# Patient Record
Sex: MALE | Race: WHITE | NOT HISPANIC OR LATINO
[De-identification: names, ages, dates, MRNs, and addresses within clinical notes are randomized per-mention and may not be internally consistent; named-entity substitution may affect disease eponyms.]

---

## 2016-01-14 RX ORDER — CARVEDILOL PHOSPHATE 80 MG/1
1 CAPSULE, EXTENDED RELEASE ORAL
Qty: 0 | Refills: 0 | DISCHARGE
Start: 2016-01-14

## 2016-01-15 RX ORDER — MYCOPHENOLATE MOFETIL 250 MG/1
500 CAPSULE ORAL
Qty: 0 | Refills: 0 | DISCHARGE
Start: 2016-01-15

## 2017-01-14 ENCOUNTER — RX RENEWAL (OUTPATIENT)
Age: 77
End: 2017-01-14

## 2017-01-16 ENCOUNTER — RX RENEWAL (OUTPATIENT)
Age: 77
End: 2017-01-16

## 2017-01-17 ENCOUNTER — LABORATORY RESULT (OUTPATIENT)
Age: 77
End: 2017-01-17

## 2017-01-17 ENCOUNTER — APPOINTMENT (OUTPATIENT)
Dept: NEPHROLOGY | Facility: CLINIC | Age: 77
End: 2017-01-17

## 2017-01-17 VITALS — DIASTOLIC BLOOD PRESSURE: 40 MMHG | SYSTOLIC BLOOD PRESSURE: 100 MMHG

## 2017-01-17 VITALS — BODY MASS INDEX: 31.94 KG/M2 | HEART RATE: 81 BPM | OXYGEN SATURATION: 98 % | WEIGHT: 207 LBS

## 2017-01-17 VITALS — DIASTOLIC BLOOD PRESSURE: 40 MMHG | SYSTOLIC BLOOD PRESSURE: 110 MMHG | HEART RATE: 72 BPM

## 2017-01-17 VITALS — HEART RATE: 72 BPM | DIASTOLIC BLOOD PRESSURE: 40 MMHG | SYSTOLIC BLOOD PRESSURE: 102 MMHG

## 2017-01-17 VITALS — DIASTOLIC BLOOD PRESSURE: 40 MMHG | SYSTOLIC BLOOD PRESSURE: 100 MMHG | HEART RATE: 72 BPM

## 2017-01-17 DIAGNOSIS — N40.0 BENIGN PROSTATIC HYPERPLASIA WITHOUT LOWER URINARY TRACT SYMPMS: ICD-10-CM

## 2017-01-17 DIAGNOSIS — R09.89 OTHER SPECIFIED SYMPTOMS AND SIGNS INVOLVING THE CIRCULATORY AND RESPIRATORY SYSTEMS: ICD-10-CM

## 2017-01-18 LAB
24R-OH-CALCIDIOL SERPL-MCNC: 48.2 PG/ML
25(OH)D3 SERPL-MCNC: 20.9 NG/ML
ALBUMIN SERPL ELPH-MCNC: 3.6 G/DL
ALP BLD-CCNC: 107 U/L
ALT SERPL-CCNC: 12 U/L
ANION GAP SERPL CALC-SCNC: 15 MMOL/L
AST SERPL-CCNC: 12 U/L
BASOPHILS # BLD AUTO: 0 K/UL
BASOPHILS NFR BLD AUTO: 0 %
BILIRUB SERPL-MCNC: 0.4 MG/DL
BUN SERPL-MCNC: 20 MG/DL
CALCIUM SERPL-MCNC: 10 MG/DL
CHLORIDE SERPL-SCNC: 104 MMOL/L
CHOLEST SERPL-MCNC: 140 MG/DL
CHOLEST/HDLC SERPL: 2.7 RATIO
CO2 SERPL-SCNC: 22 MMOL/L
CREAT SERPL-MCNC: 0.97 MG/DL
EOSINOPHIL # BLD AUTO: 0.22 K/UL
EOSINOPHIL NFR BLD AUTO: 2.9 %
ERYTHROCYTE [SEDIMENTATION RATE] IN BLOOD BY WESTERGREN METHOD: 12 MM/HR
GLUCOSE SERPL-MCNC: 189 MG/DL
HCT VFR BLD CALC: 41.8 %
HCYS SERPL-MCNC: 12.9 UMOL/L
HDLC SERPL-MCNC: 51 MG/DL
HGB BLD-MCNC: 12.4 G/DL
IMM GRANULOCYTES NFR BLD AUTO: 0.1 %
IRON SATN MFR SERPL: 31 %
IRON SERPL-MCNC: 65 UG/DL
LDLC SERPL CALC-MCNC: 61 MG/DL
LYMPHOCYTES # BLD AUTO: 1.64 K/UL
LYMPHOCYTES NFR BLD AUTO: 21.9 %
MAGNESIUM SERPL-MCNC: 2 MG/DL
MAN DIFF?: NORMAL
MCHC RBC-ENTMCNC: 27.4 PG
MCHC RBC-ENTMCNC: 29.7 GM/DL
MCV RBC AUTO: 92.5 FL
MONOCYTES # BLD AUTO: 0.6 K/UL
MONOCYTES NFR BLD AUTO: 8 %
NEUTROPHILS # BLD AUTO: 5.01 K/UL
NEUTROPHILS NFR BLD AUTO: 67.1 %
PHOSPHATE SERPL-MCNC: 2.4 MG/DL
PLATELET # BLD AUTO: 193 K/UL
POTASSIUM SERPL-SCNC: 4.7 MMOL/L
PROT SERPL-MCNC: 6.3 G/DL
RBC # BLD: 4.52 M/UL
RBC # FLD: 15 %
SODIUM SERPL-SCNC: 141 MMOL/L
TIBC SERPL-MCNC: 212 UG/DL
TRIGL SERPL-MCNC: 139 MG/DL
UIBC SERPL-MCNC: 147 UG/DL
URATE SERPL-MCNC: 4.8 MG/DL
WBC # FLD AUTO: 7.48 K/UL

## 2017-01-21 LAB
ALBUMIN MFR SERPL ELPH: 58.2 %
ALBUMIN SERPL-MCNC: 3.7 G/DL
ALBUMIN/GLOB SERPL: 1.4 RATIO
ALP BONE SERPL-MCNC: 21 MCG/L
ALPHA1 GLOB MFR SERPL ELPH: 4.4 %
ALPHA1 GLOB SERPL ELPH-MCNC: 0.3 G/DL
ALPHA2 GLOB MFR SERPL ELPH: 12.6 %
ALPHA2 GLOB SERPL ELPH-MCNC: 0.8 G/DL
B-GLOBULIN MFR SERPL ELPH: 12.1 %
B-GLOBULIN SERPL ELPH-MCNC: 0.8 G/DL
CALCIUM SERPL-MCNC: 10 MG/DL
COLLAGEN CTX SERPL-MCNC: 276 PG/ML
CYSTATIN C SERPL-MCNC: 1.48 MG/L
DEPRECATED KAPPA LC FREE/LAMBDA SER: 1.75 RATIO
FERRITIN SERPL-MCNC: 217.1 NG/ML
FOLATE SERPL-MCNC: 12.5 NG/ML
GAMMA GLOB FLD ELPH-MCNC: 0.8 G/DL
GAMMA GLOB MFR SERPL ELPH: 12.7 %
GFR/BSA.PRED SERPLBLD CYS-BASED-ARV: 43
HBA1C MFR BLD HPLC: 9.9 %
IGA SER QL IEP: 414 MG/DL
IGG SER QL IEP: 845 MG/DL
IGM SER QL IEP: 23 MG/DL
INTERPRETATION SERPL IEP-IMP: NORMAL
KAPPA LC CSF-MCNC: 2.59 MG/DL
KAPPA LC SERPL-MCNC: 4.53 MG/DL
M PROTEIN SPEC IFE-MCNC: NORMAL
METHYLMALONATE SERPL-SCNC: 0.42 NMOL/ML
PARATHYROID HORMONE INTACT: 84 PG/ML
PROT SERPL-MCNC: 6.3 G/DL
PROT SERPL-MCNC: 6.3 G/DL
T3FREE SERPL-MCNC: 2.51 PG/ML
T3RU NFR SERPL: 0.93 INDEX
T4 FREE SERPL-MCNC: 1.2 NG/DL
T4 SERPL-MCNC: 5.6 UG/DL
THYROGLOB AB SERPL-ACNC: <20 IU/ML
THYROPEROXIDASE AB SERPL IA-ACNC: 11.5 IU/ML
TSH SERPL-ACNC: 3.74 UIU/ML
VIT B12 SERPL-MCNC: 387 PG/ML

## 2017-02-10 ENCOUNTER — RX RENEWAL (OUTPATIENT)
Age: 77
End: 2017-02-10

## 2017-02-12 ENCOUNTER — RX RENEWAL (OUTPATIENT)
Age: 77
End: 2017-02-12

## 2017-02-13 ENCOUNTER — FORM ENCOUNTER (OUTPATIENT)
Age: 77
End: 2017-02-13

## 2017-02-14 ENCOUNTER — OUTPATIENT (OUTPATIENT)
Dept: OUTPATIENT SERVICES | Facility: HOSPITAL | Age: 77
LOS: 1 days | End: 2017-02-14
Payer: MEDICARE

## 2017-02-14 DIAGNOSIS — E11.9 TYPE 2 DIABETES MELLITUS WITHOUT COMPLICATIONS: ICD-10-CM

## 2017-02-14 DIAGNOSIS — I25.9 CHRONIC ISCHEMIC HEART DISEASE, UNSPECIFIED: ICD-10-CM

## 2017-02-14 DIAGNOSIS — I10 ESSENTIAL (PRIMARY) HYPERTENSION: ICD-10-CM

## 2017-02-14 DIAGNOSIS — Z94.0 KIDNEY TRANSPLANT STATUS: Chronic | ICD-10-CM

## 2017-02-14 PROCEDURE — 78452 HT MUSCLE IMAGE SPECT MULT: CPT | Mod: 26

## 2017-02-14 PROCEDURE — 93017 CV STRESS TEST TRACING ONLY: CPT

## 2017-02-14 PROCEDURE — A9500: CPT

## 2017-02-14 PROCEDURE — 93306 TTE W/DOPPLER COMPLETE: CPT

## 2017-02-14 PROCEDURE — 93018 CV STRESS TEST I&R ONLY: CPT

## 2017-02-14 PROCEDURE — 93016 CV STRESS TEST SUPVJ ONLY: CPT

## 2017-02-14 PROCEDURE — A9505: CPT

## 2017-02-14 PROCEDURE — 78452 HT MUSCLE IMAGE SPECT MULT: CPT

## 2017-03-05 ENCOUNTER — RX RENEWAL (OUTPATIENT)
Age: 77
End: 2017-03-05

## 2017-04-10 ENCOUNTER — RX RENEWAL (OUTPATIENT)
Age: 77
End: 2017-04-10

## 2017-04-19 ENCOUNTER — RX RENEWAL (OUTPATIENT)
Age: 77
End: 2017-04-19

## 2017-05-14 ENCOUNTER — RX RENEWAL (OUTPATIENT)
Age: 77
End: 2017-05-14

## 2017-05-15 ENCOUNTER — RX RENEWAL (OUTPATIENT)
Age: 77
End: 2017-05-15

## 2017-05-24 ENCOUNTER — APPOINTMENT (OUTPATIENT)
Dept: OPHTHALMOLOGY | Facility: CLINIC | Age: 77
End: 2017-05-24

## 2017-06-13 ENCOUNTER — RX RENEWAL (OUTPATIENT)
Age: 77
End: 2017-06-13

## 2017-06-19 ENCOUNTER — APPOINTMENT (OUTPATIENT)
Dept: NEPHROLOGY | Facility: CLINIC | Age: 77
End: 2017-06-19

## 2017-06-26 ENCOUNTER — LABORATORY RESULT (OUTPATIENT)
Age: 77
End: 2017-06-26

## 2017-06-27 ENCOUNTER — APPOINTMENT (OUTPATIENT)
Dept: NEPHROLOGY | Facility: CLINIC | Age: 77
End: 2017-06-27

## 2017-06-27 VITALS — SYSTOLIC BLOOD PRESSURE: 136 MMHG | DIASTOLIC BLOOD PRESSURE: 60 MMHG

## 2017-06-27 VITALS — DIASTOLIC BLOOD PRESSURE: 60 MMHG | HEART RATE: 72 BPM | SYSTOLIC BLOOD PRESSURE: 138 MMHG

## 2017-06-27 VITALS — HEART RATE: 85 BPM | OXYGEN SATURATION: 96 %

## 2017-06-28 LAB
24R-OH-CALCIDIOL SERPL-MCNC: 35.9 PG/ML
25(OH)D3 SERPL-MCNC: 24.5 NG/ML
ALBUMIN SERPL ELPH-MCNC: 4 G/DL
ALP BLD-CCNC: 89 U/L
ALT SERPL-CCNC: 14 U/L
ANION GAP SERPL CALC-SCNC: 18 MMOL/L
APPEARANCE: CLEAR
AST SERPL-CCNC: 14 U/L
BACTERIA: NEGATIVE
BASOPHILS # BLD AUTO: 0 K/UL
BASOPHILS NFR BLD AUTO: 0 %
BILIRUB SERPL-MCNC: 0.3 MG/DL
BILIRUBIN URINE: NEGATIVE
BLOOD URINE: NEGATIVE
BUN SERPL-MCNC: 32 MG/DL
CALCIUM OXALATE CRYSTALS: ABNORMAL
CALCIUM SERPL-MCNC: 10.8 MG/DL
CALCIUM SERPL-MCNC: 10.8 MG/DL
CHLORIDE SERPL-SCNC: 104 MMOL/L
CHOLEST SERPL-MCNC: 129 MG/DL
CHOLEST/HDLC SERPL: 2.3 RATIO
CK SERPL-CCNC: 18 U/L
CO2 SERPL-SCNC: 21 MMOL/L
COLOR: YELLOW
CREAT SERPL-MCNC: 1.17 MG/DL
CREAT SPEC-SCNC: 68 MG/DL
CREAT/PROT UR: 0.1 RATIO
CRP SERPL-MCNC: <0.2 MG/DL
EOSINOPHIL # BLD AUTO: 0.11 K/UL
EOSINOPHIL NFR BLD AUTO: 1.6 %
ERYTHROCYTE [SEDIMENTATION RATE] IN BLOOD BY WESTERGREN METHOD: 15 MM/HR
FERRITIN SERPL-MCNC: 231 NG/ML
FOLATE SERPL-MCNC: 15.1 NG/ML
GLUCOSE QUALITATIVE U: 1000 MG/DL
GLUCOSE SERPL-MCNC: 316 MG/DL
HCT VFR BLD CALC: 41.9 %
HCYS SERPL-MCNC: 18 UMOL/L
HDLC SERPL-MCNC: 55 MG/DL
HGB BLD-MCNC: 12.8 G/DL
IMM GRANULOCYTES NFR BLD AUTO: 0.1 %
IRON SATN MFR SERPL: 24 %
IRON SERPL-MCNC: 51 UG/DL
KETONES URINE: NEGATIVE
LDLC SERPL CALC-MCNC: 52 MG/DL
LEUKOCYTE ESTERASE URINE: NEGATIVE
LYMPHOCYTES # BLD AUTO: 1.7 K/UL
LYMPHOCYTES NFR BLD AUTO: 24.3 %
MAGNESIUM SERPL-MCNC: 2.1 MG/DL
MAN DIFF?: NORMAL
MCHC RBC-ENTMCNC: 27.5 PG
MCHC RBC-ENTMCNC: 30.5 GM/DL
MCV RBC AUTO: 89.9 FL
MICROSCOPIC-UA: NORMAL
MONOCYTES # BLD AUTO: 0.35 K/UL
MONOCYTES NFR BLD AUTO: 5 %
NEUTROPHILS # BLD AUTO: 4.84 K/UL
NEUTROPHILS NFR BLD AUTO: 69 %
NITRITE URINE: NEGATIVE
PARATHYROID HORMONE INTACT: 90 PG/ML
PH URINE: 5
PLATELET # BLD AUTO: 217 K/UL
POTASSIUM SERPL-SCNC: 5.6 MMOL/L
PROT SERPL-MCNC: 7.1 G/DL
PROT UR-MCNC: 7 MG/DL
PROTEIN URINE: NEGATIVE MG/DL
RBC # BLD: 4.66 M/UL
RBC # FLD: 15.2 %
RED BLOOD CELLS URINE: 1 /HPF
SODIUM SERPL-SCNC: 143 MMOL/L
SPECIFIC GRAVITY URINE: 1.02
SQUAMOUS EPITHELIAL CELLS: 1 /HPF
T3FREE SERPL-MCNC: 2.22 PG/ML
T3RU NFR SERPL: 0.92 INDEX
T4 FREE SERPL-MCNC: 1.2 NG/DL
T4 SERPL-MCNC: 6.4 UG/DL
TIBC SERPL-MCNC: 211 UG/DL
TRIGL SERPL-MCNC: 111 MG/DL
TSH SERPL-ACNC: 3.53 UIU/ML
UIBC SERPL-MCNC: 160 UG/DL
URATE SERPL-MCNC: 5.4 MG/DL
UROBILINOGEN URINE: NORMAL MG/DL
VIT B12 SERPL-MCNC: 454 PG/ML
WBC # FLD AUTO: 7.01 K/UL
WHITE BLOOD CELLS URINE: 0 /HPF

## 2017-07-02 LAB
ALDOSTERONE SERUM: 16.1 NG/DL
ALP BONE SERPL-MCNC: 17 MCG/L
COLLAGEN CTX SERPL-MCNC: 196 PG/ML
CYSTATIN C SERPL-MCNC: 1.57 MG/L
GFR/BSA.PRED SERPLBLD CYS-BASED-ARV: 40
HBA1C MFR BLD HPLC: 9.3 %
METHYLMALONATE SERPL-SCNC: 1123 NMOL/L
PHOSPHATE SERPL-MCNC: 3.3 MG/DL
THYROGLOB AB SERPL-ACNC: <20 IU/ML
THYROPEROXIDASE AB SERPL IA-ACNC: 10.8 IU/ML

## 2017-07-03 LAB
ALBUMIN MFR SERPL ELPH: 57.6 %
ALBUMIN SERPL-MCNC: 4.1 G/DL
ALBUMIN/GLOB SERPL: 1.4 RATIO
ALPHA1 GLOB MFR SERPL ELPH: 4.1 %
ALPHA1 GLOB SERPL ELPH-MCNC: 0.3 G/DL
ALPHA2 GLOB MFR SERPL ELPH: 12.5 %
ALPHA2 GLOB SERPL ELPH-MCNC: 0.9 G/DL
B-GLOBULIN MFR SERPL ELPH: 12.5 %
B-GLOBULIN SERPL ELPH-MCNC: 0.9 G/DL
DEPRECATED KAPPA LC FREE/LAMBDA SER: 2.1 RATIO
GAMMA GLOB FLD ELPH-MCNC: 0.9 G/DL
GAMMA GLOB MFR SERPL ELPH: 13.3 %
IGA SER QL IEP: 426 MG/DL
IGG SER QL IEP: 918 MG/DL
IGM SER QL IEP: 29 MG/DL
INTERPRETATION SERPL IEP-IMP: NORMAL
KAPPA LC CSF-MCNC: 2.52 MG/DL
KAPPA LC SERPL-MCNC: 5.3 MG/DL
M PROTEIN SPEC IFE-MCNC: NORMAL
PROT SERPL-MCNC: 7.1 G/DL
PROT SERPL-MCNC: 7.1 G/DL

## 2017-07-10 ENCOUNTER — RX RENEWAL (OUTPATIENT)
Age: 77
End: 2017-07-10

## 2017-07-13 ENCOUNTER — RX RENEWAL (OUTPATIENT)
Age: 77
End: 2017-07-13

## 2017-07-27 ENCOUNTER — LABORATORY RESULT (OUTPATIENT)
Age: 77
End: 2017-07-27

## 2017-07-27 ENCOUNTER — APPOINTMENT (OUTPATIENT)
Dept: NEPHROLOGY | Facility: CLINIC | Age: 77
End: 2017-07-27
Payer: MEDICARE

## 2017-07-27 VITALS — DIASTOLIC BLOOD PRESSURE: 46 MMHG | SYSTOLIC BLOOD PRESSURE: 116 MMHG

## 2017-07-27 VITALS — SYSTOLIC BLOOD PRESSURE: 110 MMHG | DIASTOLIC BLOOD PRESSURE: 60 MMHG | HEART RATE: 72 BPM

## 2017-07-27 VITALS — DIASTOLIC BLOOD PRESSURE: 70 MMHG | HEART RATE: 72 BPM | SYSTOLIC BLOOD PRESSURE: 120 MMHG

## 2017-07-27 VITALS — HEART RATE: 72 BPM

## 2017-07-27 VITALS — WEIGHT: 204 LBS | BODY MASS INDEX: 31.48 KG/M2 | OXYGEN SATURATION: 97 % | HEART RATE: 75 BPM

## 2017-07-27 PROCEDURE — 36415 COLL VENOUS BLD VENIPUNCTURE: CPT

## 2017-07-27 PROCEDURE — 99214 OFFICE O/P EST MOD 30 MIN: CPT | Mod: 25

## 2017-07-30 LAB
24R-OH-CALCIDIOL SERPL-MCNC: 49.4 PG/ML
25(OH)D3 SERPL-MCNC: 23.1 NG/ML
ALBUMIN MFR SERPL ELPH: 57.7 %
ALBUMIN SERPL ELPH-MCNC: 4.1 G/DL
ALBUMIN SERPL-MCNC: 3.9 G/DL
ALBUMIN/GLOB SERPL: 1.3 RATIO
ALDOSTERONE SERUM: 11.8 NG/DL
ALP BLD-CCNC: 89 U/L
ALP BONE SERPL-MCNC: 17 MCG/L
ALPHA1 GLOB MFR SERPL ELPH: 4.3 %
ALPHA1 GLOB SERPL ELPH-MCNC: 0.3 G/DL
ALPHA2 GLOB MFR SERPL ELPH: 11.9 %
ALPHA2 GLOB SERPL ELPH-MCNC: 0.8 G/DL
ALT SERPL-CCNC: 16 U/L
ANA SER IF-ACNC: NEGATIVE
ANION GAP SERPL CALC-SCNC: 14 MMOL/L
AST SERPL-CCNC: 14 U/L
B-GLOBULIN MFR SERPL ELPH: 13.1 %
B-GLOBULIN SERPL ELPH-MCNC: 0.9 G/DL
BASOPHILS # BLD AUTO: 0 K/UL
BASOPHILS NFR BLD AUTO: 0 %
BILIRUB SERPL-MCNC: 0.3 MG/DL
BUN SERPL-MCNC: 22 MG/DL
C3 SERPL-MCNC: 116 MG/DL
C4 SERPL-MCNC: 25 MG/DL
CALCIUM SERPL-MCNC: 10.4 MG/DL
CALCIUM SERPL-MCNC: 10.4 MG/DL
CHLORIDE SERPL-SCNC: 104 MMOL/L
CHOLEST SERPL-MCNC: 146 MG/DL
CHOLEST/HDLC SERPL: 2.3 RATIO
CO2 SERPL-SCNC: 24 MMOL/L
CREAT SERPL-MCNC: 1.11 MG/DL
DEPRECATED KAPPA LC FREE/LAMBDA SER: 1.98 RATIO
EOSINOPHIL # BLD AUTO: 0.13 K/UL
EOSINOPHIL NFR BLD AUTO: 1.7 %
ERYTHROCYTE [SEDIMENTATION RATE] IN BLOOD BY WESTERGREN METHOD: 15 MM/HR
FERRITIN SERPL-MCNC: 201 NG/ML
FOLATE SERPL-MCNC: 17.1 NG/ML
GAMMA GLOB FLD ELPH-MCNC: 0.9 G/DL
GAMMA GLOB MFR SERPL ELPH: 13 %
GLUCOSE SERPL-MCNC: 97 MG/DL
HBA1C MFR BLD HPLC: 9.4 %
HBV SURFACE AB SER QL: NONREACTIVE
HBV SURFACE AG SER QL: NONREACTIVE
HCT VFR BLD CALC: 41.9 %
HCV AB SER QL: NONREACTIVE
HCV S/CO RATIO: 0.12 S/CO
HCYS SERPL-MCNC: 16.5 UMOL/L
HDLC SERPL-MCNC: 64 MG/DL
HGB BLD-MCNC: 12.9 G/DL
IGA SER QL IEP: 453 MG/DL
IGG SER QL IEP: 947 MG/DL
IGM SER QL IEP: 26 MG/DL
IMM GRANULOCYTES NFR BLD AUTO: 0.1 %
INTERPRETATION SERPL IEP-IMP: NORMAL
IRON SATN MFR SERPL: 22 %
IRON SERPL-MCNC: 46 UG/DL
KAPPA LC CSF-MCNC: 2.94 MG/DL
KAPPA LC SERPL-MCNC: 5.81 MG/DL
LDLC SERPL CALC-MCNC: 69 MG/DL
LYMPHOCYTES # BLD AUTO: 1.55 K/UL
LYMPHOCYTES NFR BLD AUTO: 19.7 %
M PROTEIN SPEC IFE-MCNC: NORMAL
MAGNESIUM SERPL-MCNC: 1.9 MG/DL
MAN DIFF?: NORMAL
MCHC RBC-ENTMCNC: 27.9 PG
MCHC RBC-ENTMCNC: 30.8 GM/DL
MCV RBC AUTO: 90.5 FL
MONOCYTES # BLD AUTO: 0.4 K/UL
MONOCYTES NFR BLD AUTO: 5.1 %
MPO AB + PR3 PNL SER: NORMAL
NEUTROPHILS # BLD AUTO: 5.76 K/UL
NEUTROPHILS NFR BLD AUTO: 73.4 %
NT-PROBNP SERPL-MCNC: 484 PG/ML
PARATHYROID HORMONE INTACT: 89 PG/ML
PHOSPHATE SERPL-MCNC: 2.9 MG/DL
PLATELET # BLD AUTO: 218 K/UL
POTASSIUM SERPL-SCNC: 5.3 MMOL/L
PROT SERPL-MCNC: 6.8 G/DL
RBC # BLD: 4.63 M/UL
RBC # FLD: 16 %
RENIN PLASMA: 8.9 PG/ML
RHEUMATOID FACT SER QL: 8 IU/ML
SODIUM SERPL-SCNC: 142 MMOL/L
T3FREE SERPL-MCNC: 2.37 PG/ML
T3RU NFR SERPL: 0.92 INDEX
T4 FREE SERPL-MCNC: 1.3 NG/DL
T4 SERPL-MCNC: 6.7 UG/DL
THYROGLOB AB SERPL-ACNC: <20 IU/ML
THYROPEROXIDASE AB SERPL IA-ACNC: <10 IU/ML
TIBC SERPL-MCNC: 209 UG/DL
TRIGL SERPL-MCNC: 63 MG/DL
TSH SERPL-ACNC: 3.94 UIU/ML
UIBC SERPL-MCNC: 163 UG/DL
URATE SERPL-MCNC: 5.5 MG/DL
VIT B12 SERPL-MCNC: 456 PG/ML
WBC # FLD AUTO: 7.85 K/UL

## 2017-07-31 LAB — COLLAGEN CTX SERPL-MCNC: 235 PG/ML

## 2017-08-06 LAB — METHYLMALONATE SERPL-SCNC: 607 NMOL/L

## 2017-08-14 ENCOUNTER — RX RENEWAL (OUTPATIENT)
Age: 77
End: 2017-08-14

## 2017-08-19 ENCOUNTER — RX RENEWAL (OUTPATIENT)
Age: 77
End: 2017-08-19

## 2017-08-31 ENCOUNTER — RX RENEWAL (OUTPATIENT)
Age: 77
End: 2017-08-31

## 2017-09-03 ENCOUNTER — RX RENEWAL (OUTPATIENT)
Age: 77
End: 2017-09-03

## 2017-09-19 ENCOUNTER — APPOINTMENT (OUTPATIENT)
Dept: NEPHROLOGY | Facility: CLINIC | Age: 77
End: 2017-09-19

## 2017-09-27 ENCOUNTER — RX RENEWAL (OUTPATIENT)
Age: 77
End: 2017-09-27

## 2017-10-10 ENCOUNTER — APPOINTMENT (OUTPATIENT)
Dept: NEPHROLOGY | Facility: CLINIC | Age: 77
End: 2017-10-10
Payer: MEDICARE

## 2017-10-10 ENCOUNTER — LABORATORY RESULT (OUTPATIENT)
Age: 77
End: 2017-10-10

## 2017-10-10 VITALS — DIASTOLIC BLOOD PRESSURE: 62 MMHG | HEART RATE: 72 BPM | SYSTOLIC BLOOD PRESSURE: 140 MMHG

## 2017-10-10 VITALS — DIASTOLIC BLOOD PRESSURE: 80 MMHG | SYSTOLIC BLOOD PRESSURE: 140 MMHG

## 2017-10-10 VITALS — SYSTOLIC BLOOD PRESSURE: 140 MMHG | DIASTOLIC BLOOD PRESSURE: 60 MMHG

## 2017-10-10 VITALS — SYSTOLIC BLOOD PRESSURE: 118 MMHG | DIASTOLIC BLOOD PRESSURE: 60 MMHG

## 2017-10-10 PROCEDURE — 90662 IIV NO PRSV INCREASED AG IM: CPT

## 2017-10-10 PROCEDURE — 99215 OFFICE O/P EST HI 40 MIN: CPT | Mod: 25

## 2017-10-10 PROCEDURE — G0008: CPT

## 2017-10-10 PROCEDURE — 36415 COLL VENOUS BLD VENIPUNCTURE: CPT

## 2017-10-15 LAB
24R-OH-CALCIDIOL SERPL-MCNC: 34.7 PG/ML
25(OH)D3 SERPL-MCNC: 19 NG/ML
ALBUMIN MFR SERPL ELPH: 58.9 %
ALBUMIN SERPL ELPH-MCNC: 3.8 G/DL
ALBUMIN SERPL-MCNC: 3.9 G/DL
ALBUMIN/GLOB SERPL: 1.4 RATIO
ALDOSTERONE SERUM: 6.3 NG/DL
ALP BLD-CCNC: 73 U/L
ALP BONE SERPL-MCNC: 14 MCG/L
ALPHA1 GLOB MFR SERPL ELPH: 4.2 %
ALPHA1 GLOB SERPL ELPH-MCNC: 0.3 G/DL
ALPHA2 GLOB MFR SERPL ELPH: 11.5 %
ALPHA2 GLOB SERPL ELPH-MCNC: 0.8 G/DL
ALT SERPL-CCNC: 16 U/L
ANA SER IF-ACNC: NEGATIVE
ANION GAP SERPL CALC-SCNC: 10 MMOL/L
AST SERPL-CCNC: 18 U/L
B-GLOBULIN MFR SERPL ELPH: 12.6 %
B-GLOBULIN SERPL ELPH-MCNC: 0.8 G/DL
BASOPHILS # BLD AUTO: 0 K/UL
BASOPHILS NFR BLD AUTO: 0 %
BILIRUB SERPL-MCNC: 0.3 MG/DL
BUN SERPL-MCNC: 24 MG/DL
C3 SERPL-MCNC: 83 MG/DL
C4 SERPL-MCNC: 21 MG/DL
CALCIUM SERPL-MCNC: 10.1 MG/DL
CALCIUM SERPL-MCNC: 10.1 MG/DL
CHLORIDE SERPL-SCNC: 101 MMOL/L
CHOLEST SERPL-MCNC: 142 MG/DL
CHOLEST/HDLC SERPL: 2.5 RATIO
CO2 SERPL-SCNC: 24 MMOL/L
COLLAGEN CTX SERPL-MCNC: 182 PG/ML
CREAT SERPL-MCNC: 1.13 MG/DL
DEPRECATED KAPPA LC FREE/LAMBDA SER: 1.64 RATIO
EOSINOPHIL # BLD AUTO: 0.07 K/UL
EOSINOPHIL NFR BLD AUTO: 1.1 %
FERRITIN SERPL-MCNC: 190 NG/ML
FOLATE SERPL-MCNC: 11.8 NG/ML
GAMMA GLOB FLD ELPH-MCNC: 0.8 G/DL
GAMMA GLOB MFR SERPL ELPH: 12.8 %
GLUCOSE SERPL-MCNC: 276 MG/DL
HBA1C MFR BLD HPLC: 9.6 %
HBV SURFACE AB SER QL: NONREACTIVE
HBV SURFACE AG SER QL: NONREACTIVE
HCT VFR BLD CALC: 40.8 %
HCV AB SER QL: NONREACTIVE
HCV S/CO RATIO: 0.12 S/CO
HCYS SERPL-MCNC: 13.2 UMOL/L
HDLC SERPL-MCNC: 57 MG/DL
HGB BLD-MCNC: 12.5 G/DL
IGA SER QL IEP: 431 MG/DL
IGG SER QL IEP: 927 MG/DL
IGM SER QL IEP: 19 MG/DL
IMM GRANULOCYTES NFR BLD AUTO: 0.2 %
INTERPRETATION SERPL IEP-IMP: NORMAL
IRON SATN MFR SERPL: 21 %
IRON SERPL-MCNC: 48 UG/DL
KAPPA LC CSF-MCNC: 2.47 MG/DL
KAPPA LC SERPL-MCNC: 4.04 MG/DL
LDLC SERPL CALC-MCNC: 63 MG/DL
LYMPHOCYTES # BLD AUTO: 1.51 K/UL
LYMPHOCYTES NFR BLD AUTO: 23 %
M PROTEIN SPEC IFE-MCNC: NORMAL
MAGNESIUM SERPL-MCNC: 2 MG/DL
MAN DIFF?: NORMAL
MCHC RBC-ENTMCNC: 27.8 PG
MCHC RBC-ENTMCNC: 30.6 GM/DL
MCV RBC AUTO: 90.7 FL
METHYLMALONATE SERPL-SCNC: 408 NMOL/L
MONOCYTES # BLD AUTO: 0.33 K/UL
MONOCYTES NFR BLD AUTO: 5 %
MPO AB + PR3 PNL SER: NORMAL
NEUTROPHILS # BLD AUTO: 4.65 K/UL
NEUTROPHILS NFR BLD AUTO: 70.7 %
PARATHYROID HORMONE INTACT: 87 PG/ML
PHOSPHATE SERPL-MCNC: 2.8 MG/DL
PLATELET # BLD AUTO: 204 K/UL
POTASSIUM SERPL-SCNC: 5.3 MMOL/L
PROT SERPL-MCNC: 6.6 G/DL
RBC # BLD: 4.5 M/UL
RBC # FLD: 14.9 %
RENIN PLASMA: 14.2 PG/ML
RHEUMATOID FACT SER QL: <7 IU/ML
SODIUM SERPL-SCNC: 135 MMOL/L
T3FREE SERPL-MCNC: 2.22 PG/ML
T3RU NFR SERPL: 0.94 INDEX
T4 FREE SERPL-MCNC: 1.3 NG/DL
T4 SERPL-MCNC: 6.5 UG/DL
THYROGLOB AB SERPL-ACNC: <20 IU/ML
THYROPEROXIDASE AB SERPL IA-ACNC: <10 IU/ML
TIBC SERPL-MCNC: 228 UG/DL
TRIGL SERPL-MCNC: 110 MG/DL
TSH SERPL-ACNC: 2.88 UIU/ML
UIBC SERPL-MCNC: 180 UG/DL
URATE SERPL-MCNC: 4.9 MG/DL
VIT B12 SERPL-MCNC: 450 PG/ML
WBC # FLD AUTO: 6.57 K/UL

## 2017-10-22 ENCOUNTER — RX RENEWAL (OUTPATIENT)
Age: 77
End: 2017-10-22

## 2017-10-25 ENCOUNTER — APPOINTMENT (OUTPATIENT)
Dept: OPHTHALMOLOGY | Facility: CLINIC | Age: 77
End: 2017-10-25

## 2017-10-29 ENCOUNTER — RX RENEWAL (OUTPATIENT)
Age: 77
End: 2017-10-29

## 2017-11-24 ENCOUNTER — RX RENEWAL (OUTPATIENT)
Age: 77
End: 2017-11-24

## 2017-12-25 ENCOUNTER — RX RENEWAL (OUTPATIENT)
Age: 77
End: 2017-12-25

## 2018-01-23 ENCOUNTER — LABORATORY RESULT (OUTPATIENT)
Age: 78
End: 2018-01-23

## 2018-01-23 ENCOUNTER — APPOINTMENT (OUTPATIENT)
Dept: NEPHROLOGY | Facility: CLINIC | Age: 78
End: 2018-01-23
Payer: MEDICARE

## 2018-01-23 VITALS — OXYGEN SATURATION: 97 % | BODY MASS INDEX: 31.94 KG/M2 | WEIGHT: 207 LBS | HEART RATE: 67 BPM

## 2018-01-23 VITALS — HEART RATE: 84 BPM | DIASTOLIC BLOOD PRESSURE: 80 MMHG | SYSTOLIC BLOOD PRESSURE: 160 MMHG

## 2018-01-23 VITALS — HEART RATE: 84 BPM | SYSTOLIC BLOOD PRESSURE: 136 MMHG | DIASTOLIC BLOOD PRESSURE: 60 MMHG

## 2018-01-23 VITALS — HEART RATE: 72 BPM | SYSTOLIC BLOOD PRESSURE: 132 MMHG | DIASTOLIC BLOOD PRESSURE: 60 MMHG

## 2018-01-23 VITALS — DIASTOLIC BLOOD PRESSURE: 72 MMHG | SYSTOLIC BLOOD PRESSURE: 140 MMHG

## 2018-01-23 PROCEDURE — 36415 COLL VENOUS BLD VENIPUNCTURE: CPT

## 2018-01-23 PROCEDURE — 99214 OFFICE O/P EST MOD 30 MIN: CPT | Mod: 25

## 2018-01-24 LAB
24R-OH-CALCIDIOL SERPL-MCNC: 54.2 PG/ML
25(OH)D3 SERPL-MCNC: 24.5 NG/ML
CALCIUM SERPL-MCNC: 10.2 MG/DL
FERRITIN SERPL-MCNC: 200 NG/ML
FOLATE SERPL-MCNC: 14.5 NG/ML
HBV SURFACE AB SER QL: NONREACTIVE
HBV SURFACE AG SER QL: NONREACTIVE
HCV AB SER QL: NONREACTIVE
HCV S/CO RATIO: 0.09 S/CO
HCYS SERPL-MCNC: 18.1 UMOL/L
PARATHYROID HORMONE INTACT: 75 PG/ML
PSA FREE FLD-MCNC: 40.4
PSA FREE SERPL-MCNC: 0.57 NG/ML
PSA SERPL-MCNC: 1.49 NG/ML
T3FREE SERPL-MCNC: 2.58 PG/ML
T3RU NFR SERPL: 0.97 INDEX
T4 FREE SERPL-MCNC: 1.3 NG/DL
T4 SERPL-MCNC: 7.2 UG/DL
TSH SERPL-ACNC: 4.16 UIU/ML
VIT B12 SERPL-MCNC: 461 PG/ML

## 2018-01-25 LAB
ALBUMIN MFR SERPL ELPH: 59.4 %
ALBUMIN SERPL ELPH-MCNC: 4.3 G/DL
ALBUMIN SERPL-MCNC: 4.6 G/DL
ALBUMIN/GLOB SERPL: 1.5 RATIO
ALDOSTERONE SERUM: 9.9 NG/DL
ALP BLD-CCNC: 82 U/L
ALP BONE SERPL-MCNC: 16 MCG/L
ALPHA1 GLOB MFR SERPL ELPH: 4.2 %
ALPHA1 GLOB SERPL ELPH-MCNC: 0.3 G/DL
ALPHA2 GLOB MFR SERPL ELPH: 11.5 %
ALPHA2 GLOB SERPL ELPH-MCNC: 0.9 G/DL
ALT SERPL-CCNC: 16 U/L
ANA SER IF-ACNC: NEGATIVE
ANION GAP SERPL CALC-SCNC: 15 MMOL/L
AST SERPL-CCNC: 19 U/L
B-GLOBULIN MFR SERPL ELPH: 12.7 %
B-GLOBULIN SERPL ELPH-MCNC: 1 G/DL
BASOPHILS # BLD AUTO: 0 K/UL
BASOPHILS NFR BLD AUTO: 0 %
BILIRUB SERPL-MCNC: 0.4 MG/DL
BUN SERPL-MCNC: 20 MG/DL
C3 SERPL-MCNC: 99 MG/DL
C4 SERPL-MCNC: 24 MG/DL
CALCIUM SERPL-MCNC: 10.2 MG/DL
CHLORIDE SERPL-SCNC: 101 MMOL/L
CHOLEST SERPL-MCNC: 152 MG/DL
CHOLEST/HDLC SERPL: 2.4 RATIO
CO2 SERPL-SCNC: 25 MMOL/L
COLLAGEN CTX SERPL-MCNC: 222 PG/ML
CREAT SERPL-MCNC: 1.1 MG/DL
CRP SERPL-MCNC: <0.2 MG/DL
DEPRECATED KAPPA LC FREE/LAMBDA SER: 1.5 RATIO
EOSINOPHIL # BLD AUTO: 0.16 K/UL
EOSINOPHIL NFR BLD AUTO: 2.3 %
ERYTHROCYTE [SEDIMENTATION RATE] IN BLOOD BY WESTERGREN METHOD: 6 MM/HR
GAMMA GLOB FLD ELPH-MCNC: 0.9 G/DL
GAMMA GLOB MFR SERPL ELPH: 12.2 %
GLUCOSE SERPL-MCNC: 132 MG/DL
HBA1C MFR BLD HPLC: 9.5 %
HCT VFR BLD CALC: 44.6 %
HDLC SERPL-MCNC: 64 MG/DL
HGB BLD-MCNC: 13.3 G/DL
IGA SER QL IEP: 461 MG/DL
IGG SER QL IEP: 957 MG/DL
IGM SER QL IEP: 22 MG/DL
IMM GRANULOCYTES NFR BLD AUTO: 0.3 %
INTERPRETATION SERPL IEP-IMP: NORMAL
IRON SATN MFR SERPL: 29 %
IRON SERPL-MCNC: 64 UG/DL
KAPPA LC CSF-MCNC: 2.54 MG/DL
KAPPA LC SERPL-MCNC: 3.81 MG/DL
LDLC SERPL CALC-MCNC: 73 MG/DL
LYMPHOCYTES # BLD AUTO: 1.68 K/UL
LYMPHOCYTES NFR BLD AUTO: 24 %
M PROTEIN SPEC IFE-MCNC: NORMAL
MAGNESIUM SERPL-MCNC: 1.9 MG/DL
MAN DIFF?: NORMAL
MCHC RBC-ENTMCNC: 27.7 PG
MCHC RBC-ENTMCNC: 29.8 GM/DL
MCV RBC AUTO: 92.7 FL
MONOCYTES # BLD AUTO: 0.39 K/UL
MONOCYTES NFR BLD AUTO: 5.6 %
MPO AB + PR3 PNL SER: NORMAL
NEUTROPHILS # BLD AUTO: 4.75 K/UL
NEUTROPHILS NFR BLD AUTO: 67.8 %
PHOSPHATE SERPL-MCNC: 3.1 MG/DL
PLATELET # BLD AUTO: 207 K/UL
POTASSIUM SERPL-SCNC: 5 MMOL/L
PROT SERPL-MCNC: 7.7 G/DL
RBC # BLD: 4.81 M/UL
RBC # FLD: 15.4 %
RHEUMATOID FACT SER QL: <7 IU/ML
SODIUM SERPL-SCNC: 141 MMOL/L
THYROGLOB AB SERPL-ACNC: <20 IU/ML
THYROPEROXIDASE AB SERPL IA-ACNC: 14.5 IU/ML
TIBC SERPL-MCNC: 219 UG/DL
TRIGL SERPL-MCNC: 76 MG/DL
UIBC SERPL-MCNC: 155 UG/DL
URATE SERPL-MCNC: 4.2 MG/DL
WBC # FLD AUTO: 7 K/UL

## 2018-01-28 LAB — METHYLMALONATE SERPL-SCNC: 596 NMOL/L

## 2018-01-29 ENCOUNTER — RX RENEWAL (OUTPATIENT)
Age: 78
End: 2018-01-29

## 2018-02-22 ENCOUNTER — RECORD ABSTRACTING (OUTPATIENT)
Age: 78
End: 2018-02-22

## 2018-02-25 ENCOUNTER — RX RENEWAL (OUTPATIENT)
Age: 78
End: 2018-02-25

## 2018-03-08 ENCOUNTER — APPOINTMENT (OUTPATIENT)
Dept: OPHTHALMOLOGY | Facility: CLINIC | Age: 78
End: 2018-03-08
Payer: MEDICARE

## 2018-03-08 PROCEDURE — 92226: CPT | Mod: LT

## 2018-03-08 PROCEDURE — 92250 FUNDUS PHOTOGRAPHY W/I&R: CPT

## 2018-03-08 PROCEDURE — 92020 GONIOSCOPY: CPT

## 2018-03-08 PROCEDURE — 92014 COMPRE OPH EXAM EST PT 1/>: CPT

## 2018-03-22 ENCOUNTER — APPOINTMENT (OUTPATIENT)
Dept: OPHTHALMOLOGY | Facility: CLINIC | Age: 78
End: 2018-03-22
Payer: MEDICARE

## 2018-03-22 PROCEDURE — 92012 INTRM OPH EXAM EST PATIENT: CPT

## 2018-04-03 ENCOUNTER — RX RENEWAL (OUTPATIENT)
Age: 78
End: 2018-04-03

## 2018-04-17 ENCOUNTER — APPOINTMENT (OUTPATIENT)
Dept: OPHTHALMOLOGY | Facility: CLINIC | Age: 78
End: 2018-04-17
Payer: MEDICARE

## 2018-04-17 PROCEDURE — 92012 INTRM OPH EXAM EST PATIENT: CPT

## 2018-05-02 ENCOUNTER — FORM ENCOUNTER (OUTPATIENT)
Age: 78
End: 2018-05-02

## 2018-05-03 ENCOUNTER — LABORATORY RESULT (OUTPATIENT)
Age: 78
End: 2018-05-03

## 2018-05-03 ENCOUNTER — APPOINTMENT (OUTPATIENT)
Dept: NEPHROLOGY | Facility: CLINIC | Age: 78
End: 2018-05-03
Payer: MEDICARE

## 2018-05-03 ENCOUNTER — OUTPATIENT (OUTPATIENT)
Dept: OUTPATIENT SERVICES | Facility: HOSPITAL | Age: 78
LOS: 1 days | End: 2018-05-03
Payer: MEDICARE

## 2018-05-03 VITALS — TEMPERATURE: 98.5 F | HEART RATE: 70 BPM | OXYGEN SATURATION: 97 %

## 2018-05-03 VITALS — WEIGHT: 206 LBS | BODY MASS INDEX: 31.79 KG/M2

## 2018-05-03 VITALS — DIASTOLIC BLOOD PRESSURE: 60 MMHG | SYSTOLIC BLOOD PRESSURE: 110 MMHG | HEART RATE: 72 BPM

## 2018-05-03 VITALS — DIASTOLIC BLOOD PRESSURE: 60 MMHG | HEART RATE: 72 BPM | SYSTOLIC BLOOD PRESSURE: 120 MMHG

## 2018-05-03 VITALS — DIASTOLIC BLOOD PRESSURE: 60 MMHG | SYSTOLIC BLOOD PRESSURE: 108 MMHG

## 2018-05-03 VITALS — SYSTOLIC BLOOD PRESSURE: 110 MMHG | DIASTOLIC BLOOD PRESSURE: 60 MMHG | HEART RATE: 72 BPM

## 2018-05-03 DIAGNOSIS — Z94.0 KIDNEY TRANSPLANT STATUS: Chronic | ICD-10-CM

## 2018-05-03 PROCEDURE — 70486 CT MAXILLOFACIAL W/O DYE: CPT | Mod: 26

## 2018-05-03 PROCEDURE — 71250 CT THORAX DX C-: CPT | Mod: 26

## 2018-05-03 PROCEDURE — 93000 ELECTROCARDIOGRAM COMPLETE: CPT

## 2018-05-03 PROCEDURE — 99214 OFFICE O/P EST MOD 30 MIN: CPT | Mod: 25

## 2018-05-03 PROCEDURE — 36415 COLL VENOUS BLD VENIPUNCTURE: CPT

## 2018-05-06 ENCOUNTER — INPATIENT (INPATIENT)
Facility: HOSPITAL | Age: 78
LOS: 2 days | Discharge: HOME | DRG: 178 | End: 2018-05-09
Attending: INTERNAL MEDICINE | Admitting: INTERNAL MEDICINE
Payer: MEDICARE

## 2018-05-06 VITALS
RESPIRATION RATE: 17 BRPM | SYSTOLIC BLOOD PRESSURE: 123 MMHG | TEMPERATURE: 99 F | HEART RATE: 70 BPM | OXYGEN SATURATION: 96 % | DIASTOLIC BLOOD PRESSURE: 69 MMHG

## 2018-05-06 DIAGNOSIS — Z29.9 ENCOUNTER FOR PROPHYLACTIC MEASURES, UNSPECIFIED: ICD-10-CM

## 2018-05-06 DIAGNOSIS — Z94.0 KIDNEY TRANSPLANT STATUS: Chronic | ICD-10-CM

## 2018-05-06 DIAGNOSIS — J18.8 OTHER PNEUMONIA, UNSPECIFIED ORGANISM: ICD-10-CM

## 2018-05-06 DIAGNOSIS — I10 ESSENTIAL (PRIMARY) HYPERTENSION: ICD-10-CM

## 2018-05-06 DIAGNOSIS — E11.9 TYPE 2 DIABETES MELLITUS WITHOUT COMPLICATIONS: ICD-10-CM

## 2018-05-06 DIAGNOSIS — H70.90 UNSPECIFIED MASTOIDITIS, UNSPECIFIED EAR: ICD-10-CM

## 2018-05-06 DIAGNOSIS — H66.90 OTITIS MEDIA, UNSPECIFIED, UNSPECIFIED EAR: ICD-10-CM

## 2018-05-06 DIAGNOSIS — Z94.0 KIDNEY TRANSPLANT STATUS: ICD-10-CM

## 2018-05-06 LAB
24R-OH-CALCIDIOL SERPL-MCNC: 36.5 PG/ML
25(OH)D3 SERPL-MCNC: 26.4 NG/ML
ALBUMIN MFR SERPL ELPH: 50.7 %
ALBUMIN SERPL ELPH-MCNC: 3.2 G/DL — LOW (ref 3.3–5)
ALBUMIN SERPL ELPH-MCNC: 3.4 G/DL
ALBUMIN SERPL-MCNC: 3.5 G/DL
ALBUMIN/GLOB SERPL: 1 RATIO
ALDOSTERONE SERUM: 9.3 NG/DL
ALP BLD-CCNC: 73 U/L
ALP BONE SERPL-MCNC: 12 MCG/L
ALP SERPL-CCNC: 70 U/L — SIGNIFICANT CHANGE UP (ref 40–120)
ALPHA1 GLOB MFR SERPL ELPH: 6.7 %
ALPHA1 GLOB SERPL ELPH-MCNC: 0.5 G/DL
ALPHA2 GLOB MFR SERPL ELPH: 15.5 %
ALPHA2 GLOB SERPL ELPH-MCNC: 1.1 G/DL
ALT FLD-CCNC: 9 U/L — LOW (ref 10–45)
ALT SERPL-CCNC: 13 U/L
ANA SER IF-ACNC: NEGATIVE
ANION GAP SERPL CALC-SCNC: 13 MMOL/L — SIGNIFICANT CHANGE UP (ref 5–17)
ANION GAP SERPL CALC-SCNC: 15 MMOL/L
APPEARANCE: CLEAR
APTT BLD: 32.2 SEC — SIGNIFICANT CHANGE UP (ref 27.5–37.4)
AST SERPL-CCNC: 17 U/L
AST SERPL-CCNC: 8 U/L — LOW (ref 10–40)
B-GLOBULIN MFR SERPL ELPH: 14.3 %
B-GLOBULIN SERPL ELPH-MCNC: 1 G/DL
BACTERIA: NEGATIVE
BASOPHILS # BLD AUTO: 0 K/UL
BASOPHILS NFR BLD AUTO: 0 %
BASOPHILS NFR BLD AUTO: 0 % — SIGNIFICANT CHANGE UP (ref 0–2)
BILIRUB SERPL-MCNC: 0.2 MG/DL
BILIRUB SERPL-MCNC: 0.3 MG/DL — SIGNIFICANT CHANGE UP (ref 0.2–1.2)
BILIRUBIN URINE: NEGATIVE
BLOOD URINE: NEGATIVE
BUN SERPL-MCNC: 19 MG/DL — SIGNIFICANT CHANGE UP (ref 7–23)
BUN SERPL-MCNC: 22 MG/DL
C3 SERPL-MCNC: 116 MG/DL
C4 SERPL-MCNC: 28 MG/DL
CALCIUM OXALATE CRYSTALS: ABNORMAL
CALCIUM SERPL-MCNC: 10.3 MG/DL
CALCIUM SERPL-MCNC: 10.3 MG/DL
CALCIUM SERPL-MCNC: 9.9 MG/DL — SIGNIFICANT CHANGE UP (ref 8.4–10.5)
CHLORIDE SERPL-SCNC: 100 MMOL/L
CHLORIDE SERPL-SCNC: 96 MMOL/L — SIGNIFICANT CHANGE UP (ref 96–108)
CHOLEST SERPL-MCNC: 115 MG/DL
CHOLEST/HDLC SERPL: 3.1 RATIO
CO2 SERPL-SCNC: 22 MMOL/L
CO2 SERPL-SCNC: 25 MMOL/L — SIGNIFICANT CHANGE UP (ref 22–31)
COLLAGEN CTX SERPL-MCNC: 382 PG/ML
COLOR: YELLOW
CREAT SERPL-MCNC: 0.96 MG/DL — SIGNIFICANT CHANGE UP (ref 0.5–1.3)
CREAT SERPL-MCNC: 1.11 MG/DL
CRP SERPL-MCNC: 1.4 MG/DL
DEPRECATED KAPPA LC FREE/LAMBDA SER: 1.26 RATIO
EOSINOPHIL # BLD AUTO: 0.18 K/UL
EOSINOPHIL NFR BLD AUTO: 0.4 % — SIGNIFICANT CHANGE UP (ref 0–6)
EOSINOPHIL NFR BLD AUTO: 2 %
ERYTHROCYTE [SEDIMENTATION RATE] IN BLOOD BY WESTERGREN METHOD: 18 MM/HR
FERRITIN SERPL-MCNC: 317 NG/ML
FOLATE SERPL-MCNC: >20 NG/ML
GAMMA GLOB FLD ELPH-MCNC: 0.9 G/DL
GAMMA GLOB MFR SERPL ELPH: 12.8 %
GLUCOSE BLDC GLUCOMTR-MCNC: 342 MG/DL — HIGH (ref 70–99)
GLUCOSE QUALITATIVE U: 1000 MG/DL
GLUCOSE SERPL-MCNC: 205 MG/DL
GLUCOSE SERPL-MCNC: 256 MG/DL — HIGH (ref 70–99)
HBA1C MFR BLD HPLC: 9.7 %
HBV SURFACE AB SER QL: NONREACTIVE
HBV SURFACE AG SER QL: NONREACTIVE
HCT VFR BLD CALC: 35.6 % — LOW (ref 39–50)
HCT VFR BLD CALC: 39.1 %
HCV AB SER QL: NONREACTIVE
HCV S/CO RATIO: 0.11 S/CO
HCYS SERPL-MCNC: 17.5 UMOL/L
HDLC SERPL-MCNC: 37 MG/DL
HGB BLD-MCNC: 11.5 G/DL — LOW (ref 13–17)
HGB BLD-MCNC: 11.6 G/DL
HYALINE CASTS: 0 /LPF
IGA SER QL IEP: 429 MG/DL
IGG SER QL IEP: 775 MG/DL
IGM SER QL IEP: 25 MG/DL
IMM GRANULOCYTES NFR BLD AUTO: 0.3 %
INR BLD: 1.23 — HIGH (ref 0.88–1.16)
INTERPRETATION SERPL IEP-IMP: NORMAL
IRON SATN MFR SERPL: 22 %
IRON SERPL-MCNC: 38 UG/DL
KAPPA LC CSF-MCNC: 3.56 MG/DL
KAPPA LC SERPL-MCNC: 4.48 MG/DL
KETONES URINE: NEGATIVE
LACTATE SERPL-SCNC: 1.5 MMOL/L — SIGNIFICANT CHANGE UP (ref 0.5–2)
LDLC SERPL CALC-MCNC: 61 MG/DL
LEUKOCYTE ESTERASE URINE: NEGATIVE
LYMPHOCYTES # BLD AUTO: 1.43 K/UL
LYMPHOCYTES # BLD AUTO: 7.8 % — LOW (ref 13–44)
LYMPHOCYTES NFR BLD AUTO: 15.7 %
M PROTEIN SPEC IFE-MCNC: NORMAL
MAGNESIUM SERPL-MCNC: 2 MG/DL
MAN DIFF?: NORMAL
MCHC RBC-ENTMCNC: 27.4 PG — SIGNIFICANT CHANGE UP (ref 27–34)
MCHC RBC-ENTMCNC: 27.5 PG
MCHC RBC-ENTMCNC: 29.7 GM/DL
MCHC RBC-ENTMCNC: 32.3 G/DL — SIGNIFICANT CHANGE UP (ref 32–36)
MCV RBC AUTO: 85 FL — SIGNIFICANT CHANGE UP (ref 80–100)
MCV RBC AUTO: 92.7 FL
MICROSCOPIC-UA: NORMAL
MONOCYTES # BLD AUTO: 0.75 K/UL
MONOCYTES NFR BLD AUTO: 8.2 %
MONOCYTES NFR BLD AUTO: 8.5 % — SIGNIFICANT CHANGE UP (ref 2–14)
MPO AB + PR3 PNL SER: NORMAL
NEUTROPHILS # BLD AUTO: 6.71 K/UL
NEUTROPHILS NFR BLD AUTO: 73.8 %
NEUTROPHILS NFR BLD AUTO: 83.3 % — HIGH (ref 43–77)
NITRITE URINE: NEGATIVE
PARATHYROID HORMONE INTACT: 59 PG/ML
PH URINE: 5
PHOSPHATE SERPL-MCNC: 2.5 MG/DL
PLATELET # BLD AUTO: 287 K/UL — SIGNIFICANT CHANGE UP (ref 150–400)
PLATELET # BLD AUTO: 353 K/UL
POTASSIUM SERPL-MCNC: 4.9 MMOL/L — SIGNIFICANT CHANGE UP (ref 3.5–5.3)
POTASSIUM SERPL-SCNC: 4.9 MMOL/L — SIGNIFICANT CHANGE UP (ref 3.5–5.3)
POTASSIUM SERPL-SCNC: 5.1 MMOL/L
PROT SERPL-MCNC: 6.7 G/DL — SIGNIFICANT CHANGE UP (ref 6–8.3)
PROT SERPL-MCNC: 6.9 G/DL
PROTEIN URINE: NEGATIVE MG/DL
PROTHROM AB SERPL-ACNC: 13.7 SEC — HIGH (ref 9.8–12.7)
RBC # BLD: 4.19 M/UL — LOW (ref 4.2–5.8)
RBC # BLD: 4.22 M/UL
RBC # FLD: 14.2 % — SIGNIFICANT CHANGE UP (ref 10.3–16.9)
RBC # FLD: 15 %
RED BLOOD CELLS URINE: 0 /HPF
RENIN PLASMA: 33.9 PG/ML
RHEUMATOID FACT SER QL: <7 IU/ML
SODIUM SERPL-SCNC: 134 MMOL/L — LOW (ref 135–145)
SODIUM SERPL-SCNC: 137 MMOL/L
SPECIFIC GRAVITY URINE: 1.03
SQUAMOUS EPITHELIAL CELLS: 1 /HPF
T3FREE SERPL-MCNC: 2.42 PG/ML
T3RU NFR SERPL: 0.84 INDEX
T4 FREE SERPL-MCNC: 1.4 NG/DL
T4 SERPL-MCNC: 6.8 UG/DL
THYROGLOB AB SERPL-ACNC: <20 IU/ML
THYROPEROXIDASE AB SERPL IA-ACNC: <10 IU/ML
TIBC SERPL-MCNC: 169 UG/DL
TRIGL SERPL-MCNC: 83 MG/DL
TSH SERPL-ACNC: 3.25 UIU/ML
UIBC SERPL-MCNC: 131 UG/DL
URATE SERPL-MCNC: 5 MG/DL
UROBILINOGEN URINE: 1 MG/DL
VIT B12 SERPL-MCNC: 679 PG/ML
WBC # BLD: 11.3 K/UL — HIGH (ref 3.8–10.5)
WBC # FLD AUTO: 11.3 K/UL — HIGH (ref 3.8–10.5)
WBC # FLD AUTO: 9.1 K/UL
WHITE BLOOD CELLS URINE: 1 /HPF

## 2018-05-06 PROCEDURE — 93010 ELECTROCARDIOGRAM REPORT: CPT

## 2018-05-06 PROCEDURE — 70450 CT HEAD/BRAIN W/O DYE: CPT | Mod: 26

## 2018-05-06 PROCEDURE — 99285 EMERGENCY DEPT VISIT HI MDM: CPT | Mod: 25

## 2018-05-06 PROCEDURE — 70490 CT SOFT TISSUE NECK W/O DYE: CPT | Mod: 26

## 2018-05-06 PROCEDURE — 71045 X-RAY EXAM CHEST 1 VIEW: CPT | Mod: 26

## 2018-05-06 RX ORDER — INSULIN LISPRO 100/ML
VIAL (ML) SUBCUTANEOUS
Qty: 0 | Refills: 0 | Status: DISCONTINUED | OUTPATIENT
Start: 2018-05-06 | End: 2018-05-09

## 2018-05-06 RX ORDER — MYCOPHENOLATE MOFETIL 250 MG/1
500 CAPSULE ORAL
Qty: 0 | Refills: 0 | Status: DISCONTINUED | OUTPATIENT
Start: 2018-05-06 | End: 2018-05-09

## 2018-05-06 RX ORDER — AMPICILLIN SODIUM AND SULBACTAM SODIUM 250; 125 MG/ML; MG/ML
3 INJECTION, POWDER, FOR SUSPENSION INTRAMUSCULAR; INTRAVENOUS EVERY 6 HOURS
Qty: 0 | Refills: 0 | Status: DISCONTINUED | OUTPATIENT
Start: 2018-05-07 | End: 2018-05-08

## 2018-05-06 RX ORDER — DEXTROSE 50 % IN WATER 50 %
12.5 SYRINGE (ML) INTRAVENOUS ONCE
Qty: 0 | Refills: 0 | Status: DISCONTINUED | OUTPATIENT
Start: 2018-05-06 | End: 2018-05-09

## 2018-05-06 RX ORDER — DEXTROSE 50 % IN WATER 50 %
25 SYRINGE (ML) INTRAVENOUS ONCE
Qty: 0 | Refills: 0 | Status: DISCONTINUED | OUTPATIENT
Start: 2018-05-06 | End: 2018-05-09

## 2018-05-06 RX ORDER — DEXTROSE 50 % IN WATER 50 %
1 SYRINGE (ML) INTRAVENOUS ONCE
Qty: 0 | Refills: 0 | Status: DISCONTINUED | OUTPATIENT
Start: 2018-05-06 | End: 2018-05-09

## 2018-05-06 RX ORDER — TAMSULOSIN HYDROCHLORIDE 0.4 MG/1
0.4 CAPSULE ORAL DAILY
Qty: 0 | Refills: 0 | Status: DISCONTINUED | OUTPATIENT
Start: 2018-05-06 | End: 2018-05-09

## 2018-05-06 RX ORDER — SENNA PLUS 8.6 MG/1
2 TABLET ORAL AT BEDTIME
Qty: 0 | Refills: 0 | Status: DISCONTINUED | OUTPATIENT
Start: 2018-05-06 | End: 2018-05-09

## 2018-05-06 RX ORDER — AMPICILLIN SODIUM AND SULBACTAM SODIUM 250; 125 MG/ML; MG/ML
INJECTION, POWDER, FOR SUSPENSION INTRAMUSCULAR; INTRAVENOUS
Qty: 0 | Refills: 0 | Status: DISCONTINUED | OUTPATIENT
Start: 2018-05-07 | End: 2018-05-08

## 2018-05-06 RX ORDER — ACETAMINOPHEN 500 MG
650 TABLET ORAL ONCE
Qty: 0 | Refills: 0 | Status: COMPLETED | OUTPATIENT
Start: 2018-05-06 | End: 2018-05-06

## 2018-05-06 RX ORDER — ACETAMINOPHEN 500 MG
650 TABLET ORAL EVERY 6 HOURS
Qty: 0 | Refills: 0 | Status: DISCONTINUED | OUTPATIENT
Start: 2018-05-06 | End: 2018-05-09

## 2018-05-06 RX ORDER — AMPICILLIN SODIUM AND SULBACTAM SODIUM 250; 125 MG/ML; MG/ML
3 INJECTION, POWDER, FOR SUSPENSION INTRAMUSCULAR; INTRAVENOUS ONCE
Qty: 0 | Refills: 0 | Status: COMPLETED | OUTPATIENT
Start: 2018-05-06 | End: 2018-05-07

## 2018-05-06 RX ORDER — INSULIN GLARGINE 100 [IU]/ML
15 INJECTION, SOLUTION SUBCUTANEOUS AT BEDTIME
Qty: 0 | Refills: 0 | Status: DISCONTINUED | OUTPATIENT
Start: 2018-05-06 | End: 2018-05-07

## 2018-05-06 RX ORDER — CARVEDILOL PHOSPHATE 80 MG/1
12.5 CAPSULE, EXTENDED RELEASE ORAL EVERY 12 HOURS
Qty: 0 | Refills: 0 | Status: DISCONTINUED | OUTPATIENT
Start: 2018-05-06 | End: 2018-05-09

## 2018-05-06 RX ORDER — DOCUSATE SODIUM 100 MG
100 CAPSULE ORAL THREE TIMES A DAY
Qty: 0 | Refills: 0 | Status: DISCONTINUED | OUTPATIENT
Start: 2018-05-06 | End: 2018-05-09

## 2018-05-06 RX ORDER — FUROSEMIDE 40 MG
20 TABLET ORAL DAILY
Qty: 0 | Refills: 0 | Status: DISCONTINUED | OUTPATIENT
Start: 2018-05-07 | End: 2018-05-09

## 2018-05-06 RX ORDER — AMPICILLIN SODIUM AND SULBACTAM SODIUM 250; 125 MG/ML; MG/ML
3 INJECTION, POWDER, FOR SUSPENSION INTRAMUSCULAR; INTRAVENOUS ONCE
Qty: 0 | Refills: 0 | Status: COMPLETED | OUTPATIENT
Start: 2018-05-06 | End: 2018-05-06

## 2018-05-06 RX ORDER — SODIUM CHLORIDE 9 MG/ML
1000 INJECTION, SOLUTION INTRAVENOUS
Qty: 0 | Refills: 0 | Status: DISCONTINUED | OUTPATIENT
Start: 2018-05-06 | End: 2018-05-09

## 2018-05-06 RX ORDER — TACROLIMUS 5 MG/1
4 CAPSULE ORAL EVERY 12 HOURS
Qty: 0 | Refills: 0 | Status: DISCONTINUED | OUTPATIENT
Start: 2018-05-06 | End: 2018-05-09

## 2018-05-06 RX ORDER — GLUCAGON INJECTION, SOLUTION 0.5 MG/.1ML
1 INJECTION, SOLUTION SUBCUTANEOUS ONCE
Qty: 0 | Refills: 0 | Status: DISCONTINUED | OUTPATIENT
Start: 2018-05-06 | End: 2018-05-09

## 2018-05-06 RX ADMIN — CARVEDILOL PHOSPHATE 12.5 MILLIGRAM(S): 80 CAPSULE, EXTENDED RELEASE ORAL at 22:27

## 2018-05-06 RX ADMIN — AMPICILLIN SODIUM AND SULBACTAM SODIUM 200 GRAM(S): 250; 125 INJECTION, POWDER, FOR SUSPENSION INTRAMUSCULAR; INTRAVENOUS at 15:32

## 2018-05-06 RX ADMIN — TACROLIMUS 4 MILLIGRAM(S): 5 CAPSULE ORAL at 22:28

## 2018-05-06 RX ADMIN — Medication 10 MILLIGRAM(S): at 22:26

## 2018-05-06 RX ADMIN — INSULIN GLARGINE 15 UNIT(S): 100 INJECTION, SOLUTION SUBCUTANEOUS at 22:45

## 2018-05-06 RX ADMIN — TAMSULOSIN HYDROCHLORIDE 0.4 MILLIGRAM(S): 0.4 CAPSULE ORAL at 22:27

## 2018-05-06 RX ADMIN — Medication 650 MILLIGRAM(S): at 18:43

## 2018-05-06 RX ADMIN — Medication 8: at 21:41

## 2018-05-06 NOTE — ED ADULT NURSE NOTE - OBJECTIVE STATEMENT
Pt presents to ED c/o cough x1 month. Pt and wife report pt with cough and sinus congestion x1 month with phlegm production, green at first per wife now with brown flecks, wet cough with scant mucus production noted in ED. Pt also reports sudden onset R ear pain a few days ago, currently 7/10 radiating to R temporal area. Pt also reports feeling off balance with walking after onset of ear pain. Pt was seen by MD Mac and told to come to ED for admission. Pt and wife deny pt with fever, chills, HA, dizziness, lightheadedness, numbness/tingling, neck pain, CP, palpitations, SOB, N/V/D, difficulty urinating, difficulty passing BM. Pt presents in NAD speaking full sentences via EMS stretcher through triage. Pt placed on continuous cardiac monitor and pulse ox on arrival to bed 19, EKG preformed.

## 2018-05-06 NOTE — H&P ADULT - ASSESSMENT
78 yo male hx right renal transplant with cough 3 weeks with imaging concerning for non-tb mycobacteria and otitis media/sinusitis.

## 2018-05-06 NOTE — H&P ADULT - HISTORY OF PRESENT ILLNESS
In the ED:  ICU Vital Signs Last 24 Hrs  T(C): 37.1 (06 May 2018 19:24), Max: 37.1 (06 May 2018 14:11)  T(F): 98.8 (06 May 2018 19:24), Max: 98.8 (06 May 2018 14:11)  HR: 76 (06 May 2018 19:24) (68 - 76)  BP: 161/68 (06 May 2018 19:24) (123/69 - 168/73)  BP(mean): --  ABP: --  ABP(mean): --  RR: 18 (06 May 2018 19:24) (17 - 18)  SpO2: 97% (06 May 2018 19:24) (96% - 97%)    Patient given 1x dose of unasyn, tylenol 650x1, had CT head, CT neck soft tissue, and ENT paged and will see, rec to continue abx. for now per Dr. Dunbar. 77 year old gentleman +Functional blindness,  hx renal transplant (right sided) (was on dialysis 3-4 years prior), DM1, hypertension, presents to ER at the behest of Dr. Mac for evaluation of 3 - 4 week hx of cough and NEW right sided ear pain, and balance issues. Patient has been on immunosuppressants mycophenylate and tacrolimus + prednisone for renal transplant since May 2012. His cough initially began 4 weeks prior, was productive intermittently with mucous. He obtained 5 days of azithromycin which according to him did not help. The ear pain started this past thursday, was sudden. He went to MD for evaluation, was told he had a lot of wax and no infection; wax was cleaned out and patient started having balance issues with walking intermittently. He has no apparent sick contacts. He denies shortness of breath, chest pain, nasal congestion, nausea, vomiting, diarrhea, headache, vision changes.    In the ED:  ICU Vital Signs Last 24 Hrs  T(C): 37.1 (06 May 2018 19:24), Max: 37.1 (06 May 2018 14:11)  T(F): 98.8 (06 May 2018 19:24), Max: 98.8 (06 May 2018 14:11)  HR: 76 (06 May 2018 19:24) (68 - 76)  BP: 161/68 (06 May 2018 19:24) (123/69 - 168/73)  BP(mean): --  ABP: --  ABP(mean): --  RR: 18 (06 May 2018 19:24) (17 - 18)  SpO2: 97% (06 May 2018 19:24) (96% - 97%)    Patient given 1x dose of unasyn, tylenol 650x1, had CT head, CT neck soft tissue, and ENT paged and will see, rec to continue abx. for now per Dr. Dunbar. 77 year old gentleman +Functional blindness,  hx renal transplant (right sided) (was on dialysis 3-4 years prior), DM1, hypertension, presents to ER at the behest of Dr. Mac for evaluation of 3 - 4 week hx of cough and NEW right sided ear pain, and balance issues. Patient has been on immunosuppressants mycophenylate and tacrolimus + prednisone for renal transplant since May 2012. His cough initially began 4 weeks prior, was productive intermittently with mucous, brown. He obtained 5 days of azithromycin which according to him did not help. The ear pain started this past thursday, was sudden. He went to MD for evaluation, was told he had a lot of wax and no infection; wax was cleaned out and patient started having balance issues with walking intermittently. He has no apparent sick contacts. He denies shortness of breath, chest pain, nasal congestion, nausea, vomiting, diarrhea, headache, vision changes.    In the ED:  ICU Vital Signs Last 24 Hrs  T(C): 37.1 (06 May 2018 19:24), Max: 37.1 (06 May 2018 14:11)  T(F): 98.8 (06 May 2018 19:24), Max: 98.8 (06 May 2018 14:11)  HR: 76 (06 May 2018 19:24) (68 - 76)  BP: 161/68 (06 May 2018 19:24) (123/69 - 168/73)  BP(mean): --  ABP: --  ABP(mean): --  RR: 18 (06 May 2018 19:24) (17 - 18)  SpO2: 97% (06 May 2018 19:24) (96% - 97%)    Patient given 1x dose of unasyn, tylenol 650x1, had CT head, CT neck soft tissue, and ENT paged and will see, rec to continue abx. for now per Dr. Dunbar.

## 2018-05-06 NOTE — H&P ADULT - NSHPLABSRESULTS_GEN_ALL_CORE
.  LABS:                         11.5   11.3  )-----------( 287      ( 06 May 2018 14:57 )             35.6     05-06    134<L>  |  96  |  19  ----------------------------<  256<H>  4.9   |  25  |  0.96    Ca    9.9      06 May 2018 14:57    TPro  6.7  /  Alb  3.2<L>  /  TBili  0.3  /  DBili  x   /  AST  8<L>  /  ALT  9<L>  /  AlkPhos  70  05-06    PT/INR - ( 06 May 2018 14:57 )   PT: 13.7 sec;   INR: 1.23          PTT - ( 06 May 2018 14:57 )  PTT:32.2 sec          Lactate, Blood: 1.5 mmoL/L (05-06 @ 14:57)      RADIOLOGY, EKG & ADDITIONAL TESTS: Reviewed. .  LABS:                         11.5   11.3  )-----------( 287      ( 06 May 2018 14:57 )             35.6     05-06    134<L>  |  96  |  19  ----------------------------<  256<H>  4.9   |  25  |  0.96    Ca    9.9      06 May 2018 14:57    TPro  6.7  /  Alb  3.2<L>  /  TBili  0.3  /  DBili  x   /  AST  8<L>  /  ALT  9<L>  /  AlkPhos  70  05-06    PT/INR - ( 06 May 2018 14:57 )   PT: 13.7 sec;   INR: 1.23          PTT - ( 06 May 2018 14:57 )  PTT:32.2 sec          Lactate, Blood: 1.5 mmoL/L (05-06 @ 14:57)    < from: CT Neck Soft Tissue No Cont (05.06.18 @ 17:42) >    1. Near complete opacification of the right mastoid air cells and right   middle ear compatible with  otomastoiditis.  2. Severe and extensive inflammatory changes of the paranasal sinuses.    < end of copied text >    < from: CT Head No Cont (05.06.18 @ 17:42) >    IMPRESSION:  1. No acute abnormality of the brain. Age related changes are noted.  2. There is prominence of the extra-axial spaces bilaterally. This is   likely secondary to advanced  peripheral volume loss, but cannot exclude subdural hygromas or chronic   subdural hematomas.  3. Near complete opacification of the right mastoid air cells and right   middle ear compatible with  otomastoiditis.  4. There are severe and extensive inflammatory changes of the paranasal   sinuses.    < end of copied text >    < from: CT Chest No Cont (05.03.18 @ 14:49) >    1. Newly developing cylindrical and traction bronchiectasis involving the   mid and lower lung zones. Scattered tree-in-bud micronodular opacities   are identified. 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 Bronchoscopic correlation.  2. New focus of parenchymal scarring and or linear atelectasis with Dr.   bronchiectasis involving the lingula.  3. Additional chronic findings as above.    < end of copied text >    < from: CT Sinuses No Cont (05.03.18 @ 14:49) >    IMPRESSION:    Since prior study of 2007, there is been marked interval worsening of   chronic inflammatory disease in the paranasal sinuses, with near complete   opacification of the right sinonasal cavity, as above.      < end of copied text >        RADIOLOGY, EKG & ADDITIONAL TESTS: Reviewed.

## 2018-05-06 NOTE — CONSULT NOTE ADULT - SUBJECTIVE AND OBJECTIVE BOX
HPI: 77y Male with h/o DM1 (insulin dependent), renal failure s/p kidney transplant 6 years ago, HTN presents with complaint of cold/cough for the past 4 weeks and new onset R ear and facial pain for 2 days. The patient was seeing a local doctor in his community then eventually went to see his PCP Dr. Mac when he wasn't getting better. Was ordered for a chest CT and sinus CT which he had performed on 5/3. The sinus CT was significant for pansinusitis and the chest CT showed findings consistent with non-tuberculous mycobacterial disease. The patient was sent to the hospital by Dr. Mac for admission and workup of these lung findings. He had a head CT performed in the ER which showed opacification of the right middle ear and mastoid air cells without bony erosion consistent with otomastoiditis.     The patient denies symptoms of nasal obstruction, facial pain or pressure. Does have some congestion and has been blowing his nose and coughing up brown colored sputum frequently. He is functionally blind so has not had any changes in vision, denies pain with EOM. His primary complaint at this time is a pain in his right ear that spreads to involve the right side of his face and head radiating up as well as down into the neck. He saw another doctor a few days ago who cleaned cerumen from his ear and since then it has been more painful. Denies any changes in hearing. Denies fevers. Appetite has been slightly decreased which he says is normal for him when he has a head cold.     He had a kidney transplant in 2012 due to renal failure 2/2 diabetic nephropathy and is on mycophenolate, tacrolimus, and prednisone. The patient denies having taken any antibiotics recently but his medical team states he took a 5 day course of azithromycin which did not cause any improvement in symptoms.       Allergies    No Known Allergies    Intolerances        PAST MEDICAL & SURGICAL HISTORY:  History of renal transplant: secondary to DM  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  BPH (benign prostatic hypertrophy)  HTN (hypertension)  Kidney transplant recipient      Vital Signs Last 24 Hrs  T(C): 36.9 (06 May 2018 21:09), Max: 37.1 (06 May 2018 14:11)  T(F): 98.4 (06 May 2018 21:09), Max: 98.8 (06 May 2018 14:11)  HR: 80 (06 May 2018 21:09) (68 - 80)  BP: 144/71 (06 May 2018 21:09) (123/69 - 168/73)  BP(mean): --  RR: 18 (06 May 2018 21:09) (17 - 18)  SpO2: 97% (06 May 2018 21:09) (96% - 97%)    LABS:  CBC-    05-06    134<L>  |  96  |  19  ----------------------------<  256<H>  4.9   |  25  |  0.96    Ca    9.9      06 May 2018 14:57    TPro  6.7  /  Alb  3.2<L>  /  TBili  0.3  /  DBili  x   /  AST  8<L>  /  ALT  9<L>  /  AlkPhos  70  05-06    Coagulation Studies-  PT/INR - ( 06 May 2018 14:57 )   PT: 13.7 sec;   INR: 1.23          PTT - ( 06 May 2018 14:57 )  PTT:32.2 sec  Endocrine Panel-  --  --  9.9 mg/dL        PHYSICAL EXAM:    ENT EXAM-   Constitutional: Well-developed, well-nourished.  No hoarseness.     Head:  normocephalic, atraumatic.   Ears:  Ear canals both clear.  Tympanic membranes both intact; R side TM with erythema and effusion. L side clear no effusion.   Nose:  Septum intact.  Inferior turbinates normal bilateral  OC/OP:  Tonsils present. Floor of mouth, buccal mucosa, lips, hard palate, soft palate, uvula, posterior pharyngeal wall normal.  Mucosa moist.  Neck:  Trachea midline.  Thyroid, parotid and submandibular glands normal.  Lymph:  No cervical adenopathy.    MULTISYSTEM EXAM-  Neuro/Psych:  A&O x 3.  .  Cranial nerves: 2-12 grossly intact bilaterally.  Eyes:  EOMI, no nystagmus.  Pulm:  No dyspnea, non-labored breathing  Skin:  No rash or lesions on exposed skin of head/neck      RADIOLOGY & ADDITIONAL STUDIES:  CT Sinus 5/3  Findings:    There has been marked interval worsening of sinus opacification since   prior sinus CT of 11/2007. Specifically, there is now complete   opacification of the right-sided paranasal sinuses and of the right   anterior and posterior drainage pathways with extensive surrounding   sclerosis compatible with chronic inflammation. A fluid level is now   evident in the left frontal sinus and there is extensive opacification of   left anterior ethmoidal air cells and the left maxillary antrum. While   left-sided posterior ethmoidal air cells are clear, polypoid mucosal   thickening is present along the floor of the small left sphenoid sinus.   Left-sided anterior drainage pathways are occluded by soft tissue, while   the components of the left sphenoethmoidal recess are predominantly   ventilated. Density of the material within the opacified sinuses appears   predominantly mucoid.    There is extensive opacification of the right nasal cavity sparing the   inferior meatus, with foci of mucosal thickening noted in the left   anterior and superior middle meatus. The nasal septum is deviated to the   left side. The nasopharyngeal soft tissues are unremarkable in appearance.    There is pneumatization of the [place]. The anterior skull base and   orbital walls are intact.    There has been interval placement of an ophthalmologic implant along the   superior temporal portion of the left globe. The native ocular lenses are   now absent bilaterally. Limited evaluation of the intracranial contents   demonstrates no evidence of hydrocephalus or large intracranial mass   lesion. There is now extensive opacification of right-sided mastoid air   cells.    IMPRESSION:    Since prior study of 2007, there is been marked interval worsening of   chronic inflammatory disease in the paranasal sinuses, with near complete   opacification of the right sinonasal cavity, as above.    CT Chest 5/3:  FINDINGS:    LUNGS: No pleural effusions are seen. Evaluation of the pulmonary   parenchyma demonstrates moderate bronchiectasis in the bilateral lower   lobes, with associated scarring and atelectasis most pronounced at the   right lung base and in the lingula where there is a confluent linear and   of atelectasis and/or parenchymal scarring with associated traction   bronchiectasis (image 180, series 5 and coronal image 89, series 8038).   There are small airway inflammation characterized by tree-in-bud   micronodular opacities seen within the lingula (images 132-176, series 5).    MEDIASTINUM: The heart is normal in size.  No pericardial effusion is   seen.  Evaluation of the vasculature is limited without intravenous   contrast, but the great vessels demonstrate mild that this chronic   calcifications. There are severe calcifications in the coronary arteries.    Evaluation for adenopathy is limited without intravenous contrast,   however no gross mediastinal, hilar, axillary or supraclavicular   lymphadenopathy is identified.    UPPER ABDOMEN, SOFT TISSUES AND BONES: Limited evaluation of the upper   abdomen demonstrates atrophic kidneys bilaterally. There is   cholelithiasis. Evaluation of the osseous structures demonstrates   degenerative changes.    IMPRESSION:    1. Newly developing cylindrical and traction bronchiectasis involving the   mid and lower lung zones. Scattered tree-in-bud micronodular opacities   are identified. 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 Bronchoscopic correlation.  2. New focus of parenchymal scarring and or linear atelectasis with Dr.   bronchiectasis involving the lingula.  3. Additional chronic findings as above.      [facility]  [facility]:  Brain: There is prominence of the extra-axial spaces bilaterally. There   is involutional change of  brain parenchyma, compatible with age. There is no evidence of brain   mass. There is heterogeneity  of the white matter, likely on the basis of small vessel ischemic change.   No hemorrhage.  Ventricles: Unremarkable. No ventriculomegaly.  Bones/joints: Unremarkable. No acute fracture.  Soft tissues: Unremarkable.  Sinuses: There is complete opacification of right frontal and ethmoid   sinuses and near complete  opacification of the right sphenoid and left anterior ethmoid sinuses.   There is severe mucosal  thickening within the left maxillary sinus, there is a moderate air-fluid   level within the left frontal sinus  and there is mild mucosal thickening within the left sphenoid sinus.  Mastoid air cells: Near complete opacification of the right mastoid air   cells and right middle ear  compatible with otomastoiditis.  Tubes, lines and devices: There is a prosthetic device associated with   the lateral aspect of the left  globe.      IMPRESSION:  1. No acute abnormality of the brain. Age related changes are noted.  2. There is prominence of the extra-axial spaces bilaterally. This is   likely secondary to advanced  peripheral volume loss, but cannot exclude subdural hygromas or chronic   subdural hematomas.  3. Near complete opacification of the right mastoid air cells and right   middle ear compatible with  otomastoiditis.  4. There are severe and extensive inflammatory changes of the paranasal   sinuses.        Assessment/Plan:  77y Male with cough/cold x 4 weeks without improvement with evidence of pan sinusitis and R otomastoiditis on imaging, exam consistent with R acute otitis media.   - Recommend augmentin for treatment of otitis media. Patient without clinical signs/symptoms of sinus infection though with chronic appearing inflammation on CT and mucoid material filling sinuses   - Can add flonase as well as saline nasal sprays to help reduce swelling in the nose and promote drainage  - If symptoms worsen or do not improve as expected or other further questions arise please reconsult ENT   - D/w attending on call     [facility] at [phone] with any questions/concerns.

## 2018-05-06 NOTE — H&P ADULT - PROBLEM SELECTOR PLAN 2
Patient with cough intermittent productivity for 3 weeks s/p 5 days of outpatient abx, azithromycin. Has not had blood  in sputum. CT scan findings showing  Newly developing cylindrical and traction bronchiectasis involving the mid and lower lung zones. Scattered tree-in-bud micronodular opacities are identified. 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 Bronchoscopic correlation. Immunosuppreseants mycophenylate, and tacrolimus, prednisone increase risk of pulmonary pathology as well.   -Will consult Pulm in AM: Dr. Prather to be consulted.   -f/u blood cultures, sent in ER.   -Will hold off on starting anti-tb therapy. Patient non-toxic appearing, not hypoxic and speaking in full sentences. Obtain sputum cultures.

## 2018-05-06 NOTE — H&P ADULT - PROBLEM SELECTOR PLAN 1
Patient with few day hx of right ear pain, with associated facial pain overlying right maxillary sinus. Imaging findings concerning for ?mastoiditis.   -ENT evaluated patient in ER, recommended augmentin v. Unasyn and believe that no drainage is necessary at this time. Would treat as an acute otitis media. Recs appreciated.   -Will c/w Unasyn 3grams Q6 hours. f/u  blood cultures.

## 2018-05-06 NOTE — ED PROVIDER NOTE - PHYSICAL EXAMINATION
CONSTITUTIONAL: Well appearing, well nourished, awake, alert and in no apparent distress.  HEENT: Head is atraumatic. Eyes clear bilaterally, normal EOMI. Airway patent.  CARDIAC: Normal rate, regular rhythm.  Heart sounds S1, S2.   RESPIRATORY: Breath sounds clear and equal bilaterally. no tachypnea, sob, JVD, leg swelling.  GASTROINTESTINAL: Abdomen soft, non-tender, no guarding.  MUSCULOSKELETAL: Spine appears normal, range of motion is not limited, no muscle or joint tenderness.   NEUROLOGICAL: Alert and oriented, no focal deficits, no motor or sensory deficits.  SKIN: Skin normal color for race, warm, dry and intact. No evidence of rash.  PSYCHIATRIC: Alert and oriented to person, place, time/situation. normal mood and affect. no apparent risk to self or others. CONSTITUTIONAL: Well appearing, well nourished, awake, alert and in no apparent distress.  HEENT: Head is atraumatic. Eyes clear bilaterally, normal EOMI. Airway patent.  CARDIAC: Normal rate, regular rhythm.  Heart sounds S1, S2.   RESPIRATORY: Breath sounds clear and equal bilaterally. no tachypnea, sob, JVD, leg swelling.  GASTROINTESTINAL: Abdomen soft, non-tender, no guarding.  MUSCULOSKELETAL: Spine appears normal, range of motion is not limited, no muscle or joint tenderness. no meningismus  NEUROLOGICAL: Alert and oriented, no focal deficits, no motor or sensory deficits.  SKIN: Skin normal color for race, warm, dry and intact. No evidence of rash.  PSYCHIATRIC: Alert and oriented to person, place, time/situation. normal mood and affect. no apparent risk to self or others.

## 2018-05-06 NOTE — ED PROVIDER NOTE - MEDICAL DECISION MAKING DETAILS
Pt w chronic cough, R ear/neck pain; prior CT showing bronchiectasis, VSS, no resp distress, afebrile, disc with Dr. Caicedo, will obtain further imaging of head/neck and to obtain pulm/ENT eval as inpt.

## 2018-05-06 NOTE — H&P ADULT - NSHPPHYSICALEXAM_GEN_ALL_CORE
.  VITAL SIGNS:  T(C): 37.1 (05-06-18 @ 19:24), Max: 37.1 (05-06-18 @ 14:11)  T(F): 98.8 (05-06-18 @ 19:24), Max: 98.8 (05-06-18 @ 14:11)  HR: 76 (05-06-18 @ 19:24) (68 - 76)  BP: 161/68 (05-06-18 @ 19:24) (123/69 - 168/73)  BP(mean): --  RR: 18 (05-06-18 @ 19:24) (17 - 18)  SpO2: 97% (05-06-18 @ 19:24) (96% - 97%)  Wt(kg): --    PHYSICAL EXAM:    Constitutional: WDWN resting comfortably in bed; NAD  Head: NC/AT  Eyes: PERRL, EOMI, anicteric sclera  ENT: no nasal discharge; uvula midline, no oropharyngeal erythema or exudates; MMM  Neck: supple; no JVD or thyromegaly  Respiratory: CTA B/L; no W/R/R, no retractions  Cardiac: +S1/S2; RRR; no M/R/G; PMI non-displaced  Gastrointestinal: soft, NT/ND; no rebound or guarding; +BSx4  Genitourinary: normal external genitalia  Back: spine midline, no bony tenderness or step-offs; no CVAT B/L  Extremities: WWP, no clubbing or cyanosis; no peripheral edema  Musculoskeletal: NROM x4; no joint swelling, tenderness or erythema  Vascular: 2+ radial, femoral, DP/PT pulses B/L  Dermatologic: skin warm, dry and intact; no rashes, wounds, or scars  Lymphatic: no submandibular or cervical LAD  Neurologic: AAOx3; CNII-XII grossly intact; no focal deficits  Psychiatric: affect and characteristics of appearance, verbalizations, behaviors are appropriate .  VITAL SIGNS:  T(C): 37.1 (05-06-18 @ 19:24), Max: 37.1 (05-06-18 @ 14:11)  T(F): 98.8 (05-06-18 @ 19:24), Max: 98.8 (05-06-18 @ 14:11)  HR: 76 (05-06-18 @ 19:24) (68 - 76)  BP: 161/68 (05-06-18 @ 19:24) (123/69 - 168/73)  BP(mean): --  RR: 18 (05-06-18 @ 19:24) (17 - 18)  SpO2: 97% (05-06-18 @ 19:24) (96% - 97%)  Wt(kg): --    PHYSICAL EXAM:    Constitutional: WDWN resting comfortably in bed; NAD  Head: NC/AT  Eyes: Anisocoria, surgical changes to bilateral eyes, not reactive to light.   ENT: no nasal discharge; uvula midline, no oropharyngeal erythema or exudates; MMM  Neck: supple; no JVD or thyromegaly; Ear pain on traction of right auricle, no tragus pain on pressure. +tenderness on right maxillary sinus region.   Respiratory: CTA B/L; no W/R/R, no retractions; decreased breath sounds in lower bilateral lobes   Cardiac: +S1/S2; RRR; no M/R/G   Gastrointestinal: soft, NT/ND; no rebound or guarding; +BSx4  Back: spine midline, no bony tenderness or step-offs; no CVAT B/L  Extremities: WWP, no clubbing or cyanosis; no peripheral edema, +right AV fistula graft  Musculoskeletal: NROM x4; no joint swelling, tenderness or erythema  Vascular: 2+ radial   Dermatologic: skin warm, dry and intact; no rashes, wounds, or scars  Lymphatic: no submandibular or cervical LAD  Neurologic: AAOx3; CNII-XII grossly intact; no focal deficits  Psychiatric: affect and characteristics of appearance, verbalizations, behaviors are appropriate

## 2018-05-06 NOTE — H&P ADULT - ATTENDING COMMENTS
above reviewed, and discussed in detail with er team and admission house staff.  immunosuppressed renal transplant pt with perisitent  cough, no response to azithromycin, now has severe right mastoid area pain and tend.  2 d before adm had cxr with new densities and atelectasis, and marked sinusitis which is likely to explain his severe pain.   will admit, rx with unasyn, and requst ent and pulm consultation.
None

## 2018-05-06 NOTE — ED ADULT NURSE NOTE - PMH
BPH (benign prostatic hypertrophy)    DM (diabetes mellitus)  Type 1/insulin dependent per patient  History of renal transplant  secondary to DM  HTN (hypertension)

## 2018-05-06 NOTE — H&P ADULT - PROBLEM SELECTOR PLAN 3
Patient takes humalog 25-75 combination insulin (short acting and intermediate acting insulin)  -A1c in AM   -C/w ISS  -Will dose with lantus 15 units QHS and titrate up as needed.

## 2018-05-06 NOTE — H&P ADULT - NSHPSOCIALHISTORY_GEN_ALL_CORE
Smoke:  Etoh:  Social:   Work: Smoke: Former smoker 1ppd x 40 years, quit 15 years ago  Etoh: denies  Social:  denies

## 2018-05-06 NOTE — ED PROVIDER NOTE - OBJECTIVE STATEMENT
78 yo DM, HTN, s/p renal transplant w persisting cough over the past month, productive and worsening R earn/neck discomfort, had been placed on azithromycin due to symptoms without improvement. Pt with recent CT chest/sinuses completed. No assoc f/c, abd pain, sob, chest pain. Has been using tylenol every 5 hours for ear pain. No headache.

## 2018-05-07 ENCOUNTER — TRANSCRIPTION ENCOUNTER (OUTPATIENT)
Age: 78
End: 2018-05-07

## 2018-05-07 DIAGNOSIS — J47.9 BRONCHIECTASIS, UNCOMPLICATED: ICD-10-CM

## 2018-05-07 DIAGNOSIS — R91.8 OTHER NONSPECIFIC ABNORMAL FINDING OF LUNG FIELD: ICD-10-CM

## 2018-05-07 DIAGNOSIS — R05 COUGH: ICD-10-CM

## 2018-05-07 DIAGNOSIS — J32.9 CHRONIC SINUSITIS, UNSPECIFIED: ICD-10-CM

## 2018-05-07 LAB
ANION GAP SERPL CALC-SCNC: 11 MMOL/L — SIGNIFICANT CHANGE UP (ref 5–17)
ANISOCYTOSIS BLD QL: SLIGHT — SIGNIFICANT CHANGE UP
BUN SERPL-MCNC: 16 MG/DL — SIGNIFICANT CHANGE UP (ref 7–23)
CALCIUM SERPL-MCNC: 9.7 MG/DL — SIGNIFICANT CHANGE UP (ref 8.4–10.5)
CHLORIDE SERPL-SCNC: 102 MMOL/L — SIGNIFICANT CHANGE UP (ref 96–108)
CO2 SERPL-SCNC: 24 MMOL/L — SIGNIFICANT CHANGE UP (ref 22–31)
CREAT SERPL-MCNC: 0.89 MG/DL — SIGNIFICANT CHANGE UP (ref 0.5–1.3)
GLUCOSE BLDC GLUCOMTR-MCNC: 107 MG/DL — HIGH (ref 70–99)
GLUCOSE BLDC GLUCOMTR-MCNC: 217 MG/DL — HIGH (ref 70–99)
GLUCOSE BLDC GLUCOMTR-MCNC: 219 MG/DL — HIGH (ref 70–99)
GLUCOSE BLDC GLUCOMTR-MCNC: 233 MG/DL — HIGH (ref 70–99)
GLUCOSE BLDC GLUCOMTR-MCNC: 246 MG/DL — HIGH (ref 70–99)
GLUCOSE BLDC GLUCOMTR-MCNC: 302 MG/DL — HIGH (ref 70–99)
GLUCOSE BLDC GLUCOMTR-MCNC: 317 MG/DL — HIGH (ref 70–99)
GLUCOSE SERPL-MCNC: 313 MG/DL — HIGH (ref 70–99)
HBA1C BLD-MCNC: 10.4 % — HIGH (ref 4–5.6)
HCT VFR BLD CALC: 34.4 % — LOW (ref 39–50)
HGB BLD-MCNC: 10.8 G/DL — LOW (ref 13–17)
LG PLATELETS BLD QL AUTO: PRESENT — SIGNIFICANT CHANGE UP
LYMPHOCYTES # BLD AUTO: 5 % — LOW (ref 13–44)
MACROCYTES BLD QL: SLIGHT — SIGNIFICANT CHANGE UP
MAGNESIUM SERPL-MCNC: 1.8 MG/DL — SIGNIFICANT CHANGE UP (ref 1.6–2.6)
MANUAL SMEAR VERIFICATION: SIGNIFICANT CHANGE UP
MCHC RBC-ENTMCNC: 26.7 PG — LOW (ref 27–34)
MCHC RBC-ENTMCNC: 31.4 G/DL — LOW (ref 32–36)
MCV RBC AUTO: 84.9 FL — SIGNIFICANT CHANGE UP (ref 80–100)
MICROCYTES BLD QL: SLIGHT — SIGNIFICANT CHANGE UP
MONOCYTES NFR BLD AUTO: 6 % — SIGNIFICANT CHANGE UP (ref 2–14)
NEUTROPHILS NFR BLD AUTO: 89 % — HIGH (ref 43–77)
PLAT MORPH BLD: (no result)
PLATELET # BLD AUTO: 305 K/UL — SIGNIFICANT CHANGE UP (ref 150–400)
POTASSIUM SERPL-MCNC: 4.6 MMOL/L — SIGNIFICANT CHANGE UP (ref 3.5–5.3)
POTASSIUM SERPL-SCNC: 4.6 MMOL/L — SIGNIFICANT CHANGE UP (ref 3.5–5.3)
RBC # BLD: 4.05 M/UL — LOW (ref 4.2–5.8)
RBC # FLD: 14.2 % — SIGNIFICANT CHANGE UP (ref 10.3–16.9)
RBC BLD AUTO: (no result)
SODIUM SERPL-SCNC: 137 MMOL/L — SIGNIFICANT CHANGE UP (ref 135–145)
WBC # BLD: 11.6 K/UL — HIGH (ref 3.8–10.5)
WBC # FLD AUTO: 11.6 K/UL — HIGH (ref 3.8–10.5)

## 2018-05-07 PROCEDURE — 99223 1ST HOSP IP/OBS HIGH 75: CPT | Mod: GC

## 2018-05-07 PROCEDURE — 99223 1ST HOSP IP/OBS HIGH 75: CPT

## 2018-05-07 PROCEDURE — 99222 1ST HOSP IP/OBS MODERATE 55: CPT | Mod: GC

## 2018-05-07 RX ORDER — INSULIN LISPRO 100/ML
5 VIAL (ML) SUBCUTANEOUS
Qty: 0 | Refills: 0 | Status: DISCONTINUED | OUTPATIENT
Start: 2018-05-07 | End: 2018-05-07

## 2018-05-07 RX ORDER — INSULIN GLARGINE 100 [IU]/ML
15 INJECTION, SOLUTION SUBCUTANEOUS AT BEDTIME
Qty: 0 | Refills: 0 | Status: DISCONTINUED | OUTPATIENT
Start: 2018-05-07 | End: 2018-05-07

## 2018-05-07 RX ORDER — INSULIN GLARGINE 100 [IU]/ML
27 INJECTION, SOLUTION SUBCUTANEOUS AT BEDTIME
Qty: 0 | Refills: 0 | Status: DISCONTINUED | OUTPATIENT
Start: 2018-05-07 | End: 2018-05-07

## 2018-05-07 RX ORDER — INSULIN LISPRO 100/ML
9 VIAL (ML) SUBCUTANEOUS
Qty: 0 | Refills: 0 | Status: DISCONTINUED | OUTPATIENT
Start: 2018-05-07 | End: 2018-05-08

## 2018-05-07 RX ORDER — INSULIN LISPRO 100/ML
9 VIAL (ML) SUBCUTANEOUS
Qty: 0 | Refills: 0 | Status: DISCONTINUED | OUTPATIENT
Start: 2018-05-07 | End: 2018-05-07

## 2018-05-07 RX ORDER — INSULIN GLARGINE 100 [IU]/ML
25 INJECTION, SOLUTION SUBCUTANEOUS AT BEDTIME
Qty: 0 | Refills: 0 | Status: DISCONTINUED | OUTPATIENT
Start: 2018-05-07 | End: 2018-05-08

## 2018-05-07 RX ORDER — INSULIN LISPRO 100/ML
4 VIAL (ML) SUBCUTANEOUS ONCE
Qty: 0 | Refills: 0 | Status: COMPLETED | OUTPATIENT
Start: 2018-05-07 | End: 2018-05-07

## 2018-05-07 RX ORDER — FLUTICASONE PROPIONATE 50 MCG
1 SPRAY, SUSPENSION NASAL
Qty: 0 | Refills: 0 | Status: DISCONTINUED | OUTPATIENT
Start: 2018-05-07 | End: 2018-05-09

## 2018-05-07 RX ORDER — MAGNESIUM SULFATE 500 MG/ML
1 VIAL (ML) INJECTION ONCE
Qty: 0 | Refills: 0 | Status: COMPLETED | OUTPATIENT
Start: 2018-05-07 | End: 2018-05-07

## 2018-05-07 RX ADMIN — MYCOPHENOLATE MOFETIL 500 MILLIGRAM(S): 250 CAPSULE ORAL at 11:01

## 2018-05-07 RX ADMIN — SENNA PLUS 2 TABLET(S): 8.6 TABLET ORAL at 22:09

## 2018-05-07 RX ADMIN — Medication 20 MILLIGRAM(S): at 11:00

## 2018-05-07 RX ADMIN — Medication 10 MILLIGRAM(S): at 11:00

## 2018-05-07 RX ADMIN — Medication 100 MILLIGRAM(S): at 22:09

## 2018-05-07 RX ADMIN — AMPICILLIN SODIUM AND SULBACTAM SODIUM 200 GRAM(S): 250; 125 INJECTION, POWDER, FOR SUSPENSION INTRAMUSCULAR; INTRAVENOUS at 17:44

## 2018-05-07 RX ADMIN — MYCOPHENOLATE MOFETIL 500 MILLIGRAM(S): 250 CAPSULE ORAL at 19:41

## 2018-05-07 RX ADMIN — Medication 4: at 10:48

## 2018-05-07 RX ADMIN — AMPICILLIN SODIUM AND SULBACTAM SODIUM 200 GRAM(S): 250; 125 INJECTION, POWDER, FOR SUSPENSION INTRAMUSCULAR; INTRAVENOUS at 06:00

## 2018-05-07 RX ADMIN — AMPICILLIN SODIUM AND SULBACTAM SODIUM 200 GRAM(S): 250; 125 INJECTION, POWDER, FOR SUSPENSION INTRAMUSCULAR; INTRAVENOUS at 00:13

## 2018-05-07 RX ADMIN — Medication 100 MILLIGRAM(S): at 11:00

## 2018-05-07 RX ADMIN — Medication 4: at 22:10

## 2018-05-07 RX ADMIN — Medication 100 MILLIGRAM(S): at 13:08

## 2018-05-07 RX ADMIN — Medication 9 UNIT(S): at 17:50

## 2018-05-07 RX ADMIN — Medication 100 GRAM(S): at 14:29

## 2018-05-07 RX ADMIN — CARVEDILOL PHOSPHATE 12.5 MILLIGRAM(S): 80 CAPSULE, EXTENDED RELEASE ORAL at 11:00

## 2018-05-07 RX ADMIN — Medication 4 UNIT(S): at 14:29

## 2018-05-07 RX ADMIN — TACROLIMUS 4 MILLIGRAM(S): 5 CAPSULE ORAL at 22:10

## 2018-05-07 RX ADMIN — CARVEDILOL PHOSPHATE 12.5 MILLIGRAM(S): 80 CAPSULE, EXTENDED RELEASE ORAL at 22:09

## 2018-05-07 RX ADMIN — TAMSULOSIN HYDROCHLORIDE 0.4 MILLIGRAM(S): 0.4 CAPSULE ORAL at 11:02

## 2018-05-07 RX ADMIN — TACROLIMUS 4 MILLIGRAM(S): 5 CAPSULE ORAL at 13:05

## 2018-05-07 RX ADMIN — AMPICILLIN SODIUM AND SULBACTAM SODIUM 200 GRAM(S): 250; 125 INJECTION, POWDER, FOR SUSPENSION INTRAMUSCULAR; INTRAVENOUS at 13:04

## 2018-05-07 RX ADMIN — Medication 5 UNIT(S): at 13:15

## 2018-05-07 RX ADMIN — AMPICILLIN SODIUM AND SULBACTAM SODIUM 200 GRAM(S): 250; 125 INJECTION, POWDER, FOR SUSPENSION INTRAMUSCULAR; INTRAVENOUS at 23:32

## 2018-05-07 RX ADMIN — Medication 1 SPRAY(S): at 17:40

## 2018-05-07 RX ADMIN — Medication 8: at 13:08

## 2018-05-07 RX ADMIN — INSULIN GLARGINE 25 UNIT(S): 100 INJECTION, SOLUTION SUBCUTANEOUS at 22:09

## 2018-05-07 RX ADMIN — Medication 1 SPRAY(S): at 07:27

## 2018-05-07 NOTE — CONSULT NOTE ADULT - PROBLEM SELECTOR RECOMMENDATION 9
I reviewed the CT scan dated 2013 and the findings are new.  The condition is stable and might be contributing to cough.  At this pint  there is no evidence of acute exacerbation and observe

## 2018-05-07 NOTE — DISCHARGE NOTE ADULT - MEDICATION SUMMARY - MEDICATIONS TO TAKE
I will START or STAY ON the medications listed below when I get home from the hospital:    predniSONE 5 mg oral tablet  -- 1 tab(s) by mouth once a day  -- Indication: For Renal transplant (home medication, no changes)    aspirin 325 mg oral delayed release tablet  -- 1 tab(s) by mouth 2 times a day  -- Indication: For Prophylaxis (home medication, no changes)    traMADol 50 mg oral tablet  -- 1 tab(s) by mouth every 4 hours, As needed, Moderate Pain (4-6)  -- Indication: For Pain (home medication, no changes)    Flomax 0.4 mg oral capsule  -- 1 cap(s) by mouth once a day  -- Indication: For BPH (home medication, no changes)    Lantus Solostar Pen 100 units/mL subcutaneous solution  -- 25 unit(s) subcutaneous once a day (at bedtime)   -- Do not drink alcoholic beverages when taking this medication.  It is very important that you take or use this exactly as directed.  Do not skip doses or discontinue unless directed by your doctor.  Keep in refrigerator.  Do not freeze.    -- Indication: For DM (diabetes mellitus)    HumaLOG KwikPen 100 units/mL injectable solution  -- 14 milliliter(s) injectable 3 times a day   -- Indication: For DM (diabetes mellitus)    carvedilol 12.5 mg oral tablet  -- 1 tab(s) by mouth every 12 hours  -- Indication: For Hypertension (home medication, no changes)    furosemide 20 mg oral tablet  -- 1 tab(s) by mouth once a day  -- Indication: For Diuretic (home medication, no changes)    famotidine 20 mg oral tablet  -- 1 tab(s) by mouth 2 times a day  -- Indication: For GERD (home medication, no changes)    mycophenolate mofetil 250 mg oral capsule  -- 500mg by mouth bid  **home antirejection medication**  -- Indication: For Renal transplant (home medication, no changes)    tacrolimus 1 mg oral capsule  -- 4 cap(s) by mouth 2 times a day  -- Indication: For Renal transplant (home medication, no changes)    docusate sodium 100 mg oral capsule  -- 1 cap(s) by mouth 3 times a day  -- Indication: For Constipation (home medication, no changes)    senna oral tablet  -- 2 tab(s) by mouth once a day (at bedtime)  -- Indication: For Constipation (home medication, no changes)    levoFLOXacin 500 mg oral tablet  -- 1 tab(s) by mouth every 24 hours  -- Indication: For Sinusitis

## 2018-05-07 NOTE — DISCHARGE NOTE ADULT - HOSPITAL COURSE
78 yo male hx right renal transplant with cough 3 weeks with imaging concerning for non-tb mycobacteria and otitis media/sinusitis. ENT consulted, recommended Augmentin vs Unasyn without any intervention. Patient was treated with unasyn during hospital stay. 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 No indication for bronchoscopy per Dr. Prather. Hb A1C noted to be 10.4. Endocrine consulted. Patient is medically stable for discharge. 78 yo male hx right renal transplant with cough 3 weeks with imaging concerning for non-tb mycobacteria and otitis media/sinusitis. ENT consulted, recommended Augmentin vs Unasyn without any intervention. Patient was treated with unasyn during hospital stay. ID consulted, switched to levaquin. 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 No indication for bronchoscopy per Dr. Prather, patient advised to get repeat CT in 8 weeks. Hb A1C noted to be 10.4. Endocrine consulted. Patient is medically stable for discharge.

## 2018-05-07 NOTE — DISCHARGE NOTE ADULT - CARE PROVIDERS DIRECT ADDRESSES
,[email or web address]

## 2018-05-07 NOTE — DISCHARGE NOTE ADULT - PLAN OF CARE
Continue antibiotics (levofloxacin 500mg once daily for 13 more days) You were admitted for acute ottitis media (ear infection), sinusitis and otomastoiditis (bone infection as a complication of ear infection). You were treated antibiotics. Please continue levofloxacin 500mg once daily for 13 more days. Please follow up with Dr. Hearn in two weeks. Your chest CT was notable for bronchiectasis: inflammation of lung airways which makes it hard to clear mucus. Your condition is currently stable and do not require any medication. You will need a repeat chest CT scan in 8 weeks. Please follow-up with Dr. Prather for further management. Please continue levofloxacin 500mg once daily for 13 more days. You have a history of diabetes. You insulin regimen was adjusted during hospital stay. Continue home medications You have a history of diabetes. You insulin regimen was adjusted during hospital stay. Please follow-up with Dr. Guajardo. You have a history of diabetes. You insulin regimen was adjusted during hospital stay. Please have lantus 25u at bedtime and humalog 14u three times a day before each meal. Please follow-up with Dr. Guajardo.

## 2018-05-07 NOTE — CONSULT NOTE ADULT - SUBJECTIVE AND OBJECTIVE BOX
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 Mac for evaluation of 3 - 4 week hx of cough and NEW right sided ear pain, and balance issues. Patient has been on immunosuppressants mycophenylate and tacrolimus + prednisone for renal transplant since May 2012. His cough initially began 4 weeks prior, was productive intermittently with mucous, brown. He obtained 5 days of azithromycin which according to him did not help. The ear pain started this past thursday, was sudden. He went to MD for evaluation, was told he had a lot of wax and no infection; wax was cleaned out and patient started having balance issues with walking intermittently. He has no apparent sick contacts. He denies shortness of breath, chest pain, nasal congestion, nausea, vomiting, diarrhea, headache, vision changes.    In the ED:  ICU Vital Signs Last 24 Hrs  T(C): 37.1 (06 May 2018 19:24), Max: 37.1 (06 May 2018 14:11)  T(F): 98.8 (06 May 2018 19:24), Max: 98.8 (06 May 2018 14:11)  HR: 76 (06 May 2018 19:24) (68 - 76)  BP: 161/68 (06 May 2018 19:24) (123/69 - 168/73)  BP(mean): --  ABP: --  ABP(mean): --  RR: 18 (06 May 2018 19:24) (17 - 18)  SpO2: 97% (06 May 2018 19:24) (96% - 97%)    Patient given 1x dose of unasyn, tylenol 650x1, had CT head, CT neck soft tissue, and ENT paged and will see, rec to continue abx. for now per Dr. Dunbar. (06 May 2018 20:24)      PAST MEDICAL & SURGICAL HISTORY:  History of renal transplant: secondary to DM  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  BPH (benign prostatic hypertrophy)  HTN (hypertension)  Kidney transplant recipient      FAMILY HISTORY:      SOCIAL HISTORY:  Smoking Status: [ ] Current, [ ] Former, [x ] Never  Pack Years:    MEDICATIONS:  Pulmonary:    Antimicrobials:  ampicillin/sulbactam  IVPB      ampicillin/sulbactam  IVPB 3 Gram(s) IV Intermittent every 6 hours    Anticoagulants:    Onc:    GI/:  docusate sodium 100 milliGRAM(s) Oral three times a day  senna 2 Tablet(s) Oral at bedtime    Endocrine:  dextrose 50% Injectable 12.5 Gram(s) IV Push once  dextrose 50% Injectable 25 Gram(s) IV Push once  dextrose 50% Injectable 25 Gram(s) IV Push once  dextrose Gel 1 Dose(s) Oral once PRN  glucagon  Injectable 1 milliGRAM(s) IntraMuscular once PRN  insulin glargine Injectable (LANTUS) 25 Unit(s) SubCutaneous at bedtime  insulin lispro (HumaLOG) corrective regimen sliding scale   SubCutaneous Before meals and at bedtime  insulin lispro Injectable (HumaLOG) 9 Unit(s) SubCutaneous three times a day before meals    Cardiac:  carvedilol 12.5 milliGRAM(s) Oral every 12 hours  furosemide    Tablet 20 milliGRAM(s) Oral daily  tamsulosin 0.4 milliGRAM(s) Oral daily    Other Medications:  acetaminophen   Tablet. 650 milliGRAM(s) Oral every 6 hours PRN  dextrose 5%. 1000 milliLiter(s) IV Continuous <Continuous>  fluticasone propionate 50 MICROgram(s)/spray Nasal Spray 1 Spray(s) Both Nostrils two times a day  mycophenolate mofetil 500 milliGRAM(s) Oral two times a day  tacrolimus 4 milliGRAM(s) Oral every 12 hours      Allergies    No Known Allergies    Intolerances        Vital Signs Last 24 Hrs  T(C): 37.2 (07 May 2018 15:09), Max: 37.2 (07 May 2018 15:09)  T(F): 99 (07 May 2018 15:09), Max: 99 (07 May 2018 15:09)  HR: 76 (07 May 2018 15:09) (76 - 82)  BP: 153/- (07 May 2018 15:09) (131/67 - 187/90)  BP(mean): 68 (07 May 2018 15:09) (68 - 68)  RR: 20 (07 May 2018 15:09) (17 - 20)  SpO2: 94% (07 May 2018 15:09) (93% - 97%)    05-06 @ 07:01  -  05-07 @ 07:00  --------------------------------------------------------  IN: 200 mL / OUT: 0 mL / NET: 200 mL          LABS:      CBC Full  -  ( 07 May 2018 06:05 )  WBC Count : 11.6 K/uL  Hemoglobin : 10.8 g/dL  Hematocrit : 34.4 %  Platelet Count - Automated : 305 K/uL  Mean Cell Volume : 84.9 fL  Mean Cell Hemoglobin : 26.7 pg  Mean Cell Hemoglobin Concentration : 31.4 g/dL  Auto Neutrophil # : x  Auto Lymphocyte # : x  Auto Monocyte # : x  Auto Eosinophil # : x  Auto Basophil # : x  Auto Neutrophil % : 89.0 %  Auto Lymphocyte % : 5.0 %  Auto Monocyte % : 6.0 %  Auto Eosinophil % : x  Auto Basophil % : x    05-07    137  |  102  |  16  ----------------------------<  313<H>  4.6   |  24  |  0.89    Ca    9.7      07 May 2018 12:51  Mg     1.8     05-07    TPro  6.7  /  Alb  3.2<L>  /  TBili  0.3  /  DBili  x   /  AST  8<L>  /  ALT  9<L>  /  AlkPhos  70  05-06    PT/INR - ( 06 May 2018 14:57 )   PT: 13.7 sec;   INR: 1.23          PTT - ( 06 May 2018 14:57 )  PTT:32.2 sec  < from: CT Chest No Cont (05.03.18 @ 14:49) >  EXAM:  CT CHEST                          PROCEDURE DATE:  05/03/2018                     INTERPRETATION:  CT of the CHEST without intravenous contrast dated   5/3/2018 2:49 PM    INDICATION: 77-year-old male with chronic cough.    TECHNIQUE: Thin axial reconstructions and multiplanar reformations were   reviewed.    PRIOR STUDIES: CT chest abdomen pelvis dated 5/24/2013    FINDINGS:    LUNGS: No pleural effusions are seen. Evaluation of the pulmonary   parenchyma demonstrates moderate bronchiectasis in the bilateral lower   lobes, with associated scarring and atelectasis most pronounced at the   right lung base and in the lingula where there is a confluent linear and   of atelectasis and/or parenchymal scarring with associated traction   bronchiectasis (image 180, series 5 and coronal image 89, series 8038).   There are small airway inflammation characterized by tree-in-bud   micronodular opacities seen within the lingula (images 132-176, series 5).    MEDIASTINUM: The heart is normal in size.  No pericardial effusion is   seen.  Evaluation of the vasculature is limited without intravenous   contrast, but the great vessels demonstrate mild that this chronic   calcifications. There are severe calcifications in the coronary arteries.    Evaluation for adenopathy is limited without intravenous contrast,   however no gross mediastinal, hilar, axillary or supraclavicular   lymphadenopathy is identified.    UPPER ABDOMEN, SOFT TISSUES AND BONES: Limited evaluation of the upper   abdomen demonstrates atrophic kidneys bilaterally. There is   cholelithiasis. Evaluation of the osseous structures demonstrates   degenerative changes.    IMPRESSION:    1. Newly developing cylindrical and traction bronchiectasis involving the   mid and lower lung zones. Scattered tree-in-bud micronodular opacities   are identified. 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 Bronchoscopic correlation.  2. New focus of parenchymal scarring and or linear atelectasis with DrDada   bronchiectasis involving the lingula.  3. Additional chronic findings as above.      < end of copied text >              < from: CT Sinuses No Cont (05.03.18 @ 14:49) >  EXAM:  CT SINUSES                          PROCEDURE DATE:  05/03/2018                     INTERPRETATION:  Technique: A subcentimeter axial acquisition was   obtained through the paranasal sinuses utilizing navigational protocol.    Axial, sagittal and coronal reformatted images were created.      Contrast: Not administered.    Clinical Information: Persistent cough.    Prior Studies: 11/12/2007.    Findings:    There has been marked interval worsening of sinus opacification since   prior sinus CT of 11/2007. Specifically, there is now complete   opacification of the right-sided paranasal sinuses and of the right   anterior and posterior drainage pathways with extensive surrounding   sclerosis compatible with chronic inflammation. A fluid level is now   evident in the left frontal sinus and there is extensive opacification of   left anterior ethmoidal air cells and the left maxillary antrum. While   left-sided posterior ethmoidal air cells are clear, polypoid mucosal   thickening is present along the floor of the small left sphenoid sinus.   Left-sided anterior drainage pathways are occluded by soft tissue, while   the components of the left sphenoethmoidal recess are predominantly   ventilated. Density of the material within the opacified sinuses appears   predominantly mucoid.    There is extensive opacification of the right nasal cavity sparing the   inferior meatus, with foci of mucosal thickening noted in the left   anterior and superior middle meatus. The nasal septum is deviated to the   left side. The nasopharyngeal soft tissues are unremarkable in appearance.    There is pneumatization of the kiah [place]. The anterior skull base and   orbital walls are intact.    There has been interval placement of an ophthalmologic implant along the   superior temporal portion of the left globe. The native ocular lenses are   now absent bilaterally. Limited evaluation of the intracranial contents   demonstrates no evidence of hydrocephalus or large intracranial mass   lesion. There is now extensive opacification of right-sided mastoid air   cells.    IMPRESSION:    Since prior study of 2007, there is been marked interval worsening of   chronic inflammatory disease in the paranasal sinuses, with near complete   opacification of the right sinonasal cavity, as above.    < end of copied text >        RADIOLOGY & ADDITIONAL STUDIES (The following images were personally reviewed):

## 2018-05-07 NOTE — DISCHARGE NOTE ADULT - ADDITIONAL INSTRUCTIONS
Please follow-up with Casandra  Please follow-up with Dr. Prather in 8 weeks. Please follow-up with Dr. Aranda and Dr. Hearn within the next two weeks. Please follow-up with Dr. Prather in 8 weeks. Please follow-up with Dr. Guajardo, Dr. Aranda and Dr. Hearn within the next two weeks.

## 2018-05-07 NOTE — PROGRESS NOTE ADULT - SUBJECTIVE AND OBJECTIVE BOX
INTERVAL HPI/OVERNIGHT EVENTS: MAGGIE    SUBJECTIVE: Patient seen and examined at bedside. No fever, chills, n/v or diarrhea. No SOB.     OBJECTIVE:    VITAL SIGNS:  ICU Vital Signs Last 24 Hrs  T(C): 37.1 (07 May 2018 08:35), Max: 37.1 (06 May 2018 14:11)  T(F): 98.7 (07 May 2018 08:35), Max: 98.8 (06 May 2018 14:11)  HR: 78 (07 May 2018 08:35) (68 - 82)  BP: 187/90 (07 May 2018 10:58) (123/69 - 187/90)  BP(mean): --  ABP: --  ABP(mean): --  RR: 18 (07 May 2018 08:35) (17 - 18)  SpO2: 93% (07 May 2018 08:35) (93% - 97%)        05-06 @ 07:01  -  05-07 @ 07:00  --------------------------------------------------------  IN: 200 mL / OUT: 0 mL / NET: 200 mL      CAPILLARY BLOOD GLUCOSE      POCT Blood Glucose.: 317 mg/dL (07 May 2018 12:24)      PHYSICAL EXAM:    General: NAD  HEENT: NC/AT; PERRL, clear conjunctiva  Neck: supple  Respiratory: CTA b/l  Cardiovascular: +S1/S2; RRR  Abdomen: soft, NT/ND; +BS x4  Extremities: WWP, 2+ peripheral pulses b/l; no LE edema  Skin: normal color and turgor; no rash  Neurological: No focal deficits    MEDICATIONS:  MEDICATIONS  (STANDING):  ampicillin/sulbactam  IVPB      ampicillin/sulbactam  IVPB 3 Gram(s) IV Intermittent every 6 hours  carvedilol 12.5 milliGRAM(s) Oral every 12 hours  dextrose 5%. 1000 milliLiter(s) (50 mL/Hr) IV Continuous <Continuous>  dextrose 50% Injectable 12.5 Gram(s) IV Push once  dextrose 50% Injectable 25 Gram(s) IV Push once  dextrose 50% Injectable 25 Gram(s) IV Push once  docusate sodium 100 milliGRAM(s) Oral three times a day  fluticasone propionate 50 MICROgram(s)/spray Nasal Spray 1 Spray(s) Both Nostrils two times a day  furosemide    Tablet 20 milliGRAM(s) Oral daily  insulin glargine Injectable (LANTUS) 15 Unit(s) SubCutaneous at bedtime  insulin lispro (HumaLOG) corrective regimen sliding scale   SubCutaneous Before meals and at bedtime  insulin lispro Injectable (HumaLOG) 4 Unit(s) SubCutaneous once  insulin lispro Injectable (HumaLOG) 5 Unit(s) SubCutaneous three times a day before meals  magnesium sulfate  IVPB 1 Gram(s) IV Intermittent once  mycophenolate mofetil 500 milliGRAM(s) Oral two times a day  senna 2 Tablet(s) Oral at bedtime  tacrolimus 4 milliGRAM(s) Oral every 12 hours  tamsulosin 0.4 milliGRAM(s) Oral daily    MEDICATIONS  (PRN):  acetaminophen   Tablet. 650 milliGRAM(s) Oral every 6 hours PRN Mild Pain (1 - 3)  dextrose Gel 1 Dose(s) Oral once PRN Blood Glucose LESS THAN 70 milliGRAM(s)/deciliter  glucagon  Injectable 1 milliGRAM(s) IntraMuscular once PRN Glucose LESS THAN 70 milligrams/deciliter      ALLERGIES:  Allergies    No Known Allergies    Intolerances        LABS:                        10.8   11.6  )-----------( 305      ( 07 May 2018 06:05 )             34.4     05-07    137  |  102  |  16  ----------------------------<  313<H>  4.6   |  24  |  0.89    Ca    9.7      07 May 2018 12:51  Mg     1.8     05-07    TPro  6.7  /  Alb  3.2<L>  /  TBili  0.3  /  DBili  x   /  AST  8<L>  /  ALT  9<L>  /  AlkPhos  70  05-06    PT/INR - ( 06 May 2018 14:57 )   PT: 13.7 sec;   INR: 1.23          PTT - ( 06 May 2018 14:57 )  PTT:32.2 sec      RADIOLOGY & ADDITIONAL TESTS: Reviewed.

## 2018-05-07 NOTE — CONSULT NOTE ADULT - SUBJECTIVE AND OBJECTIVE BOX
HPI: 77yMale    Age at Dx:  How dx:  Hx and duration of insulin:  Current Therapy:  Hx of hypoglycemia  Hx of DKA/HHS?    Home FSG:  Fasting  Lunch  Dinner  Bed    Hx of other regimens  Complications:  Outpatient Endo:    PMH & Surgical Hx:MASTOIDITIS  Unknown h/o HF  Handoff  MEWS Score  History of renal transplant  DM (diabetes mellitus)  BPH (benign prostatic hypertrophy)  HTN (hypertension)  Cough  Otitis media  Need for prophylactic measure  History of renal transplant  HTN (hypertension)  DM (diabetes mellitus)  Other pneumonia, unspecified organism  Mastoiditis  Kidney transplant recipient  DIZZINESS  90+  Mastoiditis      FH:  DM:  Thyroid:  Autoimmune:  Other:    SH:  Smoking  Etoh:  Recreational Drugs:  Social Life:    Home Meds:    Current Meds:  acetaminophen   Tablet. 650 milliGRAM(s) Oral every 6 hours PRN  ampicillin/sulbactam  IVPB      ampicillin/sulbactam  IVPB 3 Gram(s) IV Intermittent every 6 hours  carvedilol 12.5 milliGRAM(s) Oral every 12 hours  dextrose 5%. 1000 milliLiter(s) IV Continuous <Continuous>  dextrose 50% Injectable 12.5 Gram(s) IV Push once  dextrose 50% Injectable 25 Gram(s) IV Push once  dextrose 50% Injectable 25 Gram(s) IV Push once  dextrose Gel 1 Dose(s) Oral once PRN  docusate sodium 100 milliGRAM(s) Oral three times a day  fluticasone propionate 50 MICROgram(s)/spray Nasal Spray 1 Spray(s) Both Nostrils two times a day  furosemide    Tablet 20 milliGRAM(s) Oral daily  glucagon  Injectable 1 milliGRAM(s) IntraMuscular once PRN  insulin glargine Injectable (LANTUS) 15 Unit(s) SubCutaneous at bedtime  insulin lispro (HumaLOG) corrective regimen sliding scale   SubCutaneous Before meals and at bedtime  insulin lispro Injectable (HumaLOG) 5 Unit(s) SubCutaneous three times a day before meals  mycophenolate mofetil 500 milliGRAM(s) Oral two times a day  senna 2 Tablet(s) Oral at bedtime  tacrolimus 4 milliGRAM(s) Oral every 12 hours  tamsulosin 0.4 milliGRAM(s) Oral daily      Allergies:  No Known Allergies      ROS:  Denies the following except as indicated.    General: weight loss/weight gain, decreased appetite, fatigue  Eyes: Blurry vision, double vision, visual changes  ENT: Throat pain, changes in voice,   CV: palpitations, SOB, CP, cough  GI: NVD, difficulty swallowing, abdominal pain  : polyuria, dysuria  Endo: abnormal menses, temperature intolerance, decreased libido  MSK: weakness, joint pain  Skin: rash, dryness, diaphoresis  Heme: Easy bruising,bleeding  Neuro: HA, dizziness, lightheadedness, numbness tingling  Psych: Anxiety, Depression    Vital Signs Last 24 Hrs  T(C): 37.2 (07 May 2018 15:09), Max: 37.2 (07 May 2018 15:09)  T(F): 99 (07 May 2018 15:09), Max: 99 (07 May 2018 15:09)  HR: 76 (07 May 2018 15:09) (76 - 82)  BP: 153/- (07 May 2018 15:09) (131/67 - 187/90)  BP(mean): 68 (07 May 2018 15:09) (68 - 68)  RR: 20 (07 May 2018 15:09) (17 - 20)  SpO2: 94% (07 May 2018 15:09) (93% - 97%)  Height (cm): 170.18 (05-06 @ 21:30)  Weight (kg): 91.5 (05-06 @ 21:30)  BMI (kg/m2): 31.6 (05-06 @ 21:30)      Constitutional: wn/wd in NAD.   HEENT: NCAT, MMM, OP clear, EOMI, , no proptosis or lid retraction  Neck: no thyromegaly or palpable thyroid nodules   Respiratory: lungs CTAB.  Cardiovascular: regular rhythm, normal S1 and S2, no audible murmurs, no peripheral edema  GI: soft, NT/ND, no masses/HSM appreciated.  Neurology: no tremors, DTR 2+  Skin: no visible rashes/lesions  Psychiatric: AAO x 3, normal affect/mood.  Ext: radial pulses intact, DP pulses intact, extremities warm, no cyanosis, clubbing or edema.       LABS:                        10.8   11.6  )-----------( 305      ( 07 May 2018 06:05 )             34.4     05-07    137  |  102  |  16  ----------------------------<  313<H>  4.6   |  24  |  0.89    Ca    9.7      07 May 2018 12:51  Mg     1.8     05-07    TPro  6.7  /  Alb  3.2<L>  /  TBili  0.3  /  DBili  x   /  AST  8<L>  /  ALT  9<L>  /  AlkPhos  70  05-06    PT/INR - ( 06 May 2018 14:57 )   PT: 13.7 sec;   INR: 1.23          PTT - ( 06 May 2018 14:57 )  PTT:32.2 sec    Hemoglobin A1C, Whole Blood: 10.4 (05-07 @ 06:05)        RADIOLOGY & ADDITIONAL STUDIES:    CAPILLARY BLOOD GLUCOSE      A/P:77y Male    1.  DM  Please continue lantus       units at night / morning.  Please continue lispro      units before each meal.  Please continue lispro moderate / low dose sliding scale four times daily with meals and at bedtime    Pt's fingerstick glucose goal is     Will continue to monitor     For discharge, pt can continue    Pt can follow up at discharge with [facility] by calling  to make an appointment.   Will discuss case with     and update primary team HPI: 78 yo male hx of DM 2, s/p right renal transplant (5/2012) 2/2 diabetic retinopathy, 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 He has been diabetic for over 20 years. He was on oral medications at one point but was placed on insulin due to kidney disease. He is currently on HUmalog 75:25 35 units BID. Patient's wife checks his fsg and inject his insulin. His fsg can be sometimes normal and sometimes high. NO hypoglycemia. He will eat sweets sometimes. He denies any neuropathy. He has been eating well in the hospital. he admits to having sweets such as chocolate which is seen at bedside. He is followed by Dr Navarro as outpatient but has not seen him for 2 years now.      PMH & Surgical Hx:  History of renal transplant  DM (diabetes mellitus)  BPH (benign prostatic hypertrophy)  HTN (hypertension)      FH:  DM: parents    SH:  Smoking: none  Etoh:none    Home Meds:  Home Medications:  acetaminophen 500 mg oral capsule: 2 tab(s) orally every 8 hours  **do not administer more than 3,200mg in a day.** (15 Jay 2016 12:54)  aspirin 325 mg oral delayed release tablet: 1 tab(s) orally 2 times a day (14 Jan 2016 14:43)  carvedilol 12.5 mg oral tablet: 1 tab(s) orally every 12 hours (14 Jan 2016 14:43)  docusate sodium 100 mg oral capsule: 1 cap(s) orally 3 times a day (14 Jan 2016 14:43)  famotidine 20 mg oral tablet: 1 tab(s) orally 2 times a day (15 Jay 2016 12:54)  Flomax 0.4 mg oral capsule: 1 cap(s) orally once a day (11 Jan 2016 12:18)  mycophenolate mofetil 250 mg oral capsule: 500mg orally bid  **home antirejection medication** (15 Jay 2016 12:54)  predniSONE 10 mg oral tablet: 1 tab(s) orally once a day (14 Jan 2016 14:43)  senna oral tablet: 2 tab(s) orally once a day (at bedtime) (15 Jay 2016 12:54)  tacrolimus 1 mg oral capsule: 4 cap(s) orally 2 times a day (14 Jan 2016 14:43)  traMADol 50 mg oral tablet: 1 tab(s) orally every 4 hours, As needed, Moderate Pain (4-6) (15 Jay 2016 12:54)  Zofran 4 mg oral tablet: 1 tab(s) orally every 6 hours, As Needed, nausea (15 Jay 2016 12:54)    Current Meds:  acetaminophen   Tablet. 650 milliGRAM(s) Oral every 6 hours PRN  ampicillin/sulbactam  IVPB      ampicillin/sulbactam  IVPB 3 Gram(s) IV Intermittent every 6 hours  carvedilol 12.5 milliGRAM(s) Oral every 12 hours  dextrose 5%. 1000 milliLiter(s) IV Continuous <Continuous>  dextrose 50% Injectable 12.5 Gram(s) IV Push once  dextrose 50% Injectable 25 Gram(s) IV Push once  dextrose 50% Injectable 25 Gram(s) IV Push once  dextrose Gel 1 Dose(s) Oral once PRN  docusate sodium 100 milliGRAM(s) Oral three times a day  fluticasone propionate 50 MICROgram(s)/spray Nasal Spray 1 Spray(s) Both Nostrils two times a day  furosemide    Tablet 20 milliGRAM(s) Oral daily  glucagon  Injectable 1 milliGRAM(s) IntraMuscular once PRN  insulin glargine Injectable (LANTUS) 15 Unit(s) SubCutaneous at bedtime  insulin lispro (HumaLOG) corrective regimen sliding scale   SubCutaneous Before meals and at bedtime  insulin lispro Injectable (HumaLOG) 5 Unit(s) SubCutaneous three times a day before meals  mycophenolate mofetil 500 milliGRAM(s) Oral two times a day  senna 2 Tablet(s) Oral at bedtime  tacrolimus 4 milliGRAM(s) Oral every 12 hours  tamsulosin 0.4 milliGRAM(s) Oral daily      Allergies:  No Known Allergies      ROS:  Denies the following except as indicated.    General: weight loss/weight gain, decreased appetite, fatigue  Eyes: Blurry vision, double vision, visual changes  ENT: Throat pain, changes in voice,   CV: palpitations, SOB, CP, cough  GI: NVD, difficulty swallowing, abdominal pain  : polyuria, dysuria  Endo: abnormal menses, temperature intolerance, decreased libido  MSK: weakness, joint pain  Skin: rash, dryness, diaphoresis  Heme: Easy bruising,bleeding  Neuro: HA, dizziness, lightheadedness, numbness tingling  Psych: Anxiety, Depression    Vital Signs Last 24 Hrs  T(C): 37.2 (07 May 2018 15:09), Max: 37.2 (07 May 2018 15:09)  T(F): 99 (07 May 2018 15:09), Max: 99 (07 May 2018 15:09)  HR: 76 (07 May 2018 15:09) (76 - 82)  BP: 153/- (07 May 2018 15:09) (131/67 - 187/90)  BP(mean): 68 (07 May 2018 15:09) (68 - 68)  RR: 20 (07 May 2018 15:09) (17 - 20)  SpO2: 94% (07 May 2018 15:09) (93% - 97%)  Height (cm): 170.18 (05-06 @ 21:30)  Weight (kg): 91.5 (05-06 @ 21:30)  BMI (kg/m2): 31.6 (05-06 @ 21:30)      Constitutional: wn/wd in NAD.   HEENT: NCAT, MMM, OP clear, EOMI, , no proptosis or lid retraction  Neck: no thyromegaly or palpable thyroid nodules   Respiratory: lungs CTAB.  Cardiovascular: regular rhythm, normal S1 and S2, no audible murmurs, no peripheral edema  GI: soft, NT/ND, no masses/HSM appreciated.  Neurology: no tremors, DTR 2+  Skin: no visible rashes/lesions  Psychiatric: AAO x 3, normal affect/mood.  Ext: radial pulses intact, DP pulses intact, extremities warm, no cyanosis, clubbing or edema.       LABS:                        10.8   11.6  )-----------( 305      ( 07 May 2018 06:05 )             34.4     05-07    137  |  102  |  16  ----------------------------<  313<H>  4.6   |  24  |  0.89    Ca    9.7      07 May 2018 12:51  Mg     1.8     05-07    TPro  6.7  /  Alb  3.2<L>  /  TBili  0.3  /  DBili  x   /  AST  8<L>  /  ALT  9<L>  /  AlkPhos  70  05-06    PT/INR - ( 06 May 2018 14:57 )   PT: 13.7 sec;   INR: 1.23          PTT - ( 06 May 2018 14:57 )  PTT:32.2 sec    Hemoglobin A1C, Whole Blood: 10.4 (05-07 @ 06:05)        RADIOLOGY & ADDITIONAL STUDIES:    CAPILLARY BLOOD GLUCOSE      A/P:78 yo male hx of DM 2, s/p right renal transplant (5/2012) 2/2 diabetic retinopathy, 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    1.  DM 2-uncontrolled, complicated. Patient is noncompliant with diet. He will not have any more sweets and only eat meals offered by the hospital.  Please inc to lantus 25 units at night.  Please start lispro 9  units before each meal.  Please continue lispro moderate  dose sliding scale four times daily with meals and at bedtime    Pt's fingerstick glucose goal is 100-180    Will continue to monitor       Pt can follow up at discharge with DR Navarro [facility] by calling  to make an appointment.   Will discuss case with Dr. Rabago   and update primary team

## 2018-05-07 NOTE — DISCHARGE NOTE ADULT - MEDICATION SUMMARY - MEDICATIONS TO STOP TAKING
I will STOP taking the medications listed below when I get home from the hospital:    predniSONE 10 mg oral tablet  -- 1 tab(s) by mouth once a day    HumaLOG  --  50/50 subcutaneous  Administer 35 units prior to breakfast    HumaLOG  --  50/50 subcutaneous  Administer 15 units at bedtime    insulin lispro 100 units/mL subcutaneous solution  --  subcutaneous 4 times a day (before meals and at bedtime); 2 Unit(s) if Glucose 151 - 200  4 Unit(s) if Glucose 201 - 250  6 Unit(s) if Glucose 251 - 300  8 Unit(s) if Glucose 301 - 350  10 Unit(s) if Glucose 351 - 400  12 Unit(s) + Contact MD if Glucose GREATER THAN 400

## 2018-05-07 NOTE — DISCHARGE NOTE ADULT - CARE PROVIDER_API CALL
Tc Guajardo), EndocrinologyMetabDiabetes; Internal Medicine  [address]  [address]  [place] [address]  Phone: [phone]  Fax: [phone]    Callum Aranda), Internal Medicine; Nephrology  [address]  [address]  [place] [address]  Phone: [phone]  Fax: [phone]    Maikel Hearn), Otolaryngology  [address]  [address] [place] [address]  Phone: [phone]  Fax: [phone]    Viviana Prather), Critical Care Medicine; Pulmonary Disease  [address]  [place] [address]  Phone: [phone]  Fax: [phone]

## 2018-05-07 NOTE — DISCHARGE NOTE ADULT - CARE PLAN
Principal Discharge DX:	Otitis media  Goal:	Continue antibiotics (levofloxacin 500mg once daily for 13 more days)  Assessment and plan of treatment:	You were admitted for acute ottitis media (ear infection), sinusitis and otomastoiditis (bone infection as a complication of ear infection). You were treated antibiotics. Please continue levofloxacin 500mg once daily for 13 more days. Please follow up with Dr. Hearn in two weeks.  Secondary Diagnosis:	Bronchiectasis  Assessment and plan of treatment:	Your chest CT was notable for bronchiectasis: inflammation of lung airways which makes it hard to clear mucus. Your condition is currently stable and do not require any medication. You will need a repeat chest CT scan in 8 weeks. Please follow-up with Dr. Prather for further management. Please continue levofloxacin 500mg once daily for 13 more days.  Secondary Diagnosis:	DM (diabetes mellitus)  Assessment and plan of treatment:	You have a history of diabetes. You insulin regimen was adjusted during hospital stay.  Secondary Diagnosis:	Essential hypertension  Goal:	Continue home medications  Secondary Diagnosis:	History of renal transplant  Goal:	Continue home medications Principal Discharge DX:	Otitis media  Goal:	Continue antibiotics (levofloxacin 500mg once daily for 13 more days)  Assessment and plan of treatment:	You were admitted for acute ottitis media (ear infection), sinusitis and otomastoiditis (bone infection as a complication of ear infection). You were treated antibiotics. Please continue levofloxacin 500mg once daily for 13 more days. Please follow up with Dr. Hearn in two weeks.  Secondary Diagnosis:	Bronchiectasis  Assessment and plan of treatment:	Your chest CT was notable for bronchiectasis: inflammation of lung airways which makes it hard to clear mucus. Your condition is currently stable and do not require any medication. You will need a repeat chest CT scan in 8 weeks. Please follow-up with Dr. Prather for further management. Please continue levofloxacin 500mg once daily for 13 more days.  Secondary Diagnosis:	DM (diabetes mellitus)  Assessment and plan of treatment:	You have a history of diabetes. You insulin regimen was adjusted during hospital stay. Please follow-up with Dr. Guajardo.  Secondary Diagnosis:	Essential hypertension  Goal:	Continue home medications  Secondary Diagnosis:	History of renal transplant  Goal:	Continue home medications Principal Discharge DX:	Otitis media  Goal:	Continue antibiotics (levofloxacin 500mg once daily for 13 more days)  Assessment and plan of treatment:	You were admitted for acute ottitis media (ear infection), sinusitis and otomastoiditis (bone infection as a complication of ear infection). You were treated antibiotics. Please continue levofloxacin 500mg once daily for 13 more days. Please follow up with Dr. Hearn in two weeks.  Secondary Diagnosis:	Bronchiectasis  Assessment and plan of treatment:	Your chest CT was notable for bronchiectasis: inflammation of lung airways which makes it hard to clear mucus. Your condition is currently stable and do not require any medication. You will need a repeat chest CT scan in 8 weeks. Please follow-up with Dr. Prather for further management. Please continue levofloxacin 500mg once daily for 13 more days.  Secondary Diagnosis:	DM (diabetes mellitus)  Assessment and plan of treatment:	You have a history of diabetes. You insulin regimen was adjusted during hospital stay. Please have lantus 25u at bedtime and humalog 14u three times a day before each meal. Please follow-up with Dr. Guajardo.  Secondary Diagnosis:	Essential hypertension  Goal:	Continue home medications  Secondary Diagnosis:	History of renal transplant  Goal:	Continue home medications

## 2018-05-07 NOTE — CONSULT NOTE ADULT - PROBLEM SELECTOR RECOMMENDATION 2
most likely related to airway inflammation and possible JOSE ALBERTO.  There is no indication for bronchoscopy at this point.  No indication to treat for JOSE ALBERTO.  He will need to repeat the CT scan of chest in 8 weeks to follow on the consolidation in the lingula

## 2018-05-08 LAB
ANION GAP SERPL CALC-SCNC: 12 MMOL/L — SIGNIFICANT CHANGE UP (ref 5–17)
BLD GP AB SCN SERPL QL: NEGATIVE — SIGNIFICANT CHANGE UP
BUN SERPL-MCNC: 15 MG/DL — SIGNIFICANT CHANGE UP (ref 7–23)
CALCIUM SERPL-MCNC: 9.8 MG/DL — SIGNIFICANT CHANGE UP (ref 8.4–10.5)
CHLORIDE SERPL-SCNC: 101 MMOL/L — SIGNIFICANT CHANGE UP (ref 96–108)
CO2 SERPL-SCNC: 26 MMOL/L — SIGNIFICANT CHANGE UP (ref 22–31)
CREAT SERPL-MCNC: 0.85 MG/DL — SIGNIFICANT CHANGE UP (ref 0.5–1.3)
EOSINOPHIL NFR BLD AUTO: 1 % — SIGNIFICANT CHANGE UP (ref 0–6)
FERRITIN SERPL-MCNC: 394 NG/ML — SIGNIFICANT CHANGE UP (ref 30–400)
FOLATE SERPL-MCNC: 11.2 NG/ML — SIGNIFICANT CHANGE UP
GLUCOSE BLDC GLUCOMTR-MCNC: 107 MG/DL — HIGH (ref 70–99)
GLUCOSE BLDC GLUCOMTR-MCNC: 153 MG/DL — HIGH (ref 70–99)
GLUCOSE BLDC GLUCOMTR-MCNC: 168 MG/DL — HIGH (ref 70–99)
GLUCOSE BLDC GLUCOMTR-MCNC: 175 MG/DL — HIGH (ref 70–99)
GLUCOSE BLDC GLUCOMTR-MCNC: 271 MG/DL — HIGH (ref 70–99)
GLUCOSE SERPL-MCNC: 168 MG/DL — HIGH (ref 70–99)
HCT VFR BLD CALC: 35.9 % — LOW (ref 39–50)
HGB BLD-MCNC: 11.3 G/DL — LOW (ref 13–17)
IRON SATN MFR SERPL: 19 % — SIGNIFICANT CHANGE UP (ref 16–55)
IRON SATN MFR SERPL: 26 UG/DL — LOW (ref 45–165)
LYMPHOCYTES # BLD AUTO: 19 % — SIGNIFICANT CHANGE UP (ref 13–44)
MAGNESIUM SERPL-MCNC: 1.9 MG/DL — SIGNIFICANT CHANGE UP (ref 1.6–2.6)
MCHC RBC-ENTMCNC: 27.4 PG — SIGNIFICANT CHANGE UP (ref 27–34)
MCHC RBC-ENTMCNC: 31.5 G/DL — LOW (ref 32–36)
MCV RBC AUTO: 87.1 FL — SIGNIFICANT CHANGE UP (ref 80–100)
METHYLMALONATE SERPL-SCNC: 631 NMOL/L
MONOCYTES NFR BLD AUTO: 9.8 % — SIGNIFICANT CHANGE UP (ref 2–14)
NEUTROPHILS NFR BLD AUTO: 70.2 % — SIGNIFICANT CHANGE UP (ref 43–77)
PLATELET # BLD AUTO: 293 K/UL — SIGNIFICANT CHANGE UP (ref 150–400)
POTASSIUM SERPL-MCNC: 4.6 MMOL/L — SIGNIFICANT CHANGE UP (ref 3.5–5.3)
POTASSIUM SERPL-SCNC: 4.6 MMOL/L — SIGNIFICANT CHANGE UP (ref 3.5–5.3)
RBC # BLD: 4.12 M/UL — LOW (ref 4.2–5.8)
RBC # BLD: 4.12 M/UL — LOW (ref 4.2–5.8)
RBC # FLD: 13.8 % — SIGNIFICANT CHANGE UP (ref 10.3–16.9)
RETICS/RBC NFR: 1.2 % — SIGNIFICANT CHANGE UP (ref 0.5–2.5)
RH IG SCN BLD-IMP: POSITIVE — SIGNIFICANT CHANGE UP
SODIUM SERPL-SCNC: 139 MMOL/L — SIGNIFICANT CHANGE UP (ref 135–145)
TIBC SERPL-MCNC: 134 UG/DL — LOW (ref 220–430)
UIBC SERPL-MCNC: 108 UG/DL — LOW (ref 110–370)
VIT B12 SERPL-MCNC: 649 PG/ML — SIGNIFICANT CHANGE UP (ref 232–1245)
WBC # BLD: 9.6 K/UL — SIGNIFICANT CHANGE UP (ref 3.8–10.5)
WBC # FLD AUTO: 9.6 K/UL — SIGNIFICANT CHANGE UP (ref 3.8–10.5)

## 2018-05-08 PROCEDURE — 99232 SBSQ HOSP IP/OBS MODERATE 35: CPT | Mod: GC

## 2018-05-08 PROCEDURE — 99233 SBSQ HOSP IP/OBS HIGH 50: CPT

## 2018-05-08 RX ORDER — INSULIN LISPRO 100/ML
14 VIAL (ML) SUBCUTANEOUS
Qty: 0 | Refills: 0 | Status: DISCONTINUED | OUTPATIENT
Start: 2018-05-08 | End: 2018-05-09

## 2018-05-08 RX ORDER — MAGNESIUM SULFATE 500 MG/ML
1 VIAL (ML) INJECTION ONCE
Qty: 0 | Refills: 0 | Status: COMPLETED | OUTPATIENT
Start: 2018-05-08 | End: 2018-05-08

## 2018-05-08 RX ORDER — INSULIN GLARGINE 100 [IU]/ML
28 INJECTION, SOLUTION SUBCUTANEOUS AT BEDTIME
Qty: 0 | Refills: 0 | Status: DISCONTINUED | OUTPATIENT
Start: 2018-05-08 | End: 2018-05-09

## 2018-05-08 RX ADMIN — Medication 6: at 12:50

## 2018-05-08 RX ADMIN — Medication 1 SPRAY(S): at 18:30

## 2018-05-08 RX ADMIN — TACROLIMUS 4 MILLIGRAM(S): 5 CAPSULE ORAL at 23:43

## 2018-05-08 RX ADMIN — Medication 650 MILLIGRAM(S): at 10:45

## 2018-05-08 RX ADMIN — Medication 9 UNIT(S): at 08:47

## 2018-05-08 RX ADMIN — Medication 2: at 08:47

## 2018-05-08 RX ADMIN — Medication 2: at 22:10

## 2018-05-08 RX ADMIN — Medication 100 MILLIGRAM(S): at 10:13

## 2018-05-08 RX ADMIN — Medication 9 UNIT(S): at 12:50

## 2018-05-08 RX ADMIN — TAMSULOSIN HYDROCHLORIDE 0.4 MILLIGRAM(S): 0.4 CAPSULE ORAL at 12:41

## 2018-05-08 RX ADMIN — Medication 650 MILLIGRAM(S): at 11:52

## 2018-05-08 RX ADMIN — INSULIN GLARGINE 28 UNIT(S): 100 INJECTION, SOLUTION SUBCUTANEOUS at 22:11

## 2018-05-08 RX ADMIN — MYCOPHENOLATE MOFETIL 500 MILLIGRAM(S): 250 CAPSULE ORAL at 22:19

## 2018-05-08 RX ADMIN — MYCOPHENOLATE MOFETIL 500 MILLIGRAM(S): 250 CAPSULE ORAL at 10:13

## 2018-05-08 RX ADMIN — Medication 14 UNIT(S): at 17:25

## 2018-05-08 RX ADMIN — AMPICILLIN SODIUM AND SULBACTAM SODIUM 200 GRAM(S): 250; 125 INJECTION, POWDER, FOR SUSPENSION INTRAMUSCULAR; INTRAVENOUS at 12:41

## 2018-05-08 RX ADMIN — CARVEDILOL PHOSPHATE 12.5 MILLIGRAM(S): 80 CAPSULE, EXTENDED RELEASE ORAL at 10:13

## 2018-05-08 RX ADMIN — TACROLIMUS 4 MILLIGRAM(S): 5 CAPSULE ORAL at 12:41

## 2018-05-08 RX ADMIN — Medication 100 GRAM(S): at 11:34

## 2018-05-08 RX ADMIN — Medication 1 SPRAY(S): at 06:21

## 2018-05-08 RX ADMIN — Medication 20 MILLIGRAM(S): at 10:13

## 2018-05-08 RX ADMIN — Medication 100 MILLIGRAM(S): at 18:30

## 2018-05-08 RX ADMIN — AMPICILLIN SODIUM AND SULBACTAM SODIUM 200 GRAM(S): 250; 125 INJECTION, POWDER, FOR SUSPENSION INTRAMUSCULAR; INTRAVENOUS at 06:16

## 2018-05-08 RX ADMIN — Medication 5 MILLIGRAM(S): at 10:13

## 2018-05-08 NOTE — PROGRESS NOTE ADULT - ATTENDING COMMENTS
Pt seen with Dr. Alexander on rounds this afternoon.  Does not appear to be eating between meals nor any concentrated sweets at this point, but post-prandial glucoses are still high.  Will increase the pre-meal Humalog from 9 to 14 units, but the Lantus only to 28 units.  Will probably have him return to the 75/25 post-discharge, but will need to discuss this with his wife.  The 75/25 was not being given consistently at home, since she was decreasing the dose whenever his glucoses were below 200.
Patient examined, resident's hx and PE reviewed and confirmed. I find BP stable. CKD stable. A/P reviewed and confirmed. Continue abx. Follow SCr. Continue anti-hypertensives. See full note.
have reviewed above and examined pt, and discussed with 7u team and dr aguirre.   pt now feels better after beginning of unasyn, but still has tend in right mastoid area.   not toxic and feels better,.  exam otherwise w/o change form baseline.   will discuss with ent and with both pulm and id.
Patient seen and examined with house-staff during bedside rounds.  Resident note read, including vitals, physical findings, laboratory data, and radiological reports.   Revisions included below.  Direct personal management at bed side and extensive interpretation of the data.  Plan was outlined and discussed in details with the housestaff.  Decision making of high complexity  Action taken for acute disease activity to reflect the level of care provided:  - medication reconciliation  - review laboratory data  - discussed with the wife and Dr Howard  - creviewed all images  - evaluated the CT scan and pulmonary nodules

## 2018-05-08 NOTE — PROGRESS NOTE ADULT - PROBLEM SELECTOR PLAN 5
c/w mycophenylate 500 BID, tacrolimus 4mg BID, and prednisone 5qd
c/w mycophenylate 500 BID, tacrolimus 4mg BID, and prednisone 5qd

## 2018-05-08 NOTE — CONSULT NOTE ADULT - SUBJECTIVE AND OBJECTIVE BOX
HPI:  77 year old gentleman +Functional blindness,  hx renal transplant (right sided) (was on dialysis 3-4 years prior), DM1, hypertension, presents to ER at the behest of Dr. Mac for evaluation of 3 - 4 week hx of cough and NEW right sided ear pain, and balance issues. Patient has been on immunosuppressants mycophenylate and tacrolimus + prednisone for renal transplant since May 2012. His cough initially began 4 weeks prior, was productive intermittently with mucous, brown. He obtained 5 days of azithromycin which according to him did not help. The ear pain started this past thursday, was sudden. He went to MD for evaluation, was told he had a lot of wax and no infection; wax was cleaned out and patient started having balance issues with walking intermittently. He has no apparent sick contacts. He denies shortness of breath, chest pain, nasal congestion, nausea, vomiting, diarrhea, headache, vision changes.    In the ED:  ICU Vital Signs Last 24 Hrs  T(C): 37.1 (06 May 2018 19:24), Max: 37.1 (06 May 2018 14:11)  T(F): 98.8 (06 May 2018 19:24), Max: 98.8 (06 May 2018 14:11)  HR: 76 (06 May 2018 19:24) (68 - 76)  BP: 161/68 (06 May 2018 19:24) (123/69 - 168/73)  BP(mean): --  ABP: --  ABP(mean): --  RR: 18 (06 May 2018 19:24) (17 - 18)  SpO2: 97% (06 May 2018 19:24) (96% - 97%)    Patient given 1x dose of unasyn, tylenol 650x1, had CT head, CT neck soft tissue, and ENT paged and will see, rec to continue abx. for now per Dr. Dunbar. (06 May 2018 20:24)      PAST MEDICAL & SURGICAL HISTORY:  History of renal transplant: secondary to DM  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  BPH (benign prostatic hypertrophy)  HTN (hypertension)  Kidney transplant recipient        REVIEW OF SYSTEMS:    General:	 no weakness; no fevers, no chills  Skin/Breast: no rash  Respiratory and Thorax: no SOB, no cough  Cardiovascular:	No chest pain  Gastrointestinal:	 no nausea, vomiting , diarrhea  Genitourinary:	no dysuria, no difficulty urinating, no hematuria  Musculoskeletal:	no weakness, no joint swelling/pain  Neurological:	no focal weakness/numbness  Endocrine:	no polyuria, no polydipsia      ANTIBIOTICS:  MEDICATIONS  (STANDING):  ampicillin/sulbactam  IVPB      ampicillin/sulbactam  IVPB 3 Gram(s) IV Intermittent every 6 hours  carvedilol 12.5 milliGRAM(s) Oral every 12 hours  dextrose 5%. 1000 milliLiter(s) (50 mL/Hr) IV Continuous <Continuous>  dextrose 50% Injectable 12.5 Gram(s) IV Push once  dextrose 50% Injectable 25 Gram(s) IV Push once  dextrose 50% Injectable 25 Gram(s) IV Push once  docusate sodium 100 milliGRAM(s) Oral three times a day  fluticasone propionate 50 MICROgram(s)/spray Nasal Spray 1 Spray(s) Both Nostrils two times a day  furosemide    Tablet 20 milliGRAM(s) Oral daily  insulin glargine Injectable (LANTUS) 25 Unit(s) SubCutaneous at bedtime  insulin lispro (HumaLOG) corrective regimen sliding scale   SubCutaneous Before meals and at bedtime  insulin lispro Injectable (HumaLOG) 9 Unit(s) SubCutaneous three times a day before meals  mycophenolate mofetil 500 milliGRAM(s) Oral two times a day  predniSONE   Tablet 5 milliGRAM(s) Oral daily  senna 2 Tablet(s) Oral at bedtime  tacrolimus 4 milliGRAM(s) Oral every 12 hours  tamsulosin 0.4 milliGRAM(s) Oral daily    MEDICATIONS  (PRN):  acetaminophen   Tablet. 650 milliGRAM(s) Oral every 6 hours PRN Mild Pain (1 - 3)  dextrose Gel 1 Dose(s) Oral once PRN Blood Glucose LESS THAN 70 milliGRAM(s)/deciliter  glucagon  Injectable 1 milliGRAM(s) IntraMuscular once PRN Glucose LESS THAN 70 milligrams/deciliter      Allergies    No Known Allergies    Intolerances        SOCIAL HISTORY:    FAMILY HISTORY:      Vital Signs Last 24 Hrs  T(C): 37 (08 May 2018 08:42), Max: 37.2 (07 May 2018 15:09)  T(F): 98.6 (08 May 2018 08:42), Max: 99 (07 May 2018 15:09)  HR: 78 (08 May 2018 08:42) (72 - 80)  BP: 143/63 (08 May 2018 08:42) (143/63 - 181/80)  BP(mean): 68 (07 May 2018 15:09) (68 - 68)  RR: 18 (08 May 2018 08:42) (18 - 20)  SpO2: 94% (08 May 2018 08:42) (94% - 94%)      PHYSICAL EXAM:  Constitutional:Well-developed, well nourished  Eyes:GORDON, EOMI  Ear/Nose/Throat: no oral lesion, no sinus tenderness on percussion	  Neck:no JVD, no lymphadenopathy, supple  Respiratory: CTA slim  Cardiovascular: S1S2 RRR, no murmurs  Gastrointestinal:soft, (+) BS, no HSM  Extremities:no e/e/c  Vascular: DP Pulse:	right normal; left normal            LABS:                        11.3   9.6   )-----------( 293      ( 08 May 2018 06:41 )             35.9     05-08    139  |  101  |  15  ----------------------------<  168<H>  4.6   |  26  |  0.85    Ca    9.8      08 May 2018 06:41  Mg     1.9     05-08    TPro  6.7  /  Alb  3.2<L>  /  TBili  0.3  /  DBili  x   /  AST  8<L>  /  ALT  9<L>  /  AlkPhos  70  05-06    PT/INR - ( 06 May 2018 14:57 )   PT: 13.7 sec;   INR: 1.23          PTT - ( 06 May 2018 14:57 )  PTT:32.2 sec      MICROBIOLOGY:  RADIOLOGY & ADDITIONAL STUDIES: HPI:  77 year old gentleman +Functional blindness,  hx renal transplant (right sided) (was on dialysis 3-4 years prior), DM1, hypertension, presents to ER at the behest of Dr. Mac for evaluation of 3 - 4 week hx of cough and NEW right sided ear pain, and balance issues. Patient has been on immunosuppressants mycophenylate and tacrolimus + prednisone for renal transplant since May 2012. His cough initially began 4 weeks prior, was productive intermittently with mucous, brown. He obtained 5 days of azithromycin which according to him did not help. The ear pain started this past thursday, was sudden. He went to MD for evaluation, was told he had a lot of wax and no infection; wax was cleaned out and patient started having balance issues with walking intermittently. He has no apparent sick contacts. He denies shortness of breath, chest pain, nasal congestion, nausea, vomiting, diarrhea, headache, vision changes.    Of note, pt states that he has never had an ear infection before. Pt states that his right ear pain has been improving. Denies n/v/f/sob/chest pain.     In the ED:  ICU Vital Signs Last 24 Hrs  T(C): 37.1 (06 May 2018 19:24), Max: 37.1 (06 May 2018 14:11)  T(F): 98.8 (06 May 2018 19:24), Max: 98.8 (06 May 2018 14:11)  HR: 76 (06 May 2018 19:24) (68 - 76)  BP: 161/68 (06 May 2018 19:24) (123/69 - 168/73)  BP(mean): --  ABP: --  ABP(mean): --  RR: 18 (06 May 2018 19:24) (17 - 18)  SpO2: 97% (06 May 2018 19:24) (96% - 97%)    Patient given 1x dose of unasyn, tylenol 650x1, had CT head, CT neck soft tissue, and ENT paged and will see, rec to continue abx. for now per Dr. Dunbar. (06 May 2018 20:24)      PAST MEDICAL & SURGICAL HISTORY:  History of renal transplant: secondary to DM  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  BPH (benign prostatic hypertrophy)  HTN (hypertension)  Kidney transplant recipient        REVIEW OF SYSTEMS:    General:	 no weakness; no fevers, no chills  Skin/Breast: no rash  Respiratory and Thorax: no SOB, no cough  Cardiovascular:	No chest pain  Gastrointestinal:	 no nausea, vomiting , diarrhea  Genitourinary:	no dysuria, no difficulty urinating, no hematuria  Musculoskeletal:	no weakness, no joint swelling/pain  Neurological:	no focal weakness/numbness  Endocrine:	no polyuria, no polydipsia      ANTIBIOTICS:  MEDICATIONS  (STANDING):  ampicillin/sulbactam  IVPB      ampicillin/sulbactam  IVPB 3 Gram(s) IV Intermittent every 6 hours  carvedilol 12.5 milliGRAM(s) Oral every 12 hours  dextrose 5%. 1000 milliLiter(s) (50 mL/Hr) IV Continuous <Continuous>  dextrose 50% Injectable 12.5 Gram(s) IV Push once  dextrose 50% Injectable 25 Gram(s) IV Push once  dextrose 50% Injectable 25 Gram(s) IV Push once  docusate sodium 100 milliGRAM(s) Oral three times a day  fluticasone propionate 50 MICROgram(s)/spray Nasal Spray 1 Spray(s) Both Nostrils two times a day  furosemide    Tablet 20 milliGRAM(s) Oral daily  insulin glargine Injectable (LANTUS) 25 Unit(s) SubCutaneous at bedtime  insulin lispro (HumaLOG) corrective regimen sliding scale   SubCutaneous Before meals and at bedtime  insulin lispro Injectable (HumaLOG) 9 Unit(s) SubCutaneous three times a day before meals  mycophenolate mofetil 500 milliGRAM(s) Oral two times a day  predniSONE   Tablet 5 milliGRAM(s) Oral daily  senna 2 Tablet(s) Oral at bedtime  tacrolimus 4 milliGRAM(s) Oral every 12 hours  tamsulosin 0.4 milliGRAM(s) Oral daily    MEDICATIONS  (PRN):  acetaminophen   Tablet. 650 milliGRAM(s) Oral every 6 hours PRN Mild Pain (1 - 3)  dextrose Gel 1 Dose(s) Oral once PRN Blood Glucose LESS THAN 70 milliGRAM(s)/deciliter  glucagon  Injectable 1 milliGRAM(s) IntraMuscular once PRN Glucose LESS THAN 70 milligrams/deciliter      Allergies    No Known Allergies    Intolerances        SOCIAL HISTORY:    FAMILY HISTORY:      Vital Signs Last 24 Hrs  T(C): 37 (08 May 2018 08:42), Max: 37.2 (07 May 2018 15:09)  T(F): 98.6 (08 May 2018 08:42), Max: 99 (07 May 2018 15:09)  HR: 78 (08 May 2018 08:42) (72 - 80)  BP: 143/63 (08 May 2018 08:42) (143/63 - 181/80)  BP(mean): 68 (07 May 2018 15:09) (68 - 68)  RR: 18 (08 May 2018 08:42) (18 - 20)  SpO2: 94% (08 May 2018 08:42) (94% - 94%)      PHYSICAL EXAM:  Constitutional:Well-developed, well nourished  Eyes:GORDON, EOMI  Ear/Nose/Throat: no oral lesion, no sinus tenderness on percussion	  Neck:no JVD, no lymphadenopathy, supple  Respiratory: CTA slim  Cardiovascular: S1S2 RRR, no murmurs  Gastrointestinal:soft, (+) BS, no HSM  Extremities:no e/e/c  Vascular: DP Pulse:	right normal; left normal            LABS:                        11.3   9.6   )-----------( 293      ( 08 May 2018 06:41 )             35.9     05-08    139  |  101  |  15  ----------------------------<  168<H>  4.6   |  26  |  0.85    Ca    9.8      08 May 2018 06:41  Mg     1.9     05-08    TPro  6.7  /  Alb  3.2<L>  /  TBili  0.3  /  DBili  x   /  AST  8<L>  /  ALT  9<L>  /  AlkPhos  70  05-06    PT/INR - ( 06 May 2018 14:57 )   PT: 13.7 sec;   INR: 1.23          PTT - ( 06 May 2018 14:57 )  PTT:32.2 sec      MICROBIOLOGY:  RADIOLOGY & ADDITIONAL STUDIES:

## 2018-05-08 NOTE — PROGRESS NOTE ADULT - SUBJECTIVE AND OBJECTIVE BOX
INTERVAL HPI/OVERNIGHT EVENTS:    Patient is a 77y old  Male who presents with a chief complaint of Cough and facial pain (07 May 2018 14:28)      Pt reports the following symptoms:    CONSTITUTIONAL:  Negative fever or chills, feels well, good appetite  EYES:  Negative  blurry vision or double vision  CARDIOVASCULAR:  Negative for chest pain or palpitations  RESPIRATORY:  Negative for cough, wheezing, or SOB   GASTROINTESTINAL:  Negative for nausea, vomiting, diarrhea, constipation, or abdominal pain  GENITOURINARY:  Negative frequency, urgency or dysuria  NEUROLOGIC:  No headache, confusion, dizziness, lightheadedness    MEDICATIONS  (STANDING):  ampicillin/sulbactam  IVPB      ampicillin/sulbactam  IVPB 3 Gram(s) IV Intermittent every 6 hours  carvedilol 12.5 milliGRAM(s) Oral every 12 hours  dextrose 5%. 1000 milliLiter(s) (50 mL/Hr) IV Continuous <Continuous>  dextrose 50% Injectable 12.5 Gram(s) IV Push once  dextrose 50% Injectable 25 Gram(s) IV Push once  dextrose 50% Injectable 25 Gram(s) IV Push once  docusate sodium 100 milliGRAM(s) Oral three times a day  fluticasone propionate 50 MICROgram(s)/spray Nasal Spray 1 Spray(s) Both Nostrils two times a day  furosemide    Tablet 20 milliGRAM(s) Oral daily  insulin glargine Injectable (LANTUS) 25 Unit(s) SubCutaneous at bedtime  insulin lispro (HumaLOG) corrective regimen sliding scale   SubCutaneous Before meals and at bedtime  insulin lispro Injectable (HumaLOG) 9 Unit(s) SubCutaneous three times a day before meals  mycophenolate mofetil 500 milliGRAM(s) Oral two times a day  predniSONE   Tablet 5 milliGRAM(s) Oral daily  senna 2 Tablet(s) Oral at bedtime  tacrolimus 4 milliGRAM(s) Oral every 12 hours  tamsulosin 0.4 milliGRAM(s) Oral daily    MEDICATIONS  (PRN):  acetaminophen   Tablet. 650 milliGRAM(s) Oral every 6 hours PRN Mild Pain (1 - 3)  dextrose Gel 1 Dose(s) Oral once PRN Blood Glucose LESS THAN 70 milliGRAM(s)/deciliter  glucagon  Injectable 1 milliGRAM(s) IntraMuscular once PRN Glucose LESS THAN 70 milligrams/deciliter      PHYSICAL EXAM  Vital Signs Last 24 Hrs  T(C): 37 (08 May 2018 08:42), Max: 37.2 (07 May 2018 15:09)  T(F): 98.6 (08 May 2018 08:42), Max: 99 (07 May 2018 15:09)  HR: 78 (08 May 2018 08:42) (72 - 80)  BP: 143/63 (08 May 2018 08:42) (143/63 - 181/80)  BP(mean): 68 (07 May 2018 15:09) (68 - 68)  RR: 18 (08 May 2018 08:42) (18 - 20)  SpO2: 94% (08 May 2018 08:42) (94% - 94%)    Constitutional: wn/wd in NAD.   HEENT: NCAT, MMM, OP clear, EOMI, no proptosis or lid retraction  Neck: no thyromegaly or palpable thyroid nodules   Respiratory: lungs CTAB.  Cardiovascular: regular rhythm, normal S1 and S2, no audible murmurs, no peripheral edema  GI: soft, NT/ND, no masses/HSM appreciated.  Neurology: no tremors, DTR 2+  Skin: no visible rashes/lesions  Psychiatric: AAO x 3, normal affect/mood.    LABS:                        11.3   9.6   )-----------( 293      ( 08 May 2018 06:41 )             35.9     05-08    139  |  101  |  15  ----------------------------<  168<H>  4.6   |  26  |  0.85    Ca    9.8      08 May 2018 06:41  Mg     1.9     05-08    TPro  6.7  /  Alb  3.2<L>  /  TBili  0.3  /  DBili  x   /  AST  8<L>  /  ALT  9<L>  /  AlkPhos  70  05-06    PT/INR - ( 06 May 2018 14:57 )   PT: 13.7 sec;   INR: 1.23          PTT - ( 06 May 2018 14:57 )  PTT:32.2 sec        HbA1C: 10.4 % (05-07 @ 06:05)    CAPILLARY BLOOD GLUCOSE      Insulin Sliding Scale requirements X 24 Hours:    RADIOLOGY & ADDITIONAL TESTS:    A/P: 77y Male with history of DM type II presenting for       1.  DM -     Please continue           units lantus at bedtime  / in the morning and        units lispro with meals and lispro moderate / low dose sliding scale 4 times daily with meals and at bedtime.  Please continue consistent carbohydrate diet.      Goal FSG is   Will continue to monitor   For discharge, pt can continue    Pt can follow up at discharge with [facility] by calling  to make an appointment.   Will discuss case with     and update primary team INTERVAL HPI/OVERNIGHT EVENTS:    Patient is eating minimally. He had a sandwich this morning. Last night he had a yogurt, soup and cracker.  FSG & Insulin received:    Yesterday:  pre-dinner fs  nutritional lispro 9 units +  0 units lispro SS  bedtime fs  lantus 25  units + 4   units lispro SS    Today:  pre-breakfast fs  nutritional lispro 9  units+  2  units lispro SS  pre-lunch fs  nutritional lispro  9 units+ 6  units lispro SS      Pt reports the following symptoms:    CONSTITUTIONAL:  Negative fever or chills, feels well, good appetite  EYES:  Negative  blurry vision or double vision  CARDIOVASCULAR:  Negative for chest pain or palpitations  RESPIRATORY:  Negative for cough, wheezing, or SOB   GASTROINTESTINAL:  Negative for nausea, vomiting, diarrhea, constipation, or abdominal pain  GENITOURINARY:  Negative frequency, urgency or dysuria  NEUROLOGIC:  No headache, confusion, dizziness, lightheadedness    MEDICATIONS  (STANDING):  ampicillin/sulbactam  IVPB      ampicillin/sulbactam  IVPB 3 Gram(s) IV Intermittent every 6 hours  carvedilol 12.5 milliGRAM(s) Oral every 12 hours  dextrose 5%. 1000 milliLiter(s) (50 mL/Hr) IV Continuous <Continuous>  dextrose 50% Injectable 12.5 Gram(s) IV Push once  dextrose 50% Injectable 25 Gram(s) IV Push once  dextrose 50% Injectable 25 Gram(s) IV Push once  docusate sodium 100 milliGRAM(s) Oral three times a day  fluticasone propionate 50 MICROgram(s)/spray Nasal Spray 1 Spray(s) Both Nostrils two times a day  furosemide    Tablet 20 milliGRAM(s) Oral daily  insulin glargine Injectable (LANTUS) 25 Unit(s) SubCutaneous at bedtime  insulin lispro (HumaLOG) corrective regimen sliding scale   SubCutaneous Before meals and at bedtime  insulin lispro Injectable (HumaLOG) 9 Unit(s) SubCutaneous three times a day before meals  mycophenolate mofetil 500 milliGRAM(s) Oral two times a day  predniSONE   Tablet 5 milliGRAM(s) Oral daily  senna 2 Tablet(s) Oral at bedtime  tacrolimus 4 milliGRAM(s) Oral every 12 hours  tamsulosin 0.4 milliGRAM(s) Oral daily    MEDICATIONS  (PRN):  acetaminophen   Tablet. 650 milliGRAM(s) Oral every 6 hours PRN Mild Pain (1 - 3)  dextrose Gel 1 Dose(s) Oral once PRN Blood Glucose LESS THAN 70 milliGRAM(s)/deciliter  glucagon  Injectable 1 milliGRAM(s) IntraMuscular once PRN Glucose LESS THAN 70 milligrams/deciliter      PHYSICAL EXAM  Vital Signs Last 24 Hrs  T(C): 37 (08 May 2018 08:42), Max: 37.2 (07 May 2018 15:09)  T(F): 98.6 (08 May 2018 08:42), Max: 99 (07 May 2018 15:09)  HR: 78 (08 May 2018 08:42) (72 - 80)  BP: 143/63 (08 May 2018 08:42) (143/63 - 181/80)  BP(mean): 68 (07 May 2018 15:09) (68 - 68)  RR: 18 (08 May 2018 08:42) (18 - 20)  SpO2: 94% (08 May 2018 08:42) (94% - 94%)    Constitutional: wn/wd in NAD.   HEENT: NCAT, MMM, OP clear, EOMI, no proptosis or lid retraction  Neck: no thyromegaly or palpable thyroid nodules   Respiratory: lungs CTAB.  Cardiovascular: regular rhythm, normal S1 and S2, no audible murmurs, no peripheral edema  GI: soft, NT/ND, no masses/HSM appreciated.  Neurology: no tremors, DTR 2+  Skin: no visible rashes/lesions  Psychiatric: AAO x 3, normal affect/mood.    LABS:                        11.3   9.6   )-----------( 293      ( 08 May 2018 06:41 )             35.9     05-08    139  |  101  |  15  ----------------------------<  168<H>  4.6   |  26  |  0.85    Ca    9.8      08 May 2018 06:41  Mg     1.9     08    TPro  6.7  /  Alb  3.2<L>  /  TBili  0.3  /  DBili  x   /  AST  8<L>  /  ALT  9<L>  /  AlkPhos  70  05-06    PT/INR - ( 06 May 2018 14:57 )   PT: 13.7 sec;   INR: 1.23          PTT - ( 06 May 2018 14:57 )  PTT:32.2 sec        HbA1C: 10.4 % (05-07 @ 06:05)    CAPILLARY BLOOD GLUCOSE      A/P:78 yo male hx of DM 2, s/p right renal transplant (2012) 2/2 diabetic retinopathy, 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    1.  DM 2-uncontrolled, complicated. Patient is noncompliant with diet. He will not have any more sweets and only eat meals offered by the hospital.  Please inc to lantus 30 units at night.  Please inc to lispro 14  units before each meal.  Please continue lispro moderate  dose sliding scale four times daily with meals and at bedtime    Pt's fingerstick glucose goal is 100-180    Will continue to monitor       Pt can follow up at discharge with DR Navarro [facility] by calling  to make an appointment.   Will discuss case with Dr. Rabago   and update primary team INTERVAL HPI/OVERNIGHT EVENTS:    Patient is eating minimally. He had a sandwich this morning. Last night he had a yogurt, soup and cracker.  FSG & Insulin received:    Yesterday:  pre-dinner fs  nutritional lispro 9 units +  0 units lispro SS  bedtime fs  lantus 25  units + 4   units lispro SS    Today:  pre-breakfast fs  nutritional lispro 9  units+  2  units lispro SS  pre-lunch fs  nutritional lispro  9 units+ 6  units lispro SS      Pt reports the following symptoms:    CONSTITUTIONAL:  Negative fever or chills, feels well, good appetite  EYES:  Negative  blurry vision or double vision  CARDIOVASCULAR:  Negative for chest pain or palpitations  RESPIRATORY:  Negative for cough, wheezing, or SOB   GASTROINTESTINAL:  Negative for nausea, vomiting, diarrhea, constipation, or abdominal pain  GENITOURINARY:  Negative frequency, urgency or dysuria  NEUROLOGIC:  No headache, confusion, dizziness, lightheadedness    MEDICATIONS  (STANDING):  ampicillin/sulbactam  IVPB      ampicillin/sulbactam  IVPB 3 Gram(s) IV Intermittent every 6 hours  carvedilol 12.5 milliGRAM(s) Oral every 12 hours  dextrose 5%. 1000 milliLiter(s) (50 mL/Hr) IV Continuous <Continuous>  dextrose 50% Injectable 12.5 Gram(s) IV Push once  dextrose 50% Injectable 25 Gram(s) IV Push once  dextrose 50% Injectable 25 Gram(s) IV Push once  docusate sodium 100 milliGRAM(s) Oral three times a day  fluticasone propionate 50 MICROgram(s)/spray Nasal Spray 1 Spray(s) Both Nostrils two times a day  furosemide    Tablet 20 milliGRAM(s) Oral daily  insulin glargine Injectable (LANTUS) 25 Unit(s) SubCutaneous at bedtime  insulin lispro (HumaLOG) corrective regimen sliding scale   SubCutaneous Before meals and at bedtime  insulin lispro Injectable (HumaLOG) 9 Unit(s) SubCutaneous three times a day before meals  mycophenolate mofetil 500 milliGRAM(s) Oral two times a day  predniSONE   Tablet 5 milliGRAM(s) Oral daily  senna 2 Tablet(s) Oral at bedtime  tacrolimus 4 milliGRAM(s) Oral every 12 hours  tamsulosin 0.4 milliGRAM(s) Oral daily    MEDICATIONS  (PRN):  acetaminophen   Tablet. 650 milliGRAM(s) Oral every 6 hours PRN Mild Pain (1 - 3)  dextrose Gel 1 Dose(s) Oral once PRN Blood Glucose LESS THAN 70 milliGRAM(s)/deciliter  glucagon  Injectable 1 milliGRAM(s) IntraMuscular once PRN Glucose LESS THAN 70 milligrams/deciliter      PHYSICAL EXAM  Vital Signs Last 24 Hrs  T(C): 37 (08 May 2018 08:42), Max: 37.2 (07 May 2018 15:09)  T(F): 98.6 (08 May 2018 08:42), Max: 99 (07 May 2018 15:09)  HR: 78 (08 May 2018 08:42) (72 - 80)  BP: 143/63 (08 May 2018 08:42) (143/63 - 181/80)  BP(mean): 68 (07 May 2018 15:09) (68 - 68)  RR: 18 (08 May 2018 08:42) (18 - 20)  SpO2: 94% (08 May 2018 08:42) (94% - 94%)    Constitutional: wn/wd in NAD.   HEENT: NCAT, MMM, OP clear, EOMI, no proptosis or lid retraction  Neck: no thyromegaly or palpable thyroid nodules   Respiratory: lungs CTAB.  Cardiovascular: regular rhythm, normal S1 and S2, no audible murmurs, no peripheral edema  GI: soft, NT/ND, no masses/HSM appreciated.  Neurology: no tremors, DTR 2+  Skin: no visible rashes/lesions  Psychiatric: AAO x 3, normal affect/mood.    LABS:                        11.3   9.6   )-----------( 293      ( 08 May 2018 06:41 )             35.9     05-08    139  |  101  |  15  ----------------------------<  168<H>  4.6   |  26  |  0.85    Ca    9.8      08 May 2018 06:41  Mg     1.9     08    TPro  6.7  /  Alb  3.2<L>  /  TBili  0.3  /  DBili  x   /  AST  8<L>  /  ALT  9<L>  /  AlkPhos  70  05-06    PT/INR - ( 06 May 2018 14:57 )   PT: 13.7 sec;   INR: 1.23          PTT - ( 06 May 2018 14:57 )  PTT:32.2 sec        HbA1C: 10.4 % (05-07 @ 06:05)    CAPILLARY BLOOD GLUCOSE      A/P:78 yo male hx of DM 2, s/p right renal transplant (2012) 2/2 diabetic retinopathy, 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    1.  DM 2-uncontrolled, complicated. Patient is noncompliant with diet. He will not have any more sweets and only eat meals offered by the hospital.  Please inc to lantus 28 units at night.  Please inc to lispro 14  units before each meal.  Please continue lispro moderate  dose sliding scale four times daily with meals and at bedtime    Pt's fingerstick glucose goal is 100-180    Will continue to monitor       Pt can follow up at discharge with DR Navarro [facility] by calling  to make an appointment.   Will discuss case with Dr. Rabago   and update primary team

## 2018-05-08 NOTE — PROGRESS NOTE ADULT - PROBLEM SELECTOR PLAN 2
Patient with cough intermittent productivity for 3 weeks s/p 5 days of outpatient abx, azithromycin. Has not had blood  in sputum. CT scan findings showing  Newly developing cylindrical and traction bronchiectasis involving the mid and lower lung zones. Scattered tree-in-bud micronodular opacities are identified. 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 Bronchoscopic correlation. Immunosuppreseants mycophenylate, and tacrolimus, prednisone increase risk of pulmonary pathology as well.   -no indication for bronchoscopy  -No indication to treat for JOSE ALBERTO.  He will need to repeat the CT scan of chest in 8 weeks to follow on the consolidation in the lingula.   -f/u Dr. Prather  -f/u blood cultures, sent in ER.
Patient with cough intermittent productivity for 3 weeks s/p 5 days of outpatient abx, azithromycin. Has not had blood  in sputum. CT scan findings showing  Newly developing cylindrical and traction bronchiectasis involving the mid and lower lung zones. Scattered tree-in-bud micronodular opacities are identified. 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 Bronchoscopic correlation. Immunosuppreseants mycophenylate, and tacrolimus, prednisone increase risk of pulmonary pathology as well.   -no indication for bronchoscopy  -f/u Dr. Prather  -f/u blood cultures, sent in ER.   -Will hold off on starting anti-tb therapy. Patient non-toxic appearing, not hypoxic and speaking in full sentences. Obtain sputum cultures.
follow with CT scan of the chest I 8 weeks to follow on the ligular consolidation .  No indication for bronch now.  Avoid azithromycin and quinolons

## 2018-05-08 NOTE — PROGRESS NOTE ADULT - PROBLEM SELECTOR PLAN 4
-C/w Carvedilol 12.5 BID with hold parameters
-C/w Carvedilol 12.5 BID with hold parameters
is related to bronchiectasis and postnasal drip

## 2018-05-08 NOTE — PROGRESS NOTE ADULT - PROBLEM SELECTOR PLAN 1
Patient with few day hx of right ear pain, with associated facial pain overlying right maxillary sinus. Imaging findings concerning for ?mastoiditis.   -ENT evaluated patient in ER, recommended augmentin v. Unasyn and believe that no drainage is necessary at this time. Would treat as an acute otitis media. Recs appreciated.   -c/w Unasyn 3grams Q6 hours (5/6 - present)  -f/u  blood cultures  -no indication for drainage  -f/u ID recs
Patient with few day hx of right ear pain, with associated facial pain overlying right maxillary sinus. Imaging findings concerning for ?mastoiditis.   -ENT evaluated patient in ER, recommended augmentin v. Unasyn and believe that no drainage is necessary at this time. Would treat as an acute otitis media. Recs appreciated.   -c/w Unasyn 3grams Q6 hours (5/6 - present)  -f/u  blood cultures  -no indication for drainage
stable and no evidence of acute exacerbation

## 2018-05-08 NOTE — PROGRESS NOTE ADULT - SUBJECTIVE AND OBJECTIVE BOX
INTERVAL HPI/OVERNIGHT EVENTS: MAGGIE    SUBJECTIVE: Patient seen and examined at bedside. Ear pain better compared to patient. Reports occasional cough and nasal congestion. Denies sputum production. No fever, chills, n/v or diarrhea. Last BM Saturday     OBJECTIVE:    VITAL SIGNS:  ICU Vital Signs Last 24 Hrs  T(C): 37 (08 May 2018 08:42), Max: 37.2 (07 May 2018 15:09)  T(F): 98.6 (08 May 2018 08:42), Max: 99 (07 May 2018 15:09)  HR: 78 (08 May 2018 08:42) (72 - 80)  BP: 143/63 (08 May 2018 08:42) (143/63 - 181/80)  BP(mean): 68 (07 May 2018 15:09) (68 - 68)  ABP: --  ABP(mean): --  RR: 18 (08 May 2018 08:42) (18 - 20)  SpO2: 94% (08 May 2018 08:42) (94% - 94%)        CAPILLARY BLOOD GLUCOSE      POCT Blood Glucose.: 175 mg/dL (08 May 2018 08:27)      PHYSICAL EXAM:    General: NAD  HEENT: NC/AT; PERRL, clear conjunctiva  Neck: supple  Respiratory: CTA b/l  Cardiovascular: +S1/S2; RRR  Abdomen: soft, NT/ND; +BS x4  Extremities: WWP, 2+ peripheral pulses b/l; no LE edema  Skin: normal color and turgor; no rash  Neurological: AAOx3, no focal deficits.     MEDICATIONS:  MEDICATIONS  (STANDING):  ampicillin/sulbactam  IVPB      ampicillin/sulbactam  IVPB 3 Gram(s) IV Intermittent every 6 hours  carvedilol 12.5 milliGRAM(s) Oral every 12 hours  dextrose 5%. 1000 milliLiter(s) (50 mL/Hr) IV Continuous <Continuous>  dextrose 50% Injectable 12.5 Gram(s) IV Push once  dextrose 50% Injectable 25 Gram(s) IV Push once  dextrose 50% Injectable 25 Gram(s) IV Push once  docusate sodium 100 milliGRAM(s) Oral three times a day  fluticasone propionate 50 MICROgram(s)/spray Nasal Spray 1 Spray(s) Both Nostrils two times a day  furosemide    Tablet 20 milliGRAM(s) Oral daily  insulin glargine Injectable (LANTUS) 25 Unit(s) SubCutaneous at bedtime  insulin lispro (HumaLOG) corrective regimen sliding scale   SubCutaneous Before meals and at bedtime  insulin lispro Injectable (HumaLOG) 9 Unit(s) SubCutaneous three times a day before meals  mycophenolate mofetil 500 milliGRAM(s) Oral two times a day  predniSONE   Tablet 5 milliGRAM(s) Oral daily  senna 2 Tablet(s) Oral at bedtime  tacrolimus 4 milliGRAM(s) Oral every 12 hours  tamsulosin 0.4 milliGRAM(s) Oral daily    MEDICATIONS  (PRN):  acetaminophen   Tablet. 650 milliGRAM(s) Oral every 6 hours PRN Mild Pain (1 - 3)  dextrose Gel 1 Dose(s) Oral once PRN Blood Glucose LESS THAN 70 milliGRAM(s)/deciliter  glucagon  Injectable 1 milliGRAM(s) IntraMuscular once PRN Glucose LESS THAN 70 milligrams/deciliter      ALLERGIES:  Allergies    No Known Allergies    Intolerances        LABS:                        11.3   9.6   )-----------( 293      ( 08 May 2018 06:41 )             35.9     05-08    139  |  101  |  15  ----------------------------<  168<H>  4.6   |  26  |  0.85    Ca    9.8      08 May 2018 06:41  Mg     1.9     05-08    TPro  6.7  /  Alb  3.2<L>  /  TBili  0.3  /  DBili  x   /  AST  8<L>  /  ALT  9<L>  /  AlkPhos  70  05-06    PT/INR - ( 06 May 2018 14:57 )   PT: 13.7 sec;   INR: 1.23          PTT - ( 06 May 2018 14:57 )  PTT:32.2 sec      RADIOLOGY & ADDITIONAL TESTS: Reviewed.

## 2018-05-08 NOTE — CONSULT NOTE ADULT - ASSESSMENT
78 yo M with PMH of +Functional blindness,  hx renal transplant (right sided) (was on dialysis 3-4 years prior), DM1, hypertension, presents to ER  for evaluation of 3 - 4 week hx of cough and NEW right sided ear pain. Patient with acute right mastoiditis that has improved on intravenous ampicillin-sulbactam.  There is no ear drainage; blood Cx negative; pt seen by ENT who does not feel that drainage is indicated.    Recommendation  1. When pt is medically cleared for d/c, pt should receive levofloxacin 500 po q 24h x 14 days.        Please call if we may be of further assistance

## 2018-05-08 NOTE — CONSULT NOTE ADULT - ATTENDING COMMENTS
ID Attending    Chart reviewed, patient examined.    Renal transplant patient with acute right mastoiditis that has improved on intravenous ampicillin-sulbactam.  There is no ear drainage; blood Cx negative; pt seen by ENT who does not feel that drainage is indicated.    There is no prior history of ear or mastoid infection.    CT reviewed.  GFR is 83    Impression:    I understand Dr. Aranda' concern about transition to orals in this immune-suppressed patient.  Given that fact that this is a first episode, the infection is likely caused by upper respiratory gaby, that are responsive ro levofloxacin, which achieves very high blood and tissue levels, equivalent to intravenous.    Recommend:    When ready for discharge pt should receive levofloxacin 500 po q 24h x 14 days.    Please call if we may be of further assistance
Pt seen on rounds with Dr. Alexander this afternoon.  Was poorly controlled on BID 75/25 at home, though dietary non-compliance was no doubt a significant contributing factor.  Was started on Lantus last night, but without standing pre-meal insulin.  Dietary "lapses" have also been a problem in the hospital--extra food from the [employer] room plus the pt has chocolate at the bedside.  Will start on basal/bolus in the hospital, only because it is too difficult to manage with 75/25 with missed meals, NPO status for procedures, etc.  Have asked pt to avoid eating extra food.  Will need to speak to his wife about this.  To increase the Lantus to 25 units and start a standing pre-meal dose (9 units TID/AC)
Patient seen and examined with house-staff during bedside rounds.  Resident note read, including vitals, physical findings, laboratory data, and radiological reports.   Revisions included below.  Direct personal management at bed side and extensive interpretation of the data.  Plan was outlined and discussed in details with the housestaff.  Decision making of high complexity  Action taken for acute disease activity to reflect the level of care provided:  - medication reconciliation  - review laboratory data  - discussed with the wife and Dr Howard  - creviewed all images  - evaluated the CT scan and pulmonary nodules

## 2018-05-08 NOTE — PROGRESS NOTE ADULT - SUBJECTIVE AND OBJECTIVE BOX
Interval Events: Reviewed  Patient seen and examined at bedside.    Patient is a 77y old  Male who presents with a chief complaint of Cough and facial pain (07 May 2018 14:28)  pain in the right side of the face    PAST MEDICAL & SURGICAL HISTORY:  History of renal transplant: secondary to DM  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  BPH (benign prostatic hypertrophy)  HTN (hypertension)  Kidney transplant recipient      MEDICATIONS:  Pulmonary:    Antimicrobials:  levoFLOXacin  Tablet 500 milliGRAM(s) Oral every 24 hours    Anticoagulants:    Cardiac:  carvedilol 12.5 milliGRAM(s) Oral every 12 hours  furosemide    Tablet 20 milliGRAM(s) Oral daily  tamsulosin 0.4 milliGRAM(s) Oral daily      Allergies    No Known Allergies    Intolerances        Vital Signs Last 24 Hrs  T(C): 36.8 (08 May 2018 15:05), Max: 37.2 (07 May 2018 20:53)  T(F): 98.3 (08 May 2018 15:05), Max: 98.9 (07 May 2018 20:53)  HR: 71 (08 May 2018 15:05) (71 - 80)  BP: 136/69 (08 May 2018 15:05) (136/69 - 181/80)  BP(mean): --  RR: 20 (08 May 2018 15:05) (18 - 20)  SpO2: 93% (08 May 2018 15:05) (93% - 94%)        LABS:      CBC Full  -  ( 08 May 2018 06:41 )  WBC Count : 9.6 K/uL  Hemoglobin : 11.3 g/dL  Hematocrit : 35.9 %  Platelet Count - Automated : 293 K/uL  Mean Cell Volume : 87.1 fL  Mean Cell Hemoglobin : 27.4 pg  Mean Cell Hemoglobin Concentration : 31.5 g/dL  Auto Neutrophil # : x  Auto Lymphocyte # : x  Auto Monocyte # : x  Auto Eosinophil # : x  Auto Basophil # : x  Auto Neutrophil % : 70.2 %  Auto Lymphocyte % : 19.0 %  Auto Monocyte % : 9.8 %  Auto Eosinophil % : 1.0 %  Auto Basophil % : x    05-08    139  |  101  |  15  ----------------------------<  168<H>  4.6   |  26  |  0.85    Ca    9.8      08 May 2018 06:41  Mg     1.9     05-08                      Culture Results:   No growth at 1 day. (05-06 @ 20:54)  Culture Results:   No growth at 1 day. (05-06 @ 20:54)      RADIOLOGY & ADDITIONAL STUDIES (The following images were personally reviewed):  Ward:                                     No  Urine output:                       adequate  DVT prophylaxis:                 Yes  Flattus:                                  Yes  Bowel movement:              No

## 2018-05-08 NOTE — PROGRESS NOTE ADULT - PROBLEM SELECTOR PLAN 3
-Patient takes humalog 25-75 combination insulin (short acting and intermediate acting insulin)  -A1c 10.4  -takes humalog 75/25 35u bid  -c/w lantus 25u bedtime and humalog 9TID  -c/w ISS  -f/u Endo recs
-Patient takes humalog 25-75 combination insulin (short acting and intermediate acting insulin)  -A1c 10.4  -takes humalog 75/25 35u bid  -c/w lantus 15u bedtime and humalog 5TID  -c/w ISS
continue follow with ENT

## 2018-05-09 VITALS
RESPIRATION RATE: 17 BRPM | HEART RATE: 77 BPM | TEMPERATURE: 98 F | OXYGEN SATURATION: 95 % | DIASTOLIC BLOOD PRESSURE: 73 MMHG | SYSTOLIC BLOOD PRESSURE: 147 MMHG

## 2018-05-09 DIAGNOSIS — I10 ESSENTIAL (PRIMARY) HYPERTENSION: ICD-10-CM

## 2018-05-09 LAB — GLUCOSE BLDC GLUCOMTR-MCNC: 83 MG/DL — SIGNIFICANT CHANGE UP (ref 70–99)

## 2018-05-09 PROCEDURE — 99233 SBSQ HOSP IP/OBS HIGH 50: CPT

## 2018-05-09 RX ORDER — FUROSEMIDE 40 MG
1 TABLET ORAL
Qty: 0 | Refills: 0 | DISCHARGE
Start: 2018-05-09

## 2018-05-09 RX ORDER — ENOXAPARIN SODIUM 100 MG/ML
25 INJECTION SUBCUTANEOUS
Qty: 9 | Refills: 3
Start: 2018-05-09 | End: 2018-09-05

## 2018-05-09 RX ORDER — INSULIN LISPRO 100/ML
14 VIAL (ML) SUBCUTANEOUS
Qty: 15 | Refills: 2
Start: 2018-05-09 | End: 2018-08-06

## 2018-05-09 RX ADMIN — CARVEDILOL PHOSPHATE 12.5 MILLIGRAM(S): 80 CAPSULE, EXTENDED RELEASE ORAL at 09:28

## 2018-05-09 RX ADMIN — MYCOPHENOLATE MOFETIL 500 MILLIGRAM(S): 250 CAPSULE ORAL at 09:28

## 2018-05-09 RX ADMIN — Medication 100 MILLIGRAM(S): at 09:28

## 2018-05-09 RX ADMIN — Medication 1 SPRAY(S): at 09:28

## 2018-05-09 RX ADMIN — Medication 14 UNIT(S): at 09:27

## 2018-05-09 RX ADMIN — Medication 5 MILLIGRAM(S): at 09:28

## 2018-05-09 RX ADMIN — Medication 20 MILLIGRAM(S): at 09:28

## 2018-05-09 NOTE — PROGRESS NOTE ADULT - ASSESSMENT
76 yo male hx right renal transplant with cough 3 weeks with imaging concerning for non-tb mycobacteria and otitis media/sinusitis.
78 yo male hx right renal transplant with cough 3 weeks with imaging concerning for non-tb mycobacteria and otitis media/sinusitis.
pt clinically improved and has decrease in right mastoid tend from exam on adm. remainder of exam is unrevealing.   pt to go home on levaquin 500 mg/d for two weeks, and will have f/u with me and dr dumont, and pulmonary f/u with dr aguirre.

## 2018-05-09 NOTE — PROGRESS NOTE ADULT - SUBJECTIVE AND OBJECTIVE BOX
CC: MASTOIDITIS      INTERVAL HISTORY:  this 78 yo man with dm, htn, renal xp 6 yrs ago, adm with cough unresponsive to azithromycin, followed bye  severe right mastoid pain and dizziness.  ct scan showed marked extensive multisinus inflammation, infection, and accumulation of secretion.s/drainage.  adm and rxed with unasyn with good response, and dr medrano feels we can complete rx with po levaquin for two weeks.  pt feels much better, cough improved but not resolved, and is eager to return home.      ROS: No chest pain. No shortness of breath. No nausea.    PAST MEDICAL & SURGICAL HISTORY:  History of renal transplant: secondary to DM  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  BPH (benign prostatic hypertrophy)  HTN (hypertension)  Kidney transplant recipient      PHYSICAL EXAM:  T(C): 36.9 (05-09-18 @ 09:24), Max: 36.9 (05-09-18 @ 09:24)  HR: 77 (05-09-18 @ 09:24)  BP: 147/73 (05-09-18 @ 09:24) (147/73 - 163/77)  RR: 17 (05-09-18 @ 09:24)  SpO2: 95% (05-09-18 @ 09:24)  Wt(kg): --  I&O's Summary    Weight 91.5 (05-06 @ 21:30)    Appearance: alert, pleasant, INAD.  ENT: oral mucosa moist, no pallor/cyanosis.  Neck: no JVD visible.  Cardiac: no rubs. no murmurs. Extremities: NO EDEMA.  Skin: no rashes.  Extremities (digits): no clubbing or cyanosis.  Respratory effort: no access muscle use. Lungs: CLEAR TO AUSCULTATION.  Abdomen: soft. nontender. no masses.  Psych affect: not depressed. Orientation: person, place, situation.     MEDICATIONS  (STANDING):  carvedilol 12.5 milliGRAM(s) Oral every 12 hours  dextrose 5%. 1000 milliLiter(s) (50 mL/Hr) IV Continuous <Continuous>  dextrose 50% Injectable 12.5 Gram(s) IV Push once  dextrose 50% Injectable 25 Gram(s) IV Push once  dextrose 50% Injectable 25 Gram(s) IV Push once  docusate sodium 100 milliGRAM(s) Oral three times a day  fluticasone propionate 50 MICROgram(s)/spray Nasal Spray 1 Spray(s) Both Nostrils two times a day  furosemide    Tablet 20 milliGRAM(s) Oral daily  insulin glargine Injectable (LANTUS) 28 Unit(s) SubCutaneous at bedtime  insulin lispro (HumaLOG) corrective regimen sliding scale   SubCutaneous Before meals and at bedtime  insulin lispro Injectable (HumaLOG) 14 Unit(s) SubCutaneous three times a day before meals  levoFLOXacin  Tablet 500 milliGRAM(s) Oral every 24 hours  mycophenolate mofetil 500 milliGRAM(s) Oral two times a day  predniSONE   Tablet 5 milliGRAM(s) Oral daily  senna 2 Tablet(s) Oral at bedtime  tacrolimus 4 milliGRAM(s) Oral every 12 hours  tamsulosin 0.4 milliGRAM(s) Oral daily    MEDICATIONS  (PRN):  acetaminophen   Tablet. 650 milliGRAM(s) Oral every 6 hours PRN Mild Pain (1 - 3)  dextrose Gel 1 Dose(s) Oral once PRN Blood Glucose LESS THAN 70 milliGRAM(s)/deciliter  glucagon  Injectable 1 milliGRAM(s) IntraMuscular once PRN Glucose LESS THAN 70 milligrams/deciliter      DATA:  139    |  101    |  15     ----------------------------<  168<H>  Ca:9.8   (08 May 2018 06:41)  4.6     |  26     |  0.85                                 11.3<L>  9.6   )-----------( 293      ( 08 May 2018 06:41 )             35.9<L>    Ferritin, Serum: 394 ng/mL (05-08 @ 06:41)

## 2018-05-09 NOTE — PROGRESS NOTE ADULT - PROBLEM SELECTOR PROBLEM 2
Essential hypertension
Other pneumonia, unspecified organism
Other pneumonia, unspecified organism
Pulmonary nodules

## 2018-05-11 DIAGNOSIS — R05 COUGH: ICD-10-CM

## 2018-05-11 DIAGNOSIS — E11.319 TYPE 2 DIABETES MELLITUS WITH UNSPECIFIED DIABETIC RETINOPATHY WITHOUT MACULAR EDEMA: ICD-10-CM

## 2018-05-11 DIAGNOSIS — E11.40 TYPE 2 DIABETES MELLITUS WITH DIABETIC NEUROPATHY, UNSPECIFIED: ICD-10-CM

## 2018-05-11 DIAGNOSIS — E11.65 TYPE 2 DIABETES MELLITUS WITH HYPERGLYCEMIA: ICD-10-CM

## 2018-05-11 DIAGNOSIS — A31.0 PULMONARY MYCOBACTERIAL INFECTION: ICD-10-CM

## 2018-05-11 DIAGNOSIS — H70.001 ACUTE MASTOIDITIS WITHOUT COMPLICATIONS, RIGHT EAR: ICD-10-CM

## 2018-05-11 DIAGNOSIS — E11.22 TYPE 2 DIABETES MELLITUS WITH DIABETIC CHRONIC KIDNEY DISEASE: ICD-10-CM

## 2018-05-11 DIAGNOSIS — Z79.4 LONG TERM (CURRENT) USE OF INSULIN: ICD-10-CM

## 2018-05-11 DIAGNOSIS — Z94.0 KIDNEY TRANSPLANT STATUS: ICD-10-CM

## 2018-05-11 DIAGNOSIS — I12.0 HYPERTENSIVE CHRONIC KIDNEY DISEASE WITH STAGE 5 CHRONIC KIDNEY DISEASE OR END STAGE RENAL DISEASE: ICD-10-CM

## 2018-05-11 DIAGNOSIS — Z91.11 PATIENT'S NONCOMPLIANCE WITH DIETARY REGIMEN: ICD-10-CM

## 2018-05-11 DIAGNOSIS — N40.0 BENIGN PROSTATIC HYPERPLASIA WITHOUT LOWER URINARY TRACT SYMPTOMS: ICD-10-CM

## 2018-05-11 DIAGNOSIS — J32.9 CHRONIC SINUSITIS, UNSPECIFIED: ICD-10-CM

## 2018-05-11 DIAGNOSIS — H54.8 LEGAL BLINDNESS, AS DEFINED IN USA: ICD-10-CM

## 2018-05-11 LAB
CULTURE RESULTS: SIGNIFICANT CHANGE UP
CULTURE RESULTS: SIGNIFICANT CHANGE UP
SPECIMEN SOURCE: SIGNIFICANT CHANGE UP
SPECIMEN SOURCE: SIGNIFICANT CHANGE UP

## 2018-05-23 PROCEDURE — 82607 VITAMIN B-12: CPT

## 2018-05-23 PROCEDURE — 82728 ASSAY OF FERRITIN: CPT

## 2018-05-23 PROCEDURE — 85730 THROMBOPLASTIN TIME PARTIAL: CPT

## 2018-05-23 PROCEDURE — 83605 ASSAY OF LACTIC ACID: CPT

## 2018-05-23 PROCEDURE — 82962 GLUCOSE BLOOD TEST: CPT

## 2018-05-23 PROCEDURE — 71045 X-RAY EXAM CHEST 1 VIEW: CPT

## 2018-05-23 PROCEDURE — 87040 BLOOD CULTURE FOR BACTERIA: CPT

## 2018-05-23 PROCEDURE — 85045 AUTOMATED RETICULOCYTE COUNT: CPT

## 2018-05-23 PROCEDURE — 82746 ASSAY OF FOLIC ACID SERUM: CPT

## 2018-05-23 PROCEDURE — 86900 BLOOD TYPING SEROLOGIC ABO: CPT

## 2018-05-23 PROCEDURE — 93005 ELECTROCARDIOGRAM TRACING: CPT

## 2018-05-23 PROCEDURE — 86901 BLOOD TYPING SEROLOGIC RH(D): CPT

## 2018-05-23 PROCEDURE — 96374 THER/PROPH/DIAG INJ IV PUSH: CPT

## 2018-05-23 PROCEDURE — 85025 COMPLETE CBC W/AUTO DIFF WBC: CPT

## 2018-05-23 PROCEDURE — 36415 COLL VENOUS BLD VENIPUNCTURE: CPT

## 2018-05-23 PROCEDURE — 80048 BASIC METABOLIC PNL TOTAL CA: CPT

## 2018-05-23 PROCEDURE — 80053 COMPREHEN METABOLIC PANEL: CPT

## 2018-05-23 PROCEDURE — 86850 RBC ANTIBODY SCREEN: CPT

## 2018-05-23 PROCEDURE — 85610 PROTHROMBIN TIME: CPT

## 2018-05-23 PROCEDURE — 83550 IRON BINDING TEST: CPT

## 2018-05-23 PROCEDURE — 71250 CT THORAX DX C-: CPT

## 2018-05-23 PROCEDURE — 70486 CT MAXILLOFACIAL W/O DYE: CPT

## 2018-05-23 PROCEDURE — 99285 EMERGENCY DEPT VISIT HI MDM: CPT | Mod: 25

## 2018-05-23 PROCEDURE — 83735 ASSAY OF MAGNESIUM: CPT

## 2018-05-23 PROCEDURE — 94640 AIRWAY INHALATION TREATMENT: CPT

## 2018-05-24 ENCOUNTER — APPOINTMENT (OUTPATIENT)
Dept: OPHTHALMOLOGY | Facility: CLINIC | Age: 78
End: 2018-05-24
Payer: MEDICARE

## 2018-05-24 PROCEDURE — 92014 COMPRE OPH EXAM EST PT 1/>: CPT

## 2018-05-24 PROCEDURE — 92250 FUNDUS PHOTOGRAPHY W/I&R: CPT

## 2018-05-24 PROCEDURE — 92020 GONIOSCOPY: CPT

## 2018-05-24 PROCEDURE — 92226: CPT | Mod: RT

## 2018-05-30 ENCOUNTER — RX RENEWAL (OUTPATIENT)
Age: 78
End: 2018-05-30

## 2018-06-07 ENCOUNTER — APPOINTMENT (OUTPATIENT)
Dept: OTOLARYNGOLOGY | Facility: CLINIC | Age: 78
End: 2018-06-07

## 2018-06-07 ENCOUNTER — LABORATORY RESULT (OUTPATIENT)
Age: 78
End: 2018-06-07

## 2018-06-07 ENCOUNTER — RX RENEWAL (OUTPATIENT)
Age: 78
End: 2018-06-07

## 2018-06-07 ENCOUNTER — APPOINTMENT (OUTPATIENT)
Dept: NEPHROLOGY | Facility: CLINIC | Age: 78
End: 2018-06-07
Payer: MEDICARE

## 2018-06-07 ENCOUNTER — APPOINTMENT (OUTPATIENT)
Dept: OTOLARYNGOLOGY | Facility: CLINIC | Age: 78
End: 2018-06-07
Payer: MEDICARE

## 2018-06-07 VITALS — HEART RATE: 72 BPM | SYSTOLIC BLOOD PRESSURE: 120 MMHG | DIASTOLIC BLOOD PRESSURE: 70 MMHG

## 2018-06-07 VITALS — SYSTOLIC BLOOD PRESSURE: 120 MMHG | HEART RATE: 72 BPM | DIASTOLIC BLOOD PRESSURE: 70 MMHG

## 2018-06-07 VITALS — HEIGHT: 67 IN | WEIGHT: 200 LBS | BODY MASS INDEX: 31.39 KG/M2

## 2018-06-07 VITALS
DIASTOLIC BLOOD PRESSURE: 81 MMHG | TEMPERATURE: 98.2 F | SYSTOLIC BLOOD PRESSURE: 105 MMHG | HEART RATE: 69 BPM | OXYGEN SATURATION: 97 %

## 2018-06-07 VITALS — SYSTOLIC BLOOD PRESSURE: 118 MMHG | HEART RATE: 84 BPM | DIASTOLIC BLOOD PRESSURE: 70 MMHG

## 2018-06-07 VITALS — WEIGHT: 204 LBS | BODY MASS INDEX: 31.95 KG/M2

## 2018-06-07 DIAGNOSIS — H92.01 OTALGIA, RIGHT EAR: ICD-10-CM

## 2018-06-07 PROCEDURE — 92567 TYMPANOMETRY: CPT

## 2018-06-07 PROCEDURE — 99215 OFFICE O/P EST HI 40 MIN: CPT | Mod: 25

## 2018-06-07 PROCEDURE — 92557 COMPREHENSIVE HEARING TEST: CPT

## 2018-06-07 PROCEDURE — 69210 REMOVE IMPACTED EAR WAX UNI: CPT

## 2018-06-07 PROCEDURE — 36415 COLL VENOUS BLD VENIPUNCTURE: CPT

## 2018-06-07 PROCEDURE — 99204 OFFICE O/P NEW MOD 45 MIN: CPT | Mod: 25

## 2018-06-11 LAB
24R-OH-CALCIDIOL SERPL-MCNC: 59.6 PG/ML
25(OH)D3 SERPL-MCNC: 23.5 NG/ML
ALBUMIN MFR SERPL ELPH: 56.2 %
ALBUMIN SERPL ELPH-MCNC: 3.8 G/DL
ALBUMIN SERPL-MCNC: 4 G/DL
ALBUMIN/GLOB SERPL: 1.3 RATIO
ALDOSTERONE SERUM: 8.7 NG/DL
ALP BLD-CCNC: 86 U/L
ALP BONE SERPL-MCNC: 18 MCG/L
ALPHA1 GLOB MFR SERPL ELPH: 4.9 %
ALPHA1 GLOB SERPL ELPH-MCNC: 0.3 G/DL
ALPHA2 GLOB MFR SERPL ELPH: 12.3 %
ALPHA2 GLOB SERPL ELPH-MCNC: 0.9 G/DL
ALT SERPL-CCNC: 14 U/L
ANA SER IF-ACNC: NEGATIVE
ANION GAP SERPL CALC-SCNC: 16 MMOL/L
APPEARANCE: CLEAR
AST SERPL-CCNC: 17 U/L
B-GLOBULIN MFR SERPL ELPH: 13.9 %
B-GLOBULIN SERPL ELPH-MCNC: 1 G/DL
BACTERIA: NEGATIVE
BASOPHILS # BLD AUTO: 0 K/UL
BASOPHILS NFR BLD AUTO: 0 %
BILIRUB SERPL-MCNC: 0.4 MG/DL
BILIRUBIN URINE: NEGATIVE
BLOOD URINE: NEGATIVE
BUN SERPL-MCNC: 19 MG/DL
C3 SERPL-MCNC: 115 MG/DL
C4 SERPL-MCNC: 29 MG/DL
CALCIUM SERPL-MCNC: 10.1 MG/DL
CALCIUM SERPL-MCNC: 10.1 MG/DL
CHLORIDE SERPL-SCNC: 100 MMOL/L
CHOLEST SERPL-MCNC: 134 MG/DL
CHOLEST/HDLC SERPL: 2.2 RATIO
CO2 SERPL-SCNC: 23 MMOL/L
COLOR: YELLOW
CREAT SERPL-MCNC: 1.01 MG/DL
CYSTATIN C SERPL-MCNC: 1.65 MG/L
DEPRECATED KAPPA LC FREE/LAMBDA SER: 1.48 RATIO
EOSINOPHIL # BLD AUTO: 0.12 K/UL
EOSINOPHIL NFR BLD AUTO: 1.4 %
FERRITIN SERPL-MCNC: 217 NG/ML
FOLATE SERPL-MCNC: 14.1 NG/ML
GAMMA GLOB FLD ELPH-MCNC: 0.9 G/DL
GAMMA GLOB MFR SERPL ELPH: 12.7 %
GFR/BSA.PRED SERPLBLD CYS-BASED-ARV: 37 ML/MIN
GLUCOSE QUALITATIVE U: NEGATIVE MG/DL
GLUCOSE SERPL-MCNC: 109 MG/DL
HBA1C MFR BLD HPLC: 9.4 %
HBV SURFACE AB SER QL: NONREACTIVE
HBV SURFACE AG SER QL: NONREACTIVE
HCT VFR BLD CALC: 41.2 %
HCV AB SER QL: NONREACTIVE
HCV S/CO RATIO: 0.1 S/CO
HCYS SERPL-MCNC: 18.7 UMOL/L
HDLC SERPL-MCNC: 62 MG/DL
HGB BLD-MCNC: 12.4 G/DL
HYALINE CASTS: 1 /LPF
IGA SER QL IEP: 467 MG/DL
IGG SER QL IEP: 834 MG/DL
IGM SER QL IEP: 27 MG/DL
IMM GRANULOCYTES NFR BLD AUTO: 0.2 %
INTERPRETATION SERPL IEP-IMP: NORMAL
IRON SATN MFR SERPL: 19 %
IRON SERPL-MCNC: 38 UG/DL
KAPPA LC CSF-MCNC: 2.73 MG/DL
KAPPA LC SERPL-MCNC: 4.05 MG/DL
KETONES URINE: NEGATIVE
LDLC SERPL CALC-MCNC: 57 MG/DL
LEUKOCYTE ESTERASE URINE: NEGATIVE
LYMPHOCYTES # BLD AUTO: 1.59 K/UL
LYMPHOCYTES NFR BLD AUTO: 18 %
M PROTEIN SPEC IFE-MCNC: NORMAL
MAGNESIUM SERPL-MCNC: 1.9 MG/DL
MAN DIFF?: NORMAL
MCHC RBC-ENTMCNC: 27.2 PG
MCHC RBC-ENTMCNC: 30.1 GM/DL
MCV RBC AUTO: 90.4 FL
MICROSCOPIC-UA: NORMAL
MONOCYTES # BLD AUTO: 0.49 K/UL
MONOCYTES NFR BLD AUTO: 5.6 %
MPO AB + PR3 PNL SER: NORMAL
NEUTROPHILS # BLD AUTO: 6.6 K/UL
NEUTROPHILS NFR BLD AUTO: 74.8 %
NITRITE URINE: NEGATIVE
NT-PROBNP SERPL-MCNC: 966 PG/ML
PARATHYROID HORMONE INTACT: 129 PG/ML
PH URINE: 5
PHOSPHATE SERPL-MCNC: 2.7 MG/DL
PLATELET # BLD AUTO: 202 K/UL
POTASSIUM SERPL-SCNC: 4.8 MMOL/L
PROT SERPL-MCNC: 7.1 G/DL
PROTEIN URINE: NEGATIVE MG/DL
RBC # BLD: 4.56 M/UL
RBC # FLD: 16 %
RED BLOOD CELLS URINE: 2 /HPF
RENIN PLASMA: 7.9 PG/ML
RHEUMATOID FACT SER QL: <7 IU/ML
SODIUM SERPL-SCNC: 139 MMOL/L
SPECIFIC GRAVITY URINE: 1.02
SQUAMOUS EPITHELIAL CELLS: 1 /HPF
T3FREE SERPL-MCNC: 2.36 PG/ML
T3RU NFR SERPL: 0.91 INDEX
T4 FREE SERPL-MCNC: 1.4 NG/DL
T4 SERPL-MCNC: 7.1 UG/DL
THYROGLOB AB SERPL-ACNC: <20 IU/ML
THYROPEROXIDASE AB SERPL IA-ACNC: <10 IU/ML
TIBC SERPL-MCNC: 201 UG/DL
TRIGL SERPL-MCNC: 74 MG/DL
TSH SERPL-ACNC: 4.28 UIU/ML
UIBC SERPL-MCNC: 163 UG/DL
URATE SERPL-MCNC: 4.5 MG/DL
UROBILINOGEN URINE: NEGATIVE MG/DL
VIT B12 SERPL-MCNC: 558 PG/ML
WBC # FLD AUTO: 8.82 K/UL
WHITE BLOOD CELLS URINE: 1 /HPF

## 2018-06-12 LAB — COLLAGEN CTX SERPL-MCNC: 364 PG/ML

## 2018-06-24 LAB — METHYLMALONATE SERPL-SCNC: 800 NMOL/L

## 2018-07-09 ENCOUNTER — RX RENEWAL (OUTPATIENT)
Age: 78
End: 2018-07-09

## 2018-07-18 ENCOUNTER — APPOINTMENT (OUTPATIENT)
Dept: OPHTHALMOLOGY | Facility: CLINIC | Age: 78
End: 2018-07-18
Payer: MEDICARE

## 2018-07-18 DIAGNOSIS — H35.3130 NONEXUDATIVE AGE-RELATED MACULAR DEGENERATION, BILATERAL, STAGE UNSPECIFIED: ICD-10-CM

## 2018-07-18 DIAGNOSIS — Z96.1 PRESENCE OF INTRAOCULAR LENS: ICD-10-CM

## 2018-07-18 DIAGNOSIS — E11.3293 TYPE 2 DIABETES MELLITUS WITH MILD NONPROLIFERATIVE DIABETIC RETINOPATHY WITHOUT MACULAR EDEMA, BILATERAL: ICD-10-CM

## 2018-07-18 DIAGNOSIS — Z86.39 PERSONAL HISTORY OF OTHER ENDOCRINE, NUTRITIONAL AND METABOLIC DISEASE: ICD-10-CM

## 2018-07-18 DIAGNOSIS — H35.3132 NONEXUDATIVE AGE-RELATED MACULAR DEGENERATION, BILATERAL, INTERMEDIATE DRY STAGE: ICD-10-CM

## 2018-07-18 DIAGNOSIS — H35.371 PUCKERING OF MACULA, RIGHT EYE: ICD-10-CM

## 2018-07-18 PROCEDURE — 92225: CPT | Mod: LT

## 2018-07-18 PROCEDURE — 92014 COMPRE OPH EXAM EST PT 1/>: CPT

## 2018-07-18 PROCEDURE — 92134 CPTRZ OPH DX IMG PST SGM RTA: CPT

## 2018-07-19 ENCOUNTER — APPOINTMENT (OUTPATIENT)
Dept: OTOLARYNGOLOGY | Facility: CLINIC | Age: 78
End: 2018-07-19
Payer: MEDICARE

## 2018-07-19 DIAGNOSIS — Z87.448 PERSONAL HISTORY OF OTHER DISEASES OF URINARY SYSTEM: ICD-10-CM

## 2018-07-19 DIAGNOSIS — Z83.3 FAMILY HISTORY OF DIABETES MELLITUS: ICD-10-CM

## 2018-07-19 PROCEDURE — 69210 REMOVE IMPACTED EAR WAX UNI: CPT

## 2018-07-19 PROCEDURE — 99213 OFFICE O/P EST LOW 20 MIN: CPT | Mod: 25

## 2018-07-23 PROBLEM — Z83.3 FAMILY HISTORY OF DIABETES MELLITUS: Status: ACTIVE | Noted: 2018-06-07

## 2018-07-23 PROBLEM — Z87.448 HISTORY OF KIDNEY DISEASE: Status: RESOLVED | Noted: 2018-06-07 | Resolved: 2018-07-23

## 2018-07-23 RX ORDER — BLOOD-GLUCOSE METER
W/DEVICE KIT MISCELLANEOUS
Qty: 1 | Refills: 0 | Status: COMPLETED | COMMUNITY
Start: 2018-02-25 | End: 2018-07-23

## 2018-07-23 RX ORDER — TACROLIMUS 0.5 MG/1
CAPSULE, GELATIN COATED ORAL
Refills: 0 | Status: ACTIVE | COMMUNITY

## 2018-07-23 RX ORDER — MYCOPHENOLATE MOFETIL 500 MG/1
TABLET, FILM COATED ORAL
Refills: 0 | Status: ACTIVE | COMMUNITY

## 2018-07-23 RX ORDER — TAMSULOSIN HYDROCHLORIDE 0.4 MG/1
CAPSULE ORAL
Refills: 0 | Status: ACTIVE | COMMUNITY

## 2018-08-01 ENCOUNTER — RX RENEWAL (OUTPATIENT)
Age: 78
End: 2018-08-01

## 2018-08-23 ENCOUNTER — APPOINTMENT (OUTPATIENT)
Dept: OPHTHALMOLOGY | Facility: CLINIC | Age: 78
End: 2018-08-23
Payer: MEDICARE

## 2018-08-23 PROCEDURE — 92012 INTRM OPH EXAM EST PATIENT: CPT

## 2018-08-28 ENCOUNTER — APPOINTMENT (OUTPATIENT)
Dept: ENDOCRINOLOGY | Facility: CLINIC | Age: 78
End: 2018-08-28
Payer: MEDICARE

## 2018-08-28 VITALS
SYSTOLIC BLOOD PRESSURE: 173 MMHG | WEIGHT: 206 LBS | HEART RATE: 68 BPM | DIASTOLIC BLOOD PRESSURE: 69 MMHG | BODY MASS INDEX: 32.26 KG/M2

## 2018-08-28 LAB
GLUCOSE BLDC GLUCOMTR-MCNC: 206
HBA1C MFR BLD HPLC: 8.8

## 2018-08-28 PROCEDURE — 82962 GLUCOSE BLOOD TEST: CPT

## 2018-08-28 PROCEDURE — 99214 OFFICE O/P EST MOD 30 MIN: CPT | Mod: 25

## 2018-08-28 PROCEDURE — 83036 HEMOGLOBIN GLYCOSYLATED A1C: CPT | Mod: QW

## 2018-08-30 ENCOUNTER — RX RENEWAL (OUTPATIENT)
Age: 78
End: 2018-08-30

## 2018-09-12 ENCOUNTER — LABORATORY RESULT (OUTPATIENT)
Age: 78
End: 2018-09-12

## 2018-09-12 ENCOUNTER — APPOINTMENT (OUTPATIENT)
Dept: NEPHROLOGY | Facility: CLINIC | Age: 78
End: 2018-09-12
Payer: MEDICARE

## 2018-09-12 VITALS — HEART RATE: 72 BPM | SYSTOLIC BLOOD PRESSURE: 126 MMHG | DIASTOLIC BLOOD PRESSURE: 70 MMHG

## 2018-09-12 VITALS — DIASTOLIC BLOOD PRESSURE: 50 MMHG | SYSTOLIC BLOOD PRESSURE: 114 MMHG

## 2018-09-12 VITALS — WEIGHT: 206 LBS | OXYGEN SATURATION: 97 % | BODY MASS INDEX: 32.26 KG/M2 | HEART RATE: 86 BPM

## 2018-09-12 VITALS — DIASTOLIC BLOOD PRESSURE: 60 MMHG | SYSTOLIC BLOOD PRESSURE: 120 MMHG | HEART RATE: 84 BPM

## 2018-09-12 PROCEDURE — 99215 OFFICE O/P EST HI 40 MIN: CPT | Mod: 25

## 2018-09-12 PROCEDURE — 36415 COLL VENOUS BLD VENIPUNCTURE: CPT

## 2018-09-14 LAB
24R-OH-CALCIDIOL SERPL-MCNC: 57 PG/ML
25(OH)D3 SERPL-MCNC: 24.4 NG/ML
ALBUMIN MFR SERPL ELPH: 56.3 %
ALBUMIN SERPL ELPH-MCNC: 4.1 G/DL
ALBUMIN SERPL-MCNC: 4 G/DL
ALBUMIN/GLOB SERPL: 1.3 RATIO
ALDOSTERONE SERUM: 9.7 NG/DL
ALP BLD-CCNC: 84 U/L
ALP BONE SERPL-MCNC: 15 MCG/L
ALPHA1 GLOB MFR SERPL ELPH: 4.4 %
ALPHA1 GLOB SERPL ELPH-MCNC: 0.3 G/DL
ALPHA2 GLOB MFR SERPL ELPH: 12.1 %
ALPHA2 GLOB SERPL ELPH-MCNC: 0.9 G/DL
ALT SERPL-CCNC: 18 U/L
ANA SER IF-ACNC: NEGATIVE
ANION GAP SERPL CALC-SCNC: 11 MMOL/L
APPEARANCE: CLEAR
AST SERPL-CCNC: 18 U/L
B-GLOBULIN MFR SERPL ELPH: 13.4 %
B-GLOBULIN SERPL ELPH-MCNC: 1 G/DL
BACTERIA: NEGATIVE
BASOPHILS # BLD AUTO: 0 K/UL
BASOPHILS NFR BLD AUTO: 0 %
BILIRUB SERPL-MCNC: 0.3 MG/DL
BILIRUBIN URINE: NEGATIVE
BLOOD URINE: NEGATIVE
BUN SERPL-MCNC: 26 MG/DL
C3 SERPL-MCNC: 96 MG/DL
C4 SERPL-MCNC: 23 MG/DL
CALCIUM SERPL-MCNC: 10.1 MG/DL
CALCIUM SERPL-MCNC: 10.1 MG/DL
CHLORIDE SERPL-SCNC: 102 MMOL/L
CHOLEST SERPL-MCNC: 136 MG/DL
CHOLEST/HDLC SERPL: 2.6 RATIO
CO2 SERPL-SCNC: 26 MMOL/L
COLLAGEN CTX SERPL-MCNC: 365 PG/ML
COLOR: YELLOW
CREAT SERPL-MCNC: 1.02 MG/DL
CREAT SPEC-SCNC: 128 MG/DL
CREAT/PROT UR: 0.2 RATIO
CRP SERPL-MCNC: 0.16 MG/DL
DEPRECATED KAPPA LC FREE/LAMBDA SER: 1.62 RATIO
EOSINOPHIL # BLD AUTO: 0.16 K/UL
EOSINOPHIL NFR BLD AUTO: 2.2 %
ERYTHROCYTE [SEDIMENTATION RATE] IN BLOOD BY WESTERGREN METHOD: 10 MM/HR
FERRITIN SERPL-MCNC: 177 NG/ML
FOLATE SERPL-MCNC: 14.7 NG/ML
GAMMA GLOB FLD ELPH-MCNC: 1 G/DL
GAMMA GLOB MFR SERPL ELPH: 13.8 %
GLUCOSE QUALITATIVE U: 1000 MG/DL
GLUCOSE SERPL-MCNC: 248 MG/DL
HBA1C MFR BLD HPLC: 9.5 %
HBV SURFACE AB SER QL: NONREACTIVE
HBV SURFACE AG SER QL: NONREACTIVE
HCT VFR BLD CALC: 42 %
HCV AB SER QL: NONREACTIVE
HCV S/CO RATIO: 0.13 S/CO
HCYS SERPL-MCNC: 17 UMOL/L
HDLC SERPL-MCNC: 52 MG/DL
HGB BLD-MCNC: 12.5 G/DL
HYALINE CASTS: 2 /LPF
IGA SER QL IEP: 470 MG/DL
IGG SER QL IEP: 1015 MG/DL
IGM SER QL IEP: 34 MG/DL
IMM GRANULOCYTES NFR BLD AUTO: 0.3 %
INTERPRETATION SERPL IEP-IMP: NORMAL
IRON SATN MFR SERPL: 21 %
IRON SERPL-MCNC: 51 UG/DL
KAPPA LC CSF-MCNC: 2.73 MG/DL
KAPPA LC SERPL-MCNC: 4.43 MG/DL
KETONES URINE: NEGATIVE
LDLC SERPL CALC-MCNC: 68 MG/DL
LEUKOCYTE ESTERASE URINE: NEGATIVE
LYMPHOCYTES # BLD AUTO: 1.69 K/UL
LYMPHOCYTES NFR BLD AUTO: 23 %
M PROTEIN SPEC IFE-MCNC: NORMAL
MAGNESIUM SERPL-MCNC: 2.1 MG/DL
MAN DIFF?: NORMAL
MCHC RBC-ENTMCNC: 26.4 PG
MCHC RBC-ENTMCNC: 29.8 GM/DL
MCV RBC AUTO: 88.6 FL
MICROSCOPIC-UA: NORMAL
MONOCYTES # BLD AUTO: 0.4 K/UL
MONOCYTES NFR BLD AUTO: 5.4 %
MPO AB + PR3 PNL SER: NORMAL
NEUTROPHILS # BLD AUTO: 5.09 K/UL
NEUTROPHILS NFR BLD AUTO: 69.1 %
NITRITE URINE: NEGATIVE
PARATHYROID HORMONE INTACT: 104 PG/ML
PH URINE: 5
PHOSPHATE SERPL-MCNC: 2.6 MG/DL
PLATELET # BLD AUTO: 211 K/UL
POTASSIUM SERPL-SCNC: 5 MMOL/L
PROT SERPL-MCNC: 7.1 G/DL
PROT UR-MCNC: 25 MG/DL
PROTEIN URINE: ABNORMAL MG/DL
RBC # BLD: 4.74 M/UL
RBC # FLD: 15.2 %
RED BLOOD CELLS URINE: 3 /HPF
RENIN PLASMA: 17.7 PG/ML
RHEUMATOID FACT SER QL: 12 IU/ML
SODIUM SERPL-SCNC: 139 MMOL/L
SPECIFIC GRAVITY URINE: 1.03
SQUAMOUS EPITHELIAL CELLS: 1 /HPF
TIBC SERPL-MCNC: 248 UG/DL
TRIGL SERPL-MCNC: 80 MG/DL
UIBC SERPL-MCNC: 197 UG/DL
URATE SERPL-MCNC: 5.7 MG/DL
UROBILINOGEN URINE: NEGATIVE MG/DL
VIT B12 SERPL-MCNC: 482 PG/ML
WBC # FLD AUTO: 7.36 K/UL
WHITE BLOOD CELLS URINE: 3 /HPF

## 2018-09-16 LAB — METHYLMALONATE SERPL-SCNC: 536 NMOL/L

## 2018-10-07 ENCOUNTER — RX RENEWAL (OUTPATIENT)
Age: 78
End: 2018-10-07

## 2018-10-30 ENCOUNTER — RX RENEWAL (OUTPATIENT)
Age: 78
End: 2018-10-30

## 2018-11-19 ENCOUNTER — RX RENEWAL (OUTPATIENT)
Age: 78
End: 2018-11-19

## 2018-11-26 ENCOUNTER — RX RENEWAL (OUTPATIENT)
Age: 78
End: 2018-11-26

## 2018-12-20 ENCOUNTER — CHART COPY (OUTPATIENT)
Age: 78
End: 2018-12-20

## 2018-12-20 ENCOUNTER — RX RENEWAL (OUTPATIENT)
Age: 78
End: 2018-12-20

## 2019-01-14 ENCOUNTER — LABORATORY RESULT (OUTPATIENT)
Age: 79
End: 2019-01-14

## 2019-01-14 ENCOUNTER — APPOINTMENT (OUTPATIENT)
Dept: NEPHROLOGY | Facility: CLINIC | Age: 79
End: 2019-01-14
Payer: MEDICARE

## 2019-01-14 VITALS — SYSTOLIC BLOOD PRESSURE: 150 MMHG | DIASTOLIC BLOOD PRESSURE: 70 MMHG | HEART RATE: 84 BPM

## 2019-01-14 VITALS — DIASTOLIC BLOOD PRESSURE: 70 MMHG | HEART RATE: 84 BPM | SYSTOLIC BLOOD PRESSURE: 150 MMHG

## 2019-01-14 VITALS — SYSTOLIC BLOOD PRESSURE: 130 MMHG | DIASTOLIC BLOOD PRESSURE: 70 MMHG | HEART RATE: 84 BPM

## 2019-01-14 VITALS — BODY MASS INDEX: 32.73 KG/M2 | HEART RATE: 79 BPM | WEIGHT: 209 LBS | OXYGEN SATURATION: 98 %

## 2019-01-14 PROCEDURE — G0008: CPT

## 2019-01-14 PROCEDURE — 36415 COLL VENOUS BLD VENIPUNCTURE: CPT

## 2019-01-14 PROCEDURE — 90662 IIV NO PRSV INCREASED AG IM: CPT

## 2019-01-14 PROCEDURE — 99215 OFFICE O/P EST HI 40 MIN: CPT | Mod: 25

## 2019-01-14 NOTE — PHYSICAL EXAM
[General Appearance - Alert] : alert [General Appearance - In No Acute Distress] : in no acute distress [Sclera] : the sclera and conjunctiva were normal [PERRL With Normal Accommodation] : pupils were equal in size, round, and reactive to light [Extraocular Movements] : extraocular movements were intact [Outer Ear] : the ears and nose were normal in appearance [Oropharynx] : the oropharynx was normal [Neck Appearance] : the appearance of the neck was normal [Neck Cervical Mass (___cm)] : no neck mass was observed [Jugular Venous Distention Increased] : there was no jugular-venous distention [Thyroid Diffuse Enlargement] : the thyroid was not enlarged [Thyroid Nodule] : there were no palpable thyroid nodules [Auscultation Breath Sounds / Voice Sounds] : lungs were clear to auscultation bilaterally [Heart Rate And Rhythm] : heart rate was normal and rhythm regular [Heart Sounds] : normal S1 and S2 [Heart Sounds Gallop] : no gallops [Murmurs] : no murmurs [Heart Sounds Pericardial Friction Rub] : no pericardial rub [Bowel Sounds] : normal bowel sounds [Abdomen Soft] : soft [Abdomen Tenderness] : non-tender [Abdomen Mass (___ Cm)] : no abdominal mass palpated [No CVA Tenderness] : no ~M costovertebral angle tenderness [No Spinal Tenderness] : no spinal tenderness [Nail Clubbing] : no clubbing  or cyanosis of the fingernails [Musculoskeletal - Swelling] : no joint swelling seen [Motor Tone] : muscle strength and tone were normal [Skin Color & Pigmentation] : normal skin color and pigmentation [Skin Turgor] : normal skin turgor [] : no rash [No Focal Deficits] : no focal deficits [Oriented To Time, Place, And Person] : oriented to person, place, and time [Impaired Insight] : insight and judgment were intact [Affect] : the affect was normal [FreeTextEntry1] : Utilizes a cane for ambulation

## 2019-01-14 NOTE — ADDENDUM
[FreeTextEntry1] : Documented by Venkatesh Biggs acting as a scribe for Dr. Callum Aranda on 01/14/2019.\par  \par All medical record entries made by the Scribe were at my, Dr. Callum Aranda, direction and personally dictated by me on 01/14/2019. I have reviewed the chart and agree that the record accurately reflects my personal performance of the history, physical exam, assessment and plan. I have also personally directed, reviewed, and agreed with the chart.

## 2019-01-14 NOTE — HISTORY OF PRESENT ILLNESS
[FreeTextEntry1] : The patient is a 79 y/o male being seen for follow-up blood pressure management, with PMH of HTN, DM, CRF, visual disability, anemia, ASCVD, and h/o renal transplant. The patient denies any new acute symptoms since his last visit on 9/12/18. He does describe that his vision has been gradually worsening for many years, for which he is followed closely by an ophthalmologist. The patient's most recent routine lab work reveals urine T protein 25, Hgb 12.5, Hct 42.0,  LDL 68, total cholesterol 136, Na 139, K 5.0, CO2 26, glucose 248, BUN 26, creatinine 1.02, eGFR 70, , Vit D 25-hydroxy 24.4, Vit D 3-15-pcrxjkhef 57.0, and HbA1c 9.5. The patient has been measuring blood glucose at home and reports that values are not well controlled but recently have not exceeded 400. Patient was weighed at 209lbs today in the office, which is up 9lbs from 06/2018. Patient's spouse reports that she did not give the patient furosemide this morning. Patient inquires about an influenza vaccination at today's visit. \par \par The patient takes the following medications: prednisone 5mg QD, ASA 325mg BID, tramadol 50mg, Flomax .4mg QD, Lantus, Humalog, carvedilol 12.5mg BID, furosemide 20mg QOD, famotidine 20mg BID, mycophenolate 250mg-500mg BID, tacrolimus 4mg BID, docusate sodium 100mg TID, and senna QD.

## 2019-01-14 NOTE — ASSESSMENT
[FreeTextEntry1] : The patient is a 79 y/o male being seen for follow-up blood pressure management, with PMH of HTN, DM, CRF, visual disability, anemia, ASCVD, and h/o renal transplant. I will plan to discuss strategies to manage his hyperglycemia and weight gain, perhaps beginning Trulicity or Victoza, with Dr. Tc Guajardo. In view of his elevated blood pressure and 1-2+ bilateral LE edema revealed on physical exam, I will increase the patient's diuretic therapy to furosemide 20mg QD. The remainder of his physical exam was unremarkable. No other changes will be made to his current regimen of medications, aside from increasing furosemide. \par \par Labs were drawn to investigate potential toxic interactions of the patient's diuretics and other antihypertensive medications. BMP will be analyzed to check for hyponatremia, hyperkalemia, and decreasing renal function. Because of the timing of his most recent tacrolimus dose, I provided the patient with a referral to have a 12 hour tacrolimus trough drawn in his neighborhood. Patient will return in 2-3 weeks for a follow-up appointment. \par \par I provided the patient with a high dose influenza vaccine (LUE) today.

## 2019-01-14 NOTE — REASON FOR VISIT
[Follow-Up] : a follow-up visit [Spouse] : spouse [FreeTextEntry1] : with PMH of HTN, DM, CRF, visual disability, anemia, ASCVD, and h/o renal transplant.

## 2019-01-16 LAB
24R-OH-CALCIDIOL SERPL-MCNC: 47.1 PG/ML
25(OH)D3 SERPL-MCNC: 23.7 NG/ML
ALBUMIN MFR SERPL ELPH: 57.8 %
ALBUMIN SERPL ELPH-MCNC: 4 G/DL
ALBUMIN SERPL-MCNC: 4.1 G/DL
ALBUMIN/GLOB SERPL: 1.4 RATIO
ALDOSTERONE SERUM: 8.2 NG/DL
ALP BLD-CCNC: 75 U/L
ALP BONE SERPL-MCNC: 15 MCG/L
ALPHA1 GLOB MFR SERPL ELPH: 4.2 %
ALPHA1 GLOB SERPL ELPH-MCNC: 0.3 G/DL
ALPHA2 GLOB MFR SERPL ELPH: 12 %
ALPHA2 GLOB SERPL ELPH-MCNC: 0.9 G/DL
ALT SERPL-CCNC: 21 U/L
ANION GAP SERPL CALC-SCNC: 10 MMOL/L
APPEARANCE: CLEAR
AST SERPL-CCNC: 17 U/L
B-GLOBULIN MFR SERPL ELPH: 12.8 %
B-GLOBULIN SERPL ELPH-MCNC: 0.9 G/DL
BACTERIA: NEGATIVE
BASOPHILS # BLD AUTO: 0 K/UL
BASOPHILS NFR BLD AUTO: 0 %
BILIRUB SERPL-MCNC: 0.4 MG/DL
BILIRUBIN URINE: NEGATIVE
BLOOD URINE: NEGATIVE
BUN SERPL-MCNC: 23 MG/DL
C3 SERPL-MCNC: 108 MG/DL
C4 SERPL-MCNC: 22 MG/DL
CALCIUM SERPL-MCNC: 10.3 MG/DL
CALCIUM SERPL-MCNC: 10.3 MG/DL
CHLORIDE SERPL-SCNC: 103 MMOL/L
CHOLEST SERPL-MCNC: 132 MG/DL
CHOLEST/HDLC SERPL: 2.2 RATIO
CO2 SERPL-SCNC: 25 MMOL/L
COLLAGEN CTX SERPL-MCNC: 257 PG/ML
COLOR: YELLOW
CREAT SERPL-MCNC: 1.13 MG/DL
CRP SERPL-MCNC: <0.1 MG/DL
CYSTATIN C SERPL-MCNC: 1.67 MG/L
DEPRECATED KAPPA LC FREE/LAMBDA SER: 1.58 RATIO
EOSINOPHIL # BLD AUTO: 0.12 K/UL
EOSINOPHIL NFR BLD AUTO: 1.8 %
ERYTHROCYTE [SEDIMENTATION RATE] IN BLOOD BY WESTERGREN METHOD: 22 MM/HR
ESTIMATED AVERAGE GLUCOSE: 217 MG/DL
FERRITIN SERPL-MCNC: 182 NG/ML
FOLATE SERPL-MCNC: 19.5 NG/ML
GAMMA GLOB FLD ELPH-MCNC: 0.9 G/DL
GAMMA GLOB MFR SERPL ELPH: 13.2 %
GFR/BSA.PRED SERPLBLD CYS-BASED-ARV: 37 ML/MIN
GLUCOSE QUALITATIVE U: 1000 MG/DL
GLUCOSE SERPL-MCNC: 289 MG/DL
HBA1C MFR BLD HPLC: 9.2 %
HBV SURFACE AB SER QL: NONREACTIVE
HBV SURFACE AG SER QL: NONREACTIVE
HCT VFR BLD CALC: 42.8 %
HCV AB SER QL: NONREACTIVE
HCV S/CO RATIO: 0.13 S/CO
HCYS SERPL-MCNC: 15.3 UMOL/L
HDLC SERPL-MCNC: 61 MG/DL
HGB BLD-MCNC: 13.1 G/DL
HYALINE CASTS: 4 /LPF
IGA SER QL IEP: 429 MG/DL
IGG SER QL IEP: 925 MG/DL
IGM SER QL IEP: 29 MG/DL
IMM GRANULOCYTES NFR BLD AUTO: 0.1 %
INTERPRETATION SERPL IEP-IMP: NORMAL
IRON SATN MFR SERPL: 18 %
IRON SERPL-MCNC: 44 UG/DL
KAPPA LC CSF-MCNC: 2.37 MG/DL
KAPPA LC SERPL-MCNC: 3.75 MG/DL
KETONES URINE: ABNORMAL
LDLC SERPL CALC-MCNC: 56 MG/DL
LEUKOCYTE ESTERASE URINE: NEGATIVE
LYMPHOCYTES # BLD AUTO: 1.61 K/UL
LYMPHOCYTES NFR BLD AUTO: 24 %
M PROTEIN SPEC IFE-MCNC: NORMAL
MAGNESIUM SERPL-MCNC: 2.1 MG/DL
MAN DIFF?: NORMAL
MCHC RBC-ENTMCNC: 27.6 PG
MCHC RBC-ENTMCNC: 30.6 GM/DL
MCV RBC AUTO: 90.3 FL
MICROSCOPIC-UA: NORMAL
MONOCYTES # BLD AUTO: 0.49 K/UL
MONOCYTES NFR BLD AUTO: 7.3 %
MPO AB + PR3 PNL SER: NORMAL
NEUTROPHILS # BLD AUTO: 4.48 K/UL
NEUTROPHILS NFR BLD AUTO: 66.8 %
NITRITE URINE: NEGATIVE
NT-PROBNP SERPL-MCNC: 710 PG/ML
PARATHYROID HORMONE INTACT: 85 PG/ML
PH URINE: 5
PHOSPHATE SERPL-MCNC: 2.8 MG/DL
PLATELET # BLD AUTO: 194 K/UL
POTASSIUM SERPL-SCNC: 5.4 MMOL/L
PROT SERPL-MCNC: 7.1 G/DL
PROTEIN URINE: ABNORMAL MG/DL
RBC # BLD: 4.74 M/UL
RBC # FLD: 15.1 %
RED BLOOD CELLS URINE: 4 /HPF
RHEUMATOID FACT SER QL: <10 IU/ML
SODIUM SERPL-SCNC: 138 MMOL/L
SPECIFIC GRAVITY URINE: 1.04
SQUAMOUS EPITHELIAL CELLS: 1 /HPF
T3FREE SERPL-MCNC: 2.5 PG/ML
T3RU NFR SERPL: 0.96 INDEX
T4 FREE SERPL-MCNC: 1.3 NG/DL
T4 SERPL-MCNC: 6.5 UG/DL
THYROGLOB AB SERPL-ACNC: <20 IU/ML
THYROPEROXIDASE AB SERPL IA-ACNC: 10.2 IU/ML
TIBC SERPL-MCNC: 248 UG/DL
TRIGL SERPL-MCNC: 74 MG/DL
TSH SERPL-ACNC: 4.43 UIU/ML
UIBC SERPL-MCNC: 204 UG/DL
URATE SERPL-MCNC: 4.5 MG/DL
UROBILINOGEN URINE: NEGATIVE MG/DL
VIT B12 SERPL-MCNC: 446 PG/ML
WBC # FLD AUTO: 6.71 K/UL
WHITE BLOOD CELLS URINE: 3 /HPF

## 2019-01-17 LAB
ANA SER IF-ACNC: NEGATIVE
METHYLMALONATE SERPL-SCNC: 813 NMOL/L

## 2019-01-22 ENCOUNTER — RX RENEWAL (OUTPATIENT)
Age: 79
End: 2019-01-22

## 2019-01-27 ENCOUNTER — RX RENEWAL (OUTPATIENT)
Age: 79
End: 2019-01-27

## 2019-03-20 ENCOUNTER — LABORATORY RESULT (OUTPATIENT)
Age: 79
End: 2019-03-20

## 2019-03-20 ENCOUNTER — APPOINTMENT (OUTPATIENT)
Dept: NEPHROLOGY | Facility: CLINIC | Age: 79
End: 2019-03-20
Payer: MEDICARE

## 2019-03-20 VITALS — DIASTOLIC BLOOD PRESSURE: 67 MMHG | SYSTOLIC BLOOD PRESSURE: 128 MMHG | HEART RATE: 71 BPM

## 2019-03-20 VITALS — WEIGHT: 208 LBS | HEART RATE: 74 BPM | BODY MASS INDEX: 32.58 KG/M2 | OXYGEN SATURATION: 98 %

## 2019-03-20 VITALS — DIASTOLIC BLOOD PRESSURE: 70 MMHG | SYSTOLIC BLOOD PRESSURE: 126 MMHG

## 2019-03-20 VITALS — SYSTOLIC BLOOD PRESSURE: 104 MMHG | DIASTOLIC BLOOD PRESSURE: 60 MMHG

## 2019-03-20 VITALS — DIASTOLIC BLOOD PRESSURE: 70 MMHG | SYSTOLIC BLOOD PRESSURE: 122 MMHG

## 2019-03-20 DIAGNOSIS — Z86.69 PERSONAL HISTORY OF OTHER DISEASES OF THE NERVOUS SYSTEM AND SENSE ORGANS: ICD-10-CM

## 2019-03-20 DIAGNOSIS — M25.561 PAIN IN RIGHT KNEE: ICD-10-CM

## 2019-03-20 DIAGNOSIS — Z79.899 OTHER LONG TERM (CURRENT) DRUG THERAPY: ICD-10-CM

## 2019-03-20 PROCEDURE — 99214 OFFICE O/P EST MOD 30 MIN: CPT | Mod: 25

## 2019-03-20 PROCEDURE — 36415 COLL VENOUS BLD VENIPUNCTURE: CPT

## 2019-03-20 NOTE — HISTORY OF PRESENT ILLNESS
[FreeTextEntry1] : The patient is a 78 year old male presenting for follow-up blood pressure management, with PMH of HTN, DM, CRF stage 3, visual disability, anemia, ASCVD, and h/o renal transplant. The patient complains of longstanding bilateral knee pain which limits his mobility, but he denies having had any prior orthopedic evaluation and continues to decline it now. He otherwise denies any new acute complaints at this time. As recommended at his last visit, the patient had tacrolimus measured at a local lab on 2/7/19 which showed level of 14.6, but his accompanying spouse states that these labs were not drawn 12 hours following his previous tacrolimus dose. His routine lab work taken at his last appointment here reveals Vit D 25-hydroxy 23.7, Vit D 4-31-brffcitmp 47.1, sedimentation rate 22, LDL 56, total cholesterol 132, iron saturation 18%, ferritin 182, Na 138. K 5.4, glucose 289, creatinine 1.13, eGFR 61, intact PTH 85, HbA1c 9.2, mean plasma glucose 217, pro-, eGFR by cystatin C 37, , and TSH 4.43. He denies any upcoming scheduled follow-up appointments with Dr. Tc Guajardo for DM management, and his spouse continues to assert that patient would not comply with novel glycemic management strategies such as a pump or additional injections due to his visual impairment. Patient was weighed at 208lbs today in the office, which has been roughly stable since his last visit.  \par \par The patient takes the following medications: prednisone 5mg QD, ASA 325mg BID, tramadol 50mg, Flomax .4mg QD, Lantus, Humalog, carvedilol 12.5mg BID, furosemide 20mg QD (increased at last visit), famotidine 20mg BID, mycophenolate 250mg-500mg BID, tacrolimus 4mg BID, docusate sodium 100mg TID, and senna QD.

## 2019-03-20 NOTE — PHYSICAL EXAM
[General Appearance - Alert] : alert [General Appearance - In No Acute Distress] : in no acute distress [Sclera] : the sclera and conjunctiva were normal [PERRL With Normal Accommodation] : pupils were equal in size, round, and reactive to light [Extraocular Movements] : extraocular movements were intact [Oropharynx] : the oropharynx was normal [Outer Ear] : the ears and nose were normal in appearance [Neck Appearance] : the appearance of the neck was normal [Thyroid Diffuse Enlargement] : the thyroid was not enlarged [Neck Cervical Mass (___cm)] : no neck mass was observed [Jugular Venous Distention Increased] : there was no jugular-venous distention [Thyroid Nodule] : there were no palpable thyroid nodules [Auscultation Breath Sounds / Voice Sounds] : lungs were clear to auscultation bilaterally [Heart Sounds] : normal S1 and S2 [Heart Rate And Rhythm] : heart rate was normal and rhythm regular [Heart Sounds Gallop] : no gallops [Murmurs] : no murmurs [Heart Sounds Pericardial Friction Rub] : no pericardial rub [Bowel Sounds] : normal bowel sounds [Abdomen Soft] : soft [Abdomen Tenderness] : non-tender [Abdomen Mass (___ Cm)] : no abdominal mass palpated [No CVA Tenderness] : no ~M costovertebral angle tenderness [No Spinal Tenderness] : no spinal tenderness [Nail Clubbing] : no clubbing  or cyanosis of the fingernails [Musculoskeletal - Swelling] : no joint swelling seen [Motor Tone] : muscle strength and tone were normal [Skin Color & Pigmentation] : normal skin color and pigmentation [No Focal Deficits] : no focal deficits [] : no rash [Skin Turgor] : normal skin turgor [Impaired Insight] : insight and judgment were intact [Oriented To Time, Place, And Person] : oriented to person, place, and time [Affect] : the affect was normal [FreeTextEntry1] : Utilizes a cane for ambulation

## 2019-03-20 NOTE — REASON FOR VISIT
[Follow-Up] : a follow-up visit [Spouse] : spouse [FreeTextEntry1] : with PMH of HTN, DM, CRF stage 3, visual disability, anemia, ASCVD, and h/o renal transplant.

## 2019-03-20 NOTE — END OF VISIT
[FreeTextEntry3] : All medical record entries made by the Scribe were at my, Dr. Callum Aranda, direction and personally dictated by me on 03/20/2019. I have reviewed the chart and agree that the record accurately reflects my personal performance of the history, physical exam, assessment and plan. I have also personally directed, reviewed, and agreed with the chart.

## 2019-03-20 NOTE — ASSESSMENT
[FreeTextEntry1] : The patient is a 78 year old male presenting for follow-up blood pressure management, with PMH of HTN, DM, CRF stage 3, visual disability, anemia, ASCVD, and h/o renal transplant. I explained to patient the importance of standardized tacrolimus measurements 12 hours following most recent dose, and will again arrange for patient to have 12 hour trough drawn at a local lab. I contacted Dr. Guajardo today to discuss patient's uncontrolled hyperglycemia, and we planned for patient to return to Dr. Guajardo after [place] for DM management, perhaps including initiation of Trulicity or ozempic. His blood pressure, measured with electronic cuff and manually, was satisfactory today and the remainder of his physical exam revealed trace to 1+ bilateral LE edema. No changes will be made to his current regimen of medications at this time, and patient will continue his antihypertensive regimen as previously directed. \par \par Labs were drawn to investigate potential toxic interactions of the patient's diuretics and other antihypertensive medications. BMP will be analyzed to check for hyponatremia, hyperkalemia, and decreasing renal function. Patient will return in 3-4 weeks for a follow-up appointment here and with Dr. Guajardo in the endocrinology department.\par \par Plan:\par 1) HTN of unclear etiology: BP acceptable today in the office, no changes to antihypertensive regimen, will reassess pressure and medications at next visit due to risk for progression. \par 2) DM: hyperglycemia uncontrolled on current management therapy (HbA1c 9.2 with mean plasma glucose 217 on recent labs), will recheck on labs today and refer patient for reevaluation of glycemic management strategies with Dr. Guajardo in 3-4 weeks when he returns here, consider starting Trulicity or ozempic.\par 3) CRF: eGFR 61 acceptable, will continue to monitor with CMP on labs drawn today due to risk of progression.\par 4) transplant medicine: will again refer patient for local 12 hour tacrolimus trough measurement, counseled patient and spouse on importance of precise and standardized measurement of tacrolimus levels.\par 5) Bilateral knee pain: patient refused referral for orthopedic evaluation due to mild severity of pain, will reconsider at his next evaluation.

## 2019-03-24 LAB
24R-OH-CALCIDIOL SERPL-MCNC: 38.7 PG/ML
25(OH)D3 SERPL-MCNC: 27 NG/ML
ALBUMIN SERPL ELPH-MCNC: 3.8 G/DL
ALDOSTERONE SERUM: 8.6 NG/DL
ALP BLD-CCNC: 72 U/L
ALP BONE SERPL-MCNC: 14 MCG/L
ALT SERPL-CCNC: 18 U/L
ANA SER IF-ACNC: NEGATIVE
ANION GAP SERPL CALC-SCNC: 12 MMOL/L
APPEARANCE: ABNORMAL
AST SERPL-CCNC: 21 U/L
BACTERIA UR CULT: NORMAL
BACTERIA: ABNORMAL
BASOPHILS # BLD AUTO: 0 K/UL
BASOPHILS NFR BLD AUTO: 0 %
BILIRUB SERPL-MCNC: 0.3 MG/DL
BILIRUBIN URINE: NEGATIVE
BLOOD URINE: NEGATIVE
BUN SERPL-MCNC: 26 MG/DL
C3 SERPL-MCNC: 92 MG/DL
C4 SERPL-MCNC: 22 MG/DL
CALCIUM SERPL-MCNC: 10 MG/DL
CALCIUM SERPL-MCNC: 10 MG/DL
CHLORIDE SERPL-SCNC: 105 MMOL/L
CHOLEST SERPL-MCNC: 129 MG/DL
CHOLEST/HDLC SERPL: 2.7 RATIO
CO2 SERPL-SCNC: 23 MMOL/L
COLLAGEN CTX SERPL-MCNC: 291 PG/ML
COLOR: YELLOW
CREAT SERPL-MCNC: 1.09 MG/DL
CREAT SPEC-SCNC: 150 MG/DL
CREAT/PROT UR: 0.1 RATIO
CRP SERPL-MCNC: <0.1 MG/DL
CYSTATIN C SERPL-MCNC: 1.73 MG/L
EOSINOPHIL # BLD AUTO: 0.1 K/UL
EOSINOPHIL NFR BLD AUTO: 1.3 %
ERYTHROCYTE [SEDIMENTATION RATE] IN BLOOD BY WESTERGREN METHOD: 5 MM/HR
ESTIMATED AVERAGE GLUCOSE: 232 MG/DL
FERRITIN SERPL-MCNC: 169 NG/ML
FOLATE SERPL-MCNC: >20 NG/ML
GFR/BSA.PRED SERPLBLD CYS-BASED-ARV: 35 ML/MIN
GLUCOSE QUALITATIVE U: NEGATIVE
GLUCOSE SERPL-MCNC: 201 MG/DL
HBA1C MFR BLD HPLC: 9.7 %
HBV SURFACE AB SER QL: NONREACTIVE
HBV SURFACE AG SER QL: NONREACTIVE
HCT VFR BLD CALC: 43.5 %
HCV AB SER QL: NONREACTIVE
HCV S/CO RATIO: 0.09 S/CO
HCYS SERPL-MCNC: 18.3 UMOL/L
HDLC SERPL-MCNC: 48 MG/DL
HGB BLD-MCNC: 12.9 G/DL
HYALINE CASTS: 0 /LPF
IMM GRANULOCYTES NFR BLD AUTO: 0.1 %
IRON SATN MFR SERPL: 30 %
IRON SERPL-MCNC: 63 UG/DL
KETONES URINE: NEGATIVE
LDLC SERPL CALC-MCNC: 61 MG/DL
LEUKOCYTE ESTERASE URINE: NEGATIVE
LYMPHOCYTES # BLD AUTO: 1.34 K/UL
LYMPHOCYTES NFR BLD AUTO: 17.1 %
MAGNESIUM SERPL-MCNC: 2.1 MG/DL
MAN DIFF?: NORMAL
MCHC RBC-ENTMCNC: 28.3 PG
MCHC RBC-ENTMCNC: 29.7 GM/DL
MCV RBC AUTO: 95.4 FL
MICROSCOPIC-UA: NORMAL
MONOCYTES # BLD AUTO: 0.46 K/UL
MONOCYTES NFR BLD AUTO: 5.9 %
MPO AB + PR3 PNL SER: NORMAL
NEUTROPHILS # BLD AUTO: 5.94 K/UL
NEUTROPHILS NFR BLD AUTO: 75.6 %
NITRITE URINE: NEGATIVE
NT-PROBNP SERPL-MCNC: 535 PG/ML
PARATHYROID HORMONE INTACT: 99 PG/ML
PH URINE: 6
PHOSPHATE SERPL-MCNC: 2.4 MG/DL
PLATELET # BLD AUTO: 191 K/UL
POTASSIUM SERPL-SCNC: 4.9 MMOL/L
PROT SERPL-MCNC: 6.5 G/DL
PROT UR-MCNC: 12 MG/DL
PROTEIN URINE: NEGATIVE
RBC # BLD: 4.56 M/UL
RBC # FLD: 14.8 %
RED BLOOD CELLS URINE: 2 /HPF
RENIN PLASMA: 16.5 PG/ML
RHEUMATOID FACT SER QL: <10 IU/ML
SODIUM SERPL-SCNC: 140 MMOL/L
SPECIFIC GRAVITY URINE: 1.03
SQUAMOUS EPITHELIAL CELLS: 0 /HPF
TIBC SERPL-MCNC: 213 UG/DL
TRIGL SERPL-MCNC: 100 MG/DL
UIBC SERPL-MCNC: 150 UG/DL
URATE SERPL-MCNC: 5.8 MG/DL
UROBILINOGEN URINE: NORMAL
VIT B12 SERPL-MCNC: 368 PG/ML
WBC # FLD AUTO: 7.85 K/UL
WHITE BLOOD CELLS URINE: 4 /HPF

## 2019-03-31 LAB
ALBUMIN MFR SERPL ELPH: 59.5 %
ALBUMIN SERPL-MCNC: 4 G/DL
ALBUMIN/GLOB SERPL: 1.4 RATIO
ALPHA1 GLOB MFR SERPL ELPH: 4.1 %
ALPHA1 GLOB SERPL ELPH-MCNC: 0.3 G/DL
ALPHA2 GLOB MFR SERPL ELPH: 11.3 %
ALPHA2 GLOB SERPL ELPH-MCNC: 0.8 G/DL
B-GLOBULIN MFR SERPL ELPH: 12.9 %
B-GLOBULIN SERPL ELPH-MCNC: 0.9 G/DL
DEPRECATED KAPPA LC FREE/LAMBDA SER: 1.66 RATIO
GAMMA GLOB FLD ELPH-MCNC: 0.8 G/DL
GAMMA GLOB MFR SERPL ELPH: 12.2 %
IGA SER QL IEP: 393 MG/DL
IGG SER QL IEP: 854 MG/DL
IGM SER QL IEP: 22 MG/DL
INTERPRETATION SERPL IEP-IMP: NORMAL
KAPPA LC CSF-MCNC: 2.45 MG/DL
KAPPA LC SERPL-MCNC: 4.07 MG/DL
M PROTEIN SPEC IFE-MCNC: NORMAL
METHYLMALONATE SERPL-SCNC: 1160 NMOL/L
PROT SERPL-MCNC: 6.8 G/DL
PROT SERPL-MCNC: 6.8 G/DL

## 2019-05-15 ENCOUNTER — RX RENEWAL (OUTPATIENT)
Age: 79
End: 2019-05-15

## 2019-05-29 ENCOUNTER — RX RENEWAL (OUTPATIENT)
Age: 79
End: 2019-05-29

## 2019-05-30 ENCOUNTER — RX RENEWAL (OUTPATIENT)
Age: 79
End: 2019-05-30

## 2019-06-03 ENCOUNTER — RX RENEWAL (OUTPATIENT)
Age: 79
End: 2019-06-03

## 2019-06-04 ENCOUNTER — RX RENEWAL (OUTPATIENT)
Age: 79
End: 2019-06-04

## 2019-06-05 ENCOUNTER — APPOINTMENT (OUTPATIENT)
Dept: NEPHROLOGY | Facility: CLINIC | Age: 79
End: 2019-06-05
Payer: MEDICARE

## 2019-06-05 ENCOUNTER — LABORATORY RESULT (OUTPATIENT)
Age: 79
End: 2019-06-05

## 2019-06-05 VITALS — SYSTOLIC BLOOD PRESSURE: 120 MMHG | DIASTOLIC BLOOD PRESSURE: 62 MMHG

## 2019-06-05 VITALS — HEART RATE: 72 BPM | OXYGEN SATURATION: 96 % | BODY MASS INDEX: 33 KG/M2 | HEIGHT: 67 IN | WEIGHT: 210.25 LBS

## 2019-06-05 VITALS — SYSTOLIC BLOOD PRESSURE: 132 MMHG | HEART RATE: 72 BPM | DIASTOLIC BLOOD PRESSURE: 64 MMHG

## 2019-06-05 VITALS — SYSTOLIC BLOOD PRESSURE: 124 MMHG | HEART RATE: 72 BPM | DIASTOLIC BLOOD PRESSURE: 60 MMHG

## 2019-06-05 VITALS — HEART RATE: 72 BPM | DIASTOLIC BLOOD PRESSURE: 70 MMHG | SYSTOLIC BLOOD PRESSURE: 120 MMHG

## 2019-06-05 PROCEDURE — 36415 COLL VENOUS BLD VENIPUNCTURE: CPT

## 2019-06-05 PROCEDURE — 99214 OFFICE O/P EST MOD 30 MIN: CPT | Mod: 25

## 2019-06-05 NOTE — PHYSICAL EXAM
[General Appearance - In No Acute Distress] : in no acute distress [General Appearance - Alert] : alert [PERRL With Normal Accommodation] : pupils were equal in size, round, and reactive to light [Extraocular Movements] : extraocular movements were intact [Sclera] : the sclera and conjunctiva were normal [Outer Ear] : the ears and nose were normal in appearance [Oropharynx] : the oropharynx was normal [Neck Appearance] : the appearance of the neck was normal [Neck Cervical Mass (___cm)] : no neck mass was observed [Jugular Venous Distention Increased] : there was no jugular-venous distention [Thyroid Nodule] : there were no palpable thyroid nodules [Thyroid Diffuse Enlargement] : the thyroid was not enlarged [Heart Rate And Rhythm] : heart rate was normal and rhythm regular [Heart Sounds Gallop] : no gallops [Murmurs] : no murmurs [Heart Sounds] : normal S1 and S2 [Heart Sounds Pericardial Friction Rub] : no pericardial rub [Abdomen Tenderness] : non-tender [Bowel Sounds] : normal bowel sounds [Abdomen Soft] : soft [No CVA Tenderness] : no ~M costovertebral angle tenderness [Abdomen Mass (___ Cm)] : no abdominal mass palpated [No Spinal Tenderness] : no spinal tenderness [Nail Clubbing] : no clubbing  or cyanosis of the fingernails [Musculoskeletal - Swelling] : no joint swelling seen [Motor Tone] : muscle strength and tone were normal [Skin Color & Pigmentation] : normal skin color and pigmentation [Skin Turgor] : normal skin turgor [] : no rash [No Focal Deficits] : no focal deficits [Oriented To Time, Place, And Person] : oriented to person, place, and time [Impaired Insight] : insight and judgment were intact [Affect] : the affect was normal [FreeTextEntry1] : cognition normal, cranial nerves intact, no motor deficit, and gait normal but tentative due to visual disability.

## 2019-06-05 NOTE — ASSESSMENT
[FreeTextEntry1] : Plans:\par 1) HTN: BP acceptable today on current regimen and therefore will not adjust patient's antihypertensive medications, will reassess pressure and regimen at next evaluation. \par 2) CRI: last eGFR of 64 stable; will continue to monitor function with CMP due to risk for progression of renal failure; have evaluated patient's renal function, blood pressure, and fluid volume status which do not necessitate changes to medications at this time and will thus maintain current therapy.\par 3) Fluid volume disorder: physical exam finding of 1-2+ bilateral LE edema (more than baseline), recommended patient acquire and make use of LE compression stockings for increased edema, will reassess at next visit and consider adjustments to medications for additional fluid volume control.\par 4) Transplant medicine: will request results of recent 12 hour tacrolimus trough drawn locally; continue antirejection therapy with mycophenolate prednisone and tacrolimus at current dosages. \par 5) Coughing: physical exam finding of scattered low pitched rhonchi, patient to continue with course of cephalosporin as prescribed by local physician, will check Measles titers on labs today, advised patient to do basic airway hydration at home.\par \par Changes to Medications: none.\par \par Labs were drawn and patient will return in 6 weeks for a follow-up appointment.

## 2019-06-05 NOTE — END OF VISIT
[FreeTextEntry3] : All medical record entries made by the Scribe were at my, Dr. Callum Aranda, direction and personally dictated by me on 06/05/2019. I have reviewed the chart and agree that the record accurately reflects my personal performance of the history, physical exam, assessment and plan. I have also personally directed, reviewed, and agreed with the chart.

## 2019-06-05 NOTE — HISTORY OF PRESENT ILLNESS
[FreeTextEntry1] : The patient is a 78 year old male presenting for follow-up blood pressure management and for active reassessment of HTN, DM, CRF stage 2, visual disability, anemia, ASCVD, and h/o renal transplant. \par \par Interval Data:\par -Labs on March 20, 2019: Intact PTH 99, Total Cholesterol 129, LDL Cholesterol 61, Total Cholesterol/HDL Ratio 2.7, Iron-Total Binding Capacity 213, Iron Sat. 30%, ferritin 169, Glucose 201, BUN 26, Creatinine 1.09, Phosphorous 2.4, eGFR 64, eGFR by Cystatin C 35, Cystatin C,S 1.73, Vit D 25 Hydroxy 27.0, Vit D1-25 Di Hydroxy 38.7, Pro , HGB 12.9, Mean plasma glucose 232, HbA1c 9.7, Homocysteine 18.3, Kappa Lambda FLC ratio 1.66, No Monoclonal Band Identified, MMA 1160\par \par Current Complaints: \par - patients reports symptoms of coughing for the past 2-3 weeks, which were treated with an unspecified cephalosporin by a local physician and patient has 3 more days remaining on course, previous CXR reportedly revealed no pneumonia.\par - denies changes to dietary sodium intake since last visit.\par - reportedly had 12 hour tacrolimus trough drawn at local facility on 5/30/19.\par  \par Current Medication: unspecified cephalosporin (course to be completed on 6/8/19), prednisone 5mg QD, ASA 325mg BID, tramadol 50mg, Flomax .4mg QD, Lantus, Humalog, carvedilol 12.5mg BID, furosemide 20mg QD (increased at last visit), famotidine 20mg BID, mycophenolate 250mg-500mg BID, tacrolimus 4mg BID, docusate sodium 100mg TID, and senna QD.

## 2019-06-05 NOTE — REASON FOR VISIT
[Follow-Up] : a follow-up visit [Spouse] : spouse [FreeTextEntry1] : reassessment of blood pressure and renal insufficiency, with complaints of coughing.

## 2019-06-05 NOTE — ADDENDUM
[FreeTextEntry1] : I, Venkatesh Biggs, acted solely as a scribe for Dr. Callum Aranda on this date 06/05/2019.

## 2019-06-06 ENCOUNTER — RX RENEWAL (OUTPATIENT)
Age: 79
End: 2019-06-06

## 2019-06-07 ENCOUNTER — RX RENEWAL (OUTPATIENT)
Age: 79
End: 2019-06-07

## 2019-06-11 LAB
24R-OH-CALCIDIOL SERPL-MCNC: 30.5 PG/ML
25(OH)D3 SERPL-MCNC: 24.7 NG/ML
ALBUMIN MFR SERPL ELPH: 60.2 %
ALBUMIN SERPL ELPH-MCNC: 4.1 G/DL
ALBUMIN SERPL-MCNC: 3.8 G/DL
ALBUMIN/GLOB SERPL: 1.5 RATIO
ALP BLD-CCNC: 73 U/L
ALP BONE SERPL-MCNC: 12 MCG/L
ALPHA1 GLOB MFR SERPL ELPH: 4 %
ALPHA1 GLOB SERPL ELPH-MCNC: 0.3 G/DL
ALPHA2 GLOB MFR SERPL ELPH: 11.4 %
ALPHA2 GLOB SERPL ELPH-MCNC: 0.7 G/DL
ALT SERPL-CCNC: 26 U/L
ANA SER IF-ACNC: NEGATIVE
ANION GAP SERPL CALC-SCNC: 14 MMOL/L
APPEARANCE: CLEAR
AST SERPL-CCNC: 20 U/L
B-GLOBULIN MFR SERPL ELPH: 12.8 %
B-GLOBULIN SERPL ELPH-MCNC: 0.8 G/DL
BACTERIA: ABNORMAL
BASOPHILS # BLD AUTO: 0 K/UL
BASOPHILS NFR BLD AUTO: 0 %
BILIRUB SERPL-MCNC: 0.2 MG/DL
BILIRUBIN URINE: NEGATIVE
BLOOD URINE: NEGATIVE
BUN SERPL-MCNC: 20 MG/DL
C3 SERPL-MCNC: 92 MG/DL
C4 SERPL-MCNC: 22 MG/DL
CALCIUM SERPL-MCNC: 10.3 MG/DL
CALCIUM SERPL-MCNC: 10.3 MG/DL
CHLORIDE SERPL-SCNC: 103 MMOL/L
CHOLEST SERPL-MCNC: 132 MG/DL
CHOLEST/HDLC SERPL: 2.4 RATIO
CO2 SERPL-SCNC: 22 MMOL/L
COLLAGEN CTX SERPL-MCNC: 184 PG/ML
COLOR: YELLOW
CREAT SERPL-MCNC: 1.04 MG/DL
CREAT SPEC-SCNC: 69 MG/DL
CREAT SPEC-SCNC: 69 MG/DL
CREAT/PROT UR: 0.1 RATIO
CRP SERPL-MCNC: <0.1 MG/DL
CYSTATIN C SERPL-MCNC: 1.61 MG/L
DEPRECATED KAPPA LC FREE/LAMBDA SER: 1.48 RATIO
EOSINOPHIL # BLD AUTO: 0.42 K/UL
EOSINOPHIL NFR BLD AUTO: 5.6 %
ERYTHROCYTE [SEDIMENTATION RATE] IN BLOOD BY WESTERGREN METHOD: 6 MM/HR
FERRITIN SERPL-MCNC: 160 NG/ML
FOLATE SERPL-MCNC: 14.1 NG/ML
GAMMA GLOB FLD ELPH-MCNC: 0.7 G/DL
GAMMA GLOB MFR SERPL ELPH: 11.6 %
GFR/BSA.PRED SERPLBLD CYS-BASED-ARV: 38 ML/MIN
GLUCOSE QUALITATIVE U: NORMAL
GLUCOSE SERPL-MCNC: 192 MG/DL
HBV SURFACE AB SER QL: NONREACTIVE
HBV SURFACE AG SER QL: NONREACTIVE
HCT VFR BLD CALC: 42.4 %
HCV AB SER QL: NONREACTIVE
HCV S/CO RATIO: 0.09 S/CO
HCYS SERPL-MCNC: 21.2 UMOL/L
HDLC SERPL-MCNC: 54 MG/DL
HGB BLD-MCNC: 12.5 G/DL
IGA SER QL IEP: 355 MG/DL
IGG SER QL IEP: 785 MG/DL
IGM SER QL IEP: 21 MG/DL
IMM GRANULOCYTES NFR BLD AUTO: 0.1 %
INTERPRETATION SERPL IEP-IMP: NORMAL
IRON SATN MFR SERPL: 28 %
IRON SERPL-MCNC: 57 UG/DL
KAPPA LC CSF-MCNC: 2.84 MG/DL
KAPPA LC SERPL-MCNC: 4.19 MG/DL
KETONES URINE: NEGATIVE
LDLC SERPL CALC-MCNC: 62 MG/DL
LEUKOCYTE ESTERASE URINE: NEGATIVE
LYMPHOCYTES # BLD AUTO: 1.38 K/UL
LYMPHOCYTES NFR BLD AUTO: 18.5 %
M PROTEIN SPEC IFE-MCNC: NORMAL
MAGNESIUM SERPL-MCNC: 1.9 MG/DL
MAN DIFF?: NORMAL
MCHC RBC-ENTMCNC: 27.8 PG
MCHC RBC-ENTMCNC: 29.5 GM/DL
MCV RBC AUTO: 94.2 FL
METHYLMALONATE SERPL-SCNC: 1009 NMOL/L
MEV IGG FLD QL IA: >300 AU/ML
MEV IGG+IGM SER-IMP: POSITIVE
MEV IGM SER QL: NEGATIVE
MICROALBUMIN 24H UR DL<=1MG/L-MCNC: 1.8 MG/DL
MICROALBUMIN/CREAT 24H UR-RTO: 26 MG/G
MICROSCOPIC-UA: NORMAL
MONOCYTES # BLD AUTO: 0.44 K/UL
MONOCYTES NFR BLD AUTO: 5.9 %
MPO AB + PR3 PNL SER: NORMAL
NEUTROPHILS # BLD AUTO: 5.22 K/UL
NEUTROPHILS NFR BLD AUTO: 69.9 %
NITRITE URINE: NEGATIVE
PARATHYROID HORMONE INTACT: 60 PG/ML
PH URINE: 5
PHOSPHATE SERPL-MCNC: 2.5 MG/DL
PLATELET # BLD AUTO: 213 K/UL
POTASSIUM SERPL-SCNC: 5 MMOL/L
PROT SERPL-MCNC: 6.3 G/DL
PROT SERPL-MCNC: 6.3 G/DL
PROT SERPL-MCNC: 6.4 G/DL
PROT UR-MCNC: 8 MG/DL
PROTEIN URINE: NEGATIVE
RBC # BLD: 4.5 M/UL
RBC # FLD: 14.7 %
RED BLOOD CELLS URINE: 1 /HPF
RHEUMATOID FACT SER QL: <10 IU/ML
SODIUM SERPL-SCNC: 139 MMOL/L
SPECIFIC GRAVITY URINE: 1.02
SQUAMOUS EPITHELIAL CELLS: 0 /HPF
TIBC SERPL-MCNC: 207 UG/DL
TRIGL SERPL-MCNC: 79 MG/DL
UIBC SERPL-MCNC: 150 UG/DL
URATE SERPL-MCNC: 5.8 MG/DL
UROBILINOGEN URINE: NORMAL
VIT B12 SERPL-MCNC: 493 PG/ML
WBC # FLD AUTO: 7.47 K/UL
WHITE BLOOD CELLS URINE: 2 /HPF

## 2019-06-20 ENCOUNTER — RX RENEWAL (OUTPATIENT)
Age: 79
End: 2019-06-20

## 2019-06-21 ENCOUNTER — RX RENEWAL (OUTPATIENT)
Age: 79
End: 2019-06-21

## 2019-08-05 ENCOUNTER — RX RENEWAL (OUTPATIENT)
Age: 79
End: 2019-08-05

## 2019-08-23 ENCOUNTER — EMERGENCY (EMERGENCY)
Facility: HOSPITAL | Age: 79
LOS: 1 days | Discharge: HOME | End: 2019-08-23
Attending: EMERGENCY MEDICINE | Admitting: EMERGENCY MEDICINE
Payer: MEDICARE

## 2019-08-23 VITALS
OXYGEN SATURATION: 96 % | SYSTOLIC BLOOD PRESSURE: 183 MMHG | TEMPERATURE: 98 F | HEART RATE: 64 BPM | DIASTOLIC BLOOD PRESSURE: 83 MMHG | RESPIRATION RATE: 16 BRPM

## 2019-08-23 VITALS
OXYGEN SATURATION: 98 % | RESPIRATION RATE: 18 BRPM | SYSTOLIC BLOOD PRESSURE: 142 MMHG | HEIGHT: 67 IN | HEART RATE: 56 BPM | DIASTOLIC BLOOD PRESSURE: 67 MMHG | WEIGHT: 205.69 LBS | TEMPERATURE: 98 F

## 2019-08-23 DIAGNOSIS — Z94.0 KIDNEY TRANSPLANT STATUS: Chronic | ICD-10-CM

## 2019-08-23 LAB
ALBUMIN SERPL ELPH-MCNC: 3.7 G/DL — SIGNIFICANT CHANGE UP (ref 3.3–5)
ALP SERPL-CCNC: 68 U/L — SIGNIFICANT CHANGE UP (ref 40–120)
ALT FLD-CCNC: 18 U/L — SIGNIFICANT CHANGE UP (ref 10–45)
ANION GAP SERPL CALC-SCNC: 8 MMOL/L — SIGNIFICANT CHANGE UP (ref 5–17)
APTT BLD: 31.6 SEC — SIGNIFICANT CHANGE UP (ref 27.5–36.3)
AST SERPL-CCNC: 14 U/L — SIGNIFICANT CHANGE UP (ref 10–40)
BASOPHILS # BLD AUTO: 0.02 K/UL — SIGNIFICANT CHANGE UP (ref 0–0.2)
BASOPHILS NFR BLD AUTO: 0.3 % — SIGNIFICANT CHANGE UP (ref 0–2)
BILIRUB SERPL-MCNC: 0.4 MG/DL — SIGNIFICANT CHANGE UP (ref 0.2–1.2)
BUN SERPL-MCNC: 21 MG/DL — SIGNIFICANT CHANGE UP (ref 7–23)
CALCIUM SERPL-MCNC: 9.6 MG/DL — SIGNIFICANT CHANGE UP (ref 8.4–10.5)
CHLORIDE SERPL-SCNC: 102 MMOL/L — SIGNIFICANT CHANGE UP (ref 96–108)
CO2 SERPL-SCNC: 29 MMOL/L — SIGNIFICANT CHANGE UP (ref 22–31)
CREAT SERPL-MCNC: 0.97 MG/DL — SIGNIFICANT CHANGE UP (ref 0.5–1.3)
EOSINOPHIL # BLD AUTO: 0.36 K/UL — SIGNIFICANT CHANGE UP (ref 0–0.5)
EOSINOPHIL NFR BLD AUTO: 5 % — SIGNIFICANT CHANGE UP (ref 0–6)
GLUCOSE SERPL-MCNC: 179 MG/DL — HIGH (ref 70–99)
HCT VFR BLD CALC: 39.9 % — SIGNIFICANT CHANGE UP (ref 39–50)
HGB BLD-MCNC: 12.2 G/DL — LOW (ref 13–17)
IMM GRANULOCYTES NFR BLD AUTO: 0.1 % — SIGNIFICANT CHANGE UP (ref 0–1.5)
INR BLD: 1.1 — SIGNIFICANT CHANGE UP (ref 0.88–1.16)
LYMPHOCYTES # BLD AUTO: 1.18 K/UL — SIGNIFICANT CHANGE UP (ref 1–3.3)
LYMPHOCYTES # BLD AUTO: 16.5 % — SIGNIFICANT CHANGE UP (ref 13–44)
MCHC RBC-ENTMCNC: 27.9 PG — SIGNIFICANT CHANGE UP (ref 27–34)
MCHC RBC-ENTMCNC: 30.6 GM/DL — LOW (ref 32–36)
MCV RBC AUTO: 91.1 FL — SIGNIFICANT CHANGE UP (ref 80–100)
MONOCYTES # BLD AUTO: 0.56 K/UL — SIGNIFICANT CHANGE UP (ref 0–0.9)
MONOCYTES NFR BLD AUTO: 7.8 % — SIGNIFICANT CHANGE UP (ref 2–14)
NEUTROPHILS # BLD AUTO: 5.02 K/UL — SIGNIFICANT CHANGE UP (ref 1.8–7.4)
NEUTROPHILS NFR BLD AUTO: 70.3 % — SIGNIFICANT CHANGE UP (ref 43–77)
NRBC # BLD: 1 /100 WBCS — HIGH (ref 0–0)
PLATELET # BLD AUTO: 200 K/UL — SIGNIFICANT CHANGE UP (ref 150–400)
POTASSIUM SERPL-MCNC: 4.3 MMOL/L — SIGNIFICANT CHANGE UP (ref 3.5–5.3)
POTASSIUM SERPL-SCNC: 4.3 MMOL/L — SIGNIFICANT CHANGE UP (ref 3.5–5.3)
PROT SERPL-MCNC: 6.4 G/DL — SIGNIFICANT CHANGE UP (ref 6–8.3)
PROTHROM AB SERPL-ACNC: 12.5 SEC — SIGNIFICANT CHANGE UP (ref 10–12.9)
RBC # BLD: 4.38 M/UL — SIGNIFICANT CHANGE UP (ref 4.2–5.8)
RBC # FLD: 14.2 % — SIGNIFICANT CHANGE UP (ref 10.3–14.5)
SODIUM SERPL-SCNC: 139 MMOL/L — SIGNIFICANT CHANGE UP (ref 135–145)
WBC # BLD: 7.15 K/UL — SIGNIFICANT CHANGE UP (ref 3.8–10.5)
WBC # FLD AUTO: 7.15 K/UL — SIGNIFICANT CHANGE UP (ref 3.8–10.5)

## 2019-08-23 PROCEDURE — 93005 ELECTROCARDIOGRAM TRACING: CPT

## 2019-08-23 PROCEDURE — 85610 PROTHROMBIN TIME: CPT

## 2019-08-23 PROCEDURE — 36415 COLL VENOUS BLD VENIPUNCTURE: CPT

## 2019-08-23 PROCEDURE — 93010 ELECTROCARDIOGRAM REPORT: CPT

## 2019-08-23 PROCEDURE — 99284 EMERGENCY DEPT VISIT MOD MDM: CPT | Mod: 25

## 2019-08-23 PROCEDURE — 99284 EMERGENCY DEPT VISIT MOD MDM: CPT

## 2019-08-23 PROCEDURE — 85730 THROMBOPLASTIN TIME PARTIAL: CPT

## 2019-08-23 PROCEDURE — 85025 COMPLETE CBC W/AUTO DIFF WBC: CPT

## 2019-08-23 PROCEDURE — 70450 CT HEAD/BRAIN W/O DYE: CPT

## 2019-08-23 PROCEDURE — 80053 COMPREHEN METABOLIC PANEL: CPT

## 2019-08-23 PROCEDURE — 70450 CT HEAD/BRAIN W/O DYE: CPT | Mod: 26

## 2019-08-23 NOTE — ED PROVIDER NOTE - OBJECTIVE STATEMENT
Pt is an 78yo m, h/o htn, dm, bph, who p/w dizziness intermittently since yesterday. Described as lightheadedness, only occurring when standing/ walking, none when sitting/ lying supine. Wife has been checking bp and noted it to be elevated to 190's/70s. Pt took his usual dose of coreg with drop in blood pressure to 120s/ 60s. No c/o cp, sob, palp, syncope, vertigo, n/v, n/t/w, ha, visual changes, speech changes, gait difficulty...

## 2019-08-23 NOTE — ED PROVIDER NOTE - PHYSICAL EXAMINATION
VITAL SIGNS: I have reviewed nursing notes and confirm.  CONSTITUTIONAL: Well-developed; well-nourished; in no acute distress.   SKIN:  warm and dry, no acute rash.   HEAD:  normocephalic, atraumatic.  EYES: PERRLA. EOM intact; conjunctiva and sclera clear.  ENT: No nasal discharge; airway clear.   NECK: Supple; non tender.  CARD: S1, S2 normal; no murmurs, gallops, or rubs. Regular rate and rhythm.   RESP:  Clear to auscultation b/l, no wheezes, rales or rhonchi.  ABD: Normal bowel sounds; soft; non-distended; non-tender; no guarding/ rebound.  EXT: Normal ROM. No clubbing, cyanosis or edema. 2+ pulses to b/l ue/le.  NEURO: Alert, oriented x 3, CN II-XII grossly intact. Motor/ sensation intact and equal b/l. Neg pronator drift. Gait steady.  PSYCH: Cooperative, mood and affect appropriate.

## 2019-08-23 NOTE — ED PROVIDER NOTE - NSFOLLOWUPINSTRUCTIONS_ED_ALL_ED_FT
Continue with your current medications. Follow up with Dr. Mac as scheduled. Return to er for any new or worsening symptoms.     Dizziness    Dizziness can manifest as a feeling of unsteadiness or light-headedness. You may feel like you are about to faint. This condition can be caused by a number of things, including medicines, dehydration, or illness. Drink enough fluid to keep your urine clear or pale yellow. Do not drink alcohol and limit your caffeine intake. Avoid quick or sudden movements.  Rise slowly from chairs and steady yourself until you feel okay. In the morning, first sit up on the side of the bed.    SEEK IMMEDIATE MEDICAL CARE IF YOU HAVE ANY OF THE FOLLOWING SYMPTOMS: vomiting, changes in your vision or speech, weakness in your arms or legs, trouble speaking or swallowing, chest pain, abdominal pain, shortness of breath, sweating, bleeding, headache, neck pain, or fever.

## 2019-08-23 NOTE — ED PROVIDER NOTE - CARE PROVIDER_API CALL
Callum Aranda)  Internal Medicine; Nephrology  [address], [address], [place] [address]  Phone: [phone]  Fax: [phone]  Follow Up Time:

## 2019-08-23 NOTE — ED PROVIDER NOTE - CLINICAL SUMMARY MEDICAL DECISION MAKING FREE TEXT BOX
Impression: intermittent dizziness, mostly with sitting/ standing, w/ associated htn, which improves w/ coreg. No associated sob, cp, palp, focal weakness, numbness, ha... Afebrile. HDS. EKG non-ischemic. Labs reviewed and wnl including wbc, electrolytes and renal function. CT head neg for acute bleed/ infarct; + sinusitis which is improving. Pt seen by Dr. Mac; will dc home and pt to f/u with him in office for bp re-check. No recommendations for med changes at this time.

## 2019-08-23 NOTE — ED ADULT TRIAGE NOTE - CHIEF COMPLAINT QUOTE
reported dizziness that started yesterday with movement. Denies vison changes. confusion, slurring of speech or weakness asper wife.

## 2019-08-26 ENCOUNTER — INBOUND DOCUMENT (OUTPATIENT)
Age: 79
End: 2019-08-26

## 2019-08-29 DIAGNOSIS — E11.9 TYPE 2 DIABETES MELLITUS WITHOUT COMPLICATIONS: ICD-10-CM

## 2019-08-29 DIAGNOSIS — R42 DIZZINESS AND GIDDINESS: ICD-10-CM

## 2019-08-29 DIAGNOSIS — I10 ESSENTIAL (PRIMARY) HYPERTENSION: ICD-10-CM

## 2019-09-03 ENCOUNTER — LABORATORY RESULT (OUTPATIENT)
Age: 79
End: 2019-09-03

## 2019-09-03 ENCOUNTER — RX RENEWAL (OUTPATIENT)
Age: 79
End: 2019-09-03

## 2019-09-03 ENCOUNTER — APPOINTMENT (OUTPATIENT)
Dept: NEPHROLOGY | Facility: CLINIC | Age: 79
End: 2019-09-03
Payer: MEDICARE

## 2019-09-03 VITALS — HEART RATE: 84 BPM | SYSTOLIC BLOOD PRESSURE: 136 MMHG | DIASTOLIC BLOOD PRESSURE: 80 MMHG

## 2019-09-03 VITALS — SYSTOLIC BLOOD PRESSURE: 140 MMHG | DIASTOLIC BLOOD PRESSURE: 56 MMHG | HEART RATE: 72 BPM

## 2019-09-03 VITALS — HEART RATE: 72 BPM | SYSTOLIC BLOOD PRESSURE: 130 MMHG | DIASTOLIC BLOOD PRESSURE: 82 MMHG

## 2019-09-03 VITALS — OXYGEN SATURATION: 97 % | WEIGHT: 207 LBS | HEART RATE: 85 BPM | BODY MASS INDEX: 32.42 KG/M2

## 2019-09-03 VITALS — DIASTOLIC BLOOD PRESSURE: 70 MMHG | SYSTOLIC BLOOD PRESSURE: 132 MMHG | HEART RATE: 84 BPM

## 2019-09-03 DIAGNOSIS — H70.91 UNSPECIFIED MASTOIDITIS, RIGHT EAR: ICD-10-CM

## 2019-09-03 DIAGNOSIS — J32.8 OTHER CHRONIC SINUSITIS: ICD-10-CM

## 2019-09-03 PROCEDURE — 99214 OFFICE O/P EST MOD 30 MIN: CPT | Mod: 25

## 2019-09-03 PROCEDURE — 36415 COLL VENOUS BLD VENIPUNCTURE: CPT

## 2019-09-03 NOTE — ASSESSMENT
[FreeTextEntry1] : Plan:\par 1) HTN: BP acceptable today on current regimen and therefore will not adjust patient's antihypertensive medications, will reassess pressure and regimen at next evaluation. \par 2) CRI: last eGFR of 68; will continue to monitor function with CMP due to risk for progression of renal failure; have evaluated patient's renal function, blood pressure, and fluid volume status which do not necessitate changes to medications at this time and will thus maintain current therapy.\par 3) Fluid volume disorder: Upon physical examination patient found to have 1-2+ bilateral LE edema, which does not necessitate a change in approach to fluid volume management at this time. Will continue to monitor at future visits.\par 4) Sinusitis: Refer patient for an ENT evaluation with Dr. Olvera. Patient's complaint is chronic persistent cough, which could be due to either post nasal drainage from sinusitis, or reflux, both accessible to ENT examination.\par \par Changes to Medications: none \par \par Labs were drawn and patient will return in 2 months for a follow-up appointment.

## 2019-09-03 NOTE — PHYSICAL EXAM
[General Appearance - In No Acute Distress] : in no acute distress [General Appearance - Alert] : alert [Sclera] : the sclera and conjunctiva were normal [PERRL With Normal Accommodation] : pupils were equal in size, round, and reactive to light [Extraocular Movements] : extraocular movements were intact [Outer Ear] : the ears and nose were normal in appearance [Oropharynx] : the oropharynx was normal [Neck Appearance] : the appearance of the neck was normal [Neck Cervical Mass (___cm)] : no neck mass was observed [Jugular Venous Distention Increased] : there was no jugular-venous distention [Thyroid Diffuse Enlargement] : the thyroid was not enlarged [Thyroid Nodule] : there were no palpable thyroid nodules [Auscultation Breath Sounds / Voice Sounds] : lungs were clear to auscultation bilaterally [Heart Rate And Rhythm] : heart rate was normal and rhythm regular [Heart Sounds] : normal S1 and S2 [Heart Sounds Gallop] : no gallops [Murmurs] : no murmurs [Heart Sounds Pericardial Friction Rub] : no pericardial rub [Bowel Sounds] : normal bowel sounds [Abdomen Soft] : soft [Abdomen Tenderness] : non-tender [Abdomen Mass (___ Cm)] : no abdominal mass palpated [No CVA Tenderness] : no ~M costovertebral angle tenderness [No Spinal Tenderness] : no spinal tenderness [Abnormal Walk] : normal gait [Nail Clubbing] : no clubbing  or cyanosis of the fingernails [Musculoskeletal - Swelling] : no joint swelling seen [Motor Tone] : muscle strength and tone were normal [Skin Color & Pigmentation] : normal skin color and pigmentation [Skin Turgor] : normal skin turgor [] : no rash [Cranial Nerves] : cranial nerves 2-12 were intact [Motor Exam] : the motor exam was normal [No Focal Deficits] : no focal deficits [Oriented To Time, Place, And Person] : oriented to person, place, and time [Impaired Insight] : insight and judgment were intact [Affect] : the affect was normal [FreeTextEntry1] : 1-2+ bilateral LE edema

## 2019-09-03 NOTE — REASON FOR VISIT
[Follow-Up] : a follow-up visit [Spouse] : spouse [FreeTextEntry1] : for blood pressure management and active reassessment of CRI and fluid volume overload.

## 2019-09-03 NOTE — ADDENDUM
[FreeTextEntry1] :  Documented by Lasha Bush acting as a scribe for Dr. Callum Aranda on 09/03/2019.

## 2019-09-03 NOTE — END OF VISIT
[FreeTextEntry3] : All medical record entries made by the Scribe were at my, Dr. Callum Aranda, direction and personally dictated by me on 09/03/2019. I have reviewed the chart and agree that the record accurately reflects my personal performance of the history, physical exam, assessment and plan. I have also personally directed, reviewed, and agreed with the chart.

## 2019-09-03 NOTE — HISTORY OF PRESENT ILLNESS
[FreeTextEntry1] : The patient is a 79 year old male presenting for follow-up for blood pressure management and for active reassessment of HTN, DM, CRF stage 2, visual disability, anemia, ASCVD, and h/o renal transplant. \par \par Interval Data:\par - Labs from 6/5/19 reveal: homocysteine 21.2, HGB 12.5, HCT 42.4, eGFR by cystatin 38, iron total binding 207, glucose 192, creatinine 1.04, eGFR 68, Vit D 25 hydroxy 24.7, MMA 1009.\par - Patient was evaluated [facility] on 8/23/19 for dizziness and out of control hypertension. He was  discharged the same day after his workup showed no urgent reason for admission and his bp returned to acceptable levels. Head CT showed sinusitis. \par \par Current Complaints:\par - Patient reports that he has had a cough since May, states he was dx with inflamed bronchitis for which he completed a course of antibiotics. The cough has been progressively worse over the last 3 months, and is worse when patient is laying down. [facility] ER last week notes head CT that showed sinusitis. His wife reports that in the hospital his bp was as high as 190/100.\par \par Current Medication: prednisone 5mg QD, ASA 325mg BID, tramadol 50mg, Flomax .4mg QD, Lantus, Humalog 72/25 BID, carvedilol 12.5mg BID, furosemide 20mg QD, famotidine 20mg BID, mycophenolate 250mg-500mg BID, tacrolimus 4mg BID, docusate sodium 100mg TID, and senna QD.

## 2019-09-15 LAB
24R-OH-CALCIDIOL SERPL-MCNC: 49.6 PG/ML
25(OH)D3 SERPL-MCNC: 18.8 NG/ML
ALBUMIN MFR SERPL ELPH: 59.9 %
ALBUMIN SERPL ELPH-MCNC: 4.2 G/DL
ALBUMIN SERPL-MCNC: 3.8 G/DL
ALBUMIN/GLOB SERPL: 1.5 RATIO
ALDOSTERONE SERUM: 7.5 NG/DL
ALP BLD-CCNC: 85 U/L
ALP BONE SERPL-MCNC: 15 MCG/L
ALPHA1 GLOB MFR SERPL ELPH: 4.3 %
ALPHA1 GLOB SERPL ELPH-MCNC: 0.3 G/DL
ALPHA2 GLOB MFR SERPL ELPH: 11.5 %
ALPHA2 GLOB SERPL ELPH-MCNC: 0.7 G/DL
ALT SERPL-CCNC: 15 U/L
ANA SER IF-ACNC: NEGATIVE
ANION GAP SERPL CALC-SCNC: 17 MMOL/L
APPEARANCE: CLEAR
AST SERPL-CCNC: 13 U/L
B-GLOBULIN MFR SERPL ELPH: 12.9 %
B-GLOBULIN SERPL ELPH-MCNC: 0.8 G/DL
BACTERIA: NEGATIVE
BASOPHILS # BLD AUTO: 0.01 K/UL
BASOPHILS NFR BLD AUTO: 0.1 %
BILIRUB SERPL-MCNC: 0.3 MG/DL
BILIRUBIN URINE: NEGATIVE
BLOOD URINE: NORMAL
BUN SERPL-MCNC: 18 MG/DL
C3 SERPL-MCNC: 98 MG/DL
C4 SERPL-MCNC: 25 MG/DL
CALCIUM OXALATE CRYSTALS: ABNORMAL
CALCIUM SERPL-MCNC: 10.2 MG/DL
CALCIUM SERPL-MCNC: 10.2 MG/DL
CHLORIDE SERPL-SCNC: 102 MMOL/L
CHOLEST SERPL-MCNC: 138 MG/DL
CHOLEST/HDLC SERPL: 2.6 RATIO
CO2 SERPL-SCNC: 21 MMOL/L
COLLAGEN CTX SERPL-MCNC: 288 PG/ML
COLOR: YELLOW
CREAT SERPL-MCNC: 1.02 MG/DL
CRP SERPL-MCNC: 0.17 MG/DL
CYSTATIN C SERPL-MCNC: 1.72 MG/L
DEPRECATED KAPPA LC FREE/LAMBDA SER: 1.65 RATIO
DEPRECATED KAPPA LC FREE/LAMBDA SER: 1.65 RATIO
EOSINOPHIL # BLD AUTO: 0.51 K/UL
EOSINOPHIL NFR BLD AUTO: 6.9 %
ERYTHROCYTE [SEDIMENTATION RATE] IN BLOOD BY WESTERGREN METHOD: 9 MM/HR
ESTIMATED AVERAGE GLUCOSE: 206 MG/DL
FERRITIN SERPL-MCNC: 173 NG/ML
FOLATE SERPL-MCNC: 14 NG/ML
GAMMA GLOB FLD ELPH-MCNC: 0.7 G/DL
GAMMA GLOB MFR SERPL ELPH: 11.4 %
GFR/BSA.PRED SERPLBLD CYS-BASED-ARV: 35 ML/MIN
GLUCOSE QUALITATIVE U: ABNORMAL
GLUCOSE SERPL-MCNC: 241 MG/DL
HBA1C MFR BLD HPLC: 8.8 %
HBV SURFACE AB SER QL: NONREACTIVE
HBV SURFACE AG SER QL: NONREACTIVE
HCT VFR BLD CALC: 43.5 %
HCV AB SER QL: NONREACTIVE
HCV S/CO RATIO: 0.11 S/CO
HCYS SERPL-MCNC: 21.4 UMOL/L
HDLC SERPL-MCNC: 54 MG/DL
HGB BLD-MCNC: 12.8 G/DL
HYALINE CASTS: 0 /LPF
IGA SER QL IEP: 427 MG/DL
IGG SER QL IEP: 805 MG/DL
IGM SER QL IEP: 24 MG/DL
IMM GRANULOCYTES NFR BLD AUTO: 0.3 %
INTERPRETATION SERPL IEP-IMP: NORMAL
IRON SATN MFR SERPL: 19 %
IRON SERPL-MCNC: 45 UG/DL
KAPPA LC CSF-MCNC: 2.73 MG/DL
KAPPA LC CSF-MCNC: 2.73 MG/DL
KAPPA LC SERPL-MCNC: 4.51 MG/DL
KAPPA LC SERPL-MCNC: 4.51 MG/DL
KETONES URINE: NEGATIVE
LDLC SERPL CALC-MCNC: 63 MG/DL
LEUKOCYTE ESTERASE URINE: NEGATIVE
LYMPHOCYTES # BLD AUTO: 1.34 K/UL
LYMPHOCYTES NFR BLD AUTO: 18.2 %
M PROTEIN SPEC IFE-MCNC: NORMAL
MAGNESIUM SERPL-MCNC: 1.9 MG/DL
MAN DIFF?: NORMAL
MCHC RBC-ENTMCNC: 27.5 PG
MCHC RBC-ENTMCNC: 29.4 GM/DL
MCV RBC AUTO: 93.3 FL
METHYLMALONATE SERPL-SCNC: 913 NMOL/L
MICROSCOPIC-UA: NORMAL
MONOCYTES # BLD AUTO: 0.53 K/UL
MONOCYTES NFR BLD AUTO: 7.2 %
MPO AB + PR3 PNL SER: NORMAL
NEUTROPHILS # BLD AUTO: 4.95 K/UL
NEUTROPHILS NFR BLD AUTO: 67.3 %
NITRITE URINE: NEGATIVE
NT-PROBNP SERPL-MCNC: 1240 PG/ML
PARATHYROID HORMONE INTACT: 72 PG/ML
PH URINE: 5
PHOSPHATE SERPL-MCNC: 2.7 MG/DL
PLATELET # BLD AUTO: 201 K/UL
POTASSIUM SERPL-SCNC: 5 MMOL/L
PROT SERPL-MCNC: 6.4 G/DL
PROTEIN URINE: NEGATIVE
RBC # BLD: 4.66 M/UL
RBC # FLD: 14.7 %
RED BLOOD CELLS URINE: 2 /HPF
RENIN PLASMA: 8.5 PG/ML
RHEUMATOID FACT SER QL: <10 IU/ML
SODIUM SERPL-SCNC: 140 MMOL/L
SPECIFIC GRAVITY URINE: 1.02
SQUAMOUS EPITHELIAL CELLS: 0 /HPF
TIBC SERPL-MCNC: 231 UG/DL
TRIGL SERPL-MCNC: 106 MG/DL
UIBC SERPL-MCNC: 186 UG/DL
URATE SERPL-MCNC: 5.5 MG/DL
UROBILINOGEN URINE: NORMAL
VIT B12 SERPL-MCNC: 472 PG/ML
WBC # FLD AUTO: 7.36 K/UL
WHITE BLOOD CELLS URINE: 1 /HPF

## 2019-09-24 ENCOUNTER — RX RENEWAL (OUTPATIENT)
Age: 79
End: 2019-09-24

## 2019-10-02 ENCOUNTER — APPOINTMENT (OUTPATIENT)
Dept: OPHTHALMOLOGY | Facility: CLINIC | Age: 79
End: 2019-10-02
Payer: MEDICARE

## 2019-10-02 ENCOUNTER — NON-APPOINTMENT (OUTPATIENT)
Age: 79
End: 2019-10-02

## 2019-10-02 PROCEDURE — 92012 INTRM OPH EXAM EST PATIENT: CPT

## 2019-10-08 VITALS
HEIGHT: 67 IN | TEMPERATURE: 99 F | DIASTOLIC BLOOD PRESSURE: 57 MMHG | WEIGHT: 203.93 LBS | SYSTOLIC BLOOD PRESSURE: 129 MMHG | OXYGEN SATURATION: 99 % | HEART RATE: 71 BPM | RESPIRATION RATE: 18 BRPM

## 2019-10-08 NOTE — ED ADULT NURSE NOTE - CHIEF COMPLAINT QUOTE
generalized weakness x1 week and left foot wound with suspected infection (called in by MD Mac); pt took 1 gram of tylenol at approx. 9 pm

## 2019-10-08 NOTE — ED PROVIDER NOTE - OBJECTIVE STATEMENT
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 Pt took Tylenol at 9pm for pain. Denies CP, SOb or syncope. No trauma or fall.   Sent in by Dr. duarte for admission and vascular consult. he has been following with podiatry for debridements, has not been on abx recently.

## 2019-10-08 NOTE — ED PROVIDER NOTE - CLINICAL SUMMARY MEDICAL DECISION MAKING FREE TEXT BOX
79M with PMHx HTN, DM, glaucoma (functional blindness), BPH, ESRD s/p Right renal transplant (was on dialysis 3-4 years prior), BPH now with infected diabetic foot ulcer. Full labs including BCx ordered, Iv abx given. CXR and Foot xray as well as LE DVT study (neg). Pt Admitted to vascular surgery for further mgmt. D/w Dr. Mac as well, who sent the patient in to the ER.

## 2019-10-08 NOTE — ED ADULT NURSE NOTE - NSIMPLEMENTINTERV_GEN_ALL_ED
Implemented All Fall Risk Interventions:  [place] to call system. Call bell, personal items and telephone within reach. Instruct patient to call for assistance. Room bathroom lighting operational. Non-slip footwear when patient is off stretcher. Physically safe environment: no spills, clutter or unnecessary equipment. Stretcher in lowest position, wheels locked, appropriate side rails in place. Provide visual cue, wrist band, yellow gown, etc. Monitor gait and stability. Monitor for mental status changes and reorient to person, place, and time. Review medications for side effects contributing to fall risk. Reinforce activity limits and safety measures with patient and family.

## 2019-10-08 NOTE — ED ADULT NURSE NOTE - OBJECTIVE STATEMENT
77M with h/o +Functional blindness, hx renal transplant (right sided) (was on dialysis 3-4 years prior), DM1, hypertension, BPh who p/w fever and hypotension tonight (took Tylenol at 9pm) and feeling woozy for a few days  non-productive cough noted

## 2019-10-08 NOTE — ED ADULT TRIAGE NOTE - CHIEF COMPLAINT QUOTE
generalized weakness x1 week and left foot wound with suspected infection (called in by MD Mac) generalized weakness x1 week and left foot wound with suspected infection (called in by MD Mac); pt took 1 gram of tylenol at approx. 9 pm

## 2019-10-08 NOTE — ED PROVIDER NOTE - PHYSICAL EXAMINATION
GEN: elderly, obese, NTAF, awake, alert, oriented to person, place, time/situation and in no apparent distress.  ENT: Airway patent, Nasal mucosa clear. Mouth with normal mucosa.  EYES: Clear bilaterally.  RESPIRATORY: Breathing comfortably with normal RR., no w/c/r  CARDIAC: Regular rate and rhythm  ABDOMEN: Soft, nontender, +bowel sounds, no rebound, rigidity, or guarding.  MSK: +L LE to knee, palpable distal pulses, L Diabetic foot ulcer. Range of motion is not limited, no deformities noted.  NEURO: Alert and oriented, no focal deficits.  SKIN: Skin normal color for race, warm, dry and intact. No evidence of rash.  PSYCH: Alert and oriented to person, place, time/situation. normal mood and affect. no apparent risk to self or others.

## 2019-10-09 ENCOUNTER — TRANSCRIPTION ENCOUNTER (OUTPATIENT)
Age: 79
End: 2019-10-09

## 2019-10-09 ENCOUNTER — RESULT REVIEW (OUTPATIENT)
Age: 79
End: 2019-10-09

## 2019-10-09 ENCOUNTER — INPATIENT (INPATIENT)
Facility: HOSPITAL | Age: 79
LOS: 2 days | Discharge: HOME HEALTH | DRG: 628 | End: 2019-10-12
Attending: SURGERY | Admitting: SURGERY
Payer: MEDICARE

## 2019-10-09 DIAGNOSIS — Z94.0 KIDNEY TRANSPLANT STATUS: Chronic | ICD-10-CM

## 2019-10-09 LAB
ALBUMIN SERPL ELPH-MCNC: 3.3 G/DL — SIGNIFICANT CHANGE UP (ref 3.3–5)
ALP SERPL-CCNC: 73 U/L — SIGNIFICANT CHANGE UP (ref 40–120)
ALT FLD-CCNC: 8 U/L — LOW (ref 10–45)
ANION GAP SERPL CALC-SCNC: 10 MMOL/L — SIGNIFICANT CHANGE UP (ref 5–17)
ANION GAP SERPL CALC-SCNC: 9 MMOL/L — SIGNIFICANT CHANGE UP (ref 5–17)
APPEARANCE UR: CLEAR — SIGNIFICANT CHANGE UP
APTT BLD: 31.1 SEC — SIGNIFICANT CHANGE UP (ref 27.5–36.3)
AST SERPL-CCNC: 9 U/L — LOW (ref 10–40)
BASE EXCESS BLDV CALC-SCNC: -1.9 MMOL/L — SIGNIFICANT CHANGE UP
BASOPHILS # BLD AUTO: 0.01 K/UL — SIGNIFICANT CHANGE UP (ref 0–0.2)
BASOPHILS NFR BLD AUTO: 0.1 % — SIGNIFICANT CHANGE UP (ref 0–2)
BILIRUB SERPL-MCNC: 0.6 MG/DL — SIGNIFICANT CHANGE UP (ref 0.2–1.2)
BILIRUB UR-MCNC: NEGATIVE — SIGNIFICANT CHANGE UP
BLD GP AB SCN SERPL QL: NEGATIVE — SIGNIFICANT CHANGE UP
BUN SERPL-MCNC: 25 MG/DL — HIGH (ref 7–23)
BUN SERPL-MCNC: 26 MG/DL — HIGH (ref 7–23)
CALCIUM SERPL-MCNC: 9.5 MG/DL — SIGNIFICANT CHANGE UP (ref 8.4–10.5)
CALCIUM SERPL-MCNC: 9.6 MG/DL — SIGNIFICANT CHANGE UP (ref 8.4–10.5)
CHLORIDE SERPL-SCNC: 105 MMOL/L — SIGNIFICANT CHANGE UP (ref 96–108)
CHLORIDE SERPL-SCNC: 99 MMOL/L — SIGNIFICANT CHANGE UP (ref 96–108)
CK SERPL-CCNC: 22 U/L — LOW (ref 30–200)
CO2 SERPL-SCNC: 24 MMOL/L — SIGNIFICANT CHANGE UP (ref 22–31)
CO2 SERPL-SCNC: 25 MMOL/L — SIGNIFICANT CHANGE UP (ref 22–31)
COLOR SPEC: YELLOW — SIGNIFICANT CHANGE UP
CREAT SERPL-MCNC: 1.24 MG/DL — SIGNIFICANT CHANGE UP (ref 0.5–1.3)
CREAT SERPL-MCNC: 1.36 MG/DL — HIGH (ref 0.5–1.3)
DIFF PNL FLD: NEGATIVE — SIGNIFICANT CHANGE UP
EOSINOPHIL # BLD AUTO: 0.03 K/UL — SIGNIFICANT CHANGE UP (ref 0–0.5)
EOSINOPHIL NFR BLD AUTO: 0.2 % — SIGNIFICANT CHANGE UP (ref 0–6)
GLUCOSE BLDC GLUCOMTR-MCNC: 157 MG/DL — HIGH (ref 70–99)
GLUCOSE BLDC GLUCOMTR-MCNC: 167 MG/DL — HIGH (ref 70–99)
GLUCOSE BLDC GLUCOMTR-MCNC: 181 MG/DL — HIGH (ref 70–99)
GLUCOSE BLDC GLUCOMTR-MCNC: 199 MG/DL — HIGH (ref 70–99)
GLUCOSE SERPL-MCNC: 155 MG/DL — HIGH (ref 70–99)
GLUCOSE SERPL-MCNC: 81 MG/DL — SIGNIFICANT CHANGE UP (ref 70–99)
GLUCOSE UR QL: 250
GRAM STN FLD: SIGNIFICANT CHANGE UP
GRAM STN FLD: SIGNIFICANT CHANGE UP
HBA1C BLD-MCNC: 8.9 % — HIGH (ref 4–5.6)
HCO3 BLDV-SCNC: 24 MMOL/L — SIGNIFICANT CHANGE UP (ref 20–27)
HCT VFR BLD CALC: 34.6 % — LOW (ref 39–50)
HCT VFR BLD CALC: 36.4 % — LOW (ref 39–50)
HGB BLD-MCNC: 10.7 G/DL — LOW (ref 13–17)
HGB BLD-MCNC: 11.4 G/DL — LOW (ref 13–17)
IMM GRANULOCYTES NFR BLD AUTO: 0.3 % — SIGNIFICANT CHANGE UP (ref 0–1.5)
INR BLD: 1.19 — HIGH (ref 0.88–1.16)
KETONES UR-MCNC: ABNORMAL MG/DL
LACTATE SERPL-SCNC: 1.3 MMOL/L — SIGNIFICANT CHANGE UP (ref 0.5–2)
LEUKOCYTE ESTERASE UR-ACNC: NEGATIVE — SIGNIFICANT CHANGE UP
LYMPHOCYTES # BLD AUTO: 1.19 K/UL — SIGNIFICANT CHANGE UP (ref 1–3.3)
LYMPHOCYTES # BLD AUTO: 8.3 % — LOW (ref 13–44)
MAGNESIUM SERPL-MCNC: 1.8 MG/DL — SIGNIFICANT CHANGE UP (ref 1.6–2.6)
MCHC RBC-ENTMCNC: 27.6 PG — SIGNIFICANT CHANGE UP (ref 27–34)
MCHC RBC-ENTMCNC: 28 PG — SIGNIFICANT CHANGE UP (ref 27–34)
MCHC RBC-ENTMCNC: 30.9 GM/DL — LOW (ref 32–36)
MCHC RBC-ENTMCNC: 31.3 GM/DL — LOW (ref 32–36)
MCV RBC AUTO: 89.4 FL — SIGNIFICANT CHANGE UP (ref 80–100)
MCV RBC AUTO: 89.4 FL — SIGNIFICANT CHANGE UP (ref 80–100)
MONOCYTES # BLD AUTO: 1.48 K/UL — HIGH (ref 0–0.9)
MONOCYTES NFR BLD AUTO: 10.3 % — SIGNIFICANT CHANGE UP (ref 2–14)
NEUTROPHILS # BLD AUTO: 11.62 K/UL — HIGH (ref 1.8–7.4)
NEUTROPHILS NFR BLD AUTO: 80.8 % — HIGH (ref 43–77)
NITRITE UR-MCNC: NEGATIVE — SIGNIFICANT CHANGE UP
NRBC # BLD: 0 /100 WBCS — SIGNIFICANT CHANGE UP (ref 0–0)
NRBC # BLD: 0 /100 WBCS — SIGNIFICANT CHANGE UP (ref 0–0)
PCO2 BLDV: 45 MMHG — SIGNIFICANT CHANGE UP (ref 41–51)
PH BLDV: 7.34 — SIGNIFICANT CHANGE UP (ref 7.32–7.43)
PH UR: 5.5 — SIGNIFICANT CHANGE UP (ref 5–8)
PHOSPHATE SERPL-MCNC: 2.6 MG/DL — SIGNIFICANT CHANGE UP (ref 2.5–4.5)
PLATELET # BLD AUTO: 200 K/UL — SIGNIFICANT CHANGE UP (ref 150–400)
PLATELET # BLD AUTO: 208 K/UL — SIGNIFICANT CHANGE UP (ref 150–400)
PO2 BLDV: 31 MMHG — SIGNIFICANT CHANGE UP
POTASSIUM SERPL-MCNC: 4.1 MMOL/L — SIGNIFICANT CHANGE UP (ref 3.5–5.3)
POTASSIUM SERPL-MCNC: 4.8 MMOL/L — SIGNIFICANT CHANGE UP (ref 3.5–5.3)
POTASSIUM SERPL-SCNC: 4.1 MMOL/L — SIGNIFICANT CHANGE UP (ref 3.5–5.3)
POTASSIUM SERPL-SCNC: 4.8 MMOL/L — SIGNIFICANT CHANGE UP (ref 3.5–5.3)
PROT SERPL-MCNC: 6.4 G/DL — SIGNIFICANT CHANGE UP (ref 6–8.3)
PROT UR-MCNC: ABNORMAL MG/DL
PROTHROM AB SERPL-ACNC: 13.5 SEC — HIGH (ref 10–12.9)
RAPID RVP RESULT: SIGNIFICANT CHANGE UP
RBC # BLD: 3.87 M/UL — LOW (ref 4.2–5.8)
RBC # BLD: 4.07 M/UL — LOW (ref 4.2–5.8)
RBC # FLD: 13.5 % — SIGNIFICANT CHANGE UP (ref 10.3–14.5)
RBC # FLD: 13.7 % — SIGNIFICANT CHANGE UP (ref 10.3–14.5)
RH IG SCN BLD-IMP: POSITIVE — SIGNIFICANT CHANGE UP
RH IG SCN BLD-IMP: POSITIVE — SIGNIFICANT CHANGE UP
SAO2 % BLDV: 59 % — SIGNIFICANT CHANGE UP
SODIUM SERPL-SCNC: 134 MMOL/L — LOW (ref 135–145)
SODIUM SERPL-SCNC: 138 MMOL/L — SIGNIFICANT CHANGE UP (ref 135–145)
SP GR SPEC: 1.02 — SIGNIFICANT CHANGE UP (ref 1–1.03)
SPECIMEN SOURCE: SIGNIFICANT CHANGE UP
SPECIMEN SOURCE: SIGNIFICANT CHANGE UP
TROPONIN T SERPL-MCNC: 0.01 NG/ML — SIGNIFICANT CHANGE UP (ref 0–0.01)
UROBILINOGEN FLD QL: 0.2 E.U./DL — SIGNIFICANT CHANGE UP
WBC # BLD: 12.68 K/UL — HIGH (ref 3.8–10.5)
WBC # BLD: 14.37 K/UL — HIGH (ref 3.8–10.5)
WBC # FLD AUTO: 12.68 K/UL — HIGH (ref 3.8–10.5)
WBC # FLD AUTO: 14.37 K/UL — HIGH (ref 3.8–10.5)

## 2019-10-09 PROCEDURE — 71045 X-RAY EXAM CHEST 1 VIEW: CPT | Mod: 26

## 2019-10-09 PROCEDURE — 11044 DBRDMT BONE 1ST 20 SQ CM/<: CPT | Mod: GC

## 2019-10-09 PROCEDURE — 73630 X-RAY EXAM OF FOOT: CPT | Mod: 26,LT

## 2019-10-09 PROCEDURE — 93971 EXTREMITY STUDY: CPT | Mod: 26,LT

## 2019-10-09 PROCEDURE — 93010 ELECTROCARDIOGRAM REPORT: CPT

## 2019-10-09 PROCEDURE — 99285 EMERGENCY DEPT VISIT HI MDM: CPT

## 2019-10-09 RX ORDER — DEXTROSE 50 % IN WATER 50 %
25 SYRINGE (ML) INTRAVENOUS ONCE
Refills: 0 | Status: DISCONTINUED | OUTPATIENT
Start: 2019-10-09 | End: 2019-10-09

## 2019-10-09 RX ORDER — TACROLIMUS 5 MG/1
4 CAPSULE ORAL EVERY 12 HOURS
Refills: 0 | Status: DISCONTINUED | OUTPATIENT
Start: 2019-10-09 | End: 2019-10-12

## 2019-10-09 RX ORDER — SODIUM CHLORIDE 9 MG/ML
1000 INJECTION, SOLUTION INTRAVENOUS
Refills: 0 | Status: DISCONTINUED | OUTPATIENT
Start: 2019-10-09 | End: 2019-10-09

## 2019-10-09 RX ORDER — SODIUM CHLORIDE 9 MG/ML
500 INJECTION INTRAMUSCULAR; INTRAVENOUS; SUBCUTANEOUS ONCE
Refills: 0 | Status: COMPLETED | OUTPATIENT
Start: 2019-10-09 | End: 2019-10-09

## 2019-10-09 RX ORDER — DOCUSATE SODIUM 100 MG
100 CAPSULE ORAL THREE TIMES A DAY
Refills: 0 | Status: DISCONTINUED | OUTPATIENT
Start: 2019-10-09 | End: 2019-10-09

## 2019-10-09 RX ORDER — OXYCODONE AND ACETAMINOPHEN 5; 325 MG/1; MG/1
2 TABLET ORAL EVERY 4 HOURS
Refills: 0 | Status: DISCONTINUED | OUTPATIENT
Start: 2019-10-09 | End: 2019-10-12

## 2019-10-09 RX ORDER — HYDROMORPHONE HYDROCHLORIDE 2 MG/ML
0.5 INJECTION INTRAMUSCULAR; INTRAVENOUS; SUBCUTANEOUS ONCE
Refills: 0 | Status: DISCONTINUED | OUTPATIENT
Start: 2019-10-09 | End: 2019-10-09

## 2019-10-09 RX ORDER — DEXTROSE 50 % IN WATER 50 %
12.5 SYRINGE (ML) INTRAVENOUS ONCE
Refills: 0 | Status: DISCONTINUED | OUTPATIENT
Start: 2019-10-09 | End: 2019-10-09

## 2019-10-09 RX ORDER — INSULIN LISPRO 100/ML
VIAL (ML) SUBCUTANEOUS
Refills: 0 | Status: DISCONTINUED | OUTPATIENT
Start: 2019-10-09 | End: 2019-10-09

## 2019-10-09 RX ORDER — CARVEDILOL PHOSPHATE 80 MG/1
12.5 CAPSULE, EXTENDED RELEASE ORAL EVERY 12 HOURS
Refills: 0 | Status: DISCONTINUED | OUTPATIENT
Start: 2019-10-09 | End: 2019-10-12

## 2019-10-09 RX ORDER — GLUCAGON INJECTION, SOLUTION 0.5 MG/.1ML
1 INJECTION, SOLUTION SUBCUTANEOUS ONCE
Refills: 0 | Status: DISCONTINUED | OUTPATIENT
Start: 2019-10-09 | End: 2019-10-12

## 2019-10-09 RX ORDER — INFLUENZA VIRUS VACCINE 15; 15; 15; 15 UG/.5ML; UG/.5ML; UG/.5ML; UG/.5ML
0.5 SUSPENSION INTRAMUSCULAR ONCE
Refills: 0 | Status: COMPLETED | OUTPATIENT
Start: 2019-10-09 | End: 2019-10-09

## 2019-10-09 RX ORDER — DEXTROSE 50 % IN WATER 50 %
15 SYRINGE (ML) INTRAVENOUS ONCE
Refills: 0 | Status: DISCONTINUED | OUTPATIENT
Start: 2019-10-09 | End: 2019-10-09

## 2019-10-09 RX ORDER — DEXTROSE 50 % IN WATER 50 %
15 SYRINGE (ML) INTRAVENOUS ONCE
Refills: 0 | Status: DISCONTINUED | OUTPATIENT
Start: 2019-10-09 | End: 2019-10-12

## 2019-10-09 RX ORDER — AMPICILLIN SODIUM AND SULBACTAM SODIUM 250; 125 MG/ML; MG/ML
3 INJECTION, POWDER, FOR SUSPENSION INTRAMUSCULAR; INTRAVENOUS EVERY 6 HOURS
Refills: 0 | Status: DISCONTINUED | OUTPATIENT
Start: 2019-10-09 | End: 2019-10-11

## 2019-10-09 RX ORDER — DEXTROSE 50 % IN WATER 50 %
25 SYRINGE (ML) INTRAVENOUS ONCE
Refills: 0 | Status: DISCONTINUED | OUTPATIENT
Start: 2019-10-09 | End: 2019-10-12

## 2019-10-09 RX ORDER — DOCUSATE SODIUM 100 MG
100 CAPSULE ORAL
Refills: 0 | Status: DISCONTINUED | OUTPATIENT
Start: 2019-10-09 | End: 2019-10-09

## 2019-10-09 RX ORDER — TACROLIMUS 5 MG/1
4 CAPSULE ORAL EVERY 12 HOURS
Refills: 0 | Status: DISCONTINUED | OUTPATIENT
Start: 2019-10-09 | End: 2019-10-09

## 2019-10-09 RX ORDER — SODIUM CHLORIDE 9 MG/ML
1000 INJECTION INTRAMUSCULAR; INTRAVENOUS; SUBCUTANEOUS
Refills: 0 | Status: DISCONTINUED | OUTPATIENT
Start: 2019-10-09 | End: 2019-10-09

## 2019-10-09 RX ORDER — TAMSULOSIN HYDROCHLORIDE 0.4 MG/1
0.4 CAPSULE ORAL AT BEDTIME
Refills: 0 | Status: DISCONTINUED | OUTPATIENT
Start: 2019-10-09 | End: 2019-10-09

## 2019-10-09 RX ORDER — VANCOMYCIN HCL 1 G
1250 VIAL (EA) INTRAVENOUS EVERY 12 HOURS
Refills: 0 | Status: DISCONTINUED | OUTPATIENT
Start: 2019-10-09 | End: 2019-10-09

## 2019-10-09 RX ORDER — SODIUM CHLORIDE 9 MG/ML
1000 INJECTION, SOLUTION INTRAVENOUS
Refills: 0 | Status: DISCONTINUED | OUTPATIENT
Start: 2019-10-09 | End: 2019-10-12

## 2019-10-09 RX ORDER — PIPERACILLIN AND TAZOBACTAM 4; .5 G/20ML; G/20ML
3.38 INJECTION, POWDER, LYOPHILIZED, FOR SOLUTION INTRAVENOUS ONCE
Refills: 0 | Status: COMPLETED | OUTPATIENT
Start: 2019-10-09 | End: 2019-10-09

## 2019-10-09 RX ORDER — DEXTROSE 50 % IN WATER 50 %
12.5 SYRINGE (ML) INTRAVENOUS ONCE
Refills: 0 | Status: DISCONTINUED | OUTPATIENT
Start: 2019-10-09 | End: 2019-10-12

## 2019-10-09 RX ORDER — TAMSULOSIN HYDROCHLORIDE 0.4 MG/1
0.4 CAPSULE ORAL AT BEDTIME
Refills: 0 | Status: DISCONTINUED | OUTPATIENT
Start: 2019-10-09 | End: 2019-10-12

## 2019-10-09 RX ORDER — ENOXAPARIN SODIUM 100 MG/ML
40 INJECTION SUBCUTANEOUS EVERY 24 HOURS
Refills: 0 | Status: DISCONTINUED | OUTPATIENT
Start: 2019-10-09 | End: 2019-10-12

## 2019-10-09 RX ORDER — ENOXAPARIN SODIUM 100 MG/ML
40 INJECTION SUBCUTANEOUS EVERY 24 HOURS
Refills: 0 | Status: DISCONTINUED | OUTPATIENT
Start: 2019-10-09 | End: 2019-10-09

## 2019-10-09 RX ORDER — GLUCAGON INJECTION, SOLUTION 0.5 MG/.1ML
1 INJECTION, SOLUTION SUBCUTANEOUS ONCE
Refills: 0 | Status: DISCONTINUED | OUTPATIENT
Start: 2019-10-09 | End: 2019-10-09

## 2019-10-09 RX ORDER — MYCOPHENOLATE MOFETIL 250 MG/1
500 CAPSULE ORAL
Refills: 0 | Status: DISCONTINUED | OUTPATIENT
Start: 2019-10-09 | End: 2019-10-12

## 2019-10-09 RX ORDER — ASPIRIN/CALCIUM CARB/MAGNESIUM 324 MG
81 TABLET ORAL DAILY
Refills: 0 | Status: DISCONTINUED | OUTPATIENT
Start: 2019-10-09 | End: 2019-10-09

## 2019-10-09 RX ORDER — MYCOPHENOLATE MOFETIL 250 MG/1
500 CAPSULE ORAL
Refills: 0 | Status: DISCONTINUED | OUTPATIENT
Start: 2019-10-09 | End: 2019-10-09

## 2019-10-09 RX ORDER — ASPIRIN/CALCIUM CARB/MAGNESIUM 324 MG
81 TABLET ORAL DAILY
Refills: 0 | Status: DISCONTINUED | OUTPATIENT
Start: 2019-10-09 | End: 2019-10-12

## 2019-10-09 RX ORDER — INSULIN LISPRO 100/ML
VIAL (ML) SUBCUTANEOUS
Refills: 0 | Status: DISCONTINUED | OUTPATIENT
Start: 2019-10-09 | End: 2019-10-12

## 2019-10-09 RX ORDER — AMPICILLIN SODIUM AND SULBACTAM SODIUM 250; 125 MG/ML; MG/ML
3 INJECTION, POWDER, FOR SUSPENSION INTRAMUSCULAR; INTRAVENOUS EVERY 6 HOURS
Refills: 0 | Status: DISCONTINUED | OUTPATIENT
Start: 2019-10-09 | End: 2019-10-09

## 2019-10-09 RX ORDER — CARVEDILOL PHOSPHATE 80 MG/1
12.5 CAPSULE, EXTENDED RELEASE ORAL EVERY 12 HOURS
Refills: 0 | Status: DISCONTINUED | OUTPATIENT
Start: 2019-10-09 | End: 2019-10-09

## 2019-10-09 RX ORDER — OXYCODONE AND ACETAMINOPHEN 5; 325 MG/1; MG/1
1 TABLET ORAL EVERY 4 HOURS
Refills: 0 | Status: DISCONTINUED | OUTPATIENT
Start: 2019-10-09 | End: 2019-10-12

## 2019-10-09 RX ADMIN — Medication 166.67 MILLIGRAM(S): at 01:21

## 2019-10-09 RX ADMIN — Medication 81 MILLIGRAM(S): at 12:18

## 2019-10-09 RX ADMIN — AMPICILLIN SODIUM AND SULBACTAM SODIUM 200 GRAM(S): 250; 125 INJECTION, POWDER, FOR SUSPENSION INTRAMUSCULAR; INTRAVENOUS at 06:16

## 2019-10-09 RX ADMIN — MYCOPHENOLATE MOFETIL 500 MILLIGRAM(S): 250 CAPSULE ORAL at 06:15

## 2019-10-09 RX ADMIN — SODIUM CHLORIDE 500 MILLILITER(S): 9 INJECTION INTRAMUSCULAR; INTRAVENOUS; SUBCUTANEOUS at 00:57

## 2019-10-09 RX ADMIN — TACROLIMUS 4 MILLIGRAM(S): 5 CAPSULE ORAL at 21:44

## 2019-10-09 RX ADMIN — CARVEDILOL PHOSPHATE 12.5 MILLIGRAM(S): 80 CAPSULE, EXTENDED RELEASE ORAL at 17:11

## 2019-10-09 RX ADMIN — Medication 100 MILLIGRAM(S): at 06:16

## 2019-10-09 RX ADMIN — Medication 2: at 21:44

## 2019-10-09 RX ADMIN — AMPICILLIN SODIUM AND SULBACTAM SODIUM 200 GRAM(S): 250; 125 INJECTION, POWDER, FOR SUSPENSION INTRAMUSCULAR; INTRAVENOUS at 21:27

## 2019-10-09 RX ADMIN — PIPERACILLIN AND TAZOBACTAM 200 GRAM(S): 4; .5 INJECTION, POWDER, LYOPHILIZED, FOR SOLUTION INTRAVENOUS at 00:57

## 2019-10-09 RX ADMIN — Medication 2: at 15:37

## 2019-10-09 RX ADMIN — TACROLIMUS 4 MILLIGRAM(S): 5 CAPSULE ORAL at 06:17

## 2019-10-09 RX ADMIN — CARVEDILOL PHOSPHATE 12.5 MILLIGRAM(S): 80 CAPSULE, EXTENDED RELEASE ORAL at 06:16

## 2019-10-09 RX ADMIN — MYCOPHENOLATE MOFETIL 500 MILLIGRAM(S): 250 CAPSULE ORAL at 17:11

## 2019-10-09 RX ADMIN — ENOXAPARIN SODIUM 40 MILLIGRAM(S): 100 INJECTION SUBCUTANEOUS at 06:15

## 2019-10-09 RX ADMIN — HYDROMORPHONE HYDROCHLORIDE 0.5 MILLIGRAM(S): 2 INJECTION INTRAMUSCULAR; INTRAVENOUS; SUBCUTANEOUS at 14:23

## 2019-10-09 RX ADMIN — Medication 2: at 07:05

## 2019-10-09 RX ADMIN — SODIUM CHLORIDE 45 MILLILITER(S): 9 INJECTION INTRAMUSCULAR; INTRAVENOUS; SUBCUTANEOUS at 06:25

## 2019-10-09 RX ADMIN — AMPICILLIN SODIUM AND SULBACTAM SODIUM 200 GRAM(S): 250; 125 INJECTION, POWDER, FOR SUSPENSION INTRAMUSCULAR; INTRAVENOUS at 14:51

## 2019-10-09 RX ADMIN — PIPERACILLIN AND TAZOBACTAM 3.38 GRAM(S): 4; .5 INJECTION, POWDER, LYOPHILIZED, FOR SOLUTION INTRAVENOUS at 01:27

## 2019-10-09 RX ADMIN — HYDROMORPHONE HYDROCHLORIDE 0.5 MILLIGRAM(S): 2 INJECTION INTRAMUSCULAR; INTRAVENOUS; SUBCUTANEOUS at 15:25

## 2019-10-09 RX ADMIN — Medication 5 MILLIGRAM(S): at 06:16

## 2019-10-09 RX ADMIN — TAMSULOSIN HYDROCHLORIDE 0.4 MILLIGRAM(S): 0.4 CAPSULE ORAL at 21:27

## 2019-10-09 RX ADMIN — SODIUM CHLORIDE 500 MILLILITER(S): 9 INJECTION INTRAMUSCULAR; INTRAVENOUS; SUBCUTANEOUS at 01:29

## 2019-10-09 NOTE — BRIEF OPERATIVE NOTE - OPERATION/FINDINGS
Plantar surface ulcer was excised using scalpel. Scalpel was used to create a 4cm x 4cm circular incision around the plantar surface ulcer. The skin and underlying subcutaneous tissue was removed en bloc and sent for pathology. The deeper structures including the intrinsic musculature and the connective tissues were identified. Fragments of the 4th metatarsal bone were resected with rongeurs and sent for pathology. A 2 cm incision was made on the dorsal surface of the foot, directly superior to the excision on the plantar surface. Penrose drain was passed through and ends were sutured together, forming a loop through the foot. The surgical area was irrigated using 2L of saline through a pulse lavage. The site was packed with betadyne soaked kerlix and then wrapped with a second kerlix.

## 2019-10-09 NOTE — H&P ADULT - ASSESSMENT
Pt is a 78 y/o M with PMHx HTN, DM, glaucoma (functional blindness), BPH, ESRD s/p Right renal transplant (was on dialysis 3-4 years prior), BPH now with infected diabetic foot ulcer    Admit to vascular surgery  Unasyn  Home medication as appropriate  NPO/IVF  F/u AM labs

## 2019-10-09 NOTE — DISCHARGE NOTE PROVIDER - NSDCHHNEEDSERVICE_GEN_ALL_CORE
Central venous access care/Wound care and assessment Teaching and training/Central venous access care/Wound care and assessment

## 2019-10-09 NOTE — ED ADULT NURSE REASSESSMENT NOTE - NS ED NURSE REASSESS COMMENT FT1
interventions as noted, sara. well, placed in gown, provided with blanket for comfort, awaiting results/further orders, pt. and family utd on poc, with understanding verbalized, awaiting urine sample, for which pt. was provided with urinal, with understanding verbalized

## 2019-10-09 NOTE — H&P ADULT - NSHPPHYSICALEXAM_GEN_ALL_CORE
General: NAD, awake, alert  Respiratory: Unlabored breathing, no respiratory distress  Cardiac: RRR  Extremity: LLE swelling, left foot erythema mostly at the distal aspect of the left foot, ulcer at the plantar aspect of foot with surrounding callus, no drainage of pus.  Vascular: Right: Palpable DP/POP/FEM; triphasic signal; Left: Palpable DP 1+/POP/Fem, triphasic PT General: NAD, awake, alert  Respiratory: Unlabored breathing, no respiratory distress  Cardiac: RRR  Extremity: LLE swelling, left foot erythema at both plantar and dorsum foot, ulcer at the plantar aspect of foot with surrounding callus, ulcer is about 1cm deep, no drainage of pus.  Vascular: Right: Palpable DP/POP/FEM; triphasic PT signal; Left: Palpable DP 1+/POP/Fem, triphasic PT signal

## 2019-10-09 NOTE — DISCHARGE NOTE PROVIDER - HOSPITAL COURSE
Pt is a 78 y/o M with PMHx HTN, DM, glaucoma (functional blindness), BPH, ESRD s/p Right renal transplant (was on dialysis 3-4 years prior), BPH who presented to [facility] ED with left foot wound infection. According to Pt's wife, Pt developed left plantar foot ulcer about 3 wks ago. Pt was being followed by a podiatrist in [place].  About one week ago, his podiatrist suggested that he see's a wound care specialist and so he is pending an appointment with Dr. Castro on 10/10/19. About 2 days ago patient started experiencing pain and swelling of left foot in addition to erythema of the foot. Today, Pt came to [facility] ED due to dizziness and hypotension at home. Upon arrival by EMS, Pt was noted to be hypotensive and he was given 500ml of fluid bolus. Pt denies nausea, vomiting, fever or chills.        Admitted 10/9/19 via ER. EMS reported BP 80/50 which responded to IVF. He met sepsis criteria with hypotension and elevated WBC 14,000. Unasyn started. Pt was taken to OR 10/9/19 he underwent left foot excisional debridement down to bone and penrose drain place through and through via dorsum of foot. He tolerated procedure well. Pt is a 80 y/o M with PMHx HTN, DM, glaucoma (functional blindness), BPH, ESRD s/p Right renal transplant (was on dialysis 3-4 years prior), BPH who presented to [facility] ED with left foot wound infection. According to Pt's wife, Pt developed left plantar foot ulcer about 3 wks ago. Pt was being followed by a podiatrist in [place].  About one week ago, his podiatrist suggested that he see's a wound care specialist and so he is pending an appointment with Dr. Castro on 10/10/19. About 2 days ago patient started experiencing pain and swelling of left foot in addition to erythema of the foot. Today, Pt came to [facility] ED due to dizziness and hypotension at home. Upon arrival by EMS, Pt was noted to be hypotensive and he was given 500ml of fluid bolus. Pt denies nausea, vomiting, fever or chills.        Admitted 10/9/19 via ER. EMS reported BP 80/50 which responded to IVF. He met sepsis criteria with hypotension and elevated WBC 14,000. Unasyn started. Pt was taken to OR 10/9/19 he underwent left foot excisional debridement down to bone and penrose drain place through and through via dorsum of foot. He tolerated procedure well. Cultures from wound grew E. Coli, E. Faecalis, and Strep Anginosus. Dr. Tabor from ID was contacted regarding optimal antibiotics. PICC team was contacted to place a line for prolonged antibiotic course and PICC was placed on 10/11. VNS was set up for outpatient wound care. The patient was stable at time of discharge. Pt is a 78 y/o M with PMHx HTN, DM, glaucoma (functional blindness), BPH, ESRD s/p Right renal transplant (was on dialysis 3-4 years prior), BPH who presented to [facility] ED with left foot wound infection. According to Pt's wife, Pt developed left plantar foot ulcer about 3 wks ago. Pt was being followed by a podiatrist in [place].  About one week ago, his podiatrist suggested that he see's a wound care specialist and so he is pending an appointment with Dr. Castro on 10/10/19. About 2 days ago patient started experiencing pain and swelling of left foot in addition to erythema of the foot. Today, Pt came to [facility] ED due to dizziness and hypotension at home. Upon arrival by EMS, Pt was noted to be hypotensive and he was given 500ml of fluid bolus. Pt denies nausea, vomiting, fever or chills.        Admitted 10/9/19 via ER. EMS reported BP 80/50 which responded to IVF. He met sepsis criteria with hypotension and elevated WBC 14,000. Unasyn started. Pt was taken to OR 10/9/19 he underwent left foot excisional debridement down to bone and penrose drain place through and through via dorsum of foot. He tolerated procedure well. Cultures from wound grew E. Coli, E. Faecalis, and Strep Anginosus. Dr. Tabor from ID was contacted regarding optimal antibiotics and recommended Zosyn 4.5g q8h for 6 weeks. PICC team was contacted to place a line for prolonged antibiotic course and PICC was placed on 10/11. VNS was set up for outpatient wound care. The patient was stable at time of discharge.

## 2019-10-09 NOTE — DISCHARGE NOTE PROVIDER - NSDCFUSCHEDAPPT_GEN_ALL_CORE_FT
ROBERT OTOOLE ; 10/10/2019 ; [facility] Surg Vasc [address]  ROBERT OTOOLE ; 12/11/2019 ; [facility] Ophthal [address] ROBERT OTOOLE ; 12/11/2019 ; [facility] Ophthal [address]

## 2019-10-09 NOTE — ED ADULT NURSE REASSESSMENT NOTE - NS ED NURSE REASSESS COMMENT FT1
pt. resting quietly, nad, family states tried for urine without success, will attempt again when possible, assessment on-going

## 2019-10-09 NOTE — ED ADULT NURSE REASSESSMENT NOTE - NS ED NURSE REASSESS COMMENT FT1
additional lab orders were obtained and sent as noted, sara. well, awaiting admit orders/final disposition, pt. and spouse utd, with understanding verbalized, resting quietly Lt. recumbent, assessment on-going

## 2019-10-09 NOTE — DISCHARGE NOTE PROVIDER - NSDCFUADDINST_GEN_ALL_CORE_FT
Follow-up with Dr. Diggs in 1-2 weeks in office at [phone]. Remove dressings to shower with soap and water. Dry well. Dampen gauze with saline solution. Place on or into wound. Cover with dry gauze and Kerlix wrap daily. Please call your doctor if you have a fever of over 101.9 or swelling/bleeding at wound. Follow up with Dr. Diggs in 1-2 weeks. Call the office at  to schedule your appointment.   You may shower;   Left foot care: soap and water over the wound. Do not scrub. Pat dry when done. Dampen gauze with saline solution. Place on or into wound. Cover with dry gauze and Kerlix wrap daily.    Ambulate as tolerated, but no heavy lifting (>10lbs) or strenuous exercise.    You may resume regular diet.     Please complete 6 weeks of Zosyn 4.5g every 8 hours, and follow up with Dr. Tabor in the office.  Weekly labs will be faxed to Dr. Tabor.      Call the office if you experience increasing pain, redness, swelling or drainage from the wound, or temperature >101.4F.

## 2019-10-09 NOTE — DISCHARGE NOTE PROVIDER - NSDCCPCAREPLAN_GEN_ALL_CORE_FT
PRINCIPAL DISCHARGE DIAGNOSIS  Diagnosis: Sepsis  Assessment and Plan of Treatment:       SECONDARY DISCHARGE DIAGNOSES  Diagnosis: S/P kidney transplant  Assessment and Plan of Treatment:     Diagnosis: BPH (benign prostatic hyperplasia)  Assessment and Plan of Treatment:     Diagnosis: Diabetes  Assessment and Plan of Treatment:     Diagnosis: Hypertension  Assessment and Plan of Treatment:     Diagnosis: CKD (chronic kidney disease)  Assessment and Plan of Treatment: s/p kidney transplant    Diagnosis: Diabetic infection of left foot  Assessment and Plan of Treatment: PRINCIPAL DISCHARGE DIAGNOSIS  Diagnosis: Diabetic foot ulcer  Assessment and Plan of Treatment: infected foot ulcer      SECONDARY DISCHARGE DIAGNOSES  Diagnosis: S/P kidney transplant  Assessment and Plan of Treatment:     Diagnosis: BPH (benign prostatic hyperplasia)  Assessment and Plan of Treatment:     Diagnosis: Diabetes  Assessment and Plan of Treatment:     Diagnosis: Hypertension  Assessment and Plan of Treatment:     Diagnosis: CKD (chronic kidney disease)  Assessment and Plan of Treatment: s/p kidney transplant    Diagnosis: Diabetic infection of left foot  Assessment and Plan of Treatment: PRINCIPAL DISCHARGE DIAGNOSIS  Diagnosis: Diabetic foot ulcer  Assessment and Plan of Treatment: infected foot ulcer      SECONDARY DISCHARGE DIAGNOSES  Diagnosis: Renal transplant recipient  Assessment and Plan of Treatment:     Diagnosis: S/P kidney transplant  Assessment and Plan of Treatment:     Diagnosis: BPH (benign prostatic hyperplasia)  Assessment and Plan of Treatment:     Diagnosis: Diabetes  Assessment and Plan of Treatment:     Diagnosis: Hypertension  Assessment and Plan of Treatment:     Diagnosis: CKD (chronic kidney disease)  Assessment and Plan of Treatment: s/p kidney transplant    Diagnosis: Diabetic infection of left foot  Assessment and Plan of Treatment:

## 2019-10-09 NOTE — H&P ADULT - HISTORY OF PRESENT ILLNESS
Pt is a 78 y/o M with PMHx HTN, DM, glaucoma (functional blindness), BPH, ESRD s/p Right renal transplant (was on dialysis 3-4 years prior), BPH who presented to [facility] ED with left foot wound infection. According to Pt's wife, Pt developed left plantar foot ulcer about 3 wks ago. Pt was being followed by a podiatrist in [place].  About one week a call, his podiatrist suggested that he see's a wound care specialist and so he is pending an appointment with Dr. Castro on 10/10/19. About 2 days ago patient started experiencing pain and swelling of left leg in addition to erythema of the foot. Today, Pt came to [facility] ED due to dizziness and hypotension at home. Upon arrival of EMS, Pt was noted to be hypotensive and he was given 500ml of fluid bolus. Pt denies nausea, vomiting, fever or chills. Pt was Pt is a 78 y/o M with PMHx HTN, DM, glaucoma (functional blindness), BPH, ESRD s/p Right renal transplant (was on dialysis 3-4 years prior), BPH who presented to [facility] ED with left foot wound infection. According to Pt's wife, Pt developed left plantar foot ulcer about 3 wks ago. Pt was being followed by a podiatrist in [place].  About one week ago, his podiatrist suggested that he see's a wound care specialist and so he is pending an appointment with Dr. Castro on 10/10/19. About 2 days ago patient started experiencing pain and swelling of left foot in addition to erythema of the foot. Today, Pt came to [facility] ED due to dizziness and hypotension at home. Upon arrival by EMS, Pt was noted to be hypotensive and he was given 500ml of fluid bolus. Pt denies nausea, vomiting, fever or chills. Pt was Pt is a 80 y/o M with PMHx HTN, DM, glaucoma (functional blindness), BPH, ESRD s/p Right renal transplant (was on dialysis 3-4 years prior), BPH who presented to [facility] ED with left foot wound infection. According to Pt's wife, Pt developed left plantar foot ulcer about 3 wks ago. Pt was being followed by a podiatrist in [place].  About one week ago, his podiatrist suggested that he see's a wound care specialist and so he is pending an appointment with Dr. Castro on 10/10/19. About 2 days ago patient started experiencing pain and swelling of left foot in addition to erythema of the foot. Today, Pt came to [facility] ED due to dizziness and hypotension at home. Upon arrival by EMS, Pt was noted to be hypotensive and he was given 500ml of fluid bolus. Pt denies nausea, vomiting, fever or chills.

## 2019-10-09 NOTE — BRIEF OPERATIVE NOTE - NSICDXBRIEFPROCEDURE_GEN_ALL_CORE_FT
PROCEDURES:  Excisional debridement of ulcer of left foot 09-Oct-2019 11:40:47 Infected diabetic foot ulcer, plantar surface [place]ui

## 2019-10-09 NOTE — DISCHARGE NOTE PROVIDER - CARE PROVIDER_API CALL
Ovidio Diggs)  Surgery; Vascular Surgery  [address] [place] [address]  Phone: [phone]  Fax: [phone]  Follow Up Time:

## 2019-10-09 NOTE — ED ADULT NURSE REASSESSMENT NOTE - NS ED NURSE REASSESS COMMENT FT1
Vascular Resident in department for evaluation, directed to US, where pt. remains, with understanding verbalized

## 2019-10-09 NOTE — H&P ADULT - NSICDXPASTMEDICALHX_GEN_ALL_CORE_FT
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

## 2019-10-09 NOTE — DISCHARGE NOTE PROVIDER - NSDCCPTREATMENT_GEN_ALL_CORE_FT
PRINCIPAL PROCEDURE  Procedure: Excisional debridement of ulcer of left foot  Findings and Treatment: Through and through penrose drain

## 2019-10-09 NOTE — PROGRESS NOTE ADULT - SUBJECTIVE AND OBJECTIVE BOX
24hr Events:  O/N: Admitted, started on unasyn, NPO, possible OR for debridement        Assessment/Plan:  Pt is a 80 y/o M with PMHx HTN, DM, glaucoma (functional blindness), BPH, ESRD s/p Right renal transplant (was on dialysis 3-4 years prior), BPH now with infected diabetic foot ulcer    Unasyn  Home medication as appropriate  NPO/IVF  F/u AM labs 24hr Events:  O/N: Admitted, started on unasyn, NPO, possible OR for debridement    S. c/o foot pain.    ampicillin/sulbactam  IVPB 3  ampicillin/sulbactam  IVPB 3  aspirin  chewable 81  carvedilol 12.5  enoxaparin Injectable 40  tamsulosin 0.4      Allergies    No Known Allergies    Intolerances        Vital Signs Last 24 Hrs  T(C): 36.8 (09 Oct 2019 05:54), Max: 37.2 (08 Oct 2019 23:40)  T(F): 98.2 (09 Oct 2019 05:54), Max: 99 (08 Oct 2019 23:40)  HR: 76 (09 Oct 2019 05:54) (71 - 76)  BP: 132/64 (09 Oct 2019 05:54) (106/56 - 132/64)  BP(mean): --  RR: 18 (09 Oct 2019 05:54) (18 - 18)  SpO2: 96% (09 Oct 2019 05:54) (96% - 99%)    Physical Exam:  General: Awake, alert, NAD   Pulmonary:  Cardiovascular:  Abdominal:  Extremities: Left foot dressing cdi.  Dressing changed earlier by admitting resident/PA.  Left foot plantar ulcer 2cm round with surrounding callous, mild erythema and edema. No drainage.   Pulses:   Right:                                                                           Left:  FEM [ ]2+ [ ]1+ [ ] doppler                                            FEM [ ]2+ [ ]1+ [ ] doppler    POP [ ]2+ [ ]1+ [ ] doppler                                            POP [ ]2+ [ ]1+ [ ] doppler    DP [ ]2+ [ ]1+ [ ] doppler                                               DP [ ]2+ [ ]1+ [ ] doppler  PT[ ]2+ [ ]1+ [ ] doppler                                                 PT [ ]2+ [ ]1+ [ ] doppler      LABS:                        10.7   12.68 )-----------( 200      ( 09 Oct 2019 05:27 )             34.6     10-09    138  |  105  |  25<H>  ----------------------------<  155<H>  4.8   |  24  |  1.24    Ca    9.6      09 Oct 2019 05:27  Phos  2.6     10-09  Mg     1.8     10-09    TPro  6.4  /  Alb  3.3  /  TBili  0.6  /  DBili  x   /  AST  9<L>  /  ALT  8<L>  /  AlkPhos  73  10-09    PT/INR - ( 09 Oct 2019 00:07 )   PT: 13.5 sec;   INR: 1.19          PTT - ( 09 Oct 2019 00:07 )  PTT:31.1 sec      RADIOLOGY & ADDITIONAL TESTS:      ---------------------------------------------------------------------------  PLEASE CHECK WHEN PRESENT:     [  ]Heart Failure     [  ] Acute     [  ] Acute on Chronic     [  ] Chronic  -------------------------------------------------------------------     [  ]Diastolic [HFpEF]     [  ]Systolic [HFrEF]     [  ]Combined [HFpEF & HFrEF]     [  ]Other:  -------------------------------------------------------------------  [ ] Respiratory failure  [ ] Acute cor pulmonale  [ ] Asthma/COPD Exacerbation  [ ] Pleural effusion  [ ] Aspiration pneumonia  -------------------------------------------------------------------  [  ]ELIZABETH     [  ]ATN     [  ]Reneal Medullary Necrosis     [  ]Renal Cortical Necrosis     [  ]Other Pathological Lesions:    [  ]CKD 1  [  ]CKD 2  [  ]CKD 3  [  ]CKD 4  [X  ]CKD 5 s/p kidney transplant  [  ]Other  -------------------------------------------------------------------  [ X ]Diabetes  [  ] Diabetic PVD Ulcer  [  ] Neuropathic ulcer to DM  [ X ] Diabetes with Nephropathy  [  ] Osteomyelitis due to diabetes  --------------------------------------------------------------------  [  ]Malnutrition: See Nutrition Note  [  ]Cachexia  [  ]Other:   [  ]Supplement Ordered:  [  ]Morbid Obesity (BMI >=40]  ---------------------------------------------------------------------  [ ] Sepsis/severe sepsis/septic shock  [ ] UTI  [ ] Pneumonia  -----------------------------------------------------------------------  [ ] Acidosis/alkalosis  [ ] Fluid overload  [ ] Hypokalemia  [ ] Hyperkalemia  [ ] Hypomagnesemia  [ ] Hypophosphatemia  [ ] Hyperphosphatemia  ------------------------------------------------------------------------  [ ] Acute blood loss anemia  [ ] Post op blood loss anemia  [ ] Iron deficiency anemia  [ ] Anemia due to chronic disease  [ ] Hypercoagulable state  ----------------------------------------------------------------------  [ ] Cerebral infarction  [ ] Transient ischemia attack  [ ] Encephalopathy        Assessment/Plan:  Pt is a 78 y/o M with PMHx HTN, DM, glaucoma (functional blindness), BPH, ESRD s/p Right renal transplant (was on dialysis 3-4 years prior), BPH now with infected diabetic foot ulcer    Unasyn  Home medication as appropriate  NPO/IVF  F/u AM labs

## 2019-10-09 NOTE — BRIEF OPERATIVE NOTE - BRIEF OP NOTE DRAINS
Penrose drain placed through plantar surface defect with one end pulled out through incision made at the dorsum of the foot. Two ends of penrose drain sutured together.

## 2019-10-09 NOTE — DISCHARGE NOTE PROVIDER - NSDCHHASSISTILLNESS_GEN_ALL_CORE
Significant diabetic foot ulcer on plantar surface Significant diabetic foot ulcer on plantar surface and pain with ambulation

## 2019-10-09 NOTE — PROGRESS NOTE ADULT - SUBJECTIVE AND OBJECTIVE BOX
POST-OPERATIVE NOTE    Procedure: Debridement of Diabetic foot ulcer    Diagnosis/Indication: Infected diabetic foot ulcer    Surgeon: Dr. Diggs    S: Pt has no complaints. Denies CP, SOB, HUTCHINSON, calf tenderness. Pain controlled with medication. Denies nausea, vomiting.    O:  T(C): 36.1 (10-09-19 @ 11:13), Max: 36.1 (10-09-19 @ 11:13)  T(F): 97 (10-09-19 @ 11:13), Max: 97 (10-09-19 @ 11:13)  HR: 79 (10-09-19 @ 12:33) (74 - 81)  BP: 170/77 (10-09-19 @ 12:33) (128/56 - 170/77)  RR: 19 (10-09-19 @ 12:33) (18 - 21)  SpO2: 95% (10-09-19 @ 12:33) (95% - 99%)  Wt(kg): --                        10.7   12.68 )-----------( 200      ( 09 Oct 2019 05:27 )             34.6     10-09    138  |  105  |  25<H>  ----------------------------<  155<H>  4.8   |  24  |  1.24    Ca    9.6      09 Oct 2019 05:27  Phos  2.6     10-09  Mg     1.8     10-09    TPro  6.4  /  Alb  3.3  /  TBili  0.6  /  DBili  x   /  AST  9<L>  /  ALT  8<L>  /  AlkPhos  73  10-09      Gen: NAD, resting comfortably in bed  C/V: NSR  Pulm: Nonlabored breathing, no respiratory distress  Abd: soft, NT/ND  Extrem: LLE with kerlix dressing in place. Minimal blood soaking through dressing.

## 2019-10-10 ENCOUNTER — APPOINTMENT (OUTPATIENT)
Dept: VASCULAR SURGERY | Facility: CLINIC | Age: 79
End: 2019-10-10

## 2019-10-10 LAB
ANION GAP SERPL CALC-SCNC: 14 MMOL/L — SIGNIFICANT CHANGE UP (ref 5–17)
BUN SERPL-MCNC: 19 MG/DL — SIGNIFICANT CHANGE UP (ref 7–23)
CALCIUM SERPL-MCNC: 8.9 MG/DL — SIGNIFICANT CHANGE UP (ref 8.4–10.5)
CHLORIDE SERPL-SCNC: 102 MMOL/L — SIGNIFICANT CHANGE UP (ref 96–108)
CO2 SERPL-SCNC: 19 MMOL/L — LOW (ref 22–31)
CREAT SERPL-MCNC: 0.99 MG/DL — SIGNIFICANT CHANGE UP (ref 0.5–1.3)
CULTURE RESULTS: NO GROWTH — SIGNIFICANT CHANGE UP
GLUCOSE BLDC GLUCOMTR-MCNC: 237 MG/DL — HIGH (ref 70–99)
GLUCOSE BLDC GLUCOMTR-MCNC: 287 MG/DL — HIGH (ref 70–99)
GLUCOSE BLDC GLUCOMTR-MCNC: 334 MG/DL — HIGH (ref 70–99)
GLUCOSE SERPL-MCNC: 240 MG/DL — HIGH (ref 70–99)
HCT VFR BLD CALC: 35.5 % — LOW (ref 39–50)
HGB BLD-MCNC: 10.8 G/DL — LOW (ref 13–17)
MAGNESIUM SERPL-MCNC: 1.7 MG/DL — SIGNIFICANT CHANGE UP (ref 1.6–2.6)
MCHC RBC-ENTMCNC: 27.5 PG — SIGNIFICANT CHANGE UP (ref 27–34)
MCHC RBC-ENTMCNC: 30.4 GM/DL — LOW (ref 32–36)
MCV RBC AUTO: 90.3 FL — SIGNIFICANT CHANGE UP (ref 80–100)
NRBC # BLD: 0 /100 WBCS — SIGNIFICANT CHANGE UP (ref 0–0)
PHOSPHATE SERPL-MCNC: 2.1 MG/DL — LOW (ref 2.5–4.5)
PLATELET # BLD AUTO: 218 K/UL — SIGNIFICANT CHANGE UP (ref 150–400)
POTASSIUM SERPL-MCNC: 4.5 MMOL/L — SIGNIFICANT CHANGE UP (ref 3.5–5.3)
POTASSIUM SERPL-SCNC: 4.5 MMOL/L — SIGNIFICANT CHANGE UP (ref 3.5–5.3)
RBC # BLD: 3.93 M/UL — LOW (ref 4.2–5.8)
RBC # FLD: 13.4 % — SIGNIFICANT CHANGE UP (ref 10.3–14.5)
SODIUM SERPL-SCNC: 135 MMOL/L — SIGNIFICANT CHANGE UP (ref 135–145)
SPECIMEN SOURCE: SIGNIFICANT CHANGE UP
WBC # BLD: 11.91 K/UL — HIGH (ref 3.8–10.5)
WBC # FLD AUTO: 11.91 K/UL — HIGH (ref 3.8–10.5)

## 2019-10-10 PROCEDURE — 93306 TTE W/DOPPLER COMPLETE: CPT | Mod: 26

## 2019-10-10 RX ORDER — INSULIN GLARGINE 100 [IU]/ML
5 INJECTION, SOLUTION SUBCUTANEOUS AT BEDTIME
Refills: 0 | Status: DISCONTINUED | OUTPATIENT
Start: 2019-10-10 | End: 2019-10-11

## 2019-10-10 RX ORDER — MAGNESIUM SULFATE 500 MG/ML
2 VIAL (ML) INJECTION ONCE
Refills: 0 | Status: COMPLETED | OUTPATIENT
Start: 2019-10-10 | End: 2019-10-10

## 2019-10-10 RX ORDER — SODIUM,POTASSIUM PHOSPHATES 278-250MG
1 POWDER IN PACKET (EA) ORAL
Refills: 0 | Status: COMPLETED | OUTPATIENT
Start: 2019-10-10 | End: 2019-10-10

## 2019-10-10 RX ADMIN — TACROLIMUS 4 MILLIGRAM(S): 5 CAPSULE ORAL at 19:08

## 2019-10-10 RX ADMIN — Medication 4: at 07:11

## 2019-10-10 RX ADMIN — TAMSULOSIN HYDROCHLORIDE 0.4 MILLIGRAM(S): 0.4 CAPSULE ORAL at 22:08

## 2019-10-10 RX ADMIN — Medication 50 GRAM(S): at 10:43

## 2019-10-10 RX ADMIN — ENOXAPARIN SODIUM 40 MILLIGRAM(S): 100 INJECTION SUBCUTANEOUS at 06:12

## 2019-10-10 RX ADMIN — Medication 1 TABLET(S): at 14:06

## 2019-10-10 RX ADMIN — Medication 5 MILLIGRAM(S): at 06:08

## 2019-10-10 RX ADMIN — Medication 6: at 22:07

## 2019-10-10 RX ADMIN — Medication 1 TABLET(S): at 17:33

## 2019-10-10 RX ADMIN — TACROLIMUS 4 MILLIGRAM(S): 5 CAPSULE ORAL at 07:47

## 2019-10-10 RX ADMIN — INSULIN GLARGINE 5 UNIT(S): 100 INJECTION, SOLUTION SUBCUTANEOUS at 22:08

## 2019-10-10 RX ADMIN — CARVEDILOL PHOSPHATE 12.5 MILLIGRAM(S): 80 CAPSULE, EXTENDED RELEASE ORAL at 06:08

## 2019-10-10 RX ADMIN — AMPICILLIN SODIUM AND SULBACTAM SODIUM 200 GRAM(S): 250; 125 INJECTION, POWDER, FOR SUSPENSION INTRAMUSCULAR; INTRAVENOUS at 09:09

## 2019-10-10 RX ADMIN — MYCOPHENOLATE MOFETIL 500 MILLIGRAM(S): 250 CAPSULE ORAL at 17:33

## 2019-10-10 RX ADMIN — Medication 81 MILLIGRAM(S): at 14:06

## 2019-10-10 RX ADMIN — AMPICILLIN SODIUM AND SULBACTAM SODIUM 200 GRAM(S): 250; 125 INJECTION, POWDER, FOR SUSPENSION INTRAMUSCULAR; INTRAVENOUS at 19:08

## 2019-10-10 RX ADMIN — AMPICILLIN SODIUM AND SULBACTAM SODIUM 200 GRAM(S): 250; 125 INJECTION, POWDER, FOR SUSPENSION INTRAMUSCULAR; INTRAVENOUS at 03:08

## 2019-10-10 RX ADMIN — CARVEDILOL PHOSPHATE 12.5 MILLIGRAM(S): 80 CAPSULE, EXTENDED RELEASE ORAL at 17:33

## 2019-10-10 RX ADMIN — MYCOPHENOLATE MOFETIL 500 MILLIGRAM(S): 250 CAPSULE ORAL at 07:13

## 2019-10-10 RX ADMIN — Medication 1 TABLET(S): at 09:10

## 2019-10-10 RX ADMIN — Medication 8: at 16:35

## 2019-10-10 RX ADMIN — AMPICILLIN SODIUM AND SULBACTAM SODIUM 200 GRAM(S): 250; 125 INJECTION, POWDER, FOR SUSPENSION INTRAMUSCULAR; INTRAVENOUS at 14:08

## 2019-10-10 NOTE — PROGRESS NOTE ADULT - SUBJECTIVE AND OBJECTIVE BOX
O/N: MAGGIE, VSS                              Assessment/Plan:  Pt is a 78 y/o M with PMHx HTN, DM, glaucoma (functional blindness), BPH, ESRD s/p Right renal transplant (was on dialysis 3-4 years prior), BPH now with infected diabetic foot ulcer    Unasyn  Home medication as appropriate  f/u OR cxs  F/u AM labs O/N: MAGGIE, VSS      S. No complaints.    ampicillin/sulbactam  IVPB 3  ampicillin/sulbactam  IVPB 3  aspirin  chewable 81  carvedilol 12.5  enoxaparin Injectable 40  tamsulosin 0.4      Allergies    No Known Allergies    Intolerances        Vital Signs Last 24 Hrs  T(C): 37.3 (10 Oct 2019 06:24), Max: 37.8 (09 Oct 2019 22:01)  T(F): 99.2 (10 Oct 2019 06:24), Max: 100 (09 Oct 2019 22:01)  HR: 88 (10 Oct 2019 06:00) (74 - 88)  BP: 163/70 (10 Oct 2019 06:00) (128/56 - 170/77)  BP(mean): 94 (09 Oct 2019 16:33) (79 - 114)  RR: 18 (10 Oct 2019 06:00) (18 - 21)  SpO2: 98% (10 Oct 2019 06:00) (94% - 99%)    Physical Exam:  General: Awake, alert, NAD   Pulmonary:  Cardiovascular:  Abdominal:  Extremities: Left foot plantar ulcer mixed fibrinous, small amount of nonviable tissue. No drainage with compression of  foot. Less erythema on plantar surface.  Penrose in place. Dorsal wound clean, No drainage.   Pulses:   Right:                                                                           Left:  FEM [ ]2+ [ ]1+ [ ] doppler                                            FEM [ ]2+ [ ]1+ [ ] doppler    POP [ ]2+ [ ]1+ [ ] doppler                                            POP [ ]2+ [ ]1+ [ ] doppler    DP [ ]2+ [ ]1+ [ ] doppler                                               DP [ ]2+ [ ]1+ [X doppler triphasic  PT[ ]2+ [ ]1+ [ ] doppler                                                 PT [ ]2+ [ ]1+ [X ] doppler triphasic      LABS:                        10.8   11.91 )-----------( 218      ( 10 Oct 2019 06:25 )             35.5     10-10    135  |  102  |  19  ----------------------------<  240<H>  4.5   |  19<L>  |  0.99    Ca    8.9      10 Oct 2019 06:25  Phos  2.1     10-10  Mg     1.7     10-10    TPro  6.4  /  Alb  3.3  /  TBili  0.6  /  DBili  x   /  AST  9<L>  /  ALT  8<L>  /  AlkPhos  73  10-09    PT/INR - ( 09 Oct 2019 00:07 )   PT: 13.5 sec;   INR: 1.19          PTT - ( 09 Oct 2019 00:07 )  PTT:31.1 sec  Urinalysis Basic - ( 09 Oct 2019 07:04 )    Color: Yellow / Appearance: Clear / S.020 / pH: x  Gluc: x / Ketone: Trace mg/dL  / Bili: Negative / Urobili: 0.2 E.U./dL   Blood: x / Protein: Trace mg/dL / Nitrite: NEGATIVE   Leuk Esterase: NEGATIVE / RBC: < 5 /HPF / WBC < 5 /HPF   Sq Epi: x / Non Sq Epi: x / Bacteria: Present /HPF        RADIOLOGY & ADDITIONAL TESTS:    PLEASE CHECK WHEN PRESENT:     [  ]Heart Failure     [  ] Acute     [  ] Acute on Chronic     [  ] Chronic  -------------------------------------------------------------------     [  ]Diastolic [HFpEF]     [  ]Systolic [HFrEF]     [  ]Combined [HFpEF & HFrEF]     [  ]Other:  -------------------------------------------------------------------  [ ] Respiratory failure  [ ] Acute cor pulmonale  [ ] Asthma/COPD Exacerbation  [ ] Pleural effusion  [ ] Aspiration pneumonia  -------------------------------------------------------------------  [  ]ELIZABETH     [  ]ATN     [  ]Reneal Medullary Necrosis     [  ]Renal Cortical Necrosis     [  ]Other Pathological Lesions:    [  ]CKD 1  [  ]CKD 2  [  ]CKD 3  [  ]CKD 4  [X  ]CKD 5 s/p kidney transplant  [  ]Other  -------------------------------------------------------------------  [ X ]Diabetes  [  ] Diabetic PVD Ulcer  [  ] Neuropathic ulcer to DM  [ X ] Diabetes with Nephropathy  [  ] Osteomyelitis due to diabetes  --------------------------------------------------------------------  [  ]Malnutrition: See Nutrition Note  [  ]Cachexia  [  ]Other:   [  ]Supplement Ordered:  [  ]Morbid Obesity (BMI >=40]  ---------------------------------------------------------------------  [ ] Sepsis/severe sepsis/septic shock  [ ] UTI  [ ] Pneumonia  -----------------------------------------------------------------------  [ ] Acidosis/alkalosis  [ ] Fluid overload  [ ] Hypokalemia  [ ] Hyperkalemia  [ ] Hypomagnesemia  [ ] Hypophosphatemia  [ ] Hyperphosphatemia  ------------------------------------------------------------------------  [ ] Acute blood loss anemia  [ ] Post op blood loss anemia  [ ] Iron deficiency anemia  [ ] Anemia due to chronic disease  [ ] Hypercoagulable state  ----------------------------------------------------------------------  [ ] Cerebral infarction  [ ] Transient ischemia attack  [ ] Encephalopathy          Assessment/Plan:  Pt is a 80 y/o M with PMHx HTN, DM, glaucoma (functional blindness), BPH, ESRD s/p Right renal transplant (was on dialysis 3-4 years prior), BPH now with infected diabetic foot ulcer. Now s/p left foot wound excisional debridement down to bone and penrose drain placement.     Unasyn  Consult Dr Aranda for kidney transplant management.  Home medication as appropriate  f/u OR cxs  f/u blood Cx  f/u OR path  Will need PICC and IV home infusion.  F/u AM labs

## 2019-10-11 LAB
-  AMPICILLIN/SULBACTAM: SIGNIFICANT CHANGE UP
-  AMPICILLIN: SIGNIFICANT CHANGE UP
-  AMPICILLIN: SIGNIFICANT CHANGE UP
-  CEFAZOLIN: SIGNIFICANT CHANGE UP
-  CEFTRIAXONE: SIGNIFICANT CHANGE UP
-  CEFTRIAXONE: SIGNIFICANT CHANGE UP
-  CIPROFLOXACIN: SIGNIFICANT CHANGE UP
-  CLINDAMYCIN: SIGNIFICANT CHANGE UP
-  ERYTHROMYCIN: SIGNIFICANT CHANGE UP
-  GENTAMICIN: SIGNIFICANT CHANGE UP
-  PENICILLIN: SIGNIFICANT CHANGE UP
-  PIPERACILLIN/TAZOBACTAM: SIGNIFICANT CHANGE UP
-  TOBRAMYCIN: SIGNIFICANT CHANGE UP
-  TRIMETHOPRIM/SULFAMETHOXAZOLE: SIGNIFICANT CHANGE UP
-  VANCOMYCIN: SIGNIFICANT CHANGE UP
-  VANCOMYCIN: SIGNIFICANT CHANGE UP
GLUCOSE BLDC GLUCOMTR-MCNC: 204 MG/DL — HIGH (ref 70–99)
GLUCOSE BLDC GLUCOMTR-MCNC: 262 MG/DL — HIGH (ref 70–99)
GLUCOSE BLDC GLUCOMTR-MCNC: 313 MG/DL — HIGH (ref 70–99)
GLUCOSE BLDC GLUCOMTR-MCNC: 322 MG/DL — HIGH (ref 70–99)
METHOD TYPE: SIGNIFICANT CHANGE UP
SURGICAL PATHOLOGY STUDY: SIGNIFICANT CHANGE UP
TACROLIMUS SERPL-MCNC: 9.4 NG/ML — SIGNIFICANT CHANGE UP

## 2019-10-11 PROCEDURE — 99222 1ST HOSP IP/OBS MODERATE 55: CPT

## 2019-10-11 PROCEDURE — 36569 INSJ PICC 5 YR+ W/O IMAGING: CPT

## 2019-10-11 PROCEDURE — 76937 US GUIDE VASCULAR ACCESS: CPT | Mod: 26,59

## 2019-10-11 PROCEDURE — 99223 1ST HOSP IP/OBS HIGH 75: CPT

## 2019-10-11 RX ORDER — PIPERACILLIN AND TAZOBACTAM 4; .5 G/20ML; G/20ML
4.5 INJECTION, POWDER, LYOPHILIZED, FOR SOLUTION INTRAVENOUS
Qty: 567 | Refills: 0
Start: 2019-10-11 | End: 2019-11-21

## 2019-10-11 RX ORDER — CHLORHEXIDINE GLUCONATE 213 G/1000ML
1 SOLUTION TOPICAL
Refills: 0 | Status: DISCONTINUED | OUTPATIENT
Start: 2019-10-12 | End: 2019-10-12

## 2019-10-11 RX ORDER — INSULIN GLARGINE 100 [IU]/ML
10 INJECTION, SOLUTION SUBCUTANEOUS AT BEDTIME
Refills: 0 | Status: DISCONTINUED | OUTPATIENT
Start: 2019-10-11 | End: 2019-10-12

## 2019-10-11 RX ORDER — ACETAMINOPHEN 500 MG
650 TABLET ORAL EVERY 6 HOURS
Refills: 0 | Status: DISCONTINUED | OUTPATIENT
Start: 2019-10-11 | End: 2019-10-12

## 2019-10-11 RX ORDER — SODIUM CHLORIDE 9 MG/ML
10 INJECTION INTRAMUSCULAR; INTRAVENOUS; SUBCUTANEOUS
Refills: 0 | Status: DISCONTINUED | OUTPATIENT
Start: 2019-10-11 | End: 2019-10-12

## 2019-10-11 RX ORDER — PIPERACILLIN AND TAZOBACTAM 4; .5 G/20ML; G/20ML
4.5 INJECTION, POWDER, LYOPHILIZED, FOR SOLUTION INTRAVENOUS EVERY 8 HOURS
Refills: 0 | Status: DISCONTINUED | OUTPATIENT
Start: 2019-10-11 | End: 2019-10-12

## 2019-10-11 RX ADMIN — PIPERACILLIN AND TAZOBACTAM 200 GRAM(S): 4; .5 INJECTION, POWDER, LYOPHILIZED, FOR SOLUTION INTRAVENOUS at 12:50

## 2019-10-11 RX ADMIN — Medication 5 MILLIGRAM(S): at 06:12

## 2019-10-11 RX ADMIN — MYCOPHENOLATE MOFETIL 500 MILLIGRAM(S): 250 CAPSULE ORAL at 17:02

## 2019-10-11 RX ADMIN — Medication 8: at 17:02

## 2019-10-11 RX ADMIN — TAMSULOSIN HYDROCHLORIDE 0.4 MILLIGRAM(S): 0.4 CAPSULE ORAL at 22:03

## 2019-10-11 RX ADMIN — ENOXAPARIN SODIUM 40 MILLIGRAM(S): 100 INJECTION SUBCUTANEOUS at 06:12

## 2019-10-11 RX ADMIN — TACROLIMUS 4 MILLIGRAM(S): 5 CAPSULE ORAL at 14:37

## 2019-10-11 RX ADMIN — Medication 81 MILLIGRAM(S): at 11:24

## 2019-10-11 RX ADMIN — Medication 4: at 11:24

## 2019-10-11 RX ADMIN — Medication 6: at 07:13

## 2019-10-11 RX ADMIN — AMPICILLIN SODIUM AND SULBACTAM SODIUM 200 GRAM(S): 250; 125 INJECTION, POWDER, FOR SUSPENSION INTRAMUSCULAR; INTRAVENOUS at 07:52

## 2019-10-11 RX ADMIN — AMPICILLIN SODIUM AND SULBACTAM SODIUM 200 GRAM(S): 250; 125 INJECTION, POWDER, FOR SUSPENSION INTRAMUSCULAR; INTRAVENOUS at 02:04

## 2019-10-11 RX ADMIN — Medication 8: at 22:03

## 2019-10-11 RX ADMIN — PIPERACILLIN AND TAZOBACTAM 200 GRAM(S): 4; .5 INJECTION, POWDER, LYOPHILIZED, FOR SOLUTION INTRAVENOUS at 22:01

## 2019-10-11 RX ADMIN — CARVEDILOL PHOSPHATE 12.5 MILLIGRAM(S): 80 CAPSULE, EXTENDED RELEASE ORAL at 17:02

## 2019-10-11 RX ADMIN — INSULIN GLARGINE 10 UNIT(S): 100 INJECTION, SOLUTION SUBCUTANEOUS at 22:03

## 2019-10-11 RX ADMIN — MYCOPHENOLATE MOFETIL 500 MILLIGRAM(S): 250 CAPSULE ORAL at 06:12

## 2019-10-11 RX ADMIN — CARVEDILOL PHOSPHATE 12.5 MILLIGRAM(S): 80 CAPSULE, EXTENDED RELEASE ORAL at 06:12

## 2019-10-11 NOTE — PROGRESS NOTE ADULT - SUBJECTIVE AND OBJECTIVE BOX
O/N: MAGGIE, VSS                                    Assessment/Plan:  Pt is a 78 y/o M with PMHx HTN, DM, glaucoma (functional blindness), BPH, ESRD s/p Right renal transplant (was on dialysis 3-4 years prior), BPH now with infected diabetic foot ulcer. Now s/p left foot wound excisional debridement down to bone and penrose drain placement.     Unasyn  Home medication as appropriate  f/u OR cxs  f/u blood Cx  f/u OR path  Will need PICC and IV home infusion.  F/u AM labs

## 2019-10-11 NOTE — CONSULT NOTE ADULT - SUBJECTIVE AND OBJECTIVE BOX
Vascular Access Service Consult Note    79yMaleHEALTH ISSUES - PROBLEM Dx:             Diagnosis: foot infection    Indications for Vascular Access (Check all that apply)  [ x ]  Antibiotic Therapy       Antibiotic Prescribed:   unasyn for unknown duration, final recs TBD           [  ]  IV Hydration  [  ]  Total Parenteral Nutrition  [  ]  Chemotherapy  [  ]  Difficult Venous Access  [  ]  CVP monitoring  [  ]  Medications with high potential for tissue necrosis on extravasation  [  ]  Other    Screening (Check all that apply)  Previous Radiation to chest  [  ] Yes      [ x ]  No  Breast Cancer                          [  ] Left     [  ]  Right    [  x]  No  Lymph Node Dissection         [  ] Left     [  ]  Right    [ x ]  No  Pacemaker or ICD                   [  ] Left     [  ]  Right    [ x ]  No  Upper Extremity DVT             [  ] Left     [  ]  Right    [x  ]  No  Chronic Kidney Disease         [  ]  Yes     [ x ]  No  Hemodialysis                           [  ]  Yes     [ x ]  No  AV Fistula/ Graft                     [  ]  Left    [  ]  Right    [  x]  No  Temp>101F in past 24 H       [  ]  Yes     [ x ]  No  H/O PICC/Midline                   [  ]  Yes     [x  ]  No    Lab data:                        10.8   11.91 )-----------( 218      ( 10 Oct 2019 06:25 )             35.5     10-10    135  |  102  |  19  ----------------------------<  240<H>  4.5   |  19<L>  |  0.99    Ca    8.9      10 Oct 2019 06:25  Phos  2.1     10-10  Mg     1.7     10-10        bcx 10/9 ngtd x2 days        I have reviewed the chart, interviewed and examined the patient and determined that this patient:  [  x] Is a candidate for a PICC line  [  ] Is a candidate for a Midline  [  ] Is not a candidate for vascular access device (reason)    Lumens:    [ x ] Single  [  ] Double Vascular Access Service Consult Note    79yMaleHEALTH ISSUES - PROBLEM Dx:             Diagnosis: foot infection    Indications for Vascular Access (Check all that apply)  [ x ]  Antibiotic Therapy       Antibiotic Prescribed:   unasyn for unknown duration, final recs TBD           [  ]  IV Hydration  [  ]  Total Parenteral Nutrition  [  ]  Chemotherapy  [  ]  Difficult Venous Access  [  ]  CVP monitoring  [  ]  Medications with high potential for tissue necrosis on extravasation  [  ]  Other    Screening (Check all that apply)  Previous Radiation to chest  [  ] Yes      [ x ]  No  Breast Cancer                          [  ] Left     [  ]  Right    [  x]  No  Lymph Node Dissection         [  ] Left     [  ]  Right    [ x ]  No  Pacemaker or ICD                   [  ] Left     [  ]  Right    [ x ]  No  Upper Extremity DVT             [  ] Left     [  ]  Right    [x  ]  No  Chronic Kidney Disease         [  ]  Yes     [ x ]  No  Hemodialysis                           [  ]  Yes     [ x ]  No- but previously on R arm  AV Fistula/ Graft                     [  ]  Left    [  ]  Right    [  x]  No  Temp>101F in past 24 H       [  ]  Yes     [ x ]  No  H/O PICC/Midline                   [ ?? ]  Yes     [  ]  No    Lab data:                        10.8   11.91 )-----------( 218      ( 10 Oct 2019 06:25 )             35.5     10-10    135  |  102  |  19  ----------------------------<  240<H>  4.5   |  19<L>  |  0.99    Ca    8.9      10 Oct 2019 06:25  Phos  2.1     10-10  Mg     1.7     10-10        bcx 10/9 ngtd x2 days        I have reviewed the chart, interviewed and examined the patient and determined that this patient:  [  x] Is a candidate for a PICC line  [  ] Is a candidate for a Midline  [  ] Is not a candidate for vascular access device (reason)    Lumens:    [ x ] Single  [  ] Double

## 2019-10-11 NOTE — PROCEDURE NOTE - NSPROCDETAILS_GEN_ALL_CORE
sterile technique, catheter placed/ultrasound guidance/location identified, draped/prepped, sterile technique used/ultrasound assessment/sterile dressing applied/supine position

## 2019-10-11 NOTE — PROCEDURE NOTE - NSPOSTCAREGUIDE_GEN_A_CORE
Care for catheter as per unit/ICU protocols/Keep the cast/splint/dressing clean and dry/Verbal/written post procedure instructions were given to patient/caregiver

## 2019-10-11 NOTE — CONSULT NOTE ADULT - ASSESSMENT
IMPRESSION:  Diabetic foot infection with ulceration s/p wound debridement.  Concern for osteomyelitis.  Infection is polymicrobial.      Recommend:  1.  Can stop Unasyn  2.  Start Zosyn 4.5 grams IV q8hrs x 6 weeks  3.  Check ESR/CRP  4.  Needs weekly CBC, CMP, ESR, CRP  5.  Can follow up with me as outpatient:  [facility] [address], [address]  [place], [place]  [phone], option 2  fax:  [phone]    ID team 2 will sign off.  Reconsult as needed

## 2019-10-11 NOTE — PROCEDURE NOTE - NSINFORMCONSENT_GEN_A_CORE
Benefits, risks, and possible complications of procedure explained to patient/caregiver who verbalized understanding and gave verbal consent./by patient with wife at bedside, he cannot sign

## 2019-10-12 ENCOUNTER — TRANSCRIPTION ENCOUNTER (OUTPATIENT)
Age: 79
End: 2019-10-12

## 2019-10-12 VITALS — RESPIRATION RATE: 16 BRPM | HEART RATE: 77 BPM | SYSTOLIC BLOOD PRESSURE: 144 MMHG | DIASTOLIC BLOOD PRESSURE: 65 MMHG

## 2019-10-12 DIAGNOSIS — N18.6 END STAGE RENAL DISEASE: ICD-10-CM

## 2019-10-12 DIAGNOSIS — E11.621 TYPE 2 DIABETES MELLITUS WITH FOOT ULCER: ICD-10-CM

## 2019-10-12 LAB
-  LEVOFLOXACIN: SIGNIFICANT CHANGE UP
CULTURE RESULTS: SIGNIFICANT CHANGE UP
CULTURE RESULTS: SIGNIFICANT CHANGE UP
GLUCOSE BLDC GLUCOMTR-MCNC: 219 MG/DL — HIGH (ref 70–99)
GLUCOSE BLDC GLUCOMTR-MCNC: 235 MG/DL — HIGH (ref 70–99)
GLUCOSE BLDC GLUCOMTR-MCNC: 296 MG/DL — HIGH (ref 70–99)
ORGANISM # SPEC MICROSCOPIC CNT: SIGNIFICANT CHANGE UP
SPECIMEN SOURCE: SIGNIFICANT CHANGE UP
SPECIMEN SOURCE: SIGNIFICANT CHANGE UP

## 2019-10-12 PROCEDURE — 99238 HOSP IP/OBS DSCHRG MGMT 30/<: CPT

## 2019-10-12 RX ORDER — ASPIRIN/CALCIUM CARB/MAGNESIUM 324 MG
1 TABLET ORAL
Qty: 0 | Refills: 0 | DISCHARGE
Start: 2019-10-12

## 2019-10-12 RX ORDER — PIPERACILLIN AND TAZOBACTAM 4; .5 G/20ML; G/20ML
4.5 INJECTION, POWDER, LYOPHILIZED, FOR SOLUTION INTRAVENOUS ONCE
Refills: 0 | Status: COMPLETED | OUTPATIENT
Start: 2019-10-12 | End: 2019-10-12

## 2019-10-12 RX ADMIN — Medication 81 MILLIGRAM(S): at 11:24

## 2019-10-12 RX ADMIN — CARVEDILOL PHOSPHATE 12.5 MILLIGRAM(S): 80 CAPSULE, EXTENDED RELEASE ORAL at 16:54

## 2019-10-12 RX ADMIN — PIPERACILLIN AND TAZOBACTAM 200 GRAM(S): 4; .5 INJECTION, POWDER, LYOPHILIZED, FOR SOLUTION INTRAVENOUS at 06:34

## 2019-10-12 RX ADMIN — MYCOPHENOLATE MOFETIL 500 MILLIGRAM(S): 250 CAPSULE ORAL at 16:54

## 2019-10-12 RX ADMIN — PIPERACILLIN AND TAZOBACTAM 200 GRAM(S): 4; .5 INJECTION, POWDER, LYOPHILIZED, FOR SOLUTION INTRAVENOUS at 13:55

## 2019-10-12 RX ADMIN — Medication 4: at 07:10

## 2019-10-12 RX ADMIN — CARVEDILOL PHOSPHATE 12.5 MILLIGRAM(S): 80 CAPSULE, EXTENDED RELEASE ORAL at 06:34

## 2019-10-12 RX ADMIN — ENOXAPARIN SODIUM 40 MILLIGRAM(S): 100 INJECTION SUBCUTANEOUS at 06:34

## 2019-10-12 RX ADMIN — Medication 5 MILLIGRAM(S): at 06:34

## 2019-10-12 RX ADMIN — Medication 6: at 16:46

## 2019-10-12 RX ADMIN — Medication 4: at 11:24

## 2019-10-12 RX ADMIN — Medication 650 MILLIGRAM(S): at 18:48

## 2019-10-12 RX ADMIN — MYCOPHENOLATE MOFETIL 500 MILLIGRAM(S): 250 CAPSULE ORAL at 06:34

## 2019-10-12 RX ADMIN — CHLORHEXIDINE GLUCONATE 1 APPLICATION(S): 213 SOLUTION TOPICAL at 06:34

## 2019-10-12 RX ADMIN — PIPERACILLIN AND TAZOBACTAM 25 GRAM(S): 4; .5 INJECTION, POWDER, LYOPHILIZED, FOR SOLUTION INTRAVENOUS at 18:10

## 2019-10-12 RX ADMIN — TACROLIMUS 4 MILLIGRAM(S): 5 CAPSULE ORAL at 16:46

## 2019-10-12 RX ADMIN — Medication 650 MILLIGRAM(S): at 18:10

## 2019-10-12 RX ADMIN — TACROLIMUS 4 MILLIGRAM(S): 5 CAPSULE ORAL at 02:02

## 2019-10-12 NOTE — PROGRESS NOTE ADULT - SUBJECTIVE AND OBJECTIVE BOX
O/N: MAGGIE, VSS                                  Assessment/Plan:  Pt is a 78 y/o M with PMHx HTN, DM, glaucoma (functional blindness), BPH, ESRD s/p Right renal transplant (was on dialysis 3-4 years prior), BPH now with infected diabetic foot ulcer. Now s/p left foot wound excisional debridement down to bone and penrose drain placement.     Unasyn  Home medication as appropriate  has PICC->  IV home infusion.  dc home today  administer pm dose of Zosyn early (son picking up@730) O/N: HERIBERTO SERRANO      Patient is a 79y old  Male who presents with a chief complaint of Diabetic foot infection. (12 Oct 2019 06:11)      INTERVAL HPI/OVERNIGHT EVENTS:    Review of Systems: 12 point review of systems otherwise negative      MEDICATIONS  (STANDING):  aspirin  chewable 81 milliGRAM(s) Oral daily  carvedilol 12.5 milliGRAM(s) Oral every 12 hours  chlorhexidine 2% Cloths 1 Application(s) Topical <User Schedule>  dextrose 5%. 1000 milliLiter(s) (50 mL/Hr) IV Continuous <Continuous>  dextrose 50% Injectable 12.5 Gram(s) IV Push once  dextrose 50% Injectable 25 Gram(s) IV Push once  dextrose 50% Injectable 25 Gram(s) IV Push once  enoxaparin Injectable 40 milliGRAM(s) SubCutaneous every 24 hours  influenza   Vaccine 0.5 milliLiter(s) IntraMuscular once  insulin glargine Injectable (LANTUS) 10 Unit(s) SubCutaneous at bedtime  insulin lispro (HumaLOG) corrective regimen sliding scale   SubCutaneous Before meals and at bedtime  mycophenolate mofetil 500 milliGRAM(s) Oral two times a day  piperacillin/tazobactam IVPB.. 4.5 Gram(s) IV Intermittent every 8 hours  predniSONE   Tablet 5 milliGRAM(s) Oral daily  tacrolimus 4 milliGRAM(s) Oral every 12 hours  tamsulosin 0.4 milliGRAM(s) Oral at bedtime    MEDICATIONS  (PRN):  acetaminophen   Tablet .. 650 milliGRAM(s) Oral every 6 hours PRN Mild Pain (1 - 3)  dextrose 40% Gel 15 Gram(s) Oral once PRN Blood Glucose LESS THAN 70 milliGRAM(s)/deciliter  glucagon  Injectable 1 milliGRAM(s) IntraMuscular once PRN Glucose LESS THAN 70 milligrams/deciliter  oxyCODONE    5 mG/acetaminophen 325 mG 1 Tablet(s) Oral every 4 hours PRN Moderate Pain (4 - 6)  oxyCODONE    5 mG/acetaminophen 325 mG 2 Tablet(s) Oral every 4 hours PRN Severe Pain (7 - 10)  sodium chloride 0.9% lock flush 10 milliLiter(s) IV Push every 1 hour PRN Pre/post blood products, medications, blood draw, and to maintain line patency      Allergies    No Known Allergies    Intolerances          Vital Signs Last 24 Hrs  T(C): 36.7 (12 Oct 2019 05:19), Max: 37.2 (11 Oct 2019 14:49)  T(F): 98 (12 Oct 2019 05:19), Max: 98.9 (11 Oct 2019 14:49)  HR: 82 (12 Oct 2019 06:31) (73 - 82)  BP: 146/59 (12 Oct 2019 06:31) (125/53 - 171/74)  BP(mean): --  RR: 18 (12 Oct 2019 06:31) (18 - 18)  SpO2: 96% (12 Oct 2019 06:31) (96% - 97%)  CAPILLARY BLOOD GLUCOSE      POCT Blood Glucose.: 235 mg/dL (12 Oct 2019 06:52)  POCT Blood Glucose.: 313 mg/dL (11 Oct 2019 21:50)  POCT Blood Glucose.: 322 mg/dL (11 Oct 2019 16:12)  POCT Blood Glucose.: 204 mg/dL (11 Oct 2019 11:09)      10-11 @ 07:01  -  10-12 @ 07:00  --------------------------------------------------------  IN: 300 mL / OUT: 0 mL / NET: 300 mL        Physical Exam:    Daily     Daily Weight in k.5 (12 Oct 2019 05:19)  General:  Well appearing, NAD, not cachetic  HEENT:  Nonicteric, PERRLA  CV:  RRR, no murmur, no JVD  Lungs:  CTA B/L, no wheezes, rales, rhonchi  Abdomen:  Soft, non-tender, no distended, positive BS, no hepatosplenomegaly  Extremities:  2+ pulses, no c/c, no edema  Skin:  Warm and dry, no rashes  :  No bourgeois  Neuro:  AAOx3, non-focal, CN II-XII grossly intact  No Restraints    LABS:                  RADIOLOGY & ADDITIONAL TESTS:    ---------------------------------------------------------------------------  I personally reviewed: [  ]EKG   [  ]CXR    [  ] CT    [  ]Other  ---------------------------------------------------------------------------  PLEASE CHECK WHEN PRESENT:     [  ]Heart Failure     [  ] Acute     [  ] Acute on Chronic     [  ] Chronic  -------------------------------------------------------------------     [  ]Diastolic [HFpEF]     [  ]Systolic [HFrEF]     [  ]Combined [HFpEF & HFrEF]     [  ]Other:  -------------------------------------------------------------------  [ ] Respiratory failure  [ ] Acute cor pulmonale  [ ] Asthma/COPD Exacerbation  [ ] Pleural effusion  [ ] Aspiration pneumonia  -------------------------------------------------------------------  [  ]ELIZABETH     [  ]ATN     [  ]Reneal Medullary Necrosis     [  ]Renal Cortical Necrosis     [  ]Other Pathological Lesions:    [  ]CKD 1  [  ]CKD 2  [  ]CKD 3  [  ]CKD 4  [  ]CKD 5  [  ]Other  -------------------------------------------------------------------  [  ]Diabetes  [  ] Diabetic PVD Ulcer  [  ] Neuropathic ulcer to DM  [  ] Diabetes with Nephropathy  [  ] Osteomyelitis due to diabetes  --------------------------------------------------------------------  [  ]Malnutrition: See Nutrition Note  [  ]Cachexia  [  ]Other:   [  ]Supplement Ordered:  [  ]Morbid Obesity (BMI >=40]  ---------------------------------------------------------------------  [ ] Sepsis/severe sepsis/septic shock  [ ] UTI  [ ] Pneumonia  -----------------------------------------------------------------------  [ ] Acidosis/alkalosis  [ ] Fluid overload  [ ] Hypokalemia  [ ] Hyperkalemia  [ ] Hypomagnesemia  [ ] Hypophosphatemia  [ ] Hyperphosphatemia  ------------------------------------------------------------------------  [ ] Acute blood loss anemia  [ ] Post op blood loss anemia  [ ] Iron deficiency anemia  [ ] Anemia due to chronic disease  [ ] Hypercoagulable state  ----------------------------------------------------------------------  [ ] Cerebral infarction  [ ] Transient ischemia attack  [ ] Encephalopathy      Assessment/Plan: 79y Male                                          Assessment/Plan:  Pt is a 78 y/o M with PMHx HTN, DM, glaucoma (functional blindness), BPH, ESRD s/p Right renal transplant (was on dialysis 3-4 years prior), BPH now with infected diabetic foot ulcer. Now s/p left foot wound excisional debridement down to bone and penrose drain placement.     Unasyn  Home medication as appropriate  has PICC->  IV home infusion.  dc home today  administer pm dose of Zosyn early (son picking up@730) O/N: MAGGIE, HERIBERTO      Patient is a 79y old  Male who presents with a chief complaint of Diabetic foot infection. (12 Oct 2019 06:11)  Patient was seen and examined at bedside. No acute event overnight. No complaints.      INTERVAL HPI/OVERNIGHT EVENTS:    Review of Systems: 12 point review of systems otherwise negative      MEDICATIONS  (STANDING):  aspirin  chewable 81 milliGRAM(s) Oral daily  carvedilol 12.5 milliGRAM(s) Oral every 12 hours  chlorhexidine 2% Cloths 1 Application(s) Topical <User Schedule>  dextrose 5%. 1000 milliLiter(s) (50 mL/Hr) IV Continuous <Continuous>  dextrose 50% Injectable 12.5 Gram(s) IV Push once  dextrose 50% Injectable 25 Gram(s) IV Push once  dextrose 50% Injectable 25 Gram(s) IV Push once  enoxaparin Injectable 40 milliGRAM(s) SubCutaneous every 24 hours  influenza   Vaccine 0.5 milliLiter(s) IntraMuscular once  insulin glargine Injectable (LANTUS) 10 Unit(s) SubCutaneous at bedtime  insulin lispro (HumaLOG) corrective regimen sliding scale   SubCutaneous Before meals and at bedtime  mycophenolate mofetil 500 milliGRAM(s) Oral two times a day  piperacillin/tazobactam IVPB.. 4.5 Gram(s) IV Intermittent every 8 hours  predniSONE   Tablet 5 milliGRAM(s) Oral daily  tacrolimus 4 milliGRAM(s) Oral every 12 hours  tamsulosin 0.4 milliGRAM(s) Oral at bedtime    MEDICATIONS  (PRN):  acetaminophen   Tablet .. 650 milliGRAM(s) Oral every 6 hours PRN Mild Pain (1 - 3)  dextrose 40% Gel 15 Gram(s) Oral once PRN Blood Glucose LESS THAN 70 milliGRAM(s)/deciliter  glucagon  Injectable 1 milliGRAM(s) IntraMuscular once PRN Glucose LESS THAN 70 milligrams/deciliter  oxyCODONE    5 mG/acetaminophen 325 mG 1 Tablet(s) Oral every 4 hours PRN Moderate Pain (4 - 6)  oxyCODONE    5 mG/acetaminophen 325 mG 2 Tablet(s) Oral every 4 hours PRN Severe Pain (7 - 10)  sodium chloride 0.9% lock flush 10 milliLiter(s) IV Push every 1 hour PRN Pre/post blood products, medications, blood draw, and to maintain line patency      Allergies    No Known Allergies    Intolerances          Vital Signs Last 24 Hrs  T(C): 36.7 (12 Oct 2019 05:19), Max: 37.2 (11 Oct 2019 14:49)  T(F): 98 (12 Oct 2019 05:19), Max: 98.9 (11 Oct 2019 14:49)  HR: 82 (12 Oct 2019 06:31) (73 - 82)  BP: 146/59 (12 Oct 2019 06:31) (125/53 - 171/74)  BP(mean): --  RR: 18 (12 Oct 2019 06:31) (18 - 18)  SpO2: 96% (12 Oct 2019 06:31) (96% - 97%)  CAPILLARY BLOOD GLUCOSE      POCT Blood Glucose.: 235 mg/dL (12 Oct 2019 06:52)  POCT Blood Glucose.: 313 mg/dL (11 Oct 2019 21:50)  POCT Blood Glucose.: 322 mg/dL (11 Oct 2019 16:12)  POCT Blood Glucose.: 204 mg/dL (11 Oct 2019 11:09)      10-11 @ 07:01  -  10-12 @ 07:00  --------------------------------------------------------  IN: 300 mL / OUT: 0 mL / NET: 300 mL        Physical Exam:    Daily     Daily Weight in k.5 (12 Oct 2019 05:19)  General:  Well appearing, NAD  CV:  RRR  Lungs:  CTA B/l  Abdomen:  Soft, non-tender, no distended,  Extremities:  LLE debridement. Dressing soaked in yellow fluid. Erythema around the incisions. Penrose drain left in wound. Dressing Changed.   Skin:  Warm and dry  Neuro:  AAOx3,    LABS:                  RADIOLOGY & ADDITIONAL TESTS:    PLEASE CHECK WHEN PRESENT:     [  ]Heart Failure     [  ] Acute     [  ] Acute on Chronic     [  ] Chronic  -------------------------------------------------------------------     [  ]Diastolic [HFpEF]     [  ]Systolic [HFrEF]     [  ]Combined [HFpEF & HFrEF]     [  ]Other:  -------------------------------------------------------------------  [ ] Respiratory failure  [ ] Acute cor pulmonale  [ ] Asthma/COPD Exacerbation  [ ] Pleural effusion  [ ] Aspiration pneumonia  -------------------------------------------------------------------  [  ]ELIZABETH     [  ]ATN     [  ]Reneal Medullary Necrosis     [  ]Renal Cortical Necrosis     [  ]Other Pathological Lesions:    [  ]CKD 1  [  ]CKD 2  [  ]CKD 3  [  ]CKD 4  [X  ]CKD 5 s/p kidney transplant  [  ]Other  -------------------------------------------------------------------  [ X ]Diabetes  [  ] Diabetic PVD Ulcer  [  ] Neuropathic ulcer to DM  [ X ] Diabetes with Nephropathy  [  ] Osteomyelitis due to diabetes  --------------------------------------------------------------------  [  ]Malnutrition: See Nutrition Note  [  ]Cachexia  [  ]Other:   [  ]Supplement Ordered:  [  ]Morbid Obesity (BMI >=40]  ---------------------------------------------------------------------  [ ] Sepsis/severe sepsis/septic shock  [ ] UTI  [ ] Pneumonia  -----------------------------------------------------------------------  [ ] Acidosis/alkalosis  [ ] Fluid overload  [ ] Hypokalemia  [ ] Hyperkalemia  [ ] Hypomagnesemia  [ ] Hypophosphatemia  [ ] Hyperphosphatemia  ------------------------------------------------------------------------  [ ] Acute blood loss anemia  [ ] Post op blood loss anemia  [ ] Iron deficiency anemia  [ ] Anemia due to chronic disease  [ ] Hypercoagulable state  ----------------------------------------------------------------------  [ ] Cerebral infarction  [ ] Transient ischemia attack  [ ] Encephalopathy          Assessment/Plan: 79y Male        Assessment/Plan:  Pt is a 80 y/o M with PMHx HTN, DM, glaucoma (functional blindness), BPH, ESRD s/p Right renal transplant (was on dialysis 3-4 years prior), BPH now with infected diabetic foot ulcer. Now s/p left foot wound excisional debridement down to bone and penrose drain placement.     Unasyn  Home medication as appropriate  has PICC->  IV home infusion.  dc home today  administer pm dose of Zosyn early (son picking up@730)

## 2019-10-12 NOTE — PROGRESS NOTE ADULT - PROBLEM SELECTOR PLAN 1
continue Tacrolimus, Cellcept, and Prednisone  check tacrolimus level should patient not get discharged  follow-up as outpatient with nephrology

## 2019-10-12 NOTE — CONSULT NOTE ADULT - ASSESSMENT
79 year old man with ESRD s/p renal transplant, ELIZABETH due to hypotension, now improved. DM foot ulcer with possible osteo.    Suggest:    1. Follow SCr.  2. Cont tacrolimus, prednisone, and cellcept.  3. Abx per ID.  4. Follow up with Dr. Aranda after discharge.    Please call with any questions.    Felix Jeffrey MD, FACP, FASN | kidney.[facility]  Nephrology, Hypertension, and Internal Medicine  Mobile: [phone] (Daytime Hours Only)  Office/Answering Service: [phone]  [employer] of Medicine, [employer] at [facility] - Nephrology at [address]  [address], [address], [place], [place]

## 2019-10-12 NOTE — DISCHARGE NOTE NURSING/CASE MANAGEMENT/SOCIAL WORK - PATIENT PORTAL LINK FT
You can access the FollowMyHealth Patient Portal offered by [facility] by registering at the following website: [email or web address]. By joining [facility]’s FollowMyHealth portal, you will also be able to view your health information using other applications (apps) compatible with our system.

## 2019-10-12 NOTE — PROGRESS NOTE ADULT - REASON FOR ADMISSION
Diabetic foot infection.

## 2019-10-12 NOTE — CONSULT NOTE ADULT - SUBJECTIVE AND OBJECTIVE BOX
HPI:  Pt is a 80 y/o M with PMHx HTN, DM, glaucoma (functional blindness), BPH, ESRD s/p Right renal transplant (was on dialysis >4 years prior), BPH who presented to [facility] ED with left foot wound infection. According to Pt's wife, Pt developed left plantar foot ulcer about 3 wks ago. Pt was being followed by a podiatrist in [place].  About one week ago, his podiatrist suggested that he see's a wound care specialist and so he is pending an appointment with Dr. Castro on 10/10/19. About 2 days ago patient started experiencing pain and swelling of left foot in addition to erythema of the foot. Today, Pt came to [facility] ED due to dizziness and hypotension at home. Upon arrival by EMS, Pt was noted to be hypotensive and he was given 500ml of fluid bolus. Pt denies nausea, vomiting, fever or chills. (09 Oct 2019 02:38)    ELIZABETH improved, SCr decreased from 1.2 to 0.9 with IVF. As outpatient, on tacrolimus, prednisone, and cellcept. 1 cm LE plantar ulcer with surrounding erythema, no drainage.       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 650 milliGRAM(s) Oral every 6 hours PRN  aspirin  chewable 81 milliGRAM(s) Oral daily  carvedilol 12.5 milliGRAM(s) Oral every 12 hours  chlorhexidine 2% Cloths 1 Application(s) Topical <User Schedule>  dextrose 40% Gel 15 Gram(s) Oral once PRN  dextrose 5%. 1000 milliLiter(s) IV Continuous <Continuous>  dextrose 50% Injectable 12.5 Gram(s) IV Push once  dextrose 50% Injectable 25 Gram(s) IV Push once  dextrose 50% Injectable 25 Gram(s) IV Push once  enoxaparin Injectable 40 milliGRAM(s) SubCutaneous every 24 hours  glucagon  Injectable 1 milliGRAM(s) IntraMuscular once PRN  influenza   Vaccine 0.5 milliLiter(s) IntraMuscular once  insulin glargine Injectable (LANTUS) 10 Unit(s) SubCutaneous at bedtime  insulin lispro (HumaLOG) corrective regimen sliding scale   SubCutaneous Before meals and at bedtime  mycophenolate mofetil 500 milliGRAM(s) Oral two times a day  oxyCODONE    5 mG/acetaminophen 325 mG 1 Tablet(s) Oral every 4 hours PRN  oxyCODONE    5 mG/acetaminophen 325 mG 2 Tablet(s) Oral every 4 hours PRN  piperacillin/tazobactam IVPB.. 4.5 Gram(s) IV Intermittent every 8 hours  predniSONE   Tablet 5 milliGRAM(s) Oral daily  sodium chloride 0.9% lock flush 10 milliLiter(s) IV Push every 1 hour PRN  tacrolimus 4 milliGRAM(s) Oral every 12 hours  tamsulosin 0.4 milliGRAM(s) Oral at bedtime          SOCIAL HISTORY:    REVIEW OF SYSTEMS:  Constitutional: No fevers.   Card: No chest pain.   Resp: No SOB.  GI: No nausea or vomiting. No abdominal pain.  : No dysuria. Neuro: No focal weakness or sensory loss.  Eyes: No visual symptoms. MS: No joint swelling.  Skin: No rashes. Psych: No psychosis.    PHYSICAL EXAM:    10 Oct 2019 07:  -  11 Oct 2019 07:00  --------------------------------------------------------  IN: 240 mL / OUT: 350 mL / NET: -110 mL    11 Oct 2019 07:01  -  12 Oct 2019 00:26  --------------------------------------------------------  IN: 300 mL / OUT: 0 mL / NET: 300 mL      Constitutional: T(C): 36.9 (11 Oct 2019 22:15), Max: 37.2 (11 Oct 2019 14:49)  HR: 77 (12 Oct 2019 00:20) (73 - 83)  BP: 125/53 (12 Oct 2019 00:20) (125/53 - 171/74)  RR: 18 (12 Oct 2019 00:20) (18 - 18)  SpO2: 97% (11 Oct 2019 20:31) (95% - 97%)  Appearance: Alert, pleasant, INAD.  Eyes: Conjunctivae pink, moist.   ENT: External ears/nose normal appearing. Lips normal, dentition good.  Lymphatic: No cervical lymphadenopathy. No supraclavicular lymphadenopathy.  Cardiac: No rubs. NO MURMURS. Extremities: pitting legs  Respiratory: Effort no accessory muscle use. Lungs: decreased breath sound  Abdomen: Soft. No masses. Nontender. Liver: Not palpable. Spleen: Not palpable.  Musculoskeltal digits: No clubbing or cyanosis. Tone normal. Moves all four extremities.  Skin inspection: No rashes. Palpation: No abnormalities. Foot bandaged.   Neuro: Cranial nerves: no facial assymetry or deficits noted. Sensation: LE intact to light touch.  Psych: Oriented to person, place, situation. Mood/affect: Not depressed.     DATA:  135    |  102    |  19     ----------------------------<  240<H>  Ca:8.9   (10 Oct 2019 06:25)  4.5     |  19<L>  |  0.99             SCr 0.99 [10 Oct 2019 06:25]  SCr 1.24 [09 Oct 2019 05:27]  SCr 1.36 [09 Oct 2019 00:07]                          10.8<L>  11.91<H> )-----------( 218      ( 10 Oct 2019 06:25 )             35.5<L>    Phos:2.1 mg/dL<L> M.7 mg/dL PTH:-- Uric acid:-- Serum Osm:--  Ferritin:-- Iron:-- TIBC:-- Tsat:--  B12:-- TSH:-- (10 Oct 2019 06:25)    Urinalysis Basic - ( 09 Oct 2019 07:04 )  Color: Yellow / Appearance: Clear / S.020 / pH: x  Gluc: x / Ketone: Trace mg/dL<!>  / Bili: Negative / Urobili: 0.2 E.U./dL   Blood: x / Protein: Trace mg/dL<!> / Nitrite: NEGATIVE   Leuk Esterase: NEGATIVE / RBC: < 5 /HPF / WBC < 5 /HPF   Sq Epi: x / Non Sq Epi: x / Bacteria: Present /HPF<!>

## 2019-10-12 NOTE — PROGRESS NOTE ADULT - ASSESSMENT
78 y/o M with PMHx HTN, DM, glaucoma (functional blindness), BPH, ESRD s/p Right renal transplant (was on dialysis 3-4 years prior), BPH now with infected diabetic foot ulcer. Now s/p left foot wound excisional debridement down to bone and penrose drain placement.
79M with PMHx HTN, DM, glaucoma (functional blindness), BPH, ESRD s/p Right renal transplant (was on dialysis 3-4 years prior), BPH now s/p debridement of left sided diabetic foot ulcer on plantar surface    IV Unasyn  Home Meds  Regular Diabetic Diet  ISS  IVF, NS  Pain/nausea control  f/u OR cultures & path  plan for PICC tomorrow

## 2019-10-12 NOTE — PROGRESS NOTE ADULT - SUBJECTIVE AND OBJECTIVE BOX
CC: INFECTED ULCER OF SKIN      INTERVAL HISTORY:  feels well  for discharge later today      ROS: No chest pain, no sob, no abd pain. 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  BPH (benign prostatic hypertrophy)  HTN (hypertension)  Kidney transplant recipient      PHYSICAL EXAM:  T(C): 37.1 (10-12-19 @ 10:27), Max: 37.2 (10-11-19 @ 14:49)  HR: 78 (10-12-19 @ 09:13)  BP: 109/53 (10-12-19 @ 09:13) (109/53 - 171/74)  RR: 18 (10-12-19 @ 09:13)  SpO2: 94% (10-12-19 @ 09:13)  Wt(kg): --  I&O's Summary    11 Oct 2019 07:  -  12 Oct 2019 07:00  --------------------------------------------------------  IN: 300 mL / OUT: 0 mL / NET: 300 mL    12 Oct 2019 07:  -  12 Oct 2019 11:16  --------------------------------------------------------  IN: 120 mL / OUT: 0 mL / NET: 120 mL      Weight 92.5 (10-09 @ 09:42)  General: AAO x 3,  NAD.  HEENT: moist mucous membranes, no pallor/cyanosis.  Neck: no JVD visible.  Cardiac: S1, S2. RRR. No murmurs   Respratory: CTA b/l, no access muscle use.   Abdomen: soft. nontender. nondistended  Skin: no rashes.  Extremities: + LE edema b/l  Access:       DATA:                    Urinalysis Basic - ( 09 Oct 2019 07:04 )  Color: Yellow / Appearance: Clear / S.020 / pH: x  Gluc: x / Ketone: Trace mg/dL<!>  / Bili: Negative / Urobili: 0.2 E.U./dL   Blood: x / Protein: Trace mg/dL<!> / Nitrite: NEGATIVE   Leuk Esterase: NEGATIVE / RBC: < 5 /HPF / WBC < 5 /HPF   Sq Epi: x / Non Sq Epi: x / Bacteria: Present /HPF<!>                MEDICATIONS  (STANDING):  aspirin  chewable 81 milliGRAM(s) Oral daily  carvedilol 12.5 milliGRAM(s) Oral every 12 hours  chlorhexidine 2% Cloths 1 Application(s) Topical <User Schedule>  dextrose 5%. 1000 milliLiter(s) (50 mL/Hr) IV Continuous <Continuous>  dextrose 50% Injectable 12.5 Gram(s) IV Push once  dextrose 50% Injectable 25 Gram(s) IV Push once  dextrose 50% Injectable 25 Gram(s) IV Push once  enoxaparin Injectable 40 milliGRAM(s) SubCutaneous every 24 hours  insulin glargine Injectable (LANTUS) 10 Unit(s) SubCutaneous at bedtime  insulin lispro (HumaLOG) corrective regimen sliding scale   SubCutaneous Before meals and at bedtime  mycophenolate mofetil 500 milliGRAM(s) Oral two times a day  piperacillin/tazobactam IVPB.. 4.5 Gram(s) IV Intermittent every 8 hours  predniSONE   Tablet 5 milliGRAM(s) Oral daily  tacrolimus 4 milliGRAM(s) Oral every 12 hours  tamsulosin 0.4 milliGRAM(s) Oral at bedtime    MEDICATIONS  (PRN):  acetaminophen   Tablet .. 650 milliGRAM(s) Oral every 6 hours PRN Mild Pain (1 - 3)  dextrose 40% Gel 15 Gram(s) Oral once PRN Blood Glucose LESS THAN 70 milliGRAM(s)/deciliter  glucagon  Injectable 1 milliGRAM(s) IntraMuscular once PRN Glucose LESS THAN 70 milligrams/deciliter  oxyCODONE    5 mG/acetaminophen 325 mG 1 Tablet(s) Oral every 4 hours PRN Moderate Pain (4 - 6)  oxyCODONE    5 mG/acetaminophen 325 mG 2 Tablet(s) Oral every 4 hours PRN Severe Pain (7 - 10)  sodium chloride 0.9% lock flush 10 milliLiter(s) IV Push every 1 hour PRN Pre/post blood products, medications, blood draw, and to maintain line patency

## 2019-10-16 DIAGNOSIS — H40.9 UNSPECIFIED GLAUCOMA: ICD-10-CM

## 2019-10-16 DIAGNOSIS — N18.9 CHRONIC KIDNEY DISEASE, UNSPECIFIED: ICD-10-CM

## 2019-10-16 DIAGNOSIS — E66.9 OBESITY, UNSPECIFIED: ICD-10-CM

## 2019-10-16 DIAGNOSIS — N40.0 BENIGN PROSTATIC HYPERPLASIA WITHOUT LOWER URINARY TRACT SYMPTOMS: ICD-10-CM

## 2019-10-16 DIAGNOSIS — E10.621 TYPE 1 DIABETES MELLITUS WITH FOOT ULCER: ICD-10-CM

## 2019-10-16 DIAGNOSIS — A41.9 SEPSIS, UNSPECIFIED ORGANISM: ICD-10-CM

## 2019-10-16 DIAGNOSIS — Z94.0 KIDNEY TRANSPLANT STATUS: ICD-10-CM

## 2019-10-16 DIAGNOSIS — I12.9 HYPERTENSIVE CHRONIC KIDNEY DISEASE WITH STAGE 1 THROUGH STAGE 4 CHRONIC KIDNEY DISEASE, OR UNSPECIFIED CHRONIC KIDNEY DISEASE: ICD-10-CM

## 2019-10-16 DIAGNOSIS — E10.22 TYPE 1 DIABETES MELLITUS WITH DIABETIC CHRONIC KIDNEY DISEASE: ICD-10-CM

## 2019-10-16 DIAGNOSIS — L97.529 NON-PRESSURE CHRONIC ULCER OF OTHER PART OF LEFT FOOT WITH UNSPECIFIED SEVERITY: ICD-10-CM

## 2019-10-16 DIAGNOSIS — Z79.4 LONG TERM (CURRENT) USE OF INSULIN: ICD-10-CM

## 2019-10-16 DIAGNOSIS — I95.9 HYPOTENSION, UNSPECIFIED: ICD-10-CM

## 2019-10-16 DIAGNOSIS — H54.7 UNSPECIFIED VISUAL LOSS: ICD-10-CM

## 2019-10-28 ENCOUNTER — APPOINTMENT (OUTPATIENT)
Dept: VASCULAR SURGERY | Facility: CLINIC | Age: 79
End: 2019-10-28
Payer: MEDICARE

## 2019-10-28 PROCEDURE — 99213 OFFICE O/P EST LOW 20 MIN: CPT

## 2019-11-01 ENCOUNTER — OUTPATIENT (OUTPATIENT)
Dept: OUTPATIENT SERVICES | Facility: HOSPITAL | Age: 79
LOS: 1 days | End: 2019-11-01

## 2019-11-01 DIAGNOSIS — Z94.0 KIDNEY TRANSPLANT STATUS: Chronic | ICD-10-CM

## 2019-11-02 ENCOUNTER — EMERGENCY (EMERGENCY)
Facility: HOSPITAL | Age: 79
LOS: 1 days | Discharge: HOME | End: 2019-11-02
Attending: EMERGENCY MEDICINE | Admitting: EMERGENCY MEDICINE
Payer: MEDICARE

## 2019-11-02 VITALS
RESPIRATION RATE: 20 BRPM | HEART RATE: 80 BPM | SYSTOLIC BLOOD PRESSURE: 160 MMHG | OXYGEN SATURATION: 100 % | DIASTOLIC BLOOD PRESSURE: 80 MMHG | TEMPERATURE: 98 F

## 2019-11-02 VITALS
TEMPERATURE: 98 F | DIASTOLIC BLOOD PRESSURE: 65 MMHG | HEART RATE: 83 BPM | SYSTOLIC BLOOD PRESSURE: 133 MMHG | OXYGEN SATURATION: 97 % | RESPIRATION RATE: 18 BRPM

## 2019-11-02 DIAGNOSIS — Z94.0 KIDNEY TRANSPLANT STATUS: Chronic | ICD-10-CM

## 2019-11-02 DIAGNOSIS — R06.00 DYSPNEA, UNSPECIFIED: ICD-10-CM

## 2019-11-02 DIAGNOSIS — G47.33 OBSTRUCTIVE SLEEP APNEA (ADULT) (PEDIATRIC): ICD-10-CM

## 2019-11-02 LAB
ANION GAP SERPL CALC-SCNC: 13 MMOL/L — SIGNIFICANT CHANGE UP (ref 5–17)
APTT BLD: 30.6 SEC — SIGNIFICANT CHANGE UP (ref 27.5–36.3)
BASOPHILS # BLD AUTO: 0.01 K/UL — SIGNIFICANT CHANGE UP (ref 0–0.2)
BASOPHILS NFR BLD AUTO: 0.1 % — SIGNIFICANT CHANGE UP (ref 0–2)
BUN SERPL-MCNC: 15 MG/DL — SIGNIFICANT CHANGE UP (ref 7–23)
CALCIUM SERPL-MCNC: 9.6 MG/DL — SIGNIFICANT CHANGE UP (ref 8.4–10.5)
CHLORIDE SERPL-SCNC: 100 MMOL/L — SIGNIFICANT CHANGE UP (ref 96–108)
CO2 SERPL-SCNC: 22 MMOL/L — SIGNIFICANT CHANGE UP (ref 22–31)
CREAT SERPL-MCNC: 1.02 MG/DL — SIGNIFICANT CHANGE UP (ref 0.5–1.3)
D DIMER BLD IA.RAPID-MCNC: 412 NG/ML DDU — HIGH
EOSINOPHIL # BLD AUTO: 0.3 K/UL — SIGNIFICANT CHANGE UP (ref 0–0.5)
EOSINOPHIL NFR BLD AUTO: 3.4 % — SIGNIFICANT CHANGE UP (ref 0–6)
GLUCOSE SERPL-MCNC: 201 MG/DL — HIGH (ref 70–99)
HCT VFR BLD CALC: 39 % — SIGNIFICANT CHANGE UP (ref 39–50)
HGB BLD-MCNC: 11.9 G/DL — LOW (ref 13–17)
IMM GRANULOCYTES NFR BLD AUTO: 0.3 % — SIGNIFICANT CHANGE UP (ref 0–1.5)
INR BLD: 1.14 — SIGNIFICANT CHANGE UP (ref 0.88–1.16)
LYMPHOCYTES # BLD AUTO: 1.41 K/UL — SIGNIFICANT CHANGE UP (ref 1–3.3)
LYMPHOCYTES # BLD AUTO: 15.9 % — SIGNIFICANT CHANGE UP (ref 13–44)
MCHC RBC-ENTMCNC: 26.9 PG — LOW (ref 27–34)
MCHC RBC-ENTMCNC: 30.5 GM/DL — LOW (ref 32–36)
MCV RBC AUTO: 88.2 FL — SIGNIFICANT CHANGE UP (ref 80–100)
MONOCYTES # BLD AUTO: 0.81 K/UL — SIGNIFICANT CHANGE UP (ref 0–0.9)
MONOCYTES NFR BLD AUTO: 9.1 % — SIGNIFICANT CHANGE UP (ref 2–14)
NEUTROPHILS # BLD AUTO: 6.31 K/UL — SIGNIFICANT CHANGE UP (ref 1.8–7.4)
NEUTROPHILS NFR BLD AUTO: 71.2 % — SIGNIFICANT CHANGE UP (ref 43–77)
NRBC # BLD: 0 /100 WBCS — SIGNIFICANT CHANGE UP (ref 0–0)
NT-PROBNP SERPL-SCNC: 1236 PG/ML — HIGH (ref 0–300)
PLATELET # BLD AUTO: 225 K/UL — SIGNIFICANT CHANGE UP (ref 150–400)
POTASSIUM SERPL-MCNC: 4.6 MMOL/L — SIGNIFICANT CHANGE UP (ref 3.5–5.3)
POTASSIUM SERPL-SCNC: 4.6 MMOL/L — SIGNIFICANT CHANGE UP (ref 3.5–5.3)
PROTHROM AB SERPL-ACNC: 12.9 SEC — SIGNIFICANT CHANGE UP (ref 10–12.9)
RAPID RVP RESULT: SIGNIFICANT CHANGE UP
RBC # BLD: 4.42 M/UL — SIGNIFICANT CHANGE UP (ref 4.2–5.8)
RBC # FLD: 14.5 % — SIGNIFICANT CHANGE UP (ref 10.3–14.5)
SODIUM SERPL-SCNC: 135 MMOL/L — SIGNIFICANT CHANGE UP (ref 135–145)
TROPONIN T SERPL-MCNC: 0.01 NG/ML — SIGNIFICANT CHANGE UP (ref 0–0.01)
TROPONIN T SERPL-MCNC: <0.01 NG/ML — SIGNIFICANT CHANGE UP (ref 0–0.01)
WBC # BLD: 8.87 K/UL — SIGNIFICANT CHANGE UP (ref 3.8–10.5)
WBC # FLD AUTO: 8.87 K/UL — SIGNIFICANT CHANGE UP (ref 3.8–10.5)

## 2019-11-02 PROCEDURE — 85730 THROMBOPLASTIN TIME PARTIAL: CPT

## 2019-11-02 PROCEDURE — 93005 ELECTROCARDIOGRAM TRACING: CPT

## 2019-11-02 PROCEDURE — 93971 EXTREMITY STUDY: CPT

## 2019-11-02 PROCEDURE — 99285 EMERGENCY DEPT VISIT HI MDM: CPT | Mod: 25

## 2019-11-02 PROCEDURE — 85025 COMPLETE CBC W/AUTO DIFF WBC: CPT

## 2019-11-02 PROCEDURE — 87798 DETECT AGENT NOS DNA AMP: CPT

## 2019-11-02 PROCEDURE — 36415 COLL VENOUS BLD VENIPUNCTURE: CPT

## 2019-11-02 PROCEDURE — 87633 RESP VIRUS 12-25 TARGETS: CPT

## 2019-11-02 PROCEDURE — 87486 CHLMYD PNEUM DNA AMP PROBE: CPT

## 2019-11-02 PROCEDURE — 71046 X-RAY EXAM CHEST 2 VIEWS: CPT | Mod: 26

## 2019-11-02 PROCEDURE — 71275 CT ANGIOGRAPHY CHEST: CPT | Mod: 26

## 2019-11-02 PROCEDURE — 80048 BASIC METABOLIC PNL TOTAL CA: CPT

## 2019-11-02 PROCEDURE — 85610 PROTHROMBIN TIME: CPT

## 2019-11-02 PROCEDURE — 71275 CT ANGIOGRAPHY CHEST: CPT

## 2019-11-02 PROCEDURE — 99284 EMERGENCY DEPT VISIT MOD MDM: CPT | Mod: 25

## 2019-11-02 PROCEDURE — 93971 EXTREMITY STUDY: CPT | Mod: 26,LT

## 2019-11-02 PROCEDURE — 93010 ELECTROCARDIOGRAM REPORT: CPT

## 2019-11-02 PROCEDURE — 83880 ASSAY OF NATRIURETIC PEPTIDE: CPT

## 2019-11-02 PROCEDURE — 71046 X-RAY EXAM CHEST 2 VIEWS: CPT

## 2019-11-02 PROCEDURE — 84484 ASSAY OF TROPONIN QUANT: CPT

## 2019-11-02 PROCEDURE — 87581 M.PNEUMON DNA AMP PROBE: CPT

## 2019-11-02 PROCEDURE — 85379 FIBRIN DEGRADATION QUANT: CPT

## 2019-11-02 RX ORDER — SODIUM CHLORIDE 9 MG/ML
1000 INJECTION INTRAMUSCULAR; INTRAVENOUS; SUBCUTANEOUS
Refills: 0 | Status: DISCONTINUED | OUTPATIENT
Start: 2019-11-02 | End: 2019-11-09

## 2019-11-02 RX ORDER — CARVEDILOL PHOSPHATE 80 MG/1
3.12 CAPSULE, EXTENDED RELEASE ORAL EVERY 12 HOURS
Refills: 0 | Status: DISCONTINUED | OUTPATIENT
Start: 2019-11-02 | End: 2019-11-09

## 2019-11-02 RX ORDER — TACROLIMUS 5 MG/1
1 CAPSULE ORAL ONCE
Refills: 0 | Status: COMPLETED | OUTPATIENT
Start: 2019-11-02 | End: 2019-11-02

## 2019-11-02 RX ORDER — FUROSEMIDE 40 MG
40 TABLET ORAL DAILY
Refills: 0 | Status: DISCONTINUED | OUTPATIENT
Start: 2019-11-02 | End: 2019-11-09

## 2019-11-02 RX ORDER — MYCOPHENOLATE MOFETIL 250 MG/1
500 CAPSULE ORAL
Refills: 0 | Status: DISCONTINUED | OUTPATIENT
Start: 2019-11-02 | End: 2019-11-09

## 2019-11-02 RX ADMIN — Medication 100 UNIT(S): at 10:45

## 2019-11-02 RX ADMIN — CARVEDILOL PHOSPHATE 3.12 MILLIGRAM(S): 80 CAPSULE, EXTENDED RELEASE ORAL at 10:19

## 2019-11-02 RX ADMIN — Medication 40 MILLIGRAM(S): at 14:18

## 2019-11-02 RX ADMIN — MYCOPHENOLATE MOFETIL 500 MILLIGRAM(S): 250 CAPSULE ORAL at 10:35

## 2019-11-02 RX ADMIN — TACROLIMUS 1 MILLIGRAM(S): 5 CAPSULE ORAL at 10:34

## 2019-11-02 NOTE — ED ADULT NURSE NOTE - OTHER COMPLAINTS
hx of HTN, DM insulin dependent, kidney transplant recently admitted for a left foot infection currently getting abx through PICC. breathing unlabored, sating 99% on RA. old fistula to right arm, not being used s/p kidney transplant

## 2019-11-02 NOTE — ED PROVIDER NOTE - PATIENT PORTAL LINK FT
You can access the FollowMyHealth Patient Portal offered by [facility] by registering at the following website: [email or web address]. By joining [employer]’s FollowMyHealth portal, you will also be able to view your health information using other applications (apps) compatible with our system.

## 2019-11-02 NOTE — ED ADULT NURSE NOTE - NSIMPLEMENTINTERV_GEN_ALL_ED
Implemented All Fall with Harm Risk Interventions:  Excel to call system. Call bell, personal items and telephone within reach. Instruct patient to call for assistance. Room bathroom lighting operational. Non-slip footwear when patient is off stretcher. Physically safe environment: no spills, clutter or unnecessary equipment. Stretcher in lowest position, wheels locked, appropriate side rails in place. Provide visual cue, wrist band, yellow gown, etc. Monitor gait and stability. Monitor for mental status changes and reorient to person, place, and time. Review medications for side effects contributing to fall risk. Reinforce activity limits and safety measures with patient and family. Provide visual clues: red socks.

## 2019-11-02 NOTE — CONSULT NOTE ADULT - PROBLEM SELECTOR RECOMMENDATION 2
-Risk factors noted above.   -Discussed w/ pt and wife at beside. Plan is to f/u w/ PMD for Sleep study.

## 2019-11-02 NOTE — CONSULT NOTE ADULT - SUBJECTIVE AND OBJECTIVE BOX
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 1000 milliLiter(s) (125 mL/Hr) IV Continuous <Continuous>    MEDICATIONS  (PRN):      REVIEW OF SYSTEMS:    CONSTITUTIONAL: No fever or chills, No fatigue or tiredness.  EYES: No blurred or double vision.  ENT: No recent URI or sore throat  RESPIRATORY: No shortness of breath, cough, hemoptysis  CARDIOVASCULAR: No Chest pain or shortness of breath  GASTROINTESTINAL: NO abdominal or flank pain, No nausea or vomiting, No diarrhea  GENITOURINARY: No dysuria or urinary burning, No difficulty passing urine, No hematuria  NEUROLOGICAL: No headaches or blurred vision  SKIN: No skin rashes   MUSCULOSKELETAL: No arthralgia, Joint pain, leg edema, No muscle pains        PHYSICAL EXAM:  GENERAL: NAD, well-developed, well nourished, alert, awake, no acute distress at present  HEAD:  Atraumatic, Normocephalic,   EYES: Bilateral conjuctiva and sclera normal,  Oral cavity: Oral mucosa dry and pink  NECK: Neck supple, No JVD  CHEST/LUNG: Clear to auscultation bilaterally; No wheeze, no rales, no crepitations  HEART: Regular rate and rhythm. ALICIA II/VI at LPSB, No gallop, no rub   ABDOMEN: Soft, Nontender, BS+nt, No flank tenderness.   EXTREMITIES: No clubbing, cyanosis, or edema, no calf tenderness  Neurology: AAOx3, no focal neurological deficit  SKIN: No rashes or lesions      CAPILLARY BLOOD GLUCOSE          I&O's Summary        LABS:                            11.9   8.87  )-----------( 225      ( 02 Nov 2019 10:24 )             39.0       PT/INR - ( 02 Nov 2019 10:24 )   PT: 12.9 sec;   INR: 1.14          PTT - ( 02 Nov 2019 10:24 )  PTT:30.6 sec  CARDIAC MARKERS ( 02 Nov 2019 13:11 )  x     / <0.01 ng/mL / x     / x     / x      CARDIAC MARKERS ( 02 Nov 2019 10:24 )  x     / 0.01 ng/mL / x     / x     / x              RADIOLOGY & ADDITIONAL TESTS:    EKG/Telemetry: Reviewed renal consult  Patient is a 79 y o male with pmh of CKD s/p kidney transplant, BPH, HTN, PVD who came to ED 2nd SOB.   Upon completing Ct chest--> ground glass opacity.   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 Tabor office [phone]: 1 application injectable once a week   · 	Heparin 10units/ml 3ml - post infusion: 1 application injectable 3 times a day, after use  · 	Normal Saline 0.9% 10ml pre and post infusion flush: 2 application injectable 3 times a day   · 	Zosyn 4 g-0.5 g/100 mL intravenous solution: 4.5 gram(s) intravenously every 8 hours x6 weeks until 11/22/19  · 	mycophenolate mofetil 250 mg oral capsule: 500mg orally bid  	**home antirejection medication**  · 	tacrolimus 1 mg oral capsule: 4 cap(s) orally 2 times a day  · 	docusate sodium 100 mg oral capsule: 1 cap(s) orally 3 times a day  · 	carvedilol 3.125 mg oral tablet: 1 tab(s) orally 2 times a day  · 	HumaLOG Mix 75/25 subcutaneous suspension: subcutaneous 2 times a day  · 	Flomax 0.4 mg oral capsule: 1 cap(s) orally once a day        FAMILY HISTORY:na      SOCIAL HISTORY:na    MEDICATIONS  (STANDING):  carvedilol 3.125 milliGRAM(s) Oral every 12 hours  furosemide    Tablet 40 milliGRAM(s) Oral daily  mycophenolate mofetil 500 milliGRAM(s) Oral two times a day  sodium chloride 0.9%. 1000 milliLiter(s) (125 mL/Hr) IV Continuous <Continuous>    MEDICATIONS  (PRN):      REVIEW OF SYSTEMS:    CONSTITUTIONAL: No fever or chills, No fatigue or tiredness.  EYES: No blurred or double vision.  ENT: No recent URI or sore throat  RESPIRATORY: No shortness of breath, cough, hemoptysis  CARDIOVASCULAR: + shortness of breath  GASTROINTESTINAL: NO abdominal or flank pain, No nausea or vomiting, No diarrhea  GENITOURINARY: No dysuria or urinary burning, No difficulty passing urine, No hematuria  NEUROLOGICAL: No headaches or blurred vision  SKIN: No skin rashes   MUSCULOSKELETAL: LE swelling        PHYSICAL EXAM: B160/80 HR 80, RR 20  GENERAL: NAD,  HEAD:  Atraumatic, Normocephalic,   EYES: Bilateral conjunctiva and sclera normal,  Oral cavity: Oral mucosa dry and pink  NECK: Neck supple, No JVD  CHEST/LUNG: Clear to auscultation bilaterally; No wheeze, no rales, no crepitations  HEART: Regular rate and rhythm. ALICIA II/VI at LPSB, No gallop, no rub   ABDOMEN: Soft, Nontender, BS+nt, No flank tenderness.   EXTREMITIES:LLE swelling associated with L diabetic foot ulcer  Neurology: AAOx3, no focal neurological deficit  SKIN: No rashes or lesions      CAPILLARY BLOOD GLUCOSE          I&O's Summary        LABS:                            11.9   8.87  )-----------( 225      ( 02 Nov 2019 10:24 )             39.0       PT/INR - ( 02 Nov 2019 10:24 )   PT: 12.9 sec;   INR: 1.14          PTT - ( 02 Nov 2019 10:24 )  PTT:30.6 sec  CARDIAC MARKERS ( 02 Nov 2019 13:11 )  x     / <0.01 ng/mL / x     / x     / x      CARDIAC MARKERS ( 02 Nov 2019 10:24 )  x     / 0.01 ng/mL / x     / x     / x              RADIOLOGY & ADDITIONAL TESTS:    EKG/Telemetry: Reviewed

## 2019-11-02 NOTE — ED PROVIDER NOTE - CARE PROVIDER_API CALL
Callum Aranda)  Internal Medicine; Nephrology  [address], [address], [place] [address]  Phone: [phone]  Fax: [phone]  Follow Up Time:

## 2019-11-02 NOTE — ED ADULT NURSE NOTE - NS ED NURSE DC INFO COMPLEXITY
PHYSICAL THERAPY TREATMENT NOTE - INPATIENT     Room Number: 213/425-P       Presenting Problem: THR RLE    Problem List  Active Problems:    Primary osteoarthritis of right hip    Essential hypertension    Hyperlipidemia    Paroxysmal A-fib ([address]    Asthm steps with a railing?: A Little     AM-PAC Score:  Raw Score: 21   PT Approx Degree of Impairment Score: 28.97%   Standardized Score (AM-PAC Scale): 50.25   CMS Modifier (G-Code): CJ    FUNCTIONAL ABILITY STATUS  Gait Assessment   Gait Assistance: Les Verbalized Understanding/Simple: Patient demonstrates quick and easy understanding

## 2019-11-02 NOTE — ED PROVIDER NOTE - PROGRESS NOTE DETAILS
Pt w high bp on repeat in ed w/o associated sx; pt did not take his meds this am so ordered for his normal am meds.  Labs w CBC, chem, trop nl, cxr neg, bnp slightly elevated but no chf on cxr or crackles on exam.  Repeat trop pending.  CT prelim neg for pe, u/s prelim neg for dvt (prev neg during last admit).  Pt discussed w Dr Lanier ([facility]) - she agrees pt can be discharged if bp improved and 2nd trop neg. Contacted by radiology - pt w ?able early pna RUL.  Pt w/o fever, cough, uri sx or leukocytosis - low clinical concern for pna.  Findings discussed w Dr Lanier who requested w consult Dr Prather given immunsuppression and recent admit and risk for hap.  Dr Prather called and texted - await reply.  Dr Lanier requested pulm fellow since unable to reach Dr Prather; fellow contacted and will review, eval pt.  RVP added to eval and pending. Pulm eval pt and discussed w their attdg Dr Mathis - they feel ct changes are v minor and could occur for mult reasons; they do not feel pt has pna based on ct, labs, hpi  They feel pt safe for dc.  Will dc to fu Dr Aranda w precaution to return for worsening sob, fever, cough, any other concerns.  RVP pending but will not change dispo decision.

## 2019-11-02 NOTE — ED ADULT NURSE NOTE - OBJECTIVE STATEMENT
The Pt is a 78 y/o male who came in to ED for evaluation of shortness of breath for the past 45  minutes. Pt reports that he has been receiving antibiotics via PICC line for foot infection at home. PICC line noted on right upper arm. Pt also has old AV fistula (Pt needed dialysis in the past, received kidney transplant) Fistula no longer in use. Pt respirations are regular and unlabored, afebrile, Lungs sounds are clear on auscultation.

## 2019-11-02 NOTE — CONSULT NOTE ADULT - ASSESSMENT
This is an 78 y/o man w/ DM s/p Kidney transplant who presented after an acute episode of self limiting SOB and was found incidentally to have a small area of GGO on CT chest. The patients symptoms may have been due to undiagnosed ANDERS, viral illness, or less likely mild pulmonary edema in the setting of systolic BP of 200 on admission to ED.

## 2019-11-02 NOTE — ED PROVIDER NOTE - OBJECTIVE STATEMENT
78 yo male h/o htn, dm, renal transplant, bph, blindness, LLE diabetic foot infection on IV abx after recent admit c/o sob this am for ~ 1h after waking up 78 yo male h/o htn, dm, renal transplant, bph, blindness, LLE diabetic foot infection on IV abx after recent admit c/o sob this am for ~ 1h after waking up 7-8a that is now resolved.  No associated cp, palpitations, fever, uri sx, cough.  + lle edema but unchanged from prior and wife reports neg u/s during admission.  No n/v/d.

## 2019-11-02 NOTE — ED PROVIDER NOTE - NSFOLLOWUPINSTRUCTIONS_ED_ALL_ED_FT
Shortness of breath    Please continue your medications as before and follow up with Dr Aranda.  Return or seek care for increased difficulty breathing, chest pain, fever, cough, any other concerns.     Shortness of breath    Shortness of breath (dyspnea) means you have trouble breathing and could indicate a medical problem. Causes include lung disease, heart disease, low amount of red blood cells (anemia), poor physical fitness, being overweight, smoking, etc. Your health care provider today may not be able to find a cause for your shortness of breath after your exam. In this case, it is important to have a follow-up exam with your primary care physician as instructed. If medicines were prescribed, take them as directed for the full length of time directed. Refrain from tobacco products.    SEEK IMMEDIATE MEDICAL CARE IF YOU HAVE ANY OF THE FOLLOWING SYMPTOMS: worsening shortness of breath, chest pain, back pain, abdominal pain, fever, coughing up blood, lightheadedness/dizziness.

## 2019-11-02 NOTE — CONSULT NOTE ADULT - ASSESSMENT
Patient is a 79 y o male with pmh of CKD s/p kidney transplant, BPH, HTN, PVD who came to ED 2nd SOB.   Upon completing Ct chest--> ground glass opacity.   Nephrology consult placed in regards to h.o renal transplant    CKD s/p renal transplant  on prednisone 5 mg po daily, tacrolimus 4 mg po BID, and mycophenylate 500 mg po BID  cr noted  electrolytes noted      HTN  on coreg,    ground-glass opacity  pending pulmonary evaluation

## 2019-11-02 NOTE — CONSULT NOTE ADULT - SUBJECTIVE AND OBJECTIVE BOX
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 1000 milliLiter(s) IV Continuous <Continuous>      Allergies    No Known Allergies    Intolerances        Vital Signs Last 24 Hrs  T(C): 36.8 (02 Nov 2019 15:20), Max: 36.8 (02 Nov 2019 15:20)  T(F): 98.2 (02 Nov 2019 15:20), Max: 98.2 (02 Nov 2019 15:20)  HR: 83 (02 Nov 2019 15:20) (80 - 84)  BP: 133/65 (02 Nov 2019 15:20) (133/65 - 207/67)  BP(mean): --  RR: 18 (02 Nov 2019 15:20) (18 - 20)  SpO2: 97% (02 Nov 2019 15:20) (96% - 100%)        LABS:      CBC Full  -  ( 02 Nov 2019 10:24 )  WBC Count : 8.87 K/uL  RBC Count : 4.42 M/uL  Hemoglobin : 11.9 g/dL  Hematocrit : 39.0 %  Platelet Count - Automated : 225 K/uL  Mean Cell Volume : 88.2 fl  Mean Cell Hemoglobin : 26.9 pg  Mean Cell Hemoglobin Concentration : 30.5 gm/dL  Auto Neutrophil # : 6.31 K/uL  Auto Lymphocyte # : 1.41 K/uL  Auto Monocyte # : 0.81 K/uL  Auto Eosinophil # : 0.30 K/uL  Auto Basophil # : 0.01 K/uL  Auto Neutrophil % : 71.2 %  Auto Lymphocyte % : 15.9 %  Auto Monocyte % : 9.1 %  Auto Eosinophil % : 3.4 %  Auto Basophil % : 0.1 %    11-02    135  |  100  |  15  ----------------------------<  201<H>  4.6   |  22  |  1.02    Ca    9.6      02 Nov 2019 10:24      PT/INR - ( 02 Nov 2019 10:24 )   PT: 12.9 sec;   INR: 1.14          PTT - ( 02 Nov 2019 10:24 )  PTT:30.6 sec                  RADIOLOGY & ADDITIONAL STUDIES (The following images were personally reviewed): Patient is a 79y old  Male who presents with a chief complaint of     HPI: This is an 80 y/o man w/ a PMH of DM s/p renal transplant, HTN, BPH, who presents to the hospital after waking up in the morning and have an acute transient episode of SOB associated with anxiety that resolved spontaneously. The patients wife brought him to the hospital "Just to be safe". The patient was afebrile, without leukocytosis, not hypoxemic, breathing comfortably on room air, and hemodynamically stable. The patient denies any viral prodrome, any sick contacts, any cough, wheeze, or sputum production. The patient actually denies any complaints at this time at all and feels back to his baseline. Of note, the patient was diagnosed with a diabetic foot wound for which he currently has a PICC line and is receiving Zosyn for the past several weeks. The patient had LE dopplers done which were negative, followed by a CT PE study which was also negative for PE. The CT was read as having a small area of GGO which could represent PNA in the right clinical context and thus the pulmonary service was called for further evaluation.     ROS:  A 14 point ROS was reviewed with the patient and was negative except for what is mentioned above.       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     SOCIAL HISTORY:  Smoking Status: [ ] Current, [X ] Former, [ ] Never  Pack Years: 40, quit 25 years ago    MEDICATIONS:  Pulmonary:    Antimicrobials:    Anticoagulants:    Onc:    GI/:    Endocrine:    Cardiac:  carvedilol 3.125 milliGRAM(s) Oral every 12 hours  furosemide    Tablet 40 milliGRAM(s) Oral daily    Other Medications:  mycophenolate mofetil 500 milliGRAM(s) Oral two times a day  sodium chloride 0.9%. 1000 milliLiter(s) IV Continuous <Continuous>      Allergies    No Known Allergies    Intolerances        Vital Signs Last 24 Hrs  T(C): 36.8 (02 Nov 2019 15:20), Max: 36.8 (02 Nov 2019 15:20)  T(F): 98.2 (02 Nov 2019 15:20), Max: 98.2 (02 Nov 2019 15:20)  HR: 83 (02 Nov 2019 15:20) (80 - 84)  BP: 133/65 (02 Nov 2019 15:20) (133/65 - 207/67)  BP(mean): --  RR: 18 (02 Nov 2019 15:20) (18 - 20)  SpO2: 97% (02 Nov 2019 15:20) (96% - 100%)    General: NAD, AAO x3  HEENT: No icterus,. Moist mucous membranes  Neck: No JVD noted. Supple, no meningismus  Cardio: S1, S2 noted, RRR. No murmurs, rubs or gallops  Resp: Clear to auscultation b/l. No adventitious sounds  Abdo: Soft, NT, bowel sounds present. No organomegaly  Extremities: No edema noted. Pulses present b/l  Neuro: AAO x3, grossly normal motor strength.  Lymphnodes: no lymphadenopathy identified.  Skin: Dry, no rashes    LABS:      CBC Full  -  ( 02 Nov 2019 10:24 )  WBC Count : 8.87 K/uL  RBC Count : 4.42 M/uL  Hemoglobin : 11.9 g/dL  Hematocrit : 39.0 %  Platelet Count - Automated : 225 K/uL  Mean Cell Volume : 88.2 fl  Mean Cell Hemoglobin : 26.9 pg  Mean Cell Hemoglobin Concentration : 30.5 gm/dL  Auto Neutrophil # : 6.31 K/uL  Auto Lymphocyte # : 1.41 K/uL  Auto Monocyte # : 0.81 K/uL  Auto Eosinophil # : 0.30 K/uL  Auto Basophil # : 0.01 K/uL  Auto Neutrophil % : 71.2 %  Auto Lymphocyte % : 15.9 %  Auto Monocyte % : 9.1 %  Auto Eosinophil % : 3.4 %  Auto Basophil % : 0.1 %    11-02    135  |  100  |  15  ----------------------------<  201<H>  4.6   |  22  |  1.02    Ca    9.6      02 Nov 2019 10:24      PT/INR - ( 02 Nov 2019 10:24 )   PT: 12.9 sec;   INR: 1.14          PTT - ( 02 Nov 2019 10:24 )  PTT:30.6 sec                  RADIOLOGY & ADDITIONAL STUDIES (The following images were personally reviewed):  CT chest

## 2019-11-02 NOTE — ED PROVIDER NOTE - MUSCULOSKELETAL, MLM
Spine appears normal, range of motion is not limited, no muscle or joint tenderness, lle 2+ edema, rle no edema, dp 1+ bilat, dressing LLE c/d/i

## 2019-11-02 NOTE — CONSULT NOTE ADULT - PROBLEM SELECTOR RECOMMENDATION 9
-Self limiting, pt back to baseline.   -Pt as risk of ANDERS (Snores, wakes up w/ HA and dry mouth, obese, excessive daytime sleepiness), and patient was unclear if his symptoms this morning woke him from sleep or if they began shortly after waking up. His first memory of this morning is the respiratory distress.   -The CT scan was reviewed, negative for PE but showing a very small area of GGO. The differential for this includes pulmonary edema (pt hypertensive to 200 initially, however this is usually seen b/l), infection (pt afebrile and without other infectious symptoms) or viral.     Recommend:  -RVP  -No clinical hx suggestive of PNA, pt already on zosyn for LE, non toxic, no active symptoms at the moment, no ifectious prodrome, no sick contacts.   -Pt and wife prefer to go home an f/u PMD.   -DVT/PE ruled out.   -would recommend supportive care, f/u w/ PMD.    Case discussed with Dr. Mathis.

## 2019-11-02 NOTE — ED PROVIDER NOTE - CLINICAL SUMMARY MEDICAL DECISION MAKING FREE TEXT BOX
Pt c/o resolved sob x ~ 1 h this am w/o associated cp, palpitations, cough/uri sx.  No h/o cad, chf.  BP nl on arrival but elevated when in ed - did not take meds this am and suspect 2/2 no meds but ? htn urgency as etiology of sx.  ? ACS - ekg w/o stemi, no cp, ? new chf - no crackles, + le edema lle but not rle, ? pe - lle swelling w neg doppler early Oct.  Plan labs, repeat doppler, cxr; reassess.

## 2019-11-04 ENCOUNTER — APPOINTMENT (OUTPATIENT)
Dept: VASCULAR SURGERY | Facility: CLINIC | Age: 79
End: 2019-11-04
Payer: MEDICARE

## 2019-11-04 VITALS
SYSTOLIC BLOOD PRESSURE: 94 MMHG | DIASTOLIC BLOOD PRESSURE: 59 MMHG | HEART RATE: 66 BPM | TEMPERATURE: 98.3 F | OXYGEN SATURATION: 92 %

## 2019-11-04 PROCEDURE — 99213 OFFICE O/P EST LOW 20 MIN: CPT

## 2019-11-04 NOTE — PHYSICAL EXAM
[Normal Breath Sounds] : Normal breath sounds [Normal Heart Sounds] : normal heart sounds [Normal Rate and Rhythm] : normal rate and rhythm [2+] : left 2+ [1+] : left 1+ [0] : left 0 [Ankle Swelling (On Exam)] : present [Ankle Swelling On The Left] : moderate [Alert] : alert [Calm] : calm [JVD] : no jugular venous distention  [Varicose Veins Of Lower Extremities] : not present [] : not present [Abdomen Tenderness] : ~T ~M No abdominal tenderness [de-identified] : NAD [de-identified] : L foot: + plantar 1 x 2 cm ulcer, penrose drain in place, no signs of infection

## 2019-11-04 NOTE — REASON FOR VISIT
[Spouse] : spouse [de-identified] : 80 y/o M with PMHx HTN, DM, glaucoma (functional blindness), BPH, ESRD s/p Right renal transplant (was on dialysis 3-4 years prior), BPH who was recently admitted to [facility] ED with left foot wound infection requiring I&D that was done on 10/9/19. Patient returns today for a post-op visit. He has wound care by VNS and receives IV Abx's vial PICC line. He denies fever, chills.

## 2019-11-04 NOTE — DISCUSSION/SUMMARY
[FreeTextEntry1] : 78 y/o M with PMHx HTN, DM, s/p left foot wound infection requiring I&D that was done on 10/9/19 returns today for a post-op visit.\par L foot wound is clean, no signs of infection.\par Patient was recommended to wash his legs with soap/water daily.\par C/w with wet to dry dressing to plantar ulcer and apply bacitracin b/w 4th and 5th toes.\par F/u in 2 weeks.

## 2019-11-04 NOTE — REVIEW OF SYSTEMS
[Lower Ext Edema] : lower extremity edema [As Noted in HPI] : as noted in HPI [Skin Wound] : skin wound [Negative] : Heme/Lymph [Fever] : no fever [Chills] : no chills [Leg Claudication] : no intermittent leg claudication

## 2019-11-05 ENCOUNTER — CXD DOCUMENT (OUTPATIENT)
Age: 79
End: 2019-11-05

## 2019-11-06 NOTE — REASON FOR VISIT
[Spouse] : spouse [de-identified] : Left foot wound debridement [de-identified] : 10/9/19 [de-identified] : 80 y/o diabetic M, s/p recent admission  to [facility] ED with left foot wound infection requiring I&D that was done on 10/9/19. Patient was seen a week ago and returns today for a f/u. He has wound care by VNS and receives IV Abx's vial PICC line. He denies fever, chills.

## 2019-11-06 NOTE — DISCUSSION/SUMMARY
[FreeTextEntry1] : 78 y/o diabetic M, s/p recent admission  to [facility] ED with left foot wound infection requiring I&D that was done on 10/9/19 returns for a f/u.\par L foot wound is clean, no signs of infection.\par Patient was recommended to continue washing his legs with soap/water daily.\par C/w with wet to dry dressing to plantar ulcer and apply bacitracin b/w 4th and 5th toes.\par F/u in 2 weeks.

## 2019-11-06 NOTE — PHYSICAL EXAM
[Normal Breath Sounds] : Normal breath sounds [Normal Heart Sounds] : normal heart sounds [Normal Rate and Rhythm] : normal rate and rhythm [2+] : left 2+ [1+] : left 1+ [0] : left 0 [Ankle Swelling (On Exam)] : present [Ankle Swelling On The Left] : moderate [Alert] : alert [Calm] : calm [JVD] : no jugular venous distention  [Varicose Veins Of Lower Extremities] : not present [] : not present [Abdomen Tenderness] : ~T ~M No abdominal tenderness [de-identified] : NAD [de-identified] : L foot: + plantar 1 x 2 cm ulcer, penrose drain in place, no signs of infection

## 2019-11-08 DIAGNOSIS — R06.02 SHORTNESS OF BREATH: ICD-10-CM

## 2019-11-08 DIAGNOSIS — I10 ESSENTIAL (PRIMARY) HYPERTENSION: ICD-10-CM

## 2019-11-08 DIAGNOSIS — R60.0 LOCALIZED EDEMA: ICD-10-CM

## 2019-11-08 DIAGNOSIS — Z79.4 LONG TERM (CURRENT) USE OF INSULIN: ICD-10-CM

## 2019-11-08 DIAGNOSIS — E11.9 TYPE 2 DIABETES MELLITUS WITHOUT COMPLICATIONS: ICD-10-CM

## 2019-11-08 DIAGNOSIS — Z79.899 OTHER LONG TERM (CURRENT) DRUG THERAPY: ICD-10-CM

## 2019-11-12 PROCEDURE — 85730 THROMBOPLASTIN TIME PARTIAL: CPT

## 2019-11-12 PROCEDURE — 86850 RBC ANTIBODY SCREEN: CPT

## 2019-11-12 PROCEDURE — 87075 CULTR BACTERIA EXCEPT BLOOD: CPT

## 2019-11-12 PROCEDURE — 93971 EXTREMITY STUDY: CPT

## 2019-11-12 PROCEDURE — 96365 THER/PROPH/DIAG IV INF INIT: CPT

## 2019-11-12 PROCEDURE — 87486 CHLMYD PNEUM DNA AMP PROBE: CPT

## 2019-11-12 PROCEDURE — 87070 CULTURE OTHR SPECIMN AEROBIC: CPT

## 2019-11-12 PROCEDURE — 87184 SC STD DISK METHOD PER PLATE: CPT

## 2019-11-12 PROCEDURE — 84484 ASSAY OF TROPONIN QUANT: CPT

## 2019-11-12 PROCEDURE — 82803 BLOOD GASES ANY COMBINATION: CPT

## 2019-11-12 PROCEDURE — 99285 EMERGENCY DEPT VISIT HI MDM: CPT | Mod: 25

## 2019-11-12 PROCEDURE — 87798 DETECT AGENT NOS DNA AMP: CPT

## 2019-11-12 PROCEDURE — 71045 X-RAY EXAM CHEST 1 VIEW: CPT

## 2019-11-12 PROCEDURE — 83880 ASSAY OF NATRIURETIC PEPTIDE: CPT

## 2019-11-12 PROCEDURE — 36573 INSJ PICC RS&I 5 YR+: CPT

## 2019-11-12 PROCEDURE — 96375 TX/PRO/DX INJ NEW DRUG ADDON: CPT

## 2019-11-12 PROCEDURE — 88305 TISSUE EXAM BY PATHOLOGIST: CPT

## 2019-11-12 PROCEDURE — 80197 ASSAY OF TACROLIMUS: CPT

## 2019-11-12 PROCEDURE — 82962 GLUCOSE BLOOD TEST: CPT

## 2019-11-12 PROCEDURE — 87581 M.PNEUMON DNA AMP PROBE: CPT

## 2019-11-12 PROCEDURE — 88311 DECALCIFY TISSUE: CPT

## 2019-11-12 PROCEDURE — 85025 COMPLETE CBC W/AUTO DIFF WBC: CPT

## 2019-11-12 PROCEDURE — 36415 COLL VENOUS BLD VENIPUNCTURE: CPT

## 2019-11-12 PROCEDURE — 85610 PROTHROMBIN TIME: CPT

## 2019-11-12 PROCEDURE — 81001 URINALYSIS AUTO W/SCOPE: CPT

## 2019-11-12 PROCEDURE — 83036 HEMOGLOBIN GLYCOSYLATED A1C: CPT

## 2019-11-12 PROCEDURE — 80053 COMPREHEN METABOLIC PANEL: CPT

## 2019-11-12 PROCEDURE — 87040 BLOOD CULTURE FOR BACTERIA: CPT

## 2019-11-12 PROCEDURE — 87633 RESP VIRUS 12-25 TARGETS: CPT

## 2019-11-12 PROCEDURE — 86901 BLOOD TYPING SEROLOGIC RH(D): CPT

## 2019-11-12 PROCEDURE — 88304 TISSUE EXAM BY PATHOLOGIST: CPT

## 2019-11-12 PROCEDURE — 93306 TTE W/DOPPLER COMPLETE: CPT

## 2019-11-12 PROCEDURE — 83735 ASSAY OF MAGNESIUM: CPT

## 2019-11-12 PROCEDURE — 84100 ASSAY OF PHOSPHORUS: CPT

## 2019-11-12 PROCEDURE — 80048 BASIC METABOLIC PNL TOTAL CA: CPT

## 2019-11-12 PROCEDURE — 93005 ELECTROCARDIOGRAM TRACING: CPT

## 2019-11-12 PROCEDURE — 83605 ASSAY OF LACTIC ACID: CPT

## 2019-11-12 PROCEDURE — 82550 ASSAY OF CK (CPK): CPT

## 2019-11-12 PROCEDURE — 87086 URINE CULTURE/COLONY COUNT: CPT

## 2019-11-12 PROCEDURE — 86900 BLOOD TYPING SEROLOGIC ABO: CPT

## 2019-11-12 PROCEDURE — 87186 SC STD MICRODIL/AGAR DIL: CPT

## 2019-11-12 PROCEDURE — 85027 COMPLETE CBC AUTOMATED: CPT

## 2019-11-12 PROCEDURE — 73630 X-RAY EXAM OF FOOT: CPT

## 2019-11-13 DIAGNOSIS — Z71.89 OTHER SPECIFIED COUNSELING: ICD-10-CM

## 2019-11-18 ENCOUNTER — INPATIENT (INPATIENT)
Facility: HOSPITAL | Age: 79
LOS: 3 days | Discharge: HOME HEALTH | DRG: 617 | End: 2019-11-22
Attending: SURGERY | Admitting: SURGERY
Payer: MEDICARE

## 2019-11-18 ENCOUNTER — APPOINTMENT (OUTPATIENT)
Dept: VASCULAR SURGERY | Facility: CLINIC | Age: 79
End: 2019-11-18
Payer: MEDICARE

## 2019-11-18 VITALS
HEIGHT: 67 IN | RESPIRATION RATE: 16 BRPM | DIASTOLIC BLOOD PRESSURE: 72 MMHG | OXYGEN SATURATION: 98 % | HEART RATE: 72 BPM | SYSTOLIC BLOOD PRESSURE: 179 MMHG

## 2019-11-18 VITALS — HEART RATE: 75 BPM | OXYGEN SATURATION: 97 % | TEMPERATURE: 98 F

## 2019-11-18 DIAGNOSIS — Z94.0 KIDNEY TRANSPLANT STATUS: Chronic | ICD-10-CM

## 2019-11-18 LAB
ALBUMIN SERPL ELPH-MCNC: 3.7 G/DL — SIGNIFICANT CHANGE UP (ref 3.3–5)
ALP SERPL-CCNC: 81 U/L — SIGNIFICANT CHANGE UP (ref 40–120)
ALT FLD-CCNC: 16 U/L — SIGNIFICANT CHANGE UP (ref 10–45)
ANION GAP SERPL CALC-SCNC: 11 MMOL/L — SIGNIFICANT CHANGE UP (ref 5–17)
APTT BLD: 31.9 SEC — SIGNIFICANT CHANGE UP (ref 27.5–36.3)
AST SERPL-CCNC: 19 U/L — SIGNIFICANT CHANGE UP (ref 10–40)
BILIRUB SERPL-MCNC: 0.4 MG/DL — SIGNIFICANT CHANGE UP (ref 0.2–1.2)
BLD GP AB SCN SERPL QL: NEGATIVE — SIGNIFICANT CHANGE UP
BUN SERPL-MCNC: 14 MG/DL — SIGNIFICANT CHANGE UP (ref 7–23)
CALCIUM SERPL-MCNC: 10 MG/DL — SIGNIFICANT CHANGE UP (ref 8.4–10.5)
CHLORIDE SERPL-SCNC: 101 MMOL/L — SIGNIFICANT CHANGE UP (ref 96–108)
CO2 SERPL-SCNC: 26 MMOL/L — SIGNIFICANT CHANGE UP (ref 22–31)
CREAT SERPL-MCNC: 1.03 MG/DL — SIGNIFICANT CHANGE UP (ref 0.5–1.3)
GLUCOSE BLDC GLUCOMTR-MCNC: 154 MG/DL — HIGH (ref 70–99)
GLUCOSE BLDC GLUCOMTR-MCNC: 165 MG/DL — HIGH (ref 70–99)
GLUCOSE BLDC GLUCOMTR-MCNC: 218 MG/DL — HIGH (ref 70–99)
GLUCOSE SERPL-MCNC: 158 MG/DL — HIGH (ref 70–99)
HCT VFR BLD CALC: 37.3 % — LOW (ref 39–50)
HGB BLD-MCNC: 11.5 G/DL — LOW (ref 13–17)
INR BLD: 1.15 — SIGNIFICANT CHANGE UP (ref 0.88–1.16)
MAGNESIUM SERPL-MCNC: 1.7 MG/DL — SIGNIFICANT CHANGE UP (ref 1.6–2.6)
MCHC RBC-ENTMCNC: 27.4 PG — SIGNIFICANT CHANGE UP (ref 27–34)
MCHC RBC-ENTMCNC: 30.8 GM/DL — LOW (ref 32–36)
MCV RBC AUTO: 88.8 FL — SIGNIFICANT CHANGE UP (ref 80–100)
NRBC # BLD: 0 /100 WBCS — SIGNIFICANT CHANGE UP (ref 0–0)
PLATELET # BLD AUTO: 183 K/UL — SIGNIFICANT CHANGE UP (ref 150–400)
POTASSIUM SERPL-MCNC: 4.4 MMOL/L — SIGNIFICANT CHANGE UP (ref 3.5–5.3)
POTASSIUM SERPL-SCNC: 4.4 MMOL/L — SIGNIFICANT CHANGE UP (ref 3.5–5.3)
PROT SERPL-MCNC: 6.6 G/DL — SIGNIFICANT CHANGE UP (ref 6–8.3)
PROTHROM AB SERPL-ACNC: 13 SEC — HIGH (ref 10–12.9)
RBC # BLD: 4.2 M/UL — SIGNIFICANT CHANGE UP (ref 4.2–5.8)
RBC # FLD: 14.8 % — HIGH (ref 10.3–14.5)
RH IG SCN BLD-IMP: POSITIVE — SIGNIFICANT CHANGE UP
SODIUM SERPL-SCNC: 138 MMOL/L — SIGNIFICANT CHANGE UP (ref 135–145)
WBC # BLD: 6.4 K/UL — SIGNIFICANT CHANGE UP (ref 3.8–10.5)
WBC # FLD AUTO: 6.4 K/UL — SIGNIFICANT CHANGE UP (ref 3.8–10.5)

## 2019-11-18 PROCEDURE — 99214 OFFICE O/P EST MOD 30 MIN: CPT | Mod: 57

## 2019-11-18 PROCEDURE — 93010 ELECTROCARDIOGRAM REPORT: CPT

## 2019-11-18 PROCEDURE — 71045 X-RAY EXAM CHEST 1 VIEW: CPT | Mod: 26

## 2019-11-18 PROCEDURE — 99223 1ST HOSP IP/OBS HIGH 75: CPT

## 2019-11-18 RX ORDER — DEXTROSE 50 % IN WATER 50 %
15 SYRINGE (ML) INTRAVENOUS ONCE
Refills: 0 | Status: DISCONTINUED | OUTPATIENT
Start: 2019-11-18 | End: 2019-11-19

## 2019-11-18 RX ORDER — CARVEDILOL PHOSPHATE 80 MG/1
12.5 CAPSULE, EXTENDED RELEASE ORAL EVERY 12 HOURS
Refills: 0 | Status: DISCONTINUED | OUTPATIENT
Start: 2019-11-18 | End: 2019-11-19

## 2019-11-18 RX ORDER — INFLUENZA VIRUS VACCINE 15; 15; 15; 15 UG/.5ML; UG/.5ML; UG/.5ML; UG/.5ML
0.5 SUSPENSION INTRAMUSCULAR ONCE
Refills: 0 | Status: DISCONTINUED | OUTPATIENT
Start: 2019-11-18 | End: 2019-11-22

## 2019-11-18 RX ORDER — DEXTROSE 50 % IN WATER 50 %
25 SYRINGE (ML) INTRAVENOUS ONCE
Refills: 0 | Status: DISCONTINUED | OUTPATIENT
Start: 2019-11-18 | End: 2019-11-19

## 2019-11-18 RX ORDER — ASPIRIN/CALCIUM CARB/MAGNESIUM 324 MG
81 TABLET ORAL DAILY
Refills: 0 | Status: DISCONTINUED | OUTPATIENT
Start: 2019-11-18 | End: 2019-11-19

## 2019-11-18 RX ORDER — DEXTROSE 50 % IN WATER 50 %
12.5 SYRINGE (ML) INTRAVENOUS ONCE
Refills: 0 | Status: DISCONTINUED | OUTPATIENT
Start: 2019-11-18 | End: 2019-11-19

## 2019-11-18 RX ORDER — PIPERACILLIN AND TAZOBACTAM 4; .5 G/20ML; G/20ML
3.38 INJECTION, POWDER, LYOPHILIZED, FOR SOLUTION INTRAVENOUS EVERY 6 HOURS
Refills: 0 | Status: DISCONTINUED | OUTPATIENT
Start: 2019-11-18 | End: 2019-11-19

## 2019-11-18 RX ORDER — INSULIN LISPRO 100/ML
VIAL (ML) SUBCUTANEOUS
Refills: 0 | Status: DISCONTINUED | OUTPATIENT
Start: 2019-11-18 | End: 2019-11-19

## 2019-11-18 RX ORDER — TAMSULOSIN HYDROCHLORIDE 0.4 MG/1
0.4 CAPSULE ORAL AT BEDTIME
Refills: 0 | Status: DISCONTINUED | OUTPATIENT
Start: 2019-11-18 | End: 2019-11-19

## 2019-11-18 RX ORDER — MYCOPHENOLATE MOFETIL 250 MG/1
500 CAPSULE ORAL
Refills: 0 | Status: DISCONTINUED | OUTPATIENT
Start: 2019-11-18 | End: 2019-11-19

## 2019-11-18 RX ORDER — SODIUM CHLORIDE 9 MG/ML
1000 INJECTION, SOLUTION INTRAVENOUS
Refills: 0 | Status: DISCONTINUED | OUTPATIENT
Start: 2019-11-18 | End: 2019-11-19

## 2019-11-18 RX ORDER — GLUCAGON INJECTION, SOLUTION 0.5 MG/.1ML
1 INJECTION, SOLUTION SUBCUTANEOUS ONCE
Refills: 0 | Status: DISCONTINUED | OUTPATIENT
Start: 2019-11-18 | End: 2019-11-19

## 2019-11-18 RX ORDER — TACROLIMUS 5 MG/1
4 CAPSULE ORAL
Refills: 0 | Status: DISCONTINUED | OUTPATIENT
Start: 2019-11-18 | End: 2019-11-19

## 2019-11-18 RX ADMIN — PIPERACILLIN AND TAZOBACTAM 200 GRAM(S): 4; .5 INJECTION, POWDER, LYOPHILIZED, FOR SOLUTION INTRAVENOUS at 23:41

## 2019-11-18 RX ADMIN — PIPERACILLIN AND TAZOBACTAM 200 GRAM(S): 4; .5 INJECTION, POWDER, LYOPHILIZED, FOR SOLUTION INTRAVENOUS at 17:35

## 2019-11-18 RX ADMIN — TAMSULOSIN HYDROCHLORIDE 0.4 MILLIGRAM(S): 0.4 CAPSULE ORAL at 22:28

## 2019-11-18 RX ADMIN — Medication 4: at 23:04

## 2019-11-18 NOTE — H&P ADULT - NSHPPHYSICALEXAM_GEN_ALL_CORE
GA: NAD  HEENT: blind, AC/AT  CHEST: CTAB  CV: RRR, no mgr  ABD: soft, non-tender, non-distended  EXT: diabetic foot ulcer on LLE with redness. Chronic edema up to the mid calf. Previous site of penrose drain for previous diabetic ulcer on same foot  Pulses: strong bi signlas on PT/DP on LLE - uncompressed ABIs GA: NAD  HEENT: blind, AC/AT  CHEST: CTAB  CV: RRR, no mgr  ABD: soft, non-tender, non-distended  EXT: diabetic foot ulcer on LLE with redness. Chronic edema up to the mid calf. Previous site of penrose drain for previous diabetic ulcer on same foot. ULE with picc line (not able to draw blood- infusion seems to work)_RUE with old AVF.   Pulses: strong bi signlas on PT/DP on LLE - uncompressed ABIs

## 2019-11-18 NOTE — PHYSICAL EXAM
[Calm] : calm [2+] : left 2+ [de-identified] : well, here with daughter [de-identified] : the ulcer between the 4th and 5th toe has become much larger.  there is foul smell coming from it., and drainage

## 2019-11-18 NOTE — H&P ADULT - ASSESSMENT
78 yo pt diabetic pt with hx of diabetes under insulin for more that 20 years, kidney transplant under immunnosuppressing medication c/o new non healing ulcer in LLE under IV abx (PICC line on LUE).    -Admit to vascular service  -Preop and consent for debridement tomorrow  - Start IV abx  - On home meds - holding lasix  - OOBA  - Diet diabetic/kosher  - NPO after midnight

## 2019-11-18 NOTE — H&P ADULT - HISTORY OF PRESENT ILLNESS
Pt is a   yo pt that was seen in the office today by Dr. Lopez and sent to be direct admitted to Vascular service due to an new non-healing ulcer on LLE. In the same site there is I&D site Pt is a  80 yo pt that was seen in the office today by Dr. Lopez and sent to be direct admitted to Vascular service due to an new non-healing ulcer on LLE. Pt has a picc line and recently discharged from our service with IV abx at home after an I&D for a previous diabetic ulcer with a penrose drain healing well with no signs of infection. Nothings else changed between two hospital stays. Pt refuses LLE pain, fevers, chest or abdominal pain, SOB, dizziness, nausea, weakness.

## 2019-11-19 ENCOUNTER — RESULT REVIEW (OUTPATIENT)
Age: 79
End: 2019-11-19

## 2019-11-19 LAB
ANION GAP SERPL CALC-SCNC: 12 MMOL/L — SIGNIFICANT CHANGE UP (ref 5–17)
APTT BLD: 30.1 SEC — SIGNIFICANT CHANGE UP (ref 27.5–36.3)
BUN SERPL-MCNC: 13 MG/DL — SIGNIFICANT CHANGE UP (ref 7–23)
CALCIUM SERPL-MCNC: 9.2 MG/DL — SIGNIFICANT CHANGE UP (ref 8.4–10.5)
CHLORIDE SERPL-SCNC: 102 MMOL/L — SIGNIFICANT CHANGE UP (ref 96–108)
CO2 SERPL-SCNC: 25 MMOL/L — SIGNIFICANT CHANGE UP (ref 22–31)
CREAT SERPL-MCNC: 1.01 MG/DL — SIGNIFICANT CHANGE UP (ref 0.5–1.3)
GLUCOSE BLDC GLUCOMTR-MCNC: 148 MG/DL — HIGH (ref 70–99)
GLUCOSE BLDC GLUCOMTR-MCNC: 216 MG/DL — HIGH (ref 70–99)
GLUCOSE BLDC GLUCOMTR-MCNC: 227 MG/DL — HIGH (ref 70–99)
GLUCOSE BLDC GLUCOMTR-MCNC: 247 MG/DL — HIGH (ref 70–99)
GLUCOSE SERPL-MCNC: 156 MG/DL — HIGH (ref 70–99)
HBA1C BLD-MCNC: 8.6 % — HIGH (ref 4–5.6)
HCT VFR BLD CALC: 37.5 % — LOW (ref 39–50)
HGB BLD-MCNC: 11.7 G/DL — LOW (ref 13–17)
INR BLD: 1.15 — SIGNIFICANT CHANGE UP (ref 0.88–1.16)
MAGNESIUM SERPL-MCNC: 1.6 MG/DL — SIGNIFICANT CHANGE UP (ref 1.6–2.6)
MCHC RBC-ENTMCNC: 27.3 PG — SIGNIFICANT CHANGE UP (ref 27–34)
MCHC RBC-ENTMCNC: 31.2 GM/DL — LOW (ref 32–36)
MCV RBC AUTO: 87.6 FL — SIGNIFICANT CHANGE UP (ref 80–100)
NRBC # BLD: 0 /100 WBCS — SIGNIFICANT CHANGE UP (ref 0–0)
PHOSPHATE SERPL-MCNC: 2.2 MG/DL — LOW (ref 2.5–4.5)
PLATELET # BLD AUTO: 184 K/UL — SIGNIFICANT CHANGE UP (ref 150–400)
POTASSIUM SERPL-MCNC: 3.8 MMOL/L — SIGNIFICANT CHANGE UP (ref 3.5–5.3)
POTASSIUM SERPL-SCNC: 3.8 MMOL/L — SIGNIFICANT CHANGE UP (ref 3.5–5.3)
PROTHROM AB SERPL-ACNC: 13.1 SEC — HIGH (ref 10–12.9)
RBC # BLD: 4.28 M/UL — SIGNIFICANT CHANGE UP (ref 4.2–5.8)
RBC # FLD: 14.7 % — HIGH (ref 10.3–14.5)
SODIUM SERPL-SCNC: 139 MMOL/L — SIGNIFICANT CHANGE UP (ref 135–145)
WBC # BLD: 6.6 K/UL — SIGNIFICANT CHANGE UP (ref 3.8–10.5)
WBC # FLD AUTO: 6.6 K/UL — SIGNIFICANT CHANGE UP (ref 3.8–10.5)

## 2019-11-19 PROCEDURE — 99233 SBSQ HOSP IP/OBS HIGH 50: CPT

## 2019-11-19 PROCEDURE — 28820 AMPUTATION OF TOE: CPT | Mod: T3,GC

## 2019-11-19 RX ORDER — DEXTROSE 50 % IN WATER 50 %
12.5 SYRINGE (ML) INTRAVENOUS ONCE
Refills: 0 | Status: DISCONTINUED | OUTPATIENT
Start: 2019-11-19 | End: 2019-11-22

## 2019-11-19 RX ORDER — DEXTROSE 50 % IN WATER 50 %
15 SYRINGE (ML) INTRAVENOUS ONCE
Refills: 0 | Status: DISCONTINUED | OUTPATIENT
Start: 2019-11-19 | End: 2019-11-22

## 2019-11-19 RX ORDER — POTASSIUM PHOSPHATE, MONOBASIC POTASSIUM PHOSPHATE, DIBASIC 236; 224 MG/ML; MG/ML
15 INJECTION, SOLUTION INTRAVENOUS ONCE
Refills: 0 | Status: DISCONTINUED | OUTPATIENT
Start: 2019-11-19 | End: 2019-11-19

## 2019-11-19 RX ORDER — SODIUM CHLORIDE 9 MG/ML
1000 INJECTION, SOLUTION INTRAVENOUS
Refills: 0 | Status: DISCONTINUED | OUTPATIENT
Start: 2019-11-19 | End: 2019-11-22

## 2019-11-19 RX ORDER — CARVEDILOL PHOSPHATE 80 MG/1
12.5 CAPSULE, EXTENDED RELEASE ORAL EVERY 12 HOURS
Refills: 0 | Status: DISCONTINUED | OUTPATIENT
Start: 2019-11-19 | End: 2019-11-22

## 2019-11-19 RX ORDER — MYCOPHENOLATE MOFETIL 250 MG/1
500 CAPSULE ORAL
Refills: 0 | Status: DISCONTINUED | OUTPATIENT
Start: 2019-11-19 | End: 2019-11-22

## 2019-11-19 RX ORDER — DEXTROSE 50 % IN WATER 50 %
25 SYRINGE (ML) INTRAVENOUS ONCE
Refills: 0 | Status: DISCONTINUED | OUTPATIENT
Start: 2019-11-19 | End: 2019-11-22

## 2019-11-19 RX ORDER — TAMSULOSIN HYDROCHLORIDE 0.4 MG/1
0.4 CAPSULE ORAL AT BEDTIME
Refills: 0 | Status: DISCONTINUED | OUTPATIENT
Start: 2019-11-19 | End: 2019-11-22

## 2019-11-19 RX ORDER — GLUCAGON INJECTION, SOLUTION 0.5 MG/.1ML
1 INJECTION, SOLUTION SUBCUTANEOUS ONCE
Refills: 0 | Status: DISCONTINUED | OUTPATIENT
Start: 2019-11-19 | End: 2019-11-22

## 2019-11-19 RX ORDER — TACROLIMUS 5 MG/1
4 CAPSULE ORAL
Refills: 0 | Status: DISCONTINUED | OUTPATIENT
Start: 2019-11-19 | End: 2019-11-22

## 2019-11-19 RX ORDER — ONDANSETRON 8 MG/1
4 TABLET, FILM COATED ORAL EVERY 6 HOURS
Refills: 0 | Status: DISCONTINUED | OUTPATIENT
Start: 2019-11-19 | End: 2019-11-22

## 2019-11-19 RX ORDER — OXYCODONE AND ACETAMINOPHEN 5; 325 MG/1; MG/1
2 TABLET ORAL EVERY 6 HOURS
Refills: 0 | Status: DISCONTINUED | OUTPATIENT
Start: 2019-11-19 | End: 2019-11-22

## 2019-11-19 RX ORDER — ENOXAPARIN SODIUM 100 MG/ML
40 INJECTION SUBCUTANEOUS DAILY
Refills: 0 | Status: DISCONTINUED | OUTPATIENT
Start: 2019-11-19 | End: 2019-11-22

## 2019-11-19 RX ORDER — ASPIRIN/CALCIUM CARB/MAGNESIUM 324 MG
81 TABLET ORAL DAILY
Refills: 0 | Status: DISCONTINUED | OUTPATIENT
Start: 2019-11-19 | End: 2019-11-22

## 2019-11-19 RX ORDER — SODIUM CHLORIDE 9 MG/ML
1000 INJECTION INTRAMUSCULAR; INTRAVENOUS; SUBCUTANEOUS
Refills: 0 | Status: DISCONTINUED | OUTPATIENT
Start: 2019-11-19 | End: 2019-11-19

## 2019-11-19 RX ORDER — PIPERACILLIN AND TAZOBACTAM 4; .5 G/20ML; G/20ML
3.38 INJECTION, POWDER, LYOPHILIZED, FOR SOLUTION INTRAVENOUS EVERY 6 HOURS
Refills: 0 | Status: DISCONTINUED | OUTPATIENT
Start: 2019-11-19 | End: 2019-11-21

## 2019-11-19 RX ORDER — SODIUM CHLORIDE 9 MG/ML
1000 INJECTION, SOLUTION INTRAVENOUS
Refills: 0 | Status: DISCONTINUED | OUTPATIENT
Start: 2019-11-19 | End: 2019-11-19

## 2019-11-19 RX ORDER — INSULIN LISPRO 100/ML
VIAL (ML) SUBCUTANEOUS
Refills: 0 | Status: DISCONTINUED | OUTPATIENT
Start: 2019-11-19 | End: 2019-11-22

## 2019-11-19 RX ORDER — MAGNESIUM SULFATE 500 MG/ML
1 VIAL (ML) INJECTION ONCE
Refills: 0 | Status: COMPLETED | OUTPATIENT
Start: 2019-11-19 | End: 2019-11-19

## 2019-11-19 RX ORDER — HYDROMORPHONE HYDROCHLORIDE 2 MG/ML
1 INJECTION INTRAMUSCULAR; INTRAVENOUS; SUBCUTANEOUS EVERY 4 HOURS
Refills: 0 | Status: DISCONTINUED | OUTPATIENT
Start: 2019-11-19 | End: 2019-11-19

## 2019-11-19 RX ORDER — OXYCODONE AND ACETAMINOPHEN 5; 325 MG/1; MG/1
1 TABLET ORAL EVERY 4 HOURS
Refills: 0 | Status: DISCONTINUED | OUTPATIENT
Start: 2019-11-19 | End: 2019-11-22

## 2019-11-19 RX ADMIN — CARVEDILOL PHOSPHATE 12.5 MILLIGRAM(S): 80 CAPSULE, EXTENDED RELEASE ORAL at 05:42

## 2019-11-19 RX ADMIN — Medication 2: at 12:49

## 2019-11-19 RX ADMIN — PIPERACILLIN AND TAZOBACTAM 200 GRAM(S): 4; .5 INJECTION, POWDER, LYOPHILIZED, FOR SOLUTION INTRAVENOUS at 05:42

## 2019-11-19 RX ADMIN — TACROLIMUS 4 MILLIGRAM(S): 5 CAPSULE ORAL at 16:38

## 2019-11-19 RX ADMIN — Medication 100 GRAM(S): at 09:22

## 2019-11-19 RX ADMIN — Medication 5 MILLIGRAM(S): at 05:41

## 2019-11-19 RX ADMIN — Medication 2: at 18:28

## 2019-11-19 RX ADMIN — ENOXAPARIN SODIUM 40 MILLIGRAM(S): 100 INJECTION SUBCUTANEOUS at 19:27

## 2019-11-19 RX ADMIN — SODIUM CHLORIDE 75 MILLILITER(S): 9 INJECTION INTRAMUSCULAR; INTRAVENOUS; SUBCUTANEOUS at 06:38

## 2019-11-19 RX ADMIN — CARVEDILOL PHOSPHATE 12.5 MILLIGRAM(S): 80 CAPSULE, EXTENDED RELEASE ORAL at 17:36

## 2019-11-19 RX ADMIN — Medication 2: at 22:18

## 2019-11-19 RX ADMIN — MYCOPHENOLATE MOFETIL 500 MILLIGRAM(S): 250 CAPSULE ORAL at 14:34

## 2019-11-19 RX ADMIN — PIPERACILLIN AND TAZOBACTAM 200 GRAM(S): 4; .5 INJECTION, POWDER, LYOPHILIZED, FOR SOLUTION INTRAVENOUS at 14:34

## 2019-11-19 RX ADMIN — PIPERACILLIN AND TAZOBACTAM 200 GRAM(S): 4; .5 INJECTION, POWDER, LYOPHILIZED, FOR SOLUTION INTRAVENOUS at 19:27

## 2019-11-19 RX ADMIN — Medication 81 MILLIGRAM(S): at 14:34

## 2019-11-19 NOTE — PROGRESS NOTE ADULT - SUBJECTIVE AND OBJECTIVE BOX
Pre-op Diagnosis: Left foot infection  Procedure: Left foot debridement, possible 4th and 5th toe amputation  Surgeon: Dr. Diggs    Consent in chart                          11.5   6.40  )-----------( 183      ( 18 Nov 2019 16:48 )             37.3     11-18    138  |  101  |  14  ----------------------------<  158<H>  4.4   |  26  |  1.03    Ca    10.0      18 Nov 2019 16:48  Mg     1.7     11-18    TPro  6.6  /  Alb  3.7  /  TBili  0.4  /  DBili  x   /  AST  19  /  ALT  16  /  AlkPhos  81  11-18    PT/INR - ( 18 Nov 2019 16:48 )   PT: 13.0 sec;   INR: 1.15     PTT - ( 18 Nov 2019 16:48 )  PTT:31.9 sec    Type & Screen: A+ Ab neg  CXR: Official read pending  EKG: NSR        A/P: 79yMale planned for above procedure  1. NPO   2. Home medication  3. [ x] Blood on hold, Units: 2u PRBC  4. Home medication as appropriate  5. F/u AM labs

## 2019-11-19 NOTE — BRIEF OPERATIVE NOTE - OPERATION/FINDINGS
Left foot with purulent drainage of plantar ulcer with communication to dorsum of foot and 4th interspace. 4th and 5th digits disarticulated followed by 4th and 5th metatarsal head resections. Deep wound culture sent and clean margin of 4th metatarsal sent for C&S. Wound left open with packing.

## 2019-11-19 NOTE — BRIEF OPERATIVE NOTE - NSICDXBRIEFPROCEDURE_GEN_ALL_CORE_FT
PROCEDURES:  Complete amputation of fourth toe of left foot by open approach 19-Nov-2019 11:24:34  Elias Moreno  Complete amputation of fifth toe of left foot by open approach 19-Nov-2019 11:24:14  Elias Moreno

## 2019-11-19 NOTE — PROGRESS NOTE ADULT - SUBJECTIVE AND OBJECTIVE BOX
24hr Events:  O/N:Pre-op, consented, Renal called, VSS  11/18: Admitted, started on Zosyn    Assessment/Plan;  80 yo pt diabetic pt with hx of diabetes under insulin for more that 20 years, kidney transplant under immunnosuppressing medication c/o new non healing ulcer in LLE under IV abx (PICC line on LUE).    - Zosyn  - On home meds - holding lasix  - OOBA  - NPO/IVF  - F/u renal rec  -F/u AM labs 24hr Events:  O/N:Pre-op, consented, Renal called, VSS  11/18: Admitted, started on Zosyn      Vital Signs Last 24 Hrs  T(C): 36.6 (19 Nov 2019 05:48), Max: 36.8 (18 Nov 2019 17:57)  T(F): 97.9 (19 Nov 2019 05:48), Max: 98.2 (18 Nov 2019 17:57)  HR: 74 (19 Nov 2019 05:39) (72 - 84)  BP: 130/65 (19 Nov 2019 05:39) (128/54 - 179/72)  BP(mean): --  RR: 16 (19 Nov 2019 05:39) (16 - 18)  SpO2: 97% (19 Nov 2019 05:39) (96% - 98%)    Physical Exam:  General: NAD  Pulmonary: Nonlabored breathing, no respiratory distress  Cardiovascular: NSR  Abdominal: soft, NT/N  Extremities: WWP, normal strength, mild edema on R calf, Wound with penrose left for drainage in L foot - now with new wund between 4-5 toes communicating with the old cavity. No signs of joe infection - no discahrge. Dressings changed  Neuro: A/O x3, decreased vision  Pulses: strong bi signals on LLE DP/PT    Lines/drains/tubes:    I&O's Summary    18 Nov 2019 07:01  -  19 Nov 2019 07:00  --------------------------------------------------------  IN: 180 mL / OUT: 400 mL / NET: -220 mL        LABS:                        11.7   6.60  )-----------( 184      ( 19 Nov 2019 06:36 )             37.5     11-19    139  |  102  |  13  ----------------------------<  156<H>  3.8   |  25  |  1.01    Ca    9.2      19 Nov 2019 06:36  Phos  2.2     11-19  Mg     1.6     11-19    TPro  6.6  /  Alb  3.7  /  TBili  0.4  /  DBili  x   /  AST  19  /  ALT  16  /  AlkPhos  81  11-18    PT/INR - ( 19 Nov 2019 06:36 )   PT: 13.1 sec;   INR: 1.15          PTT - ( 19 Nov 2019 06:36 )  PTT:30.1 sec    CAPILLARY BLOOD GLUCOSE      POCT Blood Glucose.: 148 mg/dL (19 Nov 2019 06:52)  POCT Blood Glucose.: 218 mg/dL (18 Nov 2019 22:35)  POCT Blood Glucose.: 165 mg/dL (18 Nov 2019 21:25)  POCT Blood Glucose.: 154 mg/dL (18 Nov 2019 19:39)    LIVER FUNCTIONS - ( 18 Nov 2019 16:48 )  Alb: 3.7 g/dL / Pro: 6.6 g/dL / ALK PHOS: 81 U/L / ALT: 16 U/L / AST: 19 U/L / GGT: x             RADIOLOGY & ADDITIONAL STUDIES:                  Assessment/Plan;  78 yo pt diabetic pt with hx of diabetes under insulin for more that 20 years, kidney transplant under immunnosuppressing medication c/o new non healing ulcer in LLE under IV abx (PICC line on LUE).    - Zosyn  - On home meds - holding lasix  - OOBA  - NPO/IVF  - F/u renal rec  -F/u AM labs  - Preop adn consent for Debridement today - Added on

## 2019-11-19 NOTE — PROGRESS NOTE ADULT - SUBJECTIVE AND OBJECTIVE BOX
Vascular Surgery Post-Op Note    Procedure: left 3rd and 4th toe amputation    Diagnosis/Indication: diabetic foot infection of 4th and 5th toe    Surgeon: Dr. Diggs    S: Pt has no complaints. Denies CP, SOB.    O:  T(C): 36.9 (11-19-19 @ 13:35), Max: 36.9 (11-19-19 @ 13:35)  T(F): 98.4 (11-19-19 @ 13:35), Max: 98.4 (11-19-19 @ 13:35)  HR: 76 (11-19-19 @ 13:52) (68 - 76)  BP: 172/80 (11-19-19 @ 13:52) (140/49 - 175/60)  RR: 16 (11-19-19 @ 13:52) (16 - 31)  SpO2: 97% (11-19-19 @ 13:52) (95% - 100%)  Wt(kg): --                        11.7   6.60  )-----------( 184      ( 19 Nov 2019 06:36 )             37.5     11-19    139  |  102  |  13  ----------------------------<  156<H>  3.8   |  25  |  1.01    Ca    9.2      19 Nov 2019 06:36  Phos  2.2     11-19  Mg     1.6     11-19    TPro  6.6  /  Alb  3.7  /  TBili  0.4  /  DBili  x   /  AST  19  /  ALT  16  /  AlkPhos  81  11-18      Gen: NAD, resting comfortably in bed  C/V: NSR  Pulm: Nonlabored breathing, no respiratory distress  Abd: soft, NT/ND  Extrem: left foot dressing with mild serosanguinous saturation on the lateral aspect of the foot, calf soft and non tender      A/P: 79yMale s/p above procedure  Diet: advance as tolerated  IVF: HL once eating  Pain/nausea control  AM labs  PT eval in AM  Zosyn IV  AM labs  Renal following Vascular Surgery Post-Op Note    Procedure: left 4th and 5th toe amputation    Diagnosis/Indication: diabetic foot infection of 4th and 5th toe    Surgeon: Dr. Diggs    S: Pt has no complaints. Denies CP, SOB.    O:  T(C): 36.9 (11-19-19 @ 13:35), Max: 36.9 (11-19-19 @ 13:35)  T(F): 98.4 (11-19-19 @ 13:35), Max: 98.4 (11-19-19 @ 13:35)  HR: 76 (11-19-19 @ 13:52) (68 - 76)  BP: 172/80 (11-19-19 @ 13:52) (140/49 - 175/60)  RR: 16 (11-19-19 @ 13:52) (16 - 31)  SpO2: 97% (11-19-19 @ 13:52) (95% - 100%)  Wt(kg): --                        11.7   6.60  )-----------( 184      ( 19 Nov 2019 06:36 )             37.5     11-19    139  |  102  |  13  ----------------------------<  156<H>  3.8   |  25  |  1.01    Ca    9.2      19 Nov 2019 06:36  Phos  2.2     11-19  Mg     1.6     11-19    TPro  6.6  /  Alb  3.7  /  TBili  0.4  /  DBili  x   /  AST  19  /  ALT  16  /  AlkPhos  81  11-18      Gen: NAD, resting comfortably in bed  C/V: NSR  Pulm: Nonlabored breathing, no respiratory distress  Abd: soft, NT/ND  Extrem: left foot dressing with mild serosanguinous saturation on the lateral aspect of the foot, calf soft and non tender      A/P: 79yMale s/p above procedure  Diet: advance as tolerated  IVF: HL once eating  Pain/nausea control  AM labs  PT eval in AM  Zosyn IV  AM labs  Renal following

## 2019-11-20 DIAGNOSIS — Z94.0 KIDNEY TRANSPLANT STATUS: ICD-10-CM

## 2019-11-20 LAB
ANION GAP SERPL CALC-SCNC: 10 MMOL/L — SIGNIFICANT CHANGE UP (ref 5–17)
BUN SERPL-MCNC: 17 MG/DL — SIGNIFICANT CHANGE UP (ref 7–23)
CALCIUM SERPL-MCNC: 9.8 MG/DL — SIGNIFICANT CHANGE UP (ref 8.4–10.5)
CHLORIDE SERPL-SCNC: 98 MMOL/L — SIGNIFICANT CHANGE UP (ref 96–108)
CO2 SERPL-SCNC: 24 MMOL/L — SIGNIFICANT CHANGE UP (ref 22–31)
CREAT SERPL-MCNC: 0.99 MG/DL — SIGNIFICANT CHANGE UP (ref 0.5–1.3)
GLUCOSE BLDC GLUCOMTR-MCNC: 216 MG/DL — HIGH (ref 70–99)
GLUCOSE BLDC GLUCOMTR-MCNC: 246 MG/DL — HIGH (ref 70–99)
GLUCOSE BLDC GLUCOMTR-MCNC: 280 MG/DL — HIGH (ref 70–99)
GLUCOSE BLDC GLUCOMTR-MCNC: 288 MG/DL — HIGH (ref 70–99)
GLUCOSE SERPL-MCNC: 299 MG/DL — HIGH (ref 70–99)
GRAM STN FLD: SIGNIFICANT CHANGE UP
GRAM STN FLD: SIGNIFICANT CHANGE UP
HCT VFR BLD CALC: 35.5 % — LOW (ref 39–50)
HGB BLD-MCNC: 11.1 G/DL — LOW (ref 13–17)
MAGNESIUM SERPL-MCNC: 1.8 MG/DL — SIGNIFICANT CHANGE UP (ref 1.6–2.6)
MCHC RBC-ENTMCNC: 27.5 PG — SIGNIFICANT CHANGE UP (ref 27–34)
MCHC RBC-ENTMCNC: 31.3 GM/DL — LOW (ref 32–36)
MCV RBC AUTO: 87.9 FL — SIGNIFICANT CHANGE UP (ref 80–100)
NRBC # BLD: 0 /100 WBCS — SIGNIFICANT CHANGE UP (ref 0–0)
PHOSPHATE SERPL-MCNC: 2.3 MG/DL — LOW (ref 2.5–4.5)
PLATELET # BLD AUTO: 188 K/UL — SIGNIFICANT CHANGE UP (ref 150–400)
POTASSIUM SERPL-MCNC: 4.5 MMOL/L — SIGNIFICANT CHANGE UP (ref 3.5–5.3)
POTASSIUM SERPL-SCNC: 4.5 MMOL/L — SIGNIFICANT CHANGE UP (ref 3.5–5.3)
RBC # BLD: 4.04 M/UL — LOW (ref 4.2–5.8)
RBC # FLD: 14.6 % — HIGH (ref 10.3–14.5)
SODIUM SERPL-SCNC: 132 MMOL/L — LOW (ref 135–145)
SPECIMEN SOURCE: SIGNIFICANT CHANGE UP
SPECIMEN SOURCE: SIGNIFICANT CHANGE UP
WBC # BLD: 5.99 K/UL — SIGNIFICANT CHANGE UP (ref 3.8–10.5)
WBC # FLD AUTO: 5.99 K/UL — SIGNIFICANT CHANGE UP (ref 3.8–10.5)

## 2019-11-20 PROCEDURE — 99233 SBSQ HOSP IP/OBS HIGH 50: CPT

## 2019-11-20 RX ORDER — DOCUSATE SODIUM 100 MG
100 CAPSULE ORAL THREE TIMES A DAY
Refills: 0 | Status: DISCONTINUED | OUTPATIENT
Start: 2019-11-20 | End: 2019-11-22

## 2019-11-20 RX ORDER — INSULIN GLARGINE 100 [IU]/ML
18 INJECTION, SOLUTION SUBCUTANEOUS AT BEDTIME
Refills: 0 | Status: DISCONTINUED | OUTPATIENT
Start: 2019-11-20 | End: 2019-11-22

## 2019-11-20 RX ADMIN — Medication 3: at 10:57

## 2019-11-20 RX ADMIN — Medication 81 MILLIGRAM(S): at 14:33

## 2019-11-20 RX ADMIN — TACROLIMUS 4 MILLIGRAM(S): 5 CAPSULE ORAL at 20:30

## 2019-11-20 RX ADMIN — Medication 100 MILLIGRAM(S): at 08:00

## 2019-11-20 RX ADMIN — Medication 5 MILLIGRAM(S): at 05:54

## 2019-11-20 RX ADMIN — CARVEDILOL PHOSPHATE 12.5 MILLIGRAM(S): 80 CAPSULE, EXTENDED RELEASE ORAL at 05:54

## 2019-11-20 RX ADMIN — PIPERACILLIN AND TAZOBACTAM 200 GRAM(S): 4; .5 INJECTION, POWDER, LYOPHILIZED, FOR SOLUTION INTRAVENOUS at 14:34

## 2019-11-20 RX ADMIN — Medication 3: at 19:37

## 2019-11-20 RX ADMIN — PIPERACILLIN AND TAZOBACTAM 200 GRAM(S): 4; .5 INJECTION, POWDER, LYOPHILIZED, FOR SOLUTION INTRAVENOUS at 07:37

## 2019-11-20 RX ADMIN — TACROLIMUS 4 MILLIGRAM(S): 5 CAPSULE ORAL at 00:10

## 2019-11-20 RX ADMIN — TAMSULOSIN HYDROCHLORIDE 0.4 MILLIGRAM(S): 0.4 CAPSULE ORAL at 00:08

## 2019-11-20 RX ADMIN — TAMSULOSIN HYDROCHLORIDE 0.4 MILLIGRAM(S): 0.4 CAPSULE ORAL at 22:20

## 2019-11-20 RX ADMIN — MYCOPHENOLATE MOFETIL 500 MILLIGRAM(S): 250 CAPSULE ORAL at 05:54

## 2019-11-20 RX ADMIN — MYCOPHENOLATE MOFETIL 500 MILLIGRAM(S): 250 CAPSULE ORAL at 18:19

## 2019-11-20 RX ADMIN — CARVEDILOL PHOSPHATE 12.5 MILLIGRAM(S): 80 CAPSULE, EXTENDED RELEASE ORAL at 18:19

## 2019-11-20 RX ADMIN — TACROLIMUS 4 MILLIGRAM(S): 5 CAPSULE ORAL at 07:38

## 2019-11-20 RX ADMIN — Medication 2: at 22:20

## 2019-11-20 RX ADMIN — ENOXAPARIN SODIUM 40 MILLIGRAM(S): 100 INJECTION SUBCUTANEOUS at 14:34

## 2019-11-20 RX ADMIN — PIPERACILLIN AND TAZOBACTAM 200 GRAM(S): 4; .5 INJECTION, POWDER, LYOPHILIZED, FOR SOLUTION INTRAVENOUS at 01:55

## 2019-11-20 RX ADMIN — PIPERACILLIN AND TAZOBACTAM 200 GRAM(S): 4; .5 INJECTION, POWDER, LYOPHILIZED, FOR SOLUTION INTRAVENOUS at 20:30

## 2019-11-20 RX ADMIN — MYCOPHENOLATE MOFETIL 500 MILLIGRAM(S): 250 CAPSULE ORAL at 00:07

## 2019-11-20 NOTE — PHYSICAL THERAPY INITIAL EVALUATION ADULT - ADDITIONAL COMMENTS
Patient reports that he lives with his wife, was functionally independent using cane/ rolling walker as needed prior to admission. Has 2 steps to enter the home, then 9 steps to the bedroom.

## 2019-11-20 NOTE — CONSULT NOTE ADULT - SUBJECTIVE AND OBJECTIVE BOX
HPI:  80 y/o is admitted for vascular intervention of a new non-helaing wound on the LLE, w/PMHx of HTN/DM/BPH and ESRD s/p Kidney transplant ~ 7 yeasr ago  nephrology consulted for kidney function monitoring during hospital stay.  s/p Complete amputation of fifth toe of left foot POD #1, wound left open with packing   seen and examined, denies any acute complaints, no CP or SOB/ urinating adequately, kidney function remained stable with SCr ~ 1  he was continued on his immunosuppresant regimen of Tacrolimus, Cellcept and prednisone and started on zosyn empirically pending deep wound swab clx.     Review of Systems:  Review of Systems: as noted in HPI, negative 	      Allergies and Intolerances:        Allergies:  	No Known Allergies:     Home Medications:   * Patient Currently Takes Medications as of 18-Nov-2019 18:17 documented in Structured Notes  · 	aspirin 81 mg oral tablet, chewable: Last Dose Taken:  , 1 tab(s) orally once a day  · 	mycophenolate mofetil 250 mg oral capsule: Last Dose Taken:  , 500  orally 2 times a day  · 	tacrolimus 1 mg oral capsule: Last Dose Taken:  , 4 cap(s) orally 2 times a day  · 	docusate sodium 100 mg oral capsule: Last Dose Taken:  , 1 cap(s) orally 3 times a day  · 	carvedilol 3.125 mg oral tablet: Last Dose Taken:  , 1 tab(s) orally 2 times a day  · 	HumaLOG Mix 75/25 subcutaneous suspension: Last Dose Taken:  , subcutaneous 2 times a day  · 	predniSONE 5 mg oral tablet: Last Dose Taken:  , 1 tab(s) orally once a day  · 	Flomax 0.4 mg oral capsule: Last Dose Taken:  , 1 cap(s) orally once a day    Patient History:    Past Medical, Past Surgical, and Family History:  PAST MEDICAL HISTORY:  BPH (benign prostatic hypertrophy)   DM (diabetes mellitus) Type 1/insulin dependent per patient  History of renal transplant secondary to DM  HTN (hypertension).     PAST SURGICAL HISTORY:  Kidney transplant recipient    VITAL SIGNS:  T(C): 36.7 (11-20-19 @ 13:20), Max: 37.1 (11-20-19 @ 06:06)  T(F): 98 (11-20-19 @ 13:20), Max: 98.7 (11-20-19 @ 06:06)  HR: 74 (11-20-19 @ 14:49) (73 - 82)  BP: 166/65 (11-20-19 @ 14:49) (124/51 - 166/65)  BP(mean): 94 (11-20-19 @ 14:49) (94 - 94)  RR: 16 (11-20-19 @ 14:49) (16 - 18)  SpO2: 96% (11-20-19 @ 09:00) (95% - 96%)PHYSICAL EXAM:    Constitutional: WDWN, NAD  Eyes: legally blind   ENT: MMM  Neck: no JVD  Respiratory: CTA B/L  Cardiac: +S1/S2; RRR; systolic murmur is present   Gastrointestinal: soft, NT/ND  Extremities: Warm, trace LLE edema, left foot under dressing c/d/i/  Dermatologic: skin warm, dry and intact; no rashes, wounds, or scars  Neurologic: AAOx3, no focal deficits  Access: RUE lower AVF with thrill and audible bruit     LABS:                         11.1   5.99  )-----------( 188      ( 20 Nov 2019 15:38 )             35.5     11-20    132<L>  |  98  |  17  ----------------------------<  299<H>  4.5   |  24  |  0.99    Ca    9.8      20 Nov 2019 15:38  Phos  2.3     11-20  Mg     1.8     11-20      PT/INR - ( 19 Nov 2019 06:36 )   PT: 13.1 sec;   INR: 1.15          PTT - ( 19 Nov 2019 06:36 )  PTT:30.1 sec      RADIOLOGY, EKG & ADDITIONAL TESTS: Reviewed

## 2019-11-20 NOTE — PHYSICAL THERAPY INITIAL EVALUATION ADULT - PERTINENT HX OF CURRENT PROBLEM, REHAB EVAL
78 yo pt diabetic pt with hx of diabetes under insulin for more that 20 years, kidney transplant under immunnosuppressing medication c/o new non healing ulcer in LLE under IV abx (PICC line on LUE).

## 2019-11-20 NOTE — PROGRESS NOTE ADULT - SUBJECTIVE AND OBJECTIVE BOX
24hr Events:  O/N: MAGGIE, VSS  11/19: s/p left 4th and 5th toe amputation, POC ok. started on lovenox        Assessment/Plan;  78 yo pt diabetic pt with hx of diabetes under insulin for more that 20 years, kidney transplant under immunnosuppressing medication c/o new non healing ulcer in LLE under IV abx (PICC line on LUE). s/p Left 4th and 5th toe amputations    - Zosyn  - On home meds - holding lasix  - OOBA  -Consistent Carb diet  - F/u renal rec  -F/u AM labs 24hr Events:  O/N: MAGGIE, VSS  11/19: s/p left 4th and 5th toe amputation, POC ok. started on lovenox    piperacillin/tazobactam IVPB.. 3.375  aspirin  chewable 81  carvedilol 12.5  enoxaparin Injectable 40  piperacillin/tazobactam IVPB.. 3.375  tamsulosin 0.4        Vital Signs Last 24 Hrs  T(C): 37.1 (20 Nov 2019 06:06), Max: 37.1 (20 Nov 2019 06:06)  T(F): 98.7 (20 Nov 2019 06:06), Max: 98.7 (20 Nov 2019 06:06)  HR: 82 (20 Nov 2019 05:51) (68 - 82)  BP: 132/63 (20 Nov 2019 05:51) (131/58 - 175/60)  BP(mean): 77 (19 Nov 2019 12:30) (77 - 95)  RR: 16 (20 Nov 2019 05:51) (16 - 31)  SpO2: 95% (20 Nov 2019 05:51) (95% - 100%)  I&O's Summary    19 Nov 2019 07:01  -  20 Nov 2019 07:00  --------------------------------------------------------  IN: 200 mL / OUT: 805 mL / NET: -605 mL        Physical Exam:  General: NAD, resting comfortably in bed  Pulmonary: Nonlabored breathing, no respiratory distress  Extremities: WWP, normal strength, mild edema on R calf, Wound with penrose left for drainage in L foot - now with new wund between 4-5 toes communicating with the old cavity. No signs of joe infection - no discahrge. Dressings changed      LABS:                        11.7   6.60  )-----------( 184      ( 19 Nov 2019 06:36 )             37.5     11-19    139  |  102  |  13  ----------------------------<  156<H>  3.8   |  25  |  1.01    Ca    9.2      19 Nov 2019 06:36  Phos  2.2     11-19  Mg     1.6     11-19    TPro  6.6  /  Alb  3.7  /  TBili  0.4  /  DBili  x   /  AST  19  /  ALT  16  /  AlkPhos  81  11-18    PT/INR - ( 19 Nov 2019 06:36 )   PT: 13.1 sec;   INR: 1.15          PTT - ( 19 Nov 2019 06:36 )  PTT:30.1 sec      PLEASE CHECK WHEN PRESENT:     [  ]Heart Failure     [  ] Acute     [  ] Acute on Chronic     [  ] Chronic  -------------------------------------------------------------------     [  ]Diastolic [HFpEF]     [  ]Systolic [HFrEF]     [  ]Combined [HFpEF & HFrEF]     [  ]Other:  -------------------------------------------------------------------  [ ] Respiratory failure  [ ] Acute cor pulmonale  [ ] Asthma/COPD Exacerbation  [ ] Pleural effusion  [ ] Aspiration pneumonia  -------------------------------------------------------------------  [  ]ELIZABETH     [  ]ATN     [  ]Reneal Medullary Necrosis     [  ]Renal Cortical Necrosis     [  ]Other Pathological Lesions:    [  ]CKD 1  [  ]CKD 2  [  ]CKD 3  [  ]CKD 4  [X  ]CKD 5 s/p kidney transplant  [  ]Other  -------------------------------------------------------------------  [ X ]Diabetes  [  ] Diabetic PVD Ulcer  [  ] Neuropathic ulcer to DM  [ X ] Diabetes with Nephropathy  [  ] Osteomyelitis due to diabetes  --------------------------------------------------------------------  [  ]Malnutrition: See Nutrition Note  [  ]Cachexia  [  ]Other:   [  ]Supplement Ordered:  [  ]Morbid Obesity (BMI >=40]  ---------------------------------------------------------------------  [ ] Sepsis/severe sepsis/septic shock  [ ] UTI  [ ] Pneumonia  -----------------------------------------------------------------------  [ ] Acidosis/alkalosis  [ ] Fluid overload  [ ] Hypokalemia  [ ] Hyperkalemia  [ ] Hypomagnesemia  [ ] Hypophosphatemia  [ ] Hyperphosphatemia  ------------------------------------------------------------------------  [ ] Acute blood loss anemia  [ ] Post op blood loss anemia  [ ] Iron deficiency anemia  [ ] Anemia due to chronic disease  [ ] Hypercoagulable state  ----------------------------------------------------------------------  [ ] Cerebral infarction  [ ] Transient ischemia attack  [ ] Encephalopathy    Assessment/Plan;  80 yo pt diabetic pt with hx of diabetes under insulin for more that 20 years, kidney transplant under immunnosuppressing medication c/o new non healing ulcer in LLE under IV abx (PICC line on LUE). s/p Left 4th and 5th toe amputations    - Zosyn  - On home meds - holding lasix  - OOBA  -Consistent Carb diet  - F/u renal rec  -F/u AM labs

## 2019-11-20 NOTE — CONSULT NOTE ADULT - ATTENDING COMMENTS
above reviewed  and pt examined, and status discussed in detail with pt and Mrs Miguel.   agree with findings and plan.

## 2019-11-20 NOTE — PHYSICAL THERAPY INITIAL EVALUATION ADULT - GENERAL OBSERVATIONS, REHAB EVAL
Received supine in bed with + wound vac to (L) foot, guaze dressing, PICC (L) UE, tele, daughter at bedside.

## 2019-11-20 NOTE — CONSULT NOTE ADULT - ASSESSMENT
A/p  78 y/o M with IDDM and ESRD s/p kidney transplant, presenting for new non-healing left foot ulcer, requiring vascular work up, nephrology consulted for kidney function monitoring in a transplant pt.

## 2019-11-20 NOTE — CONSULT NOTE ADULT - PROBLEM SELECTOR RECOMMENDATION 9
s/p LKD ~ 7 yeasr ago( from son), on tacrolimus, cellcept and prednisone   required left 5th toe amputation in the setting of non healing wound, currently on zosyn  Kidney function remained stable  - will monitor, cw current immunosuppressant regimen of Tacrolimus 4 mg BID/ Cellcept 500 mg BID and prednisone 5 mg daily  - on home coreg 12.5 BID, Bp in a reasonable range mostly in 130-140/65 mmhg    Discussed with Dr. Aranda

## 2019-11-21 ENCOUNTER — TRANSCRIPTION ENCOUNTER (OUTPATIENT)
Age: 79
End: 2019-11-21

## 2019-11-21 LAB
-  AMPICILLIN/SULBACTAM: SIGNIFICANT CHANGE UP
-  AMPICILLIN: SIGNIFICANT CHANGE UP
-  AZTREONAM: SIGNIFICANT CHANGE UP
-  CEFAZOLIN: SIGNIFICANT CHANGE UP
-  CEFAZOLIN: SIGNIFICANT CHANGE UP
-  CEFEPIME: SIGNIFICANT CHANGE UP
-  CEFTRIAXONE: SIGNIFICANT CHANGE UP
-  CIPROFLOXACIN: SIGNIFICANT CHANGE UP
-  CLINDAMYCIN: SIGNIFICANT CHANGE UP
-  ERYTHROMYCIN: SIGNIFICANT CHANGE UP
-  GENTAMICIN: SIGNIFICANT CHANGE UP
-  GENTAMICIN: SIGNIFICANT CHANGE UP
-  LINEZOLID: SIGNIFICANT CHANGE UP
-  MEROPENEM: SIGNIFICANT CHANGE UP
-  OXACILLIN: SIGNIFICANT CHANGE UP
-  PENICILLIN: SIGNIFICANT CHANGE UP
-  PIPERACILLIN/TAZOBACTAM: SIGNIFICANT CHANGE UP
-  PIPERACILLIN/TAZOBACTAM: SIGNIFICANT CHANGE UP
-  RIFAMPIN: SIGNIFICANT CHANGE UP
-  TOBRAMYCIN: SIGNIFICANT CHANGE UP
-  TOBRAMYCIN: SIGNIFICANT CHANGE UP
-  TRIMETHOPRIM/SULFAMETHOXAZOLE: SIGNIFICANT CHANGE UP
-  TRIMETHOPRIM/SULFAMETHOXAZOLE: SIGNIFICANT CHANGE UP
-  VANCOMYCIN: SIGNIFICANT CHANGE UP
ANION GAP SERPL CALC-SCNC: 10 MMOL/L — SIGNIFICANT CHANGE UP (ref 5–17)
BUN SERPL-MCNC: 14 MG/DL — SIGNIFICANT CHANGE UP (ref 7–23)
CALCIUM SERPL-MCNC: 9.5 MG/DL — SIGNIFICANT CHANGE UP (ref 8.4–10.5)
CHLORIDE SERPL-SCNC: 101 MMOL/L — SIGNIFICANT CHANGE UP (ref 96–108)
CO2 SERPL-SCNC: 25 MMOL/L — SIGNIFICANT CHANGE UP (ref 22–31)
CREAT SERPL-MCNC: 0.97 MG/DL — SIGNIFICANT CHANGE UP (ref 0.5–1.3)
CULTURE RESULTS: SIGNIFICANT CHANGE UP
CULTURE RESULTS: SIGNIFICANT CHANGE UP
GLUCOSE BLDC GLUCOMTR-MCNC: 166 MG/DL — HIGH (ref 70–99)
GLUCOSE BLDC GLUCOMTR-MCNC: 199 MG/DL — HIGH (ref 70–99)
GLUCOSE BLDC GLUCOMTR-MCNC: 203 MG/DL — HIGH (ref 70–99)
GLUCOSE BLDC GLUCOMTR-MCNC: 206 MG/DL — HIGH (ref 70–99)
GLUCOSE BLDC GLUCOMTR-MCNC: 251 MG/DL — HIGH (ref 70–99)
GLUCOSE SERPL-MCNC: 164 MG/DL — HIGH (ref 70–99)
HCT VFR BLD CALC: 35.7 % — LOW (ref 39–50)
HGB BLD-MCNC: 11 G/DL — LOW (ref 13–17)
MAGNESIUM SERPL-MCNC: 1.7 MG/DL — SIGNIFICANT CHANGE UP (ref 1.6–2.6)
MCHC RBC-ENTMCNC: 27 PG — SIGNIFICANT CHANGE UP (ref 27–34)
MCHC RBC-ENTMCNC: 30.8 GM/DL — LOW (ref 32–36)
MCV RBC AUTO: 87.7 FL — SIGNIFICANT CHANGE UP (ref 80–100)
METHOD TYPE: SIGNIFICANT CHANGE UP
NRBC # BLD: 0 /100 WBCS — SIGNIFICANT CHANGE UP (ref 0–0)
ORGANISM # SPEC MICROSCOPIC CNT: SIGNIFICANT CHANGE UP
PHOSPHATE SERPL-MCNC: 1.8 MG/DL — LOW (ref 2.5–4.5)
PLATELET # BLD AUTO: 177 K/UL — SIGNIFICANT CHANGE UP (ref 150–400)
POTASSIUM SERPL-MCNC: 3.7 MMOL/L — SIGNIFICANT CHANGE UP (ref 3.5–5.3)
POTASSIUM SERPL-SCNC: 3.7 MMOL/L — SIGNIFICANT CHANGE UP (ref 3.5–5.3)
RBC # BLD: 4.07 M/UL — LOW (ref 4.2–5.8)
RBC # FLD: 14.5 % — SIGNIFICANT CHANGE UP (ref 10.3–14.5)
SODIUM SERPL-SCNC: 136 MMOL/L — SIGNIFICANT CHANGE UP (ref 135–145)
SPECIMEN SOURCE: SIGNIFICANT CHANGE UP
SPECIMEN SOURCE: SIGNIFICANT CHANGE UP
WBC # BLD: 5.62 K/UL — SIGNIFICANT CHANGE UP (ref 3.8–10.5)
WBC # FLD AUTO: 5.62 K/UL — SIGNIFICANT CHANGE UP (ref 3.8–10.5)

## 2019-11-21 PROCEDURE — 99222 1ST HOSP IP/OBS MODERATE 55: CPT

## 2019-11-21 PROCEDURE — 99233 SBSQ HOSP IP/OBS HIGH 50: CPT

## 2019-11-21 RX ORDER — LINEZOLID 600 MG/300ML
600 INJECTION, SOLUTION INTRAVENOUS EVERY 12 HOURS
Refills: 0 | Status: DISCONTINUED | OUTPATIENT
Start: 2019-11-21 | End: 2019-11-22

## 2019-11-21 RX ORDER — VANCOMYCIN HCL 1 G
1250 VIAL (EA) INTRAVENOUS ONCE
Refills: 0 | Status: COMPLETED | OUTPATIENT
Start: 2019-11-21 | End: 2019-11-21

## 2019-11-21 RX ORDER — VANCOMYCIN HCL 1 G
1250 VIAL (EA) INTRAVENOUS EVERY 12 HOURS
Refills: 0 | Status: DISCONTINUED | OUTPATIENT
Start: 2019-11-21 | End: 2019-11-21

## 2019-11-21 RX ORDER — CHLORHEXIDINE GLUCONATE 213 G/1000ML
1 SOLUTION TOPICAL
Refills: 0 | Status: DISCONTINUED | OUTPATIENT
Start: 2019-11-21 | End: 2019-11-22

## 2019-11-21 RX ORDER — POTASSIUM PHOSPHATE, MONOBASIC POTASSIUM PHOSPHATE, DIBASIC 236; 224 MG/ML; MG/ML
30 INJECTION, SOLUTION INTRAVENOUS ONCE
Refills: 0 | Status: COMPLETED | OUTPATIENT
Start: 2019-11-21 | End: 2019-11-21

## 2019-11-21 RX ORDER — CEFEPIME 1 G/1
2000 INJECTION, POWDER, FOR SOLUTION INTRAMUSCULAR; INTRAVENOUS EVERY 8 HOURS
Refills: 0 | Status: DISCONTINUED | OUTPATIENT
Start: 2019-11-21 | End: 2019-11-22

## 2019-11-21 RX ORDER — MAGNESIUM SULFATE 500 MG/ML
2 VIAL (ML) INJECTION ONCE
Refills: 0 | Status: COMPLETED | OUTPATIENT
Start: 2019-11-21 | End: 2019-11-21

## 2019-11-21 RX ORDER — VANCOMYCIN HCL 1 G
VIAL (EA) INTRAVENOUS
Refills: 0 | Status: DISCONTINUED | OUTPATIENT
Start: 2019-11-21 | End: 2019-11-21

## 2019-11-21 RX ORDER — POTASSIUM CHLORIDE 20 MEQ
20 PACKET (EA) ORAL ONCE
Refills: 0 | Status: COMPLETED | OUTPATIENT
Start: 2019-11-21 | End: 2019-11-21

## 2019-11-21 RX ORDER — PIPERACILLIN AND TAZOBACTAM 4; .5 G/20ML; G/20ML
3.38 INJECTION, POWDER, LYOPHILIZED, FOR SOLUTION INTRAVENOUS EVERY 6 HOURS
Refills: 0 | Status: DISCONTINUED | OUTPATIENT
Start: 2019-11-21 | End: 2019-11-21

## 2019-11-21 RX ADMIN — PIPERACILLIN AND TAZOBACTAM 200 GRAM(S): 4; .5 INJECTION, POWDER, LYOPHILIZED, FOR SOLUTION INTRAVENOUS at 02:39

## 2019-11-21 RX ADMIN — CARVEDILOL PHOSPHATE 12.5 MILLIGRAM(S): 80 CAPSULE, EXTENDED RELEASE ORAL at 05:35

## 2019-11-21 RX ADMIN — Medication 166.67 MILLIGRAM(S): at 10:24

## 2019-11-21 RX ADMIN — Medication 20 MILLIEQUIVALENT(S): at 11:59

## 2019-11-21 RX ADMIN — Medication 2: at 11:59

## 2019-11-21 RX ADMIN — Medication 81 MILLIGRAM(S): at 11:58

## 2019-11-21 RX ADMIN — MYCOPHENOLATE MOFETIL 500 MILLIGRAM(S): 250 CAPSULE ORAL at 05:35

## 2019-11-21 RX ADMIN — TACROLIMUS 4 MILLIGRAM(S): 5 CAPSULE ORAL at 19:18

## 2019-11-21 RX ADMIN — INSULIN GLARGINE 18 UNIT(S): 100 INJECTION, SOLUTION SUBCUTANEOUS at 00:43

## 2019-11-21 RX ADMIN — Medication 5 MILLIGRAM(S): at 05:35

## 2019-11-21 RX ADMIN — CEFEPIME 100 MILLIGRAM(S): 1 INJECTION, POWDER, FOR SOLUTION INTRAMUSCULAR; INTRAVENOUS at 18:36

## 2019-11-21 RX ADMIN — PIPERACILLIN AND TAZOBACTAM 200 GRAM(S): 4; .5 INJECTION, POWDER, LYOPHILIZED, FOR SOLUTION INTRAVENOUS at 13:43

## 2019-11-21 RX ADMIN — Medication 3: at 22:49

## 2019-11-21 RX ADMIN — POTASSIUM PHOSPHATE, MONOBASIC POTASSIUM PHOSPHATE, DIBASIC 83.33 MILLIMOLE(S): 236; 224 INJECTION, SOLUTION INTRAVENOUS at 11:59

## 2019-11-21 RX ADMIN — CARVEDILOL PHOSPHATE 12.5 MILLIGRAM(S): 80 CAPSULE, EXTENDED RELEASE ORAL at 17:10

## 2019-11-21 RX ADMIN — ENOXAPARIN SODIUM 40 MILLIGRAM(S): 100 INJECTION SUBCUTANEOUS at 11:58

## 2019-11-21 RX ADMIN — Medication 50 GRAM(S): at 09:37

## 2019-11-21 RX ADMIN — LINEZOLID 300 MILLIGRAM(S): 600 INJECTION, SOLUTION INTRAVENOUS at 19:14

## 2019-11-21 RX ADMIN — INSULIN GLARGINE 18 UNIT(S): 100 INJECTION, SOLUTION SUBCUTANEOUS at 22:49

## 2019-11-21 RX ADMIN — TAMSULOSIN HYDROCHLORIDE 0.4 MILLIGRAM(S): 0.4 CAPSULE ORAL at 22:49

## 2019-11-21 RX ADMIN — PIPERACILLIN AND TAZOBACTAM 200 GRAM(S): 4; .5 INJECTION, POWDER, LYOPHILIZED, FOR SOLUTION INTRAVENOUS at 08:01

## 2019-11-21 RX ADMIN — Medication 1: at 17:10

## 2019-11-21 RX ADMIN — MYCOPHENOLATE MOFETIL 500 MILLIGRAM(S): 250 CAPSULE ORAL at 17:10

## 2019-11-21 RX ADMIN — TACROLIMUS 4 MILLIGRAM(S): 5 CAPSULE ORAL at 07:39

## 2019-11-21 RX ADMIN — Medication 1: at 07:35

## 2019-11-21 NOTE — DISCHARGE NOTE PROVIDER - CARE PROVIDER_API CALL
Ovidio Diggs)  Surgery; Vascular Surgery  [address] [place] [address]  Phone: [phone]  Fax: [phone]  Follow Up Time: 1 week    Mike Tabor)  Internal Medicine  [address], [address] [place] [address]  Phone: [phone]  Fax: [phone]  Follow Up Time: 1-3 days

## 2019-11-21 NOTE — DISCHARGE NOTE PROVIDER - NSDCMRMEDTOKEN_GEN_ALL_CORE_FT
aspirin 81 mg oral tablet, chewable: 1 tab(s) orally once a day  carvedilol 3.125 mg oral tablet: 1 tab(s) orally 2 times a day  docusate sodium 100 mg oral capsule: 1 cap(s) orally 3 times a day  Flomax 0.4 mg oral capsule: 1 cap(s) orally once a day  HumaLOG Mix 75/25 subcutaneous suspension: subcutaneous 2 times a day  mycophenolate mofetil 250 mg oral capsule: 500  orally 2 times a day  predniSONE 5 mg oral tablet: 1 tab(s) orally once a day  tacrolimus 1 mg oral capsule: 4 cap(s) orally 2 times a day aspirin 81 mg oral tablet, chewable: 1 tab(s) orally once a day  carvedilol 3.125 mg oral tablet: 1 tab(s) orally 2 times a day  cefepime 2 g intravenous injection: 2 gram(s) intravenous every 8 hours  docusate sodium 100 mg oral capsule: 1 cap(s) orally 3 times a day  Flomax 0.4 mg oral capsule: 1 cap(s) orally once a day  HumaLOG Mix 75/25 subcutaneous suspension: subcutaneous 2 times a day  linezolid 600 mg oral tablet: 1 tab(s) orally every 12 hours  mycophenolate mofetil 250 mg oral capsule: 500  orally 2 times a day  predniSONE 5 mg oral tablet: 1 tab(s) orally once a day  tacrolimus 1 mg oral capsule: 4 cap(s) orally 2 times a day

## 2019-11-21 NOTE — DISCHARGE NOTE PROVIDER - PROVIDER TOKENS
PROVIDER:[TOKEN:[[id number]],FOLLOWUP:[1 week]],PROVIDER:[TOKEN:[[id number]],FOLLOWUP:[1-3 days]]

## 2019-11-21 NOTE — CONSULT NOTE ADULT - ASSESSMENT
IMPRESSION:  Diabetic foot infection - polymicrobial in nature    Patient is concerned about having too many IV doses at home    Recommend:  1.  Can stop Zosyn given resistance   2.  Start Cefepime 2 grams IV q8hrs  3.  He has Corynebacterium striatum in current culture and Enterococcus Faecalis in the prior culture.  Can start linezolid 600 mg PO q12 hrs.  Would prefer this over vancomycin given history of renal transplant and given his concern of too many IV doses at home  4.  Would treat for 6 weeks with cefepime and linezolid  5.  Will need CBC, CMP, ESR, CRP qweek.  CBC is essential as there is a risk of bone marrow suppression with prolonged course of linezolid    ID team 2 will follow

## 2019-11-21 NOTE — PROGRESS NOTE ADULT - PROBLEM SELECTOR PLAN 1
s/p LKD ~ 7 yeasr ago( from son), on tacrolimus, cellcept and prednisone   s/p left 5th toe amputation in the setting of non healing wound  Kidney function remained stable, zosyn switched over Cefepime and linezolid based on + OR Clx for pseudomonas and Staph  - will monitor kidney function, cw current immunosuppressant regimen of Tacrolimus 4 mg BID/ Cellcept 500 mg BID and prednisone 5 mg daily  - bp well controlled on coreg 12.5 BID  Will follow

## 2019-11-21 NOTE — DIETITIAN INITIAL EVALUATION ADULT. - ADD RECOMMEND
1) Recommend continue with consistent CHO (w/evening snacks), Kosher diet. 2) Encourage PO intake. 3) Monitor lytes and replete prn. [id number]. (pt noted w/ hypophosphatemia) 4) Obtain and monitor weekly wts.

## 2019-11-21 NOTE — DIETITIAN INITIAL EVALUATION ADULT. - OTHER INFO
Pt is a 79 y.o M with PMHx significant for IDDM, ESRD s/p kidney transplant(~7 years ago) under immunosuppressing medication, recently discharged w/ PICC line here for IV abx after an I&D for a previous diabetic ulcer, now p/w c/o new non healing ulcer in LLE under IV abx. S/p left 4th and 5th toe amputation 11/19.      Pt seen sitting upright in bed, pleasant; Spouse present at bedside and able to provide information for pt. Spouse reports pt w/ good appetite and PO intake PTA, typically consuming chicken, vegetable soup, beef (x1/week), fish (x3/week), vegetables, potato, pasta. Confirms NKFA and follows a Kosher diet. Denies chewing/swallowing difficulties. Pt states -204lbs. Pt noted w/ admit wt 204lbs (11/19). Wt appears stable. Pt endorses good appetite at present, consuming >75% of meals. Denies N/V/D/C. +BM 11/20. No pain reported at this time. Skin: Edema 1+ left leg noted; Surgical incision noted. Discussed increased needs given pt's condition and T2DM nutrition therapy provided. Reinforced food safety given pt's condition. Declined to have nutrition-related questions at this time. RD to f/u per protocol. Pt is a 79 y.o M with PMHx significant for IDDM, ESRD s/p kidney transplant(~7 years ago) under immunosuppressing medication, recently discharged w/ PICC line here for IV abx after an I&D for a previous diabetic ulcer, now p/w c/o new non healing ulcer in LLE under IV abx. S/p left 4th and 5th toe amputation 11/19.      Pt seen sitting upright in bed, pleasant; Spouse present at bedside and able to provide information for pt. Spouse reports pt w/ good appetite and PO intake PTA, typically consuming chicken, vegetable soup, beef (x1/week), fish (x3/week), vegetables, potato, pasta. Confirms NKFA and follows a Kosher diet. Denies chewing/swallowing difficulties. Pt states -204lbs. Pt noted w/ admit wt 204lbs (11/19). Wt appears stable. Pt endorses good appetite at present, consuming >75% of meals. Denies N/V/D/C. +BM 11/20. No pain reported at this time. Skin: Edema 1+ left leg noted; Surgical incision noted. Discussed increased needs given pt's condition and T2DM nutrition therapy provided. Reinforced importance of food safety given pt's condition. Declined to have nutrition-related questions at this time. RD to f/u per protocol.

## 2019-11-21 NOTE — DISCHARGE NOTE PROVIDER - NSDCCPCAREPLAN_GEN_ALL_CORE_FT
PRINCIPAL DISCHARGE DIAGNOSIS  Diagnosis: Diabetic foot ulcer  Assessment and Plan of Treatment:       SECONDARY DISCHARGE DIAGNOSES  Diagnosis: Diabetes mellitus  Assessment and Plan of Treatment:     Diagnosis: BPH without urinary obstruction  Assessment and Plan of Treatment:     Diagnosis: HTN (hypertension)  Assessment and Plan of Treatment: PRINCIPAL DISCHARGE DIAGNOSIS  Diagnosis: Diabetic foot ulcer  Assessment and Plan of Treatment:       SECONDARY DISCHARGE DIAGNOSES  Diagnosis: History of renal transplant  Assessment and Plan of Treatment:     Diagnosis: Diabetes mellitus  Assessment and Plan of Treatment:     Diagnosis: BPH without urinary obstruction  Assessment and Plan of Treatment:     Diagnosis: HTN (hypertension)  Assessment and Plan of Treatment: PRINCIPAL DISCHARGE DIAGNOSIS  Diagnosis: Diabetic infection of left foot  Assessment and Plan of Treatment: Pseudomonas      SECONDARY DISCHARGE DIAGNOSES  Diagnosis: History of renal transplant  Assessment and Plan of Treatment:     Diagnosis: Diabetes mellitus  Assessment and Plan of Treatment:     Diagnosis: BPH without urinary obstruction  Assessment and Plan of Treatment:     Diagnosis: HTN (hypertension)  Assessment and Plan of Treatment:

## 2019-11-21 NOTE — PROGRESS NOTE ADULT - ASSESSMENT
A/p  80 y/o M with IDDM and ESRD s/p kidney transplant, presenting for new non-healing left foot ulcer, requiring vascular work up, nephrology consulted for kidney function monitoring in a transplant pt.

## 2019-11-21 NOTE — DIETITIAN INITIAL EVALUATION ADULT. - ENERGY NEEDS
Ht: 67" Wt: 204lbs IBW: 148lbs (+/-10%), 138%IBW, BMI: 31.9 kg/m2   IBW (67.1 kg) used for calculations as pt >120% of IBW.   Nutrient needs based on [facility] standards of care for older adults. Needs adjusted for post-op healing.

## 2019-11-21 NOTE — CONSULT NOTE ADULT - SUBJECTIVE AND OBJECTIVE BOX
HPI:  Pt is a  80 yo pt that was seen in the office today by Dr. Lopez and sent to be direct admitted to Vascular service due to an new non-healing ulcer on LLE. Pt has a picc line and recently discharged from our service with IV abx at home after an I&D for a previous diabetic ulcer with a penrose drain healing well with no signs of infection. Nothings else changed between two hospital stays. Pt refuses LLE pain, fevers, chest or abdominal pain, SOB, dizziness, nausea, weakness.  Patient went to OR on  11/19 for I&D.  Purulence was noted in the OR.  Wound cultures were sent.  ID was consulted for further recommendations.      PAST MEDICAL & SURGICAL HISTORY:  History of renal transplant: secondary to DM  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  BPH (benign prostatic hypertrophy)  HTN (hypertension)  Kidney transplant recipient        REVIEW OF SYSTEMS:    General:	 no weakness; no fevers, no chills  Skin/Breast: no rash  Respiratory and Thorax: no SOB, no cough  Cardiovascular:	No chest pain  Gastrointestinal:	 no nausea, vomiting , diarrhea  Genitourinary:	no dysuria, no difficulty urinating, no hematuria  Musculoskeletal:	no weakness, no joint swelling/pain  Neurological:	no focal weakness/numbness  Endocrine:	no polyuria, no polydipsia      ANTIBIOTICS:  MEDICATIONS  (STANDING):  aspirin  chewable 81 milliGRAM(s) Oral daily  carvedilol 12.5 milliGRAM(s) Oral every 12 hours  chlorhexidine 2% Cloths 1 Application(s) Topical <User Schedule>  dextrose 5%. 1000 milliLiter(s) (50 mL/Hr) IV Continuous <Continuous>  dextrose 50% Injectable 12.5 Gram(s) IV Push once  dextrose 50% Injectable 25 Gram(s) IV Push once  dextrose 50% Injectable 25 Gram(s) IV Push once  docusate sodium 100 milliGRAM(s) Oral three times a day  enoxaparin Injectable 40 milliGRAM(s) SubCutaneous daily  influenza   Vaccine 0.5 milliLiter(s) IntraMuscular once  insulin glargine Injectable (LANTUS) 18 Unit(s) SubCutaneous at bedtime  insulin lispro (HumaLOG) corrective regimen sliding scale   SubCutaneous Before meals and at bedtime  mycophenolate mofetil 500 milliGRAM(s) Oral two times a day  piperacillin/tazobactam IVPB.. 3.375 Gram(s) IV Intermittent every 6 hours  predniSONE   Tablet 5 milliGRAM(s) Oral daily  tacrolimus 4 milliGRAM(s) Oral two times a day  tamsulosin 0.4 milliGRAM(s) Oral at bedtime  vancomycin  IVPB 1250 milliGRAM(s) IV Intermittent every 12 hours  vancomycin  IVPB        MEDICATIONS  (PRN):  dextrose 40% Gel 15 Gram(s) Oral once PRN Blood Glucose LESS THAN 70 milliGRAM(s)/deciliter  glucagon  Injectable 1 milliGRAM(s) IntraMuscular once PRN Glucose LESS THAN 70 milligrams/deciliter  ondansetron Injectable 4 milliGRAM(s) IV Push every 6 hours PRN Nausea  oxycodone    5 mG/acetaminophen 325 mG 1 Tablet(s) Oral every 4 hours PRN Moderate Pain (4 - 6)  oxycodone    5 mG/acetaminophen 325 mG 2 Tablet(s) Oral every 6 hours PRN Severe Pain (7 - 10)      Allergies    No Known Allergies    Intolerances        SOCIAL HISTORY:    FAMILY HISTORY:      Vital Signs Last 24 Hrs  T(C): 36.6 (21 Nov 2019 13:56), Max: 37.6 (21 Nov 2019 06:24)  T(F): 97.8 (21 Nov 2019 13:56), Max: 99.6 (21 Nov 2019 06:24)  HR: 70 (21 Nov 2019 13:22) (70 - 82)  BP: 165/73 (21 Nov 2019 13:22) (117/56 - 175/73)  BP(mean): 105 (20 Nov 2019 18:18) (105 - 105)  RR: 16 (21 Nov 2019 13:22) (16 - 20)  SpO2: 97% (21 Nov 2019 13:22) (96% - 98%)    PHYSICAL EXAM:  Constitutional:  non-toxic, no distress  Eyes:  no icterus   Ear/Nose/Throat: no oral lesion, no sinus tenderness on percussion	  Neck:no JVD, no lymphadenopathy, supple  Respiratory: CTA slim  Cardiovascular: S1S2 RRR, no murmurs  Gastrointestinal:soft, (+) BS, no HSM  Extremities: left foot with dressing in place   Vascular: DP Pulse:	right normal; left normal            LABS:                        11.0   5.62  )-----------( 177      ( 21 Nov 2019 07:42 )             35.7     11-21    136  |  101  |  14  ----------------------------<  164<H>  3.7   |  25  |  0.97    Ca    9.5      21 Nov 2019 07:42  Phos  1.8     11-21  Mg     1.7     11-21            MICROBIOLOGY:    Culture - Surgical Swab (11.19.19 @ 12:50)    Gram Stain:   No organisms seen  No WBC's seen.    -  Ampicillin: R >16 These ampicillin results predict results for amoxicillin    -  Ampicillin/Sulbactam: R >16/8 Enterobacter, Citrobacter, and Serratia may develop resistance during prolonged therapy (3-4 days)    -  Cefazolin: R >16 Enterobacter, Citrobacter, and Serratia may develop resistance during prolonged therapy (3-4 days)    -  Ceftriaxone: S <=1 Enterobacter, Citrobacter, and Serratia may develop resistance during prolonged therapy    -  Ciprofloxacin: S 0.012    -  Gentamicin: S <=1    -  Meropenem: S <=1    -  Piperacillin/Tazobactam: R >64    -  Tobramycin: S <=2    -  Trimethoprim/Sulfamethoxazole: R >2/38    Specimen Source: .Surgical Swab #2-or-left foot 4th metatarsal clean margin cultur    Culture Results:   Rare Escherichia coli      Rare Corynebacterium striatum group    Organism Identification: Escherichia coli  Escherichia coli    Organism: Escherichia coli    Organism: Escherichia coli    Method Type: JOJO    Method Type: ETEST      Culture - Surgical Swab (11.19.19 @ 12:50)    Gram Stain:   No organisms seen  No WBC's seen.    -  Cefazolin: S <=4    -  Cefepime: S 8    -  Aztreonam: I    -  RIF- Rifampin: S <=1 Should not be used as monotherapy    -  Piperacillin/Tazobactam: I    -  Tobramycin: S <=2    -  Vancomycin: S 0.5    -  Trimethoprim/Sulfamethoxazole: S <=0.5/9.5    -  Oxacillin: S 0.5    -  Penicillin: R 2    -  Gentamicin: S <=1    -  Linezolid: S <=0.5    -  Ciprofloxacin: S <=0.5    -  Ciprofloxacin: S <=1    -  Clindamycin: S <=0.25    -  Erythromycin: S <=0.25    Specimen Source: .Surgical Swab #1-or-left foot deep wound swab culture    Culture Results:   Rare Pseudomonas aeruginosa  Rare Staphylococcus luanahiunensis    Organism Identification: Pseudomonas aeruginosa  Pseudomonas aeruginosa  Staphylococcus luanahiunensis    Organism: Pseudomonas aeruginosa    Organism: Pseudomonas aeruginosa    Organism: Staphylococcus lugdunensis    Method Type: JOJO    Method Type: KB    Method Type: JOJO    RADIOLOGY & ADDITIONAL STUDIES:

## 2019-11-21 NOTE — PROGRESS NOTE ADULT - ATTENDING COMMENTS
have reviewed above and examined pt.  exam w/o change, and new ID recs reviewed.   will review plan for d/c and f/u with all.

## 2019-11-21 NOTE — DISCHARGE NOTE PROVIDER - NSDCFUADDINST_GEN_ALL_CORE_FT
Follow up: with Dr. Diggs in 1-2 weeks. Call the office at  to schedule your appointment.   Please follow up with Dr. Tabor within one week for blood work      Wound Care: You will have a Wound VAC placed on your left 4th and 5th toe amputation site. This wound VAC should be changed 3 times a week by Visiting nurses. Please make sure that the wound VAC does not become wet.     Activity:  Weight bearing ***  Ambulate as tolerated, but no heavy lifting (>10lbs) or strenuous exercise. You may resume regular diet.   Call the office if you experience increasing pain, redness, swelling or drainage from incision sites/wounds, or temperature >101.4F. FOLLOW UP: with Dr. Diggs in 1-2 weeks. Call the office at  to schedule your appointment.   Please follow up with Dr. Tabor within 2 weeks     Wound Care: You will have a Wound VAC placed on your left 4th and 5th toe amputation site. This wound VAC should be changed 3 times a week by Visiting nurses. Please make sure that the wound VAC does not become wet.     You Home infusion nurses will arrange for IV antibiotics at home. They will also draw your blood for weekly labs which will be sent to your ID Doctor. Dr. Tabor .     You will be receiving home Physical therapy services     Activity:  Weight bearing -Heel weight bearing on Left leg   Ambulate as tolerated, but no heavy lifting (>10lbs) or strenuous exercise. You may resume regular diet.     Call the office if you experience increasing pain, redness, swelling or drainage from incision sites/wounds, or temperature >101.4F.    NEW MEDICATION: you will be discharged on Cefepime 2g IV every 8 hours and Linezolid 600mg PO q12 for 6 weeks. This medications should be continued for a total of 6 weeks  Please follow up with Dr. Tabor in 2 weeks FOLLOW UP: with Dr. Diggs in 1 weeks. Call the office at  to schedule your appointment.   Please follow up with Dr. Tabor within 2 weeks     Wound Care: You will have a Wound VAC placed on your left 4th and 5th toe amputation site. This wound VAC should be changed 3 times a week by Visiting nurses. Please make sure that the wound VAC does not become wet.     You Home infusion nurses will arrange for IV antibiotics at home. They will also draw your blood for weekly labs which will be sent to your ID Doctor. Dr. Tabor .     You will be receiving home Physical therapy services     Activity:  Weight bearing -Heel weight bearing on Left leg   Ambulate as tolerated, but no heavy lifting (>10lbs) or strenuous exercise. You may resume regular diet.     Call the office if you experience increasing pain, redness, swelling or drainage from incision sites/wounds, or temperature >101.4F.    NEW MEDICATION: you will be discharged on Cefepime 2g IV every 8 hours and Linezolid 600mg PO q12 for 6 weeks. This medications should be continued for a total of 6 weeks  Please follow up with Dr. Tabor in 2 weeks

## 2019-11-21 NOTE — DISCHARGE NOTE PROVIDER - NSDCCPTREATMENT_GEN_ALL_CORE_FT
PRINCIPAL PROCEDURE  Procedure: Toe amputation, left, multiple toes  Findings and Treatment: left 4/5th toes

## 2019-11-21 NOTE — DIETITIAN INITIAL EVALUATION ADULT. - PERTINENT LABORATORY DATA
(11/21) glucose 164(H), POCT 166-203 (H), phosphorus 1.8 (L), (11/20) POCT 216-288(H), (11/19) A1c 8.6% (H)

## 2019-11-21 NOTE — PROGRESS NOTE ADULT - SUBJECTIVE AND OBJECTIVE BOX
O/N: MAGGIE, VSS started Lantus 18U                              Assessment/Plan;  78 yo pt diabetic pt with hx of diabetes under insulin for more that 20 years, kidney transplant under immunnosuppressing medication c/o new non healing ulcer in LLE under IV abx (PICC line on LUE). s/p Left 4th and 5th toe amputations    - Zosyn  - On home meds - holding lasix  - OOBA  -Consistent Carb diet  - F/u renal rec  -F/u AM labs O/N: MAGGIE, VSS started Lantus 18U    Patient was seen and examined at bedside. No acute event overnight. No complaints.      Vital Signs Last 24 Hrs  T(C): 37.6 (21 Nov 2019 06:24), Max: 37.6 (21 Nov 2019 06:24)  T(F): 99.6 (21 Nov 2019 06:24), Max: 99.6 (21 Nov 2019 06:24)  HR: 71 (21 Nov 2019 05:34) (71 - 82)  BP: 137/63 (21 Nov 2019 05:34) (124/51 - 175/73)  BP(mean): 105 (20 Nov 2019 18:18) (94 - 105)  RR: 16 (21 Nov 2019 05:34) (16 - 20)  SpO2: 97% (21 Nov 2019 05:34) (96% - 98%)    Physical Exam:  General: NAD  Pulmonary: Nonlabored breathing, no respiratory distress  Cardiovascular: NSR  Abdominal: soft, NT/ND  Extremities: WWP, 4th&5th toe amp wound without any signs of ischemia, no redness. VAC in place with good suction.   Neuro: A/O x2, baseline  Pulses: doppler signals unchanged    Lines/drains/tubes:    I&O's Summary    20 Nov 2019 07:01  -  21 Nov 2019 07:00  --------------------------------------------------------  IN: 240 mL / OUT: 350 mL / NET: -110 mL        LABS:                        11.1   5.99  )-----------( 188      ( 20 Nov 2019 15:38 )             35.5     11-20    132<L>  |  98  |  17  ----------------------------<  299<H>  4.5   |  24  |  0.99    Ca    9.8      20 Nov 2019 15:38  Phos  2.3     11-20  Mg     1.8     11-20          CAPILLARY BLOOD GLUCOSE      POCT Blood Glucose.: 166 mg/dL (21 Nov 2019 06:43)  POCT Blood Glucose.: 203 mg/dL (21 Nov 2019 00:37)  POCT Blood Glucose.: 246 mg/dL (20 Nov 2019 21:51)  POCT Blood Glucose.: 288 mg/dL (20 Nov 2019 19:33)  POCT Blood Glucose.: 280 mg/dL (20 Nov 2019 10:36)        RADIOLOGY & ADDITIONAL STUDIES:                          Assessment/Plan;  78 yo pt diabetic pt with hx of diabetes under insulin for more that 20 years, kidney transplant under immunnosuppressing medication c/o new non healing ulcer in LLE under IV abx (PICC line on LUE). s/p Left 4th and 5th toe amputations    - Zosyn (continue after 11/21)  - On home meds - holding lasix  - OOBA  -Consistent Carb diet  - F/u renal rec  -F/u AM labs  - WOUND VAC O/N: MAGGIE, VSS started Lantus 18U    Patient was seen and examined at bedside. No acute event overnight. No complaints.      Vital Signs Last 24 Hrs  T(C): 37.6 (21 Nov 2019 06:24), Max: 37.6 (21 Nov 2019 06:24)  T(F): 99.6 (21 Nov 2019 06:24), Max: 99.6 (21 Nov 2019 06:24)  HR: 71 (21 Nov 2019 05:34) (71 - 82)  BP: 137/63 (21 Nov 2019 05:34) (124/51 - 175/73)  BP(mean): 105 (20 Nov 2019 18:18) (94 - 105)  RR: 16 (21 Nov 2019 05:34) (16 - 20)  SpO2: 97% (21 Nov 2019 05:34) (96% - 98%)    Physical Exam:  General: NAD  Pulmonary: Nonlabored breathing, no respiratory distress  Cardiovascular: NSR  Abdominal: soft, NT/ND  Extremities: WWP, 4th&5th toe amp wound without any signs of ischemia, no redness. VAC in place with good suction.   Neuro: A/O x2, baseline  Pulses: doppler signals unchanged    Lines/drains/tubes:    I&O's Summary    20 Nov 2019 07:01  -  21 Nov 2019 07:00  --------------------------------------------------------  IN: 240 mL / OUT: 350 mL / NET: -110 mL        LABS:                        11.1   5.99  )-----------( 188      ( 20 Nov 2019 15:38 )             35.5     11-20    132<L>  |  98  |  17  ----------------------------<  299<H>  4.5   |  24  |  0.99    Ca    9.8      20 Nov 2019 15:38  Phos  2.3     11-20  Mg     1.8     11-20          CAPILLARY BLOOD GLUCOSE      POCT Blood Glucose.: 166 mg/dL (21 Nov 2019 06:43)  POCT Blood Glucose.: 203 mg/dL (21 Nov 2019 00:37)  POCT Blood Glucose.: 246 mg/dL (20 Nov 2019 21:51)  POCT Blood Glucose.: 288 mg/dL (20 Nov 2019 19:33)  POCT Blood Glucose.: 280 mg/dL (20 Nov 2019 10:36)        RADIOLOGY & ADDITIONAL STUDIES:  PLEASE CHECK WHEN PRESENT:     [  ]Heart Failure     [  ] Acute     [  ] Acute on Chronic     [  ] Chronic  -------------------------------------------------------------------     [  ]Diastolic [HFpEF]     [  ]Systolic [HFrEF]     [  ]Combined [HFpEF & HFrEF]     [  ]Other:  -------------------------------------------------------------------  [ ] Respiratory failure  [ ] Acute cor pulmonale  [ ] Asthma/COPD Exacerbation  [ ] Pleural effusion  [ ] Aspiration pneumonia  -------------------------------------------------------------------  [  ]ELIZABETH     [  ]ATN     [  ]Reneal Medullary Necrosis     [  ]Renal Cortical Necrosis     [  ]Other Pathological Lesions:    [  ]CKD 1  [  ]CKD 2  [  ]CKD 3  [  ]CKD 4  [X  ]CKD 5 s/p kidney transplant  [  ]Other  -------------------------------------------------------------------  [ X ]Diabetes  [  ] Diabetic PVD Ulcer  [  ] Neuropathic ulcer to DM  [ X ] Diabetes with Nephropathy  [  ] Osteomyelitis due to diabetes  --------------------------------------------------------------------  [  ]Malnutrition: See Nutrition Note  [  ]Cachexia  [  ]Other:   [  ]Supplement Ordered:  [  ]Morbid Obesity (BMI >=40]  ---------------------------------------------------------------------  [ ] Sepsis/severe sepsis/septic shock  [ ] UTI  [ ] Pneumonia  -----------------------------------------------------------------------  [ ] Acidosis/alkalosis  [ ] Fluid overload  [ ] Hypokalemia  [ ] Hyperkalemia  [ ] Hypomagnesemia  [ ] Hypophosphatemia  [ ] Hyperphosphatemia  ------------------------------------------------------------------------  [ ] Acute blood loss anemia  [ ] Post op blood loss anemia  [ ] Iron deficiency anemia  [ ] Anemia due to chronic disease  [ ] Hypercoagulable state  ----------------------------------------------------------------------  [ ] Cerebral infarction  [ ] Transient ischemia attack  [ ] Encephalopathy                      Assessment/Plan;  80 yo pt diabetic pt with hx of diabetes under insulin for more that 20 years, kidney transplant under immunnosuppressing medication c/o new non healing ulcer in LLE under IV abx (PICC line on LUE). s/p Left 4th and 5th toe amputations    - Zosyn (continue after 11/21)  - On home meds - holding lasix  - OOBA  -Consistent Carb diet  - F/u renal rec  -F/u AM labs  - WOUND VAC

## 2019-11-21 NOTE — PROGRESS NOTE ADULT - SUBJECTIVE AND OBJECTIVE BOX
O/N Events: MAGGIE  Subjective:  denies any complaints, renal function stable, appears euvolemic, Lytes are WNL except low phos   OR clx growing pseudomonas and staph    VITALS  Vital Signs Last 24 Hrs  T(C): 36.7 (21 Nov 2019 17:18), Max: 37.6 (21 Nov 2019 06:24)  T(F): 98.1 (21 Nov 2019 17:18), Max: 99.6 (21 Nov 2019 06:24)  HR: 71 (21 Nov 2019 17:07) (70 - 78)  BP: 135/97 (21 Nov 2019 17:07) (117/56 - 165/73)  RR: 16 (21 Nov 2019 17:07) (16 - 20)  SpO2: 97% (21 Nov 2019 17:07) (96% - 98%)    PHYSICAL EXAM  ENT: MMM  Neck: no JVD  Respiratory: CTA B/L  Cardiac: +S1/S2; RRR; systolic murmur is present   Gastrointestinal: soft, NT/ND  Extremities: Warm, trace LLE edema, left foot wrapped with kerlix c/d/i/  Neurologic: AAOx3, no focal deficits  Access: RUE lower AVF with thrill and audible bruit     MEDICATIONS  (STANDING):  aspirin  chewable 81 milliGRAM(s) Oral daily  carvedilol 12.5 milliGRAM(s) Oral every 12 hours  cefepime   IVPB 2000 milliGRAM(s) IV Intermittent every 8 hours  chlorhexidine 2% Cloths 1 Application(s) Topical <User Schedule>  dextrose 5%. 1000 milliLiter(s) (50 mL/Hr) IV Continuous <Continuous>  dextrose 50% Injectable 12.5 Gram(s) IV Push once  dextrose 50% Injectable 25 Gram(s) IV Push once  dextrose 50% Injectable 25 Gram(s) IV Push once  docusate sodium 100 milliGRAM(s) Oral three times a day  enoxaparin Injectable 40 milliGRAM(s) SubCutaneous daily  influenza   Vaccine 0.5 milliLiter(s) IntraMuscular once  insulin glargine Injectable (LANTUS) 18 Unit(s) SubCutaneous at bedtime  insulin lispro (HumaLOG) corrective regimen sliding scale   SubCutaneous Before meals and at bedtime  linezolid  IVPB 600 milliGRAM(s) IV Intermittent every 12 hours  mycophenolate mofetil 500 milliGRAM(s) Oral two times a day  predniSONE   Tablet 5 milliGRAM(s) Oral daily  tacrolimus 4 milliGRAM(s) Oral two times a day  tamsulosin 0.4 milliGRAM(s) Oral at bedtime    MEDICATIONS  (PRN):  dextrose 40% Gel 15 Gram(s) Oral once PRN Blood Glucose LESS THAN 70 milliGRAM(s)/deciliter  glucagon  Injectable 1 milliGRAM(s) IntraMuscular once PRN Glucose LESS THAN 70 milligrams/deciliter  ondansetron Injectable 4 milliGRAM(s) IV Push every 6 hours PRN Nausea  oxycodone    5 mG/acetaminophen 325 mG 1 Tablet(s) Oral every 4 hours PRN Moderate Pain (4 - 6)  oxycodone    5 mG/acetaminophen 325 mG 2 Tablet(s) Oral every 6 hours PRN Severe Pain (7 - 10)      LABS                        11.0   5.62  )-----------( 177      ( 21 Nov 2019 07:42 )             35.7     11-21    136  |  101  |  14  ----------------------------<  164<H>  3.7   |  25  |  0.97    Ca    9.5      21 Nov 2019 07:42  Phos  1.8     11-21  Mg     1.7     11-21

## 2019-11-21 NOTE — DIETITIAN INITIAL EVALUATION ADULT. - PERTINENT MEDS FT
lantus, humalog sliding scale, potassium chloride, potassium phosphate IV, colace, carvedilol, cellcept, ondansetron injectable, prednisone, prograf

## 2019-11-21 NOTE — DISCHARGE NOTE PROVIDER - CARE PROVIDERS DIRECT ADDRESSES
[email or web address]

## 2019-11-21 NOTE — DISCHARGE NOTE PROVIDER - NSDCFUSCHEDAPPT_GEN_ALL_CORE_FT
BRYN OTOOLEF ; 11/26/2019 ; [facility]PP Nephro [address]  BRYN OTOOLEF ; 12/11/2019 ; NPP Ophthal [address] [facility] ; 11/26/2019 ; NPP Nephro [address] [facility] ; 12/02/2019 ; NPP Surg Vasc [address] [facility] ; 12/11/2019 ; NPP Ophthal [address]  [facility] ; 11/26/2019 ; NPP Nephro [address] [facility] ; 12/02/2019 ; NPP Surg Vasc [address] [facility] ; 12/11/2019 ; NPP Ophthal [address]

## 2019-11-21 NOTE — DISCHARGE NOTE PROVIDER - HOSPITAL COURSE
78 yo with hx of Renal transplant (normal creatinine), DM, BPH, HTN,  that was seen in the office by Dr. Lopez and sent to be direct admitted to Vascular service due to an new non-healing ulcer on LLE. Patient was previously admitted to the Vascular service for nonhealing wounds of left foot. During a previous admission patient underwent I&D for a previous diabetic ulcer with a penrose drain and was discharged with  a picc line for IV abx.         on 11/19/19 Patient was taken to the operating room with  and underwent 4th/5th toe amputations. Cultures were sent from the OR which grew e-coli, staph Lugdunesis, and Cornebacterium. He was initially stated on IV Vancomycin and Zosyn. ID was consulted and recommendations were made to transition to IV Cefepime 2g q8 and Linezolid [id number] for 6 weeks. Patient was instructed to follow up with ID Dr. Tabor for regular weekly BMP and CBC while on this medication. Home IV infusion was arranged and home health was reinstated *******        A wound VAC was placed on the wound on 11/20/19. This Wound VAC was changed in 11/22 and noted to be ****         Patient was seen by PT and found to be a candidate for Home PT. 80 yo with hx of Renal transplant (normal creatinine), DM, BPH, HTN,  that was seen in the office by Dr. Lopez and sent to be direct admitted to Vascular service due to an new non-healing ulcer on LLE. Patient was previously admitted to the Vascular service for nonhealing wounds of left foot. During a previous admission patient underwent I&D for a previous diabetic ulcer with a penrose drain and was discharged with  a picc line for IV abx.         on 11/19/19 Patient was taken to the operating room with  and underwent 4th/5th toe amputations. Cultures were sent from the OR which grew e-coli, staph Lugdunesis, and Cornebacterium. He was initially stated on IV Vancomycin and Zosyn. ID was consulted and recommendations were made to transition to IV Cefepime 2g q8 and Linezolid 600mg PO q12 for 6 weeks. Patient was instructed to follow up with ID Dr. Tabor for regular weekly BMP and CBC while on this medication. Home IV infusion was arranged and home health was reinstated.         A wound VAC was placed on the wound on 11/20/19. This Wound VAC was changed in 11/22 and noted to be healing well. He will be discharged with Home VAC which was applied the day of discharge.     Patient was seen by PT and found to be a candidate for Home PT. This was arranged prior to discharge

## 2019-11-21 NOTE — CONSULT NOTE ADULT - REASON FOR ADMISSION
New non-healing ulcer on the LLE - Old ID for previous diabetic infection
New non-healing ulcer on the LLE - Old ID for previous diabetic infection

## 2019-11-22 ENCOUNTER — TRANSCRIPTION ENCOUNTER (OUTPATIENT)
Age: 79
End: 2019-11-22

## 2019-11-22 VITALS — RESPIRATION RATE: 16 BRPM | SYSTOLIC BLOOD PRESSURE: 125 MMHG | DIASTOLIC BLOOD PRESSURE: 77 MMHG | HEART RATE: 63 BPM

## 2019-11-22 LAB
ANION GAP SERPL CALC-SCNC: 10 MMOL/L — SIGNIFICANT CHANGE UP (ref 5–17)
BUN SERPL-MCNC: 10 MG/DL — SIGNIFICANT CHANGE UP (ref 7–23)
CALCIUM SERPL-MCNC: 9 MG/DL — SIGNIFICANT CHANGE UP (ref 8.4–10.5)
CHLORIDE SERPL-SCNC: 100 MMOL/L — SIGNIFICANT CHANGE UP (ref 96–108)
CO2 SERPL-SCNC: 23 MMOL/L — SIGNIFICANT CHANGE UP (ref 22–31)
CREAT SERPL-MCNC: 0.88 MG/DL — SIGNIFICANT CHANGE UP (ref 0.5–1.3)
GLUCOSE BLDC GLUCOMTR-MCNC: 255 MG/DL — HIGH (ref 70–99)
GLUCOSE SERPL-MCNC: 224 MG/DL — HIGH (ref 70–99)
HCT VFR BLD CALC: 37.4 % — LOW (ref 39–50)
HGB BLD-MCNC: 11.2 G/DL — LOW (ref 13–17)
MAGNESIUM SERPL-MCNC: 1.8 MG/DL — SIGNIFICANT CHANGE UP (ref 1.6–2.6)
MCHC RBC-ENTMCNC: 26.5 PG — LOW (ref 27–34)
MCHC RBC-ENTMCNC: 29.9 GM/DL — LOW (ref 32–36)
MCV RBC AUTO: 88.4 FL — SIGNIFICANT CHANGE UP (ref 80–100)
NRBC # BLD: 0 /100 WBCS — SIGNIFICANT CHANGE UP (ref 0–0)
PHOSPHATE SERPL-MCNC: 2.2 MG/DL — LOW (ref 2.5–4.5)
PLATELET # BLD AUTO: 192 K/UL — SIGNIFICANT CHANGE UP (ref 150–400)
POTASSIUM SERPL-MCNC: 4 MMOL/L — SIGNIFICANT CHANGE UP (ref 3.5–5.3)
POTASSIUM SERPL-SCNC: 4 MMOL/L — SIGNIFICANT CHANGE UP (ref 3.5–5.3)
RBC # BLD: 4.23 M/UL — SIGNIFICANT CHANGE UP (ref 4.2–5.8)
RBC # FLD: 14.4 % — SIGNIFICANT CHANGE UP (ref 10.3–14.5)
SODIUM SERPL-SCNC: 133 MMOL/L — LOW (ref 135–145)
WBC # BLD: 5.61 K/UL — SIGNIFICANT CHANGE UP (ref 3.8–10.5)
WBC # FLD AUTO: 5.61 K/UL — SIGNIFICANT CHANGE UP (ref 3.8–10.5)

## 2019-11-22 PROCEDURE — 99232 SBSQ HOSP IP/OBS MODERATE 35: CPT

## 2019-11-22 RX ORDER — LINEZOLID 600 MG/300ML
600 INJECTION, SOLUTION INTRAVENOUS EVERY 12 HOURS
Refills: 0 | Status: DISCONTINUED | OUTPATIENT
Start: 2019-11-22 | End: 2019-11-22

## 2019-11-22 RX ORDER — LINEZOLID 600 MG/300ML
1 INJECTION, SOLUTION INTRAVENOUS
Qty: 0 | Refills: 0 | DISCHARGE
Start: 2019-11-22 | End: 2020-01-03

## 2019-11-22 RX ORDER — CEFEPIME 1 G/1
2 INJECTION, POWDER, FOR SOLUTION INTRAMUSCULAR; INTRAVENOUS
Qty: 0 | Refills: 0 | DISCHARGE
Start: 2019-11-22 | End: 2020-01-03

## 2019-11-22 RX ORDER — LINEZOLID 600 MG/300ML
1 INJECTION, SOLUTION INTRAVENOUS
Qty: 0 | Refills: 0 | DISCHARGE
Start: 2019-11-22

## 2019-11-22 RX ORDER — CEFEPIME 1 G/1
2 INJECTION, POWDER, FOR SOLUTION INTRAMUSCULAR; INTRAVENOUS
Qty: 0 | Refills: 0 | DISCHARGE
Start: 2019-11-22

## 2019-11-22 RX ADMIN — Medication 3: at 11:16

## 2019-11-22 RX ADMIN — MYCOPHENOLATE MOFETIL 500 MILLIGRAM(S): 250 CAPSULE ORAL at 06:02

## 2019-11-22 RX ADMIN — CARVEDILOL PHOSPHATE 12.5 MILLIGRAM(S): 80 CAPSULE, EXTENDED RELEASE ORAL at 06:08

## 2019-11-22 RX ADMIN — ENOXAPARIN SODIUM 40 MILLIGRAM(S): 100 INJECTION SUBCUTANEOUS at 11:15

## 2019-11-22 RX ADMIN — CHLORHEXIDINE GLUCONATE 1 APPLICATION(S): 213 SOLUTION TOPICAL at 06:09

## 2019-11-22 RX ADMIN — CEFEPIME 100 MILLIGRAM(S): 1 INJECTION, POWDER, FOR SOLUTION INTRAMUSCULAR; INTRAVENOUS at 12:07

## 2019-11-22 RX ADMIN — CEFEPIME 100 MILLIGRAM(S): 1 INJECTION, POWDER, FOR SOLUTION INTRAMUSCULAR; INTRAVENOUS at 01:14

## 2019-11-22 RX ADMIN — Medication 81 MILLIGRAM(S): at 11:15

## 2019-11-22 RX ADMIN — TACROLIMUS 4 MILLIGRAM(S): 5 CAPSULE ORAL at 12:05

## 2019-11-22 RX ADMIN — LINEZOLID 300 MILLIGRAM(S): 600 INJECTION, SOLUTION INTRAVENOUS at 06:07

## 2019-11-22 RX ADMIN — Medication 5 MILLIGRAM(S): at 06:03

## 2019-11-22 NOTE — PROGRESS NOTE ADULT - ASSESSMENT
IMPRESSION:  Diabetic foot infection s/p debridement    Recommend:  1. Continue Cefepime 2 grams IV q8hrs + Linezolid 600 mg PO q12 x 6 weeks (day 1 = 11/21/19)  2.  Needs weekly CBC, CMP, ESR, CRP  3.  Can follow up with me in 2-3 weeks:    [address], [address]  [place]  [phone], option 2  fax:  [phone]    ID team 2 will sign off.  Reconsult with any questions

## 2019-11-22 NOTE — PROGRESS NOTE ADULT - REASON FOR ADMISSION
New non-healing ulcer on the LLE - Old ID for previous diabetic infection

## 2019-11-22 NOTE — DISCHARGE NOTE NURSING/CASE MANAGEMENT/SOCIAL WORK - PATIENT PORTAL LINK FT
You can access the FollowMyHealth Patient Portal offered by [facility] by registering at the following website: [email or web address]. By joining [facility]’s FollowMyHealth portal, you will also be able to view your health information using other applications (apps) compatible with our system.

## 2019-11-22 NOTE — PROGRESS NOTE ADULT - SUBJECTIVE AND OBJECTIVE BOX
INTERVAL HPI/OVERNIGHT EVENTS:    Patient was seen and examined at bedside.  No events overnight     CONSTITUTIONAL:  Negative fever or chills, feels well, good appetite  EYES:  Negative  blurry vision or double vision  CARDIOVASCULAR:  Negative for chest pain or palpitations  RESPIRATORY:  Negative for cough, wheezing, or SOB   GASTROINTESTINAL:  Negative for nausea, vomiting, diarrhea, constipation, or abdominal pain  GENITOURINARY:  Negative frequency, urgency or dysuria  NEUROLOGIC:  No headache, confusion, dizziness, lightheadedness      ANTIBIOTICS/RELEVANT:    MEDICATIONS  (STANDING):  aspirin  chewable 81 milliGRAM(s) Oral daily  carvedilol 12.5 milliGRAM(s) Oral every 12 hours  cefepime   IVPB 2000 milliGRAM(s) IV Intermittent every 8 hours  chlorhexidine 2% Cloths 1 Application(s) Topical <User Schedule>  dextrose 5%. 1000 milliLiter(s) (50 mL/Hr) IV Continuous <Continuous>  dextrose 50% Injectable 12.5 Gram(s) IV Push once  dextrose 50% Injectable 25 Gram(s) IV Push once  dextrose 50% Injectable 25 Gram(s) IV Push once  docusate sodium 100 milliGRAM(s) Oral three times a day  enoxaparin Injectable 40 milliGRAM(s) SubCutaneous daily  influenza   Vaccine 0.5 milliLiter(s) IntraMuscular once  insulin glargine Injectable (LANTUS) 18 Unit(s) SubCutaneous at bedtime  insulin lispro (HumaLOG) corrective regimen sliding scale   SubCutaneous Before meals and at bedtime  linezolid    Tablet 600 milliGRAM(s) Oral every 12 hours  mycophenolate mofetil 500 milliGRAM(s) Oral two times a day  predniSONE   Tablet 5 milliGRAM(s) Oral daily  tacrolimus 4 milliGRAM(s) Oral two times a day  tamsulosin 0.4 milliGRAM(s) Oral at bedtime    MEDICATIONS  (PRN):  dextrose 40% Gel 15 Gram(s) Oral once PRN Blood Glucose LESS THAN 70 milliGRAM(s)/deciliter  glucagon  Injectable 1 milliGRAM(s) IntraMuscular once PRN Glucose LESS THAN 70 milligrams/deciliter  ondansetron Injectable 4 milliGRAM(s) IV Push every 6 hours PRN Nausea  oxycodone    5 mG/acetaminophen 325 mG 1 Tablet(s) Oral every 4 hours PRN Moderate Pain (4 - 6)  oxycodone    5 mG/acetaminophen 325 mG 2 Tablet(s) Oral every 6 hours PRN Severe Pain (7 - 10)        Vital Signs Last 24 Hrs  T(C): 37.1 (22 Nov 2019 09:09), Max: 37.1 (22 Nov 2019 09:09)  T(F): 98.7 (22 Nov 2019 09:09), Max: 98.7 (22 Nov 2019 09:09)  HR: 75 (22 Nov 2019 08:45) (68 - 75)  BP: 135/63 (22 Nov 2019 08:45) (123/57 - 165/73)  BP(mean): --  RR: 16 (22 Nov 2019 08:45) (14 - 16)  SpO2: 96% (22 Nov 2019 08:45) (94% - 97%)    PHYSICAL EXAM:  Constitutional:  non-toxic, no distress  Eyes:  no icterus   Ear/Nose/Throat: no oral lesion, no sinus tenderness on percussion	  Neck:no JVD, no lymphadenopathy, supple  Respiratory: clear to auscultation  Cardiovascular: S1S2 RRR, no murmurs  Gastrointestinal:soft, (+) BS, no HSM  Extremities: s/p amputations   Vascular: DP Pulse:	right normal; left normal      LABS:                        11.2   5.61  )-----------( 192      ( 22 Nov 2019 07:06 )             37.4     11-22    133<L>  |  100  |  10  ----------------------------<  224<H>  4.0   |  23  |  0.88    Ca    9.0      22 Nov 2019 07:06  Phos  2.2     11-22  Mg     1.8     11-22            MICROBIOLOGY:    Culture - Surgical Swab (11.19.19 @ 12:50)    Gram Stain:   No organisms seen  No WBC's seen.    -  Ampicillin: R >16 These ampicillin results predict results for amoxicillin    -  Cefazolin: R >16 Enterobacter, Citrobacter, and Serratia may develop resistance during prolonged therapy (3-4 days)    -  Ampicillin/Sulbactam: R >16/8 Enterobacter, Citrobacter, and Serratia may develop resistance during prolonged therapy (3-4 days)    -  Ceftriaxone: S <=1 Enterobacter, Citrobacter, and Serratia may develop resistance during prolonged therapy    -  Ciprofloxacin: S 0.012    -  Trimethoprim/Sulfamethoxazole: R >2/38    -  Gentamicin: S <=1    -  Meropenem: S <=1    -  Piperacillin/Tazobactam: R >64    -  Tobramycin: S <=2    Specimen Source: .Surgical Swab #2-or-left foot 4th metatarsal clean margin cultur    Culture Results:   Rare Escherichia coli      Rare Corynebacterium striatum group    Organism Identification: Escherichia coli  Escherichia coli    Organism: Escherichia coli    Organism: Escherichia coli    Method Type: JOJO    Method Type: ETEST        RADIOLOGY & ADDITIONAL STUDIES:

## 2019-11-22 NOTE — PROGRESS NOTE ADULT - SUBJECTIVE AND OBJECTIVE BOX
O/N: MAGGIE, VSS                          Assessment/Plan;  78 yo pt diabetic pt with hx of diabetes under insulin for more that 20 years, kidney transplant under immunnosuppressing medication c/o new non healing ulcer in LLE under IV abx (PICC line on LUE). s/p Left 4th and 5th toe amputations    - Zosyn (continue after 11/21)  - On home meds - holding lasix  - OOBA  -Consistent Carb diet  - F/u renal rec  -F/u AM labs  - WOUND VAC O/N: MAGGIE, VSS      Patient was seen and examined at bedside. No acute event overnight. No complaints.      Vital Signs Last 24 Hrs  T(C): 37.1 (22 Nov 2019 09:09), Max: 37.1 (22 Nov 2019 09:09)  T(F): 98.7 (22 Nov 2019 09:09), Max: 98.7 (22 Nov 2019 09:09)  HR: 75 (22 Nov 2019 08:45) (68 - 75)  BP: 135/63 (22 Nov 2019 08:45) (123/57 - 165/73)  BP(mean): --  RR: 16 (22 Nov 2019 08:45) (14 - 16)  SpO2: 96% (22 Nov 2019 08:45) (94% - 97%)    Physical Exam:  General: NAD  Pulmonary: Nonlabored breathing, no respiratory distress  Cardiovascular: NSR  Abdominal: soft, NT/ND, no organomegaly  Extremities: WWP, normal strength, wound VAC off- wound healing ok. Home with wound VAC  Neuro: A/O x3        Lines/drains/tubes:    I&O's Summary    21 Nov 2019 07:01  -  22 Nov 2019 07:00  --------------------------------------------------------  IN: 1049.8 mL / OUT: 820 mL / NET: 229.8 mL    22 Nov 2019 07:01  -  22 Nov 2019 11:19  --------------------------------------------------------  IN: 0 mL / OUT: 200 mL / NET: -200 mL        LABS:                        11.2   5.61  )-----------( 192      ( 22 Nov 2019 07:06 )             37.4     11-22    133<L>  |  100  |  10  ----------------------------<  224<H>  4.0   |  23  |  0.88    Ca    9.0      22 Nov 2019 07:06  Phos  2.2     11-22  Mg     1.8     11-22          CAPILLARY BLOOD GLUCOSE      POCT Blood Glucose.: 255 mg/dL (22 Nov 2019 10:25)  POCT Blood Glucose.: 251 mg/dL (21 Nov 2019 21:50)  POCT Blood Glucose.: 199 mg/dL (21 Nov 2019 17:07)  POCT Blood Glucose.: 206 mg/dL (21 Nov 2019 11:35)        RADIOLOGY & ADDITIONAL STUDIES:                      Assessment/Plan;  80 yo pt diabetic pt with hx of diabetes under insulin for more that 20 years, kidney transplant under immunnosuppressing medication c/o new non healing ulcer in LLE under IV abx (PICC line on LUE). s/p Left 4th and 5th toe amputations    - Zosyn (continue after 11/21)  - On home meds - holding lasix  - OOBA  -Consistent Carb diet  - F/u renal rec  -F/u AM labs  - WOUND VAC    Home with wound vac  and home infusions

## 2019-11-25 ENCOUNTER — RX RENEWAL (OUTPATIENT)
Age: 79
End: 2019-11-25

## 2019-11-26 ENCOUNTER — APPOINTMENT (OUTPATIENT)
Dept: NEPHROLOGY | Facility: CLINIC | Age: 79
End: 2019-11-26

## 2019-11-26 DIAGNOSIS — E11.621 TYPE 2 DIABETES MELLITUS WITH FOOT ULCER: ICD-10-CM

## 2019-11-26 DIAGNOSIS — Z97.8 PRESENCE OF OTHER SPECIFIED DEVICES: ICD-10-CM

## 2019-11-26 DIAGNOSIS — N40.0 BENIGN PROSTATIC HYPERPLASIA WITHOUT LOWER URINARY TRACT SYMPTOMS: ICD-10-CM

## 2019-11-26 DIAGNOSIS — Z94.0 KIDNEY TRANSPLANT STATUS: ICD-10-CM

## 2019-11-26 DIAGNOSIS — K21.9 GASTRO-ESOPHAGEAL REFLUX DISEASE WITHOUT ESOPHAGITIS: ICD-10-CM

## 2019-11-26 DIAGNOSIS — Z79.4 LONG TERM (CURRENT) USE OF INSULIN: ICD-10-CM

## 2019-11-26 DIAGNOSIS — L97.529 NON-PRESSURE CHRONIC ULCER OF OTHER PART OF LEFT FOOT WITH UNSPECIFIED SEVERITY: ICD-10-CM

## 2019-11-26 DIAGNOSIS — Z79.82 LONG TERM (CURRENT) USE OF ASPIRIN: ICD-10-CM

## 2019-11-26 DIAGNOSIS — B96.20 UNSPECIFIED ESCHERICHIA COLI [E. COLI] AS THE CAUSE OF DISEASES CLASSIFIED ELSEWHERE: ICD-10-CM

## 2019-11-26 DIAGNOSIS — I10 ESSENTIAL (PRIMARY) HYPERTENSION: ICD-10-CM

## 2019-11-26 DIAGNOSIS — E11.52 TYPE 2 DIABETES MELLITUS WITH DIABETIC PERIPHERAL ANGIOPATHY WITH GANGRENE: ICD-10-CM

## 2019-11-26 DIAGNOSIS — Z79.899 OTHER LONG TERM (CURRENT) DRUG THERAPY: ICD-10-CM

## 2019-11-27 LAB — SURGICAL PATHOLOGY STUDY: SIGNIFICANT CHANGE UP

## 2019-12-02 ENCOUNTER — APPOINTMENT (OUTPATIENT)
Dept: VASCULAR SURGERY | Facility: CLINIC | Age: 79
End: 2019-12-02
Payer: MEDICARE

## 2019-12-02 PROCEDURE — 99024 POSTOP FOLLOW-UP VISIT: CPT

## 2019-12-04 PROCEDURE — 86850 RBC ANTIBODY SCREEN: CPT

## 2019-12-04 PROCEDURE — 87075 CULTR BACTERIA EXCEPT BLOOD: CPT

## 2019-12-04 PROCEDURE — 85610 PROTHROMBIN TIME: CPT

## 2019-12-04 PROCEDURE — 97162 PT EVAL MOD COMPLEX 30 MIN: CPT

## 2019-12-04 PROCEDURE — 86900 BLOOD TYPING SEROLOGIC ABO: CPT

## 2019-12-04 PROCEDURE — 85027 COMPLETE CBC AUTOMATED: CPT

## 2019-12-04 PROCEDURE — 83735 ASSAY OF MAGNESIUM: CPT

## 2019-12-04 PROCEDURE — 87184 SC STD DISK METHOD PER PLATE: CPT

## 2019-12-04 PROCEDURE — 71045 X-RAY EXAM CHEST 1 VIEW: CPT

## 2019-12-04 PROCEDURE — 87186 SC STD MICRODIL/AGAR DIL: CPT

## 2019-12-04 PROCEDURE — 80053 COMPREHEN METABOLIC PANEL: CPT

## 2019-12-04 PROCEDURE — 87070 CULTURE OTHR SPECIMN AEROBIC: CPT

## 2019-12-04 PROCEDURE — 85730 THROMBOPLASTIN TIME PARTIAL: CPT

## 2019-12-04 PROCEDURE — 86901 BLOOD TYPING SEROLOGIC RH(D): CPT

## 2019-12-04 PROCEDURE — 82962 GLUCOSE BLOOD TEST: CPT

## 2019-12-04 PROCEDURE — 83036 HEMOGLOBIN GLYCOSYLATED A1C: CPT

## 2019-12-04 PROCEDURE — 36415 COLL VENOUS BLD VENIPUNCTURE: CPT

## 2019-12-04 PROCEDURE — 88311 DECALCIFY TISSUE: CPT

## 2019-12-04 PROCEDURE — 84100 ASSAY OF PHOSPHORUS: CPT

## 2019-12-04 PROCEDURE — 80048 BASIC METABOLIC PNL TOTAL CA: CPT

## 2019-12-04 PROCEDURE — 88307 TISSUE EXAM BY PATHOLOGIST: CPT

## 2019-12-04 PROCEDURE — 93005 ELECTROCARDIOGRAM TRACING: CPT

## 2019-12-04 NOTE — ASSESSMENT
[FreeTextEntry1] : Patient here for wound evaluation - vac removed and wound looks like it is improving, patient remains afebrile/no fever/chills no pain in the foot and is scheduled to complete his IV ab in January. His wound care is being provided by [facility] nurses. \par

## 2019-12-04 NOTE — PHYSICAL EXAM
[2+] : left 2+ [Calm] : calm [de-identified] : left lateral wound - vac was removed revealing good granulation tissue in the wound bed with some fibrinous exudate, no foul smell or drainage or surrounding erythema.  [de-identified] : well, here with daughter

## 2019-12-04 NOTE — HISTORY OF PRESENT ILLNESS
[FreeTextEntry1] : pt comes in for his two week fu\par no fever or chills\par on the IV abx until January, getting regular vac care by visiting nurses. \par

## 2019-12-06 ENCOUNTER — INPATIENT (INPATIENT)
Facility: HOSPITAL | Age: 79
LOS: 11 days | Discharge: NURSING, REHAB OR LONG-TERM CARE | DRG: 682 | End: 2019-12-18
Attending: INTERNAL MEDICINE | Admitting: INTERNAL MEDICINE
Payer: MEDICARE

## 2019-12-06 VITALS
OXYGEN SATURATION: 99 % | TEMPERATURE: 98 F | SYSTOLIC BLOOD PRESSURE: 135 MMHG | DIASTOLIC BLOOD PRESSURE: 81 MMHG | HEART RATE: 72 BPM | RESPIRATION RATE: 16 BRPM

## 2019-12-06 DIAGNOSIS — R63.8 OTHER SYMPTOMS AND SIGNS CONCERNING FOOD AND FLUID INTAKE: ICD-10-CM

## 2019-12-06 DIAGNOSIS — Z94.0 KIDNEY TRANSPLANT STATUS: Chronic | ICD-10-CM

## 2019-12-06 DIAGNOSIS — I10 ESSENTIAL (PRIMARY) HYPERTENSION: ICD-10-CM

## 2019-12-06 DIAGNOSIS — R41.0 DISORIENTATION, UNSPECIFIED: ICD-10-CM

## 2019-12-06 DIAGNOSIS — S98.139A COMPLETE TRAUMATIC AMPUTATION OF ONE UNSPECIFIED LESSER TOE, INITIAL ENCOUNTER: Chronic | ICD-10-CM

## 2019-12-06 DIAGNOSIS — N40.0 BENIGN PROSTATIC HYPERPLASIA WITHOUT LOWER URINARY TRACT SYMPTOMS: ICD-10-CM

## 2019-12-06 DIAGNOSIS — N17.9 ACUTE KIDNEY FAILURE, UNSPECIFIED: ICD-10-CM

## 2019-12-06 DIAGNOSIS — E11.9 TYPE 2 DIABETES MELLITUS WITHOUT COMPLICATIONS: ICD-10-CM

## 2019-12-06 DIAGNOSIS — Z91.89 OTHER SPECIFIED PERSONAL RISK FACTORS, NOT ELSEWHERE CLASSIFIED: ICD-10-CM

## 2019-12-06 DIAGNOSIS — L97.509 NON-PRESSURE CHRONIC ULCER OF OTHER PART OF UNSPECIFIED FOOT WITH UNSPECIFIED SEVERITY: ICD-10-CM

## 2019-12-06 DIAGNOSIS — Z94.0 KIDNEY TRANSPLANT STATUS: ICD-10-CM

## 2019-12-06 LAB
-  MEROPENEM: SIGNIFICANT CHANGE UP
ALBUMIN SERPL ELPH-MCNC: 3.6 G/DL — SIGNIFICANT CHANGE UP (ref 3.3–5)
ALP SERPL-CCNC: 95 U/L — SIGNIFICANT CHANGE UP (ref 40–120)
ALT FLD-CCNC: 22 U/L — SIGNIFICANT CHANGE UP (ref 10–45)
ANION GAP SERPL CALC-SCNC: 12 MMOL/L — SIGNIFICANT CHANGE UP (ref 5–17)
APPEARANCE UR: ABNORMAL
APTT BLD: 22.5 SEC — LOW (ref 27.5–36.3)
AST SERPL-CCNC: 24 U/L — SIGNIFICANT CHANGE UP (ref 10–40)
BASOPHILS # BLD AUTO: 0.01 K/UL — SIGNIFICANT CHANGE UP (ref 0–0.2)
BASOPHILS NFR BLD AUTO: 0.1 % — SIGNIFICANT CHANGE UP (ref 0–2)
BILIRUB SERPL-MCNC: 0.4 MG/DL — SIGNIFICANT CHANGE UP (ref 0.2–1.2)
BILIRUB UR-MCNC: NEGATIVE — SIGNIFICANT CHANGE UP
BUN SERPL-MCNC: 27 MG/DL — HIGH (ref 7–23)
CALCIUM SERPL-MCNC: 10.2 MG/DL — SIGNIFICANT CHANGE UP (ref 8.4–10.5)
CHLORIDE SERPL-SCNC: 101 MMOL/L — SIGNIFICANT CHANGE UP (ref 96–108)
CO2 SERPL-SCNC: 20 MMOL/L — LOW (ref 22–31)
COLOR SPEC: YELLOW — SIGNIFICANT CHANGE UP
CREAT SERPL-MCNC: 1.51 MG/DL — HIGH (ref 0.5–1.3)
DIFF PNL FLD: ABNORMAL
EOSINOPHIL # BLD AUTO: 0.28 K/UL — SIGNIFICANT CHANGE UP (ref 0–0.5)
EOSINOPHIL NFR BLD AUTO: 3.5 % — SIGNIFICANT CHANGE UP (ref 0–6)
GLUCOSE BLDC GLUCOMTR-MCNC: 395 MG/DL — HIGH (ref 70–99)
GLUCOSE SERPL-MCNC: 265 MG/DL — HIGH (ref 70–99)
GLUCOSE UR QL: 250
HCT VFR BLD CALC: 38.3 % — LOW (ref 39–50)
HGB BLD-MCNC: 11.9 G/DL — LOW (ref 13–17)
IMM GRANULOCYTES NFR BLD AUTO: 0.4 % — SIGNIFICANT CHANGE UP (ref 0–1.5)
INR BLD: 1.06 — SIGNIFICANT CHANGE UP (ref 0.88–1.16)
KETONES UR-MCNC: ABNORMAL MG/DL
LACTATE SERPL-SCNC: 1.5 MMOL/L — SIGNIFICANT CHANGE UP (ref 0.5–2)
LEUKOCYTE ESTERASE UR-ACNC: NEGATIVE — SIGNIFICANT CHANGE UP
LYMPHOCYTES # BLD AUTO: 1.25 K/UL — SIGNIFICANT CHANGE UP (ref 1–3.3)
LYMPHOCYTES # BLD AUTO: 15.5 % — SIGNIFICANT CHANGE UP (ref 13–44)
MCHC RBC-ENTMCNC: 27.3 PG — SIGNIFICANT CHANGE UP (ref 27–34)
MCHC RBC-ENTMCNC: 31.1 GM/DL — LOW (ref 32–36)
MCV RBC AUTO: 87.8 FL — SIGNIFICANT CHANGE UP (ref 80–100)
MONOCYTES # BLD AUTO: 0.58 K/UL — SIGNIFICANT CHANGE UP (ref 0–0.9)
MONOCYTES NFR BLD AUTO: 7.2 % — SIGNIFICANT CHANGE UP (ref 2–14)
NEUTROPHILS # BLD AUTO: 5.94 K/UL — SIGNIFICANT CHANGE UP (ref 1.8–7.4)
NEUTROPHILS NFR BLD AUTO: 73.3 % — SIGNIFICANT CHANGE UP (ref 43–77)
NITRITE UR-MCNC: NEGATIVE — SIGNIFICANT CHANGE UP
NRBC # BLD: 0 /100 WBCS — SIGNIFICANT CHANGE UP (ref 0–0)
PH UR: 6 — SIGNIFICANT CHANGE UP (ref 5–8)
PLATELET # BLD AUTO: 150 K/UL — SIGNIFICANT CHANGE UP (ref 150–400)
POTASSIUM SERPL-MCNC: 5.2 MMOL/L — SIGNIFICANT CHANGE UP (ref 3.5–5.3)
POTASSIUM SERPL-SCNC: 5.2 MMOL/L — SIGNIFICANT CHANGE UP (ref 3.5–5.3)
PROT SERPL-MCNC: 6.3 G/DL — SIGNIFICANT CHANGE UP (ref 6–8.3)
PROT UR-MCNC: 30 MG/DL
PROTHROM AB SERPL-ACNC: 12 SEC — SIGNIFICANT CHANGE UP (ref 10–12.9)
RBC # BLD: 4.36 M/UL — SIGNIFICANT CHANGE UP (ref 4.2–5.8)
RBC # FLD: 15 % — HIGH (ref 10.3–14.5)
SODIUM SERPL-SCNC: 133 MMOL/L — LOW (ref 135–145)
SP GR SPEC: >=1.03 — SIGNIFICANT CHANGE UP (ref 1–1.03)
UROBILINOGEN FLD QL: 0.2 E.U./DL — SIGNIFICANT CHANGE UP
WBC # BLD: 8.09 K/UL — SIGNIFICANT CHANGE UP (ref 3.8–10.5)
WBC # FLD AUTO: 8.09 K/UL — SIGNIFICANT CHANGE UP (ref 3.8–10.5)

## 2019-12-06 PROCEDURE — 71046 X-RAY EXAM CHEST 2 VIEWS: CPT | Mod: 26

## 2019-12-06 PROCEDURE — 99285 EMERGENCY DEPT VISIT HI MDM: CPT | Mod: 25

## 2019-12-06 PROCEDURE — 70450 CT HEAD/BRAIN W/O DYE: CPT | Mod: 26

## 2019-12-06 RX ORDER — DEXTROSE 50 % IN WATER 50 %
15 SYRINGE (ML) INTRAVENOUS ONCE
Refills: 0 | Status: DISCONTINUED | OUTPATIENT
Start: 2019-12-06 | End: 2019-12-18

## 2019-12-06 RX ORDER — LINEZOLID 600 MG/300ML
600 INJECTION, SOLUTION INTRAVENOUS EVERY 12 HOURS
Refills: 0 | Status: DISCONTINUED | OUTPATIENT
Start: 2019-12-06 | End: 2019-12-18

## 2019-12-06 RX ORDER — DEXTROSE 50 % IN WATER 50 %
12.5 SYRINGE (ML) INTRAVENOUS ONCE
Refills: 0 | Status: DISCONTINUED | OUTPATIENT
Start: 2019-12-06 | End: 2019-12-18

## 2019-12-06 RX ORDER — DEXTROSE 50 % IN WATER 50 %
25 SYRINGE (ML) INTRAVENOUS ONCE
Refills: 0 | Status: DISCONTINUED | OUTPATIENT
Start: 2019-12-06 | End: 2019-12-18

## 2019-12-06 RX ORDER — TACROLIMUS 5 MG/1
4 CAPSULE ORAL EVERY 12 HOURS
Refills: 0 | Status: DISCONTINUED | OUTPATIENT
Start: 2019-12-06 | End: 2019-12-18

## 2019-12-06 RX ORDER — CARVEDILOL PHOSPHATE 80 MG/1
12.5 CAPSULE, EXTENDED RELEASE ORAL EVERY 12 HOURS
Refills: 0 | Status: DISCONTINUED | OUTPATIENT
Start: 2019-12-06 | End: 2019-12-10

## 2019-12-06 RX ORDER — ENOXAPARIN SODIUM 100 MG/ML
40 INJECTION SUBCUTANEOUS EVERY 24 HOURS
Refills: 0 | Status: DISCONTINUED | OUTPATIENT
Start: 2019-12-06 | End: 2019-12-09

## 2019-12-06 RX ORDER — TACROLIMUS 5 MG/1
4 CAPSULE ORAL EVERY 12 HOURS
Refills: 0 | Status: DISCONTINUED | OUTPATIENT
Start: 2019-12-06 | End: 2019-12-06

## 2019-12-06 RX ORDER — CEFEPIME 1 G/1
2000 INJECTION, POWDER, FOR SOLUTION INTRAMUSCULAR; INTRAVENOUS EVERY 12 HOURS
Refills: 0 | Status: DISCONTINUED | OUTPATIENT
Start: 2019-12-07 | End: 2019-12-08

## 2019-12-06 RX ORDER — INFLUENZA VIRUS VACCINE 15; 15; 15; 15 UG/.5ML; UG/.5ML; UG/.5ML; UG/.5ML
0.5 SUSPENSION INTRAMUSCULAR ONCE
Refills: 0 | Status: COMPLETED | OUTPATIENT
Start: 2019-12-06 | End: 2019-12-10

## 2019-12-06 RX ORDER — SODIUM CHLORIDE 9 MG/ML
500 INJECTION INTRAMUSCULAR; INTRAVENOUS; SUBCUTANEOUS ONCE
Refills: 0 | Status: COMPLETED | OUTPATIENT
Start: 2019-12-06 | End: 2019-12-06

## 2019-12-06 RX ORDER — CEFEPIME 1 G/1
INJECTION, POWDER, FOR SOLUTION INTRAMUSCULAR; INTRAVENOUS
Refills: 0 | Status: DISCONTINUED | OUTPATIENT
Start: 2019-12-06 | End: 2019-12-08

## 2019-12-06 RX ORDER — MYCOPHENOLATE MOFETIL 250 MG/1
500 CAPSULE ORAL EVERY 12 HOURS
Refills: 0 | Status: DISCONTINUED | OUTPATIENT
Start: 2019-12-06 | End: 2019-12-18

## 2019-12-06 RX ORDER — CHLORHEXIDINE GLUCONATE 213 G/1000ML
1 SOLUTION TOPICAL
Refills: 0 | Status: DISCONTINUED | OUTPATIENT
Start: 2019-12-06 | End: 2019-12-18

## 2019-12-06 RX ORDER — ASPIRIN/CALCIUM CARB/MAGNESIUM 324 MG
81 TABLET ORAL DAILY
Refills: 0 | Status: DISCONTINUED | OUTPATIENT
Start: 2019-12-06 | End: 2019-12-18

## 2019-12-06 RX ORDER — MYCOPHENOLATE MOFETIL 250 MG/1
500 CAPSULE ORAL EVERY 12 HOURS
Refills: 0 | Status: DISCONTINUED | OUTPATIENT
Start: 2019-12-06 | End: 2019-12-06

## 2019-12-06 RX ORDER — SODIUM CHLORIDE 9 MG/ML
10 INJECTION INTRAMUSCULAR; INTRAVENOUS; SUBCUTANEOUS
Refills: 0 | Status: DISCONTINUED | OUTPATIENT
Start: 2019-12-06 | End: 2019-12-18

## 2019-12-06 RX ORDER — INSULIN GLARGINE 100 [IU]/ML
15 INJECTION, SOLUTION SUBCUTANEOUS AT BEDTIME
Refills: 0 | Status: DISCONTINUED | OUTPATIENT
Start: 2019-12-06 | End: 2019-12-11

## 2019-12-06 RX ORDER — SODIUM CHLORIDE 9 MG/ML
1000 INJECTION, SOLUTION INTRAVENOUS
Refills: 0 | Status: DISCONTINUED | OUTPATIENT
Start: 2019-12-06 | End: 2019-12-18

## 2019-12-06 RX ORDER — DOCUSATE SODIUM 100 MG
100 CAPSULE ORAL THREE TIMES A DAY
Refills: 0 | Status: DISCONTINUED | OUTPATIENT
Start: 2019-12-06 | End: 2019-12-06

## 2019-12-06 RX ORDER — DOCUSATE SODIUM 100 MG
100 CAPSULE ORAL THREE TIMES A DAY
Refills: 0 | Status: DISCONTINUED | OUTPATIENT
Start: 2019-12-06 | End: 2019-12-18

## 2019-12-06 RX ORDER — SODIUM CHLORIDE 9 MG/ML
500 INJECTION INTRAMUSCULAR; INTRAVENOUS; SUBCUTANEOUS ONCE
Refills: 0 | Status: DISCONTINUED | OUTPATIENT
Start: 2019-12-06 | End: 2019-12-09

## 2019-12-06 RX ORDER — GLUCAGON INJECTION, SOLUTION 0.5 MG/.1ML
1 INJECTION, SOLUTION SUBCUTANEOUS ONCE
Refills: 0 | Status: DISCONTINUED | OUTPATIENT
Start: 2019-12-06 | End: 2019-12-18

## 2019-12-06 RX ORDER — INSULIN LISPRO 100/ML
VIAL (ML) SUBCUTANEOUS
Refills: 0 | Status: DISCONTINUED | OUTPATIENT
Start: 2019-12-06 | End: 2019-12-18

## 2019-12-06 RX ORDER — CARVEDILOL PHOSPHATE 80 MG/1
12.5 CAPSULE, EXTENDED RELEASE ORAL EVERY 12 HOURS
Refills: 0 | Status: DISCONTINUED | OUTPATIENT
Start: 2019-12-06 | End: 2019-12-06

## 2019-12-06 RX ORDER — INSULIN LISPRO 100/ML
5 VIAL (ML) SUBCUTANEOUS
Refills: 0 | Status: DISCONTINUED | OUTPATIENT
Start: 2019-12-06 | End: 2019-12-11

## 2019-12-06 RX ORDER — CEFEPIME 1 G/1
2000 INJECTION, POWDER, FOR SOLUTION INTRAMUSCULAR; INTRAVENOUS ONCE
Refills: 0 | Status: COMPLETED | OUTPATIENT
Start: 2019-12-06 | End: 2019-12-06

## 2019-12-06 RX ORDER — TAMSULOSIN HYDROCHLORIDE 0.4 MG/1
0.4 CAPSULE ORAL AT BEDTIME
Refills: 0 | Status: DISCONTINUED | OUTPATIENT
Start: 2019-12-06 | End: 2019-12-18

## 2019-12-06 RX ADMIN — TAMSULOSIN HYDROCHLORIDE 0.4 MILLIGRAM(S): 0.4 CAPSULE ORAL at 22:32

## 2019-12-06 RX ADMIN — CEFEPIME 100 MILLIGRAM(S): 1 INJECTION, POWDER, FOR SOLUTION INTRAMUSCULAR; INTRAVENOUS at 21:20

## 2019-12-06 RX ADMIN — Medication 10: at 22:29

## 2019-12-06 RX ADMIN — MYCOPHENOLATE MOFETIL 500 MILLIGRAM(S): 250 CAPSULE ORAL at 22:30

## 2019-12-06 RX ADMIN — INSULIN GLARGINE 15 UNIT(S): 100 INJECTION, SOLUTION SUBCUTANEOUS at 22:29

## 2019-12-06 RX ADMIN — SODIUM CHLORIDE 500 MILLILITER(S): 9 INJECTION INTRAMUSCULAR; INTRAVENOUS; SUBCUTANEOUS at 14:55

## 2019-12-06 RX ADMIN — ENOXAPARIN SODIUM 40 MILLIGRAM(S): 100 INJECTION SUBCUTANEOUS at 22:31

## 2019-12-06 RX ADMIN — LINEZOLID 600 MILLIGRAM(S): 600 INJECTION, SOLUTION INTRAVENOUS at 22:31

## 2019-12-06 RX ADMIN — Medication 100 MILLIGRAM(S): at 22:31

## 2019-12-06 NOTE — H&P ADULT - PROBLEM SELECTOR PLAN 2
Pt reports frustrations with memory from time to time. Wife believe patient is closer to baseline now (in evening) compared to morning confusion yesterday but still feels he is not as sharp as normal. Head CT negative. Neuro exam unremarkable. No signs of infections (no wbc elecation, no fever, no tachy, foot exam at baseline). AOx3 althought takes some time to answer date.  - cw abx  - monitor mental status  - monitor fluid status and encourage PO

## 2019-12-06 NOTE — H&P ADULT - PROBLEM SELECTOR PLAN 8
F: encourage PO, NS as needed  E: replete prn  N: kosher renal  ppx:  lovenox 40mg  gi none  FULL CODE 1) PCP Contacted on Admission: (Y/N) --> Name & Phone #:  2) Date of Contact with PCP:  3) PCP Contacted at Discharge: (Y/N, N/A)  4) Summary of Handoff Given to PCP:   5) Post-Discharge Appointment Date and Location: 1) PCP Contacted on Admission: (Y/N) --> Name & Phone #:Estefania PCP. Plan discussed with Dr. Jeffrey  2) Date of Contact with PCP:  3) PCP Contacted at Discharge: (Y/N, N/A)  4) Summary of Handoff Given to PCP:   5) Post-Discharge Appointment Date and Location:

## 2019-12-06 NOTE — H&P ADULT - PROBLEM SELECTOR PLAN 6
-cw flomax 0.4 mg bedtime uncontrolled, c/b retinopathy? and diabetic foot ulcers. reportedly on insulin at home. uncontrolled, c/b retinopathy? and diabetic foot ulcers. humalog 75/25 at home. Pt arrived with glucose 220s. Will underdose by weight given poor PO. Titrate accordingly.  - Lantus 15 nightly  - 5U premeal  -MISS

## 2019-12-06 NOTE — ED PROVIDER NOTE - CLINICAL SUMMARY MEDICAL DECISION MAKING FREE TEXT BOX
78 y/o M with PMHx of Dm, BPH, HTN, renal transplant, L foot partial amputation x2w p/w confusion as per pt's wife ongoing for the past week and intermittent in nature. PICC line for infection of the L partial amputated toe. Confusion w/o chest pain, palpitations, cough, sob, vomiting, diarrhea, abd pain, or painful urination. On exam pt appears well nontoxic. L foot dressing was removed; no evidence of erythema/ drainage, Skin was pink and  no tenderness to palpation. Will plan for labs. 78 y/o M with PMHx of Dm, BPH, HTN, renal transplant, L foot partial amputation x2w p/w confusion as per pt's wife ongoing for the past week and intermittent in nature. PICC line for infection of the L partial amputated toe. Confusion w/o chest pain, palpitations, cough, sob, vomiting, diarrhea, abd pain, or painful urination. On exam pt appears well nontoxic. L foot dressing was removed; no evidence of erythema/ drainage, Skin was pink and  no tenderness to palpation. Will plan for labs which shows elevated creatinine.spoke with Dr. Wall who believes patietn should be admitted for sheldon. no source of infection at this time. recommend eval during admission by neuro. patient and wife agreeable to plan. 80 y/o M with PMHx of Dm, BPH, HTN, renal transplant, L foot partial amputation x2w p/w confusion as per pt's wife ongoing for the past week and intermittent in nature. PICC line for infection of the L partial amputated toe. Confusion w/o chest pain, palpitations, cough, sob, vomiting, diarrhea, abd pain, or painful urination. On exam pt appears well nontoxic. L foot dressing was removed; no evidence of erythema/ drainage, Skin was pink and  no tenderness to palpation. Will plan for labs which shows elevated creatinine. spoke with Dr. Wall who believes patient should be admitted for sheldon (to consult renal fellow and Dr. Tabor- pt's infectious disease physician). no source of infection at this time. recommend eval during admission by neuro. patient and wife agreeable to plan.

## 2019-12-06 NOTE — ED ADULT NURSE NOTE - OBJECTIVE STATEMENT
The pt is a 80 y/o male who came in to ED for evaluation of confusion at home that has resolved. Here in ED pt is aox4. Pt is on antibiotics for left foot ulcer. Wife is concern of pt having some "kidney toxicity" to the medications. Pt with PICC line to left upper arm.

## 2019-12-06 NOTE — H&P ADULT - HISTORY OF PRESENT ILLNESS
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 Most of history is per wife at bedside. Says patient felt confused this morning with dizziness. Pt reports that he has been frustrated that he forgets things, such as his childrens names. Per wife, pt woke up confused and dizzy, and became anxious and had SOB. Pt has had poor PO intake over past few days and does not drink much fluids. Wife checked BP throughtout day which was SBP 100s-110s but had one reading of 210 during episode of anxiety. Pt was seen by Dr. Benito and found to have creatinine ~1.8, double his baseline. Admitted to [facility].     Pt is known to vascular service and has been seen by Dr. Lopez for non-healing ulcer on LLE. Previously with I&D for a previous diabetic ulcer with a penrose drain and was discharged with  a picc line for IV abx. 11/19/19 Patient was taken to the operating room with  and underwent 4th/5th toe amputations. Cultures were sent from the OR which grew e-coli, staph Lugdunesis, and Cornebacterium. He was initially stated on IV Vancomycin and Zosyn. ID was consulted and recommendations were made to transition to IV Cefepime 2g q8 and Linezolid 600mg PO q12 for 6 weeks. Patient was instructed to follow up with ID Dr. Tabor for regular weekly BMP and CBC while on this medication. Home IV infusion was arranged and home health was reinstated.     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 Most of history is per wife at bedside. Says patient felt confused this morning with dizziness. Pt reports that he has been frustrated that he forgets things, such as his childrens names. Per wife, pt woke up confused and dizzy, and became anxious and had SOB. Pt has had poor PO intake over past few days and does not drink much fluids. Wife checked BP throughtout day which was SBP 100s-110s but had one reading of 210 during episode of anxiety. Pt was seen by Dr. Benito and found to have creatinine ~1.8, double his baseline. Admitted to [facility].     Pt is known to vascular service and has been seen by Dr. Lopez for non-healing ulcer on LLE. Previously with I&D for a previous diabetic ulcer with a penrose drain and was discharged with  a picc line for IV abx. 11/19/19 Patient was taken to the operating room with  and underwent 4th/5th toe amputations. Cultures were sent from the OR which grew e-coli, staph Lugdunesis, and Cornebacterium. He was initially stated on IV Vancomycin and Zosyn. ID was consulted and recommendations were made to transition to IV Cefepime 2g q8 and Linezolid 600mg PO q12 for 6 weeks. Patient was instructed to follow up with ID Dr. Tabor for regular weekly BMP and CBC while on this medication. Home IV infusion was arranged and home health was reinstated.     Denies fever chill nausea vomiting. No chest pain. No abdominal pain. No dysuria or diarrhea. No sick contacts endorsed.    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 Most of history is per wife at bedside. Says patient felt confused this morning with dizziness. Pt reports that he has been frustrated that he forgets things, such as his childrens names. Per wife, pt woke up confused and dizzy, and became anxious and had SOB. Pt has had poor PO intake over past few days and does not drink much fluids. Wife checked BP throughtout day which was SBP 100s-110s but had one reading of 210 during episode of anxiety. Pt was seen by Dr. Benito and found to have creatinine ~1.8, double his baseline. Admitted to [facility].     Pt is known to vascular service and has been seen by Dr. Lopez for non-healing ulcer on LLE. Previously with I&D for a previous diabetic ulcer with a penrose drain and was discharged with  a picc line for IV abx. 11/19/19 Patient was taken to the operating room with  and underwent 4th/5th toe amputations. Cultures were sent from the OR which grew e-coli, staph Lugdunesis, and Cornebacterium. He was initially stated on IV Vancomycin and Zosyn. ID was consulted and recommendations were made to transition to IV Cefepime 2g q8 and Linezolid 600mg PO q12 for 6 weeks. Patient was instructed to follow up with ID Dr. Tabor for regular weekly BMP and CBC while on this medication. Home IV infusion was arranged and home health was reinstated.     Denies fever chill nausea vomiting. No chest pain. No abdominal pain. No dysuria or diarrhea. No sick contacts endorsed.    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 Most of history is per wife at bedside. Says patient felt confused this morning with dizziness. Pt reports that he has been frustrated that he forgets things, such as his childrens names. Per wife, pt woke up confused and dizzy, and became anxious and had SOB. Pt has had poor PO intake over past few days and does not drink much fluids. Wife checked BP throughtout day which was SBP 100s-110s but had one reading of 210 during episode of anxiety. Pt was seen by Dr. Benito and found to have creatinine ~1.8, double his baseline. Admitted to [facility].     Pt is known to vascular service and has been seen by Dr. Lopez for non-healing ulcer on LLE. Previously with I&D for a previous diabetic ulcer with a penrose drain and was discharged with  a picc line for IV abx. 11/19/19 Patient was taken to the operating room with  and underwent 4th/5th toe amputations. Cultures were sent from the OR which grew e-coli, staph Lugdunesis, and Cornebacterium. He was initially stated on IV Vancomycin and Zosyn. ID was consulted and recommendations were made to transition to IV Cefepime 2g q8 and Linezolid 600mg PO q12 for 6 weeks. Patient was instructed to follow up with ID Dr. Tabor for regular weekly BMP and CBC while on this medication. Home IV infusion was arranged and home health was reinstated.     Denies fever chill nausea vomiting. No chest pain. No abdominal pain. No dysuria or diarrhea. No sick contacts endorsed.    ED course:T 97.7, HR 74, /75, RR 16, 97% 02 on RA. WBC 8.09; h/h 11.9/38.3, glucose 255; Na 133, K 5.2. Cr 1.51 (baseline ~0.9). Given 500 ml NS bolus.    CT head noncont  IMPRESSION:   1. No acute intracranial hemorrhage or transcortical infarct.   2. 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   3. Chronic right frontal, right maxillary, and ethmoid sinus disease  CXR:    IMPRESSION:   No evidence of acute cardiopulmonary disease Mr Lamont is an 78 yo M with hx of Renal transplant (normal creatinine), functionally blind (glaucoma), DM (on humalog 72/25), BPH, HTN, left 4th adn 5th toe amputation with wound vac and diabetic ulcer required 6 week IV abx (PICC line on LUE) with cefepime and linezolid who was brought in today with complaints of confusion found to have elevated creatinine. Most of history is per wife at bedside. Says patient felt confused this morning with dizziness. Pt reports that he has been frustrated that he forgets things, such as his childrens names. Per wife, pt woke up confused and dizzy, and became anxious and had SOB. Pt has had poor PO intake over past few days and does not drink much fluids. Wife checked BP throughtout day which was SBP 100s-110s but had one reading of 210 during episode of anxiety. Pt was seen by Dr. Benito and found to have creatinine ~1.8, double his baseline. Admitted to [facility].     Pt is known to vascular service and has been seen by Dr. Lopez for non-healing ulcer on LLE. Previously with I&D for a previous diabetic ulcer with a penrose drain and was discharged with  a picc line for IV abx. 11/19/19 Patient was taken to the operating room with  and underwent 4th/5th toe amputations. Cultures were sent from the OR which grew e-coli, staph Lugdunesis, and Cornebacterium. He was initially stated on IV Vancomycin and Zosyn. ID was consulted and recommendations were made to transition to IV Cefepime 2g q8 and Linezolid 600mg PO q12 for 6 weeks. Patient was instructed to follow up with ID Dr. Tabor for regular weekly BMP and CBC while on this medication. Home IV infusion was arranged and home health was reinstated.     Denies fever chill nausea vomiting. No chest pain. No abdominal pain. No dysuria or diarrhea. No sick contacts endorsed.    ED course:T 97.7, HR 74, /75, RR 16, 97% 02 on RA. WBC 8.09; h/h 11.9/38.3, glucose 255; Na 133, K 5.2. Cr 1.51 (baseline ~0.9). Given 500 ml NS bolus.    CT head noncont  IMPRESSION:   1. No acute intracranial hemorrhage or transcortical infarct.   2. 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   3. Chronic right frontal, right maxillary, and ethmoid sinus disease  CXR:    IMPRESSION:   No evidence of acute cardiopulmonary disease

## 2019-12-06 NOTE — ED PROVIDER NOTE - DIAGNOSTIC INTERPRETATION
ER PA: Danae To  CHEST XRAY INTERPRETATION: lungs clear, heart shadow normal, bony structures intact

## 2019-12-06 NOTE — PATIENT PROFILE ADULT - CENTRAL VENOUS CATHETER/PICC LINE
Notified MD Ely that patient has had five troponins. Inquired if any troponins were needed. No additional troponins needed. Informed lab.   yes

## 2019-12-06 NOTE — H&P ADULT - PROBLEM SELECTOR PLAN 5
uncontrolled, c/b retinopathy? and diabetic foot ulcers. reportedly on insulin at home. s/p I+D on 11/19. Non healing ulcer started abx on 11/21 with cefepime and linezolid given sensitivities. Switched from Zosyn given resistance. He had Corynebacterium striatum in culture and Enterococcus Faecalis in the prior culture.  Was started on linezolid 600 mg PO q12 hrs over vancomycin given history of renal transplant and given his concern of too many IV doses at home  - cw Cefepime 2 grams IV q12hrs (renally dosed)  - cw linezolid 600 mg PO q12 hrs  - will treat for 6 weeks total from 11/21 with cefepime and linezolid  - Will need CBC, CMP, ESR, CRP qweek.  CBC is essential as there is a risk of bone marrow suppression with prolonged course of linezolid

## 2019-12-06 NOTE — H&P ADULT - PROBLEM SELECTOR PLAN 4
-cw mycophenolate 500mg twice a day  - cw tacrolimus 4mg twice a day; should be taken at least 2 hours after mycophenolate  - f/u tacrolimus level  - f/u renal recs  -trend cr

## 2019-12-06 NOTE — ED PROVIDER NOTE - OBJECTIVE STATEMENT
78 y/o M blind at baseline with PMHx of DM, BPH, HTN, renal transplant, L foot partial amputation x2w p/w confusion as per pt's wife ongoing for the past week and intermittent in nature but today was worse. Pt also c/o SOB and dizziness that started today. Pt's wife believed confusion was due to the antibiotics to treat amputated foot infection but the 2 doctors she spoke to states that it was not the cause.  Endorses nausea and constipation. Denies fever, chest pain, back pain, abd pain, vomiting, and headache.

## 2019-12-06 NOTE — ED ADULT TRIAGE NOTE - CHIEF COMPLAINT QUOTE
Pt presents BIBA in company of wife c/o intermittent confusion since last night.  Pt on intermittent IV abx s/p LLE sx x2 weeks ago.  Pt A&Ox3 on triage RN exam, but wife elicits "sometimes he mixes up his words."  Pt without neuro focal deficit on exam.  Pt blind at baseline.

## 2019-12-06 NOTE — H&P ADULT - PROBLEM SELECTOR PLAN 7
1) PCP Contacted on Admission: (Y/N) --> Name & Phone #:  2) Date of Contact with PCP:  3) PCP Contacted at Discharge: (Y/N, N/A)  4) Summary of Handoff Given to PCP:   5) Post-Discharge Appointment Date and Location: -cw flomax 0.4 mg bedtime

## 2019-12-06 NOTE — ED PROVIDER NOTE - ATTENDING CONTRIBUTION TO CARE
80 yo male h/o DM, BPH, HTN, renal transplant, L foot partial amputation x 2, s/p recent admit for ulcer LLE in Nov currently w wound vac and on abx via picc returns c/o 1 wk intermittent dizziness and confusion but worse since last pm and worst this am.  No associated fever/chills, ha, change in speech/gait, numbness or weakness in ext, uri sx, cough, cp, palpitations, abd pain, + n w/o v/d, dysuria.  Pt was also anxious and had sob w anxiety attack this am but o/w no c/o sob.   Pt's wife believed confusion was due to the antibiotics to treat amputated foot infection but the 2 doctors she spoke to states that it was not the cause.   Well appearing, nad, nc/at, lung cta, heart reg, abd soft, nt, ext no gross deformity, wound vac in place L foot, no gross neuro deficits other than pt's baseline blindness  Sx concerning for delirium - ? infection, or ? TME,  ? lyte abnl, no c/o ha or  neuro deficits to suggest cva, ich.  Plan ct head, labs, cx's, reassess.

## 2019-12-06 NOTE — H&P ADULT - NSHPPHYSICALEXAM_GEN_ALL_CORE
Vital Signs Last 12 Hrs  T(F): 97.7 (12-06-19 @ 15:45), Max: 97.7 (12-06-19 @ 15:45)  HR: 74 (12-06-19 @ 15:45) (72 - 74)  BP: 169/75 (12-06-19 @ 15:45) (135/81 - 169/75)  BP(mean): --  RR: 16 (12-06-19 @ 15:45) (16 - 16)  SpO2: 97% (12-06-19 @ 15:45) (97% - 99%)  I&O's Summary      PHYSICAL EXAM:  Constitutional: NAD, comfortable in bed.  HEENT: NC/AT, PERRLA, EOMI, no conjunctival pallor or scleral icterus, MMM  Neck: Supple, no JVD  Respiratory: Normal rate, rhythm, depth, effort. CTAB. No w/r/r.   Cardiovascular: RRR, normal S1 and S2, no m/r/g.   Gastrointestinal: +BS, soft NTND, no guarding or rebound tenderness, no palpable masses   Extremities: wwp; no cyanosis, clubbing or edema.   Vascular: Pulses equal and strong throughout.   Neurological: AAOx3, no CN deficits, strength and sensation intact throughout.   Skin: No gross skin abnormalities or rashes Vital Signs Last 12 Hrs  T(F): 97.7 (12-06-19 @ 15:45), Max: 97.7 (12-06-19 @ 15:45)  HR: 74 (12-06-19 @ 15:45) (72 - 74)  BP: 169/75 (12-06-19 @ 15:45) (135/81 - 169/75)  BP(mean): --  RR: 16 (12-06-19 @ 15:45) (16 - 16)  SpO2: 97% (12-06-19 @ 15:45) (97% - 99%)  I&O's Summary      PHYSICAL EXAM:  Constitutional: NAD, comfortable in bed.  HEENT: NC/AT, EOMI, no conjunctival pallor or scleral icterus, MMM  Neck: Supple, no JVD  Respiratory: Normal rate, rhythm, depth, effort. CTAB. No w/r/r.   Cardiovascular: RRR, normal S1 and S2, no m/r/g.   Gastrointestinal: +BS, soft NTND, no guarding or rebound tenderness, no palpable masses   Extremities: wwp; no cyanosis, clubbing or edema.   Vascular: Pulses equal and strong throughout.   Neurological: AAOx3, no CN deficits, strength and sensation intact throughout.   Skin: No gross skin abnormalities or rashes Vital Signs Last 12 Hrs  T(F): 97.7 (12-06-19 @ 15:45), Max: 97.7 (12-06-19 @ 15:45)  HR: 74 (12-06-19 @ 15:45) (72 - 74)  BP: 169/75 (12-06-19 @ 15:45) (135/81 - 169/75)  BP(mean): --  RR: 16 (12-06-19 @ 15:45) (16 - 16)  SpO2: 97% (12-06-19 @ 15:45) (97% - 99%)  I&O's Summary      PHYSICAL EXAM:  Constitutional: NAD, comfortable in bed.  HEENT: NC/AT, EOMI, no conjunctival pallor or scleral icterus, MMM  Respiratory: Normal rate, rhythm, depth, effort. CTAB. No w/r/r.   Cardiovascular: RRR, normal S1 and S2, no m/r/g.   Gastrointestinal: soft NTND, no guarding or rebound tenderness, no palpable masses   Extremities: left foot wrapped in ace bandage with wound vac in place draining, nails unkept and dry but no erythema purulence or tenderness on feet. No edema b/l  Vascular: pulses present throughout   Neurological: AAOx3 (slow to respond to date, communicates best with wife), no CN deficits, UE strength 5/5 and ROM intact; LE strength and ROM intact and sensation intact to soft touch throughout.

## 2019-12-06 NOTE — H&P ADULT - ASSESSMENT
Mr Lamont is an 78 yo M with hx of Renal transplant (normal creatinine), functionally blind (glaucoma), DM (on insulin), BPH, HTN, left 4th adn 5th toe amputation with wound vac and diabetic ulcer required 6 week IV abx (PICC line on LUE) with cefepime and linezolid who was brought in today with complaints of confusion found to have ELIZABETH; etiology likely in setting of poor PO intake, low suspicion of infectious etiology.

## 2019-12-06 NOTE — ED ADULT NURSE NOTE - CHPI ED NUR SYMPTOMS NEG
no fever/no loss of consciousness/no dizziness/no vomiting/no blurred vision/no weakness/no change in level of consciousness/no nausea/no numbness

## 2019-12-06 NOTE — H&P ADULT - PROBLEM SELECTOR PLAN 3
Pt with sbp 160s on admission. Head CT noncon negative.   -cw home coreg 12.5 bid, titrate appropraitely  -f/u renal recs

## 2019-12-06 NOTE — ED PROVIDER NOTE - PHYSICAL EXAMINATION
General: Patient is well developed and well nourished. Patient is alert and oriented to person, place and date. Patient is laying comfortably in stretcher and appears in no acute distress.  HEENT: Head is normocephalic and atraumatic. Pupils are equal, round and reactive. Extraocular movements intact. No evidence of nystagmus, conjunctival injection, or scleral icterus. External ears symmetric without evidence of discharge.  Nose is symmetric, non-tender, patent without evidence of discharge. Teeth in good repair. Uvula midline.   Neck: Supple with no evidence of lymphadenopathy.  Full range of motion.  Heart: Regular rate and rhythm. No murmurs, rubs or gallops.   Lungs: Clear to auscultation bilaterally with equal chest expansion. No note of wheezes, rhonchi, rales. Equal chest expansion. No note of retractions.  Abdomen: Bowel sounds present in all four quadrants. Soft, non-tender, non-distended without signs of masses, rebound or guarding. No note of hepatosplenomegaly. No CVA tenderness bilaterally. Negative Vigil sign. No pain present over McBurney's point.  Neuro: GCS 15. Moving all extremities without discomfort. Sensation intact in all four extremities. gait steady   Skin: Warm, dry and intact without evidence of rashes, bruising, pallor, jaundice or cyanosis.   Psych: Mood and affect appropriate.

## 2019-12-06 NOTE — H&P ADULT - PROBLEM SELECTOR PLAN 1
Pt with creatining of ~1.8, no 1.5 on admission to [facility] from baseline of ~0.9. Pt with hx of renal transplant in setting of DM and HTN. Likely etiology of ELIZABETH is prerenal given reliable hx given by wife of poor PO intake for past few days and low fluid intake, Na slightly low to 133 on admission. Likely also contributing to confusion. Patient tolerating PO, not dry on exam and mentating closer to baseline  -s/p 500 NS bolus in ED  - encourage PO intake  -trend CMP

## 2019-12-06 NOTE — H&P ADULT - PROBLEM SELECTOR PLAN 9
F: encourage PO, NS as needed  E: replete prn  N: kosher renal  ppx:  lovenox 40mg  gi none  FULL CODE

## 2019-12-06 NOTE — H&P ADULT - NSHPLABSRESULTS_GEN_ALL_CORE
LABS:                        11.9   8.09  )-----------( 150      ( 06 Dec 2019 13:50 )             38.3     12-06    133<L>  |  101  |  27<H>  ----------------------------<  265<H>  5.2   |  20<L>  |  1.51<H>    Ca    10.2      06 Dec 2019 13:50    TPro  6.3  /  Alb  3.6  /  TBili  0.4  /  DBili  x   /  AST  24  /  ALT  22  /  AlkPhos  95  12-06    PT/INR - ( 06 Dec 2019 13:50 )   PT: 12.0 sec;   INR: 1.06          PTT - ( 06 Dec 2019 13:50 )  PTT:22.5 sec  Urinalysis Basic - ( 06 Dec 2019 14:39 )    Color: Yellow / Appearance: Hazy / SG: >=1.030 / pH: x  Gluc: x / Ketone: Trace mg/dL  / Bili: Negative / Urobili: 0.2 E.U./dL   Blood: x / Protein: 30 mg/dL / Nitrite: NEGATIVE   Leuk Esterase: NEGATIVE / RBC: < 5 /HPF / WBC < 5 /HPF   Sq Epi: x / Non Sq Epi: 0-5 /HPF / Bacteria: Present /HPF        RADIOLOGY & ADDITIONAL TESTS:    MEDICATIONS  (STANDING):  aspirin  chewable 81 milliGRAM(s) Oral daily  carvedilol 12.5 milliGRAM(s) Oral every 12 hours  docusate sodium 100 milliGRAM(s) Oral three times a day  linezolid    Tablet 600 milliGRAM(s) Oral every 12 hours  mycophenolate mofetil 500 milliGRAM(s) Oral every 12 hours  tacrolimus 4 milliGRAM(s) Oral every 12 hours  tamsulosin 0.4 milliGRAM(s) Oral at bedtime    MEDICATIONS  (PRN):

## 2019-12-06 NOTE — H&P ADULT - NSHPSOCIALHISTORY_GEN_ALL_CORE
Lives at home with wife Lives at home with wife. Previous smoker, quit 25 years ago, smoked 1 pack a day prior, was unabel to say for how long.

## 2019-12-07 LAB
ANION GAP SERPL CALC-SCNC: 14 MMOL/L — SIGNIFICANT CHANGE UP (ref 5–17)
BUN SERPL-MCNC: 30 MG/DL — HIGH (ref 7–23)
CALCIUM SERPL-MCNC: 10 MG/DL — SIGNIFICANT CHANGE UP (ref 8.4–10.5)
CHLORIDE SERPL-SCNC: 103 MMOL/L — SIGNIFICANT CHANGE UP (ref 96–108)
CO2 SERPL-SCNC: 18 MMOL/L — LOW (ref 22–31)
CREAT SERPL-MCNC: 2 MG/DL — HIGH (ref 0.5–1.3)
CULTURE RESULTS: NO GROWTH — SIGNIFICANT CHANGE UP
GLUCOSE BLDC GLUCOMTR-MCNC: 167 MG/DL — HIGH (ref 70–99)
GLUCOSE BLDC GLUCOMTR-MCNC: 200 MG/DL — HIGH (ref 70–99)
GLUCOSE BLDC GLUCOMTR-MCNC: 289 MG/DL — HIGH (ref 70–99)
GLUCOSE BLDC GLUCOMTR-MCNC: 87 MG/DL — SIGNIFICANT CHANGE UP (ref 70–99)
GLUCOSE SERPL-MCNC: 231 MG/DL — HIGH (ref 70–99)
HCT VFR BLD CALC: 36.9 % — LOW (ref 39–50)
HGB BLD-MCNC: 11.6 G/DL — LOW (ref 13–17)
MCHC RBC-ENTMCNC: 27.1 PG — SIGNIFICANT CHANGE UP (ref 27–34)
MCHC RBC-ENTMCNC: 31.4 GM/DL — LOW (ref 32–36)
MCV RBC AUTO: 86.2 FL — SIGNIFICANT CHANGE UP (ref 80–100)
NRBC # BLD: 0 /100 WBCS — SIGNIFICANT CHANGE UP (ref 0–0)
PLATELET # BLD AUTO: 152 K/UL — SIGNIFICANT CHANGE UP (ref 150–400)
POTASSIUM SERPL-MCNC: 4.2 MMOL/L — SIGNIFICANT CHANGE UP (ref 3.5–5.3)
POTASSIUM SERPL-SCNC: 4.2 MMOL/L — SIGNIFICANT CHANGE UP (ref 3.5–5.3)
RBC # BLD: 4.28 M/UL — SIGNIFICANT CHANGE UP (ref 4.2–5.8)
RBC # FLD: 15.2 % — HIGH (ref 10.3–14.5)
SODIUM SERPL-SCNC: 135 MMOL/L — SIGNIFICANT CHANGE UP (ref 135–145)
SPECIMEN SOURCE: SIGNIFICANT CHANGE UP
TACROLIMUS SERPL-MCNC: 3.8 NG/ML — SIGNIFICANT CHANGE UP
WBC # BLD: 8.89 K/UL — SIGNIFICANT CHANGE UP (ref 3.8–10.5)
WBC # FLD AUTO: 8.89 K/UL — SIGNIFICANT CHANGE UP (ref 3.8–10.5)

## 2019-12-07 PROCEDURE — 99233 SBSQ HOSP IP/OBS HIGH 50: CPT | Mod: GC

## 2019-12-07 PROCEDURE — 71045 X-RAY EXAM CHEST 1 VIEW: CPT | Mod: 26

## 2019-12-07 RX ORDER — BACITRACIN ZINC 500 UNIT/G
1 OINTMENT IN PACKET (EA) TOPICAL DAILY
Refills: 0 | Status: DISCONTINUED | OUTPATIENT
Start: 2019-12-07 | End: 2019-12-18

## 2019-12-07 RX ORDER — ALTEPLASE 100 MG
2 KIT INTRAVENOUS ONCE
Refills: 0 | Status: COMPLETED | OUTPATIENT
Start: 2019-12-07 | End: 2019-12-07

## 2019-12-07 RX ORDER — ACETAMINOPHEN 500 MG
650 TABLET ORAL ONCE
Refills: 0 | Status: COMPLETED | OUTPATIENT
Start: 2019-12-07 | End: 2019-12-07

## 2019-12-07 RX ORDER — SODIUM CHLORIDE 9 MG/ML
1000 INJECTION INTRAMUSCULAR; INTRAVENOUS; SUBCUTANEOUS
Refills: 0 | Status: DISCONTINUED | OUTPATIENT
Start: 2019-12-07 | End: 2019-12-12

## 2019-12-07 RX ADMIN — Medication 2: at 22:55

## 2019-12-07 RX ADMIN — Medication 100 MILLIGRAM(S): at 22:54

## 2019-12-07 RX ADMIN — Medication 650 MILLIGRAM(S): at 19:00

## 2019-12-07 RX ADMIN — ENOXAPARIN SODIUM 40 MILLIGRAM(S): 100 INJECTION SUBCUTANEOUS at 22:54

## 2019-12-07 RX ADMIN — CHLORHEXIDINE GLUCONATE 1 APPLICATION(S): 213 SOLUTION TOPICAL at 05:42

## 2019-12-07 RX ADMIN — Medication 100 UNIT(S): at 12:47

## 2019-12-07 RX ADMIN — Medication 5 UNIT(S): at 19:02

## 2019-12-07 RX ADMIN — SODIUM CHLORIDE 125 MILLILITER(S): 9 INJECTION INTRAMUSCULAR; INTRAVENOUS; SUBCUTANEOUS at 16:55

## 2019-12-07 RX ADMIN — Medication 100 MILLIGRAM(S): at 05:41

## 2019-12-07 RX ADMIN — INSULIN GLARGINE 15 UNIT(S): 100 INJECTION, SOLUTION SUBCUTANEOUS at 22:54

## 2019-12-07 RX ADMIN — TACROLIMUS 4 MILLIGRAM(S): 5 CAPSULE ORAL at 05:41

## 2019-12-07 RX ADMIN — Medication 5 UNIT(S): at 08:53

## 2019-12-07 RX ADMIN — LINEZOLID 600 MILLIGRAM(S): 600 INJECTION, SOLUTION INTRAVENOUS at 11:30

## 2019-12-07 RX ADMIN — TACROLIMUS 4 MILLIGRAM(S): 5 CAPSULE ORAL at 11:29

## 2019-12-07 RX ADMIN — CARVEDILOL PHOSPHATE 12.5 MILLIGRAM(S): 80 CAPSULE, EXTENDED RELEASE ORAL at 19:01

## 2019-12-07 RX ADMIN — Medication 6: at 19:01

## 2019-12-07 RX ADMIN — Medication 81 MILLIGRAM(S): at 11:29

## 2019-12-07 RX ADMIN — TAMSULOSIN HYDROCHLORIDE 0.4 MILLIGRAM(S): 0.4 CAPSULE ORAL at 22:54

## 2019-12-07 RX ADMIN — Medication 5 MILLIGRAM(S): at 11:29

## 2019-12-07 RX ADMIN — Medication 2: at 08:53

## 2019-12-07 RX ADMIN — LINEZOLID 600 MILLIGRAM(S): 600 INJECTION, SOLUTION INTRAVENOUS at 22:54

## 2019-12-07 RX ADMIN — Medication 650 MILLIGRAM(S): at 17:58

## 2019-12-07 RX ADMIN — ALTEPLASE 2 MILLIGRAM(S): KIT at 15:51

## 2019-12-07 RX ADMIN — CARVEDILOL PHOSPHATE 12.5 MILLIGRAM(S): 80 CAPSULE, EXTENDED RELEASE ORAL at 05:41

## 2019-12-07 RX ADMIN — ALTEPLASE 2 MILLIGRAM(S): KIT at 13:41

## 2019-12-07 RX ADMIN — CEFEPIME 100 MILLIGRAM(S): 1 INJECTION, POWDER, FOR SOLUTION INTRAMUSCULAR; INTRAVENOUS at 05:41

## 2019-12-07 RX ADMIN — Medication 1 APPLICATION(S): at 19:01

## 2019-12-07 RX ADMIN — MYCOPHENOLATE MOFETIL 500 MILLIGRAM(S): 250 CAPSULE ORAL at 22:54

## 2019-12-07 RX ADMIN — MYCOPHENOLATE MOFETIL 500 MILLIGRAM(S): 250 CAPSULE ORAL at 11:29

## 2019-12-07 RX ADMIN — TACROLIMUS 4 MILLIGRAM(S): 5 CAPSULE ORAL at 22:54

## 2019-12-07 NOTE — CONSULT NOTE ADULT - ASSESSMENT
80 yo pt diabetic pt with hx of diabetes under insulin for more that 20 years, kidney transplant under immunnosuppression medication c/o new non healing ulcer in LLE under IV abx s/p Left 4th and 5th toe amputations. Here with ELIZABETH.     - Attached to hospital wound vac. I instructed patient's wife to bring home vac supplies tomorrow. When they are at bedside, call vascular at [phone] and we will change the vac   -apply bacitracin ointment to R big toe wound and cover with kerlex wrap   -c/w IV linezolid and cefepime for 6 weeks total  - f/u with Dr. Diggs in 2 weeks   - rest of care per primary team

## 2019-12-07 NOTE — PROGRESS NOTE ADULT - PROBLEM SELECTOR PLAN 8
1) PCP Contacted on Admission: (Y/N) --> Name & Phone #:Estefania PCP. Plan discussed with Dr. Jeffrey  2) Date of Contact with PCP:  3) PCP Contacted at Discharge: (Y/N, N/A)  4) Summary of Handoff Given to PCP:   5) Post-Discharge Appointment Date and Location:

## 2019-12-07 NOTE — PROGRESS NOTE ADULT - PROBLEM SELECTOR PLAN 1
Pt now with Cr 2.00, while it was 1.5 on admission to [facility] from baseline of ~0.9. Pt with hx of renal transplant in setting of DM and HTN. Likely etiology of ELIZABETH is prerenal given reliable hx given by wife of poor PO intake for past few days and low fluid intake, Na slightly low to 133 on admission. Likely also contributing to confusion. Patient tolerating PO, not dry on exam and mentating closer to baseline  -s/p 500 NS bolus in ED  - encourage PO intake  - starting IV NS @ 125cc/hr maintenance  - f/u UA, urine lytes  - pending renal U/S  - trend CMP  - Dr. Jeffrey following

## 2019-12-07 NOTE — PROCEDURE NOTE - NSPROCDETAILS_GEN_ALL_CORE
sterile dressing applied/supine position/location identified, draped/prepped, sterile technique used/ultrasound guidance/sterile technique, catheter placed

## 2019-12-07 NOTE — CONSULT NOTE ADULT - SUBJECTIVE AND OBJECTIVE BOX
SUBJECTIVE: Patient seen and examined at bedside. Resting comfortably in bed. Reports mild shortness of breath but says that it doesn't bother him that much. Otherwise, no complaints. No n/v, CP, dizziness, lightheadedness.       Vital Signs Last 24 Hrs  T(C): 36.2 (07 Dec 2019 16:25), Max: 37.2 (07 Dec 2019 03:10)  T(F): 97.2 (07 Dec 2019 16:25), Max: 98.9 (07 Dec 2019 03:10)  HR: 98 (07 Dec 2019 18:16) (75 - 98)  BP: 110/55 (07 Dec 2019 18:16) (98/65 - 150/67)  BP(mean): --  RR: 16 (07 Dec 2019 16:25) (16 - 28)  SpO2: 98% (07 Dec 2019 16:25) (95% - 100%)    Physical Exam:  General: NAD  Pulmonary: Nonlabored breathing, no respiratory distress  Abdominal: soft, nondistended, nontender with no rebound or guarding  Extremities: WWP, normal strength, wound VAC off- wound healing ok. Vac replaced while at bedside with good seal. R big toe with superficial wound, applied bacitracin ointment and covered with kerlex  Pulses: Right palpable DP/PT, Left biphasic DP/PT   Neuro: A/O x3    Lines/drains/tubes:    I&O's Summary      LABS:                        11.6   8.89  )-----------( 152      ( 07 Dec 2019 15:19 )             36.9     12-07    135  |  103  |  30<H>  ----------------------------<  231<H>  4.2   |  18<L>  |  2.00<H>    Ca    10.0      07 Dec 2019 15:19    TPro  6.3  /  Alb  3.6  /  TBili  0.4  /  DBili  x   /  AST  24  /  ALT  22  /  AlkPhos  95  12-06    PT/INR - ( 06 Dec 2019 13:50 )   PT: 12.0 sec;   INR: 1.06          PTT - ( 06 Dec 2019 13:50 )  PTT:22.5 sec  Urinalysis Basic - ( 06 Dec 2019 14:39 )    Color: Yellow / Appearance: Hazy / SG: >=1.030 / pH: x  Gluc: x / Ketone: Trace mg/dL  / Bili: Negative / Urobili: 0.2 E.U./dL   Blood: x / Protein: 30 mg/dL / Nitrite: NEGATIVE   Leuk Esterase: NEGATIVE / RBC: < 5 /HPF / WBC < 5 /HPF   Sq Epi: x / Non Sq Epi: 0-5 /HPF / Bacteria: Present /HPF      CAPILLARY BLOOD GLUCOSE      POCT Blood Glucose.: 289 mg/dL (07 Dec 2019 18:22)  POCT Blood Glucose.: 87 mg/dL (07 Dec 2019 12:52)  POCT Blood Glucose.: 167 mg/dL (07 Dec 2019 08:42)  POCT Blood Glucose.: 395 mg/dL (06 Dec 2019 22:04)    LIVER FUNCTIONS - ( 06 Dec 2019 13:50 )  Alb: 3.6 g/dL / Pro: 6.3 g/dL / ALK PHOS: 95 U/L / ALT: 22 U/L / AST: 24 U/L / GGT: x             RADIOLOGY & ADDITIONAL STUDIES:

## 2019-12-07 NOTE — PROGRESS NOTE ADULT - PROBLEM SELECTOR PLAN 5
s/p I+D on 11/19. Non healing ulcer started abx on 11/21 with cefepime and linezolid given sensitivities. Switched from Zosyn given resistance. He had Corynebacterium striatum in culture and Enterococcus Faecalis in the prior culture.  Was started on linezolid 600 mg PO q12 hrs over vancomycin given history of renal transplant and given his concern of too many IV doses at home  - c/w Cefepime 2 grams IV q12hrs (renally dosed)  - c/w linezolid 600 mg PO q12 hrs  - will treat for 6 weeks total from 11/21 with cefepime and linezolid  - Will need CBC, CMP, ESR, CRP qweek.  CBC is essential as there is a risk of bone marrow suppression with prolonged course of linezolid  - PICC line clogged today 12/7, pending replacement by Dr. Bhaskar brumfield on 12/7

## 2019-12-07 NOTE — PROGRESS NOTE ADULT - PROBLEM SELECTOR PLAN 4
- c/w mycophenolate 500mg twice a day  - c/w tacrolimus 4mg twice a day; should be taken at least 2 hours after mycophenolate  - tacrolimus level 3.8  - f/u renal recs  - trend cr

## 2019-12-07 NOTE — PROGRESS NOTE ADULT - ATTENDING COMMENTS
pt seen and examined by me case d/w housestaff agree with VS, PE, assessment and plan as above with following additives    1- ELIZABETH- likely prerenal  wife reports decreased PO intake  IVF  fu urine studies and repeat BUN/CR  2- Diabetic foot infection/osteo- on linezolid and cefepime      anticipated dc tomorrow if CR coming down pt seen and examined by me case d/w housestaff agree with VS, PE, assessment and plan as above with following additives    1- ELIZABETH- likely prerenal  wife reports decreased PO intake  IVF  fu urine studies and repeat BUN/CR  2- Diabetic foot infection/osteo- on linezolid and cefepime      anticipated dc tomorrow if CR coming down      I independently performed the key portions of the evaluation and management service provided. I agree with the above history, physical, and plan which I have reviewed and edited where appropriate. I find ELIZABETH, hypovolemia, HTN. Follow SCr. Continue anti-hypertensives. See full note. (Patient seen earlier in day.)

## 2019-12-07 NOTE — PROCEDURE NOTE - ADDITIONAL PROCEDURE DETAILS
the two ports aspirate and flush easily; the apparently patent left basilic vein could not be easily accessed and did not aspirate well;  accordingly the existing single channel PICC was easily used to place the guidewire and could be well advanced; the new catheter could only be advanced to the 32cm annabel where it easily aspirated blood and easily infused saline and Heplock flush; for the 39cm catheter,  32cm are indwelling; biopatch is applied.

## 2019-12-07 NOTE — PROGRESS NOTE ADULT - PROBLEM SELECTOR PLAN 6
uncontrolled, c/b retinopathy? and diabetic foot ulcers. humalog 75/25 at home. Pt arrived with glucose 220s. Will underdose by weight given poor PO. Titrate accordingly.  - Lantus 15 qhs, lispro 5u TID premeal, mISS

## 2019-12-07 NOTE — CONSULT NOTE ADULT - SUBJECTIVE AND OBJECTIVE BOX
Medicine requested replacement of the left arm dysfunctional PICC which was clotted and did not respond to Alteplace tpa therapy. The catheter has been in place for extended antibiotic therapy  for the non-healing infected foot ulcer. The history, crx and data were seen. He has a renal transplant and he has had hemodialysis through a right arm AV fistula. At the left arm clean, non-inflamed and non-tender PICC site, the catheter is unmoored and has been dislodged about five cm contributing to the dysfunction. The catheter infuses with much difficulty and does not   aspirate. The left arm basilic vein appears normal. The procedure was explained for consent.

## 2019-12-07 NOTE — PROGRESS NOTE ADULT - SUBJECTIVE AND OBJECTIVE BOX
OVERNIGHT EVENTS:    SUBJECTIVE/INTERVAL HPI: Patient was seen and examined at bedside.    VITALS  Vital Signs Last 24 Hrs  T(C): 36.5 (07 Dec 2019 04:40), Max: 37.2 (07 Dec 2019 03:10)  T(F): 97.7 (07 Dec 2019 04:40), Max: 98.9 (07 Dec 2019 03:10)  HR: 85 (07 Dec 2019 04:40) (72 - 97)  BP: 150/67 (07 Dec 2019 04:40) (127/71 - 169/75)  BP(mean): --  RR: 18 (07 Dec 2019 04:40) (16 - 28)  SpO2: 97% (07 Dec 2019 04:40) (97% - 99%)    I&O's Summary      CAPILLARY BLOOD GLUCOSE      POCT Blood Glucose.: 167 mg/dL (07 Dec 2019 08:42)  POCT Blood Glucose.: 395 mg/dL (06 Dec 2019 22:04)      PHYSICAL EXAM      MEDICATIONS  (STANDING):  aspirin  chewable 81 milliGRAM(s) Oral daily  carvedilol 12.5 milliGRAM(s) Oral every 12 hours  cefepime   IVPB 2000 milliGRAM(s) IV Intermittent every 12 hours  cefepime   IVPB      chlorhexidine 4% Liquid 1 Application(s) Topical <User Schedule>  dextrose 5%. 1000 milliLiter(s) (50 mL/Hr) IV Continuous <Continuous>  dextrose 50% Injectable 12.5 Gram(s) IV Push once  dextrose 50% Injectable 25 Gram(s) IV Push once  dextrose 50% Injectable 25 Gram(s) IV Push once  docusate sodium 100 milliGRAM(s) Oral three times a day  enoxaparin Injectable 40 milliGRAM(s) SubCutaneous every 24 hours  influenza   Vaccine 0.5 milliLiter(s) IntraMuscular once  insulin glargine Injectable (LANTUS) 15 Unit(s) SubCutaneous at bedtime  insulin lispro (HumaLOG) corrective regimen sliding scale   SubCutaneous Before meals and at bedtime  insulin lispro Injectable (HumaLOG) 5 Unit(s) SubCutaneous three times a day before meals  linezolid    Tablet 600 milliGRAM(s) Oral every 12 hours  mycophenolate mofetil 500 milliGRAM(s) Oral every 12 hours  predniSONE   Tablet 5 milliGRAM(s) Oral every 24 hours  sodium chloride 0.9% Bolus 500 milliLiter(s) IV Bolus once  tacrolimus 4 milliGRAM(s) Oral every 12 hours  tamsulosin 0.4 milliGRAM(s) Oral at bedtime    MEDICATIONS  (PRN):  dextrose 40% Gel 15 Gram(s) Oral once PRN Blood Glucose LESS THAN 70 milliGRAM(s)/deciliter  glucagon  Injectable 1 milliGRAM(s) IntraMuscular once PRN Glucose LESS THAN 70 milligrams/deciliter  sodium chloride 0.9% lock flush 10 milliLiter(s) IV Push every 1 hour PRN Pre/post blood products, medications, blood draw, and to maintain line patency      LABS                        11.9   8.09  )-----------( 150      ( 06 Dec 2019 13:50 )             38.3     12-06    133<L>  |  101  |  27<H>  ----------------------------<  265<H>  5.2   |  20<L>  |  1.51<H>    Ca    10.2      06 Dec 2019 13:50    TPro  6.3  /  Alb  3.6  /  TBili  0.4  /  DBili  x   /  AST  24  /  ALT  22  /  AlkPhos  95  12-06    LIVER FUNCTIONS - ( 06 Dec 2019 13:50 )  Alb: 3.6 g/dL / Pro: 6.3 g/dL / ALK PHOS: 95 U/L / ALT: 22 U/L / AST: 24 U/L / GGT: x           PT/INR - ( 06 Dec 2019 13:50 )   PT: 12.0 sec;   INR: 1.06          PTT - ( 06 Dec 2019 13:50 )  PTT:22.5 sec  Urinalysis Basic - ( 06 Dec 2019 14:39 )    Color: Yellow / Appearance: Hazy / SG: >=1.030 / pH: x  Gluc: x / Ketone: Trace mg/dL  / Bili: Negative / Urobili: 0.2 E.U./dL   Blood: x / Protein: 30 mg/dL / Nitrite: NEGATIVE   Leuk Esterase: NEGATIVE / RBC: < 5 /HPF / WBC < 5 /HPF   Sq Epi: x / Non Sq Epi: 0-5 /HPF / Bacteria: Present /HPF            Radiology and other tests: Reviewed OVERNIGHT EVENTS: Patient's wound vacuum got turned off.    SUBJECTIVE/INTERVAL HPI: Patient was seen and examined at bedside this morning. Denied any CP, abdominal pain, dysuria. Reports some subjective SOB. Patient AAOx3. Per wife, patient appears still slightly confused.     VITALS  Vital Signs Last 24 Hrs  T(C): 36.5 (07 Dec 2019 04:40), Max: 37.2 (07 Dec 2019 03:10)  T(F): 97.7 (07 Dec 2019 04:40), Max: 98.9 (07 Dec 2019 03:10)  HR: 85 (07 Dec 2019 04:40) (72 - 97)  BP: 150/67 (07 Dec 2019 04:40) (127/71 - 169/75)  BP(mean): --  RR: 18 (07 Dec 2019 04:40) (16 - 28)  SpO2: 97% (07 Dec 2019 04:40) (97% - 99%)    I&O's Summary      CAPILLARY BLOOD GLUCOSE      POCT Blood Glucose.: 167 mg/dL (07 Dec 2019 08:42)  POCT Blood Glucose.: 395 mg/dL (06 Dec 2019 22:04)      PHYSICAL EXAM  GENERAL: In NAD. Sitting at edge of bed  HEENT: NC/AT. PERRL. EOMI. No JVD.   CV: RRR. +S1/S2. No murmurs, rubs or gallops  LUNGS: Clear to auscultation b/l. No wheezing, rales or rhonchi.  ABDOMEN: Soft, nontender, nondistended. +BSx4.  EXT: WWP. No edema in b/l extremities. Left 4th and 5th toe s/p amputation wrapped in kerlix and attached to wound vacuum machine  VASCULAR: 2+ radial, DP bilaterally     MEDICATIONS  (STANDING):  aspirin  chewable 81 milliGRAM(s) Oral daily  carvedilol 12.5 milliGRAM(s) Oral every 12 hours  cefepime   IVPB 2000 milliGRAM(s) IV Intermittent every 12 hours  cefepime   IVPB      chlorhexidine 4% Liquid 1 Application(s) Topical <User Schedule>  dextrose 5%. 1000 milliLiter(s) (50 mL/Hr) IV Continuous <Continuous>  dextrose 50% Injectable 12.5 Gram(s) IV Push once  dextrose 50% Injectable 25 Gram(s) IV Push once  dextrose 50% Injectable 25 Gram(s) IV Push once  docusate sodium 100 milliGRAM(s) Oral three times a day  enoxaparin Injectable 40 milliGRAM(s) SubCutaneous every 24 hours  influenza   Vaccine 0.5 milliLiter(s) IntraMuscular once  insulin glargine Injectable (LANTUS) 15 Unit(s) SubCutaneous at bedtime  insulin lispro (HumaLOG) corrective regimen sliding scale   SubCutaneous Before meals and at bedtime  insulin lispro Injectable (HumaLOG) 5 Unit(s) SubCutaneous three times a day before meals  linezolid    Tablet 600 milliGRAM(s) Oral every 12 hours  mycophenolate mofetil 500 milliGRAM(s) Oral every 12 hours  predniSONE   Tablet 5 milliGRAM(s) Oral every 24 hours  sodium chloride 0.9% Bolus 500 milliLiter(s) IV Bolus once  tacrolimus 4 milliGRAM(s) Oral every 12 hours  tamsulosin 0.4 milliGRAM(s) Oral at bedtime    MEDICATIONS  (PRN):  dextrose 40% Gel 15 Gram(s) Oral once PRN Blood Glucose LESS THAN 70 milliGRAM(s)/deciliter  glucagon  Injectable 1 milliGRAM(s) IntraMuscular once PRN Glucose LESS THAN 70 milligrams/deciliter  sodium chloride 0.9% lock flush 10 milliLiter(s) IV Push every 1 hour PRN Pre/post blood products, medications, blood draw, and to maintain line patency      LABS                        11.9   8.09  )-----------( 150      ( 06 Dec 2019 13:50 )             38.3     12-06    133<L>  |  101  |  27<H>  ----------------------------<  265<H>  5.2   |  20<L>  |  1.51<H>    Ca    10.2      06 Dec 2019 13:50    TPro  6.3  /  Alb  3.6  /  TBili  0.4  /  DBili  x   /  AST  24  /  ALT  22  /  AlkPhos  95  12-06    LIVER FUNCTIONS - ( 06 Dec 2019 13:50 )  Alb: 3.6 g/dL / Pro: 6.3 g/dL / ALK PHOS: 95 U/L / ALT: 22 U/L / AST: 24 U/L / GGT: x           PT/INR - ( 06 Dec 2019 13:50 )   PT: 12.0 sec;   INR: 1.06          PTT - ( 06 Dec 2019 13:50 )  PTT:22.5 sec  Urinalysis Basic - ( 06 Dec 2019 14:39 )    Color: Yellow / Appearance: Hazy / SG: >=1.030 / pH: x  Gluc: x / Ketone: Trace mg/dL  / Bili: Negative / Urobili: 0.2 E.U./dL   Blood: x / Protein: 30 mg/dL / Nitrite: NEGATIVE   Leuk Esterase: NEGATIVE / RBC: < 5 /HPF / WBC < 5 /HPF   Sq Epi: x / Non Sq Epi: 0-5 /HPF / Bacteria: Present /HPF            Radiology and other tests: Reviewed

## 2019-12-08 LAB
ANION GAP SERPL CALC-SCNC: 14 MMOL/L — SIGNIFICANT CHANGE UP (ref 5–17)
APPEARANCE UR: CLEAR — SIGNIFICANT CHANGE UP
BACTERIA # UR AUTO: PRESENT /HPF
BILIRUB UR-MCNC: NEGATIVE — SIGNIFICANT CHANGE UP
BUN SERPL-MCNC: 36 MG/DL — HIGH (ref 7–23)
CALCIUM SERPL-MCNC: 10.2 MG/DL — SIGNIFICANT CHANGE UP (ref 8.4–10.5)
CHLORIDE SERPL-SCNC: 101 MMOL/L — SIGNIFICANT CHANGE UP (ref 96–108)
CO2 SERPL-SCNC: 19 MMOL/L — LOW (ref 22–31)
COLOR SPEC: YELLOW — SIGNIFICANT CHANGE UP
COMMENT - URINE: SIGNIFICANT CHANGE UP
CREAT ?TM UR-MCNC: 117 MG/DL — SIGNIFICANT CHANGE UP
CREAT SERPL-MCNC: 2.83 MG/DL — HIGH (ref 0.5–1.3)
DIFF PNL FLD: ABNORMAL
EOSINOPHIL NFR URNS MANUAL: NEGATIVE — SIGNIFICANT CHANGE UP
EPI CELLS # UR: SIGNIFICANT CHANGE UP /HPF (ref 0–5)
GLUCOSE BLDC GLUCOMTR-MCNC: 123 MG/DL — HIGH (ref 70–99)
GLUCOSE BLDC GLUCOMTR-MCNC: 154 MG/DL — HIGH (ref 70–99)
GLUCOSE BLDC GLUCOMTR-MCNC: 164 MG/DL — HIGH (ref 70–99)
GLUCOSE BLDC GLUCOMTR-MCNC: 207 MG/DL — HIGH (ref 70–99)
GLUCOSE SERPL-MCNC: 189 MG/DL — HIGH (ref 70–99)
GLUCOSE UR QL: 250
HCT VFR BLD CALC: 40 % — SIGNIFICANT CHANGE UP (ref 39–50)
HGB BLD-MCNC: 12.5 G/DL — LOW (ref 13–17)
KETONES UR-MCNC: 15 MG/DL
LEUKOCYTE ESTERASE UR-ACNC: NEGATIVE — SIGNIFICANT CHANGE UP
MAGNESIUM SERPL-MCNC: 2 MG/DL — SIGNIFICANT CHANGE UP (ref 1.6–2.6)
MCHC RBC-ENTMCNC: 27.2 PG — SIGNIFICANT CHANGE UP (ref 27–34)
MCHC RBC-ENTMCNC: 31.3 GM/DL — LOW (ref 32–36)
MCV RBC AUTO: 87.1 FL — SIGNIFICANT CHANGE UP (ref 80–100)
NITRITE UR-MCNC: NEGATIVE — SIGNIFICANT CHANGE UP
NRBC # BLD: 0 /100 WBCS — SIGNIFICANT CHANGE UP (ref 0–0)
OSMOLALITY UR: 382 MOSM/KG — SIGNIFICANT CHANGE UP (ref 50–1200)
PH UR: 5 — SIGNIFICANT CHANGE UP (ref 5–8)
PLATELET # BLD AUTO: 167 K/UL — SIGNIFICANT CHANGE UP (ref 150–400)
POTASSIUM SERPL-MCNC: 4.2 MMOL/L — SIGNIFICANT CHANGE UP (ref 3.5–5.3)
POTASSIUM SERPL-SCNC: 4.2 MMOL/L — SIGNIFICANT CHANGE UP (ref 3.5–5.3)
POTASSIUM UR-SCNC: 43 MMOL/L — SIGNIFICANT CHANGE UP
PROT UR-MCNC: 30 MG/DL
RBC # BLD: 4.59 M/UL — SIGNIFICANT CHANGE UP (ref 4.2–5.8)
RBC # FLD: 15.4 % — HIGH (ref 10.3–14.5)
RBC CASTS # UR COMP ASSIST: < 5 /HPF — SIGNIFICANT CHANGE UP
SODIUM SERPL-SCNC: 134 MMOL/L — LOW (ref 135–145)
SP GR SPEC: >=1.03 — SIGNIFICANT CHANGE UP (ref 1–1.03)
UROBILINOGEN FLD QL: 0.2 E.U./DL — SIGNIFICANT CHANGE UP
UUN UR-MCNC: 309 MG/DL — SIGNIFICANT CHANGE UP
WBC # BLD: 10.14 K/UL — SIGNIFICANT CHANGE UP (ref 3.8–10.5)
WBC # FLD AUTO: 10.14 K/UL — SIGNIFICANT CHANGE UP (ref 3.8–10.5)
WBC UR QL: < 5 /HPF — SIGNIFICANT CHANGE UP

## 2019-12-08 PROCEDURE — 99233 SBSQ HOSP IP/OBS HIGH 50: CPT | Mod: GC

## 2019-12-08 PROCEDURE — 74018 RADEX ABDOMEN 1 VIEW: CPT | Mod: 26

## 2019-12-08 PROCEDURE — 93010 ELECTROCARDIOGRAM REPORT: CPT

## 2019-12-08 PROCEDURE — 76770 US EXAM ABDO BACK WALL COMP: CPT | Mod: 26

## 2019-12-08 RX ORDER — ONDANSETRON 8 MG/1
4 TABLET, FILM COATED ORAL ONCE
Refills: 0 | Status: COMPLETED | OUTPATIENT
Start: 2019-12-08 | End: 2019-12-08

## 2019-12-08 RX ADMIN — Medication 5 UNIT(S): at 09:00

## 2019-12-08 RX ADMIN — CHLORHEXIDINE GLUCONATE 1 APPLICATION(S): 213 SOLUTION TOPICAL at 06:48

## 2019-12-08 RX ADMIN — Medication 2: at 09:00

## 2019-12-08 RX ADMIN — ONDANSETRON 4 MILLIGRAM(S): 8 TABLET, FILM COATED ORAL at 10:26

## 2019-12-08 RX ADMIN — Medication 1 APPLICATION(S): at 12:33

## 2019-12-08 RX ADMIN — Medication 100 UNIT(S): at 12:33

## 2019-12-08 RX ADMIN — Medication 5 UNIT(S): at 17:25

## 2019-12-08 RX ADMIN — Medication 4: at 17:25

## 2019-12-08 RX ADMIN — ENOXAPARIN SODIUM 40 MILLIGRAM(S): 100 INJECTION SUBCUTANEOUS at 22:56

## 2019-12-08 RX ADMIN — Medication 2: at 22:57

## 2019-12-08 RX ADMIN — CARVEDILOL PHOSPHATE 12.5 MILLIGRAM(S): 80 CAPSULE, EXTENDED RELEASE ORAL at 18:36

## 2019-12-08 RX ADMIN — MYCOPHENOLATE MOFETIL 500 MILLIGRAM(S): 250 CAPSULE ORAL at 22:56

## 2019-12-08 RX ADMIN — LINEZOLID 600 MILLIGRAM(S): 600 INJECTION, SOLUTION INTRAVENOUS at 10:05

## 2019-12-08 RX ADMIN — LINEZOLID 600 MILLIGRAM(S): 600 INJECTION, SOLUTION INTRAVENOUS at 22:56

## 2019-12-08 RX ADMIN — Medication 100 MILLIGRAM(S): at 14:18

## 2019-12-08 RX ADMIN — TACROLIMUS 4 MILLIGRAM(S): 5 CAPSULE ORAL at 11:10

## 2019-12-08 RX ADMIN — SODIUM CHLORIDE 125 MILLILITER(S): 9 INJECTION INTRAMUSCULAR; INTRAVENOUS; SUBCUTANEOUS at 18:36

## 2019-12-08 RX ADMIN — Medication 100 MILLIGRAM(S): at 22:56

## 2019-12-08 RX ADMIN — ONDANSETRON 4 MILLIGRAM(S): 8 TABLET, FILM COATED ORAL at 22:59

## 2019-12-08 RX ADMIN — SODIUM CHLORIDE 125 MILLILITER(S): 9 INJECTION INTRAMUSCULAR; INTRAVENOUS; SUBCUTANEOUS at 01:55

## 2019-12-08 RX ADMIN — CARVEDILOL PHOSPHATE 12.5 MILLIGRAM(S): 80 CAPSULE, EXTENDED RELEASE ORAL at 06:48

## 2019-12-08 RX ADMIN — Medication 5 MILLIGRAM(S): at 10:05

## 2019-12-08 RX ADMIN — Medication 81 MILLIGRAM(S): at 12:33

## 2019-12-08 RX ADMIN — CEFEPIME 100 MILLIGRAM(S): 1 INJECTION, POWDER, FOR SOLUTION INTRAMUSCULAR; INTRAVENOUS at 01:54

## 2019-12-08 RX ADMIN — MYCOPHENOLATE MOFETIL 500 MILLIGRAM(S): 250 CAPSULE ORAL at 10:05

## 2019-12-08 RX ADMIN — TAMSULOSIN HYDROCHLORIDE 0.4 MILLIGRAM(S): 0.4 CAPSULE ORAL at 22:55

## 2019-12-08 RX ADMIN — INSULIN GLARGINE 15 UNIT(S): 100 INJECTION, SOLUTION SUBCUTANEOUS at 22:56

## 2019-12-08 RX ADMIN — Medication 100 MILLIGRAM(S): at 06:48

## 2019-12-08 RX ADMIN — TACROLIMUS 4 MILLIGRAM(S): 5 CAPSULE ORAL at 22:58

## 2019-12-08 NOTE — PROGRESS NOTE ADULT - ASSESSMENT
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

## 2019-12-08 NOTE — PROGRESS NOTE ADULT - ATTENDING COMMENTS
pt seen and examined by me case d/w housestaff agree with VS, PE, assessment and plan as above with following additives    1- ELIZABETH-worsening today  possibly ATN and abx induced  dc cefepime- d/w ID  PLEASE SEND OFF urine studies and renal US  monitor UO  2- Diabetic foot infection/osteo- on linezolid stop cefepime  ID consulted  3- hx renal transplant- check tacrolimus level  case d/w Dr. Jeffrey       anticipated dc tomorrow if CR coming down      I independently performed the key portions of the evaluation and management service provided. I agree with the above history, physical, and plan which I have reviewed and edited where appropriate. I find ELIZABETH, hypovolemia, HTN. Follow SCr. Continue anti-hypertensives. See full note. (Patient seen earlier in day.) pt seen and examined by me case d/w housestaff agree with VS, PE, assessment and plan as above with following additives    1- ELIZABETH-worsening today  possibly ATN and abx induced  dc cefepime- d/w ID  PLEASE SEND OFF urine studies and renal US  monitor UO  2- Diabetic foot infection/osteo- on linezolid stop cefepime  ID consulted  3- hx renal transplant- check tacrolimus level  case d/w Dr. Jeffrey       anticipated dc tomorrow if CR coming down      I independently performed the key portions of the evaluation and management service provided. I agree with the above history, physical, and plan which I have reviewed and edited where appropriate. I find ELIZABETH, hypovolemia, HTN. Follow SCr. Continue anti-hypertensives. See full note. (Patient seen earlier in day.)      —    Addendum:    I reviewed the patients course with team. Follow renal ultrasound and bladder PVR. Discussed with Dr. Aranda. May consider transfer for transplant renal biopsy if worsens.    Please call with any questions.    Felix Jeffrey MD, FACP, FASN | kidney.[facility]  Nephrology, Hypertension, and Internal Medicine  Mobile: [phone] (Daytime Hours Only)  Office/Answering Service: [phone]  Asst. Prof. of Medicine, [facility] [employer] at [facility] [place] - Nephrology at [address]  [address], [address], [place], [place] pt seen and examined by me case d/w housestaff agree with VS, PE, assessment and plan as above with following additives    1- ELIZABETH-worsening today  possibly ATN and abx induced  dc cefepime- d/w ID  PLEASE SEND OFF urine studies and renal US  monitor UO  2- Diabetic foot infection/osteo- on linezolid stop cefepime  ID consulted  3- hx renal transplant- check tacrolimus level  case d/w Dr. Jeffrey         I independently performed the key portions of the evaluation and management service provided. I agree with the above history, physical, and plan which I have reviewed and edited where appropriate. I find ELIZABETH, hypovolemia, HTN. Follow SCr. Continue anti-hypertensives. See full note. (Patient seen earlier in day.)      —    Addendum:    I reviewed the patients course with team. Follow renal ultrasound and bladder PVR. Discussed with Dr. Aranda. May consider transfer for transplant renal biopsy if worsens.    Please call with any questions.    Felix Jeffrey MD, FACP, FASN | kidney.[facility]  Nephrology, Hypertension, and Internal Medicine  Mobile: [phone] (Daytime Hours Only)  Office/Answering Service: [phone]  Asst. Prof. of Medicine, [facility] [employer] at [facility] - Nephrology at [address]  [address], [address], [place], [place]

## 2019-12-08 NOTE — PROGRESS NOTE ADULT - SUBJECTIVE AND OBJECTIVE BOX
pt denies acute complaints.   no sob/cp  no urinary symptoms  ros otherwise negative      VITALS  Vital Signs Last 24 Hrs  T(C): 36.5 (07 Dec 2019 04:40), Max: 37.2 (07 Dec 2019 03:10)  T(F): 97.7 (07 Dec 2019 04:40), Max: 98.9 (07 Dec 2019 03:10)  HR: 85 (07 Dec 2019 04:40) (72 - 97)  BP: 150/67 (07 Dec 2019 04:40) (127/71 - 169/75)  BP(mean): --  RR: 18 (07 Dec 2019 04:40) (16 - 28)  SpO2: 97% (07 Dec 2019 04:40) (97% - 99%)    I&O's Summary      CAPILLARY BLOOD GLUCOSE      POCT Blood Glucose.: 167 mg/dL (07 Dec 2019 08:42)  POCT Blood Glucose.: 395 mg/dL (06 Dec 2019 22:04)      PHYSICAL EXAM  GENERAL: In NAD. Sitting at edge of bed  HEENT: NC/AT. PERRL. EOMI. No JVD.   CV: RRR. +S1/S2. No murmurs, rubs or gallops  LUNGS: Clear to auscultation b/l. No wheezing, rales or rhonchi.  ABDOMEN: Soft, nontender, nondistended. +BSx4.  EXT: WWP. No edema in b/l extremities. Left 4th and 5th toe s/p amputation wrapped in kerlix and attached to wound vacuum machine  VASCULAR: 2+ radial, DP bilaterally     MEDICATIONS  (STANDING):  aspirin  chewable 81 milliGRAM(s) Oral daily  carvedilol 12.5 milliGRAM(s) Oral every 12 hours  cefepime   IVPB 2000 milliGRAM(s) IV Intermittent every 12 hours  cefepime   IVPB      chlorhexidine 4% Liquid 1 Application(s) Topical <User Schedule>  dextrose 5%. 1000 milliLiter(s) (50 mL/Hr) IV Continuous <Continuous>  dextrose 50% Injectable 12.5 Gram(s) IV Push once  dextrose 50% Injectable 25 Gram(s) IV Push once  dextrose 50% Injectable 25 Gram(s) IV Push once  docusate sodium 100 milliGRAM(s) Oral three times a day  enoxaparin Injectable 40 milliGRAM(s) SubCutaneous every 24 hours  influenza   Vaccine 0.5 milliLiter(s) IntraMuscular once  insulin glargine Injectable (LANTUS) 15 Unit(s) SubCutaneous at bedtime  insulin lispro (HumaLOG) corrective regimen sliding scale   SubCutaneous Before meals and at bedtime  insulin lispro Injectable (HumaLOG) 5 Unit(s) SubCutaneous three times a day before meals  linezolid    Tablet 600 milliGRAM(s) Oral every 12 hours  mycophenolate mofetil 500 milliGRAM(s) Oral every 12 hours  predniSONE   Tablet 5 milliGRAM(s) Oral every 24 hours  sodium chloride 0.9% Bolus 500 milliLiter(s) IV Bolus once  tacrolimus 4 milliGRAM(s) Oral every 12 hours  tamsulosin 0.4 milliGRAM(s) Oral at bedtime    MEDICATIONS  (PRN):  dextrose 40% Gel 15 Gram(s) Oral once PRN Blood Glucose LESS THAN 70 milliGRAM(s)/deciliter  glucagon  Injectable 1 milliGRAM(s) IntraMuscular once PRN Glucose LESS THAN 70 milligrams/deciliter  sodium chloride 0.9% lock flush 10 milliLiter(s) IV Push every 1 hour PRN Pre/post blood products, medications, blood draw, and to maintain line patency      LABS                        11.9   8.09  )-----------( 150      ( 06 Dec 2019 13:50 )             38.3     12-06    133<L>  |  101  |  27<H>  ----------------------------<  265<H>  5.2   |  20<L>  |  1.51<H>    Ca    10.2      06 Dec 2019 13:50    TPro  6.3  /  Alb  3.6  /  TBili  0.4  /  DBili  x   /  AST  24  /  ALT  22  /  AlkPhos  95  12-06    LIVER FUNCTIONS - ( 06 Dec 2019 13:50 )  Alb: 3.6 g/dL / Pro: 6.3 g/dL / ALK PHOS: 95 U/L / ALT: 22 U/L / AST: 24 U/L / GGT: x           PT/INR - ( 06 Dec 2019 13:50 )   PT: 12.0 sec;   INR: 1.06          PTT - ( 06 Dec 2019 13:50 )  PTT:22.5 sec  Urinalysis Basic - ( 06 Dec 2019 14:39 )    Color: Yellow / Appearance: Hazy / SG: >=1.030 / pH: x  Gluc: x / Ketone: Trace mg/dL  / Bili: Negative / Urobili: 0.2 E.U./dL   Blood: x / Protein: 30 mg/dL / Nitrite: NEGATIVE   Leuk Esterase: NEGATIVE / RBC: < 5 /HPF / WBC < 5 /HPF   Sq Epi: x / Non Sq Epi: 0-5 /HPF / Bacteria: Present /HPF            Radiology and other tests: Reviewed

## 2019-12-09 LAB
ANION GAP SERPL CALC-SCNC: 13 MMOL/L — SIGNIFICANT CHANGE UP (ref 5–17)
APPEARANCE UR: CLEAR — SIGNIFICANT CHANGE UP
BILIRUB UR-MCNC: NEGATIVE — SIGNIFICANT CHANGE UP
BUN SERPL-MCNC: 41 MG/DL — HIGH (ref 7–23)
CALCIUM SERPL-MCNC: 9.6 MG/DL — SIGNIFICANT CHANGE UP (ref 8.4–10.5)
CHLORIDE SERPL-SCNC: 106 MMOL/L — SIGNIFICANT CHANGE UP (ref 96–108)
CO2 SERPL-SCNC: 18 MMOL/L — LOW (ref 22–31)
COLOR SPEC: YELLOW — SIGNIFICANT CHANGE UP
CREAT ?TM UR-MCNC: 47 MG/DL — SIGNIFICANT CHANGE UP
CREAT SERPL-MCNC: 3.74 MG/DL — HIGH (ref 0.5–1.3)
DIFF PNL FLD: ABNORMAL
GLUCOSE BLDC GLUCOMTR-MCNC: 115 MG/DL — HIGH (ref 70–99)
GLUCOSE BLDC GLUCOMTR-MCNC: 161 MG/DL — HIGH (ref 70–99)
GLUCOSE BLDC GLUCOMTR-MCNC: 92 MG/DL — SIGNIFICANT CHANGE UP (ref 70–99)
GLUCOSE BLDC GLUCOMTR-MCNC: 97 MG/DL — SIGNIFICANT CHANGE UP (ref 70–99)
GLUCOSE SERPL-MCNC: 155 MG/DL — HIGH (ref 70–99)
GLUCOSE UR QL: 250
HCT VFR BLD CALC: 34.5 % — LOW (ref 39–50)
HGB BLD-MCNC: 10.9 G/DL — LOW (ref 13–17)
KETONES UR-MCNC: 15 MG/DL
LEUKOCYTE ESTERASE UR-ACNC: NEGATIVE — SIGNIFICANT CHANGE UP
MCHC RBC-ENTMCNC: 27.6 PG — SIGNIFICANT CHANGE UP (ref 27–34)
MCHC RBC-ENTMCNC: 31.6 GM/DL — LOW (ref 32–36)
MCV RBC AUTO: 87.3 FL — SIGNIFICANT CHANGE UP (ref 80–100)
NITRITE UR-MCNC: NEGATIVE — SIGNIFICANT CHANGE UP
NRBC # BLD: 0 /100 WBCS — SIGNIFICANT CHANGE UP (ref 0–0)
OSMOLALITY UR: 342 MOSM/KG — SIGNIFICANT CHANGE UP (ref 50–1200)
PH UR: 6 — SIGNIFICANT CHANGE UP (ref 5–8)
PLATELET # BLD AUTO: 158 K/UL — SIGNIFICANT CHANGE UP (ref 150–400)
POTASSIUM SERPL-MCNC: 4 MMOL/L — SIGNIFICANT CHANGE UP (ref 3.5–5.3)
POTASSIUM SERPL-SCNC: 4 MMOL/L — SIGNIFICANT CHANGE UP (ref 3.5–5.3)
PROT UR-MCNC: 30 MG/DL
RBC # BLD: 3.95 M/UL — LOW (ref 4.2–5.8)
RBC # FLD: 15.4 % — HIGH (ref 10.3–14.5)
SODIUM SERPL-SCNC: 137 MMOL/L — SIGNIFICANT CHANGE UP (ref 135–145)
SODIUM UR-SCNC: 92 MMOL/L — SIGNIFICANT CHANGE UP
SP GR SPEC: 1.02 — SIGNIFICANT CHANGE UP (ref 1–1.03)
TACROLIMUS SERPL-MCNC: 5.9 NG/ML — SIGNIFICANT CHANGE UP
UROBILINOGEN FLD QL: 0.2 E.U./DL — SIGNIFICANT CHANGE UP
WBC # BLD: 11.41 K/UL — HIGH (ref 3.8–10.5)
WBC # FLD AUTO: 11.41 K/UL — HIGH (ref 3.8–10.5)

## 2019-12-09 PROCEDURE — 99222 1ST HOSP IP/OBS MODERATE 55: CPT

## 2019-12-09 PROCEDURE — 99233 SBSQ HOSP IP/OBS HIGH 50: CPT

## 2019-12-09 RX ORDER — MEROPENEM 1 G/30ML
1 INJECTION INTRAVENOUS EVERY 24 HOURS
Refills: 0 | Status: DISCONTINUED | OUTPATIENT
Start: 2019-12-09 | End: 2019-12-09

## 2019-12-09 RX ORDER — METOCLOPRAMIDE HCL 10 MG
5 TABLET ORAL ONCE
Refills: 0 | Status: COMPLETED | OUTPATIENT
Start: 2019-12-09 | End: 2019-12-09

## 2019-12-09 RX ORDER — MEROPENEM 1 G/30ML
1000 INJECTION INTRAVENOUS EVERY 24 HOURS
Refills: 0 | Status: DISCONTINUED | OUTPATIENT
Start: 2019-12-09 | End: 2019-12-16

## 2019-12-09 RX ORDER — HEPARIN SODIUM 5000 [USP'U]/ML
5000 INJECTION INTRAVENOUS; SUBCUTANEOUS EVERY 8 HOURS
Refills: 0 | Status: DISCONTINUED | OUTPATIENT
Start: 2019-12-09 | End: 2019-12-18

## 2019-12-09 RX ADMIN — MYCOPHENOLATE MOFETIL 500 MILLIGRAM(S): 250 CAPSULE ORAL at 10:46

## 2019-12-09 RX ADMIN — Medication 81 MILLIGRAM(S): at 12:40

## 2019-12-09 RX ADMIN — Medication 5 MILLIGRAM(S): at 01:40

## 2019-12-09 RX ADMIN — MYCOPHENOLATE MOFETIL 500 MILLIGRAM(S): 250 CAPSULE ORAL at 22:19

## 2019-12-09 RX ADMIN — MEROPENEM 100 MILLIGRAM(S): 1 INJECTION INTRAVENOUS at 12:39

## 2019-12-09 RX ADMIN — Medication 5 MILLIGRAM(S): at 21:19

## 2019-12-09 RX ADMIN — SODIUM CHLORIDE 125 MILLILITER(S): 9 INJECTION INTRAMUSCULAR; INTRAVENOUS; SUBCUTANEOUS at 21:19

## 2019-12-09 RX ADMIN — HEPARIN SODIUM 5000 UNIT(S): 5000 INJECTION INTRAVENOUS; SUBCUTANEOUS at 22:19

## 2019-12-09 RX ADMIN — TACROLIMUS 4 MILLIGRAM(S): 5 CAPSULE ORAL at 10:45

## 2019-12-09 RX ADMIN — Medication 5 UNIT(S): at 12:42

## 2019-12-09 RX ADMIN — Medication 5 MILLIGRAM(S): at 10:44

## 2019-12-09 RX ADMIN — Medication 100 MILLIGRAM(S): at 07:02

## 2019-12-09 RX ADMIN — CARVEDILOL PHOSPHATE 12.5 MILLIGRAM(S): 80 CAPSULE, EXTENDED RELEASE ORAL at 19:14

## 2019-12-09 RX ADMIN — INSULIN GLARGINE 15 UNIT(S): 100 INJECTION, SOLUTION SUBCUTANEOUS at 22:25

## 2019-12-09 RX ADMIN — LINEZOLID 600 MILLIGRAM(S): 600 INJECTION, SOLUTION INTRAVENOUS at 22:19

## 2019-12-09 RX ADMIN — CHLORHEXIDINE GLUCONATE 1 APPLICATION(S): 213 SOLUTION TOPICAL at 07:01

## 2019-12-09 RX ADMIN — Medication 2: at 09:34

## 2019-12-09 RX ADMIN — Medication 5 UNIT(S): at 09:34

## 2019-12-09 RX ADMIN — CARVEDILOL PHOSPHATE 12.5 MILLIGRAM(S): 80 CAPSULE, EXTENDED RELEASE ORAL at 07:01

## 2019-12-09 RX ADMIN — Medication 1 APPLICATION(S): at 12:40

## 2019-12-09 RX ADMIN — LINEZOLID 600 MILLIGRAM(S): 600 INJECTION, SOLUTION INTRAVENOUS at 10:46

## 2019-12-09 RX ADMIN — Medication 100 MILLIGRAM(S): at 12:41

## 2019-12-09 RX ADMIN — Medication 100 MILLIGRAM(S): at 22:19

## 2019-12-09 RX ADMIN — TACROLIMUS 4 MILLIGRAM(S): 5 CAPSULE ORAL at 22:19

## 2019-12-09 RX ADMIN — TAMSULOSIN HYDROCHLORIDE 0.4 MILLIGRAM(S): 0.4 CAPSULE ORAL at 22:19

## 2019-12-09 NOTE — PROGRESS NOTE ADULT - PROBLEM SELECTOR PLAN 3
Pt with sbp 160s on admission. Head CT noncon negative.   - c/w home coreg 12.5 bid, titrate appropriately

## 2019-12-09 NOTE — CONSULT NOTE ADULT - ASSESSMENT
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 Per wife, he was not acting himself. Since then, his confusion has resolved. He is now AAOx2, not to time. He is conversant, and though wife states he has not had the "best memory" lately, he is "slow on thought retrieval." His acute confused episodes may have been 2/2 toxic metabolic encephalopathy from his ongoing infection vs. acute uremia vs. dementia, as his creatinine has been greatly increasing inpatient due to antibiotics superimposed on poor PO intake of food/water. CT head wnl.    Recommend the following:  - Neurochecks as needed  - Manage ELIZABETH as per primary team  - Consider TSH, B12, folate (last in 5/2018 B12 and folate wnl)    Neurology will continue to follow  Case discussed with Dr. Mcnair, attending neurologist  Call with questions 79M with hx of Renal transplant, functionally blind (glaucoma), DM (on humalog 72/25), BPH, HTN, foot ulcers s/p 4th and 5th digit amputation on long term antibiotic therapy w/ cefepime and linezolid, admitted for ELIZABETH    #Delirium  Now resolved, patient was noted by wife to be confused last Thursday and Friday at home, patient couldn't even remember where he was. Per wife, he was not acting himself. Since then, his confusion has resolved. He is now AAOx2, not to time. He is conversant, and though wife states he has not had the "best memory" lately, he is "slow on thought retrieval." His acute confused episodes may have been 2/2 toxic metabolic encephalopathy from his ongoing infection vs. acute uremia vs. dementia, as his creatinine has been greatly increasing inpatient due to antibiotics superimposed on poor PO intake of food/water. CT head wnl.    Recommend the following:  - Manage ELIZABETH as per primary team  - Consider TSH, B12, folate (last in 5/2018 B12 and folate wnl)    Neurology will continue to follow  Case discussed with Dr. Mcnair, attending neurologist  Call with questions

## 2019-12-09 NOTE — PROGRESS NOTE ADULT - PROBLEM SELECTOR PLAN 4
- c/w mycophenolate 500mg twice a day  - c/w tacrolimus 4mg twice a day; should be taken at least 2 hours after mycophenolate  - tacrolimus level wnl  - 12/8 renal U/S consistent with medical renal dz without hydronephrosis  - pending renal U/S with dopplers to assess renal arteries  - trend cr

## 2019-12-09 NOTE — CONSULT NOTE ADULT - SUBJECTIVE AND OBJECTIVE BOX
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 Most of history is per wife at bedside. Says patient felt confused this morning with dizziness. Pt reports that he has been frustrated that he forgets things, such as his childrens names. Per wife, pt woke up confused and dizzy, and became anxious and had SOB. Pt has had poor PO intake over past few days and does not drink much fluids. Wife checked BP throughtout day which was SBP 100s-110s but had one reading of 210 during episode of anxiety. Pt was seen by Dr. Benito and found to have creatinine ~1.8, double his baseline. Admitted to [facility].     Pt is known to vascular service and has been seen by Dr. Lopez for non-healing ulcer on LLE. Previously with I&D for a previous diabetic ulcer with a penrose drain and was discharged with  a picc line for IV abx. 19 Patient was taken to the operating room with  and underwent 4th/5th toe amputations. Cultures were sent from the OR which grew e-coli, staph Lugdunesis, and Cornebacterium. He was initially stated on IV Vancomycin and Zosyn. ID was consulted and recommendations were made to transition to IV Cefepime 2g q8 and Linezolid 600mg PO q12 for 6 weeks. Patient was instructed to follow up with ID Dr. Tabor for regular weekly BMP and CBC while on this medication. Home IV infusion was arranged and home health was reinstated.     Denies fever chill nausea vomiting. No chest pain. No abdominal pain. No dysuria or diarrhea. No sick contacts endorsed.    ED course:T 97.7, HR 74, /75, RR 16, 97% 02 on RA. WBC 8.09; h/h 11.9/38.3, glucose 255; Na 133, K 5.2. Cr 1.51 (baseline ~0.9). Given 500 ml NS bolus.    CT head noncont  IMPRESSION:   1. No acute intracranial hemorrhage or transcortical infarct.   2. 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   3. Chronic right frontal, right maxillary, and ethmoid sinus disease  CXR:    IMPRESSION:   No evidence of acute cardiopulmonary disease (06 Dec 2019 17:39)    REVIEW OF SYSTEMS:   Otherwise negative except as specified in HPI    MEDICATIONS:  MEDICATIONS  (STANDING):  aspirin  chewable 81 milliGRAM(s) Oral daily  BACItracin   Ointment 1 Application(s) Topical daily  carvedilol 12.5 milliGRAM(s) Oral every 12 hours  chlorhexidine 4% Liquid 1 Application(s) Topical <User Schedule>  dextrose 5%. 1000 milliLiter(s) (50 mL/Hr) IV Continuous <Continuous>  dextrose 50% Injectable 12.5 Gram(s) IV Push once  dextrose 50% Injectable 25 Gram(s) IV Push once  dextrose 50% Injectable 25 Gram(s) IV Push once  docusate sodium 100 milliGRAM(s) Oral three times a day  heparin  flush 100 Units/mL Injectable 100 Unit(s) IV Push every other day  heparin  Injectable 5000 Unit(s) SubCutaneous every 8 hours  influenza   Vaccine 0.5 milliLiter(s) IntraMuscular once  insulin glargine Injectable (LANTUS) 15 Unit(s) SubCutaneous at bedtime  insulin lispro (HumaLOG) corrective regimen sliding scale   SubCutaneous Before meals and at bedtime  insulin lispro Injectable (HumaLOG) 5 Unit(s) SubCutaneous three times a day before meals  linezolid    Tablet 600 milliGRAM(s) Oral every 12 hours  meropenem  IVPB 1000 milliGRAM(s) IV Intermittent every 24 hours  mycophenolate mofetil 500 milliGRAM(s) Oral every 12 hours  predniSONE   Tablet 5 milliGRAM(s) Oral every 24 hours  sodium chloride 0.9%. 1000 milliLiter(s) (125 mL/Hr) IV Continuous <Continuous>  tacrolimus 4 milliGRAM(s) Oral every 12 hours  tamsulosin 0.4 milliGRAM(s) Oral at bedtime    MEDICATIONS  (PRN):  dextrose 40% Gel 15 Gram(s) Oral once PRN Blood Glucose LESS THAN 70 milliGRAM(s)/deciliter  glucagon  Injectable 1 milliGRAM(s) IntraMuscular once PRN Glucose LESS THAN 70 milligrams/deciliter  sodium chloride 0.9% lock flush 10 milliLiter(s) IV Push every 1 hour PRN Pre/post blood products, medications, blood draw, and to maintain line patency      ALLERGIES:  Allergies    No Known Allergies    Intolerances      VITAL SIGNS:  Vital Signs Last 24 Hrs  T(C): 36.2 (09 Dec 2019 09:56), Max: 36.6 (08 Dec 2019 17:41)  T(F): 97.2 (09 Dec 2019 09:56), Max: 97.8 (08 Dec 2019 17:41)  HR: 92 (09 Dec 2019 09:56) (86 - 96)  BP: 138/69 (09 Dec 2019 09:56) (136/69 - 152/63)  BP(mean): --  RR: 18 (09 Dec 2019 09:56) (17 - 18)  SpO2: 96% (09 Dec 2019 09:56) (96% - 98%)    19 @ 07:01  -  19 @ 07:00  --------------------------------------------------------  IN:    sodium chloride 0.9%.: 1250 mL  Total IN: 1250 mL    OUT:    Voided: 150 mL  Total OUT: 150 mL    Total NET: 1100 mL    PHYSICAL EXAM:  General: NAD, elderly [place] male resting in bed, speaks Yiddish and some English  HEENT: PERRL, EOMI unable to be assessed, anicteric sclera, DMM  Respiratory: CTA b/l, no wheezes, rales, or rhonchi  Cardiovascular: +RRR, +S1/S2, no murmurs, rubs, or gallops  Gastrointestinal: Soft, nontender, nondistended, normoactive bowel sounds x4 quadrants  Extremities: WWP, no erythema, no peripheral edema, no cyanosis b/l, +AVF in RUE w/ palpable thrill  Vascular: 2+ radial, DP pulses b/l  Neurological:  - Mental Status: AAOx2 to person and place (thinks it is November); speech is fluent with intact naming, repetition, and comprehension  - Cranial Nerves II - XII:  II: PERRL; visual fields unable to assess as patient is blind  III, IV, VI: EOMI unable to assess based on patient's vision loss 2/2 glaucoma  V: facial sensation intact to light touch V1-V3 b/l  VII: no ptosis, no facial droop, symmetric eyebrow raise and closure  VIII: hearing intact to finger rub b/l  IX, X: uvula is midline, soft palate rises symmetrically  XI: head turning and shoulder shrug intact b/l  XII: tongue protrusion midline  - Motor: strength is 5/5 b/l LLE & RLE, 5/5 b/l LUE & RUE. normal muscle bulk and tone throughout, no pronator drift  - Sensory: intact to light touch b/l  - Reflexes: intact b/l brachioradialis, patellar, and negative babinski  - Coordination: able to perform heel-shin, unable to perform finger nose due to unable to see my hand  - Gait: deferred      LABS:                        10.9   11.41 )-----------( 158      ( 09 Dec 2019 10:51 )             34.5         137  |  106  |  41<H>  ----------------------------<  155<H>  4.0   |  18<L>  |  3.74<H>    Ca    9.6      09 Dec 2019 10:51  Mg     2.0             Urinalysis Basic - ( 09 Dec 2019 12:05 )    Color: Yellow / Appearance: Clear / S.025 / pH: x  Gluc: x / Ketone: 15 mg/dL  / Bili: Negative / Urobili: 0.2 E.U./dL   Blood: x / Protein: 30 mg/dL / Nitrite: NEGATIVE   Leuk Esterase: NEGATIVE / RBC: 5-10 /HPF / WBC < 5 /HPF   Sq Epi: x / Non Sq Epi: 0-5 /HPF / Bacteria: Present /HPF    CAPILLARY BLOOD GLUCOSE    POCT Blood Glucose.: 92 mg/dL (09 Dec 2019 12:27)  POCT Blood Glucose.: 161 mg/dL (09 Dec 2019 08:58)  POCT Blood Glucose.: 164 mg/dL (08 Dec 2019 22:34)  POCT Blood Glucose.: 207 mg/dL (08 Dec 2019 17:20)    RADIOLOGY & ADDITIONAL TESTS:  < from: CT Head No Cont (19 @ 14:13) >  FINDINGS:    VENTRICLES AND SULCI: The ventricles are prominent secondary to   parenchymal volume loss. There is no hydrocephalus.  INTRA-AXIAL: There are patchy and confluent areas of hypodensity within   the subcortical and periventricular white matter, consistent with   moderate chronic small vessel ischemic disease. No acute intracranial   hemorrhage or midline shift is present. The gray-white matter   differentiation is preserved.  EXTRA-AXIAL: No extra-axial fluid collection is present.   VISUALIZED SINUSES: Grossly unchanged opacification of the right frontal   sinus, rightanterior ethmoid air cells, and right maxillary sinus. The   right maxillary right frontal and right ethmoid air cells demonstrate a   more thickened sclerotic wall suggestive of chronic sinus inflammatory   disease.  VISUALIZED MASTOIDS:  Chronic opacification of the right mastoid air   cells.  CALVARIUM:  Normal.  MISCELLANEOUS:  The native ocular lenses are absent bilaterally. A left   ocular glaucoma device is noted.      IMPRESSION:  1.  No acute intracranial hemorrhage or transcortical infarct.  2.  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  3.  Chronic right frontal, right maxillary, and ethmoid sinus disease. NEUROLOGY INITIAL CONSULT NOTE    HPI:  Mr Miguel is an 80 yo M with hx of Renal transplant (normal creatinine), functionally blind (glaucoma), DM (on humalog 72/25), BPH, HTN, left 4th adn 5th toe amputation with wound vac and diabetic ulcer required 6 week IV abx (PICC line on LUE) with cefepime and linezolid who was brought in today with complaints of confusion found to have elevated creatinine. Most of history is per wife at bedside. Says patient felt confused this morning with dizziness. Pt reports that he has been frustrated that he forgets things, such as his childrens names. Per wife, pt woke up confused and dizzy, and became anxious and had SOB. Pt has had poor PO intake over past few days and does not drink much fluids. Wife checked BP throughtout day which was SBP 100s-110s but had one reading of 210 during episode of anxiety. Pt was seen by Dr. Aranda and found to have creatinine ~1.8, double his baseline. Admitted to [facility].     Pt is known to vascular service and has been seen by Dr. Lopez for non-healing ulcer on LLE. Previously with I&D for a previous diabetic ulcer with a penrose drain and was discharged with  a picc line for IV abx. 19 Patient was taken to the operating room with  and underwent 4th/5th toe amputations. Cultures were sent from the OR which grew e-coli, staph Lugdunesis, and Cornebacterium. He was initially stated on IV Vancomycin and Zosyn. ID was consulted and recommendations were made to transition to IV Cefepime 2g q8 and Linezolid 600mg PO q12 for 6 weeks. Patient was instructed to follow up with ID Dr. Tabor for regular weekly BMP and CBC while on this medication. Home IV infusion was arranged and home health was reinstated.     Denies fever chill nausea vomiting. No chest pain. No abdominal pain. No dysuria or diarrhea. No sick contacts endorsed.    ED course:T 97.7, HR 74, /75, RR 16, 97% 02 on RA. WBC 8.09; h/h 11.9/38.3, glucose 255; Na 133, K 5.2. Cr 1.51 (baseline ~0.9). Given 500 ml NS bolus.    CT head noncont  IMPRESSION:   1. No acute intracranial hemorrhage or transcortical infarct.   2. 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   3. Chronic right frontal, right maxillary, and ethmoid sinus disease  CXR:    IMPRESSION:   No evidence of acute cardiopulmonary disease (06 Dec 2019 17:39)    REVIEW OF SYSTEMS:   Otherwise negative except as specified in HPI    MEDICATIONS:  MEDICATIONS  (STANDING):  aspirin  chewable 81 milliGRAM(s) Oral daily  BACItracin   Ointment 1 Application(s) Topical daily  carvedilol 12.5 milliGRAM(s) Oral every 12 hours  chlorhexidine 4% Liquid 1 Application(s) Topical <User Schedule>  dextrose 5%. 1000 milliLiter(s) (50 mL/Hr) IV Continuous <Continuous>  dextrose 50% Injectable 12.5 Gram(s) IV Push once  dextrose 50% Injectable 25 Gram(s) IV Push once  dextrose 50% Injectable 25 Gram(s) IV Push once  docusate sodium 100 milliGRAM(s) Oral three times a day  heparin  flush 100 Units/mL Injectable 100 Unit(s) IV Push every other day  heparin  Injectable 5000 Unit(s) SubCutaneous every 8 hours  influenza   Vaccine 0.5 milliLiter(s) IntraMuscular once  insulin glargine Injectable (LANTUS) 15 Unit(s) SubCutaneous at bedtime  insulin lispro (HumaLOG) corrective regimen sliding scale   SubCutaneous Before meals and at bedtime  insulin lispro Injectable (HumaLOG) 5 Unit(s) SubCutaneous three times a day before meals  linezolid    Tablet 600 milliGRAM(s) Oral every 12 hours  meropenem  IVPB 1000 milliGRAM(s) IV Intermittent every 24 hours  mycophenolate mofetil 500 milliGRAM(s) Oral every 12 hours  predniSONE   Tablet 5 milliGRAM(s) Oral every 24 hours  sodium chloride 0.9%. 1000 milliLiter(s) (125 mL/Hr) IV Continuous <Continuous>  tacrolimus 4 milliGRAM(s) Oral every 12 hours  tamsulosin 0.4 milliGRAM(s) Oral at bedtime    MEDICATIONS  (PRN):  dextrose 40% Gel 15 Gram(s) Oral once PRN Blood Glucose LESS THAN 70 milliGRAM(s)/deciliter  glucagon  Injectable 1 milliGRAM(s) IntraMuscular once PRN Glucose LESS THAN 70 milligrams/deciliter  sodium chloride 0.9% lock flush 10 milliLiter(s) IV Push every 1 hour PRN Pre/post blood products, medications, blood draw, and to maintain line patency      ALLERGIES:  Allergies    No Known Allergies    Intolerances      VITAL SIGNS:  Vital Signs Last 24 Hrs  T(C): 36.2 (09 Dec 2019 09:56), Max: 36.6 (08 Dec 2019 17:41)  T(F): 97.2 (09 Dec 2019 09:56), Max: 97.8 (08 Dec 2019 17:41)  HR: 92 (09 Dec 2019 09:56) (86 - 96)  BP: 138/69 (09 Dec 2019 09:56) (136/69 - 152/63)  BP(mean): --  RR: 18 (09 Dec 2019 09:56) (17 - 18)  SpO2: 96% (09 Dec 2019 09:56) (96% - 98%)    19 @ 07:01  -  19 @ 07:00  --------------------------------------------------------  IN:    sodium chloride 0.9%.: 1250 mL  Total IN: 1250 mL    OUT:    Voided: 150 mL  Total OUT: 150 mL    Total NET: 1100 mL    PHYSICAL EXAM:  General: NAD, elderly [place] male resting in bed, speaks Yiddish and some English  HEENT: PERRL, EOMI unable to be assessed, anicteric sclera, DMM  Respiratory: CTA b/l, no wheezes, rales, or rhonchi  Cardiovascular: +RRR, +S1/S2, no murmurs, rubs, or gallops  Gastrointestinal: Soft, nontender, nondistended, normoactive bowel sounds x4 quadrants  Extremities: WWP, no erythema, no peripheral edema, no cyanosis b/l, +AVF in RUE w/ palpable thrill  Vascular: 2+ radial, DP pulses b/l  Neurological:  - Mental Status: AAOx2 to person and place (thinks it is November); speech is fluent with intact naming, repetition, and comprehension  - Cranial Nerves II - XII:  II: PERRL; visual fields unable to assess as patient is blind  III, IV, VI: EOMI unable to assess based on patient's vision loss 2/2 glaucoma  V: facial sensation intact to light touch V1-V3 b/l  VII: no ptosis, no facial droop, symmetric eyebrow raise and closure  VIII: hearing intact to finger rub b/l  IX, X: uvula is midline, soft palate rises symmetrically  XI: head turning and shoulder shrug intact b/l  XII: tongue protrusion midline  - Motor: strength is 5/5 b/l LLE & RLE, 5/5 b/l LUE & RUE. normal muscle bulk and tone throughout; mild asterixes with arms outstretched   - Sensory: intact to light touch b/l  - Reflexes: intact b/l brachioradialis, patellar, and negative babinski  - Coordination: able to perform heel-shin, no dysmetria on finger to nose  - Gait: deferred      LABS:                        10.9   11.41 )-----------( 158      ( 09 Dec 2019 10:51 )             34.5         137  |  106  |  41<H>  ----------------------------<  155<H>  4.0   |  18<L>  |  3.74<H>    Ca    9.6      09 Dec 2019 10:51  Mg     2.0             Urinalysis Basic - ( 09 Dec 2019 12:05 )    Color: Yellow / Appearance: Clear / S.025 / pH: x  Gluc: x / Ketone: 15 mg/dL  / Bili: Negative / Urobili: 0.2 E.U./dL   Blood: x / Protein: 30 mg/dL / Nitrite: NEGATIVE   Leuk Esterase: NEGATIVE / RBC: 5-10 /HPF / WBC < 5 /HPF   Sq Epi: x / Non Sq Epi: 0-5 /HPF / Bacteria: Present /HPF    CAPILLARY BLOOD GLUCOSE    POCT Blood Glucose.: 92 mg/dL (09 Dec 2019 12:27)  POCT Blood Glucose.: 161 mg/dL (09 Dec 2019 08:58)  POCT Blood Glucose.: 164 mg/dL (08 Dec 2019 22:34)  POCT Blood Glucose.: 207 mg/dL (08 Dec 2019 17:20)    RADIOLOGY & ADDITIONAL TESTS:  < from: CT Head No Cont (19 @ 14:13) >  FINDINGS:    VENTRICLES AND SULCI: The ventricles are prominent secondary to   parenchymal volume loss. There is no hydrocephalus.  INTRA-AXIAL: There are patchy and confluent areas of hypodensity within   the subcortical and periventricular white matter, consistent with   moderate chronic small vessel ischemic disease. No acute intracranial   hemorrhage or midline shift is present. The gray-white matter   differentiation is preserved.  EXTRA-AXIAL: No extra-axial fluid collection is present.   VISUALIZED SINUSES: Grossly unchanged opacification of the right frontal   sinus, rightanterior ethmoid air cells, and right maxillary sinus. The   right maxillary right frontal and right ethmoid air cells demonstrate a   more thickened sclerotic wall suggestive of chronic sinus inflammatory   disease.  VISUALIZED MASTOIDS:  Chronic opacification of the right mastoid air   cells.  CALVARIUM:  Normal.  MISCELLANEOUS:  The native ocular lenses are absent bilaterally. A left   ocular glaucoma device is noted.      IMPRESSION:  1.  No acute intracranial hemorrhage or transcortical infarct.  2.  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  3.  Chronic right frontal, right maxillary, and ethmoid sinus disease.

## 2019-12-09 NOTE — PROGRESS NOTE ADULT - PROBLEM SELECTOR PLAN 1
Pt Cr 1.5 on admission to [facility] from baseline of ~0.9. Pt with hx of renal transplant in setting of DM and HTN. Likely etiology of ELIZABETH is prerenal (given poor PO intake for past few days) vs. ATN 2/2 cefepime toxicity Likely also contributing to confusion. Patient tolerating PO, not dry on exam and mentating closer to baseline, per wife at bedside. Cr uptrending to 3.74 today, BUN 41  - c/w IV fluids  - encourage PO intake  - 12/8 renal U/S consistent with medical renal dz without hydronephrosis.  - f/u UA, urine lytes  - renal U/S with dopplers ordered to assess renal arteries  - CT abdomen/pelvis without contrast also ordered  - trend Cr  - Dr. Osborne from vascular also following

## 2019-12-09 NOTE — CONSULT NOTE ADULT - SUBJECTIVE AND OBJECTIVE BOX
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 Most of history is per wife at bedside. Says patient felt confused this morning with dizziness. Pt reports that he has been frustrated that he forgets things, such as his childrens names. Per wife, pt woke up confused and dizzy, and became anxious and had SOB. Pt has had poor PO intake over past few days and does not drink much fluids. Wife checked BP throughtout day which was SBP 100s-110s but had one reading of 210 during episode of anxiety. Pt was seen by Dr. Benito and found to have creatinine ~1.8, double his baseline. Admitted to [facility].     Pt is known to vascular service and has been seen by Dr. Lopez for non-healing ulcer on LLE. Previously with I&D for a previous diabetic ulcer with a penrose drain and was discharged with  a picc line for IV abx. 11/19/19 Patient was taken to the operating room with  and underwent 4th/5th toe amputations. Cultures were sent from the OR which grew e-coli, staph Lugdunesis, and Cornebacterium. He was initially stated on IV Vancomycin and Zosyn. ID was consulted and recommendations were made to transition to IV Cefepime 2g q8 and Linezolid 600mg PO q12 for 6 weeks. Patient was instructed to follow up with ID Dr. Tabor for regular weekly BMP and CBC while on this medication. Home IV infusion was arranged and home health was reinstated.     Denies fever chill nausea vomiting. No chest pain. No abdominal pain. No dysuria or diarrhea. No sick contacts endorsed.    ED course:T 97.7, HR 74, /75, RR 16, 97% 02 on RA. WBC 8.09; h/h 11.9/38.3, glucose 255; Na 133, K 5.2. Cr 1.51 (baseline ~0.9). Given 500 ml NS bolus.    CT head noncont  IMPRESSION:   1. No acute intracranial hemorrhage or transcortical infarct.   2. 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   3. 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 1000 milliLiter(s) (50 mL/Hr) IV Continuous <Continuous>  dextrose 50% Injectable 12.5 Gram(s) IV Push once  dextrose 50% Injectable 25 Gram(s) IV Push once  dextrose 50% Injectable 25 Gram(s) IV Push once  docusate sodium 100 milliGRAM(s) Oral three times a day  enoxaparin Injectable 40 milliGRAM(s) SubCutaneous every 24 hours  heparin  flush 100 Units/mL Injectable 100 Unit(s) IV Push every other day  influenza   Vaccine 0.5 milliLiter(s) IntraMuscular once  insulin glargine Injectable (LANTUS) 15 Unit(s) SubCutaneous at bedtime  insulin lispro (HumaLOG) corrective regimen sliding scale   SubCutaneous Before meals and at bedtime  insulin lispro Injectable (HumaLOG) 5 Unit(s) SubCutaneous three times a day before meals  linezolid    Tablet 600 milliGRAM(s) Oral every 12 hours  meropenem  IVPB 1000 milliGRAM(s) IV Intermittent every 24 hours  mycophenolate mofetil 500 milliGRAM(s) Oral every 12 hours  predniSONE   Tablet 5 milliGRAM(s) Oral every 24 hours  sodium chloride 0.9%. 1000 milliLiter(s) (125 mL/Hr) IV Continuous <Continuous>  tacrolimus 4 milliGRAM(s) Oral every 12 hours  tamsulosin 0.4 milliGRAM(s) Oral at bedtime    MEDICATIONS  (PRN):  dextrose 40% Gel 15 Gram(s) Oral once PRN Blood Glucose LESS THAN 70 milliGRAM(s)/deciliter  glucagon  Injectable 1 milliGRAM(s) IntraMuscular once PRN Glucose LESS THAN 70 milligrams/deciliter  sodium chloride 0.9% lock flush 10 milliLiter(s) IV Push every 1 hour PRN Pre/post blood products, medications, blood draw, and to maintain line patency      Allergies    No Known Allergies    Intolerances        SOCIAL HISTORY:    FAMILY HISTORY:      Vital Signs Last 24 Hrs  T(C): 36.2 (09 Dec 2019 09:56), Max: 36.6 (08 Dec 2019 17:41)  T(F): 97.2 (09 Dec 2019 09:56), Max: 97.8 (08 Dec 2019 17:41)  HR: 92 (09 Dec 2019 09:56) (86 - 96)  BP: 138/69 (09 Dec 2019 09:56) (136/69 - 152/63)  BP(mean): --  RR: 18 (09 Dec 2019 09:56) (17 - 18)  SpO2: 96% (09 Dec 2019 09:56) (96% - 98%)    12-08-19 @ 07:01  -  12-09-19 @ 07:00  --------------------------------------------------------  IN: 1250 mL / OUT: 150 mL / NET: 1100 mL        PHYSICAL EXAM:  Constitutional: A&Ox2-3  Eyes: Periorbital edema  Ear/Nose/Throat: no oral lesion, no sinus tenderness on percussion  Neck:no JVD, no lymphadenopathy, supple  Respiratory: CTA slim  Cardiovascular: S1S2 RRR, no murmurs  Gastrointestinal:soft, (+) BS, no HSM  Extremities: L 4th & 5th toes w/ wound vac  Vascular: DP Pulse:  right normal; left diminished        LABS:                        10.9   11.41 )-----------( 158      ( 09 Dec 2019 10:51 )             34.5     12-09    137  |  106  |  41<H>  ----------------------------<  155<H>  4.0   |  18<L>  |  3.74<H>    Ca    9.6      09 Dec 2019 10:51  Mg     2.0     12-08        Urinalysis Basic - ( 08 Dec 2019 11:29 )    Color: Yellow / Appearance: Clear / SG: >=1.030 / pH: x  Gluc: x / Ketone: 15 mg/dL  / Bili: Negative / Urobili: 0.2 E.U./dL   Blood: x / Protein: 30 mg/dL / Nitrite: NEGATIVE   Leuk Esterase: NEGATIVE / RBC: < 5 /HPF / WBC < 5 /HPF   Sq Epi: x / Non Sq Epi: 0-5 /HPF / Bacteria: Present /HPF

## 2019-12-09 NOTE — CONSULT NOTE ADULT - SUBJECTIVE AND OBJECTIVE BOX
CC:      HISTORY OF PRESENT ILLNESS:  HPI:   80 yo M with hx of Renal transplant ~7 yr ago, functionally blind (glaucoma), IDDM, BPH, HTN, diabetic foot ulcer complicated by left 4th and 5th toe amputation with wound vac, on 6 week course of IV cefepime and linezolid through PICC line, presented with acute renal failure, confusion, poor PO intake.  Sent from Dr. Aranda office due to doubling of his creatinine.   Since admission creatinine has been rising, now > 3.  Most of history per wife.   is sleepy/lethargic.   No SOB, CP. No hx of CAD, MI, CVA.     Feb 2017 Normal pharm nuclear stress test   Feb 2017 ECHO - mod LVH, mild gradient across LVOT (40mmhg with valsalva)     CT head non contrast  IMPRESSION:   1. No acute intracranial hemorrhage or transcortical infarct.   2. 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   3. Chronic right frontal, right maxillary, and ethmoid sinus disease    CXR:  IMPRESSION:   No evidence of acute cardiopulmonary disease (06 Dec 2019 17:39)          Allergies    No Known Allergies    Intolerances    	    MEDICATIONS:  aspirin  chewable 81 milliGRAM(s) Oral daily  carvedilol 12.5 milliGRAM(s) Oral every 12 hours  heparin  flush 100 Units/mL Injectable 100 Unit(s) IV Push every other day  heparin  Injectable 5000 Unit(s) SubCutaneous every 8 hours  tamsulosin 0.4 milliGRAM(s) Oral at bedtime    linezolid    Tablet 600 milliGRAM(s) Oral every 12 hours  meropenem  IVPB 1000 milliGRAM(s) IV Intermittent every 24 hours        docusate sodium 100 milliGRAM(s) Oral three times a day    dextrose 40% Gel 15 Gram(s) Oral once PRN  dextrose 50% Injectable 12.5 Gram(s) IV Push once  dextrose 50% Injectable 25 Gram(s) IV Push once  dextrose 50% Injectable 25 Gram(s) IV Push once  glucagon  Injectable 1 milliGRAM(s) IntraMuscular once PRN  insulin glargine Injectable (LANTUS) 15 Unit(s) SubCutaneous at bedtime  insulin lispro (HumaLOG) corrective regimen sliding scale   SubCutaneous Before meals and at bedtime  insulin lispro Injectable (HumaLOG) 5 Unit(s) SubCutaneous three times a day before meals  predniSONE   Tablet 5 milliGRAM(s) Oral every 24 hours    BACItracin   Ointment 1 Application(s) Topical daily  chlorhexidine 4% Liquid 1 Application(s) Topical <User Schedule>  dextrose 5%. 1000 milliLiter(s) IV Continuous <Continuous>  influenza   Vaccine 0.5 milliLiter(s) IntraMuscular once  mycophenolate mofetil 500 milliGRAM(s) Oral every 12 hours  sodium chloride 0.9% lock flush 10 milliLiter(s) IV Push every 1 hour PRN  sodium chloride 0.9%. 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  BPH (benign prostatic hypertrophy)  HTN (hypertension)  Amputation of toe  Kidney transplant recipient      FAMILY HISTORY:      SOCIAL HISTORY:  unchanged    REVIEW OF SYSTEMS:  CONSTITUTIONAL: No fever, weight loss, or fatigue  EYES: No eye pain, visual disturbances, or discharge  ENMT:  No difficulty hearing, tinnitus, vertigo; No sinus or throat pain  NECK: No pain or stiffness  RESPIRATORY: No cough, wheezing, chills or hemoptysis; No Shortness of Breath  CARDIOVASCULAR: No chest pain, palpitations, passing out, dizziness, or leg swelling  GASTROINTESTINAL: No abdominal or epigastric pain. No nausea, vomiting, or hematemesis; No diarrhea or constipation. No melena or hematochezia.  GENITOURINARY: No dysuria, frequency, hematuria, or incontinence  NEUROLOGICAL: No headaches, memory loss, loss of strength, numbness, or tremors  SKIN: No itching, burning, rashes, or lesions   LYMPH Nodes: No enlarged glands  ENDOCRINE: No heat or cold intolerance; No hair loss  MUSCULOSKELETAL: No joint pain or swelling; No muscle, back, or extremity pain  PSYCHIATRIC: No depression, anxiety, mood swings, or difficulty sleeping  HEME/LYMPH: No easy bruising, or bleeding gums  ALLERY AND IMMUNOLOGIC: No hives or eczema	    [X ] All others negative	  [ ] Unable to obtain    PHYSICAL EXAM:  T(C): 36.1 (12-09-19 @ 16:35), Max: 36.3 (12-08-19 @ 22:06)  HR: 84 (12-09-19 @ 16:35) (84 - 96)  BP: 189/74 (12-09-19 @ 16:35) (138/69 - 189/74)  RR: 18 (12-09-19 @ 16:35) (17 - 18)  SpO2: 94% (12-09-19 @ 16:35) (94% - 98%)  Wt(kg): --  I&O's Summary    08 Dec 2019 07:01  -  09 Dec 2019 07:00  --------------------------------------------------------  IN: 1250 mL / OUT: 150 mL / NET: 1100 mL    09 Dec 2019 07:01  -  09 Dec 2019 20:43  --------------------------------------------------------  IN: 1250 mL / OUT: 1000 mL / NET: 250 mL        Appearance: lethargic/sleepy, no distress	  HEENT:   Normal oral mucosa, PERRL, EOMI	  Lymphatic: No lymphadenopathy  Respiratory: Lungs clear to auscultation	  Psychiatry: A & O x 3, Mood & affect appropriate  Gastrointestinal:  Soft, Non-tender, + BS	  Neurologic: Non-focal    CARDIOVASCULAR EXAM:  Cardiac: No murmurs, Normal S1 S2  Neck: No carotid bruits, no JVD  Extremities: No edema, s/p left 4th/5th toe amp, wound vac in place  Skin: Warm, No rashes, No ecchymoses, no cyanosis,  no ulcerations   Pulses: Peripheral pulses palpable bilaterally  LEFT Femoral - present  RIGHT Femoral - present  LEFT Popliteal - present  RIGHT Popliteal - present  LEFT Dorsalis Pedis - present  RIGHT Dorsalis Pedis - present  LEFT Posterior Tibial - present  RIGHT Posterior Tibial - present     DOPPLER:       LABS:	 	    CBC Full  -  ( 09 Dec 2019 10:51 )  WBC Count : 11.41 K/uL  Hemoglobin : 10.9 g/dL  Hematocrit : 34.5 %  Platelet Count - Automated : 158 K/uL  Mean Cell Volume : 87.3 fl  Mean Cell Hemoglobin : 27.6 pg  Mean Cell Hemoglobin Concentration : 31.6 gm/dL  Auto Neutrophil # : x  Auto Lymphocyte # : x  Auto Monocyte # : x  Auto Eosinophil # : x  Auto Basophil # : x  Auto Neutrophil % : x  Auto Lymphocyte % : x  Auto Monocyte % : x  Auto Eosinophil % : x  Auto Basophil % : x    12-09    137  |  106  |  41<H>  ----------------------------<  155<H>  4.0   |  18<L>  |  3.74<H>  12-08    134<L>  |  101  |  36<H>  ----------------------------<  189<H>  4.2   |  19<L>  |  2.83<H>    Ca    9.6      09 Dec 2019 10:51  Ca    10.2      08 Dec 2019 09:48  Mg     2.0     12-08        Assessment:  Acute Renal Failure - unclear etiology:  worsened with IVF, antibiotic induced? renal artery stenosis?  Altered Mental Status  Infected Diabetic Foot Ulcer s/p toe amp     Plan:  - check renal artery duplex US to assess for JENNA   - would benefit from cross sectional imaging of abdomen.  If unable to get good images of renal arteries on duplex could consider a non contrast MRA.    - repeat ECHO.  No signs/symptoms of heart failure.  Significant LVH with mild gradient across LVOT on echo in Feb 2017   - will cont to follow     Dago Osborne MD  Vascular Cardiology    [address]  OFFICE # [phone]  EMAIL: [email or web address] CC:      HISTORY OF PRESENT ILLNESS:  HPI:   80 yo M with hx of Renal transplant ~7 yr ago, functionally blind (glaucoma), IDDM, BPH, HTN, diabetic foot ulcer complicated by left 4th and 5th toe amputation with wound vac, on 6 week course of IV cefepime and linezolid through PICC line, presented with acute renal failure, confusion, poor PO intake.  Sent from Dr. Aranda office due to doubling of his creatinine.   Since admission creatinine has been rising, now > 3.  Most of history per wife.   is sleepy/lethargic.   No SOB, CP. No hx of CAD, MI, CVA.     Feb 2017 Normal pharm nuclear stress test   Feb 2017 ECHO - mod LVH, mild gradient across LVOT (40mmhg with valsalva)     CT head non contrast  IMPRESSION:   1. No acute intracranial hemorrhage or transcortical infarct.   2. 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   3. Chronic right frontal, right maxillary, and ethmoid sinus disease    CXR:  IMPRESSION:   No evidence of acute cardiopulmonary disease (06 Dec 2019 17:39)          Allergies    No Known Allergies    Intolerances    	    MEDICATIONS:  aspirin  chewable 81 milliGRAM(s) Oral daily  carvedilol 12.5 milliGRAM(s) Oral every 12 hours  heparin  flush 100 Units/mL Injectable 100 Unit(s) IV Push every other day  heparin  Injectable 5000 Unit(s) SubCutaneous every 8 hours  tamsulosin 0.4 milliGRAM(s) Oral at bedtime    linezolid    Tablet 600 milliGRAM(s) Oral every 12 hours  meropenem  IVPB 1000 milliGRAM(s) IV Intermittent every 24 hours        docusate sodium 100 milliGRAM(s) Oral three times a day    dextrose 40% Gel 15 Gram(s) Oral once PRN  dextrose 50% Injectable 12.5 Gram(s) IV Push once  dextrose 50% Injectable 25 Gram(s) IV Push once  dextrose 50% Injectable 25 Gram(s) IV Push once  glucagon  Injectable 1 milliGRAM(s) IntraMuscular once PRN  insulin glargine Injectable (LANTUS) 15 Unit(s) SubCutaneous at bedtime  insulin lispro (HumaLOG) corrective regimen sliding scale   SubCutaneous Before meals and at bedtime  insulin lispro Injectable (HumaLOG) 5 Unit(s) SubCutaneous three times a day before meals  predniSONE   Tablet 5 milliGRAM(s) Oral every 24 hours    BACItracin   Ointment 1 Application(s) Topical daily  chlorhexidine 4% Liquid 1 Application(s) Topical <User Schedule>  dextrose 5%. 1000 milliLiter(s) IV Continuous <Continuous>  influenza   Vaccine 0.5 milliLiter(s) IntraMuscular once  mycophenolate mofetil 500 milliGRAM(s) Oral every 12 hours  sodium chloride 0.9% lock flush 10 milliLiter(s) IV Push every 1 hour PRN  sodium chloride 0.9%. 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  BPH (benign prostatic hypertrophy)  HTN (hypertension)  Amputation of toe  Kidney transplant recipient      FAMILY HISTORY:      SOCIAL HISTORY:  unchanged    REVIEW OF SYSTEMS:  CONSTITUTIONAL: No fever, weight loss, or fatigue  EYES: No eye pain, visual disturbances, or discharge  ENMT:  No difficulty hearing, tinnitus, vertigo; No sinus or throat pain  NECK: No pain or stiffness  RESPIRATORY: No cough, wheezing, chills or hemoptysis; No Shortness of Breath  CARDIOVASCULAR: No chest pain, palpitations, passing out, dizziness, or leg swelling  GASTROINTESTINAL: No abdominal or epigastric pain. No nausea, vomiting, or hematemesis; No diarrhea or constipation. No melena or hematochezia.  GENITOURINARY: No dysuria, frequency, hematuria, or incontinence  NEUROLOGICAL: No headaches, memory loss, loss of strength, numbness, or tremors  SKIN: No itching, burning, rashes, or lesions   LYMPH Nodes: No enlarged glands  ENDOCRINE: No heat or cold intolerance; No hair loss  MUSCULOSKELETAL: No joint pain or swelling; No muscle, back, or extremity pain  PSYCHIATRIC: No depression, anxiety, mood swings, or difficulty sleeping  HEME/LYMPH: No easy bruising, or bleeding gums  ALLERY AND IMMUNOLOGIC: No hives or eczema	    [X ] All others negative	  [ ] Unable to obtain    PHYSICAL EXAM:  T(C): 36.1 (12-09-19 @ 16:35), Max: 36.3 (12-08-19 @ 22:06)  HR: 84 (12-09-19 @ 16:35) (84 - 96)  BP: 189/74 (12-09-19 @ 16:35) (138/69 - 189/74)  RR: 18 (12-09-19 @ 16:35) (17 - 18)  SpO2: 94% (12-09-19 @ 16:35) (94% - 98%)  Wt(kg): --  I&O's Summary    08 Dec 2019 07:01  -  09 Dec 2019 07:00  --------------------------------------------------------  IN: 1250 mL / OUT: 150 mL / NET: 1100 mL    09 Dec 2019 07:01  -  09 Dec 2019 20:43  --------------------------------------------------------  IN: 1250 mL / OUT: 1000 mL / NET: 250 mL        Appearance: lethargic/sleepy, no distress	  HEENT:   Normal oral mucosa, PERRL, EOMI	  Lymphatic: No lymphadenopathy  Respiratory: Lungs clear to auscultation	  Psychiatry: A & O x 3, Mood & affect appropriate  Gastrointestinal:  Soft, Non-tender, + BS	  Neurologic: Non-focal    CARDIOVASCULAR EXAM:  Cardiac: No murmurs, Normal S1 S2  Neck: No carotid bruits, no JVD  Extremities: No edema, s/p left 4th/5th toe amp, wound vac in place  Skin: Warm, No rashes, No ecchymoses, no cyanosis,  no ulcerations   Pulses: Peripheral pulses palpable bilaterally  LEFT Femoral - present  RIGHT Femoral - present  LEFT Popliteal - present  RIGHT Popliteal - present  LEFT Dorsalis Pedis - present  RIGHT Dorsalis Pedis - present  LEFT Posterior Tibial - present  RIGHT Posterior Tibial - present     DOPPLER:       LABS:	 	    CBC Full  -  ( 09 Dec 2019 10:51 )  WBC Count : 11.41 K/uL  Hemoglobin : 10.9 g/dL  Hematocrit : 34.5 %  Platelet Count - Automated : 158 K/uL  Mean Cell Volume : 87.3 fl  Mean Cell Hemoglobin : 27.6 pg  Mean Cell Hemoglobin Concentration : 31.6 gm/dL  Auto Neutrophil # : x  Auto Lymphocyte # : x  Auto Monocyte # : x  Auto Eosinophil # : x  Auto Basophil # : x  Auto Neutrophil % : x  Auto Lymphocyte % : x  Auto Monocyte % : x  Auto Eosinophil % : x  Auto Basophil % : x    12-09    137  |  106  |  41<H>  ----------------------------<  155<H>  4.0   |  18<L>  |  3.74<H>  12-08    134<L>  |  101  |  36<H>  ----------------------------<  189<H>  4.2   |  19<L>  |  2.83<H>    Ca    9.6      09 Dec 2019 10:51  Ca    10.2      08 Dec 2019 09:48  Mg     2.0     12-08        Assessment:  Acute Renal Failure - unclear etiology:  worsened with IVF, antibiotic induced? renal artery stenosis?  Altered Mental Status  Infected Diabetic Foot Ulcer s/p toe amp     Plan:  - check renal artery duplex US to assess for JENNA   - would benefit from cross sectional imaging of abdomen.  If unable to get good images of renal arteries on duplex could consider a non contrast MRA.    - check EKG/ECHO.  No signs/symptoms of heart failure.  Significant LVH with mild gradient across LVOT on echo in Feb 2017.  EKG Nov 2019 NSR  - will cont to follow     Dago Osborne MD  Vascular Cardiology    [address]   OFFICE # [phone]  EMAIL: [email or web address]

## 2019-12-09 NOTE — CONSULT NOTE ADULT - ASSESSMENT
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 D/C cefepime 2/2 ELIZABETH.     Recommendations:  -Meropenem 1 g daily  -C/w Linezolid  -Continue with 6 weeks of antibiotics (Day 1: 11/21)  -ID team 1 will follow  -Plan discussed w/ Dr. Tabor

## 2019-12-09 NOTE — PROGRESS NOTE ADULT - PROBLEM SELECTOR PLAN 5
s/p I+D on 11/19. Non healing ulcer started abx on 11/21 with cefepime and linezolid given sensitivities. Switched from Zosyn given resistance. He had Corynebacterium striatum in culture and Enterococcus Faecalis in the prior culture.  Was started on linezolid 600 mg PO q12 hrs over vancomycin given history of renal transplant and given his concern of too many IV doses at home. s/p PICC line replacement by Dr. Muro on 12/7. Cefepime 2 grams IV q12hrs (renally dosed) was discontinued on 12/8 given its possible role in ELIZABETH.  - per ID, started on meropenem 1g IV q24h  - c/w linezolid 600 mg PO q12 hrs  - will treat for 6 weeks total from 11/21  - Will need CBC, CMP, ESR, CRP qweek.  CBC is essential as there is a risk of bone marrow suppression with prolonged course of linezolid

## 2019-12-09 NOTE — PROGRESS NOTE ADULT - ATTENDING COMMENTS
have reviewed all of recent patient's course since his becoming ill and episodically disoriented and exhausted at home, and find the major urgent issue over past days his progressive renal failure and oliguria.    pt has unrevealing vs and volume status, and his chief complaint today is nausea and malaise.   in view of his oliguria and h/o vascular disease there is concern that a diffuse inflammatory or structural vascular disease could be causig multifocal vascular insuficiency, including perhaps renal, cns, and abd/visceral.  have requested cardiovascular , cns/neuro, and cont'd ID consultation to sorth this question.   if no w/u is found to clarify the question, the remaining option is to transfer him to [facility], [place], for renal transplant bx. that option discussed with dr deo gupta, and will be pursued in 24-48 hrs if echo kg, vascular imaging,abd/pelvic non-con CT,  and renal scan not helpful.

## 2019-12-09 NOTE — PROGRESS NOTE ADULT - SUBJECTIVE AND OBJECTIVE BOX
OVERNIGHT EVENTS:    SUBJECTIVE/INTERVAL HPI: Patient was seen and examined at bedside.    VITALS  Vital Signs Last 24 Hrs  T(C): 36.2 (09 Dec 2019 05:00), Max: 36.6 (08 Dec 2019 17:41)  T(F): 97.1 (09 Dec 2019 05:00), Max: 97.8 (08 Dec 2019 17:41)  HR: 96 (09 Dec 2019 05:00) (86 - 96)  BP: 152/63 (09 Dec 2019 05:00) (123/73 - 152/63)  BP(mean): --  RR: 17 (09 Dec 2019 05:00) (17 - 18)  SpO2: 96% (09 Dec 2019 05:00) (96% - 98%)    I&O's Summary    08 Dec 2019 07:01  -  09 Dec 2019 07:00  --------------------------------------------------------  IN: 1250 mL / OUT: 150 mL / NET: 1100 mL        CAPILLARY BLOOD GLUCOSE      POCT Blood Glucose.: 161 mg/dL (09 Dec 2019 08:58)  POCT Blood Glucose.: 164 mg/dL (08 Dec 2019 22:34)  POCT Blood Glucose.: 207 mg/dL (08 Dec 2019 17:20)  POCT Blood Glucose.: 123 mg/dL (08 Dec 2019 12:50)      PHYSICAL EXAM          MEDICATIONS  (STANDING):  aspirin  chewable 81 milliGRAM(s) Oral daily  BACItracin   Ointment 1 Application(s) Topical daily  carvedilol 12.5 milliGRAM(s) Oral every 12 hours  chlorhexidine 4% Liquid 1 Application(s) Topical <User Schedule>  dextrose 5%. 1000 milliLiter(s) (50 mL/Hr) IV Continuous <Continuous>  dextrose 50% Injectable 12.5 Gram(s) IV Push once  dextrose 50% Injectable 25 Gram(s) IV Push once  dextrose 50% Injectable 25 Gram(s) IV Push once  docusate sodium 100 milliGRAM(s) Oral three times a day  enoxaparin Injectable 40 milliGRAM(s) SubCutaneous every 24 hours  heparin  flush 100 Units/mL Injectable 100 Unit(s) IV Push every other day  influenza   Vaccine 0.5 milliLiter(s) IntraMuscular once  insulin glargine Injectable (LANTUS) 15 Unit(s) SubCutaneous at bedtime  insulin lispro (HumaLOG) corrective regimen sliding scale   SubCutaneous Before meals and at bedtime  insulin lispro Injectable (HumaLOG) 5 Unit(s) SubCutaneous three times a day before meals  linezolid    Tablet 600 milliGRAM(s) Oral every 12 hours  mycophenolate mofetil 500 milliGRAM(s) Oral every 12 hours  predniSONE   Tablet 5 milliGRAM(s) Oral every 24 hours  sodium chloride 0.9% Bolus 500 milliLiter(s) IV Bolus once  sodium chloride 0.9%. 1000 milliLiter(s) (125 mL/Hr) IV Continuous <Continuous>  tacrolimus 4 milliGRAM(s) Oral every 12 hours  tamsulosin 0.4 milliGRAM(s) Oral at bedtime    MEDICATIONS  (PRN):  dextrose 40% Gel 15 Gram(s) Oral once PRN Blood Glucose LESS THAN 70 milliGRAM(s)/deciliter  glucagon  Injectable 1 milliGRAM(s) IntraMuscular once PRN Glucose LESS THAN 70 milligrams/deciliter  sodium chloride 0.9% lock flush 10 milliLiter(s) IV Push every 1 hour PRN Pre/post blood products, medications, blood draw, and to maintain line patency      LABS                        12.5   10.14 )-----------( 167      ( 08 Dec 2019 09:48 )             40.0     12-08    134<L>  |  101  |  36<H>  ----------------------------<  189<H>  4.2   |  19<L>  |  2.83<H>    Ca    10.2      08 Dec 2019 09:48  Mg     2.0     12-08          Urinalysis Basic - ( 08 Dec 2019 11:29 )    Color: Yellow / Appearance: Clear / SG: >=1.030 / pH: x  Gluc: x / Ketone: 15 mg/dL  / Bili: Negative / Urobili: 0.2 E.U./dL   Blood: x / Protein: 30 mg/dL / Nitrite: NEGATIVE   Leuk Esterase: NEGATIVE / RBC: < 5 /HPF / WBC < 5 /HPF   Sq Epi: x / Non Sq Epi: 0-5 /HPF / Bacteria: Present /HPF            Radiology and other tests: Reviewed OVERNIGHT EVENTS: MAGGIE    SUBJECTIVE/INTERVAL HPI: Patient was seen and examined at bedside this morning. AAOx3. Denies any CP, SOB, abdominal pain. Wife at bedside he appears better, less confused than when he first came into the hospital    VITALS  Vital Signs Last 24 Hrs  T(C): 36.2 (09 Dec 2019 05:00), Max: 36.6 (08 Dec 2019 17:41)  T(F): 97.1 (09 Dec 2019 05:00), Max: 97.8 (08 Dec 2019 17:41)  HR: 96 (09 Dec 2019 05:00) (86 - 96)  BP: 152/63 (09 Dec 2019 05:00) (123/73 - 152/63)  BP(mean): --  RR: 17 (09 Dec 2019 05:00) (17 - 18)  SpO2: 96% (09 Dec 2019 05:00) (96% - 98%)    I&O's Summary    08 Dec 2019 07:01  -  09 Dec 2019 07:00  --------------------------------------------------------  IN: 1250 mL / OUT: 150 mL / NET: 1100 mL        CAPILLARY BLOOD GLUCOSE      POCT Blood Glucose.: 161 mg/dL (09 Dec 2019 08:58)  POCT Blood Glucose.: 164 mg/dL (08 Dec 2019 22:34)  POCT Blood Glucose.: 207 mg/dL (08 Dec 2019 17:20)  POCT Blood Glucose.: 123 mg/dL (08 Dec 2019 12:50)      PHYSICAL EXAM  GENERAL: In NAD. Sleeping in bed.  HEENT: NC/AT. EOMI.  CV: RRR. +S1/S2. No murmurs, rubs or gallops  LUNGS: Clear to auscultation b/l. No wheezing, rales or rhonchi.  ABDOMEN: Soft, nontender, nondistended. +BS  EXT: WWP. No edema in b/l extremities. Left 4th and 5th toe s/p amputation wrapped in kerlix and attached to wound vacuum machine  VASCULAR: 2+ radial, DP bilaterally         MEDICATIONS  (STANDING):  aspirin  chewable 81 milliGRAM(s) Oral daily  BACItracin   Ointment 1 Application(s) Topical daily  carvedilol 12.5 milliGRAM(s) Oral every 12 hours  chlorhexidine 4% Liquid 1 Application(s) Topical <User Schedule>  dextrose 5%. 1000 milliLiter(s) (50 mL/Hr) IV Continuous <Continuous>  dextrose 50% Injectable 12.5 Gram(s) IV Push once  dextrose 50% Injectable 25 Gram(s) IV Push once  dextrose 50% Injectable 25 Gram(s) IV Push once  docusate sodium 100 milliGRAM(s) Oral three times a day  enoxaparin Injectable 40 milliGRAM(s) SubCutaneous every 24 hours  heparin  flush 100 Units/mL Injectable 100 Unit(s) IV Push every other day  influenza   Vaccine 0.5 milliLiter(s) IntraMuscular once  insulin glargine Injectable (LANTUS) 15 Unit(s) SubCutaneous at bedtime  insulin lispro (HumaLOG) corrective regimen sliding scale   SubCutaneous Before meals and at bedtime  insulin lispro Injectable (HumaLOG) 5 Unit(s) SubCutaneous three times a day before meals  linezolid    Tablet 600 milliGRAM(s) Oral every 12 hours  mycophenolate mofetil 500 milliGRAM(s) Oral every 12 hours  predniSONE   Tablet 5 milliGRAM(s) Oral every 24 hours  sodium chloride 0.9% Bolus 500 milliLiter(s) IV Bolus once  sodium chloride 0.9%. 1000 milliLiter(s) (125 mL/Hr) IV Continuous <Continuous>  tacrolimus 4 milliGRAM(s) Oral every 12 hours  tamsulosin 0.4 milliGRAM(s) Oral at bedtime    MEDICATIONS  (PRN):  dextrose 40% Gel 15 Gram(s) Oral once PRN Blood Glucose LESS THAN 70 milliGRAM(s)/deciliter  glucagon  Injectable 1 milliGRAM(s) IntraMuscular once PRN Glucose LESS THAN 70 milligrams/deciliter  sodium chloride 0.9% lock flush 10 milliLiter(s) IV Push every 1 hour PRN Pre/post blood products, medications, blood draw, and to maintain line patency      LABS                        12.5   10.14 )-----------( 167      ( 08 Dec 2019 09:48 )             40.0     12-08    134<L>  |  101  |  36<H>  ----------------------------<  189<H>  4.2   |  19<L>  |  2.83<H>    Ca    10.2      08 Dec 2019 09:48  Mg     2.0     12-08          Urinalysis Basic - ( 08 Dec 2019 11:29 )    Color: Yellow / Appearance: Clear / SG: >=1.030 / pH: x  Gluc: x / Ketone: 15 mg/dL  / Bili: Negative / Urobili: 0.2 E.U./dL   Blood: x / Protein: 30 mg/dL / Nitrite: NEGATIVE   Leuk Esterase: NEGATIVE / RBC: < 5 /HPF / WBC < 5 /HPF   Sq Epi: x / Non Sq Epi: 0-5 /HPF / Bacteria: Present /HPF            Radiology and other tests: Reviewed

## 2019-12-09 NOTE — PROGRESS NOTE ADULT - SUBJECTIVE AND OBJECTIVE BOX
Patient seen and examined at bedside - no acute complaints, resting comfortably.     No acute events overnight, afebrile, HD stable.     linezolid    Tablet 600  aspirin  chewable 81  carvedilol 12.5  enoxaparin Injectable 40  heparin  flush 100 Units/mL Injectable 100  linezolid    Tablet 600  tamsulosin 0.4        Vital Signs Last 24 Hrs  T(C): 36.2 (09 Dec 2019 09:56), Max: 36.6 (08 Dec 2019 17:41)  T(F): 97.2 (09 Dec 2019 09:56), Max: 97.8 (08 Dec 2019 17:41)  HR: 92 (09 Dec 2019 09:56) (86 - 96)  BP: 138/69 (09 Dec 2019 09:56) (136/69 - 152/63)  BP(mean): --  RR: 18 (09 Dec 2019 09:56) (17 - 18)  SpO2: 96% (09 Dec 2019 09:56) (96% - 98%)  I&O's Detail    08 Dec 2019 07:01  -  09 Dec 2019 07:00  --------------------------------------------------------  IN:    sodium chloride 0.9%.: 1250 mL  Total IN: 1250 mL    OUT:    Voided: 150 mL  Total OUT: 150 mL    Total NET: 1100 mL          Physical Exam:  General: NAD, resting comfortably in bed  Pulmonary: Nonlabored breathing, no respiratory distress  Cardiovascular: NSR  Abdominal: soft, NT/ND  Extremities: WWP, left lateral foot vac removed, pink/healthy granulation tissue in the wound bed (5x4cm) with slightly macerated surrounding skin. Vac replaced and seal confirmed.   Pulses: 2+ DP/PT bilaterally      LABS:                        10.9   11.41 )-----------( 158      ( 09 Dec 2019 10:51 )             34.5     12-08    134<L>  |  101  |  36<H>  ----------------------------<  189<H>  4.2   |  19<L>  |  2.83<H>    Ca    10.2      08 Dec 2019 09:48  Mg     2.0     12-08          Radiology and Additional Studies:    Assessment and Plan:   79y male s/p 4/5 left toe amputation in Nov with subsequent wound vac placement.     Patient remains afebrile, HD stable with no signs or symptoms of infection.   Wound vac changed today and the wound is healing well.   Vascular will continue to change the vac M/W/F. Please call with any questions or concern [phone].

## 2019-12-10 LAB
ANION GAP SERPL CALC-SCNC: 14 MMOL/L — SIGNIFICANT CHANGE UP (ref 5–17)
BLD GP AB SCN SERPL QL: NEGATIVE — SIGNIFICANT CHANGE UP
BUN SERPL-MCNC: 42 MG/DL — HIGH (ref 7–23)
CALCIUM SERPL-MCNC: 9.6 MG/DL — SIGNIFICANT CHANGE UP (ref 8.4–10.5)
CHLORIDE SERPL-SCNC: 110 MMOL/L — HIGH (ref 96–108)
CO2 SERPL-SCNC: 17 MMOL/L — LOW (ref 22–31)
CREAT SERPL-MCNC: 3.83 MG/DL — HIGH (ref 0.5–1.3)
GLUCOSE BLDC GLUCOMTR-MCNC: 119 MG/DL — HIGH (ref 70–99)
GLUCOSE BLDC GLUCOMTR-MCNC: 139 MG/DL — HIGH (ref 70–99)
GLUCOSE BLDC GLUCOMTR-MCNC: 154 MG/DL — HIGH (ref 70–99)
GLUCOSE SERPL-MCNC: 147 MG/DL — HIGH (ref 70–99)
HCT VFR BLD CALC: 36 % — LOW (ref 39–50)
HGB BLD-MCNC: 11.1 G/DL — LOW (ref 13–17)
MCHC RBC-ENTMCNC: 27.1 PG — SIGNIFICANT CHANGE UP (ref 27–34)
MCHC RBC-ENTMCNC: 30.8 GM/DL — LOW (ref 32–36)
MCV RBC AUTO: 88 FL — SIGNIFICANT CHANGE UP (ref 80–100)
NRBC # BLD: 0 /100 WBCS — SIGNIFICANT CHANGE UP (ref 0–0)
PLATELET # BLD AUTO: 172 K/UL — SIGNIFICANT CHANGE UP (ref 150–400)
POTASSIUM SERPL-MCNC: 4 MMOL/L — SIGNIFICANT CHANGE UP (ref 3.5–5.3)
POTASSIUM SERPL-SCNC: 4 MMOL/L — SIGNIFICANT CHANGE UP (ref 3.5–5.3)
RBC # BLD: 4.09 M/UL — LOW (ref 4.2–5.8)
RBC # FLD: 15.3 % — HIGH (ref 10.3–14.5)
RH IG SCN BLD-IMP: POSITIVE — SIGNIFICANT CHANGE UP
SODIUM SERPL-SCNC: 141 MMOL/L — SIGNIFICANT CHANGE UP (ref 135–145)
WBC # BLD: 12.36 K/UL — HIGH (ref 3.8–10.5)
WBC # FLD AUTO: 12.36 K/UL — HIGH (ref 3.8–10.5)

## 2019-12-10 PROCEDURE — 74176 CT ABD & PELVIS W/O CONTRAST: CPT | Mod: 26

## 2019-12-10 PROCEDURE — 99232 SBSQ HOSP IP/OBS MODERATE 35: CPT | Mod: GC

## 2019-12-10 PROCEDURE — 76776 US EXAM K TRANSPL W/DOPPLER: CPT | Mod: 26,RT

## 2019-12-10 PROCEDURE — 74185 MRA ABD W OR W/O CNTRST: CPT | Mod: 26

## 2019-12-10 PROCEDURE — 99232 SBSQ HOSP IP/OBS MODERATE 35: CPT

## 2019-12-10 PROCEDURE — 99222 1ST HOSP IP/OBS MODERATE 55: CPT

## 2019-12-10 PROCEDURE — 72198 MR ANGIO PELVIS W/O & W/DYE: CPT | Mod: 26

## 2019-12-10 PROCEDURE — 93306 TTE W/DOPPLER COMPLETE: CPT | Mod: 26

## 2019-12-10 PROCEDURE — 99233 SBSQ HOSP IP/OBS HIGH 50: CPT

## 2019-12-10 RX ORDER — CARVEDILOL PHOSPHATE 80 MG/1
25 CAPSULE, EXTENDED RELEASE ORAL EVERY 12 HOURS
Refills: 0 | Status: DISCONTINUED | OUTPATIENT
Start: 2019-12-10 | End: 2019-12-11

## 2019-12-10 RX ORDER — CARVEDILOL PHOSPHATE 80 MG/1
12.5 CAPSULE, EXTENDED RELEASE ORAL ONCE
Refills: 0 | Status: COMPLETED | OUTPATIENT
Start: 2019-12-10 | End: 2019-12-10

## 2019-12-10 RX ORDER — CARVEDILOL PHOSPHATE 80 MG/1
25 CAPSULE, EXTENDED RELEASE ORAL EVERY 12 HOURS
Refills: 0 | Status: DISCONTINUED | OUTPATIENT
Start: 2019-12-10 | End: 2019-12-10

## 2019-12-10 RX ORDER — METOCLOPRAMIDE HCL 10 MG
5 TABLET ORAL ONCE
Refills: 0 | Status: COMPLETED | OUTPATIENT
Start: 2019-12-10 | End: 2019-12-10

## 2019-12-10 RX ADMIN — MYCOPHENOLATE MOFETIL 500 MILLIGRAM(S): 250 CAPSULE ORAL at 23:22

## 2019-12-10 RX ADMIN — INSULIN GLARGINE 15 UNIT(S): 100 INJECTION, SOLUTION SUBCUTANEOUS at 23:22

## 2019-12-10 RX ADMIN — Medication 2: at 23:21

## 2019-12-10 RX ADMIN — Medication 100 UNIT(S): at 15:33

## 2019-12-10 RX ADMIN — Medication 5 MILLIGRAM(S): at 10:03

## 2019-12-10 RX ADMIN — Medication 100 MILLIGRAM(S): at 23:23

## 2019-12-10 RX ADMIN — CARVEDILOL PHOSPHATE 12.5 MILLIGRAM(S): 80 CAPSULE, EXTENDED RELEASE ORAL at 06:49

## 2019-12-10 RX ADMIN — CHLORHEXIDINE GLUCONATE 1 APPLICATION(S): 213 SOLUTION TOPICAL at 06:48

## 2019-12-10 RX ADMIN — TACROLIMUS 4 MILLIGRAM(S): 5 CAPSULE ORAL at 23:22

## 2019-12-10 RX ADMIN — TAMSULOSIN HYDROCHLORIDE 0.4 MILLIGRAM(S): 0.4 CAPSULE ORAL at 23:22

## 2019-12-10 RX ADMIN — Medication 100 MILLIGRAM(S): at 06:49

## 2019-12-10 RX ADMIN — MEROPENEM 100 MILLIGRAM(S): 1 INJECTION INTRAVENOUS at 15:33

## 2019-12-10 RX ADMIN — HEPARIN SODIUM 5000 UNIT(S): 5000 INJECTION INTRAVENOUS; SUBCUTANEOUS at 15:34

## 2019-12-10 RX ADMIN — Medication 5 MILLIGRAM(S): at 23:31

## 2019-12-10 RX ADMIN — HEPARIN SODIUM 5000 UNIT(S): 5000 INJECTION INTRAVENOUS; SUBCUTANEOUS at 06:49

## 2019-12-10 RX ADMIN — Medication 1 APPLICATION(S): at 15:35

## 2019-12-10 RX ADMIN — Medication 100 MILLIGRAM(S): at 15:35

## 2019-12-10 RX ADMIN — SODIUM CHLORIDE 125 MILLILITER(S): 9 INJECTION INTRAMUSCULAR; INTRAVENOUS; SUBCUTANEOUS at 15:33

## 2019-12-10 RX ADMIN — HEPARIN SODIUM 5000 UNIT(S): 5000 INJECTION INTRAVENOUS; SUBCUTANEOUS at 23:21

## 2019-12-10 RX ADMIN — LINEZOLID 600 MILLIGRAM(S): 600 INJECTION, SOLUTION INTRAVENOUS at 23:22

## 2019-12-10 RX ADMIN — SODIUM CHLORIDE 125 MILLILITER(S): 9 INJECTION INTRAMUSCULAR; INTRAVENOUS; SUBCUTANEOUS at 23:22

## 2019-12-10 RX ADMIN — LINEZOLID 600 MILLIGRAM(S): 600 INJECTION, SOLUTION INTRAVENOUS at 10:04

## 2019-12-10 RX ADMIN — Medication 81 MILLIGRAM(S): at 15:34

## 2019-12-10 RX ADMIN — INFLUENZA VIRUS VACCINE 0.5 MILLILITER(S): 15; 15; 15; 15 SUSPENSION INTRAMUSCULAR at 18:18

## 2019-12-10 RX ADMIN — TACROLIMUS 4 MILLIGRAM(S): 5 CAPSULE ORAL at 10:04

## 2019-12-10 RX ADMIN — CARVEDILOL PHOSPHATE 25 MILLIGRAM(S): 80 CAPSULE, EXTENDED RELEASE ORAL at 18:18

## 2019-12-10 RX ADMIN — MYCOPHENOLATE MOFETIL 500 MILLIGRAM(S): 250 CAPSULE ORAL at 10:04

## 2019-12-10 RX ADMIN — CARVEDILOL PHOSPHATE 12.5 MILLIGRAM(S): 80 CAPSULE, EXTENDED RELEASE ORAL at 09:47

## 2019-12-10 NOTE — CONSULT NOTE ADULT - SUBJECTIVE AND OBJECTIVE BOX
80yo M with PMHx significant for renal transplant, BPH, and toe amputation admitted to medicine on 12/6 for ELIZABETH. Creatinine has been increasing since admission. CT shows 6mm stone in bladder at anterior R UVJ and prostate is 5.4cm. Pt denies dysuria, emptying issues, gross hematuria, suprapubic/back pain, fevers, chills, and vomiting. States decreased appetite, and nausea with eating and drinking.     ED course:T 97.7, HR 74, /75, RR 16, 97% 02 on RA. WBC 8.09; h/h 11.9/38.3, glucose 255; Na 133, K 5.2. Cr 1.51 (baseline ~0.9). Given 500 ml NS bolus.    CT head noncont  IMPRESSION:   1. No acute intracranial hemorrhage or transcortical infarct.   2. 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   3. Chronic right frontal, right maxillary, and ethmoid sinus disease  CXR:    IMPRESSION:   No evidence of acute cardiopulmonary disease (06 Dec 2019 17:39)      Vital Signs Last 24 Hrs  T(C): 36.3 (10 Dec 2019 15:36), Max: 36.4 (09 Dec 2019 20:55)  T(F): 97.3 (10 Dec 2019 15:36), Max: 97.5 (09 Dec 2019 20:55)  HR: 86 (10 Dec 2019 15:36) (84 - 86)  BP: 198/84 (10 Dec 2019 15:36) (166/65 - 198/84)  BP(mean): --  RR: 18 (10 Dec 2019 15:36) (18 - 18)  SpO2: 95% (10 Dec 2019 15:36) (95% - 97%)  I&O's Summary    09 Dec 2019 07:01  -  10 Dec 2019 07:00  --------------------------------------------------------  IN: 1750 mL / OUT: 1785 mL / NET: -35 mL    10 Dec 2019 07:01  -  10 Dec 2019 19:21  --------------------------------------------------------  IN: 550 mL / OUT: 960 mL / NET: -410 mL        PE:  Gen: NAD. A&O X 3  Abd: soft, nontender, non-distended  : nontender suprapubic area/CVA tenderness    LABS:                        11.1   12.36 )-----------( 172      ( 10 Dec 2019 09:20 )             36.0     12-10    141  |  110<H>  |  42<H>  ----------------------------<  147<H>  4.0   |  17<L>  |  3.83<H>    Ca    9.6      10 Dec 2019 09:20        Cultures  Culture Results:   No growth (12-06 @ 14:52)  Culture Results:   No growth at 4 days. (12-06 @ 14:42)  Culture Results:   No growth at 4 days. (12-06 @ 14:42)

## 2019-12-10 NOTE — PROGRESS NOTE ADULT - PROBLEM SELECTOR PLAN 5
s/p I+D on 11/19. Non healing ulcer started abx on 11/21 with cefepime and linezolid given sensitivities. Switched from Zosyn given resistance. He had Corynebacterium striatum in culture and Enterococcus Faecalis in the prior culture.  Was started on linezolid 600 mg PO q12 hrs over vancomycin given history of renal transplant and given his concern of too many IV doses at home. s/p PICC line replacement by Dr. Muro on 12/7. Cefepime 2 grams IV q12hrs (renally dosed) was discontinued on 12/8 given its possible role in ELIZABETH.  - per ID, c/w meropenem 1g IV q24h and linezolid 600 mg PO q12 hrs for 6 week course starting 11/21  - Will need CBC, CMP, ESR, CRP qweek.  CBC is essential as there is a risk of bone marrow suppression with prolonged course of linezolid s/p I+D on 11/19. Non healing ulcer started abx on 11/21 with cefepime and linezolid given sensitivities. Switched from Zosyn given resistance. He had Corynebacterium striatum in culture and Enterococcus Faecalis in the prior culture.  Was started on linezolid 600 mg PO q12 hrs over vancomycin given history of renal transplant and given his concern of too many IV doses at home. s/p PICC line replacement by Dr. Muro on 12/7.   - pt on cefepime 2g IV q12hrs and linezolid 600mg PO q12h since 11/21 on prior discharge, planned for 6 week course  - Cefepime discontinued on 12/8 given its possible role in ELIZABETH and meropenem IV 1g qd started on 12/8.  - per ID, c/w meropenem 1g IV q24h  and linezolid 600 mg PO q12 hrs to complete a 6 week course from 11/21  - Will need CBC, CMP, ESR, CRP qweek.  CBC is essential as there is a risk of bone marrow suppression with prolonged course of linezolid

## 2019-12-10 NOTE — PROGRESS NOTE ADULT - PROBLEM SELECTOR PLAN 3
Pt with sbp 160s on admission. Head CT noncon negative.   - sBP elevated 170s-190s today, patient asymptomatic  - coreg 12.5mg BID uptitrated to 25mg BID  - elevated sBP today could be 2/2 renal artery stenosis, as noted in renal duplex today 12/10

## 2019-12-10 NOTE — PROGRESS NOTE ADULT - PROBLEM SELECTOR PLAN 7
-c/w flomax 0.4 mg bedtime  - urology consulted today given concern for pt retaining urine, appreciate recs

## 2019-12-10 NOTE — PROGRESS NOTE ADULT - SUBJECTIVE AND OBJECTIVE BOX
Medicine requested that I check the PICC for difficulty flushing the ports. On checking each port, they both flushed easily with saline and they both aspirated easily for blood. This was witnessed by the nurse. The ports were flushed with Heplock flush.

## 2019-12-10 NOTE — PROGRESS NOTE ADULT - ATTENDING COMMENTS
Note reviewed and confirmed. Increased creatinine is unlikely to be due to obstruction. Check nuclear renal scan. See full note.

## 2019-12-10 NOTE — CONSULT NOTE ADULT - ASSESSMENT
A/P:  -F/u nuclear scan  -continue trending Creatinine  -PVR is currently 0, continue monitoring PVR  -Agree with Dr. Aranda about ATN.   -not likely obstruction, stone in bladder. No need for bourgeois

## 2019-12-10 NOTE — PROGRESS NOTE ADULT - SUBJECTIVE AND OBJECTIVE BOX
OVERNIGHT EVENTS:    SUBJECTIVE/INTERVAL HPI: Patient was seen and examined at bedside.    VITALS  Vital Signs Last 24 Hrs  T(C): 36.4 (09 Dec 2019 20:55), Max: 36.4 (09 Dec 2019 20:55)  T(F): 97.5 (09 Dec 2019 20:55), Max: 97.5 (09 Dec 2019 20:55)  HR: 84 (09 Dec 2019 20:55) (84 - 92)  BP: 166/65 (09 Dec 2019 20:55) (138/69 - 189/74)  BP(mean): --  RR: 18 (09 Dec 2019 20:55) (18 - 18)  SpO2: 95% (09 Dec 2019 20:55) (94% - 96%)    I&O's Summary    08 Dec 2019 07:01  -  09 Dec 2019 07:00  --------------------------------------------------------  IN: 1250 mL / OUT: 150 mL / NET: 1100 mL    09 Dec 2019 07:01  -  10 Dec 2019 05:55  --------------------------------------------------------  IN: 1750 mL / OUT: 1460 mL / NET: 290 mL        CAPILLARY BLOOD GLUCOSE      POCT Blood Glucose.: 115 mg/dL (09 Dec 2019 22:17)  POCT Blood Glucose.: 97 mg/dL (09 Dec 2019 17:15)  POCT Blood Glucose.: 92 mg/dL (09 Dec 2019 12:27)  POCT Blood Glucose.: 161 mg/dL (09 Dec 2019 08:58)      PHYSICAL EXAM          MEDICATIONS  (STANDING):  aspirin  chewable 81 milliGRAM(s) Oral daily  BACItracin   Ointment 1 Application(s) Topical daily  carvedilol 12.5 milliGRAM(s) Oral every 12 hours  chlorhexidine 4% Liquid 1 Application(s) Topical <User Schedule>  dextrose 5%. 1000 milliLiter(s) (50 mL/Hr) IV Continuous <Continuous>  dextrose 50% Injectable 12.5 Gram(s) IV Push once  dextrose 50% Injectable 25 Gram(s) IV Push once  dextrose 50% Injectable 25 Gram(s) IV Push once  docusate sodium 100 milliGRAM(s) Oral three times a day  heparin  flush 100 Units/mL Injectable 100 Unit(s) IV Push every other day  heparin  Injectable 5000 Unit(s) SubCutaneous every 8 hours  influenza   Vaccine 0.5 milliLiter(s) IntraMuscular once  insulin glargine Injectable (LANTUS) 15 Unit(s) SubCutaneous at bedtime  insulin lispro (HumaLOG) corrective regimen sliding scale   SubCutaneous Before meals and at bedtime  insulin lispro Injectable (HumaLOG) 5 Unit(s) SubCutaneous three times a day before meals  linezolid    Tablet 600 milliGRAM(s) Oral every 12 hours  meropenem  IVPB 1000 milliGRAM(s) IV Intermittent every 24 hours  mycophenolate mofetil 500 milliGRAM(s) Oral every 12 hours  predniSONE   Tablet 5 milliGRAM(s) Oral every 24 hours  sodium chloride 0.9%. 1000 milliLiter(s) (125 mL/Hr) IV Continuous <Continuous>  tacrolimus 4 milliGRAM(s) Oral every 12 hours  tamsulosin 0.4 milliGRAM(s) Oral at bedtime    MEDICATIONS  (PRN):  dextrose 40% Gel 15 Gram(s) Oral once PRN Blood Glucose LESS THAN 70 milliGRAM(s)/deciliter  glucagon  Injectable 1 milliGRAM(s) IntraMuscular once PRN Glucose LESS THAN 70 milligrams/deciliter  sodium chloride 0.9% lock flush 10 milliLiter(s) IV Push every 1 hour PRN Pre/post blood products, medications, blood draw, and to maintain line patency      LABS                        10.9   11.41 )-----------( 158      ( 09 Dec 2019 10:51 )             34.5     12-    137  |  106  |  41<H>  ----------------------------<  155<H>  4.0   |  18<L>  |  3.74<H>    Ca    9.6      09 Dec 2019 10:51  Mg     2.0     12-08          Urinalysis Basic - ( 09 Dec 2019 12:05 )    Color: Yellow / Appearance: Clear / S.025 / pH: x  Gluc: x / Ketone: 15 mg/dL  / Bili: Negative / Urobili: 0.2 E.U./dL   Blood: x / Protein: 30 mg/dL / Nitrite: NEGATIVE   Leuk Esterase: NEGATIVE / RBC: 5-10 /HPF / WBC < 5 /HPF   Sq Epi: x / Non Sq Epi: 0-5 /HPF / Bacteria: Present /HPF            Radiology and other tests: Reviewed OVERNIGHT EVENTS:    SUBJECTIVE/INTERVAL HPI: Patient was seen and examined at bedside.    VITALS  Vital Signs Last 24 Hrs  T(C): 36.4 (09 Dec 2019 20:55), Max: 36.4 (09 Dec 2019 20:55)  T(F): 97.5 (09 Dec 2019 20:55), Max: 97.5 (09 Dec 2019 20:55)  HR: 84 (09 Dec 2019 20:55) (84 - 92)  BP: 166/65 (09 Dec 2019 20:55) (138/69 - 189/74)  BP(mean): --  RR: 18 (09 Dec 2019 20:55) (18 - 18)  SpO2: 95% (09 Dec 2019 20:55) (94% - 96%)    I&O's Summary    08 Dec 2019 07:01  -  09 Dec 2019 07:00  --------------------------------------------------------  IN: 1250 mL / OUT: 150 mL / NET: 1100 mL    09 Dec 2019 07:01  -  10 Dec 2019 05:55  --------------------------------------------------------  IN: 1750 mL / OUT: 1460 mL / NET: 290 mL        CAPILLARY BLOOD GLUCOSE      POCT Blood Glucose.: 115 mg/dL (09 Dec 2019 22:17)  POCT Blood Glucose.: 97 mg/dL (09 Dec 2019 17:15)  POCT Blood Glucose.: 92 mg/dL (09 Dec 2019 12:27)  POCT Blood Glucose.: 161 mg/dL (09 Dec 2019 08:58)      PHYSICAL EXAM  GENERAL: In NAD. Lying to one side of bed comfortably.  HEENT: NC/AT. EOMI.  CV: RRR. +S1/S2. No murmurs, rubs or gallops  LUNGS: Clear to auscultation b/l. No wheezing, rales or rhonchi.  ABDOMEN: Soft, nontender, nondistended. +BS  EXT: WWP. No edema in b/l extremities. Left 4th and 5th toe s/p amputation wrapped in kerlix and attached to wound vacuum machine  VASCULAR: 2+ radial, DP bilaterally         MEDICATIONS  (STANDING):  aspirin  chewable 81 milliGRAM(s) Oral daily  BACItracin   Ointment 1 Application(s) Topical daily  carvedilol 12.5 milliGRAM(s) Oral every 12 hours  chlorhexidine 4% Liquid 1 Application(s) Topical <User Schedule>  dextrose 5%. 1000 milliLiter(s) (50 mL/Hr) IV Continuous <Continuous>  dextrose 50% Injectable 12.5 Gram(s) IV Push once  dextrose 50% Injectable 25 Gram(s) IV Push once  dextrose 50% Injectable 25 Gram(s) IV Push once  docusate sodium 100 milliGRAM(s) Oral three times a day  heparin  flush 100 Units/mL Injectable 100 Unit(s) IV Push every other day  heparin  Injectable 5000 Unit(s) SubCutaneous every 8 hours  influenza   Vaccine 0.5 milliLiter(s) IntraMuscular once  insulin glargine Injectable (LANTUS) 15 Unit(s) SubCutaneous at bedtime  insulin lispro (HumaLOG) corrective regimen sliding scale   SubCutaneous Before meals and at bedtime  insulin lispro Injectable (HumaLOG) 5 Unit(s) SubCutaneous three times a day before meals  linezolid    Tablet 600 milliGRAM(s) Oral every 12 hours  meropenem  IVPB 1000 milliGRAM(s) IV Intermittent every 24 hours  mycophenolate mofetil 500 milliGRAM(s) Oral every 12 hours  predniSONE   Tablet 5 milliGRAM(s) Oral every 24 hours  sodium chloride 0.9%. 1000 milliLiter(s) (125 mL/Hr) IV Continuous <Continuous>  tacrolimus 4 milliGRAM(s) Oral every 12 hours  tamsulosin 0.4 milliGRAM(s) Oral at bedtime    MEDICATIONS  (PRN):  dextrose 40% Gel 15 Gram(s) Oral once PRN Blood Glucose LESS THAN 70 milliGRAM(s)/deciliter  glucagon  Injectable 1 milliGRAM(s) IntraMuscular once PRN Glucose LESS THAN 70 milligrams/deciliter  sodium chloride 0.9% lock flush 10 milliLiter(s) IV Push every 1 hour PRN Pre/post blood products, medications, blood draw, and to maintain line patency      LABS                        10.9   11.41 )-----------( 158      ( 09 Dec 2019 10:51 )             34.5         137  |  106  |  41<H>  ----------------------------<  155<H>  4.0   |  18<L>  |  3.74<H>    Ca    9.6      09 Dec 2019 10:51  Mg     2.0     12-08          Urinalysis Basic - ( 09 Dec 2019 12:05 )    Color: Yellow / Appearance: Clear / S.025 / pH: x  Gluc: x / Ketone: 15 mg/dL  / Bili: Negative / Urobili: 0.2 E.U./dL   Blood: x / Protein: 30 mg/dL / Nitrite: NEGATIVE   Leuk Esterase: NEGATIVE / RBC: 5-10 /HPF / WBC < 5 /HPF   Sq Epi: x / Non Sq Epi: 0-5 /HPF / Bacteria: Present /HPF            Radiology and other tests: Reviewed OVERNIGHT EVENTS: 5mg IV reglan given for nausea. Dr. Muro confirmed that PICC line is not clogged.    SUBJECTIVE/INTERVAL HPI: Patient was seen and examined at bedside this morning. Denies any new complaints. Denies any CP, SOB, abdominal pain. Reports he is urinating.     VITALS  Vital Signs Last 24 Hrs  T(C): 36.4 (09 Dec 2019 20:55), Max: 36.4 (09 Dec 2019 20:55)  T(F): 97.5 (09 Dec 2019 20:55), Max: 97.5 (09 Dec 2019 20:55)  HR: 84 (09 Dec 2019 20:55) (84 - 92)  BP: 166/65 (09 Dec 2019 20:55) (138/69 - 189/74)  BP(mean): --  RR: 18 (09 Dec 2019 20:55) (18 - 18)  SpO2: 95% (09 Dec 2019 20:55) (94% - 96%)    I&O's Summary    08 Dec 2019 07:01  -  09 Dec 2019 07:00  --------------------------------------------------------  IN: 1250 mL / OUT: 150 mL / NET: 1100 mL    09 Dec 2019 07:01  -  10 Dec 2019 05:55  --------------------------------------------------------  IN: 1750 mL / OUT: 1460 mL / NET: 290 mL        CAPILLARY BLOOD GLUCOSE      POCT Blood Glucose.: 115 mg/dL (09 Dec 2019 22:17)  POCT Blood Glucose.: 97 mg/dL (09 Dec 2019 17:15)  POCT Blood Glucose.: 92 mg/dL (09 Dec 2019 12:27)  POCT Blood Glucose.: 161 mg/dL (09 Dec 2019 08:58)      PHYSICAL EXAM  GENERAL: In NAD. Lying to one side of bed comfortably.  HEENT: NC/AT. EOMI.  CV: RRR. +S1/S2. No murmurs, rubs or gallops  LUNGS: Clear to auscultation b/l. No wheezing, rales or rhonchi.  ABDOMEN: Soft, nontender, nondistended. +BS  EXT: WWP. No edema in b/l extremities. Left 4th and 5th toe s/p amputation wrapped in kerlix and attached to wound vacuum machine  VASCULAR: 2+ radial, DP bilaterally     MEDICATIONS  (STANDING):  aspirin  chewable 81 milliGRAM(s) Oral daily  BACItracin   Ointment 1 Application(s) Topical daily  carvedilol 12.5 milliGRAM(s) Oral every 12 hours  chlorhexidine 4% Liquid 1 Application(s) Topical <User Schedule>  dextrose 5%. 1000 milliLiter(s) (50 mL/Hr) IV Continuous <Continuous>  dextrose 50% Injectable 12.5 Gram(s) IV Push once  dextrose 50% Injectable 25 Gram(s) IV Push once  dextrose 50% Injectable 25 Gram(s) IV Push once  docusate sodium 100 milliGRAM(s) Oral three times a day  heparin  flush 100 Units/mL Injectable 100 Unit(s) IV Push every other day  heparin  Injectable 5000 Unit(s) SubCutaneous every 8 hours  influenza   Vaccine 0.5 milliLiter(s) IntraMuscular once  insulin glargine Injectable (LANTUS) 15 Unit(s) SubCutaneous at bedtime  insulin lispro (HumaLOG) corrective regimen sliding scale   SubCutaneous Before meals and at bedtime  insulin lispro Injectable (HumaLOG) 5 Unit(s) SubCutaneous three times a day before meals  linezolid    Tablet 600 milliGRAM(s) Oral every 12 hours  meropenem  IVPB 1000 milliGRAM(s) IV Intermittent every 24 hours  mycophenolate mofetil 500 milliGRAM(s) Oral every 12 hours  predniSONE   Tablet 5 milliGRAM(s) Oral every 24 hours  sodium chloride 0.9%. 1000 milliLiter(s) (125 mL/Hr) IV Continuous <Continuous>  tacrolimus 4 milliGRAM(s) Oral every 12 hours  tamsulosin 0.4 milliGRAM(s) Oral at bedtime    MEDICATIONS  (PRN):  dextrose 40% Gel 15 Gram(s) Oral once PRN Blood Glucose LESS THAN 70 milliGRAM(s)/deciliter  glucagon  Injectable 1 milliGRAM(s) IntraMuscular once PRN Glucose LESS THAN 70 milligrams/deciliter  sodium chloride 0.9% lock flush 10 milliLiter(s) IV Push every 1 hour PRN Pre/post blood products, medications, blood draw, and to maintain line patency      LABS                        10.9   11.41 )-----------( 158      ( 09 Dec 2019 10:51 )             34.5     12    137  |  106  |  41<H>  ----------------------------<  155<H>  4.0   |  18<L>  |  3.74<H>    Ca    9.6      09 Dec 2019 10:51  Mg     2.0     12          Urinalysis Basic - ( 09 Dec 2019 12:05 )    Color: Yellow / Appearance: Clear / S.025 / pH: x  Gluc: x / Ketone: 15 mg/dL  / Bili: Negative / Urobili: 0.2 E.U./dL   Blood: x / Protein: 30 mg/dL / Nitrite: NEGATIVE   Leuk Esterase: NEGATIVE / RBC: 5-10 /HPF / WBC < 5 /HPF   Sq Epi: x / Non Sq Epi: 0-5 /HPF / Bacteria: Present /HPF            Radiology and other tests: Reviewed

## 2019-12-10 NOTE — PROGRESS NOTE ADULT - ASSESSMENT
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 D/C cefepime 2/2 ELIZABETH.     Recommendations:  -6 weeks total course of antibiotics (Day 1: 11/21): Meropenem 1g daily, Linezolid 600 q12h  -ID team 1 will sign off. Please reconsult as needed.  -Plan discussed w/ Dr. Tabor Mr Lamont is an 78 yo M with hx of Renal transplant (normal creatinine), functionally blind (glaucoma), DM (on insulin), BPH, HTN, left 4th adn 5th toe amputation with wound vac and diabetic ulcer required 6 week IV abx (PICC line on LUE) with cefepime and linezolid who was brought in with complaints of confusion found to have ELIZABETH; etiology likely in setting of poor PO intake, low suspicion of infectious etiology. D/C cefepime 2/2 ELIZABETH.     Recommendations:  -6 weeks total course of antibiotics (Day 1: 11/21): Meropenem 1g daily, Linezolid 600 q12h  - Needs weekly CBC, CMP, ESR, CRP.   Meropenem dose may need to be adjusted with alterations in renal function   -ID team 1 will sign off. Please reconsult as needed.  -Plan discussed w/ Dr. Tabor

## 2019-12-10 NOTE — PROGRESS NOTE ADULT - SUBJECTIVE AND OBJECTIVE BOX
CC:      Interval: Cr with mild increase.  Pending studies.      HISTORY OF PRESENT ILLNESS:  HPI:   80 yo M with hx of Renal transplant ~7 yr ago, functionally blind (glaucoma), IDDM, BPH, HTN, diabetic foot ulcer complicated by left 4th and 5th toe amputation with wound vac, on 6 week course of IV cefepime and linezolid through PICC line, presented with acute renal failure, confusion, poor PO intake.  Sent from Dr. Aranda office due to doubling of his creatinine.   Since admission creatinine has been rising, now > 3.  Most of history per wife.   is sleepy/lethargic.   No SOB, CP. No hx of CAD, MI, CVA.     Feb 2017 Normal pharm nuclear stress test   Feb 2017 ECHO - mod LVH, mild gradient across LVOT (40mmhg with valsalva)     CT head non contrast  IMPRESSION:   1. No acute intracranial hemorrhage or transcortical infarct.   2. 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   3. Chronic right frontal, right maxillary, and ethmoid sinus disease    CXR:  IMPRESSION:   No evidence of acute cardiopulmonary disease (06 Dec 2019 17:39)          Allergies    No Known Allergies    Intolerances    	    MEDICATIONS:  MEDICATIONS  (STANDING):  aspirin  chewable 81 milliGRAM(s) Oral daily  BACItracin   Ointment 1 Application(s) Topical daily  carvedilol 25 milliGRAM(s) Oral every 12 hours  chlorhexidine 4% Liquid 1 Application(s) Topical <User Schedule>  dextrose 5%. 1000 milliLiter(s) (50 mL/Hr) IV Continuous <Continuous>  dextrose 50% Injectable 12.5 Gram(s) IV Push once  dextrose 50% Injectable 25 Gram(s) IV Push once  dextrose 50% Injectable 25 Gram(s) IV Push once  docusate sodium 100 milliGRAM(s) Oral three times a day  heparin  flush 100 Units/mL Injectable 100 Unit(s) IV Push every other day  heparin  Injectable 5000 Unit(s) SubCutaneous every 8 hours  influenza   Vaccine 0.5 milliLiter(s) IntraMuscular once  insulin glargine Injectable (LANTUS) 15 Unit(s) SubCutaneous at bedtime  insulin lispro (HumaLOG) corrective regimen sliding scale   SubCutaneous Before meals and at bedtime  insulin lispro Injectable (HumaLOG) 5 Unit(s) SubCutaneous three times a day before meals  linezolid    Tablet 600 milliGRAM(s) Oral every 12 hours  meropenem  IVPB 1000 milliGRAM(s) IV Intermittent every 24 hours  mycophenolate mofetil 500 milliGRAM(s) Oral every 12 hours  predniSONE   Tablet 5 milliGRAM(s) Oral every 24 hours  sodium chloride 0.9%. 1000 milliLiter(s) (125 mL/Hr) IV Continuous <Continuous>  tacrolimus 4 milliGRAM(s) Oral every 12 hours  tamsulosin 0.4 milliGRAM(s) Oral at bedtime    BACItracin   Ointment 1 Application(s) Topical daily  chlorhexidine 4% Liquid 1 Application(s) Topical <User Schedule>  dextrose 5%. 1000 milliLiter(s) IV Continuous <Continuous>  influenza   Vaccine 0.5 milliLiter(s) IntraMuscular once  mycophenolate mofetil 500 milliGRAM(s) Oral every 12 hours  sodium chloride 0.9% lock flush 10 milliLiter(s) IV Push every 1 hour PRN  sodium chloride 0.9%. 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  BPH (benign prostatic hypertrophy)  HTN (hypertension)  Amputation of toe  Kidney transplant recipient      FAMILY HISTORY:      SOCIAL HISTORY:  unchanged    REVIEW OF SYSTEMS:  CONSTITUTIONAL: No fever, weight loss, or fatigue  EYES: No eye pain, visual disturbances, or discharge  ENMT:  No difficulty hearing, tinnitus, vertigo; No sinus or throat pain  NECK: No pain or stiffness  RESPIRATORY: No cough, wheezing, chills or hemoptysis; No Shortness of Breath  CARDIOVASCULAR: No chest pain, palpitations, passing out, dizziness, or leg swelling  GASTROINTESTINAL: No abdominal or epigastric pain. No nausea, vomiting, or hematemesis; No diarrhea or constipation. No melena or hematochezia.  GENITOURINARY: No dysuria, frequency, hematuria, or incontinence  NEUROLOGICAL: No headaches, memory loss, loss of strength, numbness, or tremors  SKIN: No itching, burning, rashes, or lesions   LYMPH Nodes: No enlarged glands  ENDOCRINE: No heat or cold intolerance; No hair loss  MUSCULOSKELETAL: No joint pain or swelling; No muscle, back, or extremity pain  PSYCHIATRIC: No depression, anxiety, mood swings, or difficulty sleeping  HEME/LYMPH: No easy bruising, or bleeding gums  ALLERY AND IMMUNOLOGIC: No hives or eczema	    [X ] All others negative	  [ ] Unable to obtain    PHYSICAL EXAM:  Vital Signs Last 24 Hrs  T(C): 36.3 (10 Dec 2019 08:46), Max: 36.4 (09 Dec 2019 20:55)  T(F): 97.4 (10 Dec 2019 08:46), Max: 97.5 (09 Dec 2019 20:55)  HR: 86 (10 Dec 2019 08:46) (84 - 86)  BP: 187/69 (10 Dec 2019 08:46) (166/65 - 189/74)  BP(mean): --  RR: 18 (10 Dec 2019 08:46) (18 - 18)  SpO2: 95% (10 Dec 2019 08:46) (94% - 97%)      Appearance: lethargic/sleepy, no distress	  HEENT:   Normal oral mucosa, PERRL, EOMI	  Lymphatic: No lymphadenopathy  Respiratory: Lungs clear to auscultation	  Psychiatry: A & O x 3, Mood & affect appropriate  Gastrointestinal:  Soft, Non-tender, + BS	  Neurologic: Non-focal    CARDIOVASCULAR EXAM:  Cardiac: No murmurs, Normal S1 S2  Neck: No carotid bruits, no JVD  Extremities: No edema, s/p left 4th/5th toe amp, wound vac in place  Skin: Warm, No rashes, No ecchymoses, no cyanosis,  no ulcerations   Pulses: Peripheral pulses palpable bilaterally  LEFT Femoral - present  RIGHT Femoral - present  LEFT Popliteal - present  RIGHT Popliteal - present  LEFT Dorsalis Pedis - present  RIGHT Dorsalis Pedis - present  LEFT Posterior Tibial - present  RIGHT Posterior Tibial - present     DOPPLER:       LABS:	 	                        11.1   12.36 )-----------( 172      ( 10 Dec 2019 09:20 )             36.0   12-10    141  |  110<H>  |  42<H>  ----------------------------<  147<H>  4.0   |  17<L>  |  3.83<H>    Ca    9.6      10 Dec 2019 09:20        Assessment:  Acute Renal Failure - unclear etiology:  worsened with IVF, antibiotic induced? renal artery disease?  Altered Mental Status  Infected Diabetic Foot Ulcer s/p toe amp     Plan:  - follow up renal artery duplex US to assess for JENNA, evaluate transplant anastamosis  - would benefit from cross sectional imaging of abdomen. Follow up CT abd/pel. If unable to get good images of renal arteries on duplex could consider a non contrast MRA.    - check EKG/ECHO.  No signs/symptoms of heart failure.  Significant LVH with mild gradient across LVOT on echo in Feb 2017.  EKG Nov 2019 NSR  - will cont to follow     Dago Osborne MD  Vascular Cardiology    [address]   OFFICE # [phone]  EMAIL: [email or web address]

## 2019-12-10 NOTE — PROGRESS NOTE ADULT - PROBLEM SELECTOR PLAN 1
Pt Cr 1.5 on admission to [facility] from baseline of ~0.9. Pt with hx of renal transplant in setting of DM and HTN. Likely etiology of ELIZABETH is prerenal (given poor PO intake for past few days) vs. ATN 2/2 cefepime toxicity Likely also contributing to confusion. Patient tolerating PO, not dry on exam and mentating closer to baseline, per wife at bedside. Cr uptrending to 3.83 today, BUN 42  - c/w IV fluids  - encourage PO intake  - 12/8 renal U/S consistent with medical renal dz without hydronephrosis.  - 12/10 renal dopplers showing mild R transplant kidney hydronephrosis with elevation of transplant renal artery velocity, concerning for renal artery stenosis  - CT abdomen/pelvis without contrast 12/10 showing 6 mm bladder stone just anterior to the right UVJ  - continue to trend Cr  - Dr. Osborne from vascular cardiology also following, appreciate recs Pt Cr 1.5 on admission to [facility] from baseline of ~0.9. Pt with hx of renal transplant in setting of DM and HTN. Likely etiology of ELIZABETH is prerenal (given poor PO intake for past few days) vs. ATN 2/2 cefepime toxicity Likely also contributing to confusion. Patient tolerating PO, not dry on exam and mentating closer to baseline, per wife at bedside. Cr uptrending to 3.83 today, BUN 42  - c/w IV fluids  - encourage PO intake  - 12/8 renal U/S consistent with medical renal dz without hydronephrosis.  - 12/10 renal dopplers showing mild R transplant kidney hydronephrosis with elevation of transplant renal artery velocity, concerning for renal artery stenosis  - CT abdomen/pelvis without contrast 12/10 showing 6 mm bladder stone just anterior to the right UVJ  - pending MR angio abdomen/pelvis non-contrast tonight  - continue to trend Cr  - Dr. Osborne from vascular cardiology also following, appreciate recs

## 2019-12-10 NOTE — PROGRESS NOTE ADULT - SUBJECTIVE AND OBJECTIVE BOX
INTERVAL HPI/OVERNIGHT EVENTS:  Pt seen and examined bedside. NAEO.     MEDICATIONS  (STANDING):  aspirin  chewable 81 milliGRAM(s) Oral daily  BACItracin   Ointment 1 Application(s) Topical daily  carvedilol 25 milliGRAM(s) Oral every 12 hours  chlorhexidine 4% Liquid 1 Application(s) Topical <User Schedule>  dextrose 5%. 1000 milliLiter(s) (50 mL/Hr) IV Continuous <Continuous>  dextrose 50% Injectable 12.5 Gram(s) IV Push once  dextrose 50% Injectable 25 Gram(s) IV Push once  dextrose 50% Injectable 25 Gram(s) IV Push once  docusate sodium 100 milliGRAM(s) Oral three times a day  heparin  flush 100 Units/mL Injectable 100 Unit(s) IV Push every other day  heparin  Injectable 5000 Unit(s) SubCutaneous every 8 hours  influenza   Vaccine 0.5 milliLiter(s) IntraMuscular once  insulin glargine Injectable (LANTUS) 15 Unit(s) SubCutaneous at bedtime  insulin lispro (HumaLOG) corrective regimen sliding scale   SubCutaneous Before meals and at bedtime  insulin lispro Injectable (HumaLOG) 5 Unit(s) SubCutaneous three times a day before meals  linezolid    Tablet 600 milliGRAM(s) Oral every 12 hours  meropenem  IVPB 1000 milliGRAM(s) IV Intermittent every 24 hours  mycophenolate mofetil 500 milliGRAM(s) Oral every 12 hours  predniSONE   Tablet 5 milliGRAM(s) Oral every 24 hours  sodium chloride 0.9%. 1000 milliLiter(s) (125 mL/Hr) IV Continuous <Continuous>  tacrolimus 4 milliGRAM(s) Oral every 12 hours  tamsulosin 0.4 milliGRAM(s) Oral at bedtime    MEDICATIONS  (PRN):  dextrose 40% Gel 15 Gram(s) Oral once PRN Blood Glucose LESS THAN 70 milliGRAM(s)/deciliter  glucagon  Injectable 1 milliGRAM(s) IntraMuscular once PRN Glucose LESS THAN 70 milligrams/deciliter  sodium chloride 0.9% lock flush 10 milliLiter(s) IV Push every 1 hour PRN Pre/post blood products, medications, blood draw, and to maintain line patency        Vital Signs Last 24 Hrs  T(C): 36.3 (10 Dec 2019 08:46), Max: 36.4 (09 Dec 2019 20:55)  T(F): 97.4 (10 Dec 2019 08:46), Max: 97.5 (09 Dec 2019 20:55)  HR: 86 (10 Dec 2019 08:46) (84 - 86)  BP: 187/69 (10 Dec 2019 08:46) (166/65 - 189/74)  BP(mean): --  RR: 18 (10 Dec 2019 08:46) (18 - 18)  SpO2: 95% (10 Dec 2019 08:46) (94% - 97%)    19 @ 07:01  -  12-10-19 @ 07:00  --------------------------------------------------------  IN: 1750 mL / OUT: 1785 mL / NET: -35 mL    12-10-19 @ 07:01  -  12-10-19 @ 11:52  --------------------------------------------------------  IN: 0 mL / OUT: 360 mL / NET: -360 mL      PHYSICAL EXAM:  Constitutional: A&Ox2-3  Eyes: Periorbital edema  Ear/Nose/Throat: no oral lesion, no sinus tenderness on percussion  Neck:no JVD, no lymphadenopathy, supple  Respiratory: CTA slim  Cardiovascular: S1S2 RRR, no murmurs  Gastrointestinal:soft, (+) BS, no HSM  Extremities: L 4th & 5th toes w/ wound vac  Vascular: DP Pulse:  right normal; left diminished      LABS:                        11.1   12.36 )-----------( 172      ( 10 Dec 2019 09:20 )             36.0     12-10    141  |  110<H>  |  42<H>  ----------------------------<  147<H>  4.0   |  17<L>  |  3.83<H>    Ca    9.6      10 Dec 2019 09:20        Urinalysis Basic - ( 09 Dec 2019 12:05 )    Color: Yellow / Appearance: Clear / S.025 / pH: x  Gluc: x / Ketone: 15 mg/dL  / Bili: Negative / Urobili: 0.2 E.U./dL   Blood: x / Protein: 30 mg/dL / Nitrite: NEGATIVE   Leuk Esterase: NEGATIVE / RBC: 5-10 /HPF / WBC < 5 /HPF   Sq Epi: x / Non Sq Epi: 0-5 /HPF / Bacteria: Present /HPF

## 2019-12-10 NOTE — PROGRESS NOTE ADULT - PROBLEM SELECTOR PLAN 4
- c/w mycophenolate 500mg twice a day  - c/w tacrolimus 4mg twice a day; should be taken at least 2 hours after mycophenolate  - tacrolimus level wnl  - 12/8 renal U/S consistent with medical renal dz without hydronephrosis - c/w mycophenolate 500mg twice a day  - c/w tacrolimus 4mg twice a day; should be taken at least 2 hours after mycophenolate  - tacrolimus level wnl  - 12/8 renal U/S consistent with medical renal dz without hydronephrosis  - pending MR angio abdomen/pelvis non-contrast tonight

## 2019-12-10 NOTE — PROGRESS NOTE ADULT - ASSESSMENT
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 Abx currently switched from cefepime to meropenem per ID.

## 2019-12-10 NOTE — PROGRESS NOTE ADULT - PROBLEM SELECTOR PLAN 8
1) PCP Contacted on Admission: (Y/N) --> Name & Phone #:Estefania PCP. Plan discussed with Dr. Jeffrey  2) Date of Contact with PCP:  3) PCP Contacted at Discharge: (Y/N, N/A)  4) Summary of Handoff Given to PCP:   5) Post-Discharge Appointment Date and Location: 1) PCP Contacted on Admission: (Y/N) --> Name & Phone #: Estefania PCP. Plan discussed with Dr. Jeffrey  2) Date of Contact with PCP:  3) PCP Contacted at Discharge: (Y/N, N/A)  4) Summary of Handoff Given to PCP:   5) Post-Discharge Appointment Date and Location:

## 2019-12-11 ENCOUNTER — APPOINTMENT (OUTPATIENT)
Dept: OPHTHALMOLOGY | Facility: CLINIC | Age: 79
End: 2019-12-11

## 2019-12-11 LAB
ALBUMIN SERPL ELPH-MCNC: 3.1 G/DL — LOW (ref 3.3–5)
ALP SERPL-CCNC: 85 U/L — SIGNIFICANT CHANGE UP (ref 40–120)
ALT FLD-CCNC: 17 U/L — SIGNIFICANT CHANGE UP (ref 10–45)
ANION GAP SERPL CALC-SCNC: 11 MMOL/L — SIGNIFICANT CHANGE UP (ref 5–17)
AST SERPL-CCNC: 21 U/L — SIGNIFICANT CHANGE UP (ref 10–40)
BILIRUB SERPL-MCNC: 0.2 MG/DL — SIGNIFICANT CHANGE UP (ref 0.2–1.2)
BUN SERPL-MCNC: 41 MG/DL — HIGH (ref 7–23)
CALCIUM SERPL-MCNC: 9.2 MG/DL — SIGNIFICANT CHANGE UP (ref 8.4–10.5)
CHLORIDE SERPL-SCNC: 110 MMOL/L — HIGH (ref 96–108)
CO2 SERPL-SCNC: 18 MMOL/L — LOW (ref 22–31)
CREAT SERPL-MCNC: 3.68 MG/DL — HIGH (ref 0.5–1.3)
CULTURE RESULTS: SIGNIFICANT CHANGE UP
CULTURE RESULTS: SIGNIFICANT CHANGE UP
GLUCOSE BLDC GLUCOMTR-MCNC: 130 MG/DL — HIGH (ref 70–99)
GLUCOSE BLDC GLUCOMTR-MCNC: 148 MG/DL — HIGH (ref 70–99)
GLUCOSE BLDC GLUCOMTR-MCNC: 149 MG/DL — HIGH (ref 70–99)
GLUCOSE BLDC GLUCOMTR-MCNC: 189 MG/DL — HIGH (ref 70–99)
GLUCOSE BLDC GLUCOMTR-MCNC: 56 MG/DL — LOW (ref 70–99)
GLUCOSE BLDC GLUCOMTR-MCNC: 71 MG/DL — SIGNIFICANT CHANGE UP (ref 70–99)
GLUCOSE SERPL-MCNC: 185 MG/DL — HIGH (ref 70–99)
HCT VFR BLD CALC: 34.5 % — LOW (ref 39–50)
HGB BLD-MCNC: 10.9 G/DL — LOW (ref 13–17)
MAGNESIUM SERPL-MCNC: 1.7 MG/DL — SIGNIFICANT CHANGE UP (ref 1.6–2.6)
MCHC RBC-ENTMCNC: 27.5 PG — SIGNIFICANT CHANGE UP (ref 27–34)
MCHC RBC-ENTMCNC: 31.6 GM/DL — LOW (ref 32–36)
MCV RBC AUTO: 86.9 FL — SIGNIFICANT CHANGE UP (ref 80–100)
NRBC # BLD: 0 /100 WBCS — SIGNIFICANT CHANGE UP (ref 0–0)
PLATELET # BLD AUTO: 163 K/UL — SIGNIFICANT CHANGE UP (ref 150–400)
POTASSIUM SERPL-MCNC: 3.9 MMOL/L — SIGNIFICANT CHANGE UP (ref 3.5–5.3)
POTASSIUM SERPL-SCNC: 3.9 MMOL/L — SIGNIFICANT CHANGE UP (ref 3.5–5.3)
PROT SERPL-MCNC: 5.6 G/DL — LOW (ref 6–8.3)
RBC # BLD: 3.97 M/UL — LOW (ref 4.2–5.8)
RBC # FLD: 15.6 % — HIGH (ref 10.3–14.5)
SODIUM SERPL-SCNC: 139 MMOL/L — SIGNIFICANT CHANGE UP (ref 135–145)
SPECIMEN SOURCE: SIGNIFICANT CHANGE UP
SPECIMEN SOURCE: SIGNIFICANT CHANGE UP
WBC # BLD: 10.76 K/UL — HIGH (ref 3.8–10.5)
WBC # FLD AUTO: 10.76 K/UL — HIGH (ref 3.8–10.5)

## 2019-12-11 PROCEDURE — 99233 SBSQ HOSP IP/OBS HIGH 50: CPT

## 2019-12-11 PROCEDURE — 78707 K FLOW/FUNCT IMAGE W/O DRUG: CPT | Mod: 26

## 2019-12-11 PROCEDURE — 99232 SBSQ HOSP IP/OBS MODERATE 35: CPT

## 2019-12-11 PROCEDURE — 71045 X-RAY EXAM CHEST 1 VIEW: CPT | Mod: 26

## 2019-12-11 RX ORDER — CARVEDILOL PHOSPHATE 80 MG/1
37.5 CAPSULE, EXTENDED RELEASE ORAL EVERY 12 HOURS
Refills: 0 | Status: DISCONTINUED | OUTPATIENT
Start: 2019-12-11 | End: 2019-12-18

## 2019-12-11 RX ORDER — MEROPENEM 1 G/30ML
1000 INJECTION INTRAVENOUS
Qty: 30000 | Refills: 0
Start: 2019-12-11 | End: 2020-01-09

## 2019-12-11 RX ORDER — ONDANSETRON 8 MG/1
4 TABLET, FILM COATED ORAL ONCE
Refills: 0 | Status: COMPLETED | OUTPATIENT
Start: 2019-12-11 | End: 2019-12-11

## 2019-12-11 RX ORDER — INSULIN GLARGINE 100 [IU]/ML
12 INJECTION, SOLUTION SUBCUTANEOUS AT BEDTIME
Refills: 0 | Status: DISCONTINUED | OUTPATIENT
Start: 2019-12-11 | End: 2019-12-18

## 2019-12-11 RX ORDER — AMLODIPINE BESYLATE 2.5 MG/1
5 TABLET ORAL EVERY 24 HOURS
Refills: 0 | Status: DISCONTINUED | OUTPATIENT
Start: 2019-12-11 | End: 2019-12-13

## 2019-12-11 RX ORDER — AMLODIPINE BESYLATE 2.5 MG/1
5 TABLET ORAL ONCE
Refills: 0 | Status: DISCONTINUED | OUTPATIENT
Start: 2019-12-11 | End: 2019-12-11

## 2019-12-11 RX ORDER — HYDRALAZINE HCL 50 MG
5 TABLET ORAL ONCE
Refills: 0 | Status: COMPLETED | OUTPATIENT
Start: 2019-12-11 | End: 2019-12-11

## 2019-12-11 RX ORDER — MAGNESIUM SULFATE 500 MG/ML
1 VIAL (ML) INJECTION ONCE
Refills: 0 | Status: COMPLETED | OUTPATIENT
Start: 2019-12-11 | End: 2019-12-11

## 2019-12-11 RX ORDER — DEXTROSE 50 % IN WATER 50 %
25 SYRINGE (ML) INTRAVENOUS ONCE
Refills: 0 | Status: COMPLETED | OUTPATIENT
Start: 2019-12-11 | End: 2019-12-11

## 2019-12-11 RX ORDER — CARVEDILOL PHOSPHATE 80 MG/1
12.5 CAPSULE, EXTENDED RELEASE ORAL ONCE
Refills: 0 | Status: COMPLETED | OUTPATIENT
Start: 2019-12-11 | End: 2019-12-11

## 2019-12-11 RX ADMIN — Medication 81 MILLIGRAM(S): at 11:12

## 2019-12-11 RX ADMIN — HEPARIN SODIUM 5000 UNIT(S): 5000 INJECTION INTRAVENOUS; SUBCUTANEOUS at 14:23

## 2019-12-11 RX ADMIN — ONDANSETRON 4 MILLIGRAM(S): 8 TABLET, FILM COATED ORAL at 18:25

## 2019-12-11 RX ADMIN — TACROLIMUS 4 MILLIGRAM(S): 5 CAPSULE ORAL at 22:37

## 2019-12-11 RX ADMIN — SODIUM CHLORIDE 125 MILLILITER(S): 9 INJECTION INTRAMUSCULAR; INTRAVENOUS; SUBCUTANEOUS at 12:22

## 2019-12-11 RX ADMIN — Medication 100 MILLIGRAM(S): at 06:45

## 2019-12-11 RX ADMIN — CARVEDILOL PHOSPHATE 12.5 MILLIGRAM(S): 80 CAPSULE, EXTENDED RELEASE ORAL at 10:06

## 2019-12-11 RX ADMIN — TAMSULOSIN HYDROCHLORIDE 0.4 MILLIGRAM(S): 0.4 CAPSULE ORAL at 22:36

## 2019-12-11 RX ADMIN — CHLORHEXIDINE GLUCONATE 1 APPLICATION(S): 213 SOLUTION TOPICAL at 06:41

## 2019-12-11 RX ADMIN — MYCOPHENOLATE MOFETIL 500 MILLIGRAM(S): 250 CAPSULE ORAL at 22:37

## 2019-12-11 RX ADMIN — HEPARIN SODIUM 5000 UNIT(S): 5000 INJECTION INTRAVENOUS; SUBCUTANEOUS at 22:36

## 2019-12-11 RX ADMIN — INSULIN GLARGINE 12 UNIT(S): 100 INJECTION, SOLUTION SUBCUTANEOUS at 22:37

## 2019-12-11 RX ADMIN — MYCOPHENOLATE MOFETIL 500 MILLIGRAM(S): 250 CAPSULE ORAL at 10:05

## 2019-12-11 RX ADMIN — MEROPENEM 100 MILLIGRAM(S): 1 INJECTION INTRAVENOUS at 11:06

## 2019-12-11 RX ADMIN — Medication 100 MILLIGRAM(S): at 22:36

## 2019-12-11 RX ADMIN — Medication 5 MILLIGRAM(S): at 11:30

## 2019-12-11 RX ADMIN — Medication 5 MILLIGRAM(S): at 10:06

## 2019-12-11 RX ADMIN — Medication 25 MILLILITER(S): at 10:05

## 2019-12-11 RX ADMIN — SODIUM CHLORIDE 125 MILLILITER(S): 9 INJECTION INTRAMUSCULAR; INTRAVENOUS; SUBCUTANEOUS at 22:51

## 2019-12-11 RX ADMIN — LINEZOLID 600 MILLIGRAM(S): 600 INJECTION, SOLUTION INTRAVENOUS at 10:05

## 2019-12-11 RX ADMIN — Medication 100 MILLIGRAM(S): at 14:23

## 2019-12-11 RX ADMIN — LINEZOLID 600 MILLIGRAM(S): 600 INJECTION, SOLUTION INTRAVENOUS at 22:36

## 2019-12-11 RX ADMIN — Medication 100 GRAM(S): at 12:21

## 2019-12-11 RX ADMIN — TACROLIMUS 4 MILLIGRAM(S): 5 CAPSULE ORAL at 10:05

## 2019-12-11 RX ADMIN — Medication 1 APPLICATION(S): at 11:16

## 2019-12-11 RX ADMIN — CARVEDILOL PHOSPHATE 25 MILLIGRAM(S): 80 CAPSULE, EXTENDED RELEASE ORAL at 06:44

## 2019-12-11 RX ADMIN — HEPARIN SODIUM 5000 UNIT(S): 5000 INJECTION INTRAVENOUS; SUBCUTANEOUS at 06:44

## 2019-12-11 RX ADMIN — AMLODIPINE BESYLATE 5 MILLIGRAM(S): 2.5 TABLET ORAL at 18:25

## 2019-12-11 RX ADMIN — ONDANSETRON 4 MILLIGRAM(S): 8 TABLET, FILM COATED ORAL at 22:36

## 2019-12-11 RX ADMIN — CARVEDILOL PHOSPHATE 37.5 MILLIGRAM(S): 80 CAPSULE, EXTENDED RELEASE ORAL at 19:38

## 2019-12-11 NOTE — PROGRESS NOTE ADULT - SUBJECTIVE AND OBJECTIVE BOX
OVERNIGHT EVENTS: No acute events    SUBJECTIVE / INTERVAL HPI: Patient seen and examined at bedside.   Pt denied     VITAL SIGNS:  Vital Signs Last 24 Hrs  T(C): 36.4 (11 Dec 2019 11:16), Max: 36.7 (10 Dec 2019 20:44)  T(F): 97.5 (11 Dec 2019 11:16), Max: 98 (10 Dec 2019 20:44)  HR: 80 (11 Dec 2019 11:16) (76 - 86)  BP: 194/95 (11 Dec 2019 11:16) (187/78 - 207/97)  BP(mean): --  RR: 18 (11 Dec 2019 11:16) (18 - 20)  SpO2: 93% (11 Dec 2019 11:16) (90% - 95%)    PHYSICAL EXAM:    General: WDWN; Patient is in NAD  HEENT: NC/AT; PERRL, anicteric sclera; MMM  Neck: supple  Cardiovascular: +S1/S2, RRR  Respiratory: CTA B/L; no W/R/R  Gastrointestinal: soft, NT/ND; +BS  Extremities: WWP; no edema present  Vascular: 2+ radial pulses B/L  Neurological: AAOx3; no focal deficits; CN II-XII intact; 5/5 strength in upper and lower extremities; sensation intact to light touch    MEDICATIONS:  MEDICATIONS  (STANDING):  aspirin  chewable 81 milliGRAM(s) Oral daily  BACItracin   Ointment 1 Application(s) Topical daily  carvedilol 37.5 milliGRAM(s) Oral every 12 hours  chlorhexidine 4% Liquid 1 Application(s) Topical <User Schedule>  dextrose 5%. 1000 milliLiter(s) (50 mL/Hr) IV Continuous <Continuous>  dextrose 50% Injectable 12.5 Gram(s) IV Push once  dextrose 50% Injectable 25 Gram(s) IV Push once  dextrose 50% Injectable 25 Gram(s) IV Push once  docusate sodium 100 milliGRAM(s) Oral three times a day  heparin  flush 100 Units/mL Injectable 100 Unit(s) IV Push every other day  heparin  Injectable 5000 Unit(s) SubCutaneous every 8 hours  insulin glargine Injectable (LANTUS) 12 Unit(s) SubCutaneous at bedtime  insulin lispro (HumaLOG) corrective regimen sliding scale   SubCutaneous Before meals and at bedtime  linezolid    Tablet 600 milliGRAM(s) Oral every 12 hours  magnesium sulfate  IVPB 1 Gram(s) IV Intermittent once  meropenem  IVPB 1000 milliGRAM(s) IV Intermittent every 24 hours  mycophenolate mofetil 500 milliGRAM(s) Oral every 12 hours  predniSONE   Tablet 5 milliGRAM(s) Oral every 24 hours  sodium chloride 0.9%. 1000 milliLiter(s) (125 mL/Hr) IV Continuous <Continuous>  tacrolimus 4 milliGRAM(s) Oral every 12 hours  tamsulosin 0.4 milliGRAM(s) Oral at bedtime    MEDICATIONS  (PRN):  dextrose 40% Gel 15 Gram(s) Oral once PRN Blood Glucose LESS THAN 70 milliGRAM(s)/deciliter  glucagon  Injectable 1 milliGRAM(s) IntraMuscular once PRN Glucose LESS THAN 70 milligrams/deciliter  sodium chloride 0.9% lock flush 10 milliLiter(s) IV Push every 1 hour PRN Pre/post blood products, medications, blood draw, and to maintain line patency      ALLERGIES:  Allergies    No Known Allergies    Intolerances        LABS:                        10.9   10.76 )-----------( 163      ( 11 Dec 2019 10:36 )             34.5     12-11    139  |  110<H>  |  41<H>  ----------------------------<  185<H>  3.9   |  18<L>  |  3.68<H>    Ca    9.2      11 Dec 2019 10:36  Mg     1.7     12-11    TPro  5.6<L>  /  Alb  3.1<L>  /  TBili  0.2  /  DBili  x   /  AST  21  /  ALT  17  /  AlkPhos  85  12-11      Urinalysis Basic - ( 09 Dec 2019 12:05 )    Color: Yellow / Appearance: Clear / S.025 / pH: x  Gluc: x / Ketone: 15 mg/dL  / Bili: Negative / Urobili: 0.2 E.U./dL   Blood: x / Protein: 30 mg/dL / Nitrite: NEGATIVE   Leuk Esterase: NEGATIVE / RBC: 5-10 /HPF / WBC < 5 /HPF   Sq Epi: x / Non Sq Epi: 0-5 /HPF / Bacteria: Present /HPF      CAPILLARY BLOOD GLUCOSE      POCT Blood Glucose.: 149 mg/dL (11 Dec 2019 11:08)      RADIOLOGY & ADDITIONAL TESTS: Reviewed. OVERNIGHT EVENTS: No acute events. Pt hypertensive last night and this morning.     SUBJECTIVE / INTERVAL HPI: Patient seen and examined at bedside. Pt states he is feeling well and denied headache, changes in vision, n/v, chest pain, dyspnea.     VITAL SIGNS:  Vital Signs Last 24 Hrs  T(C): 36.4 (11 Dec 2019 11:16), Max: 36.7 (10 Dec 2019 20:44)  T(F): 97.5 (11 Dec 2019 11:16), Max: 98 (10 Dec 2019 20:44)  HR: 80 (11 Dec 2019 11:16) (76 - 86)  BP: 194/95 (11 Dec 2019 11:16) (187/78 - 207/97)  BP(mean): --  RR: 18 (11 Dec 2019 11:16) (18 - 20)  SpO2: 93% (11 Dec 2019 11:16) (90% - 95%)    PHYSICAL EXAM:    General: WDWN; Patient is in NAD  HEENT: NC/AT; PERRL, anicteric sclera; MMM  Neck: supple  Cardiovascular: +S1/S2, RRR  Respiratory: CTA B/L; no W/R/R  Gastrointestinal: soft, NT/ND; +BS  Extremities: WWP; no edema present  Vascular: 2+ radial pulses B/L  Neurological: AAOx3; no focal deficits; CN II-XII intact; 5/5 strength in upper and lower extremities; sensation intact to light touch    MEDICATIONS:  MEDICATIONS  (STANDING):  aspirin  chewable 81 milliGRAM(s) Oral daily  BACItracin   Ointment 1 Application(s) Topical daily  carvedilol 37.5 milliGRAM(s) Oral every 12 hours  chlorhexidine 4% Liquid 1 Application(s) Topical <User Schedule>  dextrose 5%. 1000 milliLiter(s) (50 mL/Hr) IV Continuous <Continuous>  dextrose 50% Injectable 12.5 Gram(s) IV Push once  dextrose 50% Injectable 25 Gram(s) IV Push once  dextrose 50% Injectable 25 Gram(s) IV Push once  docusate sodium 100 milliGRAM(s) Oral three times a day  heparin  flush 100 Units/mL Injectable 100 Unit(s) IV Push every other day  heparin  Injectable 5000 Unit(s) SubCutaneous every 8 hours  insulin glargine Injectable (LANTUS) 12 Unit(s) SubCutaneous at bedtime  insulin lispro (HumaLOG) corrective regimen sliding scale   SubCutaneous Before meals and at bedtime  linezolid    Tablet 600 milliGRAM(s) Oral every 12 hours  magnesium sulfate  IVPB 1 Gram(s) IV Intermittent once  meropenem  IVPB 1000 milliGRAM(s) IV Intermittent every 24 hours  mycophenolate mofetil 500 milliGRAM(s) Oral every 12 hours  predniSONE   Tablet 5 milliGRAM(s) Oral every 24 hours  sodium chloride 0.9%. 1000 milliLiter(s) (125 mL/Hr) IV Continuous <Continuous>  tacrolimus 4 milliGRAM(s) Oral every 12 hours  tamsulosin 0.4 milliGRAM(s) Oral at bedtime    MEDICATIONS  (PRN):  dextrose 40% Gel 15 Gram(s) Oral once PRN Blood Glucose LESS THAN 70 milliGRAM(s)/deciliter  glucagon  Injectable 1 milliGRAM(s) IntraMuscular once PRN Glucose LESS THAN 70 milligrams/deciliter  sodium chloride 0.9% lock flush 10 milliLiter(s) IV Push every 1 hour PRN Pre/post blood products, medications, blood draw, and to maintain line patency      ALLERGIES:  Allergies    No Known Allergies    Intolerances        LABS:                        10.9   10.76 )-----------( 163      ( 11 Dec 2019 10:36 )             34.5     12-11    139  |  110<H>  |  41<H>  ----------------------------<  185<H>  3.9   |  18<L>  |  3.68<H>    Ca    9.2      11 Dec 2019 10:36  Mg     1.7     12-11    TPro  5.6<L>  /  Alb  3.1<L>  /  TBili  0.2  /  DBili  x   /  AST  21  /  ALT  17  /  AlkPhos  85  12-11      Urinalysis Basic - ( 09 Dec 2019 12:05 )    Color: Yellow / Appearance: Clear / S.025 / pH: x  Gluc: x / Ketone: 15 mg/dL  / Bili: Negative / Urobili: 0.2 E.U./dL   Blood: x / Protein: 30 mg/dL / Nitrite: NEGATIVE   Leuk Esterase: NEGATIVE / RBC: 5-10 /HPF / WBC < 5 /HPF   Sq Epi: x / Non Sq Epi: 0-5 /HPF / Bacteria: Present /HPF      CAPILLARY BLOOD GLUCOSE      POCT Blood Glucose.: 149 mg/dL (11 Dec 2019 11:08)      RADIOLOGY & ADDITIONAL TESTS: Reviewed.

## 2019-12-11 NOTE — DIETITIAN INITIAL EVALUATION ADULT. - DIET TYPE
recommend c/w kosher, cstCHO no snack diet. Monitor renal indices, consider remove renal restriction (Na, K WDL, P (L))

## 2019-12-11 NOTE — PROGRESS NOTE ADULT - PROBLEM SELECTOR PLAN 8
1) PCP Contacted on Admission: (Y/N) --> Name & Phone #: Estefania PCP. Plan discussed with Dr. Jeffrey  2) Date of Contact with PCP:  3) PCP Contacted at Discharge: (Y/N, N/A)  4) Summary of Handoff Given to PCP:   5) Post-Discharge Appointment Date and Location:

## 2019-12-11 NOTE — PROGRESS NOTE ADULT - ATTENDING COMMENTS
above status reviewed in detail and discussed with family, and with house staff and dr nazario.    pt's arterial velocities to transplant are elevated to degree c/w renovascular lesion, but MRA was nondiagnostic and not really interpretable.    had considered empiric decision to proceed with angio, but creat has decreased slightly which may herald onset of beginning recovery.    will therefore defer decision on angio, and proceed with f/u of labs and renal nuclear scan tomorrow .   if renal function continues to improve will hope to discharge by weekend, but if rft's stall at current level will reevaluate angiography.   discussed with all .

## 2019-12-11 NOTE — DIETITIAN INITIAL EVALUATION ADULT. - PROBLEM SELECTOR PLAN 6
uncontrolled, c/b retinopathy? and diabetic foot ulcers. humalog 75/25 at home. Pt arrived with glucose 220s. Will underdose by weight given poor PO. Titrate accordingly.  - Lantus 15 nightly  - 5U premeal  -MISS

## 2019-12-11 NOTE — PROGRESS NOTE ADULT - ASSESSMENT
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 Per wife, he was not acting himself. Since then, his confusion has resolved. He is now AAOx2, not to time. He is conversant, and though wife states he has not had the "best memory" lately, he is "slow on thought retrieval." Confusion likely  2/2 toxic metabolic encephalopathy likely from abrupt increase of uremia from ELIZABETH. Pt with much improved mental status and improving asterixis.     Recommend the following:  - Manage ELIZABETH as per primary team    Neuro will sign off. Thank you for the consult. 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 Per wife, he was not acting himself. Since then, his confusion has resolved. He is now AAOx2, not to time. He is conversant, and though wife states he has not had the "best memory" lately, he is "slow on thought retrieval." Confusion likely  2/2 toxic metabolic encephalopathy likely from abrupt increase of uremia from ELIZABETH. Pt with much improved mental status and improving asterixis.     Recommend the following:  - Manage ELIZABETH as per primary team    Neuro will sign off. call back with further questions

## 2019-12-11 NOTE — PROGRESS NOTE ADULT - PROBLEM SELECTOR PLAN 3
Pt with sbp 160s on admission. Head CT noncon negative.   - sBP elevated 170s-190s today, patient asymptomatic  - coreg uptitrated to 37.5 mg BID  -gave hydralzine 5 mg IVP x1 on 12/11  - elevated sBP today could be 2/2 renal artery stenosis, as noted in renal duplex 12/10

## 2019-12-11 NOTE — PROGRESS NOTE ADULT - ATTENDING COMMENTS
AMS, most likely uremic encephalopathy from ELIZABETH, improving; asterixes also improved from yesterday   call back with further questions

## 2019-12-11 NOTE — DIETITIAN INITIAL EVALUATION ADULT. - ENERGY NEEDS
Ht: 5'7", Wt: 84.1kg, IBW: 61.4kg, 136.9%IBW.   Calculations done using IBW as pt's current wt is >120% IBW. Needs calculated based on [facility] standards of care. Needs adjusted for age. Conservative protein given renal hx.   Defer fluids to primary care team 2/2 renal hx.

## 2019-12-11 NOTE — PROGRESS NOTE ADULT - PROBLEM SELECTOR PLAN 5
s/p I+D on 11/19. Non healing ulcer started abx on 11/21 with cefepime and linezolid given sensitivities. Switched from Zosyn given resistance. He had Corynebacterium striatum in culture and Enterococcus Faecalis in the prior culture.  Was started on linezolid 600 mg PO q12 hrs over vancomycin given history of renal transplant and given his concern of too many IV doses at home. s/p PICC line replacement by Dr. Muro on 12/7.   - pt on cefepime 2g IV q12hrs and linezolid 600mg PO q12h since 11/21 on prior discharge, planned for 6 week course  - Cefepime discontinued on 12/8 given its possible role in EILZABETH and meropenem IV 1g qd started on 12/8.  - per ID, c/w meropenem 1g IV q24h  and linezolid 600 mg PO q12 hrs to complete a 6 week course from 11/21  - Will need CBC, CMP, ESR, CRP qweek.  CBC is essential as there is a risk of bone marrow suppression with prolonged course of linezolid

## 2019-12-11 NOTE — PROGRESS NOTE ADULT - PROBLEM SELECTOR PLAN 6
uncontrolled, c/b retinopathy? and diabetic foot ulcers. humalog 75/25 at home. Pt arrived with glucose 220s. Will underdose by weight given poor PO. Titrate accordingly.  - Lantus 15 qhs, lispro 5u TID premeal, mISS  -hypoglycemic this AM, gave OJ and 1/2 amp d50

## 2019-12-11 NOTE — PROGRESS NOTE ADULT - PROBLEM SELECTOR PLAN 4
- c/w mycophenolate 500mg twice a day  - c/w tacrolimus 4mg twice a day; should be taken at least 2 hours after mycophenolate  - tacrolimus level wnl  - 12/8 renal U/S consistent with medical renal dz without hydronephrosis

## 2019-12-11 NOTE — DIETITIAN INITIAL EVALUATION ADULT. - PERTINENT LABORATORY DATA
12/10: hemoglobin 11.1, hematocrit 36.0, BUN 42, creatinine 3.83, , 11/22: P 2.2, 11/19: HgbA1c 8.6%

## 2019-12-11 NOTE — PROGRESS NOTE ADULT - SUBJECTIVE AND OBJECTIVE BOX
Patient seen and examined at bedside.     Overnight:     linezolid    Tablet 600  meropenem  IVPB 1000  aspirin  chewable 81  carvedilol 37.5  heparin  flush 100 Units/mL Injectable 100  heparin  Injectable 5000  linezolid    Tablet 600  meropenem  IVPB 1000  tamsulosin 0.4        Vital Signs Last 24 Hrs  T(C): 36.4 (11 Dec 2019 12:24), Max: 36.7 (10 Dec 2019 20:44)  T(F): 97.5 (11 Dec 2019 12:24), Max: 98 (10 Dec 2019 20:44)  HR: 84 (11 Dec 2019 12:24) (76 - 86)  BP: 166/91 (11 Dec 2019 12:24) (166/91 - 207/97)  BP(mean): --  RR: 20 (11 Dec 2019 12:24) (18 - 20)  SpO2: 92% (11 Dec 2019 12:24) (90% - 95%)  I&O's Detail    10 Dec 2019 07:01  -  11 Dec 2019 07:00  --------------------------------------------------------  IN:    IV PiggyBack: 50 mL    sodium chloride 0.9%.: 1875 mL  Total IN: 1925 mL    OUT:    Voided: 1460 mL  Total OUT: 1460 mL    Total NET: 465 mL      11 Dec 2019 07:01  -  11 Dec 2019 15:05  --------------------------------------------------------  IN:    IV PiggyBack: 100 mL    sodium chloride 0.9%.: 625 mL  Total IN: 725 mL    OUT:  Total OUT: 0 mL    Total NET: 725 mL          Physical Exam: PENDING  General: NAD, resting comfortably in bed  Pulmonary: Nonlabored breathing, no respiratory distress  Cardiovascular: NSR  Abdominal: soft, NT/ND  Extremities: WWP  Pulses:   Right:                                                                          Left:  FEM [ ]2+ [ ]1+ [ ]doppler                                             FEM [ ]2+ [ ]1+ [ ]doppler    POP [ ]2+ [ ]1+ [ ]doppler                                             POP [ ]2+ [ ]1+ [ ]doppler    DP [ ]2+ [ ]1+ [ ]doppler                                                DP [ ]2+ [ ]1+ [ ]doppler  PT[ ]2+ [ ]1+ [ ]doppler                                                  PT [ ]2+ [ ]1+ [ ]doppler      LABS:                        10.9   10.76 )-----------( 163      ( 11 Dec 2019 10:36 )             34.5     12-11    139  |  110<H>  |  41<H>  ----------------------------<  185<H>  3.9   |  18<L>  |  3.68<H>    Ca    9.2      11 Dec 2019 10:36  Mg     1.7     12-11    TPro  5.6<L>  /  Alb  3.1<L>  /  TBili  0.2  /  DBili  x   /  AST  21  /  ALT  17  /  AlkPhos  85  12-11        Radiology and Additional Studies:    Assessment and Plan:     79y male s/p 4/5 left toe amputation in Nov with subsequent wound vac placement.     Patient remains afebrile, HD stable with no signs or symptoms of infection.   Wound vac changed today and the wound is healing well.   Vascular will continue to change the vac M/W/F. Please call with any questions or concern [phone].     Afebrile, downtrending leukocytosis from 12.36 to 10.76, wound appears_  wound vac changed today -   Continue to change vac M/W/F  as per ID patient continuing meropenem  s/p Renal transplant with recent elevation in Cr, appears to have plateaued at 3.6. recent renal US showing increased renal artery anastomosis velocities concerning for possible renal artery stenosis, 323 with minimally elevated RI 0.8 even though RAR is normal; MRA prelim read pending. Vascular will continue to follow. Patient seen and examined at bedside. Vac removed showing good granulation tissue and adequate healing. No fever/chills patient feels generally weak but alright.    Overnight: No acute events     linezolid    Tablet 600  meropenem  IVPB 1000  aspirin  chewable 81  carvedilol 37.5  heparin  flush 100 Units/mL Injectable 100  heparin  Injectable 5000  linezolid    Tablet 600  meropenem  IVPB 1000  tamsulosin 0.4        Vital Signs Last 24 Hrs  T(C): 36.4 (11 Dec 2019 12:24), Max: 36.7 (10 Dec 2019 20:44)  T(F): 97.5 (11 Dec 2019 12:24), Max: 98 (10 Dec 2019 20:44)  HR: 84 (11 Dec 2019 12:24) (76 - 86)  BP: 166/91 (11 Dec 2019 12:24) (166/91 - 207/97)  BP(mean): --  RR: 20 (11 Dec 2019 12:24) (18 - 20)  SpO2: 92% (11 Dec 2019 12:24) (90% - 95%)  I&O's Detail    10 Dec 2019 07:01  -  11 Dec 2019 07:00  --------------------------------------------------------  IN:    IV PiggyBack: 50 mL    sodium chloride 0.9%.: 1875 mL  Total IN: 1925 mL    OUT:    Voided: 1460 mL  Total OUT: 1460 mL    Total NET: 465 mL      11 Dec 2019 07:01  -  11 Dec 2019 15:05  --------------------------------------------------------  IN:    IV PiggyBack: 100 mL    sodium chloride 0.9%.: 625 mL  Total IN: 725 mL    OUT:  Total OUT: 0 mL    Total NET: 725 mL        Physical Exam:  General: NAD, resting comfortably in bed  Pulmonary: Nonlabored breathing, no respiratory distress  Cardiovascular: NSR  Abdominal: soft, NT/ND  Extremities: WWP, left lateral foot vac removed, pink/healthy granulation tissue in the wound bed (5x4cm) with slightly macerated surrounding skin. Vac replaced and seal confirmed.   Pulses: 2+ DP/PT bilaterally      LABS:                        10.9   10.76 )-----------( 163      ( 11 Dec 2019 10:36 )             34.5     12-11    139  |  110<H>  |  41<H>  ----------------------------<  185<H>  3.9   |  18<L>  |  3.68<H>    Ca    9.2      11 Dec 2019 10:36  Mg     1.7     12-11    TPro  5.6<L>  /  Alb  3.1<L>  /  TBili  0.2  /  DBili  x   /  AST  21  /  ALT  17  /  AlkPhos  85  12-11        Radiology and Additional Studies:    Assessment and Plan:     79y male s/p 4/5 left toe amputation in Nov with subsequent wound vac placement.       Afebrile, downtrending leukocytosis from 12.36 to 10.76, wound appears to be healing well.   wound vac changed today   Continue to change vac M/W/F  as per ID patient continuing meropenem  s/p Renal transplant with recent elevation in Cr, appears to have plateaued at 3.6. recent renal US showing increased renal artery anastomosis velocities concerning for possible renal artery stenosis, 323 with minimally elevated RI 0.8 even though RAR is normal; MRA prelim read pending. Vascular will continue to follow.

## 2019-12-11 NOTE — PROGRESS NOTE ADULT - SUBJECTIVE AND OBJECTIVE BOX
OVERNIGHT EVENTS: MAGGIE    SUBJECTIVE / INTERVAL HPI: Patient seen and examined at bedside. Pt laying in bed resting comfortably. States he is feeling better. States he feels mildly weak but denies any CP, SOB, Palpitations, N/V, Diarrhea, F, or chills.     VITAL SIGNS:  Vital Signs Last 24 Hrs  T(C): 36.4 (11 Dec 2019 12:24), Max: 36.7 (10 Dec 2019 20:44)  T(F): 97.5 (11 Dec 2019 12:24), Max: 98 (10 Dec 2019 20:44)  HR: 84 (11 Dec 2019 12:24) (76 - 86)  BP: 166/91 (11 Dec 2019 12:24) (166/91 - 207/97)  BP(mean): --  RR: 20 (11 Dec 2019 12:24) (18 - 20)  SpO2: 92% (11 Dec 2019 12:24) (90% - 95%)    PHYSICAL EXAM:  General: NAD, elderly Confucianism male resting in bed  HEENT: PERRL, EOMI unable to be assessed, anicteric sclera, DMM  Respiratory: CTA b/l, no wheezes, rales, or rhonchi  Cardiovascular: +RRR, +S1/S2, no murmurs, rubs, or gallops  Gastrointestinal: Soft, nontender, nondistended, normoactive bowel sounds x4 quadrants  Extremities: WWP, no erythema, no peripheral edema, no cyanosis b/l, +AVF in RUE w/ palpable thrill  Vascular: 2+ radial, DP pulses b/l  Neurological:  - Mental Status: AAOx3 speech is fluent with intact naming, repetition, and comprehension  - Cranial Nerves II - XII:  II: PERRL; visual fields unable to assess as patient is blind  III, IV, VI: EOMI unable to assess based on patient's vision loss 2/2 glaucoma  V: facial sensation intact to light touch V1-V3 b/l  VII: no ptosis, no facial droop, symmetric eyebrow raise and closure  VIII: hearing intact to finger rub b/l  IX, X: uvula is midline, soft palate rises symmetrically  XI: head turning and shoulder shrug intact b/l  XII: tongue protrusion midline  - Motor: strength is 5/5 b/l LLE & RLE, 5/5 b/l LUE & RUE. normal muscle bulk and tone throughout; slight asterixes with arms outstretched but improved since yesterday  - Sensory: intact to light touch b/l  - Reflexes: intact b/l brachioradialis, patellar, and negative babinski  - Coordination: able to perform heel-shin, no dysmetria on finger to nose  - Gait: deferred      MEDICATIONS:  MEDICATIONS  (STANDING):  aspirin  chewable 81 milliGRAM(s) Oral daily  BACItracin   Ointment 1 Application(s) Topical daily  carvedilol 37.5 milliGRAM(s) Oral every 12 hours  chlorhexidine 4% Liquid 1 Application(s) Topical <User Schedule>  dextrose 5%. 1000 milliLiter(s) (50 mL/Hr) IV Continuous <Continuous>  dextrose 50% Injectable 12.5 Gram(s) IV Push once  dextrose 50% Injectable 25 Gram(s) IV Push once  dextrose 50% Injectable 25 Gram(s) IV Push once  docusate sodium 100 milliGRAM(s) Oral three times a day  heparin  flush 100 Units/mL Injectable 100 Unit(s) IV Push every other day  heparin  Injectable 5000 Unit(s) SubCutaneous every 8 hours  insulin glargine Injectable (LANTUS) 12 Unit(s) SubCutaneous at bedtime  insulin lispro (HumaLOG) corrective regimen sliding scale   SubCutaneous Before meals and at bedtime  linezolid    Tablet 600 milliGRAM(s) Oral every 12 hours  meropenem  IVPB 1000 milliGRAM(s) IV Intermittent every 24 hours  mycophenolate mofetil 500 milliGRAM(s) Oral every 12 hours  predniSONE   Tablet 5 milliGRAM(s) Oral every 24 hours  sodium chloride 0.9%. 1000 milliLiter(s) (125 mL/Hr) IV Continuous <Continuous>  tacrolimus 4 milliGRAM(s) Oral every 12 hours  tamsulosin 0.4 milliGRAM(s) Oral at bedtime    MEDICATIONS  (PRN):  dextrose 40% Gel 15 Gram(s) Oral once PRN Blood Glucose LESS THAN 70 milliGRAM(s)/deciliter  glucagon  Injectable 1 milliGRAM(s) IntraMuscular once PRN Glucose LESS THAN 70 milligrams/deciliter  sodium chloride 0.9% lock flush 10 milliLiter(s) IV Push every 1 hour PRN Pre/post blood products, medications, blood draw, and to maintain line patency      ALLERGIES:  Allergies    No Known Allergies    Intolerances        LABS:                        10.9   10.76 )-----------( 163      ( 11 Dec 2019 10:36 )             34.5     12-11    139  |  110<H>  |  41<H>  ----------------------------<  185<H>  3.9   |  18<L>  |  3.68<H>    Ca    9.2      11 Dec 2019 10:36  Mg     1.7     12-11    TPro  5.6<L>  /  Alb  3.1<L>  /  TBili  0.2  /  DBili  x   /  AST  21  /  ALT  17  /  AlkPhos  85  12-11        CAPILLARY BLOOD GLUCOSE      POCT Blood Glucose.: 189 mg/dL (11 Dec 2019 13:43)      RADIOLOGY & ADDITIONAL TESTS: Reviewed. OVERNIGHT EVENTS: MAGGIE    SUBJECTIVE / INTERVAL HPI: Patient seen and examined at bedside. Pt laying in bed resting comfortably. States he is feeling better. States he feels mildly weak but denies any CP, SOB, Palpitations, N/V, Diarrhea, F, or chills.     VITAL SIGNS:  Vital Signs Last 24 Hrs  T(C): 36.4 (11 Dec 2019 12:24), Max: 36.7 (10 Dec 2019 20:44)  T(F): 97.5 (11 Dec 2019 12:24), Max: 98 (10 Dec 2019 20:44)  HR: 84 (11 Dec 2019 12:24) (76 - 86)  BP: 166/91 (11 Dec 2019 12:24) (166/91 - 207/97)  BP(mean): --  RR: 20 (11 Dec 2019 12:24) (18 - 20)  SpO2: 92% (11 Dec 2019 12:24) (90% - 95%)    PHYSICAL EXAM:  General: NAD, elderly male resting in bed  HEENT: PERRL, EOMI unable to be assessed, anicteric sclera, DMM  Respiratory: CTA b/l, no wheezes, rales, or rhonchi  Cardiovascular: +RRR, +S1/S2, no murmurs, rubs, or gallops  Gastrointestinal: Soft, nontender, nondistended, normoactive bowel sounds x4 quadrants  Extremities: WWP, no erythema, no peripheral edema, no cyanosis b/l, +AVF in RUE w/ palpable thrill  Vascular: 2+ radial, DP pulses b/l  Neurological:  - Mental Status: AAOx3 speech is fluent with intact naming, repetition, and comprehension  - Cranial Nerves II - XII:  II: PERRL; visual fields unable to assess as patient is blind  III, IV, VI: EOMI unable to assess based on patient's vision loss 2/2 glaucoma  V: facial sensation intact to light touch V1-V3 b/l  VII: no ptosis, no facial droop, symmetric eyebrow raise and closure  VIII: hearing intact to finger rub b/l  IX, X: uvula is midline, soft palate rises symmetrically  XI: head turning and shoulder shrug intact b/l  XII: tongue protrusion midline  - Motor: strength is 5/5 b/l LLE & RLE, 5/5 b/l LUE & RUE. normal muscle bulk and tone throughout; slight asterixes with arms outstretched but improved since yesterday  - Sensory: intact to light touch b/l  - Reflexes: intact b/l brachioradialis, patellar, and negative babinski  - Coordination: able to perform heel-shin, no dysmetria on finger to nose  - Gait: deferred      MEDICATIONS:  MEDICATIONS  (STANDING):  aspirin  chewable 81 milliGRAM(s) Oral daily  BACItracin   Ointment 1 Application(s) Topical daily  carvedilol 37.5 milliGRAM(s) Oral every 12 hours  chlorhexidine 4% Liquid 1 Application(s) Topical <User Schedule>  dextrose 5%. 1000 milliLiter(s) (50 mL/Hr) IV Continuous <Continuous>  dextrose 50% Injectable 12.5 Gram(s) IV Push once  dextrose 50% Injectable 25 Gram(s) IV Push once  dextrose 50% Injectable 25 Gram(s) IV Push once  docusate sodium 100 milliGRAM(s) Oral three times a day  heparin  flush 100 Units/mL Injectable 100 Unit(s) IV Push every other day  heparin  Injectable 5000 Unit(s) SubCutaneous every 8 hours  insulin glargine Injectable (LANTUS) 12 Unit(s) SubCutaneous at bedtime  insulin lispro (HumaLOG) corrective regimen sliding scale   SubCutaneous Before meals and at bedtime  linezolid    Tablet 600 milliGRAM(s) Oral every 12 hours  meropenem  IVPB 1000 milliGRAM(s) IV Intermittent every 24 hours  mycophenolate mofetil 500 milliGRAM(s) Oral every 12 hours  predniSONE   Tablet 5 milliGRAM(s) Oral every 24 hours  sodium chloride 0.9%. 1000 milliLiter(s) (125 mL/Hr) IV Continuous <Continuous>  tacrolimus 4 milliGRAM(s) Oral every 12 hours  tamsulosin 0.4 milliGRAM(s) Oral at bedtime    MEDICATIONS  (PRN):  dextrose 40% Gel 15 Gram(s) Oral once PRN Blood Glucose LESS THAN 70 milliGRAM(s)/deciliter  glucagon  Injectable 1 milliGRAM(s) IntraMuscular once PRN Glucose LESS THAN 70 milligrams/deciliter  sodium chloride 0.9% lock flush 10 milliLiter(s) IV Push every 1 hour PRN Pre/post blood products, medications, blood draw, and to maintain line patency      ALLERGIES:  Allergies    No Known Allergies    Intolerances        LABS:                        10.9   10.76 )-----------( 163      ( 11 Dec 2019 10:36 )             34.5     12-11    139  |  110<H>  |  41<H>  ----------------------------<  185<H>  3.9   |  18<L>  |  3.68<H>    Ca    9.2      11 Dec 2019 10:36  Mg     1.7     12-11    TPro  5.6<L>  /  Alb  3.1<L>  /  TBili  0.2  /  DBili  x   /  AST  21  /  ALT  17  /  AlkPhos  85  12-11        CAPILLARY BLOOD GLUCOSE      POCT Blood Glucose.: 189 mg/dL (11 Dec 2019 13:43)      RADIOLOGY & ADDITIONAL TESTS: Reviewed.

## 2019-12-11 NOTE — DIETITIAN INITIAL EVALUATION ADULT. - ADD RECOMMEND
Diet education: discussed cstCHO diet, encouraged PO as tolerated, encouraged small frequent meals as tolerated

## 2019-12-11 NOTE — DIETITIAN INITIAL EVALUATION ADULT. - OTHER INFO
Pt seen for initial assessment. 80 yo M with hx of Renal transplant (normal creatinine), functionally blind (glaucoma), DM (on insulin), BPH, HTN, left 4th adn 5th toe amputation with wound vac and diabetic ulcer required 6 week IV abx (PICC line on LUE) with cefepime and linezolid who was brought in today with complaints of confusion found to have ELIZABETH; initially thought to be prerenal from poor PO intake, now with possible ATN 2/2 cefepime. Abx currently switched from cefepime to meropenem per ID. Skin: 1+ edema b/l feet 12/10, no pressure injury. Pt seen resting in bed, sleeping soundly, wife at bedside speaking on his behalf. Pt is on cstCHO no snack, renal, Kosher diet with 0 PO intake x3 days due to nausea (however reports significant improvement today), however reports good PO intake prior to this period, adheres to low sugar, low CHO, Kosher diet, declines all ONS. Reports preference for foods outside of the hospital, has been bringing in foods and plans to continue to do so. NKFA. Denies N/V/C/D w/ last BM 12/11. Reports prolonged mastication, otherwise no difficulty chewing/swallowing, denies coughing w/ eating/swallowing. Not reporting pain at this time. Reports wt has remained stable. RD to follow up per protocol.

## 2019-12-11 NOTE — DIETITIAN INITIAL EVALUATION ADULT. - PROBLEM SELECTOR PLAN 1
Pt with creatining of ~1.8, no 1.5 on admission to [facility] from baseline of ~0.9. Pt with hx of renal transplant in setting of DM and HTN. Likely etiology of ELIZABETH is prerenal given reliable hx given by wife of poor PO intake for past few days and low fluid intake, Na slightly low to 133 on admission. Likely also contributing to confusion. Patient tolerating PO, not dry on exam and mentating closer to baseline  -s/p 500 NS bolus in ED  - encourage PO intake  -trend CMP

## 2019-12-11 NOTE — PROGRESS NOTE ADULT - PROBLEM SELECTOR PLAN 1
Pt Cr 1.5 on admission to [facility] from baseline of ~0.9. Pt with hx of renal transplant in setting of DM and HTN. Likely etiology of ELIZABETH is prerenal (given poor PO intake for past few days) vs. ATN 2/2 cefepime toxicity Likely also contributing to confusion. Patient tolerating PO, not dry on exam and mentating closer to baseline, per wife at bedside. Cr stable at 3.6 today  - c/w IV fluids  - encourage PO intake  - 12/8 renal U/S consistent with medical renal dz without hydronephrosis.  - 12/10 renal dopplers showing mild R transplant kidney hydronephrosis with elevation of transplant renal artery velocity, concerning for renal artery stenosis  - CT abdomen/pelvis without contrast 12/10 showing 6 mm bladder stone just anterior to the right UVJ  -MRA prelim read not clearly showing renal arteries, awaiting final read  - continue to trend Cr  - Dr. Osborne from vascular cardiology also following, appreciate recs  -possibly due to zosyn/cefepime

## 2019-12-11 NOTE — PROGRESS NOTE ADULT - SUBJECTIVE AND OBJECTIVE BOX
CC:      Interval: Cr with slight downtrend today,  MRA performed, inadequate visualization of renal arteries.         Feb 2017 Normal pharm nuclear stress test   Feb 2017 ECHO - mod LVH, mild gradient across LVOT (40mmhg with valsalva)     CT head non contrast  IMPRESSION:   1. No acute intracranial hemorrhage or transcortical infarct.   2. 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   3. Chronic right frontal, right maxillary, and ethmoid sinus disease    CXR:  IMPRESSION:   No evidence of acute cardiopulmonary disease (06 Dec 2019 17:39)          Allergies    No Known Allergies    Intolerances    	    MEDICATIONS:  MEDICATIONS  (STANDING):  aspirin  chewable 81 milliGRAM(s) Oral daily  BACItracin   Ointment 1 Application(s) Topical daily  carvedilol 37.5 milliGRAM(s) Oral every 12 hours  chlorhexidine 4% Liquid 1 Application(s) Topical <User Schedule>  dextrose 5%. 1000 milliLiter(s) (50 mL/Hr) IV Continuous <Continuous>  dextrose 50% Injectable 12.5 Gram(s) IV Push once  dextrose 50% Injectable 25 Gram(s) IV Push once  dextrose 50% Injectable 25 Gram(s) IV Push once  docusate sodium 100 milliGRAM(s) Oral three times a day  heparin  flush 100 Units/mL Injectable 100 Unit(s) IV Push every other day  heparin  Injectable 5000 Unit(s) SubCutaneous every 8 hours  insulin glargine Injectable (LANTUS) 12 Unit(s) SubCutaneous at bedtime  insulin lispro (HumaLOG) corrective regimen sliding scale   SubCutaneous Before meals and at bedtime  linezolid    Tablet 600 milliGRAM(s) Oral every 12 hours  meropenem  IVPB 1000 milliGRAM(s) IV Intermittent every 24 hours  mycophenolate mofetil 500 milliGRAM(s) Oral every 12 hours  predniSONE   Tablet 5 milliGRAM(s) Oral every 24 hours  sodium chloride 0.9%. 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  BPH (benign prostatic hypertrophy)  HTN (hypertension)  Amputation of toe  Kidney transplant recipient      FAMILY HISTORY:      SOCIAL HISTORY:  unchanged    REVIEW OF SYSTEMS:  CONSTITUTIONAL: No fever, weight loss, or fatigue  EYES: No eye pain, visual disturbances, or discharge  ENMT:  No difficulty hearing, tinnitus, vertigo; No sinus or throat pain  NECK: No pain or stiffness  RESPIRATORY: No cough, wheezing, chills or hemoptysis; No Shortness of Breath  CARDIOVASCULAR: No chest pain, palpitations, passing out, dizziness, or leg swelling  GASTROINTESTINAL: No abdominal or epigastric pain. No nausea, vomiting, or hematemesis; No diarrhea or constipation. No melena or hematochezia.  GENITOURINARY: No dysuria, frequency, hematuria, or incontinence  NEUROLOGICAL: No headaches, memory loss, loss of strength, numbness, or tremors  SKIN: No itching, burning, rashes, or lesions   LYMPH Nodes: No enlarged glands  ENDOCRINE: No heat or cold intolerance; No hair loss  MUSCULOSKELETAL: No joint pain or swelling; No muscle, back, or extremity pain  PSYCHIATRIC: No depression, anxiety, mood swings, or difficulty sleeping  HEME/LYMPH: No easy bruising, or bleeding gums  ALLERY AND IMMUNOLOGIC: No hives or eczema	    [X ] All others negative	  [ ] Unable to obtain    PHYSICAL EXAM:  Vital Signs Last 24 Hrs  T(C): 36.3 (11 Dec 2019 16:24), Max: 36.7 (10 Dec 2019 20:44)  T(F): 97.4 (11 Dec 2019 16:24), Max: 98 (10 Dec 2019 20:44)  HR: 80 (11 Dec 2019 16:24) (76 - 84)  BP: 159/72 (11 Dec 2019 16:24) (159/72 - 207/97)  BP(mean): --  RR: 18 (11 Dec 2019 16:24) (18 - 20)  SpO2: 95% (11 Dec 2019 16:24) (90% - 95%)      Appearance: lethargic/sleepy, no distress	  HEENT:   Normal oral mucosa, PERRL, EOMI	  Lymphatic: No lymphadenopathy  Respiratory: Lungs clear to auscultation	  Psychiatry: A & O x 3, Mood & affect appropriate  Gastrointestinal:  Soft, Non-tender, + BS	  Neurologic: Non-focal    CARDIOVASCULAR EXAM:  Cardiac: No murmurs, Normal S1 S2  Neck: No carotid bruits, no JVD  Extremities: No edema, s/p left 4th/5th toe amp, wound vac in place  Skin: Warm, No rashes, No ecchymoses, no cyanosis,  no ulcerations   Pulses: Peripheral pulses palpable bilaterally  LEFT Femoral - present  RIGHT Femoral - present  LEFT Popliteal - present  RIGHT Popliteal - present  LEFT Dorsalis Pedis - present  RIGHT Dorsalis Pedis - present  LEFT Posterior Tibial - present  RIGHT Posterior Tibial - present     DOPPLER:       LABS:	 	                                 10.9   10.76 )-----------( 163      ( 11 Dec 2019 10:36 )             34.5   12-11    139  |  110<H>  |  41<H>  ----------------------------<  185<H>  3.9   |  18<L>  |  3.68<H>    Ca    9.2      11 Dec 2019 10:36  Mg     1.7     12-11    TPro  5.6<L>  /  Alb  3.1<L>  /  TBili  0.2  /  DBili  x   /  AST  21  /  ALT  17  /  AlkPhos  85  12-11          Assessment:  Acute Renal Failure - most likely due to antibiotic use (cefepime), Cr appears to have peaked, slight downtrend today.    Renal Artery Stenosis - High velocities > 300 cm/s noted on renal artery duplex at site of graft anastomosis.  Unclear if present in the past. Acute rise in creatinine less likely due to JENNA. MRA non con of abdomen/pelvis was not able to visualize the vasculature well.      Altered Mental Status  Infected Diabetic Foot Ulcer s/p toe amp   HTN    Plan:  - no plan for renal angiogram at this time given slight downtrending of creatinine.  Will cont to monitor closely.  F/u renal perfusion scan. If renal function continues to improve the most likely etiology is antibiotic induced.    - ECHO unremarkable, ELIZABETH not due to cardiac etiology  - HTN not well controlled, recommend adding second agent to his coreg regimen  - will cont to follow     Dago Osborne MD  Vascular Cardiology    [address]   OFFICE # [phone]  EMAIL: [email or web address]

## 2019-12-11 NOTE — DIETITIAN INITIAL EVALUATION ADULT. - PROBLEM SELECTOR PLAN 5
s/p I+D on 11/19. Non healing ulcer started abx on 11/21 with cefepime and linezolid given sensitivities. Switched from Zosyn given resistance. He had Corynebacterium striatum in culture and Enterococcus Faecalis in the prior culture.  Was started on linezolid 600 mg PO q12 hrs over vancomycin given history of renal transplant and given his concern of too many IV doses at home  - cw Cefepime 2 grams IV q12hrs (renally dosed)  - cw linezolid 600 mg PO q12 hrs  - will treat for 6 weeks total from 11/21 with cefepime and linezolid  - Will need CBC, CMP, ESR, CRP qweek.  CBC is essential as there is a risk of bone marrow suppression with prolonged course of linezolid

## 2019-12-12 LAB
ANION GAP SERPL CALC-SCNC: 12 MMOL/L — SIGNIFICANT CHANGE UP (ref 5–17)
BUN SERPL-MCNC: 40 MG/DL — HIGH (ref 7–23)
CALCIUM SERPL-MCNC: 9.3 MG/DL — SIGNIFICANT CHANGE UP (ref 8.4–10.5)
CHLORIDE SERPL-SCNC: 110 MMOL/L — HIGH (ref 96–108)
CO2 SERPL-SCNC: 19 MMOL/L — LOW (ref 22–31)
CREAT SERPL-MCNC: 3.71 MG/DL — HIGH (ref 0.5–1.3)
GLUCOSE BLDC GLUCOMTR-MCNC: 102 MG/DL — HIGH (ref 70–99)
GLUCOSE BLDC GLUCOMTR-MCNC: 109 MG/DL — HIGH (ref 70–99)
GLUCOSE BLDC GLUCOMTR-MCNC: 75 MG/DL — SIGNIFICANT CHANGE UP (ref 70–99)
GLUCOSE BLDC GLUCOMTR-MCNC: 97 MG/DL — SIGNIFICANT CHANGE UP (ref 70–99)
GLUCOSE SERPL-MCNC: 80 MG/DL — SIGNIFICANT CHANGE UP (ref 70–99)
HCT VFR BLD CALC: 35.4 % — LOW (ref 39–50)
HGB BLD-MCNC: 10.9 G/DL — LOW (ref 13–17)
MAGNESIUM SERPL-MCNC: 2 MG/DL — SIGNIFICANT CHANGE UP (ref 1.6–2.6)
MCHC RBC-ENTMCNC: 27 PG — SIGNIFICANT CHANGE UP (ref 27–34)
MCHC RBC-ENTMCNC: 30.8 GM/DL — LOW (ref 32–36)
MCV RBC AUTO: 87.8 FL — SIGNIFICANT CHANGE UP (ref 80–100)
NRBC # BLD: 0 /100 WBCS — SIGNIFICANT CHANGE UP (ref 0–0)
PLATELET # BLD AUTO: 162 K/UL — SIGNIFICANT CHANGE UP (ref 150–400)
POTASSIUM SERPL-MCNC: 3.6 MMOL/L — SIGNIFICANT CHANGE UP (ref 3.5–5.3)
POTASSIUM SERPL-SCNC: 3.6 MMOL/L — SIGNIFICANT CHANGE UP (ref 3.5–5.3)
RBC # BLD: 4.03 M/UL — LOW (ref 4.2–5.8)
RBC # FLD: 15.6 % — HIGH (ref 10.3–14.5)
SODIUM SERPL-SCNC: 141 MMOL/L — SIGNIFICANT CHANGE UP (ref 135–145)
WBC # BLD: 8.73 K/UL — SIGNIFICANT CHANGE UP (ref 3.8–10.5)
WBC # FLD AUTO: 8.73 K/UL — SIGNIFICANT CHANGE UP (ref 3.8–10.5)

## 2019-12-12 PROCEDURE — 93010 ELECTROCARDIOGRAM REPORT: CPT

## 2019-12-12 PROCEDURE — 99233 SBSQ HOSP IP/OBS HIGH 50: CPT

## 2019-12-12 PROCEDURE — 71045 X-RAY EXAM CHEST 1 VIEW: CPT | Mod: 26

## 2019-12-12 RX ORDER — FUROSEMIDE 40 MG
40 TABLET ORAL ONCE
Refills: 0 | Status: COMPLETED | OUTPATIENT
Start: 2019-12-12 | End: 2019-12-12

## 2019-12-12 RX ORDER — ONDANSETRON 8 MG/1
4 TABLET, FILM COATED ORAL ONCE
Refills: 0 | Status: COMPLETED | OUTPATIENT
Start: 2019-12-12 | End: 2019-12-12

## 2019-12-12 RX ORDER — POTASSIUM CHLORIDE 20 MEQ
40 PACKET (EA) ORAL ONCE
Refills: 0 | Status: COMPLETED | OUTPATIENT
Start: 2019-12-12 | End: 2019-12-12

## 2019-12-12 RX ADMIN — ONDANSETRON 4 MILLIGRAM(S): 8 TABLET, FILM COATED ORAL at 23:32

## 2019-12-12 RX ADMIN — Medication 81 MILLIGRAM(S): at 11:50

## 2019-12-12 RX ADMIN — HEPARIN SODIUM 5000 UNIT(S): 5000 INJECTION INTRAVENOUS; SUBCUTANEOUS at 22:24

## 2019-12-12 RX ADMIN — HEPARIN SODIUM 5000 UNIT(S): 5000 INJECTION INTRAVENOUS; SUBCUTANEOUS at 14:00

## 2019-12-12 RX ADMIN — TACROLIMUS 4 MILLIGRAM(S): 5 CAPSULE ORAL at 10:49

## 2019-12-12 RX ADMIN — CHLORHEXIDINE GLUCONATE 1 APPLICATION(S): 213 SOLUTION TOPICAL at 06:12

## 2019-12-12 RX ADMIN — MYCOPHENOLATE MOFETIL 500 MILLIGRAM(S): 250 CAPSULE ORAL at 10:49

## 2019-12-12 RX ADMIN — ONDANSETRON 4 MILLIGRAM(S): 8 TABLET, FILM COATED ORAL at 17:52

## 2019-12-12 RX ADMIN — Medication 40 MILLIEQUIVALENT(S): at 10:49

## 2019-12-12 RX ADMIN — Medication 40 MILLIGRAM(S): at 07:57

## 2019-12-12 RX ADMIN — SODIUM CHLORIDE 125 MILLILITER(S): 9 INJECTION INTRAMUSCULAR; INTRAVENOUS; SUBCUTANEOUS at 06:13

## 2019-12-12 RX ADMIN — LINEZOLID 600 MILLIGRAM(S): 600 INJECTION, SOLUTION INTRAVENOUS at 22:25

## 2019-12-12 RX ADMIN — Medication 100 MILLIGRAM(S): at 06:12

## 2019-12-12 RX ADMIN — CARVEDILOL PHOSPHATE 37.5 MILLIGRAM(S): 80 CAPSULE, EXTENDED RELEASE ORAL at 06:11

## 2019-12-12 RX ADMIN — MEROPENEM 100 MILLIGRAM(S): 1 INJECTION INTRAVENOUS at 11:50

## 2019-12-12 RX ADMIN — MYCOPHENOLATE MOFETIL 500 MILLIGRAM(S): 250 CAPSULE ORAL at 22:25

## 2019-12-12 RX ADMIN — Medication 5 MILLIGRAM(S): at 10:49

## 2019-12-12 RX ADMIN — Medication 100 MILLIGRAM(S): at 14:00

## 2019-12-12 RX ADMIN — AMLODIPINE BESYLATE 5 MILLIGRAM(S): 2.5 TABLET ORAL at 17:13

## 2019-12-12 RX ADMIN — TAMSULOSIN HYDROCHLORIDE 0.4 MILLIGRAM(S): 0.4 CAPSULE ORAL at 22:25

## 2019-12-12 RX ADMIN — CARVEDILOL PHOSPHATE 37.5 MILLIGRAM(S): 80 CAPSULE, EXTENDED RELEASE ORAL at 19:23

## 2019-12-12 RX ADMIN — Medication 100 UNIT(S): at 11:50

## 2019-12-12 RX ADMIN — HEPARIN SODIUM 5000 UNIT(S): 5000 INJECTION INTRAVENOUS; SUBCUTANEOUS at 06:11

## 2019-12-12 RX ADMIN — INSULIN GLARGINE 12 UNIT(S): 100 INJECTION, SOLUTION SUBCUTANEOUS at 22:24

## 2019-12-12 RX ADMIN — Medication 1 APPLICATION(S): at 11:50

## 2019-12-12 RX ADMIN — LINEZOLID 600 MILLIGRAM(S): 600 INJECTION, SOLUTION INTRAVENOUS at 10:49

## 2019-12-12 RX ADMIN — Medication 100 MILLIGRAM(S): at 22:25

## 2019-12-12 RX ADMIN — TACROLIMUS 4 MILLIGRAM(S): 5 CAPSULE ORAL at 22:24

## 2019-12-12 NOTE — PROGRESS NOTE ADULT - ATTENDING COMMENTS
I independently performed the key portions of the evaluation and management service provided. I agree with the above history, physical, and plan which I have reviewed and edited where appropriate. I find ELIZABETH of unclear cause. Possibilities include AIN, JENNA, and ATN — rejection and other causes are less likely. Follow SCr. CMV and BK tests ordered. Follow up tomorrow with Dr. Aranda. See full note. (Patient seen earlier in day.)

## 2019-12-12 NOTE — PROGRESS NOTE ADULT - SUBJECTIVE AND OBJECTIVE BOX
CC:      Interval: Cr stable.  pt oob in chair, mild tachypnea, given one dose of IV lasix     HISTORY OF PRESENT ILLNESS:  HPI:   78 yo M with hx of Renal transplant ~7 yr ago, functionally blind (glaucoma), IDDM, BPH, HTN, diabetic foot ulcer complicated by left 4th and 5th toe amputation with wound vac, on 6 week course of IV cefepime and linezolid through PICC line, presented with acute renal failure, confusion, poor PO intake.  Sent from Dr. Aranda office due to doubling of his creatinine.   Since admission creatinine has been rising, now > 3.  Most of history per wife.   is sleepy/lethargic.   No SOB, CP. No hx of CAD, MI, CVA.     Feb 2017 Normal pharm nuclear stress test   Feb 2017 ECHO - mod LVH, mild gradient across LVOT (40mmhg with valsalva)     CT head non contrast  IMPRESSION:   1. No acute intracranial hemorrhage or transcortical infarct.   2. 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   3. Chronic right frontal, right maxillary, and ethmoid sinus disease    CXR:  IMPRESSION:   No evidence of acute cardiopulmonary disease (06 Dec 2019 17:39)          Allergies    No Known Allergies    Intolerances    	    MEDICATIONS:  MEDICATIONS  (STANDING):  amLODIPine   Tablet 5 milliGRAM(s) Oral every 24 hours  aspirin  chewable 81 milliGRAM(s) Oral daily  BACItracin   Ointment 1 Application(s) Topical daily  carvedilol 37.5 milliGRAM(s) Oral every 12 hours  chlorhexidine 4% Liquid 1 Application(s) Topical <User Schedule>  dextrose 5%. 1000 milliLiter(s) (50 mL/Hr) IV Continuous <Continuous>  dextrose 50% Injectable 12.5 Gram(s) IV Push once  dextrose 50% Injectable 25 Gram(s) IV Push once  dextrose 50% Injectable 25 Gram(s) IV Push once  docusate sodium 100 milliGRAM(s) Oral three times a day  heparin  flush 100 Units/mL Injectable 100 Unit(s) IV Push every other day  heparin  Injectable 5000 Unit(s) SubCutaneous every 8 hours  insulin glargine Injectable (LANTUS) 12 Unit(s) SubCutaneous at bedtime  insulin lispro (HumaLOG) corrective regimen sliding scale   SubCutaneous Before meals and at bedtime  linezolid    Tablet 600 milliGRAM(s) Oral every 12 hours  meropenem  IVPB 1000 milliGRAM(s) IV Intermittent every 24 hours  mycophenolate mofetil 500 milliGRAM(s) Oral every 12 hours  predniSONE   Tablet 5 milliGRAM(s) Oral every 24 hours  tacrolimus 4 milliGRAM(s) Oral every 12 hours  tamsulosin 0.4 milliGRAM(s) Oral at bedtime      PAST MEDICAL & SURGICAL HISTORY:  History of renal transplant: secondary to DM  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  BPH (benign prostatic hypertrophy)  HTN (hypertension)  Amputation of toe  Kidney transplant recipient      FAMILY HISTORY:      SOCIAL HISTORY:  unchanged    REVIEW OF SYSTEMS:  CONSTITUTIONAL: No fever, weight loss, or fatigue  EYES: No eye pain, visual disturbances, or discharge  ENMT:  No difficulty hearing, tinnitus, vertigo; No sinus or throat pain  NECK: No pain or stiffness  RESPIRATORY: No cough, wheezing, chills or hemoptysis; No Shortness of Breath  CARDIOVASCULAR: No chest pain, palpitations, passing out, dizziness, or leg swelling  GASTROINTESTINAL: No abdominal or epigastric pain. No nausea, vomiting, or hematemesis; No diarrhea or constipation. No melena or hematochezia.  GENITOURINARY: No dysuria, frequency, hematuria, or incontinence  NEUROLOGICAL: No headaches, memory loss, loss of strength, numbness, or tremors  SKIN: No itching, burning, rashes, or lesions   LYMPH Nodes: No enlarged glands  ENDOCRINE: No heat or cold intolerance; No hair loss  MUSCULOSKELETAL: No joint pain or swelling; No muscle, back, or extremity pain  PSYCHIATRIC: No depression, anxiety, mood swings, or difficulty sleeping  HEME/LYMPH: No easy bruising, or bleeding gums  ALLERY AND IMMUNOLOGIC: No hives or eczema	    [X ] All others negative	  [ ] Unable to obtain    PHYSICAL EXAM:  Vital Signs Last 24 Hrs  T(C): 36.3 (12 Dec 2019 09:55), Max: 36.4 (11 Dec 2019 12:24)  T(F): 97.4 (12 Dec 2019 09:55), Max: 97.5 (11 Dec 2019 12:24)  HR: 73 (12 Dec 2019 09:55) (73 - 84)  BP: 164/84 (12 Dec 2019 09:55) (138/54 - 182/68)  BP(mean): --  RR: 18 (12 Dec 2019 09:55) (18 - 20)  SpO2: 97% (12 Dec 2019 09:55) (92% - 97%)      Appearance: lethargic/sleepy, no distress	  HEENT:   Normal oral mucosa, PERRL, EOMI	  Lymphatic: No lymphadenopathy  Respiratory: Lungs clear to auscultation	  Psychiatry: A & O x 3, Mood & affect appropriate  Gastrointestinal:  Soft, Non-tender, + BS	  Neurologic: Non-focal    CARDIOVASCULAR EXAM:  Cardiac: No murmurs, Normal S1 S2  Neck: No carotid bruits, no JVD  Extremities: No edema, s/p left 4th/5th toe amp, wound vac in place  Skin: Warm, No rashes, No ecchymoses, no cyanosis,  no ulcerations   Pulses: Peripheral pulses palpable bilaterally  LEFT Femoral - present  RIGHT Femoral - present  LEFT Popliteal - present  RIGHT Popliteal - present  LEFT Dorsalis Pedis - present  RIGHT Dorsalis Pedis - present  LEFT Posterior Tibial - present  RIGHT Posterior Tibial - present     DOPPLER:       LABS:	 	                         10.9   8.73  )-----------( 162      ( 12 Dec 2019 08:38 )             35.4   12-12    141  |  110<H>  |  40<H>  ----------------------------<  80  3.6   |  19<L>  |  3.71<H>    Ca    9.3      12 Dec 2019 08:38  Mg     2.0     12-12    TPro  5.6<L>  /  Alb  3.1<L>  /  TBili  0.2  /  DBili  x   /  AST  21  /  ALT  17  /  AlkPhos  85  12-11                        Assessment:  Acute Renal Failure - unclear etiology: most likely antibiotic induced, did not improve with IVF, renal artery disease noted on duplex PSV ~ 300 cm/s at site of anastomosis.  MRA abdomen with poor visualization of renal artery.    Altered Mental Status - improved  Infected Diabetic Foot Ulcer s/p toe amp   HTN     Plan:  - no plan for renal angiogram at this time given stabilization of renal function.  Will cont to monitor closely.  If renal function continues to improve the most likely etiology is antibiotic induced.  Perfusion study with adequate flow into the transplant kidney with normal uptake pattern, excretion pattern consistent with acute kidney injury.    - ECHO unremarkable, ELIZABETH not due to cardiac etiology.  Please stop IVF as patient beginning to get congested.  Given one dose of IV lasix 40mg this AM.    - HTN better controlled with addition of norvasc to coreg.  - will cont to follow     Dago Osborne MD  Vascular Cardiology    [address]  OFFICE # [phone]  EMAIL: [email or web address]

## 2019-12-12 NOTE — PROGRESS NOTE ADULT - PROBLEM SELECTOR PLAN 1
Pt Cr 1.5 on admission to [facility] from baseline of ~0.9. Pt with hx of renal transplant in setting of DM and HTN. Likely etiology of ELIZABETH is prerenal (given poor PO intake for past few days) vs. ATN 2/2 cefepime toxicity Likely also contributing to confusion. Patient tolerating PO, not dry on exam and mentating closer to baseline, per wife at bedside. Cr stable at 3.6 today  - c/w IV fluids  - encourage PO intake  - 12/8 renal U/S consistent with medical renal dz without hydronephrosis.  - 12/10 renal dopplers showing mild R transplant kidney hydronephrosis with elevation of transplant renal artery velocity, concerning for renal artery stenosis  - CT abdomen/pelvis without contrast 12/10 showing 6 mm bladder stone just anterior to the right UVJ  -MRA prelim read not clearly showing renal arteries, awaiting final read  - continue to trend Cr  - Dr. Osborne from vascular cardiology also following, appreciate recs  -possibly due to zosyn/cefepime Pt Cr 1.5 on admission to [facility] from baseline of ~0.9. Pt with hx of renal transplant in setting of DM and HTN. Likely etiology of ELIZABETH is ATN 2/2 cefepime toxicity, likely also contributing to confusion. Patient tolerating PO, not dry on exam and mentating closer to baseline, per wife at bedside. Cr stable at 3.7 today  - encourage PO intake  -stopped IV fluids due to concern for volume overload (dyspnea with eating and mild vasc congestion on am cxr)  - 12/8 renal U/S consistent with medical renal dz without hydronephrosis.  - 12/10 renal dopplers showing mild R transplant kidney hydronephrosis with elevation of transplant renal artery velocity, concerning for renal artery stenosis  - CT abdomen/pelvis without contrast 12/10 showing 6 mm bladder stone just anterior to the right UVJ  -MRA prelim read not clearly showing renal arteries, awaiting final read  - NM study showing adequate flow to transplant kidney with excretion pattern consistent with ELIZABETH  - galium scan ordered  - continue to trend Cr  - Dr. Osborne from vascular cardiology also following, appreciate recs   -possibly due to zosyn/cefepime    #Volume overload  -pt endorsed dyspnea after eating, crackles appreciated on exam, and vasc congestion on cxr  -s/p 40 mg IVP lasix

## 2019-12-12 NOTE — PROGRESS NOTE ADULT - PROBLEM SELECTOR PLAN 3
Pt with sbp 160s on admission. Head CT noncon negative.   - sBP elevated 170s-190s today, patient asymptomatic  - coreg uptitrated to 37.5 mg BID  -gave hydralzine 5 mg IVP x1 on 12/11  - elevated sBP today could be 2/2 renal artery stenosis, as noted in renal duplex 12/10 Pt with sbp 160s on admission. Head CT noncon negative.   - sBP elevated 170s-190s on 12/10 patient asymptomatic  - coreg uptitrated to 37.5 mg BID  -gave hydralzine 5 mg IVP x1 on 12/11  - BP more controlled today Pt with sbp 160s on admission. Head CT noncon negative.   - sBP elevated 170s-190s on 12/10 patient asymptomatic  - coreg uptitrated to 37.5 mg BID  -gave hydralzine 5 mg IVP x1 on 12/11  started norvasc 5 mg qd   - BP more controlled today

## 2019-12-12 NOTE — SWALLOW BEDSIDE ASSESSMENT ADULT - SWALLOW EVAL: DIAGNOSIS
Pt presents with mild oral phase deficits characterized by significantly prolonged bolus formation. Pharyngeal phase appears to be WFL. Pt would benefit from a GI consult based on c/o bloating, sticking sensation, burping, and possible regurgitation below UES. Complaints appear to be more esophageal versus pharyngeal.

## 2019-12-12 NOTE — PROGRESS NOTE ADULT - PROBLEM SELECTOR PLAN 7
-c/w flomax 0.4 mg bedtime  - urology consulted today given concern for pt retaining urine, appreciate recs -c/w flomax 0.4 mg bedtime  - urology consulted given concern for pt retaining urine, appreciate recs  - with good UOP today

## 2019-12-12 NOTE — SWALLOW BEDSIDE ASSESSMENT ADULT - COMMENTS
Pt's wife and son at bedside. Per pt and family report, pt with difficulties swallowing both solids and liquids. Per their explanation, liquid/food just "stop there," pointing to his abdomen. Although he has not had many episodes of emesis, pt feels as if he has to vomit. Additional c/o bloating, prolonged mastication, and coughing with all PO. Symptoms began a few days ago.

## 2019-12-12 NOTE — PROGRESS NOTE ADULT - PROBLEM SELECTOR PLAN 5
s/p I+D on 11/19. Non healing ulcer started abx on 11/21 with cefepime and linezolid given sensitivities. Switched from Zosyn given resistance. He had Corynebacterium striatum in culture and Enterococcus Faecalis in the prior culture.  Was started on linezolid 600 mg PO q12 hrs over vancomycin given history of renal transplant and given his concern of too many IV doses at home. s/p PICC line replacement by Dr. Muro on 12/7.   - pt on cefepime 2g IV q12hrs and linezolid 600mg PO q12h since 11/21 on prior discharge, planned for 6 week course  - Cefepime discontinued on 12/8 given its possible role in ELIZABETH and meropenem IV 1g qd started on 12/8.  - per ID, c/w meropenem 1g IV q24h  and linezolid 600 mg PO q12 hrs to complete a 6 week course from 11/21  - Will need CBC, CMP, ESR, CRP qweek.  CBC is essential as there is a risk of bone marrow suppression with prolonged course of linezolid s/p I+D on 11/19. Non healing ulcer started abx on 11/21 with cefepime and linezolid given sensitivities. Switched from Zosyn given resistance. He had Corynebacterium striatum in culture and Enterococcus Faecalis in the prior culture.  Was started on linezolid 600 mg PO q12 hrs over vancomycin given history of renal transplant and given his concern of too many IV doses at home. s/p PICC line replacement by Dr. Muro on 12/7.   - pt on cefepime 2g IV q12hrs and linezolid 600mg PO q12h since 11/21 on prior discharge, planned for 6 week course (completion on 1/11)  - Cefepime discontinued on 12/8 given its possible role in ELIZABETH and meropenem IV 1g qd started on 12/8.  - per ID, c/w meropenem 1g IV q24h  and linezolid 600 mg PO q12 hrs to complete a 6 week course from 11/21  - Will need CBC, CMP, ESR, CRP qweek. CBC is essential as there is a risk of bone marrow suppression with prolonged course of linezolid

## 2019-12-12 NOTE — SWALLOW BEDSIDE ASSESSMENT ADULT - NS SPL SWALLOW CLINIC TRIAL FT
No anterior bolus loss was appreciated. Pt presents with extremely prolonged bolus formation. However, bolus was eventually cleared from oral cavity. No clinical overt s/s of aspiration observed. Breathing remained stable without any SOB. Pt with c/o bloating and food sticking (pointing to abdomen). Burping occasionally noted throughout assessment.

## 2019-12-12 NOTE — PROGRESS NOTE ADULT - SUBJECTIVE AND OBJECTIVE BOX
OVERNIGHT EVENTS: No acute events    SUBJECTIVE / INTERVAL HPI: Patient seen and examined at bedside. Pt endorsed dyspnea "after eating" and feeling of fullness in abdomen. Also endorsed intermittent nausea. Denied chest pain, changes in vision, HA.    VITAL SIGNS:  Vital Signs Last 24 Hrs  T(C): 36.3 (12 Dec 2019 09:55), Max: 36.4 (11 Dec 2019 20:53)  T(F): 97.4 (12 Dec 2019 09:55), Max: 97.5 (11 Dec 2019 20:53)  HR: 73 (12 Dec 2019 09:55) (73 - 84)  BP: 164/84 (12 Dec 2019 09:55) (138/54 - 182/68)  BP(mean): --  RR: 18 (12 Dec 2019 09:55) (18 - 20)  SpO2: 97% (12 Dec 2019 09:55) (92% - 97%)    PHYSICAL EXAM:  General: WDWN; Patient is in NAD  HEENT: NC/AT; PERRL, anicteric sclera; MMM  Neck: supple  Cardiovascular: +S1/S2, RRR  Respiratory: bibasilar crackles; no W/R/R  Gastrointestinal: soft, NT/ND; +BS  Extremities: WWP; no edema present  Vascular: 2+ radial pulses B/L  Neurological: AAOx3; no focal deficits    MEDICATIONS:  MEDICATIONS  (STANDING):  amLODIPine   Tablet 5 milliGRAM(s) Oral every 24 hours  aspirin  chewable 81 milliGRAM(s) Oral daily  BACItracin   Ointment 1 Application(s) Topical daily  carvedilol 37.5 milliGRAM(s) Oral every 12 hours  chlorhexidine 4% Liquid 1 Application(s) Topical <User Schedule>  dextrose 5%. 1000 milliLiter(s) (50 mL/Hr) IV Continuous <Continuous>  dextrose 50% Injectable 12.5 Gram(s) IV Push once  dextrose 50% Injectable 25 Gram(s) IV Push once  dextrose 50% Injectable 25 Gram(s) IV Push once  docusate sodium 100 milliGRAM(s) Oral three times a day  heparin  flush 100 Units/mL Injectable 100 Unit(s) IV Push every other day  heparin  Injectable 5000 Unit(s) SubCutaneous every 8 hours  insulin glargine Injectable (LANTUS) 12 Unit(s) SubCutaneous at bedtime  insulin lispro (HumaLOG) corrective regimen sliding scale   SubCutaneous Before meals and at bedtime  linezolid    Tablet 600 milliGRAM(s) Oral every 12 hours  meropenem  IVPB 1000 milliGRAM(s) IV Intermittent every 24 hours  mycophenolate mofetil 500 milliGRAM(s) Oral every 12 hours  predniSONE   Tablet 5 milliGRAM(s) Oral every 24 hours  tacrolimus 4 milliGRAM(s) Oral every 12 hours  tamsulosin 0.4 milliGRAM(s) Oral at bedtime    MEDICATIONS  (PRN):  dextrose 40% Gel 15 Gram(s) Oral once PRN Blood Glucose LESS THAN 70 milliGRAM(s)/deciliter  glucagon  Injectable 1 milliGRAM(s) IntraMuscular once PRN Glucose LESS THAN 70 milligrams/deciliter  sodium chloride 0.9% lock flush 10 milliLiter(s) IV Push every 1 hour PRN Pre/post blood products, medications, blood draw, and to maintain line patency      ALLERGIES:  Allergies    No Known Allergies    Intolerances        LABS:                        10.9   8.73  )-----------( 162      ( 12 Dec 2019 08:38 )             35.4     12-12    141  |  110<H>  |  40<H>  ----------------------------<  80  3.6   |  19<L>  |  3.71<H>    Ca    9.3      12 Dec 2019 08:38  Mg     2.0     12-12    TPro  5.6<L>  /  Alb  3.1<L>  /  TBili  0.2  /  DBili  x   /  AST  21  /  ALT  17  /  AlkPhos  85  12-11        CAPILLARY BLOOD GLUCOSE      POCT Blood Glucose.: 97 mg/dL (12 Dec 2019 12:16)      RADIOLOGY & ADDITIONAL TESTS: Reviewed.

## 2019-12-12 NOTE — PROGRESS NOTE ADULT - PROBLEM SELECTOR PLAN 6
uncontrolled, c/b retinopathy? and diabetic foot ulcers. humalog 75/25 at home. Pt arrived with glucose 220s. Will underdose by weight given poor PO. Titrate accordingly.  - Lantus 15 qhs, lispro 5u TID premeal, mISS  -hypoglycemic this AM, gave OJ and 1/2 amp d50 uncontrolled, c/b retinopathy? and diabetic foot ulcers. humalog 75/25 at home. Pt arrived with glucose 220s. Will underdose by weight given poor PO. Titrate accordingly.  - Lantus 12 qhs and mISS

## 2019-12-13 LAB
ALBUMIN SERPL ELPH-MCNC: 3 G/DL — LOW (ref 3.3–5)
ALP SERPL-CCNC: 85 U/L — SIGNIFICANT CHANGE UP (ref 40–120)
ALT FLD-CCNC: 16 U/L — SIGNIFICANT CHANGE UP (ref 10–45)
ANION GAP SERPL CALC-SCNC: 12 MMOL/L — SIGNIFICANT CHANGE UP (ref 5–17)
AST SERPL-CCNC: 20 U/L — SIGNIFICANT CHANGE UP (ref 10–40)
BILIRUB DIRECT SERPL-MCNC: <0.2 MG/DL — SIGNIFICANT CHANGE UP (ref 0–0.2)
BILIRUB INDIRECT FLD-MCNC: >0 MG/DL — LOW (ref 0.2–1)
BILIRUB SERPL-MCNC: 0.2 MG/DL — SIGNIFICANT CHANGE UP (ref 0.2–1.2)
BUN SERPL-MCNC: 44 MG/DL — HIGH (ref 7–23)
CALCIUM SERPL-MCNC: 9.4 MG/DL — SIGNIFICANT CHANGE UP (ref 8.4–10.5)
CHLORIDE SERPL-SCNC: 109 MMOL/L — HIGH (ref 96–108)
CO2 SERPL-SCNC: 19 MMOL/L — LOW (ref 22–31)
CREAT SERPL-MCNC: 3.89 MG/DL — HIGH (ref 0.5–1.3)
GLUCOSE BLDC GLUCOMTR-MCNC: 140 MG/DL — HIGH (ref 70–99)
GLUCOSE BLDC GLUCOMTR-MCNC: 166 MG/DL — HIGH (ref 70–99)
GLUCOSE BLDC GLUCOMTR-MCNC: 176 MG/DL — HIGH (ref 70–99)
GLUCOSE BLDC GLUCOMTR-MCNC: 197 MG/DL — HIGH (ref 70–99)
GLUCOSE SERPL-MCNC: 151 MG/DL — HIGH (ref 70–99)
HCT VFR BLD CALC: 34 % — LOW (ref 39–50)
HGB BLD-MCNC: 10.7 G/DL — LOW (ref 13–17)
MCHC RBC-ENTMCNC: 27.4 PG — SIGNIFICANT CHANGE UP (ref 27–34)
MCHC RBC-ENTMCNC: 31.5 GM/DL — LOW (ref 32–36)
MCV RBC AUTO: 87.2 FL — SIGNIFICANT CHANGE UP (ref 80–100)
NRBC # BLD: 0 /100 WBCS — SIGNIFICANT CHANGE UP (ref 0–0)
PLATELET # BLD AUTO: 162 K/UL — SIGNIFICANT CHANGE UP (ref 150–400)
POTASSIUM SERPL-MCNC: 4.8 MMOL/L — SIGNIFICANT CHANGE UP (ref 3.5–5.3)
POTASSIUM SERPL-SCNC: 4.8 MMOL/L — SIGNIFICANT CHANGE UP (ref 3.5–5.3)
PROT SERPL-MCNC: 5.4 G/DL — LOW (ref 6–8.3)
RBC # BLD: 3.9 M/UL — LOW (ref 4.2–5.8)
RBC # FLD: 15.9 % — HIGH (ref 10.3–14.5)
SODIUM SERPL-SCNC: 140 MMOL/L — SIGNIFICANT CHANGE UP (ref 135–145)
WBC # BLD: 5.31 K/UL — SIGNIFICANT CHANGE UP (ref 3.8–10.5)
WBC # FLD AUTO: 5.31 K/UL — SIGNIFICANT CHANGE UP (ref 3.8–10.5)

## 2019-12-13 PROCEDURE — 99233 SBSQ HOSP IP/OBS HIGH 50: CPT

## 2019-12-13 PROCEDURE — 76705 ECHO EXAM OF ABDOMEN: CPT | Mod: 26

## 2019-12-13 RX ORDER — AMLODIPINE BESYLATE 2.5 MG/1
10 TABLET ORAL EVERY 24 HOURS
Refills: 0 | Status: DISCONTINUED | OUTPATIENT
Start: 2019-12-13 | End: 2019-12-18

## 2019-12-13 RX ORDER — POLYETHYLENE GLYCOL 3350 17 G/17G
17 POWDER, FOR SOLUTION ORAL DAILY
Refills: 0 | Status: DISCONTINUED | OUTPATIENT
Start: 2019-12-13 | End: 2019-12-15

## 2019-12-13 RX ADMIN — Medication 5 MILLIGRAM(S): at 10:59

## 2019-12-13 RX ADMIN — LINEZOLID 600 MILLIGRAM(S): 600 INJECTION, SOLUTION INTRAVENOUS at 22:00

## 2019-12-13 RX ADMIN — CARVEDILOL PHOSPHATE 37.5 MILLIGRAM(S): 80 CAPSULE, EXTENDED RELEASE ORAL at 18:24

## 2019-12-13 RX ADMIN — MYCOPHENOLATE MOFETIL 500 MILLIGRAM(S): 250 CAPSULE ORAL at 10:59

## 2019-12-13 RX ADMIN — Medication 1 APPLICATION(S): at 12:44

## 2019-12-13 RX ADMIN — Medication 81 MILLIGRAM(S): at 12:45

## 2019-12-13 RX ADMIN — Medication 2: at 18:23

## 2019-12-13 RX ADMIN — Medication 100 MILLIGRAM(S): at 06:26

## 2019-12-13 RX ADMIN — MEROPENEM 100 MILLIGRAM(S): 1 INJECTION INTRAVENOUS at 12:44

## 2019-12-13 RX ADMIN — TACROLIMUS 4 MILLIGRAM(S): 5 CAPSULE ORAL at 22:02

## 2019-12-13 RX ADMIN — AMLODIPINE BESYLATE 10 MILLIGRAM(S): 2.5 TABLET ORAL at 18:23

## 2019-12-13 RX ADMIN — POLYETHYLENE GLYCOL 3350 17 GRAM(S): 17 POWDER, FOR SOLUTION ORAL at 12:44

## 2019-12-13 RX ADMIN — HEPARIN SODIUM 5000 UNIT(S): 5000 INJECTION INTRAVENOUS; SUBCUTANEOUS at 06:26

## 2019-12-13 RX ADMIN — INSULIN GLARGINE 12 UNIT(S): 100 INJECTION, SOLUTION SUBCUTANEOUS at 22:09

## 2019-12-13 RX ADMIN — Medication 100 MILLIGRAM(S): at 22:00

## 2019-12-13 RX ADMIN — Medication 2: at 21:59

## 2019-12-13 RX ADMIN — MYCOPHENOLATE MOFETIL 500 MILLIGRAM(S): 250 CAPSULE ORAL at 22:00

## 2019-12-13 RX ADMIN — HEPARIN SODIUM 5000 UNIT(S): 5000 INJECTION INTRAVENOUS; SUBCUTANEOUS at 14:17

## 2019-12-13 RX ADMIN — CARVEDILOL PHOSPHATE 37.5 MILLIGRAM(S): 80 CAPSULE, EXTENDED RELEASE ORAL at 06:26

## 2019-12-13 RX ADMIN — CHLORHEXIDINE GLUCONATE 1 APPLICATION(S): 213 SOLUTION TOPICAL at 06:25

## 2019-12-13 RX ADMIN — TACROLIMUS 4 MILLIGRAM(S): 5 CAPSULE ORAL at 12:43

## 2019-12-13 RX ADMIN — LINEZOLID 600 MILLIGRAM(S): 600 INJECTION, SOLUTION INTRAVENOUS at 10:59

## 2019-12-13 RX ADMIN — TAMSULOSIN HYDROCHLORIDE 0.4 MILLIGRAM(S): 0.4 CAPSULE ORAL at 22:00

## 2019-12-13 RX ADMIN — HEPARIN SODIUM 5000 UNIT(S): 5000 INJECTION INTRAVENOUS; SUBCUTANEOUS at 22:00

## 2019-12-13 NOTE — PROGRESS NOTE ADULT - SUBJECTIVE AND OBJECTIVE BOX
Patient seen and examined at bedside. No acute complaints. Vac removed showing well healing wound with granulation tissue and minimal surrounding erythema.     No acute overnight events.     Vascular will continue to care for the wound, with vac changes MWF - patient remains afebrile, no leukocytosis and no signs or symptoms of wound infection.   continue to appreciate abx plan as per ID

## 2019-12-13 NOTE — PROGRESS NOTE ADULT - PROBLEM SELECTOR PLAN 6
uncontrolled, c/b retinopathy? and diabetic foot ulcers. humalog 75/25 at home. Pt arrived with glucose 220s. Will underdose by weight given poor PO. Titrate accordingly.  - Lantus 12 qhs and mISS

## 2019-12-13 NOTE — PROGRESS NOTE ADULT - ATTENDING COMMENTS
well summarized above.   pt states he feels slightly better and had some increase appetite.   bp still unacceptably high, remainder of exam unrevealing including absence of ruq tend despite stones seen on mr.    for bp will increase amlodipine , and if that not effective consider addn of transdermal #1 clonidine.   dr bean to [facility] gi sympt.   will wish to review current rx with ID, to consider whether the linezolid could be causing his gi complaints and/or either drug contributing to renal dysf.   ga scan in progress.   if kidneys light up will likely proceed with kidney bx.   discussed with all in depth   creat slightly increased, which makes interruption of antibiotics a more plausible question.

## 2019-12-13 NOTE — PROGRESS NOTE ADULT - PROBLEM SELECTOR PLAN 3
Pt with sbp 160s on admission. Head CT noncon negative.   - sBP elevated 170s-190s on 12/10 patient asymptomatic  - coreg uptitrated to 37.5 mg BID  -gave hydralzine 5 mg IVP x1 on 12/11  started norvasc 5 mg qd   - BP more controlled today Pt with sbp 160s on admission. Head CT noncon negative.   - sBP elevated 170s-190s on 12/10 patient asymptomatic  - coreg uptitrated to 37.5 mg BID  -gave hydralzine 5 mg IVP x1 on 12/11  -inc norvasc to 10 mg qd     #abd pain  - pt endorsed abd pain with nausea this AM  - f/u RUQ to assess for biliary pathology

## 2019-12-13 NOTE — PROGRESS NOTE ADULT - PROBLEM SELECTOR PLAN 1
Pt Cr 1.5 on admission to [facility] from baseline of ~0.9. Pt with hx of renal transplant in setting of DM and HTN. Likely etiology of ELIZBAETH is ATN 2/2 cefepime toxicity, likely also contributing to confusion. Patient tolerating PO, not dry on exam and mentating closer to baseline, per wife at bedside. Cr stable at 3.7 today  - encourage PO intake  -stopped IV fluids due to concern for volume overload (dyspnea with eating and mild vasc congestion on am cxr)  - 12/8 renal U/S consistent with medical renal dz without hydronephrosis.  - 12/10 renal dopplers showing mild R transplant kidney hydronephrosis with elevation of transplant renal artery velocity, concerning for renal artery stenosis  - CT abdomen/pelvis without contrast 12/10 showing 6 mm bladder stone just anterior to the right UVJ  -MRA prelim read not clearly showing renal arteries, awaiting final read  - NM study showing adequate flow to transplant kidney with excretion pattern consistent with ELIZABETH  - galium scan ordered to assess for AIN/ATN  - continue to trend Cr  - Dr. Osborne from vascular cardiology also following, appreciate recs   -possibly due to zosyn/cefepime    #Volume overload  -pt endorsed dyspnea after eating, crackles appreciated on exam, and vasc congestion on cxr; now improved, not c/o dyspnea today  -s/p 40 mg IVP lasix on 12/12

## 2019-12-13 NOTE — PROGRESS NOTE ADULT - PROBLEM SELECTOR PLAN 7
-c/w flomax 0.4 mg bedtime  - urology consulted given concern for pt retaining urine, appreciate recs  - with good UOP today

## 2019-12-13 NOTE — PROGRESS NOTE ADULT - SUBJECTIVE AND OBJECTIVE BOX
On interval followup, the left arm PICC site is clean, dry and non-inflamed/ non-tender.  Afebrile. Notes, cultures and progress are followed.

## 2019-12-13 NOTE — PROGRESS NOTE ADULT - PROBLEM SELECTOR PLAN 5
s/p I+D on 11/19. Non healing ulcer started abx on 11/21 with cefepime and linezolid given sensitivities. Switched from Zosyn given resistance. He had Corynebacterium striatum in culture and Enterococcus Faecalis in the prior culture.  Was started on linezolid 600 mg PO q12 hrs over vancomycin given history of renal transplant and given his concern of too many IV doses at home. s/p PICC line replacement by Dr. Muro on 12/7.   - pt on cefepime 2g IV q12hrs and linezolid 600mg PO q12h since 11/21 on prior discharge, planned for 6 week course (completion on 1/11)  - Cefepime discontinued on 12/8 given its possible role in ELIZABETH and meropenem IV 1g qd started on 12/8.  - per ID, c/w meropenem 1g IV q24h  and linezolid 600 mg PO q12 hrs to complete a 6 week course from 11/21  - Will need CBC, CMP, ESR, CRP qweek. CBC is essential as there is a risk of bone marrow suppression with prolonged course of linezolid

## 2019-12-13 NOTE — PROGRESS NOTE ADULT - PROBLEM SELECTOR PLAN 4
- c/w mycophenolate 500mg twice a day  - c/w tacrolimus 4mg twice a day; should be taken at least 2 hours after mycophenolate  - tacrolimus level wnl  - 12/8 renal U/S consistent with medical renal dz without hydronephrosis  -f/u galium scan

## 2019-12-13 NOTE — PROGRESS NOTE ADULT - SUBJECTIVE AND OBJECTIVE BOX
OVERNIGHT EVENTS: No acute events    SUBJECTIVE / INTERVAL HPI: Patient seen and examined at bedside. Pt notes improved breathing. Pt had nausea overnight which improved with zofran. Pt's last BM was 2-3 days ago. Pt denied dyspnea, chest pain, vomiting, abd pain.     VITAL SIGNS:  Vital Signs Last 24 Hrs  T(C): 36.3 (13 Dec 2019 11:06), Max: 36.8 (12 Dec 2019 19:15)  T(F): 97.3 (13 Dec 2019 11:06), Max: 98.3 (12 Dec 2019 19:15)  HR: 80 (13 Dec 2019 11:06) (79 - 82)  BP: 178/- (13 Dec 2019 11:06) (154/67 - 185/80)  BP(mean): --  RR: 19 (13 Dec 2019 11:06) (18 - 20)  SpO2: 100% (13 Dec 2019 11:06) (94% - 100%)    PHYSICAL EXAM:  General: WDWN; Patient is in NAD  HEENT: NC/AT; PERRL, anicteric sclera; MMM  Neck: supple  Cardiovascular: +S1/S2, RRR  Respiratory: CTA B/L; no W/R/R  Gastrointestinal: soft, NT/ND; +BS  Extremities: WWP; no edema present  Vascular: 2+ radial pulses B/L  Neurological: AAOx3; no focal deficits    MEDICATIONS:  MEDICATIONS  (STANDING):  amLODIPine   Tablet 5 milliGRAM(s) Oral every 24 hours  aspirin  chewable 81 milliGRAM(s) Oral daily  BACItracin   Ointment 1 Application(s) Topical daily  carvedilol 37.5 milliGRAM(s) Oral every 12 hours  chlorhexidine 4% Liquid 1 Application(s) Topical <User Schedule>  dextrose 5%. 1000 milliLiter(s) (50 mL/Hr) IV Continuous <Continuous>  dextrose 50% Injectable 12.5 Gram(s) IV Push once  dextrose 50% Injectable 25 Gram(s) IV Push once  dextrose 50% Injectable 25 Gram(s) IV Push once  docusate sodium 100 milliGRAM(s) Oral three times a day  heparin  flush 100 Units/mL Injectable 100 Unit(s) IV Push every other day  heparin  Injectable 5000 Unit(s) SubCutaneous every 8 hours  insulin glargine Injectable (LANTUS) 12 Unit(s) SubCutaneous at bedtime  insulin lispro (HumaLOG) corrective regimen sliding scale   SubCutaneous Before meals and at bedtime  linezolid    Tablet 600 milliGRAM(s) Oral every 12 hours  meropenem  IVPB 1000 milliGRAM(s) IV Intermittent every 24 hours  mycophenolate mofetil 500 milliGRAM(s) Oral every 12 hours  polyethylene glycol 3350 17 Gram(s) Oral daily  predniSONE   Tablet 5 milliGRAM(s) Oral every 24 hours  tacrolimus 4 milliGRAM(s) Oral every 12 hours  tamsulosin 0.4 milliGRAM(s) Oral at bedtime    MEDICATIONS  (PRN):  dextrose 40% Gel 15 Gram(s) Oral once PRN Blood Glucose LESS THAN 70 milliGRAM(s)/deciliter  glucagon  Injectable 1 milliGRAM(s) IntraMuscular once PRN Glucose LESS THAN 70 milligrams/deciliter  sodium chloride 0.9% lock flush 10 milliLiter(s) IV Push every 1 hour PRN Pre/post blood products, medications, blood draw, and to maintain line patency      ALLERGIES:  Allergies    No Known Allergies    Intolerances        LABS:                        10.7   5.31  )-----------( 162      ( 13 Dec 2019 10:53 )             34.0     12-12    141  |  110<H>  |  40<H>  ----------------------------<  80  3.6   |  19<L>  |  3.71<H>    Ca    9.3      12 Dec 2019 08:38  Mg     2.0     12-12          CAPILLARY BLOOD GLUCOSE      POCT Blood Glucose.: 140 mg/dL (13 Dec 2019 08:39)      RADIOLOGY & ADDITIONAL TESTS: Reviewed.

## 2019-12-14 DIAGNOSIS — K59.00 CONSTIPATION, UNSPECIFIED: ICD-10-CM

## 2019-12-14 LAB
ANION GAP SERPL CALC-SCNC: 12 MMOL/L — SIGNIFICANT CHANGE UP (ref 5–17)
BKV DNA UR QL NAA+PROBE: SIGNIFICANT CHANGE UP
BUN SERPL-MCNC: 46 MG/DL — HIGH (ref 7–23)
CALCIUM SERPL-MCNC: 8.8 MG/DL — SIGNIFICANT CHANGE UP (ref 8.4–10.5)
CHLORIDE SERPL-SCNC: 108 MMOL/L — SIGNIFICANT CHANGE UP (ref 96–108)
CMV DNA # UR NAA+PROBE: SIGNIFICANT CHANGE UP IU/ML
CO2 SERPL-SCNC: 18 MMOL/L — LOW (ref 22–31)
CREAT SERPL-MCNC: 3.51 MG/DL — HIGH (ref 0.5–1.3)
GLUCOSE BLDC GLUCOMTR-MCNC: 125 MG/DL — HIGH (ref 70–99)
GLUCOSE BLDC GLUCOMTR-MCNC: 191 MG/DL — HIGH (ref 70–99)
GLUCOSE BLDC GLUCOMTR-MCNC: 249 MG/DL — HIGH (ref 70–99)
GLUCOSE BLDC GLUCOMTR-MCNC: 68 MG/DL — LOW (ref 70–99)
GLUCOSE BLDC GLUCOMTR-MCNC: 69 MG/DL — LOW (ref 70–99)
GLUCOSE BLDC GLUCOMTR-MCNC: 76 MG/DL — SIGNIFICANT CHANGE UP (ref 70–99)
GLUCOSE SERPL-MCNC: 213 MG/DL — HIGH (ref 70–99)
HCT VFR BLD CALC: 34.8 % — LOW (ref 39–50)
HGB BLD-MCNC: 10.7 G/DL — LOW (ref 13–17)
MCHC RBC-ENTMCNC: 27 PG — SIGNIFICANT CHANGE UP (ref 27–34)
MCHC RBC-ENTMCNC: 30.7 GM/DL — LOW (ref 32–36)
MCV RBC AUTO: 87.7 FL — SIGNIFICANT CHANGE UP (ref 80–100)
NRBC # BLD: 0 /100 WBCS — SIGNIFICANT CHANGE UP (ref 0–0)
PLATELET # BLD AUTO: 154 K/UL — SIGNIFICANT CHANGE UP (ref 150–400)
POTASSIUM SERPL-MCNC: 4.4 MMOL/L — SIGNIFICANT CHANGE UP (ref 3.5–5.3)
POTASSIUM SERPL-SCNC: 4.4 MMOL/L — SIGNIFICANT CHANGE UP (ref 3.5–5.3)
RBC # BLD: 3.97 M/UL — LOW (ref 4.2–5.8)
RBC # FLD: 15.6 % — HIGH (ref 10.3–14.5)
SODIUM SERPL-SCNC: 138 MMOL/L — SIGNIFICANT CHANGE UP (ref 135–145)
WBC # BLD: 5.77 K/UL — SIGNIFICANT CHANGE UP (ref 3.8–10.5)
WBC # FLD AUTO: 5.77 K/UL — SIGNIFICANT CHANGE UP (ref 3.8–10.5)

## 2019-12-14 RX ADMIN — HEPARIN SODIUM 5000 UNIT(S): 5000 INJECTION INTRAVENOUS; SUBCUTANEOUS at 23:00

## 2019-12-14 RX ADMIN — CARVEDILOL PHOSPHATE 37.5 MILLIGRAM(S): 80 CAPSULE, EXTENDED RELEASE ORAL at 10:57

## 2019-12-14 RX ADMIN — AMLODIPINE BESYLATE 10 MILLIGRAM(S): 2.5 TABLET ORAL at 17:48

## 2019-12-14 RX ADMIN — Medication 100 MILLIGRAM(S): at 07:30

## 2019-12-14 RX ADMIN — Medication 2: at 23:00

## 2019-12-14 RX ADMIN — Medication 1 APPLICATION(S): at 12:25

## 2019-12-14 RX ADMIN — Medication 5 MILLIGRAM(S): at 10:57

## 2019-12-14 RX ADMIN — Medication 4: at 18:23

## 2019-12-14 RX ADMIN — Medication 81 MILLIGRAM(S): at 12:23

## 2019-12-14 RX ADMIN — LINEZOLID 600 MILLIGRAM(S): 600 INJECTION, SOLUTION INTRAVENOUS at 22:59

## 2019-12-14 RX ADMIN — POLYETHYLENE GLYCOL 3350 17 GRAM(S): 17 POWDER, FOR SOLUTION ORAL at 12:25

## 2019-12-14 RX ADMIN — TACROLIMUS 4 MILLIGRAM(S): 5 CAPSULE ORAL at 12:26

## 2019-12-14 RX ADMIN — HEPARIN SODIUM 5000 UNIT(S): 5000 INJECTION INTRAVENOUS; SUBCUTANEOUS at 13:48

## 2019-12-14 RX ADMIN — Medication 100 MILLIGRAM(S): at 13:48

## 2019-12-14 RX ADMIN — MYCOPHENOLATE MOFETIL 500 MILLIGRAM(S): 250 CAPSULE ORAL at 10:57

## 2019-12-14 RX ADMIN — LINEZOLID 600 MILLIGRAM(S): 600 INJECTION, SOLUTION INTRAVENOUS at 10:57

## 2019-12-14 RX ADMIN — Medication 100 MILLIGRAM(S): at 22:59

## 2019-12-14 RX ADMIN — INSULIN GLARGINE 12 UNIT(S): 100 INJECTION, SOLUTION SUBCUTANEOUS at 23:01

## 2019-12-14 RX ADMIN — Medication 5 MILLIGRAM(S): at 12:22

## 2019-12-14 RX ADMIN — TACROLIMUS 4 MILLIGRAM(S): 5 CAPSULE ORAL at 23:00

## 2019-12-14 RX ADMIN — MYCOPHENOLATE MOFETIL 500 MILLIGRAM(S): 250 CAPSULE ORAL at 22:59

## 2019-12-14 RX ADMIN — HEPARIN SODIUM 5000 UNIT(S): 5000 INJECTION INTRAVENOUS; SUBCUTANEOUS at 07:31

## 2019-12-14 RX ADMIN — CHLORHEXIDINE GLUCONATE 1 APPLICATION(S): 213 SOLUTION TOPICAL at 07:30

## 2019-12-14 RX ADMIN — Medication 100 UNIT(S): at 12:23

## 2019-12-14 RX ADMIN — TAMSULOSIN HYDROCHLORIDE 0.4 MILLIGRAM(S): 0.4 CAPSULE ORAL at 23:08

## 2019-12-14 RX ADMIN — MEROPENEM 100 MILLIGRAM(S): 1 INJECTION INTRAVENOUS at 10:57

## 2019-12-14 NOTE — CONSULT NOTE ADULT - ASSESSMENT
Abdominal bloating discomfort, probably due to lack of defecation for 4 days and possibly worsened by the metabolic effects of his ELIZABETH.    Chronic constipation.  Prolonged chewing with reluctance to transfer the food out of the mouth: probably due to abdominal discomfort and possibly also to weakness of the oropharyngeal musculature.  The history does not suggest esophageal dysphagia.

## 2019-12-14 NOTE — CONSULT NOTE ADULT - SUBJECTIVE AND OBJECTIVE BOX
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  He was on cefepime and linezolid for 6 weeks at home.  He is chronically constipated, with prn senna tea every 3-4 days when he has not defecated for 3-4 days.  He feels abdominal fullness on the 4th day without a BM, and this seems to limit his food intake.  He has had anorexia for ?duration.  His wife states that for several weeks, he has chewed his food for an abnormally long time, which he attributes to the fullness in his abdomen making him unwilling to swallow the food.  He has occasional heartburn, regurgitation, and nausea..  He denies dysphagia or abdominal pain.  He was admitted one week ago with mild confusion and dyspnea.  His BUN/creat no 12/6 was 27/1.5, increased from 10/0.8 on November 22.  The BUN/creat progressively increased since then, with a creat of 3.7 on 12/9 and peaking at 44/3.89 yesterday.  The cefepime was switched to meropenem last week because of concern about possible acute interstitial nephritis due to the cefepime.  Medications include insulin, mycophenolate, prednisone 5 mg/day, tacrolimus, s.c heparin, docusate, tamsulosin, carvedilol, hydralazine, and 81 mg ASA.  His WBC count was about 8.8 on admission, peaked a few days later at 12.3, and is now 5.3.  His liver enzymes are normal.  Hhis major complaint now is the abdominal fullness. His last BM in the hospital was 4 days ago.  He has been on docusate, and MiraLax was given last night, with no BM yet.    He is alert and oriented today.  His abdomen is obese, non-distended, with LLQ>RLQ tenderness (no rebound or guarding) and minimal LUQ=RUQ tenderness with a negative Vigil's sign. 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  He was on cefepime and linezolid for 6 weeks at home.  He is chronically constipated, with prn senna tea every 3-4 days when he has not defecated for 3-4 days.  He feels abdominal fullness on the 4th day without a BM, and this seems to limit his food intake.  He has had anorexia for ?duration.  His wife states that for several weeks, he has chewed his food for an abnormally long time, which he attributes to the fullness in his abdomen making him unwilling to swallow the food.  He has occasional heartburn, regurgitation, and nausea..  He denies dysphagia or abdominal pain.  He was admitted one week ago with mild confusion and dyspnea.  His BUN/creat no 12/6 was 27/1.5, increased from 10/0.8 on November 22.  The BUN/creat progressively increased since then, with a creat of 3.7 on 12/9 and peaking at 44/3.89 yesterday.  The cefepime was switched to meropenem last week because of concern about possible acute interstitial nephritis due to the cefepime.  Medications include insulin, mycophenolate, prednisone 5 mg/day, tacrolimus, s.c heparin, docusate, tamsulosin, carvedilol, hydralazine, and 81 mg ASA.  His WBC count was about 8.8 on admission, peaked a few days later at 12.3, and is now 5.3.  His liver enzymes are normal.  Hhis major complaint now is the abdominal fullness. His last BM in the hospital was 4 days ago.  He has been on docusate, and MiraLax was given last night, with no BM yet.    He is alert and oriented today.  His abdomen is obese, non-distended, with LLQ>RLQ tenderness (no rebound or guarding) and minimal LUQ=RUQ tenderness with a negative Vigil's sign.  There is 2+ left leg edema (chronic)  Abdominal/pelvic CT scans this week showed a 2.4 cm stone in the GB neck with mild GB distention and wall thickening (unchanged since 2013), mildly dilated rectum with stool, small bilateral pleural effusions, and small kidney and bladder stones.  There was no significant ascites.

## 2019-12-14 NOTE — CONSULT NOTE ADULT - PROBLEM SELECTOR RECOMMENDATION 9
A/P:  -F/u nuclear scan  -continue trending Creatinine  -PVR is currently 0, continue monitoring PVR  -Agree with Dr. Aranda about ATN.   -not likely obstruction, stone in bladder. No need for bourgeois
Give 2 doses of MiraLax today, one in morning and one in evening.  Repeat this daily unless diarrhea occurs.  Give 2 bisacodyl  pills this morning. May repeat with 3 pills if no BM by tomorrow.  If no BM by 4 PM today, he should get a Fleet enema.  Observe effects of adequate defecation on the abdominal bloating and the reluctance to swallow food.

## 2019-12-14 NOTE — PROGRESS NOTE ADULT - ASSESSMENT
80 yo M with hx of Renal transplant (normal creatinine), functionally blind (glaucoma), DM (on insulin), BPH, HTN, left 4th and 5th toe amputation with wound vac and diabetic ulcer required 6 week IV abx (PICC line on LUE) with cefepime and linezolid who was brought in today with complaints of confusion found to have ELIZABETH

## 2019-12-14 NOTE — PROGRESS NOTE ADULT - SUBJECTIVE AND OBJECTIVE BOX
CC: ACUTE KIDNEY INJURY      INTERVAL HISTORY:  lying in bed in NAD      ROS: No chest pain, no sob, no abd pain. 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  BPH (benign prostatic hypertrophy)  HTN (hypertension)  Amputation of toe  Kidney transplant recipient      PHYSICAL EXAM:  T(C): 36.6 (12-14-19 @ 10:42), Max: 36.8 (12-14-19 @ 05:08)  HR: 76 (12-14-19 @ 10:42)  BP: 179/75 (12-14-19 @ 10:42) (110/51 - 181/67)  RR: 20 (12-14-19 @ 10:42)  SpO2: 95% (12-14-19 @ 10:42)  Wt(kg): --  I&O's Summary    13 Dec 2019 07:01  -  14 Dec 2019 07:00  --------------------------------------------------------  IN: 0 mL / OUT: 1225 mL / NET: -1225 mL      Weight   General: ,  NAD.  HEENT: moist mucous membranes, no pallor/cyanosis.  Neck: no JVD visible.  Cardiac: S1, S2. RRR. No murmurs   Respratory: CTA b/l, no access muscle use.   Abdomen: soft. nontender. nondistended  Skin: no rashes.  Extremities: no LE edema b/l; L PICC   Access:       DATA:                        10.7<L>  5.77  )-----------( 154      ( 14 Dec 2019 13:18 )             34.8<L>        140    |  109<H>  |  44<H>  ----------------------------<  151<H>  Ca:9.4   (13 Dec 2019 10:53)  4.8     |  19<L>  |  3.89<H>        TPro  5.4<L>  /  Alb  3.0<L>  /  TBili  0.2    /  DBili  <0.2   /  AST  20     /  ALT  16     /  AlkPhos  85     13 Dec 2019 10:53                    MEDICATIONS  (STANDING):  amLODIPine   Tablet 10 milliGRAM(s) Oral every 24 hours  aspirin  chewable 81 milliGRAM(s) Oral daily  BACItracin   Ointment 1 Application(s) Topical daily  carvedilol 37.5 milliGRAM(s) Oral every 12 hours  chlorhexidine 4% Liquid 1 Application(s) Topical <User Schedule>  dextrose 5%. 1000 milliLiter(s) (50 mL/Hr) IV Continuous <Continuous>  dextrose 50% Injectable 12.5 Gram(s) IV Push once  dextrose 50% Injectable 25 Gram(s) IV Push once  dextrose 50% Injectable 25 Gram(s) IV Push once  docusate sodium 100 milliGRAM(s) Oral three times a day  heparin  flush 100 Units/mL Injectable 100 Unit(s) IV Push every other day  heparin  Injectable 5000 Unit(s) SubCutaneous every 8 hours  insulin glargine Injectable (LANTUS) 12 Unit(s) SubCutaneous at bedtime  insulin lispro (HumaLOG) corrective regimen sliding scale   SubCutaneous Before meals and at bedtime  linezolid    Tablet 600 milliGRAM(s) Oral every 12 hours  meropenem  IVPB 1000 milliGRAM(s) IV Intermittent every 24 hours  mycophenolate mofetil 500 milliGRAM(s) Oral every 12 hours  polyethylene glycol 3350 17 Gram(s) Oral daily  predniSONE   Tablet 5 milliGRAM(s) Oral every 24 hours  tacrolimus 4 milliGRAM(s) Oral every 12 hours  tamsulosin 0.4 milliGRAM(s) Oral at bedtime    MEDICATIONS  (PRN):  dextrose 40% Gel 15 Gram(s) Oral once PRN Blood Glucose LESS THAN 70 milliGRAM(s)/deciliter  glucagon  Injectable 1 milliGRAM(s) IntraMuscular once PRN Glucose LESS THAN 70 milligrams/deciliter  sodium chloride 0.9% lock flush 10 milliLiter(s) IV Push every 1 hour PRN Pre/post blood products, medications, blood draw, and to maintain line patency

## 2019-12-14 NOTE — PROGRESS NOTE ADULT - ASSESSMENT
Mr Lamont is an 78 yo M with hx of Renal transplant (normal creatinine), functionally blind (glaucoma), DM (on insulin), BPH, HTN, left 4th adn 5th toe amputation with wound vac and diabetic ulcer required 6 week IV abx (PICC line on LUE) with cefepime and linezolid who was brought in today with complaints of confusion found to have ELIZABETH; initially thought to be prerenal from poor PO intake, now with possible ATN 2/2 cefepime. Abx currently switched from cefepime to meropenem per ID.

## 2019-12-14 NOTE — PROGRESS NOTE ADULT - PROBLEM SELECTOR PLAN 1
non-oliguric ELIZABETH  awaiting lab work from today to assess  continue Mycophenylate and tacrolimus - check level  may need to consider other forms of Nutrition since patient does not appear to have much PO intake   maintenance IVF discontinued earlier this week

## 2019-12-14 NOTE — PROGRESS NOTE ADULT - PROBLEM SELECTOR PLAN 3
Pt with sbp 160s on admission. Head CT noncon negative.   - sBP elevated 170s-190s on 12/10 patient asymptomatic  - coreg uptitrated to 37.5 mg BID  -gave hydralzine 5 mg IVP x1 on 12/11  -inc norvasc to 10 mg qd     #abd pain  - pt endorsed abd pain with nausea this AM  - f/u RUQ to assess for biliary pathology - no clear diagnosis of cholecystitis but would need HIDA to further assess if high clinical concern

## 2019-12-14 NOTE — PROGRESS NOTE ADULT - SUBJECTIVE AND OBJECTIVE BOX
OVERNIGHT EVENTS: None    SUBJECTIVE / INTERVAL HPI: Patient seen and examined at bedside.     VITAL SIGNS:  Vital Signs Last 24 Hrs  T(C): 36.6 (14 Dec 2019 10:42), Max: 36.8 (14 Dec 2019 05:08)  T(F): 97.9 (14 Dec 2019 10:42), Max: 98.3 (14 Dec 2019 05:08)  HR: 76 (14 Dec 2019 10:42) (72 - 87)  BP: 179/75 (14 Dec 2019 10:42) (110/51 - 181/67)  BP(mean): --  RR: 20 (14 Dec 2019 10:42) (18 - 20)  SpO2: 95% (14 Dec 2019 10:42) (94% - 95%)    12-13-19 @ 07:01  -  12-14-19 @ 07:00  --------------------------------------------------------  IN: 0 mL / OUT: 1225 mL / NET: -1225 mL        PHYSICAL EXAM:    General: WDWN  HEENT: NC/AT; PERRL, anicteric sclera; MMM  Neck: supple  Cardiovascular: +S1/S2; RRR  Respiratory: CTA B/L; no W/R/R  Gastrointestinal: soft, NT/ND; +BSx4  Extremities: WWP; no edema, clubbing or cyanosis  Vascular: 2+ radial, DP/PT pulses B/L  Neurological: AAOx3; no focal deficits    MEDICATIONS:  MEDICATIONS  (STANDING):  amLODIPine   Tablet 10 milliGRAM(s) Oral every 24 hours  aspirin  chewable 81 milliGRAM(s) Oral daily  BACItracin   Ointment 1 Application(s) Topical daily  bisacodyl 5 milliGRAM(s) Oral once  bisacodyl 5 milliGRAM(s) Oral once  carvedilol 37.5 milliGRAM(s) Oral every 12 hours  chlorhexidine 4% Liquid 1 Application(s) Topical <User Schedule>  dextrose 5%. 1000 milliLiter(s) (50 mL/Hr) IV Continuous <Continuous>  dextrose 50% Injectable 12.5 Gram(s) IV Push once  dextrose 50% Injectable 25 Gram(s) IV Push once  dextrose 50% Injectable 25 Gram(s) IV Push once  docusate sodium 100 milliGRAM(s) Oral three times a day  heparin  flush 100 Units/mL Injectable 100 Unit(s) IV Push every other day  heparin  Injectable 5000 Unit(s) SubCutaneous every 8 hours  insulin glargine Injectable (LANTUS) 12 Unit(s) SubCutaneous at bedtime  insulin lispro (HumaLOG) corrective regimen sliding scale   SubCutaneous Before meals and at bedtime  linezolid    Tablet 600 milliGRAM(s) Oral every 12 hours  meropenem  IVPB 1000 milliGRAM(s) IV Intermittent every 24 hours  mycophenolate mofetil 500 milliGRAM(s) Oral every 12 hours  polyethylene glycol 3350 17 Gram(s) Oral daily  predniSONE   Tablet 5 milliGRAM(s) Oral every 24 hours  tacrolimus 4 milliGRAM(s) Oral every 12 hours  tamsulosin 0.4 milliGRAM(s) Oral at bedtime    MEDICATIONS  (PRN):  dextrose 40% Gel 15 Gram(s) Oral once PRN Blood Glucose LESS THAN 70 milliGRAM(s)/deciliter  glucagon  Injectable 1 milliGRAM(s) IntraMuscular once PRN Glucose LESS THAN 70 milligrams/deciliter  sodium chloride 0.9% lock flush 10 milliLiter(s) IV Push every 1 hour PRN Pre/post blood products, medications, blood draw, and to maintain line patency      ALLERGIES:  Allergies    No Known Allergies    Intolerances        LABS:                        10.7   5.31  )-----------( 162      ( 13 Dec 2019 10:53 )             34.0     12-13    140  |  109<H>  |  44<H>  ----------------------------<  151<H>  4.8   |  19<L>  |  3.89<H>    Ca    9.4      13 Dec 2019 10:53    TPro  5.4<L>  /  Alb  3.0<L>  /  TBili  0.2  /  DBili  <0.2  /  AST  20  /  ALT  16  /  AlkPhos  85  12-13        CAPILLARY BLOOD GLUCOSE      POCT Blood Glucose.: 125 mg/dL (14 Dec 2019 10:57)        RADIOLOGY & ADDITIONAL TESTS: Reviewed.    ASSESSMENT:    PLAN: OVERNIGHT EVENTS: None    SUBJECTIVE / INTERVAL HPI: Patient seen and examined at bedside. Complaining that he feels like he needs to have a BM and would like to get on the commode. Denies any pain, SOB, or other acute symptoms.    VITAL SIGNS:  Vital Signs Last 24 Hrs  T(C): 36.6 (14 Dec 2019 10:42), Max: 36.8 (14 Dec 2019 05:08)  T(F): 97.9 (14 Dec 2019 10:42), Max: 98.3 (14 Dec 2019 05:08)  HR: 76 (14 Dec 2019 10:42) (72 - 87)  BP: 179/75 (14 Dec 2019 10:42) (110/51 - 181/67)  BP(mean): --  RR: 20 (14 Dec 2019 10:42) (18 - 20)  SpO2: 95% (14 Dec 2019 10:42) (94% - 95%)    12-13-19 @ 07:01  -  12-14-19 @ 07:00  --------------------------------------------------------  IN: 0 mL / OUT: 1225 mL / NET: -1225 mL        PHYSICAL EXAM:    General: WDWN, picc in place left arm c/d/i  HEENT: NC/AT; PERRL, anicteric sclera; MMM  Neck: supple  Cardiovascular: +S1/S2; RRR  Respiratory: CTA B/L; no W/R/R  Gastrointestinal: soft, NT/ND; +BSx4  Extremities: WWP; no edema, clubbing or cyanosis  Vascular: 2+ radial, DP/PT pulses B/L  Neurological: AAOx3; no focal deficits    MEDICATIONS:  MEDICATIONS  (STANDING):  amLODIPine   Tablet 10 milliGRAM(s) Oral every 24 hours  aspirin  chewable 81 milliGRAM(s) Oral daily  BACItracin   Ointment 1 Application(s) Topical daily  bisacodyl 5 milliGRAM(s) Oral once  bisacodyl 5 milliGRAM(s) Oral once  carvedilol 37.5 milliGRAM(s) Oral every 12 hours  chlorhexidine 4% Liquid 1 Application(s) Topical <User Schedule>  dextrose 5%. 1000 milliLiter(s) (50 mL/Hr) IV Continuous <Continuous>  dextrose 50% Injectable 12.5 Gram(s) IV Push once  dextrose 50% Injectable 25 Gram(s) IV Push once  dextrose 50% Injectable 25 Gram(s) IV Push once  docusate sodium 100 milliGRAM(s) Oral three times a day  heparin  flush 100 Units/mL Injectable 100 Unit(s) IV Push every other day  heparin  Injectable 5000 Unit(s) SubCutaneous every 8 hours  insulin glargine Injectable (LANTUS) 12 Unit(s) SubCutaneous at bedtime  insulin lispro (HumaLOG) corrective regimen sliding scale   SubCutaneous Before meals and at bedtime  linezolid    Tablet 600 milliGRAM(s) Oral every 12 hours  meropenem  IVPB 1000 milliGRAM(s) IV Intermittent every 24 hours  mycophenolate mofetil 500 milliGRAM(s) Oral every 12 hours  polyethylene glycol 3350 17 Gram(s) Oral daily  predniSONE   Tablet 5 milliGRAM(s) Oral every 24 hours  tacrolimus 4 milliGRAM(s) Oral every 12 hours  tamsulosin 0.4 milliGRAM(s) Oral at bedtime    MEDICATIONS  (PRN):  dextrose 40% Gel 15 Gram(s) Oral once PRN Blood Glucose LESS THAN 70 milliGRAM(s)/deciliter  glucagon  Injectable 1 milliGRAM(s) IntraMuscular once PRN Glucose LESS THAN 70 milligrams/deciliter  sodium chloride 0.9% lock flush 10 milliLiter(s) IV Push every 1 hour PRN Pre/post blood products, medications, blood draw, and to maintain line patency      ALLERGIES:  Allergies    No Known Allergies    Intolerances        LABS:                        10.7   5.31  )-----------( 162      ( 13 Dec 2019 10:53 )             34.0     12-13    140  |  109<H>  |  44<H>  ----------------------------<  151<H>  4.8   |  19<L>  |  3.89<H>    Ca    9.4      13 Dec 2019 10:53    TPro  5.4<L>  /  Alb  3.0<L>  /  TBili  0.2  /  DBili  <0.2  /  AST  20  /  ALT  16  /  AlkPhos  85  12-13        CAPILLARY BLOOD GLUCOSE      POCT Blood Glucose.: 125 mg/dL (14 Dec 2019 10:57)        RADIOLOGY & ADDITIONAL TESTS: Reviewed.    ASSESSMENT:    PLAN:

## 2019-12-14 NOTE — CONSULT NOTE ADULT - ATTENDING COMMENTS
12/10/19  Worsening mental status in setting of ELIZABETH with uremia  Asterixes on exam, otherwise neurologic exam unremarkable  Toxic metabolic encephalopathy due to uremia  CT head unremarkable     Will re-evaluate tomorrow if continues to improve then no further neurologic workup or treatment
80yo male with h/p ESRD s/p renal transplant admitted for ELIZABETH thought to be secondary to ATN. Urology consulted to rule out obstruction. 6mm R UVJ stone noted on CT appears to be involving right native ureter and not transplant ureter. Minimal hydronephrosis of renal transplant kidney. Agree that this is likely ATN. PVR = 0cc.
I myself performed the H&P, review of medical chart, and consultation.

## 2019-12-14 NOTE — PROGRESS NOTE ADULT - PROBLEM SELECTOR PLAN 1
Pt Cr 1.5 on admission to [facility] from baseline of ~0.9. Pt with hx of renal transplant in setting of DM and HTN. Likely etiology of ELIZABETH is ATN 2/2 cefepime toxicity, likely also contributing to confusion. Patient tolerating PO, not dry on exam and mentating closer to baseline, per wife at bedside.   -creatinine improved to 3.5 today  -nephrology following, appreciate ongoing guidance  - encourage PO intake  -stopped IV fluids due to concern for volume overload (dyspnea with eating and mild vasc congestion on am cxr)  - 12/8 renal U/S consistent with medical renal dz without hydronephrosis.  - 12/10 renal dopplers showing mild R transplant kidney hydronephrosis with elevation of transplant renal artery velocity, concerning for renal artery stenosis  - CT abdomen/pelvis without contrast 12/10 showing 6 mm bladder stone just anterior to the right UVJ  -MRA prelim read not clearly showing renal arteries, awaiting final read  - NM study showing adequate flow to transplant kidney with excretion pattern consistent with ELIZABETH  - galium scan ordered to assess for AIN/ATN  - continue to trend Cr  - Dr. Osborne from vascular cardiology also following, appreciate recs   -possibly due to zosyn/cefepime    #Volume overload  -pt endorsed dyspnea after eating, crackles appreciated on exam, and vasc congestion on cxr; now improved, not c/o dyspnea today  -s/p 40 mg IVP lasix on 12/12

## 2019-12-15 LAB
ALBUMIN SERPL ELPH-MCNC: 3.3 G/DL — SIGNIFICANT CHANGE UP (ref 3.3–5)
ALP SERPL-CCNC: 70 U/L — SIGNIFICANT CHANGE UP (ref 40–120)
ALT FLD-CCNC: 11 U/L — SIGNIFICANT CHANGE UP (ref 10–45)
ANION GAP SERPL CALC-SCNC: 11 MMOL/L — SIGNIFICANT CHANGE UP (ref 5–17)
AST SERPL-CCNC: 14 U/L — SIGNIFICANT CHANGE UP (ref 10–40)
BASOPHILS # BLD AUTO: 0.01 K/UL — SIGNIFICANT CHANGE UP (ref 0–0.2)
BASOPHILS NFR BLD AUTO: 0.2 % — SIGNIFICANT CHANGE UP (ref 0–2)
BILIRUB SERPL-MCNC: 0.2 MG/DL — SIGNIFICANT CHANGE UP (ref 0.2–1.2)
BUN SERPL-MCNC: 42 MG/DL — HIGH (ref 7–23)
CALCIUM SERPL-MCNC: 9.4 MG/DL — SIGNIFICANT CHANGE UP (ref 8.4–10.5)
CHLORIDE SERPL-SCNC: 108 MMOL/L — SIGNIFICANT CHANGE UP (ref 96–108)
CO2 SERPL-SCNC: 21 MMOL/L — LOW (ref 22–31)
CREAT SERPL-MCNC: 3.19 MG/DL — HIGH (ref 0.5–1.3)
EOSINOPHIL # BLD AUTO: 0.36 K/UL — SIGNIFICANT CHANGE UP (ref 0–0.5)
EOSINOPHIL NFR BLD AUTO: 7.1 % — HIGH (ref 0–6)
GLUCOSE BLDC GLUCOMTR-MCNC: 103 MG/DL — HIGH (ref 70–99)
GLUCOSE BLDC GLUCOMTR-MCNC: 137 MG/DL — HIGH (ref 70–99)
GLUCOSE BLDC GLUCOMTR-MCNC: 180 MG/DL — HIGH (ref 70–99)
GLUCOSE BLDC GLUCOMTR-MCNC: 182 MG/DL — HIGH (ref 70–99)
GLUCOSE SERPL-MCNC: 138 MG/DL — HIGH (ref 70–99)
HCT VFR BLD CALC: 33.4 % — LOW (ref 39–50)
HGB BLD-MCNC: 10.7 G/DL — LOW (ref 13–17)
IMM GRANULOCYTES NFR BLD AUTO: 0.2 % — SIGNIFICANT CHANGE UP (ref 0–1.5)
LYMPHOCYTES # BLD AUTO: 1.27 K/UL — SIGNIFICANT CHANGE UP (ref 1–3.3)
LYMPHOCYTES # BLD AUTO: 25 % — SIGNIFICANT CHANGE UP (ref 13–44)
MAGNESIUM SERPL-MCNC: 1.8 MG/DL — SIGNIFICANT CHANGE UP (ref 1.6–2.6)
MCHC RBC-ENTMCNC: 27.7 PG — SIGNIFICANT CHANGE UP (ref 27–34)
MCHC RBC-ENTMCNC: 32 GM/DL — SIGNIFICANT CHANGE UP (ref 32–36)
MCV RBC AUTO: 86.5 FL — SIGNIFICANT CHANGE UP (ref 80–100)
MONOCYTES # BLD AUTO: 0.5 K/UL — SIGNIFICANT CHANGE UP (ref 0–0.9)
MONOCYTES NFR BLD AUTO: 9.9 % — SIGNIFICANT CHANGE UP (ref 2–14)
NEUTROPHILS # BLD AUTO: 2.92 K/UL — SIGNIFICANT CHANGE UP (ref 1.8–7.4)
NEUTROPHILS NFR BLD AUTO: 57.6 % — SIGNIFICANT CHANGE UP (ref 43–77)
NRBC # BLD: 0 /100 WBCS — SIGNIFICANT CHANGE UP (ref 0–0)
PLATELET # BLD AUTO: 149 K/UL — LOW (ref 150–400)
POTASSIUM SERPL-MCNC: 4.1 MMOL/L — SIGNIFICANT CHANGE UP (ref 3.5–5.3)
POTASSIUM SERPL-SCNC: 4.1 MMOL/L — SIGNIFICANT CHANGE UP (ref 3.5–5.3)
PROT SERPL-MCNC: 5.3 G/DL — LOW (ref 6–8.3)
RBC # BLD: 3.86 M/UL — LOW (ref 4.2–5.8)
RBC # FLD: 15.7 % — HIGH (ref 10.3–14.5)
SODIUM SERPL-SCNC: 140 MMOL/L — SIGNIFICANT CHANGE UP (ref 135–145)
WBC # BLD: 5.07 K/UL — SIGNIFICANT CHANGE UP (ref 3.8–10.5)
WBC # FLD AUTO: 5.07 K/UL — SIGNIFICANT CHANGE UP (ref 3.8–10.5)

## 2019-12-15 PROCEDURE — 78806: CPT | Mod: 26

## 2019-12-15 RX ORDER — INSULIN GLARGINE 100 [IU]/ML
9 INJECTION, SOLUTION SUBCUTANEOUS AT BEDTIME
Refills: 0 | Status: COMPLETED | OUTPATIENT
Start: 2019-12-15 | End: 2019-12-15

## 2019-12-15 RX ORDER — INSULIN GLARGINE 100 [IU]/ML
9 INJECTION, SOLUTION SUBCUTANEOUS AT BEDTIME
Refills: 0 | Status: DISCONTINUED | OUTPATIENT
Start: 2019-12-15 | End: 2019-12-15

## 2019-12-15 RX ORDER — POLYETHYLENE GLYCOL 3350 17 G/17G
17 POWDER, FOR SOLUTION ORAL EVERY 12 HOURS
Refills: 0 | Status: DISCONTINUED | OUTPATIENT
Start: 2019-12-15 | End: 2019-12-18

## 2019-12-15 RX ADMIN — Medication 81 MILLIGRAM(S): at 11:27

## 2019-12-15 RX ADMIN — HEPARIN SODIUM 5000 UNIT(S): 5000 INJECTION INTRAVENOUS; SUBCUTANEOUS at 15:52

## 2019-12-15 RX ADMIN — AMLODIPINE BESYLATE 10 MILLIGRAM(S): 2.5 TABLET ORAL at 18:37

## 2019-12-15 RX ADMIN — TAMSULOSIN HYDROCHLORIDE 0.4 MILLIGRAM(S): 0.4 CAPSULE ORAL at 22:35

## 2019-12-15 RX ADMIN — INSULIN GLARGINE 9 UNIT(S): 100 INJECTION, SOLUTION SUBCUTANEOUS at 23:18

## 2019-12-15 RX ADMIN — HEPARIN SODIUM 5000 UNIT(S): 5000 INJECTION INTRAVENOUS; SUBCUTANEOUS at 07:02

## 2019-12-15 RX ADMIN — LINEZOLID 600 MILLIGRAM(S): 600 INJECTION, SOLUTION INTRAVENOUS at 11:27

## 2019-12-15 RX ADMIN — POLYETHYLENE GLYCOL 3350 17 GRAM(S): 17 POWDER, FOR SOLUTION ORAL at 18:37

## 2019-12-15 RX ADMIN — MYCOPHENOLATE MOFETIL 500 MILLIGRAM(S): 250 CAPSULE ORAL at 11:27

## 2019-12-15 RX ADMIN — MEROPENEM 100 MILLIGRAM(S): 1 INJECTION INTRAVENOUS at 11:27

## 2019-12-15 RX ADMIN — HEPARIN SODIUM 5000 UNIT(S): 5000 INJECTION INTRAVENOUS; SUBCUTANEOUS at 22:34

## 2019-12-15 RX ADMIN — TACROLIMUS 4 MILLIGRAM(S): 5 CAPSULE ORAL at 22:36

## 2019-12-15 RX ADMIN — CARVEDILOL PHOSPHATE 37.5 MILLIGRAM(S): 80 CAPSULE, EXTENDED RELEASE ORAL at 18:37

## 2019-12-15 RX ADMIN — LINEZOLID 600 MILLIGRAM(S): 600 INJECTION, SOLUTION INTRAVENOUS at 22:35

## 2019-12-15 RX ADMIN — Medication 2: at 18:36

## 2019-12-15 RX ADMIN — MYCOPHENOLATE MOFETIL 500 MILLIGRAM(S): 250 CAPSULE ORAL at 22:37

## 2019-12-15 RX ADMIN — Medication 100 MILLIGRAM(S): at 07:02

## 2019-12-15 RX ADMIN — Medication 100 MILLIGRAM(S): at 15:53

## 2019-12-15 RX ADMIN — Medication 5 MILLIGRAM(S): at 11:27

## 2019-12-15 RX ADMIN — Medication 1 APPLICATION(S): at 11:27

## 2019-12-15 RX ADMIN — TACROLIMUS 4 MILLIGRAM(S): 5 CAPSULE ORAL at 11:31

## 2019-12-15 RX ADMIN — CHLORHEXIDINE GLUCONATE 1 APPLICATION(S): 213 SOLUTION TOPICAL at 07:03

## 2019-12-15 RX ADMIN — CARVEDILOL PHOSPHATE 37.5 MILLIGRAM(S): 80 CAPSULE, EXTENDED RELEASE ORAL at 07:04

## 2019-12-15 RX ADMIN — Medication 2: at 22:34

## 2019-12-15 RX ADMIN — Medication 10 MILLIGRAM(S): at 15:53

## 2019-12-15 RX ADMIN — POLYETHYLENE GLYCOL 3350 17 GRAM(S): 17 POWDER, FOR SOLUTION ORAL at 11:25

## 2019-12-15 NOTE — PROGRESS NOTE ADULT - SUBJECTIVE AND OBJECTIVE BOX
CC: ACUTE KIDNEY INJURY      INTERVAL HISTORY:  feels a little better      ROS: No chest pain, no sob, no abd pain. 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  HEENT: moist mucous membranes, no pallor/cyanosis.  Neck: no JVD visible.  Cardiac: S1, S2. RRR. No murmurs   Respratory: CTA b/l, no access muscle use.   Abdomen: soft. nontender. nondistended  Skin: no rashes.  Extremities: no LE edema b/l  Access:       DATA:                        10.7<L>  5.77  )-----------( 154      ( 14 Dec 2019 13:18 )             34.8<L>        138    |  108    |  46<H>  ----------------------------<  213<H>  Ca:8.8   (14 Dec 2019 13:18)  4.4     |  18<L>  |  3.51<H>      eGFR if Non : 16 <L>  eGFR if : 18 <L>    TPro  5.4<L>  /  Alb  3.0<L>  /  TBili  0.2    /  DBili  <0.2   /  AST  20     /  ALT  16     /  AlkPhos  85     13 Dec 2019 10:53                    MEDICATIONS  (STANDING):  amLODIPine   Tablet 10 milliGRAM(s) Oral every 24 hours  aspirin  chewable 81 milliGRAM(s) Oral daily  BACItracin   Ointment 1 Application(s) Topical daily  carvedilol 37.5 milliGRAM(s) Oral every 12 hours  chlorhexidine 4% Liquid 1 Application(s) Topical <User Schedule>  dextrose 5%. 1000 milliLiter(s) (50 mL/Hr) IV Continuous <Continuous>  dextrose 50% Injectable 12.5 Gram(s) IV Push once  dextrose 50% Injectable 25 Gram(s) IV Push once  dextrose 50% Injectable 25 Gram(s) IV Push once  docusate sodium 100 milliGRAM(s) Oral three times a day  heparin  flush 100 Units/mL Injectable 100 Unit(s) IV Push every other day  heparin  Injectable 5000 Unit(s) SubCutaneous every 8 hours  insulin glargine Injectable (LANTUS) 12 Unit(s) SubCutaneous at bedtime  insulin lispro (HumaLOG) corrective regimen sliding scale   SubCutaneous Before meals and at bedtime  linezolid    Tablet 600 milliGRAM(s) Oral every 12 hours  meropenem  IVPB 1000 milliGRAM(s) IV Intermittent every 24 hours  mycophenolate mofetil 500 milliGRAM(s) Oral every 12 hours  polyethylene glycol 3350 17 Gram(s) Oral daily  predniSONE   Tablet 5 milliGRAM(s) Oral every 24 hours  tacrolimus 4 milliGRAM(s) Oral every 12 hours  tamsulosin 0.4 milliGRAM(s) Oral at bedtime    MEDICATIONS  (PRN):  dextrose 40% Gel 15 Gram(s) Oral once PRN Blood Glucose LESS THAN 70 milliGRAM(s)/deciliter  glucagon  Injectable 1 milliGRAM(s) IntraMuscular once PRN Glucose LESS THAN 70 milligrams/deciliter  sodium chloride 0.9% lock flush 10 milliLiter(s) IV Push every 1 hour PRN Pre/post blood products, medications, blood draw, and to maintain line patency

## 2019-12-15 NOTE — PROGRESS NOTE ADULT - PROBLEM SELECTOR PLAN 1
slight resolution of ELIZABETH today  non-oliguric  IV Meropenem in stead of Cefipime  encourage PO intake

## 2019-12-15 NOTE — PROGRESS NOTE ADULT - ASSESSMENT
80 yo M with hx of Renal transplant (normal creatinine), functionally blind (glaucoma), DM (on insulin), BPH, HTN, left 4th adn 5th toe amputation with wound vac and diabetic ulcer required 6 week IV abx (PICC line on LUE) with cefepime and linezolid who was brought in today with complaints of confusion found to have ELIZABETH; initially thought to be prerenal from poor PO intake, now with possible ATN 2/2 cefepime

## 2019-12-16 ENCOUNTER — APPOINTMENT (OUTPATIENT)
Dept: VASCULAR SURGERY | Facility: CLINIC | Age: 79
End: 2019-12-16

## 2019-12-16 LAB
ANION GAP SERPL CALC-SCNC: 13 MMOL/L — SIGNIFICANT CHANGE UP (ref 5–17)
BUN SERPL-MCNC: 39 MG/DL — HIGH (ref 7–23)
CALCIUM SERPL-MCNC: 9.5 MG/DL — SIGNIFICANT CHANGE UP (ref 8.4–10.5)
CHLORIDE SERPL-SCNC: 107 MMOL/L — SIGNIFICANT CHANGE UP (ref 96–108)
CO2 SERPL-SCNC: 22 MMOL/L — SIGNIFICANT CHANGE UP (ref 22–31)
CREAT SERPL-MCNC: 2.88 MG/DL — HIGH (ref 0.5–1.3)
GLUCOSE BLDC GLUCOMTR-MCNC: 110 MG/DL — HIGH (ref 70–99)
GLUCOSE BLDC GLUCOMTR-MCNC: 137 MG/DL — HIGH (ref 70–99)
GLUCOSE BLDC GLUCOMTR-MCNC: 146 MG/DL — HIGH (ref 70–99)
GLUCOSE BLDC GLUCOMTR-MCNC: 179 MG/DL — HIGH (ref 70–99)
GLUCOSE BLDC GLUCOMTR-MCNC: 264 MG/DL — HIGH (ref 70–99)
GLUCOSE SERPL-MCNC: 94 MG/DL — SIGNIFICANT CHANGE UP (ref 70–99)
HCT VFR BLD CALC: 34.4 % — LOW (ref 39–50)
HGB BLD-MCNC: 10.8 G/DL — LOW (ref 13–17)
MAGNESIUM SERPL-MCNC: 1.7 MG/DL — SIGNIFICANT CHANGE UP (ref 1.6–2.6)
MCHC RBC-ENTMCNC: 27 PG — SIGNIFICANT CHANGE UP (ref 27–34)
MCHC RBC-ENTMCNC: 31.4 GM/DL — LOW (ref 32–36)
MCV RBC AUTO: 86 FL — SIGNIFICANT CHANGE UP (ref 80–100)
NRBC # BLD: 0 /100 WBCS — SIGNIFICANT CHANGE UP (ref 0–0)
PLATELET # BLD AUTO: 141 K/UL — LOW (ref 150–400)
POTASSIUM SERPL-MCNC: 4.4 MMOL/L — SIGNIFICANT CHANGE UP (ref 3.5–5.3)
POTASSIUM SERPL-SCNC: 4.4 MMOL/L — SIGNIFICANT CHANGE UP (ref 3.5–5.3)
RBC # BLD: 4 M/UL — LOW (ref 4.2–5.8)
RBC # FLD: 15.5 % — HIGH (ref 10.3–14.5)
SODIUM SERPL-SCNC: 142 MMOL/L — SIGNIFICANT CHANGE UP (ref 135–145)
WBC # BLD: 6.02 K/UL — SIGNIFICANT CHANGE UP (ref 3.8–10.5)
WBC # FLD AUTO: 6.02 K/UL — SIGNIFICANT CHANGE UP (ref 3.8–10.5)

## 2019-12-16 PROCEDURE — 99232 SBSQ HOSP IP/OBS MODERATE 35: CPT

## 2019-12-16 PROCEDURE — 99233 SBSQ HOSP IP/OBS HIGH 50: CPT

## 2019-12-16 RX ORDER — MEROPENEM 1 G/30ML
1000 INJECTION INTRAVENOUS EVERY 12 HOURS
Refills: 0 | Status: DISCONTINUED | OUTPATIENT
Start: 2019-12-16 | End: 2019-12-18

## 2019-12-16 RX ORDER — ALTEPLASE 100 MG
2 KIT INTRAVENOUS ONCE
Refills: 0 | Status: COMPLETED | OUTPATIENT
Start: 2019-12-16 | End: 2019-12-16

## 2019-12-16 RX ADMIN — POLYETHYLENE GLYCOL 3350 17 GRAM(S): 17 POWDER, FOR SOLUTION ORAL at 17:54

## 2019-12-16 RX ADMIN — MYCOPHENOLATE MOFETIL 500 MILLIGRAM(S): 250 CAPSULE ORAL at 11:25

## 2019-12-16 RX ADMIN — Medication 6: at 17:54

## 2019-12-16 RX ADMIN — HEPARIN SODIUM 5000 UNIT(S): 5000 INJECTION INTRAVENOUS; SUBCUTANEOUS at 14:12

## 2019-12-16 RX ADMIN — Medication 1 APPLICATION(S): at 11:24

## 2019-12-16 RX ADMIN — TACROLIMUS 4 MILLIGRAM(S): 5 CAPSULE ORAL at 22:16

## 2019-12-16 RX ADMIN — Medication 5 MILLIGRAM(S): at 11:25

## 2019-12-16 RX ADMIN — Medication 100 MILLIGRAM(S): at 14:12

## 2019-12-16 RX ADMIN — TACROLIMUS 4 MILLIGRAM(S): 5 CAPSULE ORAL at 11:25

## 2019-12-16 RX ADMIN — CARVEDILOL PHOSPHATE 37.5 MILLIGRAM(S): 80 CAPSULE, EXTENDED RELEASE ORAL at 17:54

## 2019-12-16 RX ADMIN — Medication 100 MILLIGRAM(S): at 22:15

## 2019-12-16 RX ADMIN — LINEZOLID 600 MILLIGRAM(S): 600 INJECTION, SOLUTION INTRAVENOUS at 22:17

## 2019-12-16 RX ADMIN — AMLODIPINE BESYLATE 10 MILLIGRAM(S): 2.5 TABLET ORAL at 17:54

## 2019-12-16 RX ADMIN — MYCOPHENOLATE MOFETIL 500 MILLIGRAM(S): 250 CAPSULE ORAL at 22:17

## 2019-12-16 RX ADMIN — MEROPENEM 100 MILLIGRAM(S): 1 INJECTION INTRAVENOUS at 11:24

## 2019-12-16 RX ADMIN — Medication 100 UNIT(S): at 11:24

## 2019-12-16 RX ADMIN — HEPARIN SODIUM 5000 UNIT(S): 5000 INJECTION INTRAVENOUS; SUBCUTANEOUS at 07:29

## 2019-12-16 RX ADMIN — INSULIN GLARGINE 12 UNIT(S): 100 INJECTION, SOLUTION SUBCUTANEOUS at 22:18

## 2019-12-16 RX ADMIN — MEROPENEM 100 MILLIGRAM(S): 1 INJECTION INTRAVENOUS at 23:02

## 2019-12-16 RX ADMIN — CARVEDILOL PHOSPHATE 37.5 MILLIGRAM(S): 80 CAPSULE, EXTENDED RELEASE ORAL at 07:29

## 2019-12-16 RX ADMIN — LINEZOLID 600 MILLIGRAM(S): 600 INJECTION, SOLUTION INTRAVENOUS at 11:25

## 2019-12-16 RX ADMIN — CHLORHEXIDINE GLUCONATE 1 APPLICATION(S): 213 SOLUTION TOPICAL at 07:29

## 2019-12-16 RX ADMIN — HEPARIN SODIUM 5000 UNIT(S): 5000 INJECTION INTRAVENOUS; SUBCUTANEOUS at 22:15

## 2019-12-16 RX ADMIN — Medication 2: at 09:00

## 2019-12-16 RX ADMIN — Medication 81 MILLIGRAM(S): at 11:24

## 2019-12-16 RX ADMIN — TAMSULOSIN HYDROCHLORIDE 0.4 MILLIGRAM(S): 0.4 CAPSULE ORAL at 22:17

## 2019-12-16 NOTE — PROGRESS NOTE ADULT - SUBJECTIVE AND OBJECTIVE BOX
OVERNIGHT EVENTS: No acute events    SUBJECTIVE / INTERVAL HPI: Patient seen and examined at bedside. Describes his breathing as "good" and denied chest pain, n/v, dyspnea, abd pain.    VITAL SIGNS:  Vital Signs Last 24 Hrs  T(C): 36.5 (16 Dec 2019 08:29), Max: 36.9 (16 Dec 2019 05:17)  T(F): 97.7 (16 Dec 2019 08:29), Max: 98.5 (16 Dec 2019 05:17)  HR: 78 (16 Dec 2019 08:29) (76 - 78)  BP: 110/64 (16 Dec 2019 08:29) (110/64 - 150/75)  BP(mean): --  RR: 18 (16 Dec 2019 08:29) (16 - 18)  SpO2: 98% (16 Dec 2019 08:29) (94% - 98%)    PHYSICAL EXAM:  General: WDWN; Patient is in NAD  HEENT: NC/AT; PERRL, anicteric sclera; MMM  Neck: supple  Cardiovascular: +S1/S2, RRR  Respiratory: CTA B/L; no W/R/R  Gastrointestinal: soft, NT/ND; +BS  Extremities: WWP; no edema present  Vascular: 2+ radial pulses B/L  Neurological: AAOx3; no focal deficits    MEDICATIONS:  MEDICATIONS  (STANDING):  amLODIPine   Tablet 10 milliGRAM(s) Oral every 24 hours  aspirin  chewable 81 milliGRAM(s) Oral daily  BACItracin   Ointment 1 Application(s) Topical daily  carvedilol 37.5 milliGRAM(s) Oral every 12 hours  chlorhexidine 4% Liquid 1 Application(s) Topical <User Schedule>  dextrose 5%. 1000 milliLiter(s) (50 mL/Hr) IV Continuous <Continuous>  dextrose 50% Injectable 12.5 Gram(s) IV Push once  dextrose 50% Injectable 25 Gram(s) IV Push once  dextrose 50% Injectable 25 Gram(s) IV Push once  docusate sodium 100 milliGRAM(s) Oral three times a day  heparin  flush 100 Units/mL Injectable 100 Unit(s) IV Push every other day  heparin  Injectable 5000 Unit(s) SubCutaneous every 8 hours  insulin glargine Injectable (LANTUS) 12 Unit(s) SubCutaneous at bedtime  insulin lispro (HumaLOG) corrective regimen sliding scale   SubCutaneous Before meals and at bedtime  linezolid    Tablet 600 milliGRAM(s) Oral every 12 hours  meropenem  IVPB 1000 milliGRAM(s) IV Intermittent every 24 hours  mycophenolate mofetil 500 milliGRAM(s) Oral every 12 hours  polyethylene glycol 3350 17 Gram(s) Oral every 12 hours  predniSONE   Tablet 5 milliGRAM(s) Oral every 24 hours  tacrolimus 4 milliGRAM(s) Oral every 12 hours  tamsulosin 0.4 milliGRAM(s) Oral at bedtime    MEDICATIONS  (PRN):  dextrose 40% Gel 15 Gram(s) Oral once PRN Blood Glucose LESS THAN 70 milliGRAM(s)/deciliter  glucagon  Injectable 1 milliGRAM(s) IntraMuscular once PRN Glucose LESS THAN 70 milligrams/deciliter  sodium chloride 0.9% lock flush 10 milliLiter(s) IV Push every 1 hour PRN Pre/post blood products, medications, blood draw, and to maintain line patency      ALLERGIES:  Allergies    No Known Allergies    Intolerances        LABS:                        10.7   5.07  )-----------( 149      ( 15 Dec 2019 12:20 )             33.4     12-15    140  |  108  |  42<H>  ----------------------------<  138<H>  4.1   |  21<L>  |  3.19<H>    Ca    9.4      15 Dec 2019 12:20  Mg     1.8     12-15    TPro  5.3<L>  /  Alb  3.3  /  TBili  0.2  /  DBili  x   /  AST  14  /  ALT  11  /  AlkPhos  70  12-15        CAPILLARY BLOOD GLUCOSE      POCT Blood Glucose.: 179 mg/dL (16 Dec 2019 08:30)      RADIOLOGY & ADDITIONAL TESTS: Reviewed.

## 2019-12-16 NOTE — PROGRESS NOTE ADULT - SUBJECTIVE AND OBJECTIVE BOX
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  Sent from Dr. Aranda office due to doubling of his creatinine.   Creatine has been improving over the weekend.   Pt is awake and alert today.  No SOB, CP.   C/o poor appetite.     Feb 2017 Normal pharm nuclear stress test   ECHO - mod LVH, normal LVEF     CXR:  IMPRESSION:   No evidence of acute cardiopulmonary disease (12 Dec 2019)          Allergies    No Known Allergies    Intolerances    	    MEDICATIONS:  MEDICATIONS  (STANDING):  amLODIPine   Tablet 5 milliGRAM(s) Oral every 24 hours  aspirin  chewable 81 milliGRAM(s) Oral daily  BACItracin   Ointment 1 Application(s) Topical daily  carvedilol 37.5 milliGRAM(s) Oral every 12 hours  chlorhexidine 4% Liquid 1 Application(s) Topical <User Schedule>  dextrose 5%. 1000 milliLiter(s) (50 mL/Hr) IV Continuous <Continuous>  dextrose 50% Injectable 12.5 Gram(s) IV Push once  dextrose 50% Injectable 25 Gram(s) IV Push once  dextrose 50% Injectable 25 Gram(s) IV Push once  docusate sodium 100 milliGRAM(s) Oral three times a day  heparin  flush 100 Units/mL Injectable 100 Unit(s) IV Push every other day  heparin  Injectable 5000 Unit(s) SubCutaneous every 8 hours  insulin glargine Injectable (LANTUS) 12 Unit(s) SubCutaneous at bedtime  insulin lispro (HumaLOG) corrective regimen sliding scale   SubCutaneous Before meals and at bedtime  linezolid    Tablet 600 milliGRAM(s) Oral every 12 hours  meropenem  IVPB 1000 milliGRAM(s) IV Intermittent every 24 hours  mycophenolate mofetil 500 milliGRAM(s) Oral every 12 hours  predniSONE   Tablet 5 milliGRAM(s) Oral every 24 hours  tacrolimus 4 milliGRAM(s) Oral every 12 hours  tamsulosin 0.4 milliGRAM(s) Oral at bedtime      PAST MEDICAL & SURGICAL HISTORY:  History of renal transplant: secondary to DM  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  BPH (benign prostatic hypertrophy)  HTN (hypertension)  Amputation of toe  Kidney transplant recipient      FAMILY HISTORY:      SOCIAL HISTORY:  unchanged    REVIEW OF SYSTEMS:  CONSTITUTIONAL: No fever, weight loss, or fatigue  EYES: No eye pain, visual disturbances, or discharge  ENMT:  No difficulty hearing, tinnitus, vertigo; No sinus or throat pain  NECK: No pain or stiffness  RESPIRATORY: No cough, wheezing, chills or hemoptysis; No Shortness of Breath  CARDIOVASCULAR: No chest pain, palpitations, passing out, dizziness, or leg swelling  GASTROINTESTINAL: No abdominal or epigastric pain. No nausea, vomiting, or hematemesis; No diarrhea or constipation. No melena or hematochezia.  GENITOURINARY: No dysuria, frequency, hematuria, or incontinence  NEUROLOGICAL: No headaches, memory loss, loss of strength, numbness, or tremors  SKIN: No itching, burning, rashes, or lesions   LYMPH Nodes: No enlarged glands  ENDOCRINE: No heat or cold intolerance; No hair loss  MUSCULOSKELETAL: No joint pain or swelling; No muscle, back, or extremity pain  PSYCHIATRIC: No depression, anxiety, mood swings, or difficulty sleeping  HEME/LYMPH: No easy bruising, or bleeding gums  ALLERY AND IMMUNOLOGIC: No hives or eczema	    [X ] All others negative	  [ ] Unable to obtain    PHYSICAL EXAM:  Vital Signs Last 24 Hrs  T(C): 36.3 (12 Dec 2019 09:55), Max: 36.4 (11 Dec 2019 12:24)  T(F): 97.4 (12 Dec 2019 09:55), Max: 97.5 (11 Dec 2019 12:24)  HR: 73 (12 Dec 2019 09:55) (73 - 84)  BP: 164/84 (12 Dec 2019 09:55) (138/54 - 182/68)  BP(mean): --  RR: 18 (12 Dec 2019 09:55) (18 - 20)  SpO2: 97% (12 Dec 2019 09:55) (92% - 97%)      Appearance: lethargic/sleepy, no distress	  HEENT:   Normal oral mucosa, PERRL, EOMI	  Lymphatic: No lymphadenopathy  Respiratory: Lungs clear to auscultation	  Psychiatry: A & O x 3, Mood & affect appropriate  Gastrointestinal:  Soft, Non-tender, + BS	  Neurologic: Non-focal    CARDIOVASCULAR EXAM:  Cardiac: No murmurs, Normal S1 S2  Neck: No carotid bruits, no JVD  Extremities: No edema, s/p left 4th/5th toe amp, wound vac in place  Skin: Warm, No rashes, No ecchymoses, no cyanosis,  no ulcerations   Pulses: Peripheral pulses palpable bilaterally  LEFT Femoral - present  RIGHT Femoral - present  LEFT Popliteal - present  RIGHT Popliteal - present  LEFT Dorsalis Pedis - present  RIGHT Dorsalis Pedis - present  LEFT Posterior Tibial - present  RIGHT Posterior Tibial - present       LABS:	 	              Hct 33.6, Cr 3.1          Assessment:  Acute Renal Failure - unclear etiology: most likely antibiotic induced.  Renal artery disease noted on duplex PSV ~ 300 cm/s at site of anastomosis.  MRA abdomen with poor visualization of renal artery.    Altered Mental Status - improved  Infected Diabetic Foot Ulcer s/p toe amp   HTN     Plan:  - no plan for renal angiogram at this time given stabilization of renal function.  Will cont to monitor closely.  If renal function continues to improve the most likely etiology is antibiotic induced.  Perfusion study with adequate flow into the transplant kidney with normal uptake pattern, excretion pattern consistent with acute kidney injury.    - ECHO unremarkable, ELIZABETH not due to cardiac etiology.     - HTN better controlled with addition of norvasc to coreg.  - will cont to follow.    Dr. Osborne will resume care tomorrow. HISTORY OF PRESENT ILLNESS:  HPI:   80 yo M with hx of Renal transplant ~7 yr ago, functionally blind (glaucoma), IDDM, BPH, HTN, diabetic foot ulcer complicated by left 4th and 5th toe amputation with wound vac, on 6 week course of IV cefepime and linezolid through PICC line, presented with acute renal failure, confusion, poor PO intake.  Sent from Dr. Aranda office due to doubling of his creatinine.   Creatine has been improving over the weekend.   Pt is awake and alert today.  No SOB, CP.   C/o poor appetite.     Feb 2017 Normal pharm nuclear stress test   ECHO - mod LVH, normal LVEF     CXR:  IMPRESSION:   No evidence of acute cardiopulmonary disease (12 Dec 2019)          Allergies    No Known Allergies    Intolerances    	    MEDICATIONS:  MEDICATIONS  (STANDING):  amLODIPine   Tablet 5 milliGRAM(s) Oral every 24 hours  aspirin  chewable 81 milliGRAM(s) Oral daily  BACItracin   Ointment 1 Application(s) Topical daily  carvedilol 37.5 milliGRAM(s) Oral every 12 hours  chlorhexidine 4% Liquid 1 Application(s) Topical <User Schedule>  dextrose 5%. 1000 milliLiter(s) (50 mL/Hr) IV Continuous <Continuous>  dextrose 50% Injectable 12.5 Gram(s) IV Push once  dextrose 50% Injectable 25 Gram(s) IV Push once  dextrose 50% Injectable 25 Gram(s) IV Push once  docusate sodium 100 milliGRAM(s) Oral three times a day  heparin  flush 100 Units/mL Injectable 100 Unit(s) IV Push every other day  heparin  Injectable 5000 Unit(s) SubCutaneous every 8 hours  insulin glargine Injectable (LANTUS) 12 Unit(s) SubCutaneous at bedtime  insulin lispro (HumaLOG) corrective regimen sliding scale   SubCutaneous Before meals and at bedtime  linezolid    Tablet 600 milliGRAM(s) Oral every 12 hours  meropenem  IVPB 1000 milliGRAM(s) IV Intermittent every 24 hours  mycophenolate mofetil 500 milliGRAM(s) Oral every 12 hours  predniSONE   Tablet 5 milliGRAM(s) Oral every 24 hours  tacrolimus 4 milliGRAM(s) Oral every 12 hours  tamsulosin 0.4 milliGRAM(s) Oral at bedtime      PAST MEDICAL & SURGICAL HISTORY:  History of renal transplant: secondary to DM  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  BPH (benign prostatic hypertrophy)  HTN (hypertension)  Amputation of toe  Kidney transplant recipient      FAMILY HISTORY:      SOCIAL HISTORY:  unchanged    REVIEW OF SYSTEMS:  CONSTITUTIONAL: No fever, weight loss, or fatigue  EYES: No eye pain, visual disturbances, or discharge  ENMT:  No difficulty hearing, tinnitus, vertigo; No sinus or throat pain  NECK: No pain or stiffness  RESPIRATORY: No cough, wheezing, chills or hemoptysis; No Shortness of Breath  CARDIOVASCULAR: No chest pain, palpitations, passing out, dizziness, or leg swelling  GASTROINTESTINAL: No abdominal or epigastric pain. No nausea, vomiting, or hematemesis; No diarrhea or constipation. No melena or hematochezia.  GENITOURINARY: No dysuria, frequency, hematuria, or incontinence  NEUROLOGICAL: No headaches, memory loss, loss of strength, numbness, or tremors  SKIN: No itching, burning, rashes, or lesions   LYMPH Nodes: No enlarged glands  ENDOCRINE: No heat or cold intolerance; No hair loss  MUSCULOSKELETAL: No joint pain or swelling; No muscle, back, or extremity pain  PSYCHIATRIC: No depression, anxiety, mood swings, or difficulty sleeping  HEME/LYMPH: No easy bruising, or bleeding gums  ALLERY AND IMMUNOLOGIC: No hives or eczema	    [X ] All others negative	  [ ] Unable to obtain    PHYSICAL EXAM:  Vital Signs Last 24 Hrs  Afebrile, Hr 60, -150      Appearance: lethargic/sleepy, no distress	  HEENT:   Normal oral mucosa, PERRL, EOMI	  Lymphatic: No lymphadenopathy  Respiratory: Lungs clear to auscultation	  Psychiatry: A & O x 3, Mood & affect appropriate  Gastrointestinal:  Soft, Non-tender, + BS	  Neurologic: Non-focal    CARDIOVASCULAR EXAM:  Cardiac: No murmurs, Normal S1 S2  Neck: No carotid bruits, no JVD  Extremities: No edema, s/p left 4th/5th toe amp, wound vac in place  Skin: Warm, No rashes, No ecchymoses, no cyanosis,  no ulcerations   Pulses: Peripheral pulses palpable bilaterally  LEFT Femoral - present  RIGHT Femoral - present  LEFT Popliteal - present  RIGHT Popliteal - present  LEFT Dorsalis Pedis - present  RIGHT Dorsalis Pedis - present  LEFT Posterior Tibial - present  RIGHT Posterior Tibial - present       LABS:	 	              Hct 33.6, Cr 3.1          Assessment:  Acute Renal Failure - unclear etiology: most likely antibiotic induced.  Renal artery disease noted on duplex PSV ~ 300 cm/s at site of anastomosis.  MRA abdomen with poor visualization of renal artery.    Altered Mental Status - improved  Infected Diabetic Foot Ulcer s/p toe amp   HTN     Plan:  - no plan for renal angiogram at this time given stabilization of renal function.  Will cont to monitor closely.  If renal function continues to improve the most likely etiology is antibiotic induced.  Perfusion study with adequate flow into the transplant kidney with normal uptake pattern, excretion pattern consistent with acute kidney injury.    - ECHO unremarkable, ELIZABETH not due to cardiac etiology.     - HTN better controlled with addition of norvasc to coreg.  - will cont to follow.    Dr. Osborne will resume care tomorrow.

## 2019-12-16 NOTE — PROGRESS NOTE ADULT - PROBLEM SELECTOR PLAN 1
Pt Cr 1.5 on admission to [facility] from baseline of ~0.9. Pt with hx of renal transplant in setting of DM and HTN. Likely etiology of ELIZABETH is ATN 2/2 cefepime toxicity, likely also contributing to confusion. Patient tolerating PO, not dry on exam and mentating closer to baseline, per wife at bedside.   -creatinine improved to 3.19 today  -nephrology following, appreciate ongoing guidance  - encourage PO intake  -stopped IV fluids due to concern for volume overload (dyspnea with eating and mild vasc congestion on am cxr)  - 12/8 renal U/S consistent with medical renal dz without hydronephrosis.  - 12/10 renal dopplers showing mild R transplant kidney hydronephrosis with elevation of transplant renal artery velocity, concerning for renal artery stenosis  - CT abdomen/pelvis without contrast 12/10 showing 6 mm bladder stone just anterior to the right UVJ  -MRA prelim read not clearly showing renal arteries, awaiting final read  - NM study showing adequate flow to transplant kidney with excretion pattern consistent with ELIZABETH  - galium scan ordered to assess for AIN/ATN - showing inflammation of transplant kidney, though unlikely 2/2 chronic rejection  - continue to trend Cr  - Dr. Osborne from vascular cardiology also following, appreciate recs - no interventions at this time  -possibly due to zosyn/cefepime    #Volume overload, improved  -pt endorsed dyspnea after eating, crackles appreciated on exam, and vasc congestion on cxr; now improved, not c/o dyspnea today  -s/p 40 mg IVP lasix on 12/12 Pt Cr 1.5 on admission to [facility] from baseline of ~0.9. Pt with hx of renal transplant in setting of DM and HTN. Likely etiology of ELIZABETH is ATN 2/2 cefepime toxicity, likely also contributing to confusion. Patient tolerating PO, not dry on exam and mentating closer to baseline, per wife at bedside.   -creatinine improved to 2.88 today  -nephrology following, appreciate ongoing guidance  - encourage PO intake  -stopped IV fluids due to concern for volume overload (dyspnea with eating and mild vasc congestion on am cxr)  - 12/8 renal U/S consistent with medical renal dz without hydronephrosis.  - 12/10 renal dopplers showing mild R transplant kidney hydronephrosis with elevation of transplant renal artery velocity, concerning for renal artery stenosis  - CT abdomen/pelvis without contrast 12/10 showing 6 mm bladder stone just anterior to the right UVJ  -MRA prelim read not clearly showing renal arteries, awaiting final read  - NM study showing adequate flow to transplant kidney with excretion pattern consistent with ELIZABETH  - galium scan ordered to assess for AIN/ATN - showing inflammation of transplant kidney, though unlikely 2/2 chronic rejection  - continue to trend Cr  - Dr. Osborne from vascular cardiology also following, appreciate recs - no interventions at this time  -possibly due to zosyn/cefepime    #Volume overload, improved  -pt endorsed dyspnea after eating, crackles appreciated on exam, and vasc congestion on cxr; now improved, not c/o dyspnea today  -s/p 40 mg IVP lasix on 12/12

## 2019-12-16 NOTE — CHART NOTE - NSCHARTNOTEFT_GEN_A_CORE
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5      16 Dec 2019 12:14  Mg     1.7     12-16    TPro  5.3<L>  /  Alb  3.3  /  TBili  0.2  /  DBili  x   /  AST  14  /  ALT  11  /  AlkPhos  70  12-15    CAPILLARY BLOOD GLUCOSE      POCT Blood Glucose.: 137 mg/dL (16 Dec 2019 12:37)  POCT Blood Glucose.: 179 mg/dL (16 Dec 2019 08:30)  POCT Blood Glucose.: 110 mg/dL (16 Dec 2019 06:22)  POCT Blood Glucose.: 182 mg/dL (15 Dec 2019 22:01)  POCT Blood Glucose.: 180 mg/dL (15 Dec 2019 18:10)      Medications:  MEDICATIONS  (STANDING):  amLODIPine   Tablet 10 milliGRAM(s) Oral every 24 hours  aspirin  chewable 81 milliGRAM(s) Oral daily  BACItracin   Ointment 1 Application(s) Topical daily  carvedilol 37.5 milliGRAM(s) Oral every 12 hours  chlorhexidine 4% Liquid 1 Application(s) Topical <User Schedule>  dextrose 5%. 1000 milliLiter(s) (50 mL/Hr) IV Continuous <Continuous>  dextrose 50% Injectable 12.5 Gram(s) IV Push once  dextrose 50% Injectable 25 Gram(s) IV Push once  dextrose 50% Injectable 25 Gram(s) IV Push once  docusate sodium 100 milliGRAM(s) Oral three times a day  heparin  flush 100 Units/mL Injectable 100 Unit(s) IV Push every other day  heparin  Injectable 5000 Unit(s) SubCutaneous every 8 hours  insulin glargine Injectable (LANTUS) 12 Unit(s) SubCutaneous at bedtime  insulin lispro (HumaLOG) corrective regimen sliding scale   SubCutaneous Before meals and at bedtime  linezolid    Tablet 600 milliGRAM(s) Oral every 12 hours  meropenem  IVPB 1000 milliGRAM(s) IV Intermittent every 12 hours  mycophenolate mofetil 500 milliGRAM(s) Oral every 12 hours  polyethylene glycol 3350 17 Gram(s) Oral every 12 hours  predniSONE   Tablet 5 milliGRAM(s) Oral every 24 hours  tacrolimus 4 milliGRAM(s) Oral every 12 hours  tamsulosin 0.4 milliGRAM(s) Oral at bedtime    MEDICATIONS  (PRN):  dextrose 40% Gel 15 Gram(s) Oral once PRN Blood Glucose LESS THAN 70 milliGRAM(s)/deciliter  glucagon  Injectable 1 milliGRAM(s) IntraMuscular once PRN Glucose LESS THAN 70 milligrams/deciliter  sodium chloride 0.9% lock flush 10 milliLiter(s) IV Push every 1 hour PRN Pre/post blood products, medications, blood draw, and to maintain line patency      Weight:  12/6- 84.1kg  12/7- 84.1kg    Weight Change: No new wts to assess    Estimated energy needs:   Ht: 5'7", Wt: 84.1kg, IBW: 61.4kg, 136.9%IBW.   Calculations done using IBW as pt's current wt is >120% IBW. Needs calculated based on [facility] standards of care. Needs adjusted for age. Conservative protein given renal hx.   Defer fluids to primary care team 2/2 renal hx.  20-25kcal/kg= 1228-1535kcal  0.75-1.0gms pro/kg= 46-62gms protein    Subjective:   Pt seen for nutrition follow up. 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 Abx currently switched from cefepime to meropenem per ID. Seen by SLP 12/12 w/ recommendation for mech soft w/ thin liquids. Pt seen resting in bed, awake, wife at bedside assisting w/ assessment. Pt is on Dysphagia 2 Mech Soft, thin liquids, cstCHO no snack, DASH, low Na, Kosher diet, although consuming mostly foods from home prepared by pts wife. Wife reports that pt is now w/ improved PO intake although appetite is still poor (wife has been coaxing/forcing pt to eat). Pt has been consuming mostly green salads, soups, egg salad etc., diet education on increasing protein and calories provided to pt and wife. Continues to decline all ONS. HgbA1c 8.6% (11/19) and 12/16: 110-179, 12/15: 103-182, 12/14: , c/w cstCHO diet (of note, also ordered for deltasone). Diet education provided. Nutrition recs below. RD to follow up per protocol.     Previous Nutrition Diagnosis:  Inadequate Oral Intake RT poor appetite AEB consuming 0% PO x 3 days.   Active [   ]  Resolved [ x  ]    If resolved, new PES:   Inadequate oral intake RT ongoing decreased appetite AEB pt reports, w/ fair PO intake     Goal: Pt to consume >75% EER consistently.    Recommendations:  1. Recommend c/w current diet order: Dysphagia 2 Mech Soft, thin liquids, cstCHO no snack, DASH, low Na, Kosher diet  2. Daily wts  3. Bowel regimen per team  4. Consider appetite stimulant of decreased appetite is persistent    Education: Discussed current diet order, encouraged consumption of small frequent meals, discussed ways to incorporate more protein rich, energy dense foods     Risk Level: High [   ] Moderate [ x  ] Low [   ]

## 2019-12-16 NOTE — PROGRESS NOTE ADULT - PROBLEM SELECTOR PLAN 6
uncontrolled, c/b diabetic foot ulcers. humalog 75/25 at home. Pt arrived with glucose 220s. Will underdose by weight given poor PO. Titrate accordingly.  - Lantus 12 qhs and mISS

## 2019-12-16 NOTE — PROGRESS NOTE ADULT - ASSESSMENT
79M w/ h/o renal transplant s/p left 4th & 5th toe amputation 11/19 (Caterina) for diabetic foot infection requiring 6 wks of IV abx (cefepime & linezolid), now readmitted w/ ELIZABETH. Per ID, abx changed to meropenem 1g qD & linezolid 600 BID to complete a 6wk course (Day 1: 11/21).     #Left foot wound  -Pt remains afebrile w/ no leukocytosis and healthy, healing wound  -Continue antibiotics per ID  -Vascular Surgery to continue vac changes MWF  -Pt may follow up w/ Dr. Diggs in the office after discharge to further discuss possibility of skin graft

## 2019-12-16 NOTE — PROGRESS NOTE ADULT - SUBJECTIVE AND OBJECTIVE BOX
The resident called to check the left arm PICC function because he and the nurse had extreme difficulty flushing both ports.  With the left arm extended as when the line was inserted, the two PICC ports effortlessly flushed easily with saline and   aspirated blood easily; this was demonstrated to the nurse. The ports were flushed with Heplock. The site is clean, dry and non-inflamed and non-tender. Notes, cultures and progress are followed.  Afebrile. The resident was notified and  the nurse was requested to cancel the Alteplase order.

## 2019-12-16 NOTE — PROGRESS NOTE ADULT - PROBLEM SELECTOR PLAN 3
Pt with sbp 160s on admission.  - sBP elevated 170s-190s on 12/10 patient asymptomatic  - coreg uptitrated to 37.5 mg BID  -gave hydralzine 5 mg IVP x1 on 12/11  -inc norvasc to 10 mg qd     #abd pain, resolved  - pt endorsed abd pain with nausea  - f/u RUQ to assess for biliary pathology - no clear diagnosis of cholecystitis but would need HIDA to further assess if high clinical concern

## 2019-12-16 NOTE — PROGRESS NOTE ADULT - SUBJECTIVE AND OBJECTIVE BOX
Vascular Surgery Consult Progress Note    Interval HPI:   79M w/ h/o renal transplant s/p left 4th & 5th toe amputation 11/19 (Eduardomissaelalexandra) for diabetic foot infection requiring 6 wks of IV abx (cefepime & linezolid) who returned to [facility] 12/6 w/ ELIZABETH. Per ID, abx were changed to meropenem 1g qD & linezolid 600 BID to complete a 6wk course (Day 1: 11/21). Vascular Surgery continues changing left foot wound vac and monitoring healing.     Subjective:   Pt seen & examined at bedside. Pain controlled. No complaints. No F/C, N/V, CP/SOB.    Medications:  linezolid    Tablet 600  meropenem  IVPB 1000  amLODIPine   Tablet 10  aspirin  chewable 81  carvedilol 37.5  heparin  flush 100 Units/mL Injectable 100  heparin  Injectable 5000  tamsulosin 0.4    Vital Signs Last 24 Hrs  T(C): 36.5 (16 Dec 2019 08:29), Max: 36.9 (16 Dec 2019 05:17)  T(F): 97.7 (16 Dec 2019 08:29), Max: 98.5 (16 Dec 2019 05:17)  HR: 78 (16 Dec 2019 08:29) (76 - 78)  BP: 110/64 (16 Dec 2019 08:29) (110/64 - 150/75)  BP(mean): --  RR: 18 (16 Dec 2019 08:29) (16 - 18)  SpO2: 98% (16 Dec 2019 08:29) (94% - 98%)    I&O's Summary    15 Dec 2019 07:01  -  16 Dec 2019 07:00  --------------------------------------------------------  IN: 0 mL / OUT: 325 mL / NET: -325 mL    Physical Exam:  General: Non-toxic, resting comfortably in bed  Pulmonary: Nonlabored breathing  Cardiovascular: NSR  Abdominal: soft, NT/ND  Extremities: WWP, left lateral foot wound 3x2cm healing well w/ pink granulation tissue, slightly macerated skin edge inferiorly however improved from admission. Vac replaced w/ good seal.   Pulses: 2+ DP/PT bilaterally    LABS:                        10.8   6.02  )-----------( 141      ( 16 Dec 2019 12:14 )             34.4     12-15    140  |  108  |  42<H>  ----------------------------<  138<H>  4.1   |  21<L>  |  3.19<H>    Ca    9.4      15 Dec 2019 12:20  Mg     1.8     12-15    TPro  5.3<L>  /  Alb  3.3  /  TBili  0.2  /  DBili  x   /  AST  14  /  ALT  11  /  AlkPhos  70  12-15

## 2019-12-16 NOTE — PROGRESS NOTE ADULT - ATTENDING COMMENTS
have reviewed above and examined pt, and discussed status with pt and staff.   pt is clinically improved and exam stable.  creat down to 2.88discussed with dr bean -- feels pt better in part because of success in modifying bowel routine.  discussed gallium result with dr swain , who feels that AIN may be most plausible dx.   if creat continues to improve will contemplate discharge.

## 2019-12-17 ENCOUNTER — TRANSCRIPTION ENCOUNTER (OUTPATIENT)
Age: 79
End: 2019-12-17

## 2019-12-17 ENCOUNTER — APPOINTMENT (OUTPATIENT)
Dept: NEPHROLOGY | Facility: CLINIC | Age: 79
End: 2019-12-17

## 2019-12-17 LAB
ANION GAP SERPL CALC-SCNC: 12 MMOL/L — SIGNIFICANT CHANGE UP (ref 5–17)
BUN SERPL-MCNC: 36 MG/DL — HIGH (ref 7–23)
CALCIUM SERPL-MCNC: 9.6 MG/DL — SIGNIFICANT CHANGE UP (ref 8.4–10.5)
CHLORIDE SERPL-SCNC: 109 MMOL/L — HIGH (ref 96–108)
CO2 SERPL-SCNC: 21 MMOL/L — LOW (ref 22–31)
CREAT SERPL-MCNC: 2.6 MG/DL — HIGH (ref 0.5–1.3)
GLUCOSE BLDC GLUCOMTR-MCNC: 124 MG/DL — HIGH (ref 70–99)
GLUCOSE BLDC GLUCOMTR-MCNC: 159 MG/DL — HIGH (ref 70–99)
GLUCOSE BLDC GLUCOMTR-MCNC: 183 MG/DL — HIGH (ref 70–99)
GLUCOSE BLDC GLUCOMTR-MCNC: 231 MG/DL — HIGH (ref 70–99)
GLUCOSE SERPL-MCNC: 207 MG/DL — HIGH (ref 70–99)
HCT VFR BLD CALC: 34.2 % — LOW (ref 39–50)
HGB BLD-MCNC: 10.9 G/DL — LOW (ref 13–17)
MCHC RBC-ENTMCNC: 27.5 PG — SIGNIFICANT CHANGE UP (ref 27–34)
MCHC RBC-ENTMCNC: 31.9 GM/DL — LOW (ref 32–36)
MCV RBC AUTO: 86.1 FL — SIGNIFICANT CHANGE UP (ref 80–100)
NRBC # BLD: 0 /100 WBCS — SIGNIFICANT CHANGE UP (ref 0–0)
PLATELET # BLD AUTO: 116 K/UL — LOW (ref 150–400)
POTASSIUM SERPL-MCNC: 4.6 MMOL/L — SIGNIFICANT CHANGE UP (ref 3.5–5.3)
POTASSIUM SERPL-SCNC: 4.6 MMOL/L — SIGNIFICANT CHANGE UP (ref 3.5–5.3)
RBC # BLD: 3.97 M/UL — LOW (ref 4.2–5.8)
RBC # FLD: 15.4 % — HIGH (ref 10.3–14.5)
SODIUM SERPL-SCNC: 142 MMOL/L — SIGNIFICANT CHANGE UP (ref 135–145)
WBC # BLD: 6.18 K/UL — SIGNIFICANT CHANGE UP (ref 3.8–10.5)
WBC # FLD AUTO: 6.18 K/UL — SIGNIFICANT CHANGE UP (ref 3.8–10.5)

## 2019-12-17 PROCEDURE — 99233 SBSQ HOSP IP/OBS HIGH 50: CPT

## 2019-12-17 RX ADMIN — Medication 5 MILLIGRAM(S): at 10:46

## 2019-12-17 RX ADMIN — Medication 100 MILLIGRAM(S): at 22:13

## 2019-12-17 RX ADMIN — CARVEDILOL PHOSPHATE 37.5 MILLIGRAM(S): 80 CAPSULE, EXTENDED RELEASE ORAL at 19:02

## 2019-12-17 RX ADMIN — MEROPENEM 100 MILLIGRAM(S): 1 INJECTION INTRAVENOUS at 23:55

## 2019-12-17 RX ADMIN — TACROLIMUS 4 MILLIGRAM(S): 5 CAPSULE ORAL at 13:30

## 2019-12-17 RX ADMIN — HEPARIN SODIUM 5000 UNIT(S): 5000 INJECTION INTRAVENOUS; SUBCUTANEOUS at 13:30

## 2019-12-17 RX ADMIN — HEPARIN SODIUM 5000 UNIT(S): 5000 INJECTION INTRAVENOUS; SUBCUTANEOUS at 07:48

## 2019-12-17 RX ADMIN — Medication 1 APPLICATION(S): at 13:29

## 2019-12-17 RX ADMIN — MEROPENEM 100 MILLIGRAM(S): 1 INJECTION INTRAVENOUS at 10:47

## 2019-12-17 RX ADMIN — MYCOPHENOLATE MOFETIL 500 MILLIGRAM(S): 250 CAPSULE ORAL at 23:55

## 2019-12-17 RX ADMIN — Medication 2: at 22:13

## 2019-12-17 RX ADMIN — MYCOPHENOLATE MOFETIL 500 MILLIGRAM(S): 250 CAPSULE ORAL at 10:47

## 2019-12-17 RX ADMIN — TACROLIMUS 4 MILLIGRAM(S): 5 CAPSULE ORAL at 22:12

## 2019-12-17 RX ADMIN — POLYETHYLENE GLYCOL 3350 17 GRAM(S): 17 POWDER, FOR SOLUTION ORAL at 07:48

## 2019-12-17 RX ADMIN — Medication 100 MILLIGRAM(S): at 07:48

## 2019-12-17 RX ADMIN — LINEZOLID 600 MILLIGRAM(S): 600 INJECTION, SOLUTION INTRAVENOUS at 22:14

## 2019-12-17 RX ADMIN — HEPARIN SODIUM 5000 UNIT(S): 5000 INJECTION INTRAVENOUS; SUBCUTANEOUS at 22:13

## 2019-12-17 RX ADMIN — AMLODIPINE BESYLATE 10 MILLIGRAM(S): 2.5 TABLET ORAL at 17:37

## 2019-12-17 RX ADMIN — INSULIN GLARGINE 12 UNIT(S): 100 INJECTION, SOLUTION SUBCUTANEOUS at 22:14

## 2019-12-17 RX ADMIN — Medication 4: at 17:50

## 2019-12-17 RX ADMIN — Medication 81 MILLIGRAM(S): at 13:28

## 2019-12-17 RX ADMIN — TAMSULOSIN HYDROCHLORIDE 0.4 MILLIGRAM(S): 0.4 CAPSULE ORAL at 22:12

## 2019-12-17 RX ADMIN — CARVEDILOL PHOSPHATE 37.5 MILLIGRAM(S): 80 CAPSULE, EXTENDED RELEASE ORAL at 07:48

## 2019-12-17 RX ADMIN — Medication 2: at 09:58

## 2019-12-17 RX ADMIN — CHLORHEXIDINE GLUCONATE 1 APPLICATION(S): 213 SOLUTION TOPICAL at 07:49

## 2019-12-17 RX ADMIN — LINEZOLID 600 MILLIGRAM(S): 600 INJECTION, SOLUTION INTRAVENOUS at 10:47

## 2019-12-17 NOTE — DISCHARGE NOTE PROVIDER - NSDCFUADDAPPT_GEN_ALL_CORE_FT
2:30 pm on 1/6/2020 with Caterina.  Please follow up with Dr. Aranda within 1 week of discharge. An appointment has been made for you at 2:30 pm on 1/6/2020 with Dr. Diggs.  Please follow up with Dr. Aranda within 7-10 days of discharge.

## 2019-12-17 NOTE — PHYSICAL THERAPY INITIAL EVALUATION ADULT - NS ASR WT BEARING DETAIL LLE
L foot heel WBing, confirmed with Vascular MD team. Pt owns L toe relief DARCO shoe and R custom orthotic shoe

## 2019-12-17 NOTE — PROGRESS NOTE ADULT - PROBLEM SELECTOR PLAN 2
IV Meropenem
Pt reports frustrations with memory from time to time. Wife believe patient is closer to baseline now (in evening) compared to morning confusion. Head CT negative (12/6). Neuro exam unremarkable. No signs of infections (no leukocytosis, no fever, no tachy, foot exam at baseline). AOx3 although takes some time to answer date. Per wife at bedside, pt's confusion is improving compared to at time of admission.  - Neuro consulted 12/9, appreciate recs  - c/w current abx treatment  - monitor mental status
patient alert today  told to get into chair for as long as possible  Physical Therapy consult needed
Pt reports frustrations with memory from time to time. Wife believe patient is closer to baseline now (in evening) compared to morning confusion. Head CT negative (12/6). Neuro exam unremarkable. No signs of infections (no leukocytosis, no fever, no tachy, foot exam at baseline). AOx3 although takes some time to answer date. Per wife at bedside, pt's confusion is improving compared to at time of admission.  - Neuro consulted 12/9, appreciate recs  - c/w current abx treatment  - monitor mental status
Pt reports frustrations with memory from time to time. Wife believe patient is closer to baseline now (in evening) compared to morning confusion. Head CT negative (12/6). Neuro exam unremarkable. No signs of infections (no leukocytosis, no fever, no tachy, foot exam at baseline). AOx3 although takes some time to answer date. Per wife at bedside, pt's confusion is improving compared to at time of admission.  - Neuro consulted today, appreciate recs  - c/w abx  - monitor mental status
Pt reports frustrations with memory from time to time. Wife believe patient is closer to baseline now (in evening) compared to morning confusion. Head CT negative (12/6). Neuro exam unremarkable. No signs of infections (no leukocytosis, no fever, no tachy, foot exam at baseline). AOx3 although takes some time to answer date. Per wife at bedside, pt's confusion is improving compared to at time of admission.  - Neuro consulted 12/9, appreciate recs  - c/w current abx treatment  - monitor mental status
Pt reports frustrations with memory from time to time. Wife believe patient is closer to baseline now (in evening) compared to morning confusion. Head CT negative (12/6). Neuro exam unremarkable. No signs of infections (no leukocytosis, no fever, no tachy, foot exam at baseline). AOx3 although takes some time to answer date. Per wife at bedside, pt's confusion is improving compared to at time of admission.  - Neuro consulted 12/9, appreciate recs  - c/w current abx treatment  - monitor mental status
Pt reports frustrations with memory from time to time. Wife believe patient is closer to baseline now (in evening) compared to morning confusion yesterday but still feels he is not as sharp as normal. Head CT negative. Neuro exam unremarkable. No signs of infections (no leukocytosis, no fever, no tachy, foot exam at baseline). AOx3 although takes some time to answer date.  - c/w abx  - monitor mental status

## 2019-12-17 NOTE — DISCHARGE NOTE PROVIDER - NSDCMRMEDTOKEN_GEN_ALL_CORE_FT
aspirin 81 mg oral tablet, chewable: 1 tab(s) orally once a day  carvedilol 12.5 mg oral tablet: 1 tab(s) orally 2 times a day  cefepime 2 g intravenous injection: 2 gram(s) intravenous every 8 hours  docusate sodium 100 mg oral capsule: 1 cap(s) orally 3 times a day  Flomax 0.4 mg oral capsule: 1 cap(s) orally once a day  HumaLOG Mix 75/25 subcutaneous suspension: subcutaneous 2 times a day  linezolid 600 mg oral tablet: 1 tab(s) orally every 12 hours  meropenem 1000 mg intravenous injection: 1000 milligram(s) intravenously once a day   mycophenolate mofetil 250 mg oral capsule: 500  orally 2 times a day  predniSONE 5 mg oral tablet: 1 tab(s) orally once a day  tacrolimus 1 mg oral capsule: 4 cap(s) orally 2 times a day  Weekly: CBC, CMP, ESR, CRP Fax to Dr. Tabor [phone]: Apply topically to affected area once a week aspirin 81 mg oral tablet, chewable: 1 tab(s) orally once a day  carvedilol 12.5 mg oral tablet: 1 tab(s) orally 2 times a day  cefepime 2 g intravenous injection: 2 gram(s) intravenous every 8 hours  docusate sodium 100 mg oral capsule: 1 cap(s) orally 3 times a day  Flomax 0.4 mg oral capsule: 1 cap(s) orally once a day  HumaLOG Mix 75/25 subcutaneous suspension: subcutaneous 2 times a day  linezolid 600 mg oral tablet: 1 tab(s) orally every 12 hours  meropenem 1000 mg intravenous injection: 1000 milligram(s) intravenously once a day   mycophenolate mofetil 250 mg oral capsule: 500  orally 2 times a day  predniSONE 5 mg oral tablet: 1 tab(s) orally once a day  tacrolimus 1 mg oral capsule: 4 cap(s) orally 2 times a day  Weekly: CBC, CMP, ESR, CRP Fax to Dr. Tabor [phone]: Apply topically to affected area once a week aspirin 81 mg oral tablet, chewable: 1 tab(s) orally once a day  carvedilol 12.5 mg oral tablet: 1 tab(s) orally 2 times a day  cefepime 2 g intravenous injection: 2 gram(s) intravenous every 8 hours  docusate sodium 100 mg oral capsule: 1 cap(s) orally 3 times a day  Flomax 0.4 mg oral capsule: 1 cap(s) orally once a day  HumaLOG Mix 75/25 subcutaneous suspension: subcutaneous 2 times a day  linezolid 600 mg oral tablet: 1 tab(s) orally every 12 hours  meropenem 1000 mg intravenous injection: 1000 milligram(s) intravenously once a day   mycophenolate mofetil 250 mg oral capsule: 500  orally 2 times a day  predniSONE 5 mg oral tablet: 1 tab(s) orally once a day  tacrolimus 1 mg oral capsule: 4 cap(s) orally 2 times a day  Weekly: CBC, CMP, ESR, CRP Fax to Dr. Tabor [phone]: Apply topically to affected area once a week aspirin 81 mg oral tablet, chewable: 1 tab(s) orally once a day  Attach left wound vac continuous setting 125, change wound vac three times a week Monday Wednesday Friday: Apply topically to affected area 3 times a week   carvedilol 12.5 mg oral tablet: 1 tab(s) orally 2 times a day  cefepime 2 g intravenous injection: 2 gram(s) intravenous every 8 hours  docusate sodium 100 mg oral capsule: 1 cap(s) orally 3 times a day  Flomax 0.4 mg oral capsule: 1 cap(s) orally once a day  HumaLOG Mix 75/25 subcutaneous suspension: subcutaneous 2 times a day  linezolid 600 mg oral tablet: 1 tab(s) orally every 12 hours  meropenem 1000 mg intravenous injection: 1000 milligram(s) intravenously once a day   mycophenolate mofetil 250 mg oral capsule: 500  orally 2 times a day  predniSONE 5 mg oral tablet: 1 tab(s) orally once a day  tacrolimus 1 mg oral capsule: 4 cap(s) orally 2 times a day  Weekly: CBC, CMP, ESR, CRP Fax to Dr. Tabor [phone]: Apply topically to affected area once a week aspirin 81 mg oral tablet, chewable: 1 tab(s) orally once a day  Attach left wound vac continuous setting 125, change wound vac three times a week Monday Wednesday Friday: Apply topically to affected area 3 times a week   carvedilol 12.5 mg oral tablet: 1 tab(s) orally 2 times a day  cefepime 2 g intravenous injection: 2 gram(s) intravenous every 8 hours  docusate sodium 100 mg oral capsule: 1 cap(s) orally 3 times a day  Flomax 0.4 mg oral capsule: 1 cap(s) orally once a day  HumaLOG Mix 75/25 subcutaneous suspension: subcutaneous 2 times a day  linezolid 600 mg oral tablet: 1 tab(s) orally every 12 hours  meropenem 1000 mg intravenous injection: 1000 milligram(s) intravenously once a day   mycophenolate mofetil 250 mg oral capsule: 500  orally 2 times a day  predniSONE 5 mg oral tablet: 1 tab(s) orally once a day  tacrolimus 1 mg oral capsule: 4 cap(s) orally 2 times a day  Weekly: CBC, CMP, ESR, CRP Fax to Dr. Tabor [phone]: Apply topically to affected area once a week

## 2019-12-17 NOTE — PROGRESS NOTE ADULT - ATTENDING COMMENTS
I independently performed the key portions of the evaluation and management service provided. I agree with the above history, physical, and plan which I have reviewed and edited where appropriate. I find ELIZABETH improving.  Follow SCr. Continue anti-hypertensives. Continue antibiotics.  See full note. (Patient seen earlier in day.)

## 2019-12-17 NOTE — PROGRESS NOTE ADULT - PROBLEM SELECTOR PROBLEM 8
Transition of care performed with sharing of clinical summary

## 2019-12-17 NOTE — PHYSICAL THERAPY INITIAL EVALUATION ADULT - CRITERIA FOR SKILLED THERAPEUTIC INTERVENTIONS
risk reduction/prevention/impairments found/rehab potential/anticipated discharge recommendation/functional limitations in following categories/therapy frequency

## 2019-12-17 NOTE — DISCHARGE NOTE PROVIDER - HOSPITAL COURSE
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        #Antibiotic toxicity - During admission, pt's creatinine increased to a peak of 3.8. Initially, ELIZABETH was thought to be due to poor PO intake vs. RT rejection vs. ATN from cefepime.         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         #ELIZABETH 2/2 ATN from cefepime toxicity - During admission, pt's creatinine increased to a peak of 3.8. Initially, ELIZABETH was thought to be due to poor PO intake vs. RT rejection vs. ATN from cefepime.         #Foot ulcer - vascular consulted and dressing changes performed. 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        #ELIZABETH 2/2 ATN from cefepime toxicity - During admission, pt's creatinine increased to a peak of 3.8. Initially, ELIZABETH was thought to be due to poor PO intake vs. RT rejection vs. ATN from cefepime. US initially concerning for renal artery stenosis, but further imaging consistent with inflammation and ATN due to the cefepime pt received. During admission cefepime was changed to meropenem with improvement in creatinine.    #Foot ulcer - vascular consulted and dressing changes performed MWF.     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        Main diagnoses:    #ELIZABETH 2/2 ATN from cefepime toxicity - During admission, pt's creatinine increased to a peak of 3.8. Initially, ELIZABETH was thought to be due to poor PO intake vs. RT rejection vs. ATN from cefepime. US initially concerning for renal artery stenosis, but further imaging consistent with inflammation and ATN due to the cefepime pt received. During admission cefepime was changed to meropenem with improvement in creatinine. Pt to receive meropenem via PICC until Jan 2nd 2020.     #Foot ulcer - Pt s/p I+D on 11/19 for non-healing ulcer and was started abx on 11/21 with cefepime and linezolid given sensitivities. Vascular consulted and dressing changes performed MWF. Switched from Zosyn given resistance. He had Corynebacterium striatum in culture and Enterococcus Faecalis in the prior culture.  Was started on linezolid 600 mg PO q12 hrs over vancomycin given history of renal transplant and given his concern of too many IV doses at home. Cefepime discontinued on 12/8 given its possible role in ELIZABETH and meropenem IV 1g qd started on 12/8.    #DM - Uncontrolled as outpatient. Started on Lantus and MISS during admission with titration with FSGs.     #HTN - Pt hypertensive during admission. coreg uptitrated to 37.5 mg BID and increased norvasc to 10 mg qd with improvement in BP.         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        Main diagnoses:    #ELIZABETH 2/2 ATN from cefepime toxicity - During admission, pt's creatinine increased to a peak of 3.8. Initially, ELIZABETH was thought to be due to poor PO intake vs. RT rejection vs. ATN from cefepime. US initially concerning for renal artery stenosis, but further imaging consistent with inflammation and ATN due to the cefepime pt received. During admission cefepime was changed to meropenem with improvement in creatinine. Pt to receive meropenem via PICC until Jan 2nd 2020.     #Foot ulcer - Pt s/p I+D on 11/19 for non-healing ulcer and was started abx on 11/21 with cefepime and linezolid given sensitivities. Vascular consulted and dressing changes performed MWF. Switched from Zosyn given resistance. He had Corynebacterium striatum in culture and Enterococcus Faecalis in the prior culture.  Was started on linezolid 600 mg PO q12 hrs over vancomycin given history of renal transplant and given his concern of too many IV doses at home. Cefepime discontinued on 12/8 given its possible role in ELIZABETH and meropenem IV 1g qd started on 12/8.    #DM - Uncontrolled as outpatient. Started on Lantus and MISS during admission with titration with FSGs.     #HTN - Pt hypertensive during admission. coreg uptitrated to 37.5 mg BID and increased norvasc to 10 mg qd with improvement in BP.         New Medications - meropenem 1000 BID, linezolid 600 BID, coreg 37.5 BID, norvasc 10 mg qd    Labs to be followed outpatient - cbc, cmp, ESR, CRP    Exam to be followed outpatient - foot x-ray

## 2019-12-17 NOTE — PHYSICAL THERAPY INITIAL EVALUATION ADULT - GENERAL OBSERVATIONS, REHAB EVAL
Pt found semi supine in bed in NAD, + hep lock, + PICC line intact, + wound VAC L foot site C/D/I, daughter at bedside, agreeable to PT Eval.

## 2019-12-17 NOTE — DISCHARGE NOTE PROVIDER - CARE PROVIDERS DIRECT ADDRESSES
[email or web address]

## 2019-12-17 NOTE — PROGRESS NOTE ADULT - PROBLEM SELECTOR PLAN 1
Pt Cr 1.5 on admission to [facility] from baseline of ~0.9. Pt with hx of renal transplant in setting of DM and HTN. Likely etiology of ELIZABETH is ATN 2/2 cefepime toxicity, likely also contributing to confusion. Patient tolerating PO, not dry on exam and mentating closer to baseline, per wife at bedside.   -creatinine improved to 2.88 today  -nephrology following, appreciate ongoing guidance  - encourage PO intake  -stopped IV fluids due to concern for volume overload (dyspnea with eating and mild vasc congestion on am cxr)  - 12/8 renal U/S consistent with medical renal dz without hydronephrosis.  - 12/10 renal dopplers showing mild R transplant kidney hydronephrosis with elevation of transplant renal artery velocity, concerning for renal artery stenosis  - CT abdomen/pelvis without contrast 12/10 showing 6 mm bladder stone just anterior to the right UVJ  -MRA prelim read not clearly showing renal arteries, awaiting final read  - NM study showing adequate flow to transplant kidney with excretion pattern consistent with ELIZABETH  - galium scan ordered to assess for AIN/ATN - showing inflammation of transplant kidney, though unlikely 2/2 chronic rejection  - continue to trend Cr  - Dr. Osborne from vascular cardiology also following, appreciate recs - no interventions at this time  -possibly due to zosyn/cefepime    #Volume overload, improved  -pt endorsed dyspnea after eating, crackles appreciated on exam, and vasc congestion on cxr; now improved, not c/o dyspnea today  -s/p 40 mg IVP lasix on 12/12

## 2019-12-17 NOTE — DISCHARGE NOTE PROVIDER - NSDCCPCAREPLAN_GEN_ALL_CORE_FT
PRINCIPAL DISCHARGE DIAGNOSIS  Diagnosis: Acute kidney injury  Assessment and Plan of Treatment: While admitted, we noticed that your kidney function tests were worsening. This was due to the cefepime you were on. We switched antibiotics to meropenema nd your kidney s improved. PRINCIPAL DISCHARGE DIAGNOSIS  Diagnosis: Acute kidney injury  Assessment and Plan of Treatment: While admitted, we noticed that your kidney function tests were worsening. This was due to the cefepime you were on. We switched antibiotics to meropenema nd your kidney s improved.  Dr. Guo PRINCIPAL DISCHARGE DIAGNOSIS  Diagnosis: Acute kidney injury  Assessment and Plan of Treatment: While admitted, we noticed that your kidney function tests were worsening. This was due to the cefepime you were on. We switched antibiotics to meropenem and your kidney function has been improved. PRINCIPAL DISCHARGE DIAGNOSIS  Diagnosis: Acute kidney injury  Assessment and Plan of Treatment: While admitted, we noticed that your kidney function tests were worsening. This was due to the cefepime you were on. We switched antibiotics to meropenem and your kidney function has been improved. Please follow up with Dr. Aranda within 7-10 days after discharge who will monitor your kidney function once discharged. Please also continue to eat and drink enough water during the day.      SECONDARY DISCHARGE DIAGNOSES  Diagnosis: Foot ulcer  Assessment and Plan of Treatment: You have a nonhealing foot ulcer. You need to be on oral linezolid (600 mg twice a day) and meropenem IV (1000 mg twice a day) for a total of 6 weeks. The last day of antibiotics is January 2nd, 2020 (first day was 11/21/2019). You will get the meropenem through the PICC line that was inserted. Vascular surgery was consulted for the foot ulcer and recommend that the wound dressing is changed on Monday, Wednsesday, and Friday. In addition, an appointment with Dr. Wood from vascular surgery has been scheduled for you. Please also continue to eat and drink enough water during the day.    Diagnosis: DM (diabetes mellitus)  Assessment and Plan of Treatment: You have a history of diabetes which is when your body cannot control your blood glucose levels. Your diabetes is the reason for the nonhealing ulcer on your foot as it can cause wounds to not heal properly. Please continue you antibiotics once discharged and follow up with Dr. Aranda.  For Rehab: pt was on Lantus 12u and MISS with adequate control. Please titrate appropriately.    Diagnosis: Confusion  Assessment and Plan of Treatment: You came into the hospital with concern for confusion. We believe this was due to dehydration and decreased intake of food and water. Upon discharge, you improved. Please also continue to eat and drink enough water during the day.    Diagnosis: HTN (hypertension)  Assessment and Plan of Treatment: Hypertension is the medical term for high blood pressure. Blood pressure refers to the pressure that blood applies to the inner walls of the arteries. Arteries carry blood from the heart to other organs and parts of the body. Untreated high blood pressure increases the strain on the heart and arteries, eventually causing organ damage. High blood pressure increases the risk of heart failure, heart attack (myocardial infarction), stroke, and kidney failure. High blood pressure does not usually cause any symptoms. Treatment of hypertension usually begins with lifestyle changes. Making these lifestyle changes involves little or no risk. Recommended changes often include reducing the amount of salt in your diet, losing weight if you are overweight or obese, avoiding drinking too much alcohol, stopping smoking and exercising at least 30 minutes per day most days of the week. If you are prescribed medication for your hypertension it is important to take these as prescribed to prevent the possible complications of uncontrolled hypertension. We increased your coreg to 37.5 mg twice a day and we increased your Norvasc to 10 mg once a day. Please monitor your blood pressure and continue these medications.    Diagnosis: History of renal transplant  Assessment and Plan of Treatment: You have a transplanted kidney. Please follow up with Dr. Aranda who will continue to monitor your kidney. Continue with your immunosuppressive medications as well.

## 2019-12-17 NOTE — PROGRESS NOTE ADULT - PROBLEM SELECTOR PLAN 9
F: IV NS @ 125cc/hr  E: replete prn  N: kosher renal  ppx:  lovenox 40mg  gi none  FULL CODE
F: IV NS @ 125cc/hr  E: replete prn  N: kosher renal  ppx:  lovenox 40mg  gi none  FULL CODE
F: None  E: replete prn  N: kosher renal dysphagia 2 mechanical soft thin liquids  ppx:  lovenox 40mg  gi none  FULL CODE
F: None  E: replete prn  N: kosher renal dysphagia 2 mechanical soft thin liquids  ppx:  lovenox 40mg  gi none  FULL CODE
F: IV NS @ 125cc/hr  E: replete prn  N: kosher renal  ppx:  lovenox 40mg  gi none  FULL CODE
F: None  E: replete prn  N: kosher renal dysphagia 2 mechanical soft thin liquids  ppx:  lovenox 40mg  gi none  FULL CODE
F: IV NS @ 125cc/hr  E: replete prn  N: kosher renal  ppx:  lovenox 40mg  gi none  FULL CODE

## 2019-12-17 NOTE — PHYSICAL THERAPY INITIAL EVALUATION ADULT - ADDITIONAL COMMENTS
Pt reports living in a PH with his wife, 2 PINO and 1 flight of steps to bedroom. Pt reports being independent with ADL's and mobility using a RW/cane prior to L toe amputations. Per wife, pt required increased assistance with mobility and ADL's following recent L toe amputations due to difficulty maintaining proper heel WB status on L LE. Pt reports he has remained on main level of house secondary to difficulty negotiating steps.

## 2019-12-17 NOTE — DISCHARGE NOTE PROVIDER - NSDCFUSCHEDAPPT_GEN_ALL_CORE_FT
ROBERT OTOOLE ; 02/10/2020 ; [facility] [address] ROBERT OTOOLE ; 01/06/2020 ; [facility] [address]  ROBERT OTOOLE ; 02/10/2020 ; [facility] [address]

## 2019-12-17 NOTE — PHYSICAL THERAPY INITIAL EVALUATION ADULT - PERTINENT HX OF CURRENT PROBLEM, REHAB EVAL
Mr Lamont is an 80 yo M with hx of Renal transplant (normal creatinine), functionally blind (glaucoma), DM (on insulin), BPH, HTN, left 4th adn 5th toe amputation with wound vac and diabetic ulcer required 6 week IV abx (PICC line on LUE) with cefepime and linezolid who was brought in today with complaints of confusion found to have ELIZABETH

## 2019-12-17 NOTE — PHYSICAL THERAPY INITIAL EVALUATION ADULT - GAIT DEVIATIONS NOTED, PT EVAL
increased time in double stance/decreased velocity of limb motion/decreased weight-shifting ability/decreased step length

## 2019-12-17 NOTE — DISCHARGE NOTE PROVIDER - CARE PROVIDER_API CALL
Ovidio Diggs)  Surgery; Vascular Surgery  [address] [place] [address]  Phone: [phone]  Fax: [phone]  Follow Up Time:     Callum Aranda)  Internal Medicine; Nephrology  [address], [address]  [place] [address]  Phone: [phone]  Fax: [phone]  Follow Up Time:

## 2019-12-17 NOTE — PHYSICAL THERAPY INITIAL EVALUATION ADULT - PLANNED THERAPY INTERVENTIONS, PT EVAL
transfer training/gait training/motor coordination training/strengthening/balance training/orthotic fitting/training/postural re-education/bed mobility training/neuromuscular re-education

## 2019-12-17 NOTE — PROGRESS NOTE ADULT - SUBJECTIVE AND OBJECTIVE BOX
OVERNIGHT EVENTS: No acute events    SUBJECTIVE / INTERVAL HPI: Patient seen and examined at bedside. Pt denied dyspnea, n/v, abd pain, d/c.     VITAL SIGNS:  Vital Signs Last 24 Hrs  T(C): 36.7 (17 Dec 2019 11:00), Max: 36.9 (16 Dec 2019 12:00)  T(F): 98.1 (17 Dec 2019 11:00), Max: 98.5 (16 Dec 2019 12:00)  HR: 75 (17 Dec 2019 11:00) (71 - 75)  BP: 144/74 (17 Dec 2019 11:00) (81/- - 144/74)  BP(mean): --  RR: 18 (17 Dec 2019 11:00) (18 - 18)  SpO2: 99% (17 Dec 2019 11:00) (92% - 99%)    PHYSICAL EXAM:    General: WDWN; Patient is in NAD  HEENT: NC/AT; PERRL, anicteric sclera; MMM  Neck: supple  Cardiovascular: +S1/S2, RRR  Respiratory: CTA B/L; no W/R/R  Gastrointestinal: soft, NT/ND; +BS  Extremities: WWP; no edema present  Vascular: 2+ radial pulses B/L  Neurological: AAOx3; no focal deficits     MEDICATIONS:  MEDICATIONS  (STANDING):  amLODIPine   Tablet 10 milliGRAM(s) Oral every 24 hours  aspirin  chewable 81 milliGRAM(s) Oral daily  BACItracin   Ointment 1 Application(s) Topical daily  carvedilol 37.5 milliGRAM(s) Oral every 12 hours  chlorhexidine 4% Liquid 1 Application(s) Topical <User Schedule>  dextrose 5%. 1000 milliLiter(s) (50 mL/Hr) IV Continuous <Continuous>  dextrose 50% Injectable 12.5 Gram(s) IV Push once  dextrose 50% Injectable 25 Gram(s) IV Push once  dextrose 50% Injectable 25 Gram(s) IV Push once  docusate sodium 100 milliGRAM(s) Oral three times a day  heparin  flush 100 Units/mL Injectable 100 Unit(s) IV Push every other day  heparin  Injectable 5000 Unit(s) SubCutaneous every 8 hours  insulin glargine Injectable (LANTUS) 12 Unit(s) SubCutaneous at bedtime  insulin lispro (HumaLOG) corrective regimen sliding scale   SubCutaneous Before meals and at bedtime  linezolid    Tablet 600 milliGRAM(s) Oral every 12 hours  meropenem  IVPB 1000 milliGRAM(s) IV Intermittent every 12 hours  mycophenolate mofetil 500 milliGRAM(s) Oral every 12 hours  polyethylene glycol 3350 17 Gram(s) Oral every 12 hours  predniSONE   Tablet 5 milliGRAM(s) Oral every 24 hours  tacrolimus 4 milliGRAM(s) Oral every 12 hours  tamsulosin 0.4 milliGRAM(s) Oral at bedtime    MEDICATIONS  (PRN):  dextrose 40% Gel 15 Gram(s) Oral once PRN Blood Glucose LESS THAN 70 milliGRAM(s)/deciliter  glucagon  Injectable 1 milliGRAM(s) IntraMuscular once PRN Glucose LESS THAN 70 milligrams/deciliter  sodium chloride 0.9% lock flush 10 milliLiter(s) IV Push every 1 hour PRN Pre/post blood products, medications, blood draw, and to maintain line patency      ALLERGIES:  Allergies    No Known Allergies    Intolerances        LABS:                        10.9   6.18  )-----------( 116      ( 17 Dec 2019 10:57 )             34.2     12-16    142  |  107  |  39<H>  ----------------------------<  94  4.4   |  22  |  2.88<H>    Ca    9.5      16 Dec 2019 12:14  Mg     1.7     12-16    TPro  5.3<L>  /  Alb  3.3  /  TBili  0.2  /  DBili  x   /  AST  14  /  ALT  11  /  AlkPhos  70  12-15        CAPILLARY BLOOD GLUCOSE      POCT Blood Glucose.: 159 mg/dL (17 Dec 2019 09:05)      RADIOLOGY & ADDITIONAL TESTS: Reviewed.

## 2019-12-18 ENCOUNTER — TRANSCRIPTION ENCOUNTER (OUTPATIENT)
Age: 79
End: 2019-12-18

## 2019-12-18 VITALS
SYSTOLIC BLOOD PRESSURE: 128 MMHG | HEART RATE: 71 BPM | DIASTOLIC BLOOD PRESSURE: 69 MMHG | OXYGEN SATURATION: 98 % | TEMPERATURE: 98 F | RESPIRATION RATE: 20 BRPM

## 2019-12-18 DIAGNOSIS — H40.9 UNSPECIFIED GLAUCOMA: ICD-10-CM

## 2019-12-18 LAB
ANION GAP SERPL CALC-SCNC: 12 MMOL/L — SIGNIFICANT CHANGE UP (ref 5–17)
BUN SERPL-MCNC: 32 MG/DL — HIGH (ref 7–23)
CALCIUM SERPL-MCNC: 9.4 MG/DL — SIGNIFICANT CHANGE UP (ref 8.4–10.5)
CHLORIDE SERPL-SCNC: 105 MMOL/L — SIGNIFICANT CHANGE UP (ref 96–108)
CO2 SERPL-SCNC: 22 MMOL/L — SIGNIFICANT CHANGE UP (ref 22–31)
CREAT SERPL-MCNC: 2.25 MG/DL — HIGH (ref 0.5–1.3)
GLUCOSE BLDC GLUCOMTR-MCNC: 146 MG/DL — HIGH (ref 70–99)
GLUCOSE BLDC GLUCOMTR-MCNC: 172 MG/DL — HIGH (ref 70–99)
GLUCOSE SERPL-MCNC: 131 MG/DL — HIGH (ref 70–99)
HCT VFR BLD CALC: 33 % — LOW (ref 39–50)
HGB BLD-MCNC: 10.5 G/DL — LOW (ref 13–17)
MAGNESIUM SERPL-MCNC: 1.7 MG/DL — SIGNIFICANT CHANGE UP (ref 1.6–2.6)
MCHC RBC-ENTMCNC: 27.1 PG — SIGNIFICANT CHANGE UP (ref 27–34)
MCHC RBC-ENTMCNC: 31.8 GM/DL — LOW (ref 32–36)
MCV RBC AUTO: 85.3 FL — SIGNIFICANT CHANGE UP (ref 80–100)
NRBC # BLD: 0 /100 WBCS — SIGNIFICANT CHANGE UP (ref 0–0)
PLATELET # BLD AUTO: 101 K/UL — LOW (ref 150–400)
POTASSIUM SERPL-MCNC: 4.6 MMOL/L — SIGNIFICANT CHANGE UP (ref 3.5–5.3)
POTASSIUM SERPL-SCNC: 4.6 MMOL/L — SIGNIFICANT CHANGE UP (ref 3.5–5.3)
RBC # BLD: 3.87 M/UL — LOW (ref 4.2–5.8)
RBC # FLD: 15.3 % — HIGH (ref 10.3–14.5)
SODIUM SERPL-SCNC: 139 MMOL/L — SIGNIFICANT CHANGE UP (ref 135–145)
WBC # BLD: 5.13 K/UL — SIGNIFICANT CHANGE UP (ref 3.8–10.5)
WBC # FLD AUTO: 5.13 K/UL — SIGNIFICANT CHANGE UP (ref 3.8–10.5)

## 2019-12-18 PROCEDURE — 99233 SBSQ HOSP IP/OBS HIGH 50: CPT

## 2019-12-18 PROCEDURE — 76776 US EXAM K TRANSPL W/DOPPLER: CPT

## 2019-12-18 PROCEDURE — 80076 HEPATIC FUNCTION PANEL: CPT

## 2019-12-18 PROCEDURE — 72198 MR ANGIO PELVIS W/O & W/DYE: CPT

## 2019-12-18 PROCEDURE — 80053 COMPREHEN METABOLIC PANEL: CPT

## 2019-12-18 PROCEDURE — 86901 BLOOD TYPING SEROLOGIC RH(D): CPT

## 2019-12-18 PROCEDURE — 81001 URINALYSIS AUTO W/SCOPE: CPT

## 2019-12-18 PROCEDURE — 97162 PT EVAL MOD COMPLEX 30 MIN: CPT

## 2019-12-18 PROCEDURE — 85027 COMPLETE CBC AUTOMATED: CPT

## 2019-12-18 PROCEDURE — 87799 DETECT AGENT NOS DNA QUANT: CPT

## 2019-12-18 PROCEDURE — 93005 ELECTROCARDIOGRAM TRACING: CPT

## 2019-12-18 PROCEDURE — 76705 ECHO EXAM OF ABDOMEN: CPT

## 2019-12-18 PROCEDURE — 86850 RBC ANTIBODY SCREEN: CPT

## 2019-12-18 PROCEDURE — 99285 EMERGENCY DEPT VISIT HI MDM: CPT | Mod: 25

## 2019-12-18 PROCEDURE — 80197 ASSAY OF TACROLIMUS: CPT

## 2019-12-18 PROCEDURE — 90686 IIV4 VACC NO PRSV 0.5 ML IM: CPT

## 2019-12-18 PROCEDURE — 74185 MRA ABD W OR W/O CNTRST: CPT

## 2019-12-18 PROCEDURE — 85610 PROTHROMBIN TIME: CPT

## 2019-12-18 PROCEDURE — 83605 ASSAY OF LACTIC ACID: CPT

## 2019-12-18 PROCEDURE — 74018 RADEX ABDOMEN 1 VIEW: CPT

## 2019-12-18 PROCEDURE — 87040 BLOOD CULTURE FOR BACTERIA: CPT

## 2019-12-18 PROCEDURE — 85025 COMPLETE CBC W/AUTO DIFF WBC: CPT

## 2019-12-18 PROCEDURE — 78802 RP LOCLZJ TUM WHBDY 1 D IMG: CPT

## 2019-12-18 PROCEDURE — 70450 CT HEAD/BRAIN W/O DYE: CPT

## 2019-12-18 PROCEDURE — 84540 ASSAY OF URINE/UREA-N: CPT

## 2019-12-18 PROCEDURE — A9556: CPT

## 2019-12-18 PROCEDURE — 84133 ASSAY OF URINE POTASSIUM: CPT

## 2019-12-18 PROCEDURE — 86900 BLOOD TYPING SEROLOGIC ABO: CPT

## 2019-12-18 PROCEDURE — 78707 K FLOW/FUNCT IMAGE W/O DRUG: CPT

## 2019-12-18 PROCEDURE — 82962 GLUCOSE BLOOD TEST: CPT

## 2019-12-18 PROCEDURE — 71046 X-RAY EXAM CHEST 2 VIEWS: CPT

## 2019-12-18 PROCEDURE — 93306 TTE W/DOPPLER COMPLETE: CPT

## 2019-12-18 PROCEDURE — 84300 ASSAY OF URINE SODIUM: CPT

## 2019-12-18 PROCEDURE — 71045 X-RAY EXAM CHEST 1 VIEW: CPT

## 2019-12-18 PROCEDURE — 82570 ASSAY OF URINE CREATININE: CPT

## 2019-12-18 PROCEDURE — 85730 THROMBOPLASTIN TIME PARTIAL: CPT

## 2019-12-18 PROCEDURE — 87205 SMEAR GRAM STAIN: CPT

## 2019-12-18 PROCEDURE — 92611 MOTION FLUOROSCOPY/SWALLOW: CPT

## 2019-12-18 PROCEDURE — 87086 URINE CULTURE/COLONY COUNT: CPT

## 2019-12-18 PROCEDURE — A9562: CPT

## 2019-12-18 PROCEDURE — 36415 COLL VENOUS BLD VENIPUNCTURE: CPT

## 2019-12-18 PROCEDURE — 83735 ASSAY OF MAGNESIUM: CPT

## 2019-12-18 PROCEDURE — 80048 BASIC METABOLIC PNL TOTAL CA: CPT

## 2019-12-18 PROCEDURE — 76770 US EXAM ABDO BACK WALL COMP: CPT

## 2019-12-18 PROCEDURE — 83935 ASSAY OF URINE OSMOLALITY: CPT

## 2019-12-18 PROCEDURE — 74176 CT ABD & PELVIS W/O CONTRAST: CPT

## 2019-12-18 PROCEDURE — 99232 SBSQ HOSP IP/OBS MODERATE 35: CPT

## 2019-12-18 RX ORDER — AMLODIPINE BESYLATE 2.5 MG/1
1 TABLET ORAL
Qty: 0 | Refills: 0 | DISCHARGE
Start: 2019-12-18

## 2019-12-18 RX ORDER — CARVEDILOL PHOSPHATE 80 MG/1
1 CAPSULE, EXTENDED RELEASE ORAL
Qty: 0 | Refills: 0 | DISCHARGE

## 2019-12-18 RX ORDER — INSULIN GLARGINE 100 [IU]/ML
12 INJECTION, SOLUTION SUBCUTANEOUS
Qty: 0 | Refills: 0 | DISCHARGE
Start: 2019-12-18

## 2019-12-18 RX ORDER — TACROLIMUS 5 MG/1
4 CAPSULE ORAL
Qty: 0 | Refills: 0 | DISCHARGE
Start: 2019-12-18

## 2019-12-18 RX ORDER — BACITRACIN ZINC 500 UNIT/G
1 OINTMENT IN PACKET (EA) TOPICAL
Qty: 0 | Refills: 0 | DISCHARGE
Start: 2019-12-18

## 2019-12-18 RX ORDER — MEROPENEM 1 G/30ML
1000 INJECTION INTRAVENOUS
Qty: 0 | Refills: 0 | DISCHARGE
Start: 2019-12-18

## 2019-12-18 RX ORDER — ASPIRIN/CALCIUM CARB/MAGNESIUM 324 MG
1 TABLET ORAL
Qty: 0 | Refills: 0 | DISCHARGE
Start: 2019-12-18

## 2019-12-18 RX ORDER — LINEZOLID 600 MG/300ML
1 INJECTION, SOLUTION INTRAVENOUS
Qty: 0 | Refills: 0 | DISCHARGE
Start: 2019-12-18

## 2019-12-18 RX ORDER — CARVEDILOL PHOSPHATE 80 MG/1
3 CAPSULE, EXTENDED RELEASE ORAL
Qty: 0 | Refills: 0 | DISCHARGE
Start: 2019-12-18

## 2019-12-18 RX ORDER — DOCUSATE SODIUM 100 MG
1 CAPSULE ORAL
Qty: 0 | Refills: 0 | DISCHARGE
Start: 2019-12-18

## 2019-12-18 RX ORDER — TACROLIMUS 5 MG/1
3 CAPSULE ORAL
Qty: 0 | Refills: 0 | DISCHARGE
Start: 2019-12-18

## 2019-12-18 RX ORDER — INSULIN LISPRO 100 [IU]/ML
0 INJECTION, SUSPENSION SUBCUTANEOUS
Qty: 0 | Refills: 0 | DISCHARGE

## 2019-12-18 RX ORDER — MYCOPHENOLATE MOFETIL 250 MG/1
2 CAPSULE ORAL
Qty: 0 | Refills: 0 | DISCHARGE
Start: 2019-12-18

## 2019-12-18 RX ORDER — MYCOPHENOLATE MOFETIL 250 MG/1
1 CAPSULE ORAL
Qty: 0 | Refills: 0 | DISCHARGE
Start: 2019-12-18

## 2019-12-18 RX ORDER — SODIUM CHLORIDE 9 MG/ML
10 INJECTION INTRAMUSCULAR; INTRAVENOUS; SUBCUTANEOUS
Qty: 252 | Refills: 0
Start: 2019-12-18 | End: 2020-01-28

## 2019-12-18 RX ORDER — TAMSULOSIN HYDROCHLORIDE 0.4 MG/1
1 CAPSULE ORAL
Qty: 0 | Refills: 0 | DISCHARGE
Start: 2019-12-18

## 2019-12-18 RX ORDER — CARVEDILOL PHOSPHATE 80 MG/1
1 CAPSULE, EXTENDED RELEASE ORAL
Qty: 0 | Refills: 0 | DISCHARGE
Start: 2019-12-18

## 2019-12-18 RX ADMIN — Medication 100 MILLIGRAM(S): at 06:43

## 2019-12-18 RX ADMIN — MEROPENEM 100 MILLIGRAM(S): 1 INJECTION INTRAVENOUS at 13:03

## 2019-12-18 RX ADMIN — LINEZOLID 600 MILLIGRAM(S): 600 INJECTION, SOLUTION INTRAVENOUS at 10:21

## 2019-12-18 RX ADMIN — Medication 1 APPLICATION(S): at 13:02

## 2019-12-18 RX ADMIN — Medication 81 MILLIGRAM(S): at 13:02

## 2019-12-18 RX ADMIN — MYCOPHENOLATE MOFETIL 500 MILLIGRAM(S): 250 CAPSULE ORAL at 10:22

## 2019-12-18 RX ADMIN — Medication 2: at 09:07

## 2019-12-18 RX ADMIN — CHLORHEXIDINE GLUCONATE 1 APPLICATION(S): 213 SOLUTION TOPICAL at 06:43

## 2019-12-18 RX ADMIN — CARVEDILOL PHOSPHATE 37.5 MILLIGRAM(S): 80 CAPSULE, EXTENDED RELEASE ORAL at 06:42

## 2019-12-18 RX ADMIN — TACROLIMUS 4 MILLIGRAM(S): 5 CAPSULE ORAL at 10:22

## 2019-12-18 RX ADMIN — Medication 100 UNIT(S): at 13:02

## 2019-12-18 RX ADMIN — Medication 5 MILLIGRAM(S): at 10:22

## 2019-12-18 RX ADMIN — HEPARIN SODIUM 5000 UNIT(S): 5000 INJECTION INTRAVENOUS; SUBCUTANEOUS at 06:42

## 2019-12-18 NOTE — PROGRESS NOTE ADULT - PROBLEM SELECTOR PROBLEM 2
Foot ulcer
Confusion
Foot ulcer
Confusion

## 2019-12-18 NOTE — PROGRESS NOTE ADULT - PROBLEM SELECTOR PROBLEM 4
Acute kidney injury
History of renal transplant

## 2019-12-18 NOTE — PROGRESS NOTE ADULT - REASON FOR ADMISSION
ELIZABETH
ELIZABETH
ELIZABETH/ AMS
ELIZABETH
ELIZABETH
ELIZABETH in pt with renal transplant.
sheldon in renal xp pt

## 2019-12-18 NOTE — PROGRESS NOTE ADULT - PROBLEM SELECTOR PROBLEM 1
Acute kidney injury
DM (diabetes mellitus)
Acute kidney injury

## 2019-12-18 NOTE — PROGRESS NOTE ADULT - ASSESSMENT
marked improvement, with creat now down to 2.25.    best interpretation of course to date is that pt had AIN likely from cefepine, now improved after change to meropenem.   bp satisfactory, and clin vol status acceptable. will transfer pt to rehab when bed avail, and continue current abx.     reviewed with family and staff.

## 2019-12-18 NOTE — PROGRESS NOTE ADULT - PROBLEM SELECTOR PROBLEM 3
History of renal transplant
HTN (hypertension)

## 2019-12-18 NOTE — PROGRESS NOTE ADULT - PROBLEM SELECTOR PROBLEM 5
HTN (hypertension)
Foot ulcer

## 2019-12-18 NOTE — PROGRESS NOTE ADULT - SUBJECTIVE AND OBJECTIVE BOX
interval:  no events, Cr cont to downtrend.        Feb 2017 Normal pharm nuclear stress test   ECHO - mod LVH, normal LVEF     CXR:  IMPRESSION:   No evidence of acute cardiopulmonary disease (12 Dec 2019)          Allergies    No Known Allergies    Intolerances    	    MEDICATIONS:  MEDICATIONS  (STANDING):  amLODIPine   Tablet 10 milliGRAM(s) Oral every 24 hours  aspirin  chewable 81 milliGRAM(s) Oral daily  BACItracin   Ointment 1 Application(s) Topical daily  carvedilol 37.5 milliGRAM(s) Oral every 12 hours  chlorhexidine 4% Liquid 1 Application(s) Topical <User Schedule>  dextrose 5%. 1000 milliLiter(s) (50 mL/Hr) IV Continuous <Continuous>  dextrose 50% Injectable 12.5 Gram(s) IV Push once  dextrose 50% Injectable 25 Gram(s) IV Push once  dextrose 50% Injectable 25 Gram(s) IV Push once  docusate sodium 100 milliGRAM(s) Oral three times a day  heparin  flush 100 Units/mL Injectable 100 Unit(s) IV Push every other day  heparin  Injectable 5000 Unit(s) SubCutaneous every 8 hours  insulin glargine Injectable (LANTUS) 12 Unit(s) SubCutaneous at bedtime  insulin lispro (HumaLOG) corrective regimen sliding scale   SubCutaneous Before meals and at bedtime  linezolid    Tablet 600 milliGRAM(s) Oral every 12 hours  meropenem  IVPB 1000 milliGRAM(s) IV Intermittent every 12 hours  mycophenolate mofetil 500 milliGRAM(s) Oral every 12 hours  polyethylene glycol 3350 17 Gram(s) Oral every 12 hours  predniSONE   Tablet 5 milliGRAM(s) Oral every 24 hours  tacrolimus 4 milliGRAM(s) Oral every 12 hours  tamsulosin 0.4 milliGRAM(s) Oral at bedtime      PAST MEDICAL & SURGICAL HISTORY:  History of renal transplant: secondary to DM  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  BPH (benign prostatic hypertrophy)  HTN (hypertension)  Amputation of toe  Kidney transplant recipient      FAMILY HISTORY:      SOCIAL HISTORY:  unchanged    REVIEW OF SYSTEMS:  CONSTITUTIONAL: No fever, weight loss, or fatigue  EYES: No eye pain, visual disturbances, or discharge  ENMT:  No difficulty hearing, tinnitus, vertigo; No sinus or throat pain  NECK: No pain or stiffness  RESPIRATORY: No cough, wheezing, chills or hemoptysis; No Shortness of Breath  CARDIOVASCULAR: No chest pain, palpitations, passing out, dizziness, or leg swelling  GASTROINTESTINAL: No abdominal or epigastric pain. No nausea, vomiting, or hematemesis; No diarrhea or constipation. No melena or hematochezia.  GENITOURINARY: No dysuria, frequency, hematuria, or incontinence  NEUROLOGICAL: No headaches, memory loss, loss of strength, numbness, or tremors  SKIN: No itching, burning, rashes, or lesions   LYMPH Nodes: No enlarged glands  ENDOCRINE: No heat or cold intolerance; No hair loss  MUSCULOSKELETAL: No joint pain or swelling; No muscle, back, or extremity pain  PSYCHIATRIC: No depression, anxiety, mood swings, or difficulty sleeping  HEME/LYMPH: No easy bruising, or bleeding gums  ALLERY AND IMMUNOLOGIC: No hives or eczema	    [X ] All others negative	  [ ] Unable to obtain    PHYSICAL EXAM:  Vital Signs Last 24 Hrs  Afebrile, Hr 60, -150      Appearance: lethargic/sleepy, no distress	  HEENT:   Normal oral mucosa, PERRL, EOMI	  Lymphatic: No lymphadenopathy  Respiratory: Lungs clear to auscultation	  Psychiatry: A & O x 3, Mood & affect appropriate  Gastrointestinal:  Soft, Non-tender, + BS	  Neurologic: Non-focal    CARDIOVASCULAR EXAM:  Cardiac: No murmurs, Normal S1 S2  Neck: No carotid bruits, no JVD  Extremities: No edema, s/p left 4th/5th toe amp, wound vac in place  Skin: Warm, No rashes, No ecchymoses, no cyanosis,  no ulcerations   Pulses: Peripheral pulses palpable bilaterally  LEFT Femoral - present  RIGHT Femoral - present  LEFT Popliteal - present  RIGHT Popliteal - present  LEFT Dorsalis Pedis - present  RIGHT Dorsalis Pedis - present  LEFT Posterior Tibial - present  RIGHT Posterior Tibial - present       LABS:	 	                        10.5   5.13  )-----------( 101      ( 18 Dec 2019 11:48 )             33.0   12-18    139  |  105  |  32<H>  ----------------------------<  131<H>  4.6   |  22  |  2.25<H>    Ca    9.4      18 Dec 2019 11:48  Mg     1.7     12-18      Assessment:  Acute Renal Failure -  most likely antibiotic induced, Cr improving.  Renal artery disease noted on duplex PSV ~ 300 cm/s at site of anastomosis.  MRA abdomen with poor visualization of renal artery.    Altered Mental Status - improved  Infected Diabetic Foot Ulcer s/p toe amp   HTN     Plan:  - as renal function improving no plan for renal angiogram at this time.  Perfusion study with adequate flow into the transplant kidney with normal uptake pattern, excretion pattern consistent with acute kidney injury.    - ECHO unremarkable, ELIZABETH not due to cardiac etiology.     - HTN better controlled with addition of norvasc to coreg     Dago Osborne MD

## 2019-12-18 NOTE — DISCHARGE NOTE NURSING/CASE MANAGEMENT/SOCIAL WORK - PATIENT PORTAL LINK FT
You can access the FollowMyHealth Patient Portal offered by [facility] by registering at the following website: [email or web address]. By joining [employer]’s FollowMyHealth portal, you will also be able to view your health information using other applications (apps) compatible with our system.

## 2019-12-18 NOTE — PROGRESS NOTE ADULT - SUBJECTIVE AND OBJECTIVE BOX
Vascular Surgery Consult Progress Note    Interval HPI:   79M w/ h/o renal transplant s/p left 4th & 5th toe amputation 11/19 (Caterina) for diabetic foot infection requiring 6 wks of IV abx (cefepime & linezolid) who returned to [facility] 12/6 w/ ELIZABETH. Per ID, abx were changed to meropenem 1g qD & linezolid 600 BID to complete a 6wk course (Day 1: 11/21). Vascular Surgery continues changing left foot wound vac and monitoring healing.     Subjective:   Pt seen & examined at bedside.     Vital Signs Last 24 Hrs  T(C): 36.7 (18 Dec 2019 11:27), Max: 36.9 (17 Dec 2019 21:16)  T(F): 98.1 (18 Dec 2019 11:27), Max: 98.4 (17 Dec 2019 21:16)  HR: 71 (18 Dec 2019 11:27) (71 - 80)  BP: 128/69 (18 Dec 2019 11:27) (117/55 - 166/66)  BP(mean): --  RR: 20 (18 Dec 2019 11:27) (18 - 20)  SpO2: 98% (18 Dec 2019 11:27) (96% - 98%)    Physical Exam:  General: Non-toxic, resting comfortably in bed  Pulmonary: Nonlabored breathing  Cardiovascular: NSR  Abdominal: soft, NT/ND  Extremities: WWP, left lateral foot wound 3x2cm healing well w/ pink granulation tissue, slightly macerated skin edge inferiorly however improved from admission. Wet to dry dressing    LABS/RADIOLOGY RESULTS:                          10.5   5.13  )-----------( 101      ( 18 Dec 2019 11:48 )             33.0   12-18    139  |  105  |  32<H>  ----------------------------<  131<H>  4.6   |  22  |  2.25<H>    Ca    9.4      18 Dec 2019 11:48  Mg     1.7     12-18    Blood Cultures

## 2019-12-18 NOTE — PROGRESS NOTE ADULT - SUBJECTIVE AND OBJECTIVE BOX
CC: ACUTE KIDNEY INJURY      INTERVAL HISTORY:  pt continues gradual clinical and biochemical improvement.   feels better.  more alert.   appetite and strength are better.        ROS: No chest pain. No shortness of breath. No nausea.    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  ENT: oral mucosa moist, no pallor/cyanosis.  Neck: no JVD visible.  Cardiac: no rubs. no murmurs. Extremities: NO EDEMA.  Skin: no rashes.  Extremities (digits): no clubbing or cyanosis.  Respratory effort: no access muscle use. Lungs: CLEAR TO AUSCULTATION.  Abdomen: soft. nontender. no masses.  Psych affect: not depressed. Orientation: person, place, situation.     MEDICATIONS  (STANDING):  amLODIPine   Tablet 10 milliGRAM(s) Oral every 24 hours  aspirin  chewable 81 milliGRAM(s) Oral daily  BACItracin   Ointment 1 Application(s) Topical daily  carvedilol 37.5 milliGRAM(s) Oral every 12 hours  chlorhexidine 4% Liquid 1 Application(s) Topical <User Schedule>  dextrose 5%. 1000 milliLiter(s) (50 mL/Hr) IV Continuous <Continuous>  dextrose 50% Injectable 12.5 Gram(s) IV Push once  dextrose 50% Injectable 25 Gram(s) IV Push once  dextrose 50% Injectable 25 Gram(s) IV Push once  docusate sodium 100 milliGRAM(s) Oral three times a day  heparin  flush 100 Units/mL Injectable 100 Unit(s) IV Push every other day  heparin  Injectable 5000 Unit(s) SubCutaneous every 8 hours  insulin glargine Injectable (LANTUS) 12 Unit(s) SubCutaneous at bedtime  insulin lispro (HumaLOG) corrective regimen sliding scale   SubCutaneous Before meals and at bedtime  linezolid    Tablet 600 milliGRAM(s) Oral every 12 hours  meropenem  IVPB 1000 milliGRAM(s) IV Intermittent every 12 hours  mycophenolate mofetil 500 milliGRAM(s) Oral every 12 hours  polyethylene glycol 3350 17 Gram(s) Oral every 12 hours  predniSONE   Tablet 5 milliGRAM(s) Oral every 24 hours  tacrolimus 4 milliGRAM(s) Oral every 12 hours  tamsulosin 0.4 milliGRAM(s) Oral at bedtime    MEDICATIONS  (PRN):  dextrose 40% Gel 15 Gram(s) Oral once PRN Blood Glucose LESS THAN 70 milliGRAM(s)/deciliter  glucagon  Injectable 1 milliGRAM(s) IntraMuscular once PRN Glucose LESS THAN 70 milligrams/deciliter  sodium chloride 0.9% lock flush 10 milliLiter(s) IV Push every 1 hour PRN Pre/post blood products, medications, blood draw, and to maintain line patency      DATA:  139    |  105    |  32<H>  ----------------------------<  131<H>  Ca:9.4   (18 Dec 2019 11:48)  4.6     |  22     |  2.25<H>      eGFR if Non : 27 <L>  eGFR if : 31 <L>                            10.5<L>  5.13  )-----------( 101<L>    ( 18 Dec 2019 11:48 )             33.0<L>

## 2019-12-18 NOTE — DISCHARGE NOTE NURSING/CASE MANAGEMENT/SOCIAL WORK - NSDCFUADDAPPT_GEN_ALL_CORE_FT
An appointment has been made for you at 2:30 pm on 1/6/2020 with Dr. Diggs.  Please follow up with Dr. Aranda within 7-10 days of discharge.

## 2019-12-18 NOTE — PROGRESS NOTE ADULT - ASSESSMENT
79M w/ h/o renal transplant s/p left 4th & 5th toe amputation 11/19 (Caterina) for diabetic foot infection requiring 6 wks of IV abx (cefepime & linezolid), now readmitted w/ ELIZABETH. Per ID, abx changed to meropenem 1g qD & linezolid 600 BID to complete a 6wk course (Day 1: 11/21).     #Left foot wound  -  healing wound  - Dressing replaced with wet to dry dressing  -Pt may follow up w/ Dr. Diggs in the office after discharge to further discuss possibility of skin graft

## 2019-12-18 NOTE — PROGRESS NOTE ADULT - PROBLEM SELECTOR PROBLEM 6
Glaucoma
DM (diabetes mellitus)

## 2019-12-21 DIAGNOSIS — T36.1X5A ADVERSE EFFECT OF CEPHALOSPORINS AND OTHER BETA-LACTAM ANTIBIOTICS, INITIAL ENCOUNTER: ICD-10-CM

## 2019-12-21 DIAGNOSIS — E11.649 TYPE 2 DIABETES MELLITUS WITH HYPOGLYCEMIA WITHOUT COMA: ICD-10-CM

## 2019-12-21 DIAGNOSIS — G93.41 METABOLIC ENCEPHALOPATHY: ICD-10-CM

## 2019-12-21 DIAGNOSIS — N13.30 UNSPECIFIED HYDRONEPHROSIS: ICD-10-CM

## 2019-12-21 DIAGNOSIS — R53.1 WEAKNESS: ICD-10-CM

## 2019-12-21 DIAGNOSIS — E11.319 TYPE 2 DIABETES MELLITUS WITH UNSPECIFIED DIABETIC RETINOPATHY WITHOUT MACULAR EDEMA: ICD-10-CM

## 2019-12-21 DIAGNOSIS — Z89.422 ACQUIRED ABSENCE OF OTHER LEFT TOE(S): ICD-10-CM

## 2019-12-21 DIAGNOSIS — E11.621 TYPE 2 DIABETES MELLITUS WITH FOOT ULCER: ICD-10-CM

## 2019-12-21 DIAGNOSIS — I70.1 ATHEROSCLEROSIS OF RENAL ARTERY: ICD-10-CM

## 2019-12-21 DIAGNOSIS — Z87.891 PERSONAL HISTORY OF NICOTINE DEPENDENCE: ICD-10-CM

## 2019-12-21 DIAGNOSIS — L97.529 NON-PRESSURE CHRONIC ULCER OF OTHER PART OF LEFT FOOT WITH UNSPECIFIED SEVERITY: ICD-10-CM

## 2019-12-21 DIAGNOSIS — K59.00 CONSTIPATION, UNSPECIFIED: ICD-10-CM

## 2019-12-21 DIAGNOSIS — N40.0 BENIGN PROSTATIC HYPERPLASIA WITHOUT LOWER URINARY TRACT SYMPTOMS: ICD-10-CM

## 2019-12-21 DIAGNOSIS — Z94.0 KIDNEY TRANSPLANT STATUS: ICD-10-CM

## 2019-12-21 DIAGNOSIS — N17.0 ACUTE KIDNEY FAILURE WITH TUBULAR NECROSIS: ICD-10-CM

## 2019-12-21 DIAGNOSIS — H40.9 UNSPECIFIED GLAUCOMA: ICD-10-CM

## 2020-01-06 ENCOUNTER — APPOINTMENT (OUTPATIENT)
Dept: VASCULAR SURGERY | Facility: CLINIC | Age: 80
End: 2020-01-06
Payer: MEDICARE

## 2020-01-06 PROCEDURE — 99024 POSTOP FOLLOW-UP VISIT: CPT

## 2020-01-06 NOTE — PHYSICAL EXAM
[2+] : left 2+ [Calm] : calm [de-identified] : pleasant here with wife and daughter [de-identified] : left foot wound with good granulation tissue

## 2020-01-06 NOTE — HISTORY OF PRESENT ILLNESS
[FreeTextEntry1] : pt was admitted to the hospital for medical reasons\par he was seen while he was admitted and the wound was cared for by the team\par \par pt had 4th and 5th toe amputations\par he had a VAC placed\par \par he is doing well now\par

## 2020-01-15 ENCOUNTER — LABORATORY RESULT (OUTPATIENT)
Age: 80
End: 2020-01-15

## 2020-01-15 ENCOUNTER — APPOINTMENT (OUTPATIENT)
Dept: NEPHROLOGY | Facility: CLINIC | Age: 80
End: 2020-01-15
Payer: MEDICARE

## 2020-01-15 VITALS — HEART RATE: 74 BPM | WEIGHT: 192.25 LBS | HEIGHT: 67 IN | OXYGEN SATURATION: 98 % | BODY MASS INDEX: 30.17 KG/M2

## 2020-01-15 VITALS — DIASTOLIC BLOOD PRESSURE: 67 MMHG | SYSTOLIC BLOOD PRESSURE: 129 MMHG | HEART RATE: 72 BPM

## 2020-01-15 VITALS — SYSTOLIC BLOOD PRESSURE: 134 MMHG | DIASTOLIC BLOOD PRESSURE: 69 MMHG | HEART RATE: 79 BPM

## 2020-01-15 DIAGNOSIS — B95.5 BACTEREMIA: ICD-10-CM

## 2020-01-15 DIAGNOSIS — H26.491 OTHER SECONDARY CATARACT, RIGHT EYE: ICD-10-CM

## 2020-01-15 DIAGNOSIS — R78.81 BACTEREMIA: ICD-10-CM

## 2020-01-15 PROCEDURE — 99214 OFFICE O/P EST MOD 30 MIN: CPT | Mod: 25

## 2020-01-15 PROCEDURE — 36415 COLL VENOUS BLD VENIPUNCTURE: CPT

## 2020-01-15 NOTE — ASSESSMENT
[FreeTextEntry1] : Plan:\par 1) HTN: BP acceptable today on current regimen and therefore will not adjust patient's antihypertensive medications. Will reassess pressure and regimen at next evaluation. \par 2) CRF: last eGFR of 59; Have evaluated patient's renal function, blood pressure, and fluid volume status which do not necessitate changes to medications at this time and will thus maintain current therapy. Will continue to monitor function with CMP due to risk for progression of renal failure.\par 3) Transplant medications: Continue on current regimen of tacrolimus and mycophenolate. \par 4) Inability to ambulate and paresthesia in hands: Advised patient to see Dr. Mcnair for neurology evaluation. Will speak to Dr. Mcnair directly.\par \par Changes to Medications: none\par \par Labs were drawn and patient will return for a follow-up appointment pending labs and above workup.

## 2020-01-15 NOTE — HISTORY OF PRESENT ILLNESS
[FreeTextEntry1] : The patient is a 79 year old male presenting for follow-up after recent hospitalization and for active reassessment of HTN, DM, anemia, ASCVD, and h/o renal transplant. \par \par Interval Data:\par - Labs from 11/14/19 reveal: HGB 12.1, HCT 39, Na 134, K 5.4, glucose 335, eGFR 59, creatinine 1.16. \par - 12/6/19 patient was admitted to [facility] for ELIZABETH due to ATN from Cefepime of which patient was on IV course s/p 4th and 5th toe amputation with wound vac and diabetic foot ulcer. Changed to Meropenem with improvement in creatinine. Pt started on linezolid 600mg PO Q12H over vancomycin. Started on Lantus during admission with titration with FSGs. Discharged on 12/18/19; note is on record.\par - 1/6/20 surgical f/u with Dr. Diggs s/p 4th and 5th toe amputations. Noted good granulation, and advised to continue local wound care on left foot; note is on record.\par \par Current Complaints:\par - Patient complains that he cannot walk since recent hospitalization, not even with walker as he was able to do prior. He also c/o tingling in hands. Pt reports that he saw Dr. Mcnair while in the hospital. Reports that his appetite and nausea are improving. He weighs 192lbs in office today, an involuntary loss of 15lbs in four months. He is now currently in rehab at [facility] s/p hospitalization.\par - Pt reports that his foot ulcer is healing. He is now off of abx and wound vacc. PICC line has been removed. \par \par Current Medication: prednisone 5mg QD, ASA 81mg QD,Flomax .4mg QD, Humalog 100 BID, insulin 100 QHS, carvedilol 12.5mg BID,  mycophenolate 500mg BID, tacrolimus 4mg BID, Tylenol 325mg prn, amlodipine 10mg QD, Colace 100mg QD, and ergocalciferol 15067 units QW.

## 2020-01-15 NOTE — END OF VISIT
[FreeTextEntry3] : All medical record entries made by the Scribe were at my, Dr. Callum Aranda, direction and personally dictated by me on 01/15/2020. I have reviewed the chart and agree that the record accurately reflects my personal performance of the history, physical exam, assessment and plan. I have also personally directed, reviewed, and agreed with the chart.

## 2020-01-15 NOTE — PHYSICAL EXAM
[General Appearance - Alert] : alert [General Appearance - In No Acute Distress] : in no acute distress [PERRL With Normal Accommodation] : pupils were equal in size, round, and reactive to light [Sclera] : the sclera and conjunctiva were normal [Extraocular Movements] : extraocular movements were intact [Outer Ear] : the ears and nose were normal in appearance [Oropharynx] : the oropharynx was normal [Neck Appearance] : the appearance of the neck was normal [Neck Cervical Mass (___cm)] : no neck mass was observed [Jugular Venous Distention Increased] : there was no jugular-venous distention [Thyroid Diffuse Enlargement] : the thyroid was not enlarged [Thyroid Nodule] : there were no palpable thyroid nodules [Auscultation Breath Sounds / Voice Sounds] : lungs were clear to auscultation bilaterally [Heart Rate And Rhythm] : heart rate was normal and rhythm regular [Heart Sounds] : normal S1 and S2 [Heart Sounds Gallop] : no gallops [Murmurs] : no murmurs [Heart Sounds Pericardial Friction Rub] : no pericardial rub [Bowel Sounds] : normal bowel sounds [Edema] : there was no peripheral edema [Abdomen Tenderness] : non-tender [Abdomen Soft] : soft [Abdomen Mass (___ Cm)] : no abdominal mass palpated [No CVA Tenderness] : no ~M costovertebral angle tenderness [Nail Clubbing] : no clubbing  or cyanosis of the fingernails [No Spinal Tenderness] : no spinal tenderness [Musculoskeletal - Swelling] : no joint swelling seen [Motor Tone] : muscle strength and tone were normal [Skin Turgor] : normal skin turgor [Skin Color & Pigmentation] : normal skin color and pigmentation [] : no rash [No Focal Deficits] : no focal deficits [Oriented To Time, Place, And Person] : oriented to person, place, and time [Impaired Insight] : insight and judgment were intact [Affect] : the affect was normal [FreeTextEntry1] : reliant on wheelchair

## 2020-01-15 NOTE — ADDENDUM
[FreeTextEntry1] :  Documented by Lasha Bush acting as a scribe for Dr. Callum Aranda on 01/15/2020.

## 2020-01-19 LAB
24R-OH-CALCIDIOL SERPL-MCNC: 53 PG/ML
25(OH)D3 SERPL-MCNC: 24.6 NG/ML
ALBUMIN MFR SERPL ELPH: 55.9 %
ALBUMIN SERPL ELPH-MCNC: 3.6 G/DL
ALBUMIN SERPL-MCNC: 3.2 G/DL
ALBUMIN/GLOB SERPL: 1.3 RATIO
ALDOSTERONE SERUM: 14 NG/DL
ALP BLD-CCNC: 113 U/L
ALP BONE SERPL-MCNC: 18 MCG/L
ALPHA1 GLOB MFR SERPL ELPH: 5.6 %
ALPHA1 GLOB SERPL ELPH-MCNC: 0.3 G/DL
ALPHA2 GLOB MFR SERPL ELPH: 13.6 %
ALPHA2 GLOB SERPL ELPH-MCNC: 0.8 G/DL
ALT SERPL-CCNC: 25 U/L
ANION GAP SERPL CALC-SCNC: 12 MMOL/L
APPEARANCE: CLEAR
AST SERPL-CCNC: 19 U/L
B-GLOBULIN MFR SERPL ELPH: 14.2 %
B-GLOBULIN SERPL ELPH-MCNC: 0.8 G/DL
BACTERIA: ABNORMAL
BASOPHILS # BLD AUTO: 0.01 K/UL
BASOPHILS NFR BLD AUTO: 0.2 %
BILIRUB SERPL-MCNC: 0.4 MG/DL
BILIRUBIN URINE: NEGATIVE
BLOOD URINE: NEGATIVE
BUN SERPL-MCNC: 12 MG/DL
C3 SERPL-MCNC: 120 MG/DL
C4 SERPL-MCNC: 28 MG/DL
CALCIUM SERPL-MCNC: 9.6 MG/DL
CALCIUM SERPL-MCNC: 9.6 MG/DL
CHLORIDE SERPL-SCNC: 103 MMOL/L
CHOLEST SERPL-MCNC: 115 MG/DL
CHOLEST/HDLC SERPL: 2.9 RATIO
CO2 SERPL-SCNC: 21 MMOL/L
COLLAGEN CTX SERPL-MCNC: 587 PG/ML
COLOR: YELLOW
CREAT SERPL-MCNC: 1.02 MG/DL
CRP SERPL-MCNC: 0.42 MG/DL
DEPRECATED KAPPA LC FREE/LAMBDA SER: 1.39 RATIO
EOSINOPHIL # BLD AUTO: 0.29 K/UL
EOSINOPHIL NFR BLD AUTO: 4.4 %
ERYTHROCYTE [SEDIMENTATION RATE] IN BLOOD BY WESTERGREN METHOD: 7 MM/HR
ESTIMATED AVERAGE GLUCOSE: 212 MG/DL
FERRITIN SERPL-MCNC: 301 NG/ML
FOLATE SERPL-MCNC: 8.6 NG/ML
GAMMA GLOB FLD ELPH-MCNC: 0.6 G/DL
GAMMA GLOB MFR SERPL ELPH: 10.7 %
GLUCOSE QUALITATIVE U: ABNORMAL
GLUCOSE SERPL-MCNC: 266 MG/DL
HBA1C MFR BLD HPLC: 9 %
HBV SURFACE AB SER QL: NONREACTIVE
HBV SURFACE AG SER QL: NONREACTIVE
HCT VFR BLD CALC: 31.3 %
HCV AB SER QL: NONREACTIVE
HCV S/CO RATIO: 0.09 S/CO
HCYS SERPL-MCNC: 24.9 UMOL/L
HDLC SERPL-MCNC: 40 MG/DL
HGB BLD-MCNC: 9.4 G/DL
HYALINE CASTS: 3 /LPF
IGA SER QL IEP: 369 MG/DL
IGG SER QL IEP: 637 MG/DL
IGM SER QL IEP: 22 MG/DL
IMM GRANULOCYTES NFR BLD AUTO: 0.5 %
INTERPRETATION SERPL IEP-IMP: NORMAL
IRON SATN MFR SERPL: 17 %
IRON SERPL-MCNC: 29 UG/DL
KAPPA LC CSF-MCNC: 3.26 MG/DL
KAPPA LC SERPL-MCNC: 4.54 MG/DL
KETONES URINE: NEGATIVE
LDLC SERPL CALC-MCNC: 50 MG/DL
LEUKOCYTE ESTERASE URINE: NEGATIVE
LYMPHOCYTES # BLD AUTO: 1.1 K/UL
LYMPHOCYTES NFR BLD AUTO: 16.5 %
M PROTEIN SPEC IFE-MCNC: NORMAL
MAGNESIUM SERPL-MCNC: 1.6 MG/DL
MAN DIFF?: NORMAL
MCHC RBC-ENTMCNC: 27.5 PG
MCHC RBC-ENTMCNC: 30 GM/DL
MCV RBC AUTO: 91.5 FL
METHYLMALONATE SERPL-SCNC: 937 NMOL/L
MICROSCOPIC-UA: NORMAL
MONOCYTES # BLD AUTO: 0.46 K/UL
MONOCYTES NFR BLD AUTO: 6.9 %
MPO AB + PR3 PNL SER: NORMAL
NEUTROPHILS # BLD AUTO: 4.77 K/UL
NEUTROPHILS NFR BLD AUTO: 71.5 %
NITRITE URINE: NEGATIVE
NT-PROBNP SERPL-MCNC: 2152 PG/ML
PARATHYROID HORMONE INTACT: 94 PG/ML
PH URINE: 5
PHOSPHATE SERPL-MCNC: 2.9 MG/DL
PLATELET # BLD AUTO: 331 K/UL
POTASSIUM SERPL-SCNC: 5.3 MMOL/L
PROT SERPL-MCNC: 5.7 G/DL
PROTEIN URINE: NORMAL
RBC # BLD: 3.42 M/UL
RBC # FLD: 15.9 %
RED BLOOD CELLS URINE: 28 /HPF
RENIN PLASMA: 14.2 PG/ML
RHEUMATOID FACT SER QL: <10 IU/ML
SODIUM SERPL-SCNC: 136 MMOL/L
SPECIFIC GRAVITY URINE: 1.02
SQUAMOUS EPITHELIAL CELLS: 1 /HPF
T3FREE SERPL-MCNC: 2.51 PG/ML
T3RU NFR SERPL: 0.9 TBI
T4 FREE SERPL-MCNC: 1.3 NG/DL
T4 SERPL-MCNC: 6 UG/DL
THYROGLOB AB SERPL-ACNC: <20 IU/ML
THYROPEROXIDASE AB SERPL IA-ACNC: <10 IU/ML
TIBC SERPL-MCNC: 173 UG/DL
TRIGL SERPL-MCNC: 123 MG/DL
TSH SERPL-ACNC: 4.64 UIU/ML
UIBC SERPL-MCNC: 144 UG/DL
URATE SERPL-MCNC: 5 MG/DL
UROBILINOGEN URINE: NORMAL
VIT B12 SERPL-MCNC: 554 PG/ML
WBC # FLD AUTO: 6.66 K/UL
WHITE BLOOD CELLS URINE: 4 /HPF

## 2020-01-25 LAB — ANA SER IF-ACNC: NEGATIVE

## 2020-02-10 ENCOUNTER — APPOINTMENT (OUTPATIENT)
Dept: OPHTHALMOLOGY | Facility: CLINIC | Age: 80
End: 2020-02-10

## 2020-02-10 ENCOUNTER — APPOINTMENT (OUTPATIENT)
Dept: VASCULAR SURGERY | Facility: CLINIC | Age: 80
End: 2020-02-10
Payer: MEDICARE

## 2020-02-10 PROCEDURE — 99024 POSTOP FOLLOW-UP VISIT: CPT

## 2020-02-11 NOTE — REVIEW OF SYSTEMS
[Skin Wound] : skin wound [Fever] : no fever [Chills] : no chills [Feeling Tired] : not feeling tired [Leg Claudication] : no intermittent leg claudication [Lower Ext Edema] : lower extremity edema [Limb Pain] : no limb pain [Limb Swelling] : limb swelling [Negative] : Endocrine

## 2020-02-11 NOTE — PROCEDURE
[FreeTextEntry1] : L foot : wound was cleaned with hydrogen peroxide and Solosite was applied. Foot was wrapped with kerlix

## 2020-02-11 NOTE — ASSESSMENT
[Arterial/Venous Disease] : arterial/venous disease [FreeTextEntry1] : 80 yo M, w/p L 4th and 5th toes amputation returns for a f/u.\par Wound appears to be smaller, with good granulation tissue. It was cleaned with hydrogen peroxide and Solosite was applied. Foot was wrapped with kerlix.\par Patient was recommended to elevate his leg to decrease the edema and speed up the healing process.\par F/u in 4 weeks.\par  [Ulcer Care] : ulcer care

## 2020-02-11 NOTE — PHYSICAL EXAM
[2+] : left 2+ [Calm] : calm [Ankle Swelling (On Exam)] : present [Ankle Swelling Bilaterally] : bilaterally  [Ankle Swelling On The Left] : moderate [Alert] : alert [de-identified] : pleasant, here with wife, on a wheelchair [de-identified] : left foot wound with good granulation tissue, smaller in size, no signs of infection

## 2020-02-11 NOTE — HISTORY OF PRESENT ILLNESS
[FreeTextEntry1] : 78 yo M with multiple medical problems, s/p 4th and 5th toe amputations that was treated with VAC dressing, later he developed a wound infection and was admitted to the hospital for IV antibiotics. Patient was discharged to a rehab and today he returns for a follow up. He continues to stay at rehab where he receives wound care and PT. Patient denies pain at the site, no fever, chills.\par

## 2020-02-24 NOTE — PATIENT PROFILE ADULT - CENTRAL VENOUS CATHETER/PICC LINE
Subjective:       Patient ID: Josephine Bolton is a 51 y.o. female.    Chief Complaint: Follow up thyroid and Fatigue    HPI     Here for a f/u.      S/p drug eluting stent in ostial rca via heart cath on 12/13/19. Developed left sided chest pain on 12/18/19. Went to [facility] ER and given imdur. At ER, no ekg changes and neg troponins. F/ued with her cardiologist on 12/20/19. Subsequently, was hospitalized on 12/23/19 for chf exacerbation. Nuclear stress test at that time showed no ischemia.      Depression and anxiety stable while on effexor xr.     Reports some heaviness in left arm x 4-6 weeks, more so with lifting overhead.     Difficulty swallowing liquids and solids x 7-10 days. On protonix.     Review of Systems      Review of Systems   Constitutional: Negative for fever and chills.   HENT: Negative for hearing loss and neck stiffness.    Eyes: Negative for redness and itching.   Respiratory: Negative for cough and choking.    Cardiovascular: Negative for chest pain and leg swelling.  Abdomen: Negative for abdominal pain and blood in stool.   Genitourinary: Negative for dysuria and flank pain.   Musculoskeletal: Negative for back pain and gait problem.   Neurological: Negative for light-headedness and headaches.   Hematological: Negative for adenopathy.   Psychiatric/Behavioral: Negative for behavioral problems.     Objective:      Physical Exam   Constitutional: She appears well-developed.   HENT:   Head: Normocephalic and atraumatic.   Eyes: Pupils are equal, round, and reactive to light. Conjunctivae are normal.   Neck: Normal range of motion.   Cardiovascular: Normal rate and regular rhythm.   No murmur heard.  Pulmonary/Chest: Effort normal and breath sounds normal.   Musculoskeletal:   Left arm: weakness with abduction > 90 deg   Lymphadenopathy:     She has no cervical adenopathy.       Assessment:       1. Dysphagia, unspecified type    2. Muscle left arm weakness    3. Essential hypertension    4.  Coronary artery disease involving native coronary artery of native heart without angina pectoris    5. Depression, unspecified depression type    6. Anxiety    7. Morbid obesity        Plan:       Dysphagia, unspecified type  -     Case request GI: ESOPHAGOGASTRODUODENOSCOPY (EGD)    Muscle left arm weakness  -     EMG W/ ULTRASOUND AND NERVE CONDUCTION TEST 1 Extremity; Future    Essential hypertension    Coronary artery disease involving native coronary artery of native heart without angina pectoris    Depression, unspecified depression type    Anxiety    Morbid obesity            Plan:  See orders  Cont current meds        Medication List with Changes/Refills   Current Medications    ACETAMINOPHEN (TYLENOL) 500 MG TABLET    Take 2 tablets (1,000 mg total) by mouth every 8 (eight) hours.    ALBUTEROL (PROVENTIL HFA) 90 MCG/ACTUATION INHALER    Inhale 2 puffs into the lungs.    ALLOPURINOL (ZYLOPRIM) 100 MG TABLET    Take 1 tablet (100 mg total) by mouth once daily.    AMLODIPINE (NORVASC) 5 MG TABLET    TAKE 1 TABLET BY MOUTH ONCE DAILY    ASPIRIN (ECOTRIN) 81 MG EC TABLET    Take 1 tablet (81 mg total) by mouth once daily.    ATORVASTATIN (LIPITOR) 40 MG TABLET    Take 1 tablet (40 mg total) by mouth every evening.    BLOOD-GLUCOSE METER (GLUCOSE MONITORING KIT) KIT    Use as instructed    CARVEDILOL (COREG) 25 MG TABLET    Take 1 tablet (25 mg total) by mouth 2 (two) times daily.    CETIRIZINE (ZYRTEC) 10 MG TABLET    Take 10 mg by mouth daily as needed for Allergies.    GABAPENTIN (NEURONTIN) 100 MG CAPSULE    Take 1 capsule (100 mg total) by mouth 2 (two) times daily.    ISOSORBIDE MONONITRATE (IMDUR) 30 MG 24 HR TABLET    Take 1 tablet (30 mg total) by mouth once daily. for 15 days    LANCETS MISC    1 Units by Misc.(Non-Drug; Combo Route) route 2 (two) times daily as needed.    LEVOTHYROXINE (SYNTHROID) 125 MCG TABLET    Take 1 tablet (125 mcg total) by mouth before breakfast.    METFORMIN (GLUCOPHAGE) 1000  MG TABLET    TAKE 1 TABLET BY MOUTH TWICE DAILY WITH MEALS    NITROGLYCERIN (NITROSTAT) 0.4 MG SL TABLET    Place 1 tablet (0.4 mg total) under the tongue every 5 (five) minutes as needed for Chest pain.    PANTOPRAZOLE (PROTONIX) 40 MG TABLET    Take 1 tablet (40 mg total) by mouth once daily.    POLYETHYLENE GLYCOL (GLYCOLAX) 17 GRAM PWPK    Take 17 g by mouth once daily.    RAMIPRIL (ALTACE) 10 MG CAPSULE    Take 1 capsule (10 mg total) by mouth once daily.    RANOLAZINE (RANEXA) 500 MG TB12    TAKE 1 TABLET (500 MG TOTAL) BY MOUTH 2 (TWO) TIMES DAILY.    TICAGRELOR (BRILINTA) 90 MG TABLET    Take 1 tablet (90 mg total) by mouth 2 (two) times daily.    VENLAFAXINE (EFFEXOR-XR) 150 MG CP24    Take 1 capsule (150 mg total) by mouth once daily.        yes

## 2020-02-26 NOTE — ADDENDUM
[Curative] : Goals of care discussed with patient: Curative [FreeTextEntry1] : I, Venkatesh Biggs, acted solely as a scribe for Dr. Callum Aranda on this date 03/20/2019.

## 2020-03-23 ENCOUNTER — APPOINTMENT (OUTPATIENT)
Dept: VASCULAR SURGERY | Facility: CLINIC | Age: 80
End: 2020-03-23

## 2020-04-29 ENCOUNTER — RX RENEWAL (OUTPATIENT)
Age: 80
End: 2020-04-29

## 2020-05-04 NOTE — PROGRESS NOTE ADULT - PROBLEM/PLAN-3
Addended by: TOI STEVENS on: 5/1/2020 03:07 PM     Modules accepted: Orders    
Addended by: TOI STEVENS on: 5/4/2020 08:20 AM     Modules accepted: Orders    
DISPLAY PLAN FREE TEXT

## 2020-05-07 ENCOUNTER — APPOINTMENT (OUTPATIENT)
Dept: NEPHROLOGY | Facility: CLINIC | Age: 80
End: 2020-05-07
Payer: MEDICARE

## 2020-05-07 PROCEDURE — 99214 OFFICE O/P EST MOD 30 MIN: CPT | Mod: 95

## 2020-05-07 NOTE — ADDENDUM
[FreeTextEntry1] :  Documented by Lasha Bush acting as a scribe for Dr. Callum Aranda on 05/07/2020.

## 2020-05-07 NOTE — END OF VISIT
[Time Spent: ___ minutes] : I have spent [unfilled] minutes of time on the encounter. [>50% of the face to face encounter time was spent on counseling and/or coordination of care for ___] : Greater than 50% of the face to face encounter time was spent on counseling and/or coordination of care for [unfilled] [FreeTextEntry3] : All medical record entries made by the Scribe were at my, Dr. Callum Aranda, direction and personally dictated by me on 05/07/2020. I have reviewed the chart and agree that the record accurately reflects my personal performance of the history, physical exam, assessment and plan. I have also personally directed, reviewed, and agreed with the chart.

## 2020-05-07 NOTE — PHYSICAL EXAM
[General Appearance - Alert] : alert [General Appearance - In No Acute Distress] : in no acute distress [Extraocular Movements] : extraocular movements were intact [Outer Ear] : the ears and nose were normal in appearance [Neck Appearance] : the appearance of the neck was normal [] : no respiratory distress [Respiration, Rhythm And Depth] : normal respiratory rhythm and effort [Exaggerated Use Of Accessory Muscles For Inspiration] : no accessory muscle use [Edema] : there was no peripheral edema [Involuntary Movements] : no involuntary movements were seen [Skin Color & Pigmentation] : normal skin color and pigmentation [No Focal Deficits] : no focal deficits [Oriented To Time, Place, And Person] : oriented to person, place, and time [Impaired Insight] : insight and judgment were intact [Affect] : the affect was normal [FreeTextEntry1] : no evidence of deformity of discomfort.

## 2020-05-07 NOTE — HISTORY OF PRESENT ILLNESS
[Other Location: e.g. Home (Enter Location, City,State)___] : at [unfilled] [Spouse] : spouse [Home] : at home, [unfilled] , at the time of the visit. [Self] : self [Patient] : the patient [Stable] : stable [Good Compliance] : good compliance with treatment [___ Month(s) Ago] : [unfilled] month(s) ago [Diabetes Mellitus] : diabetes mellitus [Primary] : primary hypertension [None] : ~He/She~ has no significant interval events [de-identified] : anemia, ASCVD, h/o renal transplant. [FreeTextEntry1] : Patient feels well, appetite is improved from prior when he was sick. He is maintaining quarantine. He is unsure of his weight but wife reports that last checked was 184lbs. He denies SOB. \par Patient has had constipation in the past and was using Colace. The last week or two he has had normal bowels during the day, but has had 2-3 episodes of diarrhea at night with bowel incontinence. 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 He is unsure of his current BP. His wife discontinued his BP meds due to hypotensive pressure.\par Patient is still not walking, but he is mobile around the house via wheelchair. Patient's physical therapy was discontinued due to pandemic.\par Reported BPs: 134/62 sitting, standing 128/62.\par \par Labs from 1/15/20 reveal: RBC 3.42, HGB 9.4, HCT 31.3, TSH 4.64, PTH 94, CO2 of 21, glucose 266, eGFR 69, Vit D 25 of 24.6, CRP 0.42, Pro BNP 2152, HbA1C 9.0.\par \par Current Medication: prednisone 5mg QD, ASA 81mg QD,Flomax .4mg QD, Humalog 100 BID, insulin 100 QHS, carvedilol 12.5mg BID, mycophenolate 500mg BID, tacrolimus 4mg BID, Tylenol 325mg prn, amlodipine 10mg QD, Colace 100mg QD, and ergocalciferol 39362 units QW.

## 2020-05-07 NOTE — ASSESSMENT
[FreeTextEntry1] : Plan:\par 1) HTN: BP was recently hypotensive and patient d/c his antihypertensive medications. Advised the wife to take the patient's pressure with her Omran cuff daily while sitting and standing. Will report pressure log to me.\par 2) Diarrhea: Advised patient to use FiberOne daily with meals, and advised against using Imodium. Advised patient to use disposable bed covers for the time being until problem resolves.\par 3) CRI: Last eGFR of 69 stable, continue to monitor on future CMP.\par 4) Transplant medications: Continue on current regimen of tacrolimus and mycophenolate.\par 5) Will attempt to arrange for patient to have labs drawn at home, and will also check for covid antibodies.\par 6) Covid quarantine: Instructed patient that even if he does have antibodies that does not necessarily ensure his protection against further infection. Advised patient to maintain social distancing, and that anyone who comes into his house should use a mask.\par 7) Physical therapy: Advised patient to obtain nasal swab and antibodies in order to ensure that he no longer has the virus, and then hopefully physical therapist will then be able to return.\par \par Plan to reconvene with patient in 2 weeks via telemedicine.

## 2020-05-12 ENCOUNTER — LABORATORY RESULT (OUTPATIENT)
Age: 80
End: 2020-05-12

## 2020-05-15 LAB
24R-OH-CALCIDIOL SERPL-MCNC: 31.7 PG/ML
25(OH)D3 SERPL-MCNC: 17.5 NG/ML
ALBUMIN MFR SERPL ELPH: 54.8 %
ALBUMIN SERPL ELPH-MCNC: 3.3 G/DL
ALBUMIN SERPL-MCNC: 3.1 G/DL
ALBUMIN/GLOB SERPL: 1.2 RATIO
ALDOSTERONE SERUM: 15.3 NG/DL
ALP BLD-CCNC: 78 U/L
ALPHA1 GLOB MFR SERPL ELPH: 5.3 %
ALPHA1 GLOB SERPL ELPH-MCNC: 0.3 G/DL
ALPHA2 GLOB MFR SERPL ELPH: 12.1 %
ALPHA2 GLOB SERPL ELPH-MCNC: 0.7 G/DL
ALT SERPL-CCNC: 19 U/L
ANA SER IF-ACNC: NEGATIVE
ANION GAP SERPL CALC-SCNC: 15 MMOL/L
APPEARANCE: CLEAR
AST SERPL-CCNC: 17 U/L
B-GLOBULIN MFR SERPL ELPH: 13.8 %
B-GLOBULIN SERPL ELPH-MCNC: 0.8 G/DL
BACTERIA: ABNORMAL
BASOPHILS # BLD AUTO: 0.01 K/UL
BASOPHILS NFR BLD AUTO: 0.1 %
BILIRUB SERPL-MCNC: 0.2 MG/DL
BILIRUBIN URINE: NEGATIVE
BLOOD URINE: NEGATIVE
BUN SERPL-MCNC: 22 MG/DL
C3 SERPL-MCNC: 82 MG/DL
C4 SERPL-MCNC: 22 MG/DL
CALCIUM SERPL-MCNC: 9.4 MG/DL
CALCIUM SERPL-MCNC: 9.4 MG/DL
CHLORIDE SERPL-SCNC: 102 MMOL/L
CHOLEST SERPL-MCNC: 120 MG/DL
CHOLEST/HDLC SERPL: 2 RATIO
CO2 SERPL-SCNC: 19 MMOL/L
COLLAGEN CTX SERPL-MCNC: 497 PG/ML
COLOR: YELLOW
CREAT SERPL-MCNC: 1.13 MG/DL
CRP SERPL-MCNC: 0.19 MG/DL
CYSTATIN C SERPL-MCNC: 2.14 MG/L
DEPRECATED D DIMER PPP IA-ACNC: 986 NG/ML DDU
DEPRECATED KAPPA LC FREE/LAMBDA SER: 2.09 RATIO
EOSINOPHIL # BLD AUTO: 0.29 K/UL
EOSINOPHIL NFR BLD AUTO: 4.2 %
ERYTHROCYTE [SEDIMENTATION RATE] IN BLOOD BY WESTERGREN METHOD: 26 MM/HR
FERRITIN SERPL-MCNC: 258 NG/ML
FOLATE SERPL-MCNC: 7 NG/ML
GAMMA GLOB FLD ELPH-MCNC: 0.8 G/DL
GAMMA GLOB MFR SERPL ELPH: 14 %
GFR/BSA.PRED SERPLBLD CYS-BASED-ARV: 26 ML/MIN
GLUCOSE QUALITATIVE U: ABNORMAL
GLUCOSE SERPL-MCNC: 254 MG/DL
HBV SURFACE AB SER QL: NONREACTIVE
HBV SURFACE AG SER QL: NONREACTIVE
HCT VFR BLD CALC: 32.2 %
HCV AB SER QL: NONREACTIVE
HCV S/CO RATIO: 0.17 S/CO
HCYS SERPL-MCNC: 31.5 UMOL/L
HDLC SERPL-MCNC: 60 MG/DL
HGB BLD-MCNC: 9.8 G/DL
HYALINE CASTS: 3 /LPF
IGA SER QL IEP: 498 MG/DL
IGG SER QL IEP: 791 MG/DL
IGM SER QL IEP: 35 MG/DL
IMM GRANULOCYTES NFR BLD AUTO: 0.1 %
INTERPRETATION SERPL IEP-IMP: NORMAL
IRON SATN MFR SERPL: 23 %
IRON SERPL-MCNC: 41 UG/DL
KAPPA LC CSF-MCNC: 4.11 MG/DL
KAPPA LC SERPL-MCNC: 8.59 MG/DL
KETONES URINE: NEGATIVE
LDLC SERPL CALC-MCNC: 46 MG/DL
LEUKOCYTE ESTERASE URINE: NEGATIVE
LYMPHOCYTES # BLD AUTO: 1.23 K/UL
LYMPHOCYTES NFR BLD AUTO: 17.8 %
M PROTEIN SPEC IFE-MCNC: NORMAL
MAGNESIUM SERPL-MCNC: 1.9 MG/DL
MAN DIFF?: NORMAL
MCHC RBC-ENTMCNC: 26.6 PG
MCHC RBC-ENTMCNC: 30.4 GM/DL
MCV RBC AUTO: 87.3 FL
METHYLMALONATE SERPL-SCNC: 869 NMOL/L
MICROSCOPIC-UA: NORMAL
MONOCYTES # BLD AUTO: 0.48 K/UL
MONOCYTES NFR BLD AUTO: 6.9 %
MPO AB + PR3 PNL SER: NORMAL
NEUTROPHILS # BLD AUTO: 4.9 K/UL
NEUTROPHILS NFR BLD AUTO: 70.9 %
NITRITE URINE: NEGATIVE
NT-PROBNP SERPL-MCNC: 3405 PG/ML
PARATHYROID HORMONE INTACT: 67 PG/ML
PH URINE: 5
PHOSPHATE SERPL-MCNC: 2 MG/DL
PLATELET # BLD AUTO: 159 K/UL
POTASSIUM SERPL-SCNC: 4.6 MMOL/L
PROT SERPL-MCNC: 5.7 G/DL
PROTEIN URINE: NORMAL
RBC # BLD: 3.69 M/UL
RBC # FLD: 16.5 %
RED BLOOD CELLS URINE: 3 /HPF
RHEUMATOID FACT SER QL: <10 IU/ML
SARS-COV-2 IGG SERPL IA-ACNC: 111 AU/ML
SARS-COV-2 IGG SERPL QL IA: POSITIVE
SODIUM SERPL-SCNC: 136 MMOL/L
SPECIFIC GRAVITY URINE: 1.02
SQUAMOUS EPITHELIAL CELLS: 1 /HPF
T3FREE SERPL-MCNC: 2 PG/ML
T3RU NFR SERPL: 0.9 TBI
T4 FREE SERPL-MCNC: 1 NG/DL
T4 SERPL-MCNC: 4.4 UG/DL
THYROGLOB AB SERPL-ACNC: <20 IU/ML
THYROPEROXIDASE AB SERPL IA-ACNC: 10.3 IU/ML
TIBC SERPL-MCNC: 182 UG/DL
TRIGL SERPL-MCNC: 71 MG/DL
TSH SERPL-ACNC: 16.1 UIU/ML
UIBC SERPL-MCNC: 141 UG/DL
URATE SERPL-MCNC: 7.2 MG/DL
UROBILINOGEN URINE: NORMAL
VIT B12 SERPL-MCNC: 497 PG/ML
WBC # FLD AUTO: 6.92 K/UL
WHITE BLOOD CELLS URINE: 10 /HPF

## 2020-05-22 LAB
ALP BONE SERPL-MCNC: 15 MCG/L
INNER EAR 68KD AB FLD QL: NEGATIVE

## 2020-05-26 ENCOUNTER — RX RENEWAL (OUTPATIENT)
Age: 80
End: 2020-05-26

## 2020-05-27 ENCOUNTER — RX RENEWAL (OUTPATIENT)
Age: 80
End: 2020-05-27

## 2020-06-22 ENCOUNTER — RX RENEWAL (OUTPATIENT)
Age: 80
End: 2020-06-22

## 2020-06-23 ENCOUNTER — RX RENEWAL (OUTPATIENT)
Age: 80
End: 2020-06-23

## 2020-06-24 ENCOUNTER — NON-APPOINTMENT (OUTPATIENT)
Age: 80
End: 2020-06-24

## 2020-06-24 ENCOUNTER — APPOINTMENT (OUTPATIENT)
Dept: OPHTHALMOLOGY | Facility: CLINIC | Age: 80
End: 2020-06-24
Payer: MEDICARE

## 2020-06-24 PROCEDURE — 92014 COMPRE OPH EXAM EST PT 1/>: CPT

## 2020-06-24 PROCEDURE — 92250 FUNDUS PHOTOGRAPHY W/I&R: CPT

## 2020-06-24 PROCEDURE — 92020 GONIOSCOPY: CPT

## 2020-07-02 NOTE — ED ADULT TRIAGE NOTE - NS ED TRIAGE AVPU SCALE
PRE-SEDATION ASSESSMENT    CONSENT  Consent for procedure and sedation obtained: Yes    MEDICAL HISTORY  Significant medical/surgical history: No  Past Complications with Sedation/Anesthesia: No  Significant Family History: No  Smoking History: No  Alcohol/Drug abuse: No  Possible Pregnancy (LMP): No  Cardiac History: No  Respiratory History: No    PHYSICAL EXAM  History and Physical Reviewed: Patient has valid H&P within 30 days. I have reviewed and there are no changes.  Heart : Normal  Lungs : Normal  LOC/Mental Status : Normal    OTHER FINDINGS  Reviewed current medications and allergies: Yes  Pertinent lab/diagnostic test reviewed: Yes    SEDATION RISK ASSESSMENT  Risk Status ASA: Class I - Normal, healthy patient  Plan for Sedation: Deep Sedation  EKG Monitoring: Yes    NARRATIVE FINDINGS     
Alert-The patient is alert, awake and responds to voice. The patient is oriented to time, place, and person. The triage nurse is able to obtain subjective information.

## 2020-07-16 ENCOUNTER — NON-APPOINTMENT (OUTPATIENT)
Age: 80
End: 2020-07-16

## 2020-07-16 ENCOUNTER — APPOINTMENT (OUTPATIENT)
Dept: OPHTHALMOLOGY | Facility: CLINIC | Age: 80
End: 2020-07-16
Payer: MEDICARE

## 2020-07-16 PROCEDURE — 92014 COMPRE OPH EXAM EST PT 1/>: CPT

## 2020-07-16 PROCEDURE — 92134 CPTRZ OPH DX IMG PST SGM RTA: CPT

## 2020-07-28 ENCOUNTER — APPOINTMENT (OUTPATIENT)
Dept: NEPHROLOGY | Facility: CLINIC | Age: 80
End: 2020-07-28
Payer: MEDICARE

## 2020-07-28 ENCOUNTER — RX RENEWAL (OUTPATIENT)
Age: 80
End: 2020-07-28

## 2020-07-28 PROCEDURE — 99442: CPT | Mod: 95

## 2020-07-28 NOTE — ADDENDUM
[FreeTextEntry1] :  Documented by Lasha Bush acting as a scribe for Dr. Callum Aranda on 07/28/2020.

## 2020-07-28 NOTE — END OF VISIT
[Time Spent: ___ minutes] : I have spent [unfilled] minutes of time on the encounter. [>50% of the face to face encounter time was spent on counseling and/or coordination of care for ___] : Greater than 50% of the face to face encounter time was spent on counseling and/or coordination of care for [unfilled] [FreeTextEntry3] : All medical record entries made by the Scribe were at my, Dr. Callum Aranda, direction and personally dictated by me on 07/28/2020. I have reviewed the chart and agree that the record accurately reflects my personal performance of the history, physical exam, assessment and plan. I have also personally directed, reviewed, and agreed with the chart.

## 2020-07-28 NOTE — HISTORY OF PRESENT ILLNESS
[Stable] : stable [Diabetic Nephropathy] : diabetic nephropathy [End-Stage Kidney Disease] : end-stage kidney disease [___ Month(s) Ago] : [unfilled] month(s) ago [Diabetes Mellitus] : diabetes mellitus [Primary] : primary hypertension [None] : ~He/She~ has no significant interval events [de-identified] : anemia, ASCVD, h/o renal transplant. [FreeTextEntry1] : Patient is now in an intense rehabilitation program, now out of the wheelchair and using a walker. He reports that beofre therapy in the morning his BP is low 105-115 systolic. After a couple of hours of intense therapy his BP increases to 160-170 systolic. He notes he is fatigued but otherwise feels normal. Patient also is doing occupational therapy.\par \par 7/16/20 ophthalmology evaluation with Dr. Hilton Roberts, note is on record. \par \par Labs from 5/12/20 reveal: RBC 3.69, HGB 9.8, HCT 32.2, sed rate 26, CO2 of 19, glucose 254, eGFR 61, proBNP 3405, covid IgG positive, D-dimer 986, Vit D 25 of 17.5, phosphorus 2.0, PTH 67, TSH 16.1, \par \par Current Medication: prednisone 5mg QD, ASA 81mg QD,Flomax .4mg QD, Humalog 100 BID, insulin 100 QHS, carvedilol 12.5mg BID, mycophenolate 500mg BID, tacrolimus 4mg BID, Tylenol 325mg prn, amlodipine 10mg QD, Colace 100mg QD, and ergocalciferol 53182 units QW.

## 2020-07-28 NOTE — ASSESSMENT
[FreeTextEntry1] : Plan:\par 1) HTN: ADvised patient to take his BP at home after returning home from therapy. If BP normalizes after he has had a refractory period after his intense exercise then we will continue to monitor. Continue current antihypertensive regimen. \par 2) Transplant medications: Continue on current regimen.\par 3) Hyperglycemia:  Advised patient to continue lifestyle management with healthy dieting and routine exercise and to continue on current antihyperglycemic regimen. \par 4) Covid IgG positive: Advised patient to continue covid precautions throughout the duration of the covid pandemic. \par \par Plan to reconvene with patient in September in the office.

## 2020-08-24 ENCOUNTER — APPOINTMENT (OUTPATIENT)
Dept: OPHTHALMOLOGY | Facility: CLINIC | Age: 80
End: 2020-08-24
Payer: MEDICARE

## 2020-08-24 ENCOUNTER — NON-APPOINTMENT (OUTPATIENT)
Age: 80
End: 2020-08-24

## 2020-08-24 PROCEDURE — 66821 AFTER CATARACT LASER SURGERY: CPT | Mod: RT

## 2020-08-25 ENCOUNTER — RX RENEWAL (OUTPATIENT)
Age: 80
End: 2020-08-25

## 2020-08-27 ENCOUNTER — RX RENEWAL (OUTPATIENT)
Age: 80
End: 2020-08-27

## 2020-09-16 ENCOUNTER — RX RENEWAL (OUTPATIENT)
Age: 80
End: 2020-09-16

## 2020-09-17 ENCOUNTER — RX RENEWAL (OUTPATIENT)
Age: 80
End: 2020-09-17

## 2020-09-21 ENCOUNTER — RX RENEWAL (OUTPATIENT)
Age: 80
End: 2020-09-21

## 2020-09-22 ENCOUNTER — RX RENEWAL (OUTPATIENT)
Age: 80
End: 2020-09-22

## 2020-09-24 ENCOUNTER — LABORATORY RESULT (OUTPATIENT)
Age: 80
End: 2020-09-24

## 2020-09-24 ENCOUNTER — APPOINTMENT (OUTPATIENT)
Dept: NEPHROLOGY | Facility: CLINIC | Age: 80
End: 2020-09-24
Payer: MEDICARE

## 2020-09-24 VITALS — SYSTOLIC BLOOD PRESSURE: 162 MMHG | HEART RATE: 84 BPM | DIASTOLIC BLOOD PRESSURE: 80 MMHG

## 2020-09-24 VITALS — SYSTOLIC BLOOD PRESSURE: 172 MMHG | DIASTOLIC BLOOD PRESSURE: 70 MMHG | HEART RATE: 84 BPM

## 2020-09-24 VITALS — HEART RATE: 77 BPM | BODY MASS INDEX: 27.88 KG/M2 | WEIGHT: 178 LBS | OXYGEN SATURATION: 98 %

## 2020-09-24 VITALS — DIASTOLIC BLOOD PRESSURE: 50 MMHG | SYSTOLIC BLOOD PRESSURE: 140 MMHG | HEART RATE: 84 BPM

## 2020-09-24 PROCEDURE — 36415 COLL VENOUS BLD VENIPUNCTURE: CPT

## 2020-09-24 PROCEDURE — 90662 IIV NO PRSV INCREASED AG IM: CPT

## 2020-09-24 PROCEDURE — 99214 OFFICE O/P EST MOD 30 MIN: CPT | Mod: 25

## 2020-09-24 PROCEDURE — 93000 ELECTROCARDIOGRAM COMPLETE: CPT

## 2020-09-24 PROCEDURE — G0008: CPT

## 2020-09-24 NOTE — END OF VISIT
[Time Spent: ___ minutes] : I have spent [unfilled] minutes of time on the encounter. [>50% of the face to face encounter time was spent on counseling and/or coordination of care for ___] : Greater than 50% of the face to face encounter time was spent on counseling and/or coordination of care for [unfilled] [FreeTextEntry3] : All medical record entries made by the Scribe were at my, Dr. Callum Aranda, direction and personally dictated by me on 09/24/2020. I have reviewed the chart and agree that the record accurately reflects my personal performance of the history, physical exam, assessment and plan. I have also personally directed, reviewed, and agreed with the chart.

## 2020-09-24 NOTE — ADDENDUM
[FreeTextEntry1] :  Documented by Lasha Bush acting as a scribe for Dr. Callum Aranda on 09/24/2020.

## 2020-09-24 NOTE — ASSESSMENT
[FreeTextEntry1] : Plan:\par 1) HTN: Patient's BP in office today is hypertensive and exhibits a postural drop upon standing with stable pulse. Patient has held his amlodipine 10mg QD. He will now take amlodipine 5mg QD and will continue to assess BP at home. \par 2) CRI: last eGFR of 61 is stable for patient. Continue to monitor on repeat CMP. \par 3) Transplant medications: Patient to continue on current regimen of tacrolimus, prednisone, and mycophenolate.\par 4) EKG taken in office today is WNL without acute changes.\par 5) Have administered high dose flu vaccine into left deltoid without incident. \par 6) COVID status: Patient is s/p COVID-19 infection and is positive for COVID-19 antibodies. Advised patient to continue covid precautions throughout the duration of the covid pandemic. \par \par Changes to Medications: Will decrease amlodipine from 10mg QD to 5mg QD.\par \par Labs were drawn and patient will return in 3-4 weeks for a follow-up appointment.

## 2020-09-24 NOTE — PHYSICAL EXAM
[General Appearance - Alert] : alert [General Appearance - In No Acute Distress] : in no acute distress [Sclera] : the sclera and conjunctiva were normal [PERRL With Normal Accommodation] : pupils were equal in size, round, and reactive to light [Extraocular Movements] : extraocular movements were intact [Outer Ear] : the ears and nose were normal in appearance [Oropharynx] : the oropharynx was normal [Neck Appearance] : the appearance of the neck was normal [Neck Cervical Mass (___cm)] : no neck mass was observed [Jugular Venous Distention Increased] : there was no jugular-venous distention [Thyroid Diffuse Enlargement] : the thyroid was not enlarged [Thyroid Nodule] : there were no palpable thyroid nodules [Auscultation Breath Sounds / Voice Sounds] : lungs were clear to auscultation bilaterally [Heart Rate And Rhythm] : heart rate was normal and rhythm regular [Heart Sounds] : normal S1 and S2 [Heart Sounds Gallop] : no gallops [Murmurs] : no murmurs [Heart Sounds Pericardial Friction Rub] : no pericardial rub [Bowel Sounds] : normal bowel sounds [Abdomen Soft] : soft [Abdomen Tenderness] : non-tender [Abdomen Mass (___ Cm)] : no abdominal mass palpated [No CVA Tenderness] : no ~M costovertebral angle tenderness [No Spinal Tenderness] : no spinal tenderness [Abnormal Walk] : normal gait [Nail Clubbing] : no clubbing  or cyanosis of the fingernails [Musculoskeletal - Swelling] : no joint swelling seen [Motor Tone] : muscle strength and tone were normal [Skin Color & Pigmentation] : normal skin color and pigmentation [Skin Turgor] : normal skin turgor [] : no rash [No Focal Deficits] : no focal deficits [Oriented To Time, Place, And Person] : oriented to person, place, and time [Impaired Insight] : insight and judgment were intact [Affect] : the affect was normal [FreeTextEntry1] : trace to 1+ bilateral LE edema

## 2020-09-24 NOTE — HISTORY OF PRESENT ILLNESS
[FreeTextEntry1] : Patient is an 80 year old male with PMH of DM, anemia, ASCVD, and h/o renal transplant presenting today for f/u evaluation of HTN.\par \par Patient feels generally well today. He is reliant on wheelchair for mobility, but he can walk with help. He is s/p COVID-19 infection and reports that he lost 14lbs since January. He reports a healthy appetite. Patient states that his breathing is comfortable. Pt reports that he is not having constipation.\par \par His wife brought in a BP log from July 2020 with temperature, O2 sat, and pulse which has been reviewed and scanned into the chart - most pressures are between 150 to 170 systolic. Patient's wife had discontinued his amlodipine and reports his BP at 130 systolic. Pt reports LE pain yesterday which is new and LE swelling and redness.\par \par He has not had an annual flu shot and agrees to receive one today.\par \par Labs from 5/12/20 reveal: RBC 3.69, HGB 9.8, HCT 32.2, sed rate 26, CO2 of 19, glucose 254, eGFR 61, proBNP 3405, covid IgG positive, D-dimer 986, Vit D 25 of 17.5, phosphorus 2.0, PTH 67, TSH 16.1, \par \par Current Medication: prednisone 5mg QD, ASA 81mg QD, Flomax .4mg QD, Humalog 100 BID, insulin 100 QHS, carvedilol 12.5mg BID, mycophenolate 500mg BID, tacrolimus 4mg BID, Tylenol 325mg prn, amlodipine 10mg QD (currently being held), Colace 100mg QD, and ergocalciferol 57024 units QW.

## 2020-09-25 LAB
25(OH)D3 SERPL-MCNC: 18 NG/ML
ALBUMIN SERPL ELPH-MCNC: 4.2 G/DL
ALP BLD-CCNC: 92 U/L
ALT SERPL-CCNC: 13 U/L
ANION GAP SERPL CALC-SCNC: 15 MMOL/L
APPEARANCE: CLEAR
AST SERPL-CCNC: 15 U/L
BACTERIA: NEGATIVE
BASOPHILS # BLD AUTO: 0.01 K/UL
BASOPHILS NFR BLD AUTO: 0.1 %
BILIRUB SERPL-MCNC: 0.2 MG/DL
BILIRUBIN URINE: NEGATIVE
BLOOD URINE: NEGATIVE
BUN SERPL-MCNC: 27 MG/DL
CALCIUM SERPL-MCNC: 9.7 MG/DL
CALCIUM SERPL-MCNC: 9.7 MG/DL
CHLORIDE SERPL-SCNC: 101 MMOL/L
CHOLEST SERPL-MCNC: 130 MG/DL
CHOLEST/HDLC SERPL: 2 RATIO
CO2 SERPL-SCNC: 22 MMOL/L
COLOR: YELLOW
CREAT SERPL-MCNC: 1.19 MG/DL
CREAT SPEC-SCNC: 152 MG/DL
CREAT/PROT UR: 0.3 RATIO
CRP SERPL-MCNC: 0.1 MG/DL
EOSINOPHIL # BLD AUTO: 0.25 K/UL
EOSINOPHIL NFR BLD AUTO: 3.1 %
ERYTHROCYTE [SEDIMENTATION RATE] IN BLOOD BY WESTERGREN METHOD: 18 MM/HR
ESTIMATED AVERAGE GLUCOSE: 209 MG/DL
FERRITIN SERPL-MCNC: 162 NG/ML
FOLATE SERPL-MCNC: 14.2 NG/ML
GLUCOSE QUALITATIVE U: ABNORMAL
GLUCOSE SERPL-MCNC: 262 MG/DL
HBA1C MFR BLD HPLC: 8.9 %
HBV SURFACE AB SER QL: NONREACTIVE
HBV SURFACE AG SER QL: NONREACTIVE
HCT VFR BLD CALC: 36.8 %
HCV AB SER QL: NONREACTIVE
HCV S/CO RATIO: 0.12 S/CO
HCYS SERPL-MCNC: 33.6 UMOL/L
HDLC SERPL-MCNC: 64 MG/DL
HGB BLD-MCNC: 11 G/DL
HYALINE CASTS: 2 /LPF
IMM GRANULOCYTES NFR BLD AUTO: 0.4 %
IRON SATN MFR SERPL: 25 %
IRON SERPL-MCNC: 56 UG/DL
KETONES URINE: NORMAL
LDLC SERPL CALC-MCNC: 47 MG/DL
LEUKOCYTE ESTERASE URINE: NEGATIVE
LYMPHOCYTES # BLD AUTO: 1.68 K/UL
LYMPHOCYTES NFR BLD AUTO: 20.7 %
MAGNESIUM SERPL-MCNC: 2.1 MG/DL
MAN DIFF?: NORMAL
MCHC RBC-ENTMCNC: 26.7 PG
MCHC RBC-ENTMCNC: 29.9 GM/DL
MCV RBC AUTO: 89.3 FL
MICROSCOPIC-UA: NORMAL
MONOCYTES # BLD AUTO: 0.39 K/UL
MONOCYTES NFR BLD AUTO: 4.8 %
NEUTROPHILS # BLD AUTO: 5.75 K/UL
NEUTROPHILS NFR BLD AUTO: 70.9 %
NITRITE URINE: NEGATIVE
NT-PROBNP SERPL-MCNC: 1258 PG/ML
PARATHYROID HORMONE INTACT: 83 PG/ML
PH URINE: 5.5
PHOSPHATE SERPL-MCNC: 2.8 MG/DL
PLATELET # BLD AUTO: 216 K/UL
POTASSIUM SERPL-SCNC: 5 MMOL/L
PROT SERPL-MCNC: 6.5 G/DL
PROT UR-MCNC: 38 MG/DL
PROTEIN URINE: ABNORMAL
RBC # BLD: 4.12 M/UL
RBC # FLD: 14.8 %
RED BLOOD CELLS URINE: 4 /HPF
RHEUMATOID FACT SER QL: <10 IU/ML
SARS-COV-2 IGG SERPL IA-ACNC: 0.1 INDEX
SARS-COV-2 IGG SERPL QL IA: NEGATIVE
SODIUM SERPL-SCNC: 138 MMOL/L
SPECIFIC GRAVITY URINE: 1.03
SQUAMOUS EPITHELIAL CELLS: 2 /HPF
T3FREE SERPL-MCNC: 2.12 PG/ML
T3RU NFR SERPL: 0.9 TBI
T4 FREE SERPL-MCNC: 1.1 NG/DL
T4 SERPL-MCNC: 5.8 UG/DL
THYROGLOB AB SERPL-ACNC: <20 IU/ML
THYROPEROXIDASE AB SERPL IA-ACNC: 13.2 IU/ML
TIBC SERPL-MCNC: 221 UG/DL
TRIGL SERPL-MCNC: 96 MG/DL
TSH SERPL-ACNC: 3.68 UIU/ML
UIBC SERPL-MCNC: 165 UG/DL
URATE SERPL-MCNC: 5.8 MG/DL
UROBILINOGEN URINE: NORMAL
VIT B12 SERPL-MCNC: 457 PG/ML
WBC # FLD AUTO: 8.11 K/UL
WHITE BLOOD CELLS URINE: 9 /HPF

## 2020-09-27 LAB
ALBUMIN MFR SERPL ELPH: 56.9 %
ALBUMIN SERPL-MCNC: 3.5 G/DL
ALBUMIN/GLOB SERPL: 1.3 RATIO
ALP BONE SERPL-MCNC: 16 MCG/L
ALPHA1 GLOB MFR SERPL ELPH: 4.2 %
ALPHA1 GLOB SERPL ELPH-MCNC: 0.3 G/DL
ALPHA2 GLOB MFR SERPL ELPH: 12.1 %
ALPHA2 GLOB SERPL ELPH-MCNC: 0.8 G/DL
ANA SER IF-ACNC: NEGATIVE
B-GLOBULIN MFR SERPL ELPH: 12.6 %
B-GLOBULIN SERPL ELPH-MCNC: 0.8 G/DL
C3 SERPL-MCNC: 92 MG/DL
C4 SERPL-MCNC: 23 MG/DL
DEPRECATED KAPPA LC FREE/LAMBDA SER: 2.36 RATIO
GAMMA GLOB FLD ELPH-MCNC: 0.9 G/DL
GAMMA GLOB MFR SERPL ELPH: 14.2 %
IGA SER QL IEP: 432 MG/DL
IGG SER QL IEP: 833 MG/DL
IGM SER QL IEP: 24 MG/DL
INTERPRETATION SERPL IEP-IMP: NORMAL
KAPPA LC CSF-MCNC: 3.12 MG/DL
KAPPA LC SERPL-MCNC: 7.37 MG/DL
M PROTEIN SPEC IFE-MCNC: NORMAL
PROT SERPL-MCNC: 6.2 G/DL
PROT SERPL-MCNC: 6.2 G/DL
RENIN PLASMA: 4.4 PG/ML

## 2020-09-29 LAB
24R-OH-CALCIDIOL SERPL-MCNC: 40.6 PG/ML
ALDOSTERONE SERUM: 9.5 NG/DL
METHYLMALONATE SERPL-SCNC: 2147 NMOL/L
MPO AB + PR3 PNL SER: NORMAL

## 2020-10-06 LAB — COLLAGEN CTX SERPL-MCNC: 469 PG/ML

## 2020-10-08 ENCOUNTER — NON-APPOINTMENT (OUTPATIENT)
Age: 80
End: 2020-10-08

## 2020-10-08 ENCOUNTER — APPOINTMENT (OUTPATIENT)
Dept: OPHTHALMOLOGY | Facility: CLINIC | Age: 80
End: 2020-10-08
Payer: MEDICARE

## 2020-10-08 PROCEDURE — 92012 INTRM OPH EXAM EST PATIENT: CPT | Mod: 24

## 2020-10-13 ENCOUNTER — EMERGENCY (EMERGENCY)
Facility: HOSPITAL | Age: 80
LOS: 1 days | Discharge: HOME | End: 2020-10-13
Attending: EMERGENCY MEDICINE | Admitting: EMERGENCY MEDICINE
Payer: MEDICARE

## 2020-10-13 VITALS
RESPIRATION RATE: 18 BRPM | TEMPERATURE: 98 F | DIASTOLIC BLOOD PRESSURE: 70 MMHG | OXYGEN SATURATION: 98 % | SYSTOLIC BLOOD PRESSURE: 158 MMHG | HEART RATE: 78 BPM

## 2020-10-13 VITALS
HEART RATE: 67 BPM | DIASTOLIC BLOOD PRESSURE: 72 MMHG | RESPIRATION RATE: 18 BRPM | HEIGHT: 67 IN | SYSTOLIC BLOOD PRESSURE: 167 MMHG | TEMPERATURE: 98 F | WEIGHT: 175.93 LBS | OXYGEN SATURATION: 98 %

## 2020-10-13 DIAGNOSIS — S98.139A COMPLETE TRAUMATIC AMPUTATION OF ONE UNSPECIFIED LESSER TOE, INITIAL ENCOUNTER: Chronic | ICD-10-CM

## 2020-10-13 DIAGNOSIS — R42 DIZZINESS AND GIDDINESS: ICD-10-CM

## 2020-10-13 DIAGNOSIS — Z94.0 KIDNEY TRANSPLANT STATUS: Chronic | ICD-10-CM

## 2020-10-13 LAB
ALBUMIN SERPL ELPH-MCNC: 4 G/DL — SIGNIFICANT CHANGE UP (ref 3.3–5)
ALP SERPL-CCNC: 100 U/L — SIGNIFICANT CHANGE UP (ref 40–120)
ALT FLD-CCNC: 11 U/L — SIGNIFICANT CHANGE UP (ref 10–45)
ANION GAP SERPL CALC-SCNC: 10 MMOL/L — SIGNIFICANT CHANGE UP (ref 5–17)
APTT BLD: 33.5 SEC — SIGNIFICANT CHANGE UP (ref 27.5–35.5)
AST SERPL-CCNC: 16 U/L — SIGNIFICANT CHANGE UP (ref 10–40)
BASOPHILS # BLD AUTO: 0.01 K/UL — SIGNIFICANT CHANGE UP (ref 0–0.2)
BASOPHILS NFR BLD AUTO: 0.1 % — SIGNIFICANT CHANGE UP (ref 0–2)
BILIRUB SERPL-MCNC: 0.4 MG/DL — SIGNIFICANT CHANGE UP (ref 0.2–1.2)
BUN SERPL-MCNC: 23 MG/DL — SIGNIFICANT CHANGE UP (ref 7–23)
CALCIUM SERPL-MCNC: 9.5 MG/DL — SIGNIFICANT CHANGE UP (ref 8.4–10.5)
CHLORIDE SERPL-SCNC: 105 MMOL/L — SIGNIFICANT CHANGE UP (ref 96–108)
CO2 SERPL-SCNC: 25 MMOL/L — SIGNIFICANT CHANGE UP (ref 22–31)
CREAT SERPL-MCNC: 1.11 MG/DL — SIGNIFICANT CHANGE UP (ref 0.5–1.3)
EOSINOPHIL # BLD AUTO: 0.37 K/UL — SIGNIFICANT CHANGE UP (ref 0–0.5)
EOSINOPHIL NFR BLD AUTO: 4.6 % — SIGNIFICANT CHANGE UP (ref 0–6)
GLUCOSE SERPL-MCNC: 291 MG/DL — HIGH (ref 70–99)
HCT VFR BLD CALC: 36.3 % — LOW (ref 39–50)
HGB BLD-MCNC: 11.1 G/DL — LOW (ref 13–17)
IMM GRANULOCYTES NFR BLD AUTO: 0.2 % — SIGNIFICANT CHANGE UP (ref 0–1.5)
INR BLD: 1 — SIGNIFICANT CHANGE UP (ref 0.88–1.16)
LYMPHOCYTES # BLD AUTO: 1.36 K/UL — SIGNIFICANT CHANGE UP (ref 1–3.3)
LYMPHOCYTES # BLD AUTO: 16.7 % — SIGNIFICANT CHANGE UP (ref 13–44)
MAGNESIUM SERPL-MCNC: 2 MG/DL — SIGNIFICANT CHANGE UP (ref 1.6–2.6)
MCHC RBC-ENTMCNC: 26.9 PG — LOW (ref 27–34)
MCHC RBC-ENTMCNC: 30.6 GM/DL — LOW (ref 32–36)
MCV RBC AUTO: 87.9 FL — SIGNIFICANT CHANGE UP (ref 80–100)
MONOCYTES # BLD AUTO: 0.46 K/UL — SIGNIFICANT CHANGE UP (ref 0–0.9)
MONOCYTES NFR BLD AUTO: 5.7 % — SIGNIFICANT CHANGE UP (ref 2–14)
NEUTROPHILS # BLD AUTO: 5.9 K/UL — SIGNIFICANT CHANGE UP (ref 1.8–7.4)
NEUTROPHILS NFR BLD AUTO: 72.7 % — SIGNIFICANT CHANGE UP (ref 43–77)
NRBC # BLD: 0 /100 WBCS — SIGNIFICANT CHANGE UP (ref 0–0)
PLATELET # BLD AUTO: 196 K/UL — SIGNIFICANT CHANGE UP (ref 150–400)
POTASSIUM SERPL-MCNC: 5.1 MMOL/L — SIGNIFICANT CHANGE UP (ref 3.5–5.3)
POTASSIUM SERPL-SCNC: 5.1 MMOL/L — SIGNIFICANT CHANGE UP (ref 3.5–5.3)
PROT SERPL-MCNC: 6.6 G/DL — SIGNIFICANT CHANGE UP (ref 6–8.3)
PROTHROM AB SERPL-ACNC: 12 SEC — SIGNIFICANT CHANGE UP (ref 10.6–13.6)
RBC # BLD: 4.13 M/UL — LOW (ref 4.2–5.8)
RBC # FLD: 15.2 % — HIGH (ref 10.3–14.5)
SODIUM SERPL-SCNC: 140 MMOL/L — SIGNIFICANT CHANGE UP (ref 135–145)
WBC # BLD: 8.12 K/UL — SIGNIFICANT CHANGE UP (ref 3.8–10.5)
WBC # FLD AUTO: 8.12 K/UL — SIGNIFICANT CHANGE UP (ref 3.8–10.5)

## 2020-10-13 PROCEDURE — 70450 CT HEAD/BRAIN W/O DYE: CPT

## 2020-10-13 PROCEDURE — 82962 GLUCOSE BLOOD TEST: CPT

## 2020-10-13 PROCEDURE — 80053 COMPREHEN METABOLIC PANEL: CPT

## 2020-10-13 PROCEDURE — 71045 X-RAY EXAM CHEST 1 VIEW: CPT | Mod: 26

## 2020-10-13 PROCEDURE — 36415 COLL VENOUS BLD VENIPUNCTURE: CPT

## 2020-10-13 PROCEDURE — 83735 ASSAY OF MAGNESIUM: CPT

## 2020-10-13 PROCEDURE — 85025 COMPLETE CBC W/AUTO DIFF WBC: CPT

## 2020-10-13 PROCEDURE — 85730 THROMBOPLASTIN TIME PARTIAL: CPT

## 2020-10-13 PROCEDURE — 93010 ELECTROCARDIOGRAM REPORT: CPT

## 2020-10-13 PROCEDURE — 84484 ASSAY OF TROPONIN QUANT: CPT

## 2020-10-13 PROCEDURE — 85610 PROTHROMBIN TIME: CPT

## 2020-10-13 PROCEDURE — 99285 EMERGENCY DEPT VISIT HI MDM: CPT | Mod: 25

## 2020-10-13 PROCEDURE — 70450 CT HEAD/BRAIN W/O DYE: CPT | Mod: 26

## 2020-10-13 PROCEDURE — 99284 EMERGENCY DEPT VISIT MOD MDM: CPT | Mod: 25

## 2020-10-13 PROCEDURE — 93005 ELECTROCARDIOGRAM TRACING: CPT

## 2020-10-13 PROCEDURE — 71045 X-RAY EXAM CHEST 1 VIEW: CPT

## 2020-10-13 NOTE — ED PROVIDER NOTE - DIAGNOSTIC INTERPRETATION
Attending: Nalini Ochoa   CXR wet read: The heart appears to be within normal limits in transverse diameter.  No acute infiltrates, effusions or evidence of pulmonary vascular congestion.  The chest wall and surrounding bony structures appear normal.

## 2020-10-13 NOTE — ED PROVIDER NOTE - PATIENT PORTAL LINK FT
You can access the FollowMyHealth Patient Portal offered by [facility] by registering at the following website: [email or web address]. By joining [facility]’s FollowMyHealth portal, you will also be able to view your health information using other applications (apps) compatible with our system.

## 2020-10-13 NOTE — ED ADULT NURSE NOTE - NSIMPLEMENTINTERV_GEN_ALL_ED
Implemented All Fall Risk Interventions:  [place] to call system. Call bell, personal items and telephone within reach. Instruct patient to call for assistance. Room bathroom lighting operational. Non-slip footwear when patient is off stretcher. Physically safe environment: no spills, clutter or unnecessary equipment. Stretcher in lowest position, wheels locked, appropriate side rails in place. Provide visual cue, wrist band, yellow gown, etc. Monitor gait and stability. Monitor for mental status changes and reorient to person, place, and time. Review medications for side effects contributing to fall risk. Reinforce activity limits and safety measures with patient and family.

## 2020-10-13 NOTE — ED ADULT NURSE NOTE - OBJECTIVE STATEMENT
Patient arrived with wife, alert, skin warm and dry, respirations even and unlabored. As per wife, patient arrives with complaints of dizziness, wife took BP at home and was "in the 90s", called EMS. Patient reports dizziness has resolved. Denies chest pain, SOB, recent falls, fevers, chills, nausea or vomiting. Hx renal transplant, BPH, HTN, DM.

## 2020-10-13 NOTE — ED PROVIDER NOTE - CLINICAL SUMMARY MEDICAL DECISION MAKING FREE TEXT BOX
Patient presents to ED with concern for episode of transient dizziness today.  Patient denies feeling dizzy or lightheaded on arrival to ED.  He is awake, alert and has no acute complaints.  EKG reviewed and noted to be NSR with non specific ST changes to inferior leads.  Patient denies CP, SOB or any additional concerns.  Labs reviewed and aside from slightly elevated blood glucose, no significant lab abnormalities are noted.  CT head and CXR unremarkable for acute process.  Vital signs remain stable throughout patient's ED stay, no significant findings with orthostatic VS.  Case is discussed w patient's PCP, Dr. Mac, who is agreeable to plan for discharge at this time with prompt outpatient follow up in his office.  Patient is advised to follow up with their PCP in 1-2 days without fail.  Patient instructed to return to ED immediately should their symptoms worsen or if there is any concern prior to the recommended PCP follow up.  Patient is aware of plan and verbalizes their understanding.  Will discharge home at this time.

## 2020-10-13 NOTE — ED PROVIDER NOTE - NS ED ROS FT
Constitutional: No fever or chills.   Eyes: No pain, blurry vision, or discharge.  ENMT: No hearing changes, pain, discharge or infections. No neck pain or stiffness.  Cardiac: No chest pain, SOB or edema. No chest pain with exertion.  Respiratory: No cough or respiratory distress. No hemoptysis. No history of asthma or RAD.  GI: No nausea, vomiting, diarrhea or abdominal pain.  : No dysuria, frequency or burning.  MS: No myalgia, muscle weakness, joint pain or back pain.  Neuro: + Episode of dizziness (resolved).  No headache or weakness. No LOC.  Skin: No skin rash.

## 2020-10-13 NOTE — ED ADULT TRIAGE NOTE - CHIEF COMPLAINT QUOTE
Per wife, patient woke up dizzy today. EMS reports patient's BP was 90/40 on their arrival to scene. Patient denies dizziness at present time.

## 2020-10-13 NOTE — ED PROVIDER NOTE - OBJECTIVE STATEMENT
80 year old male with history of Glaucoma, blindness, BPH, HTN presents to ED with concern for episode of dizziness today.  Patient states episode was transient and only lasted a minute or so.  He denies associated headache, chest pain, shortness of breath, abdominal pain, nausea, emesis, changes to bowel movements, peripheral edema, rashes, recent travel, known sick contacts or any additional acute complaints or concerns at this time.

## 2020-10-13 NOTE — ED PROVIDER NOTE - NSFOLLOWUPINSTRUCTIONS_ED_ALL_ED_FT
Please follow up with your primary physician in 1-2 days for re evaluation.  Please return to ER immediately should your symptoms worsen or if you have any concern prior to this recommended follow up.          Dizziness    WHAT YOU NEED TO KNOW:    Dizziness is a feeling of being off balance or unsteady. Common causes of dizziness are an inner ear fluid imbalance or a lack of oxygen in your blood. Dizziness may be acute (lasts 3 days or less) or chronic (lasts longer than 3 days). You may have dizzy spells that last from seconds to a few hours.     DISCHARGE INSTRUCTIONS:    Return to the emergency department if:   •You have a headache and a stiff neck.      •You have shaking chills and a fever.       •You vomit over and over with no relief.       •Your vomit or bowel movements are red or black.       •You have pain in your chest, back, or abdomen.       •You have numbness, especially in your face, arms, or legs.       •You have trouble moving your arms or legs.       •You are confused.       Contact your healthcare provider if:   •You have a fever.       •Your symptoms do not get better with treatment.       •You have questions or concerns about your condition or care.       Manage your symptoms:   •Do not drive or operate heavy machinery when you are dizzy.       •Get up slowly from sitting or lying down.       •Drink plenty of liquids. Liquids help prevent dehydration. Ask how much liquid to drink each day and which liquids are best for you.      Follow up with your healthcare provider as directed: Write down your questions so you remember to ask them during your visits.        © Copyright [employer] 2020           back to top                          © Copyright [employer] 2020

## 2020-10-13 NOTE — ED PROVIDER NOTE - CARE PROVIDER_API CALL
Callum Aranda (MD)  Internal Medicine; Nephrology  [address], [address], [place] [address]  Phone: [phone]  Fax: [phone]  Follow Up Time:

## 2020-10-14 ENCOUNTER — RX RENEWAL (OUTPATIENT)
Age: 80
End: 2020-10-14

## 2020-10-15 ENCOUNTER — RX RENEWAL (OUTPATIENT)
Age: 80
End: 2020-10-15

## 2020-10-18 ENCOUNTER — RX RENEWAL (OUTPATIENT)
Age: 80
End: 2020-10-18

## 2020-11-10 ENCOUNTER — RX RENEWAL (OUTPATIENT)
Age: 80
End: 2020-11-10

## 2020-11-11 ENCOUNTER — RX RENEWAL (OUTPATIENT)
Age: 80
End: 2020-11-11

## 2021-01-05 ENCOUNTER — RX RENEWAL (OUTPATIENT)
Age: 81
End: 2021-01-05

## 2021-02-07 ENCOUNTER — RX RENEWAL (OUTPATIENT)
Age: 81
End: 2021-02-07

## 2021-02-24 NOTE — PROGRESS NOTE ADULT - PROBLEM SELECTOR PROBLEM 6
[facility]    Medicine Progress Note - Hospitalist Service       Date of Admission:  2/23/2021  Assessment & Plan       Abdominal pain, epigastric  Presenting to clinic with 1 to 2-week history of worsening abdominal pain associate with nausea and vomiting, unable to eat or drink  Past history significant for hiatal hernia repair in July 2020  X-ray imaging on February 16 showed nonspecific pattern with no clear signs of obstruction  [place] observation, IV fluids, pain management  CT imaging of the abdomen showing no obvious obstruction, but increased stool  Today will proceed with gastrografin smal bowel follow through  If better will advance diet, hopefully home later today or tomorrow    Non-intractable vomiting with nausea  Present for the past 2 weeks associated with the abdominal pain  As above, nausea medication ordered, IV fluids    CKD (chronic kidney disease) stage 4, GFR 15-29 ml/min (H)  Chronic issue, followed by nephrology at baseline  IV fluids, follow    Essential hypertension  Controlled taking Imdur, Zestril and metoprolol  Continue these, hold Lasix for now    Type 2 diabetes mellitus with hyperglycemia, with long-term current use of insulin (H)  Controlled at home taking insulin, last hemoglobin A1c 7.0  Continue basal insulin, decrease dosage due to inability to eat  Monitor, sliding scale coverage with meals    History of coronary artery bypass graft x 2  History of drug-eluting stents, completely occluded RCA being managed medically  Asymptomatic at this time  Continue treatments using Imdur, Zestril, metoprolol and is on Crestor for lipid control    Hyperlipidemia  Taking Crestor with good control of LDL  Restart at discharge    COVID-19 virus detected  Asymptomatic, will place on special precautions  2/24/2021- no symptoms, continue to monitor    Hypokalemia  Potassium low at 2.7, likely due to poor oral intake  Potassium replacement, follow  Placed on telemetry 
overnight           Diet: Advance Diet as Tolerated: Clear Liquid Diet; 6918-3529 Calories: Moderate Consistent CHO (4-6 CHO units/meal)    DVT Prophylaxis: Pneumatic Compression Devices  Lopez Catheter: not present  Code Status: Full Code           Disposition Plan   Expected discharge: Today, or tomorrow recommended to prior living arrangement once able to tolerate diet, passing stool.  Entered: Fly Schilling MD 02/24/2021, 7:23 AM       The patient's care was discussed with the Patient.    Fly Schilling MD  Hospitalist Service  [facility]  Contact information available via [facility] Paging/Directory    ______________________________________________________________________    Interval History   Feeling better since yesterday with pain improved.  Still not eating or drinking well, not passing gas or stool.  Denies fever, cough, shortness of breath    Data reviewed today: I reviewed all medications, new labs and imaging results over the last 24 hours. I personally reviewed no images or EKG's today.    Physical Exam   Vital Signs: Temp: 98.6  F (37  C) Temp src: Tympanic BP: 126/71 Pulse: (!) 37   Resp: 16 SpO2: 100 % O2 Device: None (Room air)    Weight: 164 lbs 0 oz  Constitutional: awake, alert, cooperative, no apparent distress, and appears stated age  Respiratory: No increased work of breathing, good air exchange, clear to auscultation bilaterally, no crackles or wheezing  Cardiovascular: regular rate and rhythm  GI: normal bowel sounds, soft and non-distended    Data   Recent Labs   Lab 02/24/21  0545 02/23/21  1920 02/23/21  1419   WBC 3.1*  --  4.4   HGB 10.3*  --  12.6   MCV 90  --  88   PLT 90*  --  116*     --  144   POTASSIUM 3.3* 2.9* 2.7*   CHLORIDE 109*  --  103   CO2 29  --  30   BUN 17  --  19   CR 2.02*  --  2.10*   ANIONGAP 5  --  11   ELICIA 8.2*  --  10.0   *  --  189*   ALBUMIN  --   --  3.7   PROTTOTAL  --   --  5.9*   BILITOTAL  --   --  1.4*   ALKPHOS 
 --   --  52   ALT  --   --  50   AST  --   --  51*     Recent Results (from the past 24 hour(s))   CT Abdomen pelvis w/o contrast    Narrative    PROCEDURE:  CT ABDOMEN PELVIS W/O CONTRAST    HISTORY:  Bowel obstruction suspected; Abdominal pain, acute,  nonlocalized    TECHNIQUE:  Helical CT of the abdomen and pelvis was performed without  intravenous contrast.    COMPARISON:  6/12/2020    FINDINGS:      Evaluation of the solid organs is somewhat limited due to the lack of  intravenous contrast.    Limited views through the lung bases demonstrate trace tree-in-bud  opacity in the left lower lobe and lingula.    There is a residual or recurrent mild to moderate hiatal hernia. There  is retained or refluxed contrast in the thoracic esophagus. Oral  contrast passes through a normal caliber stomach into normal caliber  small bowel. There is prominent stool in the colon and rectum.    Small nonobstructive renal calculi versus vascular calcifications are  seen. There is hydronephrosis. There is fatty atrophy of the pancreas.  No adrenal mass is identified. The spleen is within normal limits in  size. The gallbladder is surgically absent.    No free fluid, free air or adenopathy is present.  No suspicious  osseous lesions are identified.      Impression    IMPRESSION:      Recurrent or residual hiatal hernia with extensive retained or  refluxed contrast in the thoracic esophagus. Normal caliber stomach  and small bowel. No small bowel obstruction. Prominent stool can be  seen in constipation.    NELLY PERKINS MD     
Other pneumonia, unspecified organism
Need for prophylactic measure
Need for prophylactic measure

## 2021-03-18 ENCOUNTER — LABORATORY RESULT (OUTPATIENT)
Age: 81
End: 2021-03-18

## 2021-03-18 ENCOUNTER — APPOINTMENT (OUTPATIENT)
Dept: NEPHROLOGY | Facility: CLINIC | Age: 81
End: 2021-03-18
Payer: MEDICARE

## 2021-03-18 VITALS — SYSTOLIC BLOOD PRESSURE: 163 MMHG | DIASTOLIC BLOOD PRESSURE: 64 MMHG | HEART RATE: 72 BPM

## 2021-03-18 VITALS — HEART RATE: 84 BPM | SYSTOLIC BLOOD PRESSURE: 166 MMHG | DIASTOLIC BLOOD PRESSURE: 76 MMHG

## 2021-03-18 DIAGNOSIS — I51.7 CARDIOMEGALY: ICD-10-CM

## 2021-03-18 PROCEDURE — 99215 OFFICE O/P EST HI 40 MIN: CPT | Mod: 25

## 2021-03-18 PROCEDURE — 36415 COLL VENOUS BLD VENIPUNCTURE: CPT

## 2021-03-18 PROCEDURE — 93000 ELECTROCARDIOGRAM COMPLETE: CPT

## 2021-03-18 NOTE — ASSESSMENT
[FreeTextEntry1] : Plan:\par 1) HTN: BP is elevated in office today, though patient's wife reports hypotensive pressures at home and patient now off carvedilol. 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 Will have patient receive repeat echocardiogram and nuclear stress test for further cardiac evaluation.\par 3) CRI: last eGFR of 57; will continue to monitor function with CMP due to risk for progression of renal failure; have evaluated patient's renal function, blood pressure, and fluid volume status; continue current regimen at this time.\par 4) Fluid overload: patient determined to be with 1-2+ RLE, trace-1+ LLE edema today through physical exam. We will maintain current approach to fluid volume management today but will continue to monitor at future evaluations due to risk of exacerbation of renal failure. \par 5) Transplant medications: Patient to continue on current regimen of tacrolimus, prednisone, and mycophenolate.\par 6) Referred patient to Dr. Mcnair for neurologic evaluation.\par 7) Advised patient to receive COVID vaccine as soon as he can, excepting if the patient has a history of severe allergy.\par \par No changes to medications at this time.\par \par EKG taken, results above. Labs were drawn and patient will return in 1 month for a follow-up appointment.\par

## 2021-03-18 NOTE — ADDENDUM
[FreeTextEntry1] : All medical record entries made by the Scribe were at my, Dr. Callum Aranda, direction and personally dictated by me on 03/18/2021. I have reviewed the chart and agree that the record accurately reflects my personal performance of the history, physical exam, assessment and plan. I have also personally directed, reviewed, and agreed with the chart.

## 2021-03-18 NOTE — END OF VISIT
[Time Spent: ___ minutes] : I have spent [unfilled] minutes of time on the encounter. [FreeTextEntry3] : Documented by Vasiliy Lo acting as a scribe for Dr. Callum Aranda on 03/18/2021.

## 2021-03-18 NOTE — PHYSICAL EXAM
[General Appearance - Alert] : alert [General Appearance - In No Acute Distress] : in no acute distress [Sclera] : the sclera and conjunctiva were normal [PERRL With Normal Accommodation] : pupils were equal in size, round, and reactive to light [Extraocular Movements] : extraocular movements were intact [Outer Ear] : the ears and nose were normal in appearance [Hearing Threshold Finger Rub Not [place]] : hearing was normal [Examination Of The Oral Cavity] : the lips and gums were normal [Neck Appearance] : the appearance of the neck was normal [Neck Cervical Mass (___cm)] : no neck mass was observed [Jugular Venous Distention Increased] : there was no jugular-venous distention [Thyroid Diffuse Enlargement] : the thyroid was not enlarged [Thyroid Nodule] : there were no palpable thyroid nodules [Auscultation Breath Sounds / Voice Sounds] : lungs were clear to auscultation bilaterally [Heart Rate And Rhythm] : heart rate was normal and rhythm regular [Heart Sounds] : normal S1 and S2 [Heart Sounds Gallop] : no gallops [Murmurs] : no murmurs [Heart Sounds Pericardial Friction Rub] : no pericardial rub [Bowel Sounds] : normal bowel sounds [Abdomen Soft] : soft [Abdomen Tenderness] : non-tender [Abdomen Mass (___ Cm)] : no abdominal mass palpated [No CVA Tenderness] : no ~M costovertebral angle tenderness [No Spinal Tenderness] : no spinal tenderness [Abnormal Walk] : normal gait [Involuntary Movements] : no involuntary movements were seen [Musculoskeletal - Swelling] : no joint swelling seen [Motor Tone] : muscle strength and tone were normal [Skin Color & Pigmentation] : normal skin color and pigmentation [Skin Turgor] : normal skin turgor [] : no rash [No Focal Deficits] : no focal deficits [Oriented To Time, Place, And Person] : oriented to person, place, and time [Impaired Insight] : insight and judgment were intact [Affect] : the affect was normal [FreeTextEntry1] : 1-2+ RLE, trace-1+ LLE edema

## 2021-03-18 NOTE — HISTORY OF PRESENT ILLNESS
[FreeTextEntry1] : The patient is an 80 year old male with PMH of DM, anemia, ASCVD, and h/o renal transplant presenting today for f/u evaluation of HTN. Patient's wife was the primary historian today.\par \par Over the past two weeks, the patient's BP has been lowering, to consistently 90 systolic. Patient's wife then took out carvedilol first QAM dose, then both daily doses. This morning, patient had pressure of 116 systolic. The patient is having very little mobility or exercise at home. Also patient reports increased RLE edema. Patient denies CP, SOB, though with mild exertional dyspnea. Patient also reportedly with labile BP at times, up to 160 systolic.\par \par 2017 EKG with septal hypertrophy and hyperdynamic LV with minimally increased LV OT peak rating to 40 with Valsalva. Negative pharmacologic thallium stress test which showed no myocardial ischemia.\par \par Current Medication: prednisone 5mg QD, ASA 81mg QD, Flomax 0.4mg QD, Humalog 100 BID, insulin 16 units 75/25 prn, mycophenolate 500mg BID, tacrolimus 4mg BID, Colace 100mg QD, vitamin D3 1000 units QD.\par \par Patient now off carvedilol 12.5mg BID.

## 2021-03-23 LAB
24R-OH-CALCIDIOL SERPL-MCNC: 33.8 PG/ML
25(OH)D3 SERPL-MCNC: 15 NG/ML
ALBUMIN MFR SERPL ELPH: 60.9 %
ALBUMIN SERPL ELPH-MCNC: 3.9 G/DL
ALBUMIN SERPL-MCNC: 3.7 G/DL
ALBUMIN/GLOB SERPL: 1.5 RATIO
ALDOSTERONE SERUM: 11.6 NG/DL
ALP BLD-CCNC: 85 U/L
ALPHA1 GLOB MFR SERPL ELPH: 4 %
ALPHA1 GLOB SERPL ELPH-MCNC: 0.2 G/DL
ALPHA2 GLOB MFR SERPL ELPH: 10.7 %
ALPHA2 GLOB SERPL ELPH-MCNC: 0.7 G/DL
ALT SERPL-CCNC: 15 U/L
ANION GAP SERPL CALC-SCNC: 10 MMOL/L
APPEARANCE: ABNORMAL
AST SERPL-CCNC: 14 U/L
B-GLOBULIN MFR SERPL ELPH: 12.2 %
B-GLOBULIN SERPL ELPH-MCNC: 0.7 G/DL
BACTERIA UR CULT: NORMAL
BACTERIA: NEGATIVE
BASOPHILS # BLD AUTO: 0.01 K/UL
BASOPHILS NFR BLD AUTO: 0.1 %
BILIRUB SERPL-MCNC: 0.2 MG/DL
BILIRUBIN URINE: NEGATIVE
BLOOD URINE: NEGATIVE
BUN SERPL-MCNC: 26 MG/DL
C3 SERPL-MCNC: 84 MG/DL
C4 SERPL-MCNC: 22 MG/DL
CALCIUM OXALATE CRYSTALS: ABNORMAL
CALCIUM SERPL-MCNC: 9.6 MG/DL
CALCIUM SERPL-MCNC: 9.6 MG/DL
CHLORIDE SERPL-SCNC: 108 MMOL/L
CHOLEST SERPL-MCNC: 134 MG/DL
CO2 SERPL-SCNC: 23 MMOL/L
COLOR: YELLOW
COVID-19 NUCLEOCAPSID  GAM ANTIBODY INTERPRETATION: NEGATIVE
COVID-19 SPIKE DOMAIN ANTIBODY INTERPRETATION: POSITIVE
CREAT SERPL-MCNC: 1.29 MG/DL
CREAT SPEC-SCNC: 151 MG/DL
CREAT SPEC-SCNC: 151 MG/DL
CREAT/PROT UR: 0.6 RATIO
CRP SERPL-MCNC: <3 MG/L
CYSTATIN C SERPL-MCNC: 2.17 MG/L
DEPRECATED KAPPA LC FREE/LAMBDA SER: 2.46 RATIO
DEPRECATED KAPPA LC FREE/LAMBDA SER: 2.46 RATIO
EOSINOPHIL # BLD AUTO: 0.18 K/UL
EOSINOPHIL NFR BLD AUTO: 2.1 %
ERYTHROCYTE [SEDIMENTATION RATE] IN BLOOD BY WESTERGREN METHOD: 9 MM/HR
ESTIMATED AVERAGE GLUCOSE: 200 MG/DL
FERRITIN SERPL-MCNC: 142 NG/ML
FOLATE SERPL-MCNC: 15.4 NG/ML
GAMMA GLOB FLD ELPH-MCNC: 0.7 G/DL
GAMMA GLOB MFR SERPL ELPH: 12.2 %
GFR/BSA.PRED SERPLBLD CYS-BASED-ARV: 26 ML/MIN
GLUCOSE QUALITATIVE U: ABNORMAL
GLUCOSE SERPL-MCNC: 212 MG/DL
GRANULAR CASTS: 2 /LPF
HBA1C MFR BLD HPLC: 8.6 %
HBV SURFACE AB SER QL: NONREACTIVE
HBV SURFACE AG SER QL: NONREACTIVE
HCT VFR BLD CALC: 37 %
HCV AB SER QL: NONREACTIVE
HCV S/CO RATIO: 0.07 S/CO
HCYS SERPL-MCNC: 37.1 UMOL/L
HDLC SERPL-MCNC: 67 MG/DL
HGB BLD-MCNC: 10.8 G/DL
HYALINE CASTS: 3 /LPF
IGA 24H UR QL IFE: NORMAL
IGA SER QL IEP: 422 MG/DL
IGG SER QL IEP: 846 MG/DL
IGM SER QL IEP: 24 MG/DL
IMM GRANULOCYTES NFR BLD AUTO: 0.3 %
INTERPRETATION SERPL IEP-IMP: NORMAL
IRON SATN MFR SERPL: 27 %
IRON SERPL-MCNC: 57 UG/DL
KAPPA LC CSF-MCNC: 3.49 MG/DL
KAPPA LC CSF-MCNC: 3.49 MG/DL
KAPPA LC SERPL-MCNC: 8.59 MG/DL
KAPPA LC SERPL-MCNC: 8.59 MG/DL
KAPPA TOTAL LIGHT CHAIN, URINE: 10 MG/DL
KAPPA/LAMBDA TOTAL LIGHT CHAIN RATIO, URINE: 3.18
KETONES URINE: NEGATIVE
LAMBDA TOTAL LIGHT CHAIN, URINE: 3.14 MG/DL
LDLC SERPL CALC-MCNC: 48 MG/DL
LEUKOCYTE ESTERASE URINE: NEGATIVE
LYMPHOCYTES # BLD AUTO: 1.2 K/UL
LYMPHOCYTES NFR BLD AUTO: 13.7 %
M PROTEIN SPEC IFE-MCNC: NORMAL
MAGNESIUM SERPL-MCNC: 2.3 MG/DL
MAN DIFF?: NORMAL
MCHC RBC-ENTMCNC: 27.1 PG
MCHC RBC-ENTMCNC: 29.2 GM/DL
MCV RBC AUTO: 92.7 FL
MICROALBUMIN 24H UR DL<=1MG/L-MCNC: 38 MG/DL
MICROALBUMIN/CREAT 24H UR-RTO: 251 MG/G
MICROSCOPIC-UA: NORMAL
MONOCYTES # BLD AUTO: 0.41 K/UL
MONOCYTES NFR BLD AUTO: 4.7 %
MPO AB + PR3 PNL SER: NORMAL
NEUTROPHILS # BLD AUTO: 6.9 K/UL
NEUTROPHILS NFR BLD AUTO: 79.1 %
NITRITE URINE: NEGATIVE
NONHDLC SERPL-MCNC: 67 MG/DL
NT-PROBNP SERPL-MCNC: 1497 PG/ML
PARATHYROID HORMONE INTACT: 106 PG/ML
PH URINE: 5.5
PHOSPHATE SERPL-MCNC: 2.8 MG/DL
PLATELET # BLD AUTO: 228 K/UL
POTASSIUM SERPL-SCNC: 5 MMOL/L
PROT SERPL-MCNC: 6.1 G/DL
PROT UR-MCNC: 90 MG/DL
PROTEIN URINE: ABNORMAL
RBC # BLD: 3.99 M/UL
RBC # FLD: 16.8 %
RED BLOOD CELLS URINE: 11 /HPF
RENIN PLASMA: 4.9 PG/ML
RHEUMATOID FACT SER QL: <10 IU/ML
SARS-COV-2 AB SERPL IA-ACNC: 49.5 U/ML
SARS-COV-2 AB SERPL QL IA: 0.17 INDEX
SODIUM SERPL-SCNC: 141 MMOL/L
SPECIFIC GRAVITY URINE: 1.02
SQUAMOUS EPITHELIAL CELLS: 2 /HPF
T3FREE SERPL-MCNC: 2.34 PG/ML
T3RU NFR SERPL: 0.9 TBI
T4 FREE SERPL-MCNC: 1.3 NG/DL
T4 SERPL-MCNC: 6.3 UG/DL
THYROGLOB AB SERPL-ACNC: <20 IU/ML
THYROPEROXIDASE AB SERPL IA-ACNC: 18.6 IU/ML
TIBC SERPL-MCNC: 209 UG/DL
TRIGL SERPL-MCNC: 95 MG/DL
TSH SERPL-ACNC: 4.63 UIU/ML
UIBC SERPL-MCNC: 152 UG/DL
URATE SERPL-MCNC: 5.9 MG/DL
UROBILINOGEN URINE: NORMAL
VIT B12 SERPL-MCNC: 348 PG/ML
WBC # FLD AUTO: 8.73 K/UL
WHITE BLOOD CELLS URINE: 3 /HPF

## 2021-04-06 LAB
ALP BONE SERPL-MCNC: 18.9 UG/L
ANA SER IF-ACNC: NEGATIVE
C1Q IMMUNE COMPLEX: <1.2 UG EQ/ML
C3D IMMUNE COMPLEXES: 5.2 UG EQ/ML
COLLAGEN CTX SERPL-MCNC: 590 PG/ML
METHYLMALONATE SERPL-SCNC: 1446 NMOL/L

## 2021-04-26 ENCOUNTER — NON-APPOINTMENT (OUTPATIENT)
Age: 81
End: 2021-04-26

## 2021-04-26 ENCOUNTER — APPOINTMENT (OUTPATIENT)
Dept: OPHTHALMOLOGY | Facility: CLINIC | Age: 81
End: 2021-04-26
Payer: MEDICARE

## 2021-04-26 PROCEDURE — 99213 OFFICE O/P EST LOW 20 MIN: CPT

## 2021-04-26 PROCEDURE — 92133 CPTRZD OPH DX IMG PST SGM ON: CPT

## 2021-05-07 ENCOUNTER — RX RENEWAL (OUTPATIENT)
Age: 81
End: 2021-05-07

## 2021-05-24 ENCOUNTER — FORM ENCOUNTER (OUTPATIENT)
Age: 81
End: 2021-05-24

## 2021-05-25 ENCOUNTER — OUTPATIENT (OUTPATIENT)
Dept: OUTPATIENT SERVICES | Facility: HOSPITAL | Age: 81
LOS: 1 days | End: 2021-05-25
Payer: MEDICARE

## 2021-05-25 ENCOUNTER — RESULT REVIEW (OUTPATIENT)
Age: 81
End: 2021-05-25

## 2021-05-25 DIAGNOSIS — S98.139A COMPLETE TRAUMATIC AMPUTATION OF ONE UNSPECIFIED LESSER TOE, INITIAL ENCOUNTER: Chronic | ICD-10-CM

## 2021-05-25 DIAGNOSIS — I11.9 HYPERTENSIVE HEART DISEASE WITHOUT HEART FAILURE: ICD-10-CM

## 2021-05-25 DIAGNOSIS — Z94.0 KIDNEY TRANSPLANT STATUS: Chronic | ICD-10-CM

## 2021-05-25 PROCEDURE — 93306 TTE W/DOPPLER COMPLETE: CPT | Mod: 26

## 2021-05-25 PROCEDURE — 93018 CV STRESS TEST I&R ONLY: CPT

## 2021-05-25 PROCEDURE — 93016 CV STRESS TEST SUPVJ ONLY: CPT

## 2021-05-25 PROCEDURE — A9500: CPT

## 2021-05-25 PROCEDURE — A9505: CPT

## 2021-05-25 PROCEDURE — 93017 CV STRESS TEST TRACING ONLY: CPT

## 2021-05-25 PROCEDURE — 93306 TTE W/DOPPLER COMPLETE: CPT

## 2021-05-25 PROCEDURE — 78452 HT MUSCLE IMAGE SPECT MULT: CPT

## 2021-05-25 PROCEDURE — 78452 HT MUSCLE IMAGE SPECT MULT: CPT | Mod: 26,MH

## 2021-06-04 ENCOUNTER — RX RENEWAL (OUTPATIENT)
Age: 81
End: 2021-06-04

## 2021-06-04 RX ORDER — TACROLIMUS 5 MG/1
5 CAPSULE ORAL
Qty: 30 | Refills: 0 | Status: ACTIVE | COMMUNITY
Start: 2020-07-28 | End: 1900-01-01

## 2021-06-08 ENCOUNTER — APPOINTMENT (OUTPATIENT)
Dept: NEPHROLOGY | Facility: CLINIC | Age: 81
End: 2021-06-08
Payer: MEDICARE

## 2021-06-08 PROCEDURE — 99443: CPT | Mod: 95

## 2021-06-08 NOTE — ADDENDUM
[FreeTextEntry1] :  Documented by Lasha Bush acting as a scribe for Dr. Callum Aranda on 06/08/2021.

## 2021-06-08 NOTE — HISTORY OF PRESENT ILLNESS
[FreeTextEntry1] : The patient is an 80 year old male presenting today for f/u evaluation of ischemic dilatation with recommendation for cardiac catheterization and active reassessment of HTN, DM, anemia, ASCVD, and h/o renal transplant.\par Patient's wife is the primary historian today.\par \par At this time the patient has no change clinically. He does not walk often and when he does he becomes SOB.\par \par Patient's findings from nuclear stress test: 2017 no evidence of coronary insufficiency, and now there are areas of decreased tracer uptake. There is also a report of a suggestion ischemic dilatation of the heart. Do not pursue  CTA coronaries due to renal dysfunction need for clear answer. Will proceed straight to cath as discussed with Dr. Lemuel Wu.\par \par 5/25/21 echocardiogram showed severe asymmetric left ventricular hypertrophy, normal left ventricular size and systolic function, grade 2 left  ventricular diastolic dysfunction, normal right  ventricular size and systolic function, severely dilated left atrium, mild mitral stenosis, no pericardial effusion. \par \par Labs from 3/18/21 reveal: RBC 3.99, HGB 10.8, HCT 37, cystatin C 26, 2.17, eGFR by cystatin 26, TSH 4.63, , Vit D 25 of 15.0, glucose 212, BUN 26, creatinine 1.29, eGFR 52, FLC ratio 2.46, Kappa FLC 8.59, urine RBC 11, Ca oxalate few, urine Gran cst 2, urine protein 30, urine glucose 200, homocysteine 37.1, negative nucleocapsid COVID-19 antibody, positive COVID-19 spike domain antibody, A1C 8.6, proBNP 1497, prot/creat ratio 0.6,  microalb/creat ratio 251, MMA 1446.\par \par Current Medication: prednisone 5mg QD, ASA 81mg QD, Flomax 0.4mg QD, Humalog 100 BID, insulin 16 units 75/25 prn, mycophenolate 500mg BID, tacrolimus 4mg BID, Colace 100mg QD, vitamin D3 1000 units QD.\par \par Patient now off carvedilol 12.5mg BID.

## 2021-06-08 NOTE — ASSESSMENT
[FreeTextEntry1] : Plan:\par 1) Have discussed at length with the patient and his wife the results of his abnormal nuclear stress test which revealed possible ischemic dilatation of the heart and the need for a coronary catheterization. This decision was made after review of all available data and discussion with two other cardiologists. The patient and his wife understand the risks and what subsequent therapies are possible. They will contact Dr. Lemuel Wu to schedule cardiac catheterization.\par 2) CRI: last creatinine of 1.29 with eGFR of 52 is stable for patient. Continue to monitor on repeat CMP. \par 3) Hyperglycemia: Most recent HbA1C of 8.6. Advised patient to continue lifestyle management with healthy dieting and routine exercise and to continue on current antihyperglycemic regimen and continue f/u with Dr. Guajardo for diabetes management.\par 4) Transplant medications: Patient will continue his current regimen of prednisone, mycophenolate, and tacrolimus.\par 5) COVID-19 Vaccination: Patient has reportedly received both doses of a Covid19 vaccine and has tested positive for COVID-19 spike domain antibody.\par \par Medication changes: no change today.\par \par Plan to reconvene with patient after cardiac catheterization and subsequent course.\par \par Discussion and evaluation of the patient's treatment plan led to a visit time of over 40 minutes.

## 2021-06-08 NOTE — END OF VISIT
[Time Spent: ___ minutes] : I have spent [unfilled] minutes of time on the encounter. [FreeTextEntry3] : All medical record entries made by the Scribe were at my, Dr. Callum Aranda, direction and personally dictated by me on 06/08/2021. I have reviewed the chart and agree that the record accurately reflects my personal performance of the history, physical exam, assessment and plan. I have also personally directed, reviewed, and agreed with the chart.

## 2021-06-14 VITALS
TEMPERATURE: 98 F | OXYGEN SATURATION: 99 % | RESPIRATION RATE: 18 BRPM | DIASTOLIC BLOOD PRESSURE: 99 MMHG | SYSTOLIC BLOOD PRESSURE: 183 MMHG | HEART RATE: 78 BPM

## 2021-06-14 RX ORDER — CHLORHEXIDINE GLUCONATE 213 G/1000ML
1 SOLUTION TOPICAL ONCE
Refills: 0 | Status: DISCONTINUED | OUTPATIENT
Start: 2021-06-15 | End: 2021-06-16

## 2021-06-14 NOTE — H&P ADULT - NSHPPOADEEPVENOUSTHROMB_GEN_A_CORE
Referrals sent to [facility] and [facility], [facility] is currently screening and will need a prior auth from [facility].     Per discharge planning, the choice of vendor list has been provided to the patient/family for a skilled nursing facility.    First Choice : Skilled Nursing Facility [place]: [facility]         [phone]    Second Choice: Skilled Nursing Facility [facility]: [facility]     [phone]    CM remains available.        no

## 2021-06-14 NOTE — H&P ADULT - ASSESSMENT
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    -H/H: 10.4/35.0.  (H/H was 10.8/32.0 March 2021). Pt denies BRBPR, hematuria, hematochezia, melena. Pt loaded 325mg ASA x1 and Plavix 600mg x1  -Cr: _____. EF normal. Euvolemic on exam. IVF @ 75cc/hr started pre procedure  -DM: Insulin Sliding Scale Coverage ordered  -Cardiac Catheterization ordered placed   -Home Medication Confirmed via: Wife and medication list   -Type of sedation: Moderate  -Candidate for sedation: Yes     Risks & benefits of procedure and alternative therapy have been explained to the patient including but not limited to: allergic reaction, bleeding w/possible need for blood transfusion, infection, renal and vascular compromise, limb damage, arrhythmia, stroke, vessel dissection/perforation, Myocardial infarction, emergent CABG. Informed consent obtained and in chart.  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    -H/H: 10.4/35.0.  (H/H was 10.8/32.0 March 2021March 2021, 2019 [facility] trend: H/H: 10.7-10.9/34-35). Pt denies BRBPR, hematuria, hematochezia, melena. Pt loaded 325mg ASA x1 and Plavix 600mg x1  -Cr: _____. EF normal. Euvolemic on exam. IVF @ 75cc/hr started pre procedure  -DM: Insulin Sliding Scale Coverage ordered  -Cardiac Catheterization ordered placed   -Home Medication Confirmed via: Wife and medication list   -History of renal transplant: Took his Prednisone 5 mg daily this AM; does not need further stress dosing at this time.   -Type of sedation: Moderate  -Candidate for sedation: Yes     Risks & benefits of procedure and alternative therapy have been explained to the patient including but not limited to: allergic reaction, bleeding w/possible need for blood transfusion, infection, renal and vascular compromise, limb damage, arrhythmia, stroke, vessel dissection/perforation, Myocardial infarction, emergent CABG. Informed consent obtained and in chart.  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    -H/H: 10.4/35.0.  (H/H was 10.8/32.0 March 2021March 2021, 2019 [facility] trend: H/H: 10.7-10.9/34-35). Pt denies BRBPR, hematuria, hematochezia, melena. Pt loaded 325mg ASA x1 and Plavix 600mg x1  -Cr: _____. EF normal. O2 sat stable on RA; patient with 3+ pedal edema (reports this is chronic). Lungs CTA b/l. IVF @ 50 cc/hr started pre procedure  -History of renal transplant: Took his Prednisone 5 mg daily this AM; does not need further stress dosing at this time.  -DM: Insulin Sliding Scale Coverage ordered  -Cardiac Catheterization ordered placed   -Home Medication Confirmed via: Wife and medication list      -Type of sedation: Moderate  -Candidate for sedation: Yes     Risks & benefits of procedure and alternative therapy have been explained to the patient including but not limited to: allergic reaction, bleeding w/possible need for blood transfusion, infection, renal and vascular compromise, limb damage, arrhythmia, stroke, vessel dissection/perforation, Myocardial infarction, emergent CABG. Informed consent obtained and in chart.  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    -H/H: 10.4/35.0.  (H/H was 10.8/32.0 March 2021March 2021, 2019 [facility] trend: H/H: 10.7-10.9/34-35). Pt denies BRBPR, hematuria, hematochezia, melena. Pt loaded 325mg ASA x1 and Plavix 600mg x1  -BUN/Cr: 24/1.41 (Cr: 1.29 3/18/2021).  EF 55%, Grade II left ventricular diastolic dysfunction by Echo 5/2021, O2 sat stable on RA; patient with 3+ pedal edema (reports this is chronic). Lungs CTA b/l. IVF @ 50 cc/hr started pre procedure will discuss further with Interventional Fellow.   -History of renal transplant: Took his Prednisone 5 mg daily this AM; does not need further stress dosing at this time.  -DM: Insulin Sliding Scale Coverage ordered  -Cardiac Catheterization ordered placed   -Home Medication Confirmed via: Wife and medication list      -Type of sedation: Moderate  -Candidate for sedation: Yes     Risks & benefits of procedure and alternative therapy have been explained to the patient including but not limited to: allergic reaction, bleeding w/possible need for blood transfusion, infection, renal and vascular compromise, limb damage, arrhythmia, stroke, vessel dissection/perforation, Myocardial infarction, emergent CABG. Informed consent obtained and in chart.  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    -H/H: 10.4/35.0.  (H/H was 10.8/32.0 March 2021March 2021, 2019 [facility] trend: H/H: 10.7-10.9/34-35). Pt denies BRBPR, hematuria, hematochezia, melena. Pt loaded 81mg ASA x1 and Plavix 600mg x1 (Did not take home ASA 81 mg this AM)  -BUN/Cr: 24/1.41 (Cr: 1.29 3/18/2021).  EF 55%, Grade II left ventricular diastolic dysfunction by Echo 5/2021, O2 sat stable on RA; patient with 3+ pedal edema (reports this is chronic). Lungs CTA b/l. IVF @ 50 cc/hr started pre procedure will discuss further with Interventional Fellow.   -History of renal transplant: Took his Prednisone 5 mg daily this AM; does not need further stress dosing at this time.  -DM: Insulin Sliding Scale Coverage ordered  -Cardiac Catheterization ordered placed   -Home Medication Confirmed via: Wife and medication list      -Type of sedation: Moderate  -Candidate for sedation: Yes     Risks & benefits of procedure and alternative therapy have been explained to the patient including but not limited to: allergic reaction, bleeding w/possible need for blood transfusion, infection, renal and vascular compromise, limb damage, arrhythmia, stroke, vessel dissection/perforation, Myocardial infarction, emergent CABG. Informed consent obtained and in chart.  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    -H/H: 10.4/35.0.  (H/H was 10.8/32.0 March 2021March 2021, 2019 [facility] trend: H/H: 10.7-10.9/34-35). Pt denies BRBPR, hematuria, hematochezia, melena. Pt loaded 81mg ASA x1 and Plavix 600mg x1 (Did not take home ASA 81 mg this AM)  -BUN/Cr: 24/1.41 (Cr: 1.29 3/18/2021).  EF 55%, Grade II left ventricular diastolic dysfunction by Echo 5/2021, O2 sat stable on RA; patient with 3+ pedal edema (reports this is chronic). Lungs CTA b/l. IVF @ 50 cc/hr started pre procedure as discussed with Dr. Taylor   -History of renal transplant: Took his Prednisone 5 mg daily this AM; does not need further stress dosing at this time.  -DM: Insulin Sliding Scale Coverage ordered  -Cardiac Catheterization ordered placed   -Home Medication Confirmed via: Wife and medication list      -Type of sedation: Moderate  -Candidate for sedation: Yes     Risks & benefits of procedure and alternative therapy have been explained to the patient including but not limited to: allergic reaction, bleeding w/possible need for blood transfusion, infection, renal and vascular compromise, limb damage, arrhythmia, stroke, vessel dissection/perforation, Myocardial infarction, emergent CABG. Informed consent obtained and in chart.

## 2021-06-14 NOTE — H&P ADULT - HISTORY OF PRESENT ILLNESS
PCP Dr. 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 Aranda endorsing shortness of breath, patient does not walk much but when he does he becomes short of breath,     shortness of breath w/ 15-20 steps on going for years, stable since his COVID infection in March 2021 (did not require hospitalization)  lightheaded and dizziness espeically in the morning     intermittent LE aletha a  Patient denies active chest pain, palpitations, diaphoresis, fatigue, dizziness, syncope, lower extremity edema, orthopnea, positional nocturnal dyspnea, recent fever, chills, night sweats, cough, nausea, vomiting, and diarrhea.   Echo on 5/25/21 severe asymmetric LBH, EF 55%, severely dilated LA, mild MS.   NST on 5/25/ 21 medium size area of mild ischemia in the apex and entire inferior wall. No WMA. Decreased myocardial thickening in the apical inferior wall and possible evidence of ischemic dilation or LV.  In light of patients risk factors, CCS class **, and abnormal patient now presents for cardiac catheterization with possible intervention.  PCP Dr. 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 Aranda endorsing shortness of breath w/ 15-20 steps on going for years, stable since his COVID infection in March 2021 (did not require hospitalization)patient does not walk much but when he does he becomes short of breath. Patient also endorsing lightheaded and dizziness especially in the morning, patient has intermittent LE Edema. Patient denies active chest pain, palpitations, diaphoresis, fatigue, syncope, orthopnea, positional nocturnal dyspnea, recent fever, chills, night sweats, cough, nausea, vomiting, and diarrhea. Echo on 5/25/21 severe asymmetric LBH, EF 55%, severely dilated LA, mild MS.  NST on 5/25/ 21 medium size area of mild ischemia in the apex and entire inferior wall. No WMA. Decreased myocardial thickening in the apical inferior wall and possible evidence of ischemic dilation or LV.  In light of patients risk factors, CCS class IV and abnormal patient now presents for cardiac catheterization with possible intervention.   PCP Dr. 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 Aranda endorsing shortness of breath w/ 15-20 steps on going for years, stable since his COVID infection in March 2021 (did not require hospitalization). Patient does not walk much but when he does he becomes short of breath. Patient also endorses lightheaded and dizziness especially in the morning and has intermittent LE Edema. Patient denies active chest pain, palpitations, diaphoresis, fatigue, syncope, orthopnea, positional nocturnal dyspnea, recent fever, chills, night sweats, cough, nausea, vomiting, and diarrhea. Echo on 5/25/21 severe asymmetric LVH, EF 55%, severely dilated LA, mild MS.  NST on 5/25/ 21 medium size area of mild ischemia in the apex and entire inferior wall. No WMA. Decreased myocardial thickening in the apical inferior wall and possible evidence of ischemic dilation or LV.  In light of patient’s risk factors, CCS class IV equivalent symptoms and abnormal NST, patient now presents for cardiac catheterization with possible intervention.       PCP Dr. 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 Aranda endorsing shortness of breath w/ 15-20 steps on going for years, stable since his COVID infection in March 2021 (did not require hospitalization). Patient does not walk much but when he does he becomes short of breath. Patient also endorses lightheaded and dizziness especially in the morning and has intermittent LE Edema. Patient denies active chest pain, palpitations, diaphoresis, fatigue, syncope, orthopnea, positional nocturnal dyspnea, recent fever, chills, night sweats, cough, nausea, vomiting, and diarrhea. Echo on 5/25/21 severe asymmetric LVH, EF 55%, severely dilated LA, mild MS.  NST on 5/25/ 21 medium size area of mild ischemia in the apex and entire inferior wall. No WMA. Decreased myocardial thickening in the apical inferior wall and possible evidence of ischemic dilation or LV.  In light of patient’s risk factors, CCS class IV equivalent symptoms and abnormal NST, patient now presents for cardiac catheterization with possible intervention.       PCP Dr. 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 Aranda endorsing shortness of breath w/ 15-20 steps on going for years, stable since his COVID infection in March 2021 (did not require hospitalization). Patient does not walk much but when he does he becomes short of breath. Patient also endorses lightheaded and dizziness especially in the morning and has intermittent LE Edema. Patient denies active chest pain, palpitations, diaphoresis, fatigue, syncope, orthopnea, positional nocturnal dyspnea, recent fever, chills, night sweats, cough, nausea, vomiting, and diarrhea. Echo on 5/25/21 severe asymmetric LVH, EF 55%, severely dilated LA, mild MS.  NST on 5/25/ 21 medium size area of mild ischemia in the apex and entire inferior wall. No WMA. Decreased myocardial thickening in the apical inferior wall and possible evidence of ischemic dilation or LV.  In light of patient’s risk factors, CCS class IV equivalent symptoms and abnormal NST, patient now presents for cardiac catheterization with possible intervention.       PCP Dr. Aranda   COVID-19 PCR: Negative 6/13/2021 (Results in chart)  Pharmacy: [facility], Medications confirmed wife (list present from pharmacy)  Escort: Wife or son  79 y/o legally blind (glaucoma) male, POOR HISTORIAN with a PMHx of HTN, DM type II, Anemia, history of renal failure (has Right AVF) s/p renal transplant 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 Aranda endorsing shortness of breath w/ 15-20 steps on going for years, stable since his COVID infection in March 2021 (did not require hospitalization). Patient does not walk much but when he does he becomes short of breath. Patient also endorses lightheaded and dizziness especially in the morning and has intermittent LE Edema. Patient denies active chest pain, palpitations, diaphoresis, fatigue, syncope, orthopnea, positional nocturnal dyspnea, recent fever, chills, night sweats, cough, nausea, vomiting, and diarrhea. Echo on 5/25/21 severe asymmetric LVH, EF 55%, Grade II left ventricular diastolic dysfunction., severely dilated LA, mild MS.  NST on 5/25/ 21 medium size area of mild ischemia in the apex and entire inferior wall. No WMA. Decreased myocardial thickening in the apical inferior wall and possible evidence of ischemic dilation or LV.  In light of patient’s risk factors, CCS class IV equivalent symptoms and abnormal NST, patient now presents for cardiac catheterization with possible intervention.

## 2021-06-15 ENCOUNTER — INPATIENT (INPATIENT)
Facility: HOSPITAL | Age: 81
LOS: 0 days | Discharge: HOME | DRG: 247 | End: 2021-06-16
Attending: INTERNAL MEDICINE | Admitting: INTERNAL MEDICINE
Payer: COMMERCIAL

## 2021-06-15 VITALS
HEART RATE: 80 BPM | DIASTOLIC BLOOD PRESSURE: 97 MMHG | OXYGEN SATURATION: 97 % | SYSTOLIC BLOOD PRESSURE: 231 MMHG | RESPIRATION RATE: 16 BRPM

## 2021-06-15 DIAGNOSIS — Z94.0 KIDNEY TRANSPLANT STATUS: Chronic | ICD-10-CM

## 2021-06-15 DIAGNOSIS — S98.139A COMPLETE TRAUMATIC AMPUTATION OF ONE UNSPECIFIED LESSER TOE, INITIAL ENCOUNTER: Chronic | ICD-10-CM

## 2021-06-15 LAB
A1C WITH ESTIMATED AVERAGE GLUCOSE RESULT: 8 % — HIGH (ref 4–5.6)
ALBUMIN SERPL ELPH-MCNC: 3.7 G/DL — SIGNIFICANT CHANGE UP (ref 3.3–5)
ALP SERPL-CCNC: 94 U/L — SIGNIFICANT CHANGE UP (ref 40–120)
ALT FLD-CCNC: 14 U/L — SIGNIFICANT CHANGE UP (ref 10–45)
ANION GAP SERPL CALC-SCNC: 7 MMOL/L — SIGNIFICANT CHANGE UP (ref 5–17)
AST SERPL-CCNC: 12 U/L — SIGNIFICANT CHANGE UP (ref 10–40)
BASOPHILS # BLD AUTO: 0.01 K/UL — SIGNIFICANT CHANGE UP (ref 0–0.2)
BASOPHILS NFR BLD AUTO: 0.2 % — SIGNIFICANT CHANGE UP (ref 0–2)
BILIRUB SERPL-MCNC: 0.3 MG/DL — SIGNIFICANT CHANGE UP (ref 0.2–1.2)
BUN SERPL-MCNC: 24 MG/DL — HIGH (ref 7–23)
CALCIUM SERPL-MCNC: 9.5 MG/DL — SIGNIFICANT CHANGE UP (ref 8.4–10.5)
CHLORIDE SERPL-SCNC: 108 MMOL/L — SIGNIFICANT CHANGE UP (ref 96–108)
CHOLEST SERPL-MCNC: 123 MG/DL — SIGNIFICANT CHANGE UP
CK MB CFR SERPL CALC: 1.9 NG/ML — SIGNIFICANT CHANGE UP (ref 0–6.7)
CK SERPL-CCNC: 27 U/L — LOW (ref 30–200)
CO2 SERPL-SCNC: 26 MMOL/L — SIGNIFICANT CHANGE UP (ref 22–31)
CREAT SERPL-MCNC: 1.41 MG/DL — HIGH (ref 0.5–1.3)
EOSINOPHIL # BLD AUTO: 0.26 K/UL — SIGNIFICANT CHANGE UP (ref 0–0.5)
EOSINOPHIL NFR BLD AUTO: 4.5 % — SIGNIFICANT CHANGE UP (ref 0–6)
ESTIMATED AVERAGE GLUCOSE: 183 MG/DL — HIGH (ref 68–114)
GLUCOSE BLDC GLUCOMTR-MCNC: 110 MG/DL — HIGH (ref 70–99)
GLUCOSE BLDC GLUCOMTR-MCNC: 124 MG/DL — HIGH (ref 70–99)
GLUCOSE BLDC GLUCOMTR-MCNC: 155 MG/DL — HIGH (ref 70–99)
GLUCOSE SERPL-MCNC: 136 MG/DL — HIGH (ref 70–99)
HCT VFR BLD CALC: 35 % — LOW (ref 39–50)
HDLC SERPL-MCNC: 71 MG/DL — SIGNIFICANT CHANGE UP
HGB BLD-MCNC: 10.4 G/DL — LOW (ref 13–17)
IMM GRANULOCYTES NFR BLD AUTO: 0.3 % — SIGNIFICANT CHANGE UP (ref 0–1.5)
LIPID PNL WITH DIRECT LDL SERPL: 31 MG/DL — SIGNIFICANT CHANGE UP
LYMPHOCYTES # BLD AUTO: 1.65 K/UL — SIGNIFICANT CHANGE UP (ref 1–3.3)
LYMPHOCYTES # BLD AUTO: 28.3 % — SIGNIFICANT CHANGE UP (ref 13–44)
MCHC RBC-ENTMCNC: 26.5 PG — LOW (ref 27–34)
MCHC RBC-ENTMCNC: 29.7 GM/DL — LOW (ref 32–36)
MCV RBC AUTO: 89.1 FL — SIGNIFICANT CHANGE UP (ref 80–100)
MONOCYTES # BLD AUTO: 0.53 K/UL — SIGNIFICANT CHANGE UP (ref 0–0.9)
MONOCYTES NFR BLD AUTO: 9.1 % — SIGNIFICANT CHANGE UP (ref 2–14)
NEUTROPHILS # BLD AUTO: 3.37 K/UL — SIGNIFICANT CHANGE UP (ref 1.8–7.4)
NEUTROPHILS NFR BLD AUTO: 57.6 % — SIGNIFICANT CHANGE UP (ref 43–77)
NON HDL CHOLESTEROL: 52 MG/DL — SIGNIFICANT CHANGE UP
NRBC # BLD: 0 /100 WBCS — SIGNIFICANT CHANGE UP (ref 0–0)
PLATELET # BLD AUTO: 212 K/UL — SIGNIFICANT CHANGE UP (ref 150–400)
POTASSIUM SERPL-MCNC: 4.4 MMOL/L — SIGNIFICANT CHANGE UP (ref 3.5–5.3)
POTASSIUM SERPL-SCNC: 4.4 MMOL/L — SIGNIFICANT CHANGE UP (ref 3.5–5.3)
PROT SERPL-MCNC: 6.4 G/DL — SIGNIFICANT CHANGE UP (ref 6–8.3)
RBC # BLD: 3.93 M/UL — LOW (ref 4.2–5.8)
RBC # FLD: 15.4 % — HIGH (ref 10.3–14.5)
SODIUM SERPL-SCNC: 141 MMOL/L — SIGNIFICANT CHANGE UP (ref 135–145)
TRIGL SERPL-MCNC: 104 MG/DL — SIGNIFICANT CHANGE UP
WBC # BLD: 5.84 K/UL — SIGNIFICANT CHANGE UP (ref 3.8–10.5)
WBC # FLD AUTO: 5.84 K/UL — SIGNIFICANT CHANGE UP (ref 3.8–10.5)

## 2021-06-15 PROCEDURE — 99152 MOD SED SAME PHYS/QHP 5/>YRS: CPT

## 2021-06-15 PROCEDURE — 93458 L HRT ARTERY/VENTRICLE ANGIO: CPT | Mod: 26,59

## 2021-06-15 PROCEDURE — 93010 ELECTROCARDIOGRAM REPORT: CPT | Mod: 77

## 2021-06-15 PROCEDURE — 93010 ELECTROCARDIOGRAM REPORT: CPT

## 2021-06-15 PROCEDURE — 92929: CPT | Mod: LD

## 2021-06-15 PROCEDURE — 92933 PRQ TRLML C ATHRC ST ANGIOP1: CPT | Mod: LD

## 2021-06-15 RX ORDER — MYCOPHENOLATE MOFETIL 250 MG/1
500 CAPSULE ORAL
Refills: 0 | Status: DISCONTINUED | OUTPATIENT
Start: 2021-06-15 | End: 2021-06-16

## 2021-06-15 RX ORDER — HYDRALAZINE HCL 50 MG
10 TABLET ORAL ONCE
Refills: 0 | Status: COMPLETED | OUTPATIENT
Start: 2021-06-15 | End: 2021-06-15

## 2021-06-15 RX ORDER — ASPIRIN/CALCIUM CARB/MAGNESIUM 324 MG
81 TABLET ORAL DAILY
Refills: 0 | Status: DISCONTINUED | OUTPATIENT
Start: 2021-06-16 | End: 2021-06-16

## 2021-06-15 RX ORDER — DEXTROSE 50 % IN WATER 50 %
12.5 SYRINGE (ML) INTRAVENOUS ONCE
Refills: 0 | Status: DISCONTINUED | OUTPATIENT
Start: 2021-06-15 | End: 2021-06-16

## 2021-06-15 RX ORDER — DEXTROSE 50 % IN WATER 50 %
15 SYRINGE (ML) INTRAVENOUS ONCE
Refills: 0 | Status: DISCONTINUED | OUTPATIENT
Start: 2021-06-15 | End: 2021-06-16

## 2021-06-15 RX ORDER — INSULIN GLARGINE 100 [IU]/ML
18 INJECTION, SOLUTION SUBCUTANEOUS AT BEDTIME
Refills: 0 | Status: DISCONTINUED | OUTPATIENT
Start: 2021-06-15 | End: 2021-06-16

## 2021-06-15 RX ORDER — INSULIN LISPRO 100/ML
VIAL (ML) SUBCUTANEOUS
Refills: 0 | Status: DISCONTINUED | OUTPATIENT
Start: 2021-06-15 | End: 2021-06-15

## 2021-06-15 RX ORDER — ASPIRIN/CALCIUM CARB/MAGNESIUM 324 MG
81 TABLET ORAL ONCE
Refills: 0 | Status: COMPLETED | OUTPATIENT
Start: 2021-06-15 | End: 2021-06-15

## 2021-06-15 RX ORDER — SENNA PLUS 8.6 MG/1
1 TABLET ORAL AT BEDTIME
Refills: 0 | Status: DISCONTINUED | OUTPATIENT
Start: 2021-06-15 | End: 2021-06-16

## 2021-06-15 RX ORDER — CLOPIDOGREL BISULFATE 75 MG/1
75 TABLET, FILM COATED ORAL DAILY
Refills: 0 | Status: DISCONTINUED | OUTPATIENT
Start: 2021-06-16 | End: 2021-06-16

## 2021-06-15 RX ORDER — TACROLIMUS 5 MG/1
3 CAPSULE ORAL AT BEDTIME
Refills: 0 | Status: DISCONTINUED | OUTPATIENT
Start: 2021-06-15 | End: 2021-06-16

## 2021-06-15 RX ORDER — GLUCAGON INJECTION, SOLUTION 0.5 MG/.1ML
1 INJECTION, SOLUTION SUBCUTANEOUS ONCE
Refills: 0 | Status: DISCONTINUED | OUTPATIENT
Start: 2021-06-15 | End: 2021-06-16

## 2021-06-15 RX ORDER — INSULIN LISPRO 100/ML
3 VIAL (ML) SUBCUTANEOUS
Refills: 0 | Status: DISCONTINUED | OUTPATIENT
Start: 2021-06-15 | End: 2021-06-16

## 2021-06-15 RX ORDER — CARVEDILOL PHOSPHATE 80 MG/1
12.5 CAPSULE, EXTENDED RELEASE ORAL EVERY 12 HOURS
Refills: 0 | Status: DISCONTINUED | OUTPATIENT
Start: 2021-06-15 | End: 2021-06-16

## 2021-06-15 RX ORDER — CLOPIDOGREL BISULFATE 75 MG/1
600 TABLET, FILM COATED ORAL ONCE
Refills: 0 | Status: COMPLETED | OUTPATIENT
Start: 2021-06-15 | End: 2021-06-15

## 2021-06-15 RX ORDER — SODIUM CHLORIDE 9 MG/ML
1000 INJECTION, SOLUTION INTRAVENOUS
Refills: 0 | Status: DISCONTINUED | OUTPATIENT
Start: 2021-06-15 | End: 2021-06-16

## 2021-06-15 RX ORDER — ATORVASTATIN CALCIUM 80 MG/1
20 TABLET, FILM COATED ORAL AT BEDTIME
Refills: 0 | Status: DISCONTINUED | OUTPATIENT
Start: 2021-06-15 | End: 2021-06-16

## 2021-06-15 RX ORDER — TACROLIMUS 5 MG/1
5 CAPSULE ORAL DAILY
Refills: 0 | Status: DISCONTINUED | OUTPATIENT
Start: 2021-06-16 | End: 2021-06-16

## 2021-06-15 RX ORDER — TAMSULOSIN HYDROCHLORIDE 0.4 MG/1
0.4 CAPSULE ORAL AT BEDTIME
Refills: 0 | Status: DISCONTINUED | OUTPATIENT
Start: 2021-06-15 | End: 2021-06-16

## 2021-06-15 RX ORDER — INSULIN LISPRO 100/ML
VIAL (ML) SUBCUTANEOUS ONCE
Refills: 0 | Status: COMPLETED | OUTPATIENT
Start: 2021-06-15 | End: 2021-06-15

## 2021-06-15 RX ORDER — INSULIN LISPRO 100/ML
VIAL (ML) SUBCUTANEOUS ONCE
Refills: 0 | Status: DISCONTINUED | OUTPATIENT
Start: 2021-06-15 | End: 2021-06-15

## 2021-06-15 RX ORDER — DEXTROSE 50 % IN WATER 50 %
25 SYRINGE (ML) INTRAVENOUS ONCE
Refills: 0 | Status: DISCONTINUED | OUTPATIENT
Start: 2021-06-15 | End: 2021-06-16

## 2021-06-15 RX ORDER — SODIUM CHLORIDE 9 MG/ML
500 INJECTION INTRAMUSCULAR; INTRAVENOUS; SUBCUTANEOUS
Refills: 0 | Status: DISCONTINUED | OUTPATIENT
Start: 2021-06-15 | End: 2021-06-16

## 2021-06-15 RX ORDER — SODIUM CHLORIDE 9 MG/ML
500 INJECTION INTRAMUSCULAR; INTRAVENOUS; SUBCUTANEOUS
Refills: 0 | Status: DISCONTINUED | OUTPATIENT
Start: 2021-06-15 | End: 2021-06-15

## 2021-06-15 RX ORDER — INSULIN LISPRO 100/ML
VIAL (ML) SUBCUTANEOUS
Refills: 0 | Status: DISCONTINUED | OUTPATIENT
Start: 2021-06-15 | End: 2021-06-16

## 2021-06-15 RX ADMIN — MYCOPHENOLATE MOFETIL 500 MILLIGRAM(S): 250 CAPSULE ORAL at 21:33

## 2021-06-15 RX ADMIN — SODIUM CHLORIDE 50 MILLILITER(S): 9 INJECTION INTRAMUSCULAR; INTRAVENOUS; SUBCUTANEOUS at 11:14

## 2021-06-15 RX ADMIN — TACROLIMUS 3 MILLIGRAM(S): 5 CAPSULE ORAL at 21:34

## 2021-06-15 RX ADMIN — SODIUM CHLORIDE 75 MILLILITER(S): 9 INJECTION INTRAMUSCULAR; INTRAVENOUS; SUBCUTANEOUS at 16:15

## 2021-06-15 RX ADMIN — INSULIN GLARGINE 18 UNIT(S): 100 INJECTION, SOLUTION SUBCUTANEOUS at 22:00

## 2021-06-15 RX ADMIN — TAMSULOSIN HYDROCHLORIDE 0.4 MILLIGRAM(S): 0.4 CAPSULE ORAL at 21:33

## 2021-06-15 RX ADMIN — ATORVASTATIN CALCIUM 20 MILLIGRAM(S): 80 TABLET, FILM COATED ORAL at 21:33

## 2021-06-15 RX ADMIN — CARVEDILOL PHOSPHATE 12.5 MILLIGRAM(S): 80 CAPSULE, EXTENDED RELEASE ORAL at 18:14

## 2021-06-15 RX ADMIN — CLOPIDOGREL BISULFATE 600 MILLIGRAM(S): 75 TABLET, FILM COATED ORAL at 12:35

## 2021-06-15 RX ADMIN — SENNA PLUS 1 TABLET(S): 8.6 TABLET ORAL at 21:33

## 2021-06-15 RX ADMIN — Medication 10 MILLIGRAM(S): at 19:15

## 2021-06-15 RX ADMIN — Medication 81 MILLIGRAM(S): at 12:35

## 2021-06-15 NOTE — PATIENT PROFILE ADULT - CAREGIVER NAME
Per Christi Valderrama and Dr Beyer: (this RN was only present at end of procedure)  Both prior midline drains ( #1 and #2 ) were removed and both replaced with new #8 fr pigtail drains. Drain #3right lateral # 12 fr was checked and found to be in good working order therefore it was not exchanged. Drain # 4 was taken out and replaced with a new #8 fr posterior right back. All 4 drains are to be flushed every 6 hours with 10cc of normal saline.    During procedure total sedation given was 7 mg versed (over 2.5 hours) and 500 mch fentanyl (also over 2.5 hours ) Pt remained mostly awake, comfortable and all vitals remained stable throughout. SPO2 96-98% on 4 liters O2 throughout. Please check orders from Dr Beyer for a pain pump which he will place in orders this afternoon. Call radiology if any questions    Alejandra Miguel

## 2021-06-16 ENCOUNTER — TRANSCRIPTION ENCOUNTER (OUTPATIENT)
Age: 81
End: 2021-06-16

## 2021-06-16 VITALS — TEMPERATURE: 98 F

## 2021-06-16 DIAGNOSIS — I25.10 ATHEROSCLEROTIC HEART DISEASE OF NATIVE CORONARY ARTERY WITHOUT ANGINA PECTORIS: ICD-10-CM

## 2021-06-16 DIAGNOSIS — E11.9 TYPE 2 DIABETES MELLITUS WITHOUT COMPLICATIONS: ICD-10-CM

## 2021-06-16 DIAGNOSIS — N40.0 BENIGN PROSTATIC HYPERPLASIA WITHOUT LOWER URINARY TRACT SYMPTOMS: ICD-10-CM

## 2021-06-16 DIAGNOSIS — Z94.0 KIDNEY TRANSPLANT STATUS: ICD-10-CM

## 2021-06-16 DIAGNOSIS — U07.1 COVID-19: ICD-10-CM

## 2021-06-16 DIAGNOSIS — H54.7 UNSPECIFIED VISUAL LOSS: ICD-10-CM

## 2021-06-16 DIAGNOSIS — I10 ESSENTIAL (PRIMARY) HYPERTENSION: ICD-10-CM

## 2021-06-16 LAB
ANION GAP SERPL CALC-SCNC: 9 MMOL/L — SIGNIFICANT CHANGE UP (ref 5–17)
BUN SERPL-MCNC: 25 MG/DL — HIGH (ref 7–23)
CALCIUM SERPL-MCNC: 9.1 MG/DL — SIGNIFICANT CHANGE UP (ref 8.4–10.5)
CHLORIDE SERPL-SCNC: 108 MMOL/L — SIGNIFICANT CHANGE UP (ref 96–108)
CO2 SERPL-SCNC: 22 MMOL/L — SIGNIFICANT CHANGE UP (ref 22–31)
COVID-19 SPIKE DOMAIN AB INTERP: POSITIVE
COVID-19 SPIKE DOMAIN ANTIBODY RESULT: 27.9 U/ML — HIGH
CREAT SERPL-MCNC: 1.3 MG/DL — SIGNIFICANT CHANGE UP (ref 0.5–1.3)
GLUCOSE BLDC GLUCOMTR-MCNC: 129 MG/DL — HIGH (ref 70–99)
GLUCOSE BLDC GLUCOMTR-MCNC: 170 MG/DL — HIGH (ref 70–99)
GLUCOSE SERPL-MCNC: 130 MG/DL — HIGH (ref 70–99)
HCT VFR BLD CALC: 31.1 % — LOW (ref 39–50)
HGB BLD-MCNC: 9.5 G/DL — LOW (ref 13–17)
MAGNESIUM SERPL-MCNC: 1.9 MG/DL — SIGNIFICANT CHANGE UP (ref 1.6–2.6)
MCHC RBC-ENTMCNC: 26.9 PG — LOW (ref 27–34)
MCHC RBC-ENTMCNC: 30.5 GM/DL — LOW (ref 32–36)
MCV RBC AUTO: 88.1 FL — SIGNIFICANT CHANGE UP (ref 80–100)
NRBC # BLD: 0 /100 WBCS — SIGNIFICANT CHANGE UP (ref 0–0)
PLATELET # BLD AUTO: 189 K/UL — SIGNIFICANT CHANGE UP (ref 150–400)
POTASSIUM SERPL-MCNC: 4.2 MMOL/L — SIGNIFICANT CHANGE UP (ref 3.5–5.3)
POTASSIUM SERPL-SCNC: 4.2 MMOL/L — SIGNIFICANT CHANGE UP (ref 3.5–5.3)
RBC # BLD: 3.53 M/UL — LOW (ref 4.2–5.8)
RBC # FLD: 15.4 % — HIGH (ref 10.3–14.5)
SARS-COV-2 IGG+IGM SERPL QL IA: 27.9 U/ML — HIGH
SARS-COV-2 IGG+IGM SERPL QL IA: POSITIVE
SODIUM SERPL-SCNC: 139 MMOL/L — SIGNIFICANT CHANGE UP (ref 135–145)
WBC # BLD: 5.83 K/UL — SIGNIFICANT CHANGE UP (ref 3.8–10.5)
WBC # FLD AUTO: 5.83 K/UL — SIGNIFICANT CHANGE UP (ref 3.8–10.5)

## 2021-06-16 PROCEDURE — 85027 COMPLETE CBC AUTOMATED: CPT

## 2021-06-16 PROCEDURE — C9602: CPT

## 2021-06-16 PROCEDURE — 83735 ASSAY OF MAGNESIUM: CPT

## 2021-06-16 PROCEDURE — C1894: CPT

## 2021-06-16 PROCEDURE — C1887: CPT

## 2021-06-16 PROCEDURE — 93458 L HRT ARTERY/VENTRICLE ANGIO: CPT

## 2021-06-16 PROCEDURE — 82962 GLUCOSE BLOOD TEST: CPT

## 2021-06-16 PROCEDURE — 93005 ELECTROCARDIOGRAM TRACING: CPT

## 2021-06-16 PROCEDURE — C1769: CPT

## 2021-06-16 PROCEDURE — 86769 SARS-COV-2 COVID-19 ANTIBODY: CPT

## 2021-06-16 PROCEDURE — C1724: CPT

## 2021-06-16 PROCEDURE — 80048 BASIC METABOLIC PNL TOTAL CA: CPT

## 2021-06-16 PROCEDURE — 85025 COMPLETE CBC W/AUTO DIFF WBC: CPT

## 2021-06-16 PROCEDURE — C1760: CPT

## 2021-06-16 PROCEDURE — C9601: CPT

## 2021-06-16 PROCEDURE — 99153 MOD SED SAME PHYS/QHP EA: CPT

## 2021-06-16 PROCEDURE — 99223 1ST HOSP IP/OBS HIGH 75: CPT

## 2021-06-16 PROCEDURE — 99152 MOD SED SAME PHYS/QHP 5/>YRS: CPT

## 2021-06-16 PROCEDURE — 82553 CREATINE MB FRACTION: CPT

## 2021-06-16 PROCEDURE — 80061 LIPID PANEL: CPT

## 2021-06-16 PROCEDURE — 82550 ASSAY OF CK (CPK): CPT

## 2021-06-16 PROCEDURE — 80053 COMPREHEN METABOLIC PANEL: CPT

## 2021-06-16 PROCEDURE — C1874: CPT

## 2021-06-16 PROCEDURE — 36415 COLL VENOUS BLD VENIPUNCTURE: CPT

## 2021-06-16 PROCEDURE — 99239 HOSP IP/OBS DSCHRG MGMT >30: CPT

## 2021-06-16 PROCEDURE — C1725: CPT

## 2021-06-16 PROCEDURE — 83036 HEMOGLOBIN GLYCOSYLATED A1C: CPT

## 2021-06-16 RX ORDER — ATORVASTATIN CALCIUM 80 MG/1
1 TABLET, FILM COATED ORAL
Qty: 30 | Refills: 11
Start: 2021-06-16 | End: 2022-06-10

## 2021-06-16 RX ORDER — ASPIRIN/CALCIUM CARB/MAGNESIUM 324 MG
1 TABLET ORAL
Qty: 30 | Refills: 11
Start: 2021-06-16 | End: 2022-06-10

## 2021-06-16 RX ORDER — CLOPIDOGREL BISULFATE 75 MG/1
1 TABLET, FILM COATED ORAL
Qty: 30 | Refills: 11
Start: 2021-06-16 | End: 2022-06-10

## 2021-06-16 RX ADMIN — CARVEDILOL PHOSPHATE 12.5 MILLIGRAM(S): 80 CAPSULE, EXTENDED RELEASE ORAL at 10:08

## 2021-06-16 RX ADMIN — Medication 5 MILLIGRAM(S): at 10:12

## 2021-06-16 RX ADMIN — TACROLIMUS 5 MILLIGRAM(S): 5 CAPSULE ORAL at 12:16

## 2021-06-16 RX ADMIN — MYCOPHENOLATE MOFETIL 500 MILLIGRAM(S): 250 CAPSULE ORAL at 10:08

## 2021-06-16 RX ADMIN — CLOPIDOGREL BISULFATE 75 MILLIGRAM(S): 75 TABLET, FILM COATED ORAL at 10:08

## 2021-06-16 RX ADMIN — Medication 81 MILLIGRAM(S): at 10:08

## 2021-06-16 NOTE — DISCHARGE NOTE PROVIDER - CARE PROVIDER_API CALL
Perez Galindo)  Cardiovascular Disease; Internal Medicine  [address], [address]  [place], [place] [address]  Phone: [phone]  Fax: [phone]  Established Patient  Follow Up Time: 2 weeks

## 2021-06-16 NOTE — DISCHARGE NOTE PROVIDER - NSDCCPCAREPLAN_GEN_ALL_CORE_FT
PRINCIPAL DISCHARGE DIAGNOSIS  Diagnosis: CAD (coronary artery disease)  Assessment and Plan of Treatment: You underwent a cardiac catheterization and the blockage/narrowing due to plaque build-up in your proximal left anterior descending artery and Diagonal 2 artery were opened using 2 stents. You should continue taking aspirin 81mg daily and Plavix 75 mg daily.  -You have residual blockages in 3 other arteries which we plan to medically manage at this time  NEVER MISS A DOSE OF ASPIRIN OR PLAVIX; IF YOU DO, YOU ARE AT RISK OF YOUR STENT CLOSING AND HAVING A HEART ATTACK. DO NOT STOP THESE TWO MEDICATIONS UNLESS INSTRUCTED TO DO SO BY YOUR CARDIOLOGIST.  -Your procedure was done through your groin   -You do not need to keep this area covered and you may shower  -Please avoid any heavy lifting  (no more than 3 to 5 lbs) or strenuous activity for five days  -If you develop any swelling, bleeding, hardening of the skin (hematoma formation), acute pain, numbness/tingling  in your arm or leg please contact your doctor immediately or call our 24/7 line: [phone]  -Please follow up with Dr. Galindo in 1-2 weeks  -Please return to the hospital or seek immediate medical attention if you have symptoms including, but not limited to, persistent chest pain or shortness of breath, especially if it’s associated with nausea, vomiting, sweating, passing out, or pain radiating to your jaw, shoulder, or arm.    We have provided you with a prescription for cardiac rehab which is medically supervised exercise program for your heart and has been shown to improve the quantity and quality of life of people with heart disease like yours. You should attend cardiac rehab 3 times per week for 12 weeks. We have provided you with a list of nearby facilities. Please call your insurance carrier to determine which of these facilities are covered under your plan. Please bring this prescription with you to your follow up appointment with your cardiologist who can then further assist you to enroll into a cardiac rehab program.      SECONDARY DISCHARGE DIAGNOSES  Diagnosis: HTN (hypertension)  Assessment and Plan of Treatment: Please continue your home Lasix 20mg daily. ****BB on admission??? ****    Diagnosis: HLD (hyperlipidemia)  Assessment and Plan of Treatment: We started you on a medication called Atorvastatin 20mg daily. This will help to reduce plaque buildup and the need for more stents in the future. Additionally it will help avoid the progression of the remaining blocked arteries that you have.    Diagnosis: BPH (benign prostatic hyperplasia)  Assessment and Plan of Treatment: Please continue your home tamsulosin .4mg daily. There have been no changes.    Diagnosis: DM (diabetes mellitus)  Assessment and Plan of Treatment: Please continue your home Humalog Mix 75/25 KwikPen 12-16 units twice daily. There have been no changes.    Diagnosis: History of renal transplant  Assessment and Plan of Treatment: Please continue your home tacrolimus on discharge, there have been no changes.     PRINCIPAL DISCHARGE DIAGNOSIS  Diagnosis: CAD (coronary artery disease)  Assessment and Plan of Treatment: You underwent a cardiac catheterization and the blockage/narrowing due to plaque build-up in your proximal left anterior descending artery and Diagonal 2 artery were opened using 2 stents. You should continue taking aspirin 81mg daily and Plavix 75 mg daily.  -You have residual blockages in 3 other arteries which we plan to medically manage at this time  NEVER MISS A DOSE OF ASPIRIN OR PLAVIX; IF YOU DO, YOU ARE AT RISK OF YOUR STENT CLOSING AND HAVING A HEART ATTACK. DO NOT STOP THESE TWO MEDICATIONS UNLESS INSTRUCTED TO DO SO BY YOUR CARDIOLOGIST.  -Your procedure was done through your groin   -You do not need to keep this area covered and you may shower  -Please avoid any heavy lifting  (no more than 3 to 5 lbs) or strenuous activity for five days  -If you develop any swelling, bleeding, hardening of the skin (hematoma formation), acute pain, numbness/tingling  in your arm or leg please contact your doctor immediately or call our 24/7 line: [phone]  -Please follow up with Dr. Galindo  in 1-2 weeks  -Please return to the hospital or seek immediate medical attention if you have symptoms including, but not limited to, persistent chest pain or shortness of breath, especially if it’s associated with nausea, vomiting, sweating, passing out, or pain radiating to your jaw, shoulder, or arm.    We have provided you with a prescription for cardiac rehab which is medically supervised exercise program for your heart and has been shown to improve the quantity and quality of life of people with heart disease like yours. You should attend cardiac rehab 3 times per week for 12 weeks. We have provided you with a list of nearby facilities. Please call your insurance carrier to determine which of these facilities are covered under your plan. Please bring this prescription with you to your follow up appointment with your cardiologist who can then further assist you to enroll into a cardiac rehab program.      SECONDARY DISCHARGE DIAGNOSES  Diagnosis: HTN (hypertension)  Assessment and Plan of Treatment: Please continue your home Lasix 20mg daily. Caravedilol 12.5mg twice daily    Diagnosis: HLD (hyperlipidemia)  Assessment and Plan of Treatment: We started you on a medication called Atorvastatin 40mg daily. This will help to reduce plaque buildup and the need for more stents in the future.  Additionally it will help avoid the progression of the remaining blocked arteries that you have.    Diagnosis: BPH (benign prostatic hyperplasia)  Assessment and Plan of Treatment: Please continue your home tamsulosin 0.4mg daily. There have been no changes.    Diagnosis: DM (diabetes mellitus)  Assessment and Plan of Treatment: Please continue your home Humalog Mix 75/25 KwikPen 12-16 units twice daily. There have been no changes.    Diagnosis: History of renal transplant  Assessment and Plan of Treatment: Please continue your home tacrolimus on discharge, there have been no changes.   Your kidney function has remained stable during this admission.    Please follow up with Dr. Mac in 2 weeks for post discharge check-up.

## 2021-06-16 NOTE — CONSULT NOTE ADULT - SUBJECTIVE AND OBJECTIVE BOX
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 1000 milliLiter(s) IV Continuous <Continuous>  dextrose 5%. 1000 milliLiter(s) IV Continuous <Continuous>  dextrose 50% Injectable 25 Gram(s) IV Push Once  dextrose 50% Injectable 12.5 Gram(s) IV Push Once  dextrose 50% Injectable 25 Gram(s) IV Push Once  glucagon  Injectable 1 milliGRAM(s) IntraMuscular Once  insulin glargine Injectable (LANTUS) 18 Unit(s) SubCutaneous at bedtime  insulin lispro (ADMELOG) corrective regimen sliding scale   SubCutaneous Before meals and at bedtime  insulin lispro Injectable (ADMELOG) 3 Unit(s) SubCutaneous three times a day before meals  mycophenolate mofetil 500 milliGRAM(s) Oral two times a day  predniSONE   Tablet 5 milliGRAM(s) Oral every 24 hours  senna 1 Tablet(s) Oral at bedtime  sodium chloride 0.9%. 500 milliLiter(s) IV Continuous <Continuous>  tacrolimus 5 milliGRAM(s) Oral daily  tacrolimus 3 milliGRAM(s) Oral at bedtime  tamsulosin 0.4 milliGRAM(s) Oral at bedtime      No pertinent family history in first degree relatives        SOCIAL HISTORY:    REVIEW OF SYSTEMS:  Constitutional: No fevers.   Card: No chest pain.   Resp: No SOB.  GI: No nausea or vomiting. No abdominal pain.  : No dysuria. Neuro: No focal weakness or sensory loss.  Eyes: No visual symptoms. MS: No joint swelling.  Skin: No rashes. Psych: No psychosis.    PHYSICAL EXAM:    06-15 @ 07:01  -  06-16 @ 07:00  --------------------------------------------------------  IN: 630 mL / OUT: 670 mL / NET: -40 mL    06-16 @ 07:01  -  06-16 @ 18:22  --------------------------------------------------------  IN: 420 mL / OUT: 500 mL / NET: -80 mL      Constitutional: T(C): 36.7 (06-16-21 @ 09:58), Max: 36.9 (06-15-21 @ 22:26)  HR: 80 (06-16-21 @ 08:37) (74 - 82)  BP: 150/67 (06-16-21 @ 08:37) (119/58 - 212/93)  RR: 18 (06-16-21 @ 08:37) (16 - 19)  SpO2: 98% (06-16-21 @ 08:37) (95% - 98%)  Wt(kg): --  Appearance: Alert, pleasant, INAD.  Eyes: marked visual impairment.  ENT: External ears/nose normal appearing. Lips normal, dentition good.  Lymphatic: No cervical lymphadenopathy. No supraclavicular lymphadenopathy.  Cardiac: No rubs. NO MURMURS. Extremities: NO LE EDEMA.  Respiratory: Effort no accessory muscle use. Lungs: CLEAR TO AUSCULTATION.  Abdomen: Soft. No masses. Nontender. Liver: Not palpable. Spleen: Not palpable.  Musculoskeltal digits: No clubbing or cyanosis. Tone normal. Moves all four extremities.  Skin inspection: No rashes. Palpation: No abnormalities.  Neuro: Cranial nerves: no facial assymetry or deficits noted. Sensation: LE intact to light touch.  Psych: Oriented to person, place, situation. Mood/affect: Not depressed.     DATA:  139    |  108    |  25<H>  ----------------------------<  130<H>  Ca:9.1   (16 Jun 2021 06:08)  4.2     |  22     |  1.30       eGFR if Non : 51 *L*  eGFR if : 59 *L*    TPro  6.4    /  Alb  3.7    /  TBili  0.3    /  DBili  x      /  AST  12     /  ALT  14     /  AlkPhos  94     15 Angel 2021 10:47                        9.5<L>  5.83  )-----------( 189      ( 16 Jun 2021 06:08 )             31.1<L>

## 2021-06-16 NOTE — CONSULT NOTE ADULT - ASSESSMENT
pt with symptoms suggestive of new cardiac ischemia, found to have coronary insufficiency on pharmacologic nuclear ETT, , now s/p diagnostic and therapeutic cardiac cath, which revealed marked stenosis of LAD and diag at bifurcation,  and was rxed successfully with rotoblator and bifurcation stenting.  Rpda disease will be managed medically for now.

## 2021-06-16 NOTE — DISCHARGE NOTE PROVIDER - HOSPITAL COURSE
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 Aranda endorsing shortness of breath w/ 15-20 steps on going for years, stable since his COVID infection in March 2021 (did not require hospitalization). Patient does not walk much but when he does he becomes short of breath. Patient also endorses lightheaded and dizziness especially in the morning and has intermittent LE Edema. Patient denies active chest pain, palpitations, diaphoresis, fatigue, syncope, orthopnea, positional nocturnal dyspnea, recent fever, chills, night sweats, cough, nausea, vomiting, and diarrhea. Echo on 5/25/21 severe asymmetric LVH, EF 55%, Grade II left ventricular diastolic dysfunction., severely dilated LA, mild MS.  NST on 5/25/ 21 medium size area of mild ischemia in the apex and entire inferior wall. No WMA. Decreased myocardial thickening in the apical inferior wall and possible evidence of ischemic dilation or LV. In light of patient’s risk factors, CCS class IV equivalent symptoms and abnormal NST, patient now presents for cardiac catheterization with possible intervention.   s/p cardiac cath 6/15/21: pLAD 80-90% large sized s/p rota/PTCA/DAMEON, D2 90% large s/p rota/PTCA/DAMEON. Residual dLMCA 30%, D1 60%, RPDA 70% with plan to medically manage. R groin AS, Dopplerable pulses. EF 55%, grade II LV diastolic dysfunction.    Patient seen and examined at the bedside. No complaints at this time. AM labs WNL. HD stable, No overnight events on tele. R groin access site stable with distal pulses to baseline. As per Dr. Bryant, patient is stable for discharge home on Asprin 81mg QD, Plavix 75mg QD, Lasix 20mg QD, tamsulosin .4mg QD and new Atorvastatin 20mg QD with instruction to follow up with Dr. Galindo in 1-2 weeks. Rx for cardiac rehab given to patient on discharge.           Cardiac Rehab (STEMI/NSTEMI/ACS/Unstable Angina/CHF/Post PCI):            *Education on benefits of Cardiac Rehab provided to patient: Yes/No         *Referral and Prescription Given for Cardiac Rehab : Yes/No.  If No, Why Not?         *Pt given list of locations & instructed to contact their insurance company to review list of participating provider    Statin Prescribed (STEMI/NSTEMI/UA &/OR PCI this admission):  Yes/No; If No, Why Not?    DAPT: Prescriptions for Aspirin/Plavix/Brilinta/Effient e-prescribed to patient's pharmacy Yes/No__.                     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 Aranda endorsing shortness of breath w/ 15-20 steps on going for years, stable since his COVID infection in March 2021 (did not require hospitalization). Patient does not walk much but when he does he becomes short of breath. Patient also endorses lightheaded and dizziness especially in the morning and has intermittent LE Edema. Patient denies active chest pain, palpitations, diaphoresis, fatigue, syncope, orthopnea, positional nocturnal dyspnea, recent fever, chills, night sweats, cough, nausea, vomiting, and diarrhea. Echo on 5/25/21 severe asymmetric LVH, EF 55%, Grade II left ventricular diastolic dysfunction., severely dilated LA, mild MS.  NST on 5/25/ 21 medium size area of mild ischemia in the apex and entire inferior wall. No WMA. Decreased myocardial thickening in the apical inferior wall and possible evidence of ischemic dilation or LV. In light of patient’s risk factors, CCS class IV equivalent symptoms and abnormal NST, patient now presents for cardiac catheterization with possible intervention.   s/p cardiac cath 6/15/21: pLAD 80-90% large sized s/p rota/PTCA/DAMEON, D2 90% large s/p rota/PTCA/DAMEON. Residual dLMCA 30%, D1 60%, RPDA 70% with plan to medically manage. R groin AS, Dopplerable pulses. EF 55%, grade II LV diastolic dysfunction.    Patient seen and examined at the bedside. No complaints at this time. AM labs WNL. HD stable, No overnight events on tele. R groin access site stable with distal pulses to baseline. As per Dr. Brynat, patient is stable for discharge home on Asprin 81mg QD, Plavix 75mg QD, Lasix 20mg QD, tamsulosin .4mg QD, ______(BB?) and new Atorvastatin 20mg QD with instruction to follow up with Dr. Galindo in 1-2 weeks. Rx for cardiac rehab given to patient on discharge.           Cardiac Rehab (STEMI/NSTEMI/ACS/Unstable Angina/CHF/Post PCI):            *Education on benefits of Cardiac Rehab provided to patient: Yes/No         *Referral and Prescription Given for Cardiac Rehab : Yes/No.  If No, Why Not?         *Pt given list of locations & instructed to contact their insurance company to review list of participating provider    Statin Prescribed (STEMI/NSTEMI/UA &/OR PCI this admission):  Yes/No; If No, Why Not?    DAPT: Prescriptions for Aspirin/Plavix/Brilinta/Effient e-prescribed to patient's pharmacy Yes/No__.                     79 y/o legally blind (glaucoma) male, POOR HISTORIAN with a PMHx of HTN, DM type II, Anemia, history of renal failure (has Right AVF) 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 Aranda endorsing shortness of breath w/ 15-20 steps on going for years, stable since his COVID infection in March 2021 (did not require hospitalization). Patient does not walk much but when he does he becomes short of breath. Patient also endorses lightheaded and dizziness especially in the morning and has intermittent LE Edema. Patient denies active chest pain, palpitations, diaphoresis, fatigue, syncope, orthopnea, positional nocturnal dyspnea, recent fever, chills, night sweats, cough, nausea, vomiting, and diarrhea. Echo on 5/25/21 severe asymmetric LVH, EF 55%, Grade II left ventricular diastolic dysfunction., severely dilated LA, mild MS.  NST on 5/25/ 21 medium size area of mild ischemia in the apex and entire inferior wall. No WMA. Decreased myocardial thickening in the apical inferior wall and possible evidence of ischemic dilation or LV. In light of patient’s risk factors, CCS class IV equivalent symptoms and abnormal NST, patient now presents for cardiac catheterization with possible intervention.   Pt is s/p cardiac cath 6/15/21: pLAD 80-90% large sized s/p rota/PTCA/DAMEON, D2 90% large s/p rota/PTCA/DAMEON. Residual dLMCA 30%, D1 60%, RPDA 70% with plan to medically manage. R groin AS, Dopplerable pulses. EF 55%, grade II LV diastolic dysfunction.    Patient seen and examined at the bedside. NAD, HD stable.  No complaints at this time. EKG unchanged from prior EKG.  AM labs WNL. o overnight events on tele. R groin access site stable with distal pulses to baseline. As per Dr. Bryant, patient is stable for discharge home on Asprin 81mg QD, Plavix 75mg QD, Lasix 20mg QD, Carvedilol 12.5mg BID, added Atorvastatin increased 40mg QD with instruction to follow up with Dr. Galindo and Dr. Mac 1-2 weeks for discharge check-up. Discharge plans and instruction discussed with pt who is agreeable with plan.  Pt is advised to return to the nearest ED if worsening symptoms of CP, SOB or palpitations or severe bleeding from access site occurs.  Rx for cardiac rehab given to patient on discharge.           Cardiac Rehab (STEMI/NSTEMI/ACS/Unstable Angina/CHF/Post PCI):            *Education on benefits of Cardiac Rehab provided to patient: Yes         *Referral and Prescription Given for Cardiac Rehab : Yes         *Pt given list of locations & instructed to contact their insurance company to review list of participating provider    Statin Prescribed (PCI this admission):  Yes    DAPT: Prescriptions for Aspirin/Plavix: Yes

## 2021-06-16 NOTE — DISCHARGE NOTE NURSING/CASE MANAGEMENT/SOCIAL WORK - PATIENT PORTAL LINK FT
You can access the FollowMyHealth Patient Portal offered by [facility] by registering at the following website: [email or web address]. By joining [facility]’s FollowMyHealth portal, you will also be able to view your health information using other applications (apps) compatible with our system.

## 2021-06-16 NOTE — DISCHARGE NOTE PROVIDER - NSDCMRMEDTOKEN_GEN_ALL_CORE_FT
aspirin 81 mg oral tablet, chewable: 1 tab(s) orally once a day  carvedilol 12.5 mg oral tablet: 1 tab(s) orally every 12 hours  docusate sodium 100 mg oral capsule: 1 cap(s) orally 2 times a day  HumaLOG Mix 75/25 KwikPen subcutaneous suspension: 12-16 unit(s) subcutaneous 2 times a day (pending glucose levels)  Lasix 20 mg oral tablet: 1 tab(s) orally once a day  mycophenolate mofetil 500 mg oral tablet: 1 tab(s) orally 2 times a day  predniSONE 5 mg oral tablet: 1 tab(s) orally every 24 hours  Senna 8.6 mg oral tablet: 1 tab(s) orally once a day (at bedtime)  tacrolimus 1 mg oral capsule: 3 cap(s) orally once a day (at bedtime)  tacrolimus 5 mg oral capsule: 1 cap(s) orally once a day (AT NOON)  tamsulosin 0.4 mg oral capsule: 1 cap(s) orally once a day (at bedtime)   aspirin 81 mg oral tablet, chewable: 1 tab(s) orally once a day x 30 days   atorvastatin 40 mg oral tablet: 1 tab(s) orally once a day  carvedilol 12.5 mg oral tablet: 1 tab(s) orally every 12 hours  clopidogrel 75 mg oral tablet: 1 tab(s) orally once a day  docusate sodium 100 mg oral capsule: 1 cap(s) orally 2 times a day  HumaLOG Mix 75/25 KwikPen subcutaneous suspension: 12-16 unit(s) subcutaneous 2 times a day (pending glucose levels)  Lasix 20 mg oral tablet: 1 tab(s) orally once a day  mycophenolate mofetil 500 mg oral tablet: 1 tab(s) orally 2 times a day  predniSONE 5 mg oral tablet: 1 tab(s) orally every 24 hours  Senna 8.6 mg oral tablet: 1 tab(s) orally once a day (at bedtime)  tacrolimus 1 mg oral capsule: 3 cap(s) orally once a day (at bedtime)  tacrolimus 5 mg oral capsule: 1 cap(s) orally once a day (AT NOON)  tamsulosin 0.4 mg oral capsule: 1 cap(s) orally once a day (at bedtime)

## 2021-06-16 NOTE — DISCHARGE NOTE PROVIDER - NSDCFUADDINST_GEN_ALL_CORE_FT
You underwent a coronary angiogram and should wait 3 days before returning to ordinary activities. Do not drive for 2 days. Consult your doctor before returning to vigorous activity. You may return to work in 3-5 days. The catheter from your groin was removed. You may shower once the dressing is removed, but avoid baths, hot tubs, or swimming for 5 days to prevent infection. If you notice bleeding from the site, hardening and pain at the site, drainage or redness from the site, coolness/paleness of the groin, weakness/paralysis of right leg (unable to lift or move) swelling, or fever, please call [phone]. Please continue your Aspirin and Plavix as prescribed unless otherwise indicated by your cardiologist. Please follow up with Dr. Galindo in 1-2 weeks. All of your needed prescriptions have been sent electronically to your pharmacy.

## 2021-06-28 ENCOUNTER — RX RENEWAL (OUTPATIENT)
Age: 81
End: 2021-06-28

## 2021-06-29 ENCOUNTER — NON-APPOINTMENT (OUTPATIENT)
Age: 81
End: 2021-06-29

## 2021-06-29 ENCOUNTER — APPOINTMENT (OUTPATIENT)
Dept: HEART AND VASCULAR | Facility: CLINIC | Age: 81
End: 2021-06-29
Payer: MEDICARE

## 2021-06-29 VITALS
DIASTOLIC BLOOD PRESSURE: 60 MMHG | OXYGEN SATURATION: 95 % | HEIGHT: 67 IN | SYSTOLIC BLOOD PRESSURE: 118 MMHG | WEIGHT: 175 LBS | HEART RATE: 72 BPM | BODY MASS INDEX: 27.47 KG/M2

## 2021-06-29 VITALS — TEMPERATURE: 98 F

## 2021-06-29 PROCEDURE — 93000 ELECTROCARDIOGRAM COMPLETE: CPT

## 2021-06-29 PROCEDURE — 99214 OFFICE O/P EST MOD 30 MIN: CPT

## 2021-06-29 RX ORDER — CLOPIDOGREL 75 MG/1
75 TABLET, FILM COATED ORAL
Refills: 0 | Status: ACTIVE | COMMUNITY

## 2021-06-29 RX ORDER — BRINZOLAMIDE 10 MG/ML
1 SUSPENSION/ DROPS OPHTHALMIC
Qty: 1 | Refills: 6 | Status: DISCONTINUED | COMMUNITY
Start: 2018-03-08 | End: 2021-06-29

## 2021-06-29 RX ORDER — HYDROXYCHLOROQUINE SULFATE 200 MG/1
200 TABLET, FILM COATED ORAL
Qty: 10 | Refills: 0 | Status: DISCONTINUED | COMMUNITY
Start: 2020-04-05 | End: 2021-06-29

## 2021-06-29 NOTE — REVIEW OF SYSTEMS
[Total Vision Loss] : total vision loss [Lower Ext Edema] : lower extremity edema [Heartburn] : heartburn [Change in Appetite] : change in appetite [Urinary Frequency] : urinary frequency [Nocturia] : nocturia [Joint Pain] : joint pain [Dizziness] : dizziness [Negative] : Heme/Lymph [SOB] : no shortness of breath [Dyspnea on exertion] : not dyspnea during exertion [Chest Discomfort] : no chest discomfort [Leg Claudication] : no intermittent leg claudication [Palpitations] : no palpitations [Orthopnea] : no orthopnea [PND] : no PND [Syncope] : no syncope [Nausea] : no nausea [Change In The Stool] : no change in stool [Hematuria] : no hematuria [Rash] : no rash [Numbness (Hypoesthesia)] : no numbness [Weakness] : no weakness [Memory Lapses Or Loss] : no memory lapses or loss [Suicidal] : not suicidal

## 2021-06-29 NOTE — DISCUSSION/SUMMARY
[FreeTextEntry1] : EKG:NSR,First degree AVB\par feel,given,CAD important that he be on BB- will resume at lower dose and lower norvasc for BP, continue asa +Plavix for CAD;lipitor for lipids; they are unsure of dosage of losartan and they will let me know. meds renewed

## 2021-06-29 NOTE — HISTORY OF PRESENT ILLNESS
[FreeTextEntry1] : was having increased sob and had + nuc stress and subsequent PCI- les sob but mostly wheelchair bound- no cp\par was supposed to be on Coreg but was held because of low BP

## 2021-06-29 NOTE — PHYSICAL EXAM
[Well Developed] : well developed [Well Nourished] : well nourished [No Acute Distress] : no acute distress [Normal Conjunctiva] : normal conjunctiva [Normal Venous Pressure] : normal venous pressure [No Carotid Bruit] : no carotid bruit [Normal S1, S2] : normal S1, S2 [No Murmur] : no murmur [No Rub] : no rub [Clear Lung Fields] : clear lung fields [Good Air Entry] : good air entry [No Respiratory Distress] : no respiratory distress  [Soft] : abdomen soft [Non Tender] : non-tender [Normal Bowel Sounds] : normal bowel sounds [No Cyanosis] : no cyanosis [No Clubbing] : no clubbing [No Varicosities] : no varicosities [No Rash] : no rash [No Skin Lesions] : no skin lesions [Moves all extremities] : moves all extremities [Alert and Oriented] : alert and oriented [Normal memory] : normal memory [de-identified] : examined in wheelchair [de-identified] : wheelchair [de-identified] : trace bipedal edema

## 2021-06-30 ENCOUNTER — RX RENEWAL (OUTPATIENT)
Age: 81
End: 2021-06-30

## 2021-06-30 DIAGNOSIS — Z94.0 KIDNEY TRANSPLANT STATUS: ICD-10-CM

## 2021-06-30 DIAGNOSIS — E10.22 TYPE 1 DIABETES MELLITUS WITH DIABETIC CHRONIC KIDNEY DISEASE: ICD-10-CM

## 2021-06-30 DIAGNOSIS — Z89.421 ACQUIRED ABSENCE OF OTHER RIGHT TOE(S): ICD-10-CM

## 2021-06-30 DIAGNOSIS — E78.5 HYPERLIPIDEMIA, UNSPECIFIED: ICD-10-CM

## 2021-06-30 DIAGNOSIS — N18.30 CHRONIC KIDNEY DISEASE, STAGE 3 UNSPECIFIED: ICD-10-CM

## 2021-06-30 DIAGNOSIS — I25.10 ATHEROSCLEROTIC HEART DISEASE OF NATIVE CORONARY ARTERY WITHOUT ANGINA PECTORIS: ICD-10-CM

## 2021-06-30 DIAGNOSIS — H54.7 UNSPECIFIED VISUAL LOSS: ICD-10-CM

## 2021-06-30 DIAGNOSIS — Z89.422 ACQUIRED ABSENCE OF OTHER LEFT TOE(S): ICD-10-CM

## 2021-06-30 DIAGNOSIS — I12.9 HYPERTENSIVE CHRONIC KIDNEY DISEASE WITH STAGE 1 THROUGH STAGE 4 CHRONIC KIDNEY DISEASE, OR UNSPECIFIED CHRONIC KIDNEY DISEASE: ICD-10-CM

## 2021-06-30 DIAGNOSIS — N40.0 BENIGN PROSTATIC HYPERPLASIA WITHOUT LOWER URINARY TRACT SYMPTOMS: ICD-10-CM

## 2021-06-30 DIAGNOSIS — D63.1 ANEMIA IN CHRONIC KIDNEY DISEASE: ICD-10-CM

## 2021-07-13 ENCOUNTER — LABORATORY RESULT (OUTPATIENT)
Age: 81
End: 2021-07-13

## 2021-07-13 ENCOUNTER — APPOINTMENT (OUTPATIENT)
Dept: NEPHROLOGY | Facility: CLINIC | Age: 81
End: 2021-07-13
Payer: MEDICARE

## 2021-07-13 VITALS — DIASTOLIC BLOOD PRESSURE: 50 MMHG | SYSTOLIC BLOOD PRESSURE: 126 MMHG

## 2021-07-13 VITALS — DIASTOLIC BLOOD PRESSURE: 70 MMHG | SYSTOLIC BLOOD PRESSURE: 140 MMHG

## 2021-07-13 DIAGNOSIS — I95.9 HYPOTENSION, UNSPECIFIED: ICD-10-CM

## 2021-07-13 PROCEDURE — 36415 COLL VENOUS BLD VENIPUNCTURE: CPT

## 2021-07-13 PROCEDURE — 99215 OFFICE O/P EST HI 40 MIN: CPT | Mod: 25

## 2021-07-13 NOTE — PHYSICAL EXAM
[General Appearance - Alert] : alert [General Appearance - In No Acute Distress] : in no acute distress [Sclera] : the sclera and conjunctiva were normal [PERRL With Normal Accommodation] : pupils were equal in size, round, and reactive to light [Extraocular Movements] : extraocular movements were intact [Outer Ear] : the ears and nose were normal in appearance [Hearing Threshold Finger Rub Not [place]] : hearing was normal [Examination Of The Oral Cavity] : the lips and gums were normal [Neck Appearance] : the appearance of the neck was normal [Neck Cervical Mass (___cm)] : no neck mass was observed [Jugular Venous Distention Increased] : there was no jugular-venous distention [Thyroid Diffuse Enlargement] : the thyroid was not enlarged [Thyroid Nodule] : there were no palpable thyroid nodules [Auscultation Breath Sounds / Voice Sounds] : lungs were clear to auscultation bilaterally [Heart Rate And Rhythm] : heart rate was normal and rhythm regular [Heart Sounds] : normal S1 and S2 [Heart Sounds Gallop] : no gallops [Murmurs] : no murmurs [Heart Sounds Pericardial Friction Rub] : no pericardial rub [Bowel Sounds] : normal bowel sounds [Abdomen Soft] : soft [Abdomen Tenderness] : non-tender [Abdomen Mass (___ Cm)] : no abdominal mass palpated [No CVA Tenderness] : no ~M costovertebral angle tenderness [No Spinal Tenderness] : no spinal tenderness [Skin Color & Pigmentation] : normal skin color and pigmentation [Skin Turgor] : normal skin turgor [] : no rash [No Focal Deficits] : no focal deficits [Oriented To Time, Place, And Person] : oriented to person, place, and time [Impaired Insight] : insight and judgment were intact [Affect] : the affect was normal [FreeTextEntry1] : Patient mostly wheelchair-bound, though at times walks with cane

## 2021-07-13 NOTE — HISTORY OF PRESENT ILLNESS
[FreeTextEntry1] : The patient is an 81 year old male presenting today for f/u evaluation s/p cardiac catheterization and active reassessment of HTN, DM, anemia, ASCVD, and h/o renal transplant.\par \par The patient is s/p cardiac cath 6/15/21: pLAD 80-90% large sized s/p rota/PTCA/DAMEON, D2 90% large s/p rota/PTCA/DAMEON. Residual dLMCA 30%, D1 60%, RPDA 70% with plan to medically manage. R groin AS, Dopplerable pulses. EF 55%, grade II LV diastolic dysfunction.\par \par Today, the patient is feeling generally well, though feels mild postoperative pain involving the left upper chest, towards his left shoulder. The patient has been holding amlodipine since visit with Dr. Galindo, as pressures had been notably low (99 systolic). At times, the patient had also been held by his wife on carvedilol, which she did earlier today. The patient also c/o constipation.\par \par Current Medications: atorvastatin 40mg QD, carvedilol 6.25mg BID (holding intermittently), amlodipine 5mg QD (holding for past 2 weeks), Plavix 75mg QD, prednisone 5mg QD, ASA 81mg QD, Flomax 0.4mg QD, Humalog 100 BID, insulin 16 units 75/25 prn, furosemide 20mg QD, mycophenolate 500mg BID, tacrolimus 5mg noon + 3mg QPM, Dulcolax, Senna, Colace 100mg BID, vitamin D3 1000 units QD

## 2021-07-13 NOTE — END OF VISIT
[Time Spent: ___ minutes] : I have spent [unfilled] minutes of time on the encounter. [FreeTextEntry3] : Documented by Vasiliy Lo acting as a scribe for Dr. Callum Aranda on 07/13/2021.

## 2021-07-13 NOTE — ASSESSMENT
[FreeTextEntry1] : Plan:\par 1) HTN: BP is borderline elevated in office today. Advised patient and his wife that beta blockade must not be held in view of patient's cardiovascular disease. Will cease amlodipine but continue carvedilol at 6.25mg BID, and advised patient and wife to contact me should his pressure be consistently outside normal range.\par 2) CRI: last creatinine of 1.29 with eGFR of 46 is stable for patient. Continue to monitor on repeat CMP today.\par 3) Hyperglycemia: Most recent HbA1C of 8.0. Advised patient to continue lifestyle management with healthy dieting and routine exercise and to continue on current antihyperglycemic regimen and continue f/u with Dr. Guajardo for diabetes management.\par 4) Transplant medications: Patient will c/w prednisone, mycophenolate, and tacrolimus; contacted patient's pharmacist to return tacrolimus to 4mg BID schedule.\par 5) Constipation: Advised patient to avoid milk of magnesia at this time. Advised patient on Miralax, Colace, and FiberOne regimen\par \par Changes to medications: continue tacrolimus at 4mg BID, cease amlodipine, continue carvedilol at 6.25mg BID\par \par Labs were drawn and patient will return in 2 weeks for a follow-up appointment.

## 2021-07-13 NOTE — ADDENDUM
[FreeTextEntry1] : All medical record entries made by the Scribe were at my, Dr. Callum Aranda, direction and personally dictated by me on 07/13/2021. I have reviewed the chart and agree that the record accurately reflects my personal performance of the history, physical exam, assessment and plan. I have also personally directed, reviewed, and agreed with the chart.

## 2021-07-14 LAB
25(OH)D3 SERPL-MCNC: 20.2 NG/ML
ALBUMIN SERPL ELPH-MCNC: 4 G/DL
ALP BLD-CCNC: 80 U/L
ALT SERPL-CCNC: 17 U/L
ANION GAP SERPL CALC-SCNC: 10 MMOL/L
APPEARANCE: CLEAR
AST SERPL-CCNC: 17 U/L
BACTERIA: NEGATIVE
BASOPHILS # BLD AUTO: 0 K/UL
BASOPHILS NFR BLD AUTO: 0 %
BILIRUB SERPL-MCNC: 0.3 MG/DL
BILIRUBIN URINE: NEGATIVE
BLOOD URINE: NORMAL
BUN SERPL-MCNC: 26 MG/DL
C3 SERPL-MCNC: 80 MG/DL
C4 SERPL-MCNC: 22 MG/DL
CALCIUM SERPL-MCNC: 9.2 MG/DL
CALCIUM SERPL-MCNC: 9.2 MG/DL
CHLORIDE SERPL-SCNC: 107 MMOL/L
CHOLEST SERPL-MCNC: 141 MG/DL
CO2 SERPL-SCNC: 20 MMOL/L
COLOR: NORMAL
COVID-19 NUCLEOCAPSID  GAM ANTIBODY INTERPRETATION: NEGATIVE
COVID-19 SPIKE DOMAIN ANTIBODY INTERPRETATION: POSITIVE
CREAT SERPL-MCNC: 1.27 MG/DL
CRP SERPL-MCNC: <3 MG/L
CYSTATIN C SERPL-MCNC: 2 MG/L
EOSINOPHIL # BLD AUTO: 0.09 K/UL
EOSINOPHIL NFR BLD AUTO: 1.2 %
ERYTHROCYTE [SEDIMENTATION RATE] IN BLOOD BY WESTERGREN METHOD: 7 MM/HR
ESTIMATED AVERAGE GLUCOSE: 180 MG/DL
FERRITIN SERPL-MCNC: 130 NG/ML
FOLATE SERPL-MCNC: >20 NG/ML
GFR/BSA.PRED SERPLBLD CYS-BASED-ARV: 28 ML/MIN
GLUCOSE QUALITATIVE U: ABNORMAL
GLUCOSE SERPL-MCNC: 174 MG/DL
HBA1C MFR BLD HPLC: 7.9 %
HBV SURFACE AB SER QL: NONREACTIVE
HBV SURFACE AG SER QL: NONREACTIVE
HCT VFR BLD CALC: 36.2 %
HCV AB SER QL: NONREACTIVE
HCV S/CO RATIO: 0.13 S/CO
HCYS SERPL-MCNC: 24.8 UMOL/L
HDLC SERPL-MCNC: 77 MG/DL
HGB BLD-MCNC: 10.9 G/DL
HYALINE CASTS: 0 /LPF
IMM GRANULOCYTES NFR BLD AUTO: 0.3 %
IRON SATN MFR SERPL: 29 %
IRON SERPL-MCNC: 64 UG/DL
KETONES URINE: NEGATIVE
LDLC SERPL CALC-MCNC: 50 MG/DL
LEUKOCYTE ESTERASE URINE: NEGATIVE
LYMPHOCYTES # BLD AUTO: 0.91 K/UL
LYMPHOCYTES NFR BLD AUTO: 12.6 %
MAGNESIUM SERPL-MCNC: 2.2 MG/DL
MAN DIFF?: NORMAL
MCHC RBC-ENTMCNC: 27.5 PG
MCHC RBC-ENTMCNC: 30.1 GM/DL
MCV RBC AUTO: 91.4 FL
MICROSCOPIC-UA: NORMAL
MONOCYTES # BLD AUTO: 0.33 K/UL
MONOCYTES NFR BLD AUTO: 4.6 %
NEUTROPHILS # BLD AUTO: 5.89 K/UL
NEUTROPHILS NFR BLD AUTO: 81.3 %
NITRITE URINE: NEGATIVE
NONHDLC SERPL-MCNC: 64 MG/DL
NT-PROBNP SERPL-MCNC: 1670 PG/ML
PARATHYROID HORMONE INTACT: 81 PG/ML
PH URINE: 5.5
PHOSPHATE SERPL-MCNC: 2.9 MG/DL
PLATELET # BLD AUTO: 212 K/UL
POTASSIUM SERPL-SCNC: 4.9 MMOL/L
PROT SERPL-MCNC: 6 G/DL
PROTEIN URINE: ABNORMAL
RBC # BLD: 3.96 M/UL
RBC # FLD: 16.1 %
RED BLOOD CELLS URINE: 8 /HPF
RENIN PLASMA: 10.2 PG/ML
RHEUMATOID FACT SER QL: <10 IU/ML
SARS-COV-2 AB SERPL IA-ACNC: 30.4 U/ML
SARS-COV-2 AB SERPL QL IA: 0.18 INDEX
SODIUM SERPL-SCNC: 137 MMOL/L
SPECIFIC GRAVITY URINE: 1.02
SQUAMOUS EPITHELIAL CELLS: 1 /HPF
T3FREE SERPL-MCNC: 2.23 PG/ML
T3RU NFR SERPL: 0.9 TBI
T4 FREE SERPL-MCNC: 1.2 NG/DL
T4 SERPL-MCNC: 5.7 UG/DL
THYROGLOB AB SERPL-ACNC: <20 IU/ML
THYROPEROXIDASE AB SERPL IA-ACNC: 11.1 IU/ML
TIBC SERPL-MCNC: 219 UG/DL
TRIGL SERPL-MCNC: 73 MG/DL
TSH SERPL-ACNC: 5.48 UIU/ML
UIBC SERPL-MCNC: 155 UG/DL
URATE SERPL-MCNC: 6.3 MG/DL
UROBILINOGEN URINE: NORMAL
VIT B12 SERPL-MCNC: 416 PG/ML
WBC # FLD AUTO: 7.24 K/UL
WHITE BLOOD CELLS URINE: 3 /HPF

## 2021-07-19 LAB
24R-OH-CALCIDIOL SERPL-MCNC: 35.9 PG/ML
ALDOSTERONE SERUM: 12.6 NG/DL
ALP BONE SERPL-MCNC: 18.1 UG/L
ANA SER IF-ACNC: NEGATIVE
COLLAGEN CTX SERPL-MCNC: 432 PG/ML
CREAT SPEC-SCNC: 97 MG/DL
CREAT/PROT UR: 1.2 RATIO
METHYLMALONATE SERPL-SCNC: 1117 NMOL/L
MPO AB + PR3 PNL SER: NORMAL
PROT UR-MCNC: 120 MG/DL

## 2021-07-25 LAB
ALBUMIN MFR SERPL ELPH: 60.6 %
ALBUMIN SERPL-MCNC: 3.6 G/DL
ALBUMIN/GLOB SERPL: 1.5 RATIO
ALPHA1 GLOB MFR SERPL ELPH: 3.9 %
ALPHA1 GLOB SERPL ELPH-MCNC: 0.2 G/DL
ALPHA2 GLOB MFR SERPL ELPH: 11.5 %
ALPHA2 GLOB SERPL ELPH-MCNC: 0.7 G/DL
B-GLOBULIN MFR SERPL ELPH: 11.8 %
B-GLOBULIN SERPL ELPH-MCNC: 0.7 G/DL
DEPRECATED KAPPA LC FREE/LAMBDA SER: 2.19 RATIO
GAMMA GLOB FLD ELPH-MCNC: 0.7 G/DL
GAMMA GLOB MFR SERPL ELPH: 12.2 %
IGA SER QL IEP: 408 MG/DL
IGG SER QL IEP: 784 MG/DL
IGM SER QL IEP: 21 MG/DL
INTERPRETATION SERPL IEP-IMP: NORMAL
KAPPA LC CSF-MCNC: 3.26 MG/DL
KAPPA LC SERPL-MCNC: 7.14 MG/DL
M PROTEIN SPEC IFE-MCNC: NORMAL
PROT SERPL-MCNC: 6 G/DL
PROT SERPL-MCNC: 6 G/DL

## 2021-08-06 ENCOUNTER — RX RENEWAL (OUTPATIENT)
Age: 81
End: 2021-08-06

## 2021-08-11 ENCOUNTER — RX RENEWAL (OUTPATIENT)
Age: 81
End: 2021-08-11

## 2021-09-23 NOTE — ED PROVIDER NOTE - BIRTH SEX
Male
Quality 130: Documentation Of Current Medications In The Medical Record: Current Medications Documented
Quality 402: Tobacco Use And Help With Quitting Among Adolescents: Patient screened for tobacco and never smoked
Quality 111:Pneumonia Vaccination Status For Older Adults: Pneumococcal Vaccination Previously Received
Quality 431: Preventive Care And Screening: Unhealthy Alcohol Use - Screening: Patient screened for unhealthy alcohol use using a single question and scores less than 2 times per year
Quality 226: Preventive Care And Screening: Tobacco Use: Screening And Cessation Intervention: Patient screened for tobacco use and is an ex/non-smoker
Quality 137: Melanoma: Continuity Of Care - Recall System: Patient information entered into a recall system that includes: target date for the next exam specified AND a process to follow up with patients regarding missed or unscheduled appointments
Detail Level: Detailed

## 2021-10-25 ENCOUNTER — RX RENEWAL (OUTPATIENT)
Age: 81
End: 2021-10-25

## 2021-11-01 ENCOUNTER — APPOINTMENT (OUTPATIENT)
Dept: OPHTHALMOLOGY | Facility: CLINIC | Age: 81
End: 2021-11-01
Payer: MEDICARE

## 2021-11-01 ENCOUNTER — NON-APPOINTMENT (OUTPATIENT)
Age: 81
End: 2021-11-01

## 2021-11-01 PROCEDURE — 92014 COMPRE OPH EXAM EST PT 1/>: CPT

## 2021-11-01 PROCEDURE — 92020 GONIOSCOPY: CPT

## 2021-11-01 PROCEDURE — 92250 FUNDUS PHOTOGRAPHY W/I&R: CPT

## 2021-11-03 ENCOUNTER — RX RENEWAL (OUTPATIENT)
Age: 81
End: 2021-11-03

## 2021-11-04 ENCOUNTER — RX RENEWAL (OUTPATIENT)
Age: 81
End: 2021-11-04

## 2021-11-04 RX ORDER — DOCUSATE SODIUM 100 MG/1
100 CAPSULE, LIQUID FILLED ORAL
Qty: 180 | Refills: 3 | Status: ACTIVE | COMMUNITY
Start: 2021-11-04 | End: 1900-01-01

## 2021-11-10 ENCOUNTER — LABORATORY RESULT (OUTPATIENT)
Age: 81
End: 2021-11-10

## 2021-11-10 ENCOUNTER — APPOINTMENT (OUTPATIENT)
Dept: NEPHROLOGY | Facility: CLINIC | Age: 81
End: 2021-11-10
Payer: MEDICARE

## 2021-11-10 VITALS — SYSTOLIC BLOOD PRESSURE: 161 MMHG | DIASTOLIC BLOOD PRESSURE: 60 MMHG | HEART RATE: 65 BPM

## 2021-11-10 VITALS — OXYGEN SATURATION: 93 % | HEART RATE: 72 BPM | TEMPERATURE: 98.6 F

## 2021-11-10 VITALS — OXYGEN SATURATION: 93 % | HEART RATE: 72 BPM

## 2021-11-10 VITALS — DIASTOLIC BLOOD PRESSURE: 60 MMHG | SYSTOLIC BLOOD PRESSURE: 161 MMHG | WEIGHT: 183 LBS | BODY MASS INDEX: 28.66 KG/M2

## 2021-11-10 VITALS — DIASTOLIC BLOOD PRESSURE: 70 MMHG | SYSTOLIC BLOOD PRESSURE: 140 MMHG

## 2021-11-10 PROCEDURE — 90662 IIV NO PRSV INCREASED AG IM: CPT

## 2021-11-10 PROCEDURE — G0008: CPT

## 2021-11-10 PROCEDURE — 36415 COLL VENOUS BLD VENIPUNCTURE: CPT

## 2021-11-10 PROCEDURE — 99214 OFFICE O/P EST MOD 30 MIN: CPT | Mod: 25

## 2021-11-10 NOTE — HISTORY OF PRESENT ILLNESS
[FreeTextEntry1] : The patient is an 81 year old male presenting today for f/u evaluation and active reassessment of HTN, DM, anemia, ASCVD, and h/o renal transplant, s/p 06/15/21 cardiac catheterization and angioplasty (see details on last note). Last seen July 2021.\par \par The patient is accompanied today by his spouse who is the primary historian today. He is feeling generally well today. He denies any worsening of his breathing, and denies difficulty voiding. He c/o pruritus of the skin. He has gained ~8lbs since last visit. His wife reports that they have appointments with Dr. Galindo in January.  He has not yet received his annual influenza vaccine and requests it today. \par - His wife requests a new wheelchair for him and Rx for physical therapy.\par \par Labs 07/13/21: BNP 1670, eGFR by cystatin C 28, RBC 3.96, , HCT 36.2%, Vit D 25-OH 20.2, LDL 50, CO2 20, glucose 174, BUN 26, creatinine 1.27, eGFR 52, TSH 5.48, PTH 81, U RBC 8, HgbA1c 7.9%, C3 80, protein/creat ratio 1.2, methylmalonic acid 1117, positive COVID spike domain antibody \par \par Current Medications: atorvastatin 40mg QD, carvedilol 12.5mg BID, Plavix 75mg QD, prednisone 5mg QD, ASA 81mg QD, Flomax 0.4mg QD, Humalog 100 BID, insulin 16 units 75/25 prn, furosemide 20mg QD, mycophenolate 500mg BID, tacrolimus 4mg BID, Dulcolax, Senna, Colace 100mg BID, vitamin D3 1000 units QD \par - Off amlodipine 5mg QD\par

## 2021-11-10 NOTE — ASSESSMENT
[FreeTextEntry1] : Plan:\par 1) HTN: BP is elevated in office today and hypervolemic. Will therefore increase furosemide to 40mg QD. Have contacted Dr. Galindo to move up his appointment in view of his high BPs and hypervolemia, and we agreed to have pt receive an echocardiogram and pharmacologic nuclear ETT at [facility]. He will f/u with Dr. Galindo in 1-2 weeks. Will reassess pressure and regimen at next evaluation. \par 2) CRI: last creatinine of 1.27 with eGFR of 52 is stable for patient. Continue to monitor on repeat CMP today.\par 3) Hyperglycemia: Most recent HbA1C of 7.9%. Advised patient to continue lifestyle management with healthy dieting and routine exercise and to continue on current antihyperglycemic regimen and continue f/u with Dr. Guajardo for diabetes management.\par 4) Transplant medications: Patient will c/w prednisone, mycophenolate, and tacrolimus.\par 5) Administered senior influenza vaccine subcutaneously into left deltoid without incident. \par 6) Will request wheelchair for pt. Rx given for PT.\par \par Changes to medications: Increase furosemide to 40mg QD.\par \par Labs were drawn and patient will return in mid-January for a follow-up appointment.

## 2021-11-10 NOTE — PHYSICAL EXAM
[General Appearance - Alert] : alert [General Appearance - In No Acute Distress] : in no acute distress [Sclera] : the sclera and conjunctiva were normal [PERRL With Normal Accommodation] : pupils were equal in size, round, and reactive to light [Extraocular Movements] : extraocular movements were intact [Outer Ear] : the ears and nose were normal in appearance [Hearing Threshold Finger Rub Not [place]] : hearing was normal [Neck Appearance] : the appearance of the neck was normal [Neck Cervical Mass (___cm)] : no neck mass was observed [Jugular Venous Distention Increased] : there was no jugular-venous distention [Thyroid Diffuse Enlargement] : the thyroid was not enlarged [Thyroid Nodule] : there were no palpable thyroid nodules [Auscultation Breath Sounds / Voice Sounds] : lungs were clear to auscultation bilaterally [Heart Rate And Rhythm] : heart rate was normal and rhythm regular [Heart Sounds] : normal S1 and S2 [Heart Sounds Gallop] : no gallops [Murmurs] : no murmurs [Heart Sounds Pericardial Friction Rub] : no pericardial rub [Bowel Sounds] : normal bowel sounds [Abdomen Soft] : soft [Abdomen Tenderness] : non-tender [Abdomen Mass (___ Cm)] : no abdominal mass palpated [No CVA Tenderness] : no ~M costovertebral angle tenderness [No Spinal Tenderness] : no spinal tenderness [Skin Color & Pigmentation] : normal skin color and pigmentation [Skin Turgor] : normal skin turgor [] : no rash [No Focal Deficits] : no focal deficits [Impaired Insight] : insight and judgment were intact [Oriented To Time, Place, And Person] : oriented to person, place, and time [Affect] : the affect was normal [FreeTextEntry1] : pt presents in a wheelchair

## 2021-11-10 NOTE — END OF VISIT
[Time Spent: ___ minutes] : I have spent [unfilled] minutes of time on the encounter. [FreeTextEntry3] : Documented by Dia Saeed acting as a scribe for [facility] Burns on 11/10/2021.\par

## 2021-11-10 NOTE — ADDENDUM
[FreeTextEntry1] : All medical record entries made by the Scribe were at my, [facility] Burns's direction and personally dictated by me on 11/10/2021. I have received the chart and agree that the record accurately reflects my personal performance of the history, physical exam, assessment, and plan. I have also personally directed, reviewed, and agreed with the chart.\par

## 2021-11-12 LAB
24R-OH-CALCIDIOL SERPL-MCNC: 31.6 PG/ML
25(OH)D3 SERPL-MCNC: 13.7 NG/ML
ALBUMIN SERPL ELPH-MCNC: 3.9 G/DL
ALDOSTERONE SERUM: 12.3 NG/DL
ALP BLD-CCNC: 81 U/L
ALT SERPL-CCNC: 12 U/L
ANA SER IF-ACNC: NEGATIVE
ANION GAP SERPL CALC-SCNC: 12 MMOL/L
AST SERPL-CCNC: 15 U/L
BASOPHILS # BLD AUTO: 0.01 K/UL
BASOPHILS NFR BLD AUTO: 0.1 %
BILIRUB SERPL-MCNC: 0.2 MG/DL
BUN SERPL-MCNC: 27 MG/DL
C3 SERPL-MCNC: 89 MG/DL
C4 SERPL-MCNC: 20 MG/DL
CALCIUM SERPL-MCNC: 9.2 MG/DL
CALCIUM SERPL-MCNC: 9.2 MG/DL
CHLORIDE SERPL-SCNC: 109 MMOL/L
CHOLEST SERPL-MCNC: 148 MG/DL
CO2 SERPL-SCNC: 17 MMOL/L
COVID-19 NUCLEOCAPSID  GAM ANTIBODY INTERPRETATION: NEGATIVE
COVID-19 SPIKE DOMAIN ANTIBODY INTERPRETATION: POSITIVE
CREAT SERPL-MCNC: 1.39 MG/DL
CRP SERPL-MCNC: <3 MG/L
CYSTATIN C SERPL-MCNC: 2.22 MG/L
EOSINOPHIL # BLD AUTO: 0.23 K/UL
EOSINOPHIL NFR BLD AUTO: 3.2 %
ERYTHROCYTE [SEDIMENTATION RATE] IN BLOOD BY WESTERGREN METHOD: 10 MM/HR
ESTIMATED AVERAGE GLUCOSE: 192 MG/DL
FERRITIN SERPL-MCNC: 169 NG/ML
FOLATE SERPL-MCNC: 19 NG/ML
GFR/BSA.PRED SERPLBLD CYS-BASED-ARV: 25 ML/MIN
GLUCOSE SERPL-MCNC: 193 MG/DL
HBA1C MFR BLD HPLC: 8.3 %
HBV SURFACE AB SER QL: NONREACTIVE
HBV SURFACE AG SER QL: NONREACTIVE
HCT VFR BLD CALC: 34.7 %
HCV AB SER QL: NONREACTIVE
HCV S/CO RATIO: 0.12 S/CO
HCYS SERPL-MCNC: 24.5 UMOL/L
HDLC SERPL-MCNC: 66 MG/DL
HGB BLD-MCNC: 10.2 G/DL
IMM GRANULOCYTES NFR BLD AUTO: 0.3 %
IRON SATN MFR SERPL: 31 %
IRON SERPL-MCNC: 66 UG/DL
LDLC SERPL CALC-MCNC: 59 MG/DL
LYMPHOCYTES # BLD AUTO: 0.96 K/UL
LYMPHOCYTES NFR BLD AUTO: 13.3 %
MAGNESIUM SERPL-MCNC: 2.1 MG/DL
MAN DIFF?: NORMAL
MCHC RBC-ENTMCNC: 27.1 PG
MCHC RBC-ENTMCNC: 29.4 GM/DL
MCV RBC AUTO: 92 FL
MONOCYTES # BLD AUTO: 0.33 K/UL
MONOCYTES NFR BLD AUTO: 4.6 %
NEUTROPHILS # BLD AUTO: 5.66 K/UL
NEUTROPHILS NFR BLD AUTO: 78.5 %
NONHDLC SERPL-MCNC: 83 MG/DL
NT-PROBNP SERPL-MCNC: 2880 PG/ML
PARATHYROID HORMONE INTACT: 74 PG/ML
PHOSPHATE SERPL-MCNC: 2.8 MG/DL
PLATELET # BLD AUTO: 203 K/UL
POTASSIUM SERPL-SCNC: 4.9 MMOL/L
PROT SERPL-MCNC: 6.2 G/DL
RBC # BLD: 3.77 M/UL
RBC # FLD: 15.9 %
RENIN PLASMA: 9.4 PG/ML
RHEUMATOID FACT SER QL: <10 IU/ML
SARS-COV-2 AB SERPL IA-ACNC: 24.5 U/ML
SARS-COV-2 AB SERPL QL IA: 0.17 INDEX
SODIUM SERPL-SCNC: 138 MMOL/L
T3FREE SERPL-MCNC: 2.35 PG/ML
T3RU NFR SERPL: 0.9 TBI
T4 FREE SERPL-MCNC: 1.1 NG/DL
T4 SERPL-MCNC: 6 UG/DL
THYROGLOB AB SERPL-ACNC: <20 IU/ML
THYROPEROXIDASE AB SERPL IA-ACNC: <10 IU/ML
TIBC SERPL-MCNC: 212 UG/DL
TRIGL SERPL-MCNC: 119 MG/DL
TSH SERPL-ACNC: 6.43 UIU/ML
UIBC SERPL-MCNC: 146 UG/DL
URATE SERPL-MCNC: 6.2 MG/DL
VIT B12 SERPL-MCNC: 341 PG/ML
WBC # FLD AUTO: 7.21 K/UL

## 2021-11-21 LAB
ALBUMIN MFR SERPL ELPH: 60.7 %
ALBUMIN SERPL-MCNC: 3.8 G/DL
ALBUMIN/GLOB SERPL: 1.6 RATIO
ALP BONE SERPL-MCNC: 15.6 UG/L
ALPHA1 GLOB MFR SERPL ELPH: 4.2 %
ALPHA1 GLOB SERPL ELPH-MCNC: 0.3 G/DL
ALPHA2 GLOB MFR SERPL ELPH: 11.2 %
ALPHA2 GLOB SERPL ELPH-MCNC: 0.7 G/DL
B-GLOBULIN MFR SERPL ELPH: 12.4 %
B-GLOBULIN SERPL ELPH-MCNC: 0.8 G/DL
COLLAGEN CTX SERPL-MCNC: 363 PG/ML
DEPRECATED KAPPA LC FREE/LAMBDA SER: 1.85 RATIO
GAMMA GLOB FLD ELPH-MCNC: 0.7 G/DL
GAMMA GLOB MFR SERPL ELPH: 11.5 %
IGA SER QL IEP: 355 MG/DL
IGG SER QL IEP: 712 MG/DL
IGM SER QL IEP: 22 MG/DL
INTERPRETATION SERPL IEP-IMP: NORMAL
KAPPA LC CSF-MCNC: 3.57 MG/DL
KAPPA LC SERPL-MCNC: 6.59 MG/DL
M PROTEIN SPEC IFE-MCNC: NORMAL
METHYLMALONATE SERPL-SCNC: 2598 NMOL/L
MPO AB + PR3 PNL SER: NORMAL
PROT SERPL-MCNC: 6.2 G/DL
PROT SERPL-MCNC: 6.2 G/DL

## 2021-11-29 ENCOUNTER — RX RENEWAL (OUTPATIENT)
Age: 81
End: 2021-11-29

## 2021-12-01 ENCOUNTER — RX RENEWAL (OUTPATIENT)
Age: 81
End: 2021-12-01

## 2021-12-02 ENCOUNTER — RX RENEWAL (OUTPATIENT)
Age: 81
End: 2021-12-02

## 2021-12-03 ENCOUNTER — RX RENEWAL (OUTPATIENT)
Age: 81
End: 2021-12-03

## 2021-12-08 ENCOUNTER — RX RENEWAL (OUTPATIENT)
Age: 81
End: 2021-12-08

## 2021-12-10 ENCOUNTER — RX RENEWAL (OUTPATIENT)
Age: 81
End: 2021-12-10

## 2021-12-16 ENCOUNTER — APPOINTMENT (OUTPATIENT)
Dept: HEART AND VASCULAR | Facility: CLINIC | Age: 81
End: 2021-12-16

## 2022-01-01 ENCOUNTER — INPATIENT (INPATIENT)
Facility: HOSPITAL | Age: 82
LOS: 3 days | Discharge: HOME HEALTH | DRG: 871 | End: 2022-10-25
Payer: MEDICARE

## 2022-01-01 ENCOUNTER — NON-APPOINTMENT (OUTPATIENT)
Age: 82
End: 2022-01-01

## 2022-01-01 ENCOUNTER — APPOINTMENT (OUTPATIENT)
Dept: NEPHROLOGY | Facility: CLINIC | Age: 82
End: 2022-01-01

## 2022-01-01 ENCOUNTER — LABORATORY RESULT (OUTPATIENT)
Age: 82
End: 2022-01-01

## 2022-01-01 ENCOUNTER — RX RENEWAL (OUTPATIENT)
Age: 82
End: 2022-01-01

## 2022-01-01 ENCOUNTER — TRANSCRIPTION ENCOUNTER (OUTPATIENT)
Age: 82
End: 2022-01-01

## 2022-01-01 ENCOUNTER — APPOINTMENT (OUTPATIENT)
Dept: PULMONOLOGY | Facility: CLINIC | Age: 82
End: 2022-01-01

## 2022-01-01 ENCOUNTER — APPOINTMENT (OUTPATIENT)
Dept: NEPHROLOGY | Facility: CLINIC | Age: 82
End: 2022-01-01
Payer: MEDICARE

## 2022-01-01 ENCOUNTER — EMERGENCY (EMERGENCY)
Facility: HOSPITAL | Age: 82
LOS: 1 days | Discharge: HOME | End: 2022-01-01
Attending: EMERGENCY MEDICINE | Admitting: EMERGENCY MEDICINE
Payer: MEDICARE

## 2022-01-01 ENCOUNTER — APPOINTMENT (OUTPATIENT)
Dept: GASTROENTEROLOGY | Facility: CLINIC | Age: 82
End: 2022-01-01

## 2022-01-01 ENCOUNTER — INPATIENT (INPATIENT)
Facility: HOSPITAL | Age: 82
LOS: 35 days | Discharge: NURSING, REHAB OR LONG-TERM CARE | DRG: 4 | End: 2022-12-23
Payer: MEDICARE

## 2022-01-01 ENCOUNTER — APPOINTMENT (OUTPATIENT)
Dept: HEART AND VASCULAR | Facility: CLINIC | Age: 82
End: 2022-01-01

## 2022-01-01 ENCOUNTER — APPOINTMENT (OUTPATIENT)
Dept: CARE COORDINATION | Facility: HOME HEALTH | Age: 82
End: 2022-01-01

## 2022-01-01 VITALS — OXYGEN SATURATION: 95 %

## 2022-01-01 VITALS — DIASTOLIC BLOOD PRESSURE: 51 MMHG | TEMPERATURE: 72 F | SYSTOLIC BLOOD PRESSURE: 124 MMHG

## 2022-01-01 VITALS
OXYGEN SATURATION: 97 % | TEMPERATURE: 98 F | WEIGHT: 164.02 LBS | DIASTOLIC BLOOD PRESSURE: 70 MMHG | HEART RATE: 63 BPM | RESPIRATION RATE: 18 BRPM | SYSTOLIC BLOOD PRESSURE: 119 MMHG | HEIGHT: 67 IN

## 2022-01-01 VITALS — WEIGHT: 165 LBS | HEART RATE: 55 BPM | TEMPERATURE: 98.1 F | OXYGEN SATURATION: 98 % | BODY MASS INDEX: 25.84 KG/M2

## 2022-01-01 VITALS
HEIGHT: 67 IN | TEMPERATURE: 97.6 F | BODY MASS INDEX: 24.8 KG/M2 | SYSTOLIC BLOOD PRESSURE: 102 MMHG | DIASTOLIC BLOOD PRESSURE: 58 MMHG | WEIGHT: 158 LBS | OXYGEN SATURATION: 100 % | HEART RATE: 62 BPM

## 2022-01-01 VITALS
TEMPERATURE: 98 F | SYSTOLIC BLOOD PRESSURE: 165 MMHG | HEART RATE: 76 BPM | RESPIRATION RATE: 18 BRPM | DIASTOLIC BLOOD PRESSURE: 81 MMHG | OXYGEN SATURATION: 96 %

## 2022-01-01 VITALS
SYSTOLIC BLOOD PRESSURE: 148 MMHG | OXYGEN SATURATION: 95 % | RESPIRATION RATE: 20 BRPM | HEART RATE: 56 BPM | HEIGHT: 67 IN | DIASTOLIC BLOOD PRESSURE: 68 MMHG

## 2022-01-01 VITALS
OXYGEN SATURATION: 94 % | RESPIRATION RATE: 18 BRPM | TEMPERATURE: 97 F | DIASTOLIC BLOOD PRESSURE: 64 MMHG | SYSTOLIC BLOOD PRESSURE: 143 MMHG | HEART RATE: 61 BPM

## 2022-01-01 VITALS
DIASTOLIC BLOOD PRESSURE: 91 MMHG | SYSTOLIC BLOOD PRESSURE: 176 MMHG | HEART RATE: 59 BPM | WEIGHT: 220.02 LBS | HEIGHT: 67 IN | TEMPERATURE: 98 F | OXYGEN SATURATION: 98 % | RESPIRATION RATE: 18 BRPM

## 2022-01-01 VITALS
DIASTOLIC BLOOD PRESSURE: 60 MMHG | SYSTOLIC BLOOD PRESSURE: 134 MMHG | WEIGHT: 174 LBS | BODY MASS INDEX: 27.25 KG/M2 | TEMPERATURE: 77 F

## 2022-01-01 VITALS — OXYGEN SATURATION: 98 % | WEIGHT: 168 LBS | TEMPERATURE: 98.4 F | HEART RATE: 73 BPM | BODY MASS INDEX: 26.31 KG/M2

## 2022-01-01 VITALS — BODY MASS INDEX: 24.59 KG/M2 | HEART RATE: 61 BPM | TEMPERATURE: 98.9 F | WEIGHT: 157 LBS | OXYGEN SATURATION: 99 %

## 2022-01-01 VITALS
OXYGEN SATURATION: 100 % | RESPIRATION RATE: 20 BRPM | DIASTOLIC BLOOD PRESSURE: 44 MMHG | HEART RATE: 76 BPM | SYSTOLIC BLOOD PRESSURE: 120 MMHG

## 2022-01-01 VITALS — HEART RATE: 72 BPM | DIASTOLIC BLOOD PRESSURE: 70 MMHG | SYSTOLIC BLOOD PRESSURE: 136 MMHG

## 2022-01-01 DIAGNOSIS — I12.9 HYPERTENSIVE CHRONIC KIDNEY DISEASE WITH STAGE 1 THROUGH STAGE 4 CHRONIC KIDNEY DISEASE, OR UNSPECIFIED CHRONIC KIDNEY DISEASE: ICD-10-CM

## 2022-01-01 DIAGNOSIS — Z29.9 ENCOUNTER FOR PROPHYLACTIC MEASURES, UNSPECIFIED: ICD-10-CM

## 2022-01-01 DIAGNOSIS — Z89.421 ACQUIRED ABSENCE OF OTHER RIGHT TOE(S): ICD-10-CM

## 2022-01-01 DIAGNOSIS — Z95.5 PRESENCE OF CORONARY ANGIOPLASTY IMPLANT AND GRAFT: Chronic | ICD-10-CM

## 2022-01-01 DIAGNOSIS — J15.1 PNEUMONIA DUE TO PSEUDOMONAS: ICD-10-CM

## 2022-01-01 DIAGNOSIS — N18.4 CHRONIC KIDNEY DISEASE, STAGE 4 (SEVERE): ICD-10-CM

## 2022-01-01 DIAGNOSIS — I25.10 ATHEROSCLEROTIC HEART DISEASE OF NATIVE CORONARY ARTERY W/OUT ANGINA PECTORIS: ICD-10-CM

## 2022-01-01 DIAGNOSIS — N18.9 CHRONIC KIDNEY DISEASE, UNSPECIFIED: ICD-10-CM

## 2022-01-01 DIAGNOSIS — R65.21 SEVERE SEPSIS WITH SEPTIC SHOCK: ICD-10-CM

## 2022-01-01 DIAGNOSIS — Z94.0 KIDNEY TRANSPLANT STATUS: Chronic | ICD-10-CM

## 2022-01-01 DIAGNOSIS — R93.89 ABNORMAL FINDINGS ON DIAGNOSTIC IMAGING OF OTHER SPECIFIED BODY STRUCTURES: ICD-10-CM

## 2022-01-01 DIAGNOSIS — R33.9 RETENTION OF URINE, UNSPECIFIED: ICD-10-CM

## 2022-01-01 DIAGNOSIS — H40.9 UNSPECIFIED GLAUCOMA: ICD-10-CM

## 2022-01-01 DIAGNOSIS — N40.0 BENIGN PROSTATIC HYPERPLASIA WITHOUT LOWER URINARY TRACT SYMPTOMS: ICD-10-CM

## 2022-01-01 DIAGNOSIS — E87.0 HYPEROSMOLALITY AND HYPERNATREMIA: ICD-10-CM

## 2022-01-01 DIAGNOSIS — I25.10 ATHEROSCLEROTIC HEART DISEASE OF NATIVE CORONARY ARTERY WITHOUT ANGINA PECTORIS: ICD-10-CM

## 2022-01-01 DIAGNOSIS — A41.52 SEPSIS DUE TO PSEUDOMONAS: ICD-10-CM

## 2022-01-01 DIAGNOSIS — H93.90 UNSPECIFIED DISORDER OF EAR, UNSPECIFIED EAR: ICD-10-CM

## 2022-01-01 DIAGNOSIS — E10.39 TYPE 1 DIABETES MELLITUS WITH OTHER DIABETIC OPHTHALMIC COMPLICATION: ICD-10-CM

## 2022-01-01 DIAGNOSIS — Z79.82 LONG TERM (CURRENT) USE OF ASPIRIN: ICD-10-CM

## 2022-01-01 DIAGNOSIS — E11.9 TYPE 2 DIABETES MELLITUS WITHOUT COMPLICATIONS: ICD-10-CM

## 2022-01-01 DIAGNOSIS — T68.XXXA HYPOTHERMIA, INITIAL ENCOUNTER: ICD-10-CM

## 2022-01-01 DIAGNOSIS — H54.8 LEGAL BLINDNESS, AS DEFINED IN USA: ICD-10-CM

## 2022-01-01 DIAGNOSIS — R79.89 OTHER SPECIFIED ABNORMAL FINDINGS OF BLOOD CHEMISTRY: ICD-10-CM

## 2022-01-01 DIAGNOSIS — J96.01 ACUTE RESPIRATORY FAILURE WITH HYPOXIA: ICD-10-CM

## 2022-01-01 DIAGNOSIS — E11.21 TYPE 2 DIABETES MELLITUS WITH DIABETIC NEPHROPATHY: ICD-10-CM

## 2022-01-01 DIAGNOSIS — H66.91 OTITIS MEDIA, UNSPECIFIED, RIGHT EAR: ICD-10-CM

## 2022-01-01 DIAGNOSIS — E87.29 OTHER ACIDOSIS: ICD-10-CM

## 2022-01-01 DIAGNOSIS — Z95.5 PRESENCE OF CORONARY ANGIOPLASTY IMPLANT AND GRAFT: ICD-10-CM

## 2022-01-01 DIAGNOSIS — Z87.891 PERSONAL HISTORY OF NICOTINE DEPENDENCE: ICD-10-CM

## 2022-01-01 DIAGNOSIS — I10 ESSENTIAL (PRIMARY) HYPERTENSION: ICD-10-CM

## 2022-01-01 DIAGNOSIS — A41.9 SEPSIS, UNSPECIFIED ORGANISM: ICD-10-CM

## 2022-01-01 DIAGNOSIS — E87.2 ACIDOSIS: ICD-10-CM

## 2022-01-01 DIAGNOSIS — H61.23 IMPACTED CERUMEN, BILATERAL: ICD-10-CM
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
## 2022-01-01 DIAGNOSIS — D63.1 ANEMIA IN CHRONIC KIDNEY DISEASE: ICD-10-CM

## 2022-01-01 DIAGNOSIS — S98.139A COMPLETE TRAUMATIC AMPUTATION OF ONE UNSPECIFIED LESSER TOE, INITIAL ENCOUNTER: Chronic | ICD-10-CM

## 2022-01-01 DIAGNOSIS — E10.22 TYPE 1 DIABETES MELLITUS WITH DIABETIC CHRONIC KIDNEY DISEASE: ICD-10-CM

## 2022-01-01 DIAGNOSIS — I13.0 HYPERTENSIVE HEART AND CHRONIC KIDNEY DISEASE WITH HEART FAILURE AND STAGE 1 THROUGH STAGE 4 CHRONIC KIDNEY DISEASE, OR UNSPECIFIED CHRONIC KIDNEY DISEASE: ICD-10-CM

## 2022-01-01 DIAGNOSIS — Z20.822 CONTACT WITH AND (SUSPECTED) EXPOSURE TO COVID-19: ICD-10-CM

## 2022-01-01 DIAGNOSIS — N40.1 BENIGN PROSTATIC HYPERPLASIA WITH LOWER URINARY TRACT SYMPTOMS: ICD-10-CM

## 2022-01-01 DIAGNOSIS — E87.70 FLUID OVERLOAD, UNSPECIFIED: ICD-10-CM

## 2022-01-01 DIAGNOSIS — E78.5 HYPERLIPIDEMIA, UNSPECIFIED: ICD-10-CM

## 2022-01-01 DIAGNOSIS — E10.10 TYPE 1 DIABETES MELLITUS WITH KETOACIDOSIS WITHOUT COMA: ICD-10-CM

## 2022-01-01 DIAGNOSIS — Z87.898 PERSONAL HISTORY OF OTHER SPECIFIED CONDITIONS: ICD-10-CM

## 2022-01-01 DIAGNOSIS — E87.20 ACIDOSIS, UNSPECIFIED: ICD-10-CM

## 2022-01-01 DIAGNOSIS — R05.8 OTHER SPECIFIED COUGH: ICD-10-CM

## 2022-01-01 DIAGNOSIS — J18.9 PNEUMONIA, UNSPECIFIED ORGANISM: ICD-10-CM

## 2022-01-01 DIAGNOSIS — J22 UNSPECIFIED ACUTE LOWER RESPIRATORY INFECTION: ICD-10-CM

## 2022-01-01 DIAGNOSIS — E03.9 HYPOTHYROIDISM, UNSPECIFIED: ICD-10-CM

## 2022-01-01 DIAGNOSIS — D64.9 ANEMIA, UNSPECIFIED: ICD-10-CM

## 2022-01-01 DIAGNOSIS — I50.33 ACUTE ON CHRONIC DIASTOLIC (CONGESTIVE) HEART FAILURE: ICD-10-CM

## 2022-01-01 DIAGNOSIS — R60.0 LOCALIZED EDEMA: ICD-10-CM

## 2022-01-01 DIAGNOSIS — R09.89 OTHER SPECIFIED SYMPTOMS AND SIGNS INVOLVING THE CIRCULATORY AND RESPIRATORY SYSTEMS: ICD-10-CM

## 2022-01-01 DIAGNOSIS — E44.1 MILD PROTEIN-CALORIE MALNUTRITION: ICD-10-CM

## 2022-01-01 DIAGNOSIS — U07.1 COVID-19: ICD-10-CM

## 2022-01-01 DIAGNOSIS — R68.81 EARLY SATIETY: ICD-10-CM
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
## 2022-01-01 DIAGNOSIS — E11.9 TYPE 2 DIABETES MELLITUS W/OUT COMPLICATIONS: ICD-10-CM

## 2022-01-01 DIAGNOSIS — Z89.422 ACQUIRED ABSENCE OF OTHER LEFT TOE(S): ICD-10-CM

## 2022-01-01 DIAGNOSIS — I46.8 CARDIAC ARREST DUE TO OTHER UNDERLYING CONDITION: ICD-10-CM

## 2022-01-01 DIAGNOSIS — R45.1 RESTLESSNESS AND AGITATION: ICD-10-CM

## 2022-01-01 DIAGNOSIS — J69.0 PNEUMONITIS DUE TO INHALATION OF FOOD AND VOMIT: ICD-10-CM

## 2022-01-01 DIAGNOSIS — K59.00 CONSTIPATION, UNSPECIFIED: ICD-10-CM

## 2022-01-01 DIAGNOSIS — N17.9 ACUTE KIDNEY FAILURE, UNSPECIFIED: ICD-10-CM

## 2022-01-01 DIAGNOSIS — H40.2233 CHRONIC ANGLE-CLOSURE GLAUCOMA, BILATERAL, SEVERE STAGE: ICD-10-CM

## 2022-01-01 DIAGNOSIS — J15.6 PNEUMONIA DUE TO OTHER GRAM-NEGATIVE BACTERIA: ICD-10-CM

## 2022-01-01 DIAGNOSIS — Z94.0 KIDNEY TRANSPLANT STATUS: ICD-10-CM

## 2022-01-01 DIAGNOSIS — Z99.3 DEPENDENCE ON WHEELCHAIR: ICD-10-CM

## 2022-01-01 DIAGNOSIS — K21.9 GASTRO-ESOPHAGEAL REFLUX DISEASE W/OUT ESOPHAGITIS: ICD-10-CM

## 2022-01-01 DIAGNOSIS — G93.41 METABOLIC ENCEPHALOPATHY: ICD-10-CM

## 2022-01-01 DIAGNOSIS — T86.19 OTHER COMPLICATION OF KIDNEY TRANSPLANT: ICD-10-CM

## 2022-01-01 DIAGNOSIS — Z79.4 LONG TERM (CURRENT) USE OF INSULIN: ICD-10-CM

## 2022-01-01 DIAGNOSIS — R33.8 OTHER RETENTION OF URINE: ICD-10-CM

## 2022-01-01 DIAGNOSIS — Z79.01 LONG TERM (CURRENT) USE OF ANTICOAGULANTS: ICD-10-CM

## 2022-01-01 DIAGNOSIS — N17.0 ACUTE KIDNEY FAILURE WITH TUBULAR NECROSIS: ICD-10-CM

## 2022-01-01 DIAGNOSIS — E27.40 UNSPECIFIED ADRENOCORTICAL INSUFFICIENCY: ICD-10-CM

## 2022-01-01 DIAGNOSIS — M25.529 PAIN IN UNSPECIFIED ELBOW: ICD-10-CM

## 2022-01-01 DIAGNOSIS — Z79.02 LONG TERM (CURRENT) USE OF ANTITHROMBOTICS/ANTIPLATELETS: ICD-10-CM

## 2022-01-01 DIAGNOSIS — Z01.89 ENCOUNTER FOR OTHER SPECIFIED SPECIAL EXAMINATIONS: ICD-10-CM

## 2022-01-01 DIAGNOSIS — E87.5 HYPERKALEMIA: ICD-10-CM

## 2022-01-01 DIAGNOSIS — G62.9 POLYNEUROPATHY, UNSPECIFIED: ICD-10-CM

## 2022-01-01 DIAGNOSIS — I50.30 UNSPECIFIED DIASTOLIC (CONGESTIVE) HEART FAILURE: ICD-10-CM

## 2022-01-01 DIAGNOSIS — R19.7 DIARRHEA, UNSPECIFIED: ICD-10-CM

## 2022-01-01 DIAGNOSIS — E87.1 HYPO-OSMOLALITY AND HYPONATREMIA: ICD-10-CM

## 2022-01-01 DIAGNOSIS — D84.9 IMMUNODEFICIENCY, UNSPECIFIED: ICD-10-CM

## 2022-01-01 DIAGNOSIS — H42 GLAUCOMA IN DISEASES CLASSIFIED ELSEWHERE: ICD-10-CM

## 2022-01-01 DIAGNOSIS — Z98.61 ATHEROSCLEROTIC HEART DISEASE OF NATIVE CORONARY ARTERY W/OUT ANGINA PECTORIS: ICD-10-CM

## 2022-01-01 DIAGNOSIS — Z23 ENCOUNTER FOR IMMUNIZATION: ICD-10-CM

## 2022-01-01 DIAGNOSIS — E10.649 TYPE 1 DIABETES MELLITUS WITH HYPOGLYCEMIA WITHOUT COMA: ICD-10-CM

## 2022-01-01 DIAGNOSIS — N04.9 NEPHROTIC SYNDROME WITH UNSPECIFIED MORPHOLOGIC CHANGES: ICD-10-CM

## 2022-01-01 DIAGNOSIS — Z87.2 PERSONAL HISTORY OF DISEASES OF THE SKIN AND SUBCUTANEOUS TISSUE: ICD-10-CM

## 2022-01-01 DIAGNOSIS — Z79.899 OTHER LONG TERM (CURRENT) DRUG THERAPY: ICD-10-CM

## 2022-01-01 DIAGNOSIS — R63.4 ABNORMAL WEIGHT LOSS: ICD-10-CM

## 2022-01-01 DIAGNOSIS — E10.9 TYPE 1 DIABETES MELLITUS WITHOUT COMPLICATIONS: ICD-10-CM

## 2022-01-01 LAB
-  AMPICILLIN/SULBACTAM: SIGNIFICANT CHANGE UP
-  AMPICILLIN: SIGNIFICANT CHANGE UP
-  AZTREONAM: SIGNIFICANT CHANGE UP
-  CEFAZOLIN: SIGNIFICANT CHANGE UP
-  CEFEPIME: SIGNIFICANT CHANGE UP
-  CEFTAZIDIME: SIGNIFICANT CHANGE UP
-  CEFTRIAXONE: SIGNIFICANT CHANGE UP
-  CIPROFLOXACIN: SIGNIFICANT CHANGE UP
-  ERTAPENEM: SIGNIFICANT CHANGE UP
-  GENTAMICIN: SIGNIFICANT CHANGE UP
-  LEVOFLOXACIN: SIGNIFICANT CHANGE UP
-  PIPERACILLIN/TAZOBACTAM: SIGNIFICANT CHANGE UP
-  TOBRAMYCIN: SIGNIFICANT CHANGE UP
-  TRIMETHOPRIM/SULFAMETHOXAZOLE: SIGNIFICANT CHANGE UP
-  TRIMETHOPRIM/SULFAMETHOXAZOLE: SIGNIFICANT CHANGE UP
24R-OH-CALCIDIOL SERPL-MCNC: 22 PG/ML
24R-OH-CALCIDIOL SERPL-MCNC: 25.6 PG/ML
25(OH)D3 SERPL-MCNC: 13.6 NG/ML
25(OH)D3 SERPL-MCNC: 19.1 NG/ML
A1C WITH ESTIMATED AVERAGE GLUCOSE RESULT: 9 % — HIGH (ref 4–5.6)
ACID FAST STN SPT: NORMAL
ALBUMIN MFR SERPL ELPH: 53 %
ALBUMIN MFR SERPL ELPH: 60.7 %
ALBUMIN SERPL ELPH-MCNC: 1.9 G/DL — LOW (ref 3.3–5)
ALBUMIN SERPL ELPH-MCNC: 2.1 G/DL — LOW (ref 3.3–5)
ALBUMIN SERPL ELPH-MCNC: 2.2 G/DL — LOW (ref 3.3–5)
ALBUMIN SERPL ELPH-MCNC: 2.2 G/DL — LOW (ref 3.3–5)
ALBUMIN SERPL ELPH-MCNC: 2.3 G/DL — LOW (ref 3.3–5)
ALBUMIN SERPL ELPH-MCNC: 2.4 G/DL — LOW (ref 3.3–5)
ALBUMIN SERPL ELPH-MCNC: 2.5 G/DL — LOW (ref 3.3–5)
ALBUMIN SERPL ELPH-MCNC: 2.6 G/DL — LOW (ref 3.3–5)
ALBUMIN SERPL ELPH-MCNC: 2.7 G/DL — LOW (ref 3.3–5)
ALBUMIN SERPL ELPH-MCNC: 2.7 G/DL — LOW (ref 3.3–5)
ALBUMIN SERPL ELPH-MCNC: 2.8 G/DL — LOW (ref 3.3–5)
ALBUMIN SERPL ELPH-MCNC: 2.8 G/DL — LOW (ref 3.3–5)
ALBUMIN SERPL ELPH-MCNC: 2.9 G/DL — LOW (ref 3.3–5)
ALBUMIN SERPL ELPH-MCNC: 3 G/DL — LOW (ref 3.3–5)
ALBUMIN SERPL ELPH-MCNC: 3.1 G/DL — LOW (ref 3.3–5)
ALBUMIN SERPL ELPH-MCNC: 3.2 G/DL — LOW (ref 3.3–5)
ALBUMIN SERPL ELPH-MCNC: 3.5 G/DL
ALBUMIN SERPL ELPH-MCNC: 3.5 G/DL — SIGNIFICANT CHANGE UP (ref 3.3–5)
ALBUMIN SERPL ELPH-MCNC: 4 G/DL
ALBUMIN SERPL-MCNC: 3.1 G/DL
ALBUMIN SERPL-MCNC: 3.7 G/DL
ALBUMIN/GLOB SERPL: 1.1 RATIO
ALBUMIN/GLOB SERPL: 1.5 RATIO
ALP BLD-CCNC: 104 U/L
ALP BLD-CCNC: 79 U/L
ALP BONE SERPL-MCNC: 18.7 UG/L
ALP BONE SERPL-MCNC: 23.3 UG/L
ALP SERPL-CCNC: 100 U/L — SIGNIFICANT CHANGE UP (ref 40–120)
ALP SERPL-CCNC: 101 U/L — SIGNIFICANT CHANGE UP (ref 40–120)
ALP SERPL-CCNC: 102 U/L — SIGNIFICANT CHANGE UP (ref 40–120)
ALP SERPL-CCNC: 102 U/L — SIGNIFICANT CHANGE UP (ref 40–120)
ALP SERPL-CCNC: 106 U/L — SIGNIFICANT CHANGE UP (ref 40–120)
ALP SERPL-CCNC: 107 U/L — SIGNIFICANT CHANGE UP (ref 40–120)
ALP SERPL-CCNC: 108 U/L — SIGNIFICANT CHANGE UP (ref 40–120)
ALP SERPL-CCNC: 113 U/L — SIGNIFICANT CHANGE UP (ref 40–120)
ALP SERPL-CCNC: 118 U/L — SIGNIFICANT CHANGE UP (ref 40–120)
ALP SERPL-CCNC: 122 U/L — HIGH (ref 40–120)
ALP SERPL-CCNC: 124 U/L — HIGH (ref 40–120)
ALP SERPL-CCNC: 128 U/L — HIGH (ref 40–120)
ALP SERPL-CCNC: 132 U/L — HIGH (ref 40–120)
ALP SERPL-CCNC: 140 U/L — HIGH (ref 40–120)
ALP SERPL-CCNC: 70 U/L — SIGNIFICANT CHANGE UP (ref 40–120)
ALP SERPL-CCNC: 71 U/L — SIGNIFICANT CHANGE UP (ref 40–120)
ALP SERPL-CCNC: 89 U/L — SIGNIFICANT CHANGE UP (ref 40–120)
ALP SERPL-CCNC: 92 U/L — SIGNIFICANT CHANGE UP (ref 40–120)
ALP SERPL-CCNC: 93 U/L — SIGNIFICANT CHANGE UP (ref 40–120)
ALP SERPL-CCNC: 93 U/L — SIGNIFICANT CHANGE UP (ref 40–120)
ALP SERPL-CCNC: 94 U/L — SIGNIFICANT CHANGE UP (ref 40–120)
ALP SERPL-CCNC: 95 U/L — SIGNIFICANT CHANGE UP (ref 40–120)
ALP SERPL-CCNC: 95 U/L — SIGNIFICANT CHANGE UP (ref 40–120)
ALP SERPL-CCNC: 96 U/L — SIGNIFICANT CHANGE UP (ref 40–120)
ALP SERPL-CCNC: 98 U/L — SIGNIFICANT CHANGE UP (ref 40–120)
ALP SERPL-CCNC: 99 U/L — SIGNIFICANT CHANGE UP (ref 40–120)
ALPHA1 GLOB MFR SERPL ELPH: 4.5 %
ALPHA1 GLOB MFR SERPL ELPH: 7.3 %
ALPHA1 GLOB SERPL ELPH-MCNC: 0.3 G/DL
ALPHA1 GLOB SERPL ELPH-MCNC: 0.4 G/DL
ALPHA2 GLOB MFR SERPL ELPH: 11.5 %
ALPHA2 GLOB MFR SERPL ELPH: 14.2 %
ALPHA2 GLOB SERPL ELPH-MCNC: 0.7 G/DL
ALPHA2 GLOB SERPL ELPH-MCNC: 0.8 G/DL
ALT FLD-CCNC: 11 U/L — SIGNIFICANT CHANGE UP (ref 10–45)
ALT FLD-CCNC: 12 U/L — SIGNIFICANT CHANGE UP (ref 10–45)
ALT FLD-CCNC: 16 U/L — SIGNIFICANT CHANGE UP (ref 10–45)
ALT FLD-CCNC: 17 U/L — SIGNIFICANT CHANGE UP (ref 10–45)
ALT FLD-CCNC: 31 U/L — SIGNIFICANT CHANGE UP (ref 10–45)
ALT FLD-CCNC: 34 U/L — SIGNIFICANT CHANGE UP (ref 10–45)
ALT FLD-CCNC: 37 U/L — SIGNIFICANT CHANGE UP (ref 10–45)
ALT FLD-CCNC: 38 U/L — SIGNIFICANT CHANGE UP (ref 10–45)
ALT FLD-CCNC: 42 U/L — SIGNIFICANT CHANGE UP (ref 10–45)
ALT FLD-CCNC: 44 U/L — SIGNIFICANT CHANGE UP (ref 10–45)
ALT FLD-CCNC: 45 U/L — SIGNIFICANT CHANGE UP (ref 10–45)
ALT FLD-CCNC: 47 U/L — HIGH (ref 10–45)
ALT FLD-CCNC: 48 U/L — HIGH (ref 10–45)
ALT FLD-CCNC: 53 U/L — HIGH (ref 10–45)
ALT FLD-CCNC: 56 U/L — HIGH (ref 10–45)
ALT FLD-CCNC: 6 U/L — LOW (ref 10–45)
ALT FLD-CCNC: 6 U/L — LOW (ref 10–45)
ALT FLD-CCNC: 66 U/L — HIGH (ref 10–45)
ALT FLD-CCNC: 7 U/L — LOW (ref 10–45)
ALT FLD-CCNC: 70 U/L — HIGH (ref 10–45)
ALT FLD-CCNC: 74 U/L — HIGH (ref 10–45)
ALT FLD-CCNC: 8 U/L — LOW (ref 10–45)
ALT FLD-CCNC: SIGNIFICANT CHANGE UP (ref 10–45)
ALT FLD-CCNC: SIGNIFICANT CHANGE UP (ref 10–45)
ALT SERPL-CCNC: 10 U/L
ALT SERPL-CCNC: 7 U/L
AMMONIA BLD-MCNC: 22 UMOL/L — SIGNIFICANT CHANGE UP (ref 11–55)
ANA PAT FLD IF-IMP: ABNORMAL
ANA SER IF-ACNC: ABNORMAL
ANA SER IF-ACNC: NEGATIVE
ANION GAP SERPL CALC-SCNC: 10 MMOL/L — SIGNIFICANT CHANGE UP (ref 5–17)
ANION GAP SERPL CALC-SCNC: 11 MMOL/L — SIGNIFICANT CHANGE UP (ref 5–17)
ANION GAP SERPL CALC-SCNC: 12 MMOL/L — SIGNIFICANT CHANGE UP (ref 5–17)
ANION GAP SERPL CALC-SCNC: 13 MMOL/L — SIGNIFICANT CHANGE UP (ref 5–17)
ANION GAP SERPL CALC-SCNC: 14 MMOL/L
ANION GAP SERPL CALC-SCNC: 14 MMOL/L — SIGNIFICANT CHANGE UP (ref 5–17)
ANION GAP SERPL CALC-SCNC: 15 MMOL/L
ANION GAP SERPL CALC-SCNC: 15 MMOL/L — SIGNIFICANT CHANGE UP (ref 5–17)
ANION GAP SERPL CALC-SCNC: 16 MMOL/L — SIGNIFICANT CHANGE UP (ref 5–17)
ANION GAP SERPL CALC-SCNC: 17 MMOL/L — SIGNIFICANT CHANGE UP (ref 5–17)
ANION GAP SERPL CALC-SCNC: 17 MMOL/L — SIGNIFICANT CHANGE UP (ref 5–17)
ANION GAP SERPL CALC-SCNC: 18 MMOL/L — HIGH (ref 5–17)
ANION GAP SERPL CALC-SCNC: 8 MMOL/L — SIGNIFICANT CHANGE UP (ref 5–17)
ANION GAP SERPL CALC-SCNC: 9 MMOL/L — SIGNIFICANT CHANGE UP (ref 5–17)
ANION GAP SERPL CALC-SCNC: 9 MMOL/L — SIGNIFICANT CHANGE UP (ref 5–17)
ANISOCYTOSIS BLD QL: SIGNIFICANT CHANGE UP
ANISOCYTOSIS BLD QL: SLIGHT — SIGNIFICANT CHANGE UP
APPEARANCE UR: ABNORMAL
APPEARANCE UR: CLEAR — SIGNIFICANT CHANGE UP
APPEARANCE: ABNORMAL
APTT BLD: 32.5 SEC — SIGNIFICANT CHANGE UP (ref 27.5–35.5)
APTT BLD: 34.8 SEC — SIGNIFICANT CHANGE UP (ref 27.5–35.5)
APTT BLD: 37.4 SEC — HIGH (ref 27.5–35.5)
APTT BLD: 37.5 SEC — HIGH (ref 27.5–35.5)
APTT BLD: 39.3 SEC — HIGH (ref 27.5–35.5)
APTT BLD: 47.5 SEC — HIGH (ref 27.5–35.5)
AST SERPL-CCNC: 10 U/L
AST SERPL-CCNC: 10 U/L — SIGNIFICANT CHANGE UP (ref 10–40)
AST SERPL-CCNC: 15 U/L — SIGNIFICANT CHANGE UP (ref 10–40)
AST SERPL-CCNC: 24 U/L — SIGNIFICANT CHANGE UP (ref 10–40)
AST SERPL-CCNC: 25 U/L — SIGNIFICANT CHANGE UP (ref 10–40)
AST SERPL-CCNC: 27 U/L — SIGNIFICANT CHANGE UP (ref 10–40)
AST SERPL-CCNC: 32 U/L — SIGNIFICANT CHANGE UP (ref 10–40)
AST SERPL-CCNC: 36 U/L — SIGNIFICANT CHANGE UP (ref 10–40)
AST SERPL-CCNC: 36 U/L — SIGNIFICANT CHANGE UP (ref 10–40)
AST SERPL-CCNC: 38 U/L — SIGNIFICANT CHANGE UP (ref 10–40)
AST SERPL-CCNC: 41 U/L — HIGH (ref 10–40)
AST SERPL-CCNC: 53 U/L — HIGH (ref 10–40)
AST SERPL-CCNC: 56 U/L — HIGH (ref 10–40)
AST SERPL-CCNC: 56 U/L — HIGH (ref 10–40)
AST SERPL-CCNC: 61 U/L — HIGH (ref 10–40)
AST SERPL-CCNC: 65 U/L — HIGH (ref 10–40)
AST SERPL-CCNC: 66 U/L — HIGH (ref 10–40)
AST SERPL-CCNC: 73 U/L — HIGH (ref 10–40)
AST SERPL-CCNC: 8 U/L — LOW (ref 10–40)
AST SERPL-CCNC: 8 U/L — LOW (ref 10–40)
AST SERPL-CCNC: 9 U/L
AST SERPL-CCNC: 9 U/L — LOW (ref 10–40)
AST SERPL-CCNC: 9 U/L — LOW (ref 10–40)
AST SERPL-CCNC: SIGNIFICANT CHANGE UP (ref 10–40)
AST SERPL-CCNC: SIGNIFICANT CHANGE UP (ref 10–40)
B-GLOBULIN MFR SERPL ELPH: 11.6 %
B-GLOBULIN MFR SERPL ELPH: 13.5 %
B-GLOBULIN SERPL ELPH-MCNC: 0.7 G/DL
B-GLOBULIN SERPL ELPH-MCNC: 0.8 G/DL
BACTERIA # UR AUTO: ABNORMAL /HPF
BACTERIA # UR AUTO: PRESENT /HPF
BACTERIA # UR AUTO: SIGNIFICANT CHANGE UP /HPF
BACTERIA SPT CULT: ABNORMAL
BACTERIA: NEGATIVE
BASE EXCESS BLDA CALC-SCNC: -10.9 MMOL/L — LOW (ref -2–3)
BASE EXCESS BLDA CALC-SCNC: -11.8 MMOL/L — LOW (ref -2–3)
BASE EXCESS BLDA CALC-SCNC: -13.3 MMOL/L — LOW (ref -2–3)
BASE EXCESS BLDA CALC-SCNC: -14 MMOL/L — LOW (ref -2–3)
BASE EXCESS BLDA CALC-SCNC: -6 MMOL/L — LOW (ref -2–3)
BASE EXCESS BLDA CALC-SCNC: -7 MMOL/L — LOW (ref -2–3)
BASE EXCESS BLDA CALC-SCNC: -7.9 MMOL/L — LOW (ref -2–3)
BASE EXCESS BLDV CALC-SCNC: -12.2 MMOL/L — LOW (ref -2–3)
BASE EXCESS BLDV CALC-SCNC: -13.7 MMOL/L — LOW (ref -2–3)
BASOPHILS # BLD AUTO: 0 K/UL
BASOPHILS # BLD AUTO: 0 K/UL — SIGNIFICANT CHANGE UP (ref 0–0.2)
BASOPHILS # BLD AUTO: 0.01 K/UL
BASOPHILS # BLD AUTO: 0.01 K/UL
BASOPHILS # BLD AUTO: 0.01 K/UL — SIGNIFICANT CHANGE UP (ref 0–0.2)
BASOPHILS # BLD AUTO: 0.02 K/UL — SIGNIFICANT CHANGE UP (ref 0–0.2)
BASOPHILS # BLD AUTO: 0.02 K/UL — SIGNIFICANT CHANGE UP (ref 0–0.2)
BASOPHILS # BLD AUTO: 0.03 K/UL — SIGNIFICANT CHANGE UP (ref 0–0.2)
BASOPHILS # BLD AUTO: 0.07 K/UL — SIGNIFICANT CHANGE UP (ref 0–0.2)
BASOPHILS NFR BLD AUTO: 0 %
BASOPHILS NFR BLD AUTO: 0 % — SIGNIFICANT CHANGE UP (ref 0–2)
BASOPHILS NFR BLD AUTO: 0.1 %
BASOPHILS NFR BLD AUTO: 0.1 %
BASOPHILS NFR BLD AUTO: 0.1 % — SIGNIFICANT CHANGE UP (ref 0–2)
BASOPHILS NFR BLD AUTO: 0.2 % — SIGNIFICANT CHANGE UP (ref 0–2)
BASOPHILS NFR BLD AUTO: 0.3 % — SIGNIFICANT CHANGE UP (ref 0–2)
BASOPHILS NFR BLD AUTO: 0.9 % — SIGNIFICANT CHANGE UP (ref 0–2)
BASOPHILS NFR BLD AUTO: 0.9 % — SIGNIFICANT CHANGE UP (ref 0–2)
BETA CAROTENE: 7 UG/DL
BILIRUB DIRECT SERPL-MCNC: 0.2 MG/DL — SIGNIFICANT CHANGE UP (ref 0–0.3)
BILIRUB INDIRECT FLD-MCNC: SIGNIFICANT CHANGE UP (ref 0.2–1)
BILIRUB SERPL-MCNC: 0.2 MG/DL
BILIRUB SERPL-MCNC: 0.2 MG/DL — SIGNIFICANT CHANGE UP (ref 0.2–1.2)
BILIRUB SERPL-MCNC: 0.3 MG/DL — SIGNIFICANT CHANGE UP (ref 0.2–1.2)
BILIRUB SERPL-MCNC: 0.4 MG/DL — SIGNIFICANT CHANGE UP (ref 0.2–1.2)
BILIRUB SERPL-MCNC: 0.5 MG/DL — SIGNIFICANT CHANGE UP (ref 0.2–1.2)
BILIRUB SERPL-MCNC: <0.2 MG/DL
BILIRUB SERPL-MCNC: <0.2 MG/DL — SIGNIFICANT CHANGE UP (ref 0.2–1.2)
BILIRUB UR-MCNC: ABNORMAL
BILIRUB UR-MCNC: NEGATIVE — SIGNIFICANT CHANGE UP
BILIRUBIN URINE: NEGATIVE
BLD GP AB SCN SERPL QL: NEGATIVE — SIGNIFICANT CHANGE UP
BLOOD URINE: NEGATIVE
BUN SERPL-MCNC: 102 MG/DL — HIGH (ref 7–23)
BUN SERPL-MCNC: 106 MG/DL — HIGH (ref 7–23)
BUN SERPL-MCNC: 108 MG/DL — HIGH (ref 7–23)
BUN SERPL-MCNC: 109 MG/DL — HIGH (ref 7–23)
BUN SERPL-MCNC: 125 MG/DL — HIGH (ref 7–23)
BUN SERPL-MCNC: 138 MG/DL — HIGH (ref 7–23)
BUN SERPL-MCNC: 37 MG/DL — HIGH (ref 7–23)
BUN SERPL-MCNC: 38 MG/DL — HIGH (ref 7–23)
BUN SERPL-MCNC: 39 MG/DL — HIGH (ref 7–23)
BUN SERPL-MCNC: 39 MG/DL — HIGH (ref 7–23)
BUN SERPL-MCNC: 40 MG/DL — HIGH (ref 7–23)
BUN SERPL-MCNC: 47 MG/DL
BUN SERPL-MCNC: 49 MG/DL — HIGH (ref 7–23)
BUN SERPL-MCNC: 50 MG/DL — HIGH (ref 7–23)
BUN SERPL-MCNC: 51 MG/DL
BUN SERPL-MCNC: 51 MG/DL — HIGH (ref 7–23)
BUN SERPL-MCNC: 51 MG/DL — HIGH (ref 7–23)
BUN SERPL-MCNC: 52 MG/DL — HIGH (ref 7–23)
BUN SERPL-MCNC: 52 MG/DL — HIGH (ref 7–23)
BUN SERPL-MCNC: 53 MG/DL — HIGH (ref 7–23)
BUN SERPL-MCNC: 54 MG/DL — HIGH (ref 7–23)
BUN SERPL-MCNC: 55 MG/DL — HIGH (ref 7–23)
BUN SERPL-MCNC: 57 MG/DL — HIGH (ref 7–23)
BUN SERPL-MCNC: 57 MG/DL — HIGH (ref 7–23)
BUN SERPL-MCNC: 59 MG/DL — HIGH (ref 7–23)
BUN SERPL-MCNC: 60 MG/DL — HIGH (ref 7–23)
BUN SERPL-MCNC: 61 MG/DL — HIGH (ref 7–23)
BUN SERPL-MCNC: 62 MG/DL — HIGH (ref 7–23)
BUN SERPL-MCNC: 63 MG/DL — HIGH (ref 7–23)
BUN SERPL-MCNC: 63 MG/DL — HIGH (ref 7–23)
BUN SERPL-MCNC: 64 MG/DL — HIGH (ref 7–23)
BUN SERPL-MCNC: 68 MG/DL — HIGH (ref 7–23)
BUN SERPL-MCNC: 69 MG/DL — HIGH (ref 7–23)
BUN SERPL-MCNC: 70 MG/DL — HIGH (ref 7–23)
BUN SERPL-MCNC: 70 MG/DL — HIGH (ref 7–23)
BUN SERPL-MCNC: 71 MG/DL — HIGH (ref 7–23)
BUN SERPL-MCNC: 73 MG/DL — HIGH (ref 7–23)
BUN SERPL-MCNC: 73 MG/DL — HIGH (ref 7–23)
BUN SERPL-MCNC: 74 MG/DL — HIGH (ref 7–23)
BUN SERPL-MCNC: 77 MG/DL — HIGH (ref 7–23)
BUN SERPL-MCNC: 77 MG/DL — HIGH (ref 7–23)
BUN SERPL-MCNC: 79 MG/DL — HIGH (ref 7–23)
BUN SERPL-MCNC: 81 MG/DL — HIGH (ref 7–23)
BUN SERPL-MCNC: 81 MG/DL — HIGH (ref 7–23)
BUN SERPL-MCNC: 83 MG/DL — HIGH (ref 7–23)
BUN SERPL-MCNC: 89 MG/DL — HIGH (ref 7–23)
BUN SERPL-MCNC: 89 MG/DL — HIGH (ref 7–23)
BUN SERPL-MCNC: 91 MG/DL — HIGH (ref 7–23)
BUN SERPL-MCNC: 97 MG/DL — HIGH (ref 7–23)
BURR CELLS BLD QL SMEAR: PRESENT — SIGNIFICANT CHANGE UP
C3 SERPL-MCNC: 107 MG/DL
C3 SERPL-MCNC: 76 MG/DL
C4 SERPL-MCNC: 21 MG/DL
C4 SERPL-MCNC: 28 MG/DL
CA-I SERPL-SCNC: 1.36 MMOL/L — HIGH (ref 1.15–1.33)
CA-I SERPL-SCNC: 1.4 MMOL/L — HIGH (ref 1.15–1.33)
CALCIUM OXALATE CRYSTALS: ABNORMAL
CALCIUM SERPL-MCNC: 10 MG/DL
CALCIUM SERPL-MCNC: 10 MG/DL
CALCIUM SERPL-MCNC: 7.1 MG/DL — LOW (ref 8.4–10.5)
CALCIUM SERPL-MCNC: 7.6 MG/DL — LOW (ref 8.4–10.5)
CALCIUM SERPL-MCNC: 7.7 MG/DL — LOW (ref 8.4–10.5)
CALCIUM SERPL-MCNC: 8 MG/DL — LOW (ref 8.4–10.5)
CALCIUM SERPL-MCNC: 8 MG/DL — LOW (ref 8.4–10.5)
CALCIUM SERPL-MCNC: 8.1 MG/DL — LOW (ref 8.4–10.5)
CALCIUM SERPL-MCNC: 8.2 MG/DL — LOW (ref 8.4–10.5)
CALCIUM SERPL-MCNC: 8.2 MG/DL — LOW (ref 8.4–10.5)
CALCIUM SERPL-MCNC: 8.3 MG/DL — LOW (ref 8.4–10.5)
CALCIUM SERPL-MCNC: 8.4 MG/DL — SIGNIFICANT CHANGE UP (ref 8.4–10.5)
CALCIUM SERPL-MCNC: 8.5 MG/DL — SIGNIFICANT CHANGE UP (ref 8.4–10.5)
CALCIUM SERPL-MCNC: 8.6 MG/DL — SIGNIFICANT CHANGE UP (ref 8.4–10.5)
CALCIUM SERPL-MCNC: 8.7 MG/DL — SIGNIFICANT CHANGE UP (ref 8.4–10.5)
CALCIUM SERPL-MCNC: 8.8 MG/DL — SIGNIFICANT CHANGE UP (ref 8.4–10.5)
CALCIUM SERPL-MCNC: 8.9 MG/DL — SIGNIFICANT CHANGE UP (ref 8.4–10.5)
CALCIUM SERPL-MCNC: 9 MG/DL — SIGNIFICANT CHANGE UP (ref 8.4–10.5)
CALCIUM SERPL-MCNC: 9 MG/DL — SIGNIFICANT CHANGE UP (ref 8.4–10.5)
CALCIUM SERPL-MCNC: 9.1 MG/DL — SIGNIFICANT CHANGE UP (ref 8.4–10.5)
CALCIUM SERPL-MCNC: 9.2 MG/DL — SIGNIFICANT CHANGE UP (ref 8.4–10.5)
CALCIUM SERPL-MCNC: 9.4 MG/DL — SIGNIFICANT CHANGE UP (ref 8.4–10.5)
CALCIUM SERPL-MCNC: 9.4 MG/DL — SIGNIFICANT CHANGE UP (ref 8.4–10.5)
CALCIUM SERPL-MCNC: 9.6 MG/DL
CALCIUM SERPL-MCNC: 9.6 MG/DL
CALCIUM SERPL-MCNC: 9.8 MG/DL — SIGNIFICANT CHANGE UP (ref 8.4–10.5)
CEFEPIME LEVEL RESULT: 45.1 — SIGNIFICANT CHANGE UP
CHLORIDE SERPL-SCNC: 100 MMOL/L — SIGNIFICANT CHANGE UP (ref 96–108)
CHLORIDE SERPL-SCNC: 103 MMOL/L — SIGNIFICANT CHANGE UP (ref 96–108)
CHLORIDE SERPL-SCNC: 104 MMOL/L — SIGNIFICANT CHANGE UP (ref 96–108)
CHLORIDE SERPL-SCNC: 106 MMOL/L
CHLORIDE SERPL-SCNC: 106 MMOL/L — SIGNIFICANT CHANGE UP (ref 96–108)
CHLORIDE SERPL-SCNC: 107 MMOL/L
CHLORIDE SERPL-SCNC: 108 MMOL/L — SIGNIFICANT CHANGE UP (ref 96–108)
CHLORIDE SERPL-SCNC: 109 MMOL/L — HIGH (ref 96–108)
CHLORIDE SERPL-SCNC: 110 MMOL/L — HIGH (ref 96–108)
CHLORIDE SERPL-SCNC: 112 MMOL/L — HIGH (ref 96–108)
CHLORIDE SERPL-SCNC: 114 MMOL/L — HIGH (ref 96–108)
CHLORIDE SERPL-SCNC: 115 MMOL/L — HIGH (ref 96–108)
CHLORIDE SERPL-SCNC: 117 MMOL/L — HIGH (ref 96–108)
CHLORIDE SERPL-SCNC: 93 MMOL/L — LOW (ref 96–108)
CHLORIDE SERPL-SCNC: 94 MMOL/L — LOW (ref 96–108)
CHLORIDE SERPL-SCNC: 95 MMOL/L — LOW (ref 96–108)
CHLORIDE SERPL-SCNC: 96 MMOL/L — SIGNIFICANT CHANGE UP (ref 96–108)
CHLORIDE SERPL-SCNC: 97 MMOL/L — SIGNIFICANT CHANGE UP (ref 96–108)
CHLORIDE SERPL-SCNC: 98 MMOL/L — SIGNIFICANT CHANGE UP (ref 96–108)
CHOLEST SERPL-MCNC: 115 MG/DL
CHOLEST SERPL-MCNC: 118 MG/DL
CHOLEST SERPL-MCNC: 90 MG/DL — SIGNIFICANT CHANGE UP
CK MB CFR SERPL CALC: 3.8 NG/ML — SIGNIFICANT CHANGE UP (ref 0–6.7)
CK MB CFR SERPL CALC: 4.9 NG/ML — SIGNIFICANT CHANGE UP (ref 0–6.7)
CK SERPL-CCNC: 22 U/L — LOW (ref 30–200)
CK SERPL-CCNC: 23 U/L — LOW (ref 30–200)
CK SERPL-CCNC: 9 U/L — LOW (ref 30–200)
CO2 BLDA-SCNC: 14 MMOL/L — LOW (ref 19–24)
CO2 BLDA-SCNC: 16 MMOL/L — LOW (ref 19–24)
CO2 BLDA-SCNC: 16 MMOL/L — LOW (ref 19–24)
CO2 BLDA-SCNC: 19 MMOL/L — SIGNIFICANT CHANGE UP (ref 19–24)
CO2 BLDA-SCNC: 20 MMOL/L — SIGNIFICANT CHANGE UP (ref 19–24)
CO2 BLDV-SCNC: 14.9 MMOL/L — LOW (ref 22–26)
CO2 BLDV-SCNC: 17.4 MMOL/L — LOW (ref 22–26)
CO2 SERPL-SCNC: 12 MMOL/L — LOW (ref 22–31)
CO2 SERPL-SCNC: 13 MMOL/L — LOW (ref 22–31)
CO2 SERPL-SCNC: 14 MMOL/L — LOW (ref 22–31)
CO2 SERPL-SCNC: 15 MMOL/L — LOW (ref 22–31)
CO2 SERPL-SCNC: 16 MMOL/L
CO2 SERPL-SCNC: 16 MMOL/L — LOW (ref 22–31)
CO2 SERPL-SCNC: 17 MMOL/L — LOW (ref 22–31)
CO2 SERPL-SCNC: 18 MMOL/L — LOW (ref 22–31)
CO2 SERPL-SCNC: 18 MMOL/L — LOW (ref 22–31)
CO2 SERPL-SCNC: 19 MMOL/L
CO2 SERPL-SCNC: 19 MMOL/L — LOW (ref 22–31)
CO2 SERPL-SCNC: 19 MMOL/L — LOW (ref 22–31)
CO2 SERPL-SCNC: 20 MMOL/L — LOW (ref 22–31)
CO2 SERPL-SCNC: 20 MMOL/L — LOW (ref 22–31)
CO2 SERPL-SCNC: 21 MMOL/L — LOW (ref 22–31)
CO2 SERPL-SCNC: 22 MMOL/L — SIGNIFICANT CHANGE UP (ref 22–31)
CO2 SERPL-SCNC: 25 MMOL/L — SIGNIFICANT CHANGE UP (ref 22–31)
CO2 SERPL-SCNC: 26 MMOL/L — SIGNIFICANT CHANGE UP (ref 22–31)
CO2 SERPL-SCNC: 27 MMOL/L — SIGNIFICANT CHANGE UP (ref 22–31)
CO2 SERPL-SCNC: 27 MMOL/L — SIGNIFICANT CHANGE UP (ref 22–31)
CO2 SERPL-SCNC: 28 MMOL/L — SIGNIFICANT CHANGE UP (ref 22–31)
CO2 SERPL-SCNC: 29 MMOL/L — SIGNIFICANT CHANGE UP (ref 22–31)
CO2 SERPL-SCNC: 30 MMOL/L — SIGNIFICANT CHANGE UP (ref 22–31)
CO2 SERPL-SCNC: 30 MMOL/L — SIGNIFICANT CHANGE UP (ref 22–31)
CO2 SERPL-SCNC: 31 MMOL/L — SIGNIFICANT CHANGE UP (ref 22–31)
CO2 SERPL-SCNC: 31 MMOL/L — SIGNIFICANT CHANGE UP (ref 22–31)
CO2 SERPL-SCNC: 32 MMOL/L — HIGH (ref 22–31)
CO2 SERPL-SCNC: 32 MMOL/L — HIGH (ref 22–31)
COD CRY URNS QL: ABNORMAL /HPF
COD CRY URNS QL: ABNORMAL /HPF
COLLAGEN CTX SERPL-MCNC: 706 PG/ML
COLLAGEN CTX SERPL-MCNC: 935 PG/ML
COLOR SPEC: ABNORMAL
COLOR SPEC: YELLOW — SIGNIFICANT CHANGE UP
COLOR: NORMAL
COMMENT - URINE: SIGNIFICANT CHANGE UP
CORTICOSTEROID BINDING GLOBULIN RESULT: 1.1 MG/DL — LOW
CORTIS AM PEAK SERPL-MCNC: 20.36 UG/DL — HIGH (ref 6.02–18.4)
CORTIS AM PEAK SERPL-MCNC: 23.58 UG/DL — HIGH (ref 6.02–18.4)
CORTIS F/TOTAL MFR SERPL: 15 % — SIGNIFICANT CHANGE UP
CORTIS SERPL-MCNC: 6.5 UG/DL — SIGNIFICANT CHANGE UP
CORTISOL, FREE RESULT: 0.98 UG/DL — SIGNIFICANT CHANGE UP
CREAT ?TM UR-MCNC: 21 MG/DL — SIGNIFICANT CHANGE UP
CREAT ?TM UR-MCNC: 29 MG/DL — SIGNIFICANT CHANGE UP
CREAT ?TM UR-MCNC: 30 MG/DL — SIGNIFICANT CHANGE UP
CREAT ?TM UR-MCNC: 37 MG/DL — SIGNIFICANT CHANGE UP
CREAT ?TM UR-MCNC: 40 MG/DL — SIGNIFICANT CHANGE UP
CREAT ?TM UR-MCNC: 41 MG/DL — SIGNIFICANT CHANGE UP
CREAT ?TM UR-MCNC: 45 MG/DL — SIGNIFICANT CHANGE UP
CREAT ?TM UR-MCNC: 63 MG/DL — SIGNIFICANT CHANGE UP
CREAT ?TM UR-MCNC: 65 MG/DL — SIGNIFICANT CHANGE UP
CREAT ?TM UR-MCNC: 67 MG/DL — SIGNIFICANT CHANGE UP
CREAT ?TM UR-MCNC: 68 MG/DL — SIGNIFICANT CHANGE UP
CREAT ?TM UR-MCNC: 70 MG/DL — SIGNIFICANT CHANGE UP
CREAT ?TM UR-MCNC: 71 MG/DL — SIGNIFICANT CHANGE UP
CREAT SERPL-MCNC: 1.8 MG/DL — HIGH (ref 0.5–1.3)
CREAT SERPL-MCNC: 1.86 MG/DL — HIGH (ref 0.5–1.3)
CREAT SERPL-MCNC: 1.88 MG/DL — HIGH (ref 0.5–1.3)
CREAT SERPL-MCNC: 2.13 MG/DL
CREAT SERPL-MCNC: 2.19 MG/DL
CREAT SERPL-MCNC: 2.21 MG/DL — HIGH (ref 0.5–1.3)
CREAT SERPL-MCNC: 2.38 MG/DL — HIGH (ref 0.5–1.3)
CREAT SERPL-MCNC: 2.39 MG/DL — HIGH (ref 0.5–1.3)
CREAT SERPL-MCNC: 2.39 MG/DL — HIGH (ref 0.5–1.3)
CREAT SERPL-MCNC: 2.42 MG/DL — HIGH (ref 0.5–1.3)
CREAT SERPL-MCNC: 2.43 MG/DL — HIGH (ref 0.5–1.3)
CREAT SERPL-MCNC: 2.44 MG/DL — HIGH (ref 0.5–1.3)
CREAT SERPL-MCNC: 2.47 MG/DL — HIGH (ref 0.5–1.3)
CREAT SERPL-MCNC: 2.49 MG/DL — HIGH (ref 0.5–1.3)
CREAT SERPL-MCNC: 2.5 MG/DL — HIGH (ref 0.5–1.3)
CREAT SERPL-MCNC: 2.56 MG/DL — HIGH (ref 0.5–1.3)
CREAT SERPL-MCNC: 2.56 MG/DL — HIGH (ref 0.5–1.3)
CREAT SERPL-MCNC: 2.58 MG/DL — HIGH (ref 0.5–1.3)
CREAT SERPL-MCNC: 2.58 MG/DL — HIGH (ref 0.5–1.3)
CREAT SERPL-MCNC: 2.67 MG/DL — HIGH (ref 0.5–1.3)
CREAT SERPL-MCNC: 2.69 MG/DL — HIGH (ref 0.5–1.3)
CREAT SERPL-MCNC: 2.72 MG/DL — HIGH (ref 0.5–1.3)
CREAT SERPL-MCNC: 2.72 MG/DL — HIGH (ref 0.5–1.3)
CREAT SERPL-MCNC: 2.73 MG/DL — HIGH (ref 0.5–1.3)
CREAT SERPL-MCNC: 2.75 MG/DL — HIGH (ref 0.5–1.3)
CREAT SERPL-MCNC: 2.81 MG/DL — HIGH (ref 0.5–1.3)
CREAT SERPL-MCNC: 2.83 MG/DL — HIGH (ref 0.5–1.3)
CREAT SERPL-MCNC: 2.83 MG/DL — HIGH (ref 0.5–1.3)
CREAT SERPL-MCNC: 2.9 MG/DL — HIGH (ref 0.5–1.3)
CREAT SERPL-MCNC: 2.93 MG/DL — HIGH (ref 0.5–1.3)
CREAT SERPL-MCNC: 2.93 MG/DL — HIGH (ref 0.5–1.3)
CREAT SERPL-MCNC: 2.97 MG/DL — HIGH (ref 0.5–1.3)
CREAT SERPL-MCNC: 3 MG/DL — HIGH (ref 0.5–1.3)
CREAT SERPL-MCNC: 3.03 MG/DL — HIGH (ref 0.5–1.3)
CREAT SERPL-MCNC: 3.08 MG/DL — HIGH (ref 0.5–1.3)
CREAT SERPL-MCNC: 3.12 MG/DL — HIGH (ref 0.5–1.3)
CREAT SERPL-MCNC: 3.15 MG/DL — HIGH (ref 0.5–1.3)
CREAT SERPL-MCNC: 3.34 MG/DL — HIGH (ref 0.5–1.3)
CREAT SERPL-MCNC: 3.41 MG/DL — HIGH (ref 0.5–1.3)
CREAT SERPL-MCNC: 3.46 MG/DL — HIGH (ref 0.5–1.3)
CREAT SERPL-MCNC: 3.49 MG/DL — HIGH (ref 0.5–1.3)
CREAT SERPL-MCNC: 3.63 MG/DL — HIGH (ref 0.5–1.3)
CREAT SERPL-MCNC: 3.65 MG/DL — HIGH (ref 0.5–1.3)
CREAT SERPL-MCNC: 4.02 MG/DL — HIGH (ref 0.5–1.3)
CREAT SERPL-MCNC: 4.25 MG/DL — HIGH (ref 0.5–1.3)
CREAT SERPL-MCNC: 4.29 MG/DL — HIGH (ref 0.5–1.3)
CREAT SERPL-MCNC: 4.31 MG/DL — HIGH (ref 0.5–1.3)
CREAT SERPL-MCNC: 4.48 MG/DL — HIGH (ref 0.5–1.3)
CREAT SERPL-MCNC: 4.92 MG/DL — HIGH (ref 0.5–1.3)
CRP SERPL-MCNC: 36 MG/L
CRP SERPL-MCNC: <3 MG/L
CULTURE RESULTS: NO GROWTH — SIGNIFICANT CHANGE UP
CULTURE RESULTS: NO GROWTH — SIGNIFICANT CHANGE UP
CULTURE RESULTS: SIGNIFICANT CHANGE UP
CYSTATIN C SERPL-MCNC: 2.95 MG/L
CYSTATIN C SERPL-MCNC: 3.39 MG/L
DACRYOCYTES BLD QL SMEAR: SLIGHT — SIGNIFICANT CHANGE UP
DACRYOCYTES BLD QL SMEAR: SLIGHT — SIGNIFICANT CHANGE UP
DEPRECATED KAPPA LC FREE/LAMBDA SER: 1.95 RATIO
DEPRECATED KAPPA LC FREE/LAMBDA SER: 2.36 RATIO
DIFF PNL FLD: ABNORMAL
DIFF PNL FLD: NEGATIVE — SIGNIFICANT CHANGE UP
EGFR: 11 ML/MIN/1.73M2 — LOW
EGFR: 12 ML/MIN/1.73M2 — LOW
EGFR: 13 ML/MIN/1.73M2 — LOW
EGFR: 14 ML/MIN/1.73M2 — LOW
EGFR: 16 ML/MIN/1.73M2 — LOW
EGFR: 16 ML/MIN/1.73M2 — LOW
EGFR: 17 ML/MIN/1.73M2 — LOW
EGFR: 18 ML/MIN/1.73M2 — LOW
EGFR: 19 ML/MIN/1.73M2 — LOW
EGFR: 20 ML/MIN/1.73M2 — LOW
EGFR: 21 ML/MIN/1.73M2 — LOW
EGFR: 22 ML/MIN/1.73M2 — LOW
EGFR: 23 ML/MIN/1.73M2 — LOW
EGFR: 23 ML/MIN/1.73M2 — LOW
EGFR: 24 ML/MIN/1.73M2 — LOW
EGFR: 25 ML/MIN/1.73M2 — LOW
EGFR: 26 ML/MIN/1.73M2 — LOW
EGFR: 29 ML/MIN/1.73M2
EGFR: 29 ML/MIN/1.73M2 — LOW
EGFR: 30 ML/MIN/1.73M2
EGFR: 35 ML/MIN/1.73M2 — LOW
EGFR: 35 ML/MIN/1.73M2 — LOW
EGFR: 37 ML/MIN/1.73M2 — LOW
EOSINOPHIL # BLD AUTO: 0 K/UL — SIGNIFICANT CHANGE UP (ref 0–0.5)
EOSINOPHIL # BLD AUTO: 0.01 K/UL — SIGNIFICANT CHANGE UP (ref 0–0.5)
EOSINOPHIL # BLD AUTO: 0.02 K/UL — SIGNIFICANT CHANGE UP (ref 0–0.5)
EOSINOPHIL # BLD AUTO: 0.03 K/UL — SIGNIFICANT CHANGE UP (ref 0–0.5)
EOSINOPHIL # BLD AUTO: 0.04 K/UL — SIGNIFICANT CHANGE UP (ref 0–0.5)
EOSINOPHIL # BLD AUTO: 0.05 K/UL — SIGNIFICANT CHANGE UP (ref 0–0.5)
EOSINOPHIL # BLD AUTO: 0.05 K/UL — SIGNIFICANT CHANGE UP (ref 0–0.5)
EOSINOPHIL # BLD AUTO: 0.06 K/UL — SIGNIFICANT CHANGE UP (ref 0–0.5)
EOSINOPHIL # BLD AUTO: 0.08 K/UL — SIGNIFICANT CHANGE UP (ref 0–0.5)
EOSINOPHIL # BLD AUTO: 0.09 K/UL
EOSINOPHIL # BLD AUTO: 0.09 K/UL — SIGNIFICANT CHANGE UP (ref 0–0.5)
EOSINOPHIL # BLD AUTO: 0.1 K/UL
EOSINOPHIL # BLD AUTO: 0.1 K/UL — SIGNIFICANT CHANGE UP (ref 0–0.5)
EOSINOPHIL # BLD AUTO: 0.11 K/UL — SIGNIFICANT CHANGE UP (ref 0–0.5)
EOSINOPHIL # BLD AUTO: 0.11 K/UL — SIGNIFICANT CHANGE UP (ref 0–0.5)
EOSINOPHIL # BLD AUTO: 0.12 K/UL — SIGNIFICANT CHANGE UP (ref 0–0.5)
EOSINOPHIL # BLD AUTO: 0.12 K/UL — SIGNIFICANT CHANGE UP (ref 0–0.5)
EOSINOPHIL # BLD AUTO: 0.14 K/UL
EOSINOPHIL # BLD AUTO: 0.15 K/UL — SIGNIFICANT CHANGE UP (ref 0–0.5)
EOSINOPHIL # BLD AUTO: 0.17 K/UL — SIGNIFICANT CHANGE UP (ref 0–0.5)
EOSINOPHIL # BLD AUTO: 0.17 K/UL — SIGNIFICANT CHANGE UP (ref 0–0.5)
EOSINOPHIL # BLD AUTO: 0.2 K/UL — SIGNIFICANT CHANGE UP (ref 0–0.5)
EOSINOPHIL # BLD AUTO: 0.2 K/UL — SIGNIFICANT CHANGE UP (ref 0–0.5)
EOSINOPHIL # BLD AUTO: 0.23 K/UL — SIGNIFICANT CHANGE UP (ref 0–0.5)
EOSINOPHIL # BLD AUTO: 0.26 K/UL — SIGNIFICANT CHANGE UP (ref 0–0.5)
EOSINOPHIL # BLD AUTO: 0.27 K/UL — SIGNIFICANT CHANGE UP (ref 0–0.5)
EOSINOPHIL # BLD AUTO: 0.29 K/UL — SIGNIFICANT CHANGE UP (ref 0–0.5)
EOSINOPHIL # BLD AUTO: 0.3 K/UL — SIGNIFICANT CHANGE UP (ref 0–0.5)
EOSINOPHIL # BLD AUTO: 0.38 K/UL — SIGNIFICANT CHANGE UP (ref 0–0.5)
EOSINOPHIL # BLD AUTO: 0.42 K/UL — SIGNIFICANT CHANGE UP (ref 0–0.5)
EOSINOPHIL # BLD AUTO: 0.72 K/UL — HIGH (ref 0–0.5)
EOSINOPHIL NFR BLD AUTO: 0 % — SIGNIFICANT CHANGE UP (ref 0–6)
EOSINOPHIL NFR BLD AUTO: 0.1 % — SIGNIFICANT CHANGE UP (ref 0–6)
EOSINOPHIL NFR BLD AUTO: 0.1 % — SIGNIFICANT CHANGE UP (ref 0–6)
EOSINOPHIL NFR BLD AUTO: 0.3 % — SIGNIFICANT CHANGE UP (ref 0–6)
EOSINOPHIL NFR BLD AUTO: 0.4 % — SIGNIFICANT CHANGE UP (ref 0–6)
EOSINOPHIL NFR BLD AUTO: 0.6 % — SIGNIFICANT CHANGE UP (ref 0–6)
EOSINOPHIL NFR BLD AUTO: 0.7 % — SIGNIFICANT CHANGE UP (ref 0–6)
EOSINOPHIL NFR BLD AUTO: 0.8 % — SIGNIFICANT CHANGE UP (ref 0–6)
EOSINOPHIL NFR BLD AUTO: 0.8 % — SIGNIFICANT CHANGE UP (ref 0–6)
EOSINOPHIL NFR BLD AUTO: 0.9 % — SIGNIFICANT CHANGE UP (ref 0–6)
EOSINOPHIL NFR BLD AUTO: 1 % — SIGNIFICANT CHANGE UP (ref 0–6)
EOSINOPHIL NFR BLD AUTO: 1.3 %
EOSINOPHIL NFR BLD AUTO: 1.3 % — SIGNIFICANT CHANGE UP (ref 0–6)
EOSINOPHIL NFR BLD AUTO: 1.4 % — SIGNIFICANT CHANGE UP (ref 0–6)
EOSINOPHIL NFR BLD AUTO: 1.5 %
EOSINOPHIL NFR BLD AUTO: 1.5 %
EOSINOPHIL NFR BLD AUTO: 1.9 % — SIGNIFICANT CHANGE UP (ref 0–6)
EOSINOPHIL NFR BLD AUTO: 2 % — SIGNIFICANT CHANGE UP (ref 0–6)
EOSINOPHIL NFR BLD AUTO: 2.2 % — SIGNIFICANT CHANGE UP (ref 0–6)
EOSINOPHIL NFR BLD AUTO: 2.3 % — SIGNIFICANT CHANGE UP (ref 0–6)
EOSINOPHIL NFR BLD AUTO: 2.3 % — SIGNIFICANT CHANGE UP (ref 0–6)
EOSINOPHIL NFR BLD AUTO: 2.5 % — SIGNIFICANT CHANGE UP (ref 0–6)
EOSINOPHIL NFR BLD AUTO: 2.5 % — SIGNIFICANT CHANGE UP (ref 0–6)
EOSINOPHIL NFR BLD AUTO: 2.6 % — SIGNIFICANT CHANGE UP (ref 0–6)
EOSINOPHIL NFR BLD AUTO: 2.8 % — SIGNIFICANT CHANGE UP (ref 0–6)
EOSINOPHIL NFR BLD AUTO: 2.8 % — SIGNIFICANT CHANGE UP (ref 0–6)
EOSINOPHIL NFR BLD AUTO: 3.7 % — SIGNIFICANT CHANGE UP (ref 0–6)
EOSINOPHIL NFR BLD AUTO: 4.6 % — SIGNIFICANT CHANGE UP (ref 0–6)
EOSINOPHIL NFR BLD AUTO: 4.6 % — SIGNIFICANT CHANGE UP (ref 0–6)
EOSINOPHIL NFR BLD AUTO: 5.3 % — SIGNIFICANT CHANGE UP (ref 0–6)
EOSINOPHIL NFR BLD AUTO: 6.2 % — HIGH (ref 0–6)
EPI CELLS # UR: ABNORMAL /HPF (ref 0–5)
EPI CELLS # UR: ABNORMAL /HPF (ref 0–5)
EPI CELLS # UR: SIGNIFICANT CHANGE UP /HPF (ref 0–5)
ERYTHROCYTE [SEDIMENTATION RATE] IN BLOOD BY WESTERGREN METHOD: 12 MM/HR
ERYTHROCYTE [SEDIMENTATION RATE] IN BLOOD BY WESTERGREN METHOD: 49 MM/HR
ESTIMATED AVERAGE GLUCOSE: 200 MG/DL
ESTIMATED AVERAGE GLUCOSE: 212 MG/DL — HIGH (ref 68–114)
ESTIMATED AVERAGE GLUCOSE: 217 MG/DL
FERRITIN SERPL-MCNC: 134 NG/ML
FERRITIN SERPL-MCNC: 175 NG/ML — SIGNIFICANT CHANGE UP (ref 30–400)
FERRITIN SERPL-MCNC: 183 NG/ML
FLUAV H3 RNA SPEC QL NAA+PROBE: DETECTED
FOLATE SERPL-MCNC: 13.2 NG/ML
FOLATE SERPL-MCNC: 13.4 NG/ML
FUNGUS SPT CULT: ABNORMAL
GAMMA GLOB FLD ELPH-MCNC: 0.7 G/DL
GAMMA GLOB FLD ELPH-MCNC: 0.7 G/DL
GAMMA GLOB MFR SERPL ELPH: 11.7 %
GAMMA GLOB MFR SERPL ELPH: 12 %
GAS PNL BLDA: SIGNIFICANT CHANGE UP
GAS PNL BLDV: 135 MMOL/L — LOW (ref 136–145)
GAS PNL BLDV: 137 MMOL/L — SIGNIFICANT CHANGE UP (ref 136–145)
GAS PNL BLDV: SIGNIFICANT CHANGE UP
GAS PNL BLDV: SIGNIFICANT CHANGE UP
GFR/BSA.PRED SERPLBLD CYS-BASED-ARV: 14 ML/MIN/1.73M2
GFR/BSA.PRED SERPLBLD CYS-BASED-ARV: 17 ML/MIN/1.73M2
GIANT PLATELETS BLD QL SMEAR: PRESENT — SIGNIFICANT CHANGE UP
GLUCOSE BLDC GLUCOMTR-MCNC: 101 MG/DL — HIGH (ref 70–99)
GLUCOSE BLDC GLUCOMTR-MCNC: 102 MG/DL — HIGH (ref 70–99)
GLUCOSE BLDC GLUCOMTR-MCNC: 102 MG/DL — HIGH (ref 70–99)
GLUCOSE BLDC GLUCOMTR-MCNC: 104 MG/DL — HIGH (ref 70–99)
GLUCOSE BLDC GLUCOMTR-MCNC: 108 MG/DL — HIGH (ref 70–99)
GLUCOSE BLDC GLUCOMTR-MCNC: 109 MG/DL — HIGH (ref 70–99)
GLUCOSE BLDC GLUCOMTR-MCNC: 110 MG/DL — HIGH (ref 70–99)
GLUCOSE BLDC GLUCOMTR-MCNC: 110 MG/DL — HIGH (ref 70–99)
GLUCOSE BLDC GLUCOMTR-MCNC: 113 MG/DL — HIGH (ref 70–99)
GLUCOSE BLDC GLUCOMTR-MCNC: 114 MG/DL — HIGH (ref 70–99)
GLUCOSE BLDC GLUCOMTR-MCNC: 115 MG/DL — HIGH (ref 70–99)
GLUCOSE BLDC GLUCOMTR-MCNC: 116 MG/DL — HIGH (ref 70–99)
GLUCOSE BLDC GLUCOMTR-MCNC: 116 MG/DL — HIGH (ref 70–99)
GLUCOSE BLDC GLUCOMTR-MCNC: 117 MG/DL — HIGH (ref 70–99)
GLUCOSE BLDC GLUCOMTR-MCNC: 117 MG/DL — HIGH (ref 70–99)
GLUCOSE BLDC GLUCOMTR-MCNC: 118 MG/DL — HIGH (ref 70–99)
GLUCOSE BLDC GLUCOMTR-MCNC: 118 MG/DL — HIGH (ref 70–99)
GLUCOSE BLDC GLUCOMTR-MCNC: 120 MG/DL — HIGH (ref 70–99)
GLUCOSE BLDC GLUCOMTR-MCNC: 121 MG/DL — HIGH (ref 70–99)
GLUCOSE BLDC GLUCOMTR-MCNC: 122 MG/DL — HIGH (ref 70–99)
GLUCOSE BLDC GLUCOMTR-MCNC: 123 MG/DL — HIGH (ref 70–99)
GLUCOSE BLDC GLUCOMTR-MCNC: 125 MG/DL — HIGH (ref 70–99)
GLUCOSE BLDC GLUCOMTR-MCNC: 125 MG/DL — HIGH (ref 70–99)
GLUCOSE BLDC GLUCOMTR-MCNC: 129 MG/DL — HIGH (ref 70–99)
GLUCOSE BLDC GLUCOMTR-MCNC: 130 MG/DL — HIGH (ref 70–99)
GLUCOSE BLDC GLUCOMTR-MCNC: 130 MG/DL — HIGH (ref 70–99)
GLUCOSE BLDC GLUCOMTR-MCNC: 134 MG/DL — HIGH (ref 70–99)
GLUCOSE BLDC GLUCOMTR-MCNC: 135 MG/DL — HIGH (ref 70–99)
GLUCOSE BLDC GLUCOMTR-MCNC: 136 MG/DL — HIGH (ref 70–99)
GLUCOSE BLDC GLUCOMTR-MCNC: 136 MG/DL — HIGH (ref 70–99)
GLUCOSE BLDC GLUCOMTR-MCNC: 137 MG/DL — HIGH (ref 70–99)
GLUCOSE BLDC GLUCOMTR-MCNC: 138 MG/DL — HIGH (ref 70–99)
GLUCOSE BLDC GLUCOMTR-MCNC: 139 MG/DL — HIGH (ref 70–99)
GLUCOSE BLDC GLUCOMTR-MCNC: 139 MG/DL — HIGH (ref 70–99)
GLUCOSE BLDC GLUCOMTR-MCNC: 143 MG/DL — HIGH (ref 70–99)
GLUCOSE BLDC GLUCOMTR-MCNC: 145 MG/DL — HIGH (ref 70–99)
GLUCOSE BLDC GLUCOMTR-MCNC: 146 MG/DL — HIGH (ref 70–99)
GLUCOSE BLDC GLUCOMTR-MCNC: 146 MG/DL — HIGH (ref 70–99)
GLUCOSE BLDC GLUCOMTR-MCNC: 147 MG/DL — HIGH (ref 70–99)
GLUCOSE BLDC GLUCOMTR-MCNC: 148 MG/DL — HIGH (ref 70–99)
GLUCOSE BLDC GLUCOMTR-MCNC: 149 MG/DL — HIGH (ref 70–99)
GLUCOSE BLDC GLUCOMTR-MCNC: 150 MG/DL — HIGH (ref 70–99)
GLUCOSE BLDC GLUCOMTR-MCNC: 150 MG/DL — HIGH (ref 70–99)
GLUCOSE BLDC GLUCOMTR-MCNC: 151 MG/DL — HIGH (ref 70–99)
GLUCOSE BLDC GLUCOMTR-MCNC: 152 MG/DL — HIGH (ref 70–99)
GLUCOSE BLDC GLUCOMTR-MCNC: 153 MG/DL — HIGH (ref 70–99)
GLUCOSE BLDC GLUCOMTR-MCNC: 154 MG/DL — HIGH (ref 70–99)
GLUCOSE BLDC GLUCOMTR-MCNC: 154 MG/DL — HIGH (ref 70–99)
GLUCOSE BLDC GLUCOMTR-MCNC: 160 MG/DL — HIGH (ref 70–99)
GLUCOSE BLDC GLUCOMTR-MCNC: 160 MG/DL — HIGH (ref 70–99)
GLUCOSE BLDC GLUCOMTR-MCNC: 161 MG/DL — HIGH (ref 70–99)
GLUCOSE BLDC GLUCOMTR-MCNC: 163 MG/DL — HIGH (ref 70–99)
GLUCOSE BLDC GLUCOMTR-MCNC: 164 MG/DL — HIGH (ref 70–99)
GLUCOSE BLDC GLUCOMTR-MCNC: 165 MG/DL — HIGH (ref 70–99)
GLUCOSE BLDC GLUCOMTR-MCNC: 167 MG/DL — HIGH (ref 70–99)
GLUCOSE BLDC GLUCOMTR-MCNC: 168 MG/DL — HIGH (ref 70–99)
GLUCOSE BLDC GLUCOMTR-MCNC: 170 MG/DL — HIGH (ref 70–99)
GLUCOSE BLDC GLUCOMTR-MCNC: 171 MG/DL — HIGH (ref 70–99)
GLUCOSE BLDC GLUCOMTR-MCNC: 172 MG/DL — HIGH (ref 70–99)
GLUCOSE BLDC GLUCOMTR-MCNC: 173 MG/DL — HIGH (ref 70–99)
GLUCOSE BLDC GLUCOMTR-MCNC: 176 MG/DL — HIGH (ref 70–99)
GLUCOSE BLDC GLUCOMTR-MCNC: 177 MG/DL — HIGH (ref 70–99)
GLUCOSE BLDC GLUCOMTR-MCNC: 178 MG/DL — HIGH (ref 70–99)
GLUCOSE BLDC GLUCOMTR-MCNC: 180 MG/DL — HIGH (ref 70–99)
GLUCOSE BLDC GLUCOMTR-MCNC: 181 MG/DL — HIGH (ref 70–99)
GLUCOSE BLDC GLUCOMTR-MCNC: 182 MG/DL — HIGH (ref 70–99)
GLUCOSE BLDC GLUCOMTR-MCNC: 184 MG/DL — HIGH (ref 70–99)
GLUCOSE BLDC GLUCOMTR-MCNC: 185 MG/DL — HIGH (ref 70–99)
GLUCOSE BLDC GLUCOMTR-MCNC: 186 MG/DL — HIGH (ref 70–99)
GLUCOSE BLDC GLUCOMTR-MCNC: 186 MG/DL — HIGH (ref 70–99)
GLUCOSE BLDC GLUCOMTR-MCNC: 187 MG/DL — HIGH (ref 70–99)
GLUCOSE BLDC GLUCOMTR-MCNC: 187 MG/DL — HIGH (ref 70–99)
GLUCOSE BLDC GLUCOMTR-MCNC: 190 MG/DL — HIGH (ref 70–99)
GLUCOSE BLDC GLUCOMTR-MCNC: 190 MG/DL — HIGH (ref 70–99)
GLUCOSE BLDC GLUCOMTR-MCNC: 193 MG/DL — HIGH (ref 70–99)
GLUCOSE BLDC GLUCOMTR-MCNC: 193 MG/DL — HIGH (ref 70–99)
GLUCOSE BLDC GLUCOMTR-MCNC: 194 MG/DL — HIGH (ref 70–99)
GLUCOSE BLDC GLUCOMTR-MCNC: 196 MG/DL — HIGH (ref 70–99)
GLUCOSE BLDC GLUCOMTR-MCNC: 197 MG/DL — HIGH (ref 70–99)
GLUCOSE BLDC GLUCOMTR-MCNC: 197 MG/DL — HIGH (ref 70–99)
GLUCOSE BLDC GLUCOMTR-MCNC: 200 MG/DL — HIGH (ref 70–99)
GLUCOSE BLDC GLUCOMTR-MCNC: 200 MG/DL — HIGH (ref 70–99)
GLUCOSE BLDC GLUCOMTR-MCNC: 201 MG/DL — HIGH (ref 70–99)
GLUCOSE BLDC GLUCOMTR-MCNC: 203 MG/DL — HIGH (ref 70–99)
GLUCOSE BLDC GLUCOMTR-MCNC: 205 MG/DL — HIGH (ref 70–99)
GLUCOSE BLDC GLUCOMTR-MCNC: 207 MG/DL — HIGH (ref 70–99)
GLUCOSE BLDC GLUCOMTR-MCNC: 207 MG/DL — HIGH (ref 70–99)
GLUCOSE BLDC GLUCOMTR-MCNC: 208 MG/DL — HIGH (ref 70–99)
GLUCOSE BLDC GLUCOMTR-MCNC: 208 MG/DL — HIGH (ref 70–99)
GLUCOSE BLDC GLUCOMTR-MCNC: 209 MG/DL — HIGH (ref 70–99)
GLUCOSE BLDC GLUCOMTR-MCNC: 210 MG/DL — HIGH (ref 70–99)
GLUCOSE BLDC GLUCOMTR-MCNC: 210 MG/DL — HIGH (ref 70–99)
GLUCOSE BLDC GLUCOMTR-MCNC: 212 MG/DL — HIGH (ref 70–99)
GLUCOSE BLDC GLUCOMTR-MCNC: 213 MG/DL — HIGH (ref 70–99)
GLUCOSE BLDC GLUCOMTR-MCNC: 214 MG/DL — HIGH (ref 70–99)
GLUCOSE BLDC GLUCOMTR-MCNC: 214 MG/DL — HIGH (ref 70–99)
GLUCOSE BLDC GLUCOMTR-MCNC: 215 MG/DL — HIGH (ref 70–99)
GLUCOSE BLDC GLUCOMTR-MCNC: 216 MG/DL — HIGH (ref 70–99)
GLUCOSE BLDC GLUCOMTR-MCNC: 217 MG/DL — HIGH (ref 70–99)
GLUCOSE BLDC GLUCOMTR-MCNC: 218 MG/DL — HIGH (ref 70–99)
GLUCOSE BLDC GLUCOMTR-MCNC: 219 MG/DL — HIGH (ref 70–99)
GLUCOSE BLDC GLUCOMTR-MCNC: 221 MG/DL — HIGH (ref 70–99)
GLUCOSE BLDC GLUCOMTR-MCNC: 221 MG/DL — HIGH (ref 70–99)
GLUCOSE BLDC GLUCOMTR-MCNC: 223 MG/DL — HIGH (ref 70–99)
GLUCOSE BLDC GLUCOMTR-MCNC: 226 MG/DL — HIGH (ref 70–99)
GLUCOSE BLDC GLUCOMTR-MCNC: 227 MG/DL — HIGH (ref 70–99)
GLUCOSE BLDC GLUCOMTR-MCNC: 228 MG/DL — HIGH (ref 70–99)
GLUCOSE BLDC GLUCOMTR-MCNC: 230 MG/DL — HIGH (ref 70–99)
GLUCOSE BLDC GLUCOMTR-MCNC: 235 MG/DL — HIGH (ref 70–99)
GLUCOSE BLDC GLUCOMTR-MCNC: 239 MG/DL — HIGH (ref 70–99)
GLUCOSE BLDC GLUCOMTR-MCNC: 246 MG/DL — HIGH (ref 70–99)
GLUCOSE BLDC GLUCOMTR-MCNC: 249 MG/DL — HIGH (ref 70–99)
GLUCOSE BLDC GLUCOMTR-MCNC: 251 MG/DL — HIGH (ref 70–99)
GLUCOSE BLDC GLUCOMTR-MCNC: 251 MG/DL — HIGH (ref 70–99)
GLUCOSE BLDC GLUCOMTR-MCNC: 253 MG/DL — HIGH (ref 70–99)
GLUCOSE BLDC GLUCOMTR-MCNC: 253 MG/DL — HIGH (ref 70–99)
GLUCOSE BLDC GLUCOMTR-MCNC: 256 MG/DL — HIGH (ref 70–99)
GLUCOSE BLDC GLUCOMTR-MCNC: 276 MG/DL — HIGH (ref 70–99)
GLUCOSE BLDC GLUCOMTR-MCNC: 278 MG/DL — HIGH (ref 70–99)
GLUCOSE BLDC GLUCOMTR-MCNC: 280 MG/DL — HIGH (ref 70–99)
GLUCOSE BLDC GLUCOMTR-MCNC: 282 MG/DL — HIGH (ref 70–99)
GLUCOSE BLDC GLUCOMTR-MCNC: 287 MG/DL — HIGH (ref 70–99)
GLUCOSE BLDC GLUCOMTR-MCNC: 287 MG/DL — HIGH (ref 70–99)
GLUCOSE BLDC GLUCOMTR-MCNC: 29 MG/DL — CRITICAL LOW (ref 70–99)
GLUCOSE BLDC GLUCOMTR-MCNC: 292 MG/DL — HIGH (ref 70–99)
GLUCOSE BLDC GLUCOMTR-MCNC: 309 MG/DL — HIGH (ref 70–99)
GLUCOSE BLDC GLUCOMTR-MCNC: 31 MG/DL — CRITICAL LOW (ref 70–99)
GLUCOSE BLDC GLUCOMTR-MCNC: 315 MG/DL — HIGH (ref 70–99)
GLUCOSE BLDC GLUCOMTR-MCNC: 324 MG/DL — HIGH (ref 70–99)
GLUCOSE BLDC GLUCOMTR-MCNC: 339 MG/DL — HIGH (ref 70–99)
GLUCOSE BLDC GLUCOMTR-MCNC: 377 MG/DL — HIGH (ref 70–99)
GLUCOSE BLDC GLUCOMTR-MCNC: 40 MG/DL — CRITICAL LOW (ref 70–99)
GLUCOSE BLDC GLUCOMTR-MCNC: 40 MG/DL — CRITICAL LOW (ref 70–99)
GLUCOSE BLDC GLUCOMTR-MCNC: 430 MG/DL — HIGH (ref 70–99)
GLUCOSE BLDC GLUCOMTR-MCNC: 453 MG/DL — CRITICAL HIGH (ref 70–99)
GLUCOSE BLDC GLUCOMTR-MCNC: 462 MG/DL — CRITICAL HIGH (ref 70–99)
GLUCOSE BLDC GLUCOMTR-MCNC: 61 MG/DL — LOW (ref 70–99)
GLUCOSE BLDC GLUCOMTR-MCNC: 63 MG/DL — LOW (ref 70–99)
GLUCOSE BLDC GLUCOMTR-MCNC: 64 MG/DL — LOW (ref 70–99)
GLUCOSE BLDC GLUCOMTR-MCNC: 65 MG/DL — LOW (ref 70–99)
GLUCOSE BLDC GLUCOMTR-MCNC: 67 MG/DL — LOW (ref 70–99)
GLUCOSE BLDC GLUCOMTR-MCNC: 68 MG/DL — LOW (ref 70–99)
GLUCOSE BLDC GLUCOMTR-MCNC: 68 MG/DL — LOW (ref 70–99)
GLUCOSE BLDC GLUCOMTR-MCNC: 70 MG/DL — SIGNIFICANT CHANGE UP (ref 70–99)
GLUCOSE BLDC GLUCOMTR-MCNC: 73 MG/DL — SIGNIFICANT CHANGE UP (ref 70–99)
GLUCOSE BLDC GLUCOMTR-MCNC: 76 MG/DL — SIGNIFICANT CHANGE UP (ref 70–99)
GLUCOSE BLDC GLUCOMTR-MCNC: 85 MG/DL — SIGNIFICANT CHANGE UP (ref 70–99)
GLUCOSE BLDC GLUCOMTR-MCNC: 85 MG/DL — SIGNIFICANT CHANGE UP (ref 70–99)
GLUCOSE BLDC GLUCOMTR-MCNC: 86 MG/DL — SIGNIFICANT CHANGE UP (ref 70–99)
GLUCOSE BLDC GLUCOMTR-MCNC: 89 MG/DL — SIGNIFICANT CHANGE UP (ref 70–99)
GLUCOSE BLDC GLUCOMTR-MCNC: 90 MG/DL — SIGNIFICANT CHANGE UP (ref 70–99)
GLUCOSE BLDC GLUCOMTR-MCNC: 91 MG/DL — SIGNIFICANT CHANGE UP (ref 70–99)
GLUCOSE BLDC GLUCOMTR-MCNC: 91 MG/DL — SIGNIFICANT CHANGE UP (ref 70–99)
GLUCOSE BLDC GLUCOMTR-MCNC: 94 MG/DL — SIGNIFICANT CHANGE UP (ref 70–99)
GLUCOSE QUALITATIVE U: ABNORMAL
GLUCOSE SERPL-MCNC: 107 MG/DL
GLUCOSE SERPL-MCNC: 107 MG/DL — HIGH (ref 70–99)
GLUCOSE SERPL-MCNC: 108 MG/DL — HIGH (ref 70–99)
GLUCOSE SERPL-MCNC: 118 MG/DL — HIGH (ref 70–99)
GLUCOSE SERPL-MCNC: 119 MG/DL — HIGH (ref 70–99)
GLUCOSE SERPL-MCNC: 137 MG/DL — HIGH (ref 70–99)
GLUCOSE SERPL-MCNC: 140 MG/DL — HIGH (ref 70–99)
GLUCOSE SERPL-MCNC: 141 MG/DL — HIGH (ref 70–99)
GLUCOSE SERPL-MCNC: 144 MG/DL — HIGH (ref 70–99)
GLUCOSE SERPL-MCNC: 144 MG/DL — HIGH (ref 70–99)
GLUCOSE SERPL-MCNC: 147 MG/DL — HIGH (ref 70–99)
GLUCOSE SERPL-MCNC: 153 MG/DL — HIGH (ref 70–99)
GLUCOSE SERPL-MCNC: 155 MG/DL — HIGH (ref 70–99)
GLUCOSE SERPL-MCNC: 158 MG/DL — HIGH (ref 70–99)
GLUCOSE SERPL-MCNC: 160 MG/DL — HIGH (ref 70–99)
GLUCOSE SERPL-MCNC: 164 MG/DL — HIGH (ref 70–99)
GLUCOSE SERPL-MCNC: 165 MG/DL — HIGH (ref 70–99)
GLUCOSE SERPL-MCNC: 166 MG/DL — HIGH (ref 70–99)
GLUCOSE SERPL-MCNC: 167 MG/DL — HIGH (ref 70–99)
GLUCOSE SERPL-MCNC: 168 MG/DL — HIGH (ref 70–99)
GLUCOSE SERPL-MCNC: 171 MG/DL — HIGH (ref 70–99)
GLUCOSE SERPL-MCNC: 171 MG/DL — HIGH (ref 70–99)
GLUCOSE SERPL-MCNC: 173 MG/DL — HIGH (ref 70–99)
GLUCOSE SERPL-MCNC: 177 MG/DL — HIGH (ref 70–99)
GLUCOSE SERPL-MCNC: 179 MG/DL — HIGH (ref 70–99)
GLUCOSE SERPL-MCNC: 179 MG/DL — HIGH (ref 70–99)
GLUCOSE SERPL-MCNC: 180 MG/DL — HIGH (ref 70–99)
GLUCOSE SERPL-MCNC: 186 MG/DL — HIGH (ref 70–99)
GLUCOSE SERPL-MCNC: 187 MG/DL — HIGH (ref 70–99)
GLUCOSE SERPL-MCNC: 189 MG/DL — HIGH (ref 70–99)
GLUCOSE SERPL-MCNC: 193 MG/DL — HIGH (ref 70–99)
GLUCOSE SERPL-MCNC: 194 MG/DL — HIGH (ref 70–99)
GLUCOSE SERPL-MCNC: 204 MG/DL — HIGH (ref 70–99)
GLUCOSE SERPL-MCNC: 205 MG/DL
GLUCOSE SERPL-MCNC: 216 MG/DL — HIGH (ref 70–99)
GLUCOSE SERPL-MCNC: 239 MG/DL — HIGH (ref 70–99)
GLUCOSE SERPL-MCNC: 239 MG/DL — HIGH (ref 70–99)
GLUCOSE SERPL-MCNC: 240 MG/DL — HIGH (ref 70–99)
GLUCOSE SERPL-MCNC: 25 MG/DL — CRITICAL LOW (ref 70–99)
GLUCOSE SERPL-MCNC: 251 MG/DL — HIGH (ref 70–99)
GLUCOSE SERPL-MCNC: 259 MG/DL — HIGH (ref 70–99)
GLUCOSE SERPL-MCNC: 279 MG/DL — HIGH (ref 70–99)
GLUCOSE SERPL-MCNC: 326 MG/DL — HIGH (ref 70–99)
GLUCOSE SERPL-MCNC: 344 MG/DL — HIGH (ref 70–99)
GLUCOSE SERPL-MCNC: 414 MG/DL — HIGH (ref 70–99)
GLUCOSE SERPL-MCNC: 58 MG/DL — LOW (ref 70–99)
GLUCOSE SERPL-MCNC: 62 MG/DL — LOW (ref 70–99)
GLUCOSE SERPL-MCNC: 88 MG/DL — SIGNIFICANT CHANGE UP (ref 70–99)
GLUCOSE SERPL-MCNC: 92 MG/DL — SIGNIFICANT CHANGE UP (ref 70–99)
GLUCOSE UR QL: 100
GLUCOSE UR QL: NEGATIVE — SIGNIFICANT CHANGE UP
GRAM STN FLD: SIGNIFICANT CHANGE UP
GRAN CASTS # UR COMP ASSIST: ABNORMAL /LPF
GRAN CASTS # UR COMP ASSIST: ABNORMAL /LPF
HAPTOGLOB SERPL-MCNC: 203 MG/DL — HIGH (ref 34–200)
HBA1C MFR BLD HPLC: 8.6 %
HBA1C MFR BLD HPLC: 9.2 %
HBV SURFACE AB SER QL: NONREACTIVE
HBV SURFACE AB SER QL: NONREACTIVE
HBV SURFACE AB SER-ACNC: SIGNIFICANT CHANGE UP
HBV SURFACE AG SER QL: NONREACTIVE
HBV SURFACE AG SER QL: NONREACTIVE
HBV SURFACE AG SER-ACNC: SIGNIFICANT CHANGE UP
HCO3 BLDA-SCNC: 13 MMOL/L — LOW (ref 21–28)
HCO3 BLDA-SCNC: 15 MMOL/L — LOW (ref 21–28)
HCO3 BLDA-SCNC: 15 MMOL/L — LOW (ref 21–28)
HCO3 BLDA-SCNC: 17 MMOL/L — LOW (ref 21–28)
HCO3 BLDA-SCNC: 18 MMOL/L — LOW (ref 21–28)
HCO3 BLDA-SCNC: 19 MMOL/L — LOW (ref 21–28)
HCO3 BLDA-SCNC: 19 MMOL/L — LOW (ref 21–28)
HCO3 BLDV-SCNC: 14 MMOL/L — LOW (ref 22–29)
HCO3 BLDV-SCNC: 16 MMOL/L — LOW (ref 22–29)
HCT VFR BLD CALC: 20.4 % — CRITICAL LOW (ref 39–50)
HCT VFR BLD CALC: 21.1 % — LOW (ref 39–50)
HCT VFR BLD CALC: 21.1 % — LOW (ref 39–50)
HCT VFR BLD CALC: 21.4 % — LOW (ref 39–50)
HCT VFR BLD CALC: 22 % — LOW (ref 39–50)
HCT VFR BLD CALC: 22.1 % — LOW (ref 39–50)
HCT VFR BLD CALC: 22.5 % — LOW (ref 39–50)
HCT VFR BLD CALC: 22.6 % — LOW (ref 39–50)
HCT VFR BLD CALC: 23 % — LOW (ref 39–50)
HCT VFR BLD CALC: 23.4 % — LOW (ref 39–50)
HCT VFR BLD CALC: 24 % — LOW (ref 39–50)
HCT VFR BLD CALC: 24.3 % — LOW (ref 39–50)
HCT VFR BLD CALC: 24.4 % — LOW (ref 39–50)
HCT VFR BLD CALC: 24.6 % — LOW (ref 39–50)
HCT VFR BLD CALC: 24.6 % — LOW (ref 39–50)
HCT VFR BLD CALC: 24.7 % — LOW (ref 39–50)
HCT VFR BLD CALC: 24.8 % — LOW (ref 39–50)
HCT VFR BLD CALC: 24.9 % — LOW (ref 39–50)
HCT VFR BLD CALC: 25.1 % — LOW (ref 39–50)
HCT VFR BLD CALC: 25.3 % — LOW (ref 39–50)
HCT VFR BLD CALC: 25.6 % — LOW (ref 39–50)
HCT VFR BLD CALC: 25.7 % — LOW (ref 39–50)
HCT VFR BLD CALC: 25.7 % — LOW (ref 39–50)
HCT VFR BLD CALC: 25.8 % — LOW (ref 39–50)
HCT VFR BLD CALC: 25.8 % — LOW (ref 39–50)
HCT VFR BLD CALC: 25.9 % — LOW (ref 39–50)
HCT VFR BLD CALC: 25.9 % — LOW (ref 39–50)
HCT VFR BLD CALC: 26.1 % — LOW (ref 39–50)
HCT VFR BLD CALC: 26.1 % — LOW (ref 39–50)
HCT VFR BLD CALC: 26.2 % — LOW (ref 39–50)
HCT VFR BLD CALC: 26.3 % — LOW (ref 39–50)
HCT VFR BLD CALC: 26.4 % — LOW (ref 39–50)
HCT VFR BLD CALC: 26.6 % — LOW (ref 39–50)
HCT VFR BLD CALC: 26.8 % — LOW (ref 39–50)
HCT VFR BLD CALC: 27.2 % — LOW (ref 39–50)
HCT VFR BLD CALC: 27.8 % — LOW (ref 39–50)
HCT VFR BLD CALC: 28 %
HCT VFR BLD CALC: 28 % — LOW (ref 39–50)
HCT VFR BLD CALC: 28.1 % — LOW (ref 39–50)
HCT VFR BLD CALC: 28.1 % — LOW (ref 39–50)
HCT VFR BLD CALC: 28.4 % — LOW (ref 39–50)
HCT VFR BLD CALC: 28.5 % — LOW (ref 39–50)
HCT VFR BLD CALC: 29.2 % — LOW (ref 39–50)
HCT VFR BLD CALC: 29.5 % — LOW (ref 39–50)
HCT VFR BLD CALC: 30 % — LOW (ref 39–50)
HCT VFR BLD CALC: 30.8 %
HCT VFR BLD CALC: 30.8 % — LOW (ref 39–50)
HCT VFR BLD CALC: 31.2 % — LOW (ref 39–50)
HCT VFR BLD CALC: 31.8 % — LOW (ref 39–50)
HCT VFR BLD CALC: 32.1 % — LOW (ref 39–50)
HCT VFR BLD CALC: 32.9 %
HCV AB S/CO SERPL IA: 0.04 S/CO — SIGNIFICANT CHANGE UP
HCV AB SER QL: NONREACTIVE
HCV AB SER QL: NONREACTIVE
HCV AB SERPL-IMP: SIGNIFICANT CHANGE UP
HCV S/CO RATIO: 0.09 S/CO
HCV S/CO RATIO: 0.1 S/CO
HCYS SERPL-MCNC: 40.8 UMOL/L
HCYS SERPL-MCNC: 42.7 UMOL/L
HDLC SERPL-MCNC: 46 MG/DL — SIGNIFICANT CHANGE UP
HDLC SERPL-MCNC: 53 MG/DL
HDLC SERPL-MCNC: 57 MG/DL
HGB BLD-MCNC: 10.1 G/DL — LOW (ref 13–17)
HGB BLD-MCNC: 6.3 G/DL — CRITICAL LOW (ref 13–17)
HGB BLD-MCNC: 6.5 G/DL — CRITICAL LOW (ref 13–17)
HGB BLD-MCNC: 6.5 G/DL — CRITICAL LOW (ref 13–17)
HGB BLD-MCNC: 6.6 G/DL — CRITICAL LOW (ref 13–17)
HGB BLD-MCNC: 6.9 G/DL — CRITICAL LOW (ref 13–17)
HGB BLD-MCNC: 6.9 G/DL — CRITICAL LOW (ref 13–17)
HGB BLD-MCNC: 7 G/DL — CRITICAL LOW (ref 13–17)
HGB BLD-MCNC: 7.1 G/DL — LOW (ref 13–17)
HGB BLD-MCNC: 7.2 G/DL — LOW (ref 13–17)
HGB BLD-MCNC: 7.2 G/DL — LOW (ref 13–17)
HGB BLD-MCNC: 7.3 G/DL — LOW (ref 13–17)
HGB BLD-MCNC: 7.3 G/DL — LOW (ref 13–17)
HGB BLD-MCNC: 7.4 G/DL — LOW (ref 13–17)
HGB BLD-MCNC: 7.5 G/DL — LOW (ref 13–17)
HGB BLD-MCNC: 7.7 G/DL — LOW (ref 13–17)
HGB BLD-MCNC: 7.8 G/DL — LOW (ref 13–17)
HGB BLD-MCNC: 7.9 G/DL — LOW (ref 13–17)
HGB BLD-MCNC: 8 G/DL — LOW (ref 13–17)
HGB BLD-MCNC: 8 G/DL — LOW (ref 13–17)
HGB BLD-MCNC: 8.1 G/DL
HGB BLD-MCNC: 8.1 G/DL — LOW (ref 13–17)
HGB BLD-MCNC: 8.1 G/DL — LOW (ref 13–17)
HGB BLD-MCNC: 8.2 G/DL — LOW (ref 13–17)
HGB BLD-MCNC: 8.2 G/DL — LOW (ref 13–17)
HGB BLD-MCNC: 8.4 G/DL — LOW (ref 13–17)
HGB BLD-MCNC: 8.4 G/DL — LOW (ref 13–17)
HGB BLD-MCNC: 8.5 G/DL — LOW (ref 13–17)
HGB BLD-MCNC: 8.6 G/DL — LOW (ref 13–17)
HGB BLD-MCNC: 8.6 G/DL — LOW (ref 13–17)
HGB BLD-MCNC: 8.7 G/DL — LOW (ref 13–17)
HGB BLD-MCNC: 8.8 G/DL — LOW (ref 13–17)
HGB BLD-MCNC: 9 G/DL
HGB BLD-MCNC: 9 G/DL — LOW (ref 13–17)
HGB BLD-MCNC: 9 G/DL — LOW (ref 13–17)
HGB BLD-MCNC: 9.2 G/DL — LOW (ref 13–17)
HGB BLD-MCNC: 9.3 G/DL — LOW (ref 13–17)
HGB BLD-MCNC: 9.4 G/DL — LOW (ref 13–17)
HGB BLD-MCNC: 9.7 G/DL
HGB BLD-MCNC: 9.9 G/DL — LOW (ref 13–17)
HYALINE CASTS # UR AUTO: SIGNIFICANT CHANGE UP /LPF (ref 0–2)
HYALINE CASTS # UR AUTO: SIGNIFICANT CHANGE UP /LPF (ref 0–2)
HYALINE CASTS: 1 /LPF
HYPOCHROMIA BLD QL: SIGNIFICANT CHANGE UP
HYPOCHROMIA BLD QL: SIGNIFICANT CHANGE UP
HYPOCHROMIA BLD QL: SLIGHT — SIGNIFICANT CHANGE UP
HYPOCHROMIA BLD QL: SLIGHT — SIGNIFICANT CHANGE UP
IGA SER QL IEP: 378 MG/DL
IGA SER QL IEP: 401 MG/DL
IGG SER QL IEP: 723 MG/DL
IGG SER QL IEP: 753 MG/DL
IGM SER QL IEP: 18 MG/DL
IGM SER QL IEP: 20 MG/DL
IMM GRANULOCYTES NFR BLD AUTO: 0.2 % — SIGNIFICANT CHANGE UP (ref 0–0.9)
IMM GRANULOCYTES NFR BLD AUTO: 0.2 % — SIGNIFICANT CHANGE UP (ref 0–0.9)
IMM GRANULOCYTES NFR BLD AUTO: 0.3 %
IMM GRANULOCYTES NFR BLD AUTO: 0.3 %
IMM GRANULOCYTES NFR BLD AUTO: 0.3 % — SIGNIFICANT CHANGE UP (ref 0–0.9)
IMM GRANULOCYTES NFR BLD AUTO: 0.4 %
IMM GRANULOCYTES NFR BLD AUTO: 0.4 % — SIGNIFICANT CHANGE UP (ref 0–0.9)
IMM GRANULOCYTES NFR BLD AUTO: 0.4 % — SIGNIFICANT CHANGE UP (ref 0–0.9)
IMM GRANULOCYTES NFR BLD AUTO: 0.4 % — SIGNIFICANT CHANGE UP (ref 0–1.5)
IMM GRANULOCYTES NFR BLD AUTO: 0.5 % — SIGNIFICANT CHANGE UP (ref 0–0.9)
IMM GRANULOCYTES NFR BLD AUTO: 0.5 % — SIGNIFICANT CHANGE UP (ref 0–0.9)
IMM GRANULOCYTES NFR BLD AUTO: 0.6 % — SIGNIFICANT CHANGE UP (ref 0–0.9)
IMM GRANULOCYTES NFR BLD AUTO: 0.6 % — SIGNIFICANT CHANGE UP (ref 0–0.9)
IMM GRANULOCYTES NFR BLD AUTO: 0.7 % — SIGNIFICANT CHANGE UP (ref 0–0.9)
IMM GRANULOCYTES NFR BLD AUTO: 0.8 % — SIGNIFICANT CHANGE UP (ref 0–0.9)
IMM GRANULOCYTES NFR BLD AUTO: 0.9 % — SIGNIFICANT CHANGE UP (ref 0–0.9)
IMM GRANULOCYTES NFR BLD AUTO: 0.9 % — SIGNIFICANT CHANGE UP (ref 0–0.9)
IMM GRANULOCYTES NFR BLD AUTO: 1 % — HIGH (ref 0–0.9)
IMM GRANULOCYTES NFR BLD AUTO: 1.1 % — HIGH (ref 0–0.9)
IMM GRANULOCYTES NFR BLD AUTO: 1.1 % — HIGH (ref 0–0.9)
IMM GRANULOCYTES NFR BLD AUTO: 1.2 % — HIGH (ref 0–0.9)
IMM GRANULOCYTES NFR BLD AUTO: 1.7 % — HIGH (ref 0–0.9)
INR BLD: 1.01 — SIGNIFICANT CHANGE UP (ref 0.88–1.16)
INR BLD: 1.05 — SIGNIFICANT CHANGE UP (ref 0.88–1.16)
INR BLD: 1.05 — SIGNIFICANT CHANGE UP (ref 0.88–1.16)
INR BLD: 1.06 — SIGNIFICANT CHANGE UP (ref 0.88–1.16)
INR BLD: 1.07 — SIGNIFICANT CHANGE UP (ref 0.88–1.16)
INR BLD: 1.12 — SIGNIFICANT CHANGE UP (ref 0.88–1.16)
INTERPRETATION SERPL IEP-IMP: NORMAL
INTERPRETATION SERPL IEP-IMP: NORMAL
IRON SATN MFR SERPL: 12 %
IRON SATN MFR SERPL: 21 %
IRON SATN MFR SERPL: 53 % — SIGNIFICANT CHANGE UP (ref 16–55)
IRON SATN MFR SERPL: 79 UG/DL — SIGNIFICANT CHANGE UP (ref 45–165)
IRON SERPL-MCNC: 28 UG/DL
IRON SERPL-MCNC: 45 UG/DL
KAPPA LC CSF-MCNC: 4.07 MG/DL
KAPPA LC CSF-MCNC: 6 MG/DL
KAPPA LC SERPL-MCNC: 11.72 MG/DL
KAPPA LC SERPL-MCNC: 9.61 MG/DL
KETONES UR-MCNC: 15 MG/DL
KETONES UR-MCNC: ABNORMAL MG/DL
KETONES UR-MCNC: NEGATIVE — SIGNIFICANT CHANGE UP
KETONES URINE: NEGATIVE
LACTATE SERPL-SCNC: 0.5 MMOL/L — SIGNIFICANT CHANGE UP (ref 0.5–2)
LACTATE SERPL-SCNC: 0.8 MMOL/L — SIGNIFICANT CHANGE UP (ref 0.5–2)
LACTATE SERPL-SCNC: 0.9 MMOL/L — SIGNIFICANT CHANGE UP (ref 0.5–2)
LACTATE SERPL-SCNC: 1.2 MMOL/L — SIGNIFICANT CHANGE UP (ref 0.5–2)
LDH SERPL L TO P-CCNC: 199 U/L — SIGNIFICANT CHANGE UP (ref 50–242)
LDLC SERPL CALC-MCNC: 47 MG/DL
LDLC SERPL CALC-MCNC: 50 MG/DL
LEGIONELLA AG UR QL: NEGATIVE — SIGNIFICANT CHANGE UP
LEUKOCYTE ESTERASE UR-ACNC: ABNORMAL
LEUKOCYTE ESTERASE UR-ACNC: NEGATIVE — SIGNIFICANT CHANGE UP
LEUKOCYTE ESTERASE URINE: NEGATIVE
LIDOCAIN IGE QN: 4 U/L — LOW (ref 7–60)
LIPID PNL WITH DIRECT LDL SERPL: 21 MG/DL — SIGNIFICANT CHANGE UP
LYMPHOCYTES # BLD AUTO: 0 % — LOW (ref 13–44)
LYMPHOCYTES # BLD AUTO: 0 K/UL — LOW (ref 1–3.3)
LYMPHOCYTES # BLD AUTO: 0.09 K/UL — LOW (ref 1–3.3)
LYMPHOCYTES # BLD AUTO: 0.18 K/UL — LOW (ref 1–3.3)
LYMPHOCYTES # BLD AUTO: 0.22 K/UL — LOW (ref 1–3.3)
LYMPHOCYTES # BLD AUTO: 0.24 K/UL — LOW (ref 1–3.3)
LYMPHOCYTES # BLD AUTO: 0.31 K/UL — LOW (ref 1–3.3)
LYMPHOCYTES # BLD AUTO: 0.33 K/UL — LOW (ref 1–3.3)
LYMPHOCYTES # BLD AUTO: 0.34 K/UL — LOW (ref 1–3.3)
LYMPHOCYTES # BLD AUTO: 0.35 K/UL — LOW (ref 1–3.3)
LYMPHOCYTES # BLD AUTO: 0.38 K/UL — LOW (ref 1–3.3)
LYMPHOCYTES # BLD AUTO: 0.38 K/UL — LOW (ref 1–3.3)
LYMPHOCYTES # BLD AUTO: 0.43 K/UL — LOW (ref 1–3.3)
LYMPHOCYTES # BLD AUTO: 0.43 K/UL — LOW (ref 1–3.3)
LYMPHOCYTES # BLD AUTO: 0.44 K/UL — LOW (ref 1–3.3)
LYMPHOCYTES # BLD AUTO: 0.47 K/UL — LOW (ref 1–3.3)
LYMPHOCYTES # BLD AUTO: 0.48 K/UL — LOW (ref 1–3.3)
LYMPHOCYTES # BLD AUTO: 0.48 K/UL — LOW (ref 1–3.3)
LYMPHOCYTES # BLD AUTO: 0.52 K/UL — LOW (ref 1–3.3)
LYMPHOCYTES # BLD AUTO: 0.54 K/UL — LOW (ref 1–3.3)
LYMPHOCYTES # BLD AUTO: 0.58 K/UL — LOW (ref 1–3.3)
LYMPHOCYTES # BLD AUTO: 0.59 K/UL — LOW (ref 1–3.3)
LYMPHOCYTES # BLD AUTO: 0.61 K/UL — LOW (ref 1–3.3)
LYMPHOCYTES # BLD AUTO: 0.65 K/UL — LOW (ref 1–3.3)
LYMPHOCYTES # BLD AUTO: 0.66 K/UL — LOW (ref 1–3.3)
LYMPHOCYTES # BLD AUTO: 0.7 K/UL — LOW (ref 1–3.3)
LYMPHOCYTES # BLD AUTO: 0.71 K/UL — LOW (ref 1–3.3)
LYMPHOCYTES # BLD AUTO: 0.73 K/UL — LOW (ref 1–3.3)
LYMPHOCYTES # BLD AUTO: 0.78 K/UL — LOW (ref 1–3.3)
LYMPHOCYTES # BLD AUTO: 0.84 K/UL — LOW (ref 1–3.3)
LYMPHOCYTES # BLD AUTO: 0.85 K/UL — LOW (ref 1–3.3)
LYMPHOCYTES # BLD AUTO: 0.89 K/UL — LOW (ref 1–3.3)
LYMPHOCYTES # BLD AUTO: 0.9 % — LOW (ref 13–44)
LYMPHOCYTES # BLD AUTO: 0.96 K/UL
LYMPHOCYTES # BLD AUTO: 0.98 K/UL
LYMPHOCYTES # BLD AUTO: 1.03 K/UL
LYMPHOCYTES # BLD AUTO: 1.09 K/UL — SIGNIFICANT CHANGE UP (ref 1–3.3)
LYMPHOCYTES # BLD AUTO: 1.25 K/UL — SIGNIFICANT CHANGE UP (ref 1–3.3)
LYMPHOCYTES # BLD AUTO: 1.7 % — LOW (ref 13–44)
LYMPHOCYTES # BLD AUTO: 10 % — LOW (ref 13–44)
LYMPHOCYTES # BLD AUTO: 11.3 % — LOW (ref 13–44)
LYMPHOCYTES # BLD AUTO: 11.5 % — LOW (ref 13–44)
LYMPHOCYTES # BLD AUTO: 12.6 % — LOW (ref 13–44)
LYMPHOCYTES # BLD AUTO: 13.1 % — SIGNIFICANT CHANGE UP (ref 13–44)
LYMPHOCYTES # BLD AUTO: 13.5 % — SIGNIFICANT CHANGE UP (ref 13–44)
LYMPHOCYTES # BLD AUTO: 13.5 % — SIGNIFICANT CHANGE UP (ref 13–44)
LYMPHOCYTES # BLD AUTO: 13.6 % — SIGNIFICANT CHANGE UP (ref 13–44)
LYMPHOCYTES # BLD AUTO: 2.6 % — LOW (ref 13–44)
LYMPHOCYTES # BLD AUTO: 3.2 % — LOW (ref 13–44)
LYMPHOCYTES # BLD AUTO: 3.6 % — LOW (ref 13–44)
LYMPHOCYTES # BLD AUTO: 4 % — LOW (ref 13–44)
LYMPHOCYTES # BLD AUTO: 4.4 % — LOW (ref 13–44)
LYMPHOCYTES # BLD AUTO: 4.8 % — LOW (ref 13–44)
LYMPHOCYTES # BLD AUTO: 5.2 % — LOW (ref 13–44)
LYMPHOCYTES # BLD AUTO: 5.3 % — LOW (ref 13–44)
LYMPHOCYTES # BLD AUTO: 5.9 % — LOW (ref 13–44)
LYMPHOCYTES # BLD AUTO: 6.1 % — LOW (ref 13–44)
LYMPHOCYTES # BLD AUTO: 6.4 % — LOW (ref 13–44)
LYMPHOCYTES # BLD AUTO: 6.7 % — LOW (ref 13–44)
LYMPHOCYTES # BLD AUTO: 6.7 % — LOW (ref 13–44)
LYMPHOCYTES # BLD AUTO: 6.8 % — LOW (ref 13–44)
LYMPHOCYTES # BLD AUTO: 7.5 % — LOW (ref 13–44)
LYMPHOCYTES # BLD AUTO: 7.6 % — LOW (ref 13–44)
LYMPHOCYTES # BLD AUTO: 7.6 % — LOW (ref 13–44)
LYMPHOCYTES # BLD AUTO: 7.9 % — LOW (ref 13–44)
LYMPHOCYTES # BLD AUTO: 7.9 % — LOW (ref 13–44)
LYMPHOCYTES # BLD AUTO: 8 % — LOW (ref 13–44)
LYMPHOCYTES # BLD AUTO: 8.3 % — LOW (ref 13–44)
LYMPHOCYTES # BLD AUTO: 8.8 % — LOW (ref 13–44)
LYMPHOCYTES # BLD AUTO: 9.1 % — LOW (ref 13–44)
LYMPHOCYTES # BLD AUTO: 9.8 % — LOW (ref 13–44)
LYMPHOCYTES NFR BLD AUTO: 10.9 %
LYMPHOCYTES NFR BLD AUTO: 12.2 %
LYMPHOCYTES NFR BLD AUTO: 15.9 %
M PROTEIN SPEC IFE-MCNC: NORMAL
M PROTEIN SPEC IFE-MCNC: NORMAL
MACROCYTES BLD QL: SLIGHT — SIGNIFICANT CHANGE UP
MAGNESIUM SERPL-MCNC: 1.6 MG/DL — SIGNIFICANT CHANGE UP (ref 1.6–2.6)
MAGNESIUM SERPL-MCNC: 1.7 MG/DL — SIGNIFICANT CHANGE UP (ref 1.6–2.6)
MAGNESIUM SERPL-MCNC: 1.8 MG/DL — SIGNIFICANT CHANGE UP (ref 1.6–2.6)
MAGNESIUM SERPL-MCNC: 1.8 MG/DL — SIGNIFICANT CHANGE UP (ref 1.6–2.6)
MAGNESIUM SERPL-MCNC: 1.9 MG/DL — SIGNIFICANT CHANGE UP (ref 1.6–2.6)
MAGNESIUM SERPL-MCNC: 2 MG/DL — SIGNIFICANT CHANGE UP (ref 1.6–2.6)
MAGNESIUM SERPL-MCNC: 2.1 MG/DL
MAGNESIUM SERPL-MCNC: 2.1 MG/DL — SIGNIFICANT CHANGE UP (ref 1.6–2.6)
MAGNESIUM SERPL-MCNC: 2.2 MG/DL — SIGNIFICANT CHANGE UP (ref 1.6–2.6)
MAGNESIUM SERPL-MCNC: 2.3 MG/DL — SIGNIFICANT CHANGE UP (ref 1.6–2.6)
MAGNESIUM SERPL-MCNC: 2.4 MG/DL
MAGNESIUM SERPL-MCNC: 2.4 MG/DL — SIGNIFICANT CHANGE UP (ref 1.6–2.6)
MAGNESIUM SERPL-MCNC: 2.5 MG/DL — SIGNIFICANT CHANGE UP (ref 1.6–2.6)
MAGNESIUM SERPL-MCNC: 2.5 MG/DL — SIGNIFICANT CHANGE UP (ref 1.6–2.6)
MAGNESIUM SERPL-MCNC: 2.6 MG/DL — SIGNIFICANT CHANGE UP (ref 1.6–2.6)
MAN DIFF?: NORMAL
MANUAL SMEAR VERIFICATION: SIGNIFICANT CHANGE UP
MCHC RBC-ENTMCNC: 25.5 PG
MCHC RBC-ENTMCNC: 25.5 PG
MCHC RBC-ENTMCNC: 25.6 PG — LOW (ref 27–34)
MCHC RBC-ENTMCNC: 25.6 PG — LOW (ref 27–34)
MCHC RBC-ENTMCNC: 25.7 PG — LOW (ref 27–34)
MCHC RBC-ENTMCNC: 25.7 PG — LOW (ref 27–34)
MCHC RBC-ENTMCNC: 25.8 PG — LOW (ref 27–34)
MCHC RBC-ENTMCNC: 25.9 PG — LOW (ref 27–34)
MCHC RBC-ENTMCNC: 26 PG
MCHC RBC-ENTMCNC: 26 PG — LOW (ref 27–34)
MCHC RBC-ENTMCNC: 26 PG — LOW (ref 27–34)
MCHC RBC-ENTMCNC: 26.1 PG — LOW (ref 27–34)
MCHC RBC-ENTMCNC: 26.2 PG — LOW (ref 27–34)
MCHC RBC-ENTMCNC: 26.4 PG — LOW (ref 27–34)
MCHC RBC-ENTMCNC: 26.4 PG — LOW (ref 27–34)
MCHC RBC-ENTMCNC: 26.5 PG — LOW (ref 27–34)
MCHC RBC-ENTMCNC: 26.5 PG — LOW (ref 27–34)
MCHC RBC-ENTMCNC: 26.6 PG — LOW (ref 27–34)
MCHC RBC-ENTMCNC: 26.7 PG — LOW (ref 27–34)
MCHC RBC-ENTMCNC: 26.7 PG — LOW (ref 27–34)
MCHC RBC-ENTMCNC: 26.9 PG — LOW (ref 27–34)
MCHC RBC-ENTMCNC: 27 PG — SIGNIFICANT CHANGE UP (ref 27–34)
MCHC RBC-ENTMCNC: 27.1 PG — SIGNIFICANT CHANGE UP (ref 27–34)
MCHC RBC-ENTMCNC: 27.2 PG — SIGNIFICANT CHANGE UP (ref 27–34)
MCHC RBC-ENTMCNC: 27.3 PG — SIGNIFICANT CHANGE UP (ref 27–34)
MCHC RBC-ENTMCNC: 27.4 PG — SIGNIFICANT CHANGE UP (ref 27–34)
MCHC RBC-ENTMCNC: 27.4 PG — SIGNIFICANT CHANGE UP (ref 27–34)
MCHC RBC-ENTMCNC: 27.5 PG — SIGNIFICANT CHANGE UP (ref 27–34)
MCHC RBC-ENTMCNC: 27.5 PG — SIGNIFICANT CHANGE UP (ref 27–34)
MCHC RBC-ENTMCNC: 27.6 PG — SIGNIFICANT CHANGE UP (ref 27–34)
MCHC RBC-ENTMCNC: 27.8 PG — SIGNIFICANT CHANGE UP (ref 27–34)
MCHC RBC-ENTMCNC: 27.8 PG — SIGNIFICANT CHANGE UP (ref 27–34)
MCHC RBC-ENTMCNC: 28.1 PG — SIGNIFICANT CHANGE UP (ref 27–34)
MCHC RBC-ENTMCNC: 28.9 GM/DL
MCHC RBC-ENTMCNC: 28.9 GM/DL — LOW (ref 32–36)
MCHC RBC-ENTMCNC: 29.1 GM/DL — LOW (ref 32–36)
MCHC RBC-ENTMCNC: 29.2 GM/DL
MCHC RBC-ENTMCNC: 29.2 GM/DL — LOW (ref 32–36)
MCHC RBC-ENTMCNC: 29.2 GM/DL — LOW (ref 32–36)
MCHC RBC-ENTMCNC: 29.3 GM/DL — LOW (ref 32–36)
MCHC RBC-ENTMCNC: 29.5 GM/DL
MCHC RBC-ENTMCNC: 29.5 GM/DL — LOW (ref 32–36)
MCHC RBC-ENTMCNC: 29.5 GM/DL — LOW (ref 32–36)
MCHC RBC-ENTMCNC: 29.7 GM/DL — LOW (ref 32–36)
MCHC RBC-ENTMCNC: 29.8 GM/DL — LOW (ref 32–36)
MCHC RBC-ENTMCNC: 29.9 GM/DL — LOW (ref 32–36)
MCHC RBC-ENTMCNC: 30 GM/DL — LOW (ref 32–36)
MCHC RBC-ENTMCNC: 30 GM/DL — LOW (ref 32–36)
MCHC RBC-ENTMCNC: 30.1 GM/DL — LOW (ref 32–36)
MCHC RBC-ENTMCNC: 30.2 GM/DL — LOW (ref 32–36)
MCHC RBC-ENTMCNC: 30.3 GM/DL — LOW (ref 32–36)
MCHC RBC-ENTMCNC: 30.3 GM/DL — LOW (ref 32–36)
MCHC RBC-ENTMCNC: 30.5 GM/DL — LOW (ref 32–36)
MCHC RBC-ENTMCNC: 30.6 GM/DL — LOW (ref 32–36)
MCHC RBC-ENTMCNC: 30.7 GM/DL — LOW (ref 32–36)
MCHC RBC-ENTMCNC: 30.8 GM/DL — LOW (ref 32–36)
MCHC RBC-ENTMCNC: 30.9 GM/DL — LOW (ref 32–36)
MCHC RBC-ENTMCNC: 31.1 GM/DL — LOW (ref 32–36)
MCHC RBC-ENTMCNC: 31.2 GM/DL — LOW (ref 32–36)
MCHC RBC-ENTMCNC: 31.3 GM/DL — LOW (ref 32–36)
MCHC RBC-ENTMCNC: 31.4 GM/DL — LOW (ref 32–36)
MCHC RBC-ENTMCNC: 31.4 GM/DL — LOW (ref 32–36)
MCHC RBC-ENTMCNC: 31.5 GM/DL — LOW (ref 32–36)
MCHC RBC-ENTMCNC: 31.8 GM/DL — LOW (ref 32–36)
MCHC RBC-ENTMCNC: 32 GM/DL — SIGNIFICANT CHANGE UP (ref 32–36)
MCHC RBC-ENTMCNC: 32 GM/DL — SIGNIFICANT CHANGE UP (ref 32–36)
MCHC RBC-ENTMCNC: 32.2 GM/DL — SIGNIFICANT CHANGE UP (ref 32–36)
MCHC RBC-ENTMCNC: 32.4 GM/DL — SIGNIFICANT CHANGE UP (ref 32–36)
MCV RBC AUTO: 82.4 FL — SIGNIFICANT CHANGE UP (ref 80–100)
MCV RBC AUTO: 82.5 FL — SIGNIFICANT CHANGE UP (ref 80–100)
MCV RBC AUTO: 83.2 FL — SIGNIFICANT CHANGE UP (ref 80–100)
MCV RBC AUTO: 83.4 FL — SIGNIFICANT CHANGE UP (ref 80–100)
MCV RBC AUTO: 84.1 FL — SIGNIFICANT CHANGE UP (ref 80–100)
MCV RBC AUTO: 84.6 FL — SIGNIFICANT CHANGE UP (ref 80–100)
MCV RBC AUTO: 84.7 FL — SIGNIFICANT CHANGE UP (ref 80–100)
MCV RBC AUTO: 84.8 FL — SIGNIFICANT CHANGE UP (ref 80–100)
MCV RBC AUTO: 84.9 FL — SIGNIFICANT CHANGE UP (ref 80–100)
MCV RBC AUTO: 85 FL — SIGNIFICANT CHANGE UP (ref 80–100)
MCV RBC AUTO: 85.2 FL — SIGNIFICANT CHANGE UP (ref 80–100)
MCV RBC AUTO: 85.3 FL — SIGNIFICANT CHANGE UP (ref 80–100)
MCV RBC AUTO: 85.4 FL — SIGNIFICANT CHANGE UP (ref 80–100)
MCV RBC AUTO: 85.6 FL — SIGNIFICANT CHANGE UP (ref 80–100)
MCV RBC AUTO: 85.7 FL — SIGNIFICANT CHANGE UP (ref 80–100)
MCV RBC AUTO: 85.8 FL — SIGNIFICANT CHANGE UP (ref 80–100)
MCV RBC AUTO: 86.2 FL — SIGNIFICANT CHANGE UP (ref 80–100)
MCV RBC AUTO: 86.3 FL — SIGNIFICANT CHANGE UP (ref 80–100)
MCV RBC AUTO: 86.4 FL
MCV RBC AUTO: 86.5 FL — SIGNIFICANT CHANGE UP (ref 80–100)
MCV RBC AUTO: 86.8 FL — SIGNIFICANT CHANGE UP (ref 80–100)
MCV RBC AUTO: 87 FL — SIGNIFICANT CHANGE UP (ref 80–100)
MCV RBC AUTO: 87.2 FL — SIGNIFICANT CHANGE UP (ref 80–100)
MCV RBC AUTO: 87.2 FL — SIGNIFICANT CHANGE UP (ref 80–100)
MCV RBC AUTO: 87.4 FL — SIGNIFICANT CHANGE UP (ref 80–100)
MCV RBC AUTO: 87.5 FL — SIGNIFICANT CHANGE UP (ref 80–100)
MCV RBC AUTO: 87.7 FL — SIGNIFICANT CHANGE UP (ref 80–100)
MCV RBC AUTO: 87.8 FL — SIGNIFICANT CHANGE UP (ref 80–100)
MCV RBC AUTO: 88.1 FL
MCV RBC AUTO: 88.3 FL — SIGNIFICANT CHANGE UP (ref 80–100)
MCV RBC AUTO: 88.3 FL — SIGNIFICANT CHANGE UP (ref 80–100)
MCV RBC AUTO: 88.5 FL — SIGNIFICANT CHANGE UP (ref 80–100)
MCV RBC AUTO: 88.5 FL — SIGNIFICANT CHANGE UP (ref 80–100)
MCV RBC AUTO: 89 FL
MCV RBC AUTO: 89 FL — SIGNIFICANT CHANGE UP (ref 80–100)
MCV RBC AUTO: 89 FL — SIGNIFICANT CHANGE UP (ref 80–100)
MCV RBC AUTO: 89.1 FL — SIGNIFICANT CHANGE UP (ref 80–100)
MCV RBC AUTO: 89.3 FL — SIGNIFICANT CHANGE UP (ref 80–100)
MCV RBC AUTO: 89.3 FL — SIGNIFICANT CHANGE UP (ref 80–100)
MCV RBC AUTO: 89.9 FL — SIGNIFICANT CHANGE UP (ref 80–100)
MCV RBC AUTO: 90.5 FL — SIGNIFICANT CHANGE UP (ref 80–100)
MCV RBC AUTO: 90.9 FL — SIGNIFICANT CHANGE UP (ref 80–100)
MCV RBC AUTO: 91.8 FL — SIGNIFICANT CHANGE UP (ref 80–100)
MCV RBC AUTO: 92.1 FL — SIGNIFICANT CHANGE UP (ref 80–100)
MCV RBC AUTO: 92.1 FL — SIGNIFICANT CHANGE UP (ref 80–100)
MCV RBC AUTO: 92.4 FL — SIGNIFICANT CHANGE UP (ref 80–100)
MCV RBC AUTO: 93.7 FL — SIGNIFICANT CHANGE UP (ref 80–100)
MCV RBC AUTO: 93.7 FL — SIGNIFICANT CHANGE UP (ref 80–100)
MENADIONE SERPL-MCNC: 0.2 NG/ML
METAMYELOCYTES # FLD: 1.7 % — HIGH (ref 0–0)
METAMYELOCYTES # FLD: 2.6 % — HIGH (ref 0–0)
METAMYELOCYTES # FLD: 3.7 % — HIGH (ref 0–0)
METHOD TYPE: SIGNIFICANT CHANGE UP
METHYLMALONATE SERPL-SCNC: 2152 NMOL/L
METHYLMALONATE SERPL-SCNC: 627 NMOL/L
MICROCYTES BLD QL: SLIGHT — SIGNIFICANT CHANGE UP
MICROSCOPIC-UA: NORMAL
MONOCYTES # BLD AUTO: 0.05 K/UL — SIGNIFICANT CHANGE UP (ref 0–0.9)
MONOCYTES # BLD AUTO: 0.1 K/UL — SIGNIFICANT CHANGE UP (ref 0–0.9)
MONOCYTES # BLD AUTO: 0.19 K/UL — SIGNIFICANT CHANGE UP (ref 0–0.9)
MONOCYTES # BLD AUTO: 0.21 K/UL — SIGNIFICANT CHANGE UP (ref 0–0.9)
MONOCYTES # BLD AUTO: 0.25 K/UL
MONOCYTES # BLD AUTO: 0.25 K/UL — SIGNIFICANT CHANGE UP (ref 0–0.9)
MONOCYTES # BLD AUTO: 0.25 K/UL — SIGNIFICANT CHANGE UP (ref 0–0.9)
MONOCYTES # BLD AUTO: 0.27 K/UL — SIGNIFICANT CHANGE UP (ref 0–0.9)
MONOCYTES # BLD AUTO: 0.3 K/UL — SIGNIFICANT CHANGE UP (ref 0–0.9)
MONOCYTES # BLD AUTO: 0.32 K/UL — SIGNIFICANT CHANGE UP (ref 0–0.9)
MONOCYTES # BLD AUTO: 0.33 K/UL — SIGNIFICANT CHANGE UP (ref 0–0.9)
MONOCYTES # BLD AUTO: 0.34 K/UL — SIGNIFICANT CHANGE UP (ref 0–0.9)
MONOCYTES # BLD AUTO: 0.34 K/UL — SIGNIFICANT CHANGE UP (ref 0–0.9)
MONOCYTES # BLD AUTO: 0.35 K/UL — SIGNIFICANT CHANGE UP (ref 0–0.9)
MONOCYTES # BLD AUTO: 0.37 K/UL — SIGNIFICANT CHANGE UP (ref 0–0.9)
MONOCYTES # BLD AUTO: 0.39 K/UL
MONOCYTES # BLD AUTO: 0.4 K/UL
MONOCYTES # BLD AUTO: 0.4 K/UL — SIGNIFICANT CHANGE UP (ref 0–0.9)
MONOCYTES # BLD AUTO: 0.41 K/UL — SIGNIFICANT CHANGE UP (ref 0–0.9)
MONOCYTES # BLD AUTO: 0.43 K/UL — SIGNIFICANT CHANGE UP (ref 0–0.9)
MONOCYTES # BLD AUTO: 0.52 K/UL — SIGNIFICANT CHANGE UP (ref 0–0.9)
MONOCYTES # BLD AUTO: 0.59 K/UL — SIGNIFICANT CHANGE UP (ref 0–0.9)
MONOCYTES # BLD AUTO: 0.6 K/UL — SIGNIFICANT CHANGE UP (ref 0–0.9)
MONOCYTES # BLD AUTO: 0.72 K/UL — SIGNIFICANT CHANGE UP (ref 0–0.9)
MONOCYTES # BLD AUTO: 0.85 K/UL — SIGNIFICANT CHANGE UP (ref 0–0.9)
MONOCYTES # BLD AUTO: 1.05 K/UL — HIGH (ref 0–0.9)
MONOCYTES # BLD AUTO: 1.22 K/UL — HIGH (ref 0–0.9)
MONOCYTES # BLD AUTO: 1.23 K/UL — HIGH (ref 0–0.9)
MONOCYTES # BLD AUTO: 1.26 K/UL — HIGH (ref 0–0.9)
MONOCYTES # BLD AUTO: 1.36 K/UL — HIGH (ref 0–0.9)
MONOCYTES # BLD AUTO: 1.52 K/UL — HIGH (ref 0–0.9)
MONOCYTES # BLD AUTO: 2.13 K/UL — HIGH (ref 0–0.9)
MONOCYTES NFR BLD AUTO: 0.9 % — LOW (ref 2–14)
MONOCYTES NFR BLD AUTO: 0.9 % — LOW (ref 2–14)
MONOCYTES NFR BLD AUTO: 10.3 % — SIGNIFICANT CHANGE UP (ref 2–14)
MONOCYTES NFR BLD AUTO: 11.3 % — SIGNIFICANT CHANGE UP (ref 2–14)
MONOCYTES NFR BLD AUTO: 11.4 % — SIGNIFICANT CHANGE UP (ref 2–14)
MONOCYTES NFR BLD AUTO: 11.6 % — SIGNIFICANT CHANGE UP (ref 2–14)
MONOCYTES NFR BLD AUTO: 11.7 % — SIGNIFICANT CHANGE UP (ref 2–14)
MONOCYTES NFR BLD AUTO: 12 % — SIGNIFICANT CHANGE UP (ref 2–14)
MONOCYTES NFR BLD AUTO: 12.1 % — SIGNIFICANT CHANGE UP (ref 2–14)
MONOCYTES NFR BLD AUTO: 14 % — SIGNIFICANT CHANGE UP (ref 2–14)
MONOCYTES NFR BLD AUTO: 17 % — HIGH (ref 2–14)
MONOCYTES NFR BLD AUTO: 2.1 % — SIGNIFICANT CHANGE UP (ref 2–14)
MONOCYTES NFR BLD AUTO: 3.2 %
MONOCYTES NFR BLD AUTO: 3.4 % — SIGNIFICANT CHANGE UP (ref 2–14)
MONOCYTES NFR BLD AUTO: 3.7 % — SIGNIFICANT CHANGE UP (ref 2–14)
MONOCYTES NFR BLD AUTO: 3.9 % — SIGNIFICANT CHANGE UP (ref 2–14)
MONOCYTES NFR BLD AUTO: 4 % — SIGNIFICANT CHANGE UP (ref 2–14)
MONOCYTES NFR BLD AUTO: 4.1 %
MONOCYTES NFR BLD AUTO: 4.2 % — SIGNIFICANT CHANGE UP (ref 2–14)
MONOCYTES NFR BLD AUTO: 4.3 % — SIGNIFICANT CHANGE UP (ref 2–14)
MONOCYTES NFR BLD AUTO: 4.4 % — SIGNIFICANT CHANGE UP (ref 2–14)
MONOCYTES NFR BLD AUTO: 4.5 % — SIGNIFICANT CHANGE UP (ref 2–14)
MONOCYTES NFR BLD AUTO: 4.8 % — SIGNIFICANT CHANGE UP (ref 2–14)
MONOCYTES NFR BLD AUTO: 5 % — SIGNIFICANT CHANGE UP (ref 2–14)
MONOCYTES NFR BLD AUTO: 5.3 % — SIGNIFICANT CHANGE UP (ref 2–14)
MONOCYTES NFR BLD AUTO: 5.5 % — SIGNIFICANT CHANGE UP (ref 2–14)
MONOCYTES NFR BLD AUTO: 5.7 % — SIGNIFICANT CHANGE UP (ref 2–14)
MONOCYTES NFR BLD AUTO: 6.1 % — SIGNIFICANT CHANGE UP (ref 2–14)
MONOCYTES NFR BLD AUTO: 6.5 %
MONOCYTES NFR BLD AUTO: 6.5 % — SIGNIFICANT CHANGE UP (ref 2–14)
MONOCYTES NFR BLD AUTO: 6.6 % — SIGNIFICANT CHANGE UP (ref 2–14)
MONOCYTES NFR BLD AUTO: 6.9 % — SIGNIFICANT CHANGE UP (ref 2–14)
MONOCYTES NFR BLD AUTO: 7.5 % — SIGNIFICANT CHANGE UP (ref 2–14)
MONOCYTES NFR BLD AUTO: 7.8 % — SIGNIFICANT CHANGE UP (ref 2–14)
MONOCYTES NFR BLD AUTO: 8.6 % — SIGNIFICANT CHANGE UP (ref 2–14)
MONOCYTES NFR BLD AUTO: 8.9 % — SIGNIFICANT CHANGE UP (ref 2–14)
MONOCYTES NFR BLD AUTO: 9.1 % — SIGNIFICANT CHANGE UP (ref 2–14)
MONOCYTES NFR BLD AUTO: 9.7 % — SIGNIFICANT CHANGE UP (ref 2–14)
MRSA PCR RESULT.: POSITIVE
MRSA PCR RESULT.: POSITIVE
MYELOCYTES NFR BLD: 0.9 % — HIGH (ref 0–0)
MYELOCYTES NFR BLD: 5.6 % — HIGH (ref 0–0)
NEUTROPHILS # BLD AUTO: 10.4 K/UL — HIGH (ref 1.8–7.4)
NEUTROPHILS # BLD AUTO: 10.57 K/UL — HIGH (ref 1.8–7.4)
NEUTROPHILS # BLD AUTO: 12.48 K/UL — HIGH (ref 1.8–7.4)
NEUTROPHILS # BLD AUTO: 13.14 K/UL — HIGH (ref 1.8–7.4)
NEUTROPHILS # BLD AUTO: 16.2 K/UL — HIGH (ref 1.8–7.4)
NEUTROPHILS # BLD AUTO: 2.74 K/UL — SIGNIFICANT CHANGE UP (ref 1.8–7.4)
NEUTROPHILS # BLD AUTO: 2.84 K/UL — SIGNIFICANT CHANGE UP (ref 1.8–7.4)
NEUTROPHILS # BLD AUTO: 2.9 K/UL — SIGNIFICANT CHANGE UP (ref 1.8–7.4)
NEUTROPHILS # BLD AUTO: 2.98 K/UL — SIGNIFICANT CHANGE UP (ref 1.8–7.4)
NEUTROPHILS # BLD AUTO: 24.42 K/UL — HIGH (ref 1.8–7.4)
NEUTROPHILS # BLD AUTO: 3.65 K/UL — SIGNIFICANT CHANGE UP (ref 1.8–7.4)
NEUTROPHILS # BLD AUTO: 3.68 K/UL — SIGNIFICANT CHANGE UP (ref 1.8–7.4)
NEUTROPHILS # BLD AUTO: 3.79 K/UL — SIGNIFICANT CHANGE UP (ref 1.8–7.4)
NEUTROPHILS # BLD AUTO: 4.25 K/UL — SIGNIFICANT CHANGE UP (ref 1.8–7.4)
NEUTROPHILS # BLD AUTO: 4.43 K/UL — SIGNIFICANT CHANGE UP (ref 1.8–7.4)
NEUTROPHILS # BLD AUTO: 4.66 K/UL — SIGNIFICANT CHANGE UP (ref 1.8–7.4)
NEUTROPHILS # BLD AUTO: 4.66 K/UL — SIGNIFICANT CHANGE UP (ref 1.8–7.4)
NEUTROPHILS # BLD AUTO: 4.67 K/UL — SIGNIFICANT CHANGE UP (ref 1.8–7.4)
NEUTROPHILS # BLD AUTO: 4.69 K/UL
NEUTROPHILS # BLD AUTO: 4.69 K/UL — SIGNIFICANT CHANGE UP (ref 1.8–7.4)
NEUTROPHILS # BLD AUTO: 4.86 K/UL — SIGNIFICANT CHANGE UP (ref 1.8–7.4)
NEUTROPHILS # BLD AUTO: 4.98 K/UL — SIGNIFICANT CHANGE UP (ref 1.8–7.4)
NEUTROPHILS # BLD AUTO: 5.08 K/UL — SIGNIFICANT CHANGE UP (ref 1.8–7.4)
NEUTROPHILS # BLD AUTO: 5.47 K/UL — SIGNIFICANT CHANGE UP (ref 1.8–7.4)
NEUTROPHILS # BLD AUTO: 5.69 K/UL — SIGNIFICANT CHANGE UP (ref 1.8–7.4)
NEUTROPHILS # BLD AUTO: 5.91 K/UL — SIGNIFICANT CHANGE UP (ref 1.8–7.4)
NEUTROPHILS # BLD AUTO: 6.2 K/UL — SIGNIFICANT CHANGE UP (ref 1.8–7.4)
NEUTROPHILS # BLD AUTO: 6.37 K/UL — SIGNIFICANT CHANGE UP (ref 1.8–7.4)
NEUTROPHILS # BLD AUTO: 6.54 K/UL
NEUTROPHILS # BLD AUTO: 6.6 K/UL — SIGNIFICANT CHANGE UP (ref 1.8–7.4)
NEUTROPHILS # BLD AUTO: 6.77 K/UL — SIGNIFICANT CHANGE UP (ref 1.8–7.4)
NEUTROPHILS # BLD AUTO: 7.2 K/UL — SIGNIFICANT CHANGE UP (ref 1.8–7.4)
NEUTROPHILS # BLD AUTO: 7.29 K/UL — SIGNIFICANT CHANGE UP (ref 1.8–7.4)
NEUTROPHILS # BLD AUTO: 7.88 K/UL
NEUTROPHILS # BLD AUTO: 7.98 K/UL — HIGH (ref 1.8–7.4)
NEUTROPHILS # BLD AUTO: 8.33 K/UL — HIGH (ref 1.8–7.4)
NEUTROPHILS # BLD AUTO: 8.66 K/UL — HIGH (ref 1.8–7.4)
NEUTROPHILS # BLD AUTO: 8.78 K/UL — HIGH (ref 1.8–7.4)
NEUTROPHILS NFR BLD AUTO: 66.4 % — SIGNIFICANT CHANGE UP (ref 43–77)
NEUTROPHILS NFR BLD AUTO: 71.9 % — SIGNIFICANT CHANGE UP (ref 43–77)
NEUTROPHILS NFR BLD AUTO: 74.1 % — SIGNIFICANT CHANGE UP (ref 43–77)
NEUTROPHILS NFR BLD AUTO: 74.2 % — SIGNIFICANT CHANGE UP (ref 43–77)
NEUTROPHILS NFR BLD AUTO: 75.8 %
NEUTROPHILS NFR BLD AUTO: 76.2 % — SIGNIFICANT CHANGE UP (ref 43–77)
NEUTROPHILS NFR BLD AUTO: 76.9 % — SIGNIFICANT CHANGE UP (ref 43–77)
NEUTROPHILS NFR BLD AUTO: 77 % — SIGNIFICANT CHANGE UP (ref 43–77)
NEUTROPHILS NFR BLD AUTO: 77.2 % — HIGH (ref 43–77)
NEUTROPHILS NFR BLD AUTO: 77.2 % — HIGH (ref 43–77)
NEUTROPHILS NFR BLD AUTO: 77.8 % — HIGH (ref 43–77)
NEUTROPHILS NFR BLD AUTO: 78.1 % — HIGH (ref 43–77)
NEUTROPHILS NFR BLD AUTO: 78.4 % — HIGH (ref 43–77)
NEUTROPHILS NFR BLD AUTO: 78.5 % — HIGH (ref 43–77)
NEUTROPHILS NFR BLD AUTO: 79.3 % — HIGH (ref 43–77)
NEUTROPHILS NFR BLD AUTO: 79.9 % — HIGH (ref 43–77)
NEUTROPHILS NFR BLD AUTO: 80.2 % — HIGH (ref 43–77)
NEUTROPHILS NFR BLD AUTO: 80.2 % — HIGH (ref 43–77)
NEUTROPHILS NFR BLD AUTO: 80.6 % — HIGH (ref 43–77)
NEUTROPHILS NFR BLD AUTO: 81.4 % — HIGH (ref 43–77)
NEUTROPHILS NFR BLD AUTO: 82.3 % — HIGH (ref 43–77)
NEUTROPHILS NFR BLD AUTO: 82.8 %
NEUTROPHILS NFR BLD AUTO: 83.1 %
NEUTROPHILS NFR BLD AUTO: 83.8 % — HIGH (ref 43–77)
NEUTROPHILS NFR BLD AUTO: 84.3 % — HIGH (ref 43–77)
NEUTROPHILS NFR BLD AUTO: 84.3 % — HIGH (ref 43–77)
NEUTROPHILS NFR BLD AUTO: 85.1 % — HIGH (ref 43–77)
NEUTROPHILS NFR BLD AUTO: 85.8 % — HIGH (ref 43–77)
NEUTROPHILS NFR BLD AUTO: 87 % — HIGH (ref 43–77)
NEUTROPHILS NFR BLD AUTO: 87 % — HIGH (ref 43–77)
NEUTROPHILS NFR BLD AUTO: 87.8 % — HIGH (ref 43–77)
NEUTROPHILS NFR BLD AUTO: 88 % — HIGH (ref 43–77)
NEUTROPHILS NFR BLD AUTO: 88.8 % — HIGH (ref 43–77)
NEUTROPHILS NFR BLD AUTO: 91.2 % — HIGH (ref 43–77)
NEUTROPHILS NFR BLD AUTO: 91.8 % — HIGH (ref 43–77)
NEUTROPHILS NFR BLD AUTO: 92 % — HIGH (ref 43–77)
NEUTROPHILS NFR BLD AUTO: 93 % — HIGH (ref 43–77)
NEUTROPHILS NFR BLD AUTO: 96.5 % — HIGH (ref 43–77)
NEUTS BAND # BLD: 1.7 % — SIGNIFICANT CHANGE UP (ref 0–8)
NEUTS BAND # BLD: 1.9 % — SIGNIFICANT CHANGE UP (ref 0–8)
NEUTS BAND # BLD: 2.6 % — SIGNIFICANT CHANGE UP (ref 0–8)
NEUTS BAND # BLD: 7.7 % — SIGNIFICANT CHANGE UP (ref 0–8)
NICOTINAMIDE: 51.4 NG/ML
NICOTINIC ACID: <5 NG/ML
NITRITE UR-MCNC: NEGATIVE — SIGNIFICANT CHANGE UP
NITRITE UR-MCNC: POSITIVE
NITRITE URINE: NEGATIVE
NON HDL CHOLESTEROL: 44 MG/DL — SIGNIFICANT CHANGE UP
NONHDLC SERPL-MCNC: 62 MG/DL
NONHDLC SERPL-MCNC: 62 MG/DL
NRBC # BLD: 0 /100 WBCS — SIGNIFICANT CHANGE UP (ref 0–0)
NRBC # BLD: 1 /100 — HIGH (ref 0–0)
NRBC # BLD: SIGNIFICANT CHANGE UP /100 WBCS (ref 0–0)
NT-PROBNP SERPL-MCNC: 4501 PG/ML
NT-PROBNP SERPL-MCNC: 5927 PG/ML
NT-PROBNP SERPL-SCNC: 8909 PG/ML — HIGH (ref 0–300)
NT-PROBNP SERPL-SCNC: HIGH PG/ML (ref 0–300)
OB PNL STL: NEGATIVE — SIGNIFICANT CHANGE UP
ORGANISM # SPEC MICROSCOPIC CNT: SIGNIFICANT CHANGE UP
OSMOLALITY UR: 323 MOSM/KG — SIGNIFICANT CHANGE UP (ref 300–900)
OSMOLALITY UR: 334 MOSM/KG — SIGNIFICANT CHANGE UP (ref 300–900)
OSMOLALITY UR: 373 MOSM/KG — SIGNIFICANT CHANGE UP (ref 300–900)
OSMOLALITY UR: 377 MOSM/KG — SIGNIFICANT CHANGE UP (ref 300–900)
OSMOLALITY UR: 386 MOSM/KG — SIGNIFICANT CHANGE UP (ref 300–900)
OSMOLALITY UR: 388 MOSM/KG — SIGNIFICANT CHANGE UP (ref 300–900)
OSMOLALITY UR: 417 MOSM/KG — SIGNIFICANT CHANGE UP (ref 300–900)
OVALOCYTES BLD QL SMEAR: SLIGHT — SIGNIFICANT CHANGE UP
P AERUGINOSA DNA BLD POS NAA+NON-PROBE: SIGNIFICANT CHANGE UP
PARATHYROID HORMONE INTACT: 203 PG/ML
PARATHYROID HORMONE INTACT: 96 PG/ML
PCO2 BLDA: 31 MMHG — LOW (ref 35–48)
PCO2 BLDA: 31 MMHG — LOW (ref 35–48)
PCO2 BLDA: 34 MMHG — LOW (ref 35–48)
PCO2 BLDA: 37 MMHG — SIGNIFICANT CHANGE UP (ref 35–48)
PCO2 BLDA: 37 MMHG — SIGNIFICANT CHANGE UP (ref 35–48)
PCO2 BLDA: 39 MMHG — SIGNIFICANT CHANGE UP (ref 35–48)
PCO2 BLDA: 64 MMHG — HIGH (ref 35–48)
PCO2 BLDV: 37 MMHG — LOW (ref 42–55)
PCO2 BLDV: 44 MMHG — SIGNIFICANT CHANGE UP (ref 42–55)
PH BLDA: 7.04 — CRITICAL LOW (ref 7.35–7.45)
PH BLDA: 7.22 — LOW (ref 7.35–7.45)
PH BLDA: 7.23 — LOW (ref 7.35–7.45)
PH BLDA: 7.28 — LOW (ref 7.35–7.45)
PH BLDA: 7.28 — LOW (ref 7.35–7.45)
PH BLDA: 7.31 — LOW (ref 7.35–7.45)
PH BLDA: 7.35 — SIGNIFICANT CHANGE UP (ref 7.35–7.45)
PH BLDV: 7.17 — CRITICAL LOW (ref 7.32–7.43)
PH BLDV: 7.18 — CRITICAL LOW (ref 7.32–7.43)
PH UR: 5.5 — SIGNIFICANT CHANGE UP (ref 5–8)
PH UR: 5.5 — SIGNIFICANT CHANGE UP (ref 5–8)
PH UR: 6 — SIGNIFICANT CHANGE UP (ref 5–8)
PH UR: 6.5 — SIGNIFICANT CHANGE UP (ref 5–8)
PH URINE: 5
PHOSPHATE SERPL-MCNC: 1.9 MG/DL — LOW (ref 2.5–4.5)
PHOSPHATE SERPL-MCNC: 2.2 MG/DL — LOW (ref 2.5–4.5)
PHOSPHATE SERPL-MCNC: 2.3 MG/DL — LOW (ref 2.5–4.5)
PHOSPHATE SERPL-MCNC: 2.4 MG/DL — LOW (ref 2.5–4.5)
PHOSPHATE SERPL-MCNC: 2.5 MG/DL — SIGNIFICANT CHANGE UP (ref 2.5–4.5)
PHOSPHATE SERPL-MCNC: 2.6 MG/DL — SIGNIFICANT CHANGE UP (ref 2.5–4.5)
PHOSPHATE SERPL-MCNC: 2.7 MG/DL — SIGNIFICANT CHANGE UP (ref 2.5–4.5)
PHOSPHATE SERPL-MCNC: 2.8 MG/DL — SIGNIFICANT CHANGE UP (ref 2.5–4.5)
PHOSPHATE SERPL-MCNC: 2.9 MG/DL — SIGNIFICANT CHANGE UP (ref 2.5–4.5)
PHOSPHATE SERPL-MCNC: 2.9 MG/DL — SIGNIFICANT CHANGE UP (ref 2.5–4.5)
PHOSPHATE SERPL-MCNC: 3 MG/DL — SIGNIFICANT CHANGE UP (ref 2.5–4.5)
PHOSPHATE SERPL-MCNC: 3.1 MG/DL — SIGNIFICANT CHANGE UP (ref 2.5–4.5)
PHOSPHATE SERPL-MCNC: 3.1 MG/DL — SIGNIFICANT CHANGE UP (ref 2.5–4.5)
PHOSPHATE SERPL-MCNC: 3.2 MG/DL — SIGNIFICANT CHANGE UP (ref 2.5–4.5)
PHOSPHATE SERPL-MCNC: 3.3 MG/DL — SIGNIFICANT CHANGE UP (ref 2.5–4.5)
PHOSPHATE SERPL-MCNC: 3.4 MG/DL — SIGNIFICANT CHANGE UP (ref 2.5–4.5)
PHOSPHATE SERPL-MCNC: 3.5 MG/DL
PHOSPHATE SERPL-MCNC: 3.5 MG/DL — SIGNIFICANT CHANGE UP (ref 2.5–4.5)
PHOSPHATE SERPL-MCNC: 3.5 MG/DL — SIGNIFICANT CHANGE UP (ref 2.5–4.5)
PHOSPHATE SERPL-MCNC: 3.8 MG/DL
PHOSPHATE SERPL-MCNC: 4 MG/DL — SIGNIFICANT CHANGE UP (ref 2.5–4.5)
PHOSPHATE SERPL-MCNC: 4.2 MG/DL — SIGNIFICANT CHANGE UP (ref 2.5–4.5)
PHOSPHATE SERPL-MCNC: 4.4 MG/DL — SIGNIFICANT CHANGE UP (ref 2.5–4.5)
PHOSPHATE SERPL-MCNC: 4.5 MG/DL — SIGNIFICANT CHANGE UP (ref 2.5–4.5)
PHOSPHATE SERPL-MCNC: 4.6 MG/DL — HIGH (ref 2.5–4.5)
PHOSPHATE SERPL-MCNC: 4.6 MG/DL — HIGH (ref 2.5–4.5)
PHOSPHATE SERPL-MCNC: 4.7 MG/DL — HIGH (ref 2.5–4.5)
PHOSPHATE SERPL-MCNC: 4.7 MG/DL — HIGH (ref 2.5–4.5)
PHOSPHATE SERPL-MCNC: 4.8 MG/DL — HIGH (ref 2.5–4.5)
PHOSPHATE SERPL-MCNC: 4.9 MG/DL — HIGH (ref 2.5–4.5)
PHOSPHATE SERPL-MCNC: 4.9 MG/DL — HIGH (ref 2.5–4.5)
PHOSPHATE SERPL-MCNC: 5.2 MG/DL — HIGH (ref 2.5–4.5)
PHOSPHATE SERPL-MCNC: 5.3 MG/DL — HIGH (ref 2.5–4.5)
PHOSPHATE SERPL-MCNC: 5.3 MG/DL — HIGH (ref 2.5–4.5)
PHOSPHATE SERPL-MCNC: 5.4 MG/DL — HIGH (ref 2.5–4.5)
PHOSPHATE SERPL-MCNC: 5.5 MG/DL — HIGH (ref 2.5–4.5)
PLAT MORPH BLD: ABNORMAL
PLAT MORPH BLD: NORMAL — SIGNIFICANT CHANGE UP
PLAT MORPH BLD: NORMAL — SIGNIFICANT CHANGE UP
PLATELET # BLD AUTO: 107 K/UL — LOW (ref 150–400)
PLATELET # BLD AUTO: 117 K/UL — LOW (ref 150–400)
PLATELET # BLD AUTO: 118 K/UL — LOW (ref 150–400)
PLATELET # BLD AUTO: 119 K/UL — LOW (ref 150–400)
PLATELET # BLD AUTO: 121 K/UL — LOW (ref 150–400)
PLATELET # BLD AUTO: 121 K/UL — LOW (ref 150–400)
PLATELET # BLD AUTO: 122 K/UL — LOW (ref 150–400)
PLATELET # BLD AUTO: 123 K/UL — LOW (ref 150–400)
PLATELET # BLD AUTO: 123 K/UL — LOW (ref 150–400)
PLATELET # BLD AUTO: 127 K/UL — LOW (ref 150–400)
PLATELET # BLD AUTO: 130 K/UL — LOW (ref 150–400)
PLATELET # BLD AUTO: 131 K/UL — LOW (ref 150–400)
PLATELET # BLD AUTO: 134 K/UL — LOW (ref 150–400)
PLATELET # BLD AUTO: 135 K/UL — LOW (ref 150–400)
PLATELET # BLD AUTO: 135 K/UL — LOW (ref 150–400)
PLATELET # BLD AUTO: 143 K/UL — LOW (ref 150–400)
PLATELET # BLD AUTO: 146 K/UL — LOW (ref 150–400)
PLATELET # BLD AUTO: 155 K/UL — SIGNIFICANT CHANGE UP (ref 150–400)
PLATELET # BLD AUTO: 166 K/UL — SIGNIFICANT CHANGE UP (ref 150–400)
PLATELET # BLD AUTO: 171 K/UL — SIGNIFICANT CHANGE UP (ref 150–400)
PLATELET # BLD AUTO: 176 K/UL — SIGNIFICANT CHANGE UP (ref 150–400)
PLATELET # BLD AUTO: 185 K/UL — SIGNIFICANT CHANGE UP (ref 150–400)
PLATELET # BLD AUTO: 196 K/UL — SIGNIFICANT CHANGE UP (ref 150–400)
PLATELET # BLD AUTO: 196 K/UL — SIGNIFICANT CHANGE UP (ref 150–400)
PLATELET # BLD AUTO: 205 K/UL — SIGNIFICANT CHANGE UP (ref 150–400)
PLATELET # BLD AUTO: 205 K/UL — SIGNIFICANT CHANGE UP (ref 150–400)
PLATELET # BLD AUTO: 210 K/UL
PLATELET # BLD AUTO: 212 K/UL — SIGNIFICANT CHANGE UP (ref 150–400)
PLATELET # BLD AUTO: 233 K/UL — SIGNIFICANT CHANGE UP (ref 150–400)
PLATELET # BLD AUTO: 255 K/UL
PLATELET # BLD AUTO: 270 K/UL
PLATELET # BLD AUTO: 273 K/UL — SIGNIFICANT CHANGE UP (ref 150–400)
PLATELET # BLD AUTO: 277 K/UL — SIGNIFICANT CHANGE UP (ref 150–400)
PLATELET # BLD AUTO: 280 K/UL — SIGNIFICANT CHANGE UP (ref 150–400)
PLATELET # BLD AUTO: 281 K/UL — SIGNIFICANT CHANGE UP (ref 150–400)
PLATELET # BLD AUTO: 283 K/UL — SIGNIFICANT CHANGE UP (ref 150–400)
PLATELET # BLD AUTO: 288 K/UL — SIGNIFICANT CHANGE UP (ref 150–400)
PLATELET # BLD AUTO: 299 K/UL — SIGNIFICANT CHANGE UP (ref 150–400)
PLATELET # BLD AUTO: 309 K/UL — SIGNIFICANT CHANGE UP (ref 150–400)
PLATELET # BLD AUTO: 310 K/UL — SIGNIFICANT CHANGE UP (ref 150–400)
PLATELET # BLD AUTO: 311 K/UL — SIGNIFICANT CHANGE UP (ref 150–400)
PLATELET # BLD AUTO: 313 K/UL — SIGNIFICANT CHANGE UP (ref 150–400)
PLATELET # BLD AUTO: 313 K/UL — SIGNIFICANT CHANGE UP (ref 150–400)
PLATELET # BLD AUTO: 317 K/UL — SIGNIFICANT CHANGE UP (ref 150–400)
PLATELET # BLD AUTO: 321 K/UL — SIGNIFICANT CHANGE UP (ref 150–400)
PLATELET # BLD AUTO: 333 K/UL — SIGNIFICANT CHANGE UP (ref 150–400)
PLATELET # BLD AUTO: 346 K/UL — SIGNIFICANT CHANGE UP (ref 150–400)
PLATELET # BLD AUTO: 359 K/UL — SIGNIFICANT CHANGE UP (ref 150–400)
PLATELET # BLD AUTO: 360 K/UL — SIGNIFICANT CHANGE UP (ref 150–400)
PLATELET # BLD AUTO: 394 K/UL — SIGNIFICANT CHANGE UP (ref 150–400)
PLATELET # BLD AUTO: 415 K/UL — HIGH (ref 150–400)
PLATELET CLUMP BLD QL SMEAR: SLIGHT
PO2 BLDA: 146 MMHG — HIGH (ref 83–108)
PO2 BLDA: 154 MMHG — HIGH (ref 83–108)
PO2 BLDA: 174 MMHG — HIGH (ref 83–108)
PO2 BLDA: 198 MMHG — HIGH (ref 83–108)
PO2 BLDA: 335 MMHG — HIGH (ref 83–108)
PO2 BLDA: 83 MMHG — SIGNIFICANT CHANGE UP (ref 83–108)
PO2 BLDA: 99 MMHG — SIGNIFICANT CHANGE UP (ref 83–108)
PO2 BLDV: 61 MMHG — HIGH (ref 25–45)
PO2 BLDV: <33 MMHG — SIGNIFICANT CHANGE UP (ref 25–45)
POIKILOCYTOSIS BLD QL AUTO: SIGNIFICANT CHANGE UP
POIKILOCYTOSIS BLD QL AUTO: SLIGHT — SIGNIFICANT CHANGE UP
POLYCHROMASIA BLD QL SMEAR: SLIGHT — SIGNIFICANT CHANGE UP
POTASSIUM BLDV-SCNC: 4.6 MMOL/L — SIGNIFICANT CHANGE UP (ref 3.5–5.1)
POTASSIUM BLDV-SCNC: 4.7 MMOL/L — SIGNIFICANT CHANGE UP (ref 3.5–5.1)
POTASSIUM SERPL-MCNC: 3.1 MMOL/L — LOW (ref 3.5–5.3)
POTASSIUM SERPL-MCNC: 3.2 MMOL/L — LOW (ref 3.5–5.3)
POTASSIUM SERPL-MCNC: 3.3 MMOL/L — LOW (ref 3.5–5.3)
POTASSIUM SERPL-MCNC: 3.3 MMOL/L — LOW (ref 3.5–5.3)
POTASSIUM SERPL-MCNC: 3.4 MMOL/L — LOW (ref 3.5–5.3)
POTASSIUM SERPL-MCNC: 3.4 MMOL/L — LOW (ref 3.5–5.3)
POTASSIUM SERPL-MCNC: 3.5 MMOL/L — SIGNIFICANT CHANGE UP (ref 3.5–5.3)
POTASSIUM SERPL-MCNC: 3.5 MMOL/L — SIGNIFICANT CHANGE UP (ref 3.5–5.3)
POTASSIUM SERPL-MCNC: 3.6 MMOL/L — SIGNIFICANT CHANGE UP (ref 3.5–5.3)
POTASSIUM SERPL-MCNC: 3.7 MMOL/L — SIGNIFICANT CHANGE UP (ref 3.5–5.3)
POTASSIUM SERPL-MCNC: 3.8 MMOL/L — SIGNIFICANT CHANGE UP (ref 3.5–5.3)
POTASSIUM SERPL-MCNC: 3.9 MMOL/L — SIGNIFICANT CHANGE UP (ref 3.5–5.3)
POTASSIUM SERPL-MCNC: 4 MMOL/L — SIGNIFICANT CHANGE UP (ref 3.5–5.3)
POTASSIUM SERPL-MCNC: 4.2 MMOL/L — SIGNIFICANT CHANGE UP (ref 3.5–5.3)
POTASSIUM SERPL-MCNC: 4.2 MMOL/L — SIGNIFICANT CHANGE UP (ref 3.5–5.3)
POTASSIUM SERPL-MCNC: 4.3 MMOL/L — SIGNIFICANT CHANGE UP (ref 3.5–5.3)
POTASSIUM SERPL-MCNC: 4.3 MMOL/L — SIGNIFICANT CHANGE UP (ref 3.5–5.3)
POTASSIUM SERPL-MCNC: 4.4 MMOL/L — SIGNIFICANT CHANGE UP (ref 3.5–5.3)
POTASSIUM SERPL-MCNC: 4.4 MMOL/L — SIGNIFICANT CHANGE UP (ref 3.5–5.3)
POTASSIUM SERPL-MCNC: 4.5 MMOL/L — SIGNIFICANT CHANGE UP (ref 3.5–5.3)
POTASSIUM SERPL-MCNC: 4.5 MMOL/L — SIGNIFICANT CHANGE UP (ref 3.5–5.3)
POTASSIUM SERPL-MCNC: 4.6 MMOL/L — SIGNIFICANT CHANGE UP (ref 3.5–5.3)
POTASSIUM SERPL-MCNC: 4.7 MMOL/L — SIGNIFICANT CHANGE UP (ref 3.5–5.3)
POTASSIUM SERPL-MCNC: 4.7 MMOL/L — SIGNIFICANT CHANGE UP (ref 3.5–5.3)
POTASSIUM SERPL-MCNC: 4.8 MMOL/L — SIGNIFICANT CHANGE UP (ref 3.5–5.3)
POTASSIUM SERPL-MCNC: 4.8 MMOL/L — SIGNIFICANT CHANGE UP (ref 3.5–5.3)
POTASSIUM SERPL-MCNC: 4.9 MMOL/L — SIGNIFICANT CHANGE UP (ref 3.5–5.3)
POTASSIUM SERPL-MCNC: 5 MMOL/L — SIGNIFICANT CHANGE UP (ref 3.5–5.3)
POTASSIUM SERPL-MCNC: 5 MMOL/L — SIGNIFICANT CHANGE UP (ref 3.5–5.3)
POTASSIUM SERPL-MCNC: 5.1 MMOL/L — SIGNIFICANT CHANGE UP (ref 3.5–5.3)
POTASSIUM SERPL-MCNC: 5.1 MMOL/L — SIGNIFICANT CHANGE UP (ref 3.5–5.3)
POTASSIUM SERPL-MCNC: 5.2 MMOL/L — SIGNIFICANT CHANGE UP (ref 3.5–5.3)
POTASSIUM SERPL-MCNC: 5.3 MMOL/L — SIGNIFICANT CHANGE UP (ref 3.5–5.3)
POTASSIUM SERPL-MCNC: 5.5 MMOL/L — HIGH (ref 3.5–5.3)
POTASSIUM SERPL-MCNC: 5.8 MMOL/L — HIGH (ref 3.5–5.3)
POTASSIUM SERPL-MCNC: SIGNIFICANT CHANGE UP (ref 3.5–5.3)
POTASSIUM SERPL-SCNC: 3.1 MMOL/L — LOW (ref 3.5–5.3)
POTASSIUM SERPL-SCNC: 3.2 MMOL/L — LOW (ref 3.5–5.3)
POTASSIUM SERPL-SCNC: 3.3 MMOL/L — LOW (ref 3.5–5.3)
POTASSIUM SERPL-SCNC: 3.3 MMOL/L — LOW (ref 3.5–5.3)
POTASSIUM SERPL-SCNC: 3.4 MMOL/L — LOW (ref 3.5–5.3)
POTASSIUM SERPL-SCNC: 3.4 MMOL/L — LOW (ref 3.5–5.3)
POTASSIUM SERPL-SCNC: 3.5 MMOL/L — SIGNIFICANT CHANGE UP (ref 3.5–5.3)
POTASSIUM SERPL-SCNC: 3.5 MMOL/L — SIGNIFICANT CHANGE UP (ref 3.5–5.3)
POTASSIUM SERPL-SCNC: 3.6 MMOL/L — SIGNIFICANT CHANGE UP (ref 3.5–5.3)
POTASSIUM SERPL-SCNC: 3.7 MMOL/L — SIGNIFICANT CHANGE UP (ref 3.5–5.3)
POTASSIUM SERPL-SCNC: 3.8 MMOL/L — SIGNIFICANT CHANGE UP (ref 3.5–5.3)
POTASSIUM SERPL-SCNC: 3.9 MMOL/L — SIGNIFICANT CHANGE UP (ref 3.5–5.3)
POTASSIUM SERPL-SCNC: 4 MMOL/L — SIGNIFICANT CHANGE UP (ref 3.5–5.3)
POTASSIUM SERPL-SCNC: 4.2 MMOL/L — SIGNIFICANT CHANGE UP (ref 3.5–5.3)
POTASSIUM SERPL-SCNC: 4.2 MMOL/L — SIGNIFICANT CHANGE UP (ref 3.5–5.3)
POTASSIUM SERPL-SCNC: 4.3 MMOL/L — SIGNIFICANT CHANGE UP (ref 3.5–5.3)
POTASSIUM SERPL-SCNC: 4.3 MMOL/L — SIGNIFICANT CHANGE UP (ref 3.5–5.3)
POTASSIUM SERPL-SCNC: 4.4 MMOL/L — SIGNIFICANT CHANGE UP (ref 3.5–5.3)
POTASSIUM SERPL-SCNC: 4.4 MMOL/L — SIGNIFICANT CHANGE UP (ref 3.5–5.3)
POTASSIUM SERPL-SCNC: 4.5 MMOL/L — SIGNIFICANT CHANGE UP (ref 3.5–5.3)
POTASSIUM SERPL-SCNC: 4.5 MMOL/L — SIGNIFICANT CHANGE UP (ref 3.5–5.3)
POTASSIUM SERPL-SCNC: 4.6 MMOL/L — SIGNIFICANT CHANGE UP (ref 3.5–5.3)
POTASSIUM SERPL-SCNC: 4.7 MMOL/L — SIGNIFICANT CHANGE UP (ref 3.5–5.3)
POTASSIUM SERPL-SCNC: 4.7 MMOL/L — SIGNIFICANT CHANGE UP (ref 3.5–5.3)
POTASSIUM SERPL-SCNC: 4.8 MMOL/L — SIGNIFICANT CHANGE UP (ref 3.5–5.3)
POTASSIUM SERPL-SCNC: 4.8 MMOL/L — SIGNIFICANT CHANGE UP (ref 3.5–5.3)
POTASSIUM SERPL-SCNC: 4.9 MMOL/L — SIGNIFICANT CHANGE UP (ref 3.5–5.3)
POTASSIUM SERPL-SCNC: 5 MMOL/L — SIGNIFICANT CHANGE UP (ref 3.5–5.3)
POTASSIUM SERPL-SCNC: 5 MMOL/L — SIGNIFICANT CHANGE UP (ref 3.5–5.3)
POTASSIUM SERPL-SCNC: 5.1 MMOL/L — SIGNIFICANT CHANGE UP (ref 3.5–5.3)
POTASSIUM SERPL-SCNC: 5.1 MMOL/L — SIGNIFICANT CHANGE UP (ref 3.5–5.3)
POTASSIUM SERPL-SCNC: 5.2 MMOL/L
POTASSIUM SERPL-SCNC: 5.2 MMOL/L — SIGNIFICANT CHANGE UP (ref 3.5–5.3)
POTASSIUM SERPL-SCNC: 5.3 MMOL/L — SIGNIFICANT CHANGE UP (ref 3.5–5.3)
POTASSIUM SERPL-SCNC: 5.5 MMOL/L — HIGH (ref 3.5–5.3)
POTASSIUM SERPL-SCNC: 5.8 MMOL/L — HIGH (ref 3.5–5.3)
POTASSIUM SERPL-SCNC: 6.1 MMOL/L
POTASSIUM SERPL-SCNC: SIGNIFICANT CHANGE UP (ref 3.5–5.3)
POTASSIUM UR-SCNC: 17 MMOL/L — SIGNIFICANT CHANGE UP
PROCALCITONIN SERPL-MCNC: 0.1 NG/ML
PROT ?TM UR-MCNC: 14 MG/DL — HIGH (ref 0–12)
PROT SERPL-MCNC: 4.4 G/DL — LOW (ref 6–8.3)
PROT SERPL-MCNC: 4.5 G/DL — LOW (ref 6–8.3)
PROT SERPL-MCNC: 5 G/DL — LOW (ref 6–8.3)
PROT SERPL-MCNC: 5.1 G/DL — LOW (ref 6–8.3)
PROT SERPL-MCNC: 5.1 G/DL — LOW (ref 6–8.3)
PROT SERPL-MCNC: 5.2 G/DL — LOW (ref 6–8.3)
PROT SERPL-MCNC: 5.3 G/DL — LOW (ref 6–8.3)
PROT SERPL-MCNC: 5.4 G/DL — LOW (ref 6–8.3)
PROT SERPL-MCNC: 5.5 G/DL — LOW (ref 6–8.3)
PROT SERPL-MCNC: 5.6 G/DL — LOW (ref 6–8.3)
PROT SERPL-MCNC: 5.7 G/DL — LOW (ref 6–8.3)
PROT SERPL-MCNC: 5.7 G/DL — LOW (ref 6–8.3)
PROT SERPL-MCNC: 5.8 G/DL — LOW (ref 6–8.3)
PROT SERPL-MCNC: 5.8 G/DL — LOW (ref 6–8.3)
PROT SERPL-MCNC: 5.9 G/DL
PROT SERPL-MCNC: 6 G/DL — SIGNIFICANT CHANGE UP (ref 6–8.3)
PROT SERPL-MCNC: 6.1 G/DL
PROT SERPL-MCNC: 6.3 G/DL — SIGNIFICANT CHANGE UP (ref 6–8.3)
PROT UR-MCNC: 100 MG/DL
PROT UR-MCNC: 30 MG/DL
PROT UR-MCNC: >=300 MG/DL
PROT UR-MCNC: ABNORMAL MG/DL
PROT UR-MCNC: NEGATIVE MG/DL — SIGNIFICANT CHANGE UP
PROT/CREAT UR-RTO: 0.5 RATIO — HIGH (ref 0–0.2)
PROTEIN URINE: NORMAL
PROTHROM AB SERPL-ACNC: 12 SEC — SIGNIFICANT CHANGE UP (ref 10.5–13.4)
PROTHROM AB SERPL-ACNC: 12.5 SEC — SIGNIFICANT CHANGE UP (ref 10.5–13.4)
PROTHROM AB SERPL-ACNC: 12.5 SEC — SIGNIFICANT CHANGE UP (ref 10.5–13.4)
PROTHROM AB SERPL-ACNC: 12.6 SEC — SIGNIFICANT CHANGE UP (ref 10.5–13.4)
PROTHROM AB SERPL-ACNC: 12.7 SEC — SIGNIFICANT CHANGE UP (ref 10.5–13.4)
PROTHROM AB SERPL-ACNC: 13.4 SEC — SIGNIFICANT CHANGE UP (ref 10.5–13.4)
RAPID RVP RESULT: DETECTED
RBC # BLD: 2.37 M/UL — LOW (ref 4.2–5.8)
RBC # BLD: 2.38 M/UL — LOW (ref 4.2–5.8)
RBC # BLD: 2.46 M/UL — LOW (ref 4.2–5.8)
RBC # BLD: 2.48 M/UL — LOW (ref 4.2–5.8)
RBC # BLD: 2.63 M/UL — LOW (ref 4.2–5.8)
RBC # BLD: 2.64 M/UL — LOW (ref 4.2–5.8)
RBC # BLD: 2.66 M/UL — LOW (ref 4.2–5.8)
RBC # BLD: 2.66 M/UL — LOW (ref 4.2–5.8)
RBC # BLD: 2.67 M/UL — LOW (ref 4.2–5.8)
RBC # BLD: 2.68 M/UL — LOW (ref 4.2–5.8)
RBC # BLD: 2.68 M/UL — LOW (ref 4.2–5.8)
RBC # BLD: 2.71 M/UL — LOW (ref 4.2–5.8)
RBC # BLD: 2.79 M/UL — LOW (ref 4.2–5.8)
RBC # BLD: 2.8 M/UL — LOW (ref 4.2–5.8)
RBC # BLD: 2.81 M/UL — LOW (ref 4.2–5.8)
RBC # BLD: 2.82 M/UL — LOW (ref 4.2–5.8)
RBC # BLD: 2.86 M/UL — LOW (ref 4.2–5.8)
RBC # BLD: 2.86 M/UL — LOW (ref 4.2–5.8)
RBC # BLD: 2.87 M/UL — LOW (ref 4.2–5.8)
RBC # BLD: 2.87 M/UL — LOW (ref 4.2–5.8)
RBC # BLD: 2.9 M/UL — LOW (ref 4.2–5.8)
RBC # BLD: 2.91 M/UL — LOW (ref 4.2–5.8)
RBC # BLD: 2.94 M/UL — LOW (ref 4.2–5.8)
RBC # BLD: 2.95 M/UL — LOW (ref 4.2–5.8)
RBC # BLD: 2.95 M/UL — LOW (ref 4.2–5.8)
RBC # BLD: 2.97 M/UL — LOW (ref 4.2–5.8)
RBC # BLD: 2.99 M/UL — LOW (ref 4.2–5.8)
RBC # BLD: 3 M/UL — LOW (ref 4.2–5.8)
RBC # BLD: 3.01 M/UL — LOW (ref 4.2–5.8)
RBC # BLD: 3.04 M/UL — LOW (ref 4.2–5.8)
RBC # BLD: 3.05 M/UL — LOW (ref 4.2–5.8)
RBC # BLD: 3.08 M/UL — LOW (ref 4.2–5.8)
RBC # BLD: 3.08 M/UL — LOW (ref 4.2–5.8)
RBC # BLD: 3.09 M/UL — LOW (ref 4.2–5.8)
RBC # BLD: 3.12 M/UL — LOW (ref 4.2–5.8)
RBC # BLD: 3.14 M/UL — LOW (ref 4.2–5.8)
RBC # BLD: 3.15 M/UL — LOW (ref 4.2–5.8)
RBC # BLD: 3.17 M/UL — LOW (ref 4.2–5.8)
RBC # BLD: 3.18 M/UL
RBC # BLD: 3.18 M/UL — LOW (ref 4.2–5.8)
RBC # BLD: 3.19 M/UL — LOW (ref 4.2–5.8)
RBC # BLD: 3.19 M/UL — LOW (ref 4.2–5.8)
RBC # BLD: 3.2 M/UL — LOW (ref 4.2–5.8)
RBC # BLD: 3.29 M/UL — LOW (ref 4.2–5.8)
RBC # BLD: 3.32 M/UL — LOW (ref 4.2–5.8)
RBC # BLD: 3.33 M/UL — LOW (ref 4.2–5.8)
RBC # BLD: 3.35 M/UL — LOW (ref 4.2–5.8)
RBC # BLD: 3.42 M/UL — LOW (ref 4.2–5.8)
RBC # BLD: 3.46 M/UL
RBC # BLD: 3.63 M/UL — LOW (ref 4.2–5.8)
RBC # BLD: 3.64 M/UL — LOW (ref 4.2–5.8)
RBC # BLD: 3.75 M/UL — LOW (ref 4.2–5.8)
RBC # BLD: 3.81 M/UL
RBC # FLD: 15.1 %
RBC # FLD: 15.3 %
RBC # FLD: 15.6 % — HIGH (ref 10.3–14.5)
RBC # FLD: 15.7 % — HIGH (ref 10.3–14.5)
RBC # FLD: 15.7 % — HIGH (ref 10.3–14.5)
RBC # FLD: 15.8 % — HIGH (ref 10.3–14.5)
RBC # FLD: 15.9 % — HIGH (ref 10.3–14.5)
RBC # FLD: 16 % — HIGH (ref 10.3–14.5)
RBC # FLD: 16.1 % — HIGH (ref 10.3–14.5)
RBC # FLD: 16.2 % — HIGH (ref 10.3–14.5)
RBC # FLD: 16.3 %
RBC # FLD: 16.3 % — HIGH (ref 10.3–14.5)
RBC # FLD: 16.4 % — HIGH (ref 10.3–14.5)
RBC # FLD: 16.5 % — HIGH (ref 10.3–14.5)
RBC # FLD: 16.5 % — HIGH (ref 10.3–14.5)
RBC # FLD: 16.6 % — HIGH (ref 10.3–14.5)
RBC # FLD: 16.7 % — HIGH (ref 10.3–14.5)
RBC # FLD: 16.7 % — HIGH (ref 10.3–14.5)
RBC # FLD: 16.9 % — HIGH (ref 10.3–14.5)
RBC # FLD: 16.9 % — HIGH (ref 10.3–14.5)
RBC # FLD: 17 % — HIGH (ref 10.3–14.5)
RBC # FLD: 17 % — HIGH (ref 10.3–14.5)
RBC # FLD: 17.1 % — HIGH (ref 10.3–14.5)
RBC # FLD: 17.2 % — HIGH (ref 10.3–14.5)
RBC # FLD: 17.2 % — HIGH (ref 10.3–14.5)
RBC # FLD: 17.4 % — HIGH (ref 10.3–14.5)
RBC # FLD: 17.6 % — HIGH (ref 10.3–14.5)
RBC # FLD: 17.7 % — HIGH (ref 10.3–14.5)
RBC # FLD: 17.9 % — HIGH (ref 10.3–14.5)
RBC # FLD: 18 % — HIGH (ref 10.3–14.5)
RBC # FLD: 18.3 % — HIGH (ref 10.3–14.5)
RBC # FLD: 18.4 % — HIGH (ref 10.3–14.5)
RBC BLD AUTO: ABNORMAL
RBC CASTS # UR COMP ASSIST: < 5 /HPF — SIGNIFICANT CHANGE UP
RBC CASTS # UR COMP ASSIST: > 10 /HPF
RBC CASTS # UR COMP ASSIST: ABNORMAL /HPF
RED BLOOD CELLS URINE: 1 /HPF
RETICS #: 14.6 K/UL — LOW (ref 25–125)
RETICS/RBC NFR: 0.6 % — SIGNIFICANT CHANGE UP (ref 0.5–2.5)
RH IG SCN BLD-IMP: POSITIVE — SIGNIFICANT CHANGE UP
RHEUMATOID FACT SER QL: <10 IU/ML
RHEUMATOID FACT SER QL: <10 IU/ML
RV+EV RNA SPEC QL NAA+PROBE: DETECTED
RVP NOTIFY: SIGNIFICANT CHANGE UP
S AUREUS DNA NOSE QL NAA+PROBE: POSITIVE
S AUREUS DNA NOSE QL NAA+PROBE: POSITIVE
S PNEUM AG UR QL: NEGATIVE — SIGNIFICANT CHANGE UP
S PNEUM AG UR QL: NEGATIVE — SIGNIFICANT CHANGE UP
SAO2 % BLDA: 94.3 % — SIGNIFICANT CHANGE UP (ref 94–98)
SAO2 % BLDA: 96.8 % — SIGNIFICANT CHANGE UP (ref 94–98)
SAO2 % BLDA: 96.8 % — SIGNIFICANT CHANGE UP (ref 94–98)
SAO2 % BLDA: 97 % — SIGNIFICANT CHANGE UP (ref 94–98)
SAO2 % BLDA: 97.3 % — SIGNIFICANT CHANGE UP (ref 94–98)
SAO2 % BLDA: 97.3 % — SIGNIFICANT CHANGE UP (ref 94–98)
SAO2 % BLDA: 97.6 % — SIGNIFICANT CHANGE UP (ref 94–98)
SAO2 % BLDV: 52.7 % — LOW (ref 67–88)
SAO2 % BLDV: 91.9 % — HIGH (ref 67–88)
SARS-COV-2 RNA SPEC QL NAA+PROBE: NEGATIVE — SIGNIFICANT CHANGE UP
SARS-COV-2 RNA SPEC QL NAA+PROBE: SIGNIFICANT CHANGE UP
SCHISTOCYTES BLD QL AUTO: SIGNIFICANT CHANGE UP
SCHISTOCYTES BLD QL AUTO: SLIGHT — SIGNIFICANT CHANGE UP
SMUDGE CELLS # BLD: PRESENT — SIGNIFICANT CHANGE UP
SODIUM SERPL-SCNC: 127 MMOL/L — LOW (ref 135–145)
SODIUM SERPL-SCNC: 130 MMOL/L — LOW (ref 135–145)
SODIUM SERPL-SCNC: 131 MMOL/L — LOW (ref 135–145)
SODIUM SERPL-SCNC: 132 MMOL/L — LOW (ref 135–145)
SODIUM SERPL-SCNC: 132 MMOL/L — LOW (ref 135–145)
SODIUM SERPL-SCNC: 133 MMOL/L — LOW (ref 135–145)
SODIUM SERPL-SCNC: 134 MMOL/L — LOW (ref 135–145)
SODIUM SERPL-SCNC: 135 MMOL/L — SIGNIFICANT CHANGE UP (ref 135–145)
SODIUM SERPL-SCNC: 136 MMOL/L
SODIUM SERPL-SCNC: 136 MMOL/L — SIGNIFICANT CHANGE UP (ref 135–145)
SODIUM SERPL-SCNC: 136 MMOL/L — SIGNIFICANT CHANGE UP (ref 135–145)
SODIUM SERPL-SCNC: 137 MMOL/L — SIGNIFICANT CHANGE UP (ref 135–145)
SODIUM SERPL-SCNC: 138 MMOL/L — SIGNIFICANT CHANGE UP (ref 135–145)
SODIUM SERPL-SCNC: 139 MMOL/L — SIGNIFICANT CHANGE UP (ref 135–145)
SODIUM SERPL-SCNC: 140 MMOL/L
SODIUM SERPL-SCNC: 140 MMOL/L — SIGNIFICANT CHANGE UP (ref 135–145)
SODIUM SERPL-SCNC: 141 MMOL/L — SIGNIFICANT CHANGE UP (ref 135–145)
SODIUM SERPL-SCNC: 142 MMOL/L — SIGNIFICANT CHANGE UP (ref 135–145)
SODIUM SERPL-SCNC: 142 MMOL/L — SIGNIFICANT CHANGE UP (ref 135–145)
SODIUM SERPL-SCNC: 143 MMOL/L — SIGNIFICANT CHANGE UP (ref 135–145)
SODIUM SERPL-SCNC: 143 MMOL/L — SIGNIFICANT CHANGE UP (ref 135–145)
SODIUM SERPL-SCNC: 144 MMOL/L — SIGNIFICANT CHANGE UP (ref 135–145)
SODIUM SERPL-SCNC: 145 MMOL/L — SIGNIFICANT CHANGE UP (ref 135–145)
SODIUM SERPL-SCNC: 146 MMOL/L — HIGH (ref 135–145)
SODIUM SERPL-SCNC: 146 MMOL/L — HIGH (ref 135–145)
SODIUM SERPL-SCNC: 148 MMOL/L — HIGH (ref 135–145)
SODIUM UR-SCNC: 20 MMOL/L — SIGNIFICANT CHANGE UP
SODIUM UR-SCNC: 25 MMOL/L — SIGNIFICANT CHANGE UP
SODIUM UR-SCNC: 49 MMOL/L — SIGNIFICANT CHANGE UP
SODIUM UR-SCNC: 73 MMOL/L — SIGNIFICANT CHANGE UP
SODIUM UR-SCNC: 81 MMOL/L — SIGNIFICANT CHANGE UP
SODIUM UR-SCNC: <20 MMOL/L — SIGNIFICANT CHANGE UP
SP GR SPEC: 1.02 — SIGNIFICANT CHANGE UP (ref 1–1.03)
SPECIFIC GRAVITY URINE: 1.01
SPECIMEN SOURCE: SIGNIFICANT CHANGE UP
SPHEROCYTES BLD QL SMEAR: SLIGHT — SIGNIFICANT CHANGE UP
SQUAMOUS EPITHELIAL CELLS: 0 /HPF
T3 SERPL-MCNC: 49 NG/DL — LOW (ref 80–200)
T3FREE SERPL-MCNC: 1.54 PG/ML
T3FREE SERPL-MCNC: 1.87 PG/ML
T3RU NFR SERPL: 0.8 TBI
T3RU NFR SERPL: 0.9 TBI
T4 AB SER-ACNC: 3.55 UG/DL — LOW (ref 4.5–11.7)
T4 AB SER-ACNC: 4.51 UG/DL — SIGNIFICANT CHANGE UP (ref 4.5–11.7)
T4 FREE SERPL-MCNC: 0.56 NG/DL — LOW (ref 0.93–1.7)
T4 FREE SERPL-MCNC: 0.7 NG/DL — LOW (ref 0.93–1.7)
T4 FREE SERPL-MCNC: 0.72 NG/DL — LOW (ref 0.93–1.7)
T4 FREE SERPL-MCNC: 0.74 NG/DL — LOW (ref 0.93–1.7)
T4 FREE SERPL-MCNC: 0.77 NG/DL — LOW (ref 0.93–1.7)
T4 FREE SERPL-MCNC: 0.95 NG/DL — SIGNIFICANT CHANGE UP (ref 0.93–1.7)
T4 FREE SERPL-MCNC: 1.1 NG/DL
T4 FREE SERPL-MCNC: 1.1 NG/DL
T4 SERPL-MCNC: 5.5 UG/DL
T4 SERPL-MCNC: 5.6 UG/DL
TACROLIMUS SERPL-MCNC: 2.2 NG/ML — SIGNIFICANT CHANGE UP
TACROLIMUS SERPL-MCNC: 2.4 NG/ML — SIGNIFICANT CHANGE UP
TACROLIMUS SERPL-MCNC: 2.5 NG/ML — SIGNIFICANT CHANGE UP
TACROLIMUS SERPL-MCNC: 2.5 NG/ML — SIGNIFICANT CHANGE UP
TACROLIMUS SERPL-MCNC: 2.7 NG/ML — SIGNIFICANT CHANGE UP
TACROLIMUS SERPL-MCNC: 2.7 NG/ML — SIGNIFICANT CHANGE UP
TACROLIMUS SERPL-MCNC: 3 NG/ML — SIGNIFICANT CHANGE UP
TACROLIMUS SERPL-MCNC: 3.1 NG/ML — SIGNIFICANT CHANGE UP
TACROLIMUS SERPL-MCNC: 3.2 NG/ML — SIGNIFICANT CHANGE UP
TACROLIMUS SERPL-MCNC: 3.3 NG/ML — SIGNIFICANT CHANGE UP
TACROLIMUS SERPL-MCNC: 3.4 NG/ML — SIGNIFICANT CHANGE UP
TACROLIMUS SERPL-MCNC: 3.6 NG/ML — SIGNIFICANT CHANGE UP
TACROLIMUS SERPL-MCNC: 3.7 NG/ML — SIGNIFICANT CHANGE UP
TACROLIMUS SERPL-MCNC: 4 NG/ML — SIGNIFICANT CHANGE UP
TACROLIMUS SERPL-MCNC: 4.1 NG/ML — SIGNIFICANT CHANGE UP
TACROLIMUS SERPL-MCNC: 5.4 NG/ML — SIGNIFICANT CHANGE UP
TACROLIMUS SERPL-MCNC: 5.6 NG/ML — SIGNIFICANT CHANGE UP
TACROLIMUS SERPL-MCNC: 6.5 NG/ML
TACROLIMUS SERPL-MCNC: 8.3 NG/ML — SIGNIFICANT CHANGE UP
TACROLIMUS SERPL-MCNC: 8.5 NG/ML — SIGNIFICANT CHANGE UP
TACROLIMUS SERPL-MCNC: <2 NG/ML — SIGNIFICANT CHANGE UP
THYROGLOB AB SERPL-ACNC: <20 IU/ML
THYROGLOB AB SERPL-ACNC: <20 IU/ML
THYROPEROXIDASE AB SERPL IA-ACNC: <10 IU/ML
THYROPEROXIDASE AB SERPL IA-ACNC: <10 IU/ML
TIBC SERPL-MCNC: 148 UG/DL — LOW (ref 220–430)
TIBC SERPL-MCNC: 217 UG/DL
TIBC SERPL-MCNC: 232 UG/DL
TRANSFERRIN SERPL-MCNC: 133 MG/DL — LOW (ref 200–360)
TRIGL SERPL-MCNC: 114 MG/DL — SIGNIFICANT CHANGE UP
TRIGL SERPL-MCNC: 60 MG/DL
TRIGL SERPL-MCNC: 79 MG/DL
TRIGL SERPL-MCNC: 91 MG/DL — SIGNIFICANT CHANGE UP
TROPONIN T SERPL-MCNC: 0.02 NG/ML — HIGH (ref 0–0.01)
TROPONIN T SERPL-MCNC: 0.03 NG/ML — HIGH (ref 0–0.01)
TSH SERPL-ACNC: 5.34 UIU/ML
TSH SERPL-ACNC: 5.99 UIU/ML
TSH SERPL-MCNC: 11.17 UIU/ML — HIGH (ref 0.27–4.2)
TSH SERPL-MCNC: 20.89 UIU/ML — HIGH (ref 0.27–4.2)
TSH SERPL-MCNC: 4.64 UIU/ML — HIGH (ref 0.27–4.2)
TSH SERPL-MCNC: 6.88 UIU/ML — HIGH (ref 0.27–4.2)
TSH SERPL-MCNC: 9.26 UIU/ML — HIGH (ref 0.27–4.2)
UIBC SERPL-MCNC: 172 UG/DL
UIBC SERPL-MCNC: 204 UG/DL
UIBC SERPL-MCNC: 69 UG/DL — LOW (ref 110–370)
URATE SERPL-MCNC: 10 MG/DL
URATE SERPL-MCNC: 9.8 MG/DL
UROBILINOGEN FLD QL: 0.2 E.U./DL — SIGNIFICANT CHANGE UP
UROBILINOGEN FLD QL: 1 E.U./DL — SIGNIFICANT CHANGE UP
UROBILINOGEN URINE: NORMAL
UUN UR-MCNC: 360 MG/DL — SIGNIFICANT CHANGE UP
VANCOMYCIN TROUGH SERPL-MCNC: 25.3 UG/ML — CRITICAL HIGH (ref 10–20)
VIT A SERPL-MCNC: 29.2 UG/DL
VIT B1 SERPL-MCNC: 100.8 NMOL/L
VIT B12 SERPL-MCNC: 328 PG/ML
VIT B12 SERPL-MCNC: 565 PG/ML
VIT B2 SERPL-MCNC: 228 UG/L
VIT B6 SERPL-MCNC: 1.6 UG/L
VIT C SERPL-MCNC: 0.1 MG/DL
WBC # BLD: 10.26 K/UL — SIGNIFICANT CHANGE UP (ref 3.8–10.5)
WBC # BLD: 10.42 K/UL — SIGNIFICANT CHANGE UP (ref 3.8–10.5)
WBC # BLD: 10.78 K/UL — HIGH (ref 3.8–10.5)
WBC # BLD: 11.02 K/UL — HIGH (ref 3.8–10.5)
WBC # BLD: 11.68 K/UL — HIGH (ref 3.8–10.5)
WBC # BLD: 12.52 K/UL — HIGH (ref 3.8–10.5)
WBC # BLD: 13.33 K/UL — HIGH (ref 3.8–10.5)
WBC # BLD: 14.68 K/UL — HIGH (ref 3.8–10.5)
WBC # BLD: 15.1 K/UL — HIGH (ref 3.8–10.5)
WBC # BLD: 15.4 K/UL — HIGH (ref 3.8–10.5)
WBC # BLD: 17.43 K/UL — HIGH (ref 3.8–10.5)
WBC # BLD: 25.22 K/UL — HIGH (ref 3.8–10.5)
WBC # BLD: 27.25 K/UL — HIGH (ref 3.8–10.5)
WBC # BLD: 3.34 K/UL — LOW (ref 3.8–10.5)
WBC # BLD: 3.81 K/UL — SIGNIFICANT CHANGE UP (ref 3.8–10.5)
WBC # BLD: 3.82 K/UL — SIGNIFICANT CHANGE UP (ref 3.8–10.5)
WBC # BLD: 4.05 K/UL — SIGNIFICANT CHANGE UP (ref 3.8–10.5)
WBC # BLD: 4.11 K/UL — SIGNIFICANT CHANGE UP (ref 3.8–10.5)
WBC # BLD: 4.34 K/UL — SIGNIFICANT CHANGE UP (ref 3.8–10.5)
WBC # BLD: 4.36 K/UL — SIGNIFICANT CHANGE UP (ref 3.8–10.5)
WBC # BLD: 4.49 K/UL — SIGNIFICANT CHANGE UP (ref 3.8–10.5)
WBC # BLD: 4.5 K/UL — SIGNIFICANT CHANGE UP (ref 3.8–10.5)
WBC # BLD: 4.7 K/UL — SIGNIFICANT CHANGE UP (ref 3.8–10.5)
WBC # BLD: 4.93 K/UL — SIGNIFICANT CHANGE UP (ref 3.8–10.5)
WBC # BLD: 4.98 K/UL — SIGNIFICANT CHANGE UP (ref 3.8–10.5)
WBC # BLD: 5.07 K/UL — SIGNIFICANT CHANGE UP (ref 3.8–10.5)
WBC # BLD: 5.26 K/UL — SIGNIFICANT CHANGE UP (ref 3.8–10.5)
WBC # BLD: 5.44 K/UL — SIGNIFICANT CHANGE UP (ref 3.8–10.5)
WBC # BLD: 5.81 K/UL — SIGNIFICANT CHANGE UP (ref 3.8–10.5)
WBC # BLD: 5.94 K/UL — SIGNIFICANT CHANGE UP (ref 3.8–10.5)
WBC # BLD: 5.99 K/UL — SIGNIFICANT CHANGE UP (ref 3.8–10.5)
WBC # BLD: 6.09 K/UL — SIGNIFICANT CHANGE UP (ref 3.8–10.5)
WBC # BLD: 6.15 K/UL — SIGNIFICANT CHANGE UP (ref 3.8–10.5)
WBC # BLD: 6.16 K/UL — SIGNIFICANT CHANGE UP (ref 3.8–10.5)
WBC # BLD: 6.2 K/UL — SIGNIFICANT CHANGE UP (ref 3.8–10.5)
WBC # BLD: 6.37 K/UL — SIGNIFICANT CHANGE UP (ref 3.8–10.5)
WBC # BLD: 6.46 K/UL — SIGNIFICANT CHANGE UP (ref 3.8–10.5)
WBC # BLD: 6.68 K/UL — SIGNIFICANT CHANGE UP (ref 3.8–10.5)
WBC # BLD: 6.94 K/UL — SIGNIFICANT CHANGE UP (ref 3.8–10.5)
WBC # BLD: 7.09 K/UL — SIGNIFICANT CHANGE UP (ref 3.8–10.5)
WBC # BLD: 7.24 K/UL — SIGNIFICANT CHANGE UP (ref 3.8–10.5)
WBC # BLD: 7.26 K/UL — SIGNIFICANT CHANGE UP (ref 3.8–10.5)
WBC # BLD: 8.05 K/UL — SIGNIFICANT CHANGE UP (ref 3.8–10.5)
WBC # BLD: 8.68 K/UL — SIGNIFICANT CHANGE UP (ref 3.8–10.5)
WBC # BLD: 8.75 K/UL — SIGNIFICANT CHANGE UP (ref 3.8–10.5)
WBC # BLD: 8.81 K/UL — SIGNIFICANT CHANGE UP (ref 3.8–10.5)
WBC # BLD: 9.06 K/UL — SIGNIFICANT CHANGE UP (ref 3.8–10.5)
WBC # BLD: 9.16 K/UL — SIGNIFICANT CHANGE UP (ref 3.8–10.5)
WBC # BLD: 9.8 K/UL — SIGNIFICANT CHANGE UP (ref 3.8–10.5)
WBC # BLD: 9.81 K/UL — SIGNIFICANT CHANGE UP (ref 3.8–10.5)
WBC # FLD AUTO: 10.26 K/UL — SIGNIFICANT CHANGE UP (ref 3.8–10.5)
WBC # FLD AUTO: 10.42 K/UL — SIGNIFICANT CHANGE UP (ref 3.8–10.5)
WBC # FLD AUTO: 10.78 K/UL — HIGH (ref 3.8–10.5)
WBC # FLD AUTO: 11.02 K/UL — HIGH (ref 3.8–10.5)
WBC # FLD AUTO: 11.68 K/UL — HIGH (ref 3.8–10.5)
WBC # FLD AUTO: 12.52 K/UL — HIGH (ref 3.8–10.5)
WBC # FLD AUTO: 13.33 K/UL — HIGH (ref 3.8–10.5)
WBC # FLD AUTO: 14.68 K/UL — HIGH (ref 3.8–10.5)
WBC # FLD AUTO: 15.1 K/UL — HIGH (ref 3.8–10.5)
WBC # FLD AUTO: 15.4 K/UL — HIGH (ref 3.8–10.5)
WBC # FLD AUTO: 17.43 K/UL — HIGH (ref 3.8–10.5)
WBC # FLD AUTO: 25.22 K/UL — HIGH (ref 3.8–10.5)
WBC # FLD AUTO: 27.25 K/UL — HIGH (ref 3.8–10.5)
WBC # FLD AUTO: 3.34 K/UL — LOW (ref 3.8–10.5)
WBC # FLD AUTO: 3.81 K/UL — SIGNIFICANT CHANGE UP (ref 3.8–10.5)
WBC # FLD AUTO: 3.82 K/UL — SIGNIFICANT CHANGE UP (ref 3.8–10.5)
WBC # FLD AUTO: 4.05 K/UL — SIGNIFICANT CHANGE UP (ref 3.8–10.5)
WBC # FLD AUTO: 4.11 K/UL — SIGNIFICANT CHANGE UP (ref 3.8–10.5)
WBC # FLD AUTO: 4.34 K/UL — SIGNIFICANT CHANGE UP (ref 3.8–10.5)
WBC # FLD AUTO: 4.36 K/UL — SIGNIFICANT CHANGE UP (ref 3.8–10.5)
WBC # FLD AUTO: 4.49 K/UL — SIGNIFICANT CHANGE UP (ref 3.8–10.5)
WBC # FLD AUTO: 4.5 K/UL — SIGNIFICANT CHANGE UP (ref 3.8–10.5)
WBC # FLD AUTO: 4.7 K/UL — SIGNIFICANT CHANGE UP (ref 3.8–10.5)
WBC # FLD AUTO: 4.93 K/UL — SIGNIFICANT CHANGE UP (ref 3.8–10.5)
WBC # FLD AUTO: 4.98 K/UL — SIGNIFICANT CHANGE UP (ref 3.8–10.5)
WBC # FLD AUTO: 5.07 K/UL — SIGNIFICANT CHANGE UP (ref 3.8–10.5)
WBC # FLD AUTO: 5.26 K/UL — SIGNIFICANT CHANGE UP (ref 3.8–10.5)
WBC # FLD AUTO: 5.44 K/UL — SIGNIFICANT CHANGE UP (ref 3.8–10.5)
WBC # FLD AUTO: 5.81 K/UL — SIGNIFICANT CHANGE UP (ref 3.8–10.5)
WBC # FLD AUTO: 5.94 K/UL — SIGNIFICANT CHANGE UP (ref 3.8–10.5)
WBC # FLD AUTO: 5.99 K/UL — SIGNIFICANT CHANGE UP (ref 3.8–10.5)
WBC # FLD AUTO: 6.09 K/UL — SIGNIFICANT CHANGE UP (ref 3.8–10.5)
WBC # FLD AUTO: 6.15 K/UL — SIGNIFICANT CHANGE UP (ref 3.8–10.5)
WBC # FLD AUTO: 6.16 K/UL — SIGNIFICANT CHANGE UP (ref 3.8–10.5)
WBC # FLD AUTO: 6.18 K/UL
WBC # FLD AUTO: 6.2 K/UL — SIGNIFICANT CHANGE UP (ref 3.8–10.5)
WBC # FLD AUTO: 6.37 K/UL — SIGNIFICANT CHANGE UP (ref 3.8–10.5)
WBC # FLD AUTO: 6.46 K/UL — SIGNIFICANT CHANGE UP (ref 3.8–10.5)
WBC # FLD AUTO: 6.68 K/UL — SIGNIFICANT CHANGE UP (ref 3.8–10.5)
WBC # FLD AUTO: 6.94 K/UL — SIGNIFICANT CHANGE UP (ref 3.8–10.5)
WBC # FLD AUTO: 7.09 K/UL — SIGNIFICANT CHANGE UP (ref 3.8–10.5)
WBC # FLD AUTO: 7.24 K/UL — SIGNIFICANT CHANGE UP (ref 3.8–10.5)
WBC # FLD AUTO: 7.26 K/UL — SIGNIFICANT CHANGE UP (ref 3.8–10.5)
WBC # FLD AUTO: 7.89 K/UL
WBC # FLD AUTO: 8.05 K/UL — SIGNIFICANT CHANGE UP (ref 3.8–10.5)
WBC # FLD AUTO: 8.68 K/UL — SIGNIFICANT CHANGE UP (ref 3.8–10.5)
WBC # FLD AUTO: 8.75 K/UL — SIGNIFICANT CHANGE UP (ref 3.8–10.5)
WBC # FLD AUTO: 8.81 K/UL — SIGNIFICANT CHANGE UP (ref 3.8–10.5)
WBC # FLD AUTO: 9.06 K/UL — SIGNIFICANT CHANGE UP (ref 3.8–10.5)
WBC # FLD AUTO: 9.16 K/UL — SIGNIFICANT CHANGE UP (ref 3.8–10.5)
WBC # FLD AUTO: 9.48 K/UL
WBC # FLD AUTO: 9.8 K/UL — SIGNIFICANT CHANGE UP (ref 3.8–10.5)
WBC # FLD AUTO: 9.81 K/UL — SIGNIFICANT CHANGE UP (ref 3.8–10.5)
WBC UR QL: < 5 /HPF — SIGNIFICANT CHANGE UP
WBC UR QL: > 10 /HPF
WBC UR QL: ABNORMAL /HPF
WBC UR QL: ABNORMAL /HPF
WHITE BLOOD CELLS URINE: 1 /HPF

## 2022-01-01 PROCEDURE — 99233 SBSQ HOSP IP/OBS HIGH 50: CPT | Mod: GC

## 2022-01-01 PROCEDURE — 36415 COLL VENOUS BLD VENIPUNCTURE: CPT

## 2022-01-01 PROCEDURE — 99233 SBSQ HOSP IP/OBS HIGH 50: CPT

## 2022-01-01 PROCEDURE — 99291 CRITICAL CARE FIRST HOUR: CPT | Mod: 25

## 2022-01-01 PROCEDURE — 49465 FLUORO EXAM OF G/COLON TUBE: CPT

## 2022-01-01 PROCEDURE — 87635 SARS-COV-2 COVID-19 AMP PRB: CPT

## 2022-01-01 PROCEDURE — 76937 US GUIDE VASCULAR ACCESS: CPT | Mod: 26

## 2022-01-01 PROCEDURE — 99232 SBSQ HOSP IP/OBS MODERATE 35: CPT

## 2022-01-01 PROCEDURE — 93308 TTE F-UP OR LMTD: CPT

## 2022-01-01 PROCEDURE — 84100 ASSAY OF PHOSPHORUS: CPT

## 2022-01-01 PROCEDURE — 87070 CULTURE OTHR SPECIMN AEROBIC: CPT

## 2022-01-01 PROCEDURE — 94003 VENT MGMT INPAT SUBQ DAY: CPT

## 2022-01-01 PROCEDURE — 36000 PLACE NEEDLE IN VEIN: CPT

## 2022-01-01 PROCEDURE — 99233 SBSQ HOSP IP/OBS HIGH 50: CPT | Mod: GC,25

## 2022-01-01 PROCEDURE — 93971 EXTREMITY STUDY: CPT

## 2022-01-01 PROCEDURE — 93306 TTE W/DOPPLER COMPLETE: CPT

## 2022-01-01 PROCEDURE — 73070 X-RAY EXAM OF ELBOW: CPT

## 2022-01-01 PROCEDURE — 84540 ASSAY OF URINE/UREA-N: CPT

## 2022-01-01 PROCEDURE — 99231 SBSQ HOSP IP/OBS SF/LOW 25: CPT

## 2022-01-01 PROCEDURE — 71045 X-RAY EXAM CHEST 1 VIEW: CPT | Mod: 26

## 2022-01-01 PROCEDURE — 93321 DOPPLER ECHO F-UP/LMTD STD: CPT | Mod: 26

## 2022-01-01 PROCEDURE — 99232 SBSQ HOSP IP/OBS MODERATE 35: CPT | Mod: GC

## 2022-01-01 PROCEDURE — 83880 ASSAY OF NATRIURETIC PEPTIDE: CPT

## 2022-01-01 PROCEDURE — 74019 RADEX ABDOMEN 2 VIEWS: CPT

## 2022-01-01 PROCEDURE — 84443 ASSAY THYROID STIM HORMONE: CPT

## 2022-01-01 PROCEDURE — 99285 EMERGENCY DEPT VISIT HI MDM: CPT

## 2022-01-01 PROCEDURE — 96374 THER/PROPH/DIAG INJ IV PUSH: CPT

## 2022-01-01 PROCEDURE — 99442: CPT | Mod: 95

## 2022-01-01 PROCEDURE — C8929: CPT

## 2022-01-01 PROCEDURE — 99443: CPT | Mod: CS,95

## 2022-01-01 PROCEDURE — 87040 BLOOD CULTURE FOR BACTERIA: CPT

## 2022-01-01 PROCEDURE — 87899 AGENT NOS ASSAY W/OPTIC: CPT

## 2022-01-01 PROCEDURE — 80061 LIPID PANEL: CPT

## 2022-01-01 PROCEDURE — 0225U NFCT DS DNA&RNA 21 SARSCOV2: CPT

## 2022-01-01 PROCEDURE — 99214 OFFICE O/P EST MOD 30 MIN: CPT | Mod: 25

## 2022-01-01 PROCEDURE — 80299 QUANTITATIVE ASSAY DRUG: CPT

## 2022-01-01 PROCEDURE — 80197 ASSAY OF TACROLIMUS: CPT

## 2022-01-01 PROCEDURE — 86901 BLOOD TYPING SEROLOGIC RH(D): CPT

## 2022-01-01 PROCEDURE — 85027 COMPLETE CBC AUTOMATED: CPT

## 2022-01-01 PROCEDURE — 99291 CRITICAL CARE FIRST HOUR: CPT

## 2022-01-01 PROCEDURE — 84132 ASSAY OF SERUM POTASSIUM: CPT

## 2022-01-01 PROCEDURE — 85025 COMPLETE CBC W/AUTO DIFF WBC: CPT

## 2022-01-01 PROCEDURE — 84436 ASSAY OF TOTAL THYROXINE: CPT

## 2022-01-01 PROCEDURE — 86706 HEP B SURFACE ANTIBODY: CPT

## 2022-01-01 PROCEDURE — 92526 ORAL FUNCTION THERAPY: CPT

## 2022-01-01 PROCEDURE — P9040: CPT

## 2022-01-01 PROCEDURE — 31600 PLANNED TRACHEOSTOMY: CPT | Mod: GC

## 2022-01-01 PROCEDURE — 92610 EVALUATE SWALLOWING FUNCTION: CPT

## 2022-01-01 PROCEDURE — 71045 X-RAY EXAM CHEST 1 VIEW: CPT

## 2022-01-01 PROCEDURE — 71250 CT THORAX DX C-: CPT | Mod: 26,MA

## 2022-01-01 PROCEDURE — 97163 PT EVAL HIGH COMPLEX 45 MIN: CPT

## 2022-01-01 PROCEDURE — 84466 ASSAY OF TRANSFERRIN: CPT

## 2022-01-01 PROCEDURE — 85610 PROTHROMBIN TIME: CPT

## 2022-01-01 PROCEDURE — 82570 ASSAY OF URINE CREATININE: CPT

## 2022-01-01 PROCEDURE — 93005 ELECTROCARDIOGRAM TRACING: CPT

## 2022-01-01 PROCEDURE — 84478 ASSAY OF TRIGLYCERIDES: CPT

## 2022-01-01 PROCEDURE — 99285 EMERGENCY DEPT VISIT HI MDM: CPT | Mod: CS

## 2022-01-01 PROCEDURE — 87449 NOS EACH ORGANISM AG IA: CPT

## 2022-01-01 PROCEDURE — 36000 PLACE NEEDLE IN VEIN: CPT | Mod: 59

## 2022-01-01 PROCEDURE — 84300 ASSAY OF URINE SODIUM: CPT

## 2022-01-01 PROCEDURE — 83605 ASSAY OF LACTIC ACID: CPT

## 2022-01-01 PROCEDURE — 80048 BASIC METABOLIC PNL TOTAL CA: CPT

## 2022-01-01 PROCEDURE — 99222 1ST HOSP IP/OBS MODERATE 55: CPT | Mod: GC

## 2022-01-01 PROCEDURE — 93306 TTE W/DOPPLER COMPLETE: CPT | Mod: 26

## 2022-01-01 PROCEDURE — 99215 OFFICE O/P EST HI 40 MIN: CPT

## 2022-01-01 PROCEDURE — 93970 EXTREMITY STUDY: CPT | Mod: 26

## 2022-01-01 PROCEDURE — 87340 HEPATITIS B SURFACE AG IA: CPT

## 2022-01-01 PROCEDURE — 90935 HEMODIALYSIS ONE EVALUATION: CPT

## 2022-01-01 PROCEDURE — 99222 1ST HOSP IP/OBS MODERATE 55: CPT

## 2022-01-01 PROCEDURE — 96375 TX/PRO/DX INJ NEW DRUG ADDON: CPT

## 2022-01-01 PROCEDURE — 87640 STAPH A DNA AMP PROBE: CPT

## 2022-01-01 PROCEDURE — 82728 ASSAY OF FERRITIN: CPT

## 2022-01-01 PROCEDURE — 36620 INSERTION CATHETER ARTERY: CPT

## 2022-01-01 PROCEDURE — 97530 THERAPEUTIC ACTIVITIES: CPT

## 2022-01-01 PROCEDURE — 87150 DNA/RNA AMPLIFIED PROBE: CPT

## 2022-01-01 PROCEDURE — 86803 HEPATITIS C AB TEST: CPT

## 2022-01-01 PROCEDURE — 82533 TOTAL CORTISOL: CPT

## 2022-01-01 PROCEDURE — 70450 CT HEAD/BRAIN W/O DYE: CPT

## 2022-01-01 PROCEDURE — 99232 SBSQ HOSP IP/OBS MODERATE 35: CPT | Mod: 25

## 2022-01-01 PROCEDURE — 94002 VENT MGMT INPAT INIT DAY: CPT

## 2022-01-01 PROCEDURE — 31622 DX BRONCHOSCOPE/WASH: CPT | Mod: GC

## 2022-01-01 PROCEDURE — 85730 THROMBOPLASTIN TIME PARTIAL: CPT

## 2022-01-01 PROCEDURE — 82550 ASSAY OF CK (CPK): CPT

## 2022-01-01 PROCEDURE — 87086 URINE CULTURE/COLONY COUNT: CPT

## 2022-01-01 PROCEDURE — 83615 LACTATE (LD) (LDH) ENZYME: CPT

## 2022-01-01 PROCEDURE — 71250 CT THORAX DX C-: CPT | Mod: MA

## 2022-01-01 PROCEDURE — 81001 URINALYSIS AUTO W/SCOPE: CPT

## 2022-01-01 PROCEDURE — 87184 SC STD DISK METHOD PER PLATE: CPT

## 2022-01-01 PROCEDURE — 93308 TTE F-UP OR LMTD: CPT | Mod: 26

## 2022-01-01 PROCEDURE — 86850 RBC ANTIBODY SCREEN: CPT

## 2022-01-01 PROCEDURE — 97161 PT EVAL LOW COMPLEX 20 MIN: CPT

## 2022-01-01 PROCEDURE — 99233 SBSQ HOSP IP/OBS HIGH 50: CPT | Mod: 25

## 2022-01-01 PROCEDURE — 80076 HEPATIC FUNCTION PANEL: CPT

## 2022-01-01 PROCEDURE — 99443: CPT | Mod: 95

## 2022-01-01 PROCEDURE — 76604 US EXAM CHEST: CPT | Mod: 26

## 2022-01-01 PROCEDURE — 82553 CREATINE MB FRACTION: CPT

## 2022-01-01 PROCEDURE — 86900 BLOOD TYPING SEROLOGIC ABO: CPT

## 2022-01-01 PROCEDURE — 84133 ASSAY OF URINE POTASSIUM: CPT

## 2022-01-01 PROCEDURE — 82803 BLOOD GASES ANY COMBINATION: CPT

## 2022-01-01 PROCEDURE — 92522 EVALUATE SPEECH PRODUCTION: CPT

## 2022-01-01 PROCEDURE — 83036 HEMOGLOBIN GLYCOSYLATED A1C: CPT

## 2022-01-01 PROCEDURE — 97110 THERAPEUTIC EXERCISES: CPT

## 2022-01-01 PROCEDURE — 99239 HOSP IP/OBS DSCHRG MGMT >30: CPT

## 2022-01-01 PROCEDURE — 84439 ASSAY OF FREE THYROXINE: CPT

## 2022-01-01 PROCEDURE — U0003: CPT

## 2022-01-01 PROCEDURE — 99495 TRANSJ CARE MGMT MOD F2F 14D: CPT | Mod: 95

## 2022-01-01 PROCEDURE — 82962 GLUCOSE BLOOD TEST: CPT

## 2022-01-01 PROCEDURE — 99231 SBSQ HOSP IP/OBS SF/LOW 25: CPT | Mod: GC

## 2022-01-01 PROCEDURE — 83010 ASSAY OF HAPTOGLOBIN QUANT: CPT

## 2022-01-01 PROCEDURE — 93971 EXTREMITY STUDY: CPT | Mod: 26,RT

## 2022-01-01 PROCEDURE — 76604 US EXAM CHEST: CPT | Mod: 26,GC

## 2022-01-01 PROCEDURE — 71045 X-RAY EXAM CHEST 1 VIEW: CPT | Mod: 26,77

## 2022-01-01 PROCEDURE — 99285 EMERGENCY DEPT VISIT HI MDM: CPT | Mod: 25

## 2022-01-01 PROCEDURE — 36430 TRANSFUSION BLD/BLD COMPNT: CPT

## 2022-01-01 PROCEDURE — 80053 COMPREHEN METABOLIC PANEL: CPT

## 2022-01-01 PROCEDURE — 84295 ASSAY OF SERUM SODIUM: CPT

## 2022-01-01 PROCEDURE — 90937 HEMODIALYSIS REPEATED EVAL: CPT

## 2022-01-01 PROCEDURE — U0005: CPT

## 2022-01-01 PROCEDURE — 85045 AUTOMATED RETICULOCYTE COUNT: CPT

## 2022-01-01 PROCEDURE — 82330 ASSAY OF CALCIUM: CPT

## 2022-01-01 PROCEDURE — 97116 GAIT TRAINING THERAPY: CPT

## 2022-01-01 PROCEDURE — 82272 OCCULT BLD FECES 1-3 TESTS: CPT

## 2022-01-01 PROCEDURE — 84484 ASSAY OF TROPONIN QUANT: CPT

## 2022-01-01 PROCEDURE — 87186 SC STD MICRODIL/AGAR DIL: CPT

## 2022-01-01 PROCEDURE — 84156 ASSAY OF PROTEIN URINE: CPT

## 2022-01-01 PROCEDURE — 83540 ASSAY OF IRON: CPT

## 2022-01-01 PROCEDURE — 93010 ELECTROCARDIOGRAM REPORT: CPT

## 2022-01-01 PROCEDURE — 81003 URINALYSIS AUTO W/O SCOPE: CPT

## 2022-01-01 PROCEDURE — 80202 ASSAY OF VANCOMYCIN: CPT

## 2022-01-01 PROCEDURE — 73070 X-RAY EXAM OF ELBOW: CPT | Mod: 26,LT

## 2022-01-01 PROCEDURE — 82140 ASSAY OF AMMONIA: CPT

## 2022-01-01 PROCEDURE — 93970 EXTREMITY STUDY: CPT

## 2022-01-01 PROCEDURE — 83735 ASSAY OF MAGNESIUM: CPT

## 2022-01-01 PROCEDURE — 87641 MR-STAPH DNA AMP PROBE: CPT

## 2022-01-01 PROCEDURE — 83690 ASSAY OF LIPASE: CPT

## 2022-01-01 PROCEDURE — 83935 ASSAY OF URINE OSMOLALITY: CPT

## 2022-01-01 PROCEDURE — 84480 ASSAY TRIIODOTHYRONINE (T3): CPT

## 2022-01-01 PROCEDURE — 86923 COMPATIBILITY TEST ELECTRIC: CPT

## 2022-01-01 PROCEDURE — 94640 AIRWAY INHALATION TREATMENT: CPT

## 2022-01-01 PROCEDURE — 83550 IRON BINDING TEST: CPT

## 2022-01-01 PROCEDURE — 70450 CT HEAD/BRAIN W/O DYE: CPT | Mod: 26

## 2022-01-01 PROCEDURE — 74019 RADEX ABDOMEN 2 VIEWS: CPT | Mod: 26

## 2022-01-01 PROCEDURE — 99284 EMERGENCY DEPT VISIT MOD MDM: CPT

## 2022-01-01 RX ORDER — SODIUM CHLORIDE 9 MG/ML
1000 INJECTION, SOLUTION INTRAVENOUS ONCE
Refills: 0 | Status: DISCONTINUED | OUTPATIENT
Start: 2022-01-01 | End: 2022-01-01

## 2022-01-01 RX ORDER — INSULIN LISPRO 100/ML
7 VIAL (ML) SUBCUTANEOUS
Refills: 0 | Status: DISCONTINUED | OUTPATIENT
Start: 2022-01-01 | End: 2022-01-01

## 2022-01-01 RX ORDER — POTASSIUM CHLORIDE 20 MEQ
10 PACKET (EA) ORAL ONCE
Refills: 0 | Status: COMPLETED | OUTPATIENT
Start: 2022-01-01 | End: 2022-01-01

## 2022-01-01 RX ORDER — HYDROCORTISONE 20 MG
100 TABLET ORAL ONCE
Refills: 0 | Status: COMPLETED | OUTPATIENT
Start: 2022-01-01 | End: 2022-01-01

## 2022-01-01 RX ORDER — LEVOTHYROXINE SODIUM 125 MCG
25 TABLET ORAL AT BEDTIME
Refills: 0 | Status: DISCONTINUED | OUTPATIENT
Start: 2022-01-01 | End: 2022-01-01

## 2022-01-01 RX ORDER — INSULIN HUMAN 100 [IU]/ML
INJECTION, SOLUTION SUBCUTANEOUS EVERY 6 HOURS
Refills: 0 | Status: DISCONTINUED | OUTPATIENT
Start: 2022-01-01 | End: 2022-01-01

## 2022-01-01 RX ORDER — HEPARIN SODIUM 5000 [USP'U]/ML
5000 INJECTION INTRAVENOUS; SUBCUTANEOUS EVERY 8 HOURS
Refills: 0 | Status: DISCONTINUED | OUTPATIENT
Start: 2022-01-01 | End: 2022-01-01

## 2022-01-01 RX ORDER — CEFEPIME 1 G/1
2000 INJECTION, POWDER, FOR SOLUTION INTRAMUSCULAR; INTRAVENOUS ONCE
Refills: 0 | Status: COMPLETED | OUTPATIENT
Start: 2022-01-01 | End: 2022-01-01

## 2022-01-01 RX ORDER — TACROLIMUS 5 MG/1
1.3 CAPSULE ORAL EVERY 24 HOURS
Refills: 0 | Status: DISCONTINUED | OUTPATIENT
Start: 2022-01-01 | End: 2022-01-01

## 2022-01-01 RX ORDER — ACETAMINOPHEN 500 MG
650 TABLET ORAL EVERY 6 HOURS
Refills: 0 | Status: DISCONTINUED | OUTPATIENT
Start: 2022-01-01 | End: 2022-01-01

## 2022-01-01 RX ORDER — INSULIN HUMAN 100 [IU]/ML
4 INJECTION, SOLUTION SUBCUTANEOUS EVERY 6 HOURS
Refills: 0 | Status: DISCONTINUED | OUTPATIENT
Start: 2022-01-01 | End: 2022-01-01

## 2022-01-01 RX ORDER — TACROLIMUS 5 MG/1
2 CAPSULE ORAL EVERY 24 HOURS
Refills: 0 | Status: DISCONTINUED | OUTPATIENT
Start: 2022-01-01 | End: 2022-01-01

## 2022-01-01 RX ORDER — PROPOFOL 10 MG/ML
10 INJECTION, EMULSION INTRAVENOUS
Qty: 1000 | Refills: 0 | Status: DISCONTINUED | OUTPATIENT
Start: 2022-01-01 | End: 2022-01-01

## 2022-01-01 RX ORDER — LEVOTHYROXINE SODIUM 125 MCG
75 TABLET ORAL AT BEDTIME
Refills: 0 | Status: DISCONTINUED | OUTPATIENT
Start: 2022-01-01 | End: 2022-01-01

## 2022-01-01 RX ORDER — QUETIAPINE FUMARATE 200 MG/1
25 TABLET, FILM COATED ORAL ONCE
Refills: 0 | Status: COMPLETED | OUTPATIENT
Start: 2022-01-01 | End: 2022-01-01

## 2022-01-01 RX ORDER — TACROLIMUS 5 MG/1
3 CAPSULE ORAL ONCE
Refills: 0 | Status: DISCONTINUED | OUTPATIENT
Start: 2022-01-01 | End: 2022-01-01

## 2022-01-01 RX ORDER — FENTANYL CITRATE 50 UG/ML
50 INJECTION INTRAVENOUS ONCE
Refills: 0 | Status: DISCONTINUED | OUTPATIENT
Start: 2022-01-01 | End: 2022-01-01

## 2022-01-01 RX ORDER — SENNA PLUS 8.6 MG/1
2 TABLET ORAL AT BEDTIME
Refills: 0 | Status: DISCONTINUED | OUTPATIENT
Start: 2022-01-01 | End: 2022-01-01

## 2022-01-01 RX ORDER — DEXTROSE 50 % IN WATER 50 %
25 SYRINGE (ML) INTRAVENOUS ONCE
Refills: 0 | Status: COMPLETED | OUTPATIENT
Start: 2022-01-01 | End: 2022-01-01

## 2022-01-01 RX ORDER — TACROLIMUS 5 MG/1
2 CAPSULE ORAL EVERY 12 HOURS
Refills: 0 | Status: DISCONTINUED | OUTPATIENT
Start: 2022-01-01 | End: 2022-01-01

## 2022-01-01 RX ORDER — IPRATROPIUM/ALBUTEROL SULFATE 18-103MCG
3 AEROSOL WITH ADAPTER (GRAM) INHALATION
Qty: 0 | Refills: 0 | DISCHARGE
Start: 2022-01-01

## 2022-01-01 RX ORDER — TACROLIMUS 5 MG/1
3 CAPSULE ORAL EVERY 24 HOURS
Refills: 0 | Status: DISCONTINUED | OUTPATIENT
Start: 2022-01-01 | End: 2022-01-01

## 2022-01-01 RX ORDER — TRAMADOL HYDROCHLORIDE 50 MG/1
50 TABLET, COATED ORAL
Qty: 60 | Refills: 0 | Status: ACTIVE | COMMUNITY
Start: 2022-01-01 | End: 1900-01-01

## 2022-01-01 RX ORDER — AMLODIPINE BESYLATE 2.5 MG/1
2.5 TABLET ORAL DAILY
Qty: 90 | Refills: 3 | Status: COMPLETED | COMMUNITY
Start: 2020-09-25 | End: 2022-01-01

## 2022-01-01 RX ORDER — HUMAN INSULIN 100 [IU]/ML
6 INJECTION, SUSPENSION SUBCUTANEOUS ONCE
Refills: 0 | Status: COMPLETED | OUTPATIENT
Start: 2022-01-01 | End: 2022-01-01

## 2022-01-01 RX ORDER — LEVOFLOXACIN 5 MG/ML
1 INJECTION, SOLUTION INTRAVENOUS
Qty: 1 | Refills: 0
Start: 2022-01-01 | End: 2022-01-01

## 2022-01-01 RX ORDER — TACROLIMUS 5 MG/1
3 CAPSULE ORAL AT BEDTIME
Refills: 0 | Status: DISCONTINUED | OUTPATIENT
Start: 2022-01-01 | End: 2022-01-01

## 2022-01-01 RX ORDER — TACROLIMUS 5 MG/1
4 CAPSULE ORAL EVERY 12 HOURS
Refills: 0 | Status: DISCONTINUED | OUTPATIENT
Start: 2022-01-01 | End: 2022-01-01

## 2022-01-01 RX ORDER — SODIUM CHLORIDE 9 MG/ML
1000 INJECTION, SOLUTION INTRAVENOUS ONCE
Refills: 0 | Status: COMPLETED | OUTPATIENT
Start: 2022-01-01 | End: 2022-01-01

## 2022-01-01 RX ORDER — POLYETHYLENE GLYCOL 3350 17 G/17G
17 POWDER, FOR SOLUTION ORAL EVERY 12 HOURS
Refills: 0 | Status: DISCONTINUED | OUTPATIENT
Start: 2022-01-01 | End: 2022-01-01

## 2022-01-01 RX ORDER — PANTOPRAZOLE SODIUM 20 MG/1
40 TABLET, DELAYED RELEASE ORAL DAILY
Refills: 0 | Status: DISCONTINUED | OUTPATIENT
Start: 2022-01-01 | End: 2022-01-01

## 2022-01-01 RX ORDER — OMEPRAZOLE 20 MG/1
20 TABLET, DELAYED RELEASE ORAL
Qty: 90 | Refills: 0 | Status: ACTIVE | COMMUNITY
Start: 2022-01-01 | End: 1900-01-01

## 2022-01-01 RX ORDER — QUETIAPINE FUMARATE 200 MG/1
25 TABLET, FILM COATED ORAL
Refills: 0 | Status: DISCONTINUED | OUTPATIENT
Start: 2022-01-01 | End: 2022-01-01

## 2022-01-01 RX ORDER — CARVEDILOL PHOSPHATE 80 MG/1
25 CAPSULE, EXTENDED RELEASE ORAL EVERY 12 HOURS
Refills: 0 | Status: DISCONTINUED | OUTPATIENT
Start: 2022-01-01 | End: 2022-01-01

## 2022-01-01 RX ORDER — PIPERACILLIN AND TAZOBACTAM 4; .5 G/20ML; G/20ML
4.5 INJECTION, POWDER, LYOPHILIZED, FOR SOLUTION INTRAVENOUS ONCE
Refills: 0 | Status: COMPLETED | OUTPATIENT
Start: 2022-01-01 | End: 2022-01-01

## 2022-01-01 RX ORDER — DEXTROSE 50 % IN WATER 50 %
12.5 SYRINGE (ML) INTRAVENOUS ONCE
Refills: 0 | Status: DISCONTINUED | OUTPATIENT
Start: 2022-01-01 | End: 2022-01-01

## 2022-01-01 RX ORDER — PIPERACILLIN AND TAZOBACTAM 4; .5 G/20ML; G/20ML
3.38 INJECTION, POWDER, LYOPHILIZED, FOR SOLUTION INTRAVENOUS ONCE
Refills: 0 | Status: COMPLETED | OUTPATIENT
Start: 2022-01-01 | End: 2022-01-01

## 2022-01-01 RX ORDER — BACITRACIN ZINC 500 UNIT/G
1 OINTMENT IN PACKET (EA) TOPICAL DAILY
Refills: 0 | Status: DISCONTINUED | OUTPATIENT
Start: 2022-01-01 | End: 2022-01-01

## 2022-01-01 RX ORDER — ASPIRIN/CALCIUM CARB/MAGNESIUM 324 MG
81 TABLET ORAL EVERY 24 HOURS
Refills: 0 | Status: DISCONTINUED | OUTPATIENT
Start: 2022-01-01 | End: 2022-01-01

## 2022-01-01 RX ORDER — TAMSULOSIN HYDROCHLORIDE 0.4 MG/1
0.4 CAPSULE ORAL AT BEDTIME
Refills: 0 | Status: DISCONTINUED | OUTPATIENT
Start: 2022-01-01 | End: 2022-01-01

## 2022-01-01 RX ORDER — INSULIN GLARGINE 100 [IU]/ML
7 INJECTION, SOLUTION SUBCUTANEOUS
Refills: 0 | Status: DISCONTINUED | OUTPATIENT
Start: 2022-01-01 | End: 2022-01-01

## 2022-01-01 RX ORDER — INSULIN GLARGINE 100 [IU]/ML
9 INJECTION, SOLUTION SUBCUTANEOUS AT BEDTIME
Refills: 0 | Status: DISCONTINUED | OUTPATIENT
Start: 2022-01-01 | End: 2022-01-01

## 2022-01-01 RX ORDER — SODIUM CHLORIDE 9 MG/ML
1000 INJECTION, SOLUTION INTRAVENOUS
Refills: 0 | Status: DISCONTINUED | OUTPATIENT
Start: 2022-01-01 | End: 2022-01-01

## 2022-01-01 RX ORDER — GLUCAGON INJECTION, SOLUTION 0.5 MG/.1ML
1 INJECTION, SOLUTION SUBCUTANEOUS ONCE
Refills: 0 | Status: DISCONTINUED | OUTPATIENT
Start: 2022-01-01 | End: 2022-01-01

## 2022-01-01 RX ORDER — MIDODRINE HYDROCHLORIDE 2.5 MG/1
20 TABLET ORAL EVERY 8 HOURS
Refills: 0 | Status: DISCONTINUED | OUTPATIENT
Start: 2022-01-01 | End: 2022-01-01

## 2022-01-01 RX ORDER — MYCOPHENOLATE MOFETIL 250 MG/1
500 CAPSULE ORAL
Refills: 0 | Status: DISCONTINUED | OUTPATIENT
Start: 2022-01-01 | End: 2022-01-01

## 2022-01-01 RX ORDER — LEVOTHYROXINE SODIUM 125 MCG
60 TABLET ORAL AT BEDTIME
Refills: 0 | Status: DISCONTINUED | OUTPATIENT
Start: 2022-01-01 | End: 2022-01-01

## 2022-01-01 RX ORDER — MYCOPHENOLATE MOFETIL 250 MG/1
500 CAPSULE ORAL EVERY 12 HOURS
Refills: 0 | Status: DISCONTINUED | OUTPATIENT
Start: 2022-01-01 | End: 2022-01-01

## 2022-01-01 RX ORDER — TACROLIMUS 5 MG/1
1 CAPSULE ORAL EVERY 24 HOURS
Refills: 0 | Status: DISCONTINUED | OUTPATIENT
Start: 2022-01-01 | End: 2022-01-01

## 2022-01-01 RX ORDER — QUETIAPINE FUMARATE 200 MG/1
25 TABLET, FILM COATED ORAL EVERY 12 HOURS
Refills: 0 | Status: DISCONTINUED | OUTPATIENT
Start: 2022-01-01 | End: 2022-01-01

## 2022-01-01 RX ORDER — PROPOFOL 10 MG/ML
20 INJECTION, EMULSION INTRAVENOUS
Qty: 1000 | Refills: 0 | Status: DISCONTINUED | OUTPATIENT
Start: 2022-01-01 | End: 2022-01-01

## 2022-01-01 RX ORDER — SODIUM CHLORIDE 9 MG/ML
250 INJECTION, SOLUTION INTRAVENOUS ONCE
Refills: 0 | Status: DISCONTINUED | OUTPATIENT
Start: 2022-01-01 | End: 2022-01-01

## 2022-01-01 RX ORDER — INSULIN HUMAN 100 [IU]/ML
3 INJECTION, SOLUTION SUBCUTANEOUS EVERY 6 HOURS
Refills: 0 | Status: DISCONTINUED | OUTPATIENT
Start: 2022-01-01 | End: 2022-01-01

## 2022-01-01 RX ORDER — INSULIN GLARGINE 100 [IU]/ML
15 INJECTION, SOLUTION SUBCUTANEOUS AT BEDTIME
Refills: 0 | Status: DISCONTINUED | OUTPATIENT
Start: 2022-01-01 | End: 2022-01-01

## 2022-01-01 RX ORDER — ERYTHROPOIETIN 10000 [IU]/ML
8000 INJECTION, SOLUTION INTRAVENOUS; SUBCUTANEOUS ONCE
Refills: 0 | Status: COMPLETED | OUTPATIENT
Start: 2022-01-01 | End: 2022-01-01

## 2022-01-01 RX ORDER — CLOPIDOGREL BISULFATE 75 MG/1
75 TABLET, FILM COATED ORAL DAILY
Refills: 0 | Status: DISCONTINUED | OUTPATIENT
Start: 2022-01-01 | End: 2022-01-01

## 2022-01-01 RX ORDER — ONDANSETRON 8 MG/1
4 TABLET, FILM COATED ORAL EVERY 8 HOURS
Refills: 0 | Status: DISCONTINUED | OUTPATIENT
Start: 2022-01-01 | End: 2022-01-01

## 2022-01-01 RX ORDER — PIPERACILLIN AND TAZOBACTAM 4; .5 G/20ML; G/20ML
4.5 INJECTION, POWDER, LYOPHILIZED, FOR SOLUTION INTRAVENOUS EVERY 12 HOURS
Refills: 0 | Status: DISCONTINUED | OUTPATIENT
Start: 2022-01-01 | End: 2022-01-01

## 2022-01-01 RX ORDER — NOREPINEPHRINE BITARTRATE/D5W 8 MG/250ML
0.05 PLASTIC BAG, INJECTION (ML) INTRAVENOUS
Qty: 8 | Refills: 0 | Status: DISCONTINUED | OUTPATIENT
Start: 2022-01-01 | End: 2022-01-01

## 2022-01-01 RX ORDER — LACTULOSE 10 G/15ML
15 SOLUTION ORAL ONCE
Refills: 0 | Status: COMPLETED | OUTPATIENT
Start: 2022-01-01 | End: 2022-01-01

## 2022-01-01 RX ORDER — SODIUM CHLORIDE 9 MG/ML
500 INJECTION INTRAMUSCULAR; INTRAVENOUS; SUBCUTANEOUS ONCE
Refills: 0 | Status: COMPLETED | OUTPATIENT
Start: 2022-01-01 | End: 2022-01-01

## 2022-01-01 RX ORDER — FUROSEMIDE 40 MG
40 TABLET ORAL ONCE
Refills: 0 | Status: COMPLETED | OUTPATIENT
Start: 2022-01-01 | End: 2022-01-01

## 2022-01-01 RX ORDER — FUROSEMIDE 40 MG
20 TABLET ORAL ONCE
Refills: 0 | Status: COMPLETED | OUTPATIENT
Start: 2022-01-01 | End: 2022-01-01

## 2022-01-01 RX ORDER — CARVEDILOL PHOSPHATE 80 MG/1
25 CAPSULE, EXTENDED RELEASE ORAL ONCE
Refills: 0 | Status: COMPLETED | OUTPATIENT
Start: 2022-01-01 | End: 2022-01-01

## 2022-01-01 RX ORDER — IPRATROPIUM/ALBUTEROL SULFATE 18-103MCG
3 AEROSOL WITH ADAPTER (GRAM) INHALATION EVERY 6 HOURS
Refills: 0 | Status: DISCONTINUED | OUTPATIENT
Start: 2022-01-01 | End: 2022-01-01

## 2022-01-01 RX ORDER — DEXMEDETOMIDINE HYDROCHLORIDE IN 0.9% SODIUM CHLORIDE 4 UG/ML
0.2 INJECTION INTRAVENOUS
Qty: 400 | Refills: 0 | Status: DISCONTINUED | OUTPATIENT
Start: 2022-01-01 | End: 2022-01-01

## 2022-01-01 RX ORDER — INSULIN GLARGINE 100 [IU]/ML
20 INJECTION, SOLUTION SUBCUTANEOUS AT BEDTIME
Refills: 0 | Status: DISCONTINUED | OUTPATIENT
Start: 2022-01-01 | End: 2022-01-01

## 2022-01-01 RX ORDER — CHLORHEXIDINE GLUCONATE 213 G/1000ML
15 SOLUTION TOPICAL EVERY 12 HOURS
Refills: 0 | Status: DISCONTINUED | OUTPATIENT
Start: 2022-01-01 | End: 2022-01-01

## 2022-01-01 RX ORDER — DEXMEDETOMIDINE HYDROCHLORIDE IN 0.9% SODIUM CHLORIDE 4 UG/ML
0.1 INJECTION INTRAVENOUS
Qty: 400 | Refills: 0 | Status: DISCONTINUED | OUTPATIENT
Start: 2022-01-01 | End: 2022-01-01

## 2022-01-01 RX ORDER — ACETAMINOPHEN 500 MG
650 TABLET ORAL ONCE
Refills: 0 | Status: COMPLETED | OUTPATIENT
Start: 2022-01-01 | End: 2022-01-01

## 2022-01-01 RX ORDER — SODIUM POLYSTYRENE SULFONATE 4.1 MEQ/G
15 POWDER, FOR SUSPENSION ORAL EVERY 24 HOURS
Refills: 0 | Status: DISCONTINUED | OUTPATIENT
Start: 2022-01-01 | End: 2022-01-01

## 2022-01-01 RX ORDER — INSULIN GLARGINE 100 [IU]/ML
7 INJECTION, SOLUTION SUBCUTANEOUS AT BEDTIME
Refills: 0 | Status: DISCONTINUED | OUTPATIENT
Start: 2022-01-01 | End: 2022-01-01

## 2022-01-01 RX ORDER — HYDROCORTISONE 20 MG
50 TABLET ORAL EVERY 8 HOURS
Refills: 0 | Status: DISCONTINUED | OUTPATIENT
Start: 2022-01-01 | End: 2022-01-01

## 2022-01-01 RX ORDER — HUMAN INSULIN 100 [IU]/ML
5 INJECTION, SUSPENSION SUBCUTANEOUS ONCE
Refills: 0 | Status: COMPLETED | OUTPATIENT
Start: 2022-01-01 | End: 2022-01-01

## 2022-01-01 RX ORDER — INSULIN LISPRO 100/ML
3 VIAL (ML) SUBCUTANEOUS
Refills: 0 | Status: DISCONTINUED | OUTPATIENT
Start: 2022-01-01 | End: 2022-01-01

## 2022-01-01 RX ORDER — INFLUENZA VIRUS VACCINE 15; 15; 15; 15 UG/.5ML; UG/.5ML; UG/.5ML; UG/.5ML
0.7 SUSPENSION INTRAMUSCULAR ONCE
Refills: 0 | Status: DISCONTINUED | OUTPATIENT
Start: 2022-01-01 | End: 2022-01-01

## 2022-01-01 RX ORDER — MIDODRINE HYDROCHLORIDE 2.5 MG/1
3 TABLET ORAL
Qty: 0 | Refills: 0 | DISCHARGE
Start: 2022-01-01

## 2022-01-01 RX ORDER — CHLORHEXIDINE GLUCONATE 213 G/1000ML
1 SOLUTION TOPICAL
Qty: 0 | Refills: 0 | DISCHARGE
Start: 2022-01-01

## 2022-01-01 RX ORDER — LOSARTAN POTASSIUM 100 MG/1
TABLET, FILM COATED ORAL
Refills: 0 | Status: COMPLETED | COMMUNITY
End: 2022-01-01

## 2022-01-01 RX ORDER — QUETIAPINE FUMARATE 200 MG/1
1 TABLET, FILM COATED ORAL
Qty: 0 | Refills: 0 | DISCHARGE
Start: 2022-01-01

## 2022-01-01 RX ORDER — LEVOTHYROXINE SODIUM 125 MCG
60 TABLET ORAL
Qty: 1800 | Refills: 0
Start: 2022-01-01 | End: 2023-01-01

## 2022-01-01 RX ORDER — INSULIN GLARGINE 100 [IU]/ML
9 INJECTION, SOLUTION SUBCUTANEOUS EVERY MORNING
Refills: 0 | Status: DISCONTINUED | OUTPATIENT
Start: 2022-01-01 | End: 2022-01-01

## 2022-01-01 RX ORDER — DEXTROSE 50 % IN WATER 50 %
15 SYRINGE (ML) INTRAVENOUS ONCE
Refills: 0 | Status: DISCONTINUED | OUTPATIENT
Start: 2022-01-01 | End: 2022-01-01

## 2022-01-01 RX ORDER — DEXTROSE 50 % IN WATER 50 %
25 SYRINGE (ML) INTRAVENOUS ONCE
Refills: 0 | Status: DISCONTINUED | OUTPATIENT
Start: 2022-01-01 | End: 2022-01-01

## 2022-01-01 RX ORDER — OLANZAPINE 15 MG/1
2.5 TABLET, FILM COATED ORAL ONCE
Refills: 0 | Status: COMPLETED | OUTPATIENT
Start: 2022-01-01 | End: 2022-01-01

## 2022-01-01 RX ORDER — CEFEPIME 1 G/1
2000 INJECTION, POWDER, FOR SOLUTION INTRAMUSCULAR; INTRAVENOUS ONCE
Refills: 0 | Status: DISCONTINUED | OUTPATIENT
Start: 2022-01-01 | End: 2022-01-01

## 2022-01-01 RX ORDER — SODIUM CHLORIDE 9 MG/ML
500 INJECTION, SOLUTION INTRAVENOUS ONCE
Refills: 0 | Status: COMPLETED | OUTPATIENT
Start: 2022-01-01 | End: 2022-01-01

## 2022-01-01 RX ORDER — CHLORHEXIDINE GLUCONATE 213 G/1000ML
1 SOLUTION TOPICAL
Refills: 0 | Status: DISCONTINUED | OUTPATIENT
Start: 2022-01-01 | End: 2022-01-01

## 2022-01-01 RX ORDER — QUETIAPINE FUMARATE 200 MG/1
100 TABLET, FILM COATED ORAL EVERY 12 HOURS
Refills: 0 | Status: DISCONTINUED | OUTPATIENT
Start: 2022-01-01 | End: 2022-01-01

## 2022-01-01 RX ORDER — INSULIN LISPRO 100 [IU]/ML
0 INJECTION, SUSPENSION SUBCUTANEOUS
Qty: 0 | Refills: 0 | DISCHARGE

## 2022-01-01 RX ORDER — QUETIAPINE FUMARATE 200 MG/1
50 TABLET, FILM COATED ORAL
Refills: 0 | Status: DISCONTINUED | OUTPATIENT
Start: 2022-01-01 | End: 2022-01-01

## 2022-01-01 RX ORDER — INSULIN HUMAN 100 [IU]/ML
6 INJECTION, SOLUTION SUBCUTANEOUS EVERY 6 HOURS
Refills: 0 | Status: DISCONTINUED | OUTPATIENT
Start: 2022-01-01 | End: 2022-01-01

## 2022-01-01 RX ORDER — POTASSIUM CHLORIDE 20 MEQ
20 PACKET (EA) ORAL
Refills: 0 | Status: DISCONTINUED | OUTPATIENT
Start: 2022-01-01 | End: 2022-01-01

## 2022-01-01 RX ORDER — ALBUTEROL 90 UG/1
2.5 AEROSOL, METERED ORAL EVERY 6 HOURS
Refills: 0 | Status: DISCONTINUED | OUTPATIENT
Start: 2022-01-01 | End: 2022-01-01

## 2022-01-01 RX ORDER — POTASSIUM CHLORIDE 20 MEQ
40 PACKET (EA) ORAL ONCE
Refills: 0 | Status: COMPLETED | OUTPATIENT
Start: 2022-01-01 | End: 2022-01-01

## 2022-01-01 RX ORDER — MIDODRINE HYDROCHLORIDE 2.5 MG/1
30 TABLET ORAL ONCE
Refills: 0 | Status: COMPLETED | OUTPATIENT
Start: 2022-01-01 | End: 2022-01-01

## 2022-01-01 RX ORDER — PANTOPRAZOLE SODIUM 20 MG/1
1 TABLET, DELAYED RELEASE ORAL
Qty: 0 | Refills: 0 | DISCHARGE
Start: 2022-01-01

## 2022-01-01 RX ORDER — INSULIN GLARGINE 100 [IU]/ML
6 INJECTION, SOLUTION SUBCUTANEOUS
Refills: 0 | Status: DISCONTINUED | OUTPATIENT
Start: 2022-01-01 | End: 2022-01-01

## 2022-01-01 RX ORDER — PANTOPRAZOLE SODIUM 20 MG/1
40 TABLET, DELAYED RELEASE ORAL EVERY 24 HOURS
Refills: 0 | Status: DISCONTINUED | OUTPATIENT
Start: 2022-01-01 | End: 2022-01-01

## 2022-01-01 RX ORDER — INSULIN LISPRO 100/ML
VIAL (ML) SUBCUTANEOUS
Refills: 0 | Status: DISCONTINUED | OUTPATIENT
Start: 2022-01-01 | End: 2022-01-01

## 2022-01-01 RX ORDER — CEFEPIME 1 G/1
2000 INJECTION, POWDER, FOR SOLUTION INTRAMUSCULAR; INTRAVENOUS EVERY 24 HOURS
Refills: 0 | Status: COMPLETED | OUTPATIENT
Start: 2022-01-01 | End: 2022-01-01

## 2022-01-01 RX ORDER — LANOLIN ALCOHOL/MO/W.PET/CERES
3 CREAM (GRAM) TOPICAL AT BEDTIME
Refills: 0 | Status: DISCONTINUED | OUTPATIENT
Start: 2022-01-01 | End: 2022-01-01

## 2022-01-01 RX ORDER — ROBINUL 0.2 MG/ML
0.2 INJECTION INTRAMUSCULAR; INTRAVENOUS ONCE
Refills: 0 | Status: COMPLETED | OUTPATIENT
Start: 2022-01-01 | End: 2022-01-01

## 2022-01-01 RX ORDER — FENTANYL CITRATE 50 UG/ML
0.5 INJECTION INTRAVENOUS
Qty: 2500 | Refills: 0 | Status: DISCONTINUED | OUTPATIENT
Start: 2022-01-01 | End: 2022-01-01

## 2022-01-01 RX ORDER — HYDROCORTISONE 20 MG
25 TABLET ORAL EVERY 12 HOURS
Refills: 0 | Status: DISCONTINUED | OUTPATIENT
Start: 2022-01-01 | End: 2022-01-01

## 2022-01-01 RX ORDER — LEVOTHYROXINE SODIUM 125 MCG
3 TABLET ORAL
Qty: 0 | Refills: 0 | DISCHARGE
Start: 2022-01-01

## 2022-01-01 RX ORDER — TACROLIMUS 5 MG/1
1 CAPSULE ORAL ONCE
Refills: 0 | Status: DISCONTINUED | OUTPATIENT
Start: 2022-01-01 | End: 2022-01-01

## 2022-01-01 RX ORDER — CARVEDILOL 3.12 MG/1
3.12 TABLET, FILM COATED ORAL
Qty: 90 | Refills: 11 | Status: ACTIVE | COMMUNITY
Start: 2022-01-01 | End: 1900-01-01

## 2022-01-01 RX ORDER — ETOMIDATE 2 MG/ML
20 INJECTION INTRAVENOUS ONCE
Refills: 0 | Status: COMPLETED | OUTPATIENT
Start: 2022-01-01 | End: 2022-01-01

## 2022-01-01 RX ORDER — IPRATROPIUM/ALBUTEROL SULFATE 18-103MCG
3 AEROSOL WITH ADAPTER (GRAM) INHALATION ONCE
Refills: 0 | Status: COMPLETED | OUTPATIENT
Start: 2022-01-01 | End: 2022-01-01

## 2022-01-01 RX ORDER — INSULIN HUMAN 100 [IU]/ML
7 INJECTION, SOLUTION SUBCUTANEOUS EVERY 6 HOURS
Refills: 0 | Status: DISCONTINUED | OUTPATIENT
Start: 2022-01-01 | End: 2022-01-01

## 2022-01-01 RX ORDER — SODIUM CHLORIDE 9 MG/ML
10 INJECTION INTRAMUSCULAR; INTRAVENOUS; SUBCUTANEOUS
Refills: 0 | Status: DISCONTINUED | OUTPATIENT
Start: 2022-01-01 | End: 2022-01-01

## 2022-01-01 RX ORDER — POLYETHYLENE GLYCOL 3350 17 G/17G
17 POWDER, FOR SOLUTION ORAL EVERY 24 HOURS
Refills: 0 | Status: DISCONTINUED | OUTPATIENT
Start: 2022-01-01 | End: 2022-01-01

## 2022-01-01 RX ORDER — INSULIN HUMAN 100 [IU]/ML
2 INJECTION, SOLUTION SUBCUTANEOUS EVERY 6 HOURS
Refills: 0 | Status: DISCONTINUED | OUTPATIENT
Start: 2022-01-01 | End: 2022-01-01

## 2022-01-01 RX ORDER — INSULIN GLARGINE 100 [IU]/ML
16 INJECTION, SOLUTION SUBCUTANEOUS AT BEDTIME
Refills: 0 | Status: DISCONTINUED | OUTPATIENT
Start: 2022-01-01 | End: 2022-01-01

## 2022-01-01 RX ORDER — INSULIN HUMAN 100 [IU]/ML
5 INJECTION, SOLUTION SUBCUTANEOUS EVERY 6 HOURS
Refills: 0 | Status: DISCONTINUED | OUTPATIENT
Start: 2022-01-01 | End: 2022-01-01

## 2022-01-01 RX ORDER — HYDROCORTISONE 20 MG
100 TABLET ORAL EVERY 8 HOURS
Refills: 0 | Status: DISCONTINUED | OUTPATIENT
Start: 2022-01-01 | End: 2022-01-01

## 2022-01-01 RX ORDER — DEXTROSE 50 % IN WATER 50 %
50 SYRINGE (ML) INTRAVENOUS ONCE
Refills: 0 | Status: COMPLETED | OUTPATIENT
Start: 2022-01-01 | End: 2022-01-01

## 2022-01-01 RX ORDER — MIDODRINE HYDROCHLORIDE 2.5 MG/1
10 TABLET ORAL EVERY 8 HOURS
Refills: 0 | Status: DISCONTINUED | OUTPATIENT
Start: 2022-01-01 | End: 2022-01-01

## 2022-01-01 RX ORDER — SODIUM CHLORIDE 9 MG/ML
4 INJECTION INTRAMUSCULAR; INTRAVENOUS; SUBCUTANEOUS EVERY 12 HOURS
Refills: 0 | Status: DISCONTINUED | OUTPATIENT
Start: 2022-01-01 | End: 2022-01-01

## 2022-01-01 RX ORDER — QUETIAPINE FUMARATE 200 MG/1
25 TABLET, FILM COATED ORAL ONCE
Refills: 0 | Status: DISCONTINUED | OUTPATIENT
Start: 2022-01-01 | End: 2022-01-01

## 2022-01-01 RX ORDER — FLASH GLUCOSE SENSOR
KIT MISCELLANEOUS
Refills: 0 | Status: COMPLETED | COMMUNITY
Start: 2018-08-28 | End: 2022-01-01

## 2022-01-01 RX ORDER — HYDROCORTISONE 20 MG
50 TABLET ORAL ONCE
Refills: 0 | Status: COMPLETED | OUTPATIENT
Start: 2022-01-01 | End: 2022-01-01

## 2022-01-01 RX ORDER — TACROLIMUS 5 MG/1
4 CAPSULE ORAL
Qty: 0 | Refills: 0 | DISCHARGE
Start: 2022-01-01

## 2022-01-01 RX ORDER — VANCOMYCIN HCL 1 G
750 VIAL (EA) INTRAVENOUS ONCE
Refills: 0 | Status: COMPLETED | OUTPATIENT
Start: 2022-01-01 | End: 2022-01-01

## 2022-01-01 RX ORDER — QUETIAPINE FUMARATE 200 MG/1
50 TABLET, FILM COATED ORAL ONCE
Refills: 0 | Status: COMPLETED | OUTPATIENT
Start: 2022-01-01 | End: 2022-01-01

## 2022-01-01 RX ORDER — TACROLIMUS 5 MG/1
4 CAPSULE ORAL EVERY 24 HOURS
Refills: 0 | Status: DISCONTINUED | OUTPATIENT
Start: 2022-01-01 | End: 2022-01-01

## 2022-01-01 RX ORDER — INSULIN GLARGINE 100 [IU]/ML
10 INJECTION, SOLUTION SUBCUTANEOUS AT BEDTIME
Refills: 0 | Status: DISCONTINUED | OUTPATIENT
Start: 2022-01-01 | End: 2022-01-01

## 2022-01-01 RX ORDER — SODIUM ZIRCONIUM CYCLOSILICATE 10 G/10G
10 POWDER, FOR SUSPENSION ORAL ONCE
Refills: 0 | Status: COMPLETED | OUTPATIENT
Start: 2022-01-01 | End: 2022-01-01

## 2022-01-01 RX ORDER — QUETIAPINE FUMARATE 200 MG/1
75 TABLET, FILM COATED ORAL EVERY 12 HOURS
Refills: 0 | Status: DISCONTINUED | OUTPATIENT
Start: 2022-01-01 | End: 2022-01-01

## 2022-01-01 RX ORDER — PIPERACILLIN AND TAZOBACTAM 4; .5 G/20ML; G/20ML
4.5 INJECTION, POWDER, LYOPHILIZED, FOR SOLUTION INTRAVENOUS
Qty: 0 | Refills: 0 | DISCHARGE
Start: 2022-01-01

## 2022-01-01 RX ORDER — POTASSIUM PHOSPHATE, MONOBASIC POTASSIUM PHOSPHATE, DIBASIC 236; 224 MG/ML; MG/ML
15 INJECTION, SOLUTION INTRAVENOUS ONCE
Refills: 0 | Status: COMPLETED | OUTPATIENT
Start: 2022-01-01 | End: 2022-01-01

## 2022-01-01 RX ORDER — PIPERACILLIN AND TAZOBACTAM 4; .5 G/20ML; G/20ML
4.5 INJECTION, POWDER, LYOPHILIZED, FOR SOLUTION INTRAVENOUS ONCE
Refills: 0 | Status: DISCONTINUED | OUTPATIENT
Start: 2022-01-01 | End: 2022-01-01

## 2022-01-01 RX ORDER — CARVEDILOL PHOSPHATE 80 MG/1
1 CAPSULE, EXTENDED RELEASE ORAL
Qty: 0 | Refills: 0 | DISCHARGE
Start: 2022-01-01

## 2022-01-01 RX ORDER — INSULIN GLARGINE 100 [IU]/ML
6 INJECTION, SOLUTION SUBCUTANEOUS AT BEDTIME
Refills: 0 | Status: DISCONTINUED | OUTPATIENT
Start: 2022-01-01 | End: 2022-01-01

## 2022-01-01 RX ORDER — ATORVASTATIN CALCIUM 40 MG/1
40 TABLET, FILM COATED ORAL
Refills: 0 | Status: ACTIVE | COMMUNITY

## 2022-01-01 RX ORDER — INSULIN GLARGINE 100 [IU]/ML
14 INJECTION, SOLUTION SUBCUTANEOUS AT BEDTIME
Refills: 0 | Status: DISCONTINUED | OUTPATIENT
Start: 2022-01-01 | End: 2022-01-01

## 2022-01-01 RX ORDER — PROPOFOL 10 MG/ML
5 INJECTION, EMULSION INTRAVENOUS
Qty: 1000 | Refills: 0 | Status: DISCONTINUED | OUTPATIENT
Start: 2022-01-01 | End: 2022-01-01

## 2022-01-01 RX ORDER — NOREPINEPHRINE BITARTRATE/D5W 8 MG/250ML
0.05 PLASTIC BAG, INJECTION (ML) INTRAVENOUS
Qty: 16 | Refills: 0 | Status: DISCONTINUED | OUTPATIENT
Start: 2022-01-01 | End: 2022-01-01

## 2022-01-01 RX ORDER — INSULIN HUMAN 100 [IU]/ML
12 INJECTION, SOLUTION SUBCUTANEOUS EVERY 6 HOURS
Refills: 0 | Status: DISCONTINUED | OUTPATIENT
Start: 2022-01-01 | End: 2022-01-01

## 2022-01-01 RX ORDER — CARVEDILOL PHOSPHATE 80 MG/1
12.5 CAPSULE, EXTENDED RELEASE ORAL EVERY 12 HOURS
Refills: 0 | Status: DISCONTINUED | OUTPATIENT
Start: 2022-01-01 | End: 2022-01-01

## 2022-01-01 RX ORDER — TACROLIMUS 5 MG/1
5 CAPSULE ORAL EVERY 24 HOURS
Refills: 0 | Status: DISCONTINUED | OUTPATIENT
Start: 2022-01-01 | End: 2022-01-01

## 2022-01-01 RX ORDER — CHLORHEXIDINE GLUCONATE 213 G/1000ML
15 SOLUTION TOPICAL
Qty: 0 | Refills: 0 | DISCHARGE
Start: 2022-01-01

## 2022-01-01 RX ORDER — FENTANYL CITRATE 50 UG/ML
75 INJECTION INTRAVENOUS ONCE
Refills: 0 | Status: DISCONTINUED | OUTPATIENT
Start: 2022-01-01 | End: 2022-01-01

## 2022-01-01 RX ORDER — SENNOSIDES 8.6 MG/1
8.6 TABLET, COATED ORAL
Qty: 90 | Refills: 3 | Status: ACTIVE | COMMUNITY
Start: 2022-01-01 | End: 1900-01-01

## 2022-01-01 RX ORDER — FUROSEMIDE 40 MG
80 TABLET ORAL ONCE
Refills: 0 | Status: COMPLETED | OUTPATIENT
Start: 2022-01-01 | End: 2022-01-01

## 2022-01-01 RX ORDER — PIPERACILLIN AND TAZOBACTAM 4; .5 G/20ML; G/20ML
4.5 INJECTION, POWDER, LYOPHILIZED, FOR SOLUTION INTRAVENOUS EVERY 8 HOURS
Refills: 0 | Status: DISCONTINUED | OUTPATIENT
Start: 2022-01-01 | End: 2022-01-01

## 2022-01-01 RX ORDER — ATORVASTATIN CALCIUM 80 MG/1
40 TABLET, FILM COATED ORAL AT BEDTIME
Refills: 0 | Status: DISCONTINUED | OUTPATIENT
Start: 2022-01-01 | End: 2022-01-01

## 2022-01-01 RX ORDER — LEVOTHYROXINE SODIUM 125 MCG
60 TABLET ORAL
Qty: 0 | Refills: 0 | DISCHARGE

## 2022-01-01 RX ORDER — MAGNESIUM SULFATE 500 MG/ML
2 VIAL (ML) INJECTION ONCE
Refills: 0 | Status: COMPLETED | OUTPATIENT
Start: 2022-01-01 | End: 2022-01-01

## 2022-01-01 RX ORDER — TACROLIMUS 5 MG/1
4 CAPSULE ORAL ONCE
Refills: 0 | Status: COMPLETED | OUTPATIENT
Start: 2022-01-01 | End: 2022-01-01

## 2022-01-01 RX ORDER — SODIUM CHLORIDE 9 MG/ML
500 INJECTION, SOLUTION INTRAVENOUS ONCE
Refills: 0 | Status: DISCONTINUED | OUTPATIENT
Start: 2022-01-01 | End: 2022-01-01

## 2022-01-01 RX ORDER — TACROLIMUS 5 MG/1
3 CAPSULE ORAL EVERY 12 HOURS
Refills: 0 | Status: DISCONTINUED | OUTPATIENT
Start: 2022-01-01 | End: 2022-01-01

## 2022-01-01 RX ORDER — POLYETHYLENE GLYCOL 3350 17 G/17G
17 POWDER, FOR SOLUTION ORAL
Refills: 0 | Status: DISCONTINUED | OUTPATIENT
Start: 2022-01-01 | End: 2022-01-01

## 2022-01-01 RX ORDER — CIPROFLOXACIN HYDROCHLORIDE 250 MG/1
250 TABLET, FILM COATED ORAL DAILY
Qty: 14 | Refills: 0 | Status: COMPLETED | COMMUNITY
Start: 2022-01-01 | End: 2022-01-01

## 2022-01-01 RX ORDER — SODIUM CHLORIDE 9 MG/ML
1000 INJECTION INTRAMUSCULAR; INTRAVENOUS; SUBCUTANEOUS ONCE
Refills: 0 | Status: COMPLETED | OUTPATIENT
Start: 2022-01-01 | End: 2022-01-01

## 2022-01-01 RX ORDER — LACTULOSE 10 G/15ML
10 SOLUTION ORAL EVERY 8 HOURS
Refills: 0 | Status: DISCONTINUED | OUTPATIENT
Start: 2022-01-01 | End: 2022-01-01

## 2022-01-01 RX ORDER — FENTANYL CITRATE 50 UG/ML
25 INJECTION INTRAVENOUS ONCE
Refills: 0 | Status: DISCONTINUED | OUTPATIENT
Start: 2022-01-01 | End: 2022-01-01

## 2022-01-01 RX ORDER — INSULIN GLARGINE 100 [IU]/ML
4 INJECTION, SOLUTION SUBCUTANEOUS AT BEDTIME
Refills: 0 | Status: DISCONTINUED | OUTPATIENT
Start: 2022-01-01 | End: 2022-01-01

## 2022-01-01 RX ORDER — INSULIN LISPRO 100/ML
VIAL (ML) SUBCUTANEOUS EVERY 6 HOURS
Refills: 0 | Status: DISCONTINUED | OUTPATIENT
Start: 2022-01-01 | End: 2022-01-01

## 2022-01-01 RX ORDER — GUANFACINE 3 MG/1
1 TABLET, EXTENDED RELEASE ORAL
Refills: 0 | Status: DISCONTINUED | OUTPATIENT
Start: 2022-01-01 | End: 2022-01-01

## 2022-01-01 RX ORDER — FUROSEMIDE 40 MG
40 TABLET ORAL EVERY 12 HOURS
Refills: 0 | Status: DISCONTINUED | OUTPATIENT
Start: 2022-01-01 | End: 2022-01-01

## 2022-01-01 RX ORDER — AMLODIPINE BESYLATE 2.5 MG/1
5 TABLET ORAL EVERY 24 HOURS
Refills: 0 | Status: DISCONTINUED | OUTPATIENT
Start: 2022-01-01 | End: 2022-01-01

## 2022-01-01 RX ORDER — BACITRACIN ZINC 500 UNIT/G
1 OINTMENT IN PACKET (EA) TOPICAL
Qty: 0 | Refills: 0 | DISCHARGE
Start: 2022-01-01

## 2022-01-01 RX ORDER — HYDROCORTISONE 20 MG
25 TABLET ORAL EVERY 24 HOURS
Refills: 0 | Status: DISCONTINUED | OUTPATIENT
Start: 2022-01-01 | End: 2022-01-01

## 2022-01-01 RX ORDER — CEFEPIME 1 G/1
INJECTION, POWDER, FOR SOLUTION INTRAMUSCULAR; INTRAVENOUS
Refills: 0 | Status: DISCONTINUED | OUTPATIENT
Start: 2022-01-01 | End: 2022-01-01

## 2022-01-01 RX ORDER — QUETIAPINE FUMARATE 200 MG/1
75 TABLET, FILM COATED ORAL AT BEDTIME
Refills: 0 | Status: DISCONTINUED | OUTPATIENT
Start: 2022-01-01 | End: 2022-01-01

## 2022-01-01 RX ORDER — SENNA PLUS 8.6 MG/1
1 TABLET ORAL DAILY
Refills: 0 | Status: DISCONTINUED | OUTPATIENT
Start: 2022-01-01 | End: 2022-01-01

## 2022-01-01 RX ORDER — ASPIRIN/CALCIUM CARB/MAGNESIUM 324 MG
81 TABLET ORAL DAILY
Refills: 0 | Status: DISCONTINUED | OUTPATIENT
Start: 2022-01-01 | End: 2022-01-01

## 2022-01-01 RX ORDER — DEXTROSE 10 % IN WATER 10 %
250 INTRAVENOUS SOLUTION INTRAVENOUS ONCE
Refills: 0 | Status: COMPLETED | OUTPATIENT
Start: 2022-01-01 | End: 2022-01-01

## 2022-01-01 RX ORDER — MAGNESIUM SULFATE 500 MG/ML
1 VIAL (ML) INJECTION ONCE
Refills: 0 | Status: COMPLETED | OUTPATIENT
Start: 2022-01-01 | End: 2022-01-01

## 2022-01-01 RX ORDER — DESMOPRESSIN ACETATE 0.1 MG/1
0.3 TABLET ORAL ONCE
Refills: 0 | Status: DISCONTINUED | OUTPATIENT
Start: 2022-01-01 | End: 2022-01-01

## 2022-01-01 RX ORDER — TACROLIMUS 5 MG/1
2 CAPSULE ORAL ONCE
Refills: 0 | Status: COMPLETED | OUTPATIENT
Start: 2022-01-01 | End: 2022-01-01

## 2022-01-01 RX ORDER — DEXMEDETOMIDINE HYDROCHLORIDE IN 0.9% SODIUM CHLORIDE 4 UG/ML
0.04 INJECTION INTRAVENOUS
Qty: 400 | Refills: 0 | Status: DISCONTINUED | OUTPATIENT
Start: 2022-01-01 | End: 2022-01-01

## 2022-01-01 RX ORDER — WATER FOR INHALATION
1000 VIAL, NEBULIZER (ML) INHALATION
Refills: 0 | Status: DISCONTINUED | OUTPATIENT
Start: 2022-01-01 | End: 2022-01-01

## 2022-01-01 RX ORDER — LIDOCAINE HCL 20 MG/ML
10 VIAL (ML) INJECTION ONCE
Refills: 0 | Status: DISCONTINUED | OUTPATIENT
Start: 2022-01-01 | End: 2022-01-01

## 2022-01-01 RX ORDER — ASPIRIN 81 MG/1
81 TABLET, CHEWABLE ORAL
Qty: 90 | Refills: 3 | Status: ACTIVE | COMMUNITY
Start: 2020-05-27 | End: 1900-01-01

## 2022-01-01 RX ORDER — SENNA PLUS 8.6 MG/1
1 TABLET ORAL
Qty: 0 | Refills: 0 | DISCHARGE

## 2022-01-01 RX ORDER — CEFEPIME 1 G/1
2000 INJECTION, POWDER, FOR SOLUTION INTRAMUSCULAR; INTRAVENOUS EVERY 24 HOURS
Refills: 0 | Status: DISCONTINUED | OUTPATIENT
Start: 2022-01-01 | End: 2022-01-01

## 2022-01-01 RX ORDER — POTASSIUM CHLORIDE 20 MEQ
20 PACKET (EA) ORAL
Refills: 0 | Status: COMPLETED | OUTPATIENT
Start: 2022-01-01 | End: 2022-01-01

## 2022-01-01 RX ORDER — HEPARIN SODIUM 5000 [USP'U]/ML
5000 INJECTION INTRAVENOUS; SUBCUTANEOUS EVERY 12 HOURS
Refills: 0 | Status: DISCONTINUED | OUTPATIENT
Start: 2022-01-01 | End: 2022-01-01

## 2022-01-01 RX ORDER — DESMOPRESSIN ACETATE 0.1 MG/1
24 TABLET ORAL ONCE
Refills: 0 | Status: COMPLETED | OUTPATIENT
Start: 2022-01-01 | End: 2022-01-01

## 2022-01-01 RX ORDER — SODIUM BICARBONATE 1 MEQ/ML
50 SYRINGE (ML) INTRAVENOUS ONCE
Refills: 0 | Status: COMPLETED | OUTPATIENT
Start: 2022-01-01 | End: 2022-01-01

## 2022-01-01 RX ORDER — HYDROCORTISONE 20 MG
30 TABLET ORAL EVERY 8 HOURS
Refills: 0 | Status: DISCONTINUED | OUTPATIENT
Start: 2022-01-01 | End: 2022-01-01

## 2022-01-01 RX ORDER — CARVEDILOL 6.25 MG/1
6.25 TABLET, FILM COATED ORAL TWICE DAILY
Qty: 60 | Refills: 1 | Status: COMPLETED | COMMUNITY
Start: 2021-06-29 | End: 2022-01-01

## 2022-01-01 RX ORDER — HYDROCORTISONE 20 MG
50 TABLET ORAL EVERY 6 HOURS
Refills: 0 | Status: DISCONTINUED | OUTPATIENT
Start: 2022-01-01 | End: 2022-01-01

## 2022-01-01 RX ORDER — DEXTROSE 50 % IN WATER 50 %
50 SYRINGE (ML) INTRAVENOUS ONCE
Refills: 0 | Status: DISCONTINUED | OUTPATIENT
Start: 2022-01-01 | End: 2022-01-01

## 2022-01-01 RX ORDER — INSULIN GLARGINE 100 [IU]/ML
5 INJECTION, SOLUTION SUBCUTANEOUS AT BEDTIME
Refills: 0 | Status: DISCONTINUED | OUTPATIENT
Start: 2022-01-01 | End: 2022-01-01

## 2022-01-01 RX ORDER — PROPOFOL 10 MG/ML
50 INJECTION, EMULSION INTRAVENOUS
Qty: 1000 | Refills: 0 | Status: DISCONTINUED | OUTPATIENT
Start: 2022-01-01 | End: 2022-01-01

## 2022-01-01 RX ORDER — AMLODIPINE BESYLATE 5 MG/1
5 TABLET ORAL DAILY
Qty: 90 | Refills: 3 | Status: COMPLETED | COMMUNITY
Start: 2022-02-08 | End: 2022-01-01

## 2022-01-01 RX ORDER — LACTULOSE 10 G/15ML
15 SOLUTION ORAL EVERY 8 HOURS
Refills: 0 | Status: DISCONTINUED | OUTPATIENT
Start: 2022-01-01 | End: 2022-01-01

## 2022-01-01 RX ORDER — TORSEMIDE 20 MG/1
20 TABLET ORAL
Qty: 180 | Refills: 2 | Status: ACTIVE | COMMUNITY
Start: 2022-03-10 | End: 1900-01-01

## 2022-01-01 RX ORDER — INSULIN GLARGINE 100 [IU]/ML
7 INJECTION, SOLUTION SUBCUTANEOUS
Qty: 0 | Refills: 0 | DISCHARGE
Start: 2022-01-01

## 2022-01-01 RX ORDER — VANCOMYCIN HCL 1 G
1250 VIAL (EA) INTRAVENOUS EVERY 24 HOURS
Refills: 0 | Status: DISCONTINUED | OUTPATIENT
Start: 2022-01-01 | End: 2022-01-01

## 2022-01-01 RX ORDER — QUETIAPINE FUMARATE 200 MG/1
100 TABLET, FILM COATED ORAL AT BEDTIME
Refills: 0 | Status: DISCONTINUED | OUTPATIENT
Start: 2022-01-01 | End: 2022-01-01

## 2022-01-01 RX ORDER — CISATRACURIUM BESYLATE 2 MG/ML
20 INJECTION INTRAVENOUS ONCE
Refills: 0 | Status: COMPLETED | OUTPATIENT
Start: 2022-01-01 | End: 2022-01-01

## 2022-01-01 RX ORDER — INSULIN HUMAN 100 [IU]/ML
9 INJECTION, SOLUTION SUBCUTANEOUS EVERY 6 HOURS
Refills: 0 | Status: DISCONTINUED | OUTPATIENT
Start: 2022-01-01 | End: 2022-01-01

## 2022-01-01 RX ORDER — INSULIN HUMAN 100 [IU]/ML
5 INJECTION, SOLUTION SUBCUTANEOUS
Qty: 0 | Refills: 0 | DISCHARGE
Start: 2022-01-01

## 2022-01-01 RX ORDER — PREDNISONE 10 MG
TABLET ORAL
Refills: 0 | Status: COMPLETED | COMMUNITY
End: 2022-01-01

## 2022-01-01 RX ORDER — MIDODRINE HYDROCHLORIDE 2.5 MG/1
30 TABLET ORAL
Refills: 0 | Status: DISCONTINUED | OUTPATIENT
Start: 2022-01-01 | End: 2022-01-01

## 2022-01-01 RX ORDER — SODIUM POLYSTYRENE SULFONATE 15 G/60ML
15 SUSPENSION ORAL; RECTAL
Qty: 30 | Refills: 1 | Status: ACTIVE | COMMUNITY
Start: 2022-01-01 | End: 1900-01-01

## 2022-01-01 RX ORDER — SODIUM BICARBONATE 1 MEQ/ML
0.11 SYRINGE (ML) INTRAVENOUS
Qty: 150 | Refills: 0 | Status: DISCONTINUED | OUTPATIENT
Start: 2022-01-01 | End: 2022-01-01

## 2022-01-01 RX ORDER — TACROLIMUS 5 MG/1
1 CAPSULE ORAL
Qty: 0 | Refills: 0 | DISCHARGE

## 2022-01-01 RX ORDER — SODIUM CHLORIDE 9 MG/ML
1000 INJECTION INTRAMUSCULAR; INTRAVENOUS; SUBCUTANEOUS
Refills: 0 | Status: DISCONTINUED | OUTPATIENT
Start: 2022-01-01 | End: 2022-01-01

## 2022-01-01 RX ORDER — INSULIN LISPRO 100/ML
5 VIAL (ML) SUBCUTANEOUS
Refills: 0 | Status: DISCONTINUED | OUTPATIENT
Start: 2022-01-01 | End: 2022-01-01

## 2022-01-01 RX ORDER — FUROSEMIDE 40 MG
1 TABLET ORAL
Qty: 0 | Refills: 0 | DISCHARGE

## 2022-01-01 RX ORDER — LEVOTHYROXINE SODIUM 125 MCG
50 TABLET ORAL AT BEDTIME
Refills: 0 | Status: DISCONTINUED | OUTPATIENT
Start: 2022-01-01 | End: 2022-01-01

## 2022-01-01 RX ORDER — CARVEDILOL PHOSPHATE 80 MG/1
12.5 CAPSULE, EXTENDED RELEASE ORAL ONCE
Refills: 0 | Status: COMPLETED | OUTPATIENT
Start: 2022-01-01 | End: 2022-01-01

## 2022-01-01 RX ORDER — POTASSIUM PHOSPHATE, MONOBASIC POTASSIUM PHOSPHATE, DIBASIC 236; 224 MG/ML; MG/ML
15 INJECTION, SOLUTION INTRAVENOUS ONCE
Refills: 0 | Status: DISCONTINUED | OUTPATIENT
Start: 2022-01-01 | End: 2022-01-01

## 2022-01-01 RX ORDER — LIDOCAINE HCL 20 MG/ML
2 VIAL (ML) INJECTION ONCE
Refills: 0 | Status: DISCONTINUED | OUTPATIENT
Start: 2022-01-01 | End: 2022-01-01

## 2022-01-01 RX ORDER — IPRATROPIUM BROMIDE 0.2 MG/ML
500 SOLUTION, NON-ORAL INHALATION EVERY 6 HOURS
Refills: 0 | Status: DISCONTINUED | OUTPATIENT
Start: 2022-01-01 | End: 2022-01-01

## 2022-01-01 RX ORDER — QUETIAPINE FUMARATE 200 MG/1
75 TABLET, FILM COATED ORAL EVERY 8 HOURS
Refills: 0 | Status: DISCONTINUED | OUTPATIENT
Start: 2022-01-01 | End: 2022-01-01

## 2022-01-01 RX ORDER — INSULIN HUMAN 100 [IU]/ML
8 INJECTION, SOLUTION SUBCUTANEOUS EVERY 6 HOURS
Refills: 0 | Status: DISCONTINUED | OUTPATIENT
Start: 2022-01-01 | End: 2022-01-01

## 2022-01-01 RX ORDER — VANCOMYCIN HCL 1 G
1000 VIAL (EA) INTRAVENOUS ONCE
Refills: 0 | Status: COMPLETED | OUTPATIENT
Start: 2022-01-01 | End: 2022-01-01

## 2022-01-01 RX ORDER — SODIUM CHLORIDE 9 MG/ML
500 INJECTION, SOLUTION INTRAVENOUS
Refills: 0 | Status: DISCONTINUED | OUTPATIENT
Start: 2022-01-01 | End: 2022-01-01

## 2022-01-01 RX ORDER — QUETIAPINE FUMARATE 200 MG/1
50 TABLET, FILM COATED ORAL EVERY 24 HOURS
Refills: 0 | Status: DISCONTINUED | OUTPATIENT
Start: 2022-01-01 | End: 2022-01-01

## 2022-01-01 RX ORDER — SODIUM ZIRCONIUM CYCLOSILICATE 10 G/10G
10 POWDER, FOR SUSPENSION ORAL ONCE
Refills: 0 | Status: DISCONTINUED | OUTPATIENT
Start: 2022-01-01 | End: 2022-01-01

## 2022-01-01 RX ADMIN — Medication 50 MILLIGRAM(S): at 13:18

## 2022-01-01 RX ADMIN — Medication 30 MILLIGRAM(S): at 02:34

## 2022-01-01 RX ADMIN — INSULIN HUMAN 4 UNIT(S): 100 INJECTION, SOLUTION SUBCUTANEOUS at 17:55

## 2022-01-01 RX ADMIN — CHLORHEXIDINE GLUCONATE 1 APPLICATION(S): 213 SOLUTION TOPICAL at 05:42

## 2022-01-01 RX ADMIN — HEPARIN SODIUM 5000 UNIT(S): 5000 INJECTION INTRAVENOUS; SUBCUTANEOUS at 14:06

## 2022-01-01 RX ADMIN — Medication 25 MILLIGRAM(S): at 14:02

## 2022-01-01 RX ADMIN — FENTANYL CITRATE 50 MICROGRAM(S): 50 INJECTION INTRAVENOUS at 23:36

## 2022-01-01 RX ADMIN — TAMSULOSIN HYDROCHLORIDE 0.4 MILLIGRAM(S): 0.4 CAPSULE ORAL at 22:07

## 2022-01-01 RX ADMIN — INSULIN HUMAN 1: 100 INJECTION, SOLUTION SUBCUTANEOUS at 23:05

## 2022-01-01 RX ADMIN — MIDODRINE HYDROCHLORIDE 20 MILLIGRAM(S): 2.5 TABLET ORAL at 17:25

## 2022-01-01 RX ADMIN — POLYETHYLENE GLYCOL 3350 17 GRAM(S): 17 POWDER, FOR SOLUTION ORAL at 07:29

## 2022-01-01 RX ADMIN — MYCOPHENOLATE MOFETIL 500 MILLIGRAM(S): 250 CAPSULE ORAL at 17:20

## 2022-01-01 RX ADMIN — Medication 62.5 MILLIMOLE(S): at 12:15

## 2022-01-01 RX ADMIN — INSULIN GLARGINE 6 UNIT(S): 100 INJECTION, SOLUTION SUBCUTANEOUS at 21:34

## 2022-01-01 RX ADMIN — INSULIN GLARGINE 9 UNIT(S): 100 INJECTION, SOLUTION SUBCUTANEOUS at 00:00

## 2022-01-01 RX ADMIN — QUETIAPINE FUMARATE 75 MILLIGRAM(S): 200 TABLET, FILM COATED ORAL at 06:05

## 2022-01-01 RX ADMIN — LACTULOSE 15 GRAM(S): 10 SOLUTION ORAL at 00:10

## 2022-01-01 RX ADMIN — INSULIN HUMAN 9 UNIT(S): 100 INJECTION, SOLUTION SUBCUTANEOUS at 12:22

## 2022-01-01 RX ADMIN — Medication 25 MILLIGRAM(S): at 13:43

## 2022-01-01 RX ADMIN — Medication 3 MILLILITER(S): at 21:40

## 2022-01-01 RX ADMIN — CHLORHEXIDINE GLUCONATE 1 APPLICATION(S): 213 SOLUTION TOPICAL at 05:38

## 2022-01-01 RX ADMIN — TACROLIMUS 4 MILLIGRAM(S): 5 CAPSULE ORAL at 21:46

## 2022-01-01 RX ADMIN — Medication 25 MILLIGRAM(S): at 05:12

## 2022-01-01 RX ADMIN — CHLORHEXIDINE GLUCONATE 1 APPLICATION(S): 213 SOLUTION TOPICAL at 05:27

## 2022-01-01 RX ADMIN — Medication 100 MILLIEQUIVALENT(S): at 23:22

## 2022-01-01 RX ADMIN — TAMSULOSIN HYDROCHLORIDE 0.4 MILLIGRAM(S): 0.4 CAPSULE ORAL at 22:03

## 2022-01-01 RX ADMIN — CEFEPIME 100 MILLIGRAM(S): 1 INJECTION, POWDER, FOR SOLUTION INTRAMUSCULAR; INTRAVENOUS at 15:31

## 2022-01-01 RX ADMIN — INSULIN HUMAN 3 UNIT(S): 100 INJECTION, SOLUTION SUBCUTANEOUS at 23:02

## 2022-01-01 RX ADMIN — CHLORHEXIDINE GLUCONATE 1 APPLICATION(S): 213 SOLUTION TOPICAL at 07:23

## 2022-01-01 RX ADMIN — Medication 3 MILLILITER(S): at 08:18

## 2022-01-01 RX ADMIN — SODIUM CHLORIDE 4 MILLILITER(S): 9 INJECTION INTRAMUSCULAR; INTRAVENOUS; SUBCUTANEOUS at 17:01

## 2022-01-01 RX ADMIN — SODIUM CHLORIDE 4 MILLILITER(S): 9 INJECTION INTRAMUSCULAR; INTRAVENOUS; SUBCUTANEOUS at 05:08

## 2022-01-01 RX ADMIN — Medication 60 MICROGRAM(S): at 22:19

## 2022-01-01 RX ADMIN — TACROLIMUS 4 MILLIGRAM(S): 5 CAPSULE ORAL at 21:22

## 2022-01-01 RX ADMIN — SODIUM CHLORIDE 4 MILLILITER(S): 9 INJECTION INTRAMUSCULAR; INTRAVENOUS; SUBCUTANEOUS at 05:49

## 2022-01-01 RX ADMIN — TACROLIMUS 2 MILLIGRAM(S): 5 CAPSULE ORAL at 22:12

## 2022-01-01 RX ADMIN — CEFEPIME 100 MILLIGRAM(S): 1 INJECTION, POWDER, FOR SOLUTION INTRAMUSCULAR; INTRAVENOUS at 14:42

## 2022-01-01 RX ADMIN — QUETIAPINE FUMARATE 100 MILLIGRAM(S): 200 TABLET, FILM COATED ORAL at 18:39

## 2022-01-01 RX ADMIN — Medication 1: at 23:07

## 2022-01-01 RX ADMIN — CHLORHEXIDINE GLUCONATE 1 APPLICATION(S): 213 SOLUTION TOPICAL at 05:43

## 2022-01-01 RX ADMIN — INSULIN HUMAN 2 UNIT(S): 100 INJECTION, SOLUTION SUBCUTANEOUS at 12:41

## 2022-01-01 RX ADMIN — HEPARIN SODIUM 5000 UNIT(S): 5000 INJECTION INTRAVENOUS; SUBCUTANEOUS at 21:50

## 2022-01-01 RX ADMIN — FENTANYL CITRATE 75 MICROGRAM(S): 50 INJECTION INTRAVENOUS at 11:29

## 2022-01-01 RX ADMIN — INSULIN HUMAN 8 UNIT(S): 100 INJECTION, SOLUTION SUBCUTANEOUS at 05:44

## 2022-01-01 RX ADMIN — SODIUM CHLORIDE 4 MILLILITER(S): 9 INJECTION INTRAMUSCULAR; INTRAVENOUS; SUBCUTANEOUS at 17:54

## 2022-01-01 RX ADMIN — Medication 3 MILLILITER(S): at 21:18

## 2022-01-01 RX ADMIN — QUETIAPINE FUMARATE 25 MILLIGRAM(S): 200 TABLET, FILM COATED ORAL at 10:20

## 2022-01-01 RX ADMIN — INSULIN HUMAN 2: 100 INJECTION, SOLUTION SUBCUTANEOUS at 11:56

## 2022-01-01 RX ADMIN — Medication 3 MILLILITER(S): at 11:10

## 2022-01-01 RX ADMIN — TACROLIMUS 2 MILLIGRAM(S): 5 CAPSULE ORAL at 09:43

## 2022-01-01 RX ADMIN — HEPARIN SODIUM 5000 UNIT(S): 5000 INJECTION INTRAVENOUS; SUBCUTANEOUS at 05:20

## 2022-01-01 RX ADMIN — HUMAN INSULIN 6 UNIT(S): 100 INJECTION, SUSPENSION SUBCUTANEOUS at 09:04

## 2022-01-01 RX ADMIN — INSULIN HUMAN 2: 100 INJECTION, SOLUTION SUBCUTANEOUS at 18:26

## 2022-01-01 RX ADMIN — CHLORHEXIDINE GLUCONATE 15 MILLILITER(S): 213 SOLUTION TOPICAL at 18:36

## 2022-01-01 RX ADMIN — MYCOPHENOLATE MOFETIL 500 MILLIGRAM(S): 250 CAPSULE ORAL at 17:29

## 2022-01-01 RX ADMIN — TACROLIMUS 1 MILLIGRAM(S): 5 CAPSULE ORAL at 13:26

## 2022-01-01 RX ADMIN — DEXMEDETOMIDINE HYDROCHLORIDE IN 0.9% SODIUM CHLORIDE 4.12 MICROGRAM(S)/KG/HR: 4 INJECTION INTRAVENOUS at 01:06

## 2022-01-01 RX ADMIN — INSULIN HUMAN 5 UNIT(S): 100 INJECTION, SOLUTION SUBCUTANEOUS at 17:35

## 2022-01-01 RX ADMIN — SODIUM CHLORIDE 500 MILLILITER(S): 9 INJECTION, SOLUTION INTRAVENOUS at 13:44

## 2022-01-01 RX ADMIN — DEXMEDETOMIDINE HYDROCHLORIDE IN 0.9% SODIUM CHLORIDE 4.12 MICROGRAM(S)/KG/HR: 4 INJECTION INTRAVENOUS at 18:55

## 2022-01-01 RX ADMIN — TAMSULOSIN HYDROCHLORIDE 0.4 MILLIGRAM(S): 0.4 CAPSULE ORAL at 21:46

## 2022-01-01 RX ADMIN — HEPARIN SODIUM 5000 UNIT(S): 5000 INJECTION INTRAVENOUS; SUBCUTANEOUS at 05:07

## 2022-01-01 RX ADMIN — HEPARIN SODIUM 5000 UNIT(S): 5000 INJECTION INTRAVENOUS; SUBCUTANEOUS at 17:15

## 2022-01-01 RX ADMIN — Medication 3 MILLILITER(S): at 10:08

## 2022-01-01 RX ADMIN — INSULIN GLARGINE 7 UNIT(S): 100 INJECTION, SOLUTION SUBCUTANEOUS at 23:45

## 2022-01-01 RX ADMIN — HEPARIN SODIUM 5000 UNIT(S): 5000 INJECTION INTRAVENOUS; SUBCUTANEOUS at 22:11

## 2022-01-01 RX ADMIN — HEPARIN SODIUM 5000 UNIT(S): 5000 INJECTION INTRAVENOUS; SUBCUTANEOUS at 21:35

## 2022-01-01 RX ADMIN — PIPERACILLIN AND TAZOBACTAM 25 GRAM(S): 4; .5 INJECTION, POWDER, LYOPHILIZED, FOR SOLUTION INTRAVENOUS at 05:38

## 2022-01-01 RX ADMIN — TACROLIMUS 4 MILLIGRAM(S): 5 CAPSULE ORAL at 21:47

## 2022-01-01 RX ADMIN — TACROLIMUS 2 MILLIGRAM(S): 5 CAPSULE ORAL at 21:51

## 2022-01-01 RX ADMIN — CHLORHEXIDINE GLUCONATE 1 APPLICATION(S): 213 SOLUTION TOPICAL at 06:19

## 2022-01-01 RX ADMIN — MYCOPHENOLATE MOFETIL 500 MILLIGRAM(S): 250 CAPSULE ORAL at 05:26

## 2022-01-01 RX ADMIN — TACROLIMUS 3 MILLIGRAM(S): 5 CAPSULE ORAL at 22:19

## 2022-01-01 RX ADMIN — TACROLIMUS 3 MILLIGRAM(S): 5 CAPSULE ORAL at 11:43

## 2022-01-01 RX ADMIN — Medication 81 MILLIGRAM(S): at 11:03

## 2022-01-01 RX ADMIN — MIDODRINE HYDROCHLORIDE 20 MILLIGRAM(S): 2.5 TABLET ORAL at 17:48

## 2022-01-01 RX ADMIN — HEPARIN SODIUM 5000 UNIT(S): 5000 INJECTION INTRAVENOUS; SUBCUTANEOUS at 21:37

## 2022-01-01 RX ADMIN — Medication 25 MILLIGRAM(S): at 02:56

## 2022-01-01 RX ADMIN — TAMSULOSIN HYDROCHLORIDE 0.4 MILLIGRAM(S): 0.4 CAPSULE ORAL at 23:09

## 2022-01-01 RX ADMIN — Medication 3: at 22:19

## 2022-01-01 RX ADMIN — MIDODRINE HYDROCHLORIDE 20 MILLIGRAM(S): 2.5 TABLET ORAL at 18:33

## 2022-01-01 RX ADMIN — ATORVASTATIN CALCIUM 40 MILLIGRAM(S): 80 TABLET, FILM COATED ORAL at 21:51

## 2022-01-01 RX ADMIN — PANTOPRAZOLE SODIUM 40 MILLIGRAM(S): 20 TABLET, DELAYED RELEASE ORAL at 05:26

## 2022-01-01 RX ADMIN — MYCOPHENOLATE MOFETIL 41.67 MILLIGRAM(S): 250 CAPSULE ORAL at 06:23

## 2022-01-01 RX ADMIN — PANTOPRAZOLE SODIUM 40 MILLIGRAM(S): 20 TABLET, DELAYED RELEASE ORAL at 11:38

## 2022-01-01 RX ADMIN — Medication 1 APPLICATION(S): at 11:35

## 2022-01-01 RX ADMIN — CHLORHEXIDINE GLUCONATE 15 MILLILITER(S): 213 SOLUTION TOPICAL at 17:36

## 2022-01-01 RX ADMIN — SODIUM CHLORIDE 4 MILLILITER(S): 9 INJECTION INTRAMUSCULAR; INTRAVENOUS; SUBCUTANEOUS at 05:26

## 2022-01-01 RX ADMIN — PIPERACILLIN AND TAZOBACTAM 25 GRAM(S): 4; .5 INJECTION, POWDER, LYOPHILIZED, FOR SOLUTION INTRAVENOUS at 04:18

## 2022-01-01 RX ADMIN — Medication 3 MILLILITER(S): at 22:13

## 2022-01-01 RX ADMIN — Medication 25 MICROGRAM(S): at 22:07

## 2022-01-01 RX ADMIN — Medication 3 MILLILITER(S): at 21:10

## 2022-01-01 RX ADMIN — Medication 3 MILLILITER(S): at 22:26

## 2022-01-01 RX ADMIN — FENTANYL CITRATE 50 MICROGRAM(S): 50 INJECTION INTRAVENOUS at 11:45

## 2022-01-01 RX ADMIN — Medication 6: at 17:29

## 2022-01-01 RX ADMIN — Medication 7.72 MICROGRAM(S)/KG/MIN: at 13:08

## 2022-01-01 RX ADMIN — SODIUM CHLORIDE 4 MILLILITER(S): 9 INJECTION INTRAMUSCULAR; INTRAVENOUS; SUBCUTANEOUS at 05:09

## 2022-01-01 RX ADMIN — MYCOPHENOLATE MOFETIL 500 MILLIGRAM(S): 250 CAPSULE ORAL at 06:55

## 2022-01-01 RX ADMIN — Medication 30 MILLIGRAM(S): at 22:56

## 2022-01-01 RX ADMIN — SODIUM CHLORIDE 4 MILLILITER(S): 9 INJECTION INTRAMUSCULAR; INTRAVENOUS; SUBCUTANEOUS at 05:00

## 2022-01-01 RX ADMIN — CHLORHEXIDINE GLUCONATE 1 APPLICATION(S): 213 SOLUTION TOPICAL at 05:15

## 2022-01-01 RX ADMIN — TAMSULOSIN HYDROCHLORIDE 0.4 MILLIGRAM(S): 0.4 CAPSULE ORAL at 21:37

## 2022-01-01 RX ADMIN — ATORVASTATIN CALCIUM 40 MILLIGRAM(S): 80 TABLET, FILM COATED ORAL at 22:10

## 2022-01-01 RX ADMIN — HEPARIN SODIUM 5000 UNIT(S): 5000 INJECTION INTRAVENOUS; SUBCUTANEOUS at 14:09

## 2022-01-01 RX ADMIN — INSULIN HUMAN 3 UNIT(S): 100 INJECTION, SOLUTION SUBCUTANEOUS at 17:57

## 2022-01-01 RX ADMIN — Medication 60 MICROGRAM(S): at 22:22

## 2022-01-01 RX ADMIN — INSULIN HUMAN 5 UNIT(S): 100 INJECTION, SOLUTION SUBCUTANEOUS at 06:21

## 2022-01-01 RX ADMIN — SODIUM CHLORIDE 4 MILLILITER(S): 9 INJECTION INTRAMUSCULAR; INTRAVENOUS; SUBCUTANEOUS at 06:17

## 2022-01-01 RX ADMIN — Medication 4: at 12:47

## 2022-01-01 RX ADMIN — Medication 50 MILLIGRAM(S): at 19:03

## 2022-01-01 RX ADMIN — CARVEDILOL PHOSPHATE 12.5 MILLIGRAM(S): 80 CAPSULE, EXTENDED RELEASE ORAL at 14:10

## 2022-01-01 RX ADMIN — Medication 4: at 09:05

## 2022-01-01 RX ADMIN — CEFEPIME 100 MILLIGRAM(S): 1 INJECTION, POWDER, FOR SOLUTION INTRAMUSCULAR; INTRAVENOUS at 15:00

## 2022-01-01 RX ADMIN — Medication 30 MILLIGRAM(S): at 18:39

## 2022-01-01 RX ADMIN — ATORVASTATIN CALCIUM 40 MILLIGRAM(S): 80 TABLET, FILM COATED ORAL at 22:00

## 2022-01-01 RX ADMIN — INSULIN GLARGINE 6 UNIT(S): 100 INJECTION, SOLUTION SUBCUTANEOUS at 23:13

## 2022-01-01 RX ADMIN — Medication 345.31 MILLIGRAM(S): at 02:28

## 2022-01-01 RX ADMIN — INSULIN HUMAN 4 UNIT(S): 100 INJECTION, SOLUTION SUBCUTANEOUS at 11:57

## 2022-01-01 RX ADMIN — MYCOPHENOLATE MOFETIL 500 MILLIGRAM(S): 250 CAPSULE ORAL at 17:24

## 2022-01-01 RX ADMIN — HEPARIN SODIUM 5000 UNIT(S): 5000 INJECTION INTRAVENOUS; SUBCUTANEOUS at 22:53

## 2022-01-01 RX ADMIN — MIDODRINE HYDROCHLORIDE 20 MILLIGRAM(S): 2.5 TABLET ORAL at 10:41

## 2022-01-01 RX ADMIN — Medication 5: at 17:54

## 2022-01-01 RX ADMIN — Medication 20 MILLIGRAM(S): at 06:45

## 2022-01-01 RX ADMIN — MYCOPHENOLATE MOFETIL 500 MILLIGRAM(S): 250 CAPSULE ORAL at 17:53

## 2022-01-01 RX ADMIN — MIDODRINE HYDROCHLORIDE 20 MILLIGRAM(S): 2.5 TABLET ORAL at 17:07

## 2022-01-01 RX ADMIN — Medication 3 MILLILITER(S): at 16:00

## 2022-01-01 RX ADMIN — Medication 81 MILLIGRAM(S): at 11:38

## 2022-01-01 RX ADMIN — HEPARIN SODIUM 5000 UNIT(S): 5000 INJECTION INTRAVENOUS; SUBCUTANEOUS at 06:41

## 2022-01-01 RX ADMIN — Medication 3 MILLILITER(S): at 09:21

## 2022-01-01 RX ADMIN — PANTOPRAZOLE SODIUM 40 MILLIGRAM(S): 20 TABLET, DELAYED RELEASE ORAL at 11:35

## 2022-01-01 RX ADMIN — QUETIAPINE FUMARATE 75 MILLIGRAM(S): 200 TABLET, FILM COATED ORAL at 21:35

## 2022-01-01 RX ADMIN — TAMSULOSIN HYDROCHLORIDE 0.4 MILLIGRAM(S): 0.4 CAPSULE ORAL at 21:43

## 2022-01-01 RX ADMIN — GUANFACINE 1 MILLIGRAM(S): 3 TABLET, EXTENDED RELEASE ORAL at 18:38

## 2022-01-01 RX ADMIN — MYCOPHENOLATE MOFETIL 500 MILLIGRAM(S): 250 CAPSULE ORAL at 18:57

## 2022-01-01 RX ADMIN — Medication 50 MICROGRAM(S): at 02:46

## 2022-01-01 RX ADMIN — CEFEPIME 100 MILLIGRAM(S): 1 INJECTION, POWDER, FOR SOLUTION INTRAMUSCULAR; INTRAVENOUS at 14:28

## 2022-01-01 RX ADMIN — Medication 3 MILLILITER(S): at 04:20

## 2022-01-01 RX ADMIN — Medication 345.31 MILLIGRAM(S): at 03:50

## 2022-01-01 RX ADMIN — TAMSULOSIN HYDROCHLORIDE 0.4 MILLIGRAM(S): 0.4 CAPSULE ORAL at 21:20

## 2022-01-01 RX ADMIN — Medication 1: at 16:13

## 2022-01-01 RX ADMIN — INSULIN HUMAN 2: 100 INJECTION, SOLUTION SUBCUTANEOUS at 23:45

## 2022-01-01 RX ADMIN — Medication 50 MILLIGRAM(S): at 00:53

## 2022-01-01 RX ADMIN — Medication 20 MILLIGRAM(S): at 07:17

## 2022-01-01 RX ADMIN — CARVEDILOL PHOSPHATE 25 MILLIGRAM(S): 80 CAPSULE, EXTENDED RELEASE ORAL at 06:42

## 2022-01-01 RX ADMIN — Medication 50 MICROGRAM(S): at 22:45

## 2022-01-01 RX ADMIN — INSULIN GLARGINE 6 UNIT(S): 100 INJECTION, SOLUTION SUBCUTANEOUS at 21:24

## 2022-01-01 RX ADMIN — POLYETHYLENE GLYCOL 3350 17 GRAM(S): 17 POWDER, FOR SOLUTION ORAL at 05:53

## 2022-01-01 RX ADMIN — TACROLIMUS 3 MILLIGRAM(S): 5 CAPSULE ORAL at 00:27

## 2022-01-01 RX ADMIN — INSULIN GLARGINE 10 UNIT(S): 100 INJECTION, SOLUTION SUBCUTANEOUS at 22:43

## 2022-01-01 RX ADMIN — ATORVASTATIN CALCIUM 40 MILLIGRAM(S): 80 TABLET, FILM COATED ORAL at 21:39

## 2022-01-01 RX ADMIN — HEPARIN SODIUM 5000 UNIT(S): 5000 INJECTION INTRAVENOUS; SUBCUTANEOUS at 05:53

## 2022-01-01 RX ADMIN — Medication 3 MILLILITER(S): at 16:30

## 2022-01-01 RX ADMIN — Medication 3 MILLILITER(S): at 10:11

## 2022-01-01 RX ADMIN — INSULIN HUMAN 7 UNIT(S): 100 INJECTION, SOLUTION SUBCUTANEOUS at 11:56

## 2022-01-01 RX ADMIN — MYCOPHENOLATE MOFETIL 500 MILLIGRAM(S): 250 CAPSULE ORAL at 05:20

## 2022-01-01 RX ADMIN — QUETIAPINE FUMARATE 25 MILLIGRAM(S): 200 TABLET, FILM COATED ORAL at 14:49

## 2022-01-01 RX ADMIN — INSULIN HUMAN 7 UNIT(S): 100 INJECTION, SOLUTION SUBCUTANEOUS at 18:18

## 2022-01-01 RX ADMIN — Medication 30 MILLIGRAM(S): at 19:12

## 2022-01-01 RX ADMIN — HEPARIN SODIUM 5000 UNIT(S): 5000 INJECTION INTRAVENOUS; SUBCUTANEOUS at 14:14

## 2022-01-01 RX ADMIN — CHLORHEXIDINE GLUCONATE 1 APPLICATION(S): 213 SOLUTION TOPICAL at 07:01

## 2022-01-01 RX ADMIN — SODIUM CHLORIDE 500 MILLILITER(S): 9 INJECTION, SOLUTION INTRAVENOUS at 18:06

## 2022-01-01 RX ADMIN — MYCOPHENOLATE MOFETIL 500 MILLIGRAM(S): 250 CAPSULE ORAL at 06:35

## 2022-01-01 RX ADMIN — FENTANYL CITRATE 50 MICROGRAM(S): 50 INJECTION INTRAVENOUS at 11:50

## 2022-01-01 RX ADMIN — SODIUM CHLORIDE 1000 MILLILITER(S): 9 INJECTION, SOLUTION INTRAVENOUS at 06:12

## 2022-01-01 RX ADMIN — FENTANYL CITRATE 50 MICROGRAM(S): 50 INJECTION INTRAVENOUS at 21:03

## 2022-01-01 RX ADMIN — HEPARIN SODIUM 5000 UNIT(S): 5000 INJECTION INTRAVENOUS; SUBCUTANEOUS at 13:31

## 2022-01-01 RX ADMIN — ATORVASTATIN CALCIUM 40 MILLIGRAM(S): 80 TABLET, FILM COATED ORAL at 22:14

## 2022-01-01 RX ADMIN — Medication 3 MILLILITER(S): at 21:47

## 2022-01-01 RX ADMIN — CEFEPIME 100 MILLIGRAM(S): 1 INJECTION, POWDER, FOR SOLUTION INTRAMUSCULAR; INTRAVENOUS at 15:57

## 2022-01-01 RX ADMIN — HEPARIN SODIUM 5000 UNIT(S): 5000 INJECTION INTRAVENOUS; SUBCUTANEOUS at 07:22

## 2022-01-01 RX ADMIN — CARVEDILOL PHOSPHATE 25 MILLIGRAM(S): 80 CAPSULE, EXTENDED RELEASE ORAL at 22:18

## 2022-01-01 RX ADMIN — QUETIAPINE FUMARATE 25 MILLIGRAM(S): 200 TABLET, FILM COATED ORAL at 17:54

## 2022-01-01 RX ADMIN — HEPARIN SODIUM 5000 UNIT(S): 5000 INJECTION INTRAVENOUS; SUBCUTANEOUS at 14:28

## 2022-01-01 RX ADMIN — Medication 40 MILLIEQUIVALENT(S): at 09:36

## 2022-01-01 RX ADMIN — TACROLIMUS 3 MILLIGRAM(S): 5 CAPSULE ORAL at 11:37

## 2022-01-01 RX ADMIN — ROBINUL 0.2 MILLIGRAM(S): 0.2 INJECTION INTRAMUSCULAR; INTRAVENOUS at 10:02

## 2022-01-01 RX ADMIN — HEPARIN SODIUM 5000 UNIT(S): 5000 INJECTION INTRAVENOUS; SUBCUTANEOUS at 21:42

## 2022-01-01 RX ADMIN — AMLODIPINE BESYLATE 5 MILLIGRAM(S): 2.5 TABLET ORAL at 09:36

## 2022-01-01 RX ADMIN — INSULIN GLARGINE 7 UNIT(S): 100 INJECTION, SOLUTION SUBCUTANEOUS at 22:53

## 2022-01-01 RX ADMIN — Medication 60 MICROGRAM(S): at 22:59

## 2022-01-01 RX ADMIN — HEPARIN SODIUM 5000 UNIT(S): 5000 INJECTION INTRAVENOUS; SUBCUTANEOUS at 06:42

## 2022-01-01 RX ADMIN — CLOPIDOGREL BISULFATE 75 MILLIGRAM(S): 75 TABLET, FILM COATED ORAL at 15:19

## 2022-01-01 RX ADMIN — HEPARIN SODIUM 5000 UNIT(S): 5000 INJECTION INTRAVENOUS; SUBCUTANEOUS at 21:03

## 2022-01-01 RX ADMIN — INSULIN HUMAN 5 UNIT(S): 100 INJECTION, SOLUTION SUBCUTANEOUS at 11:31

## 2022-01-01 RX ADMIN — Medication 25 MILLIGRAM(S): at 13:34

## 2022-01-01 RX ADMIN — INSULIN HUMAN 4 UNIT(S): 100 INJECTION, SOLUTION SUBCUTANEOUS at 23:44

## 2022-01-01 RX ADMIN — INSULIN HUMAN 2: 100 INJECTION, SOLUTION SUBCUTANEOUS at 05:49

## 2022-01-01 RX ADMIN — Medication 30 MILLIGRAM(S): at 14:28

## 2022-01-01 RX ADMIN — CHLORHEXIDINE GLUCONATE 1 APPLICATION(S): 213 SOLUTION TOPICAL at 06:03

## 2022-01-01 RX ADMIN — HEPARIN SODIUM 5000 UNIT(S): 5000 INJECTION INTRAVENOUS; SUBCUTANEOUS at 14:10

## 2022-01-01 RX ADMIN — INSULIN HUMAN 4 UNIT(S): 100 INJECTION, SOLUTION SUBCUTANEOUS at 17:38

## 2022-01-01 RX ADMIN — CHLORHEXIDINE GLUCONATE 15 MILLILITER(S): 213 SOLUTION TOPICAL at 06:13

## 2022-01-01 RX ADMIN — SODIUM CHLORIDE 1000 MILLILITER(S): 9 INJECTION INTRAMUSCULAR; INTRAVENOUS; SUBCUTANEOUS at 13:40

## 2022-01-01 RX ADMIN — MYCOPHENOLATE MOFETIL 500 MILLIGRAM(S): 250 CAPSULE ORAL at 05:38

## 2022-01-01 RX ADMIN — FENTANYL CITRATE 50 MICROGRAM(S): 50 INJECTION INTRAVENOUS at 21:01

## 2022-01-01 RX ADMIN — Medication 3 MILLILITER(S): at 05:00

## 2022-01-01 RX ADMIN — Medication 25 MILLIGRAM(S): at 01:50

## 2022-01-01 RX ADMIN — ATORVASTATIN CALCIUM 40 MILLIGRAM(S): 80 TABLET, FILM COATED ORAL at 21:41

## 2022-01-01 RX ADMIN — MYCOPHENOLATE MOFETIL 500 MILLIGRAM(S): 250 CAPSULE ORAL at 18:12

## 2022-01-01 RX ADMIN — Medication 3 MILLILITER(S): at 09:52

## 2022-01-01 RX ADMIN — Medication 7 UNIT(S): at 17:53

## 2022-01-01 RX ADMIN — Medication 40 MILLIGRAM(S): at 18:06

## 2022-01-01 RX ADMIN — Medication 2: at 00:57

## 2022-01-01 RX ADMIN — INSULIN HUMAN 8 UNIT(S): 100 INJECTION, SOLUTION SUBCUTANEOUS at 00:00

## 2022-01-01 RX ADMIN — INSULIN GLARGINE 7 UNIT(S): 100 INJECTION, SOLUTION SUBCUTANEOUS at 23:34

## 2022-01-01 RX ADMIN — SODIUM CHLORIDE 4 MILLILITER(S): 9 INJECTION INTRAMUSCULAR; INTRAVENOUS; SUBCUTANEOUS at 06:24

## 2022-01-01 RX ADMIN — FENTANYL CITRATE 25 MICROGRAM(S): 50 INJECTION INTRAVENOUS at 22:59

## 2022-01-01 RX ADMIN — Medication 4: at 18:43

## 2022-01-01 RX ADMIN — CEFEPIME 100 MILLIGRAM(S): 1 INJECTION, POWDER, FOR SOLUTION INTRAMUSCULAR; INTRAVENOUS at 15:03

## 2022-01-01 RX ADMIN — QUETIAPINE FUMARATE 100 MILLIGRAM(S): 200 TABLET, FILM COATED ORAL at 19:24

## 2022-01-01 RX ADMIN — INSULIN GLARGINE 7 UNIT(S): 100 INJECTION, SOLUTION SUBCUTANEOUS at 23:50

## 2022-01-01 RX ADMIN — MYCOPHENOLATE MOFETIL 500 MILLIGRAM(S): 250 CAPSULE ORAL at 01:24

## 2022-01-01 RX ADMIN — MYCOPHENOLATE MOFETIL 500 MILLIGRAM(S): 250 CAPSULE ORAL at 23:55

## 2022-01-01 RX ADMIN — PIPERACILLIN AND TAZOBACTAM 200 GRAM(S): 4; .5 INJECTION, POWDER, LYOPHILIZED, FOR SOLUTION INTRAVENOUS at 16:55

## 2022-01-01 RX ADMIN — PANTOPRAZOLE SODIUM 40 MILLIGRAM(S): 20 TABLET, DELAYED RELEASE ORAL at 06:34

## 2022-01-01 RX ADMIN — INSULIN HUMAN 7 UNIT(S): 100 INJECTION, SOLUTION SUBCUTANEOUS at 23:07

## 2022-01-01 RX ADMIN — PROPOFOL 4.94 MICROGRAM(S)/KG/MIN: 10 INJECTION, EMULSION INTRAVENOUS at 23:12

## 2022-01-01 RX ADMIN — CISATRACURIUM BESYLATE 20 MILLIGRAM(S): 2 INJECTION INTRAVENOUS at 09:38

## 2022-01-01 RX ADMIN — TAMSULOSIN HYDROCHLORIDE 0.4 MILLIGRAM(S): 0.4 CAPSULE ORAL at 21:38

## 2022-01-01 RX ADMIN — GUANFACINE 1 MILLIGRAM(S): 3 TABLET, EXTENDED RELEASE ORAL at 17:05

## 2022-01-01 RX ADMIN — INSULIN HUMAN 1: 100 INJECTION, SOLUTION SUBCUTANEOUS at 07:12

## 2022-01-01 RX ADMIN — Medication 650 MILLIGRAM(S): at 00:25

## 2022-01-01 RX ADMIN — Medication 3 MILLILITER(S): at 03:19

## 2022-01-01 RX ADMIN — INSULIN HUMAN 4 UNIT(S): 100 INJECTION, SOLUTION SUBCUTANEOUS at 00:44

## 2022-01-01 RX ADMIN — Medication 81 MILLIGRAM(S): at 11:21

## 2022-01-01 RX ADMIN — CHLORHEXIDINE GLUCONATE 1 APPLICATION(S): 213 SOLUTION TOPICAL at 06:31

## 2022-01-01 RX ADMIN — INSULIN HUMAN 2 UNIT(S): 100 INJECTION, SOLUTION SUBCUTANEOUS at 18:26

## 2022-01-01 RX ADMIN — PIPERACILLIN AND TAZOBACTAM 25 GRAM(S): 4; .5 INJECTION, POWDER, LYOPHILIZED, FOR SOLUTION INTRAVENOUS at 06:02

## 2022-01-01 RX ADMIN — TAMSULOSIN HYDROCHLORIDE 0.4 MILLIGRAM(S): 0.4 CAPSULE ORAL at 22:15

## 2022-01-01 RX ADMIN — Medication 50 MILLIGRAM(S): at 18:06

## 2022-01-01 RX ADMIN — Medication 25 MILLIGRAM(S): at 02:55

## 2022-01-01 RX ADMIN — FENTANYL CITRATE 75 MICROGRAM(S): 50 INJECTION INTRAVENOUS at 11:25

## 2022-01-01 RX ADMIN — Medication 25 MILLIGRAM(S): at 13:31

## 2022-01-01 RX ADMIN — CARVEDILOL PHOSPHATE 12.5 MILLIGRAM(S): 80 CAPSULE, EXTENDED RELEASE ORAL at 22:07

## 2022-01-01 RX ADMIN — HEPARIN SODIUM 5000 UNIT(S): 5000 INJECTION INTRAVENOUS; SUBCUTANEOUS at 22:59

## 2022-01-01 RX ADMIN — INSULIN HUMAN 4 UNIT(S): 100 INJECTION, SOLUTION SUBCUTANEOUS at 05:27

## 2022-01-01 RX ADMIN — Medication 20 MILLIGRAM(S): at 06:42

## 2022-01-01 RX ADMIN — Medication 650 MILLIGRAM(S): at 02:37

## 2022-01-01 RX ADMIN — HEPARIN SODIUM 5000 UNIT(S): 5000 INJECTION INTRAVENOUS; SUBCUTANEOUS at 22:09

## 2022-01-01 RX ADMIN — HEPARIN SODIUM 5000 UNIT(S): 5000 INJECTION INTRAVENOUS; SUBCUTANEOUS at 05:12

## 2022-01-01 RX ADMIN — PIPERACILLIN AND TAZOBACTAM 25 GRAM(S): 4; .5 INJECTION, POWDER, LYOPHILIZED, FOR SOLUTION INTRAVENOUS at 05:19

## 2022-01-01 RX ADMIN — INSULIN HUMAN 1: 100 INJECTION, SOLUTION SUBCUTANEOUS at 12:16

## 2022-01-01 RX ADMIN — LACTULOSE 15 GRAM(S): 10 SOLUTION ORAL at 17:24

## 2022-01-01 RX ADMIN — INSULIN HUMAN 2: 100 INJECTION, SOLUTION SUBCUTANEOUS at 05:43

## 2022-01-01 RX ADMIN — Medication 3 MILLILITER(S): at 11:37

## 2022-01-01 RX ADMIN — INSULIN HUMAN 1: 100 INJECTION, SOLUTION SUBCUTANEOUS at 00:14

## 2022-01-01 RX ADMIN — Medication 30 MILLIGRAM(S): at 11:13

## 2022-01-01 RX ADMIN — FENTANYL CITRATE 50 MICROGRAM(S): 50 INJECTION INTRAVENOUS at 12:10

## 2022-01-01 RX ADMIN — Medication 25 MILLIGRAM(S): at 13:47

## 2022-01-01 RX ADMIN — Medication 3 MILLILITER(S): at 16:56

## 2022-01-01 RX ADMIN — Medication 650 MILLIGRAM(S): at 15:05

## 2022-01-01 RX ADMIN — Medication 3 MILLILITER(S): at 09:43

## 2022-01-01 RX ADMIN — Medication 81 MILLIGRAM(S): at 07:23

## 2022-01-01 RX ADMIN — CHLORHEXIDINE GLUCONATE 15 MILLILITER(S): 213 SOLUTION TOPICAL at 17:24

## 2022-01-01 RX ADMIN — FENTANYL CITRATE 25 MICROGRAM(S): 50 INJECTION INTRAVENOUS at 03:10

## 2022-01-01 RX ADMIN — QUETIAPINE FUMARATE 50 MILLIGRAM(S): 200 TABLET, FILM COATED ORAL at 18:33

## 2022-01-01 RX ADMIN — FENTANYL CITRATE 50 MICROGRAM(S): 50 INJECTION INTRAVENOUS at 15:23

## 2022-01-01 RX ADMIN — Medication 650 MILLIGRAM(S): at 02:05

## 2022-01-01 RX ADMIN — TACROLIMUS 4 MILLIGRAM(S): 5 CAPSULE ORAL at 11:05

## 2022-01-01 RX ADMIN — CHLORHEXIDINE GLUCONATE 15 MILLILITER(S): 213 SOLUTION TOPICAL at 18:04

## 2022-01-01 RX ADMIN — INSULIN HUMAN 1: 100 INJECTION, SOLUTION SUBCUTANEOUS at 18:15

## 2022-01-01 RX ADMIN — Medication 60 MICROGRAM(S): at 21:58

## 2022-01-01 RX ADMIN — Medication 3 MILLILITER(S): at 05:51

## 2022-01-01 RX ADMIN — MYCOPHENOLATE MOFETIL 41.67 MILLIGRAM(S): 250 CAPSULE ORAL at 18:05

## 2022-01-01 RX ADMIN — TAMSULOSIN HYDROCHLORIDE 0.4 MILLIGRAM(S): 0.4 CAPSULE ORAL at 22:01

## 2022-01-01 RX ADMIN — SODIUM CHLORIDE 1000 MILLILITER(S): 9 INJECTION, SOLUTION INTRAVENOUS at 09:41

## 2022-01-01 RX ADMIN — Medication 3 MILLILITER(S): at 21:06

## 2022-01-01 RX ADMIN — QUETIAPINE FUMARATE 25 MILLIGRAM(S): 200 TABLET, FILM COATED ORAL at 12:47

## 2022-01-01 RX ADMIN — Medication 25 MILLIGRAM(S): at 14:48

## 2022-01-01 RX ADMIN — Medication 3 MILLILITER(S): at 16:44

## 2022-01-01 RX ADMIN — INSULIN GLARGINE 7 UNIT(S): 100 INJECTION, SOLUTION SUBCUTANEOUS at 23:42

## 2022-01-01 RX ADMIN — Medication 3 MILLILITER(S): at 22:15

## 2022-01-01 RX ADMIN — CHLORHEXIDINE GLUCONATE 15 MILLILITER(S): 213 SOLUTION TOPICAL at 06:25

## 2022-01-01 RX ADMIN — Medication 81 MILLIGRAM(S): at 11:20

## 2022-01-01 RX ADMIN — QUETIAPINE FUMARATE 25 MILLIGRAM(S): 200 TABLET, FILM COATED ORAL at 17:36

## 2022-01-01 RX ADMIN — INSULIN HUMAN 4 UNIT(S): 100 INJECTION, SOLUTION SUBCUTANEOUS at 23:13

## 2022-01-01 RX ADMIN — TAMSULOSIN HYDROCHLORIDE 0.4 MILLIGRAM(S): 0.4 CAPSULE ORAL at 21:49

## 2022-01-01 RX ADMIN — Medication 25 MILLIGRAM(S): at 13:08

## 2022-01-01 RX ADMIN — INSULIN HUMAN 2: 100 INJECTION, SOLUTION SUBCUTANEOUS at 06:49

## 2022-01-01 RX ADMIN — HEPARIN SODIUM 5000 UNIT(S): 5000 INJECTION INTRAVENOUS; SUBCUTANEOUS at 06:34

## 2022-01-01 RX ADMIN — PROPOFOL 9.88 MICROGRAM(S)/KG/MIN: 10 INJECTION, EMULSION INTRAVENOUS at 06:53

## 2022-01-01 RX ADMIN — INSULIN HUMAN 4 UNIT(S): 100 INJECTION, SOLUTION SUBCUTANEOUS at 06:27

## 2022-01-01 RX ADMIN — CHLORHEXIDINE GLUCONATE 15 MILLILITER(S): 213 SOLUTION TOPICAL at 06:01

## 2022-01-01 RX ADMIN — HEPARIN SODIUM 5000 UNIT(S): 5000 INJECTION INTRAVENOUS; SUBCUTANEOUS at 14:42

## 2022-01-01 RX ADMIN — Medication 100 MILLIGRAM(S): at 00:23

## 2022-01-01 RX ADMIN — HEPARIN SODIUM 5000 UNIT(S): 5000 INJECTION INTRAVENOUS; SUBCUTANEOUS at 13:36

## 2022-01-01 RX ADMIN — INSULIN GLARGINE 7 UNIT(S): 100 INJECTION, SOLUTION SUBCUTANEOUS at 22:11

## 2022-01-01 RX ADMIN — INSULIN HUMAN 5 UNIT(S): 100 INJECTION, SOLUTION SUBCUTANEOUS at 06:31

## 2022-01-01 RX ADMIN — CHLORHEXIDINE GLUCONATE 1 APPLICATION(S): 213 SOLUTION TOPICAL at 05:54

## 2022-01-01 RX ADMIN — INSULIN GLARGINE 15 UNIT(S): 100 INJECTION, SOLUTION SUBCUTANEOUS at 22:00

## 2022-01-01 RX ADMIN — Medication 25 MILLIGRAM(S): at 13:36

## 2022-01-01 RX ADMIN — Medication 3 MILLILITER(S): at 21:20

## 2022-01-01 RX ADMIN — HEPARIN SODIUM 5000 UNIT(S): 5000 INJECTION INTRAVENOUS; SUBCUTANEOUS at 22:07

## 2022-01-01 RX ADMIN — ALBUTEROL 2.5 MILLIGRAM(S): 90 AEROSOL, METERED ORAL at 04:27

## 2022-01-01 RX ADMIN — MIDODRINE HYDROCHLORIDE 20 MILLIGRAM(S): 2.5 TABLET ORAL at 02:38

## 2022-01-01 RX ADMIN — ATORVASTATIN CALCIUM 40 MILLIGRAM(S): 80 TABLET, FILM COATED ORAL at 22:05

## 2022-01-01 RX ADMIN — TACROLIMUS 2 MILLIGRAM(S): 5 CAPSULE ORAL at 23:32

## 2022-01-01 RX ADMIN — TACROLIMUS 3 MILLIGRAM(S): 5 CAPSULE ORAL at 10:12

## 2022-01-01 RX ADMIN — TAMSULOSIN HYDROCHLORIDE 0.4 MILLIGRAM(S): 0.4 CAPSULE ORAL at 22:05

## 2022-01-01 RX ADMIN — Medication 100 MILLIGRAM(S): at 15:50

## 2022-01-01 RX ADMIN — CHLORHEXIDINE GLUCONATE 15 MILLILITER(S): 213 SOLUTION TOPICAL at 05:52

## 2022-01-01 RX ADMIN — Medication 25 MILLIGRAM(S): at 04:51

## 2022-01-01 RX ADMIN — LACTULOSE 10 GRAM(S): 10 SOLUTION ORAL at 04:51

## 2022-01-01 RX ADMIN — Medication 1: at 06:55

## 2022-01-01 RX ADMIN — HEPARIN SODIUM 5000 UNIT(S): 5000 INJECTION INTRAVENOUS; SUBCUTANEOUS at 13:34

## 2022-01-01 RX ADMIN — MYCOPHENOLATE MOFETIL 500 MILLIGRAM(S): 250 CAPSULE ORAL at 11:05

## 2022-01-01 RX ADMIN — POLYETHYLENE GLYCOL 3350 17 GRAM(S): 17 POWDER, FOR SOLUTION ORAL at 17:09

## 2022-01-01 RX ADMIN — Medication 30 MILLIGRAM(S): at 11:11

## 2022-01-01 RX ADMIN — PIPERACILLIN AND TAZOBACTAM 25 GRAM(S): 4; .5 INJECTION, POWDER, LYOPHILIZED, FOR SOLUTION INTRAVENOUS at 17:37

## 2022-01-01 RX ADMIN — LACTULOSE 15 GRAM(S): 10 SOLUTION ORAL at 22:18

## 2022-01-01 RX ADMIN — QUETIAPINE FUMARATE 25 MILLIGRAM(S): 200 TABLET, FILM COATED ORAL at 11:12

## 2022-01-01 RX ADMIN — Medication 7.72 MICROGRAM(S)/KG/MIN: at 22:53

## 2022-01-01 RX ADMIN — INSULIN HUMAN 4 UNIT(S): 100 INJECTION, SOLUTION SUBCUTANEOUS at 17:10

## 2022-01-01 RX ADMIN — Medication 650 MILLIGRAM(S): at 17:09

## 2022-01-01 RX ADMIN — Medication 81 MILLIGRAM(S): at 09:37

## 2022-01-01 RX ADMIN — DEXMEDETOMIDINE HYDROCHLORIDE IN 0.9% SODIUM CHLORIDE 4.12 MICROGRAM(S)/KG/HR: 4 INJECTION INTRAVENOUS at 10:05

## 2022-01-01 RX ADMIN — TACROLIMUS 2 MILLIGRAM(S): 5 CAPSULE ORAL at 09:40

## 2022-01-01 RX ADMIN — Medication 60 MEQ/KG/HR: at 14:51

## 2022-01-01 RX ADMIN — INSULIN HUMAN 7 UNIT(S): 100 INJECTION, SOLUTION SUBCUTANEOUS at 23:00

## 2022-01-01 RX ADMIN — CHLORHEXIDINE GLUCONATE 15 MILLILITER(S): 213 SOLUTION TOPICAL at 06:03

## 2022-01-01 RX ADMIN — TAMSULOSIN HYDROCHLORIDE 0.4 MILLIGRAM(S): 0.4 CAPSULE ORAL at 22:12

## 2022-01-01 RX ADMIN — Medication 25 MILLIGRAM(S): at 01:08

## 2022-01-01 RX ADMIN — MIDODRINE HYDROCHLORIDE 30 MILLIGRAM(S): 2.5 TABLET ORAL at 11:56

## 2022-01-01 RX ADMIN — Medication 30 MILLIGRAM(S): at 13:08

## 2022-01-01 RX ADMIN — INSULIN HUMAN 2: 100 INJECTION, SOLUTION SUBCUTANEOUS at 12:20

## 2022-01-01 RX ADMIN — LACTULOSE 10 GRAM(S): 10 SOLUTION ORAL at 10:20

## 2022-01-01 RX ADMIN — INSULIN HUMAN 1: 100 INJECTION, SOLUTION SUBCUTANEOUS at 11:58

## 2022-01-01 RX ADMIN — CEFEPIME 100 MILLIGRAM(S): 1 INJECTION, POWDER, FOR SOLUTION INTRAMUSCULAR; INTRAVENOUS at 15:10

## 2022-01-01 RX ADMIN — Medication 650 MILLIGRAM(S): at 13:01

## 2022-01-01 RX ADMIN — HEPARIN SODIUM 5000 UNIT(S): 5000 INJECTION INTRAVENOUS; SUBCUTANEOUS at 14:32

## 2022-01-01 RX ADMIN — ATORVASTATIN CALCIUM 40 MILLIGRAM(S): 80 TABLET, FILM COATED ORAL at 21:46

## 2022-01-01 RX ADMIN — QUETIAPINE FUMARATE 100 MILLIGRAM(S): 200 TABLET, FILM COATED ORAL at 19:02

## 2022-01-01 RX ADMIN — Medication 2: at 19:03

## 2022-01-01 RX ADMIN — Medication 40 MILLIGRAM(S): at 06:55

## 2022-01-01 RX ADMIN — Medication 25 MILLIGRAM(S): at 15:00

## 2022-01-01 RX ADMIN — FENTANYL CITRATE 25 MICROGRAM(S): 50 INJECTION INTRAVENOUS at 05:46

## 2022-01-01 RX ADMIN — Medication 60 MICROGRAM(S): at 21:59

## 2022-01-01 RX ADMIN — TACROLIMUS 4 MILLIGRAM(S): 5 CAPSULE ORAL at 22:19

## 2022-01-01 RX ADMIN — Medication 650 MILLIGRAM(S): at 01:05

## 2022-01-01 RX ADMIN — TACROLIMUS 3 MILLIGRAM(S): 5 CAPSULE ORAL at 09:45

## 2022-01-01 RX ADMIN — Medication 25 MILLIGRAM(S): at 14:54

## 2022-01-01 RX ADMIN — PIPERACILLIN AND TAZOBACTAM 25 GRAM(S): 4; .5 INJECTION, POWDER, LYOPHILIZED, FOR SOLUTION INTRAVENOUS at 06:39

## 2022-01-01 RX ADMIN — TACROLIMUS 4 MILLIGRAM(S): 5 CAPSULE ORAL at 12:02

## 2022-01-01 RX ADMIN — CHLORHEXIDINE GLUCONATE 15 MILLILITER(S): 213 SOLUTION TOPICAL at 06:50

## 2022-01-01 RX ADMIN — QUETIAPINE FUMARATE 50 MILLIGRAM(S): 200 TABLET, FILM COATED ORAL at 05:15

## 2022-01-01 RX ADMIN — TAMSULOSIN HYDROCHLORIDE 0.4 MILLIGRAM(S): 0.4 CAPSULE ORAL at 22:10

## 2022-01-01 RX ADMIN — CARVEDILOL PHOSPHATE 25 MILLIGRAM(S): 80 CAPSULE, EXTENDED RELEASE ORAL at 17:55

## 2022-01-01 RX ADMIN — Medication 25 GRAM(S): at 12:38

## 2022-01-01 RX ADMIN — TACROLIMUS 3 MILLIGRAM(S): 5 CAPSULE ORAL at 10:22

## 2022-01-01 RX ADMIN — DEXMEDETOMIDINE HYDROCHLORIDE IN 0.9% SODIUM CHLORIDE 0.82 MICROGRAM(S)/KG/HR: 4 INJECTION INTRAVENOUS at 19:47

## 2022-01-01 RX ADMIN — Medication 81 MILLIGRAM(S): at 11:39

## 2022-01-01 RX ADMIN — HEPARIN SODIUM 5000 UNIT(S): 5000 INJECTION INTRAVENOUS; SUBCUTANEOUS at 06:39

## 2022-01-01 RX ADMIN — HEPARIN SODIUM 5000 UNIT(S): 5000 INJECTION INTRAVENOUS; SUBCUTANEOUS at 06:43

## 2022-01-01 RX ADMIN — INSULIN GLARGINE 20 UNIT(S): 100 INJECTION, SOLUTION SUBCUTANEOUS at 22:20

## 2022-01-01 RX ADMIN — Medication 3 MILLILITER(S): at 05:11

## 2022-01-01 RX ADMIN — Medication 650 MILLIGRAM(S): at 21:30

## 2022-01-01 RX ADMIN — TACROLIMUS 4 MILLIGRAM(S): 5 CAPSULE ORAL at 10:24

## 2022-01-01 RX ADMIN — INSULIN GLARGINE 7 UNIT(S): 100 INJECTION, SOLUTION SUBCUTANEOUS at 22:18

## 2022-01-01 RX ADMIN — HEPARIN SODIUM 5000 UNIT(S): 5000 INJECTION INTRAVENOUS; SUBCUTANEOUS at 06:13

## 2022-01-01 RX ADMIN — Medication 3 MILLILITER(S): at 16:01

## 2022-01-01 RX ADMIN — SODIUM CHLORIDE 40 MILLILITER(S): 9 INJECTION, SOLUTION INTRAVENOUS at 10:03

## 2022-01-01 RX ADMIN — INSULIN HUMAN 3: 100 INJECTION, SOLUTION SUBCUTANEOUS at 11:00

## 2022-01-01 RX ADMIN — Medication 1 APPLICATION(S): at 11:42

## 2022-01-01 RX ADMIN — DEXMEDETOMIDINE HYDROCHLORIDE IN 0.9% SODIUM CHLORIDE 4.12 MICROGRAM(S)/KG/HR: 4 INJECTION INTRAVENOUS at 02:05

## 2022-01-01 RX ADMIN — PROPOFOL 4.94 MICROGRAM(S)/KG/MIN: 10 INJECTION, EMULSION INTRAVENOUS at 18:23

## 2022-01-01 RX ADMIN — PANTOPRAZOLE SODIUM 40 MILLIGRAM(S): 20 TABLET, DELAYED RELEASE ORAL at 06:13

## 2022-01-01 RX ADMIN — QUETIAPINE FUMARATE 50 MILLIGRAM(S): 200 TABLET, FILM COATED ORAL at 06:05

## 2022-01-01 RX ADMIN — Medication 3 MILLILITER(S): at 09:42

## 2022-01-01 RX ADMIN — MIDODRINE HYDROCHLORIDE 30 MILLIGRAM(S): 2.5 TABLET ORAL at 15:47

## 2022-01-01 RX ADMIN — PIPERACILLIN AND TAZOBACTAM 25 GRAM(S): 4; .5 INJECTION, POWDER, LYOPHILIZED, FOR SOLUTION INTRAVENOUS at 22:07

## 2022-01-01 RX ADMIN — Medication 30 MILLIGRAM(S): at 12:07

## 2022-01-01 RX ADMIN — Medication 81 MILLIGRAM(S): at 21:26

## 2022-01-01 RX ADMIN — Medication 3 MILLILITER(S): at 17:08

## 2022-01-01 RX ADMIN — CHLORHEXIDINE GLUCONATE 15 MILLILITER(S): 213 SOLUTION TOPICAL at 17:13

## 2022-01-01 RX ADMIN — INSULIN HUMAN 9 UNIT(S): 100 INJECTION, SOLUTION SUBCUTANEOUS at 18:58

## 2022-01-01 RX ADMIN — Medication 30 MILLIGRAM(S): at 02:23

## 2022-01-01 RX ADMIN — Medication 100 MILLIEQUIVALENT(S): at 00:25

## 2022-01-01 RX ADMIN — Medication 60 MILLILITER(S): at 17:10

## 2022-01-01 RX ADMIN — TAMSULOSIN HYDROCHLORIDE 0.4 MILLIGRAM(S): 0.4 CAPSULE ORAL at 21:42

## 2022-01-01 RX ADMIN — FENTANYL CITRATE 4.12 MICROGRAM(S)/KG/HR: 50 INJECTION INTRAVENOUS at 07:44

## 2022-01-01 RX ADMIN — Medication 345.31 MILLIGRAM(S): at 15:33

## 2022-01-01 RX ADMIN — DEXMEDETOMIDINE HYDROCHLORIDE IN 0.9% SODIUM CHLORIDE 0.82 MICROGRAM(S)/KG/HR: 4 INJECTION INTRAVENOUS at 04:29

## 2022-01-01 RX ADMIN — HEPARIN SODIUM 5000 UNIT(S): 5000 INJECTION INTRAVENOUS; SUBCUTANEOUS at 21:13

## 2022-01-01 RX ADMIN — TACROLIMUS 3 MILLIGRAM(S): 5 CAPSULE ORAL at 09:26

## 2022-01-01 RX ADMIN — HEPARIN SODIUM 5000 UNIT(S): 5000 INJECTION INTRAVENOUS; SUBCUTANEOUS at 22:46

## 2022-01-01 RX ADMIN — SODIUM CHLORIDE 4 MILLILITER(S): 9 INJECTION INTRAMUSCULAR; INTRAVENOUS; SUBCUTANEOUS at 18:21

## 2022-01-01 RX ADMIN — Medication 3 MILLILITER(S): at 04:55

## 2022-01-01 RX ADMIN — INSULIN HUMAN 1: 100 INJECTION, SOLUTION SUBCUTANEOUS at 06:03

## 2022-01-01 RX ADMIN — CHLORHEXIDINE GLUCONATE 15 MILLILITER(S): 213 SOLUTION TOPICAL at 17:52

## 2022-01-01 RX ADMIN — DEXMEDETOMIDINE HYDROCHLORIDE IN 0.9% SODIUM CHLORIDE 0.82 MICROGRAM(S)/KG/HR: 4 INJECTION INTRAVENOUS at 18:32

## 2022-01-01 RX ADMIN — Medication 650 MILLIGRAM(S): at 01:30

## 2022-01-01 RX ADMIN — MYCOPHENOLATE MOFETIL 500 MILLIGRAM(S): 250 CAPSULE ORAL at 05:12

## 2022-01-01 RX ADMIN — INSULIN GLARGINE 5 UNIT(S): 100 INJECTION, SOLUTION SUBCUTANEOUS at 23:14

## 2022-01-01 RX ADMIN — AMLODIPINE BESYLATE 5 MILLIGRAM(S): 2.5 TABLET ORAL at 11:37

## 2022-01-01 RX ADMIN — CHLORHEXIDINE GLUCONATE 15 MILLILITER(S): 213 SOLUTION TOPICAL at 05:14

## 2022-01-01 RX ADMIN — Medication 50 MILLIGRAM(S): at 06:24

## 2022-01-01 RX ADMIN — INSULIN GLARGINE 10 UNIT(S): 100 INJECTION, SOLUTION SUBCUTANEOUS at 22:32

## 2022-01-01 RX ADMIN — TACROLIMUS 4 MILLIGRAM(S): 5 CAPSULE ORAL at 22:15

## 2022-01-01 RX ADMIN — MYCOPHENOLATE MOFETIL 500 MILLIGRAM(S): 250 CAPSULE ORAL at 16:45

## 2022-01-01 RX ADMIN — Medication 30 MILLIGRAM(S): at 18:44

## 2022-01-01 RX ADMIN — SODIUM CHLORIDE 1000 MILLILITER(S): 9 INJECTION, SOLUTION INTRAVENOUS at 07:33

## 2022-01-01 RX ADMIN — SODIUM POLYSTYRENE SULFONATE 15 GRAM(S): 4.1 POWDER, FOR SUSPENSION ORAL at 06:42

## 2022-01-01 RX ADMIN — Medication 81 MILLIGRAM(S): at 13:08

## 2022-01-01 RX ADMIN — POLYETHYLENE GLYCOL 3350 17 GRAM(S): 17 POWDER, FOR SOLUTION ORAL at 01:37

## 2022-01-01 RX ADMIN — Medication 20 MILLIGRAM(S): at 19:03

## 2022-01-01 RX ADMIN — HEPARIN SODIUM 5000 UNIT(S): 5000 INJECTION INTRAVENOUS; SUBCUTANEOUS at 06:26

## 2022-01-01 RX ADMIN — TAMSULOSIN HYDROCHLORIDE 0.4 MILLIGRAM(S): 0.4 CAPSULE ORAL at 22:58

## 2022-01-01 RX ADMIN — MYCOPHENOLATE MOFETIL 500 MILLIGRAM(S): 250 CAPSULE ORAL at 14:10

## 2022-01-01 RX ADMIN — HEPARIN SODIUM 5000 UNIT(S): 5000 INJECTION INTRAVENOUS; SUBCUTANEOUS at 13:10

## 2022-01-01 RX ADMIN — TACROLIMUS 3 MILLIGRAM(S): 5 CAPSULE ORAL at 12:23

## 2022-01-01 RX ADMIN — Medication 3 MILLILITER(S): at 22:02

## 2022-01-01 RX ADMIN — INSULIN HUMAN 4: 100 INJECTION, SOLUTION SUBCUTANEOUS at 11:03

## 2022-01-01 RX ADMIN — Medication 81 MILLIGRAM(S): at 06:55

## 2022-01-01 RX ADMIN — HEPARIN SODIUM 5000 UNIT(S): 5000 INJECTION INTRAVENOUS; SUBCUTANEOUS at 13:14

## 2022-01-01 RX ADMIN — TACROLIMUS 4 MILLIGRAM(S): 5 CAPSULE ORAL at 09:00

## 2022-01-01 RX ADMIN — INSULIN HUMAN 4 UNIT(S): 100 INJECTION, SOLUTION SUBCUTANEOUS at 11:29

## 2022-01-01 RX ADMIN — INSULIN HUMAN 5 UNIT(S): 100 INJECTION, SOLUTION SUBCUTANEOUS at 23:29

## 2022-01-01 RX ADMIN — Medication 3 MILLILITER(S): at 22:34

## 2022-01-01 RX ADMIN — CHLORHEXIDINE GLUCONATE 1 APPLICATION(S): 213 SOLUTION TOPICAL at 05:25

## 2022-01-01 RX ADMIN — INSULIN HUMAN 4: 100 INJECTION, SOLUTION SUBCUTANEOUS at 17:13

## 2022-01-01 RX ADMIN — SODIUM CHLORIDE 4 MILLILITER(S): 9 INJECTION INTRAMUSCULAR; INTRAVENOUS; SUBCUTANEOUS at 06:20

## 2022-01-01 RX ADMIN — FENTANYL CITRATE 4.12 MICROGRAM(S)/KG/HR: 50 INJECTION INTRAVENOUS at 23:21

## 2022-01-01 RX ADMIN — Medication 650 MILLIGRAM(S): at 06:00

## 2022-01-01 RX ADMIN — Medication 60 MICROGRAM(S): at 23:52

## 2022-01-01 RX ADMIN — INSULIN HUMAN 7 UNIT(S): 100 INJECTION, SOLUTION SUBCUTANEOUS at 23:43

## 2022-01-01 RX ADMIN — Medication 30 MILLIGRAM(S): at 04:36

## 2022-01-01 RX ADMIN — PROPOFOL 4.94 MICROGRAM(S)/KG/MIN: 10 INJECTION, EMULSION INTRAVENOUS at 14:27

## 2022-01-01 RX ADMIN — Medication 50 MICROGRAM(S): at 21:42

## 2022-01-01 RX ADMIN — HEPARIN SODIUM 5000 UNIT(S): 5000 INJECTION INTRAVENOUS; SUBCUTANEOUS at 13:29

## 2022-01-01 RX ADMIN — INSULIN HUMAN 1: 100 INJECTION, SOLUTION SUBCUTANEOUS at 17:39

## 2022-01-01 RX ADMIN — PIPERACILLIN AND TAZOBACTAM 25 GRAM(S): 4; .5 INJECTION, POWDER, LYOPHILIZED, FOR SOLUTION INTRAVENOUS at 18:29

## 2022-01-01 RX ADMIN — Medication 50 MILLIGRAM(S): at 07:28

## 2022-01-01 RX ADMIN — MYCOPHENOLATE MOFETIL 500 MILLIGRAM(S): 250 CAPSULE ORAL at 18:34

## 2022-01-01 RX ADMIN — SODIUM CHLORIDE 1000 MILLILITER(S): 9 INJECTION, SOLUTION INTRAVENOUS at 15:31

## 2022-01-01 RX ADMIN — HEPARIN SODIUM 5000 UNIT(S): 5000 INJECTION INTRAVENOUS; SUBCUTANEOUS at 05:46

## 2022-01-01 RX ADMIN — HEPARIN SODIUM 5000 UNIT(S): 5000 INJECTION INTRAVENOUS; SUBCUTANEOUS at 15:12

## 2022-01-01 RX ADMIN — Medication 30 MILLIGRAM(S): at 13:41

## 2022-01-01 RX ADMIN — PIPERACILLIN AND TAZOBACTAM 25 GRAM(S): 4; .5 INJECTION, POWDER, LYOPHILIZED, FOR SOLUTION INTRAVENOUS at 22:06

## 2022-01-01 RX ADMIN — HEPARIN SODIUM 5000 UNIT(S): 5000 INJECTION INTRAVENOUS; SUBCUTANEOUS at 13:27

## 2022-01-01 RX ADMIN — FENTANYL CITRATE 50 MICROGRAM(S): 50 INJECTION INTRAVENOUS at 17:10

## 2022-01-01 RX ADMIN — Medication 20 MILLIGRAM(S): at 16:22

## 2022-01-01 RX ADMIN — INSULIN HUMAN 8 UNIT(S): 100 INJECTION, SOLUTION SUBCUTANEOUS at 17:32

## 2022-01-01 RX ADMIN — Medication 2: at 09:27

## 2022-01-01 RX ADMIN — Medication 500 MICROGRAM(S): at 09:38

## 2022-01-01 RX ADMIN — MYCOPHENOLATE MOFETIL 500 MILLIGRAM(S): 250 CAPSULE ORAL at 17:28

## 2022-01-01 RX ADMIN — TACROLIMUS 3 MILLIGRAM(S): 5 CAPSULE ORAL at 10:03

## 2022-01-01 RX ADMIN — DEXMEDETOMIDINE HYDROCHLORIDE IN 0.9% SODIUM CHLORIDE 2.06 MICROGRAM(S)/KG/HR: 4 INJECTION INTRAVENOUS at 10:45

## 2022-01-01 RX ADMIN — TACROLIMUS 4 MILLIGRAM(S): 5 CAPSULE ORAL at 13:39

## 2022-01-01 RX ADMIN — INSULIN HUMAN 4 UNIT(S): 100 INJECTION, SOLUTION SUBCUTANEOUS at 17:17

## 2022-01-01 RX ADMIN — Medication 3 MILLILITER(S): at 16:54

## 2022-01-01 RX ADMIN — QUETIAPINE FUMARATE 25 MILLIGRAM(S): 200 TABLET, FILM COATED ORAL at 22:55

## 2022-01-01 RX ADMIN — TAMSULOSIN HYDROCHLORIDE 0.4 MILLIGRAM(S): 0.4 CAPSULE ORAL at 21:02

## 2022-01-01 RX ADMIN — CHLORHEXIDINE GLUCONATE 15 MILLILITER(S): 213 SOLUTION TOPICAL at 18:33

## 2022-01-01 RX ADMIN — TAMSULOSIN HYDROCHLORIDE 0.4 MILLIGRAM(S): 0.4 CAPSULE ORAL at 22:18

## 2022-01-01 RX ADMIN — Medication 60 MICROGRAM(S): at 00:10

## 2022-01-01 RX ADMIN — Medication 345.31 MILLIGRAM(S): at 04:03

## 2022-01-01 RX ADMIN — CHLORHEXIDINE GLUCONATE 1 APPLICATION(S): 213 SOLUTION TOPICAL at 05:07

## 2022-01-01 RX ADMIN — Medication 81 MILLIGRAM(S): at 11:35

## 2022-01-01 RX ADMIN — Medication 40 MILLIGRAM(S): at 06:39

## 2022-01-01 RX ADMIN — Medication 25 MILLIGRAM(S): at 02:21

## 2022-01-01 RX ADMIN — DEXMEDETOMIDINE HYDROCHLORIDE IN 0.9% SODIUM CHLORIDE 4.12 MICROGRAM(S)/KG/HR: 4 INJECTION INTRAVENOUS at 11:36

## 2022-01-01 RX ADMIN — TACROLIMUS 4 MILLIGRAM(S): 5 CAPSULE ORAL at 22:23

## 2022-01-01 RX ADMIN — Medication 3 MILLILITER(S): at 02:47

## 2022-01-01 RX ADMIN — Medication 60 MICROGRAM(S): at 00:26

## 2022-01-01 RX ADMIN — INSULIN HUMAN 5 UNIT(S): 100 INJECTION, SOLUTION SUBCUTANEOUS at 06:09

## 2022-01-01 RX ADMIN — ATORVASTATIN CALCIUM 40 MILLIGRAM(S): 80 TABLET, FILM COATED ORAL at 21:35

## 2022-01-01 RX ADMIN — ATORVASTATIN CALCIUM 40 MILLIGRAM(S): 80 TABLET, FILM COATED ORAL at 22:11

## 2022-01-01 RX ADMIN — MYCOPHENOLATE MOFETIL 500 MILLIGRAM(S): 250 CAPSULE ORAL at 04:05

## 2022-01-01 RX ADMIN — INSULIN HUMAN 4 UNIT(S): 100 INJECTION, SOLUTION SUBCUTANEOUS at 17:30

## 2022-01-01 RX ADMIN — Medication 500 MICROGRAM(S): at 22:00

## 2022-01-01 RX ADMIN — Medication 25 MILLIGRAM(S): at 13:04

## 2022-01-01 RX ADMIN — TAMSULOSIN HYDROCHLORIDE 0.4 MILLIGRAM(S): 0.4 CAPSULE ORAL at 22:14

## 2022-01-01 RX ADMIN — SENNA PLUS 2 TABLET(S): 8.6 TABLET ORAL at 22:18

## 2022-01-01 RX ADMIN — Medication 1: at 12:44

## 2022-01-01 RX ADMIN — INSULIN HUMAN 1: 100 INJECTION, SOLUTION SUBCUTANEOUS at 11:54

## 2022-01-01 RX ADMIN — Medication 60 MICROGRAM(S): at 00:47

## 2022-01-01 RX ADMIN — Medication 1: at 12:05

## 2022-01-01 RX ADMIN — MYCOPHENOLATE MOFETIL 500 MILLIGRAM(S): 250 CAPSULE ORAL at 17:33

## 2022-01-01 RX ADMIN — ERYTHROPOIETIN 8000 UNIT(S): 10000 INJECTION, SOLUTION INTRAVENOUS; SUBCUTANEOUS at 13:06

## 2022-01-01 RX ADMIN — QUETIAPINE FUMARATE 100 MILLIGRAM(S): 200 TABLET, FILM COATED ORAL at 06:00

## 2022-01-01 RX ADMIN — TACROLIMUS 2 MILLIGRAM(S): 5 CAPSULE ORAL at 21:46

## 2022-01-01 RX ADMIN — Medication 3 MILLILITER(S): at 22:11

## 2022-01-01 RX ADMIN — Medication 3 UNIT(S): at 17:29

## 2022-01-01 RX ADMIN — HEPARIN SODIUM 5000 UNIT(S): 5000 INJECTION INTRAVENOUS; SUBCUTANEOUS at 13:18

## 2022-01-01 RX ADMIN — HEPARIN SODIUM 5000 UNIT(S): 5000 INJECTION INTRAVENOUS; SUBCUTANEOUS at 22:17

## 2022-01-01 RX ADMIN — SODIUM CHLORIDE 1000 MILLILITER(S): 9 INJECTION, SOLUTION INTRAVENOUS at 08:02

## 2022-01-01 RX ADMIN — ATORVASTATIN CALCIUM 40 MILLIGRAM(S): 80 TABLET, FILM COATED ORAL at 22:18

## 2022-01-01 RX ADMIN — TACROLIMUS 4 MILLIGRAM(S): 5 CAPSULE ORAL at 21:05

## 2022-01-01 RX ADMIN — SODIUM CHLORIDE 4 MILLILITER(S): 9 INJECTION INTRAMUSCULAR; INTRAVENOUS; SUBCUTANEOUS at 05:43

## 2022-01-01 RX ADMIN — Medication 3 MILLILITER(S): at 10:20

## 2022-01-01 RX ADMIN — TACROLIMUS 4 MILLIGRAM(S): 5 CAPSULE ORAL at 11:41

## 2022-01-01 RX ADMIN — INSULIN GLARGINE 7 UNIT(S): 100 INJECTION, SOLUTION SUBCUTANEOUS at 00:10

## 2022-01-01 RX ADMIN — PANTOPRAZOLE SODIUM 40 MILLIGRAM(S): 20 TABLET, DELAYED RELEASE ORAL at 06:30

## 2022-01-01 RX ADMIN — Medication 3 MILLILITER(S): at 09:58

## 2022-01-01 RX ADMIN — Medication 7.72 MICROGRAM(S)/KG/MIN: at 11:36

## 2022-01-01 RX ADMIN — Medication 81 MILLIGRAM(S): at 11:19

## 2022-01-01 RX ADMIN — MYCOPHENOLATE MOFETIL 500 MILLIGRAM(S): 250 CAPSULE ORAL at 11:36

## 2022-01-01 RX ADMIN — TACROLIMUS 2 MILLIGRAM(S): 5 CAPSULE ORAL at 23:09

## 2022-01-01 RX ADMIN — MYCOPHENOLATE MOFETIL 500 MILLIGRAM(S): 250 CAPSULE ORAL at 18:31

## 2022-01-01 RX ADMIN — Medication 81 MILLIGRAM(S): at 13:42

## 2022-01-01 RX ADMIN — MYCOPHENOLATE MOFETIL 500 MILLIGRAM(S): 250 CAPSULE ORAL at 06:45

## 2022-01-01 RX ADMIN — Medication 250 MILLIGRAM(S): at 22:54

## 2022-01-01 RX ADMIN — INSULIN HUMAN 3 UNIT(S): 100 INJECTION, SOLUTION SUBCUTANEOUS at 17:55

## 2022-01-01 RX ADMIN — HEPARIN SODIUM 5000 UNIT(S): 5000 INJECTION INTRAVENOUS; SUBCUTANEOUS at 21:51

## 2022-01-01 RX ADMIN — Medication 650 MILLIGRAM(S): at 00:29

## 2022-01-01 RX ADMIN — DEXMEDETOMIDINE HYDROCHLORIDE IN 0.9% SODIUM CHLORIDE 0.82 MICROGRAM(S)/KG/HR: 4 INJECTION INTRAVENOUS at 07:21

## 2022-01-01 RX ADMIN — INSULIN HUMAN 1: 100 INJECTION, SOLUTION SUBCUTANEOUS at 13:35

## 2022-01-01 RX ADMIN — HEPARIN SODIUM 5000 UNIT(S): 5000 INJECTION INTRAVENOUS; SUBCUTANEOUS at 06:45

## 2022-01-01 RX ADMIN — HEPARIN SODIUM 5000 UNIT(S): 5000 INJECTION INTRAVENOUS; SUBCUTANEOUS at 06:55

## 2022-01-01 RX ADMIN — QUETIAPINE FUMARATE 25 MILLIGRAM(S): 200 TABLET, FILM COATED ORAL at 05:42

## 2022-01-01 RX ADMIN — MYCOPHENOLATE MOFETIL 500 MILLIGRAM(S): 250 CAPSULE ORAL at 05:45

## 2022-01-01 RX ADMIN — PANTOPRAZOLE SODIUM 40 MILLIGRAM(S): 20 TABLET, DELAYED RELEASE ORAL at 11:19

## 2022-01-01 RX ADMIN — SODIUM CHLORIDE 4 MILLILITER(S): 9 INJECTION INTRAMUSCULAR; INTRAVENOUS; SUBCUTANEOUS at 18:33

## 2022-01-01 RX ADMIN — FENTANYL CITRATE 25 MICROGRAM(S): 50 INJECTION INTRAVENOUS at 23:30

## 2022-01-01 RX ADMIN — PANTOPRAZOLE SODIUM 40 MILLIGRAM(S): 20 TABLET, DELAYED RELEASE ORAL at 06:50

## 2022-01-01 RX ADMIN — HEPARIN SODIUM 5000 UNIT(S): 5000 INJECTION INTRAVENOUS; SUBCUTANEOUS at 06:56

## 2022-01-01 RX ADMIN — Medication 30 MILLIGRAM(S): at 19:46

## 2022-01-01 RX ADMIN — SODIUM CHLORIDE 4 MILLILITER(S): 9 INJECTION INTRAMUSCULAR; INTRAVENOUS; SUBCUTANEOUS at 18:00

## 2022-01-01 RX ADMIN — INSULIN HUMAN 3: 100 INJECTION, SOLUTION SUBCUTANEOUS at 05:20

## 2022-01-01 RX ADMIN — Medication 50 MILLIGRAM(S): at 01:26

## 2022-01-01 RX ADMIN — CARVEDILOL PHOSPHATE 25 MILLIGRAM(S): 80 CAPSULE, EXTENDED RELEASE ORAL at 06:41

## 2022-01-01 RX ADMIN — INSULIN HUMAN 1: 100 INJECTION, SOLUTION SUBCUTANEOUS at 05:43

## 2022-01-01 RX ADMIN — TACROLIMUS 4 MILLIGRAM(S): 5 CAPSULE ORAL at 12:38

## 2022-01-01 RX ADMIN — Medication 60 MICROGRAM(S): at 23:42

## 2022-01-01 RX ADMIN — INSULIN HUMAN 9 UNIT(S): 100 INJECTION, SOLUTION SUBCUTANEOUS at 18:31

## 2022-01-01 RX ADMIN — QUETIAPINE FUMARATE 75 MILLIGRAM(S): 200 TABLET, FILM COATED ORAL at 06:45

## 2022-01-01 RX ADMIN — TACROLIMUS 2 MILLIGRAM(S): 5 CAPSULE ORAL at 04:51

## 2022-01-01 RX ADMIN — CHLORHEXIDINE GLUCONATE 15 MILLILITER(S): 213 SOLUTION TOPICAL at 17:38

## 2022-01-01 RX ADMIN — Medication 3 MILLILITER(S): at 17:20

## 2022-01-01 RX ADMIN — PIPERACILLIN AND TAZOBACTAM 25 GRAM(S): 4; .5 INJECTION, POWDER, LYOPHILIZED, FOR SOLUTION INTRAVENOUS at 06:41

## 2022-01-01 RX ADMIN — INSULIN HUMAN 1: 100 INJECTION, SOLUTION SUBCUTANEOUS at 11:39

## 2022-01-01 RX ADMIN — INSULIN HUMAN 2: 100 INJECTION, SOLUTION SUBCUTANEOUS at 19:13

## 2022-01-01 RX ADMIN — CHLORHEXIDINE GLUCONATE 15 MILLILITER(S): 213 SOLUTION TOPICAL at 05:12

## 2022-01-01 RX ADMIN — INSULIN HUMAN 1: 100 INJECTION, SOLUTION SUBCUTANEOUS at 12:07

## 2022-01-01 RX ADMIN — Medication 60 MICROGRAM(S): at 21:08

## 2022-01-01 RX ADMIN — Medication 3 MILLILITER(S): at 10:34

## 2022-01-01 RX ADMIN — Medication 3 MILLILITER(S): at 05:33

## 2022-01-01 RX ADMIN — TACROLIMUS 4 MILLIGRAM(S): 5 CAPSULE ORAL at 12:26

## 2022-01-01 RX ADMIN — HEPARIN SODIUM 5000 UNIT(S): 5000 INJECTION INTRAVENOUS; SUBCUTANEOUS at 22:10

## 2022-01-01 RX ADMIN — INSULIN GLARGINE 6 UNIT(S): 100 INJECTION, SOLUTION SUBCUTANEOUS at 23:14

## 2022-01-01 RX ADMIN — INSULIN HUMAN 5 UNIT(S): 100 INJECTION, SOLUTION SUBCUTANEOUS at 17:42

## 2022-01-01 RX ADMIN — Medication 3 MILLILITER(S): at 16:39

## 2022-01-01 RX ADMIN — Medication 81 MILLIGRAM(S): at 12:21

## 2022-01-01 RX ADMIN — TAMSULOSIN HYDROCHLORIDE 0.4 MILLIGRAM(S): 0.4 CAPSULE ORAL at 21:32

## 2022-01-01 RX ADMIN — Medication 500 MICROGRAM(S): at 04:30

## 2022-01-01 RX ADMIN — SODIUM CHLORIDE 4 MILLILITER(S): 9 INJECTION INTRAMUSCULAR; INTRAVENOUS; SUBCUTANEOUS at 04:30

## 2022-01-01 RX ADMIN — Medication 4: at 15:18

## 2022-01-01 RX ADMIN — Medication 345.31 MILLIGRAM(S): at 16:02

## 2022-01-01 RX ADMIN — ALBUTEROL 2.5 MILLIGRAM(S): 90 AEROSOL, METERED ORAL at 09:37

## 2022-01-01 RX ADMIN — INSULIN HUMAN 2: 100 INJECTION, SOLUTION SUBCUTANEOUS at 06:10

## 2022-01-01 RX ADMIN — Medication 25 MILLIGRAM(S): at 13:29

## 2022-01-01 RX ADMIN — INSULIN HUMAN 2: 100 INJECTION, SOLUTION SUBCUTANEOUS at 17:29

## 2022-01-01 RX ADMIN — MYCOPHENOLATE MOFETIL 500 MILLIGRAM(S): 250 CAPSULE ORAL at 06:04

## 2022-01-01 RX ADMIN — SODIUM CHLORIDE 4 MILLILITER(S): 9 INJECTION INTRAMUSCULAR; INTRAVENOUS; SUBCUTANEOUS at 17:13

## 2022-01-01 RX ADMIN — TACROLIMUS 4 MILLIGRAM(S): 5 CAPSULE ORAL at 11:16

## 2022-01-01 RX ADMIN — SODIUM CHLORIDE 1000 MILLILITER(S): 9 INJECTION, SOLUTION INTRAVENOUS at 04:00

## 2022-01-01 RX ADMIN — INSULIN HUMAN 3: 100 INJECTION, SOLUTION SUBCUTANEOUS at 06:31

## 2022-01-01 RX ADMIN — Medication 25 MILLIGRAM(S): at 02:38

## 2022-01-01 RX ADMIN — INSULIN HUMAN 9 UNIT(S): 100 INJECTION, SOLUTION SUBCUTANEOUS at 05:48

## 2022-01-01 RX ADMIN — MYCOPHENOLATE MOFETIL 500 MILLIGRAM(S): 250 CAPSULE ORAL at 17:10

## 2022-01-01 RX ADMIN — SODIUM CHLORIDE 4 MILLILITER(S): 9 INJECTION INTRAMUSCULAR; INTRAVENOUS; SUBCUTANEOUS at 17:51

## 2022-01-01 RX ADMIN — Medication 1: at 12:03

## 2022-01-01 RX ADMIN — Medication 3 MILLILITER(S): at 16:03

## 2022-01-01 RX ADMIN — INSULIN HUMAN 7 UNIT(S): 100 INJECTION, SOLUTION SUBCUTANEOUS at 19:15

## 2022-01-01 RX ADMIN — HEPARIN SODIUM 5000 UNIT(S): 5000 INJECTION INTRAVENOUS; SUBCUTANEOUS at 22:08

## 2022-01-01 RX ADMIN — TACROLIMUS 4 MILLIGRAM(S): 5 CAPSULE ORAL at 12:14

## 2022-01-01 RX ADMIN — MYCOPHENOLATE MOFETIL 500 MILLIGRAM(S): 250 CAPSULE ORAL at 00:59

## 2022-01-01 RX ADMIN — MYCOPHENOLATE MOFETIL 500 MILLIGRAM(S): 250 CAPSULE ORAL at 17:11

## 2022-01-01 RX ADMIN — Medication 25 MICROGRAM(S): at 22:28

## 2022-01-01 RX ADMIN — INSULIN HUMAN 4 UNIT(S): 100 INJECTION, SOLUTION SUBCUTANEOUS at 11:56

## 2022-01-01 RX ADMIN — TACROLIMUS 3 MILLIGRAM(S): 5 CAPSULE ORAL at 00:03

## 2022-01-01 RX ADMIN — QUETIAPINE FUMARATE 50 MILLIGRAM(S): 200 TABLET, FILM COATED ORAL at 18:52

## 2022-01-01 RX ADMIN — INSULIN HUMAN 1: 100 INJECTION, SOLUTION SUBCUTANEOUS at 17:10

## 2022-01-01 RX ADMIN — TACROLIMUS 4 MILLIGRAM(S): 5 CAPSULE ORAL at 11:20

## 2022-01-01 RX ADMIN — QUETIAPINE FUMARATE 25 MILLIGRAM(S): 200 TABLET, FILM COATED ORAL at 22:12

## 2022-01-01 RX ADMIN — INSULIN HUMAN 2: 100 INJECTION, SOLUTION SUBCUTANEOUS at 23:24

## 2022-01-01 RX ADMIN — TACROLIMUS 4 MILLIGRAM(S): 5 CAPSULE ORAL at 21:59

## 2022-01-01 RX ADMIN — INSULIN HUMAN 4: 100 INJECTION, SOLUTION SUBCUTANEOUS at 18:19

## 2022-01-01 RX ADMIN — HEPARIN SODIUM 5000 UNIT(S): 5000 INJECTION INTRAVENOUS; SUBCUTANEOUS at 22:18

## 2022-01-01 RX ADMIN — GUANFACINE 1 MILLIGRAM(S): 3 TABLET, EXTENDED RELEASE ORAL at 17:25

## 2022-01-01 RX ADMIN — INSULIN HUMAN 2: 100 INJECTION, SOLUTION SUBCUTANEOUS at 23:02

## 2022-01-01 RX ADMIN — TAMSULOSIN HYDROCHLORIDE 0.4 MILLIGRAM(S): 0.4 CAPSULE ORAL at 22:11

## 2022-01-01 RX ADMIN — INSULIN HUMAN 5 UNIT(S): 100 INJECTION, SOLUTION SUBCUTANEOUS at 12:00

## 2022-01-01 RX ADMIN — CHLORHEXIDINE GLUCONATE 15 MILLILITER(S): 213 SOLUTION TOPICAL at 18:57

## 2022-01-01 RX ADMIN — TACROLIMUS 3 MILLIGRAM(S): 5 CAPSULE ORAL at 22:07

## 2022-01-01 RX ADMIN — INSULIN HUMAN 5 UNIT(S): 100 INJECTION, SOLUTION SUBCUTANEOUS at 10:56

## 2022-01-01 RX ADMIN — Medication 7.72 MICROGRAM(S)/KG/MIN: at 02:05

## 2022-01-01 RX ADMIN — INSULIN HUMAN 2: 100 INJECTION, SOLUTION SUBCUTANEOUS at 12:41

## 2022-01-01 RX ADMIN — MYCOPHENOLATE MOFETIL 500 MILLIGRAM(S): 250 CAPSULE ORAL at 05:21

## 2022-01-01 RX ADMIN — SODIUM CHLORIDE 4 MILLILITER(S): 9 INJECTION INTRAMUSCULAR; INTRAVENOUS; SUBCUTANEOUS at 20:08

## 2022-01-01 RX ADMIN — Medication 25 MILLIGRAM(S): at 02:00

## 2022-01-01 RX ADMIN — SODIUM CHLORIDE 4 MILLILITER(S): 9 INJECTION INTRAMUSCULAR; INTRAVENOUS; SUBCUTANEOUS at 16:48

## 2022-01-01 RX ADMIN — QUETIAPINE FUMARATE 25 MILLIGRAM(S): 200 TABLET, FILM COATED ORAL at 06:09

## 2022-01-01 RX ADMIN — QUETIAPINE FUMARATE 25 MILLIGRAM(S): 200 TABLET, FILM COATED ORAL at 09:33

## 2022-01-01 RX ADMIN — Medication 81 MILLIGRAM(S): at 12:06

## 2022-01-01 RX ADMIN — Medication 3 MILLILITER(S): at 17:28

## 2022-01-01 RX ADMIN — INSULIN HUMAN 5 UNIT(S): 100 INJECTION, SOLUTION SUBCUTANEOUS at 17:17

## 2022-01-01 RX ADMIN — PIPERACILLIN AND TAZOBACTAM 25 GRAM(S): 4; .5 INJECTION, POWDER, LYOPHILIZED, FOR SOLUTION INTRAVENOUS at 19:23

## 2022-01-01 RX ADMIN — INSULIN HUMAN 7 UNIT(S): 100 INJECTION, SOLUTION SUBCUTANEOUS at 17:52

## 2022-01-01 RX ADMIN — DEXMEDETOMIDINE HYDROCHLORIDE IN 0.9% SODIUM CHLORIDE 4.12 MICROGRAM(S)/KG/HR: 4 INJECTION INTRAVENOUS at 19:36

## 2022-01-01 RX ADMIN — TACROLIMUS 4 MILLIGRAM(S): 5 CAPSULE ORAL at 22:10

## 2022-01-01 RX ADMIN — Medication 3 MILLILITER(S): at 15:08

## 2022-01-01 RX ADMIN — HEPARIN SODIUM 5000 UNIT(S): 5000 INJECTION INTRAVENOUS; SUBCUTANEOUS at 22:15

## 2022-01-01 RX ADMIN — SODIUM CHLORIDE 1000 MILLILITER(S): 9 INJECTION INTRAMUSCULAR; INTRAVENOUS; SUBCUTANEOUS at 12:31

## 2022-01-01 RX ADMIN — Medication 3 MILLILITER(S): at 11:02

## 2022-01-01 RX ADMIN — HEPARIN SODIUM 5000 UNIT(S): 5000 INJECTION INTRAVENOUS; SUBCUTANEOUS at 22:05

## 2022-01-01 RX ADMIN — INSULIN HUMAN 2: 100 INJECTION, SOLUTION SUBCUTANEOUS at 23:44

## 2022-01-01 RX ADMIN — Medication 3 MILLILITER(S): at 10:02

## 2022-01-01 RX ADMIN — Medication 60 MILLILITER(S): at 15:59

## 2022-01-01 RX ADMIN — HEPARIN SODIUM 5000 UNIT(S): 5000 INJECTION INTRAVENOUS; SUBCUTANEOUS at 21:43

## 2022-01-01 RX ADMIN — INSULIN HUMAN 7 UNIT(S): 100 INJECTION, SOLUTION SUBCUTANEOUS at 05:38

## 2022-01-01 RX ADMIN — TACROLIMUS 2 MILLIGRAM(S): 5 CAPSULE ORAL at 23:35

## 2022-01-01 RX ADMIN — MIDODRINE HYDROCHLORIDE 20 MILLIGRAM(S): 2.5 TABLET ORAL at 10:24

## 2022-01-01 RX ADMIN — Medication 3 MILLILITER(S): at 04:03

## 2022-01-01 RX ADMIN — CHLORHEXIDINE GLUCONATE 15 MILLILITER(S): 213 SOLUTION TOPICAL at 17:34

## 2022-01-01 RX ADMIN — INSULIN HUMAN 1: 100 INJECTION, SOLUTION SUBCUTANEOUS at 05:28

## 2022-01-01 RX ADMIN — TAMSULOSIN HYDROCHLORIDE 0.4 MILLIGRAM(S): 0.4 CAPSULE ORAL at 22:22

## 2022-01-01 RX ADMIN — Medication 4: at 22:27

## 2022-01-01 RX ADMIN — QUETIAPINE FUMARATE 25 MILLIGRAM(S): 200 TABLET, FILM COATED ORAL at 00:51

## 2022-01-01 RX ADMIN — Medication 50 MILLIEQUIVALENT(S): at 18:22

## 2022-01-01 RX ADMIN — INSULIN HUMAN 1: 100 INJECTION, SOLUTION SUBCUTANEOUS at 00:44

## 2022-01-01 RX ADMIN — INSULIN HUMAN 2: 100 INJECTION, SOLUTION SUBCUTANEOUS at 23:14

## 2022-01-01 RX ADMIN — Medication 40 MILLIGRAM(S): at 06:23

## 2022-01-01 RX ADMIN — Medication 25 MICROGRAM(S): at 21:41

## 2022-01-01 RX ADMIN — POLYETHYLENE GLYCOL 3350 17 GRAM(S): 17 POWDER, FOR SOLUTION ORAL at 17:24

## 2022-01-01 RX ADMIN — INSULIN GLARGINE 16 UNIT(S): 100 INJECTION, SOLUTION SUBCUTANEOUS at 21:42

## 2022-01-01 RX ADMIN — Medication 81 MILLIGRAM(S): at 11:37

## 2022-01-01 RX ADMIN — TAMSULOSIN HYDROCHLORIDE 0.4 MILLIGRAM(S): 0.4 CAPSULE ORAL at 21:39

## 2022-01-01 RX ADMIN — Medication 30 MILLIGRAM(S): at 03:18

## 2022-01-01 RX ADMIN — Medication 345.31 MILLIGRAM(S): at 03:22

## 2022-01-01 RX ADMIN — PANTOPRAZOLE SODIUM 40 MILLIGRAM(S): 20 TABLET, DELAYED RELEASE ORAL at 06:46

## 2022-01-01 RX ADMIN — Medication 3 MILLILITER(S): at 16:46

## 2022-01-01 RX ADMIN — SODIUM CHLORIDE 4 MILLILITER(S): 9 INJECTION INTRAMUSCULAR; INTRAVENOUS; SUBCUTANEOUS at 17:16

## 2022-01-01 RX ADMIN — HEPARIN SODIUM 5000 UNIT(S): 5000 INJECTION INTRAVENOUS; SUBCUTANEOUS at 05:15

## 2022-01-01 RX ADMIN — INSULIN HUMAN 1: 100 INJECTION, SOLUTION SUBCUTANEOUS at 06:21

## 2022-01-01 RX ADMIN — ATORVASTATIN CALCIUM 40 MILLIGRAM(S): 80 TABLET, FILM COATED ORAL at 21:12

## 2022-01-01 RX ADMIN — INSULIN HUMAN 3: 100 INJECTION, SOLUTION SUBCUTANEOUS at 23:30

## 2022-01-01 RX ADMIN — SODIUM CHLORIDE 4 MILLILITER(S): 9 INJECTION INTRAMUSCULAR; INTRAVENOUS; SUBCUTANEOUS at 05:04

## 2022-01-01 RX ADMIN — INSULIN HUMAN 8 UNIT(S): 100 INJECTION, SOLUTION SUBCUTANEOUS at 18:34

## 2022-01-01 RX ADMIN — PANTOPRAZOLE SODIUM 40 MILLIGRAM(S): 20 TABLET, DELAYED RELEASE ORAL at 06:00

## 2022-01-01 RX ADMIN — PANTOPRAZOLE SODIUM 40 MILLIGRAM(S): 20 TABLET, DELAYED RELEASE ORAL at 05:53

## 2022-01-01 RX ADMIN — HEPARIN SODIUM 5000 UNIT(S): 5000 INJECTION INTRAVENOUS; SUBCUTANEOUS at 14:01

## 2022-01-01 RX ADMIN — Medication 345.31 MILLIGRAM(S): at 03:24

## 2022-01-01 RX ADMIN — Medication 40 MILLIGRAM(S): at 17:15

## 2022-01-01 RX ADMIN — Medication 5 UNIT(S): at 08:49

## 2022-01-01 RX ADMIN — CHLORHEXIDINE GLUCONATE 1 APPLICATION(S): 213 SOLUTION TOPICAL at 06:25

## 2022-01-01 RX ADMIN — INSULIN HUMAN 2 UNIT(S): 100 INJECTION, SOLUTION SUBCUTANEOUS at 12:06

## 2022-01-01 RX ADMIN — CHLORHEXIDINE GLUCONATE 15 MILLILITER(S): 213 SOLUTION TOPICAL at 05:45

## 2022-01-01 RX ADMIN — Medication 25 GRAM(S): at 22:58

## 2022-01-01 RX ADMIN — INSULIN HUMAN 3: 100 INJECTION, SOLUTION SUBCUTANEOUS at 11:31

## 2022-01-01 RX ADMIN — Medication 345.31 MILLIGRAM(S): at 02:56

## 2022-01-01 RX ADMIN — Medication 3 MILLILITER(S): at 10:13

## 2022-01-01 RX ADMIN — Medication 3 MILLILITER(S): at 05:01

## 2022-01-01 RX ADMIN — INSULIN HUMAN 2: 100 INJECTION, SOLUTION SUBCUTANEOUS at 17:35

## 2022-01-01 RX ADMIN — FENTANYL CITRATE 50 MICROGRAM(S): 50 INJECTION INTRAVENOUS at 04:20

## 2022-01-01 RX ADMIN — Medication 3 MILLILITER(S): at 04:34

## 2022-01-01 RX ADMIN — TACROLIMUS 2 MILLIGRAM(S): 5 CAPSULE ORAL at 21:37

## 2022-01-01 RX ADMIN — HEPARIN SODIUM 5000 UNIT(S): 5000 INJECTION INTRAVENOUS; SUBCUTANEOUS at 18:42

## 2022-01-01 RX ADMIN — HEPARIN SODIUM 5000 UNIT(S): 5000 INJECTION INTRAVENOUS; SUBCUTANEOUS at 21:32

## 2022-01-01 RX ADMIN — Medication 3 MILLILITER(S): at 06:55

## 2022-01-01 RX ADMIN — INSULIN HUMAN 1: 100 INJECTION, SOLUTION SUBCUTANEOUS at 06:18

## 2022-01-01 RX ADMIN — Medication 30 MILLIGRAM(S): at 03:36

## 2022-01-01 RX ADMIN — SODIUM CHLORIDE 4 MILLILITER(S): 9 INJECTION INTRAMUSCULAR; INTRAVENOUS; SUBCUTANEOUS at 05:52

## 2022-01-01 RX ADMIN — Medication 3 MILLILITER(S): at 22:33

## 2022-01-01 RX ADMIN — Medication 3 MILLILITER(S): at 21:32

## 2022-01-01 RX ADMIN — DEXMEDETOMIDINE HYDROCHLORIDE IN 0.9% SODIUM CHLORIDE 0.82 MICROGRAM(S)/KG/HR: 4 INJECTION INTRAVENOUS at 12:43

## 2022-01-01 RX ADMIN — Medication 650 MILLIGRAM(S): at 22:00

## 2022-01-01 RX ADMIN — MIDODRINE HYDROCHLORIDE 20 MILLIGRAM(S): 2.5 TABLET ORAL at 18:38

## 2022-01-01 RX ADMIN — Medication 3 MILLILITER(S): at 15:40

## 2022-01-01 RX ADMIN — Medication 650 MILLIGRAM(S): at 01:25

## 2022-01-01 RX ADMIN — Medication 81 MILLIGRAM(S): at 11:46

## 2022-01-01 RX ADMIN — INSULIN HUMAN 4 UNIT(S): 100 INJECTION, SOLUTION SUBCUTANEOUS at 23:06

## 2022-01-01 RX ADMIN — INSULIN HUMAN 6 UNIT(S): 100 INJECTION, SOLUTION SUBCUTANEOUS at 17:25

## 2022-01-01 RX ADMIN — LACTULOSE 10 GRAM(S): 10 SOLUTION ORAL at 11:12

## 2022-01-01 RX ADMIN — INSULIN GLARGINE 15 UNIT(S): 100 INJECTION, SOLUTION SUBCUTANEOUS at 00:26

## 2022-01-01 RX ADMIN — Medication 650 MILLIGRAM(S): at 16:01

## 2022-01-01 RX ADMIN — Medication 81 MILLIGRAM(S): at 15:19

## 2022-01-01 RX ADMIN — Medication 7.72 MICROGRAM(S)/KG/MIN: at 11:27

## 2022-01-01 RX ADMIN — HEPARIN SODIUM 5000 UNIT(S): 5000 INJECTION INTRAVENOUS; SUBCUTANEOUS at 05:14

## 2022-01-01 RX ADMIN — PANTOPRAZOLE SODIUM 40 MILLIGRAM(S): 20 TABLET, DELAYED RELEASE ORAL at 11:12

## 2022-01-01 RX ADMIN — MYCOPHENOLATE MOFETIL 500 MILLIGRAM(S): 250 CAPSULE ORAL at 05:53

## 2022-01-01 RX ADMIN — FENTANYL CITRATE 50 MICROGRAM(S): 50 INJECTION INTRAVENOUS at 23:49

## 2022-01-01 RX ADMIN — PANTOPRAZOLE SODIUM 40 MILLIGRAM(S): 20 TABLET, DELAYED RELEASE ORAL at 12:20

## 2022-01-01 RX ADMIN — QUETIAPINE FUMARATE 25 MILLIGRAM(S): 200 TABLET, FILM COATED ORAL at 22:15

## 2022-01-01 RX ADMIN — DEXMEDETOMIDINE HYDROCHLORIDE IN 0.9% SODIUM CHLORIDE 4.12 MICROGRAM(S)/KG/HR: 4 INJECTION INTRAVENOUS at 11:57

## 2022-01-01 RX ADMIN — CHLORHEXIDINE GLUCONATE 1 APPLICATION(S): 213 SOLUTION TOPICAL at 06:08

## 2022-01-01 RX ADMIN — Medication 25 MILLIGRAM(S): at 01:13

## 2022-01-01 RX ADMIN — Medication 50 MILLIGRAM(S): at 06:43

## 2022-01-01 RX ADMIN — INSULIN HUMAN 5 UNIT(S): 100 INJECTION, SOLUTION SUBCUTANEOUS at 18:15

## 2022-01-01 RX ADMIN — MYCOPHENOLATE MOFETIL 500 MILLIGRAM(S): 250 CAPSULE ORAL at 12:38

## 2022-01-01 RX ADMIN — HEPARIN SODIUM 5000 UNIT(S): 5000 INJECTION INTRAVENOUS; SUBCUTANEOUS at 06:02

## 2022-01-01 RX ADMIN — HEPARIN SODIUM 5000 UNIT(S): 5000 INJECTION INTRAVENOUS; SUBCUTANEOUS at 06:24

## 2022-01-01 RX ADMIN — PIPERACILLIN AND TAZOBACTAM 200 GRAM(S): 4; .5 INJECTION, POWDER, LYOPHILIZED, FOR SOLUTION INTRAVENOUS at 17:00

## 2022-01-01 RX ADMIN — ATORVASTATIN CALCIUM 40 MILLIGRAM(S): 80 TABLET, FILM COATED ORAL at 21:31

## 2022-01-01 RX ADMIN — ATORVASTATIN CALCIUM 40 MILLIGRAM(S): 80 TABLET, FILM COATED ORAL at 22:07

## 2022-01-01 RX ADMIN — INSULIN HUMAN 2: 100 INJECTION, SOLUTION SUBCUTANEOUS at 23:57

## 2022-01-01 RX ADMIN — SENNA PLUS 2 TABLET(S): 8.6 TABLET ORAL at 22:00

## 2022-01-01 RX ADMIN — PIPERACILLIN AND TAZOBACTAM 25 GRAM(S): 4; .5 INJECTION, POWDER, LYOPHILIZED, FOR SOLUTION INTRAVENOUS at 06:45

## 2022-01-01 RX ADMIN — Medication 3 MILLILITER(S): at 06:24

## 2022-01-01 RX ADMIN — CHLORHEXIDINE GLUCONATE 15 MILLILITER(S): 213 SOLUTION TOPICAL at 05:20

## 2022-01-01 RX ADMIN — Medication 81 MILLIGRAM(S): at 11:40

## 2022-01-01 RX ADMIN — Medication 6: at 12:56

## 2022-01-01 RX ADMIN — Medication 3 MILLILITER(S): at 05:49

## 2022-01-01 RX ADMIN — SODIUM CHLORIDE 500 MILLILITER(S): 9 INJECTION, SOLUTION INTRAVENOUS at 00:03

## 2022-01-01 RX ADMIN — SODIUM CHLORIDE 4 MILLILITER(S): 9 INJECTION INTRAMUSCULAR; INTRAVENOUS; SUBCUTANEOUS at 18:13

## 2022-01-01 RX ADMIN — SENNA PLUS 2 TABLET(S): 8.6 TABLET ORAL at 21:14

## 2022-01-01 RX ADMIN — Medication 3 MILLILITER(S): at 04:30

## 2022-01-01 RX ADMIN — QUETIAPINE FUMARATE 25 MILLIGRAM(S): 200 TABLET, FILM COATED ORAL at 17:55

## 2022-01-01 RX ADMIN — ATORVASTATIN CALCIUM 40 MILLIGRAM(S): 80 TABLET, FILM COATED ORAL at 22:15

## 2022-01-01 RX ADMIN — Medication 3 MILLILITER(S): at 21:59

## 2022-01-01 RX ADMIN — Medication 3 MILLILITER(S): at 05:47

## 2022-01-01 RX ADMIN — MIDODRINE HYDROCHLORIDE 20 MILLIGRAM(S): 2.5 TABLET ORAL at 01:13

## 2022-01-01 RX ADMIN — Medication 650 MILLIGRAM(S): at 07:00

## 2022-01-01 RX ADMIN — GUANFACINE 1 MILLIGRAM(S): 3 TABLET, EXTENDED RELEASE ORAL at 17:17

## 2022-01-01 RX ADMIN — Medication 650 MILLIGRAM(S): at 22:53

## 2022-01-01 RX ADMIN — Medication 3 MILLILITER(S): at 04:58

## 2022-01-01 RX ADMIN — MIDODRINE HYDROCHLORIDE 30 MILLIGRAM(S): 2.5 TABLET ORAL at 12:06

## 2022-01-01 RX ADMIN — QUETIAPINE FUMARATE 100 MILLIGRAM(S): 200 TABLET, FILM COATED ORAL at 04:58

## 2022-01-01 RX ADMIN — SODIUM CHLORIDE 4 MILLILITER(S): 9 INJECTION INTRAMUSCULAR; INTRAVENOUS; SUBCUTANEOUS at 07:00

## 2022-01-01 RX ADMIN — Medication 650 MILLIGRAM(S): at 03:37

## 2022-01-01 RX ADMIN — QUETIAPINE FUMARATE 25 MILLIGRAM(S): 200 TABLET, FILM COATED ORAL at 05:19

## 2022-01-01 RX ADMIN — MYCOPHENOLATE MOFETIL 500 MILLIGRAM(S): 250 CAPSULE ORAL at 05:43

## 2022-01-01 RX ADMIN — TACROLIMUS 2 MILLIGRAM(S): 5 CAPSULE ORAL at 00:04

## 2022-01-01 RX ADMIN — Medication 5 UNIT(S): at 17:10

## 2022-01-01 RX ADMIN — Medication 3 MILLILITER(S): at 21:01

## 2022-01-01 RX ADMIN — TACROLIMUS 4 MILLIGRAM(S): 5 CAPSULE ORAL at 21:27

## 2022-01-01 RX ADMIN — Medication 2: at 07:49

## 2022-01-01 RX ADMIN — MYCOPHENOLATE MOFETIL 500 MILLIGRAM(S): 250 CAPSULE ORAL at 17:18

## 2022-01-01 RX ADMIN — Medication 3 MILLILITER(S): at 05:18

## 2022-01-01 RX ADMIN — SODIUM CHLORIDE 4 MILLILITER(S): 9 INJECTION INTRAMUSCULAR; INTRAVENOUS; SUBCUTANEOUS at 17:36

## 2022-01-01 RX ADMIN — CARVEDILOL PHOSPHATE 25 MILLIGRAM(S): 80 CAPSULE, EXTENDED RELEASE ORAL at 17:13

## 2022-01-01 RX ADMIN — SODIUM CHLORIDE 4 MILLILITER(S): 9 INJECTION INTRAMUSCULAR; INTRAVENOUS; SUBCUTANEOUS at 17:10

## 2022-01-01 RX ADMIN — INSULIN HUMAN 3: 100 INJECTION, SOLUTION SUBCUTANEOUS at 00:16

## 2022-01-01 RX ADMIN — GUANFACINE 1 MILLIGRAM(S): 3 TABLET, EXTENDED RELEASE ORAL at 17:29

## 2022-01-01 RX ADMIN — Medication 81 MILLIGRAM(S): at 10:45

## 2022-01-01 RX ADMIN — ATORVASTATIN CALCIUM 40 MILLIGRAM(S): 80 TABLET, FILM COATED ORAL at 23:00

## 2022-01-01 RX ADMIN — SODIUM CHLORIDE 500 MILLILITER(S): 9 INJECTION, SOLUTION INTRAVENOUS at 19:15

## 2022-01-01 RX ADMIN — Medication 3 MILLILITER(S): at 05:15

## 2022-01-01 RX ADMIN — Medication 650 MILLIGRAM(S): at 06:30

## 2022-01-01 RX ADMIN — HEPARIN SODIUM 5000 UNIT(S): 5000 INJECTION INTRAVENOUS; SUBCUTANEOUS at 14:59

## 2022-01-01 RX ADMIN — INSULIN GLARGINE 6 UNIT(S): 100 INJECTION, SOLUTION SUBCUTANEOUS at 00:43

## 2022-01-01 RX ADMIN — Medication 60 MICROGRAM(S): at 21:54

## 2022-01-01 RX ADMIN — PROPOFOL 4.94 MICROGRAM(S)/KG/MIN: 10 INJECTION, EMULSION INTRAVENOUS at 09:12

## 2022-01-01 RX ADMIN — MIDODRINE HYDROCHLORIDE 10 MILLIGRAM(S): 2.5 TABLET ORAL at 18:33

## 2022-01-01 RX ADMIN — DEXMEDETOMIDINE HYDROCHLORIDE IN 0.9% SODIUM CHLORIDE 2.06 MICROGRAM(S)/KG/HR: 4 INJECTION INTRAVENOUS at 19:03

## 2022-01-01 RX ADMIN — TACROLIMUS 2 MILLIGRAM(S): 5 CAPSULE ORAL at 00:41

## 2022-01-01 RX ADMIN — Medication 3 MILLILITER(S): at 06:17

## 2022-01-01 RX ADMIN — Medication 3 MILLILITER(S): at 05:45

## 2022-01-01 RX ADMIN — Medication 60 MICROGRAM(S): at 22:10

## 2022-01-01 RX ADMIN — MYCOPHENOLATE MOFETIL 41.67 MILLIGRAM(S): 250 CAPSULE ORAL at 19:06

## 2022-01-01 RX ADMIN — TACROLIMUS 4 MILLIGRAM(S): 5 CAPSULE ORAL at 00:08

## 2022-01-01 RX ADMIN — PROPOFOL 5.99 MICROGRAM(S)/KG/MIN: 10 INJECTION, EMULSION INTRAVENOUS at 02:10

## 2022-01-01 RX ADMIN — ATORVASTATIN CALCIUM 40 MILLIGRAM(S): 80 TABLET, FILM COATED ORAL at 21:06

## 2022-01-01 RX ADMIN — SODIUM CHLORIDE 4 MILLILITER(S): 9 INJECTION INTRAMUSCULAR; INTRAVENOUS; SUBCUTANEOUS at 05:05

## 2022-01-01 RX ADMIN — INSULIN HUMAN 4 UNIT(S): 100 INJECTION, SOLUTION SUBCUTANEOUS at 11:20

## 2022-01-01 RX ADMIN — Medication 3 MILLILITER(S): at 05:46

## 2022-01-01 RX ADMIN — QUETIAPINE FUMARATE 25 MILLIGRAM(S): 200 TABLET, FILM COATED ORAL at 12:51

## 2022-01-01 RX ADMIN — CHLORHEXIDINE GLUCONATE 1 APPLICATION(S): 213 SOLUTION TOPICAL at 06:45

## 2022-01-01 RX ADMIN — INSULIN HUMAN 3: 100 INJECTION, SOLUTION SUBCUTANEOUS at 12:26

## 2022-01-01 RX ADMIN — CARVEDILOL PHOSPHATE 25 MILLIGRAM(S): 80 CAPSULE, EXTENDED RELEASE ORAL at 07:19

## 2022-01-01 RX ADMIN — CARVEDILOL PHOSPHATE 25 MILLIGRAM(S): 80 CAPSULE, EXTENDED RELEASE ORAL at 13:14

## 2022-01-01 RX ADMIN — Medication 25 MILLIGRAM(S): at 01:51

## 2022-01-01 RX ADMIN — HEPARIN SODIUM 5000 UNIT(S): 5000 INJECTION INTRAVENOUS; SUBCUTANEOUS at 15:32

## 2022-01-01 RX ADMIN — INSULIN HUMAN 5 UNIT(S): 100 INJECTION, SOLUTION SUBCUTANEOUS at 23:24

## 2022-01-01 RX ADMIN — ETOMIDATE 20 MILLIGRAM(S): 2 INJECTION INTRAVENOUS at 11:30

## 2022-01-01 RX ADMIN — TACROLIMUS 2 MILLIGRAM(S): 5 CAPSULE ORAL at 23:53

## 2022-01-01 RX ADMIN — CHLORHEXIDINE GLUCONATE 1 APPLICATION(S): 213 SOLUTION TOPICAL at 06:36

## 2022-01-01 RX ADMIN — HEPARIN SODIUM 5000 UNIT(S): 5000 INJECTION INTRAVENOUS; SUBCUTANEOUS at 13:53

## 2022-01-01 RX ADMIN — PANTOPRAZOLE SODIUM 40 MILLIGRAM(S): 20 TABLET, DELAYED RELEASE ORAL at 06:55

## 2022-01-01 RX ADMIN — SODIUM CHLORIDE 4 MILLILITER(S): 9 INJECTION INTRAMUSCULAR; INTRAVENOUS; SUBCUTANEOUS at 18:53

## 2022-01-01 RX ADMIN — CARVEDILOL PHOSPHATE 25 MILLIGRAM(S): 80 CAPSULE, EXTENDED RELEASE ORAL at 18:40

## 2022-01-01 RX ADMIN — ERYTHROPOIETIN 8000 UNIT(S): 10000 INJECTION, SOLUTION INTRAVENOUS; SUBCUTANEOUS at 11:39

## 2022-01-01 RX ADMIN — CHLORHEXIDINE GLUCONATE 1 APPLICATION(S): 213 SOLUTION TOPICAL at 05:44

## 2022-01-01 RX ADMIN — Medication 81 MILLIGRAM(S): at 15:05

## 2022-01-01 RX ADMIN — ATORVASTATIN CALCIUM 40 MILLIGRAM(S): 80 TABLET, FILM COATED ORAL at 21:32

## 2022-01-01 RX ADMIN — PIPERACILLIN AND TAZOBACTAM 25 GRAM(S): 4; .5 INJECTION, POWDER, LYOPHILIZED, FOR SOLUTION INTRAVENOUS at 21:26

## 2022-01-01 RX ADMIN — HEPARIN SODIUM 5000 UNIT(S): 5000 INJECTION INTRAVENOUS; SUBCUTANEOUS at 15:17

## 2022-01-01 RX ADMIN — TACROLIMUS 2 MILLIGRAM(S): 5 CAPSULE ORAL at 21:41

## 2022-01-01 RX ADMIN — Medication 81 MILLIGRAM(S): at 06:26

## 2022-01-01 RX ADMIN — Medication 3 MILLILITER(S): at 04:51

## 2022-01-01 RX ADMIN — INSULIN HUMAN 4 UNIT(S): 100 INJECTION, SOLUTION SUBCUTANEOUS at 05:24

## 2022-01-01 RX ADMIN — INSULIN HUMAN 5 UNIT(S): 100 INJECTION, SOLUTION SUBCUTANEOUS at 11:54

## 2022-01-01 RX ADMIN — Medication 50 MICROGRAM(S): at 23:34

## 2022-01-01 RX ADMIN — Medication 3 MILLILITER(S): at 09:01

## 2022-01-01 RX ADMIN — SODIUM CHLORIDE 4 MILLILITER(S): 9 INJECTION INTRAMUSCULAR; INTRAVENOUS; SUBCUTANEOUS at 06:56

## 2022-01-01 RX ADMIN — Medication 25 MILLIGRAM(S): at 03:21

## 2022-01-01 RX ADMIN — INSULIN HUMAN 5 UNIT(S): 100 INJECTION, SOLUTION SUBCUTANEOUS at 05:43

## 2022-01-01 RX ADMIN — TACROLIMUS 3 MILLIGRAM(S): 5 CAPSULE ORAL at 11:55

## 2022-01-01 RX ADMIN — SODIUM CHLORIDE 4 MILLILITER(S): 9 INJECTION INTRAMUSCULAR; INTRAVENOUS; SUBCUTANEOUS at 05:19

## 2022-01-01 RX ADMIN — MYCOPHENOLATE MOFETIL 500 MILLIGRAM(S): 250 CAPSULE ORAL at 17:09

## 2022-01-01 RX ADMIN — Medication 81 MILLIGRAM(S): at 06:23

## 2022-01-01 RX ADMIN — Medication 650 MILLIGRAM(S): at 14:09

## 2022-01-01 RX ADMIN — FENTANYL CITRATE 50 MICROGRAM(S): 50 INJECTION INTRAVENOUS at 18:04

## 2022-01-01 RX ADMIN — QUETIAPINE FUMARATE 75 MILLIGRAM(S): 200 TABLET, FILM COATED ORAL at 18:33

## 2022-01-01 RX ADMIN — PANTOPRAZOLE SODIUM 40 MILLIGRAM(S): 20 TABLET, DELAYED RELEASE ORAL at 05:13

## 2022-01-01 RX ADMIN — Medication 81 MILLIGRAM(S): at 11:59

## 2022-01-01 RX ADMIN — SODIUM CHLORIDE 4 MILLILITER(S): 9 INJECTION INTRAMUSCULAR; INTRAVENOUS; SUBCUTANEOUS at 17:05

## 2022-01-01 RX ADMIN — Medication 60 MICROGRAM(S): at 22:09

## 2022-01-01 RX ADMIN — INSULIN GLARGINE 10 UNIT(S): 100 INJECTION, SOLUTION SUBCUTANEOUS at 21:40

## 2022-01-01 RX ADMIN — MYCOPHENOLATE MOFETIL 500 MILLIGRAM(S): 250 CAPSULE ORAL at 17:36

## 2022-01-01 RX ADMIN — SODIUM CHLORIDE 4 MILLILITER(S): 9 INJECTION INTRAMUSCULAR; INTRAVENOUS; SUBCUTANEOUS at 04:56

## 2022-01-01 RX ADMIN — Medication 25 MILLIGRAM(S): at 14:09

## 2022-01-01 RX ADMIN — INSULIN HUMAN 4 UNIT(S): 100 INJECTION, SOLUTION SUBCUTANEOUS at 06:29

## 2022-01-01 RX ADMIN — Medication 100 MILLIGRAM(S): at 07:22

## 2022-01-01 RX ADMIN — MIDODRINE HYDROCHLORIDE 20 MILLIGRAM(S): 2.5 TABLET ORAL at 03:10

## 2022-01-01 RX ADMIN — Medication 1 APPLICATION(S): at 13:33

## 2022-01-01 RX ADMIN — CARVEDILOL PHOSPHATE 12.5 MILLIGRAM(S): 80 CAPSULE, EXTENDED RELEASE ORAL at 09:09

## 2022-01-01 RX ADMIN — QUETIAPINE FUMARATE 50 MILLIGRAM(S): 200 TABLET, FILM COATED ORAL at 05:54

## 2022-01-01 RX ADMIN — PIPERACILLIN AND TAZOBACTAM 200 GRAM(S): 4; .5 INJECTION, POWDER, LYOPHILIZED, FOR SOLUTION INTRAVENOUS at 13:40

## 2022-01-01 RX ADMIN — TACROLIMUS 4 MILLIGRAM(S): 5 CAPSULE ORAL at 12:06

## 2022-01-01 RX ADMIN — CHLORHEXIDINE GLUCONATE 15 MILLILITER(S): 213 SOLUTION TOPICAL at 04:50

## 2022-01-01 RX ADMIN — Medication 81 MILLIGRAM(S): at 10:23

## 2022-01-01 RX ADMIN — Medication 3 MILLILITER(S): at 04:19

## 2022-01-01 RX ADMIN — CHLORHEXIDINE GLUCONATE 15 MILLILITER(S): 213 SOLUTION TOPICAL at 07:23

## 2022-01-01 RX ADMIN — INSULIN HUMAN 4 UNIT(S): 100 INJECTION, SOLUTION SUBCUTANEOUS at 05:48

## 2022-01-01 RX ADMIN — FENTANYL CITRATE 25 MICROGRAM(S): 50 INJECTION INTRAVENOUS at 21:01

## 2022-01-01 RX ADMIN — MYCOPHENOLATE MOFETIL 41.67 MILLIGRAM(S): 250 CAPSULE ORAL at 06:39

## 2022-01-01 RX ADMIN — Medication 50 MILLILITER(S): at 05:50

## 2022-01-01 RX ADMIN — Medication 1 APPLICATION(S): at 11:16

## 2022-01-01 RX ADMIN — Medication 3 MILLILITER(S): at 15:51

## 2022-01-01 RX ADMIN — HEPARIN SODIUM 5000 UNIT(S): 5000 INJECTION INTRAVENOUS; SUBCUTANEOUS at 14:55

## 2022-01-01 RX ADMIN — ATORVASTATIN CALCIUM 40 MILLIGRAM(S): 80 TABLET, FILM COATED ORAL at 21:20

## 2022-01-01 RX ADMIN — MIDODRINE HYDROCHLORIDE 20 MILLIGRAM(S): 2.5 TABLET ORAL at 02:49

## 2022-01-01 RX ADMIN — ERYTHROPOIETIN 8000 UNIT(S): 10000 INJECTION, SOLUTION INTRAVENOUS; SUBCUTANEOUS at 12:42

## 2022-01-01 RX ADMIN — Medication 3 MILLILITER(S): at 11:34

## 2022-01-01 RX ADMIN — Medication 3 MILLILITER(S): at 22:54

## 2022-01-01 RX ADMIN — CEFEPIME 100 MILLIGRAM(S): 1 INJECTION, POWDER, FOR SOLUTION INTRAMUSCULAR; INTRAVENOUS at 13:12

## 2022-01-01 RX ADMIN — INSULIN HUMAN 1: 100 INJECTION, SOLUTION SUBCUTANEOUS at 06:15

## 2022-01-01 RX ADMIN — Medication 25 MICROGRAM(S): at 21:38

## 2022-01-01 RX ADMIN — MYCOPHENOLATE MOFETIL 500 MILLIGRAM(S): 250 CAPSULE ORAL at 19:24

## 2022-01-01 RX ADMIN — MIDODRINE HYDROCHLORIDE 20 MILLIGRAM(S): 2.5 TABLET ORAL at 10:45

## 2022-01-01 RX ADMIN — TAMSULOSIN HYDROCHLORIDE 0.4 MILLIGRAM(S): 0.4 CAPSULE ORAL at 21:13

## 2022-01-01 RX ADMIN — POTASSIUM PHOSPHATE, MONOBASIC POTASSIUM PHOSPHATE, DIBASIC 62.5 MILLIMOLE(S): 236; 224 INJECTION, SOLUTION INTRAVENOUS at 14:14

## 2022-01-01 RX ADMIN — Medication 50 MILLIGRAM(S): at 13:24

## 2022-01-01 RX ADMIN — INSULIN HUMAN 9 UNIT(S): 100 INJECTION, SOLUTION SUBCUTANEOUS at 22:11

## 2022-01-01 RX ADMIN — INSULIN GLARGINE 10 UNIT(S): 100 INJECTION, SOLUTION SUBCUTANEOUS at 23:24

## 2022-01-01 RX ADMIN — QUETIAPINE FUMARATE 100 MILLIGRAM(S): 200 TABLET, FILM COATED ORAL at 06:23

## 2022-01-01 RX ADMIN — INSULIN HUMAN 7 UNIT(S): 100 INJECTION, SOLUTION SUBCUTANEOUS at 12:21

## 2022-01-01 RX ADMIN — INSULIN HUMAN 9 UNIT(S): 100 INJECTION, SOLUTION SUBCUTANEOUS at 12:21

## 2022-01-01 RX ADMIN — Medication 25 MILLIGRAM(S): at 02:05

## 2022-01-01 RX ADMIN — HEPARIN SODIUM 5000 UNIT(S): 5000 INJECTION INTRAVENOUS; SUBCUTANEOUS at 22:21

## 2022-01-01 RX ADMIN — CHLORHEXIDINE GLUCONATE 15 MILLILITER(S): 213 SOLUTION TOPICAL at 18:17

## 2022-01-01 RX ADMIN — Medication 25 MILLIGRAM(S): at 14:07

## 2022-01-01 RX ADMIN — HEPARIN SODIUM 5000 UNIT(S): 5000 INJECTION INTRAVENOUS; SUBCUTANEOUS at 06:25

## 2022-01-01 RX ADMIN — HEPARIN SODIUM 5000 UNIT(S): 5000 INJECTION INTRAVENOUS; SUBCUTANEOUS at 19:31

## 2022-01-01 RX ADMIN — POLYETHYLENE GLYCOL 3350 17 GRAM(S): 17 POWDER, FOR SOLUTION ORAL at 17:47

## 2022-01-01 RX ADMIN — Medication 2: at 23:04

## 2022-01-01 RX ADMIN — SENNA PLUS 1 TABLET(S): 8.6 TABLET ORAL at 14:10

## 2022-01-01 RX ADMIN — INSULIN HUMAN 1: 100 INJECTION, SOLUTION SUBCUTANEOUS at 12:22

## 2022-01-01 RX ADMIN — TAMSULOSIN HYDROCHLORIDE 0.4 MILLIGRAM(S): 0.4 CAPSULE ORAL at 21:52

## 2022-01-01 RX ADMIN — MIDODRINE HYDROCHLORIDE 10 MILLIGRAM(S): 2.5 TABLET ORAL at 01:51

## 2022-01-01 RX ADMIN — Medication 2: at 18:24

## 2022-01-01 RX ADMIN — INSULIN HUMAN 3 UNIT(S): 100 INJECTION, SOLUTION SUBCUTANEOUS at 05:20

## 2022-01-01 RX ADMIN — TAMSULOSIN HYDROCHLORIDE 0.4 MILLIGRAM(S): 0.4 CAPSULE ORAL at 21:14

## 2022-01-01 RX ADMIN — INSULIN HUMAN 3: 100 INJECTION, SOLUTION SUBCUTANEOUS at 06:29

## 2022-01-01 RX ADMIN — Medication 3 MILLILITER(S): at 18:29

## 2022-01-01 RX ADMIN — SODIUM CHLORIDE 4 MILLILITER(S): 9 INJECTION INTRAMUSCULAR; INTRAVENOUS; SUBCUTANEOUS at 18:17

## 2022-01-01 RX ADMIN — Medication 6: at 09:15

## 2022-01-01 RX ADMIN — SENNA PLUS 2 TABLET(S): 8.6 TABLET ORAL at 22:22

## 2022-01-01 RX ADMIN — Medication 1: at 08:49

## 2022-01-01 RX ADMIN — PANTOPRAZOLE SODIUM 40 MILLIGRAM(S): 20 TABLET, DELAYED RELEASE ORAL at 06:02

## 2022-01-01 RX ADMIN — CLOPIDOGREL BISULFATE 75 MILLIGRAM(S): 75 TABLET, FILM COATED ORAL at 11:40

## 2022-01-01 RX ADMIN — ATORVASTATIN CALCIUM 40 MILLIGRAM(S): 80 TABLET, FILM COATED ORAL at 21:42

## 2022-01-01 RX ADMIN — MIDODRINE HYDROCHLORIDE 20 MILLIGRAM(S): 2.5 TABLET ORAL at 17:24

## 2022-01-01 RX ADMIN — ATORVASTATIN CALCIUM 40 MILLIGRAM(S): 80 TABLET, FILM COATED ORAL at 22:40

## 2022-01-01 RX ADMIN — CHLORHEXIDINE GLUCONATE 1 APPLICATION(S): 213 SOLUTION TOPICAL at 06:50

## 2022-01-01 RX ADMIN — MYCOPHENOLATE MOFETIL 500 MILLIGRAM(S): 250 CAPSULE ORAL at 23:33

## 2022-01-01 RX ADMIN — INSULIN GLARGINE 10 UNIT(S): 100 INJECTION, SOLUTION SUBCUTANEOUS at 23:01

## 2022-01-01 RX ADMIN — Medication 1500 MILLILITER(S): at 06:14

## 2022-01-01 RX ADMIN — CHLORHEXIDINE GLUCONATE 15 MILLILITER(S): 213 SOLUTION TOPICAL at 17:47

## 2022-01-01 RX ADMIN — Medication 3 MILLILITER(S): at 09:15

## 2022-01-01 RX ADMIN — Medication 81 MILLIGRAM(S): at 11:36

## 2022-01-01 RX ADMIN — SODIUM CHLORIDE 4 MILLILITER(S): 9 INJECTION INTRAMUSCULAR; INTRAVENOUS; SUBCUTANEOUS at 05:33

## 2022-01-01 RX ADMIN — Medication 3 MILLILITER(S): at 21:00

## 2022-01-01 RX ADMIN — TACROLIMUS 4 MILLIGRAM(S): 5 CAPSULE ORAL at 12:07

## 2022-01-01 RX ADMIN — Medication 100 GRAM(S): at 07:24

## 2022-01-01 RX ADMIN — ATORVASTATIN CALCIUM 40 MILLIGRAM(S): 80 TABLET, FILM COATED ORAL at 21:49

## 2022-01-01 RX ADMIN — Medication 3 MILLILITER(S): at 16:41

## 2022-01-01 RX ADMIN — PROPOFOL 4.94 MICROGRAM(S)/KG/MIN: 10 INJECTION, EMULSION INTRAVENOUS at 13:46

## 2022-01-01 RX ADMIN — MYCOPHENOLATE MOFETIL 500 MILLIGRAM(S): 250 CAPSULE ORAL at 07:21

## 2022-01-01 RX ADMIN — SODIUM CHLORIDE 4 MILLILITER(S): 9 INJECTION INTRAMUSCULAR; INTRAVENOUS; SUBCUTANEOUS at 06:19

## 2022-01-01 RX ADMIN — HEPARIN SODIUM 5000 UNIT(S): 5000 INJECTION INTRAVENOUS; SUBCUTANEOUS at 15:11

## 2022-01-01 RX ADMIN — Medication 3 MILLILITER(S): at 21:48

## 2022-01-01 RX ADMIN — INSULIN HUMAN 2: 100 INJECTION, SOLUTION SUBCUTANEOUS at 23:58

## 2022-01-01 RX ADMIN — TACROLIMUS 2 MILLIGRAM(S): 5 CAPSULE ORAL at 19:03

## 2022-01-01 RX ADMIN — Medication 25 MILLIGRAM(S): at 02:48

## 2022-01-01 RX ADMIN — TACROLIMUS 4 MILLIGRAM(S): 5 CAPSULE ORAL at 22:59

## 2022-01-01 RX ADMIN — MYCOPHENOLATE MOFETIL 500 MILLIGRAM(S): 250 CAPSULE ORAL at 19:03

## 2022-01-01 RX ADMIN — CHLORHEXIDINE GLUCONATE 1 APPLICATION(S): 213 SOLUTION TOPICAL at 06:53

## 2022-01-01 RX ADMIN — INSULIN HUMAN 1: 100 INJECTION, SOLUTION SUBCUTANEOUS at 05:25

## 2022-01-01 RX ADMIN — INSULIN HUMAN 7 UNIT(S): 100 INJECTION, SOLUTION SUBCUTANEOUS at 05:20

## 2022-01-01 RX ADMIN — HEPARIN SODIUM 5000 UNIT(S): 5000 INJECTION INTRAVENOUS; SUBCUTANEOUS at 06:19

## 2022-01-01 RX ADMIN — Medication 3 MILLILITER(S): at 16:07

## 2022-01-01 RX ADMIN — MIDODRINE HYDROCHLORIDE 30 MILLIGRAM(S): 2.5 TABLET ORAL at 12:07

## 2022-01-01 RX ADMIN — INSULIN HUMAN 3: 100 INJECTION, SOLUTION SUBCUTANEOUS at 06:02

## 2022-01-01 RX ADMIN — Medication 3 MILLILITER(S): at 17:34

## 2022-01-01 RX ADMIN — SENNA PLUS 1 TABLET(S): 8.6 TABLET ORAL at 15:19

## 2022-01-01 RX ADMIN — ATORVASTATIN CALCIUM 40 MILLIGRAM(S): 80 TABLET, FILM COATED ORAL at 21:02

## 2022-01-01 RX ADMIN — INSULIN HUMAN 7 UNIT(S): 100 INJECTION, SOLUTION SUBCUTANEOUS at 17:54

## 2022-01-01 RX ADMIN — HEPARIN SODIUM 5000 UNIT(S): 5000 INJECTION INTRAVENOUS; SUBCUTANEOUS at 21:31

## 2022-01-01 RX ADMIN — CHLORHEXIDINE GLUCONATE 1 APPLICATION(S): 213 SOLUTION TOPICAL at 05:41

## 2022-01-01 RX ADMIN — TACROLIMUS 3 MILLIGRAM(S): 5 CAPSULE ORAL at 22:09

## 2022-01-01 RX ADMIN — HEPARIN SODIUM 5000 UNIT(S): 5000 INJECTION INTRAVENOUS; SUBCUTANEOUS at 05:28

## 2022-01-01 RX ADMIN — INSULIN HUMAN 4 UNIT(S): 100 INJECTION, SOLUTION SUBCUTANEOUS at 06:41

## 2022-01-01 RX ADMIN — Medication 25 MICROGRAM(S): at 22:26

## 2022-01-01 RX ADMIN — Medication 3 MILLILITER(S): at 11:18

## 2022-01-01 RX ADMIN — Medication 50 MICROGRAM(S): at 23:51

## 2022-01-01 RX ADMIN — LACTULOSE 10 GRAM(S): 10 SOLUTION ORAL at 18:57

## 2022-01-01 RX ADMIN — SODIUM CHLORIDE 4 MILLILITER(S): 9 INJECTION INTRAMUSCULAR; INTRAVENOUS; SUBCUTANEOUS at 18:16

## 2022-01-01 RX ADMIN — SODIUM CHLORIDE 4 MILLILITER(S): 9 INJECTION INTRAMUSCULAR; INTRAVENOUS; SUBCUTANEOUS at 06:35

## 2022-01-01 RX ADMIN — Medication 3 MILLILITER(S): at 16:37

## 2022-01-01 RX ADMIN — Medication 3: at 23:58

## 2022-01-01 RX ADMIN — Medication 3 MILLILITER(S): at 17:29

## 2022-01-01 RX ADMIN — Medication 25 MICROGRAM(S): at 21:36

## 2022-01-01 RX ADMIN — HEPARIN SODIUM 5000 UNIT(S): 5000 INJECTION INTRAVENOUS; SUBCUTANEOUS at 22:58

## 2022-01-01 RX ADMIN — CLOPIDOGREL BISULFATE 75 MILLIGRAM(S): 75 TABLET, FILM COATED ORAL at 14:10

## 2022-01-01 RX ADMIN — HEPARIN SODIUM 5000 UNIT(S): 5000 INJECTION INTRAVENOUS; SUBCUTANEOUS at 22:02

## 2022-01-01 RX ADMIN — PANTOPRAZOLE SODIUM 40 MILLIGRAM(S): 20 TABLET, DELAYED RELEASE ORAL at 05:42

## 2022-01-01 RX ADMIN — QUETIAPINE FUMARATE 50 MILLIGRAM(S): 200 TABLET, FILM COATED ORAL at 14:06

## 2022-01-01 RX ADMIN — MIDODRINE HYDROCHLORIDE 20 MILLIGRAM(S): 2.5 TABLET ORAL at 11:46

## 2022-01-01 RX ADMIN — HUMAN INSULIN 5 UNIT(S): 100 INJECTION, SUSPENSION SUBCUTANEOUS at 11:31

## 2022-01-01 RX ADMIN — PROPOFOL 4.94 MICROGRAM(S)/KG/MIN: 10 INJECTION, EMULSION INTRAVENOUS at 12:09

## 2022-01-01 RX ADMIN — PANTOPRAZOLE SODIUM 40 MILLIGRAM(S): 20 TABLET, DELAYED RELEASE ORAL at 05:52

## 2022-01-01 RX ADMIN — POLYETHYLENE GLYCOL 3350 17 GRAM(S): 17 POWDER, FOR SOLUTION ORAL at 06:46

## 2022-01-01 RX ADMIN — Medication 3 MILLILITER(S): at 16:15

## 2022-01-01 RX ADMIN — Medication 650 MILLIGRAM(S): at 11:15

## 2022-01-01 RX ADMIN — CHLORHEXIDINE GLUCONATE 1 APPLICATION(S): 213 SOLUTION TOPICAL at 06:43

## 2022-01-01 RX ADMIN — CHLORHEXIDINE GLUCONATE 1 APPLICATION(S): 213 SOLUTION TOPICAL at 05:13

## 2022-01-01 RX ADMIN — INSULIN HUMAN 5 UNIT(S): 100 INJECTION, SOLUTION SUBCUTANEOUS at 23:16

## 2022-01-01 RX ADMIN — HEPARIN SODIUM 5000 UNIT(S): 5000 INJECTION INTRAVENOUS; SUBCUTANEOUS at 13:01

## 2022-01-01 RX ADMIN — MYCOPHENOLATE MOFETIL 500 MILLIGRAM(S): 250 CAPSULE ORAL at 06:03

## 2022-01-01 RX ADMIN — MIDODRINE HYDROCHLORIDE 20 MILLIGRAM(S): 2.5 TABLET ORAL at 02:44

## 2022-01-01 RX ADMIN — FENTANYL CITRATE 50 MICROGRAM(S): 50 INJECTION INTRAVENOUS at 11:30

## 2022-01-01 RX ADMIN — INSULIN HUMAN 7 UNIT(S): 100 INJECTION, SOLUTION SUBCUTANEOUS at 11:38

## 2022-01-01 RX ADMIN — TAMSULOSIN HYDROCHLORIDE 0.4 MILLIGRAM(S): 0.4 CAPSULE ORAL at 21:31

## 2022-01-01 RX ADMIN — HEPARIN SODIUM 5000 UNIT(S): 5000 INJECTION INTRAVENOUS; SUBCUTANEOUS at 05:38

## 2022-01-01 RX ADMIN — Medication 81 MILLIGRAM(S): at 11:13

## 2022-01-01 RX ADMIN — TACROLIMUS 3 MILLIGRAM(S): 5 CAPSULE ORAL at 12:37

## 2022-01-01 RX ADMIN — HEPARIN SODIUM 5000 UNIT(S): 5000 INJECTION INTRAVENOUS; SUBCUTANEOUS at 13:46

## 2022-01-01 RX ADMIN — HEPARIN SODIUM 5000 UNIT(S): 5000 INJECTION INTRAVENOUS; SUBCUTANEOUS at 18:06

## 2022-01-01 RX ADMIN — Medication 60 MICROGRAM(S): at 00:52

## 2022-01-01 RX ADMIN — INSULIN HUMAN 2: 100 INJECTION, SOLUTION SUBCUTANEOUS at 23:07

## 2022-01-01 RX ADMIN — Medication 3 MILLILITER(S): at 10:03

## 2022-01-01 RX ADMIN — HEPARIN SODIUM 5000 UNIT(S): 5000 INJECTION INTRAVENOUS; SUBCUTANEOUS at 05:41

## 2022-01-01 RX ADMIN — Medication 2: at 09:40

## 2022-01-01 RX ADMIN — CARVEDILOL PHOSPHATE 25 MILLIGRAM(S): 80 CAPSULE, EXTENDED RELEASE ORAL at 05:07

## 2022-01-01 RX ADMIN — INSULIN HUMAN 7 UNIT(S): 100 INJECTION, SOLUTION SUBCUTANEOUS at 23:57

## 2022-01-01 RX ADMIN — INSULIN HUMAN 6 UNIT(S): 100 INJECTION, SOLUTION SUBCUTANEOUS at 17:48

## 2022-01-01 RX ADMIN — INSULIN HUMAN 7 UNIT(S): 100 INJECTION, SOLUTION SUBCUTANEOUS at 12:15

## 2022-01-01 RX ADMIN — Medication 7.72 MICROGRAM(S)/KG/MIN: at 14:00

## 2022-01-01 RX ADMIN — INSULIN HUMAN 2: 100 INJECTION, SOLUTION SUBCUTANEOUS at 18:35

## 2022-01-01 RX ADMIN — TAMSULOSIN HYDROCHLORIDE 0.4 MILLIGRAM(S): 0.4 CAPSULE ORAL at 21:06

## 2022-01-01 RX ADMIN — INSULIN HUMAN 9 UNIT(S): 100 INJECTION, SOLUTION SUBCUTANEOUS at 06:04

## 2022-01-01 RX ADMIN — TACROLIMUS 2 MILLIGRAM(S): 5 CAPSULE ORAL at 00:26

## 2022-01-01 RX ADMIN — SENNA PLUS 2 TABLET(S): 8.6 TABLET ORAL at 21:06

## 2022-01-01 RX ADMIN — Medication 40 MILLIEQUIVALENT(S): at 07:33

## 2022-01-01 RX ADMIN — TAMSULOSIN HYDROCHLORIDE 0.4 MILLIGRAM(S): 0.4 CAPSULE ORAL at 22:56

## 2022-01-01 RX ADMIN — Medication 30 MILLIGRAM(S): at 02:20

## 2022-01-01 RX ADMIN — Medication 81 MILLIGRAM(S): at 09:59

## 2022-01-01 RX ADMIN — TACROLIMUS 3 MILLIGRAM(S): 5 CAPSULE ORAL at 10:41

## 2022-01-01 RX ADMIN — MYCOPHENOLATE MOFETIL 500 MILLIGRAM(S): 250 CAPSULE ORAL at 06:42

## 2022-01-01 RX ADMIN — Medication 3 MILLILITER(S): at 20:23

## 2022-01-01 RX ADMIN — INSULIN HUMAN 2: 100 INJECTION, SOLUTION SUBCUTANEOUS at 11:20

## 2022-01-01 RX ADMIN — MIDODRINE HYDROCHLORIDE 20 MILLIGRAM(S): 2.5 TABLET ORAL at 01:42

## 2022-01-01 RX ADMIN — CHLORHEXIDINE GLUCONATE 15 MILLILITER(S): 213 SOLUTION TOPICAL at 06:45

## 2022-01-01 RX ADMIN — Medication 5 UNIT(S): at 09:05

## 2022-01-01 RX ADMIN — INSULIN HUMAN 3 UNIT(S): 100 INJECTION, SOLUTION SUBCUTANEOUS at 13:08

## 2022-01-01 RX ADMIN — ATORVASTATIN CALCIUM 40 MILLIGRAM(S): 80 TABLET, FILM COATED ORAL at 22:22

## 2022-01-01 RX ADMIN — MIDODRINE HYDROCHLORIDE 20 MILLIGRAM(S): 2.5 TABLET ORAL at 11:06

## 2022-01-01 RX ADMIN — SODIUM CHLORIDE 4 MILLILITER(S): 9 INJECTION INTRAMUSCULAR; INTRAVENOUS; SUBCUTANEOUS at 17:12

## 2022-01-01 RX ADMIN — CHLORHEXIDINE GLUCONATE 15 MILLILITER(S): 213 SOLUTION TOPICAL at 17:09

## 2022-01-01 RX ADMIN — INSULIN HUMAN 6 UNIT(S): 100 INJECTION, SOLUTION SUBCUTANEOUS at 23:46

## 2022-01-01 RX ADMIN — Medication 25 MILLIGRAM(S): at 01:45

## 2022-01-01 RX ADMIN — QUETIAPINE FUMARATE 75 MILLIGRAM(S): 200 TABLET, FILM COATED ORAL at 17:47

## 2022-01-01 RX ADMIN — Medication 50 MILLIGRAM(S): at 23:09

## 2022-01-01 RX ADMIN — Medication 81 MILLIGRAM(S): at 12:14

## 2022-01-01 RX ADMIN — TAMSULOSIN HYDROCHLORIDE 0.4 MILLIGRAM(S): 0.4 CAPSULE ORAL at 21:59

## 2022-01-01 RX ADMIN — INSULIN HUMAN 12 UNIT(S): 100 INJECTION, SOLUTION SUBCUTANEOUS at 23:09

## 2022-01-01 RX ADMIN — INSULIN HUMAN 5 UNIT(S): 100 INJECTION, SOLUTION SUBCUTANEOUS at 13:36

## 2022-01-01 RX ADMIN — SODIUM CHLORIDE 1000 MILLILITER(S): 9 INJECTION, SOLUTION INTRAVENOUS at 15:06

## 2022-01-01 RX ADMIN — INSULIN HUMAN 2: 100 INJECTION, SOLUTION SUBCUTANEOUS at 12:03

## 2022-01-01 RX ADMIN — MYCOPHENOLATE MOFETIL 500 MILLIGRAM(S): 250 CAPSULE ORAL at 23:58

## 2022-01-01 RX ADMIN — SODIUM CHLORIDE 1000 MILLILITER(S): 9 INJECTION INTRAMUSCULAR; INTRAVENOUS; SUBCUTANEOUS at 10:23

## 2022-01-01 RX ADMIN — ATORVASTATIN CALCIUM 40 MILLIGRAM(S): 80 TABLET, FILM COATED ORAL at 22:08

## 2022-01-01 RX ADMIN — ATORVASTATIN CALCIUM 40 MILLIGRAM(S): 80 TABLET, FILM COATED ORAL at 22:03

## 2022-01-01 RX ADMIN — Medication 3 MILLILITER(S): at 10:25

## 2022-01-01 RX ADMIN — Medication 25 MICROGRAM(S): at 22:11

## 2022-01-01 RX ADMIN — Medication 3 MILLILITER(S): at 21:54

## 2022-01-01 RX ADMIN — TACROLIMUS 4 MILLIGRAM(S): 5 CAPSULE ORAL at 22:55

## 2022-01-01 RX ADMIN — MYCOPHENOLATE MOFETIL 500 MILLIGRAM(S): 250 CAPSULE ORAL at 07:46

## 2022-01-01 RX ADMIN — INSULIN HUMAN 1: 100 INJECTION, SOLUTION SUBCUTANEOUS at 17:58

## 2022-01-01 RX ADMIN — TAMSULOSIN HYDROCHLORIDE 0.4 MILLIGRAM(S): 0.4 CAPSULE ORAL at 21:36

## 2022-01-01 RX ADMIN — PIPERACILLIN AND TAZOBACTAM 25 GRAM(S): 4; .5 INJECTION, POWDER, LYOPHILIZED, FOR SOLUTION INTRAVENOUS at 17:21

## 2022-01-01 RX ADMIN — ATORVASTATIN CALCIUM 40 MILLIGRAM(S): 80 TABLET, FILM COATED ORAL at 21:26

## 2022-01-01 RX ADMIN — CHLORHEXIDINE GLUCONATE 15 MILLILITER(S): 213 SOLUTION TOPICAL at 05:07

## 2022-01-01 RX ADMIN — INSULIN GLARGINE 7 UNIT(S): 100 INJECTION, SOLUTION SUBCUTANEOUS at 23:30

## 2022-01-01 RX ADMIN — TACROLIMUS 4 MILLIGRAM(S): 5 CAPSULE ORAL at 11:47

## 2022-01-01 RX ADMIN — MYCOPHENOLATE MOFETIL 500 MILLIGRAM(S): 250 CAPSULE ORAL at 06:47

## 2022-01-01 RX ADMIN — Medication 3 MILLILITER(S): at 10:42

## 2022-01-01 RX ADMIN — SODIUM CHLORIDE 4 MILLILITER(S): 9 INJECTION INTRAMUSCULAR; INTRAVENOUS; SUBCUTANEOUS at 05:03

## 2022-01-01 RX ADMIN — Medication 166.67 MILLIGRAM(S): at 10:11

## 2022-01-01 RX ADMIN — TAMSULOSIN HYDROCHLORIDE 0.4 MILLIGRAM(S): 0.4 CAPSULE ORAL at 22:40

## 2022-01-01 RX ADMIN — QUETIAPINE FUMARATE 50 MILLIGRAM(S): 200 TABLET, FILM COATED ORAL at 07:47

## 2022-01-01 RX ADMIN — INSULIN HUMAN 2: 100 INJECTION, SOLUTION SUBCUTANEOUS at 17:08

## 2022-01-01 RX ADMIN — Medication 25 MILLIGRAM(S): at 03:24

## 2022-01-01 RX ADMIN — INSULIN GLARGINE 6 UNIT(S): 100 INJECTION, SOLUTION SUBCUTANEOUS at 23:26

## 2022-01-01 RX ADMIN — INSULIN HUMAN 5 UNIT(S): 100 INJECTION, SOLUTION SUBCUTANEOUS at 23:51

## 2022-01-01 RX ADMIN — Medication 1: at 09:32

## 2022-01-01 RX ADMIN — INSULIN GLARGINE 7 UNIT(S): 100 INJECTION, SOLUTION SUBCUTANEOUS at 23:00

## 2022-01-01 RX ADMIN — SODIUM CHLORIDE 4 MILLILITER(S): 9 INJECTION INTRAMUSCULAR; INTRAVENOUS; SUBCUTANEOUS at 05:45

## 2022-01-01 RX ADMIN — INSULIN HUMAN 2: 100 INJECTION, SOLUTION SUBCUTANEOUS at 06:35

## 2022-01-01 RX ADMIN — ATORVASTATIN CALCIUM 40 MILLIGRAM(S): 80 TABLET, FILM COATED ORAL at 21:13

## 2022-01-01 RX ADMIN — QUETIAPINE FUMARATE 25 MILLIGRAM(S): 200 TABLET, FILM COATED ORAL at 17:11

## 2022-01-01 RX ADMIN — CHLORHEXIDINE GLUCONATE 15 MILLILITER(S): 213 SOLUTION TOPICAL at 06:08

## 2022-01-01 RX ADMIN — SODIUM CHLORIDE 4 MILLILITER(S): 9 INJECTION INTRAMUSCULAR; INTRAVENOUS; SUBCUTANEOUS at 16:45

## 2022-01-01 RX ADMIN — MYCOPHENOLATE MOFETIL 500 MILLIGRAM(S): 250 CAPSULE ORAL at 17:12

## 2022-01-01 RX ADMIN — Medication 3 MILLILITER(S): at 17:58

## 2022-01-01 RX ADMIN — TAMSULOSIN HYDROCHLORIDE 0.4 MILLIGRAM(S): 0.4 CAPSULE ORAL at 23:00

## 2022-01-01 RX ADMIN — Medication 81 MILLIGRAM(S): at 14:10

## 2022-01-01 RX ADMIN — Medication 3 MILLILITER(S): at 16:40

## 2022-01-01 RX ADMIN — HEPARIN SODIUM 5000 UNIT(S): 5000 INJECTION INTRAVENOUS; SUBCUTANEOUS at 13:12

## 2022-01-01 RX ADMIN — INSULIN HUMAN 2: 100 INJECTION, SOLUTION SUBCUTANEOUS at 06:21

## 2022-01-01 RX ADMIN — TAMSULOSIN HYDROCHLORIDE 0.4 MILLIGRAM(S): 0.4 CAPSULE ORAL at 21:35

## 2022-01-01 RX ADMIN — Medication 650 MILLIGRAM(S): at 12:24

## 2022-01-01 RX ADMIN — INSULIN HUMAN 8 UNIT(S): 100 INJECTION, SOLUTION SUBCUTANEOUS at 11:39

## 2022-01-01 RX ADMIN — Medication 40 MILLIGRAM(S): at 18:42

## 2022-01-01 RX ADMIN — OLANZAPINE 2.5 MILLIGRAM(S): 15 TABLET, FILM COATED ORAL at 03:46

## 2022-01-01 RX ADMIN — Medication 100 MILLIGRAM(S): at 19:04

## 2022-01-01 RX ADMIN — Medication 81 MILLIGRAM(S): at 06:42

## 2022-01-01 RX ADMIN — Medication 25 MILLILITER(S): at 12:15

## 2022-01-01 RX ADMIN — INSULIN HUMAN 6 UNIT(S): 100 INJECTION, SOLUTION SUBCUTANEOUS at 12:52

## 2022-01-01 RX ADMIN — HEPARIN SODIUM 5000 UNIT(S): 5000 INJECTION INTRAVENOUS; SUBCUTANEOUS at 06:08

## 2022-01-01 RX ADMIN — QUETIAPINE FUMARATE 100 MILLIGRAM(S): 200 TABLET, FILM COATED ORAL at 18:00

## 2022-01-01 RX ADMIN — Medication 7 UNIT(S): at 12:03

## 2022-01-01 RX ADMIN — PIPERACILLIN AND TAZOBACTAM 25 GRAM(S): 4; .5 INJECTION, POWDER, LYOPHILIZED, FOR SOLUTION INTRAVENOUS at 14:10

## 2022-01-01 RX ADMIN — ATORVASTATIN CALCIUM 40 MILLIGRAM(S): 80 TABLET, FILM COATED ORAL at 22:09

## 2022-01-01 RX ADMIN — TACROLIMUS 2 MILLIGRAM(S): 5 CAPSULE ORAL at 23:51

## 2022-01-01 RX ADMIN — Medication 3: at 13:27

## 2022-01-01 RX ADMIN — SENNA PLUS 1 TABLET(S): 8.6 TABLET ORAL at 11:40

## 2022-01-01 RX ADMIN — PIPERACILLIN AND TAZOBACTAM 25 GRAM(S): 4; .5 INJECTION, POWDER, LYOPHILIZED, FOR SOLUTION INTRAVENOUS at 13:29

## 2022-01-01 RX ADMIN — PANTOPRAZOLE SODIUM 40 MILLIGRAM(S): 20 TABLET, DELAYED RELEASE ORAL at 12:22

## 2022-01-01 RX ADMIN — Medication 650 MILLIGRAM(S): at 22:30

## 2022-01-01 RX ADMIN — SODIUM CHLORIDE 4 MILLILITER(S): 9 INJECTION INTRAMUSCULAR; INTRAVENOUS; SUBCUTANEOUS at 17:47

## 2022-01-01 RX ADMIN — CARVEDILOL PHOSPHATE 25 MILLIGRAM(S): 80 CAPSULE, EXTENDED RELEASE ORAL at 06:27

## 2022-01-01 RX ADMIN — MIDODRINE HYDROCHLORIDE 20 MILLIGRAM(S): 2.5 TABLET ORAL at 11:21

## 2022-01-01 RX ADMIN — FENTANYL CITRATE 50 MICROGRAM(S): 50 INJECTION INTRAVENOUS at 23:31

## 2022-01-01 RX ADMIN — CHLORHEXIDINE GLUCONATE 1 APPLICATION(S): 213 SOLUTION TOPICAL at 06:13

## 2022-01-01 RX ADMIN — QUETIAPINE FUMARATE 100 MILLIGRAM(S): 200 TABLET, FILM COATED ORAL at 07:43

## 2022-01-01 RX ADMIN — INSULIN HUMAN 7 UNIT(S): 100 INJECTION, SOLUTION SUBCUTANEOUS at 06:49

## 2022-01-01 RX ADMIN — SODIUM CHLORIDE 4 MILLILITER(S): 9 INJECTION INTRAMUSCULAR; INTRAVENOUS; SUBCUTANEOUS at 06:36

## 2022-01-01 RX ADMIN — INSULIN HUMAN 2: 100 INJECTION, SOLUTION SUBCUTANEOUS at 00:05

## 2022-01-01 RX ADMIN — SODIUM CHLORIDE 4 MILLILITER(S): 9 INJECTION INTRAMUSCULAR; INTRAVENOUS; SUBCUTANEOUS at 07:17

## 2022-01-01 RX ADMIN — Medication 100 MILLIGRAM(S): at 09:03

## 2022-01-01 RX ADMIN — PROPOFOL 9.88 MICROGRAM(S)/KG/MIN: 10 INJECTION, EMULSION INTRAVENOUS at 22:01

## 2022-01-01 RX ADMIN — CARVEDILOL PHOSPHATE 25 MILLIGRAM(S): 80 CAPSULE, EXTENDED RELEASE ORAL at 17:38

## 2022-01-01 RX ADMIN — MIDODRINE HYDROCHLORIDE 10 MILLIGRAM(S): 2.5 TABLET ORAL at 10:11

## 2022-01-01 RX ADMIN — INSULIN GLARGINE 6 UNIT(S): 100 INJECTION, SOLUTION SUBCUTANEOUS at 23:00

## 2022-01-01 RX ADMIN — Medication 250 MILLIGRAM(S): at 14:44

## 2022-01-01 RX ADMIN — Medication 25 MICROGRAM(S): at 23:32

## 2022-01-01 RX ADMIN — Medication 80 MILLIGRAM(S): at 12:20

## 2022-01-01 RX ADMIN — INSULIN HUMAN 5 UNIT(S): 100 INJECTION, SOLUTION SUBCUTANEOUS at 23:43

## 2022-01-01 RX ADMIN — Medication 3 MILLILITER(S): at 21:50

## 2022-01-01 RX ADMIN — TAMSULOSIN HYDROCHLORIDE 0.4 MILLIGRAM(S): 0.4 CAPSULE ORAL at 22:19

## 2022-01-01 RX ADMIN — TACROLIMUS 4 MILLIGRAM(S): 5 CAPSULE ORAL at 00:14

## 2022-01-01 RX ADMIN — Medication 25 MILLIGRAM(S): at 03:10

## 2022-01-01 RX ADMIN — INSULIN HUMAN 2 UNIT(S): 100 INJECTION, SOLUTION SUBCUTANEOUS at 06:37

## 2022-01-01 RX ADMIN — MYCOPHENOLATE MOFETIL 500 MILLIGRAM(S): 250 CAPSULE ORAL at 17:25

## 2022-01-01 RX ADMIN — Medication 81 MILLIGRAM(S): at 12:20

## 2022-01-01 RX ADMIN — Medication 5 UNIT(S): at 15:18

## 2022-01-01 RX ADMIN — QUETIAPINE FUMARATE 50 MILLIGRAM(S): 200 TABLET, FILM COATED ORAL at 17:33

## 2022-01-01 RX ADMIN — FENTANYL CITRATE 25 MICROGRAM(S): 50 INJECTION INTRAVENOUS at 05:52

## 2022-01-01 RX ADMIN — INSULIN GLARGINE 5 UNIT(S): 100 INJECTION, SOLUTION SUBCUTANEOUS at 23:07

## 2022-01-01 RX ADMIN — MIDODRINE HYDROCHLORIDE 20 MILLIGRAM(S): 2.5 TABLET ORAL at 09:26

## 2022-01-01 RX ADMIN — Medication 3 MILLILITER(S): at 05:08

## 2022-01-01 RX ADMIN — TACROLIMUS 4 MILLIGRAM(S): 5 CAPSULE ORAL at 03:45

## 2022-01-01 RX ADMIN — ATORVASTATIN CALCIUM 40 MILLIGRAM(S): 80 TABLET, FILM COATED ORAL at 22:56

## 2022-01-01 RX ADMIN — TACROLIMUS 3 MILLIGRAM(S): 5 CAPSULE ORAL at 09:41

## 2022-01-01 RX ADMIN — ATORVASTATIN CALCIUM 40 MILLIGRAM(S): 80 TABLET, FILM COATED ORAL at 21:38

## 2022-01-01 RX ADMIN — Medication 3 MILLILITER(S): at 17:13

## 2022-01-01 RX ADMIN — Medication 650 MILLIGRAM(S): at 14:31

## 2022-01-01 RX ADMIN — Medication 3 MILLILITER(S): at 22:46

## 2022-01-01 RX ADMIN — Medication 81 MILLIGRAM(S): at 09:26

## 2022-01-01 RX ADMIN — ALBUTEROL 2.5 MILLIGRAM(S): 90 AEROSOL, METERED ORAL at 22:00

## 2022-01-01 RX ADMIN — ATORVASTATIN CALCIUM 40 MILLIGRAM(S): 80 TABLET, FILM COATED ORAL at 21:04

## 2022-01-01 RX ADMIN — Medication 60 MICROGRAM(S): at 21:47

## 2022-01-01 RX ADMIN — INSULIN HUMAN 6 UNIT(S): 100 INJECTION, SOLUTION SUBCUTANEOUS at 06:21

## 2022-01-01 RX ADMIN — Medication 25 MILLIGRAM(S): at 15:17

## 2022-01-01 RX ADMIN — Medication 40 MILLIEQUIVALENT(S): at 06:49

## 2022-01-01 RX ADMIN — SODIUM CHLORIDE 4 MILLILITER(S): 9 INJECTION INTRAMUSCULAR; INTRAVENOUS; SUBCUTANEOUS at 17:00

## 2022-01-01 RX ADMIN — Medication 345.31 MILLIGRAM(S): at 03:32

## 2022-01-01 RX ADMIN — Medication 3 MILLILITER(S): at 09:39

## 2022-01-01 RX ADMIN — CHLORHEXIDINE GLUCONATE 1 APPLICATION(S): 213 SOLUTION TOPICAL at 07:20

## 2022-01-01 RX ADMIN — Medication 5 UNIT(S): at 12:52

## 2022-01-01 RX ADMIN — Medication 81 MILLIGRAM(S): at 06:39

## 2022-01-01 RX ADMIN — Medication 25 MILLIGRAM(S): at 13:40

## 2022-01-01 RX ADMIN — INSULIN HUMAN 7 UNIT(S): 100 INJECTION, SOLUTION SUBCUTANEOUS at 17:08

## 2022-01-01 RX ADMIN — Medication 81 MILLIGRAM(S): at 14:59

## 2022-01-01 RX ADMIN — PANTOPRAZOLE SODIUM 40 MILLIGRAM(S): 20 TABLET, DELAYED RELEASE ORAL at 05:44

## 2022-01-01 RX ADMIN — SODIUM CHLORIDE 4 MILLILITER(S): 9 INJECTION INTRAMUSCULAR; INTRAVENOUS; SUBCUTANEOUS at 06:31

## 2022-01-01 RX ADMIN — Medication 81 MILLIGRAM(S): at 14:28

## 2022-01-01 RX ADMIN — CHLORHEXIDINE GLUCONATE 1 APPLICATION(S): 213 SOLUTION TOPICAL at 06:23

## 2022-01-01 RX ADMIN — Medication 50 MILLILITER(S): at 13:04

## 2022-01-01 RX ADMIN — MYCOPHENOLATE MOFETIL 500 MILLIGRAM(S): 250 CAPSULE ORAL at 11:39

## 2022-01-01 RX ADMIN — INSULIN HUMAN 2: 100 INJECTION, SOLUTION SUBCUTANEOUS at 23:13

## 2022-01-01 RX ADMIN — INSULIN HUMAN 1: 100 INJECTION, SOLUTION SUBCUTANEOUS at 06:31

## 2022-01-01 RX ADMIN — HEPARIN SODIUM 5000 UNIT(S): 5000 INJECTION INTRAVENOUS; SUBCUTANEOUS at 13:44

## 2022-01-01 RX ADMIN — Medication 25 MILLIGRAM(S): at 15:11

## 2022-01-01 RX ADMIN — CHLORHEXIDINE GLUCONATE 15 MILLILITER(S): 213 SOLUTION TOPICAL at 17:42

## 2022-01-01 RX ADMIN — MYCOPHENOLATE MOFETIL 500 MILLIGRAM(S): 250 CAPSULE ORAL at 00:03

## 2022-01-01 RX ADMIN — INSULIN HUMAN 1: 100 INJECTION, SOLUTION SUBCUTANEOUS at 17:17

## 2022-01-01 RX ADMIN — INSULIN HUMAN 2: 100 INJECTION, SOLUTION SUBCUTANEOUS at 13:09

## 2022-01-01 RX ADMIN — Medication 3 MILLILITER(S): at 11:41

## 2022-01-01 RX ADMIN — SODIUM POLYSTYRENE SULFONATE 15 GRAM(S): 4.1 POWDER, FOR SUSPENSION ORAL at 06:56

## 2022-01-01 RX ADMIN — Medication 40 MILLIGRAM(S): at 13:23

## 2022-01-01 RX ADMIN — Medication 50 MILLIGRAM(S): at 10:35

## 2022-01-01 RX ADMIN — PIPERACILLIN AND TAZOBACTAM 3.38 GRAM(S): 4; .5 INJECTION, POWDER, LYOPHILIZED, FOR SOLUTION INTRAVENOUS at 17:44

## 2022-01-01 RX ADMIN — HEPARIN SODIUM 5000 UNIT(S): 5000 INJECTION INTRAVENOUS; SUBCUTANEOUS at 14:51

## 2022-01-01 RX ADMIN — TACROLIMUS 2 MILLIGRAM(S): 5 CAPSULE ORAL at 23:30

## 2022-01-01 RX ADMIN — Medication 345.31 MILLIGRAM(S): at 16:52

## 2022-01-01 RX ADMIN — CHLORHEXIDINE GLUCONATE 1 APPLICATION(S): 213 SOLUTION TOPICAL at 04:50

## 2022-01-01 RX ADMIN — Medication 100 MILLIGRAM(S): at 05:40

## 2022-01-01 RX ADMIN — Medication 60 MICROGRAM(S): at 23:46

## 2022-01-01 RX ADMIN — CARVEDILOL PHOSPHATE 25 MILLIGRAM(S): 80 CAPSULE, EXTENDED RELEASE ORAL at 05:28

## 2022-01-01 RX ADMIN — Medication 30 MILLILITER(S): at 15:59

## 2022-01-01 RX ADMIN — HEPARIN SODIUM 5000 UNIT(S): 5000 INJECTION INTRAVENOUS; SUBCUTANEOUS at 05:25

## 2022-01-01 RX ADMIN — MIDODRINE HYDROCHLORIDE 30 MILLIGRAM(S): 2.5 TABLET ORAL at 12:17

## 2022-01-01 RX ADMIN — TAMSULOSIN HYDROCHLORIDE 0.4 MILLIGRAM(S): 0.4 CAPSULE ORAL at 23:51

## 2022-01-01 RX ADMIN — Medication 650 MILLIGRAM(S): at 21:00

## 2022-01-01 RX ADMIN — CHLORHEXIDINE GLUCONATE 1 APPLICATION(S): 213 SOLUTION TOPICAL at 06:27

## 2022-01-01 RX ADMIN — Medication 3 MILLILITER(S): at 16:31

## 2022-01-01 RX ADMIN — POLYETHYLENE GLYCOL 3350 17 GRAM(S): 17 POWDER, FOR SOLUTION ORAL at 18:33

## 2022-01-01 RX ADMIN — HEPARIN SODIUM 5000 UNIT(S): 5000 INJECTION INTRAVENOUS; SUBCUTANEOUS at 22:04

## 2022-01-01 RX ADMIN — TACROLIMUS 2 MILLIGRAM(S): 5 CAPSULE ORAL at 14:43

## 2022-01-01 RX ADMIN — Medication 3 MILLILITER(S): at 22:00

## 2022-01-01 RX ADMIN — Medication 50 MILLILITER(S): at 00:40

## 2022-01-01 RX ADMIN — INSULIN HUMAN 2: 100 INJECTION, SOLUTION SUBCUTANEOUS at 11:55

## 2022-01-01 RX ADMIN — DESMOPRESSIN ACETATE 224 MICROGRAM(S): 0.1 TABLET ORAL at 09:50

## 2022-01-01 RX ADMIN — CHLORHEXIDINE GLUCONATE 15 MILLILITER(S): 213 SOLUTION TOPICAL at 18:30

## 2022-01-01 RX ADMIN — FENTANYL CITRATE 50 MICROGRAM(S): 50 INJECTION INTRAVENOUS at 16:00

## 2022-01-01 RX ADMIN — SODIUM CHLORIDE 4 MILLILITER(S): 9 INJECTION INTRAMUSCULAR; INTRAVENOUS; SUBCUTANEOUS at 17:19

## 2022-01-01 RX ADMIN — HEPARIN SODIUM 5000 UNIT(S): 5000 INJECTION INTRAVENOUS; SUBCUTANEOUS at 05:39

## 2022-01-01 RX ADMIN — Medication 3 MILLILITER(S): at 21:58

## 2022-01-01 RX ADMIN — Medication 7.72 MICROGRAM(S)/KG/MIN: at 10:12

## 2022-01-01 RX ADMIN — MYCOPHENOLATE MOFETIL 500 MILLIGRAM(S): 250 CAPSULE ORAL at 17:05

## 2022-01-01 RX ADMIN — INSULIN HUMAN 5 UNIT(S): 100 INJECTION, SOLUTION SUBCUTANEOUS at 17:37

## 2022-01-01 RX ADMIN — INSULIN HUMAN 7 UNIT(S): 100 INJECTION, SOLUTION SUBCUTANEOUS at 17:14

## 2022-01-01 RX ADMIN — INSULIN HUMAN 3: 100 INJECTION, SOLUTION SUBCUTANEOUS at 23:14

## 2022-01-01 RX ADMIN — CHLORHEXIDINE GLUCONATE 1 APPLICATION(S): 213 SOLUTION TOPICAL at 06:01

## 2022-01-01 RX ADMIN — Medication 3 MILLILITER(S): at 11:03

## 2022-01-01 RX ADMIN — ATORVASTATIN CALCIUM 40 MILLIGRAM(S): 80 TABLET, FILM COATED ORAL at 22:58

## 2022-01-01 RX ADMIN — Medication 3 MILLILITER(S): at 03:56

## 2022-01-01 RX ADMIN — Medication 166.67 MILLIGRAM(S): at 12:49

## 2022-01-01 RX ADMIN — SODIUM ZIRCONIUM CYCLOSILICATE 10 GRAM(S): 10 POWDER, FOR SUSPENSION ORAL at 17:29

## 2022-01-01 RX ADMIN — Medication 3 MILLILITER(S): at 23:59

## 2022-01-02 ENCOUNTER — NON-APPOINTMENT (OUTPATIENT)
Age: 82
End: 2022-01-02

## 2022-01-02 ENCOUNTER — INPATIENT (INPATIENT)
Facility: HOSPITAL | Age: 82
LOS: 2 days | Discharge: HOME HEALTH | DRG: 871 | End: 2022-01-05
Payer: MEDICARE

## 2022-01-02 VITALS
DIASTOLIC BLOOD PRESSURE: 53 MMHG | OXYGEN SATURATION: 85 % | HEART RATE: 100 BPM | HEIGHT: 67 IN | SYSTOLIC BLOOD PRESSURE: 169 MMHG | RESPIRATION RATE: 36 BRPM

## 2022-01-02 DIAGNOSIS — Z94.0 KIDNEY TRANSPLANT STATUS: Chronic | ICD-10-CM

## 2022-01-02 DIAGNOSIS — S98.139A COMPLETE TRAUMATIC AMPUTATION OF ONE UNSPECIFIED LESSER TOE, INITIAL ENCOUNTER: Chronic | ICD-10-CM

## 2022-01-02 LAB
ALBUMIN SERPL ELPH-MCNC: 3.8 G/DL — SIGNIFICANT CHANGE UP (ref 3.3–5)
ALP SERPL-CCNC: 95 U/L — SIGNIFICANT CHANGE UP (ref 40–120)
ALT FLD-CCNC: 12 U/L — SIGNIFICANT CHANGE UP (ref 10–45)
ANION GAP SERPL CALC-SCNC: 10 MMOL/L — SIGNIFICANT CHANGE UP (ref 5–17)
AST SERPL-CCNC: 13 U/L — SIGNIFICANT CHANGE UP (ref 10–40)
BASOPHILS # BLD AUTO: 0.02 K/UL — SIGNIFICANT CHANGE UP (ref 0–0.2)
BASOPHILS NFR BLD AUTO: 0.1 % — SIGNIFICANT CHANGE UP (ref 0–2)
BILIRUB SERPL-MCNC: 0.4 MG/DL — SIGNIFICANT CHANGE UP (ref 0.2–1.2)
BUN SERPL-MCNC: 29 MG/DL — HIGH (ref 7–23)
CALCIUM SERPL-MCNC: 9.2 MG/DL — SIGNIFICANT CHANGE UP (ref 8.4–10.5)
CHLORIDE SERPL-SCNC: 105 MMOL/L — SIGNIFICANT CHANGE UP (ref 96–108)
CO2 SERPL-SCNC: 19 MMOL/L — LOW (ref 22–31)
CREAT SERPL-MCNC: 1.87 MG/DL — HIGH (ref 0.5–1.3)
CRP SERPL-MCNC: 22.8 MG/L — HIGH (ref 0–4)
D DIMER BLD IA.RAPID-MCNC: 446 NG/ML DDU — HIGH
EOSINOPHIL # BLD AUTO: 0.13 K/UL — SIGNIFICANT CHANGE UP (ref 0–0.5)
EOSINOPHIL NFR BLD AUTO: 0.9 % — SIGNIFICANT CHANGE UP (ref 0–6)
FERRITIN SERPL-MCNC: 207 NG/ML — SIGNIFICANT CHANGE UP (ref 30–400)
GAS PNL BLDA: SIGNIFICANT CHANGE UP
GAS PNL BLDV: SIGNIFICANT CHANGE UP
GLUCOSE BLDC GLUCOMTR-MCNC: 150 MG/DL — HIGH (ref 70–99)
GLUCOSE SERPL-MCNC: 254 MG/DL — HIGH (ref 70–99)
HCT VFR BLD CALC: 33.7 % — LOW (ref 39–50)
HGB BLD-MCNC: 10.2 G/DL — LOW (ref 13–17)
IMM GRANULOCYTES NFR BLD AUTO: 0.5 % — SIGNIFICANT CHANGE UP (ref 0–1.5)
LACTATE SERPL-SCNC: 1.6 MMOL/L — SIGNIFICANT CHANGE UP (ref 0.5–2)
LYMPHOCYTES # BLD AUTO: 1.52 K/UL — SIGNIFICANT CHANGE UP (ref 1–3.3)
LYMPHOCYTES # BLD AUTO: 10.3 % — LOW (ref 13–44)
MCHC RBC-ENTMCNC: 27.5 PG — SIGNIFICANT CHANGE UP (ref 27–34)
MCHC RBC-ENTMCNC: 30.3 GM/DL — LOW (ref 32–36)
MCV RBC AUTO: 90.8 FL — SIGNIFICANT CHANGE UP (ref 80–100)
MONOCYTES # BLD AUTO: 1.36 K/UL — HIGH (ref 0–0.9)
MONOCYTES NFR BLD AUTO: 9.2 % — SIGNIFICANT CHANGE UP (ref 2–14)
NEUTROPHILS # BLD AUTO: 11.64 K/UL — HIGH (ref 1.8–7.4)
NEUTROPHILS NFR BLD AUTO: 79 % — HIGH (ref 43–77)
NRBC # BLD: 0 /100 WBCS — SIGNIFICANT CHANGE UP (ref 0–0)
NT-PROBNP SERPL-SCNC: 7921 PG/ML — HIGH (ref 0–300)
PLATELET # BLD AUTO: 232 K/UL — SIGNIFICANT CHANGE UP (ref 150–400)
POTASSIUM SERPL-MCNC: 6 MMOL/L — HIGH (ref 3.5–5.3)
POTASSIUM SERPL-SCNC: 6 MMOL/L — HIGH (ref 3.5–5.3)
PROCALCITONIN SERPL-MCNC: 0.17 NG/ML — HIGH (ref 0.02–0.1)
PROT SERPL-MCNC: 6.3 G/DL — SIGNIFICANT CHANGE UP (ref 6–8.3)
RBC # BLD: 3.71 M/UL — LOW (ref 4.2–5.8)
RBC # FLD: 15.7 % — HIGH (ref 10.3–14.5)
SODIUM SERPL-SCNC: 134 MMOL/L — LOW (ref 135–145)
TROPONIN T SERPL-MCNC: 0.06 NG/ML — CRITICAL HIGH (ref 0–0.01)
WBC # BLD: 14.74 K/UL — HIGH (ref 3.8–10.5)
WBC # FLD AUTO: 14.74 K/UL — HIGH (ref 3.8–10.5)

## 2022-01-02 PROCEDURE — 36600 WITHDRAWAL OF ARTERIAL BLOOD: CPT

## 2022-01-02 PROCEDURE — 99291 CRITICAL CARE FIRST HOUR: CPT | Mod: CS

## 2022-01-02 PROCEDURE — 93010 ELECTROCARDIOGRAM REPORT: CPT

## 2022-01-02 PROCEDURE — 99292 CRITICAL CARE ADDL 30 MIN: CPT | Mod: CS

## 2022-01-02 PROCEDURE — 71045 X-RAY EXAM CHEST 1 VIEW: CPT | Mod: 26

## 2022-01-02 RX ORDER — NITROGLYCERIN 6.5 MG
0.4 CAPSULE, EXTENDED RELEASE ORAL ONCE
Refills: 0 | Status: COMPLETED | OUTPATIENT
Start: 2022-01-02 | End: 2022-01-02

## 2022-01-02 RX ORDER — SODIUM BICARBONATE 1 MEQ/ML
50 SYRINGE (ML) INTRAVENOUS ONCE
Refills: 0 | Status: COMPLETED | OUTPATIENT
Start: 2022-01-02 | End: 2022-01-02

## 2022-01-02 RX ORDER — HEPARIN SODIUM 5000 [USP'U]/ML
5000 INJECTION INTRAVENOUS; SUBCUTANEOUS EVERY 8 HOURS
Refills: 0 | Status: DISCONTINUED | OUTPATIENT
Start: 2022-01-03 | End: 2022-01-05

## 2022-01-02 RX ORDER — CALCIUM GLUCONATE 100 MG/ML
2 VIAL (ML) INTRAVENOUS ONCE
Refills: 0 | Status: COMPLETED | OUTPATIENT
Start: 2022-01-02 | End: 2022-01-02

## 2022-01-02 RX ORDER — DEXTROSE 50 % IN WATER 50 %
50 SYRINGE (ML) INTRAVENOUS ONCE
Refills: 0 | Status: COMPLETED | OUTPATIENT
Start: 2022-01-02 | End: 2022-01-02

## 2022-01-02 RX ORDER — INSULIN HUMAN 100 [IU]/ML
10 INJECTION, SOLUTION SUBCUTANEOUS ONCE
Refills: 0 | Status: COMPLETED | OUTPATIENT
Start: 2022-01-02 | End: 2022-01-02

## 2022-01-02 RX ORDER — FUROSEMIDE 40 MG
40 TABLET ORAL ONCE
Refills: 0 | Status: COMPLETED | OUTPATIENT
Start: 2022-01-02 | End: 2022-01-02

## 2022-01-02 RX ORDER — ACETAMINOPHEN 500 MG
1000 TABLET ORAL ONCE
Refills: 0 | Status: COMPLETED | OUTPATIENT
Start: 2022-01-02 | End: 2022-01-02

## 2022-01-02 RX ORDER — DEXAMETHASONE 0.5 MG/5ML
6 ELIXIR ORAL ONCE
Refills: 0 | Status: COMPLETED | OUTPATIENT
Start: 2022-01-02 | End: 2022-01-02

## 2022-01-02 RX ADMIN — Medication 6 MILLIGRAM(S): at 20:23

## 2022-01-02 RX ADMIN — Medication 1000 MILLIGRAM(S): at 21:00

## 2022-01-02 RX ADMIN — Medication 40 MILLIGRAM(S): at 20:11

## 2022-01-02 RX ADMIN — Medication 0.4 MILLIGRAM(S): at 20:11

## 2022-01-02 RX ADMIN — Medication 400 MILLIGRAM(S): at 20:35

## 2022-01-02 RX ADMIN — Medication 50 MILLILITER(S): at 21:38

## 2022-01-02 RX ADMIN — Medication 200 GRAM(S): at 21:38

## 2022-01-02 RX ADMIN — Medication 2 GRAM(S): at 22:12

## 2022-01-02 RX ADMIN — INSULIN HUMAN 10 UNIT(S): 100 INJECTION, SOLUTION SUBCUTANEOUS at 21:38

## 2022-01-02 RX ADMIN — Medication 50 MILLIEQUIVALENT(S): at 21:38

## 2022-01-02 NOTE — ED PROVIDER NOTE - PROGRESS NOTE DETAILS
mental status and respiratory effort improved. still in significant respiratory distress but bp improving

## 2022-01-02 NOTE — CONSULT NOTE ADULT - SUBJECTIVE AND OBJECTIVE BOX
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    MEDICATIONS  (STANDING):    MEDICATIONS  (PRN):      Allergies    No Known Allergies    Intolerances        FAMILY HISTORY:  No pertinent family history in first degree relatives        SOCIAL HISTORY:     Vital Signs Last 24 Hrs  T(C): --  T(F): --  HR: 97 (02 Jan 2022 20:14) (97 - 102)  BP: 165/70 (02 Jan 2022 20:14) (165/70 - 209/84)  BP(mean): --  RR: 25 (02 Jan 2022 20:14) (25 - 36)  SpO2: 95% (02 Jan 2022 20:14) (85% - 95%)    PHYSICAL EXAM:  GEN: alert, NAD, comfortable in bed  HEENT: PERRL, no relative afferent pupillary defect, EOMI, moist MM, no pharyngeal erythema or exudate  CV: RRR, S1/S2, no murmurs appreciated, no JVD, no carotid bruits  RESP: CTA bilaterally, good respiratory effort, good air movement, no wheezes/rales  ABD: soft, BS+, nontender, nondistended, no guarding/rebound  EXTREMITIES: WWP, pulses 2+ in all 4 extremities, no peripheral edema  MSK: full range of motion of all 4 extremities  SKIN: warm, dry, intact, no rashes  NEURO: A&Ox3, no focal deficits, strength 5/5 throughout, no sensory deficits  PSYC: normal mood/affect    LABS: reviewed.    CAPILLARY BLOOD GLUCOSE                              10.2   14.74 )-----------( 232      ( 02 Jan 2022 20:11 )             33.7                           RADIOLOGY AND ADDITIONAL TESTS: reviewed.    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 As per wife, at bedside, she says that the patient began having a cough 3 weeks ago and they went to a clinic who ruled out pneumonia and send him home. His symptoms resolved but then this week he began having a more productive cough with weakness and forehead fevers measuring 100.7F. His wife says he also started feeling progressively weak as well. They went to the clinic today where they found him hypoxic in 80's and rapid covid +. He is unvaccinated. Otherwise, the patient denies any chest pain, abdominal pain, n/v/d/c.     ED COURSE:   Vitals: T: 99.8F rectal  | HR: 100 | BP: 169/53 | RR: 36 | spo2: 85% NRB 15L  Labs notable for: WBC: 14.7 | H/H: 10.2/33.7 | Plts 232 | D-dimer: 446 | lactate 1.6 | Na: 134 | K: 6.0 | Cl: 105 | HCO3: 19 | AG: 10 | BUN/Cr: 29/1.87 (Cr: 1.29 3/18/2021), | glucose 254 | Alk phoph 95 | ALT/AST 13/12 | Procal 0.17 | BNP 7921 | Trop 0.0VBG pH 7.13, pCO2 61  EKst degree AV block, HR 97, no acute ischemic changes, qtc 411  Imaging: CXR with bilateral infiltrates  Interventions: Tylenol 1g IV, Dexamethasone 6mg IV, Lasix 40mg IV, nitro 0.4mg IV x2, regular insulin 10 units, calcium gluconate 2g, 1 amp sodium bicarb     PAST MEDICAL & SURGICAL HISTORY:  HTN (hypertension)    BPH (benign prostatic hypertrophy)    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    MEDICATIONS  (STANDING):    MEDICATIONS  (PRN):      Allergies    No Known Allergies    Intolerances        FAMILY HISTORY:  No pertinent family history in first degree relatives        SOCIAL HISTORY:     Vital Signs Last 24 Hrs  T(C): --  T(F): --  HR: 97 (2022 20:14) (97 - 102)  BP: 165/70 (2022 20:14) (165/70 - 209/84)  BP(mean): --  RR: 25 (2022 20:14) (25 - 36)  SpO2: 95% (2022 20:14) (85% - 95%)    PHYSICAL EXAM:  GEN: alert, NAD, comfortable in bed  HEENT: PERRL, no relative afferent pupillary defect, EOMI, moist MM, no pharyngeal erythema or exudate  CV: RRR, S1/S2, no murmurs appreciated, no JVD, no carotid bruits  RESP: CTA bilaterally, good respiratory effort, good air movement, no wheezes/rales  ABD: soft, BS+, nontender, nondistended, no guarding/rebound  EXTREMITIES: WWP, pulses 2+ in all 4 extremities, no peripheral edema  MSK: full range of motion of all 4 extremities  SKIN: warm, dry, intact, no rashes  NEURO: A&Ox3, no focal deficits, strength 5/5 throughout, no sensory deficits  PSYC: normal mood/affect    LABS: reviewed.    CAPILLARY BLOOD GLUCOSE                              10.2   14.74 )-----------( 232      ( 2022 20:11 )             33.7                           RADIOLOGY AND ADDITIONAL TESTS: reviewed.    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 As per wife, at bedside, she says that the patient began having a cough 3 weeks ago and they went to a clinic who ruled out pneumonia and send him home. His symptoms resolved but then this week he began having a more productive cough with weakness and forehead fevers measuring 100.7F. His wife says he also started feeling progressively weak as well. They went to the clinic today where they found him hypoxic in 80's and rapid covid +. He is unvaccinated. Otherwise, the patient denies any chest pain, abdominal pain, n/v/d/c.     ED COURSE:   Vitals: T: 99.8F rectal  | HR: 100 | BP: 169/53 | RR: 36 | spo2: 85% NRB 15L  Labs notable for: WBC: 14.7 | H/H: 10.2/33.7 | Plts 232 | D-dimer: 446 | lactate 1.6 | Na: 134 | K: 6.0 | Cl: 105 | HCO3: 19 | AG: 10 | BUN/Cr: 29/1.87 (Cr: 1.29 3/18/2021), | glucose 254 | Alk phoph 95 | ALT/AST 13/12 | Procal 0.17 | BNP 7921 | Trop 0.06 | VBG pH 7.13, pCO2 61 | ABG (while on BIPAP): pH: 7.32, pCO2 30  EKst degree AV block, HR 97, no acute ischemic changes, qtc 411  Imaging: CXR with bilateral infiltrates  Interventions: Tylenol 1g IV, Dexamethasone 6mg IV, Lasix 40mg IV, nitro 0.4mg IV x2, regular insulin 10 units, calcium gluconate 2g, 1 amp sodium bicarb     PAST MEDICAL & SURGICAL HISTORY:  HTN (hypertension)    BPH (benign prostatic hypertrophy)    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    MEDICATIONS  (STANDING):    MEDICATIONS  (PRN):      Allergies    No Known Allergies    Intolerances        FAMILY HISTORY:  No pertinent family history in first degree relatives        SOCIAL HISTORY:     Vital Signs Last 24 Hrs  T(C): --  T(F): --  HR: 97 (2022 20:14) (97 - 102)  BP: 165/70 (2022 20:14) (165/70 - 209/84)  BP(mean): --  RR: 25 (2022 20:14) (25 - 36)  SpO2: 95% (2022 20:14) (85% - 95%)    PHYSICAL EXAM:  GEN: yiddish-speaking, alert, NAD, comfortable in bed on BIPAP  HEENT: PERRL, no relative afferent pupillary defect, EOMI, dry MM, no pharyngeal erythema or exudate  CV: RRR, S1/S2, no murmurs appreciated, no JVD, no carotid bruits  RESP: bibasilar crackles bilaterally, good respiratory effort, good air movement, no wheezes/rales  ABD: soft, BS+, nontender, nondistended, no guarding/rebound  EXTREMITIES: WWP, pulses 2+ in all 4 extremities, 2+ pitting edema in bilateral lower extremities  MSK: full range of motion of all 4 extremities  SKIN: warm, dry, intact, no rashes  NEURO: A&Ox2-3(difficulty with date), confused at times, no focal deficits, strength 5/5 throughout, no sensory deficits    LABS: reviewed.    CAPILLARY BLOOD GLUCOSE                              10.2   14.74 )-----------( 232      ( 2022 20:11 )             33.7                           RADIOLOGY AND ADDITIONAL TESTS: reviewed.    Chest XR:  EKG:  Echocardiogram: ICU CONSULT NOTE    CHIEF COMPLAINT: Patient is a 81y old  Male who presents with a chief complaint of     HPI: 81M PMH legally blind (glaucoma), HTN, DM type II, Anemia, history of renal failure s/p renal transplant [facility] , currently stage III CKD, anemia, BPH,R 4th and 5th toe amputation presenting with productive cough, shortness of breath and fevers. As per wife, at bedside, she says that the patient began having a cough 3 weeks ago and they went to a clinic who ruled out pneumonia and send him home. His symptoms resolved but then this week he began having a more productive cough with weakness and forehead fevers measuring 100.7F. His wife says he also started feeling progressively weak as well. They went to the clinic today where they found him hypoxic in 80's and rapid covid +. He is unvaccinated. Otherwise, the patient denies any chest pain, abdominal pain, n/v/d/c.     ED COURSE:   Vitals: T: 99.8F rectal  | HR: 100 | BP: 169/53 | RR: 36 | spo2: 85% NRB 15L  Labs notable for: WBC: 14.7 | H/H: 10.2/33.7 | Plts 232 | D-dimer: 446 | lactate 1.6 | Na: 134 | K: 6.0 | Cl: 105 | HCO3: 19 | AG: 10 | BUN/Cr: 29/1.87 (Cr: 1.29 3/18/2021), | glucose 254 | Alk phoph 95 | ALT/AST 13/12 | Procal 0.17 | BNP 7921 | Trop 0.06 | VBG pH 7.13, pCO2 61 | ABG (while on BIPAP): pH: 7.32, pCO2 30  EKst degree AV block, HR 97, no acute ischemic changes, qtc 411  Imaging: CXR with bilateral infiltrates  Interventions: Tylenol 1g IV, Dexamethasone 6mg IV, Lasix 40mg IV, nitro 0.4mg IV x2, regular insulin 10 units, calcium gluconate 2g, 1 amp sodium bicarb     PAST MEDICAL & SURGICAL HISTORY:  HTN (hypertension)    BPH (benign prostatic hypertrophy)    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    MEDICATIONS  (STANDING):    MEDICATIONS  (PRN):      Allergies    No Known Allergies    Intolerances        FAMILY HISTORY:  No pertinent family history in first degree relatives        SOCIAL HISTORY:     Vital Signs Last 24 Hrs  T(C): --  T(F): --  HR: 97 (2022 20:14) (97 - 102)  BP: 165/70 (2022 20:14) (165/70 - 209/84)  BP(mean): --  RR: 25 (2022 20:14) (25 - 36)  SpO2: 95% (2022 20:14) (85% - 95%)    PHYSICAL EXAM:  GEN: yiddish-speaking, alert, NAD, comfortable in bed on BIPAP  HEENT: PERRL, no relative afferent pupillary defect, EOMI, dry MM, no pharyngeal erythema or exudate  CV: RRR, S1/S2, no murmurs appreciated, no JVD, no carotid bruits  RESP: bibasilar crackles bilaterally, good respiratory effort, good air movement, no wheezes/rales  ABD: soft, BS+, nontender, nondistended, no guarding/rebound  EXTREMITIES: WWP, pulses 2+ in all 4 extremities, 2+ pitting edema in bilateral lower extremities  MSK: full range of motion of all 4 extremities  SKIN: 4th and 5th toe amputation, warm, dry, intact, no rashes  NEURO: A&Ox2-3(difficulty with date), confused at times, no focal deficits, strength 5/5 throughout, no sensory deficits    LABS: reviewed.    CAPILLARY BLOOD GLUCOSE                              10.2   14.74 )-----------( 232      ( 2022 20:11 )             33.7                           RADIOLOGY AND ADDITIONAL TESTS: reviewed.    Chest XR:  EKG:  Echocardiogram:

## 2022-01-02 NOTE — ED PROVIDER NOTE - CLINICAL SUMMARY MEDICAL DECISION MAKING FREE TEXT BOX
patient with URI/viral syndrome symptoms, shortness of breath, hypoxia, change in mental status. reportedly covid+ at clinic today.  noted to be hypoxic on room air to mid/upper 80's. arrived on nasal cannula satting upper 80's. started non rebreather with sat mid 90's but rales throughout and hypertensive to 200's concerning for pulmonary edema/ hypertensive crisis. given sl nitro x2, lasix 40mg iv, started bip. confirmatory covid testing sent along with labs to eval acs, chf, pneumonia, inflammatory markers, sepsis.  cxr done showing bilateral patchy infiltrate/pulm edema.  started decadron for suspected covid.  tylenol for fever. labs resulted with elevated bnp/ troponin, k 6.0. given insulin/glucose/ bicarb/ calcium for hyperkalemia. icu consulted. discussed with Dr. Prather on phone. admit for further management to

## 2022-01-02 NOTE — ED PROVIDER NOTE - CARE PLAN
1 Principal Discharge DX:	Acute pulmonary edema  Secondary Diagnosis:	2019 novel coronavirus disease (COVID-19)  Secondary Diagnosis:	Hyperkalemia

## 2022-01-02 NOTE — ED PROVIDER NOTE - CRITICAL CARE ATTENDING CONTRIBUTION TO CARE
acute pulmonary edema requiring bipap  sepsis from viral /covid  hyperkalemia with ekg changes requiring iv meds

## 2022-01-02 NOTE — ED PROVIDER NOTE - CONSTITUTIONAL, MLM
ill appearing, awake, respiratory distress, unable to answer any questions, appears altered normal...

## 2022-01-02 NOTE — CONSULT NOTE ADULT - ASSESSMENT
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 Admitted for acute hypoxic respiratory failure in the setting of COVID. ICU consulted for hypoxia.       NEURO:  AAOX3, at time confused. Able to follow all commands (at baseline as per wife)    PULM:  # Acute hypoxic respiratory failure   presenting for hypoxia to 80s on room air, fevers, and weakness at home. Rapid COVID + in outpatient clinic. CXR with bilateral infiltrates. On exam with bibasilar crackles and 2+ pitting edema. No JVD. BNP 7921, trop 0.06. EKG NSR no acute ischemic changes. Placed on BIPAP with improvement to 100%, saturation, not tachypneic and good mental status. Procal 0.17. EF 55%, grade II LV diastolic dysfunction in 6/2021. Hypoxic respiratory failure likely in the setting of COVID v CHF, less likely ACS and bacterial pneumonia.  - s/p Lasix 40mg IV, nitroglycerin 0.4mg sublingual x2  - order echo  - defer antibiotics at this time  - on home Lasix 20mg daily  - obtain med rec  - continue to trend troponin, likely high in the setting of ELIZABETH on CKD  - f/u blood cultures    # COVID  unvaccinated, now with rapid covid+, presenting with hypoxia, shortness of breath, and fevers. D-dimer 446  - s/p dexamethasone, can continue for now  - defer toci and remdesivir for now given kidney function  - BIPAP overnight  -  wean o2 accordingly   - continue to trend covid labs    CARDIO:  # CAD  - Pt is s/p cardiac cath 6/15/21: pLAD 80-90% large sized s/p rota/PTCA/DAMEON, D2 90% large s/p rota/PTCA/DAMEON. Residual dLMCA 30%, D1 60%, RPDA 70% with plan to medically manage. EF 55%, grade II LV diastolic dysfunction.  - continue aspirin 81mg daily  - wife reports patient no longer takes Plavix  - obtain med rec    # HTN   history of hypertension  - continue home coreg 12.5mg BID    # HLD  history of HLD  - continue home Atorvastatin 40mg at bedtime      RENAL  # ELIZABETH on CKD  on admission cr 1.87 (baseline 1.39 in 6/2021) likely prerenal in the setting of poor PO intake.   - obtain urine lytes  - aviod nephrotoxic medications    # History of renal transplant in 2013 at [facility]   - continue prednisone 5mg daily  - c/w tacrolimus 5mg at noon and 3mg at night     METABOLIC  # hyperkalemia  on admission with K 6. No EKG changes. Given insulin, dextrose, and calcium gluconate cocktail. Likely in the setting of poor renal function  - continue to monitor    ENDO  # DM (diabetes mellitus)  - uncontrolled, c/b diabetic foot ulcers. humalog 75/25 at home. Pt arrived with glucose 220s.   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 Admitted for acute hypoxic respiratory failure in the setting of COVID. ICU consulted for hypoxia.       NEURO:  AAOX3, at time confused. Able to follow all commands (at baseline as per wife)    PULM:  # Acute hypoxic respiratory failure   presenting for hypoxia to 80s on room air, fevers, and weakness at home, (at baseline on room air) Rapid COVID + in outpatient clinic. CXR with bilateral infiltrates. On exam with bibasilar crackles and 2+ pitting edema. No JVD. BNP 7921, trop 0.06. Lactate 1.6. EKG NSR no acute ischemic changes. Placed on BIPAP with improvement to 100%, saturation, not tachypneic and good mental status. Procal 0.17. EF 55%, grade II LV diastolic dysfunction in 6/2021. Hypoxic respiratory failure likely in the setting of COVID v CHF, less likely ACS and bacterial pneumonia.  - s/p Lasix 40mg IV, nitroglycerin 0.4mg sublingual x2  - order echo  - defer antibiotics at this time  - on home Lasix 20mg daily  - obtain med rec  - continue to trend troponin, likely high in the setting of ELIZABETH on CKD    # COVID  unvaccinated, now with rapid covid+, presenting with hypoxia to 80s, shortness of breath, and fevers. D-dimer 446, CRP 22.8.   - s/p dexamethasone, can continue for now  - defer toci and remdesivir for now given kidney function  - BIPAP overnight  -  wean o2 accordingly   - continue to trend covid labs  - f/u blood cultures    CARDIO:  # CAD  - Pt is s/p cardiac cath 6/15/21: pLAD 80-90% large sized s/p rota/PTCA/DAMEON, D2 90% large s/p rota/PTCA/DAMEON. Residual dLMCA 30%, D1 60%, RPDA 70% with plan to medically manage. EF 55%, grade II LV diastolic dysfunction.  - continue aspirin 81mg daily  - wife reports patient no longer takes Plavix  - obtain med rec    # HTN   history of hypertension  - continue home coreg 12.5mg BID    # HLD  history of HLD  - continue home Atorvastatin 40mg at bedtime      RENAL  # ELIZABETH on CKD  on admission cr 1.87 (baseline 1.39 in 6/2021) likely prerenal in the setting of poor PO intake.   - obtain urine lytes  - avoid nephrotoxic medications    # History of renal transplant in 2013 at [facility]   - continue prednisone 5mg daily  - c/w tacrolimus 5mg at noon and 3mg at night     METABOLIC  # hyperkalemia  on admission with K 6. No EKG changes. Given insulin, dextrose, and calcium gluconate cocktail. Likely in the setting of poor renal function  - continue to monitor    ENDO  # DM (diabetes mellitus)  - uncontrolled, c/b diabetic foot ulcers. Humalog 75/25 at home. Pt arrived with glucose 220s.   - mISS for now  - obtain med rec      # BPH (benign prostatic hyperplasia)   - c/w Flomax 0.4 mg bedtime  - bourgeois placed with good UOP  - continue to monitor    Prophylactic Measures  F: None  E: Replete K<4, Mg<2  N: NPO while on BIPAP, then obtain bedside dysphagia  DVT ppx: heparin subq  Dispo: 7lach    FULL CODE

## 2022-01-02 NOTE — ED ADULT NURSE NOTE - OBJECTIVE STATEMENT
Pt AOX4. Pt  BIBA from PCP with sat 79 % RA. As per EMS pt was diagnosed  with covid 19, unvaccinated and pneumonia. Pt cyanotic in appearance.  Pt audibly gurgling with each breath. Pt suctioned upon arrival. As EMS EMS hx of diabetes, hypertension, 1 stent, and legally blind. Pt placed on NRB upon arrival.

## 2022-01-02 NOTE — PROCEDURE NOTE - ADDITIONAL PROCEDURE DETAILS
Called mother to follow up. She has not heard from Dr. Lani Gold mother that the Florinef has been sent to [place].
L radial Arterial puncture under ultrasound guidance to obtain ABG

## 2022-01-02 NOTE — ED PROVIDER NOTE - WR INTERPRETATION 1
CXR - No pneumothorax, No free air. bilateral patchy infiltrate/ pulmonary edema. heart normal. bones normal

## 2022-01-02 NOTE — PROVIDER CONTACT NOTE (CRITICAL VALUE NOTIFICATION) - NAME OF MD/NP/PA/DO NOTIFIED:
EGD  Pre-Procedure Instructions      Patient Name: Thelma Butler  Procedure Date:    Procedure Time:     Arrival Time:    Physician:    Location:        Please read these instructions thoroughly. If you have any questions, please call the physician's office at     TRANSPORTATION:  A responsible adult must drive you home after the procedure.  The medication given during the procedure may cause drowsiness, making it unsafe for you to drive or operate machinery.  A taxi is not acceptable transportation.  Please make arrangements in advance or we will not be able to do your procedure.    Please make arrangements for someone to stay with you or check in on you frequently the rest of the day/evening until the drowsiness has completely worn off.      CANCELLATION AND RESCHEDULE POLICY:  In the event that you need to cancel or reschedule your procedure, please call our schedulers:      ADDITIONAL INSTRUCTIONS:                                           SPECIAL CONDITIONS:  Contact your primary care physician if you have diabetes and take insulin. You may need to have your insulin dose adjusted the day before and the day of the procedure.    Do not drink any alcoholic beverages for at least 24 hours before the procedure.      FIVE (5) DAYS BEFORE THE PROCEDURE:  Do not take iron supplements or Pepto-Bismol. These products may make it more difficult for the physician to see the inside of the stomach.    Do not eat products with [place] (a fat substitute found in [employer] and [employer]Free chips)    It is important to continue to take all other prescribed medications. On the day of the procedure, please take your blood pressure medications with a sip of water.    Do not take any blood thinning or platelet-modifying medications (such as Coumadin (warfarin), Plavix (clopidogrel), Aggrenox, Ticlid, etc.) unless otherwise directed.      DIET:  You may follow a normal diet up until midnight prior to your  procedure.    Sips of water after midnight unless directed not to.            DAY OF THE PROCEDURE:  You may take your medications the morning of your procedure with sips of water.    Arrive for your procedure  minutes prior to your scheduled time.    You will receive sedation during the procedure: please make sure you have someone to drive you that day.    Do not plan on going to work or doing anything after the procedure, as you will be drowsy most of the afternoon.    AFTER THE PROCEDURE:  You may resume your regular diet. Start slow with small amounts and increase as you see how well you do.    Please call the physician's office that did the procedure if you have further questions or if you need additional care.      Check with the Doctor as to when you can resume your medications after the procedure.    RESULTS:  If tissue samples were take during your procedure, your results will take approximately 10 working days to return.     If you have not heard from the Doctor's office please call.            Emanuel

## 2022-01-02 NOTE — ED PROVIDER NOTE - OBJECTIVE STATEMENT
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 Went to clinic today, found to be hypoxic in 80's. Reportedly rapid covid +. Not vaccinated. Currently ams and short of breath, unable to provide further history.

## 2022-01-02 NOTE — H&P ADULT - NSHPLABSRESULTS_GEN_ALL_CORE
10.2   14.74 )-----------( 232      ( 02 Jan 2022 20:11 )             33.7   01-02    134<L>  |  105  |  29<H>  ----------------------------<  254<H>  6.0<H>   |  19<L>  |  1.87<H>    Ca    9.2      02 Jan 2022 20:11    TPro  6.3  /  Alb  3.8  /  TBili  0.4  /  DBili  x   /  AST  13  /  ALT  12  /  AlkPhos  95  01-02

## 2022-01-02 NOTE — H&P ADULT - ASSESSMENT
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 Admitted for acute hypoxic respiratory failure in the setting of COVID. ICU consulted for hypoxia.       NEURO:  AAOX3, at time confused. Able to follow all commands (at baseline as per wife)    PULM:  # Acute hypoxic respiratory failure   presenting for hypoxia to 80s on room air, fevers, and weakness at home, (at baseline on room air) Rapid COVID + in outpatient clinic. CXR with bilateral infiltrates. On exam with bibasilar crackles and 2+ pitting edema. No JVD. BNP 7921, trop 0.06. Lactate 1.6. EKG NSR no acute ischemic changes. Placed on BIPAP with improvement to 100%, saturation, not tachypneic and good mental status. Procal 0.17. EF 55%, grade II LV diastolic dysfunction in 6/2021. Hypoxic respiratory failure likely in the setting of COVID v CHF, less likely ACS and bacterial pneumonia.  - s/p Lasix 40mg IV, nitroglycerin 0.4mg sublingual x2  - order echo  - defer antibiotics at this time  - on home Lasix 20mg daily  - obtain med rec  - continue to trend troponin, likely high in the setting of ELIZABETH on CKD    # COVID  unvaccinated, now with rapid covid+, presenting with hypoxia to 80s, shortness of breath, and fevers. D-dimer 446, CRP 22.8.   - s/p dexamethasone, can continue for now  - defer toci and remdesivir for now given kidney function  - BIPAP overnight  -  wean o2 accordingly   - continue to trend covid labs  - f/u blood cultures    CARDIO:  # CAD  - Pt is s/p cardiac cath 6/15/21: pLAD 80-90% large sized s/p rota/PTCA/DAMEON, D2 90% large s/p rota/PTCA/DAMEON. Residual dLMCA 30%, D1 60%, RPDA 70% with plan to medically manage. EF 55%, grade II LV diastolic dysfunction.  - continue aspirin 81mg daily  - wife reports patient no longer takes Plavix  - obtain med rec    # HTN   history of hypertension  - continue home coreg 12.5mg BID    # HLD  history of HLD  - continue home Atorvastatin 40mg at bedtime      RENAL  # ELIZABETH on CKD  on admission cr 1.87 (baseline 1.39 in 6/2021) likely prerenal in the setting of poor PO intake.   - obtain urine lytes  - avoid nephrotoxic medications    # History of renal transplant in 2013 at [facility]   - continue prednisone 5mg daily  - c/w tacrolimus 5mg at noon and 3mg at night     METABOLIC  # hyperkalemia  on admission with K 6. No EKG changes. Given insulin, dextrose, and calcium gluconate cocktail. Likely in the setting of poor renal function  - continue to monitor    ENDO  # DM (diabetes mellitus)  - uncontrolled, c/b diabetic foot ulcers. Humalog 75/25 at home. Pt arrived with glucose 220s.   - mISS for now  - obtain med rec      # BPH (benign prostatic hyperplasia)   - c/w Flomax 0.4 mg bedtime  - bourgeois placed with good UOP  - continue to monitor    Prophylactic Measures  F: None  E: Replete K<4, Mg<2  N: NPO while on BIPAP, then obtain bedside dysphagia  DVT ppx: hep subq  Dispo: 7lach    FULL CODE

## 2022-01-02 NOTE — H&P ADULT - NSHPREVIEWOFSYSTEMS_GEN_ALL_CORE
REVIEW OF SYSTEMS:    CONSTITUTIONAL: +weakness, +fevers or +chills  EYES/ENT: baseline visual changes;  No vertigo or throat pain   NECK: No pain or stiffness  RESPIRATORY: +cough, no wheezing, no hemoptysis; +shortness of breath  CARDIOVASCULAR: No chest pain or palpitations  GASTROINTESTINAL: No abdominal or epigastric pain. No nausea, vomiting, or hematemesis; No diarrhea or constipation. No melena or hematochezia.  GENITOURINARY: No dysuria, frequency or hematuria  NEUROLOGICAL: No numbness or weakness  SKIN: No itching, rashes

## 2022-01-02 NOTE — H&P ADULT - HISTORY OF PRESENT ILLNESS
81M PMH legally blind (glaucoma), HTN, DM type II, Anemia, history of renal failure s/p renal transplant 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 As per wife, at bedside, she says that the patient began having a cough 3 weeks ago and they went to a clinic who ruled out pneumonia and send him home. His symptoms resolved but then this week he began having a more productive cough with weakness and forehead fevers measuring 100.7F. His wife says he also started feeling progressively weak as well. They went to the clinic today where they found him hypoxic in 80's and rapid covid +. He is unvaccinated. Otherwise, the patient denies any chest pain, abdominal pain, n/v/d/c.    ED COURSE:   Vitals: T: 99.8F rectal  | HR: 100 | BP: 169/53 | RR: 36 | spo2: 85% NRB 15L  Labs notable for: WBC: 14.7 | H/H: 10.2/33.7 | Plts 232 | D-dimer: 446 | lactate 1.6 | Na: 134 | K: 6.0 | Cl: 105 | HCO3: 19 | AG: 10 | BUN/Cr: 29/1.87 (Cr: 1.29 3/18/2021), | glucose 254 | Alk phoph 95 | ALT/AST 13/12 | Procal 0.17 | BNP 7921 | Trop 0.06 | VBG pH 7.13, pCO2 61 | ABG (while on BIPAP): pH: 7.32, pCO2 30  EKst degree AV block, HR 97, no acute ischemic changes, qtc 411  Imaging: CXR with bilateral infiltrates  Interventions: Tylenol 1g IV, Dexamethasone 6mg IV, Lasix 40mg IV, nitro 0.4mg IV x2, regular insulin 10 units, calcium gluconate 2g, 1 amp sodium bicarb

## 2022-01-02 NOTE — H&P ADULT - NSHPPHYSICALEXAM_GEN_ALL_CORE
Vital Signs Last 24 Hrs  T(C): 37.7 (02 Jan 2022 21:18), Max: 37.7 (02 Jan 2022 21:18)  T(F): 99.8 (02 Jan 2022 21:18), Max: 99.8 (02 Jan 2022 21:18)  HR: 64 (02 Jan 2022 22:59) (64 - 102)  BP: 95/47 (02 Jan 2022 22:59) (95/47 - 209/84)  BP(mean): --  RR: 28 (02 Jan 2022 22:59) (28 - 36)  SpO2: 100% (02 Jan 2022 22:59) (85% - 100%)    GEN: yiddish-speaking, alert, NAD, comfortable in bed on BIPAP  HEENT: PERRL, no relative afferent pupillary defect, EOMI, dry MM, no pharyngeal erythema or exudate  CV: RRR, S1/S2, no murmurs appreciated, no JVD, no carotid bruits  RESP: bibasilar crackles bilaterally, good respiratory effort, good air movement, no wheezes/rales  ABD: soft, BS+, nontender, nondistended, no guarding/rebound  EXTREMITIES: WWP, pulses 2+ in all 4 extremities, 2+ pitting edema in bilateral lower extremities  MSK: full range of motion of all 4 extremities  SKIN: 4th and 5th toe amputation, warm, dry, intact, no rashes  NEURO: A&Ox2-3(difficulty with date), confused at times, no focal deficits, strength 5/5 throughout, no sensory deficits

## 2022-01-03 LAB
ACANTHOCYTES BLD QL SMEAR: SLIGHT — SIGNIFICANT CHANGE UP
ALBUMIN SERPL ELPH-MCNC: 3.6 G/DL — SIGNIFICANT CHANGE UP (ref 3.3–5)
ALP SERPL-CCNC: 88 U/L — SIGNIFICANT CHANGE UP (ref 40–120)
ALT FLD-CCNC: 12 U/L — SIGNIFICANT CHANGE UP (ref 10–45)
ANION GAP SERPL CALC-SCNC: 15 MMOL/L — SIGNIFICANT CHANGE UP (ref 5–17)
ANION GAP SERPL CALC-SCNC: 9 MMOL/L — SIGNIFICANT CHANGE UP (ref 5–17)
APTT BLD: 31.3 SEC — SIGNIFICANT CHANGE UP (ref 27.5–35.5)
AST SERPL-CCNC: 15 U/L — SIGNIFICANT CHANGE UP (ref 10–40)
BASOPHILS # BLD AUTO: 0 K/UL — SIGNIFICANT CHANGE UP (ref 0–0.2)
BASOPHILS NFR BLD AUTO: 0 % — SIGNIFICANT CHANGE UP (ref 0–2)
BILIRUB SERPL-MCNC: 0.4 MG/DL — SIGNIFICANT CHANGE UP (ref 0.2–1.2)
BUN SERPL-MCNC: 37 MG/DL — HIGH (ref 7–23)
BUN SERPL-MCNC: 40 MG/DL — HIGH (ref 7–23)
BURR CELLS BLD QL SMEAR: PRESENT — SIGNIFICANT CHANGE UP
CALCIUM SERPL-MCNC: 9.4 MG/DL — SIGNIFICANT CHANGE UP (ref 8.4–10.5)
CALCIUM SERPL-MCNC: 9.7 MG/DL — SIGNIFICANT CHANGE UP (ref 8.4–10.5)
CHLORIDE SERPL-SCNC: 106 MMOL/L — SIGNIFICANT CHANGE UP (ref 96–108)
CHLORIDE SERPL-SCNC: 106 MMOL/L — SIGNIFICANT CHANGE UP (ref 96–108)
CO2 SERPL-SCNC: 16 MMOL/L — LOW (ref 22–31)
CO2 SERPL-SCNC: 20 MMOL/L — LOW (ref 22–31)
CREAT ?TM UR-MCNC: 75 MG/DL — SIGNIFICANT CHANGE UP
CREAT SERPL-MCNC: 1.94 MG/DL — HIGH (ref 0.5–1.3)
CREAT SERPL-MCNC: 2.08 MG/DL — HIGH (ref 0.5–1.3)
CRP SERPL-MCNC: 45 MG/L — HIGH (ref 0–4)
D DIMER BLD IA.RAPID-MCNC: 874 NG/ML DDU — HIGH
EOSINOPHIL # BLD AUTO: 0 K/UL — SIGNIFICANT CHANGE UP (ref 0–0.5)
EOSINOPHIL NFR BLD AUTO: 0 % — SIGNIFICANT CHANGE UP (ref 0–6)
ERYTHROCYTE [SEDIMENTATION RATE] IN BLOOD: 25 MM/HR — HIGH
FERRITIN SERPL-MCNC: 214 NG/ML — SIGNIFICANT CHANGE UP (ref 30–400)
GIANT PLATELETS BLD QL SMEAR: PRESENT — SIGNIFICANT CHANGE UP
GLUCOSE BLDC GLUCOMTR-MCNC: 168 MG/DL — HIGH (ref 70–99)
GLUCOSE BLDC GLUCOMTR-MCNC: 208 MG/DL — HIGH (ref 70–99)
GLUCOSE BLDC GLUCOMTR-MCNC: 233 MG/DL — HIGH (ref 70–99)
GLUCOSE BLDC GLUCOMTR-MCNC: 236 MG/DL — HIGH (ref 70–99)
GLUCOSE BLDC GLUCOMTR-MCNC: 241 MG/DL — HIGH (ref 70–99)
GLUCOSE BLDC GLUCOMTR-MCNC: 248 MG/DL — HIGH (ref 70–99)
GLUCOSE SERPL-MCNC: 202 MG/DL — HIGH (ref 70–99)
GLUCOSE SERPL-MCNC: 240 MG/DL — HIGH (ref 70–99)
HCT VFR BLD CALC: 31.5 % — LOW (ref 39–50)
HGB BLD-MCNC: 9.6 G/DL — LOW (ref 13–17)
HYPOCHROMIA BLD QL: SLIGHT — SIGNIFICANT CHANGE UP
INR BLD: 1.01 — SIGNIFICANT CHANGE UP (ref 0.88–1.16)
LDH SERPL L TO P-CCNC: 386 U/L — HIGH (ref 50–242)
LYMPHOCYTES # BLD AUTO: 0.07 K/UL — LOW (ref 1–3.3)
LYMPHOCYTES # BLD AUTO: 0.9 % — LOW (ref 13–44)
MAGNESIUM SERPL-MCNC: 2 MG/DL — SIGNIFICANT CHANGE UP (ref 1.6–2.6)
MANUAL SMEAR VERIFICATION: SIGNIFICANT CHANGE UP
MCHC RBC-ENTMCNC: 27.1 PG — SIGNIFICANT CHANGE UP (ref 27–34)
MCHC RBC-ENTMCNC: 30.5 GM/DL — LOW (ref 32–36)
MCV RBC AUTO: 89 FL — SIGNIFICANT CHANGE UP (ref 80–100)
MONOCYTES # BLD AUTO: 0 K/UL — SIGNIFICANT CHANGE UP (ref 0–0.9)
MONOCYTES NFR BLD AUTO: 0 % — LOW (ref 2–14)
NEUTROPHILS # BLD AUTO: 7.97 K/UL — HIGH (ref 1.8–7.4)
NEUTROPHILS NFR BLD AUTO: 94.7 % — HIGH (ref 43–77)
NEUTS BAND # BLD: 4.4 % — SIGNIFICANT CHANGE UP (ref 0–8)
OVALOCYTES BLD QL SMEAR: SLIGHT — SIGNIFICANT CHANGE UP
PHOSPHATE SERPL-MCNC: 4.1 MG/DL — SIGNIFICANT CHANGE UP (ref 2.5–4.5)
PLAT MORPH BLD: NORMAL — SIGNIFICANT CHANGE UP
PLATELET # BLD AUTO: 197 K/UL — SIGNIFICANT CHANGE UP (ref 150–400)
POIKILOCYTOSIS BLD QL AUTO: SLIGHT — SIGNIFICANT CHANGE UP
POTASSIUM SERPL-MCNC: 5.4 MMOL/L — HIGH (ref 3.5–5.3)
POTASSIUM SERPL-MCNC: 6.1 MMOL/L — HIGH (ref 3.5–5.3)
POTASSIUM SERPL-SCNC: 5.4 MMOL/L — HIGH (ref 3.5–5.3)
POTASSIUM SERPL-SCNC: 6.1 MMOL/L — HIGH (ref 3.5–5.3)
PROT SERPL-MCNC: 6.1 G/DL — SIGNIFICANT CHANGE UP (ref 6–8.3)
PROTHROM AB SERPL-ACNC: 12.1 SEC — SIGNIFICANT CHANGE UP (ref 10.6–13.6)
RBC # BLD: 3.54 M/UL — LOW (ref 4.2–5.8)
RBC # FLD: 15.8 % — HIGH (ref 10.3–14.5)
RBC BLD AUTO: ABNORMAL
SARS-COV-2 RNA SPEC QL NAA+PROBE: DETECTED
SODIUM SERPL-SCNC: 135 MMOL/L — SIGNIFICANT CHANGE UP (ref 135–145)
SODIUM SERPL-SCNC: 137 MMOL/L — SIGNIFICANT CHANGE UP (ref 135–145)
SODIUM UR-SCNC: 53 MMOL/L — SIGNIFICANT CHANGE UP
WBC # BLD: 8.04 K/UL — SIGNIFICANT CHANGE UP (ref 3.8–10.5)
WBC # FLD AUTO: 8.04 K/UL — SIGNIFICANT CHANGE UP (ref 3.8–10.5)

## 2022-01-03 PROCEDURE — 99223 1ST HOSP IP/OBS HIGH 75: CPT | Mod: GC

## 2022-01-03 PROCEDURE — 71045 X-RAY EXAM CHEST 1 VIEW: CPT | Mod: 26

## 2022-01-03 RX ORDER — FUROSEMIDE 40 MG
20 TABLET ORAL EVERY 24 HOURS
Refills: 0 | Status: DISCONTINUED | OUTPATIENT
Start: 2022-01-03 | End: 2022-01-05

## 2022-01-03 RX ORDER — DEXTROSE 50 % IN WATER 50 %
25 SYRINGE (ML) INTRAVENOUS ONCE
Refills: 0 | Status: DISCONTINUED | OUTPATIENT
Start: 2022-01-03 | End: 2022-01-05

## 2022-01-03 RX ORDER — INSULIN HUMAN 100 [IU]/ML
10 INJECTION, SOLUTION SUBCUTANEOUS ONCE
Refills: 0 | Status: COMPLETED | OUTPATIENT
Start: 2022-01-03 | End: 2022-01-03

## 2022-01-03 RX ORDER — SODIUM ZIRCONIUM CYCLOSILICATE 10 G/10G
10 POWDER, FOR SUSPENSION ORAL ONCE
Refills: 0 | Status: COMPLETED | OUTPATIENT
Start: 2022-01-03 | End: 2022-01-03

## 2022-01-03 RX ORDER — ASPIRIN/CALCIUM CARB/MAGNESIUM 324 MG
81 TABLET ORAL DAILY
Refills: 0 | Status: DISCONTINUED | OUTPATIENT
Start: 2022-01-03 | End: 2022-01-05

## 2022-01-03 RX ORDER — CALCIUM GLUCONATE 100 MG/ML
2 VIAL (ML) INTRAVENOUS ONCE
Refills: 0 | Status: COMPLETED | OUTPATIENT
Start: 2022-01-03 | End: 2022-01-03

## 2022-01-03 RX ORDER — DEXTROSE 50 % IN WATER 50 %
15 SYRINGE (ML) INTRAVENOUS ONCE
Refills: 0 | Status: DISCONTINUED | OUTPATIENT
Start: 2022-01-03 | End: 2022-01-05

## 2022-01-03 RX ORDER — DEXTROSE 50 % IN WATER 50 %
50 SYRINGE (ML) INTRAVENOUS ONCE
Refills: 0 | Status: COMPLETED | OUTPATIENT
Start: 2022-01-03 | End: 2022-01-03

## 2022-01-03 RX ORDER — SODIUM CHLORIDE 9 MG/ML
1000 INJECTION, SOLUTION INTRAVENOUS
Refills: 0 | Status: DISCONTINUED | OUTPATIENT
Start: 2022-01-03 | End: 2022-01-05

## 2022-01-03 RX ORDER — CLOPIDOGREL BISULFATE 75 MG/1
75 TABLET, FILM COATED ORAL DAILY
Refills: 0 | Status: DISCONTINUED | OUTPATIENT
Start: 2022-01-03 | End: 2022-01-05

## 2022-01-03 RX ORDER — DEXTROSE 50 % IN WATER 50 %
12.5 SYRINGE (ML) INTRAVENOUS ONCE
Refills: 0 | Status: DISCONTINUED | OUTPATIENT
Start: 2022-01-03 | End: 2022-01-05

## 2022-01-03 RX ORDER — GLUCAGON INJECTION, SOLUTION 0.5 MG/.1ML
1 INJECTION, SOLUTION SUBCUTANEOUS ONCE
Refills: 0 | Status: DISCONTINUED | OUTPATIENT
Start: 2022-01-03 | End: 2022-01-05

## 2022-01-03 RX ORDER — ATORVASTATIN CALCIUM 80 MG/1
40 TABLET, FILM COATED ORAL AT BEDTIME
Refills: 0 | Status: DISCONTINUED | OUTPATIENT
Start: 2022-01-03 | End: 2022-01-05

## 2022-01-03 RX ORDER — REMDESIVIR 5 MG/ML
INJECTION INTRAVENOUS
Refills: 0 | Status: DISCONTINUED | OUTPATIENT
Start: 2022-01-03 | End: 2022-01-03

## 2022-01-03 RX ORDER — PANTOPRAZOLE SODIUM 20 MG/1
40 TABLET, DELAYED RELEASE ORAL
Refills: 0 | Status: DISCONTINUED | OUTPATIENT
Start: 2022-01-03 | End: 2022-01-05

## 2022-01-03 RX ORDER — DEXAMETHASONE 0.5 MG/5ML
6 ELIXIR ORAL EVERY 24 HOURS
Refills: 0 | Status: DISCONTINUED | OUTPATIENT
Start: 2022-01-03 | End: 2022-01-05

## 2022-01-03 RX ORDER — MYCOPHENOLATE MOFETIL 250 MG/1
500 CAPSULE ORAL EVERY 12 HOURS
Refills: 0 | Status: DISCONTINUED | OUTPATIENT
Start: 2022-01-03 | End: 2022-01-05

## 2022-01-03 RX ORDER — SENNA PLUS 8.6 MG/1
2 TABLET ORAL AT BEDTIME
Refills: 0 | Status: DISCONTINUED | OUTPATIENT
Start: 2022-01-03 | End: 2022-01-05

## 2022-01-03 RX ORDER — TACROLIMUS 5 MG/1
5 CAPSULE ORAL EVERY 24 HOURS
Refills: 0 | Status: DISCONTINUED | OUTPATIENT
Start: 2022-01-03 | End: 2022-01-05

## 2022-01-03 RX ORDER — TAMSULOSIN HYDROCHLORIDE 0.4 MG/1
0.4 CAPSULE ORAL AT BEDTIME
Refills: 0 | Status: DISCONTINUED | OUTPATIENT
Start: 2022-01-03 | End: 2022-01-05

## 2022-01-03 RX ORDER — INSULIN LISPRO 100/ML
VIAL (ML) SUBCUTANEOUS
Refills: 0 | Status: DISCONTINUED | OUTPATIENT
Start: 2022-01-03 | End: 2022-01-05

## 2022-01-03 RX ORDER — CARVEDILOL PHOSPHATE 80 MG/1
12.5 CAPSULE, EXTENDED RELEASE ORAL EVERY 12 HOURS
Refills: 0 | Status: DISCONTINUED | OUTPATIENT
Start: 2022-01-03 | End: 2022-01-05

## 2022-01-03 RX ORDER — TACROLIMUS 5 MG/1
3 CAPSULE ORAL AT BEDTIME
Refills: 0 | Status: DISCONTINUED | OUTPATIENT
Start: 2022-01-03 | End: 2022-01-05

## 2022-01-03 RX ORDER — REMDESIVIR 5 MG/ML
200 INJECTION INTRAVENOUS EVERY 24 HOURS
Refills: 0 | Status: DISCONTINUED | OUTPATIENT
Start: 2022-01-03 | End: 2022-01-03

## 2022-01-03 RX ADMIN — Medication 20 MILLIGRAM(S): at 12:15

## 2022-01-03 RX ADMIN — PANTOPRAZOLE SODIUM 40 MILLIGRAM(S): 20 TABLET, DELAYED RELEASE ORAL at 07:08

## 2022-01-03 RX ADMIN — ATORVASTATIN CALCIUM 40 MILLIGRAM(S): 80 TABLET, FILM COATED ORAL at 21:53

## 2022-01-03 RX ADMIN — Medication 6 MILLIGRAM(S): at 12:15

## 2022-01-03 RX ADMIN — Medication 50 MILLILITER(S): at 09:40

## 2022-01-03 RX ADMIN — MYCOPHENOLATE MOFETIL 500 MILLIGRAM(S): 250 CAPSULE ORAL at 19:42

## 2022-01-03 RX ADMIN — MYCOPHENOLATE MOFETIL 500 MILLIGRAM(S): 250 CAPSULE ORAL at 07:07

## 2022-01-03 RX ADMIN — Medication 4: at 21:52

## 2022-01-03 RX ADMIN — Medication 4: at 17:04

## 2022-01-03 RX ADMIN — Medication 4: at 06:10

## 2022-01-03 RX ADMIN — CARVEDILOL PHOSPHATE 12.5 MILLIGRAM(S): 80 CAPSULE, EXTENDED RELEASE ORAL at 07:07

## 2022-01-03 RX ADMIN — HEPARIN SODIUM 5000 UNIT(S): 5000 INJECTION INTRAVENOUS; SUBCUTANEOUS at 21:54

## 2022-01-03 RX ADMIN — SODIUM ZIRCONIUM CYCLOSILICATE 10 GRAM(S): 10 POWDER, FOR SUSPENSION ORAL at 09:59

## 2022-01-03 RX ADMIN — TAMSULOSIN HYDROCHLORIDE 0.4 MILLIGRAM(S): 0.4 CAPSULE ORAL at 21:53

## 2022-01-03 RX ADMIN — Medication 2: at 12:15

## 2022-01-03 RX ADMIN — INSULIN HUMAN 10 UNIT(S): 100 INJECTION, SOLUTION SUBCUTANEOUS at 09:40

## 2022-01-03 RX ADMIN — Medication 81 MILLIGRAM(S): at 12:14

## 2022-01-03 RX ADMIN — Medication 200 GRAM(S): at 09:39

## 2022-01-03 RX ADMIN — CARVEDILOL PHOSPHATE 12.5 MILLIGRAM(S): 80 CAPSULE, EXTENDED RELEASE ORAL at 18:05

## 2022-01-03 RX ADMIN — HEPARIN SODIUM 5000 UNIT(S): 5000 INJECTION INTRAVENOUS; SUBCUTANEOUS at 12:15

## 2022-01-03 RX ADMIN — TACROLIMUS 5 MILLIGRAM(S): 5 CAPSULE ORAL at 12:15

## 2022-01-03 RX ADMIN — TACROLIMUS 3 MILLIGRAM(S): 5 CAPSULE ORAL at 21:53

## 2022-01-03 RX ADMIN — CLOPIDOGREL BISULFATE 75 MILLIGRAM(S): 75 TABLET, FILM COATED ORAL at 12:15

## 2022-01-03 NOTE — PHYSICAL THERAPY INITIAL EVALUATION ADULT - FOLLOWS COMMANDS/ANSWERS QUESTIONS, REHAB EVAL
Pt. stated that he was at a small party on Sunday and fell as he was leaving and then fell again yesterday. Pt. c/po dizziness and weakness. Pt. stated he feels cold. 100% of the time

## 2022-01-03 NOTE — CONSULT NOTE ADULT - ASSESSMENT
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 Admitted for acute hypoxic respiratory failure in the setting of COVID. ICU consulted for hypoxia.       NEURO:  AAOX3, at time confused. Able to follow all commands (at baseline as per wife)    PULM:  # Acute hypoxic respiratory failure   presenting for hypoxia to 80s on room air, fevers, and weakness at home, (at baseline on room air) Rapid COVID + in outpatient clinic. CXR with bilateral infiltrates. On exam with bibasilar crackles and 2+ pitting edema. No JVD. BNP 7921, trop 0.06. Lactate 1.6. EKG NSR no acute ischemic changes. Placed on BIPAP with improvement to 100%, saturation, not tachypneic and good mental status. Procal 0.17. EF 55%, grade II LV diastolic dysfunction in 6/2021. Hypoxic respiratory failure likely in the setting of COVID v CHF, less likely ACS and bacterial pneumonia.  - s/p Lasix 40mg IV, nitroglycerin 0.4mg sublingual x2  - order echo  - defer antibiotics at this time  - on home Lasix 20mg daily  - obtain med rec  - continue to trend troponin, likely high in the setting of ELIZABETH on CKD    # COVID  unvaccinated, now with rapid covid+, presenting with hypoxia to 80s, shortness of breath, and fevers. D-dimer 446, CRP 22.8.   - s/p dexamethasone, can continue for now  - defer toci and remdesivir for now given kidney function  - BIPAP overnight  -  wean o2 accordingly   - continue to trend covid labs  - f/u blood cultures    CARDIO:  # CAD  - Pt is s/p cardiac cath 6/15/21: pLAD 80-90% large sized s/p rota/PTCA/DAMEON, D2 90% large s/p rota/PTCA/DAMEON. Residual dLMCA 30%, D1 60%, RPDA 70% with plan to medically manage. EF 55%, grade II LV diastolic dysfunction.  - continue aspirin 81mg daily  - wife reports patient no longer takes Plavix  - obtain med rec    # HTN   history of hypertension  - continue home coreg 12.5mg BID    # HLD  history of HLD  - continue home Atorvastatin 40mg at bedtime      RENAL  # ELIZABETH on CKD  on admission cr 1.87 (baseline 1.39 in 6/2021) likely prerenal in the setting of poor PO intake.   - obtain urine lytes  - avoid nephrotoxic medications    # History of renal transplant in 2013 at [facility]   - continue prednisone 5mg daily  - c/w tacrolimus 5mg at noon and 3mg at night     METABOLIC  # hyperkalemia  on admission with K 6. No EKG changes. Given insulin, dextrose, and calcium gluconate cocktail. Likely in the setting of poor renal function  - continue to monitor    ENDO  # DM (diabetes mellitus)  - uncontrolled, c/b diabetic foot ulcers. Humalog 75/25 at home. Pt arrived with glucose 220s.   - mISS for now  - obtain med rec      # BPH (benign prostatic hyperplasia)   - c/w Flomax 0.4 mg bedtime  - bourgeois placed with good UOP  - continue to monitor    Prophylactic Measures  F: None  E: Replete K<4, Mg<2  N: NPO while on BIPAP, then obtain bedside dysphagia  DVT ppx: hep subq  Dispo: 7lach    FULL CODE

## 2022-01-03 NOTE — PHYSICAL THERAPY INITIAL EVALUATION ADULT - ADDITIONAL COMMENTS
Pt. lives with his wife, he uses RW to ambulate on level surfaces. he has a flight of stairs to negotiate which he was able to accomplish PTA with bilat rail hold. Pt, has two RWs - one kept upstairs and one downstairs for his use. NO current home care.

## 2022-01-03 NOTE — CONSULT NOTE ADULT - SUBJECTIVE AND OBJECTIVE BOX
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 As per wife, at bedside, she says that the patient began having a cough 3 weeks ago and they went to a clinic who ruled out pneumonia and send him home. His symptoms resolved but then this week he began having a more productive cough with weakness and forehead fevers measuring 100.7F. His wife says he also started feeling progressively weak as well. They went to the clinic today where they found him hypoxic in 80's and rapid covid +. He is unvaccinated. Otherwise, the patient denies any chest pain, abdominal pain, n/v/d/c.    ED COURSE:   Vitals: T: 99.8F rectal  | HR: 100 | BP: 169/53 | RR: 36 | spo2: 85% NRB 15L  Labs notable for: WBC: 14.7 | H/H: 10.2/33.7 | Plts 232 | D-dimer: 446 | lactate 1.6 | Na: 134 | K: 6.0 | Cl: 105 | HCO3: 19 | AG: 10 | BUN/Cr: 29/1.87 (Cr: 1.29 3/18/2021), | glucose 254 | Alk phoph 95 | ALT/AST 13/12 | Procal 0.17 | BNP 7921 | Trop 0.06 | VBG pH 7.13, pCO2 61 | ABG (while on BIPAP): pH: 7.32, pCO2 30  EKst degree AV block, HR 97, no acute ischemic changes, qtc 411  Imaging: CXR with bilateral infiltrates  Interventions: Tylenol 1g IV, Dexamethasone 6mg IV, Lasix 40mg IV, nitro 0.4mg IV x2, regular insulin 10 units, calcium gluconate 2g, 1 amp sodium bicarb (2022 22:56)      PAST MEDICAL & SURGICAL HISTORY:  HTN (hypertension)    BPH (benign prostatic hypertrophy)    DM (diabetes mellitus)  Type 1/insulin dependent per patient    History of renal transplant  secondary to DM    Kidney transplant recipient    Amputation of toe  x 3        MEDICATIONS  (STANDING):  aspirin  chewable 81 milliGRAM(s) Oral daily  atorvastatin 40 milliGRAM(s) Oral at bedtime  calcium gluconate IVPB 2 Gram(s) IV Intermittent once  carvedilol 12.5 milliGRAM(s) Oral every 12 hours  clopidogrel Tablet 75 milliGRAM(s) Oral daily  dexAMETHasone     Tablet 6 milliGRAM(s) Oral every 24 hours  dextrose 40% Gel 15 Gram(s) Oral once  dextrose 5%. 1000 milliLiter(s) (50 mL/Hr) IV Continuous <Continuous>  dextrose 5%. 1000 milliLiter(s) (100 mL/Hr) IV Continuous <Continuous>  dextrose 50% Injectable 50 milliLiter(s) IV Push once  dextrose 50% Injectable 25 Gram(s) IV Push once  dextrose 50% Injectable 12.5 Gram(s) IV Push once  dextrose 50% Injectable 25 Gram(s) IV Push once  furosemide    Tablet 20 milliGRAM(s) Oral every 24 hours  glucagon  Injectable 1 milliGRAM(s) IntraMuscular once  heparin   Injectable 5000 Unit(s) SubCutaneous every 8 hours  insulin lispro (ADMELOG) corrective regimen sliding scale   SubCutaneous Before meals and at bedtime  insulin regular  human recombinant 10 Unit(s) IV Push once  mycophenolate mofetil 500 milliGRAM(s) Oral every 12 hours  pantoprazole    Tablet 40 milliGRAM(s) Oral before breakfast  senna 2 Tablet(s) Oral at bedtime  sodium zirconium cyclosilicate 10 Gram(s) Oral once  tacrolimus 3 milliGRAM(s) Oral at bedtime  tacrolimus 5 milliGRAM(s) Oral every 24 hours  tamsulosin 0.4 milliGRAM(s) Oral at bedtime    MEDICATIONS  (PRN):        FAMILY HISTORY:  No pertinent family history in first degree relatives        CBC Full  -  ( 2022 05:52 )  WBC Count : 8.04 K/uL  RBC Count : 3.54 M/uL  Hemoglobin : 9.6 g/dL  Hematocrit : 31.5 %  Platelet Count - Automated : 197 K/uL  Mean Cell Volume : 89.0 fl  Mean Cell Hemoglobin : 27.1 pg  Mean Cell Hemoglobin Concentration : 30.5 gm/dL  Auto Neutrophil # : 7.97 K/uL  Auto Lymphocyte # : 0.07 K/uL  Auto Monocyte # : 0.00 K/uL  Auto Eosinophil # : 0.00 K/uL  Auto Basophil # : 0.00 K/uL  Auto Neutrophil % : 94.7 %  Auto Lymphocyte % : 0.9 %  Auto Monocyte % : 0.0 %  Auto Eosinophil % : 0.0 %  Auto Basophil % : 0.0 %          137  |  106  |  37<H>  ----------------------------<  240<H>  6.1<H>   |  16<L>  |  1.94<H>    Ca    9.4      2022 05:52  Phos  4.1       Mg     2.0         TPro  6.1  /  Alb  3.6  /  TBili  0.4  /  DBili  x   /  AST  15  /  ALT  12  /  AlkPhos  88              Radiology:                Vital Signs Last 24 Hrs  T(C): 36.8 (2022 06:51), Max: 37.7 (2022 21:18)  T(F): 98.3 (2022 06:51), Max: 99.8 (2022 21:18)  HR: 88 (2022 07:02) (64 - 102)  BP: 167/69 (2022 07:02) (95/47 - 209/84)  BP(mean): 99 (2022 07:02) (99 - 99)  RR: 18 (2022 04:08) (18 - 36)  SpO2: 100% (2022 07:02) (85% - 100%)        REVIEW OF SYSTEMS: per HPI        Physical Exam:  on COVID isolation, in accordance with current standards limiting patient contact, please refer to exam performed on 1/3/2022    GEN: yiddish-speaking, alert, NAD, comfortable in bed on BIPAP  HEENT: PERRL, no relative afferent pupillary defect, EOMI, dry MM, no pharyngeal erythema or exudate  CV: RRR, S1/S2, no murmurs appreciated, no JVD, no carotid bruits  RESP: bibasilar crackles bilaterally, good respiratory effort, good air movement, no wheezes/rales  ABD: soft, BS+, nontender, nondistended, no guarding/rebound  EXTREMITIES: WWP, pulses 2+ in all 4 extremities, 2+ pitting edema in bilateral lower extremities  MSK: full range of motion of all 4 extremities  SKIN: 4th and 5th toe amputation, warm, dry, intact, no rashes  NEURO: A&Ox2-3(difficulty with date), confused at times, no focal deficits, strength 5/5 throughout, no sensory deficits    PM&R Impression:    1) deconditioned  2) no focal weakness    Recommendations/ Plan :    1) Physical therapy focusing on therapeutic exercises, bed mobility/transfer out of bed evaluation, progressive ambulation with assistive devices prn.    2) Anticipated Disposition Plan/Recs:    pending functional progress

## 2022-01-03 NOTE — PROGRESS NOTE ADULT - ATTENDING COMMENTS
Patient seen and examined with house-staff during bedside rounds.  Resident note read, including vitals, physical findings, laboratory data, and radiological reports.   Revisions included below.  Direct personal management at bed side and extensive interpretation of the data.  Plan was outlined and discussed in details with the housestaff.  Decision making of high complexity  Action taken for acute disease activity to reflect the level of care provided:  - medication reconciliation  - review laboratory datPatient admitted with acute hypoxic respiratory failure secondary Covid pneumonia and fluid overload.  I compared the chest x-ray to the previous x-ray from October of last year and there is bilateral subpleural prominence tissue marking that could be related to Covid pneumonia and also fluid overload.  The hemoglobin is at baseline.  The patient is an acute on top of chronic renal failure.  The patient is in immunosuppression with mycophenolate and systemic steroids with Prograf.  Patient improved with extra diuretic.  The patient started on Lasix.  Follow-up with nephrology.  Echocardiogram.  I discussed the case with cardiology and they are again evaluate the patient.  The patient not a candidate for remdesivir.  Follow-up on hyperkalemia.  I discussed the case in details with the wifea Patient seen and examined with house-staff during bedside rounds.  Resident note read, including vitals, physical findings, laboratory data, and radiological reports.   Revisions included below.  Direct personal management at bed side and extensive interpretation of the data.  Plan was outlined and discussed in details with the housestaff.  Decision making of high complexity  Action taken for acute disease activity to reflect the level of care provided:  - medication reconciliation  - review laboratory datPatient admitted with acute hypoxic respiratory failure secondary Covid pneumonia and fluid overload.  I compared the chest x-ray to the previous x-ray from October of last year and there is bilateral subpleural prominence tissue marking that could be related to Covid pneumonia and also fluid overload.  The hemoglobin is at baseline.  The patient is an acute on top of chronic renal failure.  The patient is in immunosuppression with mycophenolate and systemic steroids with Prograf.  Patient improved with extra diuretic.  The patient started on Lasix.  Follow-up with nephrology.  Echocardiogram.  I discussed the case with cardiology and they are again evaluate the patient.  The patient not a candidate for remdesivir.  Follow-up on hyperkalemia.  I discussed the case in details with the wife.  The creatinine from May of last year was 1.3 and increased and then.  The echocardiogram from May revealed severe left ventricular hypertrophy and grade 2 diastolic dysfunction with dilated left atrium.  The patient had a stress echo test which revealed ischemia at certain areas of the myocardial wall.  Blood pressure is stable and follow with cardiology.  The blood sugar is uncontrolled and start insulin to maintain the blood sugar 1 40-1 80

## 2022-01-03 NOTE — PHYSICAL THERAPY INITIAL EVALUATION ADULT - PERTINENT HX OF CURRENT PROBLEM, REHAB EVAL
Pt. is an 81 y.o male presenting with worsening gen. weakness, cough, SOB; was found to be desaturating to 80s on RA and tested + for COVID; admitted for further management of AHRF in settings of COVID PNA.

## 2022-01-03 NOTE — PROGRESS NOTE ADULT - SUBJECTIVE AND OBJECTIVE BOX
INTERVAL HPI/OVERNIGHT EVENTS:  Patient was seen and examined at bedside. Patient comfortable, complaining of discomfort from bourgeois    VITAL SIGNS:  T(F): 96.5 (01-03-22 @ 17:15)  HR: 87 (01-03-22 @ 17:30)  BP: 178/73 (01-03-22 @ 17:05)  RR: 18 (01-03-22 @ 17:05)  SpO2: 99% (01-03-22 @ 17:30)  Wt(kg): --    PHYSICAL EXAM:  GEN: yiddish-speaking, alert, NAD, comfortable in bed on BIPAP  HEENT: PERRL, no relative afferent pupillary defect, EOMI, dry MM, no pharyngeal erythema or exudate  CV: RRR, S1/S2, no murmurs appreciated, no JVD, no carotid bruits  RESP: good respiratory effort, good air movement, no wheezes/rales  ABD: soft, BS+, nontender, nondistended, no guarding/rebound  EXTREMITIES: WWP, pulses 2+ in all 4 extremities, 2+ pitting edema in bilateral lower extremities  MSK: full range of motion of all 4 extremities  SKIN: 4th and 5th toe amputation, warm, dry, intact, no rashes  NEURO: A&Ox2-3(difficulty with date), confused at times, no focal deficits, strength 5/5 throughout, no sensory deficits    MEDICATIONS  (STANDING):  aspirin  chewable 81 milliGRAM(s) Oral daily  atorvastatin 40 milliGRAM(s) Oral at bedtime  carvedilol 12.5 milliGRAM(s) Oral every 12 hours  clopidogrel Tablet 75 milliGRAM(s) Oral daily  dexAMETHasone     Tablet 6 milliGRAM(s) Oral every 24 hours  dextrose 40% Gel 15 Gram(s) Oral once  dextrose 5%. 1000 milliLiter(s) (50 mL/Hr) IV Continuous <Continuous>  dextrose 5%. 1000 milliLiter(s) (100 mL/Hr) IV Continuous <Continuous>  dextrose 50% Injectable 25 Gram(s) IV Push once  dextrose 50% Injectable 12.5 Gram(s) IV Push once  dextrose 50% Injectable 25 Gram(s) IV Push once  furosemide    Tablet 20 milliGRAM(s) Oral every 24 hours  glucagon  Injectable 1 milliGRAM(s) IntraMuscular once  heparin   Injectable 5000 Unit(s) SubCutaneous every 8 hours  insulin lispro (ADMELOG) corrective regimen sliding scale   SubCutaneous Before meals and at bedtime  mycophenolate mofetil 500 milliGRAM(s) Oral every 12 hours  pantoprazole    Tablet 40 milliGRAM(s) Oral before breakfast  senna 2 Tablet(s) Oral at bedtime  tacrolimus 3 milliGRAM(s) Oral at bedtime  tacrolimus 5 milliGRAM(s) Oral every 24 hours  tamsulosin 0.4 milliGRAM(s) Oral at bedtime    MEDICATIONS  (PRN):      Allergies    No Known Allergies    Intolerances        LABS:                        9.6    8.04  )-----------( 197      ( 03 Jan 2022 05:52 )             31.5     01-03    135  |  106  |  40<H>  ----------------------------<  202<H>  5.4<H>   |  20<L>  |  2.08<H>    Ca    9.7      03 Jan 2022 15:38  Phos  4.1     01-03  Mg     2.0     01-03    TPro  6.1  /  Alb  3.6  /  TBili  0.4  /  DBili  x   /  AST  15  /  ALT  12  /  AlkPhos  88  01-03    PT/INR - ( 03 Jan 2022 05:52 )   PT: 12.1 sec;   INR: 1.01          PTT - ( 03 Jan 2022 05:52 )  PTT:31.3 sec      RADIOLOGY & ADDITIONAL TESTS:  Reviewed

## 2022-01-04 ENCOUNTER — APPOINTMENT (OUTPATIENT)
Dept: HEART AND VASCULAR | Facility: CLINIC | Age: 82
End: 2022-01-04

## 2022-01-04 LAB
ALBUMIN SERPL ELPH-MCNC: 3.2 G/DL — LOW (ref 3.3–5)
ALP SERPL-CCNC: 77 U/L — SIGNIFICANT CHANGE UP (ref 40–120)
ALT FLD-CCNC: 9 U/L — LOW (ref 10–45)
ANION GAP SERPL CALC-SCNC: 14 MMOL/L — SIGNIFICANT CHANGE UP (ref 5–17)
AST SERPL-CCNC: 14 U/L — SIGNIFICANT CHANGE UP (ref 10–40)
BASOPHILS # BLD AUTO: 0 K/UL — SIGNIFICANT CHANGE UP (ref 0–0.2)
BASOPHILS NFR BLD AUTO: 0 % — SIGNIFICANT CHANGE UP (ref 0–2)
BILIRUB SERPL-MCNC: 0.3 MG/DL — SIGNIFICANT CHANGE UP (ref 0.2–1.2)
BUN SERPL-MCNC: 47 MG/DL — HIGH (ref 7–23)
CALCIUM SERPL-MCNC: 9.4 MG/DL — SIGNIFICANT CHANGE UP (ref 8.4–10.5)
CHLORIDE SERPL-SCNC: 104 MMOL/L — SIGNIFICANT CHANGE UP (ref 96–108)
CO2 SERPL-SCNC: 15 MMOL/L — LOW (ref 22–31)
CREAT SERPL-MCNC: 2.06 MG/DL — HIGH (ref 0.5–1.3)
EOSINOPHIL # BLD AUTO: 0 K/UL — SIGNIFICANT CHANGE UP (ref 0–0.5)
EOSINOPHIL NFR BLD AUTO: 0 % — SIGNIFICANT CHANGE UP (ref 0–6)
GLUCOSE BLDC GLUCOMTR-MCNC: 100 MG/DL — HIGH (ref 70–99)
GLUCOSE BLDC GLUCOMTR-MCNC: 123 MG/DL — HIGH (ref 70–99)
GLUCOSE BLDC GLUCOMTR-MCNC: 220 MG/DL — HIGH (ref 70–99)
GLUCOSE BLDC GLUCOMTR-MCNC: 244 MG/DL — HIGH (ref 70–99)
GLUCOSE BLDC GLUCOMTR-MCNC: 248 MG/DL — HIGH (ref 70–99)
GLUCOSE SERPL-MCNC: 264 MG/DL — HIGH (ref 70–99)
HCT VFR BLD CALC: 31.1 % — LOW (ref 39–50)
HGB BLD-MCNC: 9.5 G/DL — LOW (ref 13–17)
IMM GRANULOCYTES NFR BLD AUTO: 0.3 % — SIGNIFICANT CHANGE UP (ref 0–1.5)
LYMPHOCYTES # BLD AUTO: 0.64 K/UL — LOW (ref 1–3.3)
LYMPHOCYTES # BLD AUTO: 6.9 % — LOW (ref 13–44)
MAGNESIUM SERPL-MCNC: 2 MG/DL — SIGNIFICANT CHANGE UP (ref 1.6–2.6)
MCHC RBC-ENTMCNC: 27.2 PG — SIGNIFICANT CHANGE UP (ref 27–34)
MCHC RBC-ENTMCNC: 30.5 GM/DL — LOW (ref 32–36)
MCV RBC AUTO: 89.1 FL — SIGNIFICANT CHANGE UP (ref 80–100)
MONOCYTES # BLD AUTO: 0.56 K/UL — SIGNIFICANT CHANGE UP (ref 0–0.9)
MONOCYTES NFR BLD AUTO: 6.1 % — SIGNIFICANT CHANGE UP (ref 2–14)
NEUTROPHILS # BLD AUTO: 7.98 K/UL — HIGH (ref 1.8–7.4)
NEUTROPHILS NFR BLD AUTO: 86.7 % — HIGH (ref 43–77)
NRBC # BLD: 0 /100 WBCS — SIGNIFICANT CHANGE UP (ref 0–0)
PHOSPHATE SERPL-MCNC: 4.3 MG/DL — SIGNIFICANT CHANGE UP (ref 2.5–4.5)
PLATELET # BLD AUTO: 205 K/UL — SIGNIFICANT CHANGE UP (ref 150–400)
POTASSIUM SERPL-MCNC: 5.5 MMOL/L — HIGH (ref 3.5–5.3)
POTASSIUM SERPL-SCNC: 5.5 MMOL/L — HIGH (ref 3.5–5.3)
PROT SERPL-MCNC: 5.9 G/DL — LOW (ref 6–8.3)
RBC # BLD: 3.49 M/UL — LOW (ref 4.2–5.8)
RBC # FLD: 15.8 % — HIGH (ref 10.3–14.5)
SODIUM SERPL-SCNC: 133 MMOL/L — LOW (ref 135–145)
WBC # BLD: 9.21 K/UL — SIGNIFICANT CHANGE UP (ref 3.8–10.5)
WBC # FLD AUTO: 9.21 K/UL — SIGNIFICANT CHANGE UP (ref 3.8–10.5)

## 2022-01-04 PROCEDURE — 99233 SBSQ HOSP IP/OBS HIGH 50: CPT | Mod: GC

## 2022-01-04 PROCEDURE — 93308 TTE F-UP OR LMTD: CPT | Mod: 26

## 2022-01-04 PROCEDURE — 99223 1ST HOSP IP/OBS HIGH 75: CPT

## 2022-01-04 PROCEDURE — 76937 US GUIDE VASCULAR ACCESS: CPT | Mod: 26

## 2022-01-04 PROCEDURE — 36000 PLACE NEEDLE IN VEIN: CPT

## 2022-01-04 RX ORDER — INSULIN LISPRO 100/ML
4 VIAL (ML) SUBCUTANEOUS
Refills: 0 | Status: DISCONTINUED | OUTPATIENT
Start: 2022-01-04 | End: 2022-01-05

## 2022-01-04 RX ADMIN — TAMSULOSIN HYDROCHLORIDE 0.4 MILLIGRAM(S): 0.4 CAPSULE ORAL at 21:33

## 2022-01-04 RX ADMIN — MYCOPHENOLATE MOFETIL 500 MILLIGRAM(S): 250 CAPSULE ORAL at 06:14

## 2022-01-04 RX ADMIN — CARVEDILOL PHOSPHATE 12.5 MILLIGRAM(S): 80 CAPSULE, EXTENDED RELEASE ORAL at 06:13

## 2022-01-04 RX ADMIN — Medication 6 MILLIGRAM(S): at 13:19

## 2022-01-04 RX ADMIN — ATORVASTATIN CALCIUM 40 MILLIGRAM(S): 80 TABLET, FILM COATED ORAL at 21:33

## 2022-01-04 RX ADMIN — HEPARIN SODIUM 5000 UNIT(S): 5000 INJECTION INTRAVENOUS; SUBCUTANEOUS at 06:14

## 2022-01-04 RX ADMIN — Medication 4 UNIT(S): at 13:16

## 2022-01-04 RX ADMIN — HEPARIN SODIUM 5000 UNIT(S): 5000 INJECTION INTRAVENOUS; SUBCUTANEOUS at 21:32

## 2022-01-04 RX ADMIN — Medication 4: at 06:37

## 2022-01-04 RX ADMIN — PANTOPRAZOLE SODIUM 40 MILLIGRAM(S): 20 TABLET, DELAYED RELEASE ORAL at 06:14

## 2022-01-04 RX ADMIN — CLOPIDOGREL BISULFATE 75 MILLIGRAM(S): 75 TABLET, FILM COATED ORAL at 13:18

## 2022-01-04 RX ADMIN — CARVEDILOL PHOSPHATE 12.5 MILLIGRAM(S): 80 CAPSULE, EXTENDED RELEASE ORAL at 19:31

## 2022-01-04 RX ADMIN — MYCOPHENOLATE MOFETIL 500 MILLIGRAM(S): 250 CAPSULE ORAL at 19:31

## 2022-01-04 RX ADMIN — Medication 4: at 13:16

## 2022-01-04 RX ADMIN — Medication 20 MILLIGRAM(S): at 13:18

## 2022-01-04 RX ADMIN — TACROLIMUS 3 MILLIGRAM(S): 5 CAPSULE ORAL at 21:33

## 2022-01-04 RX ADMIN — HEPARIN SODIUM 5000 UNIT(S): 5000 INJECTION INTRAVENOUS; SUBCUTANEOUS at 13:20

## 2022-01-04 RX ADMIN — Medication 81 MILLIGRAM(S): at 13:19

## 2022-01-04 RX ADMIN — TACROLIMUS 5 MILLIGRAM(S): 5 CAPSULE ORAL at 13:19

## 2022-01-04 NOTE — PROGRESS NOTE ADULT - ASSESSMENT
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 Admitted for acute hypoxic respiratory failure in the setting of COVID. ICU consulted for hypoxia.       NEURO:  AAOX3, at time confused. Able to follow all commands (at baseline as per wife)    PULM:  # Acute hypoxic respiratory failure   presenting for hypoxia to 80s on room air, fevers, and weakness at home, (at baseline on room air) Rapid COVID + in outpatient clinic. CXR with bilateral infiltrates. On exam with bibasilar crackles and 2+ pitting edema. No JVD. BNP 7921, trop 0.06. Lactate 1.6. EKG NSR no acute ischemic changes. Placed on BIPAP with improvement to 100%, saturation, not tachypneic and good mental status. Procal 0.17. EF 55%, grade II LV diastolic dysfunction in 6/2021. Hypoxic respiratory failure likely in the setting of COVID v CHF, less likely ACS and bacterial pneumonia.  - s/p Lasix 40mg IV, nitroglycerin 0.4mg sublingual x2  - f/u TTE  - defer antibiotics at this time  - on home Lasix 20mg daily    # COVID  unvaccinated, now with rapid covid+, presenting with hypoxia to 80s, shortness of breath, and fevers. D-dimer 446, CRP 22.8.   - s/p dexamethasone, can continue for now  - start remdesivir today for 5 days  - BIPAP overnight  -  wean o2 accordingly   - continue to trend covid labs  - f/u blood cultures    CARDIO:  # CAD  - Pt is s/p cardiac cath 6/15/21: pLAD 80-90% large sized s/p rota/PTCA/DAMEON, D2 90% large s/p rota/PTCA/DAMEON. Residual dLMCA 30%, D1 60%, RPDA 70% with plan to medically manage. EF 55%, grade II LV diastolic dysfunction.  - continue aspirin 81mg daily  - wife reports patient no longer takes Plavix  - obtain med rec    # HTN   history of hypertension  - continue home coreg 12.5mg BID    # HLD  history of HLD  - continue home Atorvastatin 40mg at bedtime      RENAL  # ELIZABETH on CKD  on admission cr 1.87 (baseline 1.39 in 6/2021) likely prerenal in the setting of poor PO intake.   - obtain urine lytes  - avoid nephrotoxic medications    # History of renal transplant in 2013 at [facility]   - continue prednisone 5mg daily  - c/w tacrolimus 5mg at noon and 3mg at night     METABOLIC  # hyperkalemia  on admission with K 6. No EKG changes. Given insulin, dextrose, and calcium gluconate cocktail. Likely in the setting of poor renal function  Down to 5.4  - continue to monitor    ENDO  # DM (diabetes mellitus)  - uncontrolled, c/b diabetic foot ulcers. Humalog 75/25 at home. Pt arrived with glucose 220s.   - mISS for now  - obtain med rec      # BPH (benign prostatic hyperplasia)   - c/w Flomax 0.4 mg bedtime  - bourgeois placed with good UOP  - continue to monitor    Prophylactic Measures  F: None  E: Replete K<4, Mg<2  N: NPO while on BIPAP, then obtain bedside dysphagia  DVT ppx: hep subq  Dispo: 7lach    FULL CODE
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 Admitted for acute hypoxic respiratory failure in the setting of COVID. ICU consulted for hypoxia.       NEURO:  AAOX3, at time confused. Able to follow all commands (at baseline as per wife)    PULM:  # Acute hypoxic respiratory failure   presenting for hypoxia to 80s on room air, fevers, and weakness at home, (at baseline on room air) Rapid COVID + in outpatient clinic. CXR with bilateral infiltrates. On exam with bibasilar crackles and 2+ pitting edema. No JVD. BNP 7921, trop 0.06. Lactate 1.6. EKG NSR no acute ischemic changes. Placed on BIPAP with improvement to 100%, saturation, not tachypneic and good mental status. Procal 0.17. EF 55%, grade II LV diastolic dysfunction in 6/2021. Hypoxic respiratory failure likely in the setting of COVID v CHF, less likely ACS and bacterial pneumonia.  - c/w lasix  - f/u TTE  - defer antibiotics at this time  - on home Lasix 20mg daily    # COVID  unvaccinated, now with rapid covid+, presenting with hypoxia to 80s, shortness of breath, and fevers. D-dimer 446, CRP 22.8.   - s/p dexamethasone, can continue for now  - remdesivir DC'd for renal function  - BIPAP overnight  -  wean o2 accordingly   - f/u blood cultures    CARDIO:  # CAD  - Pt is s/p cardiac cath 6/15/21: pLAD 80-90% large sized s/p rota/PTCA/DAMEON, D2 90% large s/p rota/PTCA/DAMEON. Residual dLMCA 30%, D1 60%, RPDA 70% with plan to medically manage. EF 55%, grade II LV diastolic dysfunction.  - continue aspirin 81mg daily      # HTN   history of hypertension  - continue home coreg 12.5mg BID    # HLD  history of HLD  - continue home Atorvastatin 40mg at bedtime      RENAL  # ELIZABETH on CKD  on admission cr 1.87 (baseline 1.39 in 6/2021) likely prerenal in the setting of poor PO intake.   Creat 2.06 today  -f/u bladder scans  - obtain urine lytes  - avoid nephrotoxic medications    # History of renal transplant in 2013 at [facility]   - continue prednisone 5mg daily  - c/w tacrolimus 5mg at noon and 3mg at night     METABOLIC  # hyperkalemia  on admission with K 6. No EKG changes. Given insulin, dextrose, and calcium gluconate cocktail. Likely in the setting of poor renal function  5.5 today  - continue to monitor    ENDO  # DM (diabetes mellitus)  - uncontrolled, c/b diabetic foot ulcers. Humalog 75/25 at home. Pt arrived with glucose 220s.   - started lispro 4 TID  - mISS for now  - obtain med rec      # BPH (benign prostatic hyperplasia)   - c/w Flomax 0.4 mg bedtime  - monitor UOP and bladder scans  - continue to monitor    Prophylactic Measures  F: None  E: Replete K<4, Mg<2  N: NPO while on BIPAP, then obtain bedside dysphagia  DVT ppx: hep subq  Dispo: 7lach    FULL CODE

## 2022-01-04 NOTE — PROCEDURE NOTE - NSICDXPROCEDURE_GEN_ALL_CORE_FT
PROCEDURES:  Insertion, needle, vein 04-Jan-2022 07:06:56  Chaparrita Jackson  Ultrasound guidance for vascular access 04-Jan-2022 07:07:01  Chaparrita Jackson  
PROCEDURES:  Collection of specimen for arterial blood gas (ABG) measurement 02-Jan-2022 22:00:26  Chaparrita Jackson

## 2022-01-04 NOTE — CONSULT NOTE ADULT - SUBJECTIVE AND OBJECTIVE BOX
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 As per wife, at bedside, she says that the patient began having a cough 3 weeks ago and they went to a clinic who ruled out pneumonia and send him home. His symptoms resolved but then this week he began having a more productive cough with weakness and forehead fevers measuring 100.7F. His wife says he also started feeling progressively weak as well. They went to the clinic today where they found him hypoxic in 80's and rapid covid +. He is unvaccinated. Otherwise, the patient denies any chest pain, abdominal pain, n/v/d/c.    ED COURSE:   Vitals: T: 99.8F rectal  | HR: 100 | BP: 169/53 | RR: 36 | spo2: 85% NRB 15L  Labs notable for: WBC: 14.7 | H/H: 10.2/33.7 | Plts 232 | D-dimer: 446 | lactate 1.6 | Na: 134 | K: 6.0 | Cl: 105 | HCO3: 19 | AG: 10 | BUN/Cr: 29/1.87 (Cr: 1.29 3/18/2021), | glucose 254 | Alk phoph 95 | ALT/AST 13/12 | Procal 0.17 | BNP 7921 | Trop 0.06 | VBG pH 7.13, pCO2 61 | ABG (while on BIPAP): pH: 7.32, pCO2 30  EKst degree AV block, HR 97, no acute ischemic changes, qtc 411  Imaging: CXR with bilateral infiltrates  Interventions: Tylenol 1g IV, Dexamethasone 6mg IV, Lasix 40mg IV, nitro 0.4mg IV x2, regular insulin 10 units, calcium gluconate 2g, 1 amp sodium bicarb (2022 22:56)  currently w/o cp, mild sob/cough- states that was in USOH prior to current illness    PAST MEDICAL & SURGICAL HISTORY:  HTN (hypertension)    BPH (benign prostatic hypertrophy)    DM (diabetes mellitus)  Type 1/insulin dependent per patient    History of renal transplant  secondary to DM    Kidney transplant recipient    Amputation of toe  x 3        [ ] Diabetes   [ ] Hypertension  [ ] Hyperlipidemia  [ ] CAD  [ ] PCI  [ ] CABG    PREVIOUS DIAGNOSTIC TESTING:    [ ] Echocardiogram:  [ ]  Catheterization:  [ ] Stress Test:  	    MEDICATIONS:  carvedilol 12.5 milliGRAM(s) Oral every 12 hours  furosemide    Tablet 20 milliGRAM(s) Oral every 24 hours  tamsulosin 0.4 milliGRAM(s) Oral at bedtime          pantoprazole    Tablet 40 milliGRAM(s) Oral before breakfast  senna 2 Tablet(s) Oral at bedtime    atorvastatin 40 milliGRAM(s) Oral at bedtime  dexAMETHasone     Tablet 6 milliGRAM(s) Oral every 24 hours  dextrose 40% Gel 15 Gram(s) Oral once  dextrose 50% Injectable 25 Gram(s) IV Push once  dextrose 50% Injectable 12.5 Gram(s) IV Push once  dextrose 50% Injectable 25 Gram(s) IV Push once  glucagon  Injectable 1 milliGRAM(s) IntraMuscular once  insulin lispro (ADMELOG) corrective regimen sliding scale   SubCutaneous Before meals and at bedtime    aspirin  chewable 81 milliGRAM(s) Oral daily  clopidogrel Tablet 75 milliGRAM(s) Oral daily  dextrose 5%. 1000 milliLiter(s) IV Continuous <Continuous>  dextrose 5%. 1000 milliLiter(s) IV Continuous <Continuous>  heparin   Injectable 5000 Unit(s) SubCutaneous every 8 hours  mycophenolate mofetil 500 milliGRAM(s) Oral every 12 hours  tacrolimus 3 milliGRAM(s) Oral at bedtime  tacrolimus 5 milliGRAM(s) Oral every 24 hours      FAMILY HISTORY:  No pertinent family history in first degree relatives        SOCIAL HISTORY:    [ ] Non-smoker  [ ] Smoker  [ ] Alcohol    Allergies    No Known Allergies    Intolerances    	    REVIEW OF SYSTEMS:    [x] as per HPI  e	  [ ] Unable to obtain    PHYSICAL EXAM:  T(C): 36.1 (22 @ 01:26), Max: 36.8 (22 @ 06:51)  HR: 68 (22 @ 04:18) (66 - 89)  BP: 170/72 (22 @ 04:18) (148/67 - 188/79)  RR: 18 (22 @ 04:18) (18 - 18)  SpO2: 99% (22 @ 04:18) (94% - 100%)  Wt(kg): --  I&O's Summary    2022 07:01  -  2022 06:42  --------------------------------------------------------  IN: 0 mL / OUT: 1025 mL / NET: -1025 mL        Appearance: Normal	  HEENT:   Normal oral mucosa, PERRL, EOMI	  Lymphatic: No lymphadenopathy  Cardiovascular: Normal S1 S2, No JVD, No murmurs, No edema  Respiratory: Lungs decreased BS bilateral bases	  Psychiatry: A & O x 3, Mood & affect appropriate  Gastrointestinal:  Soft, Non-tender, + BS	  Skin: No rashes, No ecchymoses, No cyanosis	  Neurologic: Non-focal  Extremities: Normal range of motion, No clubbing, cyanosis or edema  Vascular: Peripheral pulses palpable 2+ bilaterally    TELEMETRY: 	NSR    ECG:    ECHO:  STRESS:  CATH:  	  RADIOLOGY:  CXR:  CT:  US:   	  	  LABS:	 	    CARDIAC MARKERS:                                  9.6    8.04  )-----------( 197      ( 2022 05:52 )             31.5     -    135  |  106  |  40<H>  ----------------------------<  202<H>  5.4<H>   |  20<L>  |  2.08<H>    Ca    9.7      2022 15:38  Phos  4.1     -  Mg     2.0     -    TPro  6.1  /  Alb  3.6  /  TBili  0.4  /  DBili  x   /  AST  15  /  ALT  12  /  AlkPhos  88      proBNP:   Lipid Profile:   HgA1c:   TSH:     ASSESSMENT/PLAN: 	  reviewed labs elevated proBNP which probably represents mild dCHF in setting of COVIDS pneumonia,CRI,DM,CAD  would get echo to evaluate LVEF- cont asa +Plavix for cad/coreg, lasix for dCHF, ;lipitor for lipids  discussed with Dr Prather and house staff

## 2022-01-04 NOTE — PROGRESS NOTE ADULT - SUBJECTIVE AND OBJECTIVE BOX
INTERVAL HPI/OVERNIGHT EVENTS:  Patient was seen and examined at bedside. As per nurse and patient, no o/n events, patient resting comfortably. No complaints at this time. Patient denies: fever, chills, dizziness, weakness, HA, Changes in vision, CP, palpitations, SOB, cough, N/V/D/C, dysuria, changes in bowel movements, LE edema. ROS otherwise negative.    VITAL SIGNS:  T(F): 97.6 (01-04-22 @ 13:57)  HR: 64 (01-04-22 @ 13:26)  BP: 148/92 (01-04-22 @ 13:26)  RR: 19 (01-04-22 @ 13:26)  SpO2: 99% (01-04-22 @ 13:26)  Wt(kg): --    PHYSICAL EXAM:    Constitutional:   HEENT: PERRL, EOMI, sclera non-icteric, neck supple, trachea midline, no masses, no JVD, MMM, good dentition  Respiratory: CTA b/l, good air entry b/l, no wheezing, no rhonchi, no rales, without accessory muscle use and no intercostal retractions  Cardiovascular: RRR, normal S1S2, no M/R/G  Gastrointestinal: soft, NTND, no masses palpable, BS normal  Extremities: Warm, well perfused, pulses equal bilateral upper and lower extremities, no edema, no clubbing. Capillary refill <2 sec  Neurological: AAOx3, CN Grossly intact  Skin: Normal temperature, warm, dry    MEDICATIONS  (STANDING):  aspirin  chewable 81 milliGRAM(s) Oral daily  atorvastatin 40 milliGRAM(s) Oral at bedtime  carvedilol 12.5 milliGRAM(s) Oral every 12 hours  clopidogrel Tablet 75 milliGRAM(s) Oral daily  dexAMETHasone     Tablet 6 milliGRAM(s) Oral every 24 hours  dextrose 40% Gel 15 Gram(s) Oral once  dextrose 5%. 1000 milliLiter(s) (50 mL/Hr) IV Continuous <Continuous>  dextrose 5%. 1000 milliLiter(s) (100 mL/Hr) IV Continuous <Continuous>  dextrose 50% Injectable 25 Gram(s) IV Push once  dextrose 50% Injectable 12.5 Gram(s) IV Push once  dextrose 50% Injectable 25 Gram(s) IV Push once  furosemide    Tablet 20 milliGRAM(s) Oral every 24 hours  glucagon  Injectable 1 milliGRAM(s) IntraMuscular once  heparin   Injectable 5000 Unit(s) SubCutaneous every 8 hours  insulin lispro (ADMELOG) corrective regimen sliding scale   SubCutaneous Before meals and at bedtime  insulin lispro Injectable (ADMELOG) 4 Unit(s) SubCutaneous three times a day before meals  mycophenolate mofetil 500 milliGRAM(s) Oral every 12 hours  pantoprazole    Tablet 40 milliGRAM(s) Oral before breakfast  senna 2 Tablet(s) Oral at bedtime  tacrolimus 3 milliGRAM(s) Oral at bedtime  tacrolimus 5 milliGRAM(s) Oral every 24 hours  tamsulosin 0.4 milliGRAM(s) Oral at bedtime    MEDICATIONS  (PRN):      Allergies    No Known Allergies    Intolerances        LABS:                        9.5    9.21  )-----------( 205      ( 04 Jan 2022 07:32 )             31.1     01-04    133<L>  |  104  |  47<H>  ----------------------------<  264<H>  5.5<H>   |  15<L>  |  2.06<H>    Ca    9.4      04 Jan 2022 07:32  Phos  4.3     01-04  Mg     2.0     01-04    TPro  5.9<L>  /  Alb  3.2<L>  /  TBili  0.3  /  DBili  x   /  AST  14  /  ALT  9<L>  /  AlkPhos  77  01-04    PT/INR - ( 03 Jan 2022 05:52 )   PT: 12.1 sec;   INR: 1.01          PTT - ( 03 Jan 2022 05:52 )  PTT:31.3 sec      RADIOLOGY & ADDITIONAL TESTS:  Reviewed INTERVAL HPI/OVERNIGHT EVENTS:  Patient was seen and examined at bedside. As per nurse and patient, no o/n events, patient resting comfortably.     VITAL SIGNS:  T(F): 97.6 (01-04-22 @ 13:57)  HR: 64 (01-04-22 @ 13:26)  BP: 148/92 (01-04-22 @ 13:26)  RR: 19 (01-04-22 @ 13:26)  SpO2: 99% (01-04-22 @ 13:26)  Wt(kg): --    PHYSICAL EXAM:  GEN: yiddish-speaking, alert, NAD, comfortable in bed on BIPAP  HEENT: PERRL, no relative afferent pupillary defect, EOMI, dry MM, no pharyngeal erythema or exudate  CV: RRR, S1/S2, no murmurs appreciated, no JVD, no carotid bruits  RESP: good respiratory effort, good air movement, no wheezes/rales  ABD: soft, BS+, nontender, nondistended, no guarding/rebound  EXTREMITIES: WWP, pulses 2+ in all 4 extremities, 2+ pitting edema in bilateral lower extremities  MSK: full range of motion of all 4 extremities  SKIN: 4th and 5th toe amputation, warm, dry, intact, no rashes  NEURO: A&Ox2-3(difficulty with date), confused at times, no focal deficits, strength 5/5 throughout, no sensory deficits    MEDICATIONS  (STANDING):  aspirin  chewable 81 milliGRAM(s) Oral daily  atorvastatin 40 milliGRAM(s) Oral at bedtime  carvedilol 12.5 milliGRAM(s) Oral every 12 hours  clopidogrel Tablet 75 milliGRAM(s) Oral daily  dexAMETHasone     Tablet 6 milliGRAM(s) Oral every 24 hours  dextrose 40% Gel 15 Gram(s) Oral once  dextrose 5%. 1000 milliLiter(s) (50 mL/Hr) IV Continuous <Continuous>  dextrose 5%. 1000 milliLiter(s) (100 mL/Hr) IV Continuous <Continuous>  dextrose 50% Injectable 25 Gram(s) IV Push once  dextrose 50% Injectable 12.5 Gram(s) IV Push once  dextrose 50% Injectable 25 Gram(s) IV Push once  furosemide    Tablet 20 milliGRAM(s) Oral every 24 hours  glucagon  Injectable 1 milliGRAM(s) IntraMuscular once  heparin   Injectable 5000 Unit(s) SubCutaneous every 8 hours  insulin lispro (ADMELOG) corrective regimen sliding scale   SubCutaneous Before meals and at bedtime  insulin lispro Injectable (ADMELOG) 4 Unit(s) SubCutaneous three times a day before meals  mycophenolate mofetil 500 milliGRAM(s) Oral every 12 hours  pantoprazole    Tablet 40 milliGRAM(s) Oral before breakfast  senna 2 Tablet(s) Oral at bedtime  tacrolimus 3 milliGRAM(s) Oral at bedtime  tacrolimus 5 milliGRAM(s) Oral every 24 hours  tamsulosin 0.4 milliGRAM(s) Oral at bedtime    MEDICATIONS  (PRN):      Allergies    No Known Allergies    Intolerances        LABS:                        9.5    9.21  )-----------( 205      ( 04 Jan 2022 07:32 )             31.1     01-04    133<L>  |  104  |  47<H>  ----------------------------<  264<H>  5.5<H>   |  15<L>  |  2.06<H>    Ca    9.4      04 Jan 2022 07:32  Phos  4.3     01-04  Mg     2.0     01-04    TPro  5.9<L>  /  Alb  3.2<L>  /  TBili  0.3  /  DBili  x   /  AST  14  /  ALT  9<L>  /  AlkPhos  77  01-04    PT/INR - ( 03 Jan 2022 05:52 )   PT: 12.1 sec;   INR: 1.01          PTT - ( 03 Jan 2022 05:52 )  PTT:31.3 sec      RADIOLOGY & ADDITIONAL TESTS:  Reviewed

## 2022-01-04 NOTE — PROCEDURE NOTE - NSPROCDETAILS_GEN_ALL_CORE
location identified, draped/prepped, sterile technique used, needle inserted/introduced/positive blood return obtained via catheter/hemostasis with direct pressure, dressing applied/all materials/supplies accounted for at end of procedure
location identified, draped/prepped, sterile technique used/blood seen on insertion/dressing applied/flushes easily/secured in place/sterile technique, catheter placed

## 2022-01-05 ENCOUNTER — TRANSCRIPTION ENCOUNTER (OUTPATIENT)
Age: 82
End: 2022-01-05

## 2022-01-05 VITALS — HEART RATE: 72 BPM | OXYGEN SATURATION: 98 %

## 2022-01-05 LAB
A1C WITH ESTIMATED AVERAGE GLUCOSE RESULT: 8.1 % — HIGH (ref 4–5.6)
ALBUMIN SERPL ELPH-MCNC: 3.2 G/DL — LOW (ref 3.3–5)
ALP SERPL-CCNC: 75 U/L — SIGNIFICANT CHANGE UP (ref 40–120)
ALT FLD-CCNC: 9 U/L — LOW (ref 10–45)
ANION GAP SERPL CALC-SCNC: 15 MMOL/L — SIGNIFICANT CHANGE UP (ref 5–17)
ANION GAP SERPL CALC-SCNC: 18 MMOL/L — HIGH (ref 5–17)
APPEARANCE UR: CLEAR — SIGNIFICANT CHANGE UP
AST SERPL-CCNC: 17 U/L — SIGNIFICANT CHANGE UP (ref 10–40)
BACTERIA # UR AUTO: SIGNIFICANT CHANGE UP /HPF
BASOPHILS # BLD AUTO: 0.01 K/UL — SIGNIFICANT CHANGE UP (ref 0–0.2)
BASOPHILS NFR BLD AUTO: 0.1 % — SIGNIFICANT CHANGE UP (ref 0–2)
BILIRUB SERPL-MCNC: 0.3 MG/DL — SIGNIFICANT CHANGE UP (ref 0.2–1.2)
BILIRUB UR-MCNC: NEGATIVE — SIGNIFICANT CHANGE UP
BUN SERPL-MCNC: 53 MG/DL — HIGH (ref 7–23)
BUN SERPL-MCNC: 55 MG/DL — HIGH (ref 7–23)
CALCIUM SERPL-MCNC: 9.3 MG/DL — SIGNIFICANT CHANGE UP (ref 8.4–10.5)
CALCIUM SERPL-MCNC: 9.4 MG/DL — SIGNIFICANT CHANGE UP (ref 8.4–10.5)
CHLORIDE SERPL-SCNC: 106 MMOL/L — SIGNIFICANT CHANGE UP (ref 96–108)
CHLORIDE SERPL-SCNC: 107 MMOL/L — SIGNIFICANT CHANGE UP (ref 96–108)
CO2 SERPL-SCNC: 12 MMOL/L — LOW (ref 22–31)
CO2 SERPL-SCNC: 13 MMOL/L — LOW (ref 22–31)
COLOR SPEC: YELLOW — SIGNIFICANT CHANGE UP
COMMENT - URINE: SIGNIFICANT CHANGE UP
CREAT SERPL-MCNC: 2.07 MG/DL — HIGH (ref 0.5–1.3)
CREAT SERPL-MCNC: 2.08 MG/DL — HIGH (ref 0.5–1.3)
DIFF PNL FLD: ABNORMAL
EOSINOPHIL # BLD AUTO: 0 K/UL — SIGNIFICANT CHANGE UP (ref 0–0.5)
EOSINOPHIL NFR BLD AUTO: 0 % — SIGNIFICANT CHANGE UP (ref 0–6)
EPI CELLS # UR: SIGNIFICANT CHANGE UP /HPF (ref 0–5)
ESTIMATED AVERAGE GLUCOSE: 186 MG/DL — HIGH (ref 68–114)
GLUCOSE BLDC GLUCOMTR-MCNC: 158 MG/DL — HIGH (ref 70–99)
GLUCOSE BLDC GLUCOMTR-MCNC: 158 MG/DL — HIGH (ref 70–99)
GLUCOSE BLDC GLUCOMTR-MCNC: 222 MG/DL — HIGH (ref 70–99)
GLUCOSE BLDC GLUCOMTR-MCNC: 238 MG/DL — HIGH (ref 70–99)
GLUCOSE SERPL-MCNC: 165 MG/DL — HIGH (ref 70–99)
GLUCOSE SERPL-MCNC: 258 MG/DL — HIGH (ref 70–99)
GLUCOSE UR QL: NEGATIVE — SIGNIFICANT CHANGE UP
HCT VFR BLD CALC: 35 % — LOW (ref 39–50)
HGB BLD-MCNC: 10 G/DL — LOW (ref 13–17)
HYALINE CASTS # UR AUTO: ABNORMAL /LPF (ref 0–2)
IMM GRANULOCYTES NFR BLD AUTO: 0.4 % — SIGNIFICANT CHANGE UP (ref 0–1.5)
KETONES UR-MCNC: ABNORMAL MG/DL
LEUKOCYTE ESTERASE UR-ACNC: ABNORMAL
LYMPHOCYTES # BLD AUTO: 0.52 K/UL — LOW (ref 1–3.3)
LYMPHOCYTES # BLD AUTO: 4.4 % — LOW (ref 13–44)
MAGNESIUM SERPL-MCNC: 2 MG/DL — SIGNIFICANT CHANGE UP (ref 1.6–2.6)
MCHC RBC-ENTMCNC: 26 PG — LOW (ref 27–34)
MCHC RBC-ENTMCNC: 28.6 GM/DL — LOW (ref 32–36)
MCV RBC AUTO: 90.9 FL — SIGNIFICANT CHANGE UP (ref 80–100)
MONOCYTES # BLD AUTO: 0.3 K/UL — SIGNIFICANT CHANGE UP (ref 0–0.9)
MONOCYTES NFR BLD AUTO: 2.5 % — SIGNIFICANT CHANGE UP (ref 2–14)
NEUTROPHILS # BLD AUTO: 11.02 K/UL — HIGH (ref 1.8–7.4)
NEUTROPHILS NFR BLD AUTO: 92.6 % — HIGH (ref 43–77)
NITRITE UR-MCNC: NEGATIVE — SIGNIFICANT CHANGE UP
NRBC # BLD: 0 /100 WBCS — SIGNIFICANT CHANGE UP (ref 0–0)
PH UR: 5 — SIGNIFICANT CHANGE UP (ref 5–8)
PHOSPHATE SERPL-MCNC: 4.7 MG/DL — HIGH (ref 2.5–4.5)
PLATELET # BLD AUTO: 229 K/UL — SIGNIFICANT CHANGE UP (ref 150–400)
POTASSIUM SERPL-MCNC: 5.5 MMOL/L — HIGH (ref 3.5–5.3)
POTASSIUM SERPL-MCNC: 5.8 MMOL/L — HIGH (ref 3.5–5.3)
POTASSIUM SERPL-SCNC: 5.5 MMOL/L — HIGH (ref 3.5–5.3)
POTASSIUM SERPL-SCNC: 5.8 MMOL/L — HIGH (ref 3.5–5.3)
PROT SERPL-MCNC: 5.9 G/DL — LOW (ref 6–8.3)
PROT UR-MCNC: 30 MG/DL
RBC # BLD: 3.85 M/UL — LOW (ref 4.2–5.8)
RBC # FLD: 15.6 % — HIGH (ref 10.3–14.5)
RBC CASTS # UR COMP ASSIST: > 10 /HPF
SODIUM SERPL-SCNC: 134 MMOL/L — LOW (ref 135–145)
SODIUM SERPL-SCNC: 137 MMOL/L — SIGNIFICANT CHANGE UP (ref 135–145)
SODIUM UR-SCNC: 54 MMOL/L — SIGNIFICANT CHANGE UP
SP GR SPEC: 1.02 — SIGNIFICANT CHANGE UP (ref 1–1.03)
UROBILINOGEN FLD QL: 0.2 E.U./DL — SIGNIFICANT CHANGE UP
UUN UR-MCNC: 629 MG/DL — SIGNIFICANT CHANGE UP
WBC # BLD: 11.9 K/UL — HIGH (ref 3.8–10.5)
WBC # FLD AUTO: 11.9 K/UL — HIGH (ref 3.8–10.5)
WBC UR QL: > 10 /HPF

## 2022-01-05 PROCEDURE — 85025 COMPLETE CBC W/AUTO DIFF WBC: CPT

## 2022-01-05 PROCEDURE — 80048 BASIC METABOLIC PNL TOTAL CA: CPT

## 2022-01-05 PROCEDURE — 93005 ELECTROCARDIOGRAM TRACING: CPT

## 2022-01-05 PROCEDURE — 96367 TX/PROPH/DG ADDL SEQ IV INF: CPT

## 2022-01-05 PROCEDURE — 84484 ASSAY OF TROPONIN QUANT: CPT

## 2022-01-05 PROCEDURE — 82728 ASSAY OF FERRITIN: CPT

## 2022-01-05 PROCEDURE — 97161 PT EVAL LOW COMPLEX 20 MIN: CPT

## 2022-01-05 PROCEDURE — 82330 ASSAY OF CALCIUM: CPT

## 2022-01-05 PROCEDURE — 84300 ASSAY OF URINE SODIUM: CPT

## 2022-01-05 PROCEDURE — 87040 BLOOD CULTURE FOR BACTERIA: CPT

## 2022-01-05 PROCEDURE — 84295 ASSAY OF SERUM SODIUM: CPT

## 2022-01-05 PROCEDURE — 96375 TX/PRO/DX INJ NEW DRUG ADDON: CPT

## 2022-01-05 PROCEDURE — 94660 CPAP INITIATION&MGMT: CPT

## 2022-01-05 PROCEDURE — 85379 FIBRIN DEGRADATION QUANT: CPT

## 2022-01-05 PROCEDURE — 82803 BLOOD GASES ANY COMBINATION: CPT

## 2022-01-05 PROCEDURE — 83036 HEMOGLOBIN GLYCOSYLATED A1C: CPT

## 2022-01-05 PROCEDURE — 36415 COLL VENOUS BLD VENIPUNCTURE: CPT

## 2022-01-05 PROCEDURE — 97116 GAIT TRAINING THERAPY: CPT

## 2022-01-05 PROCEDURE — U0005: CPT

## 2022-01-05 PROCEDURE — 83615 LACTATE (LD) (LDH) ENZYME: CPT

## 2022-01-05 PROCEDURE — 80053 COMPREHEN METABOLIC PANEL: CPT

## 2022-01-05 PROCEDURE — 82570 ASSAY OF URINE CREATININE: CPT

## 2022-01-05 PROCEDURE — U0003: CPT

## 2022-01-05 PROCEDURE — 71045 X-RAY EXAM CHEST 1 VIEW: CPT

## 2022-01-05 PROCEDURE — 93306 TTE W/DOPPLER COMPLETE: CPT

## 2022-01-05 PROCEDURE — 85610 PROTHROMBIN TIME: CPT

## 2022-01-05 PROCEDURE — 83735 ASSAY OF MAGNESIUM: CPT

## 2022-01-05 PROCEDURE — 99233 SBSQ HOSP IP/OBS HIGH 50: CPT | Mod: GC

## 2022-01-05 PROCEDURE — 85652 RBC SED RATE AUTOMATED: CPT

## 2022-01-05 PROCEDURE — 84132 ASSAY OF SERUM POTASSIUM: CPT

## 2022-01-05 PROCEDURE — 83605 ASSAY OF LACTIC ACID: CPT

## 2022-01-05 PROCEDURE — 83880 ASSAY OF NATRIURETIC PEPTIDE: CPT

## 2022-01-05 PROCEDURE — 84540 ASSAY OF URINE/UREA-N: CPT

## 2022-01-05 PROCEDURE — 81001 URINALYSIS AUTO W/SCOPE: CPT

## 2022-01-05 PROCEDURE — 84145 PROCALCITONIN (PCT): CPT

## 2022-01-05 PROCEDURE — 82962 GLUCOSE BLOOD TEST: CPT

## 2022-01-05 PROCEDURE — 86140 C-REACTIVE PROTEIN: CPT

## 2022-01-05 PROCEDURE — 84100 ASSAY OF PHOSPHORUS: CPT

## 2022-01-05 PROCEDURE — 96365 THER/PROPH/DIAG IV INF INIT: CPT

## 2022-01-05 PROCEDURE — 99291 CRITICAL CARE FIRST HOUR: CPT | Mod: 25

## 2022-01-05 PROCEDURE — 85730 THROMBOPLASTIN TIME PARTIAL: CPT

## 2022-01-05 RX ORDER — DEXAMETHASONE 0.5 MG/5ML
1 ELIXIR ORAL
Qty: 6 | Refills: 0
Start: 2022-01-05 | End: 2022-01-10

## 2022-01-05 RX ORDER — SODIUM POLYSTYRENE SULFONATE 4.1 MEQ/G
15 POWDER, FOR SUSPENSION ORAL
Qty: 30 | Refills: 2
Start: 2022-01-05 | End: 2022-04-04

## 2022-01-05 RX ORDER — SODIUM POLYSTYRENE SULFONATE 4.1 MEQ/G
30 POWDER, FOR SUSPENSION ORAL ONCE
Refills: 0 | Status: COMPLETED | OUTPATIENT
Start: 2022-01-05 | End: 2022-01-05

## 2022-01-05 RX ORDER — SODIUM ZIRCONIUM CYCLOSILICATE 10 G/10G
10 POWDER, FOR SUSPENSION ORAL ONCE
Refills: 0 | Status: COMPLETED | OUTPATIENT
Start: 2022-01-05 | End: 2022-01-05

## 2022-01-05 RX ADMIN — CARVEDILOL PHOSPHATE 12.5 MILLIGRAM(S): 80 CAPSULE, EXTENDED RELEASE ORAL at 17:08

## 2022-01-05 RX ADMIN — CLOPIDOGREL BISULFATE 75 MILLIGRAM(S): 75 TABLET, FILM COATED ORAL at 12:58

## 2022-01-05 RX ADMIN — HEPARIN SODIUM 5000 UNIT(S): 5000 INJECTION INTRAVENOUS; SUBCUTANEOUS at 16:44

## 2022-01-05 RX ADMIN — Medication 2: at 17:44

## 2022-01-05 RX ADMIN — Medication 4 UNIT(S): at 12:55

## 2022-01-05 RX ADMIN — HEPARIN SODIUM 5000 UNIT(S): 5000 INJECTION INTRAVENOUS; SUBCUTANEOUS at 05:33

## 2022-01-05 RX ADMIN — CARVEDILOL PHOSPHATE 12.5 MILLIGRAM(S): 80 CAPSULE, EXTENDED RELEASE ORAL at 06:06

## 2022-01-05 RX ADMIN — MYCOPHENOLATE MOFETIL 500 MILLIGRAM(S): 250 CAPSULE ORAL at 06:06

## 2022-01-05 RX ADMIN — Medication 4 UNIT(S): at 09:26

## 2022-01-05 RX ADMIN — Medication 2: at 12:55

## 2022-01-05 RX ADMIN — Medication 81 MILLIGRAM(S): at 12:58

## 2022-01-05 RX ADMIN — Medication 6 MILLIGRAM(S): at 12:58

## 2022-01-05 RX ADMIN — Medication 4: at 06:05

## 2022-01-05 RX ADMIN — SODIUM POLYSTYRENE SULFONATE 30 GRAM(S): 4.1 POWDER, FOR SUSPENSION ORAL at 12:56

## 2022-01-05 RX ADMIN — SODIUM ZIRCONIUM CYCLOSILICATE 10 GRAM(S): 10 POWDER, FOR SUSPENSION ORAL at 08:45

## 2022-01-05 RX ADMIN — TACROLIMUS 5 MILLIGRAM(S): 5 CAPSULE ORAL at 12:58

## 2022-01-05 RX ADMIN — Medication 20 MILLIGRAM(S): at 12:58

## 2022-01-05 RX ADMIN — PANTOPRAZOLE SODIUM 40 MILLIGRAM(S): 20 TABLET, DELAYED RELEASE ORAL at 06:06

## 2022-01-05 NOTE — DISCHARGE NOTE NURSING/CASE MANAGEMENT/SOCIAL WORK - NSDCVIVACCINE_GEN_ALL_CORE_FT
influenza, injectable, quadrivalent, preservative free; 10-Dec-2019 18:18; Tiff Michele (RN); [employer]; [id number] (Exp. Date: 30-Jun-2020); IntraMuscular; Deltoid Right.; 0.5 milliLiter(s); VIS (VIS Published: 15-Aug-2019, VIS Presented: 10-Dec-2019);

## 2022-01-05 NOTE — DISCHARGE NOTE PROVIDER - PROVIDER TOKENS
PROVIDER:[TOKEN:[[id number]],FOLLOWUP:[1 month],ESTABLISHEDPATIENT:[T]],PROVIDER:[TOKEN:[[id number]],FOLLOWUP:[1 month],ESTABLISHEDPATIENT:[T]]

## 2022-01-05 NOTE — DISCHARGE NOTE PROVIDER - WILL THE PATIENT ACCEPT THE PFIZER COVID-19 VACCINE IF ELIGIBLE AND IT IS AVAILABLE?
chills, diaphoresis, fever and unexpected weight change. HENT: Negative. Eyes: Negative. Respiratory: Negative. Cardiovascular: Negative. Gastrointestinal: Negative for abdominal distention, abdominal pain, constipation, diarrhea and vomiting. Endocrine: Negative for polydipsia, polyphagia and polyuria. Genitourinary: Negative for difficulty urinating, dysuria, genital sores and urgency. Musculoskeletal: Negative. Cyst   Skin: Negative. Allergic/Immunologic: Negative. Neurological: Negative for dizziness, seizures, syncope, weakness, light-headedness and headaches. Hematological: Negative. Psychiatric/Behavioral: Negative. Negative for agitation, behavioral problems, decreased concentration, dysphoric mood, sleep disturbance and suicidal ideas. The patient is not hyperactive. All other systems reviewed and are negative. Physical Exam  Vitals signs and nursing note reviewed. Constitutional:       General: He is active. Appearance: He is well-developed. HENT:      Head: No signs of injury. Mouth/Throat:      Mouth: Mucous membranes are moist.      Dentition: No dental caries. Tonsils: No tonsillar exudate. Eyes:      General:         Right eye: No discharge. Left eye: No discharge. Conjunctiva/sclera: Conjunctivae normal.      Pupils: Pupils are equal, round, and reactive to light. Neck:      Musculoskeletal: Neck supple. Cardiovascular:      Rate and Rhythm: Normal rate and regular rhythm. Pulmonary:      Effort: Pulmonary effort is normal. No respiratory distress or retractions. Breath sounds: Normal breath sounds and air entry. No decreased air movement. No wheezing or rhonchi. Abdominal:      General: There is no distension. Palpations: Abdomen is soft. Tenderness: There is no abdominal tenderness. There is no guarding or rebound. Hernia: No hernia is present. Musculoskeletal: Normal range of motion.
No

## 2022-01-05 NOTE — DISCHARGE NOTE PROVIDER - CARE PROVIDERS DIRECT ADDRESSES
[email or web address]

## 2022-01-05 NOTE — DISCHARGE NOTE PROVIDER - CARE PROVIDER_API CALL
Viviana Prather)  Critical Care Medicine; Pulmonary Disease  [address], [address] [place] [address]  Phone: [phone]  Fax: [phone]  Established Patient  Follow Up Time: 1 month    Callum Aranda)  Internal Medicine; Nephrology  [address], [address] [address]  Phone: [phone]  Fax: [phone]  Established Patient  Follow Up Time: 1 month

## 2022-01-05 NOTE — DISCHARGE NOTE PROVIDER - NSDCCPCAREPLAN_GEN_ALL_CORE_FT
PRINCIPAL DISCHARGE DIAGNOSIS  Diagnosis: Acute pulmonary edema  Assessment and Plan of Treatment:       SECONDARY DISCHARGE DIAGNOSES  Diagnosis: 2019 novel coronavirus disease (COVID-19)  Assessment and Plan of Treatment:     Diagnosis: Hyperkalemia  Assessment and Plan of Treatment:      PRINCIPAL DISCHARGE DIAGNOSIS  Diagnosis: 2019 novel coronavirus disease (COVID-19)  Assessment and Plan of Treatment: You came to the hospital because you were feeling short of breath with a cough, and you were found to have covid-19. At first, you required high levels of oxygen via the bipap machine, but shortly after we were able to wean you down to just the nasal cannula. Today, you remained stable on a low level of oxygen, 2L via nasal cannula. You are stable to go home and you will have a prescription for oxygen at home which you should continue to use. We started you on a course of decadron, a steroid, while you were here. You will need to complete the 10 day course of decadron. Please reach out to Dr Prather to discuss resuming you home prednisone dose, please don't take it while you are taking the decadron.      SECONDARY DISCHARGE DIAGNOSES  Diagnosis: Hyperkalemia  Assessment and Plan of Treatment: You have a history of kidney transplant and when you first came to the hospital you had an acute kidney injury and your potassium was elevated. While you were here, your kidney function and creatinine remained realtively stable at a new higher level and your potassium remained elevated. We are starting you on a medication called kayexalate which will help you lower your potassium levels.  Please follow up with Dr Aranda in the clinic in the next few weeks

## 2022-01-05 NOTE — DISCHARGE NOTE PROVIDER - HOSPITAL COURSE
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 Admitted for acute hypoxic respiratory failure in the setting of COVID.    #covid  Patient admitted for acute hypoxic respiratory failure, 2/2 covid. Patient started on course of decadron but not candidate for remdesivir or tocilizumab considering renal function. Patient initially requiring bipap but quickly transitioned to NRB followed by nasal cannula. On day of discharge, patient stable on 2L NC, plan to go home with home O2  -complete 10 day course of decadron  -c/w 2L NC at home, try to wean off at home in next few days  -f/u Dr Prather    #hyperkalemia  Potassium persistently elevated throughout admission, likely 2/2 to ELIZABETH on CKD, with creatinine stable at 2.08 on day of discharge with potassium of 5.8. Patient without EKG changes or symptoms, stable for discharge on kayexalate   -start 15mg kayexalate daily  -f/u Dr Mac    New Meds: Decadron 6mg until 1/11, kayexalate 15 daily

## 2022-01-05 NOTE — DISCHARGE NOTE PROVIDER - NSDCMRMEDTOKEN_GEN_ALL_CORE_FT
aspirin 81 mg oral tablet, chewable: 1 tab(s) orally once a day x 30 days   atorvastatin 40 mg oral tablet: 1 tab(s) orally once a day  cardiac rehab: Cardiac Rehabilitation  Diagnosis: Coronary Artery Disease  3 times per week for 12 weeks     carvedilol 12.5 mg oral tablet: 1 tab(s) orally every 12 hours  clopidogrel 75 mg oral tablet: 1 tab(s) orally once a day  docusate sodium 100 mg oral capsule: 1 cap(s) orally 2 times a day  HumaLOG Mix 75/25 KwikPen subcutaneous suspension: 12-16 unit(s) subcutaneous 2 times a day (pending glucose levels)  Lasix 20 mg oral tablet: 1 tab(s) orally once a day  mycophenolate mofetil 500 mg oral tablet: 1 tab(s) orally 2 times a day  predniSONE 5 mg oral tablet: 1 tab(s) orally every 24 hours  Senna 8.6 mg oral tablet: 1 tab(s) orally once a day (at bedtime)  tacrolimus 1 mg oral capsule: 3 cap(s) orally once a day (at bedtime)  tacrolimus 5 mg oral capsule: 1 cap(s) orally once a day (AT NOON)  tamsulosin 0.4 mg oral capsule: 1 cap(s) orally once a day (at bedtime)   aspirin 81 mg oral tablet, chewable: 1 tab(s) orally once a day x 30 days   atorvastatin 40 mg oral tablet: 1 tab(s) orally once a day  cardiac rehab: Cardiac Rehabilitation  Diagnosis: Coronary Artery Disease  3 times per week for 12 weeks     carvedilol 12.5 mg oral tablet: 1 tab(s) orally every 12 hours  clopidogrel 75 mg oral tablet: 1 tab(s) orally once a day  dexamethasone 6 mg oral tablet: 1 tab(s) orally every 24 hours  docusate sodium 100 mg oral capsule: 1 cap(s) orally 2 times a day  HumaLOG Mix 75/25 KwikPen subcutaneous suspension: 12-16 unit(s) subcutaneous 2 times a day (pending glucose levels)  Lasix 20 mg oral tablet: 1 tab(s) orally once a day  mycophenolate mofetil 500 mg oral tablet: 1 tab(s) orally 2 times a day  Senna 8.6 mg oral tablet: 1 tab(s) orally once a day (at bedtime)  sodium polystyrene sulfonate: 15 milligram(s) orally once a day   tacrolimus 1 mg oral capsule: 3 cap(s) orally once a day (at bedtime)  tacrolimus 5 mg oral capsule: 1 cap(s) orally once a day (AT NOON)  tamsulosin 0.4 mg oral capsule: 1 cap(s) orally once a day (at bedtime)

## 2022-01-05 NOTE — DISCHARGE NOTE NURSING/CASE MANAGEMENT/SOCIAL WORK - PATIENT PORTAL LINK FT
You can access the FollowMyHealth Patient Portal offered by [facility] by registering at the following website: [email or web address]. By joining [facility]’s FollowMyHealth portal, you will also be able to view your health information using other applications (apps) compatible with our system.

## 2022-01-05 NOTE — DISCHARGE NOTE NURSING/CASE MANAGEMENT/SOCIAL WORK - NSDCPEFALRISK_GEN_ALL_CORE
For information on Fall & Injury Prevention, visit: [email or web address] OR  [email or web address] OR  https://www.cdc.gov/steadi/patient.html

## 2022-01-11 NOTE — CHART NOTE - NSCHARTNOTEFT_GEN_A_CORE
Patient seen and examined with house-staff during bedside rounds.  Resident note read, including vitals, physical findings, laboratory data, and radiological reports.   Revisions included below.  Direct personal management at bed side and extensive interpretation of the data.  Plan was outlined and discussed in details with the housestaff.  Decision making of high complexity  Action taken for acute disease activity to reflect the level of care provided:  - medication reconciliation  - review laboratory data  The patient clinically stable.  Oxygen saturation is improving.  Decrease the oxygen down to 3 L and will continue to wean the oxygen as tolerated.  Patient creatinine is plateaued at 2.06.  The potassium is stable.  Discussed the case with the with Dr. Hebert Caicedo and will continue Lasix as the  is elevated.  Discussed the case with Dr. Galindo and the cardiac status is stable while he has diastolic dysfunction which is chronic and will continue Lasix for now.  Continue immunosuppressive therapy.  The blood sugar is uncontrolled on the Decadron I started the patient on premeal insulin.  I discussed the case in details with the wife.  We will plan for discharge home if the patient is stable tomorrow on Decadron
Patient seen and examined with house-staff during bedside rounds.  Resident note read, including vitals, physical findings, laboratory data, and radiological reports.   Revisions included below.  Direct personal management at bed side and extensive interpretation of the data.  Plan was outlined and discussed in details with the housestaff.  Decision making of high complexity  Action taken for acute disease activity to reflect the level of care provided:  - medication reconciliation  - review laboratory data  The patient is clinically stable.  The cough is decreased.  The patient tolerated weaning the oxygen to 2 L/min with adequate oxygen oxygenation.  The patient hemodynamically stable.  Creatinine is about the baseline for the last 3 days.  The potassium is still elevated but his has no EKG changes consistent with hyperkalemia.  Discussed the case with Dr. Arreaga.   We will start the patient on Kayaxelate on daily basis.  The patient will follow up with Dr. Arreaga next week with a telemetry.  We will plan for discharge the patient home on home oxygen.  Patient need better control of the blood sugar  I discussed with the wife
The patient was admitted with sepsis due to covid 19 pneumonia and present on admission.  Please refer to my admission note from 1/3/22

## 2022-01-15 DIAGNOSIS — D64.9 ANEMIA, UNSPECIFIED: ICD-10-CM

## 2022-01-15 DIAGNOSIS — A41.89 OTHER SPECIFIED SEPSIS: ICD-10-CM

## 2022-01-15 DIAGNOSIS — H54.8 LEGAL BLINDNESS, AS DEFINED IN USA: ICD-10-CM

## 2022-01-15 DIAGNOSIS — N17.9 ACUTE KIDNEY FAILURE, UNSPECIFIED: ICD-10-CM

## 2022-01-15 DIAGNOSIS — N18.30 CHRONIC KIDNEY DISEASE, STAGE 3 UNSPECIFIED: ICD-10-CM

## 2022-01-15 DIAGNOSIS — J96.01 ACUTE RESPIRATORY FAILURE WITH HYPOXIA: ICD-10-CM

## 2022-01-15 DIAGNOSIS — N40.0 BENIGN PROSTATIC HYPERPLASIA WITHOUT LOWER URINARY TRACT SYMPTOMS: ICD-10-CM

## 2022-01-15 DIAGNOSIS — U07.1 COVID-19: ICD-10-CM

## 2022-01-15 DIAGNOSIS — Z89.421 ACQUIRED ABSENCE OF OTHER RIGHT TOE(S): ICD-10-CM

## 2022-01-15 DIAGNOSIS — E87.5 HYPERKALEMIA: ICD-10-CM

## 2022-01-15 DIAGNOSIS — Z95.5 PRESENCE OF CORONARY ANGIOPLASTY IMPLANT AND GRAFT: ICD-10-CM

## 2022-01-15 DIAGNOSIS — I11.0 HYPERTENSIVE HEART DISEASE WITH HEART FAILURE: ICD-10-CM

## 2022-01-15 DIAGNOSIS — E11.22 TYPE 2 DIABETES MELLITUS WITH DIABETIC CHRONIC KIDNEY DISEASE: ICD-10-CM

## 2022-01-15 DIAGNOSIS — I50.32 CHRONIC DIASTOLIC (CONGESTIVE) HEART FAILURE: ICD-10-CM

## 2022-01-15 DIAGNOSIS — J12.82 PNEUMONIA DUE TO CORONAVIRUS DISEASE 2019: ICD-10-CM

## 2022-01-15 DIAGNOSIS — T86.19 OTHER COMPLICATION OF KIDNEY TRANSPLANT: ICD-10-CM

## 2022-01-15 DIAGNOSIS — I25.10 ATHEROSCLEROTIC HEART DISEASE OF NATIVE CORONARY ARTERY WITHOUT ANGINA PECTORIS: ICD-10-CM

## 2022-01-15 DIAGNOSIS — R53.1 WEAKNESS: ICD-10-CM

## 2022-01-18 ENCOUNTER — APPOINTMENT (OUTPATIENT)
Dept: PULMONOLOGY | Facility: CLINIC | Age: 82
End: 2022-01-18
Payer: MEDICARE

## 2022-01-18 DIAGNOSIS — J12.9 VIRAL PNEUMONIA, UNSPECIFIED: ICD-10-CM

## 2022-01-18 DIAGNOSIS — I82.4Z9 ACUTE EMBOLISM AND THROMBOSIS OF UNSPECIFIED DEEP VEINS OF UNSPECIFIED DISTAL LOWER EXTREMITY: ICD-10-CM

## 2022-01-18 PROCEDURE — 99443: CPT | Mod: CS,95

## 2022-01-18 NOTE — ASSESSMENT
[FreeTextEntry1] : The patient was admitted with acute hypoxic respiratory failure secondary COVID-pneumonia.  The patient was started on treatment for his bilateral pneumonia.  The patient improved and was discharged home.  The patient is not on oxygen.  The chest x-ray revealed prominent station markings which could be consistent with his previous chest x-ray from the hospital.  Patient is off anticoagulation.  Patient is on prednisone for his renal transplant.\par \par Patient is to continue increase activity.  We will follow-up in the office and require repeat images in 6 to 8 weeks.

## 2022-01-18 NOTE — REVIEW OF SYSTEMS
[Negative] : Endocrine [Fever] : no fever [Fatigue] : fatigue [Recent Wt Gain (___ Lbs)] : ~T no recent weight gain [Chills] : no chills [Poor Appetite] : no poor appetite [Recent Wt Loss (___ Lbs)] : ~T no recent weight loss [Cough] : cough [Hemoptysis] : no hemoptysis [Chest Tightness] : no chest tightness [Frequent URIs] : no frequent URIs [Sputum] : sputum [Dyspnea] : no dyspnea [Pleuritic Pain] : no pleuritic pain [Wheezing] : no wheezing [A.M. Dry Mouth] : no a.m. dry mouth [SOB on Exertion] : no sob on exertion

## 2022-01-18 NOTE — HISTORY OF PRESENT ILLNESS
[Never] : never [TextBox_4] : He is doing much better since she was discharged from the hospital.  He is off the oxygen now.  He does not have that much of energy but he is this is his baseline.  Appetite is not that much.  No fever no chills no coughing and he is back on the same dose of prednisone.  Last day he the other day he had to get a little more cough went to see his primary physician who sent him for chest x-ray which showed abnormal some is this tissue pattern and his doctor started him on antibiotic initially Levaquin but was changed to doxycycline but otherwise he is doing much better

## 2022-01-18 NOTE — REASON FOR VISIT
[Home] : at home, [unfilled] , at the time of the visit. [Medical Office: ([facility])___] : at the medical office located in  [Follow-Up] : a follow-up visit [Spouse] : spouse [TextBox_44] : covid

## 2022-02-07 ENCOUNTER — RX RENEWAL (OUTPATIENT)
Age: 82
End: 2022-02-07

## 2022-02-08 ENCOUNTER — RX RENEWAL (OUTPATIENT)
Age: 82
End: 2022-02-08

## 2022-02-21 ENCOUNTER — NON-APPOINTMENT (OUTPATIENT)
Age: 82
End: 2022-02-21

## 2022-02-25 ENCOUNTER — EMERGENCY (EMERGENCY)
Facility: HOSPITAL | Age: 82
LOS: 1 days | Discharge: HOME | End: 2022-02-25
Attending: EMERGENCY MEDICINE | Admitting: EMERGENCY MEDICINE
Payer: MEDICARE

## 2022-02-25 VITALS
TEMPERATURE: 98 F | HEART RATE: 68 BPM | DIASTOLIC BLOOD PRESSURE: 66 MMHG | SYSTOLIC BLOOD PRESSURE: 203 MMHG | RESPIRATION RATE: 26 BRPM | OXYGEN SATURATION: 96 % | HEIGHT: 67 IN

## 2022-02-25 DIAGNOSIS — S98.139A COMPLETE TRAUMATIC AMPUTATION OF ONE UNSPECIFIED LESSER TOE, INITIAL ENCOUNTER: Chronic | ICD-10-CM

## 2022-02-25 DIAGNOSIS — Z94.0 KIDNEY TRANSPLANT STATUS: Chronic | ICD-10-CM

## 2022-02-25 PROCEDURE — 93010 ELECTROCARDIOGRAM REPORT: CPT

## 2022-02-25 PROCEDURE — 99285 EMERGENCY DEPT VISIT HI MDM: CPT | Mod: 25

## 2022-02-25 PROCEDURE — 71045 X-RAY EXAM CHEST 1 VIEW: CPT | Mod: 26

## 2022-02-25 RX ORDER — LABETALOL HCL 100 MG
10 TABLET ORAL ONCE
Refills: 0 | Status: DISCONTINUED | OUTPATIENT
Start: 2022-02-25 | End: 2022-02-26

## 2022-02-25 NOTE — ED ADULT NURSE NOTE - NSIMPLEMENTINTERV_GEN_ALL_ED
Implemented All Universal Safety Interventions:  [place] to call system. Call bell, personal items and telephone within reach. Instruct patient to call for assistance. Room bathroom lighting operational. Non-slip footwear when patient is off stretcher. Physically safe environment: no spills, clutter or unnecessary equipment. Stretcher in lowest position, wheels locked, appropriate side rails in place.

## 2022-02-25 NOTE — ED ADULT TRIAGE NOTE - ARRIVAL INFO ADDITIONAL COMMENTS
pt c/o not feeling well to his wife who noted he was slightly sweaty.  pt denied pain or dizziness to her.   she checked his temp which was normal and BP was high.   pt has hx of htn.   hx of covid 4 weeks ago needing home o2 which was discontinued shortly thereafter

## 2022-02-25 NOTE — ED ADULT NURSE NOTE - OBJECTIVE STATEMENT
Patient presents with complaint of not feeling well. As per wife, she found patient to be slightly diaphoretic along with elevated by SBP 200s. Pt denies cp, abd che, nausea, vomiting, diarrhea.  of note, patient  reports covid positive x 4 weeks ago.

## 2022-02-26 VITALS — DIASTOLIC BLOOD PRESSURE: 67 MMHG | SYSTOLIC BLOOD PRESSURE: 158 MMHG | HEART RATE: 68 BPM

## 2022-02-26 DIAGNOSIS — Z95.5 PRESENCE OF CORONARY ANGIOPLASTY IMPLANT AND GRAFT: Chronic | ICD-10-CM

## 2022-02-26 LAB
ALBUMIN SERPL ELPH-MCNC: 3.6 G/DL — SIGNIFICANT CHANGE UP (ref 3.3–5)
ALP SERPL-CCNC: 79 U/L — SIGNIFICANT CHANGE UP (ref 40–120)
ALT FLD-CCNC: 13 U/L — SIGNIFICANT CHANGE UP (ref 10–45)
ANION GAP SERPL CALC-SCNC: 8 MMOL/L — SIGNIFICANT CHANGE UP (ref 5–17)
APTT BLD: 35.7 SEC — HIGH (ref 27.5–35.5)
AST SERPL-CCNC: 14 U/L — SIGNIFICANT CHANGE UP (ref 10–40)
BASOPHILS # BLD AUTO: 0.02 K/UL — SIGNIFICANT CHANGE UP (ref 0–0.2)
BASOPHILS NFR BLD AUTO: 0.2 % — SIGNIFICANT CHANGE UP (ref 0–2)
BILIRUB SERPL-MCNC: 0.3 MG/DL — SIGNIFICANT CHANGE UP (ref 0.2–1.2)
BUN SERPL-MCNC: 36 MG/DL — HIGH (ref 7–23)
CALCIUM SERPL-MCNC: 9.5 MG/DL — SIGNIFICANT CHANGE UP (ref 8.4–10.5)
CHLORIDE SERPL-SCNC: 106 MMOL/L — SIGNIFICANT CHANGE UP (ref 96–108)
CO2 SERPL-SCNC: 20 MMOL/L — LOW (ref 22–31)
CREAT SERPL-MCNC: 1.76 MG/DL — HIGH (ref 0.5–1.3)
EOSINOPHIL # BLD AUTO: 0.11 K/UL — SIGNIFICANT CHANGE UP (ref 0–0.5)
EOSINOPHIL NFR BLD AUTO: 0.9 % — SIGNIFICANT CHANGE UP (ref 0–6)
GLUCOSE SERPL-MCNC: 190 MG/DL — HIGH (ref 70–99)
HCT VFR BLD CALC: 30.9 % — LOW (ref 39–50)
HGB BLD-MCNC: 9.2 G/DL — LOW (ref 13–17)
IMM GRANULOCYTES NFR BLD AUTO: 0.2 % — SIGNIFICANT CHANGE UP (ref 0–1.5)
INR BLD: 1.06 — SIGNIFICANT CHANGE UP (ref 0.88–1.16)
LYMPHOCYTES # BLD AUTO: 0.81 K/UL — LOW (ref 1–3.3)
LYMPHOCYTES # BLD AUTO: 6.7 % — LOW (ref 13–44)
MAGNESIUM SERPL-MCNC: 2.2 MG/DL — SIGNIFICANT CHANGE UP (ref 1.6–2.6)
MCHC RBC-ENTMCNC: 26.6 PG — LOW (ref 27–34)
MCHC RBC-ENTMCNC: 29.8 GM/DL — LOW (ref 32–36)
MCV RBC AUTO: 89.3 FL — SIGNIFICANT CHANGE UP (ref 80–100)
MONOCYTES # BLD AUTO: 0.99 K/UL — HIGH (ref 0–0.9)
MONOCYTES NFR BLD AUTO: 8.2 % — SIGNIFICANT CHANGE UP (ref 2–14)
NEUTROPHILS # BLD AUTO: 10.14 K/UL — HIGH (ref 1.8–7.4)
NEUTROPHILS NFR BLD AUTO: 83.8 % — HIGH (ref 43–77)
NRBC # BLD: 0 /100 WBCS — SIGNIFICANT CHANGE UP (ref 0–0)
NT-PROBNP SERPL-SCNC: 5490 PG/ML — HIGH (ref 0–300)
PLATELET # BLD AUTO: 266 K/UL — SIGNIFICANT CHANGE UP (ref 150–400)
POTASSIUM SERPL-MCNC: 4.9 MMOL/L — SIGNIFICANT CHANGE UP (ref 3.5–5.3)
POTASSIUM SERPL-SCNC: 4.9 MMOL/L — SIGNIFICANT CHANGE UP (ref 3.5–5.3)
PROT SERPL-MCNC: 6 G/DL — SIGNIFICANT CHANGE UP (ref 6–8.3)
PROTHROM AB SERPL-ACNC: 12.6 SEC — SIGNIFICANT CHANGE UP (ref 10.5–13.4)
RBC # BLD: 3.46 M/UL — LOW (ref 4.2–5.8)
RBC # FLD: 17.4 % — HIGH (ref 10.3–14.5)
SODIUM SERPL-SCNC: 134 MMOL/L — LOW (ref 135–145)
TROPONIN T SERPL-MCNC: 0.02 NG/ML — HIGH (ref 0–0.01)
TROPONIN T SERPL-MCNC: 0.02 NG/ML — HIGH (ref 0–0.01)
WBC # BLD: 12.1 K/UL — HIGH (ref 3.8–10.5)
WBC # FLD AUTO: 12.1 K/UL — HIGH (ref 3.8–10.5)

## 2022-02-26 PROCEDURE — 71045 X-RAY EXAM CHEST 1 VIEW: CPT

## 2022-02-26 PROCEDURE — 85610 PROTHROMBIN TIME: CPT

## 2022-02-26 PROCEDURE — 93971 EXTREMITY STUDY: CPT | Mod: 26,59,RT

## 2022-02-26 PROCEDURE — 93970 EXTREMITY STUDY: CPT

## 2022-02-26 PROCEDURE — 84484 ASSAY OF TROPONIN QUANT: CPT

## 2022-02-26 PROCEDURE — 80053 COMPREHEN METABOLIC PANEL: CPT

## 2022-02-26 PROCEDURE — 36415 COLL VENOUS BLD VENIPUNCTURE: CPT

## 2022-02-26 PROCEDURE — 93970 EXTREMITY STUDY: CPT | Mod: 26

## 2022-02-26 PROCEDURE — 99285 EMERGENCY DEPT VISIT HI MDM: CPT | Mod: 25

## 2022-02-26 PROCEDURE — 83735 ASSAY OF MAGNESIUM: CPT

## 2022-02-26 PROCEDURE — 71045 X-RAY EXAM CHEST 1 VIEW: CPT | Mod: 26

## 2022-02-26 PROCEDURE — 85730 THROMBOPLASTIN TIME PARTIAL: CPT

## 2022-02-26 PROCEDURE — 83880 ASSAY OF NATRIURETIC PEPTIDE: CPT

## 2022-02-26 PROCEDURE — 85025 COMPLETE CBC W/AUTO DIFF WBC: CPT

## 2022-02-26 PROCEDURE — 93971 EXTREMITY STUDY: CPT

## 2022-02-26 PROCEDURE — 93005 ELECTROCARDIOGRAM TRACING: CPT

## 2022-02-26 RX ORDER — CARVEDILOL PHOSPHATE 80 MG/1
12.5 CAPSULE, EXTENDED RELEASE ORAL ONCE
Refills: 0 | Status: COMPLETED | OUTPATIENT
Start: 2022-02-26 | End: 2022-02-26

## 2022-02-26 RX ADMIN — CARVEDILOL PHOSPHATE 12.5 MILLIGRAM(S): 80 CAPSULE, EXTENDED RELEASE ORAL at 02:26

## 2022-02-26 NOTE — ED PROVIDER NOTE - OBJECTIVE STATEMENT
81M hx htn, dm, CAD (stent), CKD, kidney transplant, bph, anemia, glaucoma, brought in for feeling unwell.  wife states over past week pt has had increased LE and RUE swelling. states has also had dyspnea on exertion. tonight felt sweaty and nauseated. no chest pain. no fevers. no abd pain. pt recently admitted for covid 1/2/22. pt was seen by outside doctor and was started on HCTZ.  tonight wife states his BP was elevated and she gave him carvedilol prior to ED arrival.

## 2022-02-26 NOTE — ED PROVIDER NOTE - NSICDXPASTMEDICALHX_GEN_ALL_CORE_FT
PAST MEDICAL HISTORY:  BPH (benign prostatic hypertrophy)     Chronic kidney disease (CKD)     DM (diabetes mellitus) Type 1/insulin dependent per patient    Glaucoma     History of renal transplant secondary to DM    HTN (hypertension)

## 2022-02-26 NOTE — ED PROVIDER NOTE - NSICDXPASTSURGICALHX_GEN_ALL_CORE_FT
PAST SURGICAL HISTORY:  Amputation of toe x 3    H/O heart artery stent     Kidney transplant recipient

## 2022-02-26 NOTE — ED PROVIDER NOTE - PROGRESS NOTE DETAILS
no resp distress, speaking in full sentences. spoke with pt and wife at length regarding admission to the hospital to cards service.  they would like to f/u with Dr. Aranda and Dr. Galindo as outpt rather than staying in the hospital this weekend.  troponin, pro-bnp, and creatinine improved from prior results. pt feeling much better. wife comfortable taking pt home, pt does not wish to be admitted at this time  I have discussed the discharge plan with the patient. The patient agrees with the plan, as discussed.  The patient understands Emergency Department diagnosis is a preliminary diagnosis often based on limited information and that the patient must adhere to the follow-up plan as discussed.  The patient understands that if the symptoms worsen the patient may return to the Emergency Department at any time for further evaluation and treatment.

## 2022-02-26 NOTE — ED PROVIDER NOTE - CLINICAL SUMMARY MEDICAL DECISION MAKING FREE TEXT BOX
diaphoretic and nauseated tonight with elevated BP.  worsening extremity swelling recently, no resp distress currently, afebrile  -check labs  -ekg  -cxr  -US

## 2022-02-26 NOTE — ED PROVIDER NOTE - NSFOLLOWUPINSTRUCTIONS_ED_ALL_ED_FT
Peripheral Edema     Peripheral edema is swelling that is caused by a buildup of fluid. Peripheral edema most often affects the lower legs, ankles, and feet. It can also develop in the arms, hands, and face. The area of the body that has peripheral edema will look swollen. It may also feel heavy or warm. Your clothes may start to feel tight. Pressing on the area may make a temporary dent in your skin. You may not be able to move your swollen arm or leg as much as usual.    There are many causes of peripheral edema. It can happen because of a complication of other conditions such as congestive heart failure, kidney disease, or a problem with your blood circulation. It also can be a side effect of certain medicines or because of an infection. It often happens to women during pregnancy. Sometimes, the cause is not known.    Follow these instructions at home:    Managing pain, stiffness, and swelling      •Raise (elevate) your legs while you are sitting or lying down.    •Move around often to prevent stiffness and to lessen swelling.    • Do not sit or stand for long periods of time.    •Wear support stockings as told by your health care provider.    Medicines     •Take over-the-counter and prescription medicines only as told by your health care provider.    •Your health care provider may prescribe medicine to help your body get rid of excess water (diuretic).    General instructions     •Pay attention to any changes in your symptoms.    •Follow instructions from your health care provider about limiting salt (sodium) in your diet. Sometimes, eating less salt may reduce swelling.    •Moisturize skin daily to help prevent skin from cracking and draining.    •Keep all follow-up visits as told by your health care provider. This is important.    Contact a health care provider if you have:    •A fever.    •Edema that starts suddenly or is getting worse, especially if you are pregnant or have a medical condition.    •Swelling in only one leg.    •Increased swelling, redness, or pain in one or both of your legs.    •Drainage or sores at the area where you have edema.    Get help right away if you:    •Develop shortness of breath, especially when you are lying down.    •Have pain in your chest or abdomen.    •Feel weak.    •Feel faint.    Summary    •Peripheral edema is swelling that is caused by a buildup of fluid. Peripheral edema most often affects the lower legs, ankles, and feet.    •Move around often to prevent stiffness and to lessen swelling. Do not sit or stand for long periods of time.    •Pay attention to any changes in your symptoms.    •Contact a health care provider if you have edema that starts suddenly or is getting worse, especially if you are pregnant or have a medical condition.    •Get help right away if you develop shortness of breath, especially when lying down.    This information is not intended to replace advice given to you by your health care provider. Make sure you discuss any questions you have with your health care provider.    Hypertension, Adult    Hypertension is another name for high blood pressure. High blood pressure forces your heart to work harder to pump blood. This can cause problems over time.    There are two numbers in a blood pressure reading. There is a top number (systolic) over a bottom number (diastolic). It is best to have a blood pressure that is below 120/80. Healthy choices can help lower your blood pressure, or you may need medicine to help lower it.    What are the causes?    The cause of this condition is not known. Some conditions may be related to high blood pressure.    What increases the risk?    •Smoking.    •Having type 2 diabetes mellitus, high cholesterol, or both.    •Not getting enough exercise or physical activity.    •Being overweight.    •Having too much fat, sugar, calories, or salt (sodium) in your diet.    •Drinking too much alcohol.    •Having long-term (chronic) kidney disease.    •Having a family history of high blood pressure.    •Age. Risk increases with age.    •Race. You may be at higher risk if you are .    •Gender. Men are at higher risk than women before age 45. After age 65, women are at higher risk than men.    •Having obstructive sleep apnea.    •Stress.    What are the signs or symptoms?  •High blood pressure may not cause symptoms. Very high blood pressure (hypertensive crisis) may cause:  •Headache.    •Feelings of worry or nervousness (anxiety).    •Shortness of breath.    •Nosebleed.    •A feeling of being sick to your stomach (nausea).    •Throwing up (vomiting).    •Changes in how you see.    •Very bad chest pain.    •Seizures.    How is this treated?  •This condition is treated by making healthy lifestyle changes, such as:  •Eating healthy foods.    •Exercising more.    •Drinking less alcohol.    •Your health care provider may prescribe medicine if lifestyle changes are not enough to get your blood pressure under control, and if:  •Your top number is above 130.    •Your bottom number is above 80.    •Your personal target blood pressure may vary.    Follow these instructions at home:    Eating and drinking    •If told, follow the DASH eating plan. To follow this plan:  •Fill one half of your plate at each meal with fruits and vegetables.    •Fill one fourth of your plate at each meal with whole grains. Whole grains include whole-wheat pasta, brown rice, and whole-grain bread.    •Eat or drink low-fat dairy products, such as skim milk or low-fat yogurt.    •Fill one fourth of your plate at each meal with low-fat (lean) proteins. Low-fat proteins include fish, chicken without skin, eggs, beans, and tofu.    •Avoid fatty meat, cured and processed meat, or chicken with skin.    •Avoid pre-made or processed food.    •Eat less than 1,500 mg of salt each day.    • Do not drink alcohol if:  •Your doctor tells you not to drink.    •You are pregnant, may be pregnant, or are planning to become pregnant.    •If you drink alcohol:•Limit how much you use to:  •0–1 drink a day for women.    •0–2 drinks a day for men.    •Be aware of how much alcohol is in your drink. In the [place] one drink equals one 12 oz bottle of beer (355 mL), one 5 oz glass of wine (148 mL), or one 1½ oz glass of hard liquor (44 mL).    Lifestyle      •Work with your doctor to stay at a healthy weight or to lose weight. Ask your doctor what the best weight is for you.    •Get at least 30 minutes of exercise most days of the week. This may include walking, swimming, or biking.    •Get at least 30 minutes of exercise that strengthens your muscles (resistance exercise) at least 3 days a week. This may include lifting weights or doing Pilates.    • Do not use any products that contain nicotine or tobacco, such as cigarettes, e-cigarettes, and chewing tobacco. If you need help quitting, ask your doctor.    •Check your blood pressure at home as told by your doctor.    •Keep all follow-up visits as told by your doctor. This is important.    Medicines     •Take over-the-counter and prescription medicines only as told by your doctor. Follow directions carefully.    • Do not skip doses of blood pressure medicine. The medicine does not work as well if you skip doses. Skipping doses also puts you at risk for problems.    •Ask your doctor about side effects or reactions to medicines that you should watch for.    Contact a doctor if you:    •Think you are having a reaction to the medicine you are taking.    •Have headaches that keep coming back (recurring).    •Feel dizzy.    •Have swelling in your ankles.    •Have trouble with your vision.    Get help right away if you:    •Get a very bad headache.    •Start to feel mixed up (confused).    •Feel weak or numb.    •Feel faint.    •Have very bad pain in your:  •Chest.    •Belly (abdomen).    •Throw up more than once.    •Have trouble breathing.    Summary    •Hypertension is another name for high blood pressure.    •High blood pressure forces your heart to work harder to pump blood.    •For most people, a normal blood pressure is less than 120/80.    •Making healthy choices can help lower blood pressure. If your blood pressure does not get lower with healthy choices, you may need to take medicine.    This information is not intended to replace advice given to you by your health care provider. Make sure you discuss any questions you have with your health care provider. Follow-up with Dr. Aranda and Dr. Galindo    Peripheral Edema     Peripheral edema is swelling that is caused by a buildup of fluid. Peripheral edema most often affects the lower legs, ankles, and feet. It can also develop in the arms, hands, and face. The area of the body that has peripheral edema will look swollen. It may also feel heavy or warm. Your clothes may start to feel tight. Pressing on the area may make a temporary dent in your skin. You may not be able to move your swollen arm or leg as much as usual.    There are many causes of peripheral edema. It can happen because of a complication of other conditions such as congestive heart failure, kidney disease, or a problem with your blood circulation. It also can be a side effect of certain medicines or because of an infection. It often happens to women during pregnancy. Sometimes, the cause is not known.    Follow these instructions at home:    Managing pain, stiffness, and swelling      •Raise (elevate) your legs while you are sitting or lying down.    •Move around often to prevent stiffness and to lessen swelling.    • Do not sit or stand for long periods of time.    •Wear support stockings as told by your health care provider.    Medicines     •Take over-the-counter and prescription medicines only as told by your health care provider.    •Your health care provider may prescribe medicine to help your body get rid of excess water (diuretic).    General instructions     •Pay attention to any changes in your symptoms.    •Follow instructions from your health care provider about limiting salt (sodium) in your diet. Sometimes, eating less salt may reduce swelling.    •Moisturize skin daily to help prevent skin from cracking and draining.    •Keep all follow-up visits as told by your health care provider. This is important.    Contact a health care provider if you have:    •A fever.    •Edema that starts suddenly or is getting worse, especially if you are pregnant or have a medical condition.    •Swelling in only one leg.    •Increased swelling, redness, or pain in one or both of your legs.    •Drainage or sores at the area where you have edema.    Get help right away if you:    •Develop shortness of breath, especially when you are lying down.    •Have pain in your chest or abdomen.    •Feel weak.    •Feel faint.    Summary    •Peripheral edema is swelling that is caused by a buildup of fluid. Peripheral edema most often affects the lower legs, ankles, and feet.    •Move around often to prevent stiffness and to lessen swelling. Do not sit or stand for long periods of time.    •Pay attention to any changes in your symptoms.    •Contact a health care provider if you have edema that starts suddenly or is getting worse, especially if you are pregnant or have a medical condition.    •Get help right away if you develop shortness of breath, especially when lying down.    This information is not intended to replace advice given to you by your health care provider. Make sure you discuss any questions you have with your health care provider.    Hypertension, Adult    Hypertension is another name for high blood pressure. High blood pressure forces your heart to work harder to pump blood. This can cause problems over time.    There are two numbers in a blood pressure reading. There is a top number (systolic) over a bottom number (diastolic). It is best to have a blood pressure that is below 120/80. Healthy choices can help lower your blood pressure, or you may need medicine to help lower it.    What are the causes?    The cause of this condition is not known. Some conditions may be related to high blood pressure.    What increases the risk?    •Smoking.    •Having type 2 diabetes mellitus, high cholesterol, or both.    •Not getting enough exercise or physical activity.    •Being overweight.    •Having too much fat, sugar, calories, or salt (sodium) in your diet.    •Drinking too much alcohol.    •Having long-term (chronic) kidney disease.    •Having a family history of high blood pressure.    •Age. Risk increases with age.    •Race. You may be at higher risk if you are .    •Gender. Men are at higher risk than women before age 45. After age 65, women are at higher risk than men.    •Having obstructive sleep apnea.    •Stress.    What are the signs or symptoms?  •High blood pressure may not cause symptoms. Very high blood pressure (hypertensive crisis) may cause:  •Headache.    •Feelings of worry or nervousness (anxiety).    •Shortness of breath.    •Nosebleed.    •A feeling of being sick to your stomach (nausea).    •Throwing up (vomiting).    •Changes in how you see.    •Very bad chest pain.    •Seizures.    How is this treated?  •This condition is treated by making healthy lifestyle changes, such as:  •Eating healthy foods.    •Exercising more.    •Drinking less alcohol.    •Your health care provider may prescribe medicine if lifestyle changes are not enough to get your blood pressure under control, and if:  •Your top number is above 130.    •Your bottom number is above 80.    •Your personal target blood pressure may vary.    Follow these instructions at home:    Eating and drinking    •If told, follow the DASH eating plan. To follow this plan:  •Fill one half of your plate at each meal with fruits and vegetables.    •Fill one fourth of your plate at each meal with whole grains. Whole grains include whole-wheat pasta, brown rice, and whole-grain bread.    •Eat or drink low-fat dairy products, such as skim milk or low-fat yogurt.    •Fill one fourth of your plate at each meal with low-fat (lean) proteins. Low-fat proteins include fish, chicken without skin, eggs, beans, and tofu.    •Avoid fatty meat, cured and processed meat, or chicken with skin.    •Avoid pre-made or processed food.    •Eat less than 1,500 mg of salt each day.    • Do not drink alcohol if:  •Your doctor tells you not to drink.    •You are pregnant, may be pregnant, or are planning to become pregnant.    •If you drink alcohol:•Limit how much you use to:  •0–1 drink a day for women.    •0–2 drinks a day for men.    •Be aware of how much alcohol is in your drink. In the [place] one drink equals one 12 oz bottle of beer (355 mL), one 5 oz glass of wine (148 mL), or one 1½ oz glass of hard liquor (44 mL).    Lifestyle      •Work with your doctor to stay at a healthy weight or to lose weight. Ask your doctor what the best weight is for you.    •Get at least 30 minutes of exercise most days of the week. This may include walking, swimming, or biking.    •Get at least 30 minutes of exercise that strengthens your muscles (resistance exercise) at least 3 days a week. This may include lifting weights or doing Pilates.    • Do not use any products that contain nicotine or tobacco, such as cigarettes, e-cigarettes, and chewing tobacco. If you need help quitting, ask your doctor.    •Check your blood pressure at home as told by your doctor.    •Keep all follow-up visits as told by your doctor. This is important.    Medicines     •Take over-the-counter and prescription medicines only as told by your doctor. Follow directions carefully.    • Do not skip doses of blood pressure medicine. The medicine does not work as well if you skip doses. Skipping doses also puts you at risk for problems.    •Ask your doctor about side effects or reactions to medicines that you should watch for.    Contact a doctor if you:    •Think you are having a reaction to the medicine you are taking.    •Have headaches that keep coming back (recurring).    •Feel dizzy.    •Have swelling in your ankles.    •Have trouble with your vision.    Get help right away if you:    •Get a very bad headache.    •Start to feel mixed up (confused).    •Feel weak or numb.    •Feel faint.    •Have very bad pain in your:  •Chest.    •Belly (abdomen).    •Throw up more than once.    •Have trouble breathing.    Summary    •Hypertension is another name for high blood pressure.    •High blood pressure forces your heart to work harder to pump blood.    •For most people, a normal blood pressure is less than 120/80.    •Making healthy choices can help lower blood pressure. If your blood pressure does not get lower with healthy choices, you may need to take medicine.    This information is not intended to replace advice given to you by your health care provider. Make sure you discuss any questions you have with your health care provider.

## 2022-02-26 NOTE — ED PROVIDER NOTE - PATIENT PORTAL LINK FT
You can access the FollowMyHealth Patient Portal offered by [facility] by registering at the following website: [email or web address]. By joining [facility] FollowMyHealth portal, you will also be able to view your health information using other applications (apps) compatible with our system.

## 2022-02-26 NOTE — ED PROVIDER NOTE - CROS ED RESP ALL NEG
CHIEF COMPLAINT: HSV lesions    HPI: The patient is a 23 yo F with HIV (Maternal transmission) who presents for evaluation of genital HSV lesions. The patient was initially sent in by her immunologist for ID consultation. The patient was tried on Valacyclovir  for 6 weeks with no improvement, then treated with Famciclovir for 3 weeks without improvement, and was sent to the hospital for further eval. The patient states that the lesions have not changed in size for the last two months and are still painful, especially when walking. The lesions have a yellow membrane with drainage, which she believes has had minimal improvement. She denies any F NVD CP SOB abd pain, HA, change in vision. +Dysuria States lidocaine helps with the pain.    PAST MEDICAL & SURGICAL HISTORY:  Genital herpes  HIV (human immunodeficiency virus infection)  No significant past surgical history      FAMILY HISTORY:  Mother-  HIV      SOCIAL HISTORY:  Denies EtOH, Smoking  Nursing student    Allergies    No Known Allergies    Intolerances      REVIEW OF SYSTEMS    CONSTITUTIONAL:  Denies f nvd chills  HEENT:  Eyes:  Denies visual loss, blurred vision, double vision or scleral icterus. Ears, Nose, Throat:  Denies hearing loss, sneezing, congestion, runny nose or sore throat.  SKIN:  see hpi  CARDIOVASCULAR:  Denies chest pain, BURAK  RESPIRATORY:  Denies shortness of breath, cough or sputum.  GASTROINTESTINAL:  Denies anorexia, nausea, vomiting or diarrhea. No abdominal pain or blood.  GENITOURINARY:  see hpi  NEUROLOGICAL:  Denies headache, dizziness, syncope, paralysis, ataxia, numbness or tingling in the extremities. No change in bowel or bladder control.  MUSCULOSKELETAL:  Denies muscle, back pain, joint pain or stiffness.  HEMATOLOGIC:  Denies anemia, bleeding or bruising.  LYMPHATICS:  Denies enlarged nodes.   PSYCHIATRIC:  Denies history of depression or anxiety.  ENDOCRINOLOGIC:  Denies reports of sweating, cold or heat intolerance. No polyuria or polydipsia.  ALLERGIES:  Denies history of asthma, hives, eczema or rhinitis.    OBJECTIVE:  Vital Signs Last 24 Hrs  T(C): 37 (03 Apr 2019 17:13), Max: 37 (03 Apr 2019 17:13)  T(F): 98.6 (03 Apr 2019 17:13), Max: 98.6 (03 Apr 2019 17:13)  HR: 78 (03 Apr 2019 17:13) (61 - 78)  BP: 140/76 (03 Apr 2019 17:13) (128/75 - 151/90)  BP(mean): --  ABP: --  ABP(mean): --  RR: 18 (03 Apr 2019 17:13) (16 - 18)  SpO2: 100% (03 Apr 2019 17:13) (99% - 100%)        04-03 @ 07:01  -  04-03 @ 17:22  --------------------------------------------------------  IN: 0 mL / OUT: 400 mL / NET: -400 mL      CAPILLARY BLOOD GLUCOSE        PHYSICAL EXAM:    GENERAL:  Crying when talking about her history.  HEAD:  Normocephalic, atraumatic  EYES: EOMI, PERRLA  HEENT: Moist mucous membranes  NECK: Supple, No JVD  NERVOUS SYSTEM:  CN 2-12 intact b/l. Alert & Oriented X3, Motor Strength 5/5 B/L upper and lower extremities. Sensation intact B/L  CHEST/LUNG: Clear to auscultation bilaterally  HEART: Regular rate and rhythm, no murmur   ABDOMEN: Soft, Nontender, Nondistended, Bowel sounds present  EXTREMITIES:   No clubbing, cyanosis, or edema  MUSCULOSKELETAL: No muscle tenderness, no joint tenderness  : Patient deferred.  SKIN: warm and dry, no rash       LINES:     HOSPITAL MEDICATIONS:    atazanavir 300 milliGRAM(s) Oral daily  azithromycin   Tablet 1200 milliGRAM(s) Oral <User Schedule>  emtricitabine 200 mG/tenofovir alafenamide 25 mG (DESCOVY) Tablet 1 Tablet(s) Oral daily  foscarnet (Peripheral) IVPB 2000 milliGRAM(s) IV Intermittent every 8 hours  ritonavir Tablet 100 milliGRAM(s) Oral daily  trimethoprim  160 mG/sulfamethoxazole 800 mG 1 Tablet(s) Oral <User Schedule>  acetaminophen  IVPB .. 1000 milliGRAM(s) IV Intermittent once  acetaminophen  IVPB .. 1000 milliGRAM(s) IV Intermittent once  naproxen 500 milliGRAM(s) Oral every 12 hours  lidocaine 5% Ointment 1 Application(s) Topical three times a day        LABS:                        13.0   6.15  )-----------( 246      ( 02 Apr 2019 21:25 )             41.7     Hgb Trend: 13.0<--  04-02    139  |  104  |  13  ----------------------------<  82  3.7   |  21<L>  |  0.79    Ca    9.4      02 Apr 2019 21:25    TPro  7.9  /  Alb  4.2  /  TBili  0.6  /  DBili  x   /  AST  23  /  ALT  16  /  AlkPhos  66  04-02    Creatinine Trend: 0.79<--    Urinalysis Basic - ( 03 Apr 2019 13:16 )    Color: YELLOW / Appearance: TURBID / SG: > 1.040 / pH: 7.0  Gluc: NEGATIVE / Ketone: NEGATIVE  / Bili: NEGATIVE / Urobili: 1.0   Blood: SMALL / Protein: 100 / Nitrite: NEGATIVE   Leuk Esterase: LARGE / RBC: 6-10 / WBC >50   Sq Epi: FEW / Non Sq Epi: x / Bacteria: FEW            MICROBIOLOGY:   UCx pending. - - -

## 2022-02-28 DIAGNOSIS — N18.9 CHRONIC KIDNEY DISEASE, UNSPECIFIED: ICD-10-CM

## 2022-02-28 DIAGNOSIS — M79.89 OTHER SPECIFIED SOFT TISSUE DISORDERS: ICD-10-CM

## 2022-02-28 DIAGNOSIS — R61 GENERALIZED HYPERHIDROSIS: ICD-10-CM

## 2022-02-28 DIAGNOSIS — R60.0 LOCALIZED EDEMA: ICD-10-CM

## 2022-02-28 DIAGNOSIS — E11.22 TYPE 2 DIABETES MELLITUS WITH DIABETIC CHRONIC KIDNEY DISEASE: ICD-10-CM

## 2022-02-28 DIAGNOSIS — I12.9 HYPERTENSIVE CHRONIC KIDNEY DISEASE WITH STAGE 1 THROUGH STAGE 4 CHRONIC KIDNEY DISEASE, OR UNSPECIFIED CHRONIC KIDNEY DISEASE: ICD-10-CM

## 2022-02-28 DIAGNOSIS — R11.0 NAUSEA: ICD-10-CM

## 2022-03-10 ENCOUNTER — LABORATORY RESULT (OUTPATIENT)
Age: 82
End: 2022-03-10

## 2022-03-10 ENCOUNTER — APPOINTMENT (OUTPATIENT)
Dept: NEPHROLOGY | Facility: CLINIC | Age: 82
End: 2022-03-10
Payer: MEDICARE

## 2022-03-10 VITALS — WEIGHT: 187.39 LBS | BODY MASS INDEX: 29.35 KG/M2

## 2022-03-10 VITALS — SYSTOLIC BLOOD PRESSURE: 167 MMHG | DIASTOLIC BLOOD PRESSURE: 63 MMHG | HEART RATE: 61 BPM

## 2022-03-10 VITALS — DIASTOLIC BLOOD PRESSURE: 59 MMHG | HEART RATE: 60 BPM | SYSTOLIC BLOOD PRESSURE: 178 MMHG

## 2022-03-10 DIAGNOSIS — H40.059 OCULAR HYPERTENSION, UNSPECIFIED EYE: ICD-10-CM

## 2022-03-10 PROCEDURE — 99215 OFFICE O/P EST HI 40 MIN: CPT | Mod: CS,25

## 2022-03-10 PROCEDURE — 36415 COLL VENOUS BLD VENIPUNCTURE: CPT

## 2022-03-10 NOTE — ASSESSMENT
[FreeTextEntry1] : Plan:\par 1) HTN: BP is elevated in office today and fluid overloaded. Will stop HCTZ and start torsemide at 20mg BID for first two days, and continue 20mg QD. Will reassess pressure and regimen at next evaluation. \par 2) CRI: last creatinine of 1.58 with eGFR of 40 is stable for patient. Continue to monitor on repeat CMP today.\par 3) Hyperglycemia: Most recent HbA1C of 7.7%. Continue on current antihyperglycemic regimen and continue f/u with Dr. Guajardo for diabetes management.\par 4) Transplant medications: Patient will c/w prednisone, mycophenolate, and tacrolimus.\par 5) Fluid overload: patient with 2+ pitting RLE edema and edematous RUE today on physical exam. Will therefore adjust diuretic as above. Will continue to monitor at future evaluations due to risk of exacerbation of renal failure. \par \par Changes to medications: Discontinue HCTZ. Start torsemide 20mg BID for first two days, then continue with 20mg QD.\par \par Labs were drawn and patient will return in 4 weeks for a follow-up appointment.

## 2022-03-10 NOTE — PHYSICAL EXAM
[General Appearance - Alert] : alert [General Appearance - In No Acute Distress] : in no acute distress [Sclera] : the sclera and conjunctiva were normal [PERRL With Normal Accommodation] : pupils were equal in size, round, and reactive to light [Extraocular Movements] : extraocular movements were intact [Outer Ear] : the ears and nose were normal in appearance [Hearing Threshold Finger Rub Not [place]] : hearing was normal [Neck Appearance] : the appearance of the neck was normal [Neck Cervical Mass (___cm)] : no neck mass was observed [Jugular Venous Distention Increased] : there was no jugular-venous distention [Thyroid Diffuse Enlargement] : the thyroid was not enlarged [Thyroid Nodule] : there were no palpable thyroid nodules [Auscultation Breath Sounds / Voice Sounds] : lungs were clear to auscultation bilaterally [Heart Rate And Rhythm] : heart rate was normal and rhythm regular [Heart Sounds] : normal S1 and S2 [Heart Sounds Gallop] : no gallops [Murmurs] : no murmurs [Heart Sounds Pericardial Friction Rub] : no pericardial rub [Bowel Sounds] : normal bowel sounds [Abdomen Soft] : soft [Abdomen Tenderness] : non-tender [Abdomen Mass (___ Cm)] : no abdominal mass palpated [No CVA Tenderness] : no ~M costovertebral angle tenderness [No Spinal Tenderness] : no spinal tenderness [Skin Color & Pigmentation] : normal skin color and pigmentation [Skin Turgor] : normal skin turgor [] : no rash [No Focal Deficits] : no focal deficits [Oriented To Time, Place, And Person] : oriented to person, place, and time [Impaired Insight] : insight and judgment were intact [Affect] : the affect was normal [FreeTextEntry1] : presents in wheelchair, unable to bear weight on his own

## 2022-03-10 NOTE — ADDENDUM
[FreeTextEntry1] : All medical record entries made by the Scribe were at my, CHI Burns's direction and personally dictated by me on 03/10/2022. I have received the chart and agree that the record accurately reflects my personal performance of the history, physical exam, assessment, and plan. I have also personally directed, reviewed, and agreed with the chart.\par

## 2022-03-10 NOTE — END OF VISIT
[Time Spent: ___ minutes] : I have spent [unfilled] minutes of time on the encounter. [FreeTextEntry3] : Documented by Dia Saeed acting as a scribe for CHI Burns on 03/10/2022.

## 2022-03-20 LAB
24R-OH-CALCIDIOL SERPL-MCNC: 28.8 PG/ML
25(OH)D3 SERPL-MCNC: 11.8 NG/ML
ALBUMIN MFR SERPL ELPH: 57.5 %
ALBUMIN SERPL ELPH-MCNC: 3.7 G/DL
ALBUMIN SERPL-MCNC: 3.3 G/DL
ALBUMIN/GLOB SERPL: 1.3 RATIO
ALDOSTERONE SERUM: 14.7 NG/DL
ALP BLD-CCNC: 79 U/L
ALP BONE SERPL-MCNC: 13.8 UG/L
ALPHA1 GLOB MFR SERPL ELPH: 5.2 %
ALPHA1 GLOB SERPL ELPH-MCNC: 0.3 G/DL
ALPHA2 GLOB MFR SERPL ELPH: 12.9 %
ALPHA2 GLOB SERPL ELPH-MCNC: 0.7 G/DL
ALT SERPL-CCNC: 11 U/L
ANA SER IF-ACNC: NEGATIVE
ANION GAP SERPL CALC-SCNC: 11 MMOL/L
AST SERPL-CCNC: 8 U/L
B-GLOBULIN MFR SERPL ELPH: 12.6 %
B-GLOBULIN SERPL ELPH-MCNC: 0.7 G/DL
BASOPHILS # BLD AUTO: 0 K/UL
BASOPHILS NFR BLD AUTO: 0 %
BILIRUB SERPL-MCNC: 0.3 MG/DL
BUN SERPL-MCNC: 37 MG/DL
C1Q IMMUNE COMPLEX: <1.2 UG EQ/ML
C3 SERPL-MCNC: 85 MG/DL
C4 SERPL-MCNC: 22 MG/DL
CALCIUM SERPL-MCNC: 9.4 MG/DL
CALCIUM SERPL-MCNC: 9.4 MG/DL
CHLORIDE SERPL-SCNC: 106 MMOL/L
CHOLEST SERPL-MCNC: 128 MG/DL
CO2 SERPL-SCNC: 20 MMOL/L
COLLAGEN CTX SERPL-MCNC: 461 PG/ML
CREAT SERPL-MCNC: 1.65 MG/DL
CRP SERPL-MCNC: <3 MG/L
DEPRECATED KAPPA LC FREE/LAMBDA SER: 2.12 RATIO
EGFR: 41 ML/MIN/1.73M2
EOSINOPHIL # BLD AUTO: 0.12 K/UL
EOSINOPHIL NFR BLD AUTO: 2.1 %
ERYTHROCYTE [SEDIMENTATION RATE] IN BLOOD BY WESTERGREN METHOD: 32 MM/HR
ESTIMATED AVERAGE GLUCOSE: 160 MG/DL
FERRITIN SERPL-MCNC: 132 NG/ML
FOLATE SERPL-MCNC: 9.2 NG/ML
GAMMA GLOB FLD ELPH-MCNC: 0.7 G/DL
GAMMA GLOB MFR SERPL ELPH: 11.8 %
GLUCOSE SERPL-MCNC: 216 MG/DL
HBA1C MFR BLD HPLC: 7.2 %
HBV SURFACE AB SER QL: NONREACTIVE
HBV SURFACE AG SER QL: NONREACTIVE
HCT VFR BLD CALC: 31.5 %
HCV AB SER QL: NONREACTIVE
HCV S/CO RATIO: 0.08 S/CO
HCYS SERPL-MCNC: 31.8 UMOL/L
HDLC SERPL-MCNC: 67 MG/DL
HGB BLD-MCNC: 9.1 G/DL
IGA SER QL IEP: 341 MG/DL
IGG SER QL IEP: 640 MG/DL
IGM SER QL IEP: 19 MG/DL
IMM GRANULOCYTES NFR BLD AUTO: 0.2 %
INTERPRETATION SERPL IEP-IMP: NORMAL
IRON SATN MFR SERPL: 16 %
IRON SERPL-MCNC: 32 UG/DL
KAPPA LC CSF-MCNC: 4 MG/DL
KAPPA LC SERPL-MCNC: 8.5 MG/DL
LDLC SERPL CALC-MCNC: 46 MG/DL
LYMPHOCYTES # BLD AUTO: 0.81 K/UL
LYMPHOCYTES NFR BLD AUTO: 14.3 %
M PROTEIN SPEC IFE-MCNC: NORMAL
MAGNESIUM SERPL-MCNC: 2.3 MG/DL
MAN DIFF?: NORMAL
MCHC RBC-ENTMCNC: 26.1 PG
MCHC RBC-ENTMCNC: 28.9 GM/DL
MCV RBC AUTO: 90.3 FL
MONOCYTES # BLD AUTO: 0.3 K/UL
MONOCYTES NFR BLD AUTO: 5.3 %
NEUTROPHILS # BLD AUTO: 4.43 K/UL
NEUTROPHILS NFR BLD AUTO: 78.1 %
NONHDLC SERPL-MCNC: 62 MG/DL
NT-PROBNP SERPL-MCNC: 5124 PG/ML
PARATHYROID HORMONE INTACT: 107 PG/ML
PHOSPHATE SERPL-MCNC: 3 MG/DL
PLATELET # BLD AUTO: 221 K/UL
POTASSIUM SERPL-SCNC: 5.4 MMOL/L
PROT SERPL-MCNC: 5.8 G/DL
PROT SERPL-MCNC: 5.8 G/DL
PROT SERPL-MCNC: 5.9 G/DL
RBC # BLD: 3.49 M/UL
RBC # FLD: 17.2 %
RENIN PLASMA: 7.2 PG/ML
RHEUMATOID FACT SER QL: <10 IU/ML
SODIUM SERPL-SCNC: 137 MMOL/L
T3FREE SERPL-MCNC: 2 PG/ML
T3RU NFR SERPL: 0.9 TBI
T4 FREE SERPL-MCNC: 1.1 NG/DL
T4 SERPL-MCNC: 5.4 UG/DL
THYROGLOB AB SERPL-ACNC: <20 IU/ML
THYROPEROXIDASE AB SERPL IA-ACNC: <10 IU/ML
TIBC SERPL-MCNC: 194 UG/DL
TRIGL SERPL-MCNC: 78 MG/DL
TSH SERPL-ACNC: 7.99 UIU/ML
UIBC SERPL-MCNC: 162 UG/DL
URATE SERPL-MCNC: 7.9 MG/DL
VIT B12 SERPL-MCNC: 366 PG/ML
WBC # FLD AUTO: 5.67 K/UL

## 2022-03-21 PROBLEM — H40.9 UNSPECIFIED GLAUCOMA: Chronic | Status: ACTIVE | Noted: 2022-02-26

## 2022-03-21 PROBLEM — N18.9 CHRONIC KIDNEY DISEASE, UNSPECIFIED: Chronic | Status: ACTIVE | Noted: 2022-02-26

## 2022-03-22 ENCOUNTER — APPOINTMENT (OUTPATIENT)
Dept: PULMONOLOGY | Facility: CLINIC | Age: 82
End: 2022-03-22

## 2022-03-22 RX ORDER — BLOOD-GLUCOSE METER
W/DEVICE EACH MISCELLANEOUS
Qty: 1 | Refills: 0 | Status: DISCONTINUED | COMMUNITY
Start: 2022-03-10 | End: 2022-03-22

## 2022-03-24 RX ORDER — LANCETS 33 GAUGE
31G X 6 MM EACH MISCELLANEOUS
Qty: 300 | Refills: 0 | Status: ACTIVE | COMMUNITY
Start: 2020-04-30 | End: 1900-01-01

## 2022-03-27 LAB — METHYLMALONATE SERPL-SCNC: 2313 NMOL/L

## 2022-03-27 RX ORDER — BLOOD-GLUCOSE METER
W/DEVICE KIT MISCELLANEOUS
Qty: 90 | Refills: 0 | Status: ACTIVE | COMMUNITY
Start: 1900-01-01 | End: 1900-01-01

## 2022-04-26 ENCOUNTER — APPOINTMENT (OUTPATIENT)
Dept: NEPHROLOGY | Facility: CLINIC | Age: 82
End: 2022-04-26
Payer: MEDICARE

## 2022-04-26 PROCEDURE — 99442: CPT | Mod: 95

## 2022-04-26 NOTE — ASSESSMENT
[FreeTextEntry1] : Plan:\par 1) HTN: Patient to present to next office visit in 2-3 weeks with BP cuff. Will assess pressure and regimen at that time. \par 2) CRI: last creatinine of 1.65 with eGFR of 41 is stable for patient. Continue to monitor on repeat CMP at future office visits. \par 3) Hyperglycemia: Most recent HbA1C of 7.2% which is reduced from 7.7%  Continue on current antihyperglycemic regimen .\par 4) Transplant medications: Patient will c/w prednisone, mycophenolate, and tacrolimus.\par \par Changes to medications: C/w torsemide 20mg QD until reexamined.\par \par Patient will follow up in 2-3 weeks for in office appointment and will bring his BP cuff.

## 2022-04-26 NOTE — END OF VISIT
[FreeTextEntry3] : This note was written by Kat Muniz on 04/26/2022 acting as scribe for Dr. Aranda.

## 2022-04-26 NOTE — ADDENDUM
[FreeTextEntry1] : All medical record entries made by the Scribe were at my, Dr. Aranda's, discretion and personally dictated by me on 04/26/2022. I have reviewed the chart and agree that the record accurately reflects my personal performance of the history, physical exam, assessment and plan. I have also personally directed, reviewed and agreed to the chart.

## 2022-04-26 NOTE — HISTORY OF PRESENT ILLNESS
[Home] : at home, [unfilled] , at the time of the visit. [Medical Office: ([facility])___] : at the medical office located in  [Verbal consent obtained from patient] : the patient, [unfilled] [FreeTextEntry1] : The patient is an 81 year old male presenting today for f/u evaluation and active reassessment of HTN, DM, anemia, ASCVD, and h/o renal transplant, s/p 06/15/21 cardiac catheterization and angioplasty. Last seen November 2021.\par \par Patient is doing generally well today. His wife reports the swelling in his right arm and legs has improved. She reports patient's BP is low in the morning at ~110/50 but she had patient start drinking two cups of water before the reading and BP recently in the morning was ~140/68.\par \par Labs 03/10/22: RBC 3.49, HGB 9.1, HCT 31.5%, potassium 5.4, CO2 20, glucose 216, BUN 37, creatinine 1.65, total protein 5.9, AST 8, eGFR 41, intact , pro BNP 5124, Vitamin D 11.8, iron serum 32, homocysteine 31.8, TSH 7.99, SED rate 32, HGB A1C 7.2%\par \par Current Medications: atorvastatin 40mg QD, carvedilol 12.5mg BID, Plavix 75mg QD, prednisone 5mg QD, ASA 81mg QD, Flomax 0.4mg QD, Humalog 100 BID, insulin 16 units 75/25 prn, mycophenolate 500mg BID, tacrolimus 4mg BID, Dulcolax, Senna, Colace 100mg BID, vitamin D3 1000 units QD, Kayexalate 15mg QD, after initial torsemide 20mg BID patient maintained on 20mg QD

## 2022-05-13 ENCOUNTER — RX RENEWAL (OUTPATIENT)
Age: 82
End: 2022-05-13

## 2022-06-14 NOTE — HISTORY OF PRESENT ILLNESS
[FreeTextEntry1] : The patient is an 81 year old male presenting today for f/u evaluation and active reassessment of HTN, DM, anemia, ASCVD, and h/o renal transplant, s/p 06/15/21 cardiac catheterization and angioplasty. \par \par The patient is feeling generally well and presents with his wife who is the primary historian today. The patient’s wife presents home BP log and reports episodes of morning hypotension during which times she administers salty snacks to increase his pressure. She also notes he has hypertension later in the day at which time, she administers carvedilol. She also reports frequent nocturia, which she attributes to his torsemide. She also notes that he has insomnia. He does drink coffee in the afternoon and in the evening, and has chocolate daily. In the evenings, he attends Spiritism social gathering until about 9:30pm and reclines until approx 11pm after which he takes his final medications. His wife also states that he naps frequently throughout the day. Patient reportedly has good appetite though eats much less than he used to.\par \par  Current Medications: atorvastatin 40mg QD, carvedilol 12.5mg BID, Plavix 75mg QD, prednisone 5mg QD, ASA 81mg QD, Flomax 0.4mg QD, Humalog 100 BID, insulin 16 units 75/25 prn, mycophenolate 500mg BID, tacrolimus 4mg BID, Dulcolax, Senna, Colace 100mg BID, vitamin D3 1000 units QD, Kayexalate 15mg QD, torsemide 40mg QD

## 2022-06-14 NOTE — ASSESSMENT
[FreeTextEntry1] : Plan:\par 1) HTN:BP acceptable today on curre nt regimen though patient has morning hypotension. Will therefore lower evening carvedilol dose to 6.25mg with plan to adjust dose pending results. Will also decrease torsemide to 40mg QD except for Wednesday and Saturday. Will reassess regimen and pressure at next visit.\par 2) CRI: last creatinine of 1.65 with eGFR of 41 is stable for patient. Will coninue to monitor on repeat CMP at future office visits. If his creatinine increases, may plan to order US Kidney/Bladder. Will advise further pending labs. \par 3) Hyperglycemia: Most recent HbA1C of 7.2% which is reduced from 7.7% Continue on current antihyperglycemic regimen.\par 4) Transplant medications: Patient will c/w prednisone, mycophenolate, and tacrolimus.\par 5) Insomnia: Advised to switch to decaf coffee and avoid other caffeinated drinks/foods. Patient also encouraged to change evening routines to promote better sleep, and to avoid daytime naps.\par \par Changes in medication:\par - increase torsemide 40mg QD except for Wednesday and Saturday\par - lower Carvedilol tp 12.5 QAM + 6.25 mg QPM\par \par Labs were drawn and the patient will follow up in July for the follow up evaluation.

## 2022-06-14 NOTE — END OF VISIT
[Time Spent: ___ minutes] : I have spent [unfilled] minutes of time on the encounter. [FreeTextEntry3] : Documented by Dia Saeed acting as a scribe for CHI Burns on 06/14/2022.

## 2022-06-14 NOTE — ADDENDUM
[FreeTextEntry1] : All medical record entries made by the Scribe were at my, CHI Burns's direction and personally dictated by me on 06/14/2022. I have received the chart and agree that the record accurately reflects my personal performance of the history, physical exam, assessment, and plan. I have also personally directed, reviewed, and agreed with the chart.

## 2022-06-14 NOTE — PHYSICAL EXAM
[General Appearance - Alert] : alert [General Appearance - In No Acute Distress] : in no acute distress [Sclera] : the sclera and conjunctiva were normal [PERRL With Normal Accommodation] : pupils were equal in size, round, and reactive to light [Extraocular Movements] : extraocular movements were intact [Outer Ear] : the ears and nose were normal in appearance [Hearing Threshold Finger Rub Not [place]] : hearing was normal [Neck Appearance] : the appearance of the neck was normal [Neck Cervical Mass (___cm)] : no neck mass was observed [Thyroid Diffuse Enlargement] : the thyroid was not enlarged [Jugular Venous Distention Increased] : there was no jugular-venous distention [Thyroid Nodule] : there were no palpable thyroid nodules [Auscultation Breath Sounds / Voice Sounds] : lungs were clear to auscultation bilaterally [Heart Rate And Rhythm] : heart rate was normal and rhythm regular [Heart Sounds] : normal S1 and S2 [Heart Sounds Gallop] : no gallops [Murmurs] : no murmurs [Heart Sounds Pericardial Friction Rub] : no pericardial rub [Bowel Sounds] : normal bowel sounds [Abdomen Soft] : soft [Abdomen Tenderness] : non-tender [Abdomen Mass (___ Cm)] : no abdominal mass palpated [No CVA Tenderness] : no ~M costovertebral angle tenderness [No Spinal Tenderness] : no spinal tenderness [Abnormal Walk] : normal gait [Involuntary Movements] : no involuntary movements were seen [Musculoskeletal - Swelling] : no joint swelling seen [Motor Tone] : muscle strength and tone were normal [Skin Color & Pigmentation] : normal skin color and pigmentation [Skin Turgor] : normal skin turgor [] : no rash [No Focal Deficits] : no focal deficits [Oriented To Time, Place, And Person] : oriented to person, place, and time [Impaired Insight] : insight and judgment were intact [Affect] : the affect was normal [FreeTextEntry1] : trace 1+ edema RLE trace edema LLE decreased from the last exam

## 2022-07-14 NOTE — ED PROVIDER NOTE - PATIENT PORTAL LINK FT
You can access the FollowMyHealth Patient Portal offered by [facility] by registering at the following website: [email or web address]. By joining [employer]’s FollowMyHealth portal, you will also be able to view your health information using other applications (apps) compatible with our system.

## 2022-07-14 NOTE — ED ADULT NURSE NOTE - OBJECTIVE STATEMENT
82 y.o M a&ox4 BIBA c.o abnormal lab. sent in by MD feliciano. as per wife at bedside Pt had low BP in the officce and abnormal labs revealing kidney failure. denies complaints at this time. speaking in clear, full coherent sentences. airway patent. breathing unlabored. PMH kidney transplant.

## 2022-07-14 NOTE — ED PROVIDER NOTE - CARE PROVIDER_API CALL
Callum Aranda (MD)  Internal Medicine; Nephrology  [address], [address], [place] [address]  Phone: [phone]  Fax: [phone]  Follow Up Time:

## 2022-07-14 NOTE — ED ADULT NURSE NOTE - NS ED NURSE RECORD ANOTHER HT AND WT
Called and LMOM. Reviewed interrogation, rates are somewhat improved. [place] 27%. Ablation has been discussed and recommended for symptomatic AF. Will call patient to discuss ablation Monday.      MARLENE Torrez-CNP Yes

## 2022-07-14 NOTE — ED PROVIDER NOTE - NS ED ATTENDING STATEMENT MOD
This was a shared visit with the NIKKY. I reviewed and verified the documentation and independently performed the documented:

## 2022-07-14 NOTE — ED PROVIDER NOTE - OBJECTIVE STATEMENT
The pt is a 81 y/o M, who presents to ED w/wife, stating "kidney failure"-- had blood work done at outside facility and called back and told that "kidney failure", unsure of values. PMH HTN, HLD, CKD (cr ~2), CAD, anemia (hgb ~9). Pt has had a dry cough x 6 wks, took 2 courses of abx (dr duarte Rx'd), the cough is improved. Wife states that was instructed by dr duarte to come to ED today. Denies fevers, chills, n/v/d, abd pain, falls, dizziness

## 2022-07-14 NOTE — ED PROVIDER NOTE - CLINICAL SUMMARY MEDICAL DECISION MAKING FREE TEXT BOX
pt had outpatient labs and called about "abnormal kidney function" - sent by dr duarte, recent uri (was given 2 rounds of abx), pt well appearing, non toxic, afebrile, no resp distress, all labs @ pt's baseline w/o any acute changes, all results discussed w/pmd and stable for dc

## 2022-07-14 NOTE — ED PROVIDER NOTE - PCP FREE TEXT FOR MDM DISCUSSED CASE WITH QUESTION
dr duarte -- all labs discussed - all at pt's baseline, agrees w/dc home and will ensure outpatient f/u

## 2022-07-14 NOTE — ED PROVIDER NOTE - ATTENDING APP SHARED VISIT CONTRIBUTION OF CARE
Pt with h/o htn, hld, ckd, s/p kidney transplant, had outpt labs last wk and told to have arf. + cough, no f/c. No abd pain, dysuria, hematuria, flank pain. AVSS. PE as above. Cr and hg at baseline. No indications for admission or further work up at this time. Plan for dc home w/ outpt f/u with Dr. Beckett. Pt given return precautions and is understanding of discharge plan of care.

## 2022-07-14 NOTE — ED ADULT NURSE NOTE - NSIMPLEMENTINTERV_GEN_ALL_ED
Implemented All Fall Risk Interventions:  [place] to call system. Call bell, personal items and telephone within reach. Instruct patient to call for assistance. Room bathroom lighting operational. Non-slip footwear when patient is off stretcher. Physically safe environment: no spills, clutter or unnecessary equipment. Stretcher in lowest position, wheels locked, appropriate side rails in place. Provide visual cue, wrist band, yellow gown, etc. Monitor gait and stability. Monitor for mental status changes and reorient to person, place, and time. Review medications for side effects contributing to fall risk. Reinforce activity limits and safety measures with patient and family.

## 2022-07-28 NOTE — ED PROVIDER NOTE - CADM POA CENTRAL LINE
Patient returned call and can be contacted back at [phone]. Clinical staff was unavailable.    No

## 2022-08-05 PROBLEM — M25.529 ELBOW PAIN: Status: ACTIVE | Noted: 2022-01-01

## 2022-10-13 NOTE — PHYSICAL EXAM
[TextBox_68] : \par adequate air entry, conductive sounds appreciated on both sides, few inspiratory crackles, no wheezing

## 2022-10-13 NOTE — ASSESSMENT
[FreeTextEntry1] : 10-13-22:\par \par It was a pleasure to meet Octaviano today in consultation. His respiratory issues are summarized:\par \par 1. Cough\par Octaviano has had a productive cough for 4 months. It is extensive. It appears to have increased in intensity. He has had a cough chronically waxing and waning over the past several years. He had COVID in March 2020 and January 2022. We have reviewed his CT of the chest, which shows a significantly dilated esophagus. Also, the CT shows an area of consolidation with perilobular fibrosis and possible reverse halo sign at the right base. This may be seen in patients who have recurrent aspiration or organizing pneumonia. However in a person who is on immunosuppressives (Cellcept and Tacrolimus), focal infections such as a bacterial pneumonia including Nocardia cannot be ruled out.\par \par Our differentials include: \par a. Acid reflux. Octaviano has a dilated esophagus on CT. He coughs more when he lays down on his right side. He coughs after eating and drinking. He has lost 20 pounds since July when he broke his arm. His appetite has been decreased.  \par b. Organizing pneumonia at the right base 2/2 COVID-19.\par c. Bacterial/fungal infection of the lung including JOSE ALBERTO \par d. Bilateral effusions. He has renal failure. Pro-BNP is elevated. He has grade 2 diastolic dysfunction\par \par Plan:\par - We will order baseline PFT to evaluate Octaviano's lung function.  We will hold off ordering a 6MWT until he using a walker.\par - He needs an urgent GI evaluation with Dr. Master Smith\par - We will send sputum for bacteria, AFB and fungal elements. If sputum is unrevealing we will refer Octaviano for bronchoscopic evaluation (TBBX, BAL)\par - He should make an appointment with cardiologist, Dr. Galindo as he has not been seen in over a year. He will also need cardiology clearance for bronchoscopy. We will check pro-BNP today.\par - We discussed giving Octaviano an antibiotic for possible aspiration. However, we will hold off as he is not clinically ill appearing. We will check CBC and procalcitonin.\par \par 2. Former smoker\par Octaviano has a 35-40 pack year smoking history. He stopped smoking 30 years ago. We will order PFT to evaluate his lung function.\par \par Return to clinic: 4 weeks

## 2022-10-13 NOTE — HISTORY OF PRESENT ILLNESS
[TextBox_4] : 81 yo M kidney transplant 10 years ago, diabetes, CAD s/p percutaneous coronary angioplasty\par He has a cough for the past 4 months, congestion, productive. Sputum is clear, light green, yellow\par He is wheelchair bound since July, used to \par He is legally blind\par He broke his right arm early July\par He has shortness of breath with any exertion\par He had COVID at the beginning of the pandemic, March/April 2020\par He stopped smoking 30 years ago. 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 He has a decreased appetite.\par He had COVID-19 a second time in January 2022. As per discharge summary, "He was admitted for acute hypoxic respiratory failure, 2/2 covid. Patient started on course of decadron but not candidate for remdesivir or tocilizumab considering renal function. Patient initially requiring bipap but quickly transitioned to NRB followed by nasal cannula. On day of discharge, patient stable on 2L NC, plan to go home with home O2." He was seen after discharge by Dr. Prather.\par \par Chest CT 2/24/22 bilateral effusions (right greater than left), some reticulation seen in right and left upper lobe with a bronchocentric pattern. There are cysts in the right upper lobe, significantly dilated esophagus. The lung window does not go to the base of the lungs. There is significant coronary calcifications. Lymphadenopathy in the mediastinum\par \par Current Medications: atorvastatin 40mg QD, carvedilol 12.5mg BID, Plavix 75mg QD, prednisone 5mg QD, ASA 81mg QD, Flomax 0.4mg QD, Humalog 100 BID, insulin 16 units 75/25 prn, mycophenolate 500mg BID, tacrolimus 4mg BID, Dulcolax, Senna, Colace 100mg BID, vitamin D3 1000 units QD, Kayexalate 15mg QD, torsemide 40mg QD

## 2022-10-13 NOTE — CONSULT LETTER
[Dear  ___] : Dear ~SRINATH, [Consult Letter:] : I had the pleasure of evaluating your patient, [unfilled]. [Please see my note below.] : Please see my note below. [Consult Closing:] : Thank you very much for allowing me to participate in the care of this patient.  If you have any questions, please do not hesitate to contact me. [FreeTextEntry2] : Callum Aranda MD

## 2022-10-13 NOTE — DISCUSSION/SUMMARY
[FreeTextEntry1] : ATTENDING'S SUMMARY:\par 10-13-22:\par mild pallor, no icterus\par no JVD, no hepatojugular reflux, no HSM\par no cervical adenopathy, no supraclavicular adenopathy\par normal s1/s2, no murmurs, rubs or gallops\par adequate air entry, conductive sounds appreciated on both sides, few inspiratory crackles, no wheezing\par no cyanosis, no clubbing, no articular manifestations, no thickening of the skin, minimal coarse tremors noted in both hands\par no pedal edema

## 2022-10-13 NOTE — REVIEW OF SYSTEMS
[Recent Wt Loss (___ Lbs)] : ~T recent [unfilled] lb weight loss [Cough] : cough [Sputum] : sputum [Dyspnea] : dyspnea [SOB on Exertion] : sob on exertion [Anemia] : anemia

## 2022-10-14 PROBLEM — R68.81 EARLY SATIETY: Status: ACTIVE | Noted: 2022-01-01

## 2022-10-14 PROBLEM — R09.89 LABILE BLOOD PRESSURE: Status: ACTIVE | Noted: 2017-01-17

## 2022-10-17 NOTE — PATIENT PROFILE ADULT - NSTRANSFERBELONGINGSRESP_GEN_A_NUR
Called patient with her son on the line and reviewed PET scan results with them in detail. Answered all questions. Agreeable to proceed with supraclavicular LN biopsy for diagnosis. Order placed, I will follow up with them with results.      Monica Johnson MD  Pulmonary and Critical Care Medicine  [facility]  Office: [phone]       yes

## 2022-10-19 NOTE — ED ADULT NURSE NOTE - CCCP TRG CHIEF CMPLNT
abnormal lab result Bactrim Pregnancy And Lactation Text: This medication is Pregnancy Category D and is known to cause fetal risk.  It is also excreted in breast milk.

## 2022-10-21 NOTE — H&P ADULT - NSHPSOCIALHISTORY_GEN_ALL_CORE
Lives at home with wife. Lives at home with wife. Former smoker, 40 pack year history, quit 25 years ago. Denies alcohol use or recreational drug use. Has HHA 6 hours/day, 5 days/week. Lives at home with wife. Former smoker, 40 pack year history, quit 25 years ago. Denies alcohol use or recreational drug use. Has HHA 6 hours/day, 5 days/week. Wife otherwise assists with ADLs.

## 2022-10-21 NOTE — H&P ADULT - HISTORY OF PRESENT ILLNESS
81 y/o M, w HTN, HLD, CKD (cr ~2), DMT1, glaucoma (legally blind), CAD s/p DESx2 pLAD and D2 in 6/2021, anemia (hgb ~9) history of renal transplant at [facility] in 2013, complaining of increasing lethargy over the past 3-4 days. Patient ambulated with walker until two months ago when he fell and broke his left arm. Since then patient has been wheelchair bound. Eats small meals- has lost 20lbs over the last few months. Wife states that patient has become more lethargic over last few days and not eating that much. Last meal was beef last night. Has had chronic cough for last 4 months and is being worked up by pulm outpatient. Was started on Cipro last night for what wife was told was pna but only took one dose. Wife called EMS today for fingerstick of 59 and when EMS came and brought patient to ED. At bedside, wife states that patient looks closer to his baseline than he did this morning when she took the fingerstick. Denies headaches, fevers, chills, chest pain, shortness of breath, N/V/D, abd pain, urinary symptoms, or other symptoms.     ED Course:   Vitals: T 93.5, hr 56, /68 rr 20 SpO2 95%  Labs notable for Hgb 8.6 Cr 2.38 VBG PH 7.18 no gap, PCO2 37 PO2 61 Lactate wnl  Interventions: vanc, zosyn x1. 1L NS bolus    Patient admitted to [facility] for further management. 81 y/o M, w HTN, HLD, CKD stage 4 (cr ~2), DMT1, glaucoma (legally blind), CAD s/p DESx2 pLAD and D2 in 6/2021, anemia (hgb ~9), BPH, history of renal transplant at [facility] in 2013, complaining of increasing lethargy over the past 3-4 days. Patient ambulated with walker until two months ago when he fell and broke his left arm. Since then patient has been wheelchair bound. Eats small meals- has lost 20lbs over the last few months. Wife states that patient has become more lethargic over last few days and not eating that much. Last meal was beef last night. Has had chronic cough for last 4 months and is being worked up by pulm outpatient. Was started on Cipro last night for what wife was told was pna but only took one dose. Wife called EMS today for fingerstick of 59 and when EMS came and brought patient to ED. At bedside, wife states that patient looks closer to his baseline than he did this morning when she took the fingerstick. Denies headaches, fevers, chills, chest pain, shortness of breath, N/V/D, abd pain, urinary symptoms, or other symptoms.     ED Course:   Vitals: T 93.5, hr 56, /68 rr 20 SpO2 95%  Labs notable for Hgb 8.6 Cr 2.38 VBG PH 7.18 no gap, PCO2 37 PO2 61 Lactate wnl  Interventions: vanc, zosyn x1. 1L NS bolus    Patient admitted to [facility] for further management. 83 y/o M, w HTN, HLD, CKD stage 4 (cr ~2), DMT1, glaucoma (legally blind), CAD s/p DESx2 pLAD and D2 in 6/2021, anemia (hgb ~9), BPH, history of renal transplant at [facility] in 2013, complaining of increasing lethargy over the past 3-4 days. Patient ambulated with walker until two months ago when he fell and broke his left arm. Since then patient has been wheelchair bound. Eats small meals- has lost 20lbs over the last few months. Wife states that patient has become more lethargic over last few days and not eating that much. Last meal was beef last night. Has had chronic cough for last 4 months and is being worked up by pulm outpatient. Was started on Cipro last night for what wife was told was pna but only took one dose. Wife called EMS today for fingerstick of 59 and when EMS came and brought patient to ED. At bedside, wife states that patient looks closer to his baseline than he did this morning when she took the fingerstick. Denies headaches, fevers, chills, chest pain, shortness of breath, N/V/D, abd pain, urinary symptoms, or other symptoms.     ED Course:   Vitals: T 93.5, hr 56, /68 rr 20 SpO2 95%  Labs notable for Hgb 8.6 Cr 2.38 VBG PH 7.18 no gap, PCO2 37 PO2 61 Lactate wnl  CXR (compare to 9/2022): Unremarkable cardiomediastinal silhouette. Patchy reticular opacities bilaterally, most prominent RLL, not significantly changed from prior study. No pleural effusions or PTX.    No acute abnormalities of the soft tissues and osseous structures.  Interventions: vanc, zosyn x1. 1L NS bolus    Patient admitted to [facility] for further management.

## 2022-10-21 NOTE — H&P ADULT - PROBLEM SELECTOR PLAN 7
S/p DESx2 pLAD, D2 6/2021.  Home meds ASA 81 mg po qd, atorvastatin 40 mg po qd, carvedilol 12.5 mg po qd, lasix 20 mg po qd S/p DESx2 pLAD, D2 6/2021.  Home meds ASA 81 mg po qd, atorvastatin 40 mg po qd, carvedilol 12.5 mg po qd, lasix 20 mg po qd  - continue home meds

## 2022-10-21 NOTE — ED PROVIDER NOTE - OBJECTIVE STATEMENT
pt is a 83 y/o M, w HTN, HLD, CKD (cr ~2), CAD, anemia (hgb ~9) 83 y/o M, w HTN, HLD, CKD (cr ~2), CAD, anemia (hgb ~9) ho renal transplant- co increasing lethargy over the past 3-4 days- at baseline px is wheelchair bound- eats small meals- has lost 20lbs over the last few months- no f/c no n/v at home  change is less active- harder to awaken- increase in sleep  mod-severe  started on cipro 1 day ago for sputum findings has chronic cough 81 y/o M, w HTN, HLD, CKD (cr ~2), CAD, anemia (hgb ~9) ho renal transplant- co increasing lethargy over the past 3-4 days- at baseline px is wheelchair bound- eats small meals- has lost 20lbs over the last few months- no f/c no n/v at home  change is less active- harder to awaken- increase in sleep  mod-severe  started on cipro 1 day ago for sputum findings has chronic cough  px probably has aspiration pneumonia- given recent CT

## 2022-10-21 NOTE — H&P ADULT - PROBLEM SELECTOR PLAN 1
Consult placed to wound care for assessment and education of groin and abd incisions. Patient needs to be able to do wound care at home independently and is still needing moderate intervention from nursing staff. Patient with a cough for 4 months per wife and was followed by Dr. Solomon outpatient. Reports that sputum was received yesterday and was started on Cipro for concern for aspiration pna. On HIE chart review confirms that patient has had chronic cough for 4 months with no definite etiology identified. however, sputum from 10/13 grew pseudomonas Patient reports increasing sputum production since cough first began  -pan sensitive pseudomonas--rec Zosyn 4.5g i/s/o immunocompromised state  -on room air at this time and denies any sob, cp, chest tightness, and protecting airway.   -f/u bcx  -recommend hydrocortisone 100mg q8h Patient with a cough for 4 months per wife and was followed by Dr. Solomon outpatient. Reports that sputum was received yesterday and was started on Cipro for concern for aspiration pna. On HIE chart review confirms that patient has had chronic cough for 4 months with no definite etiology identified. however, sputum from 10/13 grew pseudomonas. Patient reports increasing sputum production since cough first began  -pan sensitive pseudomonas--Zosyn 4.5g i/s/o immunocompromised state  -on room air at this time and denies any sob, cp, chest tightness, and protecting airway.   -f/u bcx  -recommend hydrocortisone 100mg q8h Patient with a cough for 4 months per wife and was followed by Dr. Solomon outpatient. Reports that sputum was received yesterday and was started on Cipro for concern for aspiration pna. On HIE chart review confirms that patient has had chronic cough for 4 months with no definite etiology identified. however, sputum from 10/13 grew pseudomonas. Patient reports increasing sputum production since cough first began.  -pan sensitive pseudomonas--Zosyn 4.5g i/s/o immunocompromised state  -on room air at this time, denies sob, cp, chest tightness, and protecting airway.   -f/u bcx    #hypothermia  T 93.5 on admission, improved to 96 with david mckeon  -hydrocortisone 100mg q8h due to concern for adrenal insufficiency

## 2022-10-21 NOTE — H&P ADULT - PROBLEM SELECTOR PLAN 3
Patient with VBG PH 7.18 with no anion gap. On chart review prior VBG 1/2022 with VPH PH 7.13. Patient denies any significant diarrhea  ABG: pH 7.23, pCO2 31, pO2 99, HCO3 13, O2 sat 96.8  -recommend further workup with urine studies to evaluate cause of NAGMA. Consider RTA in patient with hx of renal transplant.    -1 amp of bicarb for NAGMA   -primary team med rec

## 2022-10-21 NOTE — H&P ADULT - PROBLEM SELECTOR PLAN 4
Home med humalog 12-16 u tid (sliding scale)  - JENNIE while inpatient Home med humalog 12-16 u tid (sliding scale)  - JENNIE while inpatient  - f/u A1c

## 2022-10-21 NOTE — H&P ADULT - PROBLEM/PLAN-7
Writer spoke with Ap Aguila in [address] [place] [address] regarding patient's ordered IR thyroid biopsy. Per Ap Aguila, will plan to schedule patient from tomorrow, 12/21/2019, around 0800. Per Ap Aguila, patient does not need to be NPO. SubQ heparin will need to be held tomorrow morning prior to the biopsy. Will update patient on plan of care. DISPLAY PLAN FREE TEXT

## 2022-10-21 NOTE — ED ADULT NURSE NOTE - OBJECTIVE STATEMENT
81 y/o male c/o generalized weakness, cough, and lethargy progressing over 3 days. Denies fevers, vomiting, dizziness. Reports glucose levels have been on the lower side.

## 2022-10-21 NOTE — CONSULT NOTE ADULT - SUBJECTIVE AND OBJECTIVE BOX
INTERNAL MEDICINE SERVICE INITIAL CONSULT NOTE    HPI:   81 y/o M, w HTN, HLD, CKD (cr ~2), CAD, anemia (hgb ~9) ho renal transplant- co increasing lethargy over the past 3-4 days- at baseline px is wheelchair bound- eats small meals- has lost 20lbs over the last few months. Wife states that patient has become more lethargic over last few days and not eating that much. Last meal was beef last night. Has had chronic cough for last 4 months and is being worked up by pulm outpatient. Was started on Cipro last night for what wife was told was pna but only took one dose. Wife called EMS today for fingerstick of 59 and when EMS came and brought patient to ED. At bedside, wife states that patient looks closer to his baseline than he did this morning when she took the fingerstick. No diarrhea, fevers, chills, n/v/, abd pain, cp, sob, or any other sx.     ED Course:   Vitals notable for T 93.5, bradycardia  ordered for vanc, zosyn 1L NS   Labs notable for Hgb 8.6 Cr 2.38 VBG PH 7.18 no gap, PCO2 37 PO2 61 Lactate wnl      REVIEW OF SYSTEMS:   Otherwise negative except as specified in HPI    PAST MEDICAL HISTORY:     PAST SURGICAL HISTORY:    FAMILY HISTORY:    SOCIAL HISTORY:  Tobacco use:  EtOH use:  Illicit drug use:    MEDICATIONS:  MEDICATIONS  (STANDING):  vancomycin  IVPB. 1000 milliGRAM(s) IV Intermittent once    MEDICATIONS  (PRN):      ALLERGIES:  Allergies    No Known Allergies    Intolerances        VITAL SIGNS:  Vital Signs Last 24 Hrs  T(C): 34.2 (21 Oct 2022 12:48), Max: 34.2 (21 Oct 2022 12:48)  T(F): 93.5 (21 Oct 2022 12:48), Max: 93.5 (21 Oct 2022 12:48)  HR: 55 (21 Oct 2022 13:51) (55 - 56)  BP: 119/62 (21 Oct 2022 13:51) (119/62 - 148/68)  BP(mean): --  RR: 20 (21 Oct 2022 13:51) (20 - 20)  SpO2: 97% (21 Oct 2022 13:51) (95% - 97%)    Parameters below as of 21 Oct 2022 13:51  Patient On (Oxygen Delivery Method): room air          PHYSICAL EXAM:  Constitutional: comfortably resting in bed.   Head: NC/AT  Eyes: PERRL, EOMI, anicteric sclera  Respiratory: CTA B/L; no W/R/R, no retractions  Cardiac: +S1/S2; RRR; no M/R/G; PMI non-displaced  Gastrointestinal: abdomen soft, NT/ND; no rebound or guarding; +BSx4  Extremities: WWP, no clubbing or cyanosis; no peripheral edema  Musculoskeletal: NROM x4; no joint swelling, tenderness or erythema  Neurologic: no focal deficits ***    LABS:                        8.6    8.05  )-----------( 212      ( 21 Oct 2022 12:55 )             28.4     10-21    141  |  112<H>  |  62<H>  ----------------------------<  92  4.9   |  16<L>  |  2.38<H>    Ca    9.2      21 Oct 2022 12:55    TPro  5.8<L>  /  Alb  3.2<L>  /  TBili  <0.2  /  DBili  x   /  AST  8<L>  /  ALT  6<L>  /  AlkPhos  102  10-21            CAPILLARY BLOOD GLUCOSE      POCT Blood Glucose.: 81 mg/dL (21 Oct 2022 11:07)          RADIOLOGY & ADDITIONAL TESTS: Reviewed. INTERNAL MEDICINE SERVICE INITIAL CONSULT NOTE    HPI:   83 y/o M, w HTN, HLD, CKD (cr ~2), CAD, anemia (hgb ~9) ho renal transplant- co increasing lethargy over the past 3-4 days- at baseline px is wheelchair bound- eats small meals- has lost 20lbs over the last few months. Wife states that patient has become more lethargic over last few days and not eating that much. Last meal was beef last night. Has had chronic cough for last 4 months and is being worked up by pulm outpatient. Was started on Cipro last night for what wife was told was pna but only took one dose. Wife called EMS today for fingerstick of 59 and when EMS came and brought patient to ED. At bedside, wife states that patient looks closer to his baseline than he did this morning when she took the fingerstick. No diarrhea, fevers, chills, n/v/, abd pain, cp, sob, or any other sx.     ED Course:   Vitals notable for T 93.5, bradycardia  ordered for vanc, zosyn 1L NS   Labs notable for Hgb 8.6 Cr 2.38 VBG PH 7.18 no gap, PCO2 37 PO2 61 Lactate wnl      REVIEW OF SYSTEMS:   Otherwise negative except as specified in HPI    PAST MEDICAL HISTORY:     PAST SURGICAL HISTORY:    FAMILY HISTORY:    SOCIAL HISTORY:  Tobacco use:  EtOH use:  Illicit drug use:    MEDICATIONS:  MEDICATIONS  (STANDING):  vancomycin  IVPB. 1000 milliGRAM(s) IV Intermittent once    MEDICATIONS  (PRN):      ALLERGIES:  Allergies    No Known Allergies    Intolerances        VITAL SIGNS:  Vital Signs Last 24 Hrs  T(C): 34.2 (21 Oct 2022 12:48), Max: 34.2 (21 Oct 2022 12:48)  T(F): 93.5 (21 Oct 2022 12:48), Max: 93.5 (21 Oct 2022 12:48)  HR: 55 (21 Oct 2022 13:51) (55 - 56)  BP: 119/62 (21 Oct 2022 13:51) (119/62 - 148/68)  BP(mean): --  RR: 20 (21 Oct 2022 13:51) (20 - 20)  SpO2: 97% (21 Oct 2022 13:51) (95% - 97%)    Parameters below as of 21 Oct 2022 13:51  Patient On (Oxygen Delivery Method): room air          PHYSICAL EXAM:  Constitutional: comfortably resting in bed.   Head: NC/AT  Eyes: PERRL, EOMI, anicteric sclera  Respiratory: CTA B/L; no W/R/R, no retractions  Cardiac: +S1/S2; RRR; no M/R/G; PMI non-displaced  Gastrointestinal: abdomen soft, NT/ND; no rebound or guarding; +BSx4  Extremities: WWP, no clubbing or cyanosis; no peripheral edema  Musculoskeletal: NROM x4; no joint swelling, tenderness or erythema  Neurologic: no focal deficits AAOx3    LABS:                        8.6    8.05  )-----------( 212      ( 21 Oct 2022 12:55 )             28.4     10-21    141  |  112<H>  |  62<H>  ----------------------------<  92  4.9   |  16<L>  |  2.38<H>    Ca    9.2      21 Oct 2022 12:55    TPro  5.8<L>  /  Alb  3.2<L>  /  TBili  <0.2  /  DBili  x   /  AST  8<L>  /  ALT  6<L>  /  AlkPhos  102  10-21            CAPILLARY BLOOD GLUCOSE      POCT Blood Glucose.: 81 mg/dL (21 Oct 2022 11:07)          RADIOLOGY & ADDITIONAL TESTS: Reviewed.

## 2022-10-21 NOTE — ED PROVIDER NOTE - ST/T WAVE
I will call pt to see if he got the medication since his Testosterone level was sent t inv inferiorly

## 2022-10-21 NOTE — H&P ADULT - PROBLEM SELECTOR PLAN 9
Home medications Home medications carvedilol 12.5 mg po q12h, lasix 20 mg po qd  -continue home meds      #BPH  Home med tamsulosin 0.4 mg oral capsule: 1 cap(s) orally once a day (at bedtime)  - continue home medication

## 2022-10-21 NOTE — CONSULT NOTE ADULT - ASSESSMENT
81 y/o M, w HTN, HLD, CKD (cr ~2), CAD, anemia (hgb ~9) ho renal transplant- co increasing lethargy over the past 3-4 days- at baseline px is wheelchair bound- eats small meals- has lost 20lbs over the last few months. ICU consulted for worsening lethargy i/s/o complicated medical hx     #Failure to thrive       #ELIZABETH on CKD       # 81 y/o M, w HTN, HLD, CKD (cr ~2), CAD, anemia (hgb ~9) ho renal transplant- co increasing lethargy over the past 3-4 days- at baseline px is wheelchair bound- eats small meals- has lost 20lbs over the last few months. ICU consulted for worsening lethargy i/s/o likely pna and complicated medical hx     #pseudomonal pna  Patient with a cough for 4 months per wife and was followed by Dr. Solomon outpatient. Reports that sputum was received yesterday and was started on Cipro for concern for aspiration pna. On HIE chart review confirms that patient has had chronic cough for 4 months with no definite etiology identified. however, sputum from 10/13 grew pseudomonas Patient reports increasing sputum production since cough first began  -zosyn pseudomonal dosing 4.5g renally dosed   -on room air at this time and denies any sob, cp, chest tightness, and protecting airway.   -f/u bcx    #NAGMA   patient with VBG PH 7.18 with no anion gap. On chart review prior VBG 1/2022 with VPH PH 7.13. Patient denies any significant diarrhea  -recommend further workup with ABG, urine studies to evaluate cause of NAGMA. Consider RTA in patient with hx of renal transplant.  -primary team med rec  83 y/o M, w HTN, HLD, CKD (cr ~2), CAD, anemia (hgb ~9) ho renal transplant- co increasing lethargy over the past 3-4 days- at baseline px is wheelchair bound- eats small meals- has lost 20lbs over the last few months. ICU consulted for worsening lethargy i/s/o likely pna and complicated medical hx     #pseudomonal pna  Patient with a cough for 4 months per wife and was followed by Dr. Solomon outpatient. Reports that sputum was received yesterday and was started on Cipro for concern for aspiration pna. On HIE chart review confirms that patient has had chronic cough for 4 months with no definite etiology identified. however, sputum from 10/13 grew pseudomonas Patient reports increasing sputum production since cough first began  -pan sensitive pseudomonas--rec levoquin 750 q24 PO QTC OK  -on room air at this time and denies any sob, cp, chest tightness, and protecting airway.   -f/u bcx    #NAGMA   patient with VBG PH 7.18 with no anion gap. On chart review prior VBG 1/2022 with VPH PH 7.13. Patient denies any significant diarrhea  -recommend further workup with ABG, urine studies to evaluate cause of NAGMA. Consider RTA in patient with hx of renal transplant.  -primary team med rec  83 y/o M, w HTN, HLD, CKD (cr ~2), CAD, anemia (hgb ~9) ho renal transplant- co increasing lethargy over the past 3-4 days- at baseline px is wheelchair bound- eats small meals- has lost 20lbs over the last few months. ICU consulted for worsening lethargy i/s/o likely pna and complicated medical hx     #pseudomonal pna  Patient with a cough for 4 months per wife and was followed by Dr. Solomon outpatient. Reports that sputum was received yesterday and was started on Cipro for concern for aspiration pna. On HIE chart review confirms that patient has had chronic cough for 4 months with no definite etiology identified. however, sputum from 10/13 grew pseudomonas Patient reports increasing sputum production since cough first began  -pan sensitive pseudomonas--rec levoquin 750 q24 PO QTC OK  -on room air at this time and denies any sob, cp, chest tightness, and protecting airway.   -f/u bcx    #NAGMA   patient with VBG PH 7.18 with no anion gap. On chart review prior VBG 1/2022 with VPH PH 7.13. Patient denies any significant diarrhea  -recommend further workup with ABG, urine studies to evaluate cause of NAGMA. Consider RTA in patient with hx of renal transplant.  -1 amp of bicarb for NAGMA   -primary team med rec     Dispo: *** 81 y/o M, w HTN, HLD, CKD (cr ~2), CAD, anemia (hgb ~9) ho renal transplant- co increasing lethargy over the past 3-4 days- at baseline px is wheelchair bound- eats small meals- has lost 20lbs over the last few months. ICU consulted for worsening lethargy i/s/o likely pna and complicated medical hx     #pseudomonal pna  Patient with a cough for 4 months per wife and was followed by Dr. Solomon outpatient. Reports that sputum was received yesterday and was started on Cipro for concern for aspiration pna. On HIE chart review confirms that patient has had chronic cough for 4 months with no definite etiology identified. however, sputum from 10/13 grew pseudomonas Patient reports increasing sputum production since cough first began  -pan sensitive pseudomonas--rec Zosyn 4.5g i/s/o immunocompromised state  -on room air at this time and denies any sob, cp, chest tightness, and protecting airway.   -f/u bcx  -recommend hydrocortisone 100mg q8h    #NAGMA   patient with VBG PH 7.18 with no anion gap. On chart review prior VBG 1/2022 with VPH PH 7.13. Patient denies any significant diarrhea  -recommend further workup with ABG, urine studies to evaluate cause of NAGMA. Consider RTA in patient with hx of renal transplant.  -1 amp of bicarb for NAGMA   -primary team med rec     Dispo: [id number]

## 2022-10-21 NOTE — H&P ADULT - ASSESSMENT
81 y/o M, w HTN, HLD, CKD stage 4 (cr ~2), DMT1, glaucoma (legally blind), CAD s/p DESx2 pLAD and D2 in 6/2021, anemia (hgb ~9), BPH, history of renal transplant at [facility] in 2013, complaining of increasing lethargy over the past 3-4 days, found to have pseudomonal pneumonia on outpatient sputum cultures yesterday, admitted for pneumonia and continued medical management. 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

## 2022-10-21 NOTE — ED PROVIDER NOTE - CLINICAL SUMMARY MEDICAL DECISION MAKING FREE TEXT BOX
hypothermia- acidotic- septic jones - hypothermia- acidotic- septic jones - seen by ICU- stable for the floor- admit for medical jones

## 2022-10-21 NOTE — ED ADULT NURSE NOTE - NSIMPLEMENTINTERV_GEN_ALL_ED
Implemented All Fall with Harm Risk Interventions:  [place] to call system. Call bell, personal items and telephone within reach. Instruct patient to call for assistance. Room bathroom lighting operational. Non-slip footwear when patient is off stretcher. Physically safe environment: no spills, clutter or unnecessary equipment. Stretcher in lowest position, wheels locked, appropriate side rails in place. Provide visual cue, wrist band, yellow gown, etc. Monitor gait and stability. Monitor for mental status changes and reorient to person, place, and time. Review medications for side effects contributing to fall risk. Reinforce activity limits and safety measures with patient and family. Provide visual clues: red socks.

## 2022-10-21 NOTE — H&P ADULT - PROBLEM SELECTOR PLAN 5
Transplant in 2013 at [facility]  - tacrolimus 3 mg po qhs  - tacrolimus 5 mg po at noon qd Transplant in 2013 at [facility]  - tacrolimus 3 mg po qhs  - tacrolimus 5 mg po at noon qd  - mycophenolate mofetil 500 mg oral tablet: 1 tab(s) orally 2 times a day

## 2022-10-21 NOTE — H&P ADULT - PROBLEM SELECTOR PLAN 12
F: s/p NS 1L bolus   E: replenish prn  N: DASH/TLC  DVT: F: s/p NS 1L bolus   E: replenish prn  N: DASH/TLC  DVT: none  GI: none  Code: Full  Dispo: LETTY F: s/p NS 1L bolus   E: replenish prn  N: DASH/TLC  DVT: heparin 5000u SQ q8h  GI: none  Code: Full  Dispo: [id number]

## 2022-10-21 NOTE — H&P ADULT - PROBLEM SELECTOR PLAN 2
Reportedly 20 lbs weight loss in the past few months, likely i/s/o poor po intake. Denies fevers, chills, night sweats.  - consider nutrition consult Reportedly 20 lbs weight loss in the past few months, likely i/s/o poor po intake. Denies fevers, chills, night sweats.  - consider nutrition consult    #wheelchair bound  until two months ago, patient ambulated with walker. Two months ago, had fall and broke L arm so no longer able to use walker. Had outpatient PT twice per week before admission  - PT/OT consult

## 2022-10-21 NOTE — ED PROVIDER NOTE - PRINCIPAL DIAGNOSIS
[de-identified] : 27 yo male presenting from [place] accompanied by health aid Lg complaining of worsening psoriasis\par \par He has had flaking and intermittent itching in the eyebrow and posterior neck regions for a few months. Patient has had psoriasis since age 11; has been prescribed betamethasone and hydrocortisone in the past which help his symptoms but they return after stopping treatment. No other skin-related problems in the trunk, UE, or LE.  Pneumonia

## 2022-10-21 NOTE — H&P ADULT - NSHPPHYSICALEXAM_GEN_ALL_CORE
PHYSICAL EXAM:  Constitutional: Elderly, ill-appearing, resting comfortably in bed; NAD  Head: NC/AT  Eyes: anicteric sclera  ENT: no nasal discharge; uvula midline; dry mucous membranes  Neck: supple; no JVD or thyromegaly  Respiratory: CTA B/L; no W/R/R, no retractions  Cardiac: +S1/S2; RRR; no M/R/G  Gastrointestinal: abdomen soft, NTND; +BSx4  Extremities: WWP, no clubbing or cyanosis; no peripheral edema  Musculoskeletal: NROM x4; no joint swelling, tenderness or erythema.   Vascular: 1+ radial, palpable DP/PT pulses B/L  Dermatologic: skin warm, dry and intact; no rashes, wounds, or scars  Lymphatic: no submandibular or cervical LAD  Neurologic: AAOx3; CNII-XII grossly intact; no focal deficits  Psychiatric: affect and characteristics of appearance, verbalizations, behaviors are appropriate

## 2022-10-21 NOTE — ED ADULT TRIAGE NOTE - CHIEF COMPLAINT QUOTE
Pt brought in for generalized weakness, cough, and lethargy progressing over 3 days. Denies fevers, vomiting, dizziness. Reports glucose levels have been on the lower side,. FS 81 on arrival.

## 2022-10-22 NOTE — DIETITIAN INITIAL EVALUATION ADULT - OTHER CALCULATIONS
5'7''  pounds+-10%  Wt 160 pounds, BMI 25, %USC556  current body wt used for energy calculations as pt falls within % IBW  adjust for age, malnutrition, renal-rec lower end prot at this time; fluids per team

## 2022-10-22 NOTE — PATIENT PROFILE ADULT - TRANSPORTATION
Progress Note    Patient: Bam Sterling  MRN: [id number]  SSN: [id number]   YOB: 1960  Age: 61 y.o. Sex: male     Chief Complaint   Patient presents with    Skin Problem     on back       HPI    Mr. Aurelia Kc is a 58-year-old gentleman who presents with a 3 x 3 cm abscess on the middle part of his upper back. Said this for about a week, is currently not taking any antibiotics and is here for treatment. He has not had fever or other constitutional complaints. He has had a abscess in this area in the past as well. Past Medical History:   Diagnosis Date    Advance directive discussed with patient 04/24/2017    Anxiety     Back injury 4/27/2007    Behavioral change     Chronic pain     CKD (chronic kidney disease) stage 2, GFR 60-89 ml/min 12/20/2013    Confusion     DDD (degenerative disc disease)     Depression     Diabetic eye exam ([facility] [address] 09/09/2016    Dr. Amaris Mendez ( bilateral cataracts)    Difficulty walking     DJD (degenerative joint disease)     Fatigue     Generalized stiffness     H/O colonoscopy 12/2/2013    Bx: showed 2 tubular adenomas Done by Dr. Babar Miller    High cholesterol     Hypertension     Incoordination     Infected sebaceous cyst 5/4/2016    Insomnia     Joint pain     Lower extremity edema     Lymphedema     Muscle pain     Neuropathy     Osteoarthritis     Other and unspecified hyperlipidemia     Painful sexual intercourse     Poor concentration     Snoring     Type II or unspecified type diabetes mellitus without mention of complication, not stated as uncontrolled     Weakness      Past Surgical History:   Procedure Laterality Date    HX COLONOSCOPY      HX ORTHOPAEDIC      left elbow surgery    HX ORTHOPAEDIC Left 1/19/16    left Total Knee replacement     No Known Allergies  Current Outpatient Prescriptions   Medication Sig Dispense Refill    cephALEXin (KEFLEX) 500 mg capsule Take 1 Cap by mouth three (3) times daily.  30 Cap 0  gabapentin (NEURONTIN) 300 mg capsule TAKE 2 CAPSULES BY MOUTH EVERY MORNING AND 2 CAPSULES EVERY EVENING 360 Cap 3    amLODIPine-benazepril (LOTREL) 5-20 mg per capsule TAKE ONE CAPSULE BY MOUTH ONCE DAILY 90 Cap 3    FREESTYLE LITE STRIPS strip Check fasting sugars daily before breakfast. BRAND MEDICALLY NECESSARY. FOR FREESTYLE LITE METER 100 Strip 11    Lancets misc Check fasting sugars daily before breakfast. BRAND MEDICALLY NECESSARY. FOR FREESTYLE LITE METER 100 Each 11    furosemide (LASIX) 20 mg tablet Take one po daily prn swelling 30 Tab 1    atorvastatin (LIPITOR) 10 mg tablet TAKE 1 TABLET BY MOUTH DAILY 90 Tab 3    metoprolol tartrate (LOPRESSOR) 50 mg tablet TAKE ONE TABLET BY MOUTH TWICE DAILY 180 Tab 3    FREESTYLE LITE METER monitoring kit Check fasting sugars daily before breakfast. BRAND MEDICALLY NECESSARY 1 Kit 0    Aspirin, Buffered 81 mg tab Take 81 mg by mouth daily.  oxyCODONE-acetaminophen (PERCOCET) 7.5-325 mg per tablet Take one po daily prn pain 30 Tab 0     Social History     Social History    Marital status:      Spouse name: N/A    Number of children: N/A    Years of education: N/A     Occupational History    Not on file. Social History Main Topics    Smoking status: Never Smoker    Smokeless tobacco: Never Used      Comment: Pt counseled to continue to not smoke.     Alcohol use No    Drug use: No    Sexual activity: Yes     Partners: Female     Birth control/ protection: Condom     Other Topics Concern    Not on file     Social History Narrative     Family History   Problem Relation Age of Onset   [address] [facility] Stroke Mother     Hypertension Mother     Seizures Mother     Diabetes Father     Heart Disease Father     Hypertension Father     Other Maternal Grandmother      tumor in abdomen         Review of systems:  Patient denies any reflux, emesis, abdominal pain, change in bowel habits, hematochezia, melena, fever, weight loss, fatigue chills, dermatitis, abnormal moles, change in vision, vertigo, epistaxis, dysphagia, hoarseness, chest pain, palpitations, hypertension, edema, cough, shortness of breath, wheezing, hemoptysis, snoring, hematuria, diabetes, thyroid disease, anemia, bruising, history of blood transfusion, dizziness, headache, or fainting. Physical Examination    Well developed well nourished male in no apparent distress  Visit Vitals    /88    Temp 98.3 °F (36.8 °C) (Oral)    Resp 18      Head: normocephalic, atraumatic  Mouth: Clear, no overt lesions, oral mucosa pink and moist  Neck: supple, no masses, no adenopathy or carotid bruits, trachea midline  Resp: clear to auscultation bilaterally, no wheeze, rhonchi or rales, excursions normal and symmetrical  Cardio: Regular rate and rhythm, no murmurs, clicks, gallops or rubs, no edema or varicosities  Abdomen: soft, nontender, nondistended, normoactive bowel sounds, no hernias, no hepatosplenomegaly,   Back: 3 x 3 cm abscess midportion of the upper back  Extremeties: warm, well-perfused, no tenderness or swelling, normal gait/station  Neuro: sensation and strength grossly intact and symmetrical  Psych: alert and oriented to person, place and time  Breast exam deferred    IMPRESSION  Large abscess upper back    PLAN  Orders Placed This Encounter    CULTURE, WOUND W GRAM STAIN     Standing Status:   Future     Standing Expiration Date:   2/15/2019    cephALEXin (KEFLEX) 500 mg capsule     Sig: Take 1 Cap by mouth three (3) times daily.      Dispense:  30 Cap     Refill:  0     Incision and drainage, start on antibiotics  Kaycee Zarate MD     GENERAL SURGERY  OFFICE PROCEDURE PROGRESS NOTE        Chart reviewed for the following:   I, Kaycee Zarate MD, have reviewed the History, Physical and updated the Allergic reactions for [address] performed immediately prior to start of procedure:   Yon Navas MD, have performed the following reviews on [facility] prior to the start of the procedure:            * Patient was identified by name and date of birth   * Agreement on procedure being performed was verified  * Risks and Benefits explained to the patient  * Procedure site verified and marked as necessary  * Patient was positioned for comfort  * Consent was signed and verified     Time: 5059      Date of procedure: 2/14/2018    Procedure performed by:  Cherry Kwon MD    Provider assisted by:  LPN    Patient assisted by: spouse    How tolerated by patient: tolerated the procedure well with no complications    Post Procedural Pain Scale: 2 - Hurts Little Bit    Comments: Mr Calixto Stiles was taken to the procedure room and after informed consent as well as proper timeout his back was prepped with Betadine and anesthetized with lidocaine 2%. An incision and drainage was carried out in the abscess cavity completely opened. The wound was cleaned out manually and packed with a 4 x 4. He tolerated the procedure well and we will get home health for his dressing changes and see him back next week. no

## 2022-10-22 NOTE — DIETITIAN INITIAL EVALUATION ADULT - ADD RECOMMEND
1. Suggest SLP Eval d/t Noted treatment for ASP PNA PTA. If s/s of issues swallowing are noted or pt is too lethagric for meals, NPO prior.  >> With passing results, recommend regular/kosher diet, nepro x1/day 425kcal/20gm prot per 1 can with PO consistency per SLP Recs.   2. Monitor Skin, Wt, Pain, GI, Labs, GOC.  3. RD to remain available for additional nutrition interventions as needed.  * High Nutrition Risk.

## 2022-10-22 NOTE — DIETITIAN INITIAL EVALUATION ADULT - NUTRITIONGOAL OUTCOME1
- Pt will meet at least 75% of nutrient needs via feasible route  - Show no further s/s of malnutrition

## 2022-10-22 NOTE — DIETITIAN INITIAL EVALUATION ADULT - PERTINENT LABORATORY DATA
10-22    139  |  114<H>  |  55<H>  ----------------------------<  239<H>  5.1   |  14<L>  |  2.50<H>    Ca    8.6      22 Oct 2022 05:30  Phos  3.4     10-22  Mg     2.1     10-22    TPro  5.6<L>  /  Alb  3.1<L>  /  TBili  0.2  /  DBili  x   /  AST  8<L>  /  ALT  6<L>  /  AlkPhos  96  10-22  POCT Blood Glucose.: 246 mg/dL (10-22-22 @ 08:33)  A1C with Estimated Average Glucose Result: 9.0 % (10-22-22 @ 05:30)  A1C with Estimated Average Glucose Result: 8.1 % (01-05-22 @ 06:59)

## 2022-10-22 NOTE — PROGRESS NOTE ADULT - SUBJECTIVE AND OBJECTIVE BOX
Interval Events: Reviewed  Patient seen and examined at bedside.    Patient is a 82y old  Male who presents with a chief complaint of Increasing lethargy (21 Oct 2022 17:06)  no acute event overnight, RA 96 %, son/care taker at bedside      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      History of renal transplant  secondary to DM      Chronic kidney disease (CKD)      Glaucoma      Kidney transplant recipient      Amputation of toe  x 3      H/O heart artery stent          MEDICATIONS:  Pulmonary:    Antimicrobials:  piperacillin/tazobactam IVPB.. 4.5 Gram(s) IV Intermittent every 8 hours    Anticoagulants:  aspirin  chewable 81 milliGRAM(s) Oral daily  heparin   Injectable 5000 Unit(s) SubCutaneous every 8 hours    Cardiac:      Allergies    No Known Allergies    Intolerances        Vital Signs Last 24 Hrs  T(C): 36.6 (22 Oct 2022 05:00), Max: 36.6 (21 Oct 2022 21:30)  T(F): 97.9 (22 Oct 2022 05:00), Max: 97.9 (22 Oct 2022 05:00)  HR: 72 (22 Oct 2022 05:00) (55 - 76)  BP: 137/64 (22 Oct 2022 05:00) (119/62 - 148/68)  BP(mean): --  RR: 18 (22 Oct 2022 05:00) (18 - 20)  SpO2: 96% (22 Oct 2022 05:00) (94% - 97%)    Parameters below as of 22 Oct 2022 05:00  Patient On (Oxygen Delivery Method): room air            Review of Systems:   •	General: negative  •	Skin/Breast: negative  •	Ophthalmologic: negative  •	ENMT: negative  •	Respiratory and Thorax: negative  •	Cardiovascular: negative  •	Gastrointestinal: negative  •	Genitourinary: negative  •	Musculoskeletal: negative  •	Neurological: negative  •	Psychiatric: negative  •	Hematology/Lymphatics: negative  •	Endocrine: negative  •	Allergic/Immunologic: negative    Physical Exam:   • Constitutional:	elderly male in NAD  • Eyes:	EOMI; PERRL; no drainage or redness  • ENMT:	No oral lesions; no gross abnormalities  • Neck	no thyromegaly or nodules  • Breasts:	not examined  • Back:	No deformity or limitation of movement  • Respiratory:	Breath Sounds equal & clear to auscultation, no accessory muscle use  • Cardiovascular:	Regular rate & rhythm, normal S1, S2; no murmurs, gallops or rubs; no S3, S4  • Gastrointestinal:	Soft, non-tender, no hepatosplenomegaly, normal bowel sounds  • Genitourinary:	not examined  • Rectal: not examined  • Extremities:	No cyanosis, clubbing or edema  • Vascular:	Equal and normal pulses (dorsalis pedis)  • Neurologica:l	not examined  • Skin:	No lesions; no rash  • Lymph Nodes:	No lymphadedenopathy  • Musculoskeletal:	No joint pain, swelling or deformity; no limitation of movement        LABS:  ABG - ( 21 Oct 2022 15:44 )  pH, Arterial: 7.23  pH, Blood: x     /  pCO2: 31    /  pO2: 99    / HCO3: 13    / Base Excess: -13.3 /  SaO2: 96.8                CBC Full  -  ( 21 Oct 2022 12:55 )  WBC Count : 8.05 K/uL  RBC Count : 3.29 M/uL  Hemoglobin : 8.6 g/dL  Hematocrit : 28.4 %  Platelet Count - Automated : 212 K/uL  Mean Cell Volume : 86.3 fl  Mean Cell Hemoglobin : 26.1 pg  Mean Cell Hemoglobin Concentration : 30.3 gm/dL  Auto Neutrophil # : 6.20 K/uL  Auto Lymphocyte # : 1.09 K/uL  Auto Monocyte # : 0.43 K/uL  Auto Eosinophil # : 0.30 K/uL  Auto Basophil # : 0.01 K/uL  Auto Neutrophil % : 77.2 %  Auto Lymphocyte % : 13.5 %  Auto Monocyte % : 5.3 %  Auto Eosinophil % : 3.7 %  Auto Basophil % : 0.1 %    10-21    141  |  112<H>  |  62<H>  ----------------------------<  92  4.9   |  16<L>  |  2.38<H>    Ca    9.2      21 Oct 2022 12:55    TPro  5.8<L>  /  Alb  3.2<L>  /  TBili  <0.2  /  DBili  x   /  AST  8<L>  /  ALT  6<L>  /  AlkPhos  102  10-21                    Culture Results:   No growth at 12 hours (10-21 @ 12:12)  Culture Results:   No growth at 12 hours (10-21 @ 12:12)      RADIOLOGY & ADDITIONAL STUDIES (The following images were personally reviewed):  Ward:                                     No  Urine output:                       adequate  DVT prophylaxis:                 Yes  Flattus:                                  Yes  Bowel movement:              No

## 2022-10-22 NOTE — DIETITIAN INITIAL EVALUATION ADULT - ENERGY INTAKE
83 y/o M, HTN, HLD, CKD stage 4 (cr ~2), DMT1, glaucoma (legally blind), CAD s/p DESx2 pLAD and D2 in 6/2021, anemia (hgb ~9), BPH, history of renal transplant at [facility] in 2013, complaining of increasing lethargy over the past 3-4 days. Admitted for Pneumonia due to Pseudomonas.    Pt seen in room this AM, soundly sleeping, son at bedside. Reported decreased PO intake which has gotten worse over the last week. Pt not taking ONS PTA. Assume meeting <75% EER PTA. +Kosher Diet PTA. Son denies issues chewing/swallowing PTA, however per H&P noted pt started on Cipro for concern for aspiration pna. Current diet order for DASH, breakfast seen at bedside which was untouched. Son agreeable to ONS use at this time, feels pt would need ONS that is low in K d/t reported kidney issues. K 5.1 this AM. Other Labs of note: A1c 9.0%, lipids WDL, AST SGOT 8, ALT SGPT 6, , BUN/Cr 55/2.50, Na 139, POCT 246 149 149. Pt admit wt 160 pounds, reported has lost 20 pounds as of recent. NFPE not feasible as pt sleeping however Malnutrition note placed based on wt loss and PO intake. NKFA. 2+left foot. No pressure ulcer. GI WDL.   Please see below for nutritions recommendations.

## 2022-10-22 NOTE — DIETITIAN INITIAL EVALUATION ADULT - PERTINENT MEDS FT
MEDICATIONS  (STANDING):  aspirin  chewable 81 milliGRAM(s) Oral daily  atorvastatin 40 milliGRAM(s) Oral at bedtime  dextrose 5%. 1000 milliLiter(s) (50 mL/Hr) IV Continuous <Continuous>  dextrose 5%. 1000 milliLiter(s) (100 mL/Hr) IV Continuous <Continuous>  dextrose 50% Injectable 25 Gram(s) IV Push once  dextrose 50% Injectable 12.5 Gram(s) IV Push once  dextrose 50% Injectable 25 Gram(s) IV Push once  glucagon  Injectable 1 milliGRAM(s) IntraMuscular once  heparin   Injectable 5000 Unit(s) SubCutaneous every 8 hours  hydrocortisone 100 milliGRAM(s) Oral every 8 hours  insulin lispro (ADMELOG) corrective regimen sliding scale   SubCutaneous Before meals and at bedtime  piperacillin/tazobactam IVPB.. 4.5 Gram(s) IV Intermittent every 8 hours  tacrolimus 3 milliGRAM(s) Oral at bedtime  tacrolimus 1 milliGRAM(s) Oral every 24 hours    MEDICATIONS  (PRN):  acetaminophen     Tablet .. 650 milliGRAM(s) Oral every 6 hours PRN Temp greater or equal to 38C (100.4F), Mild Pain (1 - 3)  aluminum hydroxide/magnesium hydroxide/simethicone Suspension 30 milliLiter(s) Oral every 4 hours PRN Dyspepsia  artificial  tears Solution 1 Drop(s) Both EYES four times a day PRN Dry Eyes  dextrose Oral Gel 15 Gram(s) Oral once PRN Blood Glucose LESS THAN 70 milliGRAM(s)/deciliter  melatonin 3 milliGRAM(s) Oral at bedtime PRN Insomnia  ondansetron Injectable 4 milliGRAM(s) IV Push every 8 hours PRN Nausea and/or Vomiting

## 2022-10-22 NOTE — PATIENT PROFILE ADULT - FALL HARM RISK - HARM RISK INTERVENTIONS

## 2022-10-22 NOTE — SWALLOW BEDSIDE ASSESSMENT ADULT - SWALLOW EVAL: DIAGNOSIS
Pt presents with mild oral dysphagia 2/2 lack of dentition and overall weakness. Pharyngeal swallow is judged to be WFL per bedside eval, therefore PNA is likely not 2/2 aspiration.

## 2022-10-22 NOTE — PATIENT PROFILE ADULT - FUNCTIONAL ASSESSMENT - BASIC MOBILITY 6.
2-calculated by average/Not able to assess (calculate score using [facility] averaging method)

## 2022-10-23 NOTE — SWALLOW BEDSIDE ASSESSMENT ADULT - MODE OF PRESENTATION
Thin liquid via cup sip x6 oz, regular solid x1 cracker/cup/self fed
Thin liquid via cup sip x6 oz, regular solid x1 cracker/cup/self fed

## 2022-10-23 NOTE — SWALLOW BEDSIDE ASSESSMENT ADULT - SPECIFY REASON(S)
To assess swallow function 2/2 dx of PNA and concern for aspiration PNA
reconsulted d/t pt and family unhappy with current diet.

## 2022-10-23 NOTE — SWALLOW BEDSIDE ASSESSMENT ADULT - NS SPL SWALLOW CLINIC TRIAL FT
Oral phase significant for mild prolonged bolus manipulation with eventual oral clearance.  No overt signs of airway protection deficits noted across textures and trials. No evidence of aspiration appreciated during this eval. Patient and son report that coughing is at baseline, however not specifically with PO.  Recommend downgrade to soft/bite sized solids 2/2 oral phase deficits, and patient report of difficulty with tougher meats/foods. Continue thin liquids. Discontinue diet and reconsult this svc should s/s of aspiration increase with PO intake. Maintain strict aspiration precautions.
Oral phase significant for mild prolonged bolus manipulation with eventual oral clearance.  No overt signs of airway protection deficits noted across textures and trials. No evidence of aspiration appreciated during this eval.   Recommend upgrade easy to chew with thins. Per family this is more consistent with pt's baseline diet compared to "soft And bite sized". Continue with thins. d/c from SLP services, reconsult PRN.

## 2022-10-23 NOTE — SWALLOW BEDSIDE ASSESSMENT ADULT - COMMENTS
Primarily Jer speaking. Son at bedside who assisted with interpretation.
Primarily Yiddish speaking. Wife at bedside who assisted with interpretation. Per discussion with pt and family at bedside, pt consumes mostly regular food at home without difficulty, and they are requesting "easy to chew" rather than "soft and bite sized diet".

## 2022-10-23 NOTE — SWALLOW BEDSIDE ASSESSMENT ADULT - SWALLOW EVAL: RECOMMENDED FEEDING/EATING TECHNIQUES
allow for swallow between intakes/alternate food with liquid/oral hygiene/position upright (90 degrees)/small sips/bites
allow for swallow between intakes/alternate food with liquid/oral hygiene/position upright (90 degrees)/small sips/bites

## 2022-10-23 NOTE — PROGRESS NOTE ADULT - SUBJECTIVE AND OBJECTIVE BOX
INTERVAL HPI/OVERNIGHT EVENTS:  Patient was seen and examined at bedside. As per overnight team, no o/n events, patient rested comfortably. No complaints at this time. States he is feeling much better. Still have productive cough with white sputum. Patient denies: fever, chills, dizziness, weakness, SOB,  N/V, changes in bowel movements.     VITAL SIGNS:  T(F): 98.2 (10-23-22 @ 10:11)  HR: 84 (10-23-22 @ 10:11)  BP: 147/62 (10-23-22 @ 10:11)  RR: 18 (10-23-22 @ 10:11)  SpO2: 95% (10-23-22 @ 10:11)      10-22-22 @ 07:01  -  10-23-22 @ 07:00  --------------------------------------------------------  IN: 0 mL / OUT: 125 mL / NET: -125 mL      PHYSICAL EXAM:  Constitutional: elderly male resting comfortably; NAD  HEENT: NC/AT, dry MM  Neck: supple  Respiratory: CTA B/L; no W/R/R  Cardiac: +S1/S2; RRR; no murmurs  Gastrointestinal: soft, NT/ND; no rebound or guarding; +BSx4  Extremities: WWP, 2+ pitting edema up to knees b/l  Vascular: 2+ radial pulses B/L  Dermatologic: skin warm, dry and intact  Neurologic: AAOx3, no focal deficits  Psychiatric: calm, cooperative    MEDICATIONS  (STANDING):  aspirin  chewable 81 milliGRAM(s) Oral daily  atorvastatin 40 milliGRAM(s) Oral at bedtime  carvedilol 12.5 milliGRAM(s) Oral once  carvedilol 12.5 milliGRAM(s) Oral every 12 hours  clopidogrel Tablet 75 milliGRAM(s) Oral daily  dextrose 5%. 1000 milliLiter(s) (50 mL/Hr) IV Continuous <Continuous>  dextrose 5%. 1000 milliLiter(s) (100 mL/Hr) IV Continuous <Continuous>  dextrose 50% Injectable 25 Gram(s) IV Push once  dextrose 50% Injectable 12.5 Gram(s) IV Push once  dextrose 50% Injectable 25 Gram(s) IV Push once  glucagon  Injectable 1 milliGRAM(s) IntraMuscular once  heparin   Injectable 5000 Unit(s) SubCutaneous every 8 hours  hydrocortisone 100 milliGRAM(s) Oral once  insulin glargine Injectable (LANTUS) 9 Unit(s) SubCutaneous every morning  insulin lispro (ADMELOG) corrective regimen sliding scale   SubCutaneous Before meals and at bedtime  insulin lispro Injectable (ADMELOG) 3 Unit(s) SubCutaneous three times a day before meals  mycophenolate mofetil 500 milliGRAM(s) Oral two times a day  piperacillin/tazobactam IVPB.. 4.5 Gram(s) IV Intermittent every 8 hours  senna 1 Tablet(s) Oral daily  tacrolimus 3 milliGRAM(s) Oral at bedtime  tamsulosin 0.4 milliGRAM(s) Oral at bedtime  torsemide 20 milliGRAM(s) Oral daily    MEDICATIONS  (PRN):  acetaminophen     Tablet .. 650 milliGRAM(s) Oral every 6 hours PRN Temp greater or equal to 38C (100.4F), Mild Pain (1 - 3)  aluminum hydroxide/magnesium hydroxide/simethicone Suspension 30 milliLiter(s) Oral every 4 hours PRN Dyspepsia  artificial  tears Solution 1 Drop(s) Both EYES four times a day PRN Dry Eyes  dextrose Oral Gel 15 Gram(s) Oral once PRN Blood Glucose LESS THAN 70 milliGRAM(s)/deciliter  melatonin 3 milliGRAM(s) Oral at bedtime PRN Insomnia  ondansetron Injectable 4 milliGRAM(s) IV Push every 8 hours PRN Nausea and/or Vomiting      Allergies: No Known Allergies        LABS:                        7.9    4.05  )-----------( 196      ( 23 Oct 2022 05:30 )             26.3     10-23    138  |  109<H>  |  57<H>  ----------------------------<  414<H>  4.8   |  12<L>  |  2.69<H>    Ca    9.1      23 Oct 2022 05:30  Phos  3.4     10-22  Mg     2.1     10-22    TPro  5.6<L>  /  Alb  3.1<L>  /  TBili  0.2  /  DBili  x   /  AST  8<L>  /  ALT  6<L>  /  AlkPhos  96  10-22      Urinalysis Basic - ( 22 Oct 2022 11:16 )    Color: Yellow / Appearance: Clear / S.020 / pH: x  Gluc: x / Ketone: NEGATIVE  / Bili: Negative / Urobili: 0.2 E.U./dL   Blood: x / Protein: Trace mg/dL / Nitrite: NEGATIVE   Leuk Esterase: Trace / RBC: < 5 /HPF / WBC > 10 /HPF   Sq Epi: x / Non Sq Epi: 5-10 /HPF / Bacteria: Present /HPF      RADIOLOGY & ADDITIONAL TESTS: Reviewed

## 2022-10-23 NOTE — SWALLOW BEDSIDE ASSESSMENT ADULT - SLP PERTINENT HISTORY OF CURRENT PROBLEM
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
83 yo M legally blind (glaucoma) HTN, DM type I, Anemia, history of renal failure s/p renal transplant 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

## 2022-10-23 NOTE — SWALLOW BEDSIDE ASSESSMENT ADULT - ADDITIONAL RECOMMENDATIONS
Pt is functional to return to baseline diet of Easy to chew with thins. d/c from SLP service, reconsult PRN.

## 2022-10-23 NOTE — SWALLOW BEDSIDE ASSESSMENT ADULT - ASR SWALLOW ASPIRATION MONITOR
change of breathing pattern/position upright (90Y)/cough
change of breathing pattern/position upright (90Y)/cough

## 2022-10-23 NOTE — SWALLOW BEDSIDE ASSESSMENT ADULT - SLP GENERAL OBSERVATIONS
Pt is awake and alert, agreeable to evaluation. HOB raised, pt on RA. Able to make all wants/needs known as well as food preferences, with interpretation from wife.
Pt is awake and alert, agreeable to evaluation. Son assisted patient to sit up at edge of bed for evaluation. Able to follow directions and provide hx with interpretation from son.

## 2022-10-23 NOTE — PROGRESS NOTE ADULT - SUBJECTIVE AND OBJECTIVE BOX
Interval Events: Reviewed  Patient seen and examined at bedside.    Patient is a 82y old  Male who presents with a chief complaint of PNEUMONIA   (22 Oct 2022 12:31)  no acute event overnight, RA 95%      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      History of renal transplant  secondary to DM      Chronic kidney disease (CKD)      Glaucoma      Kidney transplant recipient      Amputation of toe  x 3      H/O heart artery stent          MEDICATIONS:  Pulmonary:    Antimicrobials:  piperacillin/tazobactam IVPB.. 4.5 Gram(s) IV Intermittent every 8 hours    Anticoagulants:  aspirin  chewable 81 milliGRAM(s) Oral daily  heparin   Injectable 5000 Unit(s) SubCutaneous every 8 hours    Cardiac:      Allergies    No Known Allergies    Intolerances        Vital Signs Last 24 Hrs  T(C): 36.6 (23 Oct 2022 05:00), Max: 36.7 (22 Oct 2022 09:40)  T(F): 97.8 (23 Oct 2022 05:00), Max: 98.1 (22 Oct 2022 09:40)  HR: 83 (23 Oct 2022 05:00) (75 - 83)  BP: 135/57 (23 Oct 2022 05:00) (133/60 - 180/-)  BP(mean): 83 (23 Oct 2022 05:00) (83 - 83)  RR: 15 (23 Oct 2022 05:00) (15 - 18)  SpO2: 95% (23 Oct 2022 05:00) (95% - 98%)    Parameters below as of 23 Oct 2022 05:00  Patient On (Oxygen Delivery Method): room air        10-22 @ 07:01  -  10-23 @ 06:13  --------------------------------------------------------  IN: 0 mL / OUT: 125 mL / NET: -125 mL          Review of Systems:   •	General: negative  •	Skin/Breast: negative  •	Ophthalmologic: negative  •	ENMT: negative  •	Respiratory and Thorax: negative  •	Cardiovascular: negative  •	Gastrointestinal: negative  •	Genitourinary: negative  •	Musculoskeletal: negative  •	Neurological: negative  •	Psychiatric: negative  •	Hematology/Lymphatics: negative  •	Endocrine: negative  •	Allergic/Immunologic: negative    Physical Exam:   • Constitutional:	elderly male, NAD  • Eyes:	EOMI; PERRL; no drainage or redness  • ENMT:	No oral lesions; no gross abnormalities  • Neck: no thyromegaly or nodules  • Breasts:	not examined  • Back:	No deformity or limitation of movement  • Respiratory:	Breath Sounds equal & clear to auscultation, no accessory muscle use  • Cardiovascular:	Regular rate & rhythm, normal S1, S2; no murmurs, gallops or rubs; no S3, S4  • Gastrointestinal:	Soft, non-tender, no hepatosplenomegaly, normal bowel sounds  • Genitourinary:	not examined  • Rectal: not examined  • Extremities:	No cyanosis, clubbing or edema  • Vascular:	Equal and normal pulses (dorsalis pedis)  • Neurologica:l	not examined  • Skin:	No lesions; no rash  • Lymph Nodes:	No lymphadedenopathy  • Musculoskeletal:	No joint pain, swelling or deformity; no limitation of movement        LABS:  ABG - ( 21 Oct 2022 15:44 )  pH, Arterial: 7.23  pH, Blood: x     /  pCO2: 31    /  pO2: 99    / HCO3: 13    / Base Excess: -13.3 /  SaO2: 96.8                CBC Full  -  ( 22 Oct 2022 05:30 )  WBC Count : 6.20 K/uL  RBC Count : 3.08 M/uL  Hemoglobin : 7.9 g/dL  Hematocrit : 26.8 %  Platelet Count - Automated : 196 K/uL  Mean Cell Volume : 87.0 fl  Mean Cell Hemoglobin : 25.6 pg  Mean Cell Hemoglobin Concentration : 29.5 gm/dL  Auto Neutrophil # : 4.86 K/uL  Auto Lymphocyte # : 0.84 K/uL  Auto Monocyte # : 0.25 K/uL  Auto Eosinophil # : 0.23 K/uL  Auto Basophil # : 0.01 K/uL  Auto Neutrophil % : 78.4 %  Auto Lymphocyte % : 13.5 %  Auto Monocyte % : 4.0 %  Auto Eosinophil % : 3.7 %  Auto Basophil % : 0.2 %    10-22    139  |  114<H>  |  55<H>  ----------------------------<  239<H>  5.1   |  14<L>  |  2.50<H>    Ca    8.6      22 Oct 2022 05:30  Phos  3.4     10-22  Mg     2.1     10-22    TPro  5.6<L>  /  Alb  3.1<L>  /  TBili  0.2  /  DBili  x   /  AST  8<L>  /  ALT  6<L>  /  AlkPhos  96  10-22          Urinalysis Basic - ( 22 Oct 2022 11:16 )    Color: Yellow / Appearance: Clear / S.020 / pH: x  Gluc: x / Ketone: NEGATIVE  / Bili: Negative / Urobili: 0.2 E.U./dL   Blood: x / Protein: Trace mg/dL / Nitrite: NEGATIVE   Leuk Esterase: Trace / RBC: < 5 /HPF / WBC > 10 /HPF   Sq Epi: x / Non Sq Epi: 5-10 /HPF / Bacteria: Present /HPF              Culture Results:   No growth at 1 day. (10-21 @ 12:12)  Culture Results:   No growth at 1 day. (10-21 @ 12:12)      RADIOLOGY & ADDITIONAL STUDIES (The following images were personally reviewed):  Ward:                                     No  Urine output:                       adequate  DVT prophylaxis:                 Yes  Flattus:                                  Yes  Bowel movement:              No   Interval Events: Reviewed  Patient seen and examined at bedside.    Patient is a 82y old  Male who presents with a chief complaint of PNEUMONIA   (22 Oct 2022 12:31)  no acute event overnight, RA 95%, aide/daughter at bedside      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      History of renal transplant  secondary to DM      Chronic kidney disease (CKD)      Glaucoma      Kidney transplant recipient      Amputation of toe  x 3      H/O heart artery stent          MEDICATIONS:  Pulmonary:    Antimicrobials:  piperacillin/tazobactam IVPB.. 4.5 Gram(s) IV Intermittent every 8 hours    Anticoagulants:  aspirin  chewable 81 milliGRAM(s) Oral daily  heparin   Injectable 5000 Unit(s) SubCutaneous every 8 hours    Cardiac:      Allergies    No Known Allergies    Intolerances        Vital Signs Last 24 Hrs  T(C): 36.6 (23 Oct 2022 05:00), Max: 36.7 (22 Oct 2022 09:40)  T(F): 97.8 (23 Oct 2022 05:00), Max: 98.1 (22 Oct 2022 09:40)  HR: 83 (23 Oct 2022 05:00) (75 - 83)  BP: 135/57 (23 Oct 2022 05:00) (133/60 - 180/-)  BP(mean): 83 (23 Oct 2022 05:00) (83 - 83)  RR: 15 (23 Oct 2022 05:00) (15 - 18)  SpO2: 95% (23 Oct 2022 05:00) (95% - 98%)    Parameters below as of 23 Oct 2022 05:00  Patient On (Oxygen Delivery Method): room air        10-22 @ 07:01  -  10-23 @ 06:13  --------------------------------------------------------  IN: 0 mL / OUT: 125 mL / NET: -125 mL          Review of Systems:   •	General: negative  •	Skin/Breast: negative  •	Ophthalmologic: negative  •	ENMT: negative  •	Respiratory and Thorax: negative  •	Cardiovascular: negative  •	Gastrointestinal: negative  •	Genitourinary: negative  •	Musculoskeletal: negative  •	Neurological: negative  •	Psychiatric: negative  •	Hematology/Lymphatics: negative  •	Endocrine: negative  •	Allergic/Immunologic: negative    Physical Exam:   • Constitutional:	elderly male, NAD  • Eyes:	EOMI; PERRL; no drainage or redness  • ENMT:	No oral lesions; no gross abnormalities  • Neck: no thyromegaly or nodules  • Breasts:	not examined  • Back:	No deformity or limitation of movement  • Respiratory:	Breath Sounds equal & clear to auscultation, no accessory muscle use  • Cardiovascular:	Regular rate & rhythm, normal S1, S2; no murmurs, gallops or rubs; no S3, S4  • Gastrointestinal:	Soft, non-tender, no hepatosplenomegaly, normal bowel sounds  • Genitourinary:	not examined  • Rectal: not examined  • Extremities:	No cyanosis, clubbing or edema  • Vascular:	Equal and normal pulses (dorsalis pedis)  • Neurologica:l	not examined  • Skin:	No lesions; no rash  • Lymph Nodes:	No lymphadedenopathy  • Musculoskeletal:	No joint pain, swelling or deformity; no limitation of movement        LABS:  ABG - ( 21 Oct 2022 15:44 )  pH, Arterial: 7.23  pH, Blood: x     /  pCO2: 31    /  pO2: 99    / HCO3: 13    / Base Excess: -13.3 /  SaO2: 96.8                CBC Full  -  ( 22 Oct 2022 05:30 )  WBC Count : 6.20 K/uL  RBC Count : 3.08 M/uL  Hemoglobin : 7.9 g/dL  Hematocrit : 26.8 %  Platelet Count - Automated : 196 K/uL  Mean Cell Volume : 87.0 fl  Mean Cell Hemoglobin : 25.6 pg  Mean Cell Hemoglobin Concentration : 29.5 gm/dL  Auto Neutrophil # : 4.86 K/uL  Auto Lymphocyte # : 0.84 K/uL  Auto Monocyte # : 0.25 K/uL  Auto Eosinophil # : 0.23 K/uL  Auto Basophil # : 0.01 K/uL  Auto Neutrophil % : 78.4 %  Auto Lymphocyte % : 13.5 %  Auto Monocyte % : 4.0 %  Auto Eosinophil % : 3.7 %  Auto Basophil % : 0.2 %    10-22    139  |  114<H>  |  55<H>  ----------------------------<  239<H>  5.1   |  14<L>  |  2.50<H>    Ca    8.6      22 Oct 2022 05:30  Phos  3.4     10-22  Mg     2.1     10-22    TPro  5.6<L>  /  Alb  3.1<L>  /  TBili  0.2  /  DBili  x   /  AST  8<L>  /  ALT  6<L>  /  AlkPhos  96  10-22          Urinalysis Basic - ( 22 Oct 2022 11:16 )    Color: Yellow / Appearance: Clear / S.020 / pH: x  Gluc: x / Ketone: NEGATIVE  / Bili: Negative / Urobili: 0.2 E.U./dL   Blood: x / Protein: Trace mg/dL / Nitrite: NEGATIVE   Leuk Esterase: Trace / RBC: < 5 /HPF / WBC > 10 /HPF   Sq Epi: x / Non Sq Epi: 5-10 /HPF / Bacteria: Present /HPF              Culture Results:   No growth at 1 day. (10-21 @ 12:12)  Culture Results:   No growth at 1 day. (10-21 @ 12:12)      RADIOLOGY & ADDITIONAL STUDIES (The following images were personally reviewed):  Ward:                                     No  Urine output:                       adequate  DVT prophylaxis:                 Yes  Flattus:                                  Yes  Bowel movement:              No

## 2022-10-23 NOTE — SWALLOW BEDSIDE ASSESSMENT ADULT - DIET PRIOR TO ADMI
Soft/bite sized per patient and son
More consistent with "easy to chew" rather than "soft And bite sized", requesting diet upgrade

## 2022-10-24 NOTE — PROGRESS NOTE ADULT - PROBLEM SELECTOR PLAN 11
Baseline Hb 9. Admission Hb 8.6. No signs/symptoms of acute bleed  - monitor CBC
Baseline Hb 9. Admission Hb 8.6. No signs/symptoms of acute bleed  - monitor CBC
Baseline Hb 9. Admission Hb 8.6. No signs/symptoms of acute bleed  - monitor CBCs
Baseline Hb 9. Admission Hb 8.6. No signs/symptoms of acute bleed  - monitor CBCs

## 2022-10-24 NOTE — PROGRESS NOTE ADULT - SUBJECTIVE AND OBJECTIVE BOX
Patient is a 82y old  Male who presents with a chief complaint of Increasing lethargy (24 Oct 2022 10:54)      INTERVAL HPI/OVERNIGHT EVENTS:  Patient seen and examined at bedside. No acute events overnight. This morning, pt lying in bed, in NAD, with no complaints. Reports he has not had a BM in 3 days; started on miralax and senna. Wife at bedside, reports that cough has improved significantly since admission. Tolerating easy to chew diet. Pt denies fever, chills, HA, dizziness, CP, SOB, palpitations, abdominal pain, n/v, diarrhea, constipation, dysuria, hematuria, urinary frequency/urgency, numbness, or tingling. 12pt ROS otherwise negative.        FAMILY HISTORY:      VITAL SIGNS:  T(C): 36.4 (10-24-22 @ 09:36), Max: 36.6 (10-23-22 @ 17:02)  HR: 66 (10-24-22 @ 09:36) (66 - 78)  BP: 177/63 (10-24-22 @ 09:36) (167/63 - 177/72)  RR: 18 (10-24-22 @ 09:36) (16 - 18)  SpO2: 97% (10-24-22 @ 09:36) (95% - 97%)  Wt(kg): --Vital Signs Last 24 Hrs  T(C): 36.4 (24 Oct 2022 09:36), Max: 36.6 (23 Oct 2022 17:02)  T(F): 97.6 (24 Oct 2022 09:36), Max: 97.8 (23 Oct 2022 17:02)  HR: 66 (24 Oct 2022 09:36) (66 - 78)  BP: 177/63 (24 Oct 2022 09:36) (167/63 - 177/72)  BP(mean): --  RR: 18 (24 Oct 2022 09:36) (16 - 18)  SpO2: 97% (24 Oct 2022 09:36) (95% - 97%)    Parameters below as of 24 Oct 2022 09:36  Patient On (Oxygen Delivery Method): room air      No Known Allergies      PHYSICAL EXAM:  Constitutional: Elderly, ill-appearing, resting comfortably in bed; NAD  Head: NC/AT  Eyes: anicteric sclera  ENT: no nasal discharge; uvula midline; dry mucous membranes  Neck: supple; no JVD or thyromegaly  Respiratory: CTA B/L; no W/R/R, no retractions  Cardiac: +S1/S2; RRR; no M/R/G  Gastrointestinal: abdomen soft, NTND; +BSx4  Extremities: WWP, no clubbing or cyanosis; no peripheral edema  Musculoskeletal: NROM x4; no joint swelling, tenderness or erythema.   Vascular: 1+ radial, palpable DP/PT pulses B/L  Dermatologic: skin warm, dry and intact; no rashes, wounds, or scars  Lymphatic: no submandibular or cervical LAD  Neurologic: AAOx3; CNII-XII grossly intact; no focal deficits  Psychiatric: affect and characteristics of appearance, verbalizations, behaviors are appropriate    Consultant(s) Notes Reviewed:  [x ] YES  [ ] NO  Care Discussed with Consultants/Other Providers [ x] YES  [ ] NO    LABS:                        7.7    6.15  )-----------( 205      ( 24 Oct 2022 05:30 )             26.4     10-24    141  |  112<H>  |  52<H>  ----------------------------<  326<H>  4.5   |  14<L>  |  2.81<H>    Ca    9.4      24 Oct 2022 05:30          CAPILLARY BLOOD GLUCOSE      POCT Blood Glucose.: 315 mg/dL (24 Oct 2022 08:28)                          I & O Summary:      Microbiology:      Culture - Urine (collected 22 Oct 2022 11:16)  Source: Clean Catch None  Final Report (23 Oct 2022 08:46):    No growth    Urinalysis with Rflx Culture (collected 22 Oct 2022 11:16)        RADIOLOGY, EKG AND ADDITIONAL TESTS: Reviewed.      RADIOLOGY & ADDITIONAL TESTS:    Imaging Personally Reviewed:  [ ] YES  [ ] NO  acetaminophen     Tablet .. 650 milliGRAM(s) Oral every 6 hours PRN  aluminum hydroxide/magnesium hydroxide/simethicone Suspension 30 milliLiter(s) Oral every 4 hours PRN  artificial  tears Solution 1 Drop(s) Both EYES four times a day PRN  aspirin  chewable 81 milliGRAM(s) Oral daily  atorvastatin 40 milliGRAM(s) Oral at bedtime  carvedilol 12.5 milliGRAM(s) Oral every 12 hours  clopidogrel Tablet 75 milliGRAM(s) Oral daily  dextrose 5%. 1000 milliLiter(s) IV Continuous <Continuous>  dextrose 5%. 1000 milliLiter(s) IV Continuous <Continuous>  dextrose 50% Injectable 25 Gram(s) IV Push once  dextrose 50% Injectable 12.5 Gram(s) IV Push once  dextrose 50% Injectable 25 Gram(s) IV Push once  dextrose Oral Gel 15 Gram(s) Oral once PRN  glucagon  Injectable 1 milliGRAM(s) IntraMuscular once  heparin   Injectable 5000 Unit(s) SubCutaneous every 8 hours  insulin glargine Injectable (LANTUS) 15 Unit(s) SubCutaneous at bedtime  insulin lispro (ADMELOG) corrective regimen sliding scale   SubCutaneous Before meals and at bedtime  insulin lispro Injectable (ADMELOG) 5 Unit(s) SubCutaneous three times a day before meals  melatonin 3 milliGRAM(s) Oral at bedtime PRN  mycophenolate mofetil 500 milliGRAM(s) Oral two times a day  ondansetron Injectable 4 milliGRAM(s) IV Push every 8 hours PRN  piperacillin/tazobactam IVPB.. 4.5 Gram(s) IV Intermittent every 12 hours  polyethylene glycol 3350 17 Gram(s) Oral two times a day  senna 1 Tablet(s) Oral daily  tacrolimus 3 milliGRAM(s) Oral at bedtime  tamsulosin 0.4 milliGRAM(s) Oral at bedtime  torsemide 20 milliGRAM(s) Oral daily      HEALTH ISSUES - PROBLEM Dx:  Pneumonia due to Pseudomonas    Weight loss    Metabolic acidosis    Type 1 diabetes    History of kidney transplant    Glaucoma    CAD (coronary artery disease)    Stage 4 chronic kidney disease    HTN (hypertension)    HLD (hyperlipidemia)    Anemia    Prophylactic measure

## 2022-10-24 NOTE — OCCUPATIONAL THERAPY INITIAL EVALUATION ADULT - ADDITIONAL COMMENTS
Pt lives w/ his wife in private house w/ 2 stairs to enter. Pt states that he was w/c bound in/out doors - required 1 person assist for functional transfers and most ADLs. In addition, Pt has a RW and shower chair.

## 2022-10-24 NOTE — PROGRESS NOTE ADULT - PROBLEM SELECTOR PLAN 9
Home medications carvedilol 12.5 mg po q12h, lasix 20 mg po qd  -continue home meds      #BPH  Home med tamsulosin 0.4 mg oral capsule: 1 cap(s) orally once a day (at bedtime)  - continue home medication
Home medications carvedilol 12.5 mg po q12h, lasix 20 mg po qd  -increased carvedilol dosing to 25 mg PO BID       #BPH  Home med tamsulosin 0.4 mg oral capsule: 1 cap(s) orally once a day (at bedtime)  - continue home medication
Home medications carvedilol 12.5 mg po q12h, lasix 20 mg po qd  -continue home meds      #BPH  Home med tamsulosin 0.4 mg oral capsule: 1 cap(s) orally once a day (at bedtime)  - continue home medication
Home medications carvedilol 12.5 mg po q12h, lasix 20 mg po qd  -continue home meds      #BPH  Home med tamsulosin 0.4 mg oral capsule: 1 cap(s) orally once a day (at bedtime)  - continue home medication

## 2022-10-24 NOTE — PROGRESS NOTE ADULT - PROBLEM SELECTOR PROBLEM 8
HLD (hyperlipidemia)
Stage 4 chronic kidney disease

## 2022-10-24 NOTE — DISCHARGE NOTE PROVIDER - PROVIDER TOKENS
PROVIDER:[TOKEN:[[id number]],SCHEDULEDAPPT:[11/22/2022],SCHEDULEDAPPTTIME:[02:30 PM],ESTABLISHEDPATIENT:[T]]

## 2022-10-24 NOTE — PROGRESS NOTE ADULT - ASSESSMENT
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

## 2022-10-24 NOTE — PROGRESS NOTE ADULT - PROBLEM SELECTOR PROBLEM 1
Pneumonia due to Pseudomonas

## 2022-10-24 NOTE — PROGRESS NOTE ADULT - PROBLEM SELECTOR PLAN 6
Legally blind, glaucoma d/t DMT1  - artificial tears prn

## 2022-10-24 NOTE — DISCHARGE NOTE PROVIDER - HOSPITAL COURSE
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       Problem List/Main Diagnoses (system-based):  1. Pneumonia due to pseudomonas: Patient presents with cough x4 months per wife and was followed by Dr. Solomon outpatient. Reports that sputum culture was received  the day prior to admission growing pseudomonas and pt was started on ciprofloxacin out of concern for aspiration. Pt reports increasing sputum production since cough first began. Pt was started on Zosyn 4.5 g IV daily for pseudomonal pneumonia in setting of immunocompromised state. Pt tolerated room air while inpatient and did not have SOB, CP, or chest tightness. BCx remained negative during his stay.    2. Hypothermia: On admission, pt had T 93.5. Improved to T 96 with david hugger. Pt was started on hydrocortisone taper due to concern for adrenal insufficiency. He did not have additional episodes of hypothermia during his hospital stay.    3. Weight loss: Pt reportedly lost 20 lbs in the past few months, likely in setting of poor PO intake. Pt denies fevers, chills, night sweats. Nutrition evaluated patient and recommended easy to chew diet.     4. Wheelchair bound: Until 2 months prior to admission, pt ambulated with walker. 2 months ago, pt fell and broke L arm so was no longer able to use walker. Pt had outpatinet PT twice per week before admission. OT evaluated patient and recommended home OT.    5. Metabolic acidosis:     6. T1DM: Pt has history of T1DM and takes humalog mix 75/25 12-16 units BID at home. Pt was initially started on low-dose insulin sliding scale on admission due to decreased PO intake, and eventually increased to 15 units of lantus at night and 5 units of lispro TID with meals.     7. History of kidney transplant: Pt underwent renal transplant in 2013 at [facility]. Takes tacrolimus and mycophenolate mofetil BID at home. While inpatient, pt was continued on tacrolimus 3 mg daily and mycophenolate 500 mg BID.     8. Glaucoma: Pt has a history of glaucoma 2/2 T1DM and is legally blind. Received artificial tears PRN while inpatient.    9. CAD: Pt has history of CAD and is s/p DAMEON x2 LAD, D2 6/2021. Takes ASA 81 mg daily, atorvastatin 40 mg daily, carvedilol 12.5 mg daily, and lasix 20 mg daily at home. Continued on home medications while inpatient; increased carvedilol dose to 25 mg BID.     10. Stage IV CKD: GFR 26. Plan as above.    9. HTN: Pt takes carvedilol 12.5 mg BID and lasix 20 mg daily at home. Continued on home medications while inpatient. Increased carvedilol dosing to 25 mg BID.    10. BPH: Pt takes tamsulosin 0.4 mg daily. Continued on home medication while inpatient.    11. Hyperlipidemia: Pt takes atorvastatin 40 mg daily. Continued on home medication while inpatient.    12. Pt has baseline Hgb 9. On admission, Hgb was 8.6. No signs/symptoms of acute bleed. H/H remained stable during admission.      Inpatient treatment course:         Patient was discharged to: (home/[facility]/acute rehab/hospice, etc, and with what services – home health PT/RN? Home O2?)         New medications:    Changes to old medications: Carvedilol 12.5 mg PO daily -> 25 mg PO daily     Medications that were stopped:         Items to follow up as outpatient:         Physical exam at the time of discharge:  Constitutional: Elderly, ill-appearing, resting comfortably in bed; NAD  Head: NC/AT  Eyes: anicteric sclera  ENT: no nasal discharge; uvula midline; dry mucous membranes  Neck: supple; no JVD or thyromegaly  Respiratory: CTA B/L; no W/R/R, no retractions  Cardiac: +S1/S2; RRR; no M/R/G  Gastrointestinal: abdomen soft, NTND; +BSx4  Extremities: WWP, no clubbing or cyanosis; no peripheral edema  Musculoskeletal: NROM x4; no joint swelling, tenderness or erythema.   Vascular: 1+ radial, palpable DP/PT pulses B/L  Dermatologic: skin warm, dry and intact; no rashes, wounds, or scars  Lymphatic: no submandibular or cervical LAD  Neurologic: AAOx3; CNII-XII grossly intact; no focal deficits  Psychiatric: affect and characteristics of appearance, verbalizations, behaviors are appropriate #Discharge: do not delete    Patient is a 81 yo M w/ PMH glaucoma (legally blind), HTN, T1DM, anemia, history of renal failure s/p renal transplant 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       Problem List/Main Diagnoses (system-based):  1. Pneumonia due to pseudomonas: Patient presents with cough x4 months per wife and was followed by Dr. Solomon outpatient. Reports that sputum culture was received  the day prior to admission growing pseudomonas and pt was started on ciprofloxacin out of concern for aspiration. Pt reports increasing sputum production since cough first began. Pt was started on Zosyn 4.5 g IV daily for pseudomonal pneumonia in setting of immunocompromised state. Pt tolerated room air while inpatient and did not have SOB, CP, or chest tightness. BCx remained negative during his stay.    2. Hypothermia: On admission, pt had T 93.5. Improved to T 96 with david hugger. Pt was started on hydrocortisone taper due to concern for adrenal insufficiency. He did not have additional episodes of hypothermia during his hospital stay.    3. Weight loss: Pt reportedly lost 20 lbs in the past few months, likely in setting of poor PO intake. Pt denies fevers, chills, night sweats. Nutrition evaluated patient and recommended easy to chew diet.     4. Wheelchair bound: Until 2 months prior to admission, pt ambulated with walker. 2 months ago, pt fell and broke L arm so was no longer able to use walker. Pt had outpatinet PT twice per week before admission. OT evaluated patient and recommended home OT.    5. Metabolic acidosis:     6. T1DM: Pt has history of T1DM and takes humalog mix 75/25 12-16 units BID at home. Pt was initially started on low-dose insulin sliding scale on admission due to decreased PO intake, and eventually increased to 20 units of lantus at night and 7 units of lispro TID with meals.     7. History of kidney transplant: Pt underwent renal transplant in 2013 at [facility]. Takes tacrolimus and mycophenolate mofetil BID at home. While inpatient, pt was continued on tacrolimus 3 mg daily and mycophenolate 500 mg BID.     8. Glaucoma: Pt has a history of glaucoma 2/2 T1DM and is legally blind. Received artificial tears PRN while inpatient.    9. CAD: Pt has history of CAD and is s/p DAMEON x2 LAD, D2 6/2021. Takes ASA 81 mg daily, atorvastatin 40 mg daily, carvedilol 12.5 mg daily, and lasix 20 mg daily at home. Continued on home medications while inpatient; increased carvedilol dose to 25 mg BID.     10. Stage IV CKD: GFR 26. Plan as above.    9. HTN: Pt takes carvedilol 12.5 mg BID and lasix 20 mg daily at home. Continued on home medications while inpatient. Increased carvedilol dosing to 25 mg BID.    10. BPH: Pt takes tamsulosin 0.4 mg daily. Continued on home medication while inpatient.    11. Hyperlipidemia: Pt takes atorvastatin 40 mg daily. Continued on home medication while inpatient.    12. Pt has baseline Hgb 9. On admission, Hgb was 8.6. No signs/symptoms of acute bleed. H/H remained stable during admission.      Inpatient treatment course:         Patient was discharged to: (home/[facility]/acute rehab/hospice, etc, and with what services – home health PT/RN? Home O2?)         New medications:    Changes to old medications: Carvedilol 12.5 mg PO daily -> 25 mg PO daily     Medications that were stopped:         Items to follow up as outpatient:         Physical exam at the time of discharge:  Constitutional: Elderly, ill-appearing, resting comfortably in bed; NAD  Head: NC/AT  Eyes: anicteric sclera  ENT: no nasal discharge; uvula midline; dry mucous membranes  Neck: supple; no JVD or thyromegaly  Respiratory: CTA B/L; no W/R/R, no retractions  Cardiac: +S1/S2; RRR; no M/R/G  Gastrointestinal: abdomen soft, NTND; +BSx4  Extremities: WWP, no clubbing or cyanosis; no peripheral edema  Musculoskeletal: NROM x4; no joint swelling, tenderness or erythema.   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       Problem List/Main Diagnoses (system-based):  1. Pneumonia due to pseudomonas: Patient presents with cough x4 months per wife and was followed by Dr. Solomon outpatient. Reports that sputum culture was received  the day prior to admission growing pseudomonas and pt was started on ciprofloxacin out of concern for aspiration. Pt reports increasing sputum production since cough first began. Pt was started on Zosyn 4.5 g IV daily for pseudomonal pneumonia in setting of immunocompromised state. Pt remained on room air while inpatient and did not have SOB, CP, or chest tightness. BCx remained negative during his stay. Pt was discharged on 2-day course of levofloxacin (renally adjusted).     2. Hypothermia: On admission, pt had T 93.5. Improved to T 96 with david hugger. Pt was started on hydrocortisone taper due to concern for adrenal insufficiency. Pt was not hypothermic the remainder of his hospital stay.    3. Weight loss: Pt reportedly lost 20 lbs in the past few months, likely in setting of poor PO intake. Pt denies fevers, chills, night sweats. Nutrition evaluated patient and recommended easy to chew diet.     4. Wheelchair bound: Until 2 months prior to admission, pt ambulated with walker. 2 months ago, pt fell and broke L arm so was no longer able to use walker. Pt had outpatient PT twice per week before admission. OT evaluated patient and recommended home OT.    5. Metabolic acidosis: Pt presented with VBG pH 7.18 with no anion gap. Pt denies any significant diarrhea. ABG on admission showed pH 7.23, pCO2 31, pO2 99, HCO3 13. Possible RTA in setting of patient with history of renal transplant. Pt developed anion gap metabolic acidosis during his hospital stay, likely 2/2 ketoacidosis in setting of uncontrolled diabetes. Pt was treated for T1DM as below.    6. T1DM: Pt has history of T1DM and takes humalog mix 75/25 12-16 units BID at home. Pt was initially started on low-dose insulin sliding scale on admission due to decreased PO intake, and eventually increased to 20 units of lantus at night and 7 units of lispro TID with meals.     7. History of kidney transplant: Pt underwent renal transplant in 2013 at [facility]. Takes tacrolimus and mycophenolate mofetil BID at home. While inpatient, pt was continued on tacrolimus 3 mg daily and mycophenolate 500 mg BID.     8. Glaucoma: Pt has a history of glaucoma 2/2 T1DM and is legally blind. Received artificial tears PRN while inpatient.    9. CAD: Pt has history of CAD and is s/p DAMEON x2 LAD, D2 6/2021. Takes ASA 81 mg daily, atorvastatin 40 mg daily, carvedilol 12.5 mg daily, and lasix 20 mg daily at home. Continued on home medications while inpatient; increased carvedilol dose to 25 mg BID.     10. Stage IV CKD: GFR 26. Plan as above.    9. HTN: Pt takes carvedilol 12.5 mg BID and lasix 20 mg daily at home. Continued on home medications while inpatient. Increased carvedilol dosing to 25 mg BID.    10. BPH: Pt takes tamsulosin 0.4 mg daily. Continued on home medication while inpatient.    11. Hyperlipidemia: Pt takes atorvastatin 40 mg daily. Continued on home medication while inpatient.    12. Pt has baseline Hgb 9. On admission, Hgb was 8.6. No signs/symptoms of acute bleed. H/H remained stable during admission.      Inpatient treatment course: Zosyn 4.5 g IV q24h x5 days, hydrocortisone taper, carvedilol 25 mg PO BID         Patient was discharged to: Home with home PT          New medications: Levofloxacin 750 mg x1 day, levofloxacin 500 mg x1 day     Changes to old medications: Carvedilol 12.5 mg PO daily -> 25 mg PO daily     Medications that were stopped: None         Items to follow up as outpatient: PCP         Physical exam at the time of discharge:  Constitutional: Elderly, ill-appearing, resting comfortably in bed; NAD  Head: NC/AT  Eyes: anicteric sclera  ENT: no nasal discharge; uvula midline; dry mucous membranes  Neck: supple; no JVD or thyromegaly  Respiratory: CTA B/L; no W/R/R, no retractions  Cardiac: +S1/S2; RRR; no M/R/G  Gastrointestinal: abdomen soft, NTND; +BSx4  Extremities: WWP, no clubbing or cyanosis; no peripheral edema  Musculoskeletal: NROM x4; no joint swelling, tenderness or erythema.   Vascular: 1+ radial, palpable DP/PT pulses B/L  Dermatologic: skin warm, dry and intact; no rashes, wounds, or scars  Lymphatic: no submandibular or cervical LAD  Neurologic: AAOx3; CNII-XII grossly intact; no focal deficits  Psychiatric: affect and characteristics of appearance, verbalizations, behaviors are appropriate

## 2022-10-24 NOTE — PROGRESS NOTE ADULT - PROBLEM SELECTOR PLAN 4
Home med humalog 12-16 u tid (sliding scale)  - JENNIE while inpatient  - f/u A1c
Home med humalog 12-16 u tid (sliding scale)  - JENNIE while inpatient  - f/u A1c
Home med humalog 12-16 u tid (sliding scale). A1c 9.0.   - Start Lantus 9 units at bedtime  - Start Lispro 3 units premeal
Home med humalog 12-16 u tid (sliding scale). A1c 9.0.   - Increased lantus to 15 units at bedtime  - Increased lispro to 5 units at bedtime

## 2022-10-24 NOTE — PROGRESS NOTE ADULT - PROBLEM SELECTOR PLAN 12
F: s/p NS 1L bolus   E: replenish prn  N: DASH/TLC  DVT: heparin 5000u SQ q8h  GI: none  Code: Full  Dispo: YULIANA

## 2022-10-24 NOTE — PROGRESS NOTE ADULT - SUBJECTIVE AND OBJECTIVE BOX
INTERVAL HPI/OVERNIGHT EVENTS:  Awake   Labs noted  Increased glusose  Also CKD  BP increased      MEDICATIONS  (STANDING):  aspirin  chewable 81 milliGRAM(s) Oral daily  atorvastatin 40 milliGRAM(s) Oral at bedtime  carvedilol 12.5 milliGRAM(s) Oral every 12 hours  clopidogrel Tablet 75 milliGRAM(s) Oral daily  dextrose 5%. 1000 milliLiter(s) (50 mL/Hr) IV Continuous <Continuous>  dextrose 5%. 1000 milliLiter(s) (100 mL/Hr) IV Continuous <Continuous>  dextrose 50% Injectable 25 Gram(s) IV Push once  dextrose 50% Injectable 12.5 Gram(s) IV Push once  dextrose 50% Injectable 25 Gram(s) IV Push once  glucagon  Injectable 1 milliGRAM(s) IntraMuscular once  heparin   Injectable 5000 Unit(s) SubCutaneous every 8 hours  insulin glargine Injectable (LANTUS) 15 Unit(s) SubCutaneous at bedtime  insulin lispro (ADMELOG) corrective regimen sliding scale   SubCutaneous Before meals and at bedtime  insulin lispro Injectable (ADMELOG) 5 Unit(s) SubCutaneous three times a day before meals  mycophenolate mofetil 500 milliGRAM(s) Oral two times a day  piperacillin/tazobactam IVPB.. 4.5 Gram(s) IV Intermittent every 8 hours  polyethylene glycol 3350 17 Gram(s) Oral two times a day  senna 1 Tablet(s) Oral daily  tacrolimus 3 milliGRAM(s) Oral at bedtime  tamsulosin 0.4 milliGRAM(s) Oral at bedtime  torsemide 20 milliGRAM(s) Oral daily    MEDICATIONS  (PRN):  acetaminophen     Tablet .. 650 milliGRAM(s) Oral every 6 hours PRN Temp greater or equal to 38C (100.4F), Mild Pain (1 - 3)  aluminum hydroxide/magnesium hydroxide/simethicone Suspension 30 milliLiter(s) Oral every 4 hours PRN Dyspepsia  artificial  tears Solution 1 Drop(s) Both EYES four times a day PRN Dry Eyes  dextrose Oral Gel 15 Gram(s) Oral once PRN Blood Glucose LESS THAN 70 milliGRAM(s)/deciliter  melatonin 3 milliGRAM(s) Oral at bedtime PRN Insomnia  ondansetron Injectable 4 milliGRAM(s) IV Push every 8 hours PRN Nausea and/or Vomiting      Allergies    No Known Allergies    Intolerances        Vital Signs Last 24 Hrs  T(C): 36.4 (24 Oct 2022 09:36), Max: 36.6 (23 Oct 2022 17:02)  T(F): 97.6 (24 Oct 2022 09:36), Max: 97.8 (23 Oct 2022 17:02)  HR: 66 (24 Oct 2022 09:36) (66 - 78)  BP: 177/63 (24 Oct 2022 09:36) (167/63 - 177/72)  BP(mean): --  RR: 18 (24 Oct 2022 09:36) (16 - 18)  SpO2: 97% (24 Oct 2022 09:36) (95% - 97%)    Parameters below as of 24 Oct 2022 09:36  Patient On (Oxygen Delivery Method): room air              Constitutional: Awake     Eyes: GORDON    ENMT: Negative    Neck: Supple    Back:  no tenderness     Respiratory:  clear    Cardiovascular: S1 S2    Gastrointestinal: soft    Genitourinary:    Extremities:  no edema    Vascular:    Neurological:    Skin:    Lymph Nodes:            LABS:                        7.7    6.15  )-----------( 205      ( 24 Oct 2022 05:30 )             26.4     10-24    141  |  112<H>  |  52<H>  ----------------------------<  326<H>  4.5   |  14<L>  |  2.81<H>    Ca    9.4      24 Oct 2022 05:30        Urinalysis Basic - ( 22 Oct 2022 11:16 )    Color: Yellow / Appearance: Clear / S.020 / pH: x  Gluc: x / Ketone: NEGATIVE  / Bili: Negative / Urobili: 0.2 E.U./dL   Blood: x / Protein: Trace mg/dL / Nitrite: NEGATIVE   Leuk Esterase: Trace / RBC: < 5 /HPF / WBC > 10 /HPF   Sq Epi: x / Non Sq Epi: 5-10 /HPF / Bacteria: Present /HPF        RADIOLOGY & ADDITIONAL TESTS:

## 2022-10-24 NOTE — PROGRESS NOTE ADULT - PROBLEM SELECTOR PLAN 2
Reportedly 20 lbs weight loss in the past few months, likely i/s/o poor po intake. Denies fevers, chills, night sweats.  - appreciate nutrition and speech and swallow recs    #wheelchair bound  until two months ago, patient ambulated with walker. Two months ago, had fall and broke L arm so no longer able to use walker. Had outpatient PT twice per week before admission  - PT/OT consult
Reportedly 20 lbs weight loss in the past few months, likely i/s/o poor po intake. Denies fevers, chills, night sweats.  - consider nutrition consult    #wheelchair bound  until two months ago, patient ambulated with walker. Two months ago, had fall and broke L arm so no longer able to use walker. Had outpatient PT twice per week before admission  - PT/OT consult
Reportedly 20 lbs weight loss in the past few months, likely i/s/o poor po intake. Denies fevers, chills, night sweats.  - Per Nutrition recs, c/w easy to chew diet     #wheelchair bound  until two months ago, patient ambulated with walker. Two months ago, had fall and broke L arm so no longer able to use walker. Had outpatient PT twice per week before admission  - PT consulted, f/u recs   - OT consulted, recommended home OT
Reportedly 20 lbs weight loss in the past few months, likely i/s/o poor po intake. Denies fevers, chills, night sweats.  - consider nutrition consult    #wheelchair bound  until two months ago, patient ambulated with walker. Two months ago, had fall and broke L arm so no longer able to use walker. Had outpatient PT twice per week before admission  - PT/OT consult

## 2022-10-24 NOTE — DISCHARGE NOTE PROVIDER - NSDCMRMEDTOKEN_GEN_ALL_CORE_FT
aspirin 81 mg oral tablet, chewable: 1 tab(s) orally once a day x 30 days   atorvastatin 40 mg oral tablet: 1 tab(s) orally once a day  cardiac rehab: Cardiac Rehabilitation  Diagnosis: Coronary Artery Disease  3 times per week for 12 weeks     carvedilol 12.5 mg oral tablet: 1 tab(s) orally every 12 hours  clopidogrel 75 mg oral tablet: 1 tab(s) orally once a day  dexamethasone 6 mg oral tablet: 1 tab(s) orally every 24 hours  docusate sodium 100 mg oral capsule: 1 cap(s) orally 2 times a day  HumaLOG Mix 75/25 KwikPen subcutaneous suspension: 12-16 unit(s) subcutaneous 2 times a day (pending glucose levels)  Lasix 20 mg oral tablet: 1 tab(s) orally once a day  mycophenolate mofetil 500 mg oral tablet: 1 tab(s) orally 2 times a day  Senna 8.6 mg oral tablet: 1 tab(s) orally once a day (at bedtime)  sodium polystyrene sulfonate: 15 milligram(s) orally once a day   tacrolimus 1 mg oral capsule: 3 cap(s) orally once a day (at bedtime)  tacrolimus 5 mg oral capsule: 1 cap(s) orally once a day (AT NOON)  tamsulosin 0.4 mg oral capsule: 1 cap(s) orally once a day (at bedtime)   aspirin 81 mg oral tablet, chewable: 1 tab(s) orally once a day x 30 days   atorvastatin 40 mg oral tablet: 1 tab(s) orally once a day  cardiac rehab: Cardiac Rehabilitation  Diagnosis: Coronary Artery Disease  3 times per week for 12 weeks     carvedilol 25 mg oral tablet: 1 tab(s) orally every 12 hours  clopidogrel 75 mg oral tablet: 1 tab(s) orally once a day  dexamethasone 6 mg oral tablet: 1 tab(s) orally every 24 hours  docusate sodium 100 mg oral capsule: 1 cap(s) orally 2 times a day  HumaLOG Mix 75/25 KwikPen subcutaneous suspension: 12-16 unit(s) subcutaneous 2 times a day (pending glucose levels)  Lasix 20 mg oral tablet: 1 tab(s) orally once a day  levoFLOXacin 500 mg oral tablet: 1 tab(s) orally once a day   levoFLOXacin 750 mg oral tablet: 1 tab(s) orally once a day   mycophenolate mofetil 500 mg oral tablet: 1 tab(s) orally 2 times a day  Senna 8.6 mg oral tablet: 1 tab(s) orally once a day (at bedtime)  sodium polystyrene sulfonate: 15 milligram(s) orally once a day   tacrolimus 1 mg oral capsule: 3 cap(s) orally once a day (at bedtime)  tacrolimus 5 mg oral capsule: 1 cap(s) orally once a day (AT NOON)  tamsulosin 0.4 mg oral capsule: 1 cap(s) orally once a day (at bedtime)

## 2022-10-24 NOTE — PHYSICAL THERAPY INITIAL EVALUATION ADULT - GENERAL OBSERVATIONS, REHAB EVAL
Patient received in bedside chair, in no acute distress, IV heplock, and agreeable to participate in PT eval. Patient demonstrates min-A with bed mobility, and mod-Ax1 with transfers and gait with RW. Patient demonstrates deficits with reduced BLE/BUE strength, reduced functional endurance, reduced static/dynamic standing balance, and is deemed high risk for falls. Patient's mobility additionally limited by vision impairments/blindness in unfamiliar environment. Patient can continue to benefit from skilled PT intervention to minimize risk of falls and maximize pt's functional capacity. Discussed PT reccs with patient for which he agreed with HPT with continued assist from spouse.

## 2022-10-24 NOTE — PHYSICAL THERAPY INITIAL EVALUATION ADULT - PERTINENT HX OF CURRENT PROBLEM, REHAB EVAL
Patient is an 83 y/o M who presents to ED with s/s of: increasing lethargy in last 3-4 days. Wife states that patient has become more lethargic over last few days and eating less. Last meal was beef last night. Patient reports also chronic cough for last 4 months, and is being worked up by pulm outpatient; patient was started on Cipro last night for PNA, but only took one dose. Patient reports reduced PO intake with recent 20lb weight loss over the last few months. Of note, patient had recent fall with injury to L arm (broken) ~2 months ago when ambulating with walker in the home. Patient admitted for PNA due to Pseudomonas. Patient has PMHx of: HTN, HLD, CKD stage 4 (cr ~2), DMT1, glaucoma (legally blind), CAD s/p DESx2 pLAD and D2 in 6/2021, anemia (hgb ~9), BPH, h/o renal transplant at [facility] in 2013.

## 2022-10-24 NOTE — OCCUPATIONAL THERAPY INITIAL EVALUATION ADULT - PERTINENT HX OF CURRENT PROBLEM, REHAB EVAL
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

## 2022-10-24 NOTE — PROGRESS NOTE ADULT - NUTRITIONAL ASSESSMENT
This patient has been assessed with a concern for Malnutrition and has been determined to have a diagnosis/diagnoses of Mild protein-calorie malnutrition.    This patient is being managed with:   Diet Easy to Chew-  Entered: Oct 23 2022  1:01PM    
This patient has been assessed with a concern for Malnutrition and has been determined to have a diagnosis/diagnoses of Mild protein-calorie malnutrition.    This patient is being managed with:   Diet Soft and Bite Sized-  DASH/TLC {Sodium & Cholesterol Restricted} (DASH)  Supplement Feeding Modality:  Oral  Glucerna Shake Cans or Servings Per Day:  1       Frequency:  Daily  Entered: Oct 22 2022  3:20PM

## 2022-10-24 NOTE — PROGRESS NOTE ADULT - PROBLEM SELECTOR PLAN 5
Transplant in 2013 at [facility].   - tacrolimus 3 mg po qhs  - mycophenolate mofetil 500 mg bid
Transplant in 2013 at [facility]  - tacrolimus 3 mg po qhs  - tacrolimus 5 mg po at noon qd  - mycophenolate mofetil 500 mg oral tablet: 1 tab(s) orally 2 times a day
Transplant in 2013 at [facility]  - tacrolimus 3 mg po qhs  - tacrolimus 5 mg po at noon qd  - mycophenolate mofetil 500 mg oral tablet: 1 tab(s) orally 2 times a day
Transplant in 2013 at [facility].   - tacrolimus 3 mg po qhs  - mycophenolate mofetil 500 mg bid

## 2022-10-24 NOTE — PROGRESS NOTE ADULT - PROBLEM SELECTOR PROBLEM 5
CAD (coronary artery disease)
History of kidney transplant

## 2022-10-24 NOTE — PROGRESS NOTE ADULT - PROBLEM SELECTOR PLAN 3
Patient with VBG PH 7.18 with no anion gap. On chart review prior VBG 1/2022 with VPH PH 7.13. Patient denies any significant diarrhea  ABG: pH 7.23, pCO2 31, pO2 99, HCO3 13, O2 sat 96.8  -recommend further workup with urine studies to evaluate cause of NAGMA. Consider RTA in patient with hx of renal transplant.    -1 amp of bicarb for NAGMA   -primary team med rec
Patient with VBG PH 7.18 with no anion gap. On chart review prior VBG 1/2022 with VPH PH 7.13. Patient denies any significant diarrhea. ABG: pH 7.23, pCO2 31, pO2 99, HCO3 13, O2 sat 96.8. Will pursue further workup with urine studies to evaluate cause of NAGMA. Consider RTA in patient with hx of renal transplant.  - s/p 1 amp of bicarb for NAGMA  - renal consult
Patient with VBG PH 7.18 with no anion gap. On chart review prior VBG 1/2022 with VPH PH 7.13. Patient denies any significant diarrhea. ABG: pH 7.23, pCO2 31, pO2 99, HCO3 13, O2 sat 96.8. Will pursue further workup with urine studies to evaluate cause of NAGMA. Consider RTA in patient with hx of renal transplant.  - s/p 1 amp of bicarb for NAGMA  - AG increased yesterday, likely 2/2 ketoacidosis i/s/o uncontrolled diabetes. Management of T1DM as per below
Patient with VBG PH 7.18 with no anion gap. On chart review prior VBG 1/2022 with VPH PH 7.13. Patient denies any significant diarrhea  ABG: pH 7.23, pCO2 31, pO2 99, HCO3 13, O2 sat 96.8  -recommend further workup with urine studies to evaluate cause of NAGMA. Consider RTA in patient with hx of renal transplant.    -1 amp of bicarb for NAGMA   -primary team med rec

## 2022-10-24 NOTE — PROGRESS NOTE ADULT - PROBLEM SELECTOR PLAN 1
Patient with a cough for 4 months per wife and was followed by Dr. Solomon outpatient. Reports that sputum was received yesterday and was started on Cipro for concern for aspiration pna. On HIE chart review confirms that patient has had chronic cough for 4 months with no definite etiology identified. however, sputum from 10/13 grew pseudomonas. Patient reports increasing sputum production since cough first began.  -pan sensitive pseudomonas--Zosyn 4.5g i/s/o immunocompromised state  -on room air at this time, denies sob, cp, chest tightness, and protecting airway.   -f/u bcx    #hypothermia  T 93.5 on admission, improved to 96 with david mckeon  -hydrocortisone 100mg q8h due to concern for adrenal insufficiency
Patient with a cough for 4 months per wife and was followed by Dr. Solomon outpatient. Reports that sputum was received yesterday and was started on Cipro for concern for aspiration pna. On HIE chart review confirms that patient has had chronic cough for 4 months with no definite etiology identified. however, sputum from 10/13 grew pseudomonas. Patient reports increasing sputum production since cough first began.  -pan sensitive pseudomonas--Zosyn 4.5g i/s/o immunocompromised state  -on room air at this time, denies sob, cp, chest tightness, and protecting airway.   -f/u bcx - NGTD  -f/u echo given LE edema. Last one in January 2022 with EF 55%. home med Torsemide 20mg daily restarted.    #hypothermia, resolved  T 93.5 on admission, improved to 96 with david hugger  -hydrocortisone 100mg q8h due to concern for adrenal insufficiency
Patient with a cough for 4 months per wife and was followed by Dr. Solomon outpatient. Reports that sputum was received yesterday and was started on Cipro for concern for aspiration pna. On HIE chart review confirms that patient has had chronic cough for 4 months with no definite etiology identified. however, sputum from 10/13 grew pseudomonas. Patient reports increasing sputum production since cough first began.  -pan sensitive pseudomonas--Zosyn 4.5g i/s/o immunocompromised state  -on room air at this time, denies sob, cp, chest tightness, and protecting airway.   -f/u bcx    #hypothermia  T 93.5 on admission, improved to 96 with david mckeon  -hydrocortisone 100mg q8h due to concern for adrenal insufficiency
Patient with a cough for 4 months per wife and was followed by Dr. Solomon outpatient. Reports that sputum was received yesterday and was started on Cipro for concern for aspiration pna. On HIE chart review confirms that patient has had chronic cough for 4 months with no definite etiology identified. however, sputum from 10/13 grew pseudomonas. Patient reports increasing sputum production since cough first began.  -pan sensitive pseudomonas--c/w Zosyn 4.5g i/s/o immunocompromised state  -on room air at this time, denies sob, cp, chest tightness, and protecting airway.   -f/u bcx - NGTD  -f/u echo given LE edema. Last one in January 2022 with EF 55%. home med Torsemide 20mg daily restarted.    #hypothermia, resolved  T 93.5 on admission, improved to 96 with david hugger  -on hydrocortisone taper due to concern for adrenal insufficiency-- 100 mg daily today

## 2022-10-24 NOTE — DISCHARGE NOTE PROVIDER - DETAILS OF MALNUTRITION DIAGNOSIS/DIAGNOSES
This patient has been assessed with a concern for Malnutrition and was treated during this hospitalization for the following Nutrition diagnosis/diagnoses:     -  10/22/2022: Mild protein-calorie malnutrition

## 2022-10-24 NOTE — PROGRESS NOTE ADULT - PROBLEM SELECTOR PLAN 7
S/p DESx2 pLAD, D2 6/2021.  Home meds ASA 81 mg po qd, atorvastatin 40 mg po qd, carvedilol 12.5 mg po qd, lasix 20 mg po qd  - continue home meds

## 2022-10-24 NOTE — PROGRESS NOTE ADULT - PROBLEM SELECTOR PROBLEM 7
HTN (hypertension)
CAD (coronary artery disease)

## 2022-10-24 NOTE — DISCHARGE NOTE PROVIDER - CARE PROVIDER_API CALL
Viviana Prather)  Critical Care Medicine; Pulmonary Disease  [address] [place] [address]  Phone: [phone]  Fax: [phone]  Established Patient  Scheduled Appointment: 11/22/2022 02:30 PM

## 2022-10-24 NOTE — DISCHARGE NOTE PROVIDER - NSDCFUSCHEDAPPT_GEN_ALL_CORE_FT
Critical Care Note     LOS: 5 days   Patient Care Team:  Gio Henderson MD as PCP - General (Adolescent Medicine)    Chief Complaint/Reason for visit:    Chief Complaint   Patient presents with   • Numbness     Patient Active Problem List   Diagnosis   • Acute on chronic respiratory failure with hypoxia (2L @ baseline)   • COPD with acute exacerbation (CMS/HCC)   • Hypothyroidism (acquired)   • Essential hypertension   • Depression   • Pinched nerve in neck   • left renal collecting system obstruction   • Guillain-Centerbrook syndrome (CMS/HCC)   • Dysphagia   • Tobacco abuse   • JURGEN on CPAP       Subjective      56-year-old woman who presented December 31 with weakness and numbness to the [facility]. These symptoms progressed and on January 12 she developed worsening shortness of breath and cough. She has a known history of COPD and is an active smoker. She is on supplemental oxygen at home. CTA of the chest was negative for pulmonary embolism. She then developed urinary retention. Lumbar puncture on January 15 revealed a protein of 66, glucose of 95. EMG was results were consistent with Guillian-Centerbrook'syndrome. IVIG was initiated January 15.  She moved to the intensive care unit January 16 with hypoxia requiring BiPAP.    Interval History:   Yesterday she had significant amounts of purulent sputum expectorated with improvement in her oxygenation. She is having leg cramping and pain and requesting frequent narcotics. She takes Lortab 7.54 times daily at home and has been on narcotics for 14 years. She is wearing BiPAP nightly and 45% oxygen by high flow cannula during the day. She continues to have weakness but it is not worse. She fatigues when she swallows and can only eat small amounts.      Review of Systems:    All systems were reviewed and negative except as noted in subjective.    Medical history, surgical history, social history, family history reviewed    Objective  "    Intake/Output:    Intake/Output Summary (Last 24 hours) at 1/17/2019 1352  Last data filed at 1/17/2019 1338  Gross per 24 hour   Intake 1811.9 ml   Output 2075 ml   Net -263.1 ml       Nutrition:  Diet Dysphagia; IV - Mechanical Soft No Mixed Consistencies; Thin; No Straws    Infusions:       Mechanical Ventilator Settings:            Resp Rate (Set): 4  Pressure Support (cm H2O): 8 cm H20  FiO2 (%): 60 %  PEEP/CPAP (cm H2O): 8 cm H20    Minute Ventilation (L/min) (Obs): 15 L/min  Resp Rate (Observed) Vent: 25          PIP Observed (cm H2O): 12 cm H2O       Telemetry: Sinus rhythm             Vital Signs  Blood pressure 134/82, pulse 67, temperature 98.8 °F (37.1 °C), temperature source Oral, resp. rate 22, height 170.2 cm (67.01\"), weight 96 kg (211 lb 10.3 oz), SpO2 96 %.    Physical Exam:  General Appearance:  Middle-aged white woman in no respiratory distress    Head:  Normocephalic, atraumatic    Eyes:          No jaundice. Pupils equal and reactive to light, extraocular movements intact    Ears:     Throat: Oral mucosa moist    Neck: Trachea midline, no palpable thyroid    Back:      Lungs:   Symmetric chest expansion. Breath sounds are bilateral with mid and end expiratory rhonchi     Heart:  Regular rhythm, S1, S2 auscultated    Abdomen:   Bowel sounds present, soft, nontender    Rectal:   Deferred   Extremities: No pitting edema or cyanosis    Pulses: Pedal pulses present    Skin: Warm and dry    Lymph nodes:    Neurologic: Alert and oriented. Speech fluent, tongue midline, mild generalized weakness       Results Review:     I reviewed the patient's new clinical results.   Results from last 7 days   Lab Units 01/16/19  0351 01/15/19  0601 01/14/19  0501  01/12/19  1346   SODIUM mmol/L 136 138 139   < > 136   POTASSIUM mmol/L 3.7 4.2 3.4*   < > 3.3*   CHLORIDE mmol/L 101 105 104   < > 97*   CO2 mmol/L 29.0 25.0 30.0   < > 31.0   BUN mg/dL 28* 28* 24*   < > 20   CREATININE mg/dL 0.67 0.76 0.68   < > 0.73 "   CALCIUM mg/dL 8.8 9.0 8.9   < > 9.3   BILIRUBIN mg/dL  --   --   --   --  1.3*   ALK PHOS U/L  --   --   --   --  104*   ALT (SGPT) U/L  --   --   --   --  32   AST (SGOT) U/L  --   --   --   --  23   GLUCOSE mg/dL 117* 143* 129*   < > 101*    < > = values in this interval not displayed.     Results from last 7 days   Lab Units 01/16/19  0351 01/15/19  1529 01/13/19  0718   WBC 10*3/mm3 8.43 10.18 7.49   HEMOGLOBIN g/dL 16.7* 16.8* 17.1*   HEMATOCRIT % 52.4* 52.1* 53.0*   PLATELETS 10*3/mm3 194 212 203     Results from last 7 days   Lab Units 01/16/19  1108   PH, ARTERIAL pH units 7.484*   PO2 ART mm Hg 57.0*   PCO2, ARTERIAL mm Hg 42.6   HCO3 ART mmol/L 32.0*     No results found for: BLOODCX  No results found for: URINECX    I reviewed the patient's new imaging including images and reports.  COMPARISONS: 01/12/2019.     FINDINGS:  Lungs are without focal abnormality aside from subsegmental  atelectasis of the left lung base. No sizable pleural effusion or  pneumothorax. Cardiomediastinal silhouette is within normal limits.     IMPRESSION:  No significant interval change.     D:  01/16/2019  FINDINGS: The most superior images demonstrate bibasilar airspace  disease, left greater than right.      The liver and spleen are normal.  There is no adrenal mass. The pancreas  is normal.  There are small bilateral renal parenchymal stones. More  importantly, there is a 1 cm stone in the left ureteropelvic junction  producing moderately severe obstruction. Also there is a 1.2 cm area of  low density within the left renal pelvis. This is not calcified on  images obtained prior to contrast and may represent clot. There are also  simple cysts in both kidneys. There is no ascites, aneurysm or  retroperitoneal lymphadenopathy. There is no pelvic mass or fluid.   There is no bladder stone.     IMPRESSION:  1. There is a 1 cm stone in the left ureteropelvic junction producing  moderately severe obstruction of the left kidney.  Master Smith  [facility] Physician [facility]  GASTRO [address]  Scheduled Appointment: 10/27/2022    Perez Galindo  [facility] Physician [facility]  HEARTVASC [address]  Scheduled Appointment: 11/14/2022    [facility] Physician [facility]  PULMMED [address]  Scheduled Appointment: 11/17/2022    Jimenez Baird  [facility] Physician [facility]  PULED [address]  Scheduled Appointment: 11/17/2022     There is also a 12 mm  low density filling defect in the renal pelvis. On the unenhanced  examination this is not a calcified stone this may represent clot  related to the ureteral stone and obstruction and less likely a  noncalcified stone.   2. Lastly there is bibasilar airspace disease, left greater than right.     D:  01/15/2019  E:  01/15/2019       All medications reviewed.     aspirin 81 mg Oral Daily   doxycycline 100 mg Intravenous Q12H   enoxaparin 40 mg Subcutaneous Q24H   escitalopram 10 mg Oral Daily   gabapentin 300 mg Oral TID   hydrochlorothiazide 25 mg Oral Daily   immune globulin (human) 20 g Intravenous Q24H   And      immune globulin (human) 20 g Intravenous Q24H   ipratropium-albuterol 3 mL Nebulization 4x Daily - RT   levothyroxine 100 mcg Oral Daily   lisinopril 10 mg Oral Q24H   methylPREDNISolone sodium succinate 30 mg Intravenous Q12H   metoprolol succinate XL 25 mg Oral Daily   pharmacy consult - MTM  Does not apply Daily   piperacillin-tazobactam 4.5 g Intravenous Q8H         Assessment/Plan       Guillain-Stella syndrome (CMS/HCC)    Acute on chronic respiratory failure with hypoxia (2L @ baseline)    COPD with acute exacerbation (CMS/HCC)    Dysphagia    JURGEN on CPAP    Hypothyroidism (acquired)    Essential hypertension    Depression    Pinched nerve in neck    left renal collecting system obstruction    Tobacco abuse    #1 Guillian Stella' Syndrome currently receiving IVIG. Yesterday her vital capacity was 1.1. She has some improvement in strength of her extremities    #2 acute on chronic respiratory failure, at baseline she wears 4 L nasal cannula at home. She is tolerating BiPAP at night. She actively smoke cigarettes. She is maintaining oxygen saturations on high flow nasal cannula. This morning blood gas revealed a pH of 7.48, CO2 of 42, O2 of 57 on 52% high flow nasal cannula. She has underlying obstructive airways disease and a productive cough. X-ray shows chronic changes but no  pneumonia.    #3 COPD with exacerbation she is currently on Zosyn, doxycycline, nebulized bronchodilators. She also is on IV steroids that will help both her lungs and her Guillain-Barré syndrome.    #4 essential hypertension, blood pressure adequately controlled with lisinopril and beta blocker    #5 start of sleep apnea, tolerating BiPAP nightly, has a home CPAP unit    #6 left hydronephrosis with nephrolithiasis, may need nephrology evaluation. Serum creatinine is normal. She would be high risk for anesthesia with her Guillian Barré syndrome and hypoxia    PLAN:  Complete 7-10 days of Zosyn, doxycycline  Nebulized bronchodilators  BiPAP nightly  Scheduled Lortab 7.5  4 times daily, home dose  Physical therapy, occupational therapy  Add gabapentin and ibuprofen  Ativan for muscle cramping, per neurology  Nicotine patch  Encourage ongoing smoking cessation  Thyroid replacement  IVIG per neurology 20 g daily  Lisinopril, metoprolol for high blood pressure  Monitor vital capacity, NIF    VTE Prophylaxis: Lovenox    Stress Ulcer Prophylaxis:none    Coretta Schmidt MD  01/17/19  1:52 PM      Time: 35min  I personally provided care to this critically ill patient as documented above.  Critical care time does not include time spent on separately billed procedures.  Non of my critical care time was concurrent with other critical care providers.

## 2022-10-24 NOTE — PHYSICAL THERAPY INITIAL EVALUATION ADULT - ADDITIONAL COMMENTS
Pt states he lives with his wife in private house with 2 PINO. Patient reports he was primarily in w/c for ambulation in and out of the home, but was able to utilize RW for transfers and short distances (<10 ft) with assistance from his wife. Patient owns a RW, w/c, and shower chair. Denies any HH services/aids.

## 2022-10-24 NOTE — PROGRESS NOTE ADULT - PROBLEM SELECTOR PLAN 10
Home med atorvastatin 40 mg po qd  - continue home med
Home med atorvastatin 40 mg po qd  - f/u lipid panel  - continue home med
Home med atorvastatin 40 mg po qd  - continue home med
Home med atorvastatin 40 mg po qd  - f/u lipid panel  - continue home med

## 2022-10-24 NOTE — PROGRESS NOTE ADULT - PROBLEM SELECTOR PLAN 8
GFR 26  - sodium polystyrene sulfonate 15 milligram(s) orally once a day
GFR 26  - plan as above
GFR 26  - sodium polystyrene sulfonate 15 milligram(s) orally once a day
GFR 26  - plan as above

## 2022-10-25 PROBLEM — J22 ACUTE RESPIRATORY INFECTION: Status: ACTIVE | Noted: 2018-05-03

## 2022-10-25 NOTE — DISCHARGE NOTE NURSING/CASE MANAGEMENT/SOCIAL WORK - NSDCVIVACCINE_GEN_ALL_CORE_FT
influenza, injectable, quadrivalent, preservative free; 10-Dec-2019 18:18; Tiff Michele (RN); [employer]; [id number] (Exp. Date: 30-Jun-2020); IntraMuscular; Deltoid Right.; 0.5 milliLiter(s); VIS (VIS Published: 15-Aug-2019, VIS Presented: 10-Dec-2019);

## 2022-10-25 NOTE — DISCHARGE NOTE NURSING/CASE MANAGEMENT/SOCIAL WORK - PATIENT PORTAL LINK FT
You can access the FollowMyHealth Patient Portal offered by [facility] by registering at the following website: [email or web address]. By joining [facility]’s FollowMyHealth portal, you will also be able to view your health information using other applications (apps) compatible with our system.

## 2022-10-25 NOTE — DISCHARGE NOTE NURSING/CASE MANAGEMENT/SOCIAL WORK - NSDCPEFALRISK_GEN_ALL_CORE
For information on Fall & Injury Prevention, visit: [email or web address] OR  [email or web address] OR  https://www.cdc.gov/steadi/patient.html

## 2022-10-27 PROBLEM — H66.91 CHRONIC OTITIS MEDIA OF RIGHT EAR: Status: RESOLVED | Noted: 2018-06-07 | Resolved: 2022-01-01

## 2022-10-27 PROBLEM — N04.9 ANASARCA ASSOCIATED WITH DISORDER OF KIDNEY: Status: RESOLVED | Noted: 2022-03-10 | Resolved: 2022-01-01

## 2022-10-27 PROBLEM — J96.01 ACUTE RESPIRATORY FAILURE WITH HYPOXIA: Status: RESOLVED | Noted: 2022-01-18 | Resolved: 2022-01-01

## 2022-10-27 PROBLEM — H93.90 EAR PROBLEM: Status: RESOLVED | Noted: 2018-06-07 | Resolved: 2022-01-01

## 2022-10-27 PROBLEM — Z87.898 HISTORY OF PERSISTENT COUGH: Status: RESOLVED | Noted: 2019-09-03 | Resolved: 2022-01-01

## 2022-10-27 PROBLEM — E78.5 HYPERLIPIDEMIA, UNSPECIFIED: Status: ACTIVE | Noted: 2021-06-29

## 2022-10-27 PROBLEM — U07.1 COVID-19 VIRUS INFECTION: Status: RESOLVED | Noted: 2022-01-18 | Resolved: 2022-01-01

## 2022-10-27 PROBLEM — H61.23 BILATERAL IMPACTED CERUMEN: Status: RESOLVED | Noted: 2018-06-07 | Resolved: 2022-01-01

## 2022-10-27 NOTE — REVIEW OF SYSTEMS
[Fever] : no fever [Chills] : no chills [Fatigue] : no fatigue [Hot Flashes] : no hot flashes [Night Sweats] : no night sweats [Vision Problems] : vision problems [Chest Pain] : no chest pain [Palpitations] : no palpitations [Claudication] : no  leg claudication [Lower Ext Edema] : no lower extremity edema [Orthopena] : no orthopnea [Shortness Of Breath] : no shortness of breath [Wheezing] : no wheezing [Cough] : no cough [Dyspnea on Exertion] : not dyspnea on exertion [Abdominal Pain] : no abdominal pain [Nausea] : no nausea [Constipation] : no constipation [Diarrhea] : no diarrhea [Vomiting] : no vomiting [Dysuria] : no dysuria [Incontinence] : no incontinence [Frequency] : no frequency [Joint Pain] : no joint pain [Joint Stiffness] : no joint stiffness [Muscle Pain] : no muscle pain [Back Pain] : no back pain [Skin Rash] : no skin rash [Headache] : no headache [Dizziness] : no dizziness [Suicidal] : not suicidal [Insomnia] : no insomnia [Anxiety] : no anxiety [Depression] : no depression [Easy Bleeding] : no easy bleeding [Easy Bruising] : no easy bruising [Negative] : ENT [FreeTextEntry3] : legally blind [FreeTextEntry9] : wheelchair bound

## 2022-10-27 NOTE — PHYSICAL EXAM
[No Acute Distress] : no acute distress [Well Developed] : well developed [Well-Appearing] : well-appearing [Normal Outer Ear/Nose] : the outer ears and nose were normal in appearance [No JVD] : no jugular venous distention [No Respiratory Distress] : no respiratory distress  [No Accessory Muscle Use] : no accessory muscle use [No Varicosities] : no varicosities [No Extremity Clubbing/Cyanosis] : no extremity clubbing/cyanosis [Non Tender] : non-tender [Non-distended] : non-distended [No Rash] : no rash [Normal Affect] : the affect was normal [Normal Insight/Judgement] : insight and judgment were intact [de-identified] : limited due to tele visit [de-identified] : hx glaucoma- legally blind [de-identified] : wheelchair bound

## 2022-10-27 NOTE — HISTORY OF PRESENT ILLNESS
[Home] : at home, [unfilled] , at the time of the visit. [Other Location: e.g. 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 Patient was admitted from 10/21/22 - 10/25/22 to [facility] for a diagnosis of PNA. 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 Solomon outpatient. Reports that sputum culture was received the day prior to admission growing pseudomonas and pt was started on ciprofloxacin out of concern for aspiration. Pt reports increasing sputum production since cough first began. Pt was started on Zosyn 4.5 g IV daily for pseudomonal pneumonia in setting of immunocompromised state. Pt remained on room air while inpatient and did not have SOB, CP, or chest tightness. BCx remained negative during his stay. Pt was discharged on 2-day course of levofloxacin (renally adjusted". \par \par Patient observed via tele health and is alert and oriented x 3, in no acute distress. Patient states they are feeling well today. Patient states they are eating and drinking well. Patient is accompanied by spouse and [facility] nurse during the time of visit. Patient reports compliance with medications but states he has been taking his old prescription of Carvedilol 12.5mg rather than the discharged dose of 25mg as his blood pressure had been running in the low 100's systolic. Patient states he has follow up appointments arranged with pulmonary and cardiology. Denies any cough, fever, chills, abdominal pain, palpitations, nausea, vomiting, diarrhea, lightheadedness, dizziness, shortness of breath, or chest pain.\par \par Patient contacted by TCM RN within 48 hours of discharge and d/c instructions reviewed.  Discharge medications were reviewed and reconciled with the current medication list and medications in home. Patient had 24 hour discharge call done by Corie Tubbs on 10/26/22. \par \par

## 2022-10-27 NOTE — PLAN
[FreeTextEntry1] : -CV and pulmonary status stable\par -Patient advised to continue with medication regimen as directed \par -Medication education discussed in full detail with + teach back. \par -Encouraged verbalization of fears and concerns. \par -Report all symptoms not relieved by rest or medication\par -Educated on monitoring blood pressure\par -Maintain Balanced diet \par -Exercise as appropriate\par -Patient educated on s/s of when to call medical providers with + teach back. \par -Reminded of NP role and advised to call with any questions/concerns. \par -Follow up with medical providers as scheduled\par \par - Patient verbalized understanding of plan above, advised to call call TCM Team or CCC with any questions or concerns.\par

## 2022-10-27 NOTE — ASSESSMENT
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 Patient was admitted from 10/21/22 - 10/25/22 to [facility] for a diagnosis of PNA. 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 Solomon outpatient. Reports that sputum culture was received the day prior to admission growing pseudomonas and pt was started on ciprofloxacin out of concern for aspiration. Pt reports increasing sputum production since cough first began. Pt was started on Zosyn 4.5 g IV daily for pseudomonal pneumonia in setting of immunocompromised state. Pt remained on room air while inpatient and did not have SOB, CP, or chest tightness. BCx remained negative during his stay. Pt was discharged on 2-day course of levofloxacin (renally adjusted". \par \par Patient observed via tele health and is alert and oriented x 3, in no acute distress. Patient states they are feeling well today. Patient states they are eating and drinking well. Patient is accompanied by spouse and [facility] nurse during the time of visit. Patient reports compliance with medications but states he has been taking his old prescription of Carvedilol 12.5mg rather than the discharged dose of 25mg as his blood pressure had been running in the low 100's systolic. Patient states he has follow up appointments arranged with pulmonary and cardiology. Denies any cough, fever, chills, abdominal pain, palpitations, nausea, vomiting, diarrhea, lightheadedness, dizziness, shortness of breath, or chest pain.\par \par Patient contacted by TCM RN within 48 hours of discharge and d/c instructions reviewed.  Discharge medications were reviewed and reconciled with the current medication list and medications in home. Patient had 24 hour discharge call done by Corie Tubbs on 10/26/22.

## 2022-11-01 NOTE — CHART NOTE - NSCHARTNOTEFT_GEN_A_CORE
When admitted this patient was septic from pseudomonas pneumonia; Failed outpatient treatmen When admitted this patient he had sepsis  from pseudomonas pneumonia; Failed outpatient treatment

## 2022-11-14 NOTE — REASON FOR VISIT
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

## 2022-11-14 NOTE — ASSESSMENT
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

## 2022-11-17 PROBLEM — R93.89 ABNORMAL CHEST CT: Status: ACTIVE | Noted: 2022-01-01

## 2022-11-17 PROBLEM — K21.9 CHRONIC GERD: Status: ACTIVE | Noted: 2022-01-01

## 2022-11-17 PROBLEM — Z94.0 HISTORY OF RENAL TRANSPLANT: Status: ACTIVE | Noted: 2018-01-23

## 2022-11-17 PROBLEM — I25.10 CAD S/P PERCUTANEOUS CORONARY ANGIOPLASTY: Status: ACTIVE | Noted: 2021-06-29

## 2022-11-17 PROBLEM — E87.70 VOLUME OVERLOAD: Status: ACTIVE | Noted: 2019-09-03

## 2022-11-17 NOTE — ED PROVIDER NOTE - CLINICAL SUMMARY MEDICAL DECISION MAKING FREE TEXT BOX
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 Pt sleepy, states he is here for a cough and LE swelling.   Wife/son at bedside. Lives w/ family. Minimally ambulatory only w/ assistance. Pt has several months of cough. Pt presented ~3-4w ago w/ sleepiness/cough. Diagnosed w/ pseudomonal pneumonia. Treated w/ steroids/abx. Wife reports 1-2w of increasing sleepiness. States that after abx the phlegm had improved however pt still has persistent cough. Also noticed that pt becomes SOB w/ minimal exertion. Also light colored stool. Also noticed increasing b/l LE swelling. Went to Dr. Aranda today who reportedly referred to ED.   Hypertensive, other vitals wnl. Exam as above.  ddx: Possible metabolic vs. infectious (?recurrent aspiration), vs. less likely cardiac (recent echo) vs. ?lung obstruction/malignancy (recurrent cough despite abx, recent weight loss)  Labs, CXR, CT, UA, reassess.

## 2022-11-17 NOTE — H&P ADULT - PROBLEM SELECTOR PLAN 1
SUNNY 2/2 CKD vs acute on chronic CHF (grade I diastolic dysfunction). Also w cough but saturating well on RA without increased work of breathing. BNP elevated supporting acute on chronic chf. Poor renal function evidenced by acidosis though he is at baseline Cr. Cardiology consulted in ED limited TTE w preserved systolic function. Renal consulted.   - lasix 20mg in AM  - I/Os  - f/u echo  - f/u cardiology and renal recs

## 2022-11-17 NOTE — H&P ADULT - PROBLEM SELECTOR PLAN 2
Fluid overloaded but may be due to CHF, no sheldon. Acidotic but at baseline. Follows w Dr. Mac. Does have orthopnea and intermittent SOB.   - I/Os  - f/u urine studies  - f/u renal recs  - renally dose meds  - tacrolimus 3mg in pm and 5mg 12 noon

## 2022-11-17 NOTE — ASSESSMENT
[FreeTextEntry1] : ROBERT OTOOLE was seen for a follow up on Nov 17 2022 10:30AM.\par \par 1) The patient has extreme edema and possible decompensated heart failure in addition to still bilateral ronchi consistent with possible infection and needs further evaluation today. We called an ambulance to take him to the ER. \par 2) The patient will wait on Tacrolimus until they draw blood in the ER.

## 2022-11-17 NOTE — ED ADULT NURSE NOTE - CAS TRG GEN SKIN COLOR
"Mom would like for you to send the ex (metadate 10mg) to a different pharmacy. She stated that the pharmacy it was sent to was "out of stock". [facility] I went into the chart an changed it already.    Please advise   "
Metadate CD 10 mg.   Please check with above pharmacy to see if this is in stock. If so, resend message back to me and I will order.     Thanks!    AM
Normal for race

## 2022-11-17 NOTE — ED PROVIDER NOTE - PHYSICAL EXAMINATION
2+ b/l LE pitting edema extending to knees  resp: good air entry b/l, scattered rhonchi  Sleepy - easily arousable to verbal stimuli.  L elbow edema - pt has known fracture ~2months ago, swelling is at baseline.  abd: soft, minimal periumbilical ttp.  b/l LE 2/5 strength.  RUE AVF +thrill c/d/i (no longer on HD since transplant)

## 2022-11-17 NOTE — ED CLERICAL - NS ED CLERK NOTE PRE-ARRIVAL INFORMATION; ADDITIONAL PRE-ARRIVAL INFORMATION
This patient is enrolled in the COPD/PNA STARs readmission reduction program and has active care navigation. This patient can be followed up by the care navigation team within 24 hours. To arrange close follow-up or to obtain additional clinical information about this patient, please call the contact number above and please contact the on call pulmonary fellow (Long range pager is [phone]). Please also alert either Dr. Prather or the hospitalist on call [phone] PRIOR to admission. Dr. Prather can be reached at [phone].

## 2022-11-17 NOTE — ED PROVIDER NOTE - PROGRESS NOTE DETAILS
[place]: Hgb 7.3 at baseline, plt 130, VBG pH 7.17, Cl 112, bicarb 14 (grossly at baseline), BUN/Cr 62/2.42 grossly at baseline, albumin 3, trop 0.03 (likely related to CKD), BNP 8909, other labs grossly wnl. COVID neg. CXR showing "Coarsened bilateral interstitial pattern with increasing superimposed confluent opacity over the right upper right lower lung zone and a developing multifocal right-sided pneumonia cannot be excluded..." Pt remains stable. d/w Dr. Aranda, will give lasix for likely fluid overload. CT results pending. Will reassess. Danielle: CT showed "1. Since September 12, 2022, new bilateral pulmonary opacities, increased   small bilateral pleural effusions and mild anasarca. Findings may represent pneumonia, atypical pulmonary edema/volume overload and combination of both." Given cough, lethargy, and CT findings, will tx for possible recurrent PNA. Pt remains w/o resp distress, satting mid 90s. Pt sees Dr. Prather - carolyn, went to voicemail. Updated wife. Will admit for further care. Danielle: Pt evaluated by Dr. Prather in ED, feels that findings are most c/w fluid overload/pulmonary edema. Recommending cards admit. I d/w Dr. Galindo - he is going away, suggesting service. I rediscussed w/ Dr. Prather --> suggesting cards. I d/w cards fellow - very resistant regarding admission. I attempted to contact cards attg Dr. Felder - left voicemail. Danielle: d/w cards fellow and attg Dr. Bryant, cards fellow to consult on pt/perform echo, but feel that pt is more appropriate for medicine service. rediscussed w/ Dr. Prather, will admit medicine for further care.

## 2022-11-17 NOTE — H&P ADULT - HISTORY OF PRESENT ILLNESS
HPI: 84 y/o M PMH CKD 4, renal transplant in 2013 at [facility], glaucoma (blind), DM1, anemia, CAD s/p 2 DAMEON to LAD in 6/21, BPH, recent admission for PNA presents with acute on chronic cough and SUNNY. Mr. Miguel went to see his nephrologist Dr. Menendez today and was sent to ED for SUNNY. He has had a cough for several months that was initially more productive and he was treated w abx and steroids here in October. His cough is less productive but still perists and has worsened over the past few days associated w lethargy, HUTCHINSON, and worsening leg swelling. No fevers, chills, congestion, sore throat, N/V, diarrhea, constipation. He has had very light stools recently, no melena or hematochezia. Pt is urinating normally.    In the ED:  Initial vital signs: T: 98.3 F, HR: 59, BP: 176/91-->107/65, R: 18, SpO2: 98% on RA  ED course:   Labs: significant for hgb 7.3, plt 130, bicarb 14, BUN 62, Cr 2.42 (at baseline), venous pH 7.17, lactate 0.5, AG 14, UA negative for pyuria, trop 0.03, CKMB 3.8 (negative), BNP 8909  Imaging:  CT: b/l pulmonary opacifications and increased b/l pulmonary effusions   EKG:   Medications: lasix 20mg IV, zosyn 3.375g  Consults: cardiology, nephrology    Cardiology consulted for admission to cards and after discussion decided on medicine admission in setting of mixed picture. Given lasix 20mg.

## 2022-11-17 NOTE — ED PROVIDER NOTE - OBJECTIVE STATEMENT
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 Pt sleepy, states he is here for a cough and LE swelling.   Wife/son at bedside. Lives w/ family. Minimally ambulatory only w/ assistance. Pt has several months of cough. Pt presented ~3-4w ago w/ sleepiness/cough. Diagnosed w/ pseudomonal pneumonia. Treated w/ steroids/abx. Wife reports 1-2w of increasing sleepiness. States that after abx the phlegm had improved however pt still has persistent cough. Also noticed that pt becomes SOB w/ minimal exertion. Also light colored stool. Also noticed increasing b/l LE swelling. Went to Dr. Aranda today who reportedly referred to ED.   Pt denies (and no reported) HA, f/c, sore throat, CP, NVD, abd pain, urinary complaints.   Echo 10/24/22:  1. Normal left and right ventricular size and systolic function.  2. Mild symmetric left ventricular hypertrophy.  3. Grade I left ventricular diastolic dysfunction.  4. Mildly dilated left atrium.  5. No significant valvular disease.  6. No evidence of pulmonary hypertension.  7. No pericardial effusion.

## 2022-11-17 NOTE — H&P ADULT - NSHPPHYSICALEXAM_GEN_ALL_CORE
T(C): 36.8 (11-17-22 @ 22:58), Max: 36.8 (11-17-22 @ 12:38)  HR: 56 (11-17-22 @ 22:58) (55 - 63)  BP: 140/62 (11-17-22 @ 22:58) (107/65 - 176/91)  RR: 18 (11-17-22 @ 22:58) (18 - 18)  SpO2: 97% (11-17-22 @ 22:58) (97% - 98%)    GEN - Elderly M lying in bed head of bed slightly raised, sleeping easily arousable  HEENT - MMM  RESP - CTA BL, rhonchi heard b/l, no increased work of breathing, on RA  CARDIO - NS1S2, RRR  ABD - Soft/Non tender/Non distended  Ext - b/l up to knees, cool feet  Neuro - moves all extremities, tremor of L hand when outstretched  Skin - clean, dry, intact. no rashes or lesions.    Psych- AAOx3. appropriate behaviour. attentive. normal affect.

## 2022-11-17 NOTE — HISTORY OF PRESENT ILLNESS
[FreeTextEntry1] : The patient is a 82 year male being seen for a follow up. He has a previous history of evaluation and active reassessment of HTN, DM, anemia, ASCVD, and h/o renal transplant, s/p 06/15/21 cardiac catheterization and angioplasty. Last seen October 2022. The patient was accompanied by his wife.\par \par The patient was diagnosed with pneumonia and was discharged on 10/24/22. The discharge notes are outlined below. The patient is still wheezing and coughing. No fever or phlegm.  The patient had an appointment with Dr. Baird which was cancelled today. His wife reports that he is always fatigued and falling asleep. She reports during his nocturia he is short of breath. She reports he cannot walk around the house but uses the wheelchair. She reports his bowel movements are light in color. His oxygen is 54 or 58. His saturation is 97 or 98. He has a healthy appetite. Patient reports to be swallowing better. His reflux has resolved.The patient broke his elbow in the summer, and his x-ray reportedly showed scar tissue instead of bone growth. Patient is being evaluated for possible surgical intervention \par \par \par ------------------------------------------------------------------------------------------------------------------------------------------\par Problem List/Main Diagnoses (system-based): \par 1. Pneumonia due to pseudomonas: Patient presents with cough x4 months per wife \par and was followed by Dr. Solomon outpatient. Reports that sputum culture was \par received  the day prior to admission growing pseudomonas and pt was started on \par ciprofloxacin out of concern for aspiration. Pt reports increasing sputum \par production since cough first began. Pt was started on Zosyn 4.5 g IV daily for \par pseudomonal pneumonia in setting of immunocompromised state. Pt remained on \par room air while inpatient and did not have SOB, CP, or chest tightness. BCx \par remained negative during his stay. Pt was discharged on 2-day course of \par levofloxacin (renally adjusted). \par 2. Hypothermia: On admission, pt had T 93.5. Improved to T 96 with david hugger. \par Pt was started on hydrocortisone taper due to concern for adrenal \par insufficiency. Pt was not hypothermic the remainder of his hospital stay. \par 3. Weight loss: Pt reportedly lost 20 lbs in the past few months, likely in \par setting of poor PO intake. Pt denies fevers, chills, night sweats. Nutrition \par evaluated patient and recommended easy to chew diet. \par 4. Wheelchair bound: Until 2 months prior to admission, pt ambulated with \par walker. 2 months ago, pt fell and broke L arm so was no longer able to use \par walker. Pt had outpatient PT twice per week before admission. OT evaluated \par patient and recommended home OT. \par 5. Metabolic acidosis: Pt presented with VBG pH 7.18 with no anion gap. Pt \par denies any significant diarrhea. ABG on admission showed pH 7.23, pCO2 31, pO2 \par 99, HCO3 13. Possible RTA in setting of patient with history of renal \par transplant. Pt developed anion gap metabolic acidosis during his hospital stay, \par likely 2/2 ketoacidosis in setting of uncontrolled diabetes. Pt was treated for \par T1DM as below. \par 6. T1DM: Pt has history of T1DM and takes humalog mix 75/25 12-16 units BID at \par home. Pt was initially started on low-dose insulin sliding scale on admission \par due to decreased PO intake, and eventually increased to 20 units of lantus at \par night and 7 units of lispro TID with meals. \par 7. History of kidney transplant: Pt [facility] in 2013 [facility]. Takes tacrolimus and mycophenolate mofetil BID at home. While \par inpatient, pt was continued on tacrolimus 3 mg daily and mycophenolate 500 mg \par BID. \par 8. Glaucoma: Pt has a history of glaucoma 2/2 T1DM and is legally blind. \par Received artificial tears PRN while inpatient. \par 9. CAD: Pt has history of CAD and is s/p DAMEON x2 LAD, D2 6/2021. Takes ASA 81 mg \par daily, atorvastatin 40 mg daily, carvedilol 12.5 mg daily, and lasix 20 mg \par daily at home. Continued on home medications while inpatient; increased \par carvedilol dose to 25 mg BID. \par 10. Stage IV CKD: GFR 26. Plan as above. \par 9. HTN: Pt takes carvedilol 12.5 mg BID and lasix 20 mg daily at home. \par Continued on home medications while inpatient. Increased carvedilol dosing to \par 25 mg BID. \par 10. BPH: Pt takes tamsulosin 0.4 mg daily. Continued on home medication while \par inpatient. \par 11. Hyperlipidemia: Pt takes atorvastatin 40 mg daily. Continued on home \par medication while inpatient. \par 12. Pt has baseline Hgb 9. On admission, Hgb was 8.6. No signs/symptoms of \par acute bleed. H/H remained stable during admission. \par Inpatient treatment course: Zosyn 4.5 g IV q24h x5 days, hydrocortisone taper, \par carvedilol 25 mg PO BID \par \par Patient was discharged to: Home with home PT \par New medications: Levofloxacin 750 mg x1 day, levofloxacin 500 mg x1 day \par Changes to old medications: Carvedilol 12.5 mg PO daily -> 25 mg PO daily \par Medications that were stopped: None \par --------------------------------------------------------------------------------------------------------------------------------------\par  Current Medications: atorvastatin 40mg QD, carvedilol 25mg BID, Plavix 75mg QD, prednisone 5mg QD, ASA 81mg QD, Flomax 0.4mg QD, Humalog 100 BID, insulin 16 units 75/25 prn, mycophenolate 500mg BID, tacrolimus 4mg BID, Dulcolax, Senna, Colace 100mg BID, vitamin D3 1000 units QD, Kayexalate 15mg QD, torsemide 40mg QD, Levofloxacin 750 mg x1 day, levofloxacin 500 mg x1 day

## 2022-11-17 NOTE — H&P ADULT - PROBLEM SELECTOR PLAN 4
Hx of CAD s/p 2 DAMEON to LAD in June 2021, curently no CP. Trops 0.03 but no CP or ischemic changes on EKG, likely due to CKD. Takes Plavix and aspirin per last d/c but only aspirin on outpatient note  - c/w ASA daily  - collateral information on plavix

## 2022-11-17 NOTE — ED ADULT TRIAGE NOTE - NSWEIGHTCALCTOOLDRUG_GEN_A_CORE
Message Given to patient to schedule lab appointment.  Halina Santana on 5/21/2019 at 10:31 AM      used

## 2022-11-17 NOTE — ED ADULT NURSE NOTE - SUICIDE SCREENING QUESTION 2
Thanks for visiting us today!    You were given a packet of information handouts at today's well child exam. Please keep them handy for future reference.     If you haven't already liked us on Facebook, please do so!  Just search for \"[facility]\"    If you are not on Facebook and would like to see the information we post, you can find it at this website:    [email or web address]    Remember these important phone numbers:    [phone] for phone nurses during the day and our nurse answering service at night    [phone]  for scheduling or changing future appointments    When leaving a message for our staff, please include:   • the spelling of your child’s full name and date of birth  • your full name and relationship to child  • best phone number and time to reach you   • reason for the call  --------------------------------------------------------------------------------------------------------------------        
No

## 2022-11-17 NOTE — H&P ADULT - NSHPLABSRESULTS_GEN_ALL_CORE
.  LABS:                         7.3    6.37  )-----------( 130      ( 2022 14:09 )             24.6         140  |  112<H>  |  62<H>  ----------------------------<  147<H>  4.7   |  14<L>  |  2.42<H>    Ca    9.0      2022 14:09    TPro  5.7<L>  /  Alb  3.0<L>  /  TBili  0.2  /  DBili  x   /  AST  10  /  ALT  7<L>  /  AlkPhos  92      PT/INR - ( 2022 14:09 )   PT: 12.0 sec;   INR: 1.01          PTT - ( 2022 14:09 )  PTT:37.4 sec  Urinalysis Basic - ( 2022 19:25 )    Color: Yellow / Appearance: Clear / S.020 / pH: x  Gluc: x / Ketone: NEGATIVE  / Bili: Negative / Urobili: 0.2 E.U./dL   Blood: x / Protein: NEGATIVE mg/dL / Nitrite: NEGATIVE   Leuk Esterase: NEGATIVE / RBC: x / WBC x   Sq Epi: x / Non Sq Epi: x / Bacteria: x      Serum Pro-Brain Natriuretic Peptide: 8909 pg/mL ( @ 14:09)    Lactate, Blood: 0.5 mmol/L ( @ 14:09)      RADIOLOGY, EKG & ADDITIONAL TESTS: Reviewed.

## 2022-11-17 NOTE — ED PROVIDER NOTE - INPATIENT RESIDENT/ACP NOTIFIED
----- Message from Marlin Hanks MD sent at 3/6/2019  9:33 AM CST -----  Let patient know that TSH is within normal limits, but on the upper limit of normal.  If symptomatic we could consider increase dose of thyroid medication.    Marlin Hanks MD  
Attempted to contact patient, no answer and voicemail is full.   
Medication sent to pharmacy.  Will need repeat TSH in 6-8 weeks (already ordered).    Marlin Hanks MD  
Patient notified, she verbalized understanding and agreement.   
Writer spoke with Angelia. She states that her previous symptoms have not changed and would like it if you would change her medication  [facility]  
MONET

## 2022-11-17 NOTE — CHART NOTE - NSCHARTNOTEFT_GEN_A_CORE
Nephrology called for management of CKD and vol overload. Pt is a 81 yo M with renal transplant sent in by his outpatient nephrologist Dr Aranda for complains of orthopnea, intermittent dyspnea and crackles on lung exam. Of note, pt had psuedomonas pneumonia last month and since has had chronic cough    #CKD Stage IV  Hx of renal transplant in 2013, on tacrolimus and mycophenolate  Admitted with above symptoms  Dyspnea improved with lasix 20mg IV in ED  BCr earlier this year was ~2, has had gradual worsening since July/2022  Electrolytes and UA noted  CT chest noted  No evidence of ELIZABETH at this time  Repeat lasix 20mg IV dose tomorrow AM. Will re-evaluate to see if increased dose needed  Resume home dose of tacro and mycophenolate. Get tacro levels 30 minutes prior to morning dose  Strict I&Os. Ensure no obstruction  Get urine lytes     Full consult note to follow in AM    Titi Grant M.D  PGY-4 Nephrology  P: [phone]

## 2022-11-17 NOTE — H&P ADULT - PROBLEM SELECTOR PLAN 8
Electrolytes: careful w advanced kidney disease  Nutrition:  consis. carb and renal, kosher  Prophylaxis: heparin sq  Activity: activity  GI: none  C: FC  Dispo: Admit to F

## 2022-11-17 NOTE — PATIENT PROFILE ADULT - FALL HARM RISK - HARM RISK INTERVENTIONS
Assistance with ambulation/Assistance OOB with selected safe patient handling equipment/Communicate Risk of Fall with Harm to all staff/Discuss with provider need for PT consult/Monitor gait and stability/Provide patient with walking aids - walker, cane, crutches/Reinforce activity limits and safety measures with patient and family/Tailored Fall Risk Interventions/Use of alarms - bed, chair and/or voice tab/Visual Cue: Yellow wristband and red socks/Bed in lowest position, wheels locked, appropriate side rails in place/Call bell, personal items and telephone in reach/Instruct patient to call for assistance before getting out of bed or chair/Non-slip footwear when patient is out of bed/[place] to call system/Physically safe environment - no spills, clutter or unnecessary equipment/Purposeful Proactive Rounding/Room/bathroom lighting operational, light cord in reach

## 2022-11-17 NOTE — ED ADULT NURSE NOTE - CHIEF COMPLAINT QUOTE
Pt BIBEMS from pcp office due to worsening cough. Pt recently admitted to [facility] due to CHF exac. and pneumonia. Pt seen by out pt MD and sent to [facility] due to failure to thrive and ongoing worsening cough. Denies fevers, significant LE edema noted. +rhonchi b/l per EMS.

## 2022-11-17 NOTE — ED ADULT TRIAGE NOTE - CHIEF COMPLAINT QUOTE
Pt BIBEMS from pcp office due to worsening cough. Pt recently admitted to [facility] due to CHF exac. and pneumonia. Pt seen by out pt MD and sent to [facility] due to failure to thrive and ongoing worsening cough. Denies fevers, significant LE edema noted. +rhonchi b/l per EMS.

## 2022-11-17 NOTE — H&P ADULT - PROBLEM SELECTOR PLAN 3
Had EF of 55% on TTE from Oct 2022, grade I diastolic dysfunction. Now fluid overloaded. Liver enzymes wnl and urinating but need to follow up how much so likely perfusing well. Cardiology consulted.  - I/Os  - f/u echo  - daily weights  - lasix 20mg in AM  - f/u cardiology recommendations

## 2022-11-17 NOTE — PHYSICAL EXAM
[General Appearance - Alert] : alert [General Appearance - In No Acute Distress] : in no acute distress [Sclera] : the sclera and conjunctiva were normal [Extraocular Movements] : extraocular movements were intact [Outer Ear] : the ears and nose were normal in appearance [Hearing Threshold Finger Rub Not [place]] : hearing was normal [Neck Appearance] : the appearance of the neck was normal [Neck Cervical Mass (___cm)] : no neck mass was observed [Bowel Sounds] : normal bowel sounds [Abdomen Soft] : soft [Abdomen Tenderness] : non-tender [] : no hepato-splenomegaly [Abdomen Mass (___ Cm)] : no abdominal mass palpated [FreeTextEntry1] : wheelchair

## 2022-11-17 NOTE — ED ADULT NURSE NOTE - OBJECTIVE STATEMENT
82 y.o male c/o cough. Pt recently admitted to hospital for pneumonia, finished oral antibiotics at home this past weekend. Per family pt sent for CHF. Pt denies cp, fever, n/v, sob. Pt noted with edema to b/l LE, left upper extremity. Pt has history of left elbow fracture, did not heal correctly and is still swollen. 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 Pt is legally blind. Ambulates with assistance via wheelchair.

## 2022-11-17 NOTE — H&P ADULT - ASSESSMENT
84 y/o M PMH CKD 4, renal transplant in 2013 at [facility], glaucoma (blind), DM1, anemia, CAD s/p 2 DAMEON to LAD in 6/21, BPH, recent admission for PNA presents with acute on chronic cough and SUNNY 2/2 acute CHF vs CKD.

## 2022-11-18 NOTE — CONSULT NOTE ADULT - ASSESSMENT
INCOMPELET PENDING DISCUSS WITH ATTENDING     Assessment/Plan:     #nonoliguric ELIZABETH   - likely multifactorial ischemic ATN 2/2 hypotension + toxic ATN and interstitial nephritis 2/2 vanco/zosyn, less likely glomerulonephritis given clinical picture   - hypotensive previously   - supratherapeutic vanco level   - electrolyte and volume status noted   - no absolute indication for hemodialysis   - keep MAP > 65   - monitor urine output, currently, UOP   - if adequate PO intake, may hold off on IVF for now   - Cr , baseline Cr   - Urine Na ; Ur Cr    - FENa  indicative of intrinsic renal disease  - recommend renal sono r/o obstruction   - recommend repeat BMP, serum complement, UA, urine protein:creatinine ratio, and urine lytes    - recommend CPK level r/o rhabdomyolysis   - recommend continue to hold vanco if supratherapeutic   - recommend alternative to vanco/zosyn   - avoid ACE/ARB/NSAIDS and other nephrotoxic medications   - renal dosing of antibiotics   - hold off on glomerulonephritis work-up at this time   - renal diet     Thank you for the opportunity to participate in the care of your patient. The nephrology service remains available to assist with any questions or concerns. Please feel free to reach us by paging the on-call nephrology fellow for urgent issues or as below.      Pt is 84 y/o M PMH CKD 4, renal transplant in 2013 at [facility], glaucoma (blind), DM1, anemia, CAD s/p 2 DAMEON to LAD in 6/21, BPH, recent admission for PNA presents with acute on chronic cough and SUNNY 2/2 acute CHF vs CKD. renal consulted for CKD and kidney transplant recs. In physical exam pt looks overloaded with not adequate UOP. The pt has extreme edema and possible decompensated HF and b/l coarse crackles.     Assessment/Plan:     #CKD IV, Hx of kidney transplant   -Home medication confirmed with wife is: Tacrolimus 4mg BID and Mycophenolate 500 mg BID. Also, confirmed with pharmacy, there is no need to take these 2 medications separately Giving the same time is safe. (Wife reported they have to be given 2 hours separately to avoid drug intractions)  -Creatinine is at baseline Cr: 2.42,   -Monitor creatinine  -Pt is overloaded in exam. Lung b/l coarse crackles, LE b/l pitting edema, UOP net -500.  CXR congested, CT b/l pleural effusion. Would diuresis  -agree with cardiology recs Lasix 40mg BID if not adequate UOP.  - Strick UOP monitor     -Would send Tacrolimus level       Renal team is following     Thank you for the opportunity to participate in the care of your patient. The nephrology service remains available to assist with any questions or concerns. Please feel free to reach us by paging the on-call nephrology fellow for urgent issues or as below.      Pt is 82 y/o M PMH CKD 4, renal transplant in 2013 at [facility], glaucoma (blind), DM1, anemia, CAD s/p 2 DAMEON to LAD in 6/21, BPH, recent admission for PNA presents with acute on chronic cough and SUNNY 2/2 acute CHF vs CKD. renal consulted for CKD and kidney transplant recs. In physical exam pt looks overloaded with not adequate UOP. The pt has extreme edema and possible decompensated HF and b/l coarse crackles.     Assessment/Plan:     #CKD IV, Hx of kidney transplant   -Home medication confirmed with wife is: Tacrolimus 4mg BID and Mycophenolate 500 mg BID. Also, confirmed with pharmacy, there is no need to take these 2 medications separately Giving the same time is safe. (Wife reported they have to be given 2 hours separately to avoid drug intractions)  -Creatinine is at baseline Cr: 2.42,   -Monitor creatinine  -Pt is overloaded in exam. Lung b/l coarse crackles, LE b/l pitting edema, UOP net -500.  CXR congested, CT b/l pleural effusion. Would diuresis  -agree with cardiology recs Lasix 40mg BID if not adequate UOP.  - Strick UOP monitor     -Would send Tacrolimus level 30 minutes before next Tacrolimus dose given       Renal team is following     Thank you for the opportunity to participate in the care of your patient. The nephrology service remains available to assist with any questions or concerns. Please feel free to reach us by paging the on-call nephrology fellow for urgent issues or as below.      Pt is 82 y/o M PMH CKD 4, renal transplant in 2013 at [facility], glaucoma (blind), DM1, anemia, CAD s/p 2 DAMEON to LAD in 6/21, BPH, recent admission for PNA presents with acute on chronic cough and SUNNY 2/2 acute CHF vs CKD. renal consulted for CKD and kidney transplant recs. In physical exam pt looks overloaded with not adequate UOP. The pt has extreme edema and possible decompensated HF and b/l coarse crackles.     Assessment/Plan:     #CKD IV, Hx of kidney transplant   -Home medication confirmed with wife is: Tacrolimus 4mg BID and Mycophenolate 500 mg BID. Also, confirmed with pharmacy, there is no need to take these 2 medications separately Giving the same time is safe. (Wife reported they have to be given 2 hours separately to avoid drug intractions)  -Creatinine is at baseline Cr: 2.42,   -Monitor creatinine  -Pt is overloaded in exam. Lung b/l coarse crackles, LE b/l pitting edema, UOP net -500.  CXR congested, CT b/l pleural effusion. Would diuresis  - Agree with cardiology recs Lasix 40mg BID if not adequate UOP.  - Strick UOP monitor     -Would send Tacrolimus level 30 minutes before next Tacrolimus dose given   -f/u Cardiology recs       Renal team is following     Thank you for the opportunity to participate in the care of your patient. The nephrology service remains available to assist with any questions or concerns. Please feel free to reach us by paging the on-call nephrology fellow for urgent issues or as below.

## 2022-11-18 NOTE — CONSULT NOTE ADULT - ASSESSMENT
[age] y/o legally blind man PMHx CAD s/p 2 DAMEON to LAD in 6/21, CKD 4 BL Cr ~2.5, renal transplant in 2013 at [facility] on tacrolimus and mycophenolate mofetil, glaucoma (blind), DM1, anemia, BPH, recent admission for PNA presents with BL LE edema and acute on chronic cough.    # volume overload likely 2/2 sheldon on ckd iso nephrotoxic abx, doubt acute HFpEF exacerbation  - repeat tte to ensure no change in EF  - tight Is and Os  - lasix 20 bid, consider 40 if urine OP isn't improving and nephrology agrees  - cardio will follow    # CAD s/p DAMEON to LAD 6/2021  - continue w/ coureg, plavix, asa, statin   83 y/o legally blind man PMHx CAD s/p 2 DAMEON to LAD in 6/21, CKD 4 BL Cr ~2.5, renal transplant in 2013 at [facility] on tacrolimus and mycophenolate mofetil, glaucoma (blind), DM1, anemia, BPH, recent admission for PNA presents with BL LE edema and acute on chronic cough.    # volume overload likely 2/2 sheldon on ckd iso nephrotoxic abx, doubt acute HFpEF exacerbation  - repeat tte to ensure no change in EF  - tight Is and Os  - lasix IV 20 bid, consider lasix IV 40 bid if urine OP isn't improving and nephrology agrees  - cardio will follow    # CAD s/p DAMEON to LAD 6/2021  - continue w/ coureg, plavix, asa, statin   83 y/o legally blind man PMHx CAD s/p 2 DAMEON to LAD in 6/21, CKD 4 BL Cr ~2.5, renal transplant in 2013 at [facility] on tacrolimus and mycophenolate mofetil, glaucoma (blind), DM1, anemia, BPH, recent admission for PNA presents with BL LE edema and acute on chronic cough.    # volume overload likely 2/2 CKD  - repeat tte to ensure no change in EF  - strict I's and O's   - lasix IV 20 bid, consider lasix IV 40 bid if urine OP isn't improving and nephrology agrees  - cardio will follow    # CAD s/p DAMEON to LAD 6/2021  - continue w/ coreg, plavix, asa, statin

## 2022-11-18 NOTE — CONSULT NOTE ADULT - ATTENDING COMMENTS
CKD stable at baseline   CHF / fluid overload -- still quite overloaded and agree with diuresis -- likely will need lasix 40 IV BID at least   dosing of tacro clarified

## 2022-11-18 NOTE — PROGRESS NOTE ADULT - PROBLEM SELECTOR PLAN 6
Previously on lantus 15 lispro 5  - c/w w this regimen and adjust based on requirements Previously on lantus 15 lispro 5    - c/w this regimen and adjust based on requirements

## 2022-11-18 NOTE — PROGRESS NOTE ADULT - ASSESSMENT
82 y/o M PMH CKD 4, renal transplant in 2013 at [facility], glaucoma (blind), DM1, anemia, CAD s/p 2 DAMEON to LAD in 6/21, BPH, recent admission for PNA presents with acute on chronic cough and SUNNY 2/2 acute CHF vs CKD.

## 2022-11-18 NOTE — PROGRESS NOTE ADULT - PROBLEM SELECTOR PLAN 3
Had EF of 55% on TTE from Oct 2022, grade I diastolic dysfunction. Now fluid overloaded. Liver enzymes wnl and urinating but need to follow up how much so likely perfusing well. Cardiology consulted.  - I/Os  - f/u echo  - daily weights  - lasix 20mg in AM  - f/u cardiology recommendations Had EF of 55% on TTE from Oct 2022, grade I diastolic dysfunction. Now fluid overloaded. Liver enzymes wnl and urinating but need to follow up how much so likely perfusing well. Cardiology consulted.    - I/Os  - f/u echo  - daily weights  - lasix 20mg in AM  - f/u cardiology recommendations Had EF of 55% on TTE from Oct 2022, grade I diastolic dysfunction. Now fluid overloaded. Liver enzymes wnl and urinating but need to follow up how much so likely perfusing well. Cardiology consulted.    - I/Os  - f/u echo  - daily weights  - lasix 40mg BID, per cardiology and renal recs  - f/u cardiology recommendations

## 2022-11-18 NOTE — PROGRESS NOTE ADULT - PROBLEM SELECTOR PLAN 1
SUNNY 2/2 CKD vs acute on chronic CHF (grade I diastolic dysfunction). Also w cough but saturating well on RA without increased work of breathing. BNP elevated supporting acute on chronic chf. Poor renal function evidenced by acidosis though he is at baseline Cr. Cardiology consulted in ED limited TTE w preserved systolic function. Renal consulted.   - lasix 20mg in AM  - I/Os  - f/u echo  - f/u cardiology and renal recs SUNNY 2/2 CKD vs acute on chronic CHF (grade I diastolic dysfunction). Also w cough but saturating well on RA without increased work of breathing. BNP elevated supporting acute on chronic chf. Poor renal function evidenced by acidosis though he is at baseline Cr. Cardiology consulted in ED limited TTE w preserved systolic function. Renal consulted.     - lasix 20mg in AM, per renal recs  - I/Os  - f/u echo  - cardiology and renal following, appreciate recs SUNNY 2/2 CKD vs acute on chronic CHF (grade I diastolic dysfunction). Also w cough but saturating well on RA without increased work of breathing. BNP elevated supporting acute on chronic chf. Poor renal function evidenced by acidosis though he is at baseline Cr. Cardiology consulted in ED limited TTE w preserved systolic function. Renal consulted.     - lasix 40mg BID, per cardiology and renal recs  - I/Os  - f/u TTE  - cardiology and renal following, appreciate recs

## 2022-11-18 NOTE — CONSULT NOTE ADULT - SUBJECTIVE AND OBJECTIVE BOX
ROBERT OTOOLE  82y, Male  Allergy: No Known Allergies      CHIEF COMPLAINT:     HPI:  HPI: 84 y/o legally blind man PMHx CAD s/p 2 DAMEON to LAD in , CKD 4 BL Cr ~2.5, renal transplant in  at [facility] on tacrolimus and mycophenolate mofetil, glaucoma (blind), DM1, anemia, BPH, recent admission for PNA presents with BL LE edema and acute on chronic cough. Mr. Otoole went to see his nephrologist Dr. Menendez today and was sent to ED for SUNNY. He has had a cough for several months that was initially more productive and he was treated w abx and steroids here in October. His cough is less productive but still persists and has worsened over the past few days associated w lethargy, HUTCHINSON, and worsening leg swelling.     Per son in law at bedside pt cognitive ability has been in decline since he lost ability to ambulate after arm fx in august.     No fevers, chills, congestion, sore throat, N/V, diarrhea, constipation. He has had very light stools recently, no melena or hematochezia. Pt is urinating normally.    In the ED:  Initial vital signs: T: 98.3 F, HR: 59, BP: 176/91-->107/65, R: 18, SpO2: 98% on RA  ED course:   Labs: significant for hgb 7.3, plt 130, bicarb 14, BUN 62, Cr 2.42 (at baseline), venous pH 7.17, lactate 0.5, AG 14, UA negative for pyuria, trop 0.03, CKMB 3.8 (negative), BNP 8909  Imaging:  CT: b/l pulmonary opacifications and increased b/l pulmonary effusions   EKG:   Medications: lasix 20mg IV, zosyn 3.375g  Consults: cardiology, nephrology    Cardiology consulted for admission to cards and after discussion decided on medicine admission in setting of mixed picture. Given lasix 20mg.   (2022 23:35)      INFECTIOUS DISEASE HISTORY:    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      History of renal transplant  secondary to DM      Chronic kidney disease (CKD)      Glaucoma      Kidney transplant recipient      Amputation of toe  x 3      H/O heart artery stent          FAMILY HISTORY  No pertinent family history in first degree relatives        SOCIAL HISTORY    Substance Use (  ) never used  (  ) IVDU (  ) Other:  Tobacco Usage:  (   ) never smoked   (   ) former smoker   (   ) current smoker   Alcohol Usage: (   ) social  (   ) daily use (   ) denies  Sexual History:       ROS  General: Denies rigors, nightsweats  HEENT: Denies headache, rhinorrhea, sore throat, eye pain  CV: Denies CP, palpitations  PULM: Denies wheezing, hemoptysis  GI: Denies hematemesis, hematochezia, melena  : Denies discharge, hematuria  MSK: Denies arthralgias, myalgias  SKIN: Denies rash, lesions  NEURO: Denies paresthesias, weakness  PSYCH: Denies depression, anxiety    TESTS & MEASUREMENTS:                        7.4    4.34  )-----------( 118      ( 2022 05:30 )             24.4         140  |  112<H>  |  62<H>  ----------------------------<  147<H>  4.7   |  14<L>  |  2.42<H>    Ca    9.0      2022 14:09    TPro  5.7<L>  /  Alb  3.0<L>  /  TBili  0.2  /  DBili  x   /  AST  10  /  ALT  7<L>  /  AlkPhos  92  1117      LIVER FUNCTIONS - ( 2022 14:09 )  Alb: 3.0 g/dL / Pro: 5.7 g/dL / ALK PHOS: 92 U/L / ALT: 7 U/L / AST: 10 U/L / GGT: x           Urinalysis Basic - ( 2022 19:25 )    Color: Yellow / Appearance: Clear / S.020 / pH: x  Gluc: x / Ketone: NEGATIVE  / Bili: Negative / Urobili: 0.2 E.U./dL   Blood: x / Protein: NEGATIVE mg/dL / Nitrite: NEGATIVE   Leuk Esterase: NEGATIVE / RBC: x / WBC x   Sq Epi: x / Non Sq Epi: x / Bacteria: x        Culture - Blood (collected 22 @ 14:21)  Source: .Blood Blood-Peripheral  Preliminary Report (22 @ 03:00):    No growth at 12 hours    Culture - Blood (collected 22 @ 14:21)  Source: .Blood Blood-Peripheral  Preliminary Report (22 @ 03:00):    No growth at 12 hours        Lactate, Blood: 0.5 mmol/L (22 @ 14:09)      INFECTIOUS DISEASES TESTING  MRSA PCR Result.: Positive (10-22-22 @ 08:18)      RADIOLOGY & ADDITIONAL TESTS:  I have personally reviewed the last Chest xray  CXR      CT  CT Chest No Cont:   ACC: [id number] EXAM:  CT CHEST                          PROCEDURE DATE:  2022          INTERPRETATION:  CLINICAL INFORMATION: Worsening cough. Evaluate for   pneumonia.    COMPARISON: CT chest 2022    CONTRAST/COMPLICATIONS:  IV Contrast: None  Oral Contrast: None  Complications: None    PROCEDURE:  CT of the Chest was performed.  Sagittal and coronal reformats were performed.    FINDINGS:    LUNGS AND AIRWAYS: Patent central airways.  New bilateral airspace   opacities, for example in right upper and left lower lobe. Unchanged   right lung base opacities, probably atelectatic. Multifocal bronchial   wall thickening.  PLEURA: Increased previously placed lateral small bilateral pleural   effusions, right greater than left.  MEDIASTINUM AND JESSE: No suspicious lymphadenopathy.  HEART: Heart size is normal. No pericardial effusion. Severe coronary   calcifications.  VISUALIZED UPPER ABDOMEN: Atrophic kidneys. Cholelithiasis.  BONES AND SOFT TISSUES: New mild anasarca. Nosuspicious lesion.    IMPRESSION:    1. Since 2022, new bilateral pulmonary opacities, increased   small bilateral pleural effusions and mild anasarca. Findings may   represent pneumonia, atypical pulmonary edema/volume overload and   combination of both.    --- End of Report ---          JOAO CLARK MD; Attending Radiologist  This document has been electronically signed.  JOAO CLARK MD; Attending Radiologist  This document has been electronically signed. 2022  4:23PM (22 @ 14:56)      CARDIOLOGY TESTING      MEDICATIONS  aspirin enteric coated 81  carvedilol 25  dextrose 5%. 1000  dextrose 5%. 1000  dextrose 50% Injectable 25  dextrose 50% Injectable 12.5  dextrose 50% Injectable 25  glucagon  Injectable 1  heparin   Injectable 5000  influenza  Vaccine (HIGH DOSE) 0.7  insulin glargine Injectable (LANTUS) 15  insulin lispro (ADMELOG) corrective regimen sliding scale   insulin lispro Injectable (ADMELOG) 5  mycophenolate mofetil 500  senna 2  sodium polystyrene sulfonate Suspension 15  tacrolimus 3  tacrolimus 5  tamsulosin 0.4      ANTIBIOTICS:      ALLERGIES:  No Known Allergies      VITALS:  T(F): 97.8, Max: 98.3 (-- @ 12:38)  HR: 60  BP: 141/57  RR: 18Vital Signs Last 24 Hrs  T(C): 36.6 (2022 05:19), Max: 36.8 (2022 12:38)  T(F): 97.8 (2022 05:19), Max: 98.3 (2022 12:38)  HR: 60 (2022 05:19) (55 - 63)  BP: 141/57 (2022 05:19) (107/65 - 176/91)  BP(mean): --  RR: 18 (2022 05:19) (18 - 18)  SpO2: 93% (2022 05:19) (93% - 98%)    Parameters below as of 2022 05:19  Patient On (Oxygen Delivery Method): room air        PHYSICAL EXAM:  Gen: NAD, resting in bed  HEENT: Normocephalic, atraumatic  Neck: supple, no lymphadenopathy  CV: Regular rate & regular rhythm  Lungs: decreased BS at bases, no fremitus  Abdomen: Soft, BS present  Ext: Warm, well perfused  Neuro: non focal, awake  Skin: no rash, no erythema  Lines: no phlebitis       ROBERT OTOOLE  82y, Male  Allergy: No Known Allergies      CHIEF COMPLAINT: sent in by nephrologist for BL LE edema    HPI:   82 y/o legally blind man PMHx CAD s/p 2 DAMEON to LAD in , CKD 4 BL Cr ~2.5, renal transplant in  at [facility] on tacrolimus and mycophenolate mofetil, glaucoma (blind), DM1, anemia, BPH, recent admission for PNA presents with BL LE edema and acute on chronic cough. Mr. Otoole went to see his nephrologist Dr. Menendez today and was sent to ED for SUNNY. He has had a cough for several months that was initially more productive and he was treated w abx and steroids here in October. His cough is less productive but still persists and has worsened over the past few days associated w lethargy, HUTCHINSON, and worsening leg swelling.     Per son in law at bedside pt cognitive ability has been in decline since he lost ability to ambulate after arm fx in august.     No fevers, chills, congestion, sore throat, N/V, diarrhea, constipation. He has had very light stools recently, no melena or hematochezia. Pt is urinating normally.    In the ED:  Initial vital signs: T: 98.3 F, HR: 59, BP: 176/91-->107/65, R: 18, SpO2: 98% on RA  ED course:   Labs: significant for hgb 7.3, plt 130, bicarb 14, BUN 62, Cr 2.42 (at baseline), venous pH 7.17, lactate 0.5, AG 14, UA negative for pyuria, trop 0.03, CKMB 3.8 (negative), BNP 8909  Imaging:  CT: b/l pulmonary opacifications and increased b/l pulmonary effusions   EKG:   Medications: lasix 20mg IV, zosyn 3.375g  Consults: cardiology, nephrology    Cardiology consulted for admission to cards and after discussion decided on medicine admission in setting of mixed picture. Given lasix 20mg.   (2022 23:35)      PAST MEDICAL & SURGICAL HISTORY:  HTN (hypertension)  BPH (benign prostatic hypertrophy)  DM (diabetes mellitus)  Type 1/insulin dependent per patient  History of renal transplant  secondary to DM  Chronic kidney disease (CKD)  Glaucoma  Kidney transplant recipient  Amputation of toe  x 3  H/O heart artery stent      FAMILY HISTORY  No pertinent family history in first degree relatives        SOCIAL HISTORY    Substance Use ( x ) never used  (  ) IVDU (  ) Other:  Tobacco Usage:  ( x ) never smoked   (   ) former smoker   (   ) current smoker   Alcohol Usage: (   ) social  (   ) daily use ( x ) denies    ROS  *ROS limited 2/2 pt cognition; gathered w/ son in law at bedside  General: Denies rigors, nightsweats  HEENT: Denies headache, rhinorrhea, sore throat, eye pain  CV: endorses significant LE swelling; Denies CP, palpitations  PULM: endorses productive cough on chronic cough; Denies wheezing, hemoptysis  GI: Denies hematemesis, hematochezia, melena  : Denies discharge, hematuria  MSK: Denies arthralgias, myalgias  SKIN: Denies rash, lesions  NEURO: Denies paresthesias, weakness  PSYCH: Denies depression, anxiety    TESTS & MEASUREMENTS:                        7.4    4.34  )-----------( 118      ( 2022 05:30 )             24.4         140  |  112<H>  |  62<H>  ----------------------------<  147<H>  4.7   |  14<L>  |  2.42<H>    Ca    9.0      2022 14:09    TPro  5.7<L>  /  Alb  3.0<L>  /  TBili  0.2  /  DBili  x   /  AST  10  /  ALT  7<L>  /  AlkPhos  92        LIVER FUNCTIONS - ( 2022 14:09 )  Alb: 3.0 g/dL / Pro: 5.7 g/dL / ALK PHOS: 92 U/L / ALT: 7 U/L / AST: 10 U/L / GGT: x           Urinalysis Basic - ( 2022 19:25 )    Color: Yellow / Appearance: Clear / S.020 / pH: x  Gluc: x / Ketone: NEGATIVE  / Bili: Negative / Urobili: 0.2 E.U./dL   Blood: x / Protein: NEGATIVE mg/dL / Nitrite: NEGATIVE   Leuk Esterase: NEGATIVE / RBC: x / WBC x   Sq Epi: x / Non Sq Epi: x / Bacteria: x        Culture - Blood (collected 22 @ 14:21)  Source: .Blood Blood-Peripheral  Preliminary Report (22 @ 03:00):    No growth at 12 hours    Culture - Blood (collected 22 @ 14:21)  Source: .Blood Blood-Peripheral  Preliminary Report (22 @ 03:00):    No growth at 12 hours        Lactate, Blood: 0.5 mmol/L (22 @ 14:09)    MRSA PCR Result.: Positive (10-22-22 @ 08:18)      RADIOLOGY & ADDITIONAL TESTS:    CT  CT Chest No Cont:   ACC: [id number] EXAM:  CT CHEST                          PROCEDURE DATE:  2022          INTERPRETATION:  CLINICAL INFORMATION: Worsening cough. Evaluate for   pneumonia.    COMPARISON: CT chest 2022    CONTRAST/COMPLICATIONS:  IV Contrast: None  Oral Contrast: None  Complications: None    PROCEDURE:  CT of the Chest was performed.  Sagittal and coronal reformats were performed.    FINDINGS:    LUNGS AND AIRWAYS: Patent central airways.  New bilateral airspace   opacities, for example in right upper and left lower lobe. Unchanged   right lung base opacities, probably atelectatic. Multifocal bronchial   wall thickening.  PLEURA: Increased previously placed lateral small bilateral pleural   effusions, right greater than left.  MEDIASTINUM AND JESSE: No suspicious lymphadenopathy.  HEART: Heart size is normal. No pericardial effusion. Severe coronary   calcifications.  VISUALIZED UPPER ABDOMEN: Atrophic kidneys. Cholelithiasis.  BONES AND SOFT TISSUES: New mild anasarca. Nosuspicious lesion.    IMPRESSION:    1. Since 2022, new bilateral pulmonary opacities, increased   small bilateral pleural effusions and mild anasarca. Findings may   represent pneumonia, atypical pulmonary edema/volume overload and   combination of both.    --- End of Report ---          JOAO CLARK MD; Attending Radiologist  This document has been electronically signed.  JOAO CLARK MD; Attending Radiologist  This document has been electronically signed. 2022  4:23PM (22 @ 14:56)      CARDIOLOGY TESTING  < from: TTE Echo Complete w/ Contrast w/ Doppler (10.24.22 @ 13:33) >    ---------------------------------------------------------------------------  -----  CONCLUSIONS:     1. Normal left and right ventricular size and systolic function.   2. Mild symmetric left ventricular hypertrophy.   3. Grade I left ventricular diastolic dysfunction.   4. Mildly dilated left atrium.   5. No significant valvulardisease.   6. No evidence of pulmonary hypertension.   7. No pericardial effusion.    ---------------------------------------------------------------------------  -----    < end of copied text >      MEDICATIONS  aspirin enteric coated 81  carvedilol 25  dextrose 5%. 1000  dextrose 5%. 1000  dextrose 50% Injectable 25  dextrose 50% Injectable 12.5  dextrose 50% Injectable 25  glucagon  Injectable 1  heparin   Injectable 5000  influenza  Vaccine (HIGH DOSE) 0.7  insulin glargine Injectable (LANTUS) 15  insulin lispro (ADMELOG) corrective regimen sliding scale   insulin lispro Injectable (ADMELOG) 5  mycophenolate mofetil 500  senna 2  sodium polystyrene sulfonate Suspension 15  tacrolimus 3  tacrolimus 5  tamsulosin 0.4      ALLERGIES:  No Known Allergies      VITALS:  T(F): 97.8, Max: 98.3 (22 @ 12:38)  HR: 60  BP: 141/57  RR: 18Vital Signs Last 24 Hrs  T(C): 36.6 (2022 05:19), Max: 36.8 (2022 12:38)  T(F): 97.8 (2022 05:19), Max: 98.3 (2022 12:38)  HR: 60 (2022 05:19) (55 - 63)  BP: 141/57 (2022 05:19) (107/65 - 176/91)  BP(mean): --  RR: 18 (2022 05:19) (18 - 18)  SpO2: 93% (2022 05:19) (93% - 98%)    Parameters below as of 2022 05:19  Patient On (Oxygen Delivery Method): room air        PHYSICAL EXAM:  Gen: NAD, resting in bed  HEENT: Normocephalic, atraumatic  Neck: supple, no lymphadenopathy  CV: Regular rate & regular rhythm  Lungs: decreased BS at bases, no fremitus, BL crackles/rales  Abdomen: Soft, BS present  Ext: Warm, well perfused  Neuro: non focal, awake  Skin: no rash, no erythema  Lines: no phlebitis       ROBERT OTOOLE  82y, Male  Allergy: No Known Allergies      CHIEF COMPLAINT: sent in by nephrologist for BL LE edema    HPI:   83 y/o legally blind man PMHx CAD s/p 2 DAMEON to LAD in , CKD 4 BL Cr ~2.5, renal transplant in  at [facility] on tacrolimus and mycophenolate mofetil, glaucoma (blind), DM1, anemia, BPH, recent admission for PNA presents with BL LE edema and acute on chronic cough. Mr. Otoole went to see his nephrologist Dr. Menendez today and was sent to ED for SUNNY. He has had a cough for several months that was initially more productive and he was treated w abx (received vanc and zosyn) and steroids here in October. His cough is less productive but still persists and has worsened over the past few days associated w lethargy, HUTCHINSON, and worsening leg swelling.     Per son in law at bedside pt cognitive ability has been in decline since he lost ability to ambulate after arm fx in august.     No fevers, chills, congestion, sore throat, N/V, diarrhea, constipation. He has had very light stools recently, no melena or hematochezia. Pt is urinating normally.    In the ED:  Initial vital signs: T: 98.3 F, HR: 59, BP: 176/91-->107/65, R: 18, SpO2: 98% on RA  ED course:   Labs: significant for hgb 7.3, plt 130, bicarb 14, BUN 62, Cr 2.42 (at baseline), venous pH 7.17, lactate 0.5, AG 14, UA negative for pyuria, trop 0.03, CKMB 3.8 (negative), BNP 8909  Imaging:  CT: b/l pulmonary opacifications and increased b/l pulmonary effusions   EKG:   Medications: lasix 20mg IV, zosyn 3.375g  Consults: cardiology, nephrology    Cardiology consulted for admission to cards and after discussion decided on medicine admission in setting of mixed picture. Given lasix 20mg.   (2022 23:35)      PAST MEDICAL & SURGICAL HISTORY:  HTN (hypertension)  BPH (benign prostatic hypertrophy)  DM (diabetes mellitus)  Type 1/insulin dependent per patient  History of renal transplant  secondary to DM  Chronic kidney disease (CKD)  Glaucoma  Kidney transplant recipient  Amputation of toe  x 3  H/O heart artery stent      FAMILY HISTORY  No pertinent family history in first degree relatives        SOCIAL HISTORY    Substance Use ( x ) never used  (  ) IVDU (  ) Other:  Tobacco Usage:  ( x ) never smoked   (   ) former smoker   (   ) current smoker   Alcohol Usage: (   ) social  (   ) daily use ( x ) denies    ROS  *ROS limited 2/2 pt cognition; gathered w/ son in law at bedside  General: Denies rigors, nightsweats  HEENT: Denies headache, rhinorrhea, sore throat, eye pain  CV: endorses significant LE swelling; Denies CP, palpitations  PULM: endorses productive cough on chronic cough; Denies wheezing, hemoptysis  GI: Denies hematemesis, hematochezia, melena  : Denies discharge, hematuria  MSK: Denies arthralgias, myalgias  SKIN: Denies rash, lesions  NEURO: Denies paresthesias, weakness  PSYCH: Denies depression, anxiety    TESTS & MEASUREMENTS:                        7.4    4.34  )-----------( 118      ( 2022 05:30 )             24.4     11-17    140  |  112<H>  |  62<H>  ----------------------------<  147<H>  4.7   |  14<L>  |  2.42<H>    Ca    9.0      2022 14:09    TPro  5.7<L>  /  Alb  3.0<L>  /  TBili  0.2  /  DBili  x   /  AST  10  /  ALT  7<L>  /  AlkPhos  92  11-17      LIVER FUNCTIONS - ( 2022 14:09 )  Alb: 3.0 g/dL / Pro: 5.7 g/dL / ALK PHOS: 92 U/L / ALT: 7 U/L / AST: 10 U/L / GGT: x           Urinalysis Basic - ( 2022 19:25 )    Color: Yellow / Appearance: Clear / S.020 / pH: x  Gluc: x / Ketone: NEGATIVE  / Bili: Negative / Urobili: 0.2 E.U./dL   Blood: x / Protein: NEGATIVE mg/dL / Nitrite: NEGATIVE   Leuk Esterase: NEGATIVE / RBC: x / WBC x   Sq Epi: x / Non Sq Epi: x / Bacteria: x        Culture - Blood (collected 22 @ 14:21)  Source: .Blood Blood-Peripheral  Preliminary Report (22 @ 03:00):    No growth at 12 hours    Culture - Blood (collected 22 @ 14:21)  Source: .Blood Blood-Peripheral  Preliminary Report (22 @ 03:00):    No growth at 12 hours        Lactate, Blood: 0.5 mmol/L (22 @ 14:09)    MRSA PCR Result.: Positive (10-22-22 @ 08:18)      RADIOLOGY & ADDITIONAL TESTS:    CT  CT Chest No Cont:   ACC: [id number] EXAM:  CT CHEST                          PROCEDURE DATE:  2022          INTERPRETATION:  CLINICAL INFORMATION: Worsening cough. Evaluate for   pneumonia.    COMPARISON: CT chest 2022    CONTRAST/COMPLICATIONS:  IV Contrast: None  Oral Contrast: None  Complications: None    PROCEDURE:  CT of the Chest was performed.  Sagittal and coronal reformats were performed.    FINDINGS:    LUNGS AND AIRWAYS: Patent central airways.  New bilateral airspace   opacities, for example in right upper and left lower lobe. Unchanged   right lung base opacities, probably atelectatic. Multifocal bronchial   wall thickening.  PLEURA: Increased previously placed lateral small bilateral pleural   effusions, right greater than left.  MEDIASTINUM AND JESSE: No suspicious lymphadenopathy.  HEART: Heart size is normal. No pericardial effusion. Severe coronary   calcifications.  VISUALIZED UPPER ABDOMEN: Atrophic kidneys. Cholelithiasis.  BONES AND SOFT TISSUES: New mild anasarca. Nosuspicious lesion.    IMPRESSION:    1. Since 2022, new bilateral pulmonary opacities, increased   small bilateral pleural effusions and mild anasarca. Findings may   represent pneumonia, atypical pulmonary edema/volume overload and   combination of both.    --- End of Report ---          JOAO CLARK MD; Attending Radiologist  This document has been electronically signed.  JOAO CLARK MD; Attending Radiologist  This document has been electronically signed. 2022  4:23PM (22 @ 14:56)      CARDIOLOGY TESTING  < from: TTE Echo Complete w/ Contrast w/ Doppler (10.24.22 @ 13:33) >    ---------------------------------------------------------------------------  -----  CONCLUSIONS:     1. Normal left and right ventricular size and systolic function.   2. Mild symmetric left ventricular hypertrophy.   3. Grade I left ventricular diastolic dysfunction.   4. Mildly dilated left atrium.   5. No significant valvulardisease.   6. No evidence of pulmonary hypertension.   7. No pericardial effusion.    ---------------------------------------------------------------------------  -----    < end of copied text >      MEDICATIONS  aspirin enteric coated 81  carvedilol 25  dextrose 5%. 1000  dextrose 5%. 1000  dextrose 50% Injectable 25  dextrose 50% Injectable 12.5  dextrose 50% Injectable 25  glucagon  Injectable 1  heparin   Injectable 5000  influenza  Vaccine (HIGH DOSE) 0.7  insulin glargine Injectable (LANTUS) 15  insulin lispro (ADMELOG) corrective regimen sliding scale   insulin lispro Injectable (ADMELOG) 5  mycophenolate mofetil 500  senna 2  sodium polystyrene sulfonate Suspension 15  tacrolimus 3  tacrolimus 5  tamsulosin 0.4      ALLERGIES:  No Known Allergies      VITALS:  T(F): 97.8, Max: 98.3 (- @ 12:38)  HR: 60  BP: 141/57  RR: 18Vital Signs Last 24 Hrs  T(C): 36.6 (2022 05:19), Max: 36.8 (2022 12:38)  T(F): 97.8 (2022 05:19), Max: 98.3 (2022 12:38)  HR: 60 (2022 05:19) (55 - 63)  BP: 141/57 (2022 05:19) (107/65 - 176/91)  BP(mean): --  RR: 18 (2022 05:19) (18 - 18)  SpO2: 93% (2022 05:19) (93% - 98%)    Parameters below as of 2022 05:19  Patient On (Oxygen Delivery Method): room air        PHYSICAL EXAM:  Gen: NAD, resting in bed  HEENT: Normocephalic, atraumatic  Neck: supple, no lymphadenopathy  CV: Regular rate & regular rhythm  Lungs: decreased BS at bases, no fremitus, BL crackles/rales  Abdomen: Soft, BS present  Ext: Warm, well perfused  Neuro: non focal, awake  Skin: no rash, no erythema

## 2022-11-18 NOTE — PROGRESS NOTE ADULT - SUBJECTIVE AND OBJECTIVE BOX
** INCOMPLETE **    OVERNIGHT EVENTS:     SUBJECTIVE:    VITAL SIGNS:  Vital Signs Last 24 Hrs  T(C): 36.6 (18 Nov 2022 05:19), Max: 36.8 (17 Nov 2022 12:38)  T(F): 97.8 (18 Nov 2022 05:19), Max: 98.3 (17 Nov 2022 12:38)  HR: 60 (18 Nov 2022 05:19) (55 - 63)  BP: 141/57 (18 Nov 2022 05:19) (107/65 - 176/91)  BP(mean): --  RR: 18 (18 Nov 2022 05:19) (18 - 18)  SpO2: 93% (18 Nov 2022 05:19) (93% - 98%)    Parameters below as of 18 Nov 2022 05:19  Patient On (Oxygen Delivery Method): room air        PHYSICAL EXAM:  General: NAD; speaking in full sentences  HEENT: NC/AT; PERRL; EOMI; MMM  Neck: supple; no JVD  Cardiac: RRR; +S1/S2  Pulm: CTA B/L; no W/R/R  GI: soft, NT/ND, +BS  Extremities:  no edema, clubbing or cyanosis  Vasc: 2+ radial, DP pulses B/L  Neuro: AAOx3; no focal deficits    MEDICATIONS:  MEDICATIONS  (STANDING):  aspirin enteric coated 81 milliGRAM(s) Oral every 24 hours  carvedilol 25 milliGRAM(s) Oral every 12 hours  dextrose 5%. 1000 milliLiter(s) (100 mL/Hr) IV Continuous <Continuous>  dextrose 5%. 1000 milliLiter(s) (50 mL/Hr) IV Continuous <Continuous>  dextrose 50% Injectable 25 Gram(s) IV Push once  dextrose 50% Injectable 12.5 Gram(s) IV Push once  dextrose 50% Injectable 25 Gram(s) IV Push once  furosemide   Injectable 20 milliGRAM(s) IV Push once  glucagon  Injectable 1 milliGRAM(s) IntraMuscular once  heparin   Injectable 5000 Unit(s) SubCutaneous every 12 hours  influenza  Vaccine (HIGH DOSE) 0.7 milliLiter(s) IntraMuscular once  insulin glargine Injectable (LANTUS) 15 Unit(s) SubCutaneous at bedtime  insulin lispro (ADMELOG) corrective regimen sliding scale   SubCutaneous Before meals and at bedtime  insulin lispro Injectable (ADMELOG) 5 Unit(s) SubCutaneous three times a day before meals  mycophenolate mofetil 500 milliGRAM(s) Oral every 12 hours  senna 2 Tablet(s) Oral at bedtime  sodium polystyrene sulfonate Suspension 15 Gram(s) Oral every 24 hours  tacrolimus 3 milliGRAM(s) Oral at bedtime  tacrolimus 5 milliGRAM(s) Oral every 24 hours  tamsulosin 0.4 milliGRAM(s) Oral at bedtime    MEDICATIONS  (PRN):  dextrose Oral Gel 15 Gram(s) Oral once PRN Blood Glucose LESS THAN 70 milliGRAM(s)/deciliter      ALLERGIES:  Allergies    No Known Allergies    Intolerances        LABS:                        7.3    6.37  )-----------( 130      ( 17 Nov 2022 14:09 )             24.6     11-17    140  |  112<H>  |  62<H>  ----------------------------<  147<H>  4.7   |  14<L>  |  2.42<H>    Ca    9.0      17 Nov 2022 14:09    TPro  5.7<L>  /  Alb  3.0<L>  /  TBili  0.2  /  DBili  x   /  AST  10  /  ALT  7<L>  /  AlkPhos  92  11-17    PT/INR - ( 17 Nov 2022 14:09 )   PT: 12.0 sec;   INR: 1.01          PTT - ( 17 Nov 2022 14:09 )  PTT:37.4 sec    RADIOLOGY & ADDITIONAL TESTS: Reviewed. OVERNIGHT EVENTS:   No acute events overnight.    SUBJECTIVE:  Pt seen and examined at bedside with son-in-law at bedside. Pt endorses chronic dry cough without sputum production. Also endorses b/l LE edema which is what brought him to the hospital. No dyspnea, CP, abd pain, fevers.     VITAL SIGNS:  Vital Signs Last 24 Hrs  T(C): 36.6 (18 Nov 2022 05:19), Max: 36.8 (17 Nov 2022 12:38)  T(F): 97.8 (18 Nov 2022 05:19), Max: 98.3 (17 Nov 2022 12:38)  HR: 60 (18 Nov 2022 05:19) (55 - 63)  BP: 141/57 (18 Nov 2022 05:19) (107/65 - 176/91)  BP(mean): --  RR: 18 (18 Nov 2022 05:19) (18 - 18)  SpO2: 93% (18 Nov 2022 05:19) (93% - 98%)    Parameters below as of 18 Nov 2022 05:19  Patient On (Oxygen Delivery Method): room air        PHYSICAL EXAM:  General: elderly male resting in bed in NAD; speaking in full sentences  HEENT: NC/AT; PERRL; EOMI; MMM  Neck: supple; no JVD  Cardiac: RRR; +S1/S2  Pulm: diffuse rhonchi to all lung fields, no increased WOB, no intercostal retractions, no nasal retractions  GI: soft, NT/ND, +BS  Extremities:  pitting edema to b/l LEs up to knees, no clubbing or cyanosis  Vasc: 2+ radial pulses B/L  Neuro: AAOx3; no focal deficits    MEDICATIONS:  MEDICATIONS  (STANDING):  aspirin enteric coated 81 milliGRAM(s) Oral every 24 hours  carvedilol 25 milliGRAM(s) Oral every 12 hours  dextrose 5%. 1000 milliLiter(s) (100 mL/Hr) IV Continuous <Continuous>  dextrose 5%. 1000 milliLiter(s) (50 mL/Hr) IV Continuous <Continuous>  dextrose 50% Injectable 25 Gram(s) IV Push once  dextrose 50% Injectable 12.5 Gram(s) IV Push once  dextrose 50% Injectable 25 Gram(s) IV Push once  furosemide   Injectable 20 milliGRAM(s) IV Push once  glucagon  Injectable 1 milliGRAM(s) IntraMuscular once  heparin   Injectable 5000 Unit(s) SubCutaneous every 12 hours  influenza  Vaccine (HIGH DOSE) 0.7 milliLiter(s) IntraMuscular once  insulin glargine Injectable (LANTUS) 15 Unit(s) SubCutaneous at bedtime  insulin lispro (ADMELOG) corrective regimen sliding scale   SubCutaneous Before meals and at bedtime  insulin lispro Injectable (ADMELOG) 5 Unit(s) SubCutaneous three times a day before meals  mycophenolate mofetil 500 milliGRAM(s) Oral every 12 hours  senna 2 Tablet(s) Oral at bedtime  sodium polystyrene sulfonate Suspension 15 Gram(s) Oral every 24 hours  tacrolimus 3 milliGRAM(s) Oral at bedtime  tacrolimus 5 milliGRAM(s) Oral every 24 hours  tamsulosin 0.4 milliGRAM(s) Oral at bedtime    MEDICATIONS  (PRN):  dextrose Oral Gel 15 Gram(s) Oral once PRN Blood Glucose LESS THAN 70 milliGRAM(s)/deciliter      ALLERGIES:  Allergies    No Known Allergies    Intolerances        LABS:                        7.3    6.37  )-----------( 130      ( 17 Nov 2022 14:09 )             24.6     11-17    140  |  112<H>  |  62<H>  ----------------------------<  147<H>  4.7   |  14<L>  |  2.42<H>    Ca    9.0      17 Nov 2022 14:09    TPro  5.7<L>  /  Alb  3.0<L>  /  TBili  0.2  /  DBili  x   /  AST  10  /  ALT  7<L>  /  AlkPhos  92  11-17    PT/INR - ( 17 Nov 2022 14:09 )   PT: 12.0 sec;   INR: 1.01          PTT - ( 17 Nov 2022 14:09 )  PTT:37.4 sec    RADIOLOGY & ADDITIONAL TESTS: Reviewed.

## 2022-11-18 NOTE — CONSULT NOTE ADULT - SUBJECTIVE AND OBJECTIVE BOX
NEPHROLOGY SERVICE INITIAL CONSULT NOTE    HPI:  HPI: 84 y/o M PMH CKD 4, renal transplant in  at [facility], glaucoma (blind), DM1, anemia, CAD s/p 2 DAMEON to LAD in , BPH, recent admission for PNA presents with acute on chronic cough and SUNNY. Mr. Miguel went to see his nephrologist Dr. Menendez today and was sent to ED for SUNNY. He has had a cough for several months that was initially more productive and he was treated w abx and steroids here in October. His cough is less productive but still perists and has worsened over the past few days associated w lethargy, HUTCHINSON, and worsening leg swelling. No fevers, chills, congestion, sore throat, N/V, diarrhea, constipation. He has had very light stools recently, no melena or hematochezia. Pt is urinating normally.    In the ED:  Initial vital signs: T: 98.3 F, HR: 59, BP: 176/91-->107/65, R: 18, SpO2: 98% on RA  ED course:   Labs: significant for hgb 7.3, plt 130, bicarb 14, BUN 62, Cr 2.42 (at baseline), venous pH 7.17, lactate 0.5, AG 14, UA negative for pyuria, trop 0.03, CKMB 3.8 (negative), BNP 8909  Imaging:  CT: b/l pulmonary opacifications and increased b/l pulmonary effusions   EKG:   Medications: lasix 20mg IV, zosyn 3.375g  Consults: cardiology, nephrology    Cardiology consulted for admission to [facility] and after discussion decided on medicine admission in setting of mixed picture. Given lasix 20mg.   (2022 23:35)      ADDITIONAL NEPHROLOGY HPI:    REVIEW OF SYSTEMS:   Otherwise negative except as specified in HPI    PAST MEDICAL AND SURGICAL HISTORY:   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      History of renal transplant  secondary to DM      Chronic kidney disease (CKD)      Glaucoma      Kidney transplant recipient      Amputation of toe  x 3      H/O heart artery stent          FAMILY HISTORY:  FAMILY HISTORY:      Otherwise Noncontributory to current admission    SOCIAL HISTORY:  Tobacco use: Denies  EtOH use: Denies  Illicit drug use: Denies    HOME MEDICATIONS:      ACTIVE MEDICATIONS:  MEDICATIONS  (STANDING):  aspirin enteric coated 81 milliGRAM(s) Oral every 24 hours  carvedilol 25 milliGRAM(s) Oral every 12 hours  dextrose 5%. 1000 milliLiter(s) (100 mL/Hr) IV Continuous <Continuous>  dextrose 5%. 1000 milliLiter(s) (50 mL/Hr) IV Continuous <Continuous>  dextrose 50% Injectable 25 Gram(s) IV Push once  dextrose 50% Injectable 12.5 Gram(s) IV Push once  dextrose 50% Injectable 25 Gram(s) IV Push once  glucagon  Injectable 1 milliGRAM(s) IntraMuscular once  heparin   Injectable 5000 Unit(s) SubCutaneous every 12 hours  influenza  Vaccine (HIGH DOSE) 0.7 milliLiter(s) IntraMuscular once  insulin glargine Injectable (LANTUS) 15 Unit(s) SubCutaneous at bedtime  insulin lispro (ADMELOG) corrective regimen sliding scale   SubCutaneous Before meals and at bedtime  insulin lispro Injectable (ADMELOG) 5 Unit(s) SubCutaneous three times a day before meals  mycophenolate mofetil 500 milliGRAM(s) Oral every 12 hours  senna 2 Tablet(s) Oral at bedtime  sodium polystyrene sulfonate Suspension 15 Gram(s) Oral every 24 hours  tacrolimus 3 milliGRAM(s) Oral at bedtime  tacrolimus 5 milliGRAM(s) Oral every 24 hours  tamsulosin 0.4 milliGRAM(s) Oral at bedtime    MEDICATIONS  (PRN):  benzonatate 100 milliGRAM(s) Oral every 8 hours PRN Cough  dextrose Oral Gel 15 Gram(s) Oral once PRN Blood Glucose LESS THAN 70 milliGRAM(s)/deciliter      ALLERGIES:  Allergies    No Known Allergies    Intolerances        VITAL SIGNS:  Vital Signs Last 24 Hrs  T(C): 36.6 (2022 05:19), Max: 36.8 (2022 12:38)  T(F): 97.8 (2022 05:19), Max: 98.3 (2022 12:38)  HR: 56 (2022 08:55) (55 - 63)  BP: 150/64 (2022 08:55) (107/65 - 176/91)  BP(mean): --  RR: 18 (2022 08:55) (18 - 18)  SpO2: 97% (2022 08:55) (93% - 98%)    Parameters below as of 2022 08:55  Patient On (Oxygen Delivery Method): room air        22 @ 07:01  -  22 @ 07:00  --------------------------------------------------------  IN:  Total IN: 0 mL    OUT:    Intermittent Catheterization - Urethral (mL): 500 mL  Total OUT: 500 mL    Total NET: -500 mL          PHYSICAL EXAM:  Constitutional: WDWN resting comfortably in bed; NAD  Head: NC/AT  Eyes: PERRL, EOMI, anicteric sclera  ENT: no nasal discharge; uvula midline, no oropharyngeal erythema or exudates; MMM  Neck: supple; no JVD or thyromegaly  Respiratory: CTA B/L; no W/R/R, no retractions  Cardiac: +S1/S2; RRR; no M/R/G; PMI non-displaced  Gastrointestinal: abdomen soft, NT/ND; no rebound or guarding; +BSx4  Genitourinary: normal external genitalia  Back: spine midline, no bony tenderness or step-offs; no CVAT B/L  Extremities: WWP, no clubbing or cyanosis; no peripheral edema  Musculoskeletal: NROM x4; no joint swelling, tenderness or erythema  Vascular: 2+ radial, femoral, DP/PT pulses B/L  Dermatologic: skin warm, dry and intact; no rashes, wounds, or scars  Lymphatic: no submandibular or cervical LAD  Neurologic: AAOx3; CNII-XII grossly intact; no focal deficits  Psychiatric: affect and characteristics of appearance, verbalizations, behaviors are appropriate  Access:    LABS:                        7.4    4.34  )-----------( 118      ( 2022 05:30 )             24.4     11-18    142  |  114<H>  |  62<H>  ----------------------------<  166<H>  5.0   |  15<L>  |  2.44<H>    Ca    8.8      2022 05:30  Phos  4.8     11-18  Mg     1.8     11-18    TPro  5.7<L>  /  Alb  3.0<L>  /  TBili  0.2  /  DBili  x   /  AST  10  /  ALT  7<L>  /  AlkPhos  92  11-17    PT/INR - ( 2022 14:09 )   PT: 12.0 sec;   INR: 1.01          PTT - ( 2022 14:09 )  PTT:37.4 sec  Urinalysis Basic - ( 2022 19:25 )    Color: Yellow / Appearance: Clear / S.020 / pH: x  Gluc: x / Ketone: NEGATIVE  / Bili: Negative / Urobili: 0.2 E.U./dL   Blood: x / Protein: NEGATIVE mg/dL / Nitrite: NEGATIVE   Leuk Esterase: NEGATIVE / RBC: x / WBC x   Sq Epi: x / Non Sq Epi: x / Bacteria: x      CARDIAC MARKERS ( 2022 14:09 )  x     / 0.03 ng/mL / 23 U/L / x     / 3.8 ng/mL      CAPILLARY BLOOD GLUCOSE      POCT Blood Glucose.: 168 mg/dL (2022 08:38)  POCT Blood Glucose.: 190 mg/dL (2022 00:09)      Serum Pro-Brain Natriuretic Peptide: 8909 pg/mL (22 @ 14:09)      RADIOLOGY & ADDITIONAL TESTS: Reviewed. NEPHROLOGY SERVICE INITIAL CONSULT NOTE    HPI:  HPI: 84 y/o M PMH CKD 4, renal transplant in  at [facility]  (on Tacrolimus 4mg BID and Mycophenolate 500mg BID), glaucoma (blind), DM1, anemia, CAD s/p 2 DAMEON to LAD in , BPH, recent admission in October for PNA presents with acute on chronic cough and SUNNY. Mr. Miguel went to see his nephrologist Dr. Menendez today and was sent to ED for SUNNY. He has had a cough for several months that was initially more productive and he was treated w abx and steroids here in October. His cough is less productive but still perists and has worsened over the past few days associated w lethargy, HUTCHINSON, and worsening leg swelling. No fevers, chills, congestion, sore throat, N/V, diarrhea, constipation. He has had very light stools recently, no melena or hematochezia. Pt is urinating normally.    In the ED:  Initial vital signs: T: 98.3 F, HR: 59, BP: 176/91-->107/65, R: 18, SpO2: 98% on RA  ED course:   Labs: significant for hgb 7.3, plt 130, bicarb 14, BUN 62, Cr 2.42 (at baseline), venous pH 7.17, lactate 0.5, AG 14, UA negative for pyuria, trop 0.03, CKMB 3.8 (negative), BNP 8909  Imaging:  CT: b/l pulmonary opacifications and increased b/l pulmonary effusions   EKG:   Medications: lasix 20mg IV, zosyn 3.375g  Consults: cardiology, nephrology    Cardiology consulted for admission to [facility] and after discussion decided on medicine admission in setting of mixed picture. Given lasix 20mg.   (2022 23:35)      ADDITIONAL NEPHROLOGY HPI:  Baseline creatinine 2.42, at baseline at this admission   CKD 4, renal transplant in  at [facility]  (on Tacrolimus 4mg BID and Mycophenolate 500mg BID)    REVIEW OF SYSTEMS:   Complaint of cough and SOB     PAST MEDICAL & SURGICAL HISTORY:  HTN (hypertension)  BPH (benign prostatic hypertrophy)  DM (diabetes mellitus)  Type 1/insulin dependent per patient  History of renal transplant  secondary to DM  Chronic kidney disease (CKD)  Glaucoma  Kidney transplant recipient  Amputation of toe x 3  H/O heart artery stent      SOCIAL HISTORY:  Stopped smoking years ago per nephew at bedside         ACTIVE MEDICATIONS:  MEDICATIONS  (STANDING):  aspirin enteric coated 81 milliGRAM(s) Oral every 24 hours  carvedilol 25 milliGRAM(s) Oral every 12 hours  dextrose 5%. 1000 milliLiter(s) (100 mL/Hr) IV Continuous <Continuous>  dextrose 5%. 1000 milliLiter(s) (50 mL/Hr) IV Continuous <Continuous>  dextrose 50% Injectable 25 Gram(s) IV Push once  dextrose 50% Injectable 12.5 Gram(s) IV Push once  dextrose 50% Injectable 25 Gram(s) IV Push once  glucagon  Injectable 1 milliGRAM(s) IntraMuscular once  heparin   Injectable 5000 Unit(s) SubCutaneous every 12 hours  influenza  Vaccine (HIGH DOSE) 0.7 milliLiter(s) IntraMuscular once  insulin glargine Injectable (LANTUS) 15 Unit(s) SubCutaneous at bedtime  insulin lispro (ADMELOG) corrective regimen sliding scale   SubCutaneous Before meals and at bedtime  insulin lispro Injectable (ADMELOG) 5 Unit(s) SubCutaneous three times a day before meals  mycophenolate mofetil 500 milliGRAM(s) Oral every 12 hours  senna 2 Tablet(s) Oral at bedtime  sodium polystyrene sulfonate Suspension 15 Gram(s) Oral every 24 hours  tacrolimus 3 milliGRAM(s) Oral at bedtime  tacrolimus 5 milliGRAM(s) Oral every 24 hours  tamsulosin 0.4 milliGRAM(s) Oral at bedtime    MEDICATIONS  (PRN):  benzonatate 100 milliGRAM(s) Oral every 8 hours PRN Cough  dextrose Oral Gel 15 Gram(s) Oral once PRN Blood Glucose LESS THAN 70 milliGRAM(s)/deciliter      ALLERGIES:  Allergies  No Known Allergies      VITAL SIGNS:  Vital Signs Last 24 Hrs  T(C): 36.6 (2022 05:19), Max: 36.8 (2022 12:38)  T(F): 97.8 (2022 05:19), Max: 98.3 (2022 12:38)  HR: 56 (2022 08:55) (55 - 63)  BP: 150/64 (2022 08:55) (107/65 - 176/91)  BP(mean): --  RR: 18 (2022 08:55) (18 - 18)  SpO2: 97% (2022 08:55) (93% - 98%)    Parameters below as of 2022 08:55  Patient On (Oxygen Delivery Method): room air        22 @ 07:01  -  22 @ 07:00  --------------------------------------------------------  IN:  Total IN: 0 mL    OUT:    Intermittent Catheterization - Urethral (mL): 500 mL  Total OUT: 500 mL    Total NET: -500 mL      PHYSICAL EXAM:  Constitutional: WDWN resting comfortably in bed; NAD  Neck: supple; no JVD or thyromegaly  Respiratory: Generalized b/l coarse crackles   Cardiac: +S1/S2; RRR;  Gastrointestinal: abdomen soft, NT/ND;  Genitourinary: Ward in placed   Extremities: B/l LE pitting Edema 2+   Vascular: 2+ radial, femoral, DP/PT pulses B/L  Neurologic: AAOx3;     LABS:                        7.4    4.34  )-----------( 118      ( 2022 05:30 )             24.4     11-18    142  |  114<H>  |  62<H>  ----------------------------<  166<H>  5.0   |  15<L>  |  2.44<H>    Ca    8.8      2022 05:30  Phos  4.8       Mg     1.8     18    TPro  5.7<L>  /  Alb  3.0<L>  /  TBili  0.2  /  DBili  x   /  AST  10  /  ALT  7<L>  /  AlkPhos  92  11-17    PT/INR - ( 2022 14:09 )   PT: 12.0 sec;   INR: 1.01          PTT - ( 2022 14:09 )  PTT:37.4 sec  Urinalysis Basic - ( 2022 19:25 )    Color: Yellow / Appearance: Clear / S.020 / pH: x  Gluc: x / Ketone: NEGATIVE  / Bili: Negative / Urobili: 0.2 E.U./dL   Blood: x / Protein: NEGATIVE mg/dL / Nitrite: NEGATIVE   Leuk Esterase: NEGATIVE / RBC: x / WBC x   Sq Epi: x / Non Sq Epi: x / Bacteria: x      CARDIAC MARKERS ( 2022 14:09 )  x     / 0.03 ng/mL / 23 U/L / x     / 3.8 ng/mL      POCT Blood Glucose.: 168 mg/dL (2022 08:38)  POCT Blood Glucose.: 190 mg/dL (2022 00:09)      Serum Pro-Brain Natriuretic Peptide: 8909 pg/mL (22 @ 14:09)    RADIOLOGY & ADDITIONAL TESTS: Reviewed.

## 2022-11-18 NOTE — PROGRESS NOTE ADULT - SUBJECTIVE AND OBJECTIVE BOX
INTERVAL HPI/OVERNIGHT EVENTS:  Patient known to me from last admission; Admitted with cough and weakness;  CT noted;  Question of pneumonia;        MEDICATIONS  (STANDING):  aspirin enteric coated 81 milliGRAM(s) Oral every 24 hours  carvedilol 25 milliGRAM(s) Oral every 12 hours  dextrose 5%. 1000 milliLiter(s) (100 mL/Hr) IV Continuous <Continuous>  dextrose 5%. 1000 milliLiter(s) (50 mL/Hr) IV Continuous <Continuous>  dextrose 50% Injectable 25 Gram(s) IV Push once  dextrose 50% Injectable 12.5 Gram(s) IV Push once  dextrose 50% Injectable 25 Gram(s) IV Push once  glucagon  Injectable 1 milliGRAM(s) IntraMuscular once  heparin   Injectable 5000 Unit(s) SubCutaneous every 12 hours  influenza  Vaccine (HIGH DOSE) 0.7 milliLiter(s) IntraMuscular once  insulin glargine Injectable (LANTUS) 15 Unit(s) SubCutaneous at bedtime  insulin lispro (ADMELOG) corrective regimen sliding scale   SubCutaneous Before meals and at bedtime  insulin lispro Injectable (ADMELOG) 5 Unit(s) SubCutaneous three times a day before meals  mycophenolate mofetil 500 milliGRAM(s) Oral every 12 hours  senna 2 Tablet(s) Oral at bedtime  sodium polystyrene sulfonate Suspension 15 Gram(s) Oral every 24 hours  tacrolimus 3 milliGRAM(s) Oral at bedtime  tacrolimus 5 milliGRAM(s) Oral every 24 hours  tamsulosin 0.4 milliGRAM(s) Oral at bedtime    MEDICATIONS  (PRN):  benzonatate 100 milliGRAM(s) Oral every 8 hours PRN Cough  dextrose Oral Gel 15 Gram(s) Oral once PRN Blood Glucose LESS THAN 70 milliGRAM(s)/deciliter      Allergies    No Known Allergies    Intolerances        Vital Signs Last 24 Hrs  T(C): 36.6 (2022 05:19), Max: 36.8 (2022 12:38)  T(F): 97.8 (2022 05:19), Max: 98.3 (2022 12:38)  HR: 56 (2022 08:55) (55 - 63)  BP: 150/64 (2022 08:55) (107/65 - 176/91)  BP(mean): --  RR: 18 (2022 08:55) (18 - 18)  SpO2: 97% (2022 08:55) (93% - 98%)    Parameters below as of 2022 08:55  Patient On (Oxygen Delivery Method): room air              Constitutional:  NAD     Eyes: GORDON    ENMT: Negative    Neck: Supple    Back:  no tenderness     Respiratory:  decreased breath sounds    Cardiovascular: S1 S2    Gastrointestinal: soft     Genitourinary:    Extremities: edema    Vascular:    Neurological:    Skin:    Lymph Nodes:             @ 07:01  -   @ 07:00  --------------------------------------------------------  IN: 0 mL / OUT: 500 mL / NET: -500 mL      LABS:                        7.4    4.34  )-----------( 118      ( 2022 05:30 )             24.4     -18    142  |  114<H>  |  62<H>  ----------------------------<  166<H>  5.0   |  15<L>  |  2.44<H>    Ca    8.8      2022 05:30  Phos  4.8       Mg     1.8         TPro  5.7<L>  /  Alb  3.0<L>  /  TBili  0.2  /  DBili  x   /  AST  10  /  ALT  7<L>  /  AlkPhos  92  -17    PT/INR - ( 2022 14:09 )   PT: 12.0 sec;   INR: 1.01          PTT - ( 2022 14:09 )  PTT:37.4 sec  Urinalysis Basic - ( 2022 19:25 )    Color: Yellow / Appearance: Clear / S.020 / pH: x  Gluc: x / Ketone: NEGATIVE  / Bili: Negative / Urobili: 0.2 E.U./dL   Blood: x / Protein: NEGATIVE mg/dL / Nitrite: NEGATIVE   Leuk Esterase: NEGATIVE / RBC: x / WBC x   Sq Epi: x / Non Sq Epi: x / Bacteria: x        RADIOLOGY & ADDITIONAL TESTS:

## 2022-11-18 NOTE — PROGRESS NOTE ADULT - PROBLEM SELECTOR PLAN 4
Hx of CAD s/p 2 DAMEON to LAD in June 2021, curently no CP. Trops 0.03 but no CP or ischemic changes on EKG, likely due to CKD. Takes Plavix and aspirin per last d/c but only aspirin on outpatient note  - c/w ASA daily  - collateral information on plavix Hx of CAD s/p 2 DAMEON to LAD in June 2021, curently no CP. Trops 0.03 but no CP or ischemic changes on EKG, likely due to CKD. Takes Plavix and aspirin per last d/c but only aspirin on outpatient note    - c/w ASA daily  - collateral information on plavix

## 2022-11-18 NOTE — PROGRESS NOTE ADULT - PROBLEM SELECTOR PLAN 2
Fluid overloaded but may be due to CHF, no sheldon. Acidotic but at baseline. Follows w Dr. Mac. Does have orthopnea and intermittent SOB.   - I/Os  - f/u urine studies  - f/u renal recs  - renally dose meds  - tacrolimus 3mg in pm and 5mg 12 noon Fluid overloaded but may be due to CHF, no sheldon. Acidotic but at baseline. Follows w Dr. Mac. Does have orthopnea and intermittent SOB.     - I/Os  - f/u urine studies  - f/u renal recs  - renally dose meds  - tacrolimus 3mg in pm and 5mg 12 noon Fluid overloaded but may be due to CHF, no sheldon. Acidotic but at baseline. Follows w Dr. Mac. Does have orthopnea and intermittent SOB.     - I/Os  - f/u urine studies  - f/u renal recs  - renally dose meds  - tacrolimus 4mg BID

## 2022-11-18 NOTE — PROGRESS NOTE ADULT - PROBLEM SELECTOR PLAN 5
Likely due to CKD, hgb 10.7, normocytic.   - active T+S  - transfuse <7 Likely due to CKD, hgb 10.7, normocytic.     - active T+S  - transfuse <7

## 2022-11-18 NOTE — CHART NOTE - NSCHARTNOTEFT_GEN_A_CORE
Limited TTE performed by fellow on call  Grossly preserved EF, appears hyperdynamic in some views.  No regional wall motion abnormalities  LVH  Obtain official TTE in AM if indicated

## 2022-11-19 NOTE — PROGRESS NOTE ADULT - SUBJECTIVE AND OBJECTIVE BOX
INTERVAL HPI/OVERNIGHT EVENTS: Transferred to MICU s/p likely PEA  arrest in AM  2/2 hypoglycemic event.     SUBJECTIVE: Patient seen and examined at bedside. Nonverbal, intubated and sedated on propofol    OBJECTIVE:    VITAL SIGNS:  ICU Vital Signs Last 24 Hrs  T(C): 32.2 (2022 17:44), Max: 36.6 (2022 20:45)  T(F): 90 (2022 17:44), Max: 97.8 (2022 20:45)  HR: 52 (2022 19:00) (42 - 66)  BP: 125/52 (2022 19:00) (56/34 - 187/74)  BP(mean): 75 (2022 19:00) (40 - 126)  RR: 17 (2022 19:00) (12 - 18)  SpO2: 100% (:00) (92% - 100%)    O2 Parameters below as of 2022 19:00  Patient On (Oxygen Delivery Method): ventilator    O2 Concentration (%): 40      Mode: AC/ CMV (Assist Control/ Continuous Mandatory Ventilation), RR (machine): 12, TV (machine): 500, FiO2: 60, PEEP: 5, ITime: 1, MAP: 8.8, PIP: 25     @ 07:  -   @ 07:00  --------------------------------------------------------  IN: 200 mL / OUT: 850 mL / NET: -650 mL     @ 07:  -   @ 19:57  --------------------------------------------------------  IN: 431 mL / OUT: 660 mL / NET: -229 mL      CAPILLARY BLOOD GLUCOSE      POCT Blood Glucose.: 184 mg/dL (2022 15:54)      PHYSICAL EXAM:    General: Elderly  male, intubated and sedated on propofol  HEENT: NC/AT; PERRL, anicteric sclera  Neck: supple, +JVP  Respiratory: Bibasilar crackles  Cardiovascular: +S1/S2; RRR; no M/R/G  Gastrointestinal: soft, NT/ND; +BS x4  Extremities: WWP; 2+ peripheral pulses B/L; 2+ bilateral edema  Skin: normal color and turgor; no rash  Neurological: Unable to assess    MEDICATIONS:  MEDICATIONS  (STANDING):  aspirin enteric coated 81 milliGRAM(s) Oral every 24 hours  cefepime   IVPB 2000 milliGRAM(s) IV Intermittent every 24 hours  dextrose 5%. 1000 milliLiter(s) (100 mL/Hr) IV Continuous <Continuous>  dextrose 5%. 1000 milliLiter(s) (50 mL/Hr) IV Continuous <Continuous>  dextrose 50% Injectable 25 Gram(s) IV Push once  dextrose 50% Injectable 12.5 Gram(s) IV Push once  dextrose 50% Injectable 25 Gram(s) IV Push once  furosemide   Injectable 40 milliGRAM(s) IV Push every 12 hours  glucagon  Injectable 1 milliGRAM(s) IntraMuscular once  heparin   Injectable 5000 Unit(s) SubCutaneous every 12 hours  influenza  Vaccine (HIGH DOSE) 0.7 milliLiter(s) IntraMuscular once  insulin lispro (ADMELOG) corrective regimen sliding scale   SubCutaneous Before meals and at bedtime  mycophenolate mofetil IVPB 500 milliGRAM(s) IV Intermittent every 12 hours  norepinephrine Infusion 0.05 MICROgram(s)/kG/Min (9.36 mL/Hr) IV Continuous <Continuous>  propofol Infusion 10 MICROgram(s)/kG/Min (5.99 mL/Hr) IV Continuous <Continuous>  sodium polystyrene sulfonate Suspension 15 Gram(s) Oral every 24 hours  tacrolimus 2 milliGRAM(s) Oral every 12 hours  tamsulosin 0.4 milliGRAM(s) Oral at bedtime  vancomycin  IVPB 1250 milliGRAM(s) IV Intermittent every 24 hours    MEDICATIONS  (PRN):  dextrose Oral Gel 15 Gram(s) Oral once PRN Blood Glucose LESS THAN 70 milliGRAM(s)/deciliter      ALLERGIES:  Allergies    No Known Allergies    Intolerances        LABS:                        8.1    15.10 )-----------( 107      ( 2022 09:15 )             27.8     11-19    141  |  110<H>  |  61<H>  ----------------------------<  194<H>  4.3   |  17<L>  |  2.56<H>    Ca    8.7      2022 14:18  Phos  5.3     11  Mg     2.2         TPro  5.2<L>  /  Alb  2.9<L>  /  TBili  <0.2  /  DBili  x   /  AST  9<L>  /  ALT  8<L>  /  AlkPhos  99  11-    LIVER FUNCTIONS - ( 2022 09:15 )  Alb: 2.9 g/dL / Pro: 5.2 g/dL / ALK PHOS: 99 U/L / ALT: 8 U/L / AST: 9 U/L / GGT: x           PT/INR - ( 2022 09:15 )   PT: 12.7 sec;   INR: 1.07          PTT - ( 2022 09:15 )  PTT:39.3 sec  Urinalysis Basic - ( 2022 17:58 )    Color: Red / Appearance: Turbid / S.025 / pH: x  Gluc: x / Ketone: 15 mg/dL  / Bili: Moderate / Urobili: 1.0 E.U./dL   Blood: x / Protein: >=300 mg/dL / Nitrite: POSITIVE   Leuk Esterase: Small / RBC: Many /HPF / WBC 5-10 /HPF   Sq Epi: x / Non Sq Epi: 0-5 /HPF / Bacteria: Many /HPF      CARDIAC MARKERS ( 2022 09:15 )  x     / 0.02 ng/mL / 22 U/L / x     / 4.9 ng/mL        RADIOLOGY & ADDITIONAL TESTS: Reviewed.

## 2022-11-19 NOTE — PROGRESS NOTE ADULT - ASSESSMENT
84 y/o M PMH CKD 4, renal transplant in 2013 at [facility], glaucoma (blind), DM1, anemia, CAD s/p 2 DAMEON to LAD in 6/21, BPH, recent admission for PNA presents with acute on chronic cough and SUNNY 2/2 acute CHF vs CKD.

## 2022-11-19 NOTE — PROGRESS NOTE ADULT - ASSESSMENT
82M legally blind PMHx CAD s/p 2 DAMEON to LAD (6/21), CKD 4 (Cr ~2.5), Renal transplant  in 2013 at [facility] (on tacrolimus and mycophenolate mofetil), Glaucoma (blind), DM1, anemia, BPH with recent admission for PNA presents with acute on chronic cough with CT findings concerning for interstitial pulmonary edema vs PNA. Cardiology consulted due to concern for CHF in the setting of CKD. Hospital course complicated by PEA arrest 2/2 hypoglycemia and subsequent ventricular tachycardia s/p epinephrine.    #Cardiac Arrest 2/2 Hypoglycemic event  Noted to be volume overloaded on admission with bilateral pitting edema and CT finding of interstitial edema vs PNA. Hospital course complicated by cardiac arrest in the setting of hypoglycemia  10/24 TTE Normal LV and RV with mild symmetric LVH and dilated LA.   ECG: Sinus bradycardia with 1 degree AV block on admission. ECG 11/19 (s/p cardiac arrest): accelerated idioventricular rhythm with repeat ECG back to baseline shortly after.  - Etiology of cardiac arrest is likely due to the hypoglycemic event vs hyperkalemia - likely PEA with subsequent ventricular tachycardia after administration of epinephrine. Low suspicion for acute ischemic event given that after obtaining ROSC and treatement of hypoglycemia, ECG without ischemic changes, and no significant pressor requirements at the time of evaluation  - Please obtain official TTE - fellow TTE with grossly preserved EF and no RWA.  - s/p Lasix 20mg IVP - transitioned to 40mg IVP BID - agree with continuing diuresis  - Close fingerstick monitoring  - Strict Ins and Outs  - f/u repeat perfusion labs including CMP and lactate    # CAD s/p DAMEON to LAD 6/2021  - c/w Aspirin 81mg  - c/w Lipitor 40mg QD  - Coreg 25mg BID held (on levophed)  - Daily ECGs    Plan discussed with Cardiology consult attending.

## 2022-11-19 NOTE — CHART NOTE - NSCHARTNOTEFT_GEN_A_CORE
Endocrinology consult service contacted for concern for myxedema coma. In brief, Mr. Miguel is an 82 year-old man with a history of multiple medical problems including coronary artery disease, end stage renal disease status post renal transplant, type 1 diabetes mellitus admitted 11/17/2022 after presenting to his outpatient nephrologist with lower extremity edema in the setting of acute on chronic congestive heart failure. He had hypertension and borderline bradycardia on admission but had normal oxygenation and mental status was at baseline per notes. He is status post cardiac arrest this morning in the setting of hypoglycemia and hyperkalemia and was intubated. Vital signs have subsequently demonstrated hypothermia, hypotension, and bradycardia. Relevant laboratories from today demonstrated TSH 20.890 uIU/mL (normal: 0.270-4.400) and free T4 0.773 ng/dL (normal: 0.931-1.700). Cortisol in late October 2022 (unsure why measured) was not robust. While he currently has some features seen with myxedema coma, these findings may be explained by his recent cardiac arrest. His free T4 is not particularly low. Due to concern for possible adrenal insufficiency, recommend hydrocortisone 50 mg IV every 6 hours for now until clinical status improves and further evaluation can be performed. Given his significant cardiac history, I recommend measured levothyroxine replacement of 25 mcg IV today, after hydrocortisone. Full consult note to follow tomorrow. Aaliyah Mathis MD

## 2022-11-19 NOTE — CHART NOTE - NSCHARTNOTEFT_GEN_A_CORE
RRT called for FSG 31 with mental status changes and hypoxia 70s on NC. Assessed patient at bedside. Opened eyes to sternal rub but not communicating. Given 2amps of D50. Unable to obtain O2 sat so started oxygen supplementation with BVM. Found to be severely hypotensive, ordered levophed and put pads on patient. Repeat FSG wnl. Patient had cardiac arrest, code blue called and initiated chest compressions. 1mg epinephrine given x1. Pt had ROSC within 1 minute of chest compressions. Extra access obtained via IO. Pt intubated with rocuronium and propofol. BP improved with levophed. Ward placed. EKG NSR. Pt was stable for transfer to ICU level care. Family member (wife) at bedside updated.

## 2022-11-19 NOTE — PROGRESS NOTE ADULT - ASSESSMENT
Patient is an 82 yo M with ESKD s/p transplant 2013 now CKD 4, glaucoma, DM1, Anemia, CAD, BPH presenting with cough and lower extremity edema found to have decompensated heart failure, complicated by code blue and hypoglyemia 11/19 AM with rosc    Renal allograft with CKD 4 - baseline sCr 2.3-2.5, usual meds tacrolimus 4mg BID and mcyophenolate 500mg BID.   - tacrolimus level elevated, would decrease tacro to 2mg BID and repeat tacrolevel tomorrow PM 30 minutes before tacro dose  - Caution with cefepime given CKD and propensity to neurotoxicity    Fluid overload - currently in shock, holding further diuresis for now  - consider echocardiogram and RHC to guide further diuretic dose    Shock - normally hypertensive, monitoring on pressors

## 2022-11-19 NOTE — CHART NOTE - NSCHARTNOTEFT_GEN_A_CORE
Infectious Diseases Anti-infective Approval Note    Medication:  Cefepime   Dose:  2 grams   Route:  IV  Frequency: daily  Duration**:  5 days    Duration refers to duration of approval, not recommended duration of treatment     Dose may be adjusted as needed for alterations in renal function.    *THIS IS NOT AN INFECTIOUS DISEASES CONSULTATION*

## 2022-11-19 NOTE — PROGRESS NOTE ADULT - PROBLEM SELECTOR PLAN 3
Had EF of 55% on TTE from Oct 2022, grade I diastolic dysfunction. Now fluid overloaded. Liver enzymes wnl and urinating but need to follow up how much so likely perfusing well. Cardiology consulted.    - I/Os  - f/u echo  - daily weights  - lasix 40mg BID, per cardiology and renal recs  - f/u cardiology recommendations

## 2022-11-19 NOTE — PROGRESS NOTE ADULT - SUBJECTIVE AND OBJECTIVE BOX
Called and spoke to patient  Patient scheduled for a void trial for next Tuesday 3/26 at 8:30 and 2:30  Patient scheduled for post op follow up appointment with Lorena Maldonado on 4/4/19  Patient denies any other questions or concerns  Interval Events: Reviewed  Patient seen and examined at bedside.    Patient is a 82y old  Male who presents with a chief complaint of LE edema and cough (2022 09:20)  no acute event overnight, RA 92%, wife at bedside    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      History of renal transplant  secondary to DM      Chronic kidney disease (CKD)      Glaucoma      Kidney transplant recipient      Amputation of toe  x 3      H/O heart artery stent          MEDICATIONS:  Pulmonary:  benzonatate 100 milliGRAM(s) Oral every 8 hours PRN    Antimicrobials:    Anticoagulants:  aspirin enteric coated 81 milliGRAM(s) Oral every 24 hours  heparin   Injectable 5000 Unit(s) SubCutaneous every 12 hours    Cardiac:  carvedilol 25 milliGRAM(s) Oral every 12 hours  furosemide   Injectable 40 milliGRAM(s) IV Push every 12 hours      Allergies    No Known Allergies    Intolerances        Vital Signs Last 24 Hrs  T(C): 36.6 (2022 20:45), Max: 36.6 (2022 20:45)  T(F): 97.8 (2022 20:45), Max: 97.8 (2022 20:45)  HR: 55 (2022 20:45) (55 - 61)  BP: 149/57 (2022 20:45) (149/57 - 155/62)  BP(mean): --  RR: 18 (2022 20:45) (18 - 20)  SpO2: 92% (2022 20:45) (92% - 97%)    Parameters below as of 2022 20:45  Patient On (Oxygen Delivery Method): room air         @ :  -   @ 07:00  --------------------------------------------------------  IN: 0 mL / OUT: 500 mL / NET: -500 mL     @ 07:  -   @ 06:40  --------------------------------------------------------  IN: 200 mL / OUT: 0 mL / NET: 200 mL          Review of Systems:   •	General: negative  •	Skin/Breast: negative  •	Ophthalmologic: negative  •	ENMT: negative  •	Respiratory and Thorax: negative  •	Cardiovascular: negative  •	Gastrointestinal: negative  •	Genitourinary: negative  •	Musculoskeletal: negative  •	Neurological: negative  •	Psychiatric: negative  •	Hematology/Lymphatics: negative  •	Endocrine: negative  •	Allergic/Immunologic: negative    Physical Exam:   • Constitutional:	obese male, NAD  • Eyes:	EOMI; PERRL; no drainage or redness  • ENMT:	No oral lesions; no gross abnormalities  • Neck	no thyromegaly or nodules  • Breasts:	not examined  • Back:	No deformity or limitation of movement  • Respiratory:	Breath Sounds equal & clear to auscultation, no accessory muscle use  • Cardiovascular:	Regular rate & rhythm, normal S1, S2; no murmurs, gallops or rubs; no S3, S4  • Gastrointestinal:	Soft, non-tender, no hepatosplenomegaly, normal bowel sounds  • Genitourinary:	not examined  • Rectal: not examined  • Extremities:	No cyanosis, clubbing, b/l LE pitting edema  • Vascular:	Equal and normal pulses (dorsalis pedis)  • Neurologica:l	not examined  • Skin:	No lesions; no rash  • Lymph Nodes:	No lymphadedenopathy  • Musculoskeletal:	No joint pain, swelling or deformity; no limitation of movement        LABS:      CBC Full  -  ( 2022 05:30 )  WBC Count : 4.34 K/uL  RBC Count : 2.87 M/uL  Hemoglobin : 7.4 g/dL  Hematocrit : 24.4 %  Platelet Count - Automated : 118 K/uL  Mean Cell Volume : 85.0 fl  Mean Cell Hemoglobin : 25.8 pg  Mean Cell Hemoglobin Concentration : 30.3 gm/dL  Auto Neutrophil # : x  Auto Lymphocyte # : x  Auto Monocyte # : x  Auto Eosinophil # : x  Auto Basophil # : x  Auto Neutrophil % : x  Auto Lymphocyte % : x  Auto Monocyte % : x  Auto Eosinophil % : x  Auto Basophil % : x    11-18    142  |  114<H>  |  62<H>  ----------------------------<  166<H>  5.0   |  15<L>  |  2.44<H>    Ca    8.8      2022 05:30  Phos  4.8       Mg     1.8         TPro  5.7<L>  /  Alb  3.0<L>  /  TBili  0.2  /  DBili  x   /  AST  10  /  ALT  7<L>  /  AlkPhos  92      PT/INR - ( 2022 14:09 )   PT: 12.0 sec;   INR: 1.01          PTT - ( 2022 14:09 )  PTT:37.4 sec      Urinalysis Basic - ( 2022 19:25 )    Color: Yellow / Appearance: Clear / S.020 / pH: x  Gluc: x / Ketone: NEGATIVE  / Bili: Negative / Urobili: 0.2 E.U./dL   Blood: x / Protein: NEGATIVE mg/dL / Nitrite: NEGATIVE   Leuk Esterase: NEGATIVE / RBC: x / WBC x   Sq Epi: x / Non Sq Epi: x / Bacteria: x              Culture Results:   No growth to date ( @ 19:25)  Culture Results:   No growth at 1 day. ( @ 14:21)  Culture Results:   No growth at 1 day. ( @ 14:21)      RADIOLOGY & ADDITIONAL STUDIES (The following images were personally reviewed):  Ward:                                     No  Urine output:                       adequate  DVT prophylaxis:                 Yes  Flattus:                                  Yes  Bowel movement:              No

## 2022-11-19 NOTE — PROGRESS NOTE ADULT - ATTENDING COMMENTS
-PEA Arrest - Brief raul arrest i/s/o hypoglycemia, w/ ROSC <2 mins, intubated, Lactate WNL, no evidence of end-organ damage, HD stable on minimal levophed; EKG: Sinus Raul, non-ischemic; TTE: Normal LVEF, Normal RV function; No significant valvular disease; Pt. does have h/o CAD w/ PCI; would c/w ASA/Lipitor - no evidence of ACS at this time Trops .03->.02, Neg CPK/MB; Would consider non-urgent ischemic w/u once extubated  -Will continue to follow    Jeffrey García MD  Cardiology Attending

## 2022-11-19 NOTE — PROGRESS NOTE ADULT - PROBLEM SELECTOR PLAN 1
SUNNY 2/2 CKD vs acute on chronic CHF (grade I diastolic dysfunction). Also w cough but saturating well on RA without increased work of breathing. BNP elevated supporting acute on chronic chf. Poor renal function evidenced by acidosis though he is at baseline Cr. Cardiology consulted in ED limited TTE w preserved systolic function. Renal consulted.     - lasix 40mg BID, per cardiology and renal recs  - I/Os  - f/u TTE  - cardiology and renal following, appreciate recs

## 2022-11-19 NOTE — PROGRESS NOTE ADULT - ASSESSMENT
82 y/o M PMH CKD 4, renal transplant in 2013 at [facility], glaucoma (blind), DM1, anemia, CAD s/p 2 DAMEON to LAD in 6/21, BPH, recent admission for PNA presents with acute on chronic cough and SUNNY 2/2 acute CHF vs CKD.

## 2022-11-19 NOTE — PROGRESS NOTE ADULT - SUBJECTIVE AND OBJECTIVE BOX
**Incomplete Note**    HOSPITAL COURSE:   82 y/o M PMH CKD 4, S/P renal transplant in 2013 at [facility], glaucoma (blind), DM1, anemia, CAD s/p 2 DAMEON to LAD in 6/21, BPH, recent admission for PNA presented with acute on chronic cough and b/l LE edema 2/2 acute CHF vs CKD. Cardiology and renal consulted, and pt was started on Lasix 40mg IV BID with improvement in b/l LE edema. This morning, pt found to be hypoxic to 70s on RA and hypoglycemia 31, difficult to arouse, and rapid response was called. Pt was put on supplemental oxygen with O2sat 80s, still difficult to arouse, and pt was intubated. Lost a pulse on pt so code blue was called, ROSC achieved, and pt was transferred to the ICU for closer monitoring.    *******TRANSFER from [facility] --> ICU********      HOSPITAL COURSE:   84 y/o M PMH CKD 4, S/P renal transplant in  at [facility], glaucoma (blind), DM1, anemia, CAD s/p 2 DAMEON to LAD in , BPH, recent admission for PNA presented with acute on chronic cough and b/l LE edema 2/2 acute CHF vs CKD. Cardiology and renal consulted, and pt was started on Lasix 40mg IV BID with improvement in b/l LE edema. This morning, pt found to be hypoxic to 70s on RA and hypoglycemia 31, difficult to arouse, and rapid response was called. Pt was put on supplemental oxygen with O2sat 80s, still difficult to arouse, and pt was intubated. Lost a pulse on pt so code blue was called, ROSC achieved, and pt was transferred to the ICU for closer monitoring.   CC: Patient is a 82y old  Male who presents with a chief complaint of LE edema and cough (2022 09:20)      INTERVAL EVENTS: MAGGIE    SUBJECTIVE / INTERVAL HPI: Patient seen and examined at bedside. Intubated and sedated on propofol after code blue    ROS: negative unless otherwise stated above.    VITAL SIGNS:  Vital Signs Last 24 Hrs  T(C): 35.3 (2022 21:00), Max: 36.6 (2022 06:39)  T(F): 95.5 (2022 21:00), Max: 97.8 (2022 06:39)  HR: 61 (2022 21:00) (42 - 66)  BP: 114/52 (2022 21:00) (56/34 - 187/74)  BP(mean): 75 (2022 21:00) (40 - 126)  RR: 15 (2022 21:00) (12 - 18)  SpO2: 100% (2022 21:00) (94% - 100%)    Parameters below as of 2022 21:  Patient On (Oxygen Delivery Method): ventilator    O2 Concentration (%): 40      22 @ 07:01  -  22 @ 07:00  --------------------------------------------------------  IN: 200 mL / OUT: 850 mL / NET: -650 mL    22 @ 07:01  -  22 @ 22:08  --------------------------------------------------------  IN: 431 mL / OUT: 660 mL / NET: -229 mL        PHYSICAL EXAM:    General: NAD, elderly male, intubated  HEENT: MMM  Neck: supple  Cardiovascular: +S1/S2; RRR  Respiratory: bibasilar crackles, no increased WOB, appears comfortable on ventilator  Gastrointestinal: soft, NT/ND  Extremities: WWP; 2+ pitting edema in b/l lower extremities  Vascular: 2+ radial, DP/PT pulses B/L  Neurological: intubated and sedated; +cough reflex, +corneal reflex    MEDICATIONS:  MEDICATIONS  (STANDING):  aspirin enteric coated 81 milliGRAM(s) Oral every 24 hours  cefepime   IVPB 2000 milliGRAM(s) IV Intermittent every 24 hours  dextrose 5%. 1000 milliLiter(s) (100 mL/Hr) IV Continuous <Continuous>  dextrose 5%. 1000 milliLiter(s) (50 mL/Hr) IV Continuous <Continuous>  dextrose 50% Injectable 25 Gram(s) IV Push once  dextrose 50% Injectable 12.5 Gram(s) IV Push once  dextrose 50% Injectable 25 Gram(s) IV Push once  furosemide   Injectable 40 milliGRAM(s) IV Push every 12 hours  glucagon  Injectable 1 milliGRAM(s) IntraMuscular once  heparin   Injectable 5000 Unit(s) SubCutaneous every 12 hours  influenza  Vaccine (HIGH DOSE) 0.7 milliLiter(s) IntraMuscular once  insulin lispro (ADMELOG) corrective regimen sliding scale   SubCutaneous Before meals and at bedtime  mycophenolate mofetil IVPB 500 milliGRAM(s) IV Intermittent every 12 hours  norepinephrine Infusion 0.05 MICROgram(s)/kG/Min (9.36 mL/Hr) IV Continuous <Continuous>  propofol Infusion 10 MICROgram(s)/kG/Min (5.99 mL/Hr) IV Continuous <Continuous>  sodium polystyrene sulfonate Suspension 15 Gram(s) Oral every 24 hours  tacrolimus 2 milliGRAM(s) Oral every 12 hours  tamsulosin 0.4 milliGRAM(s) Oral at bedtime  vancomycin  IVPB 1250 milliGRAM(s) IV Intermittent every 24 hours    MEDICATIONS  (PRN):  dextrose Oral Gel 15 Gram(s) Oral once PRN Blood Glucose LESS THAN 70 milliGRAM(s)/deciliter      ALLERGIES:  Allergies    No Known Allergies    Intolerances        LABS:                        8.1    15.10 )-----------( 107      ( 2022 09:15 )             27.8     11-    141  |  110<H>  |  61<H>  ----------------------------<  194<H>  4.3   |  17<L>  |  2.56<H>    Ca    8.7      2022 14:18  Phos  5.3     11-  Mg     2.2     -    TPro  5.2<L>  /  Alb  2.8<L>  /  TBili  <0.2  /  DBili  0.2  /  AST  25  /  ALT  17  /  AlkPhos  101  11-19    PT/INR - ( 2022 09:15 )   PT: 12.7 sec;   INR: 1.07          PTT - ( 2022 09:15 )  PTT:39.3 sec  Urinalysis Basic - ( 2022 17:58 )    Color: Red / Appearance: Turbid / S.025 / pH: x  Gluc: x / Ketone: 15 mg/dL  / Bili: Moderate / Urobili: 1.0 E.U./dL   Blood: x / Protein: >=300 mg/dL / Nitrite: POSITIVE   Leuk Esterase: Small / RBC: Many /HPF / WBC 5-10 /HPF   Sq Epi: x / Non Sq Epi: 0-5 /HPF / Bacteria: Many /HPF      CAPILLARY BLOOD GLUCOSE      POCT Blood Glucose.: 184 mg/dL (2022 15:54)      RADIOLOGY & ADDITIONAL TESTS: Reviewed.

## 2022-11-19 NOTE — PROGRESS NOTE ADULT - PROBLEM SELECTOR PLAN 2
Fluid overloaded but may be due to CHF, no sheldon. Acidotic but at baseline. Follows w Dr. Mac. Does have orthopnea and intermittent SOB.     - I/Os  - f/u urine studies  - f/u renal recs  - renally dose meds  - tacrolimus 4mg BID

## 2022-11-19 NOTE — PROGRESS NOTE ADULT - ASSESSMENT
84 y/o M PMH CKD 4, renal transplant in 2013 at [facility], glaucoma (blind), DM1, anemia, CAD s/p 2 DAMEON to LAD in 6/21, BPH, recent admission for PNA presents with acute on chronic cough and SUNNY 2/2 acute CHF vs CKD.         Problem/Plan - 1:  ·  Problem: Lower extremity edema.   ·  Plan: SUNNY 2/2 CKD vs acute on chronic CHF (grade I diastolic dysfunction). Also w cough but saturating well on RA without increased work of breathing. BNP elevated supporting acute on chronic chf. Poor renal function evidenced by acidosis though he is at baseline Cr. Cardiology consulted in ED limited TTE w preserved systolic function. Renal consulted.     - lasix 40mg BID, per cardiology and renal recs  - I/Os  - f/u TTE  - cardiology and renal following, appreciate recs.     Problem/Plan - 2:  ·  Problem: Chronic kidney disease (CKD).   ·  Plan: Fluid overloaded but may be due to CHF, no sheldon. Acidotic but at baseline. Follows w Dr. Mac. Does have orthopnea and intermittent SOB.     - I/Os  - f/u urine studies  - f/u renal recs  - renally dose meds  - tacrolimus 4mg BID.     Problem/Plan - 3:  ·  Problem: Acute on chronic diastolic congestive heart failure.   ·  Plan: Had EF of 55% on TTE from Oct 2022, grade I diastolic dysfunction. Now fluid overloaded. Liver enzymes wnl and urinating but need to follow up how much so likely perfusing well. Cardiology consulted.    - I/Os  - f/u echo  - daily weights  - lasix 40mg BID, per cardiology and renal recs  - f/u cardiology recommendations.     Problem/Plan - 4:  ·  Problem: CAD (coronary artery disease).   ·  Plan: Hx of CAD s/p 2 DAMEON to LAD in June 2021, curently no CP. Trops 0.03 but no CP or ischemic changes on EKG, likely due to CKD. Takes Plavix and aspirin per last d/c but only aspirin on outpatient note    - c/w ASA daily  - collateral information on plavix.     Problem/Plan - 5:  ·  Problem: Anemia.   ·  Plan: Likely due to CKD, hgb 10.7, normocytic.     - active T+S  - transfuse <7.     Problem/Plan - 6:  ·  Problem: DM (diabetes mellitus).   ·  Plan: Previously on lantus 15 lispro 5    - c/w this regimen and adjust based on requirements.     Problem/Plan - 7:  ·  Problem: BPH (benign prostatic hyperplasia).   ·  Plan: c/w tamsulosin 0.4mg daily.     Problem/Plan - 8:  ·  Problem: Prophylactic measure.   ·  Plan: Electrolytes: careful w advanced kidney disease  Nutrition:  consis. carb and renal, kosher  Prophylaxis: heparin sq  Activity: activity  GI: none  C: FC  Dispo: Admit to [facility].  84 y/o M PMH CKD 4, renal transplant in 2013 at [facility], glaucoma (blind), DM1, anemia, CAD s/p 2 DAMEON to LAD in 6/21, BPH, recent admission for PNA presents with acute on chronic cough and SUNNY 2/2 acute CHF vs CKD.    NEUROLOGY  #    CARDIOVASCULAR  # Acute on chronic diastolic CHF  Had EF of 55% on TTE from Oct 2022, grade I diastolic dysfunction. Now fluid overloaded. Liver enzymes wnl and urinating but need to follow up how much so likely perfusing well.   - Cardiology consulted.  - lasix 40mg BID, per cardiology and renal recs  - f/u cardiology recommendations.    #Lower extremity edema  SUNNY 2/2 CKD vs acute on chronic CHF (grade I diastolic dysfunction). Also w cough but saturating well on RA without increased work of breathing. BNP elevated supporting acute on chronic chf. Poor renal function evidenced by acidosis though he is at baseline Cr. Cardiology consulted in ED limited TTE w preserved systolic function.   - Cards & Renal consulted.   - lasix 40mg BID, per cardiology and renal recs  - I/Os  - f/u TTE  - cardiology and renal following, appreciate recs.    #CAD  Hx of CAD s/p 2 DAMEON to LAD in June 2021, curently no CP. Trops 0.03 but no CP or ischemic changes on EKG, likely due to CKD. Takes Plavix and aspirin per last d/c but only aspirin on outpatient note  - c/w ASA daily  - collateral information on plavix.    PULMONARY  #  GASTROINTESTINAL  #  RENAL  #Chronic kidney disease (CKD).   Fluid overloaded but may be due to CHF, no sheldon. Acidotic but at baseline. Follows w Dr. Mac. Does have orthopnea and intermittent SOB.   - I/Os  - f/u urine studies  - f/u renal recs  - renally dose meds  - per nephro, decreased home tacrolimus from 4mg BID --> 2mg BID        #BPH (benign prostatic hyperplasia).   - c/w tamsulosin 0.4mg daily      ENDOCRINE  #DM (diabetes mellitus).   Previously on lantus 15 lispro 5  - c/w this regimen and adjust based on requirements.    HEME  #Normocytic Anemia  Likely 2.2 CKD. Hgb 10.7  - active T+S  - transfuse <7.      FLUIDS/ELECTROLYTES/NUTRITION  -IVF none  -Monitor, careful w advanced kidney disease  -Diet  consis. carb and renal, kosher  Prophylaxis: heparin sq  Activity: activity  GI: none  C: FC  Dispo: Admit to [facility].      DISPO  CODE STATUS

## 2022-11-19 NOTE — PROGRESS NOTE ADULT - SUBJECTIVE AND OBJECTIVE BOX
** INCOMPLETE **    OVERNIGHT EVENTS:     SUBJECTIVE:    VITAL SIGNS:  Vital Signs Last 24 Hrs  T(C): 36.6 (19 Nov 2022 06:39), Max: 36.6 (18 Nov 2022 20:45)  T(F): 97.8 (19 Nov 2022 06:39), Max: 97.8 (18 Nov 2022 20:45)  HR: 58 (19 Nov 2022 06:39) (55 - 61)  BP: 136/69 (19 Nov 2022 06:39) (136/69 - 155/62)  BP(mean): --  RR: 16 (19 Nov 2022 06:39) (16 - 20)  SpO2: 94% (19 Nov 2022 06:39) (92% - 97%)    Parameters below as of 19 Nov 2022 06:39  Patient On (Oxygen Delivery Method): room air        PHYSICAL EXAM:  General: NAD; speaking in full sentences  HEENT: NC/AT; PERRL; EOMI; MMM  Neck: supple; no JVD  Cardiac: RRR; +S1/S2  Pulm: CTA B/L; no W/R/R  GI: soft, NT/ND, +BS  Extremities:  no edema, clubbing or cyanosis  Vasc: 2+ radial, DP pulses B/L  Neuro: AAOx3; no focal deficits    MEDICATIONS:  MEDICATIONS  (STANDING):  aspirin enteric coated 81 milliGRAM(s) Oral every 24 hours  carvedilol 25 milliGRAM(s) Oral every 12 hours  dextrose 5%. 1000 milliLiter(s) (100 mL/Hr) IV Continuous <Continuous>  dextrose 5%. 1000 milliLiter(s) (50 mL/Hr) IV Continuous <Continuous>  dextrose 50% Injectable 25 Gram(s) IV Push once  dextrose 50% Injectable 12.5 Gram(s) IV Push once  dextrose 50% Injectable 25 Gram(s) IV Push once  furosemide   Injectable 40 milliGRAM(s) IV Push every 12 hours  glucagon  Injectable 1 milliGRAM(s) IntraMuscular once  heparin   Injectable 5000 Unit(s) SubCutaneous every 12 hours  influenza  Vaccine (HIGH DOSE) 0.7 milliLiter(s) IntraMuscular once  insulin glargine Injectable (LANTUS) 15 Unit(s) SubCutaneous at bedtime  insulin lispro (ADMELOG) corrective regimen sliding scale   SubCutaneous Before meals and at bedtime  insulin lispro Injectable (ADMELOG) 5 Unit(s) SubCutaneous three times a day before meals  mycophenolate mofetil 500 milliGRAM(s) Oral every 12 hours  senna 2 Tablet(s) Oral at bedtime  sodium polystyrene sulfonate Suspension 15 Gram(s) Oral every 24 hours  tacrolimus 4 milliGRAM(s) Oral every 12 hours  tamsulosin 0.4 milliGRAM(s) Oral at bedtime    MEDICATIONS  (PRN):  benzonatate 100 milliGRAM(s) Oral every 8 hours PRN Cough  dextrose Oral Gel 15 Gram(s) Oral once PRN Blood Glucose LESS THAN 70 milliGRAM(s)/deciliter      ALLERGIES:  Allergies    No Known Allergies    Intolerances        LABS:                        7.4    4.34  )-----------( 118      ( 18 Nov 2022 05:30 )             24.4     11-18    142  |  114<H>  |  62<H>  ----------------------------<  166<H>  5.0   |  15<L>  |  2.44<H>    Ca    8.8      18 Nov 2022 05:30  Phos  4.8     11-18  Mg     1.8     11-18    TPro  5.7<L>  /  Alb  3.0<L>  /  TBili  0.2  /  DBili  x   /  AST  10  /  ALT  7<L>  /  AlkPhos  92  11-17    PT/INR - ( 17 Nov 2022 14:09 )   PT: 12.0 sec;   INR: 1.01          PTT - ( 17 Nov 2022 14:09 )  PTT:37.4 sec    RADIOLOGY & ADDITIONAL TESTS: Reviewed. ****************************TRANSFER FROM F TO ICU********************     HOSPITAL COURSE:   84 y/o M PMH CKD 4, S/P renal transplant in 2013 at [facility], glaucoma (blind), DM1, anemia, CAD s/p 2 DAMEON to LAD in 6/21, BPH, recent admission for PNA presented with acute on chronic cough and b/l LE edema 2/2 acute CHF vs CKD. Cardiology and renal consulted, and pt was started on Lasix 40mg IV BID with improvement in b/l LE edema. This morning, pt found to be hypoxic to 70s on RA and hypoglycemia 31, difficult to arouse, and rapid response was called. Pt was put on supplemental oxygen with O2sat 80s, still difficult to arouse, and pt was intubated. Lost a pulse on pt so code blue was called, ROSC achieved, and pt was transferred to the ICU for closer monitoring.       VITAL SIGNS:  Vital Signs Last 24 Hrs  T(C): 36.6 (19 Nov 2022 06:39), Max: 36.6 (18 Nov 2022 20:45)  T(F): 97.8 (19 Nov 2022 06:39), Max: 97.8 (18 Nov 2022 20:45)  HR: 58 (19 Nov 2022 06:39) (55 - 61)  BP: 136/69 (19 Nov 2022 06:39) (136/69 - 155/62)  BP(mean): --  RR: 16 (19 Nov 2022 06:39) (16 - 20)  SpO2: 94% (19 Nov 2022 06:39) (92% - 97%)    Parameters below as of 19 Nov 2022 06:39  Patient On (Oxygen Delivery Method): room air        PHYSICAL EXAM:  General: resting in bed in NAD  HEENT: NC/AT; PERRL; EOMI; MMM  Neck: supple; no JVD  Cardiac: RRR; +S1/S2  Pulm: CTA B/L; no W/R/R  GI: soft, NT/ND, +BS  Extremities:  cold extremities; 1+ pitting edema to b/l LE, improved from yesterday; chronic edema to R forearm  Neuro: difficult to arouse but awoke to loud voice     MEDICATIONS:  MEDICATIONS  (STANDING):  aspirin enteric coated 81 milliGRAM(s) Oral every 24 hours  carvedilol 25 milliGRAM(s) Oral every 12 hours  dextrose 5%. 1000 milliLiter(s) (100 mL/Hr) IV Continuous <Continuous>  dextrose 5%. 1000 milliLiter(s) (50 mL/Hr) IV Continuous <Continuous>  dextrose 50% Injectable 25 Gram(s) IV Push once  dextrose 50% Injectable 12.5 Gram(s) IV Push once  dextrose 50% Injectable 25 Gram(s) IV Push once  furosemide   Injectable 40 milliGRAM(s) IV Push every 12 hours  glucagon  Injectable 1 milliGRAM(s) IntraMuscular once  heparin   Injectable 5000 Unit(s) SubCutaneous every 12 hours  influenza  Vaccine (HIGH DOSE) 0.7 milliLiter(s) IntraMuscular once  insulin glargine Injectable (LANTUS) 15 Unit(s) SubCutaneous at bedtime  insulin lispro (ADMELOG) corrective regimen sliding scale   SubCutaneous Before meals and at bedtime  insulin lispro Injectable (ADMELOG) 5 Unit(s) SubCutaneous three times a day before meals  mycophenolate mofetil 500 milliGRAM(s) Oral every 12 hours  senna 2 Tablet(s) Oral at bedtime  sodium polystyrene sulfonate Suspension 15 Gram(s) Oral every 24 hours  tacrolimus 4 milliGRAM(s) Oral every 12 hours  tamsulosin 0.4 milliGRAM(s) Oral at bedtime    MEDICATIONS  (PRN):  benzonatate 100 milliGRAM(s) Oral every 8 hours PRN Cough  dextrose Oral Gel 15 Gram(s) Oral once PRN Blood Glucose LESS THAN 70 milliGRAM(s)/deciliter      ALLERGIES:  Allergies    No Known Allergies    Intolerances        LABS:                        7.4    4.34  )-----------( 118      ( 18 Nov 2022 05:30 )             24.4     11-18    142  |  114<H>  |  62<H>  ----------------------------<  166<H>  5.0   |  15<L>  |  2.44<H>    Ca    8.8      18 Nov 2022 05:30  Phos  4.8     11-18  Mg     1.8     11-18    TPro  5.7<L>  /  Alb  3.0<L>  /  TBili  0.2  /  DBili  x   /  AST  10  /  ALT  7<L>  /  AlkPhos  92  11-17    PT/INR - ( 17 Nov 2022 14:09 )   PT: 12.0 sec;   INR: 1.01          PTT - ( 17 Nov 2022 14:09 )  PTT:37.4 sec    RADIOLOGY & ADDITIONAL TESTS: Reviewed.

## 2022-11-20 NOTE — CHART NOTE - NSCHARTNOTEFT_GEN_A_CORE
Informed by RN that patient's left pupil was non reactive to light, which was a deviation from prior assessments, but right was fixed and dilated, as was consistent with prior pupillary assessments. Left pupil on prior assessments demonstrated sluggishness. On exam, patient moving all four extremities, responsive to pain. Corneal reflex intact, gag reflex intact, On exam, left pupil appeared fixed; however, upon usage of Pupilometer, left pupil seen to be sluggish but reactive, which was consistent with prior assessments.

## 2022-11-20 NOTE — DIETITIAN INITIAL EVALUATION ADULT - PERTINENT LABORATORY DATA
11-20    139  |  110<H>  |  60<H>  ----------------------------<  155<H>  4.4   |  15<L>  |  2.73<H>    Ca    8.5      20 Nov 2022 05:58  Phos  4.9     11-20  Mg     1.6     11-20    TPro  5.4<L>  /  Alb  2.5<L>  /  TBili  0.2  /  DBili  x   /  AST  15  /  ALT  16  /  AlkPhos  107  11-20  POCT Blood Glucose.: 153 mg/dL (11-20-22 @ 12:40)  A1C with Estimated Average Glucose Result: 9.0 % (10-22-22 @ 05:30)  A1C with Estimated Average Glucose Result: 8.1 % (01-05-22 @ 06:59)

## 2022-11-20 NOTE — CONSULT NOTE ADULT - SUBJECTIVE AND OBJECTIVE BOX
HPI: Mr. Miguel is an 82 year-old man with a history of multiple medical problems including coronary artery disease, end stage renal disease status post renal transplant, type 2 diabetes mellitus admitted 2022 after presenting to his outpatient nephrologist with lower extremity edema in the setting of acute on chronic congestive heart failure. He had hypertension and borderline bradycardia on admission; he had normal oxygenation and mental status was at baseline per notes. We were consulted for question myxedema coma.     He is status post cardiac arrest on 2022 in the setting of hypoglycemia and hyperkalemia and was intubated. Vital signs previous to cardiac arrest significant for: T 97.8 | /69 mmHg | HR 55 bpm | 94%. Vital signs have subsequently demonstrated hypothermia, hypotension, and bradycardia. Relevant laboratories from today demonstrated TSH 20.890 uIU/mL (normal: 0.270-4.400) and free T4 0.773 ng/dL (normal: 0.931-1.700). Cortisol in late 2022 (unsure why measured) was not robust.     I was called last night and recommended hydrocortisone 50 mg IV every 6 hours and levothyroxine 25 mg IV after hydrocortisone initiated. Team has written for levothyroxine 75 mcg IV tonight.    Patient is currently intubated and sedated.     PMH & Surgical Hx:PNEUMONIA; PERIPHERALEDEMA    No pertinent family history in first degree relatives    Handoff    MEWS Score    HTN (hypertension)    BPH (benign prostatic hypertrophy)    DM (diabetes mellitus)    History of renal transplant    Chronic kidney disease (CKD)    Glaucoma    Pneumonia    Lower extremity edema    Chronic kidney disease (CKD)    Acute on chronic diastolic congestive heart failure    CAD (coronary artery disease)    Anemia    DM (diabetes mellitus)    BPH (benign prostatic hyperplasia)    Prophylactic measure    US guided vascular access    Kidney transplant recipient    Amputation of toe    H/O heart artery stent    Amputation of toe    SHORTNESS OF BREATH    22    Peripheral edema    [email or web address]        FH: No known history of thyroid disease per son.    SH: Former tobacco use.    Current Meds:  aspirin enteric coated 81 milliGRAM(s) Oral every 24 hours  cefepime   IVPB 2000 milliGRAM(s) IV Intermittent every 24 hours  dextrose 5%. 1000 milliLiter(s) IV Continuous <Continuous>  dextrose 5%. 1000 milliLiter(s) IV Continuous <Continuous>  dextrose 50% Injectable 25 Gram(s) IV Push once  dextrose 50% Injectable 12.5 Gram(s) IV Push once  dextrose 50% Injectable 25 Gram(s) IV Push once  dextrose Oral Gel 15 Gram(s) Oral once PRN  furosemide   Injectable 40 milliGRAM(s) IV Push every 12 hours  glucagon  Injectable 1 milliGRAM(s) IntraMuscular once  heparin   Injectable 5000 Unit(s) SubCutaneous every 12 hours  hydrocortisone sodium succinate Injectable 50 milliGRAM(s) IV Push every 6 hours  influenza  Vaccine (HIGH DOSE) 0.7 milliLiter(s) IntraMuscular once  insulin lispro (ADMELOG) corrective regimen sliding scale   SubCutaneous Before meals and at bedtime  levothyroxine Injectable 75 MICROGram(s) IV Push at bedtime  mycophenolate mofetil IVPB 500 milliGRAM(s) IV Intermittent every 12 hours  norepinephrine Infusion 0.05 MICROgram(s)/kG/Min IV Continuous <Continuous>  propofol Infusion 10 MICROgram(s)/kG/Min IV Continuous <Continuous>  sodium polystyrene sulfonate Suspension 15 Gram(s) Oral every 24 hours  tacrolimus 2 milliGRAM(s) Oral every 12 hours  tamsulosin 0.4 milliGRAM(s) Oral at bedtime      Allergies:  No Known Allergies      ROS:  Denies the following except as indicated.    General: weight loss/weight gain, decreased appetite, fatigue  Eyes: Blurry vision, double vision, visual changes  ENT: Throat pain, changes in voice,   CV: palpitations, SOB, CP, cough  GI: NVD, difficulty swallowing, abdominal pain  : polyuria, dysuria  Endo: abnormal menses, temperature intolerance, decreased libido  MSK: weakness, joint pain  Skin: rash, dryness, diaphoresis  Heme: Easy bruising,bleeding  Neuro: HA, dizziness, lightheadedness, numbness tingling  Psych: Anxiety, Depression    Vital Signs Last 24 Hrs  T(C): 36 (2022 14:34), Max: 37.4 (2022 05:00)  T(F): 96.8 (2022 14:34), Max: 99.3 (2022 05:00)  HR: 52 (2022 14:15) (43 - 72)  BP: 125/56 (2022 14:15) (81/52 - 202/79)  BP(mean): 81 (2022 14:15) (48 - 132)  RR: 14 (2022 14:15) (12 - 20)  SpO2: 100% (2022 14:15) (99% - 100%)    Parameters below as of 2022 14:00  Patient On (Oxygen Delivery Method): ventilator    O2 Concentration (%): 40  Height (cm): 170.2 ( @ 12:38)  Weight (kg): 82.3 ( @ 13:06)  BMI (kg/m2): 28.4 ( @ 13:06)      Constitutional: wn/wd in NAD.   HEENT: NCAT, MMM, OP clear, EOMI, , no proptosis or lid retraction  Neck: no thyromegaly or palpable thyroid nodules   Respiratory: lungs CTAB.  Cardiovascular: regular rhythm, normal S1 and S2, no audible murmurs, no peripheral edema  GI: soft, NT/ND, no masses/HSM appreciated.  Neurology: no tremors, DTR 2+  Skin: no visible rashes/lesions  Psychiatric: AAO x 3, normal affect/mood.  Ext: radial pulses intact, DP pulses intact, extremities warm, no cyanosis, clubbing or edema.       LABS:                        7.8    17.43 )-----------( 127      ( 2022 05:58 )             25.9     11-20    139  |  110<H>  |  60<H>  ----------------------------<  155<H>  4.4   |  15<L>  |  2.73<H>    Ca    8.5      2022 05:58  Phos  4.9     20  Mg     1.6     -20    TPro  5.4<L>  /  Alb  2.5<L>  /  TBili  0.2  /  DBili  x   /  AST  15  /  ALT  16  /  AlkPhos  107  11-20    PT/INR - ( 2022 05:58 )   PT: 12.5 sec;   INR: 1.05          PTT - ( 2022 05:58 )  PTT:37.5 sec  Urinalysis Basic - ( 2022 07:54 )    Color: Yellow / Appearance: Cloudy / S.025 / pH: x  Gluc: x / Ketone: NEGATIVE  / Bili: Negative / Urobili: 0.2 E.U./dL   Blood: x / Protein: 30 mg/dL / Nitrite: NEGATIVE   Leuk Esterase: Trace / RBC: Many /HPF / WBC < 5 /HPF   Sq Epi: x / Non Sq Epi: 0-5 /HPF / Bacteria: None /HPF        Thyroid Stimulating Hormone, Serum: 20.890 (-19 @ 14:18)  Thyroid Stimulating Hormone, Serum: 6.930 (10-21 @ 12:55)      RADIOLOGY & ADDITIONAL STUDIES:  CAPILLARY BLOOD GLUCOSE      POCT Blood Glucose.: 153 mg/dL (2022 12:40)  POCT Blood Glucose.: 143 mg/dL (2022 07:17)  POCT Blood Glucose.: 182 mg/dL (2022 22:55)  POCT Blood Glucose.: 184 mg/dL (2022 15:54)        Impression/Recommendations: Mr. Miguel is an 82 year-old man with a history of multiple medical problems including coronary artery disease, end stage renal disease status post renal transplant, type 2 diabetes mellitus admitted 2022 after presenting to his outpatient nephrologist with lower extremity edema in the setting of acute on chronic congestive heart failure. He had hypertension and borderline bradycardia on admission; he had normal oxygenation and mental status was at baseline per notes. We were consulted for concern for myxedema coma.     Concern for myxedema coma. While he currently has some features seen with myxedema coma, these findings may be explained by his recent cardiac arrest; his vital signs previous to his arrest were apparently around his baseline, as was his mental status. His free T4 is not particularly low. Due to recent cardiac arrest, I recommend slow initiation of levothyroxine.  Recommend levothyroxine 25 mg IV tonight; team tomorrow to provider further recommendations    Concern for adrenal insufficiency. Recommend continued steroid replacement until further evaluation can be performed  Recommend hydrocortisone 50 mg IV Q6 hours    Type 2 diabetes mellitus. The etiology for his previous hypoglycemia is unclear. Patient in currently managed with an insulin drip.    Will continue to monitor. Will update primary team.

## 2022-11-20 NOTE — PROGRESS NOTE ADULT - SUBJECTIVE AND OBJECTIVE BOX
INTERVAL HPI/OVERNIGHT EVENTS:   TSH high, free T4 low, pt started on synthroid 25  Afebrile, hemodynamically stable         ICU Vital Signs Last 24 Hrs  T(C): 36.9 (2022 09:25), Max: 37.4 (2022 05:00)  T(F): 98.4 (2022 09:25), Max: 99.3 (2022 05:00)  HR: 52 (2022 14:15) (43 - 72)  BP: 125/56 (2022 14:15) (81/52 - 202/79)  BP(mean): 81 (2022 14:15) (48 - 132)  ABP: --  ABP(mean): --  RR: 14 (2022 14:15) (12 - 20)  SpO2: 100% (2022 14:15) (99% - 100%)    O2 Parameters below as of 2022 14:00  Patient On (Oxygen Delivery Method): ventilator    O2 Concentration (%): 40      I&O's Summary    2022 07:01  -  2022 07:00  --------------------------------------------------------  IN: 723.9 mL / OUT: 753 mL / NET: -29.1 mL    2022 07:01  -  2022 14:25  --------------------------------------------------------  IN: 131.5 mL / OUT: 82 mL / NET: 49.5 mL      Mode: AC/ CMV (Assist Control/ Continuous Mandatory Ventilation)  RR (machine): 14  TV (machine): 500  FiO2: 40  PEEP: 5  ITime: 1  MAP: 8  PIP: 18      LABS:                        7.8    17.43 )-----------( 127      ( 2022 05:58 )             25.9     11-20    139  |  110<H>  |  60<H>  ----------------------------<  155<H>  4.4   |  15<L>  |  2.73<H>    Ca    8.5      2022 05:58  Phos  4.9     11-20  Mg     1.6     11-20    TPro  5.4<L>  /  Alb  2.5<L>  /  TBili  0.2  /  DBili  x   /  AST  15  /  ALT  16  /  AlkPhos  107  11-20    PT/INR - ( 2022 05:58 )   PT: 12.5 sec;   INR: 1.05          PTT - ( 2022 05:58 )  PTT:37.5 sec  Urinalysis Basic - ( 2022 07:54 )    Color: Yellow / Appearance: Cloudy / S.025 / pH: x  Gluc: x / Ketone: NEGATIVE  / Bili: Negative / Urobili: 0.2 E.U./dL   Blood: x / Protein: 30 mg/dL / Nitrite: NEGATIVE   Leuk Esterase: Trace / RBC: Many /HPF / WBC < 5 /HPF   Sq Epi: x / Non Sq Epi: 0-5 /HPF / Bacteria: None /HPF      CAPILLARY BLOOD GLUCOSE      POCT Blood Glucose.: 153 mg/dL (2022 12:40)  POCT Blood Glucose.: 143 mg/dL (2022 07:17)  POCT Blood Glucose.: 182 mg/dL (2022 22:55)  POCT Blood Glucose.: 184 mg/dL (2022 15:54)    ABG - ( 2022 21:33 )  pH, Arterial: 7.28  pH, Blood: x     /  pCO2: 31    /  pO2: 146   / HCO3: 15    / Base Excess: -10.9 /  SaO2: 96.8                RADIOLOGY & ADDITIONAL TESTS:    MEDICATIONS  (STANDING):  aspirin enteric coated 81 milliGRAM(s) Oral every 24 hours  cefepime   IVPB 2000 milliGRAM(s) IV Intermittent every 24 hours  dextrose 5%. 1000 milliLiter(s) (100 mL/Hr) IV Continuous <Continuous>  dextrose 5%. 1000 milliLiter(s) (50 mL/Hr) IV Continuous <Continuous>  dextrose 50% Injectable 25 Gram(s) IV Push once  dextrose 50% Injectable 12.5 Gram(s) IV Push once  dextrose 50% Injectable 25 Gram(s) IV Push once  furosemide   Injectable 40 milliGRAM(s) IV Push every 12 hours  glucagon  Injectable 1 milliGRAM(s) IntraMuscular once  heparin   Injectable 5000 Unit(s) SubCutaneous every 12 hours  hydrocortisone sodium succinate Injectable 50 milliGRAM(s) IV Push every 6 hours  influenza  Vaccine (HIGH DOSE) 0.7 milliLiter(s) IntraMuscular once  insulin lispro (ADMELOG) corrective regimen sliding scale   SubCutaneous Before meals and at bedtime  levothyroxine Injectable 75 MICROGram(s) IV Push at bedtime  mycophenolate mofetil IVPB 500 milliGRAM(s) IV Intermittent every 12 hours  norepinephrine Infusion 0.05 MICROgram(s)/kG/Min (9.36 mL/Hr) IV Continuous <Continuous>  propofol Infusion 10 MICROgram(s)/kG/Min (5.99 mL/Hr) IV Continuous <Continuous>  sodium polystyrene sulfonate Suspension 15 Gram(s) Oral every 24 hours  tacrolimus 2 milliGRAM(s) Oral every 12 hours  tamsulosin 0.4 milliGRAM(s) Oral at bedtime    MEDICATIONS  (PRN):  dextrose Oral Gel 15 Gram(s) Oral once PRN Blood Glucose LESS THAN 70 milliGRAM(s)/deciliter      PHYSICAL EXAM:  General: NAD, elderly male, intubated  HEENT: MMM  Neck: supple  Cardiovascular: +S1/S2; RRR  Respiratory: bibasilar crackles, no increased WOB, appears comfortable on ventilator  Gastrointestinal: soft, NT/ND  Extremities: WWP; 2+ pitting edema in b/l lower extremities  Vascular: 2+ radial, DP/PT pulses B/L  Neurological: intubated and sedated; +cough reflex, +corneal reflex   INTERVAL HPI/OVERNIGHT EVENTS:   TSH high, free T4 low  Afebrile, hemodynamically stable         ICU Vital Signs Last 24 Hrs  T(C): 36.9 (2022 09:25), Max: 37.4 (2022 05:00)  T(F): 98.4 (2022 09:25), Max: 99.3 (2022 05:00)  HR: 52 (2022 14:15) (43 - 72)  BP: 125/56 (2022 14:15) (81/52 - 202/79)  BP(mean): 81 (2022 14:15) (48 - 132)  ABP: --  ABP(mean): --  RR: 14 (2022 14:15) (12 - 20)  SpO2: 100% (2022 14:15) (99% - 100%)    O2 Parameters below as of 2022 14:00  Patient On (Oxygen Delivery Method): ventilator    O2 Concentration (%): 40      I&O's Summary    2022 07:01  -  2022 07:00  --------------------------------------------------------  IN: 723.9 mL / OUT: 753 mL / NET: -29.1 mL    2022 07:01  -  2022 14:25  --------------------------------------------------------  IN: 131.5 mL / OUT: 82 mL / NET: 49.5 mL      Mode: AC/ CMV (Assist Control/ Continuous Mandatory Ventilation)  RR (machine): 14  TV (machine): 500  FiO2: 40  PEEP: 5  ITime: 1  MAP: 8  PIP: 18      LABS:                        7.8    17.43 )-----------( 127      ( 2022 05:58 )             25.9     11-20    139  |  110<H>  |  60<H>  ----------------------------<  155<H>  4.4   |  15<L>  |  2.73<H>    Ca    8.5      2022 05:58  Phos  4.9     11-20  Mg     1.6     11-20    TPro  5.4<L>  /  Alb  2.5<L>  /  TBili  0.2  /  DBili  x   /  AST  15  /  ALT  16  /  AlkPhos  107  11-20    PT/INR - ( 2022 05:58 )   PT: 12.5 sec;   INR: 1.05          PTT - ( 2022 05:58 )  PTT:37.5 sec  Urinalysis Basic - ( 2022 07:54 )    Color: Yellow / Appearance: Cloudy / S.025 / pH: x  Gluc: x / Ketone: NEGATIVE  / Bili: Negative / Urobili: 0.2 E.U./dL   Blood: x / Protein: 30 mg/dL / Nitrite: NEGATIVE   Leuk Esterase: Trace / RBC: Many /HPF / WBC < 5 /HPF   Sq Epi: x / Non Sq Epi: 0-5 /HPF / Bacteria: None /HPF      CAPILLARY BLOOD GLUCOSE      POCT Blood Glucose.: 153 mg/dL (2022 12:40)  POCT Blood Glucose.: 143 mg/dL (2022 07:17)  POCT Blood Glucose.: 182 mg/dL (2022 22:55)  POCT Blood Glucose.: 184 mg/dL (2022 15:54)    ABG - ( 2022 21:33 )  pH, Arterial: 7.28  pH, Blood: x     /  pCO2: 31    /  pO2: 146   / HCO3: 15    / Base Excess: -10.9 /  SaO2: 96.8                RADIOLOGY & ADDITIONAL TESTS:    MEDICATIONS  (STANDING):  aspirin enteric coated 81 milliGRAM(s) Oral every 24 hours  cefepime   IVPB 2000 milliGRAM(s) IV Intermittent every 24 hours  dextrose 5%. 1000 milliLiter(s) (100 mL/Hr) IV Continuous <Continuous>  dextrose 5%. 1000 milliLiter(s) (50 mL/Hr) IV Continuous <Continuous>  dextrose 50% Injectable 25 Gram(s) IV Push once  dextrose 50% Injectable 12.5 Gram(s) IV Push once  dextrose 50% Injectable 25 Gram(s) IV Push once  furosemide   Injectable 40 milliGRAM(s) IV Push every 12 hours  glucagon  Injectable 1 milliGRAM(s) IntraMuscular once  heparin   Injectable 5000 Unit(s) SubCutaneous every 12 hours  hydrocortisone sodium succinate Injectable 50 milliGRAM(s) IV Push every 6 hours  influenza  Vaccine (HIGH DOSE) 0.7 milliLiter(s) IntraMuscular once  insulin lispro (ADMELOG) corrective regimen sliding scale   SubCutaneous Before meals and at bedtime  levothyroxine Injectable 75 MICROGram(s) IV Push at bedtime  mycophenolate mofetil IVPB 500 milliGRAM(s) IV Intermittent every 12 hours  norepinephrine Infusion 0.05 MICROgram(s)/kG/Min (9.36 mL/Hr) IV Continuous <Continuous>  propofol Infusion 10 MICROgram(s)/kG/Min (5.99 mL/Hr) IV Continuous <Continuous>  sodium polystyrene sulfonate Suspension 15 Gram(s) Oral every 24 hours  tacrolimus 2 milliGRAM(s) Oral every 12 hours  tamsulosin 0.4 milliGRAM(s) Oral at bedtime    MEDICATIONS  (PRN):  dextrose Oral Gel 15 Gram(s) Oral once PRN Blood Glucose LESS THAN 70 milliGRAM(s)/deciliter      PHYSICAL EXAM:  General: NAD, elderly male, intubated  HEENT: MMM  Neck: supple  Cardiovascular: +S1/S2; RRR  Respiratory: bibasilar crackles, no increased WOB, appears comfortable on ventilator  Gastrointestinal: soft, NT/ND  Extremities: WWP; 2+ pitting edema in b/l lower extremities  Vascular: 2+ radial, DP/PT pulses B/L  Neurological: intubated and sedated; +cough reflex, +corneal reflex

## 2022-11-20 NOTE — DIETITIAN INITIAL EVALUATION ADULT - OTHER CALCULATIONS
Fluids per team. Ideal body weight used for all calculations as large weight discrepancies per EMR. Needs adjusted for advanced age, CHF, vent demands, pressure injury.

## 2022-11-20 NOTE — PROGRESS NOTE ADULT - ASSESSMENT
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   - tacrolimus level elevated, would hold tacro and obtain a trough this evening 30 minutes prior to when patient would be receiving medication- will redose accordingly   - Caution with cefepime given CKD and propensity to neurotoxicity  -strict i's and o's  -uop noted; no need for HD currently however will assess daily     Fluid overload - currently in shock, holding further diuresis for now  - consider echocardiogram and RHC to guide further diuretic dose    Shock - normally hypertensive, monitoring on pressors  maintain MAP >70 for adequate renal perfusion    Floresita Freeman D.O.  PGY 5 nephrology fellow  [phone] Patient is an 84 yo M with ESKD s/p transplant 2013 now CKD 4, glaucoma, DM1, Anemia, CAD, BPH presenting with cough and lower extremity edema found to have decompensated heart failure, complicated by code blue and hypoglycemia 11/19 AM with rosc now with elevated Cr to 2.67    ELIZABETH on CKD 2/2 iATN in the setting of cardiac arrest   Renal allograft with CKD 4 - baseline sCr 2.3-2.5, usual meds tacrolimus 4mg BID and mcyophenolate 500mg BID.   - tacrolimus level elevated, would hold tacro and obtain a trough this evening 30 minutes prior to when patient would be receiving medication- will redose accordingly   - Caution with cefepime given CKD and propensity to neurotoxicity  -strict i's and o's  -uop noted; no need for HD currently however will assess daily   -vancomycin should be dosed by level as patient CKD IV now in ELIZABETH    Fluid overload - currently in shock, holding further diuresis for now  - consider echocardiogram and RHC to guide further diuretic dose      Shock - normally hypertensive, monitoring on pressors  maintain MAP >70 for adequate renal perfusion    Floresita Freeman D.O.  PGY 5 nephrology fellow  [phone]

## 2022-11-20 NOTE — DIETITIAN INITIAL EVALUATION ADULT - NSFNSPHYEXAMSKINFT_GEN_A_CORE
Pressure Injury 1: Bilateral:,buttocks, Stage I  Pressure Injury 2: none, none  Pressure Injury 3: none, none  Pressure Injury 4: none, none  Pressure Injury 5: none, none  Pressure Injury 6: none, none  Pressure Injury 7: none, none  Pressure Injury 8: none, none  Pressure Injury 9: none, none  Pressure Injury 10: none, none  Pressure Injury 11: none, none, Pressure Injury 1: Bilateral:,buttocks, Stage I  Pressure Injury 2: none, none  Pressure Injury 3: none, none  Pressure Injury 4: none, none  Pressure Injury 5: none, none  Pressure Injury 6: none, none  Pressure Injury 7: none, none  Pressure Injury 8: none, none  Pressure Injury 9: none, none  Pressure Injury 10: none, none  Pressure Injury 11: none, none

## 2022-11-20 NOTE — PROGRESS NOTE ADULT - ATTENDING COMMENTS
I agree with the fellow's findings and plans as written above with the following additions/amendments:    Seen and examined at bedside. Discussed with team and outpatient nephrologist at length, likely had underlying lung pathology that predisposed to presentation and coding, may need deeper pulm eval. Intubated, sedated; making some urine in bourgeois but monitoring for likely ischemic ELIZABETH in next day or so. Further recs as above

## 2022-11-20 NOTE — DIETITIAN INITIAL EVALUATION ADULT - PERTINENT MEDS FT
MEDICATIONS  (STANDING):  aspirin enteric coated 81 milliGRAM(s) Oral every 24 hours  cefepime   IVPB 2000 milliGRAM(s) IV Intermittent every 24 hours  dextrose 5%. 1000 milliLiter(s) (100 mL/Hr) IV Continuous <Continuous>  dextrose 5%. 1000 milliLiter(s) (50 mL/Hr) IV Continuous <Continuous>  dextrose 50% Injectable 25 Gram(s) IV Push once  dextrose 50% Injectable 12.5 Gram(s) IV Push once  dextrose 50% Injectable 25 Gram(s) IV Push once  furosemide   Injectable 40 milliGRAM(s) IV Push every 12 hours  glucagon  Injectable 1 milliGRAM(s) IntraMuscular once  heparin   Injectable 5000 Unit(s) SubCutaneous every 12 hours  hydrocortisone sodium succinate Injectable 50 milliGRAM(s) IV Push every 6 hours  influenza  Vaccine (HIGH DOSE) 0.7 milliLiter(s) IntraMuscular once  insulin lispro (ADMELOG) corrective regimen sliding scale   SubCutaneous Before meals and at bedtime  levothyroxine Injectable 75 MICROGram(s) IV Push at bedtime  mycophenolate mofetil IVPB 500 milliGRAM(s) IV Intermittent every 12 hours  norepinephrine Infusion 0.05 MICROgram(s)/kG/Min (9.36 mL/Hr) IV Continuous <Continuous>  propofol Infusion 10 MICROgram(s)/kG/Min (5.99 mL/Hr) IV Continuous <Continuous>  sodium polystyrene sulfonate Suspension 15 Gram(s) Oral every 24 hours  tacrolimus 2 milliGRAM(s) Oral every 12 hours  tamsulosin 0.4 milliGRAM(s) Oral at bedtime  vancomycin  IVPB 1250 milliGRAM(s) IV Intermittent every 24 hours    MEDICATIONS  (PRN):  dextrose Oral Gel 15 Gram(s) Oral once PRN Blood Glucose LESS THAN 70 milliGRAM(s)/deciliter

## 2022-11-20 NOTE — PROGRESS NOTE ADULT - ASSESSMENT
84 y/o M PMH CKD 4, renal transplant in 2013 at [facility], glaucoma (blind), DM1, anemia, CAD s/p 2 DAMEON to LAD in 6/21, BPH, recent admission for PNA presents with acute on chronic cough and SUNNY 2/2 acute CHF vs CKD.       CARDIOVASCULAR  # Acute on chronic diastolic CHF  Had EF of 55% on TTE from Oct 2022, grade I diastolic dysfunction. Now fluid overloaded. Liver enzymes wnl and urinating but need to follow up how much so likely perfusing well.   - Cardiology consulted.  - lasix 40mg BID, per cardiology and renal recs  - f/u cardiology recommendations    #Lower extremity edema  SUNNY 2/2 CKD vs acute on chronic CHF (grade I diastolic dysfunction). Also w cough but saturating well on RA without increased work of breathing. BNP elevated supporting acute on chronic chf. Poor renal function evidenced by acidosis though he is at baseline Cr. Cardiology consulted in ED limited TTE w preserved systolic function.   - Cards & Renal consulted.   - lasix 40mg BID, per cardiology and renal recs  - I/Os  - f/u TTE  - cardiology and renal following, appreciate recs.    #CAD  Hx of CAD s/p 2 DAMEON to LAD in June 2021, curently no CP. Trops 0.03 but no CP or ischemic changes on EKG, likely due to CKD. Takes Plavix and aspirin per last d/c but only aspirin on outpatient note  - c/w ASA daily  - collateral information on plavix.      RENAL  #Chronic kidney disease (CKD).   Fluid overloaded but may be due to CHF, no hseldon. Acidotic but at baseline. Follows w Dr. Mac. Does have orthopnea and intermittent SOB.   - I/Os  - f/u urine studies  - f/u renal recs  - renally dose meds  - per nephro, decreased home tacrolimus from 4mg BID --> 2mg BID        #BPH (benign prostatic hyperplasia).   - c/w tamsulosin 0.4mg daily      ENDOCRINE  #DM (diabetes mellitus).   Previously on lantus 15 lispro 5  - c/w this regimen and adjust based on requirements.    # Hypothyroidism  - TSH 3.55, free t4 0.7, T3 49  - suspicion for myxedema coma, started synthroid 75mg qd    HEME  #Normocytic Anemia  Likely 2.2 CKD. Hgb 10.7  - active T+S  - transfuse <7    FLUIDS/ELECTROLYTES/NUTRITION  -IVF none  -Monitor, careful w advanced kidney disease  -Diet  consis. carb and renal, kosher  Prophylaxis: heparin sq  Activity: activity  GI: none  C: FC  Dispo: Admit to F.   82 y/o M PMH CKD 4, renal transplant in 2013 at [facility], glaucoma (blind), DM1, anemia, CAD s/p 2 DAMEON to LAD in 6/21, BPH, recent admission for PNA presents with acute on chronic cough and SUNNY 2/2 acute CHF vs CKD.       CARDIOVASCULAR  # Acute on chronic diastolic CHF  Had EF of 55% on TTE from Oct 2022, grade I diastolic dysfunction. Now fluid overloaded. Liver enzymes wnl and urinating but need to follow up how much so likely perfusing well.   - Cardiology consulted.  - lasix 40mg BID, per cardiology and renal recs  - f/u cardiology recommendations    #Lower extremity edema  SUNNY 2/2 CKD vs acute on chronic CHF (grade I diastolic dysfunction). Also w cough but saturating well on RA without increased work of breathing. BNP elevated supporting acute on chronic chf. Poor renal function evidenced by acidosis though he is at baseline Cr. Cardiology consulted in ED limited TTE w preserved systolic function.   - Cards & Renal consulted.   - lasix 40mg BID, per cardiology and renal recs  - I/Os  - f/u TTE  - cardiology and renal following, appreciate recs.    #CAD  Hx of CAD s/p 2 DAMEON to LAD in June 2021, curently no CP. Trops 0.03 but no CP or ischemic changes on EKG, likely due to CKD. Takes Plavix and aspirin per last d/c but only aspirin on outpatient note  - c/w ASA daily  - collateral information on plavix.      RENAL  #Chronic kidney disease (CKD).   Fluid overloaded but may be due to CHF, no sheldon. Acidotic but at baseline. Follows w Dr. Mac. Does have orthopnea and intermittent SOB.   - I/Os  - f/u urine studies  - f/u renal recs  - renally dose meds  - per nephro, decreased home tacrolimus from 4mg BID --> 2mg BID        #BPH (benign prostatic hyperplasia).   - c/w tamsulosin 0.4mg daily      ENDOCRINE  #DM (diabetes mellitus).   Previously on lantus 15 lispro 5  - c/w this regimen and adjust based on requirements.    # Hypothyroidism  - TSH 3.55, free t4 0.7, T3 49  - per endo rec no suspicion of myxedema coma, continue with 25 synthroid    HEME  #Normocytic Anemia  Likely 2.2 CKD. Hgb 10.7  - active T+S  - transfuse <7    FLUIDS/ELECTROLYTES/NUTRITION  -IVF none  -Monitor, careful w advanced kidney disease  -Diet  consis. carb and renal, kosher  Prophylaxis: heparin sq  Activity: activity  GI: none  C: FC  Dispo: Admit to F.

## 2022-11-20 NOTE — DIETITIAN INITIAL EVALUATION ADULT - ENTERAL
If medically warranted, recommend EN via NGT: NeproCarbsteady @ 42 mL/hr x 24 hours. Start EN @ 10 mL/hr increasing by 10 q6h to goal rate. This provides: 1008 mL TV, 1814 kcal, 81.6 g protein, 733 mL free water. Meetin kcal/kg, 1.2 g/kg protein, 22.2 non protein kcal/kg based on IBW 67.1 kg. EN @ goal rate + propofol (185 kcal/day) provides 1999 kcal/ 30 kcal/kg based on IBW. Defer free water flushes to team. Maintain aspiration precautions. Monitor s/s of intolerance; adjust EN as needed.

## 2022-11-20 NOTE — DIETITIAN INITIAL EVALUATION ADULT - OTHER INFO
84 y/o M PMH CKD 4, S/P renal transplant in 2013 at [facility], glaucoma (blind), DM1, anemia, CAD s/p 2 DAMEON to LAD in 6/21, BPH, recent admission for PNA presented with acute on chronic cough and b/l LE edema 2/2 acute CHF vs CKD. Cardiology and renal consulted, and pt was started on Lasix 40mg IV BID with improvement in b/l LE edema. This morning, pt found to be hypoxic to 70s on RA and hypoglycemia 31, difficult to arouse, and rapid response was called. Pt was put on supplemental oxygen with O2sat 80s, still difficult to arouse, and pt was intubated. Lost a pulse on pt so code blue was called, ROSC achieved, and pt was transferred to the ICU for closer monitoring.     Attempted to visit pt at bedside, however, team at bedside and pt being transferred off unit thus information obtained via EMR at this time-intubated (on CMV mode), sedated (on propofol), on pressor support- Levo (MAP 70). TMAX: 99.3. NGT in place. No n/v/d/c documented at this time. No BM noted since admission. NKFA documented. Dosing weight 220 pounds. Per previous admission/EMR pt 160 pounds 10/22/22; thus ? accuracy of current dosing weight. Dependent, B/L leg edema 3+. Stage 1 pressure injury to B/L butt. Skin tears and wounds noted per chart. Marcin score=11. Labs reviewed 11/20; pt hyperphosphatemic and noted w/ elevated BUN/Cr. Fingersticks 11/19-11/20:  mg/dL; sliding scale ordered. Unable to perform nutrition focused physical exam at this time. Based on ASPEN guidelines, pt does not meet criteria for malnutrition at this time. Propofol infusing @ 7 mL/hr; appears to be titrating down (185 kcal/day). Currently NPO. RD to follow up per protocol. See nutrition recommendations below.

## 2022-11-20 NOTE — DIETITIAN INITIAL EVALUATION ADULT - ADD RECOMMEND
1. Continue NPO status pending extubation; if enteral nutrition is warranted/within GOC see nutrition recommendations above   2. Pain and bowel regimen per team   3. Monitor need for phos binder   4. Obtain updated weight   5. RD to obtain subjective information/provide diet ed as able

## 2022-11-20 NOTE — PROGRESS NOTE ADULT - SUBJECTIVE AND OBJECTIVE BOX
Patient is a 82y Male seen and evaluated at bedside. patient remains intubated sedated on high dose levophed for pressor support Cr 2.73 K 4.4 bicarb 15 uop 753cc/24 hours currently holding tacro in light of ELIZABETH       Meds:    aspirin enteric coated 81 every 24 hours  cefepime   IVPB 2000 every 24 hours  dextrose 5%. 1000 <Continuous>  dextrose 5%. 1000 <Continuous>  dextrose 50% Injectable 25 once  dextrose 50% Injectable 12.5 once  dextrose 50% Injectable 25 once  dextrose Oral Gel 15 once PRN  furosemide   Injectable 40 every 12 hours  glucagon  Injectable 1 once  heparin   Injectable 5000 every 12 hours  hydrocortisone sodium succinate Injectable 50 every 6 hours  influenza  Vaccine (HIGH DOSE) 0.7 once  insulin lispro (ADMELOG) corrective regimen sliding scale  Before meals and at bedtime  levothyroxine Injectable 75 at bedtime  mycophenolate mofetil IVPB 500 every 12 hours  norepinephrine Infusion 0.05 <Continuous>  propofol Infusion 10 <Continuous>  sodium polystyrene sulfonate Suspension 15 every 24 hours  tacrolimus 2 every 12 hours  tamsulosin 0.4 at bedtime  vancomycin  IVPB 1250 every 24 hours      T(C): , Max: 37.4 (22 @ 05:00)  T(F): , Max: 99.3 (22 @ 05:00)  HR: 54 (22 @ 12:22)  BP: 100/49 (22 @ 12:22)  BP(mean): 70 (22 @ 12:22)  RR: 14 (22 @ 12:22)  SpO2: 99% (22 @ 12:22)  Wt(kg): --     @ 07:01  -   @ 07:00  --------------------------------------------------------  IN: 723.9 mL / OUT: 753 mL / NET: -29.1 mL     @ 07:01  -   @ 12:44  --------------------------------------------------------  IN: 83.8 mL / OUT: 82 mL / NET: 1.8 mL          Review of Systems:  unable to participate      PHYSICAL EXAM:  GENERAL: intubated; sedated on 40% fi02  CHEST/LUNG: mechanical breath sounds BL  HEART: normal S1S2, RRR  ABDOMEN: Soft, Nontender, +BS, No flank tenderness bilateral  EXTREMITIES: + UE and LE edema  ACCESS: + right radial AVF      LABS:                        7.8    17.43 )-----------( 127      ( 2022 05:58 )             25.9     11-20    139  |  110<H>  |  60<H>  ----------------------------<  155<H>  4.4   |  15<L>  |  2.73<H>    Ca    8.5      2022 05:58  Phos  4.9       Mg     1.6         TPro  5.4<L>  /  Alb  2.5<L>  /  TBili  0.2  /  DBili  x   /  AST  15  /  ALT  16  /  AlkPhos  107  11-20      PT/INR - ( 2022 05:58 )   PT: 12.5 sec;   INR: 1.05          PTT - ( 2022 05:58 )  PTT:37.5 sec  Urinalysis Basic - ( 2022 07:54 )    Color: Yellow / Appearance: Cloudy / S.025 / pH: x  Gluc: x / Ketone: NEGATIVE  / Bili: Negative / Urobili: 0.2 E.U./dL   Blood: x / Protein: 30 mg/dL / Nitrite: NEGATIVE   Leuk Esterase: Trace / RBC: Many /HPF / WBC < 5 /HPF   Sq Epi: x / Non Sq Epi: 0-5 /HPF / Bacteria: None /HPF      Creatinine, Random Urine: 30 mg/dL ( @ 15:28)  Protein/Creatinine Ratio Calculation: 0.5 Ratio ( @ 15:28)  Sodium, Random Urine: 73 mmol/L ( @ 15:28)        RADIOLOGY & ADDITIONAL STUDIES:           Patient is a 82y Male seen and evaluated at bedside. patient remains intubated sedated on high dose levophed for pressor support Cr 2.73 K 4.4 bicarb 15 uop 753cc/24 hours currently holding tacro in light of ELIZABETH patient had been seen outpatient by pulmonologist Dr Montemayor for chronic waxing/waning cough AFB fand funal cx were negative next step was plan for bronch       Meds:    aspirin enteric coated 81 every 24 hours  cefepime   IVPB 2000 every 24 hours  dextrose 5%. 1000 <Continuous>  dextrose 5%. 1000 <Continuous>  dextrose 50% Injectable 25 once  dextrose 50% Injectable 12.5 once  dextrose 50% Injectable 25 once  dextrose Oral Gel 15 once PRN  furosemide   Injectable 40 every 12 hours  glucagon  Injectable 1 once  heparin   Injectable 5000 every 12 hours  hydrocortisone sodium succinate Injectable 50 every 6 hours  influenza  Vaccine (HIGH DOSE) 0.7 once  insulin lispro (ADMELOG) corrective regimen sliding scale  Before meals and at bedtime  levothyroxine Injectable 75 at bedtime  mycophenolate mofetil IVPB 500 every 12 hours  norepinephrine Infusion 0.05 <Continuous>  propofol Infusion 10 <Continuous>  sodium polystyrene sulfonate Suspension 15 every 24 hours  tacrolimus 2 every 12 hours  tamsulosin 0.4 at bedtime  vancomycin  IVPB 1250 every 24 hours      T(C): , Max: 37.4 (22 @ 05:00)  T(F): , Max: 99.3 (22 @ 05:00)  HR: 54 (22 @ 12:22)  BP: 100/49 (22 @ 12:22)  BP(mean): 70 (22 @ 12:22)  RR: 14 (22 @ 12:22)  SpO2: 99% (22 @ 12:22)  Wt(kg): --     @ 07:01  -   07:00  --------------------------------------------------------  IN: 723.9 mL / OUT: 753 mL / NET: -29.1 mL     @ 07:01  -  11-20 @ 12:44  --------------------------------------------------------  IN: 83.8 mL / OUT: 82 mL / NET: 1.8 mL          Review of Systems:  unable to participate      PHYSICAL EXAM:  GENERAL: intubated; sedated on 40% fi02  CHEST/LUNG: mechanical breath sounds BL  HEART: normal S1S2, RRR  ABDOMEN: Soft, Nontender, +BS, No flank tenderness bilateral  EXTREMITIES: + UE and LE edema  ACCESS: + right radial AVF      LABS:                        7.8    17.43 )-----------( 127      ( 2022 05:58 )             25.9     -    139  |  110<H>  |  60<H>  ----------------------------<  155<H>  4.4   |  15<L>  |  2.73<H>    Ca    8.5      2022 05:58  Phos  4.9       Mg     1.6         TPro  5.4<L>  /  Alb  2.5<L>  /  TBili  0.2  /  DBili  x   /  AST  15  /  ALT  16  /  AlkPhos  107  11-20      PT/INR - ( 2022 05:58 )   PT: 12.5 sec;   INR: 1.05          PTT - ( 2022 05:58 )  PTT:37.5 sec  Urinalysis Basic - ( 2022 07:54 )    Color: Yellow / Appearance: Cloudy / S.025 / pH: x  Gluc: x / Ketone: NEGATIVE  / Bili: Negative / Urobili: 0.2 E.U./dL   Blood: x / Protein: 30 mg/dL / Nitrite: NEGATIVE   Leuk Esterase: Trace / RBC: Many /HPF / WBC < 5 /HPF   Sq Epi: x / Non Sq Epi: 0-5 /HPF / Bacteria: None /HPF      Creatinine, Random Urine: 30 mg/dL ( @ 15:28)  Protein/Creatinine Ratio Calculation: 0.5 Ratio ( @ 15:28)  Sodium, Random Urine: 73 mmol/L ( @ 15:28)        RADIOLOGY & ADDITIONAL STUDIES:

## 2022-11-21 NOTE — PROGRESS NOTE ADULT - ATTENDING COMMENTS
likely superimposed ATN   hold tacro and recheck level  hold MMF if concern for sepsis until stabilized

## 2022-11-21 NOTE — PROGRESS NOTE ADULT - ASSESSMENT
84 y/o M PMH CKD 4, renal transplant in 2013 at [facility], glaucoma (blind), DM1, anemia, CAD s/p 2 DAMEON to LAD in 6/21, BPH, recent admission for PNA presents with acute on chronic cough and SUNNY 2/2 acute CHF vs CKD.       CARDIOVASCULAR  # Acute on chronic diastolic CHF  Had EF of 55% on TTE from Oct 2022, grade I diastolic dysfunction. Now fluid overloaded. Liver enzymes wnl and urinating but need to follow up how much so likely perfusing well.   - Cardiology consulted.  - lasix 40mg BID, per cardiology and renal recs  - f/u cardiology recommendations    #Lower extremity edema  SUNNY 2/2 CKD vs acute on chronic CHF (grade I diastolic dysfunction). Also w cough but saturating well on RA without increased work of breathing. BNP elevated supporting acute on chronic chf. Poor renal function evidenced by acidosis though he is at baseline Cr. Cardiology consulted in ED limited TTE w preserved systolic function.   - Cards & Renal consulted.   - lasix 40mg BID, per cardiology and renal recs  - I/Os  - f/u TTE  - cardiology and renal following, appreciate recs.    #CAD  Hx of CAD s/p 2 DAMEON to LAD in June 2021, curently no CP. Trops 0.03 but no CP or ischemic changes on EKG, likely due to CKD. Takes Plavix and aspirin per last d/c but only aspirin on outpatient note  - c/w ASA daily  - collateral information on plavix.      RENAL  #Chronic kidney disease (CKD).   Fluid overloaded but may be due to CHF, no sheldon. Acidotic but at baseline. Follows w Dr. Mac. Does have orthopnea and intermittent SOB.   - I/Os  - f/u urine studies  - f/u renal recs  - renally dose meds  - per nephro, decreased home tacrolimus from 4mg BID --> 2mg BID        #BPH (benign prostatic hyperplasia).   - c/w tamsulosin 0.4mg daily      ENDOCRINE  #DM (diabetes mellitus).   Previously on lantus 15 lispro 5  - c/w this regimen and adjust based on requirements.    # Hypothyroidism  - TSH 3.55, free t4 0.7, T3 49  - per endo rec no suspicion of myxedema coma, continue with 25 synthroid    HEME  #Normocytic Anemia  Likely 2.2 CKD. Hgb 10.7  - active T+S  - transfuse <7    FLUIDS/ELECTROLYTES/NUTRITION  -IVF none  -Monitor, careful w advanced kidney disease  -Diet  consis. carb and renal, kosher  Prophylaxis: heparin sq  Activity: activity  GI: none  C: FC  Dispo: Admit to F.   82 y/o M PMH CKD 4, renal transplant in 2013 at [facility], glaucoma (blind), DM1, anemia, CAD s/p 2 DAMEON to LAD in 6/21, BPH, recent admission for PNA presents with acute on chronic cough and SUNNY 2/2 acute CHF vs CKD.       CARDIOVASCULAR  # Acute on chronic diastolic CHF  Had EF of 55% on TTE from Oct 2022, grade I diastolic dysfunction. Repeat TTE 11/17 with poor visualization of LV. Patient appeared fluid overloaded, now s/p lasix 40mg BID with UOP 20cc Now fluid overloaded. Liver enzymes wnl and urinating but need to follow up how much so likely perfusing well.   - Cardiology consulted.  - lasix 40mg BID, per cardiology and renal recs  - f/u cardiology recommendations    #Lower extremity edema  SUNNY 2/2 CKD vs acute on chronic CHF (grade I diastolic dysfunction). Also w cough but saturating well on RA without increased work of breathing. BNP elevated supporting acute on chronic chf. Poor renal function evidenced by acidosis though he is at baseline Cr. Cardiology consulted in ED limited TTE w preserved systolic function.   - Cards & Renal consulted.   - lasix 40mg BID, per cardiology and renal recs  - I/Os  - f/u TTE  - cardiology and renal following, appreciate recs.    #CAD  Hx of CAD s/p 2 DAMEON to LAD in June 2021, curently no CP. Trops 0.03 but no CP or ischemic changes on EKG, likely due to CKD. Takes Plavix and aspirin per last d/c but only aspirin on outpatient note  - c/w ASA daily  - collateral information on plavix.      RENAL  #Chronic kidney disease (CKD).   Fluid overloaded but may be due to CHF, no sheldon. Acidotic but at baseline. Follows w Dr. Mac. Does have orthopnea and intermittent SOB.   - I/Os  - f/u urine studies  - f/u renal recs  - renally dose meds  - per nephro, decreased home tacrolimus from 4mg BID --> 2mg BID        #BPH (benign prostatic hyperplasia).   - c/w tamsulosin 0.4mg daily      ENDOCRINE  #DM (diabetes mellitus).   Previously on lantus 15 lispro 5  - c/w this regimen and adjust based on requirements.    # Hypothyroidism  - TSH 3.55, free t4 0.7, T3 49  - per endo rec no suspicion of myxedema coma, continue with 25 synthroid    HEME  #Normocytic Anemia  Likely 2.2 CKD. Hgb 10.7  - active T+S  - transfuse <7    FLUIDS/ELECTROLYTES/NUTRITION  -IVF none  -Monitor, careful w advanced kidney disease  -Diet  consis. carb and renal, kosher  Prophylaxis: heparin sq  Activity: activity  GI: none  C: FC  Dispo: Admit to F.   84 y/o M PMH CKD 4, renal transplant in 2013 at [facility], glaucoma (blind), DM1, anemia, CAD s/p 2 DAMEON to LAD in 6/21, BPH, recent admission for PNA presents with acute on chronic cough and SUNNY 2/2 acute CHF vs CKD.       NEUROLOGY  #Intubated  Intubated since 11/19,  - goal RASS -2      CARDIOVASCULAR  # Acute on chronic diastolic CHF  Had EF of 55% on TTE from Oct 2022, grade I diastolic dysfunction. Repeat TTE 11/17 with poor visualization of LV. Patient appeared fluid overloaded, now s/p lasix 40mg BID with UOP 340cc o/n.   - Cardiology consulted.  - lasix 40mg BID, per cardiology and renal recs  - f/u cardiology recommendations    #Lower extremity edema  SUNNY 2/2 CKD vs acute on chronic CHF (grade I diastolic dysfunction). Also w cough but saturating well on RA without increased work of breathing. BNP elevated supporting acute on chronic chf. Poor renal function evidenced by acidosis though he is at baseline Cr. Cardiology consulted in ED limited TTE w preserved systolic function.   - Cards & Renal consulted.   - lasix 40mg BID, per cardiology and renal recs  - I/Os  - f/u TTE  - cardiology and renal following, appreciate recs.    #CAD  Hx of CAD s/p 2 DAMEON to LAD in June 2021, curently no CP. Trops 0.03 but no CP or ischemic changes on EKG, likely due to CKD. Takes Plavix and aspirin per last d/c but only aspirin on outpatient note  - c/w ASA daily  - collateral information on plavix.      RENAL  #Chronic kidney disease (CKD).   Fluid overloaded but may be due to CHF, no sheldon. Acidotic but at baseline. Follows w Dr. Mac. Does have orthopnea and intermittent SOB.   - I/Os  - f/u urine studies  - f/u renal recs  - renally dose meds  - per nephro, decreased home tacrolimus from 4mg BID --> 2mg BID        #BPH (benign prostatic hyperplasia).   - c/w tamsulosin 0.4mg daily      ENDOCRINE  #DM (diabetes mellitus).   Previously on lantus 15 lispro 5  - c/w this regimen and adjust based on requirements.    # Hypothyroidism  - TSH 3.55, free t4 0.7, T3 49  - per endo rec no suspicion of myxedema coma, continue with 25 synthroid    HEME  #Normocytic Anemia  Likely 2.2 CKD. Hgb 10.7  - active T+S  - transfuse <7    FLUIDS/ELECTROLYTES/NUTRITION  -IVF none  -Monitor, careful w advanced kidney disease  -Diet  consis. carb and renal, kosher  Prophylaxis: heparin sq  Activity: activity  GI: none  C: FC  Dispo: Admit to F.   82 y/o M PMH CKD 4, renal transplant in 2013 at [facility], glaucoma (blind), DM1, anemia, CAD s/p 2 DAMEON to LAD in 6/21, BPH, recent admission for PNA presents with acute on chronic cough and SUNNY 2/2 acute CHF vs CKD.       NEUROLOGY  #Intubated  Intubated since 11/19,   - goal RASS -2      CARDIOVASCULAR  # Acute on chronic diastolic CHF  Had EF of 55% on TTE from Oct 2022, grade I diastolic dysfunction. Repeat TTE 11/17 with poor visualization of LV. Patient appeared fluid overloaded, now s/p lasix 40mg BID with UOP 340cc o/n.   - holding lasix 40mg IV BID  - start 1L LR given relatively euvolemic today  - lasix 40mg BID, per cardiology and renal recs  - f/u cardiology recommendations    #Lower extremity edema  SUNNY 2/2 CKD vs acute on chronic CHF (grade I diastolic dysfunction). Also w cough but saturating well on RA without increased work of breathing. BNP elevated supporting acute on chronic chf. Poor renal function evidenced by acidosis though he is at baseline Cr. Cardiology consulted in ED limited TTE w preserved systolic function.   - Cards & Renal consulted.   - lasix 40mg BID, per cardiology and renal recs  - I/Os  - f/u TTE  - cardiology and renal following, appreciate recs.    #CAD  Hx of CAD s/p 2 DAMEON to LAD in June 2021, curently no CP. Trops 0.03 but no CP or ischemic changes on EKG, likely due to CKD. Takes Plavix and aspirin per last d/c but only aspirin on outpatient note  - c/w ASA daily  - collateral information on plavix.      RENAL  #Chronic kidney disease (CKD).   Fluid overloaded but may be due to CHF, no sheldon. Acidotic but at baseline. Follows w Dr. Mac. Does have orthopnea and intermittent SOB.   - I/Os  - f/u urine studies  - f/u renal recs  - renally dose meds  - per nephro, decreased home tacrolimus from 4mg BID --> 2mg BID        #BPH (benign prostatic hyperplasia).   - c/w tamsulosin 0.4mg daily      ENDOCRINE  #DM (diabetes mellitus).   Previously on lantus 15 lispro 5  - c/w this regimen and adjust based on requirements.    # Hypothyroidism  - TSH 3.55, free t4 0.7, T3 49  - per endo rec no suspicion of myxedema coma, continue with 25 synthroid    HEME  #Normocytic Anemia  Likely 2.2 CKD. Hgb 10.7  - active T+S  - transfuse <7    FLUIDS/ELECTROLYTES/NUTRITION  -IVF none  -Monitor, careful w advanced kidney disease  -Diet  consis. carb and renal, kosher  Prophylaxis: heparin sq  Activity: activity  GI: none  C: FC  Dispo: Admit to F.   82 y/o M PMH CKD 4, renal transplant in 2013 at [facility], glaucoma (blind), DM1, anemia, CAD s/p 2 DAMEON to LAD in 6/21, BPH, recent admission for PNA presents with acute on chronic cough and SUNNY 2/2 acute CHF vs CKD.       NEUROLOGY  #Intubated  Intubated since 11/19. Intact cough reflex. F  - goal RASS -2      CARDIOVASCULAR  # Acute on chronic diastolic CHF  Had EF of 55% on TTE from Oct 2022, grade I diastolic dysfunction. Repeat TTE 11/17 with poor visualization of LV. Patient appeared fluid overloaded, now s/p lasix 40mg BID with UOP 340cc o/n.   - holding lasix 40mg IV BID  - start 1L LR given relatively euvolemic today  - lasix 40mg BID, per cardiology and renal recs  - f/u cardiology recommendations    #Lower extremity edema  SUNNY 2/2 CKD vs acute on chronic CHF (grade I diastolic dysfunction). Also w cough but saturating well on RA without increased work of breathing. BNP elevated supporting acute on chronic chf. Poor renal function evidenced by acidosis though he is at baseline Cr. Cardiology consulted in ED limited TTE w preserved systolic function.   - Cards & Renal consulted.   - lasix 40mg BID, per cardiology and renal recs  - I/Os  - f/u TTE  - cardiology and renal following, appreciate recs.    #CAD  Hx of CAD s/p 2 DAMEON to LAD in June 2021, curently no CP. Trops 0.03 but no CP or ischemic changes on EKG, likely due to CKD. Takes Plavix and aspirin per last d/c but only aspirin on outpatient note  - c/w ASA daily  - collateral information on plavix.      RENAL  #Chronic kidney disease (CKD).   Fluid overloaded but may be due to CHF, no sheldon. Acidotic but at baseline. Follows w Dr. Mac. Does have orthopnea and intermittent SOB.   - I/Os  - f/u urine studies  - f/u renal recs  - renally dose meds  - per nephro, decreased home tacrolimus from 4mg BID --> 2mg BID        #BPH (benign prostatic hyperplasia).   - c/w tamsulosin 0.4mg daily      ENDOCRINE  #DM (diabetes mellitus).   Previously on lantus 15 lispro 5  - c/w this regimen and adjust based on requirements.    # Hypothyroidism  - TSH 3.55, free t4 0.7, T3 49  - per endo rec no suspicion of myxedema coma, continue with 25 synthroid    HEME  #Normocytic Anemia  Likely 2.2 CKD. Hgb 10.7  - active T+S  - transfuse <7    FLUIDS/ELECTROLYTES/NUTRITION  -IVF none  -Monitor, careful w advanced kidney disease  -Diet  consis. carb and renal, kosher  Prophylaxis: heparin sq  Activity: activity  GI: none  C: FC  Dispo: Admit to F.   84 y/o M PMH CKD 4, renal transplant in 2013 at [facility], glaucoma (blind), DM1, anemia, CAD s/p 2 DAMEON to LAD in 6/21, BPH, recent admission for PNA presents with acute on chronic cough and SUNNY 2/2 acute CHF vs CKD.       NEUROLOGY  #Intubated  Intubated since 11/19. Intact cough reflex.   - goal RASS -2      CARDIOVASCULAR  # Acute on chronic diastolic CHF  Had EF of 55% on TTE from Oct 2022, grade I diastolic dysfunction. Repeat TTE 11/17 with poor visualization of LV. Patient appeared fluid overloaded, now s/p lasix 40mg BID with UOP 340cc o/n.   - holding lasix 40mg IV BID  - give 1L LR bolus given relatively euvolemic today  - f/u cards recommendations    #Lower extremity edema  SUNNY 2/2 CKD vs acute on chronic CHF (g I dd). Also w cough but saturating well on RA without increased work of breathing. BNP elevated supporting acute on chronic chf. Poor renal function evidenced by acidosis though he is at baseline Cr. Cardiology consulted in ED limited TTE w preserved systolic function.   - Cards & Renal consulted.   - lasix 40mg BID, per cardiology and renal recs  - I/Os  - f/u TTE  - cardiology and renal following, appreciate recs.    #CAD  Hx of CAD s/p 2 DAMEON to LAD in June 2021, curently no CP. Trops 0.03 but no CP or ischemic changes on EKG, likely due to CKD. Takes Plavix and aspirin per last d/c but only aspirin on outpatient note  - c/w ASA daily  - collateral information on plavix.      RENAL  #Chronic kidney disease (CKD).   Fluid overloaded but may be due to CHF, no sheldon. Acidotic but at baseline. Follows w Dr. Mac. Does have orthopnea and intermittent SOB.   - I/Os  - f/u urine studies  - f/u renal recs  - renally dose meds  - per nephro, decreased home tacrolimus from 4mg BID --> 2mg BID        #BPH (benign prostatic hyperplasia).   - c/w tamsulosin 0.4mg daily      ENDOCRINE  #DM (diabetes mellitus).   Previously on lantus 15 lispro 5  - c/w this regimen and adjust based on requirements.    # Hypothyroidism  - TSH 3.55, free t4 0.7, T3 49  - per endo rec no suspicion of myxedema coma, continue with 25 synthroid    HEME  #Normocytic Anemia  Likely 2.2 CKD. Hgb 10.7  - active T+S  - transfuse <7    FLUIDS/ELECTROLYTES/NUTRITION  -IVF none  -Monitor, careful w advanced kidney disease  -Diet  consis. carb and renal, kosher  Prophylaxis: heparin sq  Activity: activity  GI: none  C: FC  Dispo: Admit to F.   82 y/o M PMH CKD 4, renal transplant in 2013 at [facility], glaucoma (blind), DM1, anemia, CAD s/p 2 DAMEON to LAD in 6/21, BPH, recent admission for PNA presents with acute on chronic cough and SUNNY 2/2 acute CHF vs CKD.       NEUROLOGY  #Intubated  Intubated since 11/19. Intact cough reflex. On propofol gtt.   - goal RASS -2    CARDIOVASCULAR  # Acute on chronic diastolic CHF  Had EF of 55% on TTE from Oct 2022, grade I diastolic dysfunction. Repeat TTE 11/17 with poor visualization of LV. Patient appeared fluid overloaded, now s/p lasix 40mg BID with UOP 340cc o/n.   - holding lasix 40mg IV BID  - give 1L LR bolus given relatively euvolemic today  - f/u cards recommendations    #Lower extremity edema  SUNNY 2/2 CKD vs acute on chronic CHF (g I dd). Also w cough but saturating well on RA without increased work of breathing. BNP elevated supporting acute on chronic chf. Poor renal function evidenced by acidosis though he is at baseline Cr. Cardiology consulted in ED limited TTE w preserved systolic function, but poor visualization  - Cards & Renal consulted.   - I/Os  - f/u official TTE today    #CAD  Hx of CAD s/p 2 DAMEON to LAD in June 2021, currently no CP. Trops 0.03 but no CP or ischemic changes on EKG, likely due to CKD. Takes Plavix and aspirin per last d/c but only aspirin on outpatient note  - c/w ASA daily  - collateral information on plavix.      RENAL  #ELIZABETH on Chronic kidney disease (CKD).   Renal function worsening today. Fluid overloaded but may be due to CHF, no elizabeth. Acidotic but at baseline. Follows w Dr. Mac. Does have orthopnea and intermittent SOB.   - I/Os  - f/u urine studies  - f/u renal recs  - renally dose meds  - per nephro, decreased home tacrolimus from 4mg BID --> 2mg BID        #BPH (benign prostatic hyperplasia).   - c/w tamsulosin 0.4mg daily      ENDOCRINE  #DM (diabetes mellitus).   Previously on lantus 15 lispro 5  - c/w this regimen and adjust based on requirements.    # Hypothyroidism  - TSH 3.55, free t4 0.7, T3 49  - per endo rec no suspicion of myxedema coma, continue with 25 synthroid    HEME  #Normocytic Anemia  Likely 2.2 CKD. Hgb 10.7  - active T+S  - transfuse <7    FLUIDS/ELECTROLYTES/NUTRITION  -IVF none  -Monitor, careful w advanced kidney disease  -Diet  consis. carb and renal, kosher  Prophylaxis: heparin sq  Activity: activity  GI: none  C: FC  Dispo: Admit to F.   82 y/o M PMH CKD 4, renal transplant in 2013 at [facility], glaucoma (blind), DM1, anemia, CAD s/p 2 DAMEON to LAD in 6/21, BPH, recent admission for PNA presents with acute on chronic cough and SUNNY 2/2 acute CHF vs CKD.       NEUROLOGY  #Intubated  Intubated since 11/19. Intact cough reflex. On propofol gtt.   - goal RASS -2    CARDIOVASCULAR  # Acute on chronic diastolic CHF  Had EF of 55% on TTE from Oct 2022, grade I diastolic dysfunction. Repeat TTE 11/17 with poor visualization of LV. Patient appeared fluid overloaded, now s/p lasix 40mg BID with UOP 340cc o/n.   - holding lasix 40mg IV BID  - give 1L LR bolus given relatively euvolemic today  - f/u cards recommendations    #Lower extremity edema  SUNNY 2/2 CKD vs acute on chronic CHF (g I dd). Also w cough but saturating well on RA without increased work of breathing. BNP elevated supporting acute on chronic chf. Poor renal function evidenced by acidosis though he is at baseline Cr. Cardiology consulted in ED limited TTE w preserved systolic function, but poor visualization  - Cards & Renal consulted.   - I/Os  - f/u official TTE today    #CAD  Hx of CAD s/p 2 DAMEON to LAD in June 2021, currently no CP. Trops 0.03 but no CP or ischemic changes on EKG, likely due to CKD. Takes Plavix and aspirin per last d/c but only aspirin on outpatient note  - c/w ASA daily  - collateral information on plavix.      RENAL  #ELIZABETH on Chronic kidney disease (CKD).   Renal function worsening today. Fluid overloaded. Acidotic but at baseline. Follows w Dr. Mac. Does have orthopnea and intermittent SOB at baseline.   - I/Os  - f/u urine studies  - f/u renal recs  - renally dose meds  - per nephro, decreased home tacrolimus from 4mg BID --> 2mg BID        #BPH (benign prostatic hyperplasia).   - c/w tamsulosin 0.4mg daily      ENDOCRINE  #DM (diabetes mellitus).   Previously on lantus 15 lispro 5  - c/w this regimen and adjust based on requirements.    # Hypothyroidism  - TSH 3.55, free t4 0.7, T3 49  - per endo rec no suspicion of myxedema coma, continue with 25 synthroid    HEME  #Normocytic Anemia  Likely 2.2 CKD. Hgb 10.7  - active T+S  - transfuse <7    FLUIDS/ELECTROLYTES/NUTRITION  -IVF none  -Monitor, careful w advanced kidney disease  -Diet  consis. carb and renal, kosher  Prophylaxis: heparin sq  Activity: activity  GI: none  C: FC  Dispo: Admit to F.   82 y/o M PMH CKD 4, renal transplant in 2013 at [facility], glaucoma (blind), DM1, anemia, CAD s/p 2 DAMEON to LAD in 6/21, BPH, recent admission for PNA presents with acute on chronic cough and SUNNY 2/2 acute CHF vs CKD.       NEUROLOGY  #Intubated  Intubated since 11/19. Intact cough reflex. On propofol gtt.   - goal RASS -2    CARDIOVASCULAR  # Acute on chronic diastolic CHF  Had EF of 55% on TTE from Oct 2022, grade I diastolic dysfunction. Repeat TTE 11/17 with poor visualization of LV. Patient appeared fluid overloaded, now s/p lasix 40mg BID with UOP 340cc o/n.   - holding further lasix  - give 1L LR bolus given relatively euvolemic today  - f/u cards recommendations    #Lower extremity edema  SUNNY 2/2 CKD vs acute on chronic CHF (g I dd). Also w cough but saturating well on RA without increased work of breathing. BNP elevated supporting acute on chronic chf. Poor renal function evidenced by acidosis though he is at baseline Cr. Cardiology consulted in ED limited TTE w preserved systolic function, but poor visualization  - Cards & Renal consulted.   - I/Os  - f/u official TTE today    #CAD  Hx of CAD s/p 2 DAMEON to LAD in June 2021, currently no CP. Trops 0.03 but no CP or ischemic changes on EKG, likely due to CKD. Takes Plavix and aspirin per last d/c but only aspirin on outpatient note  - c/w ASA daily  - collateral information on plavix.      RENAL  #ELIZABETH on Chronic kidney disease (CKD).   Renal function worsening today. Baseline sCr 2.3-2.5. On admission, fluid overloaded. Does have orthopnea and intermittent SOB at baseline. Acidotic but at baseline. Follows w Dr. Mac.   - monitor I/Os  - give 1L LR bolus  - f/u urine studies  - f/u renal recs  - renally dose meds    #Renal transplant  Patient had renal transplant in 2013. On home mycophenolate 500mg BID and tacrolimus 4mg BID.  - per nephro, decreased home tacrolimus from 4mg BID --> 2mg BID while in hospital   - f/u renal recs        #BPH (benign prostatic hyperplasia).   - c/w tamsulosin 0.4mg daily      ENDOCRINE  #DM (diabetes mellitus).   Previously on lantus 15 lispro 5  - c/w this regimen and adjust based on requirements.    # Hypothyroidism  - TSH 3.55, free t4 0.7, T3 49  - per endo rec no suspicion of myxedema coma, continue with 25 synthroid    HEME  #Normocytic Anemia  Likely 2.2 CKD. Hgb 10.7  - active T+S  - transfuse <7    FLUIDS/ELECTROLYTES/NUTRITION  -IVF none  -Monitor, careful w advanced kidney disease  -Diet  consis. carb and renal, kosher  Prophylaxis: heparin sq  Activity: activity  GI: none  C: FC  Dispo: Admit to F.   82 y/o M PMH CKD 4, renal transplant in 2013 at [facility], glaucoma (blind), DM1, anemia, CAD s/p 2 DAMEON to LAD in 6/21, BPH, recent admission for PNA presents with acute on chronic cough and SUNNY 2/2 acute CHF vs CKD.       NEUROLOGY  #Intubated  Intubated since 11/19. Intact cough reflex. On propofol gtt.   - goal RASS -2    CARDIOVASCULAR  # Acute on chronic diastolic CHF  Had EF of 55% on TTE from Oct 2022, grade I diastolic dysfunction. Repeat TTE 11/17 with poor visualization of LV. Patient appeared fluid overloaded, now s/p lasix 40mg BID with UOP 340cc o/n.   - holding further lasix  - give 1L LR bolus given relatively euvolemic today  - f/u cards recommendations    #Lower extremity edema  SUNNY 2/2 CKD vs acute on chronic CHF (g I dd). Also w cough but saturating well on RA without increased work of breathing. BNP elevated supporting acute on chronic chf. Poor renal function evidenced by acidosis though he is at baseline Cr. Cardiology consulted in ED limited TTE w preserved systolic function, but poor visualization  - Cards & Renal consulted  - I/Os  - f/u official TTE today    #CAD  Hx of CAD s/p 2 DAMEON to LAD in June 2021, currently no CP. Trops 0.03 but no CP or ischemic changes on EKG, likely due to CKD. Takes Plavix and aspirin per last d/c but only aspirin on outpatient note  - c/w ASA daily  - collateral information on plavix.      RENAL  #ELIZABETH on Chronic kidney disease (CKD).   Renal function worsening today. Baseline sCr 2.3-2.5. On admission, fluid overloaded. Does have orthopnea and intermittent SOB at baseline. Acidotic but at baseline. Follows w Dr. Mac.   - monitor I/Os  - give 1L LR bolus  - f/u urine studies  - f/u renal recs  - renally dose meds    #Renal transplant  Patient had renal transplant in 2013. On home mycophenolate 500mg BID and tacrolimus 4mg BID.  - per nephro, decreased home tacrolimus from 4mg BID --> 2mg BID while in hospital   - d/c mycophenolate i/s/o infection  - ob  - f/u renal recs        #BPH (benign prostatic hyperplasia).   - c/w tamsulosin 0.4mg daily      ENDOCRINE  #DM (diabetes mellitus).   Previously on lantus 15 lispro 5  - c/w this regimen and adjust based on requirements.    # Hypothyroidism  - TSH 3.55, free t4 0.7, T3 49  - per endo rec no suspicion of myxedema coma, continue with 25 synthroid    HEME  #Normocytic Anemia  Likely 2.2 CKD. Hgb 10.7  - active T+S  - transfuse <7    FLUIDS/ELECTROLYTES/NUTRITION  -IVF none  -Monitor, careful w advanced kidney disease  -Diet  consis. carb and renal, kosher  Prophylaxis: heparin sq  Activity: activity  GI: none  C: FC  Dispo: Admit to F.   84 y/o M PMH CKD 4, renal transplant in 2013 at [facility], glaucoma (blind), DM1, anemia, CAD s/p 2 DAMEON to LAD in 6/21, BPH, recent admission for PNA presents with acute on chronic cough and SUNNY 2/2 acute CHF vs CKD.       NEUROLOGY  #Intubated  Intubated since 11/19. Intact cough reflex. On propofol gtt.   - goal RASS -2    CARDIOVASCULAR  #Acute on chronic diastolic CHF  Had EF of 55% on TTE from Oct 2022, grade I diastolic dysfunction. Repeat TTE 11/17 with poor visualization of LV. Patient appeared fluid overloaded, now s/p lasix 40mg BID with UOP 340cc o/n.   - holding further lasix  - give 1L LR bolus given relatively euvolemic today  - f/u cards recommendations    #Lower extremity edema  SUNNY 2/2 CKD vs acute on chronic CHF (g I dd). Also w cough but saturating well on RA without increased work of breathing. BNP elevated supporting acute on chronic chf. Poor renal function evidenced by acidosis though he is at baseline Cr. Cardiology consulted in ED limited TTE w preserved systolic function, but poor visualization  - Cards & Renal consulted  - I/Os  - f/u official TTE today    #CAD  Hx of CAD s/p 2 DAMEON to LAD in June 2021, currently no CP. Trops 0.03 but no CP or ischemic changes on EKG, likely due to CKD. Takes Plavix and aspirin per last d/c but only aspirin on outpatient note  - c/w ASA daily  - collateral information on plavix.      RENAL  #ELIZABETH on Chronic kidney disease (CKD).   Renal function worsening today. Baseline sCr 2.3-2.5. On admission, fluid overloaded. Does have orthopnea and intermittent SOB at baseline. Acidotic but at baseline. Follows w Dr. Mac.   - monitor I/Os  - give 1L LR bolus  - f/u urine studies  - f/u renal recs  - renally dose meds    #Renal transplant  Patient had renal transplant in 2013. On home mycophenolate 500mg BID and tacrolimus 4mg BID.  - per nephro, decreased home tacrolimus from 4mg BID --> 2mg BID while in hospital   - holding mycophenolate i/s/o infection  - holding tacrolimus prior to trough tonight  - obtain tacrolimus level tonight, clarify goal trough  - f/u renal recs        #BPH (benign prostatic hyperplasia).   - c/w tamsulosin 0.4mg daily      ENDOCRINE  #DM (diabetes mellitus).   Previously on lantus 15 lispro 5  - c/w lantus 15 lispro 5  - goal -180s    # Hypothyroidism  TSH 20.8, free t4 0.7, T3 49  - per endo rec no suspicion of myxedema coma, continue with 25 synthroid    HEME  #Normocytic Anemia  Likely 2.2 CKD. Hgb 10.7  - active T+S  - transfuse <7    FLUIDS/ELECTROLYTES/NUTRITION  -IVF none  -Monitor, careful w advanced kidney disease  -Diet  consis. carb and renal, kosher  Prophylaxis: heparin sq  Activity: activity  GI: none  C: FC  Dispo: Admit to F.   84 y/o M PMH CKD 4, renal transplant in 2013 at [facility], glaucoma (blind), DM1, anemia, CAD s/p 2 DAMEON to LAD in 6/21, BPH, recent admission for PNA presents with acute on chronic cough and SUNNY 2/2 acute CHF vs CKD.       NEUROLOGY  #Intubated  Intubated since 11/19. Intact cough reflex, corneal reflex. On propofol gtt.   - goal RASS -2    CARDIOVASCULAR  #Acute on chronic diastolic CHF  Had EF of 55% on TTE from Oct 2022, grade I diastolic dysfunction. Repeat TTE 11/17 with poor visualization of LV. Patient appeared fluid overloaded, now s/p lasix 40mg BID with UOP 340cc o/n.   - holding further lasix  - give 1L LR bolus given relatively euvolemic today  - f/u cards recommendations    #Lower extremity edema  SUNNY 2/2 CKD vs acute on chronic CHF (g I dd). Also w cough but saturating well on RA without increased work of breathing. BNP elevated supporting acute on chronic chf. Poor renal function evidenced by acidosis though he is at baseline Cr. Cardiology consulted in ED limited TTE w preserved systolic function, but poor visualization  - Cards & Renal consulted  - I/Os  - f/u official TTE today    #CAD  Hx of CAD s/p 2 DAMEON to LAD in June 2021, currently no CP. Trops 0.03 but no CP or ischemic changes on EKG, likely due to CKD. Takes Plavix and aspirin per last d/c but only aspirin on outpatient note  - c/w ASA daily  - collateral information on plavix.      RENAL  #ELIZABETH on Chronic kidney disease (CKD).   Renal function worsening today. Baseline sCr 2.3-2.5. On admission, fluid overloaded. Does have orthopnea and intermittent SOB at baseline. Acidotic but at baseline. Follows w Dr. Mac.   - monitor I/Os  - give 1L LR bolus  - f/u urine studies  - f/u renal recs  - renally dose meds    #Renal transplant  Patient had renal transplant in 2013. On home mycophenolate 500mg BID and tacrolimus 4mg BID.  - per nephro, decreased home tacrolimus from 4mg BID --> 2mg BID while in hospital   - holding mycophenolate i/s/o infection  - holding tacrolimus prior to trough tonight  - obtain tacrolimus level tonight, clarify goal trough  - f/u renal recs        #BPH (benign prostatic hyperplasia).   - c/w tamsulosin 0.4mg daily      ENDOCRINE  #DM (diabetes mellitus).   Previously on lantus 15 lispro 5  - c/w lantus 15 lispro 5  - goal -180s    # Hypothyroidism  TSH 20.8, free t4 0.7, T3 49  - per endo rec no suspicion of myxedema coma, continue with 25 synthroid    HEME  #Normocytic Anemia  Likely 2.2 CKD. Hgb 10.7  - active T+S  - transfuse <7    FLUIDS/ELECTROLYTES/NUTRITION  -IVF none  -Monitor, careful w advanced kidney disease  -Diet  consis. carb and renal, kosher  Prophylaxis: heparin sq  Activity: activity  GI: none  C: FC  Dispo: Admit to F.   82 y/o M PMH CKD 4, renal transplant in 2013 at [facility], glaucoma (blind), DM1, anemia, CAD s/p 2 DAMEON to LAD in 6/21, BPH, recent admission for PNA presents with acute on chronic cough and SUNNY 2/2 acute CHF vs CKD.       NEUROLOGY  #Intubated  Intubated since 11/19. Intact cough reflex, corneal reflex. On propofol gtt.   - goal RASS -2    CARDIOVASCULAR  #Acute on chronic diastolic CHF  Had EF of 55% on TTE from Oct 2022, grade I diastolic dysfunction. Repeat TTE 11/17 with poor visualization of LV. Patient appeared fluid overloaded, now s/p lasix 40mg BID with UOP 340cc o/n.   - holding further lasix  - give 1L LR bolus given relatively euvolemic today  - f/u cards recommendations    #Lower extremity edema  SUNNY 2/2 CKD vs acute on chronic CHF (g I dd). Also w cough but saturating well on RA without increased work of breathing. BNP elevated supporting acute on chronic chf. Poor renal function evidenced by acidosis though he is at baseline Cr. Cardiology consulted in ED limited TTE w preserved systolic function, but poor visualization  - Cards & Renal consulted  - I/Os  - f/u official TTE today    #CAD  Hx of CAD s/p 2 DAMEON to LAD in June 2021, currently no CP. Trops 0.03 but no CP or ischemic changes on EKG, likely due to CKD. Takes Plavix and aspirin per last d/c but only aspirin on outpatient note  - c/w ASA daily  - collateral information on plavix.      RENAL  #ELIZABETH on Chronic kidney disease (CKD).   Renal function worsening today. Baseline sCr 2.3-2.5. On admission, fluid overloaded. Does have orthopnea and intermittent SOB at baseline. Acidotic but at baseline. Follows w Dr. Mac.   - monitor I/Os  - give 1L LR bolus  - f/u urine studies  - f/u renal recs  - renally dose meds    #Renal transplant  Patient had renal transplant in 2013. On home mycophenolate 500mg BID and tacrolimus 4mg BID.  - per nephro, decreased home tacrolimus from 4mg BID --> 2mg BID while in hospital   - holding mycophenolate i/s/o infection  - holding tacrolimus prior to trough tonight  - obtain tacrolimus level tonight, clarify goal trough  - f/u renal recs        #BPH (benign prostatic hyperplasia).   - c/w tamsulosin 0.4mg daily      ID  #Aspiration PNA  Tracheal aspirate cx growing serratia marcescens and pseudomonas. Patient was started on vancomycin and cefepime. MRSA nares positive. Dc'ed vancomycin given supratherapeutic trough   - c/w cefepime 2g q24  - given relatively poor renal function, will monitor closely for neurotoxicity      ENDOCRINE  #DM (diabetes mellitus).   Previously on lantus 15 lispro 5  - c/w lantus 15 lispro 5  - goal -180s    # Hypothyroidism  TSH 20.8, free t4 0.7, T3 49 (low)  - per endo rec no suspicion of myxedema coma  - continue with 25 synthroid    #Relative Adrenal Insufficiency  Patient was started on hydrocortisone 50mg q6hr  - c/w hydrocortisone 50mg q6hr    HEME  #Normocytic Anemia  Likely 2.2 CKD. Hgb 6.9 on 11/21, corrected appropriately s/p 1u pRBC. No active signs of bleeding on physical exam. CTH with no intracranial bleeding.  - active T+S  - transfuse <7    FLUIDS/ELECTROLYTES/NUTRITION  -IVF none  -Monitor, careful w advanced kidney disease  -Diet  consis. carb and renal, kosher  Prophylaxis: heparin sq  Activity: activity  GI: none  C: FC  Dispo: Admit to [facility].   82 y/o M PMH CKD 4, renal transplant in 2013 at [facility], glaucoma (blind), DM1, anemia, CAD s/p 2 DAMEON to LAD in 6/21, BPH, recent admission for PNA presents with acute on chronic cough and SUNNY 2/2 acute CHF vs CKD.       NEUROLOGY  #Intubated  Intubated since 11/19. Intact cough reflex, corneal reflex. On propofol gtt.   - goal RASS -2  - hold sedation today    CARDIOVASCULAR  #Acute on chronic diastolic CHF  Had EF of 55% on TTE from Oct 2022, grade I diastolic dysfunction. Repeat TTE 11/17 with poor visualization of LV. Patient appeared fluid overloaded, now s/p lasix 40mg BID with UOP 340cc o/n.   - holding further lasix  - give 1L LR bolus given relatively euvolemic today  - f/u cards recommendations    #Lower extremity edema  SUNNY 2/2 CKD vs acute on chronic CHF (g I dd). Also w cough but saturating well on RA without increased work of breathing. BNP elevated supporting acute on chronic chf. Poor renal function evidenced by acidosis though he is at baseline Cr. Cardiology consulted in ED limited TTE w preserved systolic function, but poor visualization  - Cards & Renal consulted  - I/Os  - f/u official TTE today    #CAD  Hx of CAD s/p 2 DAMEON to LAD in June 2021, currently no CP. Trops 0.03 but no CP or ischemic changes on EKG, likely due to CKD. Takes Plavix and aspirin per last d/c but only aspirin on outpatient note  - c/w ASA daily  - collateral information on plavix.    #HLD  Known history  - c/w home lipitor    RENAL  #ELIZABETH on Chronic kidney disease (CKD).   Renal function worsening today. Baseline sCr 2.3-2.5. On admission, fluid overloaded. Does have orthopnea and intermittent SOB at baseline. Acidotic but at baseline. Follows w Dr. Mac.   - monitor I/Os  - give 1L LR bolus  - f/u urine studies  - f/u renal recs  - renally dose meds    #Renal transplant  Patient had renal transplant in 2013. On home mycophenolate 500mg BID and tacrolimus 4mg BID.  - per nephro, decreased home tacrolimus from 4mg BID --> 2mg BID while in hospital   - holding mycophenolate i/s/o infection  - holding tacrolimus prior to trough tonight  - obtain tacrolimus level tonight, clarify goal trough  - f/u renal recs        #BPH (benign prostatic hyperplasia).   - c/w tamsulosin 0.4mg daily      ID  #Aspiration PNA  Tracheal aspirate cx growing serratia marcescens and pseudomonas. Patient was started on vancomycin and cefepime. MRSA nares positive. Dc'ed vancomycin given supratherapeutic trough   - c/w cefepime 2g q24  - given relatively poor renal function, will monitor closely for neurotoxicity      ENDOCRINE  #DM (diabetes mellitus).   Previously on lantus 15 lispro 5  - c/w lantus 15 lispro 5  - goal -180s    # Hypothyroidism  TSH 20.8, free t4 0.7, T3 49 (low)  - per endo rec no suspicion of myxedema coma  - continue with 25 synthroid    #Relative Adrenal Insufficiency  Patient was started on hydrocortisone 50mg q6hr  - c/w hydrocortisone 50mg q6hr    HEME  #Normocytic Anemia  Likely 2.2 CKD. Hgb 6.9 on 11/21, corrected appropriately s/p 1u pRBC. No active signs of bleeding on physical exam. CTH with no intracranial bleeding.  - active T+S  - transfuse <7    FLUIDS/ELECTROLYTES/NUTRITION  -IVF none  -Monitor, careful w advanced kidney disease  -Diet  consis. carb and renal, kosher  Prophylaxis: heparin sq  Activity: activity  GI: none  C: FC  Dispo: Admit to [facility].

## 2022-11-21 NOTE — PROGRESS NOTE ADULT - ASSESSMENT
INCOMPLETE       HOLD TACROLIMUS NOW AND GET TACROLIMUS TROUGH AT 8:30PM TODAY.  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      Problem/Plan:    #ELIZABETH on CKD 2/2 ATN in the setting of cardiac arrest   Renal allograft with CKD 4 - baseline sCr 2.3-2.5, usual meds tacrolimus 4mg BID and mcyophenolate 500mg BID.   - tacrolimus level elevated 8.3 in 11/19, HOLD TACROLIMUS NOW AND GET TACROLIMUS TROUGH AT 8:30PM TODAY. Renal team will follow up afterwards with level   - Caution with cefepime given CKD and propensity to neurotoxicity  -strict i's and o's  -uop noted; no need for HD currently however will assess daily   -vancomycin should be dosed by level as patient CKD IV now in ELIZABETH  -Trend BMP daily    #Fluid overload/ CHF - currently in shock, holding further diuresis for now, on pressor   - consider echocardiogram and RHC to guide further diuretic dose      #Shock - normally hypertensive, monitoring on pressors  maintain MAP >70 for adequate renal perfusion      Renal team is following     Thank you for the opportunity to participate in the care of your patient. The nephrology service remains available to assist with any questions or concerns. Please feel free to reach us by paging the on-call nephrology fellow for urgent issues or as below.      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      Problem/Plan:    #ELIZABETH on CKD 2/2 ATN in the setting of cardiac arrest   Renal allograft with CKD 4 - baseline sCr 2.3-2.5, usual meds tacrolimus 4mg BID and mcyophenolate 500mg BID.   - tacrolimus level elevated 8.3 in 11/19, HOLD TACROLIMUS NOW AND GET TACROLIMUS TROUGH AT 8:30PM TODAY. Renal team will follow up afterwards with level   - Caution with cefepime given CKD and propensity to neurotoxicity  - strict i's and o's  -uop noted; no need for HD currently however will assess daily   -vancomycin should be dosed by level as patient CKD IV now in ELIZABETH  -Trend BMP daily  -F/u CK level     #Fluid overload/ CHF - currently in shock, holding further diuresis for now, on pressor   - consider echocardiogram and RHC to guide further diuretic dose      #Shock   -Normally hypertensive, but on pressors at this time  maintain MAP >70 for adequate renal perfusion      Renal team is following     Thank you for the opportunity to participate in the care of your patient. The nephrology service remains available to assist with any questions or concerns. Please feel free to reach us by paging the on-call nephrology fellow for urgent issues or as below.      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      Problem/Plan:    #ELIZABETH on CKD 2/2 ATN in the setting of cardiac arrest   Renal allograft with CKD 4 - baseline sCr 2.3-2.5, usual meds tacrolimus 4mg BID and mcyophenolate 500mg BID.   -Creatinine uptrending after cardiac arrest on 11/19: 2.3-->3.15  - tacrolimus level elevated 8.3 in 11/19, HOLD TACROLIMUS NOW AND GET TACROLIMUS TROUGH AT 8:30PM TODAY. Renal team will follow up afterwards with level   - Caution with cefepime given CKD and propensity to neurotoxicity  - strict i's and o's  -uop noted; no need for HD currently however will assess daily   -vancomycin should be dosed by level as patient CKD IV now in ELIZABETH  -Trend BMP daily  -F/u CK level     #Fluid overload/ CHF - currently in shock, holding further diuresis for now, on pressor   - consider echocardiogram and RHC to guide further diuretic dose      #Shock   -Normally hypertensive, but on pressors at this time  maintain MAP >70 for adequate renal perfusion      Renal team is following     Thank you for the opportunity to participate in the care of your patient. The nephrology service remains available to assist with any questions or concerns. Please feel free to reach us by paging the on-call nephrology fellow for urgent issues or as below.

## 2022-11-21 NOTE — PROGRESS NOTE ADULT - ATTENDING COMMENTS
Pt seen on rounds this afternoon and hospital course reviewed.  82-yo man with type 2 DM, CAD, ESRD s/p transplant but with current creatinine 3.2 mg%, now s/p cardiac arrest. Pt seen on rounds this afternoon and hospital course reviewed.  82-yo man with type 2 DM, CAD, ESRD s/p transplant but with current creatinine 3.2 mg% who was admitted with acute on chronic CHF.  Is now s/p cardiac arrest, was hypoglycemic and hyperkalemic at that time.  Was intubated after the arrest and is still on ventilator.    DM was managed on 75/25/ insulin at home, doses varying from none (if glucose was in the low 100 range) to 14 units.  Was on an insulin drip earlier in his ICU course now on low-dose sliding scale only.  Fingersticks have been 130-150 mg%  Was noted to be hypothyroid during this admission, with  Free T4 0.77, TSH 20 uU/ml.  Was started on levothyroxine 25 ug IV daily after the arrest, as well as stress dose steroids.  (He apparently had a low-normal cortisol level of 6 mcg/dl in October, not sure why this was checked).  On exam he is intubated/sedated.  Lungs with air entry in the left lung slightly less than the right, with rales at the base.  Heart--distant.  Abdomen soft and non-distended.  Extr without edema.  Systolic BPs are currently in the 170 range with pressors having been D/C'd.  --Given his current hemodynamic stability, plus his advanced age and CAD, would continue the levothyroxine at only 25 mcg/day  --Would taper the hydrocortisone to 50 mg q8h  --Continue low dose insulin sliding scale  --Will need to verify what appears to be a distinct worsening of his hypothyroidism in the past month (TSH level was borderline elevated in October), and see if we can determine why the cortisol level was checked

## 2022-11-21 NOTE — PROGRESS NOTE ADULT - SUBJECTIVE AND OBJECTIVE BOX
Interval Events: Reviewed  Patient seen and examined at bedside.    Patient is a 82y old  Male who presents with a chief complaint of LE edema and cough (2022 09:30)  sedated    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      History of renal transplant  secondary to DM      Chronic kidney disease (CKD)      Glaucoma      Kidney transplant recipient      Amputation of toe  x 3      H/O heart artery stent          MEDICATIONS:  Pulmonary:    Antimicrobials:  cefepime   IVPB 2000 milliGRAM(s) IV Intermittent every 24 hours    Anticoagulants:  heparin   Injectable 5000 Unit(s) SubCutaneous every 8 hours    Cardiac:  norepinephrine Infusion 0.05 MICROgram(s)/kG/Min IV Continuous <Continuous>      Allergies    No Known Allergies    Intolerances        Vital Signs Last 24 Hrs  T(C): 36.5 (2022 22:12), Max: 37.1 (2022 01:04)  T(F): 97.7 (2022 22:12), Max: 98.8 (2022 01:04)  HR: 65 (2022 22:00) (56 - 93)  BP: --  BP(mean): --  RR: 14 (2022 22:00) (14 - 29)  SpO2: 96% (2022 22:00) (93% - 100%)    Parameters below as of 2022 22:00  Patient On (Oxygen Delivery Method): ventilator    O2 Concentration (%): 40     @ 07:  -   @ 07:00  --------------------------------------------------------  IN: 404.3 mL / OUT: 662 mL / NET: -257.7 mL     @ 07:  -   @ 22:36  --------------------------------------------------------  IN: 585.3 mL / OUT: 990 mL / NET: -404.7 mL      Mode: AC/ CMV (Assist Control/ Continuous Mandatory Ventilation)  RR (machine): 14  TV (machine): 500  FiO2: 40  PEEP: 5  ITime: 1  MAP: 13  PIP: 26      Review of Systems:   •	General: negative  •	Skin/Breast: negative  •	Ophthalmologic: negative  •	ENMT: negative  •	Respiratory and Thorax: negative  •	Cardiovascular: negative  •	Gastrointestinal: negative  •	Genitourinary: negative  •	Musculoskeletal: negative  •	Neurological: negative  •	Psychiatric: negative  •	Hematology/Lymphatics: negative  •	Endocrine: negative  •	Allergic/Immunologic: negative    Physical Exam:   • Constitutional:	refer to the dietion /Nutritionist note  • Eyes:	EOMI; PERRL; no drainage or redness  • ENMT:	No oral lesions; no gross abnormalities  • Neck	No bruits; no thyromegaly or nodules  • Breasts:	not examined  • Back:	No deformity or limitation of movement  • Respiratory:	Breath Sounds equal & clear to percussion & auscultation, no accessory muscle use  • Cardiovascular:	Regular rate & rhythm, normal S1, S2; no murmurs, gallops or rubs; no S3, S4  • Gastrointestinal:	Soft, non-tender, no hepatosplenomegaly, normal bowel sounds  • Genitourinary:	not examined  • Rectal: not examined  • Extremities:	No cyanosis, clubbing or edema  • Vascular:	Equal and normal pulses (carotid, femoral, dorsalis pedis)  • Neurologica:l	not examined  • Skin:	No lesions; no rash  • Lymph Nodes:	No lymphadedenopathy  • Musculoskeletal:	No joint pain, swelling or deformity; no limitation of movement        LABS:      CBC Full  -  ( 2022 12:40 )  WBC Count : 9.16 K/uL  RBC Count : 2.95 M/uL  Hemoglobin : 7.8 g/dL  Hematocrit : 24.8 %  Platelet Count - Automated : 119 K/uL  Mean Cell Volume : 84.1 fl  Mean Cell Hemoglobin : 26.4 pg  Mean Cell Hemoglobin Concentration : 31.5 gm/dL  Auto Neutrophil # : x  Auto Lymphocyte # : x  Auto Monocyte # : x  Auto Eosinophil # : x  Auto Basophil # : x  Auto Neutrophil % : x  Auto Lymphocyte % : x  Auto Monocyte % : x  Auto Eosinophil % : x  Auto Basophil % : x    11-21    140  |  109<H>  |  64<H>  ----------------------------<  165<H>  3.6   |  15<L>  |  3.15<H>    Ca    8.1<L>      2022 04:50  Phos  5.2     -  Mg     1.6         TPro  5.2<L>  /  Alb  2.7<L>  /  TBili  0.2  /  DBili  x   /  AST  10  /  ALT  11  /  AlkPhos  93  11-    PT/INR - ( 2022 05:58 )   PT: 12.5 sec;   INR: 1.05          PTT - ( 2022 05:58 )  PTT:37.5 sec      Urinalysis Basic - ( 2022 07:54 )    Color: Yellow / Appearance: Cloudy / S.025 / pH: x  Gluc: x / Ketone: NEGATIVE  / Bili: Negative / Urobili: 0.2 E.U./dL   Blood: x / Protein: 30 mg/dL / Nitrite: NEGATIVE   Leuk Esterase: Trace / RBC: Many /HPF / WBC < 5 /HPF   Sq Epi: x / Non Sq Epi: 0-5 /HPF / Bacteria: None /HPF      < from: Xray Chest 1 View- PORTABLE-Urgent (Xray Chest 1 View- PORTABLE-Urgent .) (22 @ 13:28) >  ACC: [id number] EXAM:  XR CHEST PORTABLE URGENT 1V                          PROCEDURE DATE:  2022          INTERPRETATION:  Clinical History: Line placement    Frontal examination of the chest demonstrates left internal jugular line   with tip overlying superior vena cava. No interval change position   remaining support devices in comparison to prior examination of the chest   2022. Congestion and/or infiltrates. Left effusion. The right   costophrenic angle is not included on the film.    IMPRESSION: Congestion and/or infiltrates. Right effusion    < end of copied text >              RADIOLOGY & ADDITIONAL STUDIES (The following images were personally reviewed):

## 2022-11-21 NOTE — PROGRESS NOTE ADULT - PROBLEM SELECTOR PLAN 1
Event noticed.  The patient is sedated.  I suctioned the patient copious amount of secretion.  Gas exchange is stable.  Sputum grew Pseudomonas and Serratia.  The patient on appropriate antibiotic.  Continue pulmonary toilet.  Patient has baseline dementia and does not and trial up off sedation.  Tapers pressors as needed.  Cortisol level is adequate.  I discussed the case with critical care.  Trial of pressure support ventilation

## 2022-11-21 NOTE — PROGRESS NOTE ADULT - ATTENDING COMMENTS
Initial attending contact date  11/21/22    . See fellow note written above for details. I reviewed the fellow documentation. I have personally seen and examined this patient. I reviewed vitals, labs, medications, cardiac studies, and additional imaging. I agree with the above fellow's findings and plans as written above with the following additions/statements.    82M l CAD s/p 2 DAMEON to LAD (6/21), CKD 4 (Cr ~2.5), Renal transplant  in 2013 at [facility] (on tacrolimus and mycophenolate mofetil), Glaucoma (blind), DM1, anemia, BPH with recent admission for PNA presents with acute on chronic cough with CT findings concerning for interstitial pulmonary edema vs PNA. Cardiology consulted due to concern for CHF in the setting of CKD. Hospital course complicated by PEA arrest 2/2 hypoglycemia and subsequent ventricular tachycardia s/p epinephrine.  -On exam, ankle edema and bl upper extremity edema, possibly due to 3rd spacing  -Diuresis on hold  -Awaiting ECHO. last official ECHO with normal systolic/diastolic function   -Cont ASA ,statin

## 2022-11-21 NOTE — PROGRESS NOTE ADULT - SUBJECTIVE AND OBJECTIVE BOX
OVERNIGHT: No acute events overnight.   SUBJECTIVE: Patient was seen and examined this morning. He didn't have any new concerns or complaints.     CAPILLARY BLOOD GLUCOSE & INSULIN RECEIVED  Yesterday  - Dinner FSG: *** mg/dL = *** units of premeal Lispro + *** units of Lispro sliding scale.   - Bedtime FSG: *** mg/dL = *** units of Lantus + *** units Lispro sliding scale.     Today  - Breakfast FSG: *** mg/dL = *** units of premeal Lispro + *** units of Lispro sliding scale.   - Lunch FSG: *** mg/dL = *** units of premeal Lispro + *** units of Lispro sliding scale.     190 mg/dL (11-21 @ 11:33)  172 mg/dL (11-21 @ 06:36)  145 mg/dL (11-20 @ 21:50)  143 mg/dL (11-20 @ 16:06)  139 mg/dL (11-20 @ 16:04)  153 mg/dL (11-20 @ 12:40)    REVIEW OF SYSTEMS  Constitutional:  Negative fever, chills or loss of appetite.  Eyes:  Negative blurry vision or double vision.  Cardiovascular:  Negative for chest pain or palpitations.  Respiratory:  Negative for cough, wheezing, or shortness of breath.    Gastrointestinal:  Negative for nausea, vomiting, diarrhea, constipation, or abdominal pain.  Genitourinary:  Negative frequency, urgency or dysuria.  Neurologic:  No headache, confusion, dizziness, lightheadedness.    PHYSICAL EXAM  Vital Signs Last 24 Hrs  T(C): 36.4 (21 Nov 2022 09:42), Max: 37.1 (21 Nov 2022 01:04)  T(F): 97.5 (21 Nov 2022 09:42), Max: 98.8 (21 Nov 2022 01:04)  HR: 72 (21 Nov 2022 10:45) (49 - 75)  BP: 141/65 (20 Nov 2022 17:50) (94/44 - 202/79)  BP(mean): 93 (20 Nov 2022 17:50) (64 - 114)  RR: 25 (21 Nov 2022 10:45) (14 - 26)  SpO2: 100% (21 Nov 2022 10:45) (94% - 100%)    Parameters below as of 21 Nov 2022 10:45  Patient On (Oxygen Delivery Method): ventilator    O2 Concentration (%): 40    Constitutional: Awake, alert, in no acute distress.   HEENT: Normocephalic, atraumatic, GORDON, no proptosis or lid retraction.   Neck: supple, no acanthosis, no thyromegaly or palpable thyroid nodules.  Respiratory: Lungs clear to ausculation bilaterally.   Cardiovascular: regular rhythm, normal S1 and S2, no audible murmurs.   GI: soft, non-tender, non-distended, bowel sounds present, no masses appreciated.  Extremities: No lower extremity edema, peripheral pulses present.   Skin: no rashes.   Psychiatric: AAO x 3. Normal affect/mood.     LABS  CBC - WBC/HGB/HTC/PLT: 10.26/6.9/22.0/130 (11-21-22)  BMP - Na/K/Cl/Bicarb/BUN/Cr/Gluc/AG/eGFR: 140/3.6/109/15/64/3.15/165/16/19 (11-21-22)  Ca - 8.1 (11-21-22)  Phos - 5.2 (11-21-22)  Mg - 1.6 (11-21-22)  LFT - Alb/Tprot/Tbili/Dbili/AlkPhos/ALT/AST: 2.7/--/0.2/--/93/11/10 (11-21-22)  PT/aPTT/INR: 12.5/37.5/1.05 (11-20-22)   Thyroid Stimulating Hormone, Serum: 20.890 (11-19-22)  Total T4/Free T4: 3.55/-- (11-20-22)    MEDICATIONS  MEDICATIONS  (STANDING):  cefepime   IVPB 2000 milliGRAM(s) IV Intermittent every 24 hours  chlorhexidine 2% Cloths 1 Application(s) Topical <User Schedule>  dextrose 5%. 1000 milliLiter(s) (100 mL/Hr) IV Continuous <Continuous>  dextrose 5%. 1000 milliLiter(s) (50 mL/Hr) IV Continuous <Continuous>  dextrose 50% Injectable 25 Gram(s) IV Push once  dextrose 50% Injectable 12.5 Gram(s) IV Push once  dextrose 50% Injectable 25 Gram(s) IV Push once  glucagon  Injectable 1 milliGRAM(s) IntraMuscular once  heparin   Injectable 5000 Unit(s) SubCutaneous every 8 hours  hydrocortisone sodium succinate Injectable 50 milliGRAM(s) IV Push every 6 hours  influenza  Vaccine (HIGH DOSE) 0.7 milliLiter(s) IntraMuscular once  insulin lispro (ADMELOG) corrective regimen sliding scale   SubCutaneous every 6 hours  levothyroxine Injectable 25 MICROGram(s) IV Push at bedtime  mycophenolate mofetil IVPB 500 milliGRAM(s) IV Intermittent every 12 hours  norepinephrine Infusion 0.05 MICROgram(s)/kG/Min (9.36 mL/Hr) IV Continuous <Continuous>  propofol Infusion 10 MICROgram(s)/kG/Min (4.94 mL/Hr) IV Continuous <Continuous>  tamsulosin 0.4 milliGRAM(s) Oral at bedtime    MEDICATIONS  (PRN):  dextrose Oral Gel 15 Gram(s) Oral once PRN Blood Glucose LESS THAN 70 milliGRAM(s)/deciliter  sodium chloride 0.9% lock flush 10 milliLiter(s) IV Push every 1 hour PRN Pre/post blood products, medications, blood draw, and to maintain line patency    ASSESSMENT / RECOMMENDATIONS    A1C: 9.0 %  BUN: 64  Creatinine: 3.15  GFR: 19  Weight:82.3  BMI: 28.4  Ejection Fraction: LVEF appears normal in limited views (11/17/22)    # Type 2 diabetes mellitus  - Please continue lantus *** units at bedtime.   - Continue lispro *** units before each meal.  - Continue lispro moderate / low dose sliding scale four times daily with meals and at bedtime.  - Patient's fingerstick glucose goal is 100-180 mg/dL.    - For discharge, patient can ***.    - Patient can follow up at discharge with [facility] by calling [phone] to make an appointment.      Case discussed with Dr. Rabago. Primary team updated.       Ulysses Weber    Endocrinology Fellow    Service Pager: [phone]    OVERNIGHT: No acute events overnight.   SUBJECTIVE: Patient was seen and examined this morning. He remains intubated. Patient's family was at beside. His daughter obtained collateral information from spouse who says that at home he takes Humulin 75/25 sliding scale. He usually takes it twice daily (before breakfast and before dinner). She says that if his glucose is >150 mg/dL she would give him 10-12 units, and that if his blood glucose is >250 mg/dL she would give him 14 units.     CAPILLARY BLOOD GLUCOSE & INSULIN RECEIVED  Yesterday  - Dinner FS mg/dL = He didn't receive insulin.   - Bedtime FS mg/dL = He didn't receive insulin.     Today  - Breakfast FS mg/dL = 1 units of Lispro sliding scale.   - Lunch FS mg/dL = 1 units of Lispro sliding scale.     REVIEW OF SYSTEMS  Unable to obtain as patient was sedated/intubated.      PHYSICAL EXAM  Vital Signs Last 24 Hrs  T(C): 36.4 (2022 09:42), Max: 37.1 (2022 01:04)  T(F): 97.5 (2022 09:42), Max: 98.8 (2022 01:04)  HR: 72 (2022 10:45) (49 - 75)  BP: 141/65 (2022 17:50) (94/44 - 202/79)  BP(mean): 93 (2022 17:50) (64 - 114)  RR: 25 (2022 10:45) (14 - 26)  SpO2: 100% (2022 10:45) (94% - 100%)    Parameters below as of 2022 10:45  Patient On (Oxygen Delivery Method): ventilator    O2 Concentration (%): 40    Constitutional: Elderly male, sedated, intubated.  HEENT: Normocephalic, atraumatic, GORDON.  Respiratory: Lungs clear to ausculation bilaterally, on mechanical ventilation.   Cardiovascular: regular rhythm, normal S1 and S2, no audible murmurs.   GI: soft, non-tender, non-distended, bowel sounds present.  Extremities: No lower extremity edema.    LABS  CBC - WBC/HGB/HTC/PLT: 10.26/6.9/22.0/130 (22)  BMP - Na/K/Cl/Bicarb/BUN/Cr/Gluc/AG/eGFR: 140/3.6/109/15/64/3.15/165/16/19 (22)  Ca - 8.1 (22)  Phos - 5.2 (22)  Mg - 1.6 (22)  LFT - Alb/Tprot/Tbili/Dbili/AlkPhos/ALT/AST: 2.7/--/0.2/--/93//10 (22)  PT/aPTT/INR: 12.5/37.5/1.05 (22)   Thyroid Stimulating Hormone, Serum: 20.890 (22)  Total T4/Free T4: 3.55/-- (22)    MEDICATIONS  MEDICATIONS  (STANDING):  cefepime   IVPB 2000 milliGRAM(s) IV Intermittent every 24 hours  chlorhexidine 2% Cloths 1 Application(s) Topical <User Schedule>  dextrose 5%. 1000 milliLiter(s) (100 mL/Hr) IV Continuous <Continuous>  dextrose 5%. 1000 milliLiter(s) (50 mL/Hr) IV Continuous <Continuous>  dextrose 50% Injectable 25 Gram(s) IV Push once  dextrose 50% Injectable 12.5 Gram(s) IV Push once  dextrose 50% Injectable 25 Gram(s) IV Push once  glucagon  Injectable 1 milliGRAM(s) IntraMuscular once  heparin   Injectable 5000 Unit(s) SubCutaneous every 8 hours  hydrocortisone sodium succinate Injectable 50 milliGRAM(s) IV Push every 6 hours  influenza  Vaccine (HIGH DOSE) 0.7 milliLiter(s) IntraMuscular once  insulin lispro (ADMELOG) corrective regimen sliding scale   SubCutaneous every 6 hours  levothyroxine Injectable 25 MICROGram(s) IV Push at bedtime  mycophenolate mofetil IVPB 500 milliGRAM(s) IV Intermittent every 12 hours  norepinephrine Infusion 0.05 MICROgram(s)/kG/Min (9.36 mL/Hr) IV Continuous <Continuous>  propofol Infusion 10 MICROgram(s)/kG/Min (4.94 mL/Hr) IV Continuous <Continuous>  tamsulosin 0.4 milliGRAM(s) Oral at bedtime    MEDICATIONS  (PRN):  dextrose Oral Gel 15 Gram(s) Oral once PRN Blood Glucose LESS THAN 70 milliGRAM(s)/deciliter  sodium chloride 0.9% lock flush 10 milliLiter(s) IV Push every 1 hour PRN Pre/post blood products, medications, blood draw, and to maintain line patency    ASSESSMENT / RECOMMENDATIONS  Mr. Miguel is an 82 year-old man with a history of multiple medical problems including coronary artery disease, end stage renal disease status post renal transplant, type 2 diabetes mellitus admitted 2022 after presenting to his outpatient nephrologist with lower extremity edema in the setting of acute on chronic congestive heart failure. He had hypertension and borderline bradycardia on admission; he had normal oxygenation and mental status was at baseline per notes. We were consulted for concern for myxedema coma.     A1C: 9.0 %  BUN: 64  Creatinine: 3.15  GFR: 19  Weight:82.3  BMI: 28.4  Ejection Fraction: LVEF appears normal in limited views (22)    # Concern for myxedema coma  - TSH 20. Free T4 0.77. ()  - While he currently has some features seen with myxedema coma, these findings may be explained by his recent cardiac arrest; his vital signs prior to his arrest were apparently around his baseline, as was his mental status. His free T4 is not particularly low.   - Continue levothyroxine 25 mg IV daily.    # Concern for adrenal insufficiency  - Please consider decreasing hydrocortisone to 50 mg every 8 hours.     # Type 2 diabetes mellitus   - Continue with moderate dose sliding scale four times daily before meals and at bedtime.     Case discussed with Dr. Rabago. Primary team updated.       Ulysses Weber    Endocrinology Fellow    Service Pager: [phone]

## 2022-11-21 NOTE — PROGRESS NOTE ADULT - ASSESSMENT
82M legally blind PMHx CAD s/p 2 DAMEON to LAD (6/21), CKD 4 (Cr ~2.5), Renal transplant  in 2013 at [facility] (on tacrolimus and mycophenolate mofetil), Glaucoma (blind), DM1, anemia, BPH with recent admission for PNA presents with acute on chronic cough with CT findings concerning for interstitial pulmonary edema vs PNA. Cardiology consulted due to concern for CHF in the setting of CKD. Hospital course complicated by PEA arrest 2/2 hypoglycemia and subsequent ventricular tachycardia s/p epinephrine.    #Cardiac Arrest 2/2 Hypoglycemic event  Noted to be volume overloaded on admission with bilateral pitting edema and CT finding of interstitial edema vs PNA. Hospital course complicated by cardiac arrest in the setting of hypoglycemia  10/24 TTE Normal LV and RV with mild symmetric LVH and dilated LA.   ECG: Sinus bradycardia with 1 degree AV block on admission. ECG 11/19 (s/p cardiac arrest): accelerated idioventricular rhythm with repeat ECG back to baseline shortly after.  - Etiology of cardiac arrest is likely due to the hypoglycemic event vs hyperkalemia - likely PEA with subsequent ventricular tachycardia after administration of epinephrine. Low suspicion for acute ischemic event given that after obtaining ROSC and treatement of hypoglycemia, ECG without ischemic changes, and no significant pressor requirements at the time of evaluation  - Close fingerstick monitoring  - f/u complete tte  - Pt appears more euvolemic, Cr increasing; agree w/ holding lasix for today  - Strict Ins and Outs    # CAD s/p DAMEON to LAD 6/2021  - c/w Aspirin 81mg  - c/w Lipitor 40mg QD  - Coreg 25mg BID held (on levophed)  - Daily ECGs    Please call cardio with any question;

## 2022-11-21 NOTE — PROGRESS NOTE ADULT - SUBJECTIVE AND OBJECTIVE BOX
ROBERT OTOOLE  82y, Male  Allergy: No Known Allergies      CHIEF COMPLAINT: sent in by nephrologist for BL LE edema    OVERNIGHT EVENTS:    Pt examined at bedside, intubated, sedated, family at bedside;  - no acute events overnight; family updated on cardiology plan    PAST MEDICAL & SURGICAL HISTORY:  HTN (hypertension)  BPH (benign prostatic hypertrophy)  DM (diabetes mellitus)  Type 1/insulin dependent per patient  History of renal transplant  secondary to DM  Chronic kidney disease (CKD)  Glaucoma  Kidney transplant recipient  Amputation of toe  x 3  H/O heart artery stent      FAMILY HISTORY  No pertinent family history in first degree relatives    SOCIAL HISTORY    Substance Use ( x ) never used  (  ) IVDU (  ) Other:  Tobacco Usage:  ( x ) never smoked   (   ) former smoker   (   ) current smoker   Alcohol Usage: (   ) social  (   ) daily use ( x ) denies    ROS  *ROS limited 2/2 pt cognition; gathered w/ son in law at bedside  General: Denies rigors, nightsweats  HEENT: Denies headache, rhinorrhea, sore throat, eye pain  CV: endorses significant LE swelling; Denies CP, palpitations  PULM: endorses productive cough on chronic cough; Denies wheezing, hemoptysis  GI: Denies hematemesis, hematochezia, melena  : Denies discharge, hematuria  MSK: Denies arthralgias, myalgias  SKIN: Denies rash, lesions  NEURO: Denies paresthesias, weakness  PSYCH: Denies depression, anxiety    MEDICATIONS:  STANDING MEDICATIONS  atorvastatin 40 milliGRAM(s) Oral at bedtime  cefepime   IVPB 2000 milliGRAM(s) IV Intermittent every 24 hours  chlorhexidine 0.12% Liquid 15 milliLiter(s) Oral Mucosa every 12 hours  chlorhexidine 2% Cloths 1 Application(s) Topical <User Schedule>  dextrose 5%. 1000 milliLiter(s) IV Continuous <Continuous>  dextrose 5%. 1000 milliLiter(s) IV Continuous <Continuous>  dextrose 50% Injectable 25 Gram(s) IV Push once  dextrose 50% Injectable 12.5 Gram(s) IV Push once  dextrose 50% Injectable 25 Gram(s) IV Push once  glucagon  Injectable 1 milliGRAM(s) IntraMuscular once  heparin   Injectable 5000 Unit(s) SubCutaneous every 8 hours  hydrocortisone sodium succinate Injectable 50 milliGRAM(s) IV Push every 6 hours  influenza  Vaccine (HIGH DOSE) 0.7 milliLiter(s) IntraMuscular once  insulin lispro (ADMELOG) corrective regimen sliding scale   SubCutaneous every 6 hours  lactated ringers. 500 milliLiter(s) IV Continuous <Continuous>  levothyroxine Injectable 25 MICROGram(s) IV Push at bedtime  norepinephrine Infusion 0.05 MICROgram(s)/kG/Min IV Continuous <Continuous>  propofol Infusion 10 MICROgram(s)/kG/Min IV Continuous <Continuous>  tamsulosin 0.4 milliGRAM(s) Oral at bedtime    PRN MEDICATIONS  dextrose Oral Gel 15 Gram(s) Oral once PRN  sodium chloride 0.9% lock flush 10 milliLiter(s) IV Push every 1 hour PRN      LABS:                        7.8    9.16  )-----------( 119      ( 2022 12:40 )             24.8         140  |  109<H>  |  64<H>  ----------------------------<  165<H>  3.6   |  15<L>  |  3.15<H>    Ca    8.1<L>      2022 04:50  Phos  5.2     -  Mg     1.6         TPro  5.2<L>  /  Alb  2.7<L>  /  TBili  0.2  /  DBili  x   /  AST  10  /  ALT  11  /  AlkPhos  93  -    PT/INR - ( 2022 05:58 )   PT: 12.5 sec;   INR: 1.05          PTT - ( 2022 05:58 )  PTT:37.5 sec  Urinalysis Basic - ( 2022 07:54 )    Color: Yellow / Appearance: Cloudy / S.025 / pH: x  Gluc: x / Ketone: NEGATIVE  / Bili: Negative / Urobili: 0.2 E.U./dL   Blood: x / Protein: 30 mg/dL / Nitrite: NEGATIVE   Leuk Esterase: Trace / RBC: Many /HPF / WBC < 5 /HPF   Sq Epi: x / Non Sq Epi: 0-5 /HPF / Bacteria: None /HPF      ABG - ( 2022 21:33 )  pH, Arterial: 7.28  pH, Blood: x     /  pCO2: 31    /  pO2: 146   / HCO3: 15    / Base Excess: -10.9 /  SaO2: 96.8                  Urinalysis with Rflx Culture (collected 2022 07:54)    Culture - Blood (collected 2022 17:51)  Source: .Blood Blood  Preliminary Report (2022 18:00):    No growth at 1 day.    Culture - Blood (collected 2022 17:51)  Source: .Blood Blood  Preliminary Report (2022 18:00):    No growth at 1 day.    Culture - Sputum (collected 2022 14:23)  Source: .Sputum ET Tube  Gram Stain (2022 21:05):    No epithelial cells seen    Moderate White blood cells    No organisms seen  Preliminary Report (2022 14:08):    Numerous Serratia marcescens    Moderate to Numerous Pseudomonas aeruginosa    Susceptibility to follow.  Organism: Serratia marcescens (2022 09:24)  Organism: Serratia marcescens (2022 09:24)    VITALS:   T(F): 96.4  HR: 77  BP: 141/65  RR: 24  SpO2: 100%      PHYSICAL EXAM:  Gen: intubated, sedated, restrained  HEENT: Normocephalic, atraumatic, NGT,   Neck: supple, no lymphadenopathy, L IJ TLC  CV: Regular rate & regular rhythm  Lungs: decreased BS at bases, BL crackles/rales  Abdomen: Soft, pendulous, BS present  Ext: Warm, well perfused, upper extremities significant edema; lower extremities edema limited to BL feet/ankles  Neuro: sedated; w/d to painful stimuli   Skin: no rash, no erythema

## 2022-11-21 NOTE — PROGRESS NOTE ADULT - SUBJECTIVE AND OBJECTIVE BOX
**Incomplete Note**   CC: Patient is a 82y old  Male who presents with a chief complaint of LE edema and cough (2022 09:30)      INTERVAL EVENTS: MAGGIE    SUBJECTIVE / INTERVAL HPI: Patient seen and examined at bedside.     ROS: negative unless otherwise stated above.    VITAL SIGNS:  Vital Signs Last 24 Hrs  T(C): 35.8 (:00), Max: 37.1 (2022 01:04)  T(F): 96.4 (:00), Max: 98.8 (2022 01:04)  HR: 65 (:) (49 - 75)  BP: 141/65 (2022 17:50) (116/56 - 145/69)  BP(mean): 93 (2022 17:50) (80 - 99)  RR: 15 (:) (14 - 26)  SpO2: 99% (:) (94% - 100%)    Parameters below as of 2022 13:00  Patient On (Oxygen Delivery Method): ventilator    O2 Concentration (%): 40      22 @ 07:01  -  22 @ 07:00  --------------------------------------------------------  IN: 404.3 mL / OUT: 662 mL / NET: -257.7 mL    22 @ 07:01  -  22 @ 13:53  --------------------------------------------------------  IN: 61.4 mL / OUT: 410 mL / NET: -348.6 mL        PHYSICAL EXAM:    General: NAD  HEENT: MMM  Neck: supple  Cardiovascular: +S1/S2; RRR  Respiratory: CTA B/L; no W/R/R  Gastrointestinal: soft, NT/ND  Extremities: WWP; no edema, clubbing or cyanosis  Vascular: 2+ radial, DP/PT pulses B/L  Neurological: AAOx3; no focal deficits    MEDICATIONS:  MEDICATIONS  (STANDING):  atorvastatin 40 milliGRAM(s) Oral at bedtime  cefepime   IVPB 2000 milliGRAM(s) IV Intermittent every 24 hours  chlorhexidine 2% Cloths 1 Application(s) Topical <User Schedule>  dextrose 5%. 1000 milliLiter(s) (100 mL/Hr) IV Continuous <Continuous>  dextrose 5%. 1000 milliLiter(s) (50 mL/Hr) IV Continuous <Continuous>  dextrose 50% Injectable 25 Gram(s) IV Push once  dextrose 50% Injectable 12.5 Gram(s) IV Push once  dextrose 50% Injectable 25 Gram(s) IV Push once  glucagon  Injectable 1 milliGRAM(s) IntraMuscular once  heparin   Injectable 5000 Unit(s) SubCutaneous every 8 hours  hydrocortisone sodium succinate Injectable 50 milliGRAM(s) IV Push every 6 hours  influenza  Vaccine (HIGH DOSE) 0.7 milliLiter(s) IntraMuscular once  insulin lispro (ADMELOG) corrective regimen sliding scale   SubCutaneous every 6 hours  lactated ringers. 500 milliLiter(s) (500 mL/Hr) IV Continuous <Continuous>  levothyroxine Injectable 25 MICROGram(s) IV Push at bedtime  norepinephrine Infusion 0.05 MICROgram(s)/kG/Min (9.36 mL/Hr) IV Continuous <Continuous>  propofol Infusion 10 MICROgram(s)/kG/Min (4.94 mL/Hr) IV Continuous <Continuous>  tamsulosin 0.4 milliGRAM(s) Oral at bedtime    MEDICATIONS  (PRN):  dextrose Oral Gel 15 Gram(s) Oral once PRN Blood Glucose LESS THAN 70 milliGRAM(s)/deciliter  sodium chloride 0.9% lock flush 10 milliLiter(s) IV Push every 1 hour PRN Pre/post blood products, medications, blood draw, and to maintain line patency      ALLERGIES:  Allergies    No Known Allergies    Intolerances        LABS:                        7.8    9.16  )-----------( 119      ( 2022 12:40 )             24.8     11-21    140  |  109<H>  |  64<H>  ----------------------------<  165<H>  3.6   |  15<L>  |  3.15<H>    Ca    8.1<L>      2022 04:50  Phos  5.2     11-  Mg     1.6     -    TPro  5.2<L>  /  Alb  2.7<L>  /  TBili  0.2  /  DBili  x   /  AST  10  /  ALT  11  /  AlkPhos  93  11-    PT/INR - ( 2022 05:58 )   PT: 12.5 sec;   INR: 1.05          PTT - ( 2022 05:58 )  PTT:37.5 sec  Urinalysis Basic - ( 2022 07:54 )    Color: Yellow / Appearance: Cloudy / S.025 / pH: x  Gluc: x / Ketone: NEGATIVE  / Bili: Negative / Urobili: 0.2 E.U./dL   Blood: x / Protein: 30 mg/dL / Nitrite: NEGATIVE   Leuk Esterase: Trace / RBC: Many /HPF / WBC < 5 /HPF   Sq Epi: x / Non Sq Epi: 0-5 /HPF / Bacteria: None /HPF      CAPILLARY BLOOD GLUCOSE      POCT Blood Glucose.: 190 mg/dL (2022 11:33)      RADIOLOGY & ADDITIONAL TESTS: Reviewed.   CC: Patient is a 82y old  Male who presents with a chief complaint of LE edema and cough (2022 09:30)      INTERVAL EVENTS: MAGGIE.     SUBJECTIVE / INTERVAL HPI: Patient seen and examined at bedside.     ROS: negative unless otherwise stated above.    VITAL SIGNS:  Vital Signs Last 24 Hrs  T(C): 35.8 (2022 13:00), Max: 37.1 (2022 01:04)  T(F): 96.4 (:00), Max: 98.8 (2022 01:04)  HR: 65 (:00) (49 - 75)  BP: 141/65 (2022 17:50) (116/56 - 145/69)  BP(mean): 93 (2022 17:50) (80 - 99)  RR: 15 (:) (14 - 26)  SpO2: 99% (:) (94% - 100%)    Parameters below as of 2022 13:00  Patient On (Oxygen Delivery Method): ventilator    O2 Concentration (%): 40      22 @ 07:01  -  22 @ 07:00  --------------------------------------------------------  IN: 404.3 mL / OUT: 662 mL / NET: -257.7 mL    22 @ 07:01  -  22 @ 13:53  --------------------------------------------------------  IN: 61.4 mL / OUT: 410 mL / NET: -348.6 mL        PHYSICAL EXAM:    General: NAD  HEENT: MMM  Neck: supple  Cardiovascular: +S1/S2; RRR  Respiratory: CTA B/L; no W/R/R  Gastrointestinal: soft, NT/ND  Extremities: WWP; no edema, clubbing or cyanosis  Vascular: 2+ radial, DP/PT pulses B/L  Neurological: AAOx3; no focal deficits    MEDICATIONS:  MEDICATIONS  (STANDING):  atorvastatin 40 milliGRAM(s) Oral at bedtime  cefepime   IVPB 2000 milliGRAM(s) IV Intermittent every 24 hours  chlorhexidine 2% Cloths 1 Application(s) Topical <User Schedule>  dextrose 5%. 1000 milliLiter(s) (100 mL/Hr) IV Continuous <Continuous>  dextrose 5%. 1000 milliLiter(s) (50 mL/Hr) IV Continuous <Continuous>  dextrose 50% Injectable 25 Gram(s) IV Push once  dextrose 50% Injectable 12.5 Gram(s) IV Push once  dextrose 50% Injectable 25 Gram(s) IV Push once  glucagon  Injectable 1 milliGRAM(s) IntraMuscular once  heparin   Injectable 5000 Unit(s) SubCutaneous every 8 hours  hydrocortisone sodium succinate Injectable 50 milliGRAM(s) IV Push every 6 hours  influenza  Vaccine (HIGH DOSE) 0.7 milliLiter(s) IntraMuscular once  insulin lispro (ADMELOG) corrective regimen sliding scale   SubCutaneous every 6 hours  lactated ringers. 500 milliLiter(s) (500 mL/Hr) IV Continuous <Continuous>  levothyroxine Injectable 25 MICROGram(s) IV Push at bedtime  norepinephrine Infusion 0.05 MICROgram(s)/kG/Min (9.36 mL/Hr) IV Continuous <Continuous>  propofol Infusion 10 MICROgram(s)/kG/Min (4.94 mL/Hr) IV Continuous <Continuous>  tamsulosin 0.4 milliGRAM(s) Oral at bedtime    MEDICATIONS  (PRN):  dextrose Oral Gel 15 Gram(s) Oral once PRN Blood Glucose LESS THAN 70 milliGRAM(s)/deciliter  sodium chloride 0.9% lock flush 10 milliLiter(s) IV Push every 1 hour PRN Pre/post blood products, medications, blood draw, and to maintain line patency      ALLERGIES:  Allergies    No Known Allergies    Intolerances        LABS:                        7.8    9.16  )-----------( 119      ( 2022 12:40 )             24.8     11-21    140  |  109<H>  |  64<H>  ----------------------------<  165<H>  3.6   |  15<L>  |  3.15<H>    Ca    8.1<L>      2022 04:50  Phos  5.2     -  Mg     1.6     -    TPro  5.2<L>  /  Alb  2.7<L>  /  TBili  0.2  /  DBili  x   /  AST  10  /  ALT  11  /  AlkPhos  93  -    PT/INR - ( 2022 05:58 )   PT: 12.5 sec;   INR: 1.05          PTT - ( 2022 05:58 )  PTT:37.5 sec  Urinalysis Basic - ( 2022 07:54 )    Color: Yellow / Appearance: Cloudy / S.025 / pH: x  Gluc: x / Ketone: NEGATIVE  / Bili: Negative / Urobili: 0.2 E.U./dL   Blood: x / Protein: 30 mg/dL / Nitrite: NEGATIVE   Leuk Esterase: Trace / RBC: Many /HPF / WBC < 5 /HPF   Sq Epi: x / Non Sq Epi: 0-5 /HPF / Bacteria: None /HPF      CAPILLARY BLOOD GLUCOSE      POCT Blood Glucose.: 190 mg/dL (2022 11:33)      RADIOLOGY & ADDITIONAL TESTS: Reviewed.   CC: Patient is a 82y old  Male who presents with a chief complaint of LE edema and cough (2022 09:30)      INTERVAL EVENTS: Tacrolimus level 3.2 on  PM. Hgb 6.9 this AM.    SUBJECTIVE / INTERVAL HPI: Patient seen and examined at bedside. Patient intubated limiting ROS.    ROS: negative unless otherwise stated above.    VITAL SIGNS:  Vital Signs Last 24 Hrs  T(C): 35.8 (:00), Max: 37.1 (2022 01:04)  T(F): 96.4 (2022 13:00), Max: 98.8 (2022 01:04)  HR: 65 (:00) (49 - 75)  BP: 141/65 (2022 17:50) (116/56 - 145/69)  BP(mean): 93 (2022 17:50) (80 - 99)  RR: 15 (:00) (14 - 26)  SpO2: 99% (:00) (94% - 100%)    Parameters below as of 2022 13:00  Patient On (Oxygen Delivery Method): ventilator    O2 Concentration (%): 40      22 @ 07:01  -  22 @ 07:00  --------------------------------------------------------  IN: 404.3 mL / OUT: 662 mL / NET: -257.7 mL    22 @ 07:01  -  22 @ 13:53  --------------------------------------------------------  IN: 61.4 mL / OUT: 410 mL / NET: -348.6 mL        PHYSICAL EXAM:  General: NAD, lying in   HEENT: MMM  Neck: supple  Cardiovascular: +S1/S2; RRR  Respiratory: CTA B/L; no W/R/R  Gastrointestinal: soft, NT/ND  Extremities: WWP; no edema, clubbing or cyanosis  Vascular: 2+ radial, DP/PT pulses B/L  Neurological: AAOx3; no focal deficits    MEDICATIONS:  MEDICATIONS  (STANDING):  atorvastatin 40 milliGRAM(s) Oral at bedtime  cefepime   IVPB 2000 milliGRAM(s) IV Intermittent every 24 hours  chlorhexidine 2% Cloths 1 Application(s) Topical <User Schedule>  dextrose 5%. 1000 milliLiter(s) (100 mL/Hr) IV Continuous <Continuous>  dextrose 5%. 1000 milliLiter(s) (50 mL/Hr) IV Continuous <Continuous>  dextrose 50% Injectable 25 Gram(s) IV Push once  dextrose 50% Injectable 12.5 Gram(s) IV Push once  dextrose 50% Injectable 25 Gram(s) IV Push once  glucagon  Injectable 1 milliGRAM(s) IntraMuscular once  heparin   Injectable 5000 Unit(s) SubCutaneous every 8 hours  hydrocortisone sodium succinate Injectable 50 milliGRAM(s) IV Push every 6 hours  influenza  Vaccine (HIGH DOSE) 0.7 milliLiter(s) IntraMuscular once  insulin lispro (ADMELOG) corrective regimen sliding scale   SubCutaneous every 6 hours  lactated ringers. 500 milliLiter(s) (500 mL/Hr) IV Continuous <Continuous>  levothyroxine Injectable 25 MICROGram(s) IV Push at bedtime  norepinephrine Infusion 0.05 MICROgram(s)/kG/Min (9.36 mL/Hr) IV Continuous <Continuous>  propofol Infusion 10 MICROgram(s)/kG/Min (4.94 mL/Hr) IV Continuous <Continuous>  tamsulosin 0.4 milliGRAM(s) Oral at bedtime    MEDICATIONS  (PRN):  dextrose Oral Gel 15 Gram(s) Oral once PRN Blood Glucose LESS THAN 70 milliGRAM(s)/deciliter  sodium chloride 0.9% lock flush 10 milliLiter(s) IV Push every 1 hour PRN Pre/post blood products, medications, blood draw, and to maintain line patency      ALLERGIES:  Allergies    No Known Allergies    Intolerances        LABS:                        7.8    9.16  )-----------( 119      ( 2022 12:40 )             24.8     11-21    140  |  109<H>  |  64<H>  ----------------------------<  165<H>  3.6   |  15<L>  |  3.15<H>    Ca    8.1<L>      2022 04:50  Phos  5.2     11-  Mg     1.6     -    TPro  5.2<L>  /  Alb  2.7<L>  /  TBili  0.2  /  DBili  x   /  AST  10  /  ALT  11  /  AlkPhos  93  11-21    PT/INR - ( 2022 05:58 )   PT: 12.5 sec;   INR: 1.05          PTT - ( 2022 05:58 )  PTT:37.5 sec  Urinalysis Basic - ( 2022 07:54 )    Color: Yellow / Appearance: Cloudy / S.025 / pH: x  Gluc: x / Ketone: NEGATIVE  / Bili: Negative / Urobili: 0.2 E.U./dL   Blood: x / Protein: 30 mg/dL / Nitrite: NEGATIVE   Leuk Esterase: Trace / RBC: Many /HPF / WBC < 5 /HPF   Sq Epi: x / Non Sq Epi: 0-5 /HPF / Bacteria: None /HPF      CAPILLARY BLOOD GLUCOSE      POCT Blood Glucose.: 190 mg/dL (2022 11:33)      RADIOLOGY & ADDITIONAL TESTS: Reviewed.   CC: Patient is a 82y old  Male who presents with a chief complaint of LE edema and cough (2022 09:30)      INTERVAL EVENTS: Tacrolimus level 3.2 on  PM. Hgb 6.9 this AM.    SUBJECTIVE / INTERVAL HPI: Patient seen and examined at bedside. Patient intubated limiting ROS.    ROS: negative unless otherwise stated above.    VITAL SIGNS:  Vital Signs Last 24 Hrs  T(C): 35.8 (:00), Max: 37.1 (2022 01:04)  T(F): 96.4 (2022 13:00), Max: 98.8 (2022 01:04)  HR: 65 (:00) (49 - 75)  BP: 141/65 (2022 17:50) (116/56 - 145/69)  BP(mean): 93 (2022 17:50) (80 - 99)  RR: 15 (:00) (14 - 26)  SpO2: 99% (:00) (94% - 100%)    Parameters below as of 2022 13:00  Patient On (Oxygen Delivery Method): ventilator    O2 Concentration (%): 40      22 @ 07:01  -  22 @ 07:00  --------------------------------------------------------  IN: 404.3 mL / OUT: 662 mL / NET: -257.7 mL    22 @ 07:01  -  22 @ 13:53  --------------------------------------------------------  IN: 61.4 mL / OUT: 410 mL / NET: -348.6 mL        PHYSICAL EXAM:  General: NAD, lying in bed  HEENT: MMM  Neck: supple  Cardiovascular: +S1/S2; RRR  Respiratory: CTA B/L; no W/R/R  Gastrointestinal: soft, NT/ND  Extremities: WWP; 1+ pitting edema around lower extremities, clubbing or cyanosis  Vascular: 2+ radial, DP/PT pulses B/L  Neurological: AAOx3; no focal deficits    MEDICATIONS:  MEDICATIONS  (STANDING):  atorvastatin 40 milliGRAM(s) Oral at bedtime  cefepime   IVPB 2000 milliGRAM(s) IV Intermittent every 24 hours  chlorhexidine 2% Cloths 1 Application(s) Topical <User Schedule>  dextrose 5%. 1000 milliLiter(s) (100 mL/Hr) IV Continuous <Continuous>  dextrose 5%. 1000 milliLiter(s) (50 mL/Hr) IV Continuous <Continuous>  dextrose 50% Injectable 25 Gram(s) IV Push once  dextrose 50% Injectable 12.5 Gram(s) IV Push once  dextrose 50% Injectable 25 Gram(s) IV Push once  glucagon  Injectable 1 milliGRAM(s) IntraMuscular once  heparin   Injectable 5000 Unit(s) SubCutaneous every 8 hours  hydrocortisone sodium succinate Injectable 50 milliGRAM(s) IV Push every 6 hours  influenza  Vaccine (HIGH DOSE) 0.7 milliLiter(s) IntraMuscular once  insulin lispro (ADMELOG) corrective regimen sliding scale   SubCutaneous every 6 hours  lactated ringers. 500 milliLiter(s) (500 mL/Hr) IV Continuous <Continuous>  levothyroxine Injectable 25 MICROGram(s) IV Push at bedtime  norepinephrine Infusion 0.05 MICROgram(s)/kG/Min (9.36 mL/Hr) IV Continuous <Continuous>  propofol Infusion 10 MICROgram(s)/kG/Min (4.94 mL/Hr) IV Continuous <Continuous>  tamsulosin 0.4 milliGRAM(s) Oral at bedtime    MEDICATIONS  (PRN):  dextrose Oral Gel 15 Gram(s) Oral once PRN Blood Glucose LESS THAN 70 milliGRAM(s)/deciliter  sodium chloride 0.9% lock flush 10 milliLiter(s) IV Push every 1 hour PRN Pre/post blood products, medications, blood draw, and to maintain line patency      ALLERGIES:  Allergies    No Known Allergies    Intolerances        LABS:                        7.8    9.16  )-----------( 119      ( 2022 12:40 )             24.8     11-21    140  |  109<H>  |  64<H>  ----------------------------<  165<H>  3.6   |  15<L>  |  3.15<H>    Ca    8.1<L>      2022 04:50  Phos  5.2     -  Mg     1.6         TPro  5.2<L>  /  Alb  2.7<L>  /  TBili  0.2  /  DBili  x   /  AST  10  /  ALT  11  /  AlkPhos  93  -    PT/INR - ( 2022 05:58 )   PT: 12.5 sec;   INR: 1.05          PTT - ( 2022 05:58 )  PTT:37.5 sec  Urinalysis Basic - ( 2022 07:54 )    Color: Yellow / Appearance: Cloudy / S.025 / pH: x  Gluc: x / Ketone: NEGATIVE  / Bili: Negative / Urobili: 0.2 E.U./dL   Blood: x / Protein: 30 mg/dL / Nitrite: NEGATIVE   Leuk Esterase: Trace / RBC: Many /HPF / WBC < 5 /HPF   Sq Epi: x / Non Sq Epi: 0-5 /HPF / Bacteria: None /HPF      CAPILLARY BLOOD GLUCOSE      POCT Blood Glucose.: 190 mg/dL (2022 11:33)      RADIOLOGY & ADDITIONAL TESTS: Reviewed.   CC: Patient is a 82y old  Male who presents with a chief complaint of LE edema and cough (2022 09:30)      INTERVAL EVENTS: Tacrolimus level 3.2 on  PM. Hgb 6.9 this AM.    SUBJECTIVE / INTERVAL HPI: Patient seen and examined at bedside. Patient intubated limiting ROS.    ROS: negative unless otherwise stated above.    VITAL SIGNS:  Vital Signs Last 24 Hrs  T(C): 35.8 (:00), Max: 37.1 (2022 01:04)  T(F): 96.4 (2022 13:00), Max: 98.8 (2022 01:04)  HR: 65 (:00) (49 - 75)  BP: 141/65 (2022 17:50) (116/56 - 145/69)  BP(mean): 93 (2022 17:50) (80 - 99)  RR: 15 (:00) (14 - 26)  SpO2: 99% (:00) (94% - 100%)    Parameters below as of 2022 13:00  Patient On (Oxygen Delivery Method): ventilator    O2 Concentration (%): 40      22 @ 07:  -  22 @ 07:00  --------------------------------------------------------  IN: 404.3 mL / OUT: 662 mL / NET: -257.7 mL    22 @ 07:01  -  22 @ 13:53  --------------------------------------------------------  IN: 61.4 mL / OUT: 410 mL / NET: -348.6 mL        PHYSICAL EXAM:  General: NAD, lying in bed  HEENT: MMM, R pupil 5 (fixed), L pupil 3 (sluggish to light)  Neck: supple  Cardiovascular: +S1/S2; RRR  Respiratory: L-sided inspiratory crackles, R-side diminished lung sounds, no increased WOB.   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 1000 milliLiter(s) (100 mL/Hr) IV Continuous <Continuous>  dextrose 5%. 1000 milliLiter(s) (50 mL/Hr) IV Continuous <Continuous>  dextrose 50% Injectable 25 Gram(s) IV Push once  dextrose 50% Injectable 12.5 Gram(s) IV Push once  dextrose 50% Injectable 25 Gram(s) IV Push once  glucagon  Injectable 1 milliGRAM(s) IntraMuscular once  heparin   Injectable 5000 Unit(s) SubCutaneous every 8 hours  hydrocortisone sodium succinate Injectable 50 milliGRAM(s) IV Push every 6 hours  influenza  Vaccine (HIGH DOSE) 0.7 milliLiter(s) IntraMuscular once  insulin lispro (ADMELOG) corrective regimen sliding scale   SubCutaneous every 6 hours  lactated ringers. 500 milliLiter(s) (500 mL/Hr) IV Continuous <Continuous>  levothyroxine Injectable 25 MICROGram(s) IV Push at bedtime  norepinephrine Infusion 0.05 MICROgram(s)/kG/Min (9.36 mL/Hr) IV Continuous <Continuous>  propofol Infusion 10 MICROgram(s)/kG/Min (4.94 mL/Hr) IV Continuous <Continuous>  tamsulosin 0.4 milliGRAM(s) Oral at bedtime    MEDICATIONS  (PRN):  dextrose Oral Gel 15 Gram(s) Oral once PRN Blood Glucose LESS THAN 70 milliGRAM(s)/deciliter  sodium chloride 0.9% lock flush 10 milliLiter(s) IV Push every 1 hour PRN Pre/post blood products, medications, blood draw, and to maintain line patency      ALLERGIES:  Allergies    No Known Allergies    Intolerances        LABS:                        7.8    9.16  )-----------( 119      ( 2022 12:40 )             24.8         140  |  109<H>  |  64<H>  ----------------------------<  165<H>  3.6   |  15<L>  |  3.15<H>    Ca    8.1<L>      2022 04:50  Phos  5.2       Mg     1.6         TPro  5.2<L>  /  Alb  2.7<L>  /  TBili  0.2  /  DBili  x   /  AST  10  /  ALT  11  /  AlkPhos  93  -    PT/INR - ( 2022 05:58 )   PT: 12.5 sec;   INR: 1.05          PTT - ( 2022 05:58 )  PTT:37.5 sec  Urinalysis Basic - ( 2022 07:54 )    Color: Yellow / Appearance: Cloudy / S.025 / pH: x  Gluc: x / Ketone: NEGATIVE  / Bili: Negative / Urobili: 0.2 E.U./dL   Blood: x / Protein: 30 mg/dL / Nitrite: NEGATIVE   Leuk Esterase: Trace / RBC: Many /HPF / WBC < 5 /HPF   Sq Epi: x / Non Sq Epi: 0-5 /HPF / Bacteria: None /HPF      CAPILLARY BLOOD GLUCOSE      POCT Blood Glucose.: 190 mg/dL (2022 11:33)      RADIOLOGY & ADDITIONAL TESTS: Reviewed.   CC: Patient is a 82y old  Male who presents with a chief complaint of LE edema and cough (2022 09:30)      INTERVAL EVENTS: Tacrolimus level 3.2 on  PM. Hgb 6.9 this AM.    SUBJECTIVE / INTERVAL HPI: Patient seen and examined at bedside. Patient intubated, limiting ROS.    ROS: negative unless otherwise stated above.    VITAL SIGNS:  Vital Signs Last 24 Hrs  T(C): 35.8 (:00), Max: 37.1 (2022 01:04)  T(F): 96.4 (:00), Max: 98.8 (2022 01:04)  HR: 65 (:00) (49 - 75)  BP: 141/65 (2022 17:50) (116/56 - 145/69)  BP(mean): 93 (2022 17:50) (80 - 99)  RR: 15 (:00) (14 - 26)  SpO2: 99% (:00) (94% - 100%)    Parameters below as of 2022 13:00  Patient On (Oxygen Delivery Method): ventilator    O2 Concentration (%): 40      22 @ 07:01  -  22 @ 07:00  --------------------------------------------------------  IN: 404.3 mL / OUT: 662 mL / NET: -257.7 mL    22 @ 07:01  -  22 @ 13:53  --------------------------------------------------------  IN: 61.4 mL / OUT: 410 mL / NET: -348.6 mL        PHYSICAL EXAM:  General: NAD, lying in bed  HEENT: MMM, R pupil 5 (fixed), L pupil 3 (sluggish to light)  Neck: supple  Cardiovascular: +S1/S2; RRR  Respiratory: L-sided inspiratory crackles, R-side diminished lung sounds, no increased WOB.   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 1000 milliLiter(s) (100 mL/Hr) IV Continuous <Continuous>  dextrose 5%. 1000 milliLiter(s) (50 mL/Hr) IV Continuous <Continuous>  dextrose 50% Injectable 25 Gram(s) IV Push once  dextrose 50% Injectable 12.5 Gram(s) IV Push once  dextrose 50% Injectable 25 Gram(s) IV Push once  glucagon  Injectable 1 milliGRAM(s) IntraMuscular once  heparin   Injectable 5000 Unit(s) SubCutaneous every 8 hours  hydrocortisone sodium succinate Injectable 50 milliGRAM(s) IV Push every 6 hours  influenza  Vaccine (HIGH DOSE) 0.7 milliLiter(s) IntraMuscular once  insulin lispro (ADMELOG) corrective regimen sliding scale   SubCutaneous every 6 hours  lactated ringers. 500 milliLiter(s) (500 mL/Hr) IV Continuous <Continuous>  levothyroxine Injectable 25 MICROGram(s) IV Push at bedtime  norepinephrine Infusion 0.05 MICROgram(s)/kG/Min (9.36 mL/Hr) IV Continuous <Continuous>  propofol Infusion 10 MICROgram(s)/kG/Min (4.94 mL/Hr) IV Continuous <Continuous>  tamsulosin 0.4 milliGRAM(s) Oral at bedtime    MEDICATIONS  (PRN):  dextrose Oral Gel 15 Gram(s) Oral once PRN Blood Glucose LESS THAN 70 milliGRAM(s)/deciliter  sodium chloride 0.9% lock flush 10 milliLiter(s) IV Push every 1 hour PRN Pre/post blood products, medications, blood draw, and to maintain line patency      ALLERGIES:  Allergies    No Known Allergies    Intolerances        LABS:                        7.8    9.16  )-----------( 119      ( 2022 12:40 )             24.8         140  |  109<H>  |  64<H>  ----------------------------<  165<H>  3.6   |  15<L>  |  3.15<H>    Ca    8.1<L>      2022 04:50  Phos  5.2     -  Mg     1.6         TPro  5.2<L>  /  Alb  2.7<L>  /  TBili  0.2  /  DBili  x   /  AST  10  /  ALT  11  /  AlkPhos  93  -    PT/INR - ( 2022 05:58 )   PT: 12.5 sec;   INR: 1.05          PTT - ( 2022 05:58 )  PTT:37.5 sec  Urinalysis Basic - ( 2022 07:54 )    Color: Yellow / Appearance: Cloudy / S.025 / pH: x  Gluc: x / Ketone: NEGATIVE  / Bili: Negative / Urobili: 0.2 E.U./dL   Blood: x / Protein: 30 mg/dL / Nitrite: NEGATIVE   Leuk Esterase: Trace / RBC: Many /HPF / WBC < 5 /HPF   Sq Epi: x / Non Sq Epi: 0-5 /HPF / Bacteria: None /HPF      CAPILLARY BLOOD GLUCOSE      POCT Blood Glucose.: 190 mg/dL (2022 11:33)      RADIOLOGY & ADDITIONAL TESTS: Reviewed.

## 2022-11-21 NOTE — PROGRESS NOTE ADULT - ATTENDING COMMENTS
h/o renal transplant with CKD 4, CAD, HFpEF admitted with LE edema for diuresis and now s/p hypoglycemia with hypotension likely septic shock in setting of [place] pna, hypoglycemia, renal failure  physical as above  edema decreased in LEs, increased in upper so to check UE dopplers  continue cefepime for Serr[place]ia pna  continue NE to keep MAP over 65  sedation vacation with possible CPAP trial  bolus liter of RL as looks dry now with lower UO  head CT for anisocoria  rest as above

## 2022-11-21 NOTE — PROGRESS NOTE ADULT - SUBJECTIVE AND OBJECTIVE BOX
NEPHROLOGY SERVICE INITIAL CONSULT NOTE    HPI:  HPI: 84 y/o M PMH CKD 4, renal transplant in  at [facility], glaucoma (blind), DM1, anemia, CAD s/p 2 DAMEON to LAD in , BPH, recent admission for PNA presents with acute on chronic cough and SUNNY. Mr. Miguel went to see his nephrologist Dr. Menendez today and was sent to ED for SUNNY. He has had a cough for several months that was initially more productive and he was treated w abx and steroids here in October. His cough is less productive but still perists and has worsened over the past few days associated w lethargy, HUTCHINSON, and worsening leg swelling. No fevers, chills, congestion, sore throat, N/V, diarrhea, constipation. He has had very light stools recently, no melena or hematochezia. Pt is urinating normally.    In the ED:  Initial vital signs: T: 98.3 F, HR: 59, BP: 176/91-->107/65, R: 18, SpO2: 98% on RA  ED course:   Labs: significant for hgb 7.3, plt 130, bicarb 14, BUN 62, Cr 2.42 (at baseline), venous pH 7.17, lactate 0.5, AG 14, UA negative for pyuria, trop 0.03, CKMB 3.8 (negative), BNP 8909  Imaging:  CT: b/l pulmonary opacifications and increased b/l pulmonary effusions   EKG:   Medications: lasix 20mg IV, zosyn 3.375g  Consults: cardiology, nephrology    Cardiology consulted for admission to [facility] and after discussion decided on medicine admission in setting of mixed picture. Given lasix 20mg.   (2022 23:35)      ADDITIONAL NEPHROLOGY HPI:    REVIEW OF SYSTEMS:   Otherwise negative except as specified in HPI    PAST MEDICAL AND SURGICAL HISTORY:   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      History of renal transplant  secondary to DM      Chronic kidney disease (CKD)      Glaucoma      Kidney transplant recipient      Amputation of toe  x 3      H/O heart artery stent          FAMILY HISTORY:  FAMILY HISTORY:      Otherwise Noncontributory to current admission    SOCIAL HISTORY:  Tobacco use: Denies  EtOH use: Denies  Illicit drug use: Denies    HOME MEDICATIONS:      ACTIVE MEDICATIONS:  MEDICATIONS  (STANDING):  cefepime   IVPB 2000 milliGRAM(s) IV Intermittent every 24 hours  chlorhexidine 2% Cloths 1 Application(s) Topical <User Schedule>  dextrose 5%. 1000 milliLiter(s) (100 mL/Hr) IV Continuous <Continuous>  dextrose 5%. 1000 milliLiter(s) (50 mL/Hr) IV Continuous <Continuous>  dextrose 50% Injectable 25 Gram(s) IV Push once  dextrose 50% Injectable 12.5 Gram(s) IV Push once  dextrose 50% Injectable 25 Gram(s) IV Push once  furosemide   Injectable 40 milliGRAM(s) IV Push every 12 hours  glucagon  Injectable 1 milliGRAM(s) IntraMuscular once  heparin   Injectable 5000 Unit(s) SubCutaneous every 8 hours  hydrocortisone sodium succinate Injectable 50 milliGRAM(s) IV Push every 6 hours  influenza  Vaccine (HIGH DOSE) 0.7 milliLiter(s) IntraMuscular once  insulin lispro (ADMELOG) corrective regimen sliding scale   SubCutaneous every 6 hours  levothyroxine Injectable 25 MICROGram(s) IV Push at bedtime  mycophenolate mofetil IVPB 500 milliGRAM(s) IV Intermittent every 12 hours  norepinephrine Infusion 0.05 MICROgram(s)/kG/Min (9.36 mL/Hr) IV Continuous <Continuous>  propofol Infusion 10 MICROgram(s)/kG/Min (4.94 mL/Hr) IV Continuous <Continuous>  tacrolimus    0.5 mG/mL Suspension 2 milliGRAM(s) Oral every 12 hours  tamsulosin 0.4 milliGRAM(s) Oral at bedtime    MEDICATIONS  (PRN):  dextrose Oral Gel 15 Gram(s) Oral once PRN Blood Glucose LESS THAN 70 milliGRAM(s)/deciliter  sodium chloride 0.9% lock flush 10 milliLiter(s) IV Push every 1 hour PRN Pre/post blood products, medications, blood draw, and to maintain line patency      ALLERGIES:  Allergies    No Known Allergies    Intolerances        VITAL SIGNS:  Vital Signs Last 24 Hrs  T(C): 36.8 (2022 06:04), Max: 37.1 (2022 01:04)  T(F): 98.2 (2022 06:04), Max: 98.8 (2022 01:04)  HR: 68 (2022 09:00) (49 - 75)  BP: 141/65 (2022 17:50) (94/44 - 202/79)  BP(mean): 93 (2022 17:50) (64 - 132)  RR: 18 (2022 09:00) (14 - 23)  SpO2: 100% (2022 09:00) (94% - 100%)    Parameters below as of 2022 09:00  Patient On (Oxygen Delivery Method): ventilator    O2 Concentration (%): 40    22 @ 07:01  -  22 @ 07:00  --------------------------------------------------------  IN:    IV PiggyBack: 100 mL    Norepinephrine: 104.6 mL    Propofol: 123.3 mL    Propofol: 76.4 mL  Total IN: 404.3 mL    OUT:    Indwelling Catheter - Urethral (mL): 662 mL  Total OUT: 662 mL    Total NET: -257.7 mL      22 @ 07:01  -  22 @ 09:30  --------------------------------------------------------  IN:    Norepinephrine: 6 mL    Propofol: 19.8 mL  Total IN: 25.8 mL    OUT:    Indwelling Catheter - Urethral (mL): 125 mL  Total OUT: 125 mL    Total NET: -99.2 mL          PHYSICAL EXAM:  Constitutional: WDWN resting comfortably in bed; NAD  Head: NC/AT  Eyes: PERRL, EOMI, anicteric sclera  ENT: no nasal discharge; uvula midline, no oropharyngeal erythema or exudates; MMM  Neck: supple; no JVD or thyromegaly  Respiratory: CTA B/L; no W/R/R, no retractions  Cardiac: +S1/S2; RRR; no M/R/G; PMI non-displaced  Gastrointestinal: abdomen soft, NT/ND; no rebound or guarding; +BSx4  Genitourinary: normal external genitalia  Back: spine midline, no bony tenderness or step-offs; no CVAT B/L  Extremities: WWP, no clubbing or cyanosis; no peripheral edema  Musculoskeletal: NROM x4; no joint swelling, tenderness or erythema  Vascular: 2+ radial, femoral, DP/PT pulses B/L  Dermatologic: skin warm, dry and intact; no rashes, wounds, or scars  Lymphatic: no submandibular or cervical LAD  Neurologic: AAOx3; CNII-XII grossly intact; no focal deficits  Psychiatric: affect and characteristics of appearance, verbalizations, behaviors are appropriate  Access:    LABS:                        6.9    10.26 )-----------( 130      ( 2022 06:21 )             22.0     11-    140  |  109<H>  |  64<H>  ----------------------------<  165<H>  3.6   |  15<L>  |  3.15<H>    Ca    8.1<L>      2022 04:50  Phos  5.2     -  Mg     1.6         TPro  5.2<L>  /  Alb  2.7<L>  /  TBili  0.2  /  DBili  x   /  AST  10  /  ALT  11  /  AlkPhos  93  11-21    PT/INR - ( 2022 05:58 )   PT: 12.5 sec;   INR: 1.05          PTT - ( 2022 05:58 )  PTT:37.5 sec  Urinalysis Basic - ( 2022 07:54 )    Color: Yellow / Appearance: Cloudy / S.025 / pH: x  Gluc: x / Ketone: NEGATIVE  / Bili: Negative / Urobili: 0.2 E.U./dL   Blood: x / Protein: 30 mg/dL / Nitrite: NEGATIVE   Leuk Esterase: Trace / RBC: Many /HPF / WBC < 5 /HPF   Sq Epi: x / Non Sq Epi: 0-5 /HPF / Bacteria: None /HPF          CAPILLARY BLOOD GLUCOSE      POCT Blood Glucose.: 172 mg/dL (2022 06:36)  POCT Blood Glucose.: 145 mg/dL (2022 21:50)  POCT Blood Glucose.: 143 mg/dL (2022 16:06)  POCT Blood Glucose.: 139 mg/dL (2022 16:04)  POCT Blood Glucose.: 153 mg/dL (2022 12:40)          RADIOLOGY & ADDITIONAL TESTS: Reviewed. NEPHROLOGY SERVICE INITIAL CONSULT NOTE    HPI:  Patient is a 82y Male seen and evaluated at bedside. Pt status post cardiac arrest and ROSC in , intubated, sedated on propofol and Levo/pressor. Creatinine uptrending to 3.15, K 3.6, Chl 109, Co2 15, BUN 67, UOP Net -251.1.       ADDITIONAL NEPHROLOGY HPI:  Baseline creatinine 2.42, at baseline at this admission   CKD 4, renal transplant in  at [facility]  (on Tacrolimus 4mg BID and Mycophenolate 500mg BID)    REVIEW OF SYSTEMS:   Complaint of cough and SOB     PAST MEDICAL & SURGICAL HISTORY:  HTN (hypertension)  BPH (benign prostatic hypertrophy)  DM (diabetes mellitus)  Type 1/insulin dependent per patient  History of renal transplant  secondary to DM  Chronic kidney disease (CKD)  Glaucoma  Kidney transplant recipient  Amputation of toe x 3  H/O heart artery stent      SOCIAL HISTORY:  Stopped smoking years ago per nephew at bedside         ACTIVE MEDICATIONS:  MEDICATIONS  (STANDING):  aspirin enteric coated 81 milliGRAM(s) Oral every 24 hours  carvedilol 25 milliGRAM(s) Oral every 12 hours  dextrose 5%. 1000 milliLiter(s) (100 mL/Hr) IV Continuous <Continuous>  dextrose 5%. 1000 milliLiter(s) (50 mL/Hr) IV Continuous <Continuous>  dextrose 50% Injectable 25 Gram(s) IV Push once  dextrose 50% Injectable 12.5 Gram(s) IV Push once  dextrose 50% Injectable 25 Gram(s) IV Push once  glucagon  Injectable 1 milliGRAM(s) IntraMuscular once  heparin   Injectable 5000 Unit(s) SubCutaneous every 12 hours  influenza  Vaccine (HIGH DOSE) 0.7 milliLiter(s) IntraMuscular once  insulin glargine Injectable (LANTUS) 15 Unit(s) SubCutaneous at bedtime  insulin lispro (ADMELOG) corrective regimen sliding scale   SubCutaneous Before meals and at bedtime  insulin lispro Injectable (ADMELOG) 5 Unit(s) SubCutaneous three times a day before meals  mycophenolate mofetil 500 milliGRAM(s) Oral every 12 hours  senna 2 Tablet(s) Oral at bedtime  sodium polystyrene sulfonate Suspension 15 Gram(s) Oral every 24 hours  tacrolimus 3 milliGRAM(s) Oral at bedtime  tacrolimus 5 milliGRAM(s) Oral every 24 hours  tamsulosin 0.4 milliGRAM(s) Oral at bedtime    MEDICATIONS  (PRN):  benzonatate 100 milliGRAM(s) Oral every 8 hours PRN Cough  dextrose Oral Gel 15 Gram(s) Oral once PRN Blood Glucose LESS THAN 70 milliGRAM(s)/deciliter      ALLERGIES:  Allergies  No Known Allergies      VITAL SIGNS:  Vital Signs Last 24 Hrs  T(C): 36.6 (2022 05:19), Max: 36.8 (2022 12:38)  T(F): 97.8 (2022 05:19), Max: 98.3 (2022 12:38)  HR: 56 (2022 08:55) (55 - 63)  BP: 150/64 (2022 08:55) (107/65 - 176/91)  BP(mean): --  RR: 18 (2022 08:55) (18 - 18)  SpO2: 97% (2022 08:55) (93% - 98%)    Parameters below as of 2022 08:55  Patient On (Oxygen Delivery Method): room air        22 @ 07:01  -  22 @ 07:00  --------------------------------------------------------  IN:  Total IN: 0 mL    OUT:    Intermittent Catheterization - Urethral (mL): 500 mL  Total OUT: 500 mL    Total NET: -500 mL      PHYSICAL EXAM:  Constitutional: -----------------  Neck: supple; no JVD or thyromegaly  Respiratory: Generalized b/l coarse crackles   Cardiac: +S1/S2; RRR;  Gastrointestinal: abdomen soft, NT/ND;  Genitourinary: Ward in placed   Extremities: B/l LE pitting Edema 2+   Vascular: 2+ radial, femoral, DP/PT pulses B/L  Neurologic: Intubated     LABS:                        7.4    4.34  )-----------( 118      ( 2022 05:30 )             24.4         142  |  114<H>  |  62<H>  ----------------------------<  166<H>  5.0   |  15<L>  |  2.44<H>    Ca    8.8      2022 05:30  Phos  4.8     11-  Mg     1.8     -    TPro  5.7<L>  /  Alb  3.0<L>  /  TBili  0.2  /  DBili  x   /  AST  10  /  ALT  7<L>  /  AlkPhos  92  11-17    PT/INR - ( 2022 14:09 )   PT: 12.0 sec;   INR: 1.01          PTT - ( 2022 14:09 )  PTT:37.4 sec  Urinalysis Basic - ( 2022 19:25 )    Color: Yellow / Appearance: Clear / S.020 / pH: x  Gluc: x / Ketone: NEGATIVE  / Bili: Negative / Urobili: 0.2 E.U./dL   Blood: x / Protein: NEGATIVE mg/dL / Nitrite: NEGATIVE   Leuk Esterase: NEGATIVE / RBC: x / WBC x   Sq Epi: x / Non Sq Epi: x / Bacteria: x      CARDIAC MARKERS ( 2022 14:09 )  x     / 0.03 ng/mL / 23 U/L / x     / 3.8 ng/mL      POCT Blood Glucose.: 168 mg/dL (2022 08:38      Serum Pro-Brain Natriuretic Peptide: 8909 pg/mL (22 @ 14:09)    RADIOLOGY & ADDITIONAL TESTS: Reviewed. NEPHROLOGY SERVICE INITIAL CONSULT NOTE    HPI:  Patient is a 82y Male seen and evaluated at bedside. Pt status post cardiac arrest and ROSC in , intubated, sedated on propofol and Levo/pressor. Creatinine uptrending to 3.15, K 3.6, Chl 109, Co2 15, BUN 67, UOP Net -251.1.       ADDITIONAL NEPHROLOGY HPI:  Baseline creatinine 2.42, at baseline at this admission   CKD 4, renal transplant in  at [facility]  (on Tacrolimus 4mg BID and Mycophenolate 500mg BID)    REVIEW OF SYSTEMS:   Complaint of cough and SOB     PAST MEDICAL & SURGICAL HISTORY:  HTN (hypertension)  BPH (benign prostatic hypertrophy)  DM (diabetes mellitus)  Type 1/insulin dependent per patient  History of renal transplant  secondary to DM  Chronic kidney disease (CKD)  Glaucoma  Kidney transplant recipient  Amputation of toe x 3  H/O heart artery stent      SOCIAL HISTORY:  Stopped smoking years ago per nephew at bedside         ACTIVE MEDICATIONS:  MEDICATIONS  (STANDING):  aspirin enteric coated 81 milliGRAM(s) Oral every 24 hours  carvedilol 25 milliGRAM(s) Oral every 12 hours  dextrose 5%. 1000 milliLiter(s) (100 mL/Hr) IV Continuous <Continuous>  dextrose 5%. 1000 milliLiter(s) (50 mL/Hr) IV Continuous <Continuous>  dextrose 50% Injectable 25 Gram(s) IV Push once  dextrose 50% Injectable 12.5 Gram(s) IV Push once  dextrose 50% Injectable 25 Gram(s) IV Push once  glucagon  Injectable 1 milliGRAM(s) IntraMuscular once  heparin   Injectable 5000 Unit(s) SubCutaneous every 12 hours  influenza  Vaccine (HIGH DOSE) 0.7 milliLiter(s) IntraMuscular once  insulin glargine Injectable (LANTUS) 15 Unit(s) SubCutaneous at bedtime  insulin lispro (ADMELOG) corrective regimen sliding scale   SubCutaneous Before meals and at bedtime  insulin lispro Injectable (ADMELOG) 5 Unit(s) SubCutaneous three times a day before meals  mycophenolate mofetil 500 milliGRAM(s) Oral every 12 hours  senna 2 Tablet(s) Oral at bedtime  sodium polystyrene sulfonate Suspension 15 Gram(s) Oral every 24 hours  tacrolimus 3 milliGRAM(s) Oral at bedtime  tacrolimus 5 milliGRAM(s) Oral every 24 hours  tamsulosin 0.4 milliGRAM(s) Oral at bedtime    MEDICATIONS  (PRN):  benzonatate 100 milliGRAM(s) Oral every 8 hours PRN Cough  dextrose Oral Gel 15 Gram(s) Oral once PRN Blood Glucose LESS THAN 70 milliGRAM(s)/deciliter      ALLERGIES:  Allergies  No Known Allergies      VITAL SIGNS:  Vital Signs Last 24 Hrs  T(C): 36.6 (2022 05:19), Max: 36.8 (2022 12:38)  T(F): 97.8 (2022 05:19), Max: 98.3 (2022 12:38)  HR: 56 (2022 08:55) (55 - 63)  BP: 150/64 (2022 08:55) (107/65 - 176/91)  BP(mean): --  RR: 18 (2022 08:55) (18 - 18)  SpO2: 97% (2022 08:55) (93% - 98%)    Parameters below as of 2022 08:55  Patient On (Oxygen Delivery Method): room air        22 @ 07:01  -  22 @ 07:00  --------------------------------------------------------  IN:  Total IN: 0 mL    OUT:    Intermittent Catheterization - Urethral (mL): 500 mL  Total OUT: 500 mL    Total NET: -500 mL      PHYSICAL EXAM:  Constitutional: Sedated   Respiratory: on vent   Cardiac: +S1/S2; RRR;  Gastrointestinal: NGT   Genitourinary: Ward in placed   Extremities: B/l LE pitting Edema 2+   Neurologic: Intubated     LABS:                        7.4    4.34  )-----------( 118      ( 2022 05:30 )             24.4     18    142  |  114<H>  |  62<H>  ----------------------------<  166<H>  5.0   |  15<L>  |  2.44<H>    Ca    8.8      2022 05:30  Phos  4.8       Mg     1.8         TPro  5.7<L>  /  Alb  3.0<L>  /  TBili  0.2  /  DBili  x   /  AST  10  /  ALT  7<L>  /  AlkPhos  92  -    PT/INR - ( 2022 14:09 )   PT: 12.0 sec;   INR: 1.01          PTT - ( 2022 14:09 )  PTT:37.4 sec  Urinalysis Basic - ( 2022 19:25 )    Color: Yellow / Appearance: Clear / S.020 / pH: x  Gluc: x / Ketone: NEGATIVE  / Bili: Negative / Urobili: 0.2 E.U./dL   Blood: x / Protein: NEGATIVE mg/dL / Nitrite: NEGATIVE   Leuk Esterase: NEGATIVE / RBC: x / WBC x   Sq Epi: x / Non Sq Epi: x / Bacteria: x      CARDIAC MARKERS ( 2022 14:09 )  x     / 0.03 ng/mL / 23 U/L / x     / 3.8 ng/mL      POCT Blood Glucose.: 168 mg/dL (2022 08:38      Serum Pro-Brain Natriuretic Peptide: 8909 pg/mL (22 @ 14:09)    RADIOLOGY & ADDITIONAL TESTS: Reviewed.

## 2022-11-22 NOTE — PROGRESS NOTE ADULT - SUBJECTIVE AND OBJECTIVE BOX
OVERNIGHT EVENTS:    SUBJECTIVE / INTERVAL HPI: Patient seen and examined at bedside.     VITAL SIGNS:  Vital Signs Last 24 Hrs  T(C): 35.8 (22 Nov 2022 05:11), Max: 36.5 (21 Nov 2022 17:08)  T(F): 96.4 (22 Nov 2022 05:11), Max: 97.7 (21 Nov 2022 17:08)  HR: 72 (22 Nov 2022 08:00) (57 - 93)  BP: --  BP(mean): --  RR: 28 (22 Nov 2022 08:00) (14 - 29)  SpO2: 97% (22 Nov 2022 08:00) (93% - 100%)    Parameters below as of 22 Nov 2022 08:00  Patient On (Oxygen Delivery Method): ventilator    O2 Concentration (%): 40    PHYSICAL EXAM:    General: Well developed, well nourished, no acute distress  HEENT: NC/AT; PERRL, anicteric sclera; MMM  Neck: supple  Cardiovascular: +S1/S2, RRR, no murmurs, rubs, gallops  Respiratory: CTA B/L; no W/R/R  Gastrointestinal: soft, NT/ND; +BSx4  Extremities: WWP; no edema, clubbing or cyanosis  Vascular: 2+ radial, DP/PT pulses B/L  Neurological: AAOx3; no focal deficits    MEDICATIONS:  MEDICATIONS  (STANDING):  aspirin  chewable 81 milliGRAM(s) Enteral Tube every 24 hours  atorvastatin 40 milliGRAM(s) Oral at bedtime  cefepime   IVPB 2000 milliGRAM(s) IV Intermittent every 24 hours  chlorhexidine 0.12% Liquid 15 milliLiter(s) Oral Mucosa every 12 hours  chlorhexidine 2% Cloths 1 Application(s) Topical <User Schedule>  dexMEDEtomidine Infusion 0.1 MICROgram(s)/kG/Hr (2.06 mL/Hr) IV Continuous <Continuous>  dextrose 5%. 1000 milliLiter(s) (100 mL/Hr) IV Continuous <Continuous>  dextrose 5%. 1000 milliLiter(s) (50 mL/Hr) IV Continuous <Continuous>  dextrose 50% Injectable 25 Gram(s) IV Push once  dextrose 50% Injectable 12.5 Gram(s) IV Push once  dextrose 50% Injectable 25 Gram(s) IV Push once  glucagon  Injectable 1 milliGRAM(s) IntraMuscular once  heparin   Injectable 5000 Unit(s) SubCutaneous every 8 hours  hydrocortisone sodium succinate Injectable 50 milliGRAM(s) IV Push every 8 hours  influenza  Vaccine (HIGH DOSE) 0.7 milliLiter(s) IntraMuscular once  insulin lispro (ADMELOG) corrective regimen sliding scale   SubCutaneous every 6 hours  lactated ringers. 500 milliLiter(s) (500 mL/Hr) IV Continuous <Continuous>  levothyroxine Injectable 25 MICROGram(s) IV Push at bedtime  norepinephrine Infusion 0.05 MICROgram(s)/kG/Min (9.36 mL/Hr) IV Continuous <Continuous>  propofol Infusion 10 MICROgram(s)/kG/Min (4.94 mL/Hr) IV Continuous <Continuous>  tamsulosin 0.4 milliGRAM(s) Oral at bedtime    MEDICATIONS  (PRN):  dextrose Oral Gel 15 Gram(s) Oral once PRN Blood Glucose LESS THAN 70 milliGRAM(s)/deciliter  sodium chloride 0.9% lock flush 10 milliLiter(s) IV Push every 1 hour PRN Pre/post blood products, medications, blood draw, and to maintain line patency      ALLERGIES:  Allergies    No Known Allergies    Intolerances        LABS:                        7.9    7.24  )-----------( 130      ( 22 Nov 2022 04:05 )             25.6     11-22    145  |  112<H>  |  69<H>  ----------------------------<  186<H>  3.4<L>   |  16<L>  |  3.03<H>    Ca    8.6      22 Nov 2022 04:05  Phos  5.4     11-22  Mg     1.7     11-22    TPro  5.5<L>  /  Alb  2.8<L>  /  TBili  0.3  /  DBili  x   /  AST  10  /  ALT  12  /  AlkPhos  93  11-22        CAPILLARY BLOOD GLUCOSE      POCT Blood Glucose.: 218 mg/dL (22 Nov 2022 07:30)      RADIOLOGY & ADDITIONAL TESTS: Reviewed.    PLAN:  OVERNIGHT EVENTS:    SUBJECTIVE / INTERVAL HPI: Patient seen and examined at bedside. Intubated, sedated     VITAL SIGNS:  Vital Signs Last 24 Hrs  T(C): 35.8 (22 Nov 2022 05:11), Max: 36.5 (21 Nov 2022 17:08)  T(F): 96.4 (22 Nov 2022 05:11), Max: 97.7 (21 Nov 2022 17:08)  HR: 72 (22 Nov 2022 08:00) (57 - 93)  BP: --  BP(mean): --  RR: 28 (22 Nov 2022 08:00) (14 - 29)  SpO2: 97% (22 Nov 2022 08:00) (93% - 100%)    Parameters below as of 22 Nov 2022 08:00  Patient On (Oxygen Delivery Method): ventilator    O2 Concentration (%): 40    PHYSICAL EXAM:    General: Intubated, sedated   Cardiovascular: +S1/S2, RRR, no murmurs, rubs, gallops  Respiratory: On ventilator   Extremities: UE and Lower exterimites b/l edema   Neurological: AAOx0 Sedated intubated     MEDICATIONS:  MEDICATIONS  (STANDING):  aspirin  chewable 81 milliGRAM(s) Enteral Tube every 24 hours  atorvastatin 40 milliGRAM(s) Oral at bedtime  cefepime   IVPB 2000 milliGRAM(s) IV Intermittent every 24 hours  chlorhexidine 0.12% Liquid 15 milliLiter(s) Oral Mucosa every 12 hours  chlorhexidine 2% Cloths 1 Application(s) Topical <User Schedule>  dexMEDEtomidine Infusion 0.1 MICROgram(s)/kG/Hr (2.06 mL/Hr) IV Continuous <Continuous>  dextrose 5%. 1000 milliLiter(s) (100 mL/Hr) IV Continuous <Continuous>  dextrose 5%. 1000 milliLiter(s) (50 mL/Hr) IV Continuous <Continuous>  dextrose 50% Injectable 25 Gram(s) IV Push once  dextrose 50% Injectable 12.5 Gram(s) IV Push once  dextrose 50% Injectable 25 Gram(s) IV Push once  glucagon  Injectable 1 milliGRAM(s) IntraMuscular once  heparin   Injectable 5000 Unit(s) SubCutaneous every 8 hours  hydrocortisone sodium succinate Injectable 50 milliGRAM(s) IV Push every 8 hours  influenza  Vaccine (HIGH DOSE) 0.7 milliLiter(s) IntraMuscular once  insulin lispro (ADMELOG) corrective regimen sliding scale   SubCutaneous every 6 hours  lactated ringers. 500 milliLiter(s) (500 mL/Hr) IV Continuous <Continuous>  levothyroxine Injectable 25 MICROGram(s) IV Push at bedtime  norepinephrine Infusion 0.05 MICROgram(s)/kG/Min (9.36 mL/Hr) IV Continuous <Continuous>  propofol Infusion 10 MICROgram(s)/kG/Min (4.94 mL/Hr) IV Continuous <Continuous>  tamsulosin 0.4 milliGRAM(s) Oral at bedtime    MEDICATIONS  (PRN):  dextrose Oral Gel 15 Gram(s) Oral once PRN Blood Glucose LESS THAN 70 milliGRAM(s)/deciliter  sodium chloride 0.9% lock flush 10 milliLiter(s) IV Push every 1 hour PRN Pre/post blood products, medications, blood draw, and to maintain line patency      ALLERGIES:  Allergies    No Known Allergies    Intolerances        LABS:                        7.9    7.24  )-----------( 130      ( 22 Nov 2022 04:05 )             25.6     11-22    145  |  112<H>  |  69<H>  ----------------------------<  186<H>  3.4<L>   |  16<L>  |  3.03<H>    Ca    8.6      22 Nov 2022 04:05  Phos  5.4     11-22  Mg     1.7     11-22    TPro  5.5<L>  /  Alb  2.8<L>  /  TBili  0.3  /  DBili  x   /  AST  10  /  ALT  12  /  AlkPhos  93  11-22        CAPILLARY BLOOD GLUCOSE      POCT Blood Glucose.: 218 mg/dL (22 Nov 2022 07:30)      RADIOLOGY & ADDITIONAL TESTS: Reviewed.    PLAN:  OVERNIGHT EVENTS: remains intubated but more awake and alert , moving about  uirine output better off diuretics     SUBJECTIVE / INTERVAL HPI: Patient seen and examined at bedside. Intubated, sedated     VITAL SIGNS:  Vital Signs Last 24 Hrs  T(C): 35.8 (22 Nov 2022 05:11), Max: 36.5 (21 Nov 2022 17:08)  T(F): 96.4 (22 Nov 2022 05:11), Max: 97.7 (21 Nov 2022 17:08)  HR: 72 (22 Nov 2022 08:00) (57 - 93)  BP: -- 157/44  BP(mean): --  RR: 28 (22 Nov 2022 08:00) (14 - 29)  SpO2: 97% (22 Nov 2022 08:00) (93% - 100%)    Parameters below as of 22 Nov 2022 08:00  Patient On (Oxygen Delivery Method): ventilator    O2 Concentration (%): 40    PHYSICAL EXAM:    General: Intubated, sedated   Cardiovascular: +S1/S2, RRR, no murmurs, rubs, gallops  Respiratory: On ventilator   Extremities: UE and Lower exterimites b/l edema   Neurological: AAOx0 Sedated intubated     MEDICATIONS:  MEDICATIONS  (STANDING):  aspirin  chewable 81 milliGRAM(s) Enteral Tube every 24 hours  atorvastatin 40 milliGRAM(s) Oral at bedtime  cefepime   IVPB 2000 milliGRAM(s) IV Intermittent every 24 hours  chlorhexidine 0.12% Liquid 15 milliLiter(s) Oral Mucosa every 12 hours  chlorhexidine 2% Cloths 1 Application(s) Topical <User Schedule>  dexMEDEtomidine Infusion 0.1 MICROgram(s)/kG/Hr (2.06 mL/Hr) IV Continuous <Continuous>  dextrose 5%. 1000 milliLiter(s) (100 mL/Hr) IV Continuous <Continuous>  dextrose 5%. 1000 milliLiter(s) (50 mL/Hr) IV Continuous <Continuous>  dextrose 50% Injectable 25 Gram(s) IV Push once  dextrose 50% Injectable 12.5 Gram(s) IV Push once  dextrose 50% Injectable 25 Gram(s) IV Push once  glucagon  Injectable 1 milliGRAM(s) IntraMuscular once  heparin   Injectable 5000 Unit(s) SubCutaneous every 8 hours  hydrocortisone sodium succinate Injectable 50 milliGRAM(s) IV Push every 8 hours  influenza  Vaccine (HIGH DOSE) 0.7 milliLiter(s) IntraMuscular once  insulin lispro (ADMELOG) corrective regimen sliding scale   SubCutaneous every 6 hours  lactated ringers. 500 milliLiter(s) (500 mL/Hr) IV Continuous <Continuous>  levothyroxine Injectable 25 MICROGram(s) IV Push at bedtime  norepinephrine Infusion 0.05 MICROgram(s)/kG/Min (9.36 mL/Hr) IV Continuous <Continuous>  propofol Infusion 10 MICROgram(s)/kG/Min (4.94 mL/Hr) IV Continuous <Continuous>  tamsulosin 0.4 milliGRAM(s) Oral at bedtime    MEDICATIONS  (PRN):  dextrose Oral Gel 15 Gram(s) Oral once PRN Blood Glucose LESS THAN 70 milliGRAM(s)/deciliter  sodium chloride 0.9% lock flush 10 milliLiter(s) IV Push every 1 hour PRN Pre/post blood products, medications, blood draw, and to maintain line patency      ALLERGIES:  Allergies    No Known Allergies    Intolerances        LABS:                        7.9    7.24  )-----------( 130      ( 22 Nov 2022 04:05 )             25.6     11-22    145  |  112<H>  |  69<H>  ----------------------------<  186<H>  3.4<L>   |  16<L>  |  3.03<H>    Ca    8.6      22 Nov 2022 04:05  Phos  5.4     11-22  Mg     1.7     11-22    TPro  5.5<L>  /  Alb  2.8<L>  /  TBili  0.3  /  DBili  x   /  AST  10  /  ALT  12  /  AlkPhos  93  11-22        CAPILLARY BLOOD GLUCOSE      POCT Blood Glucose.: 218 mg/dL (22 Nov 2022 07:30)      RADIOLOGY & ADDITIONAL TESTS: Reviewed.    PLAN:

## 2022-11-22 NOTE — PROGRESS NOTE ADULT - ATTENDING COMMENTS
h/o renal transplant, glaucoma with blindness, aspiration pna with Serratia and Pseudomonas, ATN, CAD, edema,DM2, HFpEF  physical as above  extubated to NIV  continue cefepime  renal function slightly improved  continue lantus and synthroid  taper HC  decision making of high complexity

## 2022-11-22 NOTE — PROGRESS NOTE ADULT - ASSESSMENT
Mr. Miguel is an 82 year-old man with a history of multiple medical problems including coronary artery disease, end stage renal disease status post renal transplant, type 2 diabetes mellitus admitted 11/17/2022 after presenting to his outpatient nephrologist with lower extremity edema in the setting of acute on chronic congestive heart failure. He had hypertension and borderline bradycardia on admission; he had normal oxygenation and mental status was at baseline per notes. We were consulted for concern for myxedema coma.     A1C: 9.0 %  BUN: 64  Creatinine: 3.15  GFR: 19  Weight:82.3  BMI: 28.4  Ejection Fraction: LVEF appears normal in limited views (11/17/22) Mr. Miguel is an 82 year-old man with a history of multiple medical problems including coronary artery disease, end stage renal disease status post renal transplant, type 2 diabetes mellitus admitted 11/17/2022 after presenting to his outpatient nephrologist with lower extremity edema in the setting of acute on chronic congestive heart failure. He had hypertension and borderline bradycardia on admission. He had an episode of hypoglycemia to 20s. Admission c/b cardiac arrest with ROSC.    Case discussed with Dr. Rabago. Primary team updated.

## 2022-11-22 NOTE — PROGRESS NOTE ADULT - SUBJECTIVE AND OBJECTIVE BOX
**Incomplete Note**     CC: Patient is a 82y old  Male who presents with a chief complaint of LE edema and cough (21 Nov 2022 09:30)      INTERVAL EVENTS: MAGGIE    SUBJECTIVE / INTERVAL HPI: Patient seen and examined at bedside. Examined while on precedex. Opens eyes to voice and touch.     ROS: negative unless otherwise stated above.    VITAL SIGNS:  Vital Signs Last 24 Hrs  T(C): 35.8 (22 Nov 2022 05:11), Max: 36.5 (21 Nov 2022 17:08)  T(F): 96.4 (22 Nov 2022 05:11), Max: 97.7 (21 Nov 2022 17:08)  HR: 54 (22 Nov 2022 12:00) (52 - 93)  BP: --  BP(mean): --  RR: 23 (22 Nov 2022 12:00) (14 - 29)  SpO2: 99% (22 Nov 2022 12:00) (93% - 100%)    Parameters below as of 22 Nov 2022 12:00  Patient On (Oxygen Delivery Method): ventilator,CPAP     O2 Concentration (%): 40      11-21-22 @ 07:01  -  11-22-22 @ 07:00  --------------------------------------------------------  IN: 677.7 mL / OUT: 1440 mL / NET: -762.3 mL    11-22-22 @ 07:01  -  11-22-22 @ 12:22  --------------------------------------------------------  IN: 49.2 mL / OUT: 160 mL / NET: -110.8 mL        PHYSICAL EXAM:  General: NAD, lying in bed  HEENT: MMM, R pupil 5 (fixed), L pupil 3 (sluggish to light)  Neck: supple  Cardiovascular: +S1/S2; RRR  Respiratory: diminished breath sounds, otherwise CTAB; no increased WOB.   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 1000 milliLiter(s) (100 mL/Hr) IV Continuous <Continuous>  dextrose 5%. 1000 milliLiter(s) (50 mL/Hr) IV Continuous <Continuous>  dextrose 50% Injectable 25 Gram(s) IV Push once  dextrose 50% Injectable 12.5 Gram(s) IV Push once  dextrose 50% Injectable 25 Gram(s) IV Push once  glucagon  Injectable 1 milliGRAM(s) IntraMuscular once  heparin   Injectable 5000 Unit(s) SubCutaneous every 8 hours  hydrocortisone sodium succinate Injectable 50 milliGRAM(s) IV Push every 8 hours  influenza  Vaccine (HIGH DOSE) 0.7 milliLiter(s) IntraMuscular once  insulin lispro (ADMELOG) corrective regimen sliding scale   SubCutaneous every 6 hours  lactated ringers. 500 milliLiter(s) (500 mL/Hr) IV Continuous <Continuous>  levothyroxine Injectable 25 MICROGram(s) IV Push at bedtime  tamsulosin 0.4 milliGRAM(s) Oral at bedtime    MEDICATIONS  (PRN):  dextrose Oral Gel 15 Gram(s) Oral once PRN Blood Glucose LESS THAN 70 milliGRAM(s)/deciliter  sodium chloride 0.9% lock flush 10 milliLiter(s) IV Push every 1 hour PRN Pre/post blood products, medications, blood draw, and to maintain line patency      ALLERGIES:  Allergies    No Known Allergies    Intolerances        LABS:                        7.9    7.24  )-----------( 130      ( 22 Nov 2022 04:05 )             25.6     11-22    145  |  112<H>  |  69<H>  ----------------------------<  186<H>  3.4<L>   |  16<L>  |  3.03<H>    Ca    8.6      22 Nov 2022 04:05  Phos  5.4     11-22  Mg     1.7     11-22    TPro  5.5<L>  /  Alb  2.8<L>  /  TBili  0.3  /  DBili  x   /  AST  10  /  ALT  12  /  AlkPhos  93  11-22        CAPILLARY BLOOD GLUCOSE      POCT Blood Glucose.: 218 mg/dL (22 Nov 2022 07:30)      RADIOLOGY & ADDITIONAL TESTS: Reviewed.     CC: Patient is a 82y old  Male who presents with a chief complaint of LE edema and cough (21 Nov 2022 09:30)      INTERVAL EVENTS: MAGGIE    SUBJECTIVE / INTERVAL HPI: Patient seen and examined at bedside. Examined while on precedex. Opens eyes to voice and touch.     ROS: negative unless otherwise stated above.    VITAL SIGNS:  Vital Signs Last 24 Hrs  T(C): 35.8 (22 Nov 2022 05:11), Max: 36.5 (21 Nov 2022 17:08)  T(F): 96.4 (22 Nov 2022 05:11), Max: 97.7 (21 Nov 2022 17:08)  HR: 54 (22 Nov 2022 12:00) (52 - 93)  BP: --  BP(mean): --  RR: 23 (22 Nov 2022 12:00) (14 - 29)  SpO2: 99% (22 Nov 2022 12:00) (93% - 100%)    Parameters below as of 22 Nov 2022 12:00  Patient On (Oxygen Delivery Method): ventilator,CPAP     O2 Concentration (%): 40      11-21-22 @ 07:01  -  11-22-22 @ 07:00  --------------------------------------------------------  IN: 677.7 mL / OUT: 1440 mL / NET: -762.3 mL    11-22-22 @ 07:01  -  11-22-22 @ 12:22  --------------------------------------------------------  IN: 49.2 mL / OUT: 160 mL / NET: -110.8 mL        PHYSICAL EXAM:  General: NAD, lying in bed  HEENT: MMM, R pupil 5 (fixed), L pupil 3 (more reactive that yesterday, still sluggish)  Neck: supple  Cardiovascular: +S1/S2; RRR  Respiratory: diminished breath sounds, otherwise CTAB; no increased WOB.   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 1000 milliLiter(s) (100 mL/Hr) IV Continuous <Continuous>  dextrose 5%. 1000 milliLiter(s) (50 mL/Hr) IV Continuous <Continuous>  dextrose 50% Injectable 25 Gram(s) IV Push once  dextrose 50% Injectable 12.5 Gram(s) IV Push once  dextrose 50% Injectable 25 Gram(s) IV Push once  glucagon  Injectable 1 milliGRAM(s) IntraMuscular once  heparin   Injectable 5000 Unit(s) SubCutaneous every 8 hours  hydrocortisone sodium succinate Injectable 50 milliGRAM(s) IV Push every 8 hours  influenza  Vaccine (HIGH DOSE) 0.7 milliLiter(s) IntraMuscular once  insulin lispro (ADMELOG) corrective regimen sliding scale   SubCutaneous every 6 hours  lactated ringers. 500 milliLiter(s) (500 mL/Hr) IV Continuous <Continuous>  levothyroxine Injectable 25 MICROGram(s) IV Push at bedtime  tamsulosin 0.4 milliGRAM(s) Oral at bedtime    MEDICATIONS  (PRN):  dextrose Oral Gel 15 Gram(s) Oral once PRN Blood Glucose LESS THAN 70 milliGRAM(s)/deciliter  sodium chloride 0.9% lock flush 10 milliLiter(s) IV Push every 1 hour PRN Pre/post blood products, medications, blood draw, and to maintain line patency      ALLERGIES:  Allergies    No Known Allergies    Intolerances        LABS:                        7.9    7.24  )-----------( 130      ( 22 Nov 2022 04:05 )             25.6     11-22    145  |  112<H>  |  69<H>  ----------------------------<  186<H>  3.4<L>   |  16<L>  |  3.03<H>    Ca    8.6      22 Nov 2022 04:05  Phos  5.4     11-22  Mg     1.7     11-22    TPro  5.5<L>  /  Alb  2.8<L>  /  TBili  0.3  /  DBili  x   /  AST  10  /  ALT  12  /  AlkPhos  93  11-22        CAPILLARY BLOOD GLUCOSE      POCT Blood Glucose.: 218 mg/dL (22 Nov 2022 07:30)      RADIOLOGY & ADDITIONAL TESTS: Reviewed.

## 2022-11-22 NOTE — PROGRESS NOTE ADULT - PROBLEM SELECTOR PLAN 3
- Please consider decreasing hydrocortisone to 30 mg every 8 hours. VSS. Please consider decreasing hydrocortisone to 30 mg every 8 hours.

## 2022-11-22 NOTE — PROGRESS NOTE ADULT - ASSESSMENT
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      Problem/Plan:    #ELIZABETH on CKD 2/2 ATN in the setting of cardiac arrest   Renal allograft with CKD 4 - baseline sCr 2.3-2.5, usual meds tacrolimus 4mg BID and mcyophenolate 500mg BID.   -Creatinine uptrending after cardiac arrest on 11/19: 2.3-->3.15, today improved -->3.03  - tacrolimus level elevated 8.3 in 11/19, Tacrolimus was held in 11/21. Level in 11/21 9PM 2.2  - Caution with cefepime given CKD and propensity to neurotoxicity  - strict i's and o's  -uop noted; no need for HD currently however will assess daily   -vancomycin should be dosed by level as patient CKD IV now in ELIZABETH  -Trend BMP daily  -F/u CK level     #Fluid overload/ CHF - currently in shock, holding further diuresis for now, on pressor   - consider echocardiogram and RHC to guide further diuretic dose      #Shock   -Normally hypertensive, but on pressors at this time  maintain MAP >70 for adequate renal perfusion      Renal team is following     Thank you for the opportunity to participate in the care of your patient. The nephrology service remains available to assist with any questions or concerns. Please feel free to reach us by paging the on-call nephrology fellow for urgent issues or as below.      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      Problem/Plan:    INCOMPLETE     #ELIZABETH on CKD 2/2 ATN in the setting of cardiac arrest   Renal allograft with CKD 4 - baseline sCr 2.3-2.5, usual meds tacrolimus 4mg BID and mcyophenolate 500mg BID.   -Creatinine uptrending after cardiac arrest on 11/19: 2.3-->3.15, today improved -->3.03  - tacrolimus level elevated 8.3 in 11/19, Tacrolimus was held in 11/21. Level in 11/21 9PM 2.2  - Caution with cefepime given CKD and propensity to neurotoxicity  - strict i's and o's  -uop noted; no need for HD currently however will assess daily   -vancomycin should be dosed by level as patient CKD IV now in ELIZABETH  -Trend BMP daily  -F/u CK level     #Fluid overload/ CHF - currently in shock, holding further diuresis for now, on pressor   - consider echocardiogram and RHC to guide further diuretic dose      #Shock   -Normally hypertensive, but on pressors at this time  maintain MAP >70 for adequate renal perfusion      Renal team is following     Thank you for the opportunity to participate in the care of your patient. The nephrology service remains available to assist with any questions or concerns. Please feel free to reach us by paging the on-call nephrology fellow for urgent issues or as below.      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      Problem/Plan:    #ELIZABETH on CKD 2/2 ATN in the setting of cardiac arrest   Renal allograft with CKD 4 - baseline sCr 2.3-2.5, usual meds tacrolimus 4mg BID and mycophenolate 500mg BID.   Recs:  -Creatinine uptrending after cardiac arrest on 11/19: 2.3-->3.15, today improved -->3.03  - Tacrolimus level elevated 8.3 in 11/19. Tacrolimus was held in 11/21.  Level in 11/21 9PM 2.2, Please resume Tacrolimus 2mg BID  (lower dose than home med).   - Check the Tacrolimus level in 2 days in 30 minutes before 11/24 AM dose given. If elevated, call renal team to get recs   - Caution with cefepime given CKD and propensity to neurotoxicity  - strict i's and o's  - UOP keep net negative -1 Litter   - uop noted; no need for HD currently however will assess daily   -vancomycin should be dosed by level as patient CKD IV now in ELIZABETH  -Trend BMP daily  -Ok to use lasix since pt is very edematous and Upper and lower EX pitting edema+. Would be start with Lasix 20mg BID today and monitor UOP and creatinine       #Fluid overload/ CHF -  -Ok to use lasix since creatinine downtrending and pt is very overloaded in exam.   Keep net negative -1 Litter       #Shock RESOLVED   -Normally hypertensive, Off from pressor since 11/21  maintain MAP >70 for adequate renal perfusion      Renal team is following     Thank you for the opportunity to participate in the care of your patient. The nephrology service remains available to assist with any questions or concerns. Please feel free to reach us by paging the on-call nephrology fellow for urgent issues or as below.      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      Problem/Plan:    #ELIZABETH on CKD 2/2 ATN in the setting of cardiac arrest   Renal allograft with CKD 4 - baseline sCr 2.3-2.5, usual meds tacrolimus 4mg BID and mycophenolate 500mg BID.   Recs:  -Creatinine uptrending after cardiac arrest on 11/19: 2.3-->3.15, today improved -->3.03  - Tacrolimus level elevated 8.3 in 11/19. Tacrolimus was held in 11/21.  Level in 11/21 9PM 2.2, Please resume Tacrolimus 2mg BID  (lower dose than home med).   - Check the Tacrolimus level in 2 days in 30 minutes before 11/24 AM dose given. If elevated, call renal team to get recs   - Caution with cefepime given CKD and propensity to neurotoxicity  - strict i's and o's  - UOP keep net negative -1 Litter   - uop noted; no need for HD currently however will assess daily   -vancomycin should be dosed by level as patient CKD IV now in ELIZABETH  -Trend BMP daily  -Ok to use lasix IVP since pt is very edematous and Upper and lower EX pitting edema+. Would be start with Lasix 20mg BID IVP today and monitor UOP and creatinine       #Fluid overload/ CHF -  -Ok to use lasix since creatinine downtrending and pt is very overloaded in exam.   Keep net negative -1 Litter       #Shock RESOLVED   -Normally hypertensive, Off from pressor since 11/21  maintain MAP >70 for adequate renal perfusion      Renal team is following     Thank you for the opportunity to participate in the care of your patient. The nephrology service remains available to assist with any questions or concerns. Please feel free to reach us by paging the on-call nephrology fellow for urgent issues or as below.

## 2022-11-22 NOTE — PROGRESS NOTE ADULT - ASSESSMENT
82 y/o M PMH CKD 4, renal transplant in 2013 at [facility], glaucoma (blind), DM1, anemia, CAD s/p 2 DAMEON to LAD in 6/21, BPH, recent admission for PNA presents with acute on chronic cough and SUNNY 2/2 acute CHF vs CKD.       NEUROLOGY  #Intubated  Intubated since 11/19. Intact cough reflex, corneal reflex. On propofol gtt.   - goal RASS -2  - hold sedation today    CARDIOVASCULAR  #Acute on chronic diastolic CHF  Had EF of 55% on TTE from Oct 2022, grade I diastolic dysfunction. Repeat TTE 11/17 with poor visualization of LV. Patient appeared fluid overloaded, now s/p lasix 40mg BID with UOP 340cc o/n.   - holding further lasix  - give 1L LR bolus given relatively euvolemic today  - f/u cards recommendations    #Lower extremity edema  SUNNY 2/2 CKD vs acute on chronic CHF (g I dd). Also w cough but saturating well on RA without increased work of breathing. BNP elevated supporting acute on chronic chf. Poor renal function evidenced by acidosis though he is at baseline Cr. Cardiology consulted in ED limited TTE w preserved systolic function, but poor visualization  - Cards & Renal consulted  - I/Os  - f/u official TTE today    #CAD  Hx of CAD s/p 2 DAMEON to LAD in June 2021, currently no CP. Trops 0.03 but no CP or ischemic changes on EKG, likely due to CKD. Takes Plavix and aspirin per last d/c but only aspirin on outpatient note  - c/w ASA daily  - collateral information on plavix.    #HLD  Known history  - c/w home lipitor    RENAL  #ELIZABETH on Chronic kidney disease (CKD).   Renal function worsening today. Baseline sCr 2.3-2.5. On admission, fluid overloaded. Does have orthopnea and intermittent SOB at baseline. Acidotic but at baseline. Follows w Dr. Mac.   - monitor I/Os  - give 1L LR bolus  - f/u urine studies  - f/u renal recs  - renally dose meds    #Renal transplant  Patient had renal transplant in 2013. On home mycophenolate 500mg BID and tacrolimus 4mg BID.  - per nephro, decreased home tacrolimus from 4mg BID --> 2mg BID while in hospital   - holding mycophenolate i/s/o infection  - holding tacrolimus prior to trough tonight  - obtain tacrolimus level tonight, clarify goal trough  - f/u renal recs        #BPH (benign prostatic hyperplasia).   - c/w tamsulosin 0.4mg daily      ID  #Aspiration PNA  Tracheal aspirate cx growing serratia marcescens and pseudomonas. Patient was started on vancomycin and cefepime. MRSA nares positive. Dc'ed vancomycin given supratherapeutic trough   - c/w cefepime 2g q24  - given relatively poor renal function, will monitor closely for neurotoxicity      ENDOCRINE  #DM (diabetes mellitus).   Previously on lantus 15 lispro 5  - c/w lantus 15 lispro 5  - goal -180s    # Hypothyroidism  TSH 20.8, free t4 0.7, T3 49 (low)  - per endo rec no suspicion of myxedema coma  - continue with 25 synthroid    #Relative Adrenal Insufficiency  Patient was started on hydrocortisone 50mg q6hr  - c/w hydrocortisone 50mg q6hr    HEME  #Normocytic Anemia  Likely 2.2 CKD. Hgb 6.9 on 11/21, corrected appropriately s/p 1u pRBC. No active signs of bleeding on physical exam. CTH with no intracranial bleeding.  - active T+S  - transfuse <7    FLUIDS/ELECTROLYTES/NUTRITION  -IVF none  -Monitor, careful w advanced kidney disease  -Diet  consis. carb and renal, kosher  Prophylaxis: heparin sq  Activity: activity  GI: none  C: FC  Dispo: Admit to [facility].   84 y/o M PMH CKD 4, renal transplant in 2013 at [facility], glaucoma (blind), DM1, anemia, CAD s/p 2 DAMEON to LAD in 6/21, BPH, recent admission for PNA presents with acute on chronic cough and SUNNY 2/2 acute CHF vs CKD.       NEUROLOGY  #Intubated  On precedex gtt, with RASS goal 0-1.   - CPAP trial today, tolerating well  - plan to extubate to BiPAP    CARDIOVASCULAR  #Acute on chronic diastolic CHF  Had EF of 55% on TTE from Oct 2022, grade I diastolic dysfunction. Repeat TTE 11/17 with poor visualization of LV. Patient appeared relatively euvolemic  - will hold on further fluids or lasix  - formal TTE today    #Lower extremity edema  SUNNY 2/2 CKD vs acute on chronic CHF (g I dd). Also w cough but saturating well on RA without increased work of breathing. BNP elevated supporting acute on chronic chf. Poor renal function evidenced by acidosis though he is at baseline Cr. Cards consulted in ED, limited TTE w preserved systolic function, but poor visualization.  - Cards & Renal consulted  - I/Os  - formal TTE today    #CAD  Hx of CAD s/p 2 DAMEON to LAD in June 2021, currently no CP. Trops 0.03 but no CP or ischemic changes on EKG, likely due to CKD. Takes Plavix and aspirin per last d/c but only aspirin on outpatient note  - c/w ASA daily  - collateral information on plavix.    #HLD  Known history  - c/w home lipitor    RENAL  #ELIZABETH on Chronic kidney disease (CKD).   Renal function worsening today. Baseline sCr 2.3-2.5. On admission, fluid overloaded. Does have orthopnea and intermittent SOB at baseline. Acidotic but at baseline. Follows w Dr. Mac.   - monitor I/Os  - f/u urine studies  - f/u UA  - f/u renal recs  - renally dose meds    #Renal transplant  Patient had renal transplant in 2013. On home mycophenolate 500mg BID and tacrolimus 4mg BID. Per nephro, decreased home tacrolimus from 4mg BID --> 2mg BID while in hospital. Tacrolimus level 2.2 yesterday.  - continue holding mycophenolate i/s/o infection  - obtain tacrolimus level tonight, clarify goal trough  - f/u renal recs        #BPH (benign prostatic hyperplasia).   - c/w tamsulosin 0.4mg daily      ID  #Aspiration PNA  Tracheal aspirate cx growing serratia marcescens and pseudomonas. Patient was started on vancomycin and cefepime. MRSA nares positive. Dc'ed vancomycin given supratherapeutic trough   - c/w cefepime 2g q24  - given relatively poor renal function, will monitor closely for neurotoxicity      ENDOCRINE  #DM (diabetes mellitus).   Previously on lantus 15 lispro 5  - c/w lantus 15 lispro 5  - goal -180s    # Hypothyroidism  TSH 20.8, free t4 0.7, T3 49 (low)  - per endo rec no suspicion of myxedema coma  - continue with 25 synthroid    #Relative Adrenal Insufficiency  Patient was started on hydrocortisone 50mg q6hr  - c/w hydrocortisone 50mg q6hr    HEME  #Normocytic Anemia  Likely 2.2 CKD. Hgb 6.9 on 11/21, corrected appropriately s/p 1u pRBC. No active signs of bleeding on physical exam. CTH with no intracranial bleeding.  - active T+S  - transfuse <7    FLUIDS/ELECTROLYTES/NUTRITION  -IVF none  -Monitor, careful w advanced kidney disease  -Diet  consis. carb and renal, kosher  Prophylaxis: heparin sq  Activity: activity  GI: none  C: FC  Dispo: Admit to [facility].   82 y/o M PMH CKD 4, renal transplant in 2013 at [facility], glaucoma (blind), DM1, anemia, CAD s/p 2 DAMEON to LAD in 6/21, BPH, recent admission for PNA presents with acute on chronic cough and SUNNY 2/2 acute CHF vs CKD.       NEUROLOGY  #Intubated  On precedex gtt, with RASS goal 0-1.   - CPAP trial today, tolerating well  - plan to extubate to BiPAP    CARDIOVASCULAR  #Acute on chronic diastolic CHF  Had EF of 55% on TTE from Oct 2022, grade I diastolic dysfunction. Repeat TTE 11/17 with poor visualization of LV. Patient appeared relatively euvolemic  - will hold on further fluids or lasix  - formal TTE today    #Lower extremity edema  SUNNY 2/2 CKD vs acute on chronic CHF (g I dd). Also w cough but saturating well on RA without increased work of breathing. BNP elevated supporting acute on chronic chf. Poor renal function evidenced by acidosis though he is at baseline Cr. Cards consulted in ED, limited TTE w preserved systolic function, but poor visualization.  - Cards & Renal consulted  - I/Os  - formal TTE today    #Upper extremity edema  Stable from yesterday but increased compared to baseline  - pending b/l upper extremity dopplers     #CAD  Hx of CAD s/p 2 DAMEON to LAD in June 2021, currently no CP. Trops 0.03 but no CP or ischemic changes on EKG, likely due to CKD. Takes Plavix and aspirin per last d/c but only aspirin on outpatient note  - c/w ASA daily  - collateral information on plavix.    #HLD  Known history  - c/w home Lipitor    RENAL  #ELIZABETH on Chronic kidney disease (CKD).   Renal function worsening today. Baseline sCr 2.3-2.5. On admission, fluid overloaded. Does have orthopnea and intermittent SOB at baseline. Acidotic but at baseline. Follows w Dr. Mac.   - monitor I/Os  - f/u urine studies  - f/u UA  - f/u renal recs  - renally dose meds    #Renal transplant  Patient had renal transplant in 2013. On home mycophenolate 500mg BID and tacrolimus 4mg BID. Per nephro, decreased home tacrolimus from 4mg BID --> 2mg BID while in hospital.   - Tacrolimus level 2.2 yesterday.  - continue holding mycophenolate i/s/o infection  - obtain tacrolimus level tonight, clarify goal trough  - f/u renal recs        #BPH (benign prostatic hyperplasia).   - c/w tamsulosin 0.4mg daily      ID  #Aspiration PNA  Tracheal aspirate cx growing serratia marcescens and pseudomonas. Patient was started on vancomycin and cefepime. MRSA nares positive. Dc'ed vancomycin given supratherapeutic trough   - c/w cefepime 2g q24  - given relatively poor renal function, will monitor closely for neurotoxicity      ENDOCRINE  #DM (diabetes mellitus).   Previously on lantus 15 lispro 5  - c/w lantus 15 lispro 5  - goal -180s    # Hypothyroidism  TSH 20.8, free t4 0.7, T3 49 (low)  - per endo rec no suspicion of myxedema coma  - continue with 25 synthroid    #Relative Adrenal Insufficiency  Patient was started on hydrocortisone 50mg q6hr  - c/w hydrocortisone 50mg q6hr    HEME  #Normocytic Anemia  Likely 2.2 CKD. Hgb 6.9 on 11/21, corrected appropriately s/p 1u pRBC. No active signs of bleeding on physical exam. CTH with no intracranial bleeding.  - active T+S  - transfuse <7    FLUIDS/ELECTROLYTES/NUTRITION  -IVF none  -Monitor, careful w advanced kidney disease  -Diet  consis. carb and renal, kosher  Prophylaxis: heparin sq  Activity: activity  GI: none  C: FC  Dispo: Admit to [facility].   82 y/o M PMH CKD 4, renal transplant in 2013 at [facility], glaucoma (blind), DM1, anemia, CAD s/p 2 DAMEON to LAD in 6/21, BPH, recent admission for PNA presents with acute on chronic cough and SUNNY 2/2 acute CHF vs CKD.       NEUROLOGY  #Intubated  On precedex gtt, with RASS goal 0-1.   - CPAP trial today, tolerating well  - plan to extubate to BiPAP    CARDIOVASCULAR  #Acute on chronic diastolic CHF  Had EF of 55% on TTE from Oct 2022, grade I diastolic dysfunction. Repeat TTE 11/17 with poor visualization of LV. Patient appeared relatively euvolemic  - will hold on further fluids or lasix  - formal TTE today    #Lower extremity edema  SUNNY 2/2 CKD vs acute on chronic CHF (g I dd). Also w cough but saturating well on RA without increased work of breathing. BNP elevated supporting acute on chronic chf. Poor renal function evidenced by acidosis though he is at baseline Cr. Cards consulted in ED, limited TTE w preserved systolic function, but poor visualization.  - Cards & Renal consulted  - I/Os  - formal TTE today    #Upper extremity edema  Stable from yesterday but increased compared to baseline  - pending b/l upper extremity dopplers     #CAD  Hx of CAD s/p 2 DAMEON to LAD in June 2021, currently no CP. Trops 0.03 but no CP or ischemic changes on EKG, likely due to CKD. Takes Plavix and aspirin per last d/c but only aspirin on outpatient note  - c/w ASA daily  - collateral information on plavix.    #HLD  Known history  - c/w home Lipitor    RENAL  #ELIZABEHT on Chronic kidney disease (CKD).   Renal function worsening today. Baseline sCr 2.3-2.5. On admission, fluid overloaded. Does have orthopnea and intermittent SOB at baseline. Acidotic but at baseline. Follows w Dr. Mac.   - monitor I/Os  - f/u urine studies  - f/u UA  - f/u renal recs  - renally dose meds    #Renal transplant  Patient had renal transplant in 2013. On home mycophenolate 500mg BID and tacrolimus 4mg BID. Per nephro, decreased home tacrolimus from 4mg BID --> 2mg BID while in hospital.   - Tacrolimus level 2.2 yesterday.  - continue holding mycophenolate i/s/o infection  - f/u renal recs on resuming tacrolimus        #BPH (benign prostatic hyperplasia).   - c/w tamsulosin 0.4mg daily      ID  #Aspiration PNA  Tracheal aspirate cx growing serratia marcescens and pseudomonas. Patient was started on vancomycin and cefepime. MRSA nares positive. Dc'ed vancomycin given supratherapeutic trough   - c/w cefepime 2g q24 (start 11/19- )  - given relatively poor renal function, will monitor closely for neurotoxicity      ENDOCRINE  #DM (diabetes mellitus).   Previously on lantus 15 lispro 5  - c/w lantus 15 lispro 5  - goal -180s    # Hypothyroidism  TSH 20.8, free t4 0.7, T3 49 (low)  - per endo rec no suspicion of myxedema coma  - continue with 25 synthroid    #Relative Adrenal Insufficiency  Patient was started on hydrocortisone 50mg q6hr  - c/w hydrocortisone 50mg q6hr    HEME  #Normocytic Anemia  Likely 2.2 CKD. Hgb 6.9 on 11/21, corrected appropriately s/p 1u pRBC. No active signs of bleeding on physical exam. CTH with no intracranial bleeding.  - active T+S  - transfuse <7    FLUIDS/ELECTROLYTES/NUTRITION  -IVF none  -Monitor, careful w advanced kidney disease  -Diet  NPO  ylaxis: heparin sq  Activity: activity  GI: none  C: FC  Dispo: Admit to [facility].   82 y/o M PMH CKD 4, renal transplant in 2013 at [facility], glaucoma (blind), DM1, anemia, CAD s/p 2 DAMEON to LAD in 6/21, BPH, recent admission for PNA presents with acute on chronic cough and SUNNY 2/2 acute CHF vs CKD.       NEUROLOGY  #Intubated  On precedex gtt, with RASS goal 0-1.   - CPAP trial today, tolerating well  - plan to extubate to BiPAP    CARDIOVASCULAR  #Acute on chronic diastolic CHF  Had EF of 55% on TTE from Oct 2022, grade I diastolic dysfunction. Repeat TTE 11/17 with poor visualization of LV. Patient appeared relatively euvolemic  - will hold on further fluids or lasix  - formal TTE today    #Lower extremity edema  SUNNY 2/2 CKD vs acute on chronic CHF (g I dd). Also w cough but saturating well on RA without increased work of breathing. BNP elevated supporting acute on chronic chf. Poor renal function evidenced by acidosis though he is at baseline Cr. Cards consulted in ED, limited TTE w preserved systolic function, but poor visualization.  - Cards & Renal consulted  - I/Os  - formal TTE today    #Upper extremity edema  Stable from yesterday but increased compared to baseline  - pending b/l upper extremity dopplers     #CAD  Hx of CAD s/p 2 DAMEON to LAD in June 2021, currently no CP. Trops 0.03 but no CP or ischemic changes on EKG, likely due to CKD. Takes Plavix and aspirin per last d/c but only aspirin on outpatient note  - c/w ASA daily  - resume carvedilol 25mg BID today  - collateral information on plavix.    #HLD  Known history  - c/w home Lipitor    RENAL  #ELIZABETH on Chronic kidney disease (CKD).   Renal function stable today. Baseline sCr 2.3-2.5. On admission, fluid overloaded. Does have orthopnea and intermittent SOB at baseline. Acidotic but at baseline. Follows w Dr. Mac.   - monitor I/Os  - f/u urine studies  - f/u UA  - f/u renal recs  - renally dose meds    #Renal transplant  Patient had renal transplant in 2013. On home mycophenolate 500mg BID and tacrolimus 4mg BID. Per nephro, decreased home tacrolimus from 4mg BID --> 2mg BID while in hospital.   - Tacrolimus level 2.2 yesterday.  - continue holding mycophenolate i/s/o infection  - f/u renal recs on resuming tacrolimus        #BPH (benign prostatic hyperplasia).   - c/w tamsulosin 0.4mg daily      ID  #Aspiration PNA  Tracheal aspirate cx growing serratia marcescens and pseudomonas. Patient was started on vancomycin and cefepime. MRSA nares positive. Dc'ed vancomycin given supratherapeutic trough   - c/w cefepime 2g q24 (start 11/19- )  - given relatively poor renal function, will monitor closely for neurotoxicity    ENDOCRINE  #DM (diabetes mellitus).   Previously on lantus 15 lispro 5.  - holding basal bolus due to NPO status  - FSG q6  - goal -180s    # Hypothyroidism  TSH 20.8, free t4 0.7, T3 49 (low)  - per endo rec no suspicion of myxedema coma  - continue with 25 synthroid    #Relative Adrenal Insufficiency  Patient was started on hydrocortisone 50mg q6hr.  - given inc BP, transitioned to hydrocortisone 50mg q8hr    HEME  #Normocytic Anemia  Likely 2.2 CKD. Hgb 6.9 on 11/21, corrected appropriately s/p 1u pRBC. No active signs of bleeding on physical exam. CTH with no intracranial bleeding. Hgb stable today.  - active T+S  - transfuse <7    FLUIDS/ELECTROLYTES/NUTRITION  -IVF none  -Monitor, careful w advanced kidney disease  -Diet  NPO  DVT ppx: heparin TID  GI: none  C: FC  Dispo: [facility]

## 2022-11-22 NOTE — PROGRESS NOTE ADULT - SUBJECTIVE AND OBJECTIVE BOX
Interval Events: Reviewed  Patient seen and examined at bedside.    Patient is a 82y old  Male who presents with a chief complaint of LE edema and cough (21 Nov 2022 09:30)    lethragic  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      History of renal transplant  secondary to DM      Chronic kidney disease (CKD)      Glaucoma      Kidney transplant recipient      Amputation of toe  x 3      H/O heart artery stent          MEDICATIONS:  Pulmonary:    Antimicrobials:  cefepime   IVPB 2000 milliGRAM(s) IV Intermittent every 24 hours    Anticoagulants:  aspirin  chewable 81 milliGRAM(s) Enteral Tube every 24 hours  heparin   Injectable 5000 Unit(s) SubCutaneous every 8 hours    Cardiac:      Allergies    No Known Allergies    Intolerances        Vital Signs Last 24 Hrs  T(C): 35.8 (22 Nov 2022 05:11), Max: 36.5 (21 Nov 2022 17:08)  T(F): 96.4 (22 Nov 2022 05:11), Max: 97.7 (21 Nov 2022 17:08)  HR: 54 (22 Nov 2022 12:00) (52 - 93)  BP: --  BP(mean): --  RR: 23 (22 Nov 2022 12:00) (14 - 29)  SpO2: 99% (22 Nov 2022 12:00) (93% - 100%)    Parameters below as of 22 Nov 2022 12:00  Patient On (Oxygen Delivery Method): ventilator,CPAP     O2 Concentration (%): 40    11-21 @ 07:01  -  11-22 @ 07:00  --------------------------------------------------------  IN: 677.7 mL / OUT: 1440 mL / NET: -762.3 mL    11-22 @ 07:01  -  11-22 @ 12:46  --------------------------------------------------------  IN: 49.2 mL / OUT: 160 mL / NET: -110.8 mL      Mode: CPAP with PS  FiO2: 40  PEEP: 5  PS: 5  MAP: 7.2  PIP: 11      Review of Systems:   •	General: negative  •	Skin/Breast: negative  •	Ophthalmologic: negative  •	ENMT: negative  •	Respiratory and Thorax: negative  •	Cardiovascular: negative  •	Gastrointestinal: negative  •	Genitourinary: negative  •	Musculoskeletal: negative  •	Neurological: negative  •	Psychiatric: negative  •	Hematology/Lymphatics: negative  •	Endocrine: negative  •	Allergic/Immunologic: negative    Physical Exam:   • Constitutional:	refer to the dietion /Nutritionist note  • Eyes:	EOMI; PERRL; no drainage or redness  • ENMT:	No oral lesions; no gross abnormalities  • Neck	No bruits; no thyromegaly or nodules  • Breasts:	not examined  • Back:	No deformity or limitation of movement  • Respiratory:	Breath Sounds equal & clear to percussion & auscultation, no accessory muscle use  • Cardiovascular:	Regular rate & rhythm, normal S1, S2; no murmurs, gallops or rubs; no S3, S4  • Gastrointestinal:	Soft, non-tender, no hepatosplenomegaly, normal bowel sounds  • Genitourinary:	not examined  • Rectal: not examined  • Extremities:	No cyanosis, clubbing or edema  • Vascular:	Equal and normal pulses (carotid, femoral, dorsalis pedis)  • Neurologica:l	not examined  • Skin:	No lesions; no rash  • Lymph Nodes:	No lymphadedenopathy  • Musculoskeletal:	No joint pain, swelling or deformity; no limitation of movement        LABS:      CBC Full  -  ( 22 Nov 2022 04:05 )  WBC Count : 7.24 K/uL  RBC Count : 3.01 M/uL  Hemoglobin : 7.9 g/dL  Hematocrit : 25.6 %  Platelet Count - Automated : 130 K/uL  Mean Cell Volume : 85.0 fl  Mean Cell Hemoglobin : 26.2 pg  Mean Cell Hemoglobin Concentration : 30.9 gm/dL  Auto Neutrophil # : 6.60 K/uL  Auto Lymphocyte # : 0.35 K/uL  Auto Monocyte # : 0.27 K/uL  Auto Eosinophil # : 0.00 K/uL  Auto Basophil # : 0.00 K/uL  Auto Neutrophil % : 91.2 %  Auto Lymphocyte % : 4.8 %  Auto Monocyte % : 3.7 %  Auto Eosinophil % : 0.0 %  Auto Basophil % : 0.0 %    11-22    145  |  112<H>  |  69<H>  ----------------------------<  186<H>  3.4<L>   |  16<L>  |  3.03<H>    Ca    8.6      22 Nov 2022 04:05  Phos  5.4     11-22  Mg     1.7     11-22    TPro  5.5<L>  /  Alb  2.8<L>  /  TBili  0.3  /  DBili  x   /  AST  10  /  ALT  12  /  AlkPhos  93  11-22                        RADIOLOGY & ADDITIONAL STUDIES (The following images were personally reviewed):

## 2022-11-22 NOTE — PROGRESS NOTE ADULT - PROBLEM SELECTOR PLAN 1
Event noticed.  The patient is sedated.  I suctioned the patient copious amount of secretion.  Gas exchange is stable.  Sputum grew Pseudomonas and Serratia.  The patient on appropriate antibiotic.  Continue pulmonary toilet.  Patient has baseline dementia and does not and trial up off sedation.  Tapers pressors as needed.  Cortisol level is adequate.  I discussed the case with critical care.  Trial of pressure support ventilation is adequate.  Suctioned the patient scanty secretions.  Suggest DC Precedex and possible extubation.  CT scan of head unremarkable

## 2022-11-22 NOTE — PROGRESS NOTE ADULT - PROBLEM SELECTOR PLAN 1
In light of recent hypoglycemia to 20s, will continue lispro MISS before meals and at bedtime. Expect hyperlgycemia to improve as steroids are tapered.    A1C: 9.0 %  BUN: 64  Creatinine: 3.15  GFR: 19  Weight:82.3  BMI: 28.4  Ejection Fraction: LVEF appears normal in limited views (11/17/22)

## 2022-11-22 NOTE — PROGRESS NOTE ADULT - SUBJECTIVE AND OBJECTIVE BOX
OVERNIGHT: No acute events overnight.   SUBJECTIVE: Patient was seen and examined this morning. He remains intubated. Patient's family was at beside. His daughter obtained collateral information from spouse who says that at home he takes Humulin 75/25 sliding scale. He usually takes it twice daily (before breakfast and before dinner). She says that if his glucose is >150 mg/dL she would give him 10-12 units, and that if his blood glucose is >250 mg/dL she would give him 14 units.     CAPILLARY BLOOD GLUCOSE & INSULIN RECEIVED  Yesterday  - Dinner FS mg/dL = He didn't receive insulin.   - Bedtime FS mg/dL = He didn't receive insulin.     Today  - Breakfast FS mg/dL = 1 units of Lispro sliding scale.   - Lunch FS mg/dL = 1 units of Lispro sliding scale.     REVIEW OF SYSTEMS  Unable to obtain as patient was sedated/intubated.      PHYSICAL EXAM  Vital Signs Last 24 Hrs  T(C): 36.4 (2022 09:42), Max: 37.1 (2022 01:04)  T(F): 97.5 (2022 09:42), Max: 98.8 (2022 01:04)  HR: 72 (2022 10:45) (49 - 75)  BP: 141/65 (2022 17:50) (94/44 - 202/79)  BP(mean): 93 (2022 17:50) (64 - 114)  RR: 25 (2022 10:45) (14 - 26)  SpO2: 100% (2022 10:45) (94% - 100%)    Parameters below as of 2022 10:45  Patient On (Oxygen Delivery Method): ventilator    O2 Concentration (%): 40    Constitutional: Elderly male, sedated, intubated.  HEENT: Normocephalic, atraumatic, GORDON.  Respiratory: Lungs clear to ausculation bilaterally, on mechanical ventilation.   Cardiovascular: regular rhythm, normal S1 and S2, no audible murmurs.   GI: soft, non-tender, non-distended, bowel sounds present.  Extremities: No lower extremity edema.    LABS  CBC - WBC/HGB/HTC/PLT: 7.24/7.9/25.6/130 (22)  BMP - Na/K/Cl/Bicarb/BUN/Cr/Gluc: 145/3.4/112/16/69/3.03/186 (22)  Anion Gap: 17 (22)  eGFR: 20 (22)  Calcium: 8.6 (22)  Phosphorus: 5.4 (22)  Magnesium: 1.7 (22)  LFT - Alb/Tprot/Tbili/Dbili/AlkPhos/ALT/AST: 2.8/--/0.3/--/93/12/10 (22)  PT/aPTT/INR: 12.5/37.5/1.05 (22)   Thyroid Stimulating Hormone, Serum: 20.890 (22)  Total T4/Free T4: 3.55/-- (22)      MEDICATIONS  MEDICATIONS  (STANDING):  aspirin  chewable 81 milliGRAM(s) Enteral Tube every 24 hours  atorvastatin 40 milliGRAM(s) Oral at bedtime  cefepime   IVPB 2000 milliGRAM(s) IV Intermittent every 24 hours  chlorhexidine 2% Cloths 1 Application(s) Topical <User Schedule>  dextrose 5%. 1000 milliLiter(s) (100 mL/Hr) IV Continuous <Continuous>  dextrose 5%. 1000 milliLiter(s) (50 mL/Hr) IV Continuous <Continuous>  dextrose 50% Injectable 25 Gram(s) IV Push once  dextrose 50% Injectable 12.5 Gram(s) IV Push once  dextrose 50% Injectable 25 Gram(s) IV Push once  glucagon  Injectable 1 milliGRAM(s) IntraMuscular once  heparin   Injectable 5000 Unit(s) SubCutaneous every 8 hours  hydrocortisone sodium succinate Injectable 50 milliGRAM(s) IV Push every 8 hours  influenza  Vaccine (HIGH DOSE) 0.7 milliLiter(s) IntraMuscular once  insulin lispro (ADMELOG) corrective regimen sliding scale   SubCutaneous every 6 hours  levothyroxine Injectable 25 MICROGram(s) IV Push at bedtime  tacrolimus 2 milliGRAM(s) Oral every 12 hours  tamsulosin 0.4 milliGRAM(s) Oral at bedtime    MEDICATIONS  (PRN):  dextrose Oral Gel 15 Gram(s) Oral once PRN Blood Glucose LESS THAN 70 milliGRAM(s)/deciliter  sodium chloride 0.9% lock flush 10 milliLiter(s) IV Push every 1 hour PRN Pre/post blood products, medications, blood draw, and to maintain line patency    ASSESSMENT / RECOMMENDATIONS    A1C: 9.0 %  BUN: 69 mg/dL  Creatinine: 3.03 mg/dL  GFR: 20 mL/min/1.73m2  Weight (kg): 82.3  BMI (kg/m2): 28.4  Ejection Fraction:     # Type 2 diabetes mellitus  - Please continue lantus *** units at bedtime.   - Continue lispro *** units before each meal.  - Continue lispro moderate / low dose sliding scale four times daily with meals and at bedtime.  - Patient's fingerstick glucose goal is 100-180 mg/dL.    - For discharge, patient can ***.    - Patient can follow up at discharge with [facility] by calling [phone] to make an appointment.      Case discussed with Dr. Rabago. Primary team updated.       Ulysses Weber    Endocrinology Fellow    Service Pager: [phone]    OVERNIGHT: No acute events overnight.   SUBJECTIVE: Patient was seen and examined this morning. Patient extubated this afternoon. VSS are stable. BG are in 200s on low dose sliding scale.  He is on HC 50 every 8 hours and levothyroxine 25 mcg IV daily.    REVIEW OF SYSTEMS  Unable to obtain as patient is minimally conversant immediately post extubation.    PHYSICAL EXAM  Vital Signs Last 24 Hrs  T(C): 36.4 (21 Nov 2022 09:42), Max: 37.1 (21 Nov 2022 01:04)  T(F): 97.5 (21 Nov 2022 09:42), Max: 98.8 (21 Nov 2022 01:04)  HR: 72 (21 Nov 2022 10:45) (49 - 75)  BP: 141/65 (20 Nov 2022 17:50) (94/44 - 202/79)  BP(mean): 93 (20 Nov 2022 17:50) (64 - 114)  RR: 25 (21 Nov 2022 10:45) (14 - 26)  SpO2: 100% (21 Nov 2022 10:45) (94% - 100%)    Parameters below as of 21 Nov 2022 10:45  Patient On (Oxygen Delivery Method): ventilator    O2 Concentration (%): 40    Constitutional: Elderly male.  HEENT: Normocephalic, atraumatic, GORDON.  Respiratory: Lungs clear to ausculation bilaterally  Cardiovascular: regular rhythm, normal S1 and S2, no audible murmurs.   GI: soft, non-tender, non-distended, bowel sounds present.  Extremities: b/l upper and lower edema 3+    LABS  CBC - WBC/HGB/HTC/PLT: 7.24/7.9/25.6/130 (11-22-22)  BMP - Na/K/Cl/Bicarb/BUN/Cr/Gluc: 145/3.4/112/16/69/3.03/186 (11-22-22)  Anion Gap: 17 (11-22-22)  eGFR: 20 (11-22-22)  Calcium: 8.6 (11-22-22)  Phosphorus: 5.4 (11-22-22)  Magnesium: 1.7 (11-22-22)  LFT - Alb/Tprot/Tbili/Dbili/AlkPhos/ALT/AST: 2.8/--/0.3/--/93/12/10 (11-22-22)  PT/aPTT/INR: 12.5/37.5/1.05 (11-20-22)   Thyroid Stimulating Hormone, Serum: 20.890 (11-19-22)  Total T4/Free T4: 3.55/-- (11-20-22)      MEDICATIONS  MEDICATIONS  (STANDING):  aspirin  chewable 81 milliGRAM(s) Enteral Tube every 24 hours  atorvastatin 40 milliGRAM(s) Oral at bedtime  cefepime   IVPB 2000 milliGRAM(s) IV Intermittent every 24 hours  chlorhexidine 2% Cloths 1 Application(s) Topical <User Schedule>  dextrose 5%. 1000 milliLiter(s) (100 mL/Hr) IV Continuous <Continuous>  dextrose 5%. 1000 milliLiter(s) (50 mL/Hr) IV Continuous <Continuous>  dextrose 50% Injectable 25 Gram(s) IV Push once  dextrose 50% Injectable 12.5 Gram(s) IV Push once  dextrose 50% Injectable 25 Gram(s) IV Push once  glucagon  Injectable 1 milliGRAM(s) IntraMuscular once  heparin   Injectable 5000 Unit(s) SubCutaneous every 8 hours  hydrocortisone sodium succinate Injectable 50 milliGRAM(s) IV Push every 8 hours  influenza  Vaccine (HIGH DOSE) 0.7 milliLiter(s) IntraMuscular once  insulin lispro (ADMELOG) corrective regimen sliding scale   SubCutaneous every 6 hours  levothyroxine Injectable 25 MICROGram(s) IV Push at bedtime  tacrolimus 2 milliGRAM(s) Oral every 12 hours  tamsulosin 0.4 milliGRAM(s) Oral at bedtime    MEDICATIONS  (PRN):  dextrose Oral Gel 15 Gram(s) Oral once PRN Blood Glucose LESS THAN 70 milliGRAM(s)/deciliter  sodium chloride 0.9% lock flush 10 milliLiter(s) IV Push every 1 hour PRN Pre/post blood products, medications, blood draw, and to maintain line patency

## 2022-11-23 NOTE — PROGRESS NOTE ADULT - SUBJECTIVE AND OBJECTIVE BOX
OVERNIGHT: No acute events overnight.   SUBJECTIVE / INTERVAL HPI: Patient was seen and examined this morning. He was started on tube feeds this morning at 10 mL/hr with goal of increasing up to 43 mL/hr.     CAPILLARY BLOOD GLUCOSE & INSULIN RECEIVED  Yesterday  - Dinner FS mg/dL = 2 units of Lispro sliding scale.   - Bedtime FS mg/dL = 2 units Lispro sliding scale.     Today  - Breakfast FS mg/dL = 2 units of Lispro sliding scale.   - Lunch FS mg/dL = He didn't receive insulin.     REVIEW OF SYSTEMS  Constitutional:  Negative fever, chills.   Eyes:  Negative blurry vision or double vision.  Cardiovascular:  Negative for chest pain or palpitations.  Respiratory:  (+) Shortness of breath. Negative for cough, wheezing.  Gastrointestinal:  Negative for nausea, vomiting, diarrhea, constipation.  Neurologic:  No headache, confusion, dizziness, lightheadedness.    PHYSICAL EXAM  Vital Signs Last 24 Hrs  T(C): 35 (2022 18:00), Max: 36.1 (2022 06:03)  T(F): 95 (2022 18:00), Max: 97 (2022 06:03)  HR: 58 (2022 20:00) (55 - 77)  RR: 22 (2022 20:00) (18 - 34)  SpO2: 98% (2022 20:00) (93% - 100%)    Parameters below as of 2022 20:00  Patient On (Oxygen Delivery Method): nasal cannula  O2 Flow (L/min): 4    Constitutional: Awake, alert, elderly male in no acute distress.   HEENT: Normocephalic, atraumatic, GORDON.  Neck: supple, no thyromegaly or palpable thyroid nodules.  Respiratory: (+) Bilateral diffuse rhonchi.   Cardiovascular: regular rhythm, normal S1 and S2, no audible murmurs.   GI: soft, non-tender, non-distended, bowel sounds present, no masses appreciated.  Extremities: +1 bilateral lower extremity edema.    LABS  CBC - WBC/HGB/HTC/PLT: 8.68/7.7/24.7/134 (22)  BMP - Na/K/Cl/Bicarb/BUN/Cr/Gluc/AG/eGFR: 146/3.8/114/16/77/3.00/164/16/20 (22)  Ca - 8.7 (22)  Phos - 5.3 (22)  Mg - 2.0 (22)  LFT - Alb/Tprot/Tbili/Dbili/AlkPhos/ALT/AST: 2.6/--/0.3/--/95/11/ (22)  PT/aPTT/INR: 12.5/37.5/1.05 (22)   Thyroid Stimulating Hormone, Serum: 20.890 (22)  Total T4/Free T4: 3.55/-- (22)    MEDICATIONS  MEDICATIONS  (STANDING):  albuterol/ipratropium for Nebulization 3 milliLiter(s) Nebulizer every 6 hours  aspirin  chewable 81 milliGRAM(s) Oral daily  atorvastatin 40 milliGRAM(s) Oral at bedtime  carvedilol 25 milliGRAM(s) Oral every 12 hours  chlorhexidine 2% Cloths 1 Application(s) Topical <User Schedule>  dextrose 5%. 1000 milliLiter(s) (100 mL/Hr) IV Continuous <Continuous>  dextrose 5%. 1000 milliLiter(s) (50 mL/Hr) IV Continuous <Continuous>  dextrose 50% Injectable 25 Gram(s) IV Push once  dextrose 50% Injectable 12.5 Gram(s) IV Push once  dextrose 50% Injectable 25 Gram(s) IV Push once  glucagon  Injectable 1 milliGRAM(s) IntraMuscular once  heparin   Injectable 5000 Unit(s) SubCutaneous every 8 hours  hydrocortisone sodium succinate Injectable 30 milliGRAM(s) IV Push every 8 hours  influenza  Vaccine (HIGH DOSE) 0.7 milliLiter(s) IntraMuscular once  insulin glargine Injectable (LANTUS) 10 Unit(s) SubCutaneous at bedtime  insulin regular  human corrective regimen sliding scale   SubCutaneous every 6 hours  levothyroxine Injectable 25 MICROGram(s) IV Push at bedtime  polyethylene glycol 3350 17 Gram(s) Oral every 24 hours  senna 2 Tablet(s) Oral at bedtime  tacrolimus 2 milliGRAM(s) Oral every 12 hours  tamsulosin 0.4 milliGRAM(s) Oral at bedtime    MEDICATIONS  (PRN):  acetaminophen     Tablet .. 650 milliGRAM(s) Oral every 6 hours PRN Mild Pain (1 - 3)  dextrose Oral Gel 15 Gram(s) Oral once PRN Blood Glucose LESS THAN 70 milliGRAM(s)/deciliter  sodium chloride 0.9% lock flush 10 milliLiter(s) IV Push every 1 hour PRN Pre/post blood products, medications, blood draw, and to maintain line patency    ASSESSMENT / RECOMMENDATIONS  Mr. Miguel is an 83-year-old male with type 2 diabetes mellitus, coronary artery disease (s/p DAMEON x2 to LAD on 2021), chronic kidney disease stage 4 (s/p renal transplant on , on tracrolimus and prednisone) and benign prostatic hyperplasia who was sent to the hospital by his nephrologist for further evaluation of lower extremity edema (22). He was admitted for further management of LE edema thought to be secondary to his underlying CKD versus congestive heart failure. Hospitalization was complicated by hypoglycemia and cardiac arrest (22, ROSC after 1 minute of chest compressions). Post-cardiac arrest he was noted to develop hypotension, bradycardia and hypothermia. Labs were remarkable for elevated TSH (20) and decreased FT4 (0.77). 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     Although he had features seen with myxedema coma, these findings were better explained by his cardiac arrest as his vital signs prior to the arrest were apparently around his baseline, as was his mental status. Nonetheless, he was started on levothyroxine 25 mcg IV daily (22). In addition, given concern for adrenal insufficiency, he was started on stress-dose steroids (hydrocortisone 50 mg every 6 hours). Since then, he has been extubated (22) and his hydrocortisone has been titrated down to 30 mg every 8 hours. His diabetes has been managed with a moderate dose sliding scale with glucose levels mostly within target goal. He was started on tube feeds earlier today.     A1C: 9.0 %  BUN: 77  Creatinine: 3.00  GFR: 20  Weight: 82.3  BMI: 28.4  Ejection Fraction: Normal LVEF, Grade II diastolic dysfunction.     # Hypothyroidism  - TSH 20. FT4 0.77 (22).   - Continue with levothyroxine 25 mcg IV daily.     # Concern for adrenal insufficiency  - Patient has been on prednisone 5 mg daily for renal transplant.   - He had recent hospitalization on 2022 for pneumonia due to pseudomonas where he was also started on stress dose steroids. At that time, a cortisol level was collected prior to starting steroids which was not robust (6.5 ug/dL).   - Please continue with hydrocortisone 30 mg every 8 hours for now.     # Type 2 diabetes mellitus   - Please start Lantus 10 units at bedtime.   - Change sliding scale to regular insulin low dose sliding scale every 6 hours.   - If patient's blood glucose remains consistently elevated >200 mg/dL as tube feeds are titrated, please start nutritional regular insulin 4 units every 6 hours (only to be continued while tube feeds are running).   - Fingerstick glucose goal of 100-180 mg/dL.     Case discussed with Dr. Rabago. Primary team updated.       Ulysses Weber    Endocrinology Fellow    Service Pager: [phone]

## 2022-11-23 NOTE — CHART NOTE - NSCHARTNOTEFT_GEN_A_CORE
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      History of renal transplant  secondary to DM      Chronic kidney disease (CKD)      Glaucoma      Kidney transplant recipient      Amputation of toe  x 3      H/O heart artery stent    Current Nutrition Order:   Diet, NPO with Tube Feed:   Tube Feeding Modality: Nasogastric  Nepro with Carb Steady (NEPRORTH)  Total Volume for 24 Hours (mL): 1032  Total Number of Cans: 5  Continuous  Starting Tube Feed Rate {mL per Hour}: 10  Increase Tube Feed Rate by (mL): 10     Every hour  Until Goal Tube Feed Rate (mL per Hour): 43  Tube Feed Duration (in Hours): 24  Tube Feed Start Time: 10:45  Liquid Protein Supplement     Qty per Day:  1 (11-23-22 @ 13:00)    PO Intake: N/A    GI Issues: Abdomen ND/NT, +BS x4, LBM 11/23    Pain: No non-verbal indicators     Skin Integrity: Stg I PI sacrum, +3 gen. edema, +4 peripheral     Labs:   11-23    146<H>  |  114<H>  |  77<H>  ----------------------------<  164<H>  3.8   |  16<L>  |  3.00<H>    Ca    8.7      23 Nov 2022 05:30  Phos  5.3     11-23  Mg     2.0     11-23    TPro  5.1<L>  /  Alb  2.6<L>  /  TBili  0.3  /  DBili  x   /  AST  9<L>  /  ALT  11  /  AlkPhos  95  11-23    CAPILLARY BLOOD GLUCOSE      POCT Blood Glucose.: 138 mg/dL (23 Nov 2022 12:12)  POCT Blood Glucose.: 180 mg/dL (23 Nov 2022 09:36)  POCT Blood Glucose.: 167 mg/dL (23 Nov 2022 06:42)  POCT Blood Glucose.: 171 mg/dL (23 Nov 2022 00:28)  POCT Blood Glucose.: 173 mg/dL (22 Nov 2022 18:56)      Medications:  MEDICATIONS  (STANDING):  albuterol/ipratropium for Nebulization 3 milliLiter(s) Nebulizer every 6 hours  aspirin  chewable 81 milliGRAM(s) Oral daily  atorvastatin 40 milliGRAM(s) Oral at bedtime  carvedilol 25 milliGRAM(s) Oral every 12 hours  cefepime   IVPB 2000 milliGRAM(s) IV Intermittent every 24 hours  chlorhexidine 2% Cloths 1 Application(s) Topical <User Schedule>  dextrose 5%. 1000 milliLiter(s) (100 mL/Hr) IV Continuous <Continuous>  dextrose 5%. 1000 milliLiter(s) (50 mL/Hr) IV Continuous <Continuous>  dextrose 50% Injectable 25 Gram(s) IV Push once  dextrose 50% Injectable 12.5 Gram(s) IV Push once  dextrose 50% Injectable 25 Gram(s) IV Push once  glucagon  Injectable 1 milliGRAM(s) IntraMuscular once  heparin   Injectable 5000 Unit(s) SubCutaneous every 8 hours  hydrocortisone sodium succinate Injectable 30 milliGRAM(s) IV Push every 8 hours  influenza  Vaccine (HIGH DOSE) 0.7 milliLiter(s) IntraMuscular once  insulin lispro (ADMELOG) corrective regimen sliding scale   SubCutaneous every 6 hours  levothyroxine Injectable 25 MICROGram(s) IV Push at bedtime  polyethylene glycol 3350 17 Gram(s) Oral every 24 hours  senna 2 Tablet(s) Oral at bedtime  tacrolimus 2 milliGRAM(s) Oral every 12 hours  tamsulosin 0.4 milliGRAM(s) Oral at bedtime    MEDICATIONS  (PRN):  dextrose Oral Gel 15 Gram(s) Oral once PRN Blood Glucose LESS THAN 70 milliGRAM(s)/deciliter  sodium chloride 0.9% lock flush 10 milliLiter(s) IV Push every 1 hour PRN Pre/post blood products, medications, blood draw, and to maintain line patency    Height for BMI (FEET)	5 Feet  Height for BMI (INCHES)	7 Inch(s)  Height for BMI (CENTIMETERS)	170.18 Centimeter(s)  Weight for BMI (lbs)	220 lb  Weight for BMI (kg)	99.8 kg  Body Mass Index	34.4    Weight Change: No new wt since admit.     Estimated energy needs:   -Needs updated 11/23 using IBW 67.3kg adjusted for vented pt.   EEN: 3757-2571 kcal (25-30 kcal/kg)  EPN:  gProt. (1.4-1.6 gProt./kg)  EFN: 9911-7840 ml (25-30 ml/kg)    Subjective: 84 y/o M PMH CKD 4, S/P renal transplant in 2013 at [facility], glaucoma (blind), DM1, anemia, CAD s/p 2 DAMEON to LAD in 6/21, BPH, recent admission for PNA presented with acute on chronic cough and b/l LE edema 2/2 acute CHF vs CKD. Cardiology and renal consulted, and pt was started on Lasix 40mg IV BID with improvement in b/l LE edema. This morning, pt found to be hypoxic to 70s on RA and hypoglycemia 31, difficult to arouse, and rapid response was called. Pt was put on supplemental oxygen with O2sat 80s, still difficult to arouse, and pt was intubated. Lost a pulse on pt so code blue was called, ROSC achieved, and pt was transferred to the ICU for closer monitoring. 11/19: Intubated. 11/22: Bowel care started.     Pt care discussed with IDT team. Rx and labs reviewed. Pt intubated at time of assessment; vent to VC-AC, MAP 91, no pressors, no propofol. Spoke with RN, initiating trophic feeds today. Spoke with team; plan to advance TF as tolerated; see recs below. Monitor ability to extubated and advance to PO diet. No other reports GI distress or further nutritional concerns at this time. RDN will continue to reassess, intervene, and monitor as appropriate.     Previous Nutrition Diagnosis: Inadequate Protein Energy Intake r/t intubation AEB NPO status    Active [ x ]  Resolved [   ] *adjusted 11/23    If resolved, new PES:     Goal: Pt will meet 75% or more of protein/energy needs via most appropriate route for nutrition.     Recommendations:  -Advance TF regimen as tolerated    *Recommend Nepro @43 ml/hr with LPS x1/day to provide 1032 ml TF, 1958 kcal, 99 gProt., and 750 ml FW. This is 23.2 non-protein kcal and 1.47 gProt. per kg IBW 63.7kg.    *FWF per team. If euvolemic and sNa WNL, consider 210 ml FWF q4hr to provide 2010 total fluids per day.   -Monitor pressor and propofol demands; adjust TF as necessary   -Maintain aspiration precautions at all times   -Align nutrition with GOC at all times   -Monitor chemistry, GI fxn, and skin integrity     Risk Level: High [ x ] Moderate [   ] Low [   ]

## 2022-11-23 NOTE — PHYSICAL THERAPY INITIAL EVALUATION ADULT - PREDICTED DURATION OF THERAPY (DAYS/WKS), PT EVAL
Pt. would benefit from cont. PT f/u to improve functional mobility; prevent further deconditioning, decrease burden of care.

## 2022-11-23 NOTE — PROGRESS NOTE ADULT - ASSESSMENT
Patient is an 82 yo M with ESKD s/p transplant 2013 now CKD 4, glaucoma, DM1, Anemia, CAD, BPH presenting with cough and lower extremity edema found to have decompensated heart failure, complicated by cardiac arrest due to hypoxia vs hypoglycemia vs hyperkalemia, with uptrending creatinine after cardiac arrest.       Problem/Plan:    #ELIZABETH on CKD 2/2 ATN in the setting of cardiac arrest   Renal allograft with CKD 4 - baseline sCr 2.3-2.5, usual meds tacrolimus 4mg BID and mycophenolate 500mg BID.   Recs:  -Creatinine uptrending after cardiac arrest on 11/19: 2.3-->3.15, -->3.03--> 3.00 today improved   - C/w  Tacrolimus 2mg BID  (lower dose than home med).   - Check the Tacrolimus level in 2 days in 30 minutes before 11/24 AM dose given. If elevated, call renal team to get recs   - strict i's and o's  - UOP keep net negative -1 Litter   - uop noted; no need for HD currently however will assess daily   -vancomycin should be dosed by level as patient CKD IV now in ELIZABETH  -Trend BMP daily  -Ok to use lasix IVP if needed     INCOMPLETE      Patient is an 84 yo M with ESKD s/p transplant 2013 now CKD 4, glaucoma, DM1, Anemia, CAD, BPH presenting with cough and lower extremity edema found to have decompensated heart failure, complicated by cardiac arrest due to hypoxia vs hypoglycemia vs hyperkalemia, with uptrending creatinine after cardiac arrest.       Problem/Plan:    #LEIZABETH on CKD 2/2 ATN in the setting of cardiac arrest   Renal allograft with CKD 4 - baseline sCr 2.3-2.5, usual meds tacrolimus 4mg BID and mycophenolate 500mg BID.   Recs:  -Creatinine uptrending after cardiac arrest on 11/19: 2.3-->3.15, -->3.03--> 3.00 stable today  - C/w  Tacrolimus 2mg BID  (lower dose than home med). started last night one  - Check the Tacrolimus level in 2 days in 30 minutes before 11/24 AM dose given. If elevated, call renal team to get recs   - strict i's and o's  - UOP dropped to 25/hr, CVP noted 9 at bedside. LR 1000cc given by primary team.   - UOP keep net negative -1 Litter   - Trend BMP daily  - Ok to use lasix IVP if needed

## 2022-11-23 NOTE — PROGRESS NOTE ADULT - PROBLEM SELECTOR PLAN 1
Event noticed.  The patient is sedated.  I suctioned the patient copious amount of secretion.  Gas exchange is stable.  Sputum grew Pseudomonas and Serratia.  The patient on appropriate antibiotic.  Continue pulmonary toilet.  Patient has baseline dementia and does not and trial up off sedation.  Tapers pressors as needed.  Cortisol level is adequate.  I discussed the case with critical care. She was extubated.  Patient is on nasal cannula.  Suction the patient copious amount of secretion.  Dysphagia evaluation.  Continue antibiotic.  The patient is hemodynamically stable off pressors and sedative

## 2022-11-23 NOTE — PHYSICAL THERAPY INITIAL EVALUATION ADULT - IMPAIRMENTS FOUND, PT EVAL
impaired standing balance and transfers, increased unsteadiness of gait; limited endurance/muscle strength

## 2022-11-23 NOTE — PROGRESS NOTE ADULT - SUBJECTIVE AND OBJECTIVE BOX
**Incomplete Note**   CC: Patient is a 82y old  Male who presents with a chief complaint of LE edema and cough (2022 09:30)      INTERVAL EVENTS: MAGGIE    SUBJECTIVE / INTERVAL HPI: Patient seen and examined at bedside. Patient was     ROS: negative unless otherwise stated above.    VITAL SIGNS:  Vital Signs Last 24 Hrs  T(C): 36.1 (2022 06:03), Max: 36.1 (2022 06:03)  T(F): 97 (2022 06:03), Max: 97 (2022 06:03)  HR: 61 (2022 12:00) (53 - 77)  BP: --  BP(mean): --  RR: 24 (2022 12:00) (20 - 31)  SpO2: 97% (2022 12:00) (93% - 100%)    Parameters below as of 2022 12:00  Patient On (Oxygen Delivery Method): nasal cannula, high flow  O2 Flow (L/min): 40  O2 Concentration (%): 40      22 @ 07:01  -  22 @ 07:00  --------------------------------------------------------  IN: 373.2 mL / OUT: 805 mL / NET: -431.8 mL    22 @ 07:01  -  22 @ 12:34  --------------------------------------------------------  IN: 70 mL / OUT: 205 mL / NET: -135 mL        PHYSICAL EXAM:    General: NAD  HEENT: MMM  Neck: supple  Cardiovascular: +S1/S2; RRR  Respiratory: CTA B/L; no W/R/R  Gastrointestinal: soft, NT/ND  Extremities: WWP; no edema, clubbing or cyanosis  Vascular: 2+ radial, DP/PT pulses B/L  Neurological: AAOx3; no focal deficits    MEDICATIONS:  MEDICATIONS  (STANDING):  albuterol/ipratropium for Nebulization 3 milliLiter(s) Nebulizer every 6 hours  aspirin  chewable 81 milliGRAM(s) Enteral Tube every 24 hours  atorvastatin 40 milliGRAM(s) Oral at bedtime  carvedilol 25 milliGRAM(s) Oral every 12 hours  cefepime   IVPB 2000 milliGRAM(s) IV Intermittent every 24 hours  chlorhexidine 2% Cloths 1 Application(s) Topical <User Schedule>  dextrose 5%. 1000 milliLiter(s) (100 mL/Hr) IV Continuous <Continuous>  dextrose 5%. 1000 milliLiter(s) (50 mL/Hr) IV Continuous <Continuous>  dextrose 50% Injectable 25 Gram(s) IV Push once  dextrose 50% Injectable 12.5 Gram(s) IV Push once  dextrose 50% Injectable 25 Gram(s) IV Push once  glucagon  Injectable 1 milliGRAM(s) IntraMuscular once  heparin   Injectable 5000 Unit(s) SubCutaneous every 8 hours  hydrocortisone sodium succinate Injectable 30 milliGRAM(s) IV Push every 8 hours  influenza  Vaccine (HIGH DOSE) 0.7 milliLiter(s) IntraMuscular once  insulin lispro (ADMELOG) corrective regimen sliding scale   SubCutaneous every 6 hours  levothyroxine Injectable 25 MICROGram(s) IV Push at bedtime  polyethylene glycol 3350 17 Gram(s) Oral every 24 hours  senna 2 Tablet(s) Oral at bedtime  tacrolimus 2 milliGRAM(s) Oral every 12 hours  tamsulosin 0.4 milliGRAM(s) Oral at bedtime    MEDICATIONS  (PRN):  dextrose Oral Gel 15 Gram(s) Oral once PRN Blood Glucose LESS THAN 70 milliGRAM(s)/deciliter  sodium chloride 0.9% lock flush 10 milliLiter(s) IV Push every 1 hour PRN Pre/post blood products, medications, blood draw, and to maintain line patency      ALLERGIES:  Allergies    No Known Allergies    Intolerances        LABS:                        7.7    8.68  )-----------( 134      ( 2022 05:30 )             24.7     11-23    146<H>  |  114<H>  |  77<H>  ----------------------------<  164<H>  3.8   |  16<L>  |  3.00<H>    Ca    8.7      2022 05:30  Phos  5.3     11-  Mg     2.0         TPro  5.1<L>  /  Alb  2.6<L>  /  TBili  0.3  /  DBili  x   /  AST  9<L>  /  ALT  11  /  AlkPhos  95        Urinalysis Basic - ( 2022 09:20 )    Color: Yellow / Appearance: Clear / S.020 / pH: x  Gluc: x / Ketone: Trace mg/dL  / Bili: Negative / Urobili: 0.2 E.U./dL   Blood: x / Protein: 30 mg/dL / Nitrite: NEGATIVE   Leuk Esterase: NEGATIVE / RBC: Many /HPF / WBC < 5 /HPF   Sq Epi: x / Non Sq Epi: 5-10 /HPF / Bacteria: Present /HPF      CAPILLARY BLOOD GLUCOSE      POCT Blood Glucose.: 138 mg/dL (2022 12:12)      RADIOLOGY & ADDITIONAL TESTS: Reviewed.   **Incomplete Note**    CC: Patient is a 82y old  Male who presents with a chief complaint of LE edema and cough (2022 09:30)      INTERVAL EVENTS: MAGGIE    SUBJECTIVE / INTERVAL HPI: Patient seen and examined at bedside. More alert today on HFNC. Denies chest pain, SOB is improving. Still has productive cough when taking deep inspiration.     ROS: negative unless otherwise stated above.    VITAL SIGNS:  Vital Signs Last 24 Hrs  T(C): 36.1 (2022 06:03), Max: 36.1 (2022 06:03)  T(F): 97 (2022 06:03), Max: 97 (2022 06:03)  HR: 61 (2022 12:00) (53 - 77)  BP: --  BP(mean): --  RR: 24 (2022 12:00) (20 - 31)  SpO2: 97% (2022 12:00) (93% - 100%)    Parameters below as of 2022 12:00  Patient On (Oxygen Delivery Method): nasal cannula, high flow  O2 Flow (L/min): 40  O2 Concentration (%): 40      22 @ 07:01  -  22 @ 07:00  --------------------------------------------------------  IN: 373.2 mL / OUT: 805 mL / NET: -431.8 mL    22 @ 07:01  -  22 @ 12:34  --------------------------------------------------------  IN: 70 mL / OUT: 205 mL / NET: -135 mL        PHYSICAL EXAM:  General: NAD  HEENT: MMM  Neck: supple  Cardiovascular: +S1/S2; RRR  Respiratory: CTA B/L; no W/R/R  Gastrointestinal: soft, NT/ND  Extremities: WWP; no edema, clubbing or cyanosis  Vascular: 2+ radial, DP/PT pulses B/L  Neurological: AAOx3; no focal deficits    MEDICATIONS:  MEDICATIONS  (STANDING):  albuterol/ipratropium for Nebulization 3 milliLiter(s) Nebulizer every 6 hours  aspirin  chewable 81 milliGRAM(s) Enteral Tube every 24 hours  atorvastatin 40 milliGRAM(s) Oral at bedtime  carvedilol 25 milliGRAM(s) Oral every 12 hours  cefepime   IVPB 2000 milliGRAM(s) IV Intermittent every 24 hours  chlorhexidine 2% Cloths 1 Application(s) Topical <User Schedule>  dextrose 5%. 1000 milliLiter(s) (100 mL/Hr) IV Continuous <Continuous>  dextrose 5%. 1000 milliLiter(s) (50 mL/Hr) IV Continuous <Continuous>  dextrose 50% Injectable 25 Gram(s) IV Push once  dextrose 50% Injectable 12.5 Gram(s) IV Push once  dextrose 50% Injectable 25 Gram(s) IV Push once  glucagon  Injectable 1 milliGRAM(s) IntraMuscular once  heparin   Injectable 5000 Unit(s) SubCutaneous every 8 hours  hydrocortisone sodium succinate Injectable 30 milliGRAM(s) IV Push every 8 hours  influenza  Vaccine (HIGH DOSE) 0.7 milliLiter(s) IntraMuscular once  insulin lispro (ADMELOG) corrective regimen sliding scale   SubCutaneous every 6 hours  levothyroxine Injectable 25 MICROGram(s) IV Push at bedtime  polyethylene glycol 3350 17 Gram(s) Oral every 24 hours  senna 2 Tablet(s) Oral at bedtime  tacrolimus 2 milliGRAM(s) Oral every 12 hours  tamsulosin 0.4 milliGRAM(s) Oral at bedtime    MEDICATIONS  (PRN):  dextrose Oral Gel 15 Gram(s) Oral once PRN Blood Glucose LESS THAN 70 milliGRAM(s)/deciliter  sodium chloride 0.9% lock flush 10 milliLiter(s) IV Push every 1 hour PRN Pre/post blood products, medications, blood draw, and to maintain line patency      ALLERGIES:  Allergies    No Known Allergies    Intolerances        LABS:                        7.7    8.68  )-----------( 134      ( 2022 05:30 )             24.7     11-23    146<H>  |  114<H>  |  77<H>  ----------------------------<  164<H>  3.8   |  16<L>  |  3.00<H>    Ca    8.7      2022 05:30  Phos  5.3     11-  Mg     2.0     -    TPro  5.1<L>  /  Alb  2.6<L>  /  TBili  0.3  /  DBili  x   /  AST  9<L>  /  ALT  11  /  AlkPhos  95  11-      Urinalysis Basic - ( 2022 09:20 )    Color: Yellow / Appearance: Clear / S.020 / pH: x  Gluc: x / Ketone: Trace mg/dL  / Bili: Negative / Urobili: 0.2 E.U./dL   Blood: x / Protein: 30 mg/dL / Nitrite: NEGATIVE   Leuk Esterase: NEGATIVE / RBC: Many /HPF / WBC < 5 /HPF   Sq Epi: x / Non Sq Epi: 5-10 /HPF / Bacteria: Present /HPF      CAPILLARY BLOOD GLUCOSE      POCT Blood Glucose.: 138 mg/dL (2022 12:12)      RADIOLOGY & ADDITIONAL TESTS: Reviewed.     CC: Patient is a 82y old  Male who presents with a chief complaint of LE edema and cough (2022 09:30)      INTERVAL EVENTS: MAGGIE    SUBJECTIVE / INTERVAL HPI: Patient seen and examined at bedside. More alert today on HFNC. Denies chest pain, SOB is improving. Still has productive cough when taking deep inspiration.     ROS: negative unless otherwise stated above.    VITAL SIGNS:  Vital Signs Last 24 Hrs  T(C): 36.1 (2022 06:03), Max: 36.1 (2022 06:03)  T(F): 97 (2022 06:03), Max: 97 (2022 06:03)  HR: 61 (2022 12:00) (53 - 77)  BP: --  BP(mean): --  RR: 24 (2022 12:00) (20 - 31)  SpO2: 97% (2022 12:00) (93% - 100%)    Parameters below as of 2022 12:00  Patient On (Oxygen Delivery Method): nasal cannula, high flow  O2 Flow (L/min): 40  O2 Concentration (%): 40      22 @ 07:01  -  22 @ 07:00  --------------------------------------------------------  IN: 373.2 mL / OUT: 805 mL / NET: -431.8 mL    22 @ 07:01  -  22 @ 12:34  --------------------------------------------------------  IN: 70 mL / OUT: 205 mL / NET: -135 mL        PHYSICAL EXAM:    General: NAD,   HEENT: MMM, pupils are asymmetric but reactive to light  Neck: supple  Cardiovascular: +S1/S2; RRR  Respiratory: CTA B/L; no W/R/R  Gastrointestinal: soft, NT/ND  Extremities: WWP; no edema, clubbing or cyanosis  Vascular: 2+ radial, DP/PT pulses B/L  Neurological: AAOx3; no focal deficits    MEDICATIONS:  MEDICATIONS  (STANDING):  albuterol/ipratropium for Nebulization 3 milliLiter(s) Nebulizer every 6 hours  aspirin  chewable 81 milliGRAM(s) Enteral Tube every 24 hours  atorvastatin 40 milliGRAM(s) Oral at bedtime  carvedilol 25 milliGRAM(s) Oral every 12 hours  cefepime   IVPB 2000 milliGRAM(s) IV Intermittent every 24 hours  chlorhexidine 2% Cloths 1 Application(s) Topical <User Schedule>  dextrose 5%. 1000 milliLiter(s) (100 mL/Hr) IV Continuous <Continuous>  dextrose 5%. 1000 milliLiter(s) (50 mL/Hr) IV Continuous <Continuous>  dextrose 50% Injectable 25 Gram(s) IV Push once  dextrose 50% Injectable 12.5 Gram(s) IV Push once  dextrose 50% Injectable 25 Gram(s) IV Push once  glucagon  Injectable 1 milliGRAM(s) IntraMuscular once  heparin   Injectable 5000 Unit(s) SubCutaneous every 8 hours  hydrocortisone sodium succinate Injectable 30 milliGRAM(s) IV Push every 8 hours  influenza  Vaccine (HIGH DOSE) 0.7 milliLiter(s) IntraMuscular once  insulin lispro (ADMELOG) corrective regimen sliding scale   SubCutaneous every 6 hours  levothyroxine Injectable 25 MICROGram(s) IV Push at bedtime  polyethylene glycol 3350 17 Gram(s) Oral every 24 hours  senna 2 Tablet(s) Oral at bedtime  tacrolimus 2 milliGRAM(s) Oral every 12 hours  tamsulosin 0.4 milliGRAM(s) Oral at bedtime    MEDICATIONS  (PRN):  dextrose Oral Gel 15 Gram(s) Oral once PRN Blood Glucose LESS THAN 70 milliGRAM(s)/deciliter  sodium chloride 0.9% lock flush 10 milliLiter(s) IV Push every 1 hour PRN Pre/post blood products, medications, blood draw, and to maintain line patency      ALLERGIES:  Allergies    No Known Allergies    Intolerances        LABS:                        7.7    8.68  )-----------( 134      ( 2022 05:30 )             24.7     11-23    146<H>  |  114<H>  |  77<H>  ----------------------------<  164<H>  3.8   |  16<L>  |  3.00<H>    Ca    8.7      2022 05:30  Phos  5.3     11-  Mg     2.0     -    TPro  5.1<L>  /  Alb  2.6<L>  /  TBili  0.3  /  DBili  x   /  AST  9<L>  /  ALT  11  /  AlkPhos  95  11-      Urinalysis Basic - ( 2022 09:20 )    Color: Yellow / Appearance: Clear / S.020 / pH: x  Gluc: x / Ketone: Trace mg/dL  / Bili: Negative / Urobili: 0.2 E.U./dL   Blood: x / Protein: 30 mg/dL / Nitrite: NEGATIVE   Leuk Esterase: NEGATIVE / RBC: Many /HPF / WBC < 5 /HPF   Sq Epi: x / Non Sq Epi: 5-10 /HPF / Bacteria: Present /HPF      CAPILLARY BLOOD GLUCOSE      POCT Blood Glucose.: 138 mg/dL (2022 12:12)      RADIOLOGY & ADDITIONAL TESTS: Reviewed.     CC: Patient is a 82y old  Male who presents with a chief complaint of LE edema and cough (2022 09:30)      INTERVAL EVENTS: MAGGIE    SUBJECTIVE / INTERVAL HPI: Patient seen and examined at bedside. More alert today on HFNC. Denies chest pain, SOB is improving. Still has productive cough when taking deep inspiration. Mentating well.    ROS: negative unless otherwise stated above.    VITAL SIGNS:  Vital Signs Last 24 Hrs  T(C): 36.1 (2022 06:03), Max: 36.1 (2022 06:03)  T(F): 97 (2022 06:03), Max: 97 (2022 06:03)  HR: 61 (2022 12:00) (53 - 77)  BP: --  BP(mean): --  RR: 24 (2022 12:00) (20 - 31)  SpO2: 97% (2022 12:00) (93% - 100%)    Parameters below as of 2022 12:00  Patient On (Oxygen Delivery Method): nasal cannula, high flow  O2 Flow (L/min): 40  O2 Concentration (%): 40      22 @ 07:01  -  22 @ 07:00  --------------------------------------------------------  IN: 373.2 mL / OUT: 805 mL / NET: -431.8 mL    22 @ 07:01  -  22 @ 12:34  --------------------------------------------------------  IN: 70 mL / OUT: 205 mL / NET: -135 mL        PHYSICAL EXAM:    General: NAD,   HEENT: MMM, anisoric pupils, still sluggish but reactive to light  Neck: supple  Cardiovascular: +S1/S2; RRR  Respiratory: bilateral rhonic decreased from yesterday and improved inspiratory effort; no W/R/R  Gastrointestinal: soft, NT/ND  Extremities: WWP; 1+ pitting edema around b/l ankles, 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, surgical site well-healing; 2+ pitting edema in b/l upper arms L>R up to mid forearm  Vascular: 2+ radial, DP pulses B/L  Neurological: following simple commands, more alert than yesterday, no focal deficits    MEDICATIONS:  MEDICATIONS  (STANDING):  albuterol/ipratropium for Nebulization 3 milliLiter(s) Nebulizer every 6 hours  aspirin  chewable 81 milliGRAM(s) Enteral Tube every 24 hours  atorvastatin 40 milliGRAM(s) Oral at bedtime  carvedilol 25 milliGRAM(s) Oral every 12 hours  cefepime   IVPB 2000 milliGRAM(s) IV Intermittent every 24 hours  chlorhexidine 2% Cloths 1 Application(s) Topical <User Schedule>  dextrose 5%. 1000 milliLiter(s) (100 mL/Hr) IV Continuous <Continuous>  dextrose 5%. 1000 milliLiter(s) (50 mL/Hr) IV Continuous <Continuous>  dextrose 50% Injectable 25 Gram(s) IV Push once  dextrose 50% Injectable 12.5 Gram(s) IV Push once  dextrose 50% Injectable 25 Gram(s) IV Push once  glucagon  Injectable 1 milliGRAM(s) IntraMuscular once  heparin   Injectable 5000 Unit(s) SubCutaneous every 8 hours  hydrocortisone sodium succinate Injectable 30 milliGRAM(s) IV Push every 8 hours  influenza  Vaccine (HIGH DOSE) 0.7 milliLiter(s) IntraMuscular once  insulin lispro (ADMELOG) corrective regimen sliding scale   SubCutaneous every 6 hours  levothyroxine Injectable 25 MICROGram(s) IV Push at bedtime  polyethylene glycol 3350 17 Gram(s) Oral every 24 hours  senna 2 Tablet(s) Oral at bedtime  tacrolimus 2 milliGRAM(s) Oral every 12 hours  tamsulosin 0.4 milliGRAM(s) Oral at bedtime    MEDICATIONS  (PRN):  dextrose Oral Gel 15 Gram(s) Oral once PRN Blood Glucose LESS THAN 70 milliGRAM(s)/deciliter  sodium chloride 0.9% lock flush 10 milliLiter(s) IV Push every 1 hour PRN Pre/post blood products, medications, blood draw, and to maintain line patency      ALLERGIES:  Allergies    No Known Allergies    Intolerances        LABS:                        7.7    8.68  )-----------( 134      ( 2022 05:30 )             24.7     11-    146<H>  |  114<H>  |  77<H>  ----------------------------<  164<H>  3.8   |  16<L>  |  3.00<H>    Ca    8.7      2022 05:30  Phos  5.3     11  Mg     2.0         TPro  5.1<L>  /  Alb  2.6<L>  /  TBili  0.3  /  DBili  x   /  AST  9<L>  /  ALT  11  /  AlkPhos  95        Urinalysis Basic - ( 2022 09:20 )    Color: Yellow / Appearance: Clear / S.020 / pH: x  Gluc: x / Ketone: Trace mg/dL  / Bili: Negative / Urobili: 0.2 E.U./dL   Blood: x / Protein: 30 mg/dL / Nitrite: NEGATIVE   Leuk Esterase: NEGATIVE / RBC: Many /HPF / WBC < 5 /HPF   Sq Epi: x / Non Sq Epi: 5-10 /HPF / Bacteria: Present /HPF      CAPILLARY BLOOD GLUCOSE      POCT Blood Glucose.: 138 mg/dL (2022 12:12)      RADIOLOGY & ADDITIONAL TESTS: Reviewed.     CC: Patient is a 82y old  Male who presents with a chief complaint of LE edema and cough (2022 09:30)      INTERVAL EVENTS: MAGGIE    SUBJECTIVE / INTERVAL HPI: Patient seen and examined at bedside. More alert today on HFNC. Denies chest pain, SOB is improving. Still has productive cough when taking deep inspiration. Mentating well.    ROS: negative unless otherwise stated above.    VITAL SIGNS:  Vital Signs Last 24 Hrs  T(C): 36.1 (2022 06:03), Max: 36.1 (2022 06:03)  T(F): 97 (2022 06:03), Max: 97 (2022 06:03)  HR: 61 (2022 12:00) (53 - 77)  BP: --  BP(mean): --  RR: 24 (2022 12:00) (20 - 31)  SpO2: 97% (2022 12:00) (93% - 100%)    Parameters below as of 2022 12:00  Patient On (Oxygen Delivery Method): nasal cannula, high flow  O2 Flow (L/min): 40  O2 Concentration (%): 40      22 @ 07:01  -  22 @ 07:00  --------------------------------------------------------  IN: 373.2 mL / OUT: 805 mL / NET: -431.8 mL    22 @ 07:01  -  22 @ 12:34  --------------------------------------------------------  IN: 70 mL / OUT: 205 mL / NET: -135 mL        PHYSICAL EXAM:    General: NAD,   HEENT: MMM, anisoric pupils, still sluggish but reactive to light  Neck: supple  Cardiovascular: +S1/S2; RRR  Respiratory: bilateral rhonci decreased from yesterday and improved inspiratory effort; no W/R/R  Gastrointestinal: soft, NT/ND  Extremities: WWP; 1+ pitting edema around b/l ankles, 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, surgical site well-healing; 2+ pitting edema in b/l upper arms L>R up to mid forearm  Vascular: 2+ radial, DP pulses B/L  Neurological: following simple commands, more alert than yesterday, no focal deficits    MEDICATIONS:  MEDICATIONS  (STANDING):  albuterol/ipratropium for Nebulization 3 milliLiter(s) Nebulizer every 6 hours  aspirin  chewable 81 milliGRAM(s) Enteral Tube every 24 hours  atorvastatin 40 milliGRAM(s) Oral at bedtime  carvedilol 25 milliGRAM(s) Oral every 12 hours  cefepime   IVPB 2000 milliGRAM(s) IV Intermittent every 24 hours  chlorhexidine 2% Cloths 1 Application(s) Topical <User Schedule>  dextrose 5%. 1000 milliLiter(s) (100 mL/Hr) IV Continuous <Continuous>  dextrose 5%. 1000 milliLiter(s) (50 mL/Hr) IV Continuous <Continuous>  dextrose 50% Injectable 25 Gram(s) IV Push once  dextrose 50% Injectable 12.5 Gram(s) IV Push once  dextrose 50% Injectable 25 Gram(s) IV Push once  glucagon  Injectable 1 milliGRAM(s) IntraMuscular once  heparin   Injectable 5000 Unit(s) SubCutaneous every 8 hours  hydrocortisone sodium succinate Injectable 30 milliGRAM(s) IV Push every 8 hours  influenza  Vaccine (HIGH DOSE) 0.7 milliLiter(s) IntraMuscular once  insulin lispro (ADMELOG) corrective regimen sliding scale   SubCutaneous every 6 hours  levothyroxine Injectable 25 MICROGram(s) IV Push at bedtime  polyethylene glycol 3350 17 Gram(s) Oral every 24 hours  senna 2 Tablet(s) Oral at bedtime  tacrolimus 2 milliGRAM(s) Oral every 12 hours  tamsulosin 0.4 milliGRAM(s) Oral at bedtime    MEDICATIONS  (PRN):  dextrose Oral Gel 15 Gram(s) Oral once PRN Blood Glucose LESS THAN 70 milliGRAM(s)/deciliter  sodium chloride 0.9% lock flush 10 milliLiter(s) IV Push every 1 hour PRN Pre/post blood products, medications, blood draw, and to maintain line patency      ALLERGIES:  Allergies    No Known Allergies    Intolerances        LABS:                        7.7    8.68  )-----------( 134      ( 2022 05:30 )             24.7     11-    146<H>  |  114<H>  |  77<H>  ----------------------------<  164<H>  3.8   |  16<L>  |  3.00<H>    Ca    8.7      2022 05:30  Phos  5.3       Mg     2.0         TPro  5.1<L>  /  Alb  2.6<L>  /  TBili  0.3  /  DBili  x   /  AST  9<L>  /  ALT  11  /  AlkPhos  95        Urinalysis Basic - ( 2022 09:20 )    Color: Yellow / Appearance: Clear / S.020 / pH: x  Gluc: x / Ketone: Trace mg/dL  / Bili: Negative / Urobili: 0.2 E.U./dL   Blood: x / Protein: 30 mg/dL / Nitrite: NEGATIVE   Leuk Esterase: NEGATIVE / RBC: Many /HPF / WBC < 5 /HPF   Sq Epi: x / Non Sq Epi: 5-10 /HPF / Bacteria: Present /HPF      CAPILLARY BLOOD GLUCOSE      POCT Blood Glucose.: 138 mg/dL (2022 12:12)      RADIOLOGY & ADDITIONAL TESTS: Reviewed.

## 2022-11-23 NOTE — PROGRESS NOTE ADULT - ATTENDING COMMENTS
clinically much improved-- alert, comfortable on NCO2  renal fxn stable  agree with trial of IVF for decreased uo

## 2022-11-23 NOTE — PROGRESS NOTE ADULT - ASSESSMENT
84 y/o M PMH CKD 4, renal transplant in 2013 at [facility], glaucoma (blind), DM1, anemia, CAD s/p 2 DAMEON to LAD in 6/21, BPH, recent admission for PNA presents with acute on chronic cough and SUNNY 2/2 acute CHF vs CKD.       NEUROLOGY  #Intubated  On precedex gtt, with RASS goal 0-1.   - CPAP trial today, tolerating well  - plan to extubate to BiPAP    CARDIOVASCULAR  #Acute on chronic diastolic CHF  Had EF of 55% on TTE from Oct 2022, grade I diastolic dysfunction. Repeat TTE 11/17 with poor visualization of LV. Patient appeared relatively euvolemic  - will hold on further fluids or lasix  - formal TTE today    #Lower extremity edema  SUNNY 2/2 CKD vs acute on chronic CHF (g I dd). Also w cough but saturating well on RA without increased work of breathing. BNP elevated supporting acute on chronic chf. Poor renal function evidenced by acidosis though he is at baseline Cr. Cards consulted in ED, limited TTE w preserved systolic function, but poor visualization.  - Cards & Renal consulted  - I/Os  - formal TTE today    #Upper extremity edema  Stable from yesterday but increased compared to baseline  - pending b/l upper extremity dopplers     #CAD  Hx of CAD s/p 2 DAMEON to LAD in June 2021, currently no CP. Trops 0.03 but no CP or ischemic changes on EKG, likely due to CKD. Takes Plavix and aspirin per last d/c but only aspirin on outpatient note  - c/w ASA daily  - resume carvedilol 25mg BID today  - collateral information on plavix.    #HLD  Known history  - c/w home Lipitor    RENAL  #ELIZABETH on Chronic kidney disease (CKD).   Renal function stable today. Baseline sCr 2.3-2.5. On admission, fluid overloaded. Does have orthopnea and intermittent SOB at baseline. Acidotic but at baseline. Follows w Dr. Mac.   - monitor I/Os  - f/u urine studies  - f/u UA  - f/u renal recs  - renally dose meds    #Renal transplant  Patient had renal transplant in 2013. On home mycophenolate 500mg BID and tacrolimus 4mg BID. Per nephro, decreased home tacrolimus from 4mg BID --> 2mg BID while in hospital.   - Tacrolimus level 2.2 yesterday.  - continue holding mycophenolate i/s/o infection  - f/u renal recs on resuming tacrolimus        #BPH (benign prostatic hyperplasia).   - c/w tamsulosin 0.4mg daily      ID  #Aspiration PNA  Tracheal aspirate cx growing serratia marcescens and pseudomonas. Patient was started on vancomycin and cefepime. MRSA nares positive. Dc'ed vancomycin given supratherapeutic trough   - c/w cefepime 2g q24 (start 11/19- )  - given relatively poor renal function, will monitor closely for neurotoxicity    ENDOCRINE  #DM (diabetes mellitus).   Previously on lantus 15 lispro 5.  - holding basal bolus due to NPO status  - FSG q6  - goal -180s    # Hypothyroidism  TSH 20.8, free t4 0.7, T3 49 (low)  - per endo rec no suspicion of myxedema coma  - continue with 25 synthroid    #Relative Adrenal Insufficiency  Patient was started on hydrocortisone 50mg q6hr.  - given inc BP, transitioned to hydrocortisone 50mg q8hr    HEME  #Normocytic Anemia  Likely 2.2 CKD. Hgb 6.9 on 11/21, corrected appropriately s/p 1u pRBC. No active signs of bleeding on physical exam. CTH with no intracranial bleeding. Hgb stable today.  - active T+S  - transfuse <7    FLUIDS/ELECTROLYTES/NUTRITION  -IVF none  -Monitor, careful w advanced kidney disease  -Diet  NPO  DVT ppx: heparin TID  GI: none  C: FC  Dispo: MICU   82 y/o M PMH CKD 4, renal transplant in 2013 at [facility], glaucoma (blind), DM1, anemia, CAD s/p 2 DAMEON to LAD in 6/21, BPH, recent admission for PNA presents with acute on chronic cough and SUNNY 2/2 acute CHF vs CKD.       NEUROLOGY  #S/p extubation  Off sedation and extubated. Mentating well. Pupils still anisocoric CT scan negative for acute intracranial pathology.      CARDIOVASCULAR  #Acute on chronic diastolic CHF  Had EF of 55% on TTE from Oct 2022, grade I diastolic dysfunction. Repeat TTE 11/17 with poor visualization of LV. Patient making poor UOP overnight, urine studies suggest hypovolemic. CVP 5 today from 11 yesterday.   - 1L NS bolus today      #Lower extremity edema  SUNNY 2/2 CKD vs acute on chronic CHF (g I dd). Also w cough but saturating well on RA without increased work of breathing. BNP elevated supporting acute on chronic chf. Cards consulted in ED, limited TTE w preserved systolic function, but poor visualization.  - continuing to monitor I/Os    #Upper extremity edema  Stable from yesterday but increased compared to baseline  - pending b/l upper extremity dopplers     #CAD  Hx of CAD s/p 2 DAMEON to LAD in June 2021, currently no CP. Trops 0.03 but no CP or ischemic changes on EKG, likely due to CKD. Takes Plavix and aspirin per last d/c but only aspirin on outpatient note  - c/w ASA daily  - resume carvedilol 25mg BID today  - collateral information on plavix.    #HLD  Known history  - c/w home Lipitor    RENAL  #ELIZABETH on Chronic kidney disease (CKD).   Renal function stable today. Baseline sCr 2.3-2.5. On admission, fluid overloaded. Does have orthopnea and intermittent SOB at baseline. Acidotic but at baseline. Follows w Dr. Mac.   - monitor I/Os  - f/u urine studies  - f/u UA  - f/u renal recs  - renally dose meds    #Renal transplant  Patient had renal transplant in 2013. On home mycophenolate 500mg BID and tacrolimus 4mg BID. Per nephro, decreased home tacrolimus from 4mg BID --> 2mg BID while in hospital.   - Tacrolimus level 2.2 yesterday.  - continue holding mycophenolate i/s/o infection  - f/u renal recs on resuming tacrolimus        #BPH (benign prostatic hyperplasia).   - c/w tamsulosin 0.4mg daily      ID  #Aspiration PNA  Tracheal aspirate cx growing serratia marcescens and pseudomonas. Patient was started on vancomycin and cefepime. MRSA nares positive. Dc'ed vancomycin given supratherapeutic trough   - c/w cefepime 2g q24 (start 11/19- )  - given relatively poor renal function, will monitor closely for neurotoxicity    ENDOCRINE  #DM (diabetes mellitus).   Previously on lantus 15 lispro 5.  - holding basal bolus due to NPO status  - FSG q6  - goal -180s    # Hypothyroidism  TSH 20.8, free t4 0.7, T3 49 (low)  - per endo rec no suspicion of myxedema coma  - continue with 25 synthroid    #Relative Adrenal Insufficiency  Patient was started on hydrocortisone 50mg q6hr.  - given inc BP, transitioned to hydrocortisone 50mg q8hr    HEME  #Normocytic Anemia  Likely 2.2 CKD. Hgb 6.9 on 11/21, corrected appropriately s/p 1u pRBC. No active signs of bleeding on physical exam. CTH with no intracranial bleeding. Hgb stable today.  - active T+S  - transfuse <7    FLUIDS/ELECTROLYTES/NUTRITION  -IVF none  -Monitor, careful w advanced kidney disease  -Diet  NPO  DVT ppx: heparin TID  GI: none  C: FC  Dispo: MICU   84 y/o M PMH CKD 4, renal transplant in 2013 at [facility], glaucoma (blind), DM1, anemia, CAD s/p 2 DAMEON to LAD in 6/21, BPH, recent admission for PNA presents with acute on chronic cough and SUNNY 2/2 acute CHF vs CKD.       NEUROLOGY  #S/p extubation  Off sedation and extubated. Mentating well. Pupils still anisocoric. CT scan negative for acute intracranial pathology.      CARDIOVASCULAR  #HFpEF  Had EF of 55% on TTE from Oct 2022, grade I diastolic dysfunction. Repeat TTE 11/17 with poor visualization of LV. Patient making poor UOP overnight, urine studies suggest hypovolemia. CVP 5 today from 11 yesterday.   - 1L NS bolus today  - c/w carvedilol 25mg BID today    #Lower extremity edema  SUNNY 2/2 CKD vs acute on chronic CHF (g I dd). Also w cough but saturating well on RA without increased work of breathing. BNP elevated supporting acute on chronic chf. Cards consulted in ED, limited TTE w preserved systolic function, but poor visualization.  - continuing to monitor I/Os    #Upper extremity edema  Stable from yesterday but increased compared to baseline. US with superficial thrombosis of L cephalic vein extending to AC fossa, no DVTs.   - given patients propensity to bleed and superficial nature of thrombus, no indication for AC    #CAD  #HLD  Hx of CAD s/p 2 DAMEON to LAD in June 2021, currently no CP. Trops 0.03 but no CP or ischemic changes on EKG, likely due to CKD. Takes Plavix and aspirin per last d/c but only aspirin on outpatient note  - c/w ASA daily  - collateral information on plavix.  - c/w home Lipitor    RENAL  #ELIZABETH on Chronic kidney disease (CKD).   BUN 77/Cr 3.0, still not at baseline sCr 2.3-2.5. On admission, fluid overloaded. Does have orthopnea and intermittent SOB at baseline. Acidotic but at baseline. Follows w Dr. Mac.   - UA with granular casts  - f/u urine studies  - f/u UA  - f/u renal recs  - renally dose meds    #Renal transplant  Patient had renal transplant in 2013. On home mycophenolate 500mg BID and tacrolimus 4mg BID. Per nephro, decreased home tacrolimus from 4mg BID --> 2mg BID while in hospital.   - Tacrolimus level 2.2 yesterday.  - continue holding mycophenolate i/s/o infection  - f/u renal recs on resuming tacrolimus        #BPH (benign prostatic hyperplasia).   - c/w tamsulosin 0.4mg daily      ID  #Aspiration PNA  Tracheal aspirate cx growing serratia marcescens and pseudomonas. Patient was started on vancomycin and cefepime. MRSA nares positive. Dc'ed vancomycin given supratherapeutic trough   - c/w cefepime 2g q24 (start 11/19- )  - given relatively poor renal function, will monitor closely for neurotoxicity    ENDOCRINE  #DM (diabetes mellitus).   Previously on lantus 15 lispro 5.  - holding basal bolus due to NPO status  - FSG q6  - goal -180s    # Hypothyroidism  TSH 20.8, free t4 0.7, T3 49 (low)  - per endo rec no suspicion of myxedema coma  - continue with 25 synthroid    #Relative Adrenal Insufficiency  Patient was started on hydrocortisone 50mg q6hr.  - given inc BP, transitioned to hydrocortisone 50mg q8hr    HEME  #Normocytic Anemia  Likely 2.2 CKD. Hgb 6.9 on 11/21, corrected appropriately s/p 1u pRBC. No active signs of bleeding on physical exam. CTH with no intracranial bleeding. Hgb stable today.  - active T+S  - transfuse <7    FLUIDS/ELECTROLYTES/NUTRITION  -IVF none  -Monitor, careful w advanced kidney disease  -Diet  NPO  DVT ppx: heparin TID  GI: none  C: FC  Dispo: MICU   82 y/o M PMH CKD 4, renal transplant in 2013 at [facility], glaucoma (blind), DM1, anemia, CAD s/p 2 DAMEON to LAD in 6/21, BPH, recent admission for PNA presents with acute on chronic cough and SUNNY 2/2 acute CHF vs CKD.       NEUROLOGY  #S/p extubation  Off sedation and extubated. Mentating well. Pupils still anisocoric. CT scan negative for acute intracranial pathology.      CARDIOVASCULAR  #HFpEF  Had EF of 55% on TTE from Oct 2022, grade I diastolic dysfunction. Repeat TTE 11/17 with poor visualization of LV. Patient making poor UOP overnight, urine studies suggest hypovolemia. CVP 5 today from 11 yesterday.   - 1L NS bolus today  - c/w carvedilol 25mg BID today    #Lower extremity edema  SUNNY 2/2 CKD vs acute on chronic CHF (g I dd). Also w cough but saturating well on RA without increased work of breathing. BNP elevated supporting acute on chronic chf. Cards consulted in ED, limited TTE w preserved systolic function, but poor visualization.  - continuing to monitor I/Os    #Upper extremity edema  Stable from yesterday but increased compared to baseline. US with superficial thrombosis of L cephalic vein extending to AC fossa, no DVTs.   - given patients propensity to bleed and superficial nature of thrombus, no indication for AC    #CAD  #HLD  Hx of CAD s/p 2 DAMEON to LAD in June 2021, currently no CP. Trops 0.03 but no CP or ischemic changes on EKG, likely due to CKD. Takes Plavix and aspirin per last d/c but only aspirin on outpatient note  - c/w ASA daily  - collateral information on plavix.  - c/w home Lipitor    RENAL  #ELIZABETH on Chronic kidney disease (CKD).   BUN 77/Cr 3.0, still not at baseline sCr 2.3-2.5. On admission, fluid overloaded. Does have orthopnea and intermittent SOB at baseline. Acidotic but at baseline. Follows w Dr. Mac. UA with granular casts, Jailene 20.  - give 1L NS bolus  - next check the Tacrolimus level in 2 days; 30 minutes before 11/24 AM dose  - renally dose meds    #Renal transplant  Patient had renal transplant in 2013. On home mycophenolate 500mg BID and tacrolimus 4mg BID. Per nephro, decreased home tacrolimus from 4mg BID --> 2mg BID while in hospital.   - c/w tacrolimus 2mg BID  - continue holding mycophenolate i/s/o infection  - f/u renal recs    #Hypernatremia  Free water free wa      #BPH (benign prostatic hyperplasia).   - c/w tamsulosin 0.4mg daily      ID  #Aspiration PNA  Tracheal aspirate cx growing serratia marcescens and pseudomonas. Patient was started on vancomycin and cefepime. MRSA nares positive. Dc'ed vancomycin given supratherapeutic trough   - c/w cefepime 2g q24 (start 11/19- )  - given relatively poor renal function, will monitor closely for neurotoxicity    ENDOCRINE  #DM (diabetes mellitus).   Previously on lantus 15 lispro 5. Holding basal bolus due to NPO status  - resumed tube feeds nepro today  - FSG q6  - goal -180s    # Hypothyroidism  TSH 20.8, free t4 0.7, T3 49 (low)  - per endo rec no suspicion of myxedema coma  - continue with synthroid 25mg qD    #Relative Adrenal Insufficiency  Patient was started on hydrocortisone 50mg q6hr --> q8.  - c/w HC 30mg TID    HEME  #Normocytic Anemia  Likely 2.2 CKD. Hgb 6.9 on 11/21, corrected appropriately s/p 1u pRBC. No active signs of bleeding on physical exam. CTH with no intracranial bleeding. Hgb stable today.  - active T+S  - transfuse <7    FLUIDS/ELECTROLYTES/NUTRITION  -IVF none  -Monitor, careful w advanced kidney disease  -Diet: resumed tube feeds  DVT ppx: heparin TID  GI: none  Dispo: PT to evaluate today

## 2022-11-23 NOTE — PROGRESS NOTE ADULT - ATTENDING COMMENTS
h/o renal transplant, ATN, aspiration pna, CAD  physical as above  with increase in BUN and prerenal indices given 2 extra liters of crystalloid today  continue NG feeds  continue cefepime  follow tacrolimus levels  continue carvedilol  mobilize  decision  making of high complexity

## 2022-11-23 NOTE — PHYSICAL THERAPY INITIAL EVALUATION ADULT - GENERAL OBSERVATIONS, REHAB EVAL
Pt. was received supine, on NC=6L/min, L IJ TLC to infusion, + L radial A-line; + NGT to feeding pump, + EKG, + Ward, NAD.

## 2022-11-23 NOTE — PROGRESS NOTE ADULT - ATTENDING COMMENTS
Pt seen on rounds this afternoon.  Daughter was at the bedside.  Pt was OOB in a chair, slightly dyspneic on high-flow O2.  Asking for coffee, (though explained to his daughter that he "failed" Speech/Swallow evaluation and will be only on tube feeds for now  Tube feeds have been started, but were still only at 10 cc/hr (Nepro) at the time of our visit  Lungs again with coarse rhonchi bilaterally, somewhat decreased breath sounds over the left lung relative to the right   Glucoses have been mostly in the 140-180 range since late yesterday--on sliding scale coverage only  At this point will start 10 units of Lantus qhs, and defer starting nutritional insulin until tolerance of tube feeds is confirmed and his blood sugar rises to over 200 mg%--at which point would begin regular insulin 4 units q6h.

## 2022-11-23 NOTE — PROGRESS NOTE ADULT - SUBJECTIVE AND OBJECTIVE BOX
OVERNIGHT EVENTS:    SUBJECTIVE / INTERVAL HPI: Patient seen and examined at bedside.     VITAL SIGNS:  Vital Signs Last 24 Hrs  T(C): 36.1 (23 Nov 2022 06:03), Max: 36.1 (23 Nov 2022 06:03)  T(F): 97 (23 Nov 2022 06:03), Max: 97 (23 Nov 2022 06:03)  HR: 65 (23 Nov 2022 09:00) (52 - 77)  BP: --  BP(mean): --  RR: 27 (23 Nov 2022 09:00) (20 - 31)  SpO2: 100% (23 Nov 2022 09:00) (93% - 100%)    Parameters below as of 23 Nov 2022 08:00  Patient On (Oxygen Delivery Method): nasal cannula, high flow  O2 Flow (L/min): 40  O2 Concentration (%): 40    PHYSICAL EXAM:    General: Well developed, well nourished, no acute distress  HEENT: NC/AT; PERRL, anicteric sclera; MMM  Neck: supple  Cardiovascular: +S1/S2, RRR, no murmurs, rubs, gallops  Respiratory: CTA B/L; no W/R/R  Gastrointestinal: soft, NT/ND; +BSx4  Extremities: WWP; no edema, clubbing or cyanosis  Vascular: 2+ radial, DP/PT pulses B/L  Neurological: AAOx3; no focal deficits    MEDICATIONS:  MEDICATIONS  (STANDING):  albuterol/ipratropium for Nebulization 3 milliLiter(s) Nebulizer every 6 hours  aspirin  chewable 81 milliGRAM(s) Enteral Tube every 24 hours  atorvastatin 40 milliGRAM(s) Oral at bedtime  carvedilol 25 milliGRAM(s) Oral every 12 hours  cefepime   IVPB 2000 milliGRAM(s) IV Intermittent every 24 hours  chlorhexidine 2% Cloths 1 Application(s) Topical <User Schedule>  dextrose 5%. 1000 milliLiter(s) (100 mL/Hr) IV Continuous <Continuous>  dextrose 5%. 1000 milliLiter(s) (50 mL/Hr) IV Continuous <Continuous>  dextrose 50% Injectable 25 Gram(s) IV Push once  dextrose 50% Injectable 12.5 Gram(s) IV Push once  dextrose 50% Injectable 25 Gram(s) IV Push once  glucagon  Injectable 1 milliGRAM(s) IntraMuscular once  heparin   Injectable 5000 Unit(s) SubCutaneous every 8 hours  hydrocortisone sodium succinate Injectable 30 milliGRAM(s) IV Push every 8 hours  influenza  Vaccine (HIGH DOSE) 0.7 milliLiter(s) IntraMuscular once  insulin lispro (ADMELOG) corrective regimen sliding scale   SubCutaneous every 6 hours  levothyroxine Injectable 25 MICROGram(s) IV Push at bedtime  polyethylene glycol 3350 17 Gram(s) Oral every 12 hours  senna 2 Tablet(s) Oral at bedtime  tacrolimus 2 milliGRAM(s) Oral every 12 hours  tamsulosin 0.4 milliGRAM(s) Oral at bedtime    MEDICATIONS  (PRN):  dextrose Oral Gel 15 Gram(s) Oral once PRN Blood Glucose LESS THAN 70 milliGRAM(s)/deciliter  sodium chloride 0.9% lock flush 10 milliLiter(s) IV Push every 1 hour PRN Pre/post blood products, medications, blood draw, and to maintain line patency      ALLERGIES:  Allergies    No Known Allergies    Intolerances        LABS:                        7.7    8.68  )-----------( 134      ( 23 Nov 2022 05:30 )             24.7     11-23    146<H>  |  114<H>  |  77<H>  ----------------------------<  164<H>  3.8   |  16<L>  |  3.00<H>    Ca    8.7      23 Nov 2022 05:30  Phos  5.3     11-23  Mg     2.0     11-23    TPro  5.1<L>  /  Alb  2.6<L>  /  TBili  0.3  /  DBili  x   /  AST  9<L>  /  ALT  11  /  AlkPhos  95  11-23        CAPILLARY BLOOD GLUCOSE      POCT Blood Glucose.: 180 mg/dL (23 Nov 2022 09:36)      RADIOLOGY & ADDITIONAL TESTS: Reviewed.    PLAN:  OVERNIGHT EVENTS: Extubated yesterday. s/p BIPAP, HFNC last night.     SUBJECTIVE / INTERVAL HPI: Patient seen and examined at bedside. He is awake, on nasal canula. understand the discussion and answer questions with eye and nod the head. Denies HA, SOB, CP. moving his hands    VITAL SIGNS:  Vital Signs Last 24 Hrs  T(C): 36.1 (23 Nov 2022 06:03), Max: 36.1 (23 Nov 2022 06:03)  T(F): 97 (23 Nov 2022 06:03), Max: 97 (23 Nov 2022 06:03)  HR: 65 (23 Nov 2022 09:00) (52 - 77)  BP: --  BP(mean): --  RR: 27 (23 Nov 2022 09:00) (20 - 31)  SpO2: 100% (23 Nov 2022 09:00) (93% - 100%)    Parameters below as of 23 Nov 2022 08:00  Patient On (Oxygen Delivery Method): nasal cannula, high flow  O2 Flow (L/min): 40  O2 Concentration (%): 40    PHYSICAL EXAM:    General: Awake, post extubation yesterday. NAD   HEENT: NC/AT; PERRL,  Neck: supple  Cardiovascular: +S1/S2, RRR, no murmurs, rubs, gallops  Respiratory: CTA B/L; no W/R/R  Gastrointestinal: soft, NT/ND; +BSx4  Extremities: Upper and Lower exterimites edema 1+.   Vascular: 2+ radial, DP/PT pulses B/L  Neurological: Awake. AAOx2-3     MEDICATIONS:  MEDICATIONS  (STANDING):  albuterol/ipratropium for Nebulization 3 milliLiter(s) Nebulizer every 6 hours  aspirin  chewable 81 milliGRAM(s) Enteral Tube every 24 hours  atorvastatin 40 milliGRAM(s) Oral at bedtime  carvedilol 25 milliGRAM(s) Oral every 12 hours  cefepime   IVPB 2000 milliGRAM(s) IV Intermittent every 24 hours  chlorhexidine 2% Cloths 1 Application(s) Topical <User Schedule>  dextrose 5%. 1000 milliLiter(s) (100 mL/Hr) IV Continuous <Continuous>  dextrose 5%. 1000 milliLiter(s) (50 mL/Hr) IV Continuous <Continuous>  dextrose 50% Injectable 25 Gram(s) IV Push once  dextrose 50% Injectable 12.5 Gram(s) IV Push once  dextrose 50% Injectable 25 Gram(s) IV Push once  glucagon  Injectable 1 milliGRAM(s) IntraMuscular once  heparin   Injectable 5000 Unit(s) SubCutaneous every 8 hours  hydrocortisone sodium succinate Injectable 30 milliGRAM(s) IV Push every 8 hours  influenza  Vaccine (HIGH DOSE) 0.7 milliLiter(s) IntraMuscular once  insulin lispro (ADMELOG) corrective regimen sliding scale   SubCutaneous every 6 hours  levothyroxine Injectable 25 MICROGram(s) IV Push at bedtime  polyethylene glycol 3350 17 Gram(s) Oral every 12 hours  senna 2 Tablet(s) Oral at bedtime  tacrolimus 2 milliGRAM(s) Oral every 12 hours  tamsulosin 0.4 milliGRAM(s) Oral at bedtime    MEDICATIONS  (PRN):  dextrose Oral Gel 15 Gram(s) Oral once PRN Blood Glucose LESS THAN 70 milliGRAM(s)/deciliter  sodium chloride 0.9% lock flush 10 milliLiter(s) IV Push every 1 hour PRN Pre/post blood products, medications, blood draw, and to maintain line patency      ALLERGIES:  Allergies    No Known Allergies    Intolerances        LABS:                        7.7    8.68  )-----------( 134      ( 23 Nov 2022 05:30 )             24.7     11-23    146<H>  |  114<H>  |  77<H>  ----------------------------<  164<H>  3.8   |  16<L>  |  3.00<H>    Ca    8.7      23 Nov 2022 05:30  Phos  5.3     11-23  Mg     2.0     11-23    TPro  5.1<L>  /  Alb  2.6<L>  /  TBili  0.3  /  DBili  x   /  AST  9<L>  /  ALT  11  /  AlkPhos  95  11-23        CAPILLARY BLOOD GLUCOSE      POCT Blood Glucose.: 180 mg/dL (23 Nov 2022 09:36)      RADIOLOGY & ADDITIONAL TESTS: Reviewed.    PLAN:  OVERNIGHT EVENTS: Extubated yesterday. s/p BIPAP, HFNC last night.     SUBJECTIVE / INTERVAL HPI: Patient seen and examined at bedside. He is awake, on nasal canula. understand the discussion and answer questions with eye and nod the head. Denies HA, SOB, CP. moving his hands    VITAL SIGNS:  Vital Signs Last 24 Hrs  T(C): 36.1 (23 Nov 2022 06:03), Max: 36.1 (23 Nov 2022 06:03)  T(F): 97 (23 Nov 2022 06:03), Max: 97 (23 Nov 2022 06:03)  HR: 65 (23 Nov 2022 09:00) (52 - 77)  BP: -- 133/42  BP(mean): --  RR: 27 (23 Nov 2022 09:00) (20 - 31)  SpO2: 100% (23 Nov 2022 09:00) (93% - 100%)    Parameters below as of 23 Nov 2022 08:00  Patient On (Oxygen Delivery Method): nasal cannula, high flow  O2 Flow (L/min): 40  O2 Concentration (%): 40    PHYSICAL EXAM:    General: Awake, post extubation yesterday. NAD   HEENT: NC/AT; PERRL,  Neck: supple  Cardiovascular: +S1/S2, RRR, no murmurs, rubs, gallops  Respiratory: CTA B/L; no W/R/R  Gastrointestinal: soft, NT/ND; +BSx4  Extremities: Upper and Lower exterimites edema 1+.   Vascular: 2+ radial, DP/PT pulses B/L  Neurological: Awake. AAOx2-3     MEDICATIONS:  MEDICATIONS  (STANDING):  albuterol/ipratropium for Nebulization 3 milliLiter(s) Nebulizer every 6 hours  aspirin  chewable 81 milliGRAM(s) Enteral Tube every 24 hours  atorvastatin 40 milliGRAM(s) Oral at bedtime  carvedilol 25 milliGRAM(s) Oral every 12 hours  cefepime   IVPB 2000 milliGRAM(s) IV Intermittent every 24 hours  chlorhexidine 2% Cloths 1 Application(s) Topical <User Schedule>  dextrose 5%. 1000 milliLiter(s) (100 mL/Hr) IV Continuous <Continuous>  dextrose 5%. 1000 milliLiter(s) (50 mL/Hr) IV Continuous <Continuous>  dextrose 50% Injectable 25 Gram(s) IV Push once  dextrose 50% Injectable 12.5 Gram(s) IV Push once  dextrose 50% Injectable 25 Gram(s) IV Push once  glucagon  Injectable 1 milliGRAM(s) IntraMuscular once  heparin   Injectable 5000 Unit(s) SubCutaneous every 8 hours  hydrocortisone sodium succinate Injectable 30 milliGRAM(s) IV Push every 8 hours  influenza  Vaccine (HIGH DOSE) 0.7 milliLiter(s) IntraMuscular once  insulin lispro (ADMELOG) corrective regimen sliding scale   SubCutaneous every 6 hours  levothyroxine Injectable 25 MICROGram(s) IV Push at bedtime  polyethylene glycol 3350 17 Gram(s) Oral every 12 hours  senna 2 Tablet(s) Oral at bedtime  tacrolimus 2 milliGRAM(s) Oral every 12 hours  tamsulosin 0.4 milliGRAM(s) Oral at bedtime    MEDICATIONS  (PRN):  dextrose Oral Gel 15 Gram(s) Oral once PRN Blood Glucose LESS THAN 70 milliGRAM(s)/deciliter  sodium chloride 0.9% lock flush 10 milliLiter(s) IV Push every 1 hour PRN Pre/post blood products, medications, blood draw, and to maintain line patency      ALLERGIES:  Allergies    No Known Allergies    Intolerances        LABS:                        7.7    8.68  )-----------( 134      ( 23 Nov 2022 05:30 )             24.7     11-23    146<H>  |  114<H>  |  77<H>  ----------------------------<  164<H>  3.8   |  16<L>  |  3.00<H>    Ca    8.7      23 Nov 2022 05:30  Phos  5.3     11-23  Mg     2.0     11-23    TPro  5.1<L>  /  Alb  2.6<L>  /  TBili  0.3  /  DBili  x   /  AST  9<L>  /  ALT  11  /  AlkPhos  95  11-23        CAPILLARY BLOOD GLUCOSE      POCT Blood Glucose.: 180 mg/dL (23 Nov 2022 09:36)      RADIOLOGY & ADDITIONAL TESTS: Reviewed.    PLAN:

## 2022-11-23 NOTE — PROGRESS NOTE ADULT - SUBJECTIVE AND OBJECTIVE BOX
Interval Events: Reviewed  Patient seen and examined at bedside.    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      History of renal transplant  secondary to DM      Chronic kidney disease (CKD)      Glaucoma      Kidney transplant recipient      Amputation of toe  x 3      H/O heart artery stent          MEDICATIONS:  Pulmonary:  albuterol/ipratropium for Nebulization 3 milliLiter(s) Nebulizer every 6 hours    Antimicrobials:    Anticoagulants:  aspirin  chewable 81 milliGRAM(s) Oral daily  heparin   Injectable 5000 Unit(s) SubCutaneous every 8 hours    Cardiac:  carvedilol 25 milliGRAM(s) Oral every 12 hours      Allergies    No Known Allergies    Intolerances        Vital Signs Last 24 Hrs  T(C): 35 (2022 14:30), Max: 36.1 (2022 06:03)  T(F): 95 (2022 14:30), Max: 97 (2022 06:03)  HR: 63 (2022 15:00) (55 - 77)  BP: --  BP(mean): --  RR: 23 (2022 15:00) (20 - 31)  SpO2: 96% (2022 15:00) (93% - 100%)    Parameters below as of 2022 13:00  Patient On (Oxygen Delivery Method): nasal cannula  O2 Flow (L/min): 6       @ :  -   @ 07:00  --------------------------------------------------------  IN: 373.2 mL / OUT: 805 mL / NET: -431.8 mL     @ :  -   @ 16:27  --------------------------------------------------------  IN: 90 mL / OUT: 305 mL / NET: -215 mL          Review of Systems:   •	General: negative  •	Skin/Breast: negative  •	Ophthalmologic: negative  •	ENMT: negative  •	Respiratory and Thorax: negative  •	Cardiovascular: negative  •	Gastrointestinal: negative  •	Genitourinary: negative  •	Musculoskeletal: negative  •	Neurological: negative  •	Psychiatric: negative  •	Hematology/Lymphatics: negative  •	Endocrine: negative  •	Allergic/Immunologic: negative    Physical Exam:   • Constitutional:	refer to the dietion /Nutritionist note  • Eyes:	EOMI; PERRL; no drainage or redness  • ENMT:	No oral lesions; no gross abnormalities  • Neck	No bruits; no thyromegaly or nodules  • Breasts:	not examined  • Back:	No deformity or limitation of movement  • Respiratory:	Breath Sounds equal & clear to percussion & auscultation, no accessory muscle use  • Cardiovascular:	Regular rate & rhythm, normal S1, S2; no murmurs, gallops or rubs; no S3, S4  • Gastrointestinal:	Soft, non-tender, no hepatosplenomegaly, normal bowel sounds  • Genitourinary:	not examined  • Rectal: not examined  • Extremities:	No cyanosis, clubbing or edema  • Vascular:	Equal and normal pulses (carotid, femoral, dorsalis pedis)  • Neurologica:l	not examined  • Skin:	No lesions; no rash  • Lymph Nodes:	No lymphadedenopathy  • Musculoskeletal:	No joint pain, swelling or deformity; no limitation of movement        LABS:  ABG - ( 2022 22:04 )  pH, Arterial: 7.30  pH, Blood: x     /  pCO2: 31    /  pO2: 151   / HCO3: 15    / Base Excess: -9.9  /  SaO2: 97.5                CBC Full  -  ( 2022 05:30 )  WBC Count : 8.68 K/uL  RBC Count : 2.97 M/uL  Hemoglobin : 7.7 g/dL  Hematocrit : 24.7 %  Platelet Count - Automated : 134 K/uL  Mean Cell Volume : 83.2 fl  Mean Cell Hemoglobin : 25.9 pg  Mean Cell Hemoglobin Concentration : 31.2 gm/dL  Auto Neutrophil # : 7.98 K/uL  Auto Lymphocyte # : 0.31 K/uL  Auto Monocyte # : 0.34 K/uL  Auto Eosinophil # : 0.01 K/uL  Auto Basophil # : 0.01 K/uL  Auto Neutrophil % : 92.0 %  Auto Lymphocyte % : 3.6 %  Auto Monocyte % : 3.9 %  Auto Eosinophil % : 0.1 %  Auto Basophil % : 0.1 %        146<H>  |  114<H>  |  77<H>  ----------------------------<  164<H>  3.8   |  16<L>  |  3.00<H>    Ca    8.7      2022 05:30  Phos  5.3       Mg     2.0         TPro  5.1<L>  /  Alb  2.6<L>  /  TBili  0.3  /  DBili  x   /  AST  9<L>  /  ALT  11  /  AlkPhos  95            Urinalysis Basic - ( 2022 09:20 )    Color: Yellow / Appearance: Clear / S.020 / pH: x  Gluc: x / Ketone: Trace mg/dL  / Bili: Negative / Urobili: 0.2 E.U./dL   Blood: x / Protein: 30 mg/dL / Nitrite: NEGATIVE   Leuk Esterase: NEGATIVE / RBC: Many /HPF / WBC < 5 /HPF   Sq Epi: x / Non Sq Epi: 5-10 /HPF / Bacteria: Present /HPF                  RADIOLOGY & ADDITIONAL STUDIES (The following images were personally reviewed):

## 2022-11-23 NOTE — PHYSICAL THERAPY INITIAL EVALUATION ADULT - ADDITIONAL COMMENTS
Pt. resides in a house with stairs, but stays on one level; mostly utilizes WC, but able to perform transfers to /toilet with 1 person assist. Limited in ambulation x past 2 months PTA since last hospital admission for PNA. Pt. has 6 hrs HHA x 5 days while spouse is at work.

## 2022-11-24 NOTE — PROGRESS NOTE ADULT - ATTENDING COMMENTS
h/o renal transplant with ATN, aspiration pna, HFpEF,   physical as above  renal indices prerenal and numbers are responding to extra fluid  add base today  trial of NC  continue NG feeds  not ready for swalloswing eval today  decision making of high complexity

## 2022-11-24 NOTE — PROGRESS NOTE ADULT - SUBJECTIVE AND OBJECTIVE BOX
Patient is a 82y Male seen and evaluated at bedside seen this morning at bedside remains stable, no acute complaints. Creatinine improving. K repleted by team, Sodium 146. Given 1L bolus of sodium bicarb this AM, also started on FWF       Meds:    acetaminophen     Tablet .. 650 every 6 hours PRN  albuterol/ipratropium for Nebulization 3 every 6 hours  aspirin  chewable 81 daily  atorvastatin 40 at bedtime  carvedilol 25 every 12 hours  cefepime   IVPB 2000 every 24 hours  cefepime   IVPB 2000 every 24 hours  chlorhexidine 2% Cloths 1 <User Schedule>  dextrose 5%. 1000 <Continuous>  dextrose 5%. 1000 <Continuous>  dextrose 50% Injectable 25 once  dextrose 50% Injectable 12.5 once  dextrose 50% Injectable 25 once  dextrose Oral Gel 15 once PRN  glucagon  Injectable 1 once  heparin   Injectable 5000 every 8 hours  hydrocortisone sodium succinate Injectable 30 every 8 hours  influenza  Vaccine (HIGH DOSE) 0.7 once  insulin glargine Injectable (LANTUS) 10 at bedtime  insulin regular  human corrective regimen sliding scale  every 6 hours  insulin regular  human recombinant 4 every 6 hours  levothyroxine Injectable 25 at bedtime  sodium chloride 0.225% 1000 <Continuous>  sodium chloride 0.9% lock flush 10 every 1 hour PRN  tacrolimus 2 once  tacrolimus 2 every 12 hours  tamsulosin 0.4 at bedtime      T(C): , Max: 36.6 (22 @ 05:00)  T(F): , Max: 97.9 (22 @ 05:00)  HR: 66 (22 @ 12:00)  BP: --  BP(mean): --  RR: 22 (22 @ 12:00)  SpO2: 98% (22 @ 12:00)  Wt(kg): --     @ 07:01  -   @ 07:00  --------------------------------------------------------  IN: 2920 mL / OUT: 825 mL / NET: 2095 mL     @ 07:01  -   @ 12:54  --------------------------------------------------------  IN: 2462 mL / OUT: 135 mL / NET: 2327 mL          Review of Systems:  ROS negative except as per HPI      PHYSICAL EXAM:  General: Awake, on NC  HEENT: NC/AT; PERRL,  Neck: supple  Cardiovascular: +S1/S2, RRR, no murmurs, rubs, gallops  Respiratory: CTA B/L; no W/R/R  Gastrointestinal: soft, NT/ND; +BSx4  Extremities: Upper extremities with 1+ pitting edema, lower extremities trace edema   Vascular: 2+ radial, DP/PT pulses B/L  Neurological: Awake. AAOx2-3       LABS:                        7.5    6.94  )-----------( 117      ( 2022 05:41 )             24.0         146<H>  |  115<H>  |  71<H>  ----------------------------<  165<H>  3.1<L>   |  16<L>  |  2.72<H>    Ca    8.3<L>      2022 05:41  Phos  4.5       Mg     1.9         TPro  5.1<L>  /  Alb  2.6<L>  /  TBili  0.3  /  DBili  x   /  AST  9<L>  /  ALT  11  /  AlkPhos  95          Urinalysis Basic - ( 2022 09:20 )    Color: Yellow / Appearance: Clear / S.020 / pH: x  Gluc: x / Ketone: Trace mg/dL  / Bili: Negative / Urobili: 0.2 E.U./dL   Blood: x / Protein: 30 mg/dL / Nitrite: NEGATIVE   Leuk Esterase: NEGATIVE / RBC: Many /HPF / WBC < 5 /HPF   Sq Epi: x / Non Sq Epi: 5-10 /HPF / Bacteria: Present /HPF      Sodium, Random Urine: 20 mmol/L ( @ 06:49)  Creatinine, Random Urine: 70 mg/dL ( @ 06:49)  Sodium, Random Urine: 20 mmol/L ( @ 09:20)  Creatinine, Random Urine: 71 mg/dL ( @ 09:20)  Osmolality, Random Urine: 388 mosm/kg ( @ 09:20)  Osmolality, Random Urine: 417 mosm/kg ( @ 16:03)  Sodium, Random Urine: <20 mmol/L ( @ 16:03)  Creatinine, Random Urine: 68 mg/dL ( @ 16:03)        RADIOLOGY & ADDITIONAL STUDIES:           Patient is a 82y Male seen and evaluated at bedside seen this morning at bedside remains stable, no acute complaints. Creatinine improving. K repleted by team, Sodium 146. Given 1L bolus of sodium bicarb this AM, also started on FWF       Meds:    acetaminophen     Tablet .. 650 every 6 hours PRN  albuterol/ipratropium for Nebulization 3 every 6 hours  aspirin  chewable 81 daily  atorvastatin 40 at bedtime  carvedilol 25 every 12 hours  cefepime   IVPB 2000 every 24 hours  cefepime   IVPB 2000 every 24 hours  chlorhexidine 2% Cloths 1 <User Schedule>  dextrose 5%. 1000 <Continuous>  dextrose 5%. 1000 <Continuous>  dextrose 50% Injectable 25 once  dextrose 50% Injectable 12.5 once  dextrose 50% Injectable 25 once  dextrose Oral Gel 15 once PRN  glucagon  Injectable 1 once  heparin   Injectable 5000 every 8 hours  hydrocortisone sodium succinate Injectable 30 every 8 hours  influenza  Vaccine (HIGH DOSE) 0.7 once  insulin glargine Injectable (LANTUS) 10 at bedtime  insulin regular  human corrective regimen sliding scale  every 6 hours  insulin regular  human recombinant 4 every 6 hours  levothyroxine Injectable 25 at bedtime  sodium chloride 0.225% 1000 <Continuous>  sodium chloride 0.9% lock flush 10 every 1 hour PRN  tacrolimus 2 once  tacrolimus 2 every 12 hours  tamsulosin 0.4 at bedtime      T(C): , Max: 36.6 (22 @ 05:00)  T(F): , Max: 97.9 (22 @ 05:00)  HR: 66 (22 @ 12:00)  BP: 133/41  RR: 22 (22 @ 12:00)  SpO2: 98% (22 @ 12:00)  Wt(kg): --     @ 07:01  -   @ 07:00  --------------------------------------------------------  IN: 2920 mL / OUT: 825 mL / NET: 2095 mL     @ 07:01  -   @ 12:54  --------------------------------------------------------  IN: 2462 mL / OUT: 135 mL / NET: 2327 mL          Review of Systems:  ROS negative except as per HPI      PHYSICAL EXAM:  General: Awake, on NC  HEENT: NC/AT; PERRL,  Neck: supple  Cardiovascular: +S1/S2, RRR, no murmurs, rubs, gallops  Respiratory: CTA B/L; no W/R/R  Gastrointestinal: soft, NT/ND; +BSx4  Extremities: Upper extremities with 1+ pitting edema, lower extremities trace edema   Vascular: 2+ radial, DP/PT pulses B/L  Neurological: Awake. AAOx2-3       LABS:                        7.5    6.94  )-----------( 117      ( 2022 05:41 )             24.0         146<H>  |  115<H>  |  71<H>  ----------------------------<  165<H>  3.1<L>   |  16<L>  |  2.72<H>    Ca    8.3<L>      2022 05:41  Phos  4.5       Mg     1.9         TPro  5.1<L>  /  Alb  2.6<L>  /  TBili  0.3  /  DBili  x   /  AST  9<L>  /  ALT  11  /  AlkPhos  95          Urinalysis Basic - ( 2022 09:20 )    Color: Yellow / Appearance: Clear / S.020 / pH: x  Gluc: x / Ketone: Trace mg/dL  / Bili: Negative / Urobili: 0.2 E.U./dL   Blood: x / Protein: 30 mg/dL / Nitrite: NEGATIVE   Leuk Esterase: NEGATIVE / RBC: Many /HPF / WBC < 5 /HPF   Sq Epi: x / Non Sq Epi: 5-10 /HPF / Bacteria: Present /HPF      Sodium, Random Urine: 20 mmol/L ( @ 06:49)  Creatinine, Random Urine: 70 mg/dL ( @ 06:49)  Sodium, Random Urine: 20 mmol/L ( @ 09:20)  Creatinine, Random Urine: 71 mg/dL ( @ 09:20)  Osmolality, Random Urine: 388 mosm/kg ( @ 09:20)  Osmolality, Random Urine: 417 mosm/kg ( @ 16:03)  Sodium, Random Urine: <20 mmol/L ( @ 16:03)  Creatinine, Random Urine: 68 mg/dL ( @ 16:03)        RADIOLOGY & ADDITIONAL STUDIES:

## 2022-11-24 NOTE — PROGRESS NOTE ADULT - SUBJECTIVE AND OBJECTIVE BOX
Interval Events: Reviewed  Patient seen and examined at bedside.    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      History of renal transplant  secondary to DM      Chronic kidney disease (CKD)      Glaucoma      Kidney transplant recipient      Amputation of toe  x 3      H/O heart artery stent          MEDICATIONS:  Pulmonary:  albuterol/ipratropium for Nebulization 3 milliLiter(s) Nebulizer every 6 hours    Antimicrobials:  cefepime   IVPB 2000 milliGRAM(s) IV Intermittent every 24 hours    Anticoagulants:  aspirin  chewable 81 milliGRAM(s) Oral daily  heparin   Injectable 5000 Unit(s) SubCutaneous every 8 hours    Cardiac:  carvedilol 25 milliGRAM(s) Oral every 12 hours      Allergies    No Known Allergies    Intolerances        Vital Signs Last 24 Hrs  T(C): 35.6 (2022 18:00), Max: 36.6 (2022 05:00)  T(F): 96 (2022 18:00), Max: 97.9 (2022 05:00)  HR: 58 (2022 20:00) (58 - 71)  BP: --  BP(mean): --  RR: 23 (2022 20:00) (20 - 35)  SpO2: 98% (2022 20:00) (92% - 100%)    Parameters below as of 2022 20:00  Patient On (Oxygen Delivery Method): nasal cannula  O2 Flow (L/min): 2       @ : @ 07:00  --------------------------------------------------------  IN: 2920 mL / OUT: 825 mL / NET: 2095 mL     @ :  -   @ 20:40  --------------------------------------------------------  IN: 3056 mL / OUT: 430 mL / NET: 2626 mL          Review of Systems:   •	General: negative  •	Skin/Breast: negative  •	Ophthalmologic: negative  •	ENMT: negative  •	Respiratory and Thorax: negative  •	Cardiovascular: negative  •	Gastrointestinal: negative  •	Genitourinary: negative  •	Musculoskeletal: negative  •	Neurological: negative  •	Psychiatric: negative  •	Hematology/Lymphatics: negative  •	Endocrine: negative  •	Allergic/Immunologic: negative    Physical Exam:   • Constitutional:	refer to the dietion /Nutritionist note  • Eyes:	EOMI; PERRL; no drainage or redness  • ENMT:	No oral lesions; no gross abnormalities  • Neck	No bruits; no thyromegaly or nodules  • Breasts:	not examined  • Back:	No deformity or limitation of movement  • Respiratory:	Breath Sounds equal & clear to percussion & auscultation, no accessory muscle use  • Cardiovascular:	Regular rate & rhythm, normal S1, S2; no murmurs, gallops or rubs; no S3, S4  • Gastrointestinal:	Soft, non-tender, no hepatosplenomegaly, normal bowel sounds  • Genitourinary:	not examined  • Rectal: not examined  • Extremities:	No cyanosis, clubbing or edema  • Vascular:	Equal and normal pulses (carotid, femoral, dorsalis pedis)  • Neurologica:l	not examined  • Skin:	No lesions; no rash  • Lymph Nodes:	No lymphadedenopathy  • Musculoskeletal:	No joint pain, swelling or deformity; no limitation of movement        LABS:  ABG - ( 2022 22:04 )  pH, Arterial: 7.30  pH, Blood: x     /  pCO2: 31    /  pO2: 151   / HCO3: 15    / Base Excess: -9.9  /  SaO2: 97.5                CBC Full  -  ( 2022 05:41 )  WBC Count : 6.94 K/uL  RBC Count : 2.81 M/uL  Hemoglobin : 7.5 g/dL  Hematocrit : 24.0 %  Platelet Count - Automated : 117 K/uL  Mean Cell Volume : 85.4 fl  Mean Cell Hemoglobin : 26.7 pg  Mean Cell Hemoglobin Concentration : 31.3 gm/dL  Auto Neutrophil # : 6.37 K/uL  Auto Lymphocyte # : 0.22 K/uL  Auto Monocyte # : 0.30 K/uL  Auto Eosinophil # : 0.02 K/uL  Auto Basophil # : 0.00 K/uL  Auto Neutrophil % : 91.8 %  Auto Lymphocyte % : 3.2 %  Auto Monocyte % : 4.3 %  Auto Eosinophil % : 0.3 %  Auto Basophil % : 0.0 %        146<H>  |  115<H>  |  71<H>  ----------------------------<  165<H>  3.1<L>   |  16<L>  |  2.72<H>    Ca    8.3<L>      2022 05:41  Phos  4.5       Mg     1.9         TPro  5.1<L>  /  Alb  2.6<L>  /  TBili  0.3  /  DBili  x   /  AST  9<L>  /  ALT  11  /  AlkPhos  95            Urinalysis Basic - ( 2022 09:20 )    Color: Yellow / Appearance: Clear / S.020 / pH: x  Gluc: x / Ketone: Trace mg/dL  / Bili: Negative / Urobili: 0.2 E.U./dL   Blood: x / Protein: 30 mg/dL / Nitrite: NEGATIVE   Leuk Esterase: NEGATIVE / RBC: Many /HPF / WBC < 5 /HPF   Sq Epi: x / Non Sq Epi: 5-10 /HPF / Bacteria: Present /HPF                  RADIOLOGY & ADDITIONAL STUDIES (The following images were personally reviewed):

## 2022-11-24 NOTE — PROGRESS NOTE ADULT - ASSESSMENT
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   Recs:  -Creatinine now starting to downtrend  - C/w Tacrolimus 2mg BID  (lower dose than home med).   - Tacro level collected 11/24 AM.   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   Recs:  -Creatinine now starting to downtrend  - Increase AM tacrolimus to 3mg and leave PM dose at 2mg.  -Repeat tacro level on 11/26 30 mins prior to AM dose   - Tacro level collected 2.7 on 11/24 AM   - strict i's and o's  - U/O 825cc/24 hours  - Trend BMP daily  - Ok to use lasix IVP if needed     #Hypernatremia  Given 1/2 NS w/ 2 amps of sodium bicarb this AM  -Start on FWF 250cc q6h

## 2022-11-24 NOTE — PROGRESS NOTE ADULT - SUBJECTIVE AND OBJECTIVE BOX
**Incomplete Note**   CC: Patient is a 82y old  Male who presents with a chief complaint of LE edema and cough (2022 09:30)      INTERVAL EVENTS: MAGGIE    SUBJECTIVE / INTERVAL HPI: Patient seen and examined at bedside.     ROS: negative unless otherwise stated above.    VITAL SIGNS:  Vital Signs Last 24 Hrs  T(C): 36.1 (2022 09:20), Max: 36.6 (2022 05:00)  T(F): 97 (2022 09:20), Max: 97.9 (2022 05:00)  HR: 67 (:00) (55 - 71)  BP: --  BP(mean): --  RR: 23 (:00) (18 - 34)  SpO2: 100% (:) (92% - 100%)    Parameters below as of 2022 11:00  Patient On (Oxygen Delivery Method): nasal cannula  O2 Flow (L/min): 2        22 @ 07:01  -  22 @ 07:00  --------------------------------------------------------  IN: 2920 mL / OUT: 825 mL / NET: 2095 mL    22 @ 07:01  -  22 @ 11:30  --------------------------------------------------------  IN: 1419 mL / OUT: 135 mL / NET: 1284 mL        PHYSICAL EXAM:    General: NAD  HEENT: MMM  Neck: supple  Cardiovascular: +S1/S2; RRR  Respiratory: CTA B/L; no W/R/R  Gastrointestinal: soft, NT/ND  Extremities: WWP; no edema, clubbing or cyanosis  Vascular: 2+ radial, DP/PT pulses B/L  Neurological: AAOx3; no focal deficits    MEDICATIONS:  MEDICATIONS  (STANDING):  albuterol/ipratropium for Nebulization 3 milliLiter(s) Nebulizer every 6 hours  aspirin  chewable 81 milliGRAM(s) Oral daily  atorvastatin 40 milliGRAM(s) Oral at bedtime  carvedilol 25 milliGRAM(s) Oral every 12 hours  cefepime   IVPB 2000 milliGRAM(s) IV Intermittent every 24 hours  cefepime   IVPB 2000 milliGRAM(s) IV Intermittent every 24 hours  chlorhexidine 2% Cloths 1 Application(s) Topical <User Schedule>  dextrose 5%. 1000 milliLiter(s) (100 mL/Hr) IV Continuous <Continuous>  dextrose 5%. 1000 milliLiter(s) (50 mL/Hr) IV Continuous <Continuous>  dextrose 50% Injectable 25 Gram(s) IV Push once  dextrose 50% Injectable 12.5 Gram(s) IV Push once  dextrose 50% Injectable 25 Gram(s) IV Push once  glucagon  Injectable 1 milliGRAM(s) IntraMuscular once  heparin   Injectable 5000 Unit(s) SubCutaneous every 8 hours  hydrocortisone sodium succinate Injectable 30 milliGRAM(s) IV Push every 8 hours  influenza  Vaccine (HIGH DOSE) 0.7 milliLiter(s) IntraMuscular once  insulin glargine Injectable (LANTUS) 10 Unit(s) SubCutaneous at bedtime  insulin regular  human corrective regimen sliding scale   SubCutaneous every 6 hours  insulin regular  human recombinant 4 Unit(s) SubCutaneous every 6 hours  levothyroxine Injectable 25 MICROGram(s) IV Push at bedtime  sodium chloride 0.225% 1000 milliLiter(s) (1000 mL/Hr) IV Continuous <Continuous>  tamsulosin 0.4 milliGRAM(s) Oral at bedtime    MEDICATIONS  (PRN):  acetaminophen     Tablet .. 650 milliGRAM(s) Oral every 6 hours PRN Mild Pain (1 - 3)  dextrose Oral Gel 15 Gram(s) Oral once PRN Blood Glucose LESS THAN 70 milliGRAM(s)/deciliter  sodium chloride 0.9% lock flush 10 milliLiter(s) IV Push every 1 hour PRN Pre/post blood products, medications, blood draw, and to maintain line patency      ALLERGIES:  Allergies    No Known Allergies    Intolerances        LABS:                        7.5    6.94  )-----------( 117      ( 2022 05:41 )             24.0     11-24    146<H>  |  115<H>  |  71<H>  ----------------------------<  165<H>  3.1<L>   |  16<L>  |  2.72<H>    Ca    8.3<L>      2022 05:41  Phos  4.5     11-  Mg     1.9     -    TPro  5.1<L>  /  Alb  2.6<L>  /  TBili  0.3  /  DBili  x   /  AST  9<L>  /  ALT  11  /  AlkPhos  95        Urinalysis Basic - ( 2022 09:20 )    Color: Yellow / Appearance: Clear / S.020 / pH: x  Gluc: x / Ketone: Trace mg/dL  / Bili: Negative / Urobili: 0.2 E.U./dL   Blood: x / Protein: 30 mg/dL / Nitrite: NEGATIVE   Leuk Esterase: NEGATIVE / RBC: Many /HPF / WBC < 5 /HPF   Sq Epi: x / Non Sq Epi: 5-10 /HPF / Bacteria: Present /HPF      CAPILLARY BLOOD GLUCOSE      POCT Blood Glucose.: 149 mg/dL (2022 11:08)      RADIOLOGY & ADDITIONAL TESTS: Reviewed.   CC: Patient is a 82y old  Male who presents with a chief complaint of LE edema and cough (2022 09:30)      INTERVAL EVENTS: Multiple episodes of loose stool overnight. He also became agitated and removed central line.    SUBJECTIVE / INTERVAL HPI: Patient seen and examined at bedside. Gave     ROS: negative unless otherwise stated above.    VITAL SIGNS:  Vital Signs Last 24 Hrs  T(C): 36.1 (2022 09:20), Max: 36.6 (2022 05:00)  T(F): 97 (2022 09:20), Max: 97.9 (2022 05:00)  HR: 67 (2022 11:00) (55 - 71)  BP: --  BP(mean): --  RR: 23 (2022 11:00) (18 - 34)  SpO2: 100% (2022 11:00) (92% - 100%)    Parameters below as of 2022 11:00  Patient On (Oxygen Delivery Method): nasal cannula  O2 Flow (L/min): 2        22 @ 07:01  -  22 @ 07:00  --------------------------------------------------------  IN: 2920 mL / OUT: 825 mL / NET: 2095 mL    22 @ 07:01  -  22 @ 11:30  --------------------------------------------------------  IN: 1419 mL / OUT: 135 mL / NET: 1284 mL        PHYSICAL EXAM:    General: NAD  HEENT: MMM  Neck: supple  Cardiovascular: +S1/S2; RRR  Respiratory: CTA B/L; no W/R/R  Gastrointestinal: soft, NT/ND  Extremities: WWP; no edema, clubbing or cyanosis  Vascular: 2+ radial, DP/PT pulses B/L  Neurological: AAOx3; no focal deficits    MEDICATIONS:  MEDICATIONS  (STANDING):  albuterol/ipratropium for Nebulization 3 milliLiter(s) Nebulizer every 6 hours  aspirin  chewable 81 milliGRAM(s) Oral daily  atorvastatin 40 milliGRAM(s) Oral at bedtime  carvedilol 25 milliGRAM(s) Oral every 12 hours  cefepime   IVPB 2000 milliGRAM(s) IV Intermittent every 24 hours  cefepime   IVPB 2000 milliGRAM(s) IV Intermittent every 24 hours  chlorhexidine 2% Cloths 1 Application(s) Topical <User Schedule>  dextrose 5%. 1000 milliLiter(s) (100 mL/Hr) IV Continuous <Continuous>  dextrose 5%. 1000 milliLiter(s) (50 mL/Hr) IV Continuous <Continuous>  dextrose 50% Injectable 25 Gram(s) IV Push once  dextrose 50% Injectable 12.5 Gram(s) IV Push once  dextrose 50% Injectable 25 Gram(s) IV Push once  glucagon  Injectable 1 milliGRAM(s) IntraMuscular once  heparin   Injectable 5000 Unit(s) SubCutaneous every 8 hours  hydrocortisone sodium succinate Injectable 30 milliGRAM(s) IV Push every 8 hours  influenza  Vaccine (HIGH DOSE) 0.7 milliLiter(s) IntraMuscular once  insulin glargine Injectable (LANTUS) 10 Unit(s) SubCutaneous at bedtime  insulin regular  human corrective regimen sliding scale   SubCutaneous every 6 hours  insulin regular  human recombinant 4 Unit(s) SubCutaneous every 6 hours  levothyroxine Injectable 25 MICROGram(s) IV Push at bedtime  sodium chloride 0.225% 1000 milliLiter(s) (1000 mL/Hr) IV Continuous <Continuous>  tamsulosin 0.4 milliGRAM(s) Oral at bedtime    MEDICATIONS  (PRN):  acetaminophen     Tablet .. 650 milliGRAM(s) Oral every 6 hours PRN Mild Pain (1 - 3)  dextrose Oral Gel 15 Gram(s) Oral once PRN Blood Glucose LESS THAN 70 milliGRAM(s)/deciliter  sodium chloride 0.9% lock flush 10 milliLiter(s) IV Push every 1 hour PRN Pre/post blood products, medications, blood draw, and to maintain line patency      ALLERGIES:  Allergies    No Known Allergies    Intolerances        LABS:                        7.5    6.94  )-----------( 117      ( 2022 05:41 )             24.0     11-24    146<H>  |  115<H>  |  71<H>  ----------------------------<  165<H>  3.1<L>   |  16<L>  |  2.72<H>    Ca    8.3<L>      2022 05:41  Phos  4.5     11-24  Mg     1.9     11-24    TPro  5.1<L>  /  Alb  2.6<L>  /  TBili  0.3  /  DBili  x   /  AST  9<L>  /  ALT  11  /  AlkPhos  95        Urinalysis Basic - ( 2022 09:20 )    Color: Yellow / Appearance: Clear / S.020 / pH: x  Gluc: x / Ketone: Trace mg/dL  / Bili: Negative / Urobili: 0.2 E.U./dL   Blood: x / Protein: 30 mg/dL / Nitrite: NEGATIVE   Leuk Esterase: NEGATIVE / RBC: Many /HPF / WBC < 5 /HPF   Sq Epi: x / Non Sq Epi: 5-10 /HPF / Bacteria: Present /HPF      CAPILLARY BLOOD GLUCOSE      POCT Blood Glucose.: 149 mg/dL (2022 11:08)      RADIOLOGY & ADDITIONAL TESTS: Reviewed.   CC: Patient is a 82y old  Male who presents with a chief complaint of LE edema and cough (2022 09:30)      INTERVAL EVENTS: Multiple episodes of loose stool overnight. He also became agitated and removed central line.    SUBJECTIVE / INTERVAL HPI: Patient seen and examined at bedside. More alert that yesterday. Denies abdominal pain, chest pain, fever, chills.     ROS: negative unless otherwise stated above.    VITAL SIGNS:  Vital Signs Last 24 Hrs  T(C): 36.1 (2022 09:20), Max: 36.6 (2022 05:00)  T(F): 97 (2022 09:20), Max: 97.9 (2022 05:00)  HR: 67 (:00) (55 - 71)  BP: --  BP(mean): --  RR: 23 (:00) (18 - 34)  SpO2: 100% (:) (92% - 100%)    Parameters below as of 2022 11:00  Patient On (Oxygen Delivery Method): nasal cannula  O2 Flow (L/min): 2        22 @ 07:01  -  22 @ 07:00  --------------------------------------------------------  IN: 2920 mL / OUT: 825 mL / NET: 2095 mL    22 @ 07:01  -  22 @ 11:30  --------------------------------------------------------  IN: 1419 mL / OUT: 135 mL / NET: 1284 mL        PHYSICAL EXAM:  General: NAD, lying in bed  HEENT: MMM, anisocoric pupils R fixed L sluggish (at baseline), EOMI, anicteric sclera  Neck: supple  Cardiovascular: +S1/S2; RRR, no murmurs  Respiratory: productive cough with deep inspiration, no W/R/R  Gastrointestinal: soft, NT/ND, +BS, no JVD  Extremities: WWP; trace edema in b/l ankles, no clubbing or cyanosis; L arm 2+ edema extending to mid-forearm  Vascular: 2+ radial, DP/PT pulses B/L  Neurological: AAOx3; no focal deficits, moving all limbs spontaneously    MEDICATIONS:  MEDICATIONS  (STANDING):  albuterol/ipratropium for Nebulization 3 milliLiter(s) Nebulizer every 6 hours  aspirin  chewable 81 milliGRAM(s) Oral daily  atorvastatin 40 milliGRAM(s) Oral at bedtime  carvedilol 25 milliGRAM(s) Oral every 12 hours  cefepime   IVPB 2000 milliGRAM(s) IV Intermittent every 24 hours  cefepime   IVPB 2000 milliGRAM(s) IV Intermittent every 24 hours  chlorhexidine 2% Cloths 1 Application(s) Topical <User Schedule>  dextrose 5%. 1000 milliLiter(s) (100 mL/Hr) IV Continuous <Continuous>  dextrose 5%. 1000 milliLiter(s) (50 mL/Hr) IV Continuous <Continuous>  dextrose 50% Injectable 25 Gram(s) IV Push once  dextrose 50% Injectable 12.5 Gram(s) IV Push once  dextrose 50% Injectable 25 Gram(s) IV Push once  glucagon  Injectable 1 milliGRAM(s) IntraMuscular once  heparin   Injectable 5000 Unit(s) SubCutaneous every 8 hours  hydrocortisone sodium succinate Injectable 30 milliGRAM(s) IV Push every 8 hours  influenza  Vaccine (HIGH DOSE) 0.7 milliLiter(s) IntraMuscular once  insulin glargine Injectable (LANTUS) 10 Unit(s) SubCutaneous at bedtime  insulin regular  human corrective regimen sliding scale   SubCutaneous every 6 hours  insulin regular  human recombinant 4 Unit(s) SubCutaneous every 6 hours  levothyroxine Injectable 25 MICROGram(s) IV Push at bedtime  sodium chloride 0.225% 1000 milliLiter(s) (1000 mL/Hr) IV Continuous <Continuous>  tamsulosin 0.4 milliGRAM(s) Oral at bedtime    MEDICATIONS  (PRN):  acetaminophen     Tablet .. 650 milliGRAM(s) Oral every 6 hours PRN Mild Pain (1 - 3)  dextrose Oral Gel 15 Gram(s) Oral once PRN Blood Glucose LESS THAN 70 milliGRAM(s)/deciliter  sodium chloride 0.9% lock flush 10 milliLiter(s) IV Push every 1 hour PRN Pre/post blood products, medications, blood draw, and to maintain line patency      ALLERGIES:  Allergies    No Known Allergies    Intolerances        LABS:                        7.5    6.94  )-----------( 117      ( 2022 05:41 )             24.0     11-24    146<H>  |  115<H>  |  71<H>  ----------------------------<  165<H>  3.1<L>   |  16<L>  |  2.72<H>    Ca    8.3<L>      2022 05:41  Phos  4.5     11-24  Mg     1.9     11-24    TPro  5.1<L>  /  Alb  2.6<L>  /  TBili  0.3  /  DBili  x   /  AST  9<L>  /  ALT  11  /  AlkPhos  95  11-      Urinalysis Basic - ( 2022 09:20 )    Color: Yellow / Appearance: Clear / S.020 / pH: x  Gluc: x / Ketone: Trace mg/dL  / Bili: Negative / Urobili: 0.2 E.U./dL   Blood: x / Protein: 30 mg/dL / Nitrite: NEGATIVE   Leuk Esterase: NEGATIVE / RBC: Many /HPF / WBC < 5 /HPF   Sq Epi: x / Non Sq Epi: 5-10 /HPF / Bacteria: Present /HPF      CAPILLARY BLOOD GLUCOSE      POCT Blood Glucose.: 149 mg/dL (2022 11:08)      RADIOLOGY & ADDITIONAL TESTS: Reviewed.   CC: Patient is a 82y old  Male who presents with a chief complaint of LE edema and cough (2022 09:30)      INTERVAL EVENTS: Multiple episodes of loose stool overnight. He also became agitated and removed central line.    SUBJECTIVE / INTERVAL HPI: Patient seen and examined at bedside. More alert that yesterday. Denies abdominal pain, chest pain, fever, and chills.     ROS: negative unless otherwise stated above.    VITAL SIGNS:  Vital Signs Last 24 Hrs  T(C): 36.1 (2022 09:20), Max: 36.6 (2022 05:00)  T(F): 97 (2022 09:20), Max: 97.9 (2022 05:00)  HR: 67 (:00) (55 - 71)  BP: --  BP(mean): --  RR: 23 (2022 11:00) (18 - 34)  SpO2: 100% (:) (92% - 100%)    Parameters below as of 2022 11:00  Patient On (Oxygen Delivery Method): nasal cannula  O2 Flow (L/min): 2        22 @ 07:01  -  22 @ 07:00  --------------------------------------------------------  IN: 2920 mL / OUT: 825 mL / NET: 2095 mL    22 @ 07:01  -  22 @ 11:30  --------------------------------------------------------  IN: 1419 mL / OUT: 135 mL / NET: 1284 mL        PHYSICAL EXAM:  General: NAD, lying in bed  HEENT: MMM, anisocoric pupils R fixed L sluggish (at baseline), EOMI, anicteric sclera  Neck: supple  Cardiovascular: +S1/S2; RRR, no murmurs  Respiratory: productive cough with deep inspiration, no W/R/R  Gastrointestinal: soft, NT/ND, +BS, no JVD  Extremities: WWP; trace edema in b/l ankles, no clubbing or cyanosis; L arm 2+ edema extending to mid-forearm  Vascular: 2+ radial, DP/PT pulses B/L  Neurological: AAOx3; no focal deficits, moving all limbs spontaneously    MEDICATIONS:  MEDICATIONS  (STANDING):  albuterol/ipratropium for Nebulization 3 milliLiter(s) Nebulizer every 6 hours  aspirin  chewable 81 milliGRAM(s) Oral daily  atorvastatin 40 milliGRAM(s) Oral at bedtime  carvedilol 25 milliGRAM(s) Oral every 12 hours  cefepime   IVPB 2000 milliGRAM(s) IV Intermittent every 24 hours  cefepime   IVPB 2000 milliGRAM(s) IV Intermittent every 24 hours  chlorhexidine 2% Cloths 1 Application(s) Topical <User Schedule>  dextrose 5%. 1000 milliLiter(s) (100 mL/Hr) IV Continuous <Continuous>  dextrose 5%. 1000 milliLiter(s) (50 mL/Hr) IV Continuous <Continuous>  dextrose 50% Injectable 25 Gram(s) IV Push once  dextrose 50% Injectable 12.5 Gram(s) IV Push once  dextrose 50% Injectable 25 Gram(s) IV Push once  glucagon  Injectable 1 milliGRAM(s) IntraMuscular once  heparin   Injectable 5000 Unit(s) SubCutaneous every 8 hours  hydrocortisone sodium succinate Injectable 30 milliGRAM(s) IV Push every 8 hours  influenza  Vaccine (HIGH DOSE) 0.7 milliLiter(s) IntraMuscular once  insulin glargine Injectable (LANTUS) 10 Unit(s) SubCutaneous at bedtime  insulin regular  human corrective regimen sliding scale   SubCutaneous every 6 hours  insulin regular  human recombinant 4 Unit(s) SubCutaneous every 6 hours  levothyroxine Injectable 25 MICROGram(s) IV Push at bedtime  sodium chloride 0.225% 1000 milliLiter(s) (1000 mL/Hr) IV Continuous <Continuous>  tamsulosin 0.4 milliGRAM(s) Oral at bedtime    MEDICATIONS  (PRN):  acetaminophen     Tablet .. 650 milliGRAM(s) Oral every 6 hours PRN Mild Pain (1 - 3)  dextrose Oral Gel 15 Gram(s) Oral once PRN Blood Glucose LESS THAN 70 milliGRAM(s)/deciliter  sodium chloride 0.9% lock flush 10 milliLiter(s) IV Push every 1 hour PRN Pre/post blood products, medications, blood draw, and to maintain line patency      ALLERGIES:  Allergies    No Known Allergies    Intolerances        LABS:                        7.5    6.94  )-----------( 117      ( 2022 05:41 )             24.0     11-24    146<H>  |  115<H>  |  71<H>  ----------------------------<  165<H>  3.1<L>   |  16<L>  |  2.72<H>    Ca    8.3<L>      2022 05:41  Phos  4.5     11-24  Mg     1.9     11-24    TPro  5.1<L>  /  Alb  2.6<L>  /  TBili  0.3  /  DBili  x   /  AST  9<L>  /  ALT  11  /  AlkPhos  95  11-      Urinalysis Basic - ( 2022 09:20 )    Color: Yellow / Appearance: Clear / S.020 / pH: x  Gluc: x / Ketone: Trace mg/dL  / Bili: Negative / Urobili: 0.2 E.U./dL   Blood: x / Protein: 30 mg/dL / Nitrite: NEGATIVE   Leuk Esterase: NEGATIVE / RBC: Many /HPF / WBC < 5 /HPF   Sq Epi: x / Non Sq Epi: 5-10 /HPF / Bacteria: Present /HPF      CAPILLARY BLOOD GLUCOSE      POCT Blood Glucose.: 149 mg/dL (2022 11:08)      RADIOLOGY & ADDITIONAL TESTS: Reviewed.   CC: Patient is a 82y old  Male who presents with a chief complaint of LE edema and cough (2022 09:30)      INTERVAL EVENTS: Multiple episodes of loose stool overnight. He also became agitated and removed central line.    SUBJECTIVE / INTERVAL HPI: Patient seen and examined at bedside. More alert that yesterday. Denies abdominal pain, chest pain, fever, and chills.     ROS: negative unless otherwise stated above.    VITAL SIGNS:  Vital Signs Last 24 Hrs  T(C): 36.1 (2022 09:20), Max: 36.6 (2022 05:00)  T(F): 97 (2022 09:20), Max: 97.9 (2022 05:00)  HR: 67 (:00) (55 - 71)  BP: --  BP(mean): --  RR: 23 (2022 11:00) (18 - 34)  SpO2: 100% (:) (92% - 100%)    Parameters below as of 2022 11:00  Patient On (Oxygen Delivery Method): nasal cannula  O2 Flow (L/min): 2        22 @ 07:01  -  22 @ 07:00  --------------------------------------------------------  IN: 2920 mL / OUT: 825 mL / NET: 2095 mL    22 @ 07:01  -  22 @ 11:30  --------------------------------------------------------  IN: 1419 mL / OUT: 135 mL / NET: 1284 mL        PHYSICAL EXAM:  General: NAD, lying in bed  HEENT: MMM, anisocoric pupils R fixed L sluggish (at baseline), EOMI, anicteric sclera  Neck: supple  Cardiovascular: +S1/S2; RRR, no murmurs  Respiratory: productive cough with deep inspiration, mild rhonchi, no W/R/R  Gastrointestinal: soft, NT/ND,+BS, no JVD  Extremities: WWP; trace edema in b/l ankles, no clubbing or cyanosis; L arm 2+ edema extending to mid-forearm  Vascular: 2+ radial, DP/PT pulses B/L  Neurological: AAOx3; no focal deficits, moving all limbs spontaneously    MEDICATIONS:  MEDICATIONS  (STANDING):  albuterol/ipratropium for Nebulization 3 milliLiter(s) Nebulizer every 6 hours  aspirin  chewable 81 milliGRAM(s) Oral daily  atorvastatin 40 milliGRAM(s) Oral at bedtime  carvedilol 25 milliGRAM(s) Oral every 12 hours  cefepime   IVPB 2000 milliGRAM(s) IV Intermittent every 24 hours  cefepime   IVPB 2000 milliGRAM(s) IV Intermittent every 24 hours  chlorhexidine 2% Cloths 1 Application(s) Topical <User Schedule>  dextrose 5%. 1000 milliLiter(s) (100 mL/Hr) IV Continuous <Continuous>  dextrose 5%. 1000 milliLiter(s) (50 mL/Hr) IV Continuous <Continuous>  dextrose 50% Injectable 25 Gram(s) IV Push once  dextrose 50% Injectable 12.5 Gram(s) IV Push once  dextrose 50% Injectable 25 Gram(s) IV Push once  glucagon  Injectable 1 milliGRAM(s) IntraMuscular once  heparin   Injectable 5000 Unit(s) SubCutaneous every 8 hours  hydrocortisone sodium succinate Injectable 30 milliGRAM(s) IV Push every 8 hours  influenza  Vaccine (HIGH DOSE) 0.7 milliLiter(s) IntraMuscular once  insulin glargine Injectable (LANTUS) 10 Unit(s) SubCutaneous at bedtime  insulin regular  human corrective regimen sliding scale   SubCutaneous every 6 hours  insulin regular  human recombinant 4 Unit(s) SubCutaneous every 6 hours  levothyroxine Injectable 25 MICROGram(s) IV Push at bedtime  sodium chloride 0.225% 1000 milliLiter(s) (1000 mL/Hr) IV Continuous <Continuous>  tamsulosin 0.4 milliGRAM(s) Oral at bedtime    MEDICATIONS  (PRN):  acetaminophen     Tablet .. 650 milliGRAM(s) Oral every 6 hours PRN Mild Pain (1 - 3)  dextrose Oral Gel 15 Gram(s) Oral once PRN Blood Glucose LESS THAN 70 milliGRAM(s)/deciliter  sodium chloride 0.9% lock flush 10 milliLiter(s) IV Push every 1 hour PRN Pre/post blood products, medications, blood draw, and to maintain line patency      ALLERGIES:  Allergies    No Known Allergies    Intolerances        LABS:                        7.5    6.94  )-----------( 117      ( 2022 05:41 )             24.0     11-24    146<H>  |  115<H>  |  71<H>  ----------------------------<  165<H>  3.1<L>   |  16<L>  |  2.72<H>    Ca    8.3<L>      2022 05:41  Phos  4.5     11-24  Mg     1.9     -    TPro  5.1<L>  /  Alb  2.6<L>  /  TBili  0.3  /  DBili  x   /  AST  9<L>  /  ALT  11  /  AlkPhos  95  -      Urinalysis Basic - ( 2022 09:20 )    Color: Yellow / Appearance: Clear / S.020 / pH: x  Gluc: x / Ketone: Trace mg/dL  / Bili: Negative / Urobili: 0.2 E.U./dL   Blood: x / Protein: 30 mg/dL / Nitrite: NEGATIVE   Leuk Esterase: NEGATIVE / RBC: Many /HPF / WBC < 5 /HPF   Sq Epi: x / Non Sq Epi: 5-10 /HPF / Bacteria: Present /HPF      CAPILLARY BLOOD GLUCOSE      POCT Blood Glucose.: 149 mg/dL (2022 11:08)      RADIOLOGY & ADDITIONAL TESTS: Reviewed.

## 2022-11-24 NOTE — CHART NOTE - NSCHARTNOTEFT_GEN_A_CORE
Infectious Diseases Anti-infective Approval Note    Medication: cefepime   Dose*: 2g  Route: IV  Frequency: q24h  Duration**: 2d (through 11/25/22)    *Dose may be adjusted as needed for alterations in renal function.    **Reflects duration of approval and not necessarily duration of therapy     THIS IS NOT AN INFECTIOUS DISEASES CONSULTATION

## 2022-11-24 NOTE — PROGRESS NOTE ADULT - ASSESSMENT
82 y/o M PMH CKD 4, renal transplant in 2013 at [facility], glaucoma (blind), DM1, anemia, CAD s/p 2 DAMEON to LAD in 6/21, BPH, recent admission for PNA presents with acute on chronic cough and SUNNY 2/2 acute CHF vs CKD.       NEUROLOGY  #S/p extubation  Off sedation and extubated. Mentating well. Pupils still anisocoric. CT scan negative for acute intracranial pathology.      CARDIOVASCULAR  #HFpEF  Had EF of 55% on TTE from Oct 2022, grade I diastolic dysfunction. Repeat TTE 11/17 with poor visualization of LV. Patient making poor UOP overnight, urine studies suggest hypovolemia. CVP 5 today from 11 yesterday.   - c/w carvedilol 25mg BID today    #Lower extremity edema  SUNNY 2/2 CKD vs acute on chronic CHF (g I dd). Also w cough but saturating well on RA without increased work of breathing. BNP elevated supporting acute on chronic chf. Cards consulted in ED, limited TTE w preserved systolic function, but poor visualization.  - continuing to monitor I/Os    #Upper extremity edema  Stable from yesterday but increased compared to baseline. US with superficial thrombosis of L cephalic vein extending to AC fossa, no DVTs.   - given patients propensity to bleed and superficial nature of thrombus, no indication for AC    #CAD  #HLD  Hx of CAD s/p 2 DAMEON to LAD in June 2021, currently no CP. Trops 0.03 but no CP or ischemic changes on EKG, likely due to CKD. Takes Plavix and aspirin per last d/c but only aspirin on outpatient note  - c/w ASA daily  - collateral information on plavix.  - c/w home Lipitor    RENAL  #ELIZABETH on Chronic kidney disease (CKD).   BUN 77/Cr 3.0, still not at baseline sCr 2.3-2.5. On admission, fluid overloaded. Does have orthopnea and intermittent SOB at baseline. Acidotic but at baseline. Follows w Dr. Mac. UA with granular casts, Jailene 20.  - give 1L NS bolus  - next check the Tacrolimus level in 2 days; 30 minutes before 11/24 AM dose  - renally dose meds    #Renal transplant  Patient had renal transplant in 2013. On home mycophenolate 500mg BID and tacrolimus 4mg BID. Per nephro, decreased home tacrolimus from 4mg BID --> 2mg BID while in hospital.   - c/w tacrolimus 2mg BID  - continue holding mycophenolate i/s/o infection  - f/u renal recs    #Hypernatremia  Free water free wa      #BPH (benign prostatic hyperplasia).   - c/w tamsulosin 0.4mg daily      ID  #Aspiration PNA  Tracheal aspirate cx growing serratia marcescens and pseudomonas. Patient was started on vancomycin and cefepime. MRSA nares positive. Dc'ed vancomycin given supratherapeutic trough   - c/w cefepime 2g q24 (start 11/19- )  - given relatively poor renal function, will monitor closely for neurotoxicity    ENDOCRINE  #DM (diabetes mellitus).   Previously on lantus 15 lispro 5. Holding basal bolus due to NPO status  - resumed tube feeds nepro today  - FSG q6  - goal -180s    # Hypothyroidism  TSH 20.8, free t4 0.7, T3 49 (low)  - per endo rec no suspicion of myxedema coma  - continue with synthroid 25mg qD    #Relative Adrenal Insufficiency  Patient was started on hydrocortisone 50mg q6hr --> q8.  - c/w HC 30mg TID    HEME  #Normocytic Anemia  Likely 2.2 CKD. Hgb 6.9 on 11/21, corrected appropriately s/p 1u pRBC. No active signs of bleeding on physical exam. CTH with no intracranial bleeding. Hgb stable today.  - active T+S  - transfuse <7    FLUIDS/ELECTROLYTES/NUTRITION  -IVF none  -Monitor, careful w advanced kidney disease  -Diet: resumed tube feeds  DVT ppx: heparin TID  GI: none  Dispo: PT to evaluate today   84 y/o M PMH CKD 4, renal transplant in 2013 at [facility], glaucoma (blind), DM1, anemia, CAD s/p 2 DAMEON to LAD in 6/21, BPH, recent admission for PNA presents with acute on chronic cough and SUNNY 2/2 acute CHF vs CKD.       NEUROLOGY  #S/p extubation  Off sedation and extubated. Mentating well. Pupils still anisocoric. CT scan negative for acute intracranial pathology.    CARDIOVASCULAR  #HFpEF  Had EF of 55% on TTE from Oct 2022, grade I diastolic dysfunction. Repeat TTE 11/17 with poor visualization of LV. Patient making poor UOP overnight, urine studies suggest hypovolemia. CVP 5 today from 11 yesterday.   - c/w carvedilol 25mg BID today    #Upper extremity edema  Stable from yesterday but increased compared to baseline. US with superficial thrombosis of L cephalic vein extending to AC fossa, no DVTs.   - given patients propensity to bleed and superficial nature of thrombus, no indication for AC    #CAD  #HLD  Hx of CAD s/p 2 DAMEON to LAD in June 2021, currently no CP. Trops 0.03 but no CP or ischemic changes on EKG, likely due to CKD. Takes Plavix and aspirin per last d/c but only aspirin on outpatient note  - c/w ASA daily  - c/w home Lipitor    RENAL  #ELIZABETH on Chronic kidney disease (CKD).   BUN 77/Cr 3.0, still not at baseline sCr 2.3-2.5. On admission, fluid overloaded. Does have orthopnea and intermittent SOB at baseline. Acidotic but at baseline. Follows w Dr. Mac. UA with granular casts. rine studies this AM suggest pre-renal etiology.  - gave 1L LR bolus this AM  - gave 1L 1/4 NS with 2 amps of bicarb  - renally dose meds    #Renal transplant  Patient had renal transplant in 2013. On home mycophenolate 500mg BID and tacrolimus 4mg BID. Per nephro, decreased home tacrolimus from 4mg BID --> 2mg BID while in hospital.   - next tacrolimus level 30 minutes before 11/24 AM dose  - c/w tacrolimus 2mg BID  - continue holding mycophenolate i/s/o infection  - f/u renal recs    #Hypernatremia  Free water deficit of 2L today  - c/w tube feeds with 250cc q6 free water flushes      #BPH (benign prostatic hyperplasia).   - c/w tamsulosin 0.4mg daily      ID  #Aspiration PNA  Tracheal aspirate cx growing serratia marcescens and pseudomonas. Patient was started on vancomycin and cefepime. MRSA nares positive. Dc'ed vancomycin given supratherapeutic trough   - c/w cefepime 2g q24 (start 11/19- )  - given relatively poor renal function, will monitor closely for neurotoxicity    ENDOCRINE  #DM (diabetes mellitus).   Previously on lantus 15 lispro 5. Holding basal bolus due to NPO status  - c/w tube feeds   - FSG q6  - goal -180s    # Hypothyroidism  TSH 20.8, free t4 0.7, T3 49 (low)  - per endo rec no suspicion of myxedema coma  - continue with synthroid 25mg qD    #Relative Adrenal Insufficiency  Patient was started on hydrocortisone 50mg q6hr --> q8.  - c/w HC 30mg TID    HEME  #Normocytic Anemia  Likely 2.2 CKD. Hgb 6.9 on 11/21, corrected appropriately s/p 1u pRBC. No active signs of bleeding on physical exam. CTH with no intracranial bleeding. Hgb stable today.  - active T+S  - transfuse <7    FLUIDS/ELECTROLYTES/NUTRITION  -IVF as above  -Monitor, careful w advanced kidney disease  -Diet: resumed tube feeds; s/s to re-evaluate tomorow  -DVT ppx: heparin TID  -GI: none  -Dispo: PT recommending [facility] 84 y/o M PMH CKD 4, renal transplant in 2013 at [facility], glaucoma (blind), DM1, anemia, CAD s/p 2 DAMEON to LAD in 6/21, BPH, recent admission for PNA presents with acute on chronic cough and SUNNY 2/2 acute CHF vs CKD.       NEUROLOGY  #S/p extubation  Off sedation and extubated. Mentating well. Pupils still anisocoric. CT scan negative for acute intracranial pathology.    CARDIOVASCULAR  #HFpEF  Had EF of 55% on TTE from Oct 2022, grade I diastolic dysfunction. Repeat TTE 11/17 with poor visualization of LV. Patient making poor UOP overnight, urine studies suggest hypovolemia. CVP 5 today from 11 yesterday.   - c/w carvedilol 25mg BID today    #Upper extremity edema  Stable from yesterday but increased compared to baseline. US with superficial thrombosis of L cephalic vein extending to AC fossa, no DVTs.   - given patients propensity to bleed and superficial nature of thrombus, no indication for AC    #CAD  #HLD  Hx of CAD s/p 2 DAMEON to LAD in June 2021, currently no CP. Trops 0.03 but no CP or ischemic changes on EKG, likely due to CKD. Takes Plavix and aspirin per last d/c but only aspirin on outpatient note  - c/w ASA daily  - c/w home Lipitor    RENAL  #ELIZABETH on Chronic kidney disease (CKD).   BUN 77/Cr 3.0, still not at baseline sCr 2.3-2.5. On admission, fluid overloaded. Does have orthopnea and intermittent SOB at baseline. Acidotic but at baseline. Follows w Dr. Mac. UA with granular casts. rine studies this AM suggest pre-renal etiology.  - gave 1L LR bolus this AM  - gave 1L 1/4 NS with 2 amps of bicarb  - renally dose meds    #Renal transplant  Patient had renal transplant in 2013. On home mycophenolate 500mg BID and tacrolimus 4mg BID. Per nephro, decreased home tacrolimus from 4mg BID --> 2mg BID while in hospital.   - next tacrolimus level 30 minutes before 11/24 AM dose  - c/w tacrolimus 2mg BID  - continue holding mycophenolate i/s/o infection  - f/u renal recs    #Hypernatremia  Free water deficit of 2L today  - c/w tube feeds with 250cc q6 free water flushes    GI  #Diarrhea  Patient with loose stool overnight, have resolved this AM after started on miralax and senna.  - dced miralax and senna  - can consider adding banana flakes to tube feeds if contined  - will continue to monitor for BMs      #BPH (benign prostatic hyperplasia).   - c/w tamsulosin 0.4mg daily      ID  #Aspiration PNA  Tracheal aspirate cx growing serratia marcescens and pseudomonas. Patient was started on vancomycin and cefepime. MRSA nares positive. Dc'ed vancomycin given supratherapeutic trough   - c/w cefepime 2g q24 x7 day (start 11/19- )  - given relatively poor renal function, will monitor closely for neurotoxicity    ENDOCRINE  #DM (diabetes mellitus).   Previously on lantus 15 lispro 5. Holding basal bolus due to NPO status  - c/w tube feeds   - lantus 10U, lispro 6U TID  - c/w low dose ISS  - FSG q6  - goal -180    #Hypothyroidism  TSH 20.8, free t4 0.7, T3 49 (low)  - per endo rec no suspicion of myxedema coma  - continue with synthroid 25mg qD    #Relative Adrenal Insufficiency  Patient was started on hydrocortisone 50mg q6hr --> q8.  - c/w HC 30mg TID    HEME  #Normocytic Anemia  Likely 2.2 CKD. Hgb 6.9 on 11/21, corrected appropriately s/p 1u pRBC. No active signs of bleeding on physical exam. CTH with no intracranial bleeding. Hgb stable today.  - active T+S  - transfuse <7    FLUIDS/ELECTROLYTES/NUTRITION  -IVF as above  -Monitor, careful w advanced kidney disease  -Diet: resumed tube feeds; s/s to re-evaluate tomorrows  -DVT ppx: heparin TID  -GI: none  -Dispo: PT recommending [facility]

## 2022-11-24 NOTE — PROGRESS NOTE ADULT - ATTENDING COMMENTS
renal fxn stable  uo improved after IVF  volume seems ok  replete K , agree with IV hco3  free water

## 2022-11-24 NOTE — PROGRESS NOTE ADULT - PROBLEM SELECTOR PLAN 1
Event noticed.  The patient is sedated.  I suctioned the patient copious amount of secretion.  Gas exchange is stable.  Sputum grew Pseudomonas and Serratia.  The patient on appropriate antibiotic.  Continue pulmonary toilet.  Patient has baseline dementia and does not and trial up off sedation.  Tapers pressors as needed.  Cortisol level is adequate.  I discussed the case with critical care. She was extubated.  Patient is on nasal cannula.  Suction the patient copious amount of secretion.  Dysphagia evaluation.  Continue antibiotic.  The patient is hemodynamically stable off pressors and sedative.  Suctioned the patient thick moderate secretion.  NO CXR.  Sat OK on NC.  On nebs

## 2022-11-25 NOTE — PROGRESS NOTE ADULT - SUBJECTIVE AND OBJECTIVE BOX
OVERNIGHT EVENTS:    SUBJECTIVE / INTERVAL HPI: Patient seen and examined at bedside.     VITAL SIGNS:  Vital Signs Last 24 Hrs  T(C): 36.9 (25 Nov 2022 09:08), Max: 36.9 (25 Nov 2022 09:08)  T(F): 98.5 (25 Nov 2022 09:08), Max: 98.5 (25 Nov 2022 09:08)  HR: 70 (25 Nov 2022 09:17) (58 - 79)  BP: 137/65 (25 Nov 2022 03:00) (137/65 - 137/65)  BP(mean): 93 (25 Nov 2022 03:00) (93 - 93)  RR: 32 (25 Nov 2022 08:00) (17 - 35)  SpO2: 97% (25 Nov 2022 09:17) (90% - 100%)    Parameters below as of 25 Nov 2022 09:17  Patient On (Oxygen Delivery Method): nasal cannula        PHYSICAL EXAM:    General: Well developed, well nourished, no acute distress  HEENT: NC/AT; PERRL, anicteric sclera; MMM  Neck: supple  Cardiovascular: +S1/S2, RRR, no murmurs, rubs, gallops  Respiratory: CTA B/L; no W/R/R  Gastrointestinal: soft, NT/ND; +BSx4  Extremities: WWP; no edema, clubbing or cyanosis  Vascular: 2+ radial, DP/PT pulses B/L  Neurological: AAOx3; no focal deficits    MEDICATIONS:  MEDICATIONS  (STANDING):  albuterol/ipratropium for Nebulization 3 milliLiter(s) Nebulizer every 6 hours  aspirin  chewable 81 milliGRAM(s) Oral daily  atorvastatin 40 milliGRAM(s) Oral at bedtime  carvedilol 25 milliGRAM(s) Oral every 12 hours  cefepime   IVPB 2000 milliGRAM(s) IV Intermittent every 24 hours  chlorhexidine 2% Cloths 1 Application(s) Topical <User Schedule>  dextrose 5%. 1000 milliLiter(s) (100 mL/Hr) IV Continuous <Continuous>  dextrose 5%. 1000 milliLiter(s) (50 mL/Hr) IV Continuous <Continuous>  dextrose 50% Injectable 25 Gram(s) IV Push once  dextrose 50% Injectable 12.5 Gram(s) IV Push once  dextrose 50% Injectable 25 Gram(s) IV Push once  glucagon  Injectable 1 milliGRAM(s) IntraMuscular once  heparin   Injectable 5000 Unit(s) SubCutaneous every 8 hours  hydrocortisone sodium succinate Injectable 30 milliGRAM(s) IV Push every 8 hours  influenza  Vaccine (HIGH DOSE) 0.7 milliLiter(s) IntraMuscular once  insulin glargine Injectable (LANTUS) 10 Unit(s) SubCutaneous at bedtime  insulin regular  human corrective regimen sliding scale   SubCutaneous every 6 hours  insulin regular  human recombinant 4 Unit(s) SubCutaneous every 6 hours  levothyroxine Injectable 25 MICROGram(s) IV Push at bedtime  tacrolimus    0.5 mG/mL Suspension 3 milliGRAM(s) Oral every 24 hours  tacrolimus    0.5 mG/mL Suspension 2 milliGRAM(s) Oral every 24 hours  tamsulosin 0.4 milliGRAM(s) Oral at bedtime    MEDICATIONS  (PRN):  acetaminophen     Tablet .. 650 milliGRAM(s) Oral every 6 hours PRN Mild Pain (1 - 3)  dextrose Oral Gel 15 Gram(s) Oral once PRN Blood Glucose LESS THAN 70 milliGRAM(s)/deciliter  sodium chloride 0.9% lock flush 10 milliLiter(s) IV Push every 1 hour PRN Pre/post blood products, medications, blood draw, and to maintain line patency      ALLERGIES:  Allergies    No Known Allergies    Intolerances        LABS:                        7.1    6.46  )-----------( 121      ( 25 Nov 2022 05:26 )             22.6     11-25    143  |  114<H>  |  74<H>  ----------------------------<  141<H>  3.2<L>   |  19<L>  |  2.56<H>    Ca    8.4      25 Nov 2022 05:26  Phos  4.0     11-25  Mg     1.9     11-25          CAPILLARY BLOOD GLUCOSE      POCT Blood Glucose.: 118 mg/dL (25 Nov 2022 05:09)      RADIOLOGY & ADDITIONAL TESTS: Reviewed.    PLAN:

## 2022-11-25 NOTE — PROGRESS NOTE ADULT - ASSESSMENT
82 y/o M PMH CKD 4, renal transplant in 2013 at [facility], glaucoma (blind), DM1, anemia, CAD s/p 2 DAMEON to LAD in 6/21, BPH, recent admission for PNA presents with acute on chronic cough and SUNNY 2/2 acute CHF vs CKD.       NEUROLOGY  #S/p extubation  Off sedation and extubated. Mentating well. Pupils still anisocoric. CT scan negative for acute intracranial pathology.    CARDIOVASCULAR  #HFpEF  Had EF of 55% on TTE from Oct 2022, grade I diastolic dysfunction. Repeat TTE 11/17 with poor visualization of LV. Patient making poor UOP overnight, urine studies suggest hypovolemia. CVP 5 today from 11 yesterday.   - c/w carvedilol 25mg BID today    #Upper extremity edema  Stable from yesterday but increased compared to baseline. US with superficial thrombosis of L cephalic vein extending to AC fossa, no DVTs.   - given patients propensity to bleed and superficial nature of thrombus, no indication for AC    #CAD  #HLD  Hx of CAD s/p 2 DAMEON to LAD in June 2021, currently no CP. Trops 0.03 but no CP or ischemic changes on EKG, likely due to CKD. Takes Plavix and aspirin per last d/c but only aspirin on outpatient note  - c/w ASA daily  - c/w home Lipitor    RENAL  #ELIZABETH on Chronic kidney disease (CKD).   BUN 77/Cr 3.0, still not at baseline sCr 2.3-2.5. On admission, fluid overloaded. Does have orthopnea and intermittent SOB at baseline. Acidotic but at baseline. Follows w Dr. Mac. UA with granular casts. rine studies this AM suggest pre-renal etiology.  - gave 1L LR bolus this AM  - gave 1L 1/4 NS with 2 amps of bicarb  - renally dose meds    #Renal transplant  Patient had renal transplant in 2013. On home mycophenolate 500mg BID and tacrolimus 4mg BID. Per nephro, decreased home tacrolimus from 4mg BID --> 2mg BID while in hospital.   - next tacrolimus level 30 minutes before 11/24 AM dose  - c/w tacrolimus 2mg BID  - continue holding mycophenolate i/s/o infection  - f/u renal recs    #Hypernatremia  Free water deficit of 2L today  - c/w tube feeds with 250cc q6 free water flushes    GI  #Diarrhea  Patient with loose stool overnight, have resolved this AM after started on miralax and senna.  - dced miralax and senna  - can consider adding banana flakes to tube feeds if contined  - will continue to monitor for BMs      #BPH (benign prostatic hyperplasia).   - c/w tamsulosin 0.4mg daily      ID  #Aspiration PNA  Tracheal aspirate cx growing serratia marcescens and pseudomonas. Patient was started on vancomycin and cefepime. MRSA nares positive. Dc'ed vancomycin given supratherapeutic trough   - c/w cefepime 2g q24 x7 day (start 11/19- )  - given relatively poor renal function, will monitor closely for neurotoxicity    ENDOCRINE  #DM (diabetes mellitus).   Previously on lantus 15 lispro 5. Holding basal bolus due to NPO status  - c/w tube feeds   - lantus 10U, lispro 6U TID  - c/w low dose ISS  - FSG q6  - goal -180    #Hypothyroidism  TSH 20.8, free t4 0.7, T3 49 (low)  - per endo rec no suspicion of myxedema coma  - continue with synthroid 25mg qD    #Relative Adrenal Insufficiency  Patient was started on hydrocortisone 50mg q6hr --> q8.  - c/w HC 30mg TID    HEME  #Normocytic Anemia  Likely 2.2 CKD. Hgb 6.9 on 11/21, corrected appropriately s/p 1u pRBC. No active signs of bleeding on physical exam. CTH with no intracranial bleeding. Hgb stable today.  - active T+S  - transfuse <7    FLUIDS/ELECTROLYTES/NUTRITION  -IVF as above  -Monitor, careful w advanced kidney disease  -Diet: resumed tube feeds; s/s to re-evaluate tomorrows  -DVT ppx: heparin TID  -GI: none  -Dispo: PT recommending NAVID 84 y/o M PMH CKD 4, renal transplant in 2013 at [facility], glaucoma (blind), DM1, anemia, CAD s/p 2 DAMEON to LAD in 6/21, BPH, recent admission for PNA presents with acute on chronic cough and SUNNY 2/2 acute CHF vs CKD.       NEUROLOGY  #S/p extubation  Off sedation and extubated. Mentating well. Pupils still anisocoric. CT scan negative for acute intracranial pathology.    #Agitation  Patient wishes to leave the hospital and has been agitated typically overnight. Last day of cefepime 2g today.  - consider additional zyprexa 2.5mg IM PRN    CARDIOVASCULAR  #HFpEF  Had EF of 55% on TTE from Oct 2022, grade I diastolic dysfunction. Repeat TTE 11/17 with poor visualization of LV. Patient making poor UOP overnight, urine studies suggest hypovolemia. CVP 5 today from 11 yesterday.   - c/w carvedilol 25mg BID     #Upper extremity edema  Stable from yesterday but increased compared to baseline. US with superficial thrombosis of L cephalic vein extending to AC fossa, no DVTs.   - given patients propensity to bleed and superficial nature of thrombus, no indication for AC    #CAD  #HLD  Hx of CAD s/p 2 DAMEON to LAD in June 2021, currently no CP. Trops 0.03 but no CP or ischemic changes on EKG, likely due to CKD. Takes Plavix and aspirin per last d/c but only aspirin on outpatient note  - c/w ASA daily  - c/w home Lipitor    RENAL  #ELIZABETH on Chronic kidney disease (CKD). - RESOLVED  Baseline sCr 2.3-2.5. On admission, fluid overloaded. Does have orthopnea and intermittent SOB at baseline. Acidotic but at baseline. Follows w Dr. Mac. UA with granular casts. Urine studies this AM suggest pre-renal etiology.  - today sCr back at baseline; BUN 74 increased  - renally dose meds    #Renal transplant  Patient had renal transplant in 2013. On home mycophenolate 500mg BID and tacrolimus 4mg BID. Per nephro, decreased home tacrolimus from 4mg BID --> 2mg BID while in hospital.   - start tacrolimus 3mg in AM, 2mg in PM  - next tacrolimus level 30 minutes before 11/26 AM dose  - continue holding mycophenolate i/s/o infection  - f/u renal recs    #Hypernatremia - RESOLVED  - c/w tube feeds with 250cc q6 free water flushes    GI  #Diarrhea  Patient with loose stool overnight, have resolved this AM after started on miralax and senna.  - dced miralax and senna  - can consider adding banana flakes to tube feeds if contines  - will continue to monitor for BMs      #BPH (benign prostatic hyperplasia).   - c/w tamsulosin 0.4mg daily    ID  #Aspiration PNA  Tracheal aspirate cx growing serratia marcescens and pseudomonas. Patient was started on vancomycin and cefepime. MRSA nares positive. Dc'ed vancomycin given supratherapeutic trough   - c/w cefepime 2g q24 x7 day (start 11/19- 11/25)  - given relatively poor renal function, will monitor closely for neurotoxicity  - patient with new agitation, last day of zyprexa today    ENDOCRINE  #DM (diabetes mellitus).   Previously on lantus 15 lispro 5. Endocrine following. One episode of hypoglycemia this morning.  - c/w lantus 10U in PM  - decrease regular insulin 3U TID  - c/w low dose regular ISS  - FSG q6  - goal -180    #Hypothyroidism  TSH 20.8, free t4 0.7, T3 49 (low)  - per endo rec no suspicion of myxedema coma  - continue with synthroid 25mg qD    #Relative Adrenal Insufficiency  Patient was started on hydrocortisone 50mg q6hr --> q8.  - c/w HC 30mg TID    HEME  #Normocytic Anemia  Likely 2.2 CKD. Hgb 6.9 on 11/21, corrected appropriately s/p 1u pRBC. No active signs of bleeding on physical exam. CTH with no intracranial bleeding. Hgb stable today.  - active T+S  - transfuse <7    FLUIDS/ELECTROLYTES/NUTRITION  -IVF as above  -Monitor, careful w advanced kidney disease  -Diet: resumed tube feeds; s/s to re-evaluate tomorrows  -DVT ppx: heparin TID  -GI: none  -Dispo: PT recommending NAVID 84 y/o M PMH CKD 4, renal transplant in 2013 at [facility], glaucoma (blind), DM1, anemia, CAD s/p 2 DAMEON to LAD in 6/21, BPH, recent admission for PNA presents with acute on chronic cough and SUNNY 2/2 acute CHF vs CKD.       NEUROLOGY  #S/p extubation  Off sedation and extubated. Mentating well. Pupils still anisocoric. CT scan negative for acute intracranial pathology.    #Agitation  Patient wishes to leave the hospital and has been agitated typically overnight. Last day of cefepime 2g today.  - consider additional zyprexa 2.5mg IM PRN    CARDIOVASCULAR  #HFpEF  Had EF of 55% on TTE from Oct 2022, grade I diastolic dysfunction. Repeat TTE 11/17 with poor visualization of LV. Patient making poor UOP overnight, urine studies suggest hypovolemia. CVP 5 today from 11 yesterday.   - c/w carvedilol 25mg BID     #Upper extremity edema  Stable from yesterday but increased compared to baseline. US with superficial thrombosis of L cephalic vein extending to AC fossa, no DVTs.   - given patients propensity to bleed and superficial nature of thrombus, no indication for AC    #CAD  #HLD  Hx of CAD s/p 2 DAMEON to LAD in June 2021, currently no CP. Trops 0.03 but no CP or ischemic changes on EKG, likely due to CKD. Takes Plavix and aspirin per last d/c but only aspirin on outpatient note  - c/w ASA daily  - c/w home Lipitor    RENAL  #ELIZABETH on Chronic kidney disease (CKD). - RESOLVED  Baseline sCr 2.3-2.5. On admission, fluid overloaded. Does have orthopnea and intermittent SOB at baseline. Acidotic but at baseline. Follows w Dr. Mac. UA with granular casts. Urine studies this AM suggest pre-renal etiology.  - today sCr back at baseline; BUN 74 increased  - renally dose meds    #Renal transplant  Patient had renal transplant in 2013. On home mycophenolate 500mg BID and tacrolimus 4mg BID. Per nephro, decreased home tacrolimus from 4mg BID --> 2mg BID while in hospital.   - start tacrolimus 3mg in AM, 2mg in PM  - next tacrolimus level 30 minutes before 11/26 AM dose  - continue holding mycophenolate i/s/o infection  - f/u renal recs    #Hypernatremia - RESOLVED  - c/w tube feeds with 250cc q6 free water flushes    GI  #Diarrhea  Patient with loose stool 2 nights ago, have resolved this AM after started on miralax and senna.  - dced miralax and senna  - can consider adding banana flakes to tube feeds if contines  - will continue to monitor for BMs    #Nutrition  NPO with tube feeds.   - ice chips as needed  - s/s to re-evaluate in a few days      #BPH (benign prostatic hyperplasia).   - c/w tamsulosin 0.4mg daily    ID  #Aspiration PNA  Tracheal aspirate cx growing serratia marcescens and pseudomonas. Patient was started on vancomycin and cefepime. MRSA nares positive. Dc'ed vancomycin given supratherapeutic trough   - c/w cefepime 2g q24 x7 day (start 11/19- 11/25)  - given relatively poor renal function, will monitor closely for neurotoxicity  - patient with new agitation, last day of zyprexa today    ENDOCRINE  #DM (diabetes mellitus).   Previously on lantus 15 lispro 5. Endocrine following. One episode of hypoglycemia this morning.  - c/w lantus 10U in PM  - decrease regular insulin 3U TID  - c/w low dose regular ISS  - FSG q6  - goal -180    #Hypothyroidism  TSH 20.8, free t4 0.7, T3 49 (low)  - per endo rec no suspicion of myxedema coma  - continue with synthroid 25mg qD    #Relative Adrenal Insufficiency  Patient was started on hydrocortisone 50mg q6hr --> q8.  - c/w HC 30mg TID    HEME  #Normocytic Anemia  Likely 2.2 CKD. Hgb 6.9 on 11/21, corrected appropriately s/p 1u pRBC. No active signs of bleeding on physical exam. CTH with no intracranial bleeding. Hgb stable today.  - active T+S  - transfuse <7    FLUIDS/ELECTROLYTES/NUTRITION  -IVF as above  -Monitor, careful w advanced kidney disease  -Diet: NPO with tube feeds; s/s to re-evaluate  -DVT ppx: heparin TID  -GI: none  -Dispo: PT recommending NAVID

## 2022-11-25 NOTE — PROGRESS NOTE ADULT - ATTENDING COMMENTS
82yoM h/o poorly controlled T2DM, CAD s/p DAMEON to LAD, renal transplant in 2013 admitted for lower extremity edema (etiology CKD vs CHF), course c/b cardiac arrest and hypoglycemia, hypotension, bradycardia, hypothermia, found to be hypothyroid this admission. He’s been treated with steroids for possible adrenal insufficiency. Is also getting tube feeds.     Tube feeds have been at goal (43cc/hr). This morning, FSG dropped from 118 at 6am to 64-67 at noon. On exam, the NG tube is intact with TFs flowing in normally. The day nurse reported that it’s possible that the TFs may have been held for about 2 hours in the early morning. It’s possible that this contributed to the relative hypoglycemia. As a precaution, would also slightly decrease Regular insulin to 3 units q6h. Continue lantus 10 units every evening. 82yoM h/o poorly controlled T2DM, hypothyroidism, CAD s/p DAMEON to LAD, renal transplant in 2013 admitted for lower extremity edema (etiology CKD vs CHF), course c/b cardiac arrest and hypoglycemia, hypotension, bradycardia, hypothermia, found to be hypothyroid this admission. He’s been treated with steroids for possible adrenal insufficiency. Is also getting tube feeds.     Tube feeds have been at goal (43cc/hr). This morning, FSG dropped from 118 at 6am to 64-67 at noon. On exam, the NG tube is intact with TFs flowing in normally. The day nurse reported that it’s possible that the TFs may have been held for about 2 hours in the early morning. It’s possible that this contributed to the relative hypoglycemia. As a precaution, would also slightly decrease Regular insulin to 3 units q6h. Continue lantus 10 units every evening.

## 2022-11-25 NOTE — PROGRESS NOTE ADULT - SUBJECTIVE AND OBJECTIVE BOX
**Incomplete Note**   CC: Patient is a 82y old  Male who presents with a chief complaint of LE edema and cough (21 Nov 2022 09:30)      INTERVAL EVENTS: Agitated o/n given Zyprexa 2.5mg x1    SUBJECTIVE / INTERVAL HPI: Patient seen and examined at bedside. States    ROS: negative unless otherwise stated above.    VITAL SIGNS:  Vital Signs Last 24 Hrs  T(C): 36.9 (25 Nov 2022 09:08), Max: 36.9 (25 Nov 2022 09:08)  T(F): 98.5 (25 Nov 2022 09:08), Max: 98.5 (25 Nov 2022 09:08)  HR: 71 (25 Nov 2022 11:00) (58 - 79)  BP: 137/65 (25 Nov 2022 03:00) (137/65 - 137/65)  BP(mean): 93 (25 Nov 2022 03:00) (93 - 93)  RR: 29 (25 Nov 2022 11:00) (17 - 32)  SpO2: 93% (25 Nov 2022 11:00) (90% - 100%)    Parameters below as of 25 Nov 2022 11:00  Patient On (Oxygen Delivery Method): nasal cannula w/ humidification  O2 Flow (L/min): 6        11-24-22 @ 07:01  -  11-25-22 @ 07:00  --------------------------------------------------------  IN: 3986 mL / OUT: 805 mL / NET: 3181 mL    11-25-22 @ 07:01  -  11-25-22 @ 12:15  --------------------------------------------------------  IN: 379 mL / OUT: 100 mL / NET: 279 mL        PHYSICAL EXAM:    General: NAD  HEENT: MMM  Neck: supple  Cardiovascular: +S1/S2; RRR  Respiratory: CTA B/L; no W/R/R  Gastrointestinal: soft, NT/ND  Extremities: WWP; no edema, clubbing or cyanosis  Vascular: 2+ radial, DP/PT pulses B/L  Neurological: AAOx3; no focal deficits    MEDICATIONS:  MEDICATIONS  (STANDING):  albuterol/ipratropium for Nebulization 3 milliLiter(s) Nebulizer every 6 hours  aspirin  chewable 81 milliGRAM(s) Oral daily  atorvastatin 40 milliGRAM(s) Oral at bedtime  carvedilol 25 milliGRAM(s) Oral every 12 hours  cefepime   IVPB 2000 milliGRAM(s) IV Intermittent every 24 hours  chlorhexidine 2% Cloths 1 Application(s) Topical <User Schedule>  dextrose 5%. 1000 milliLiter(s) (100 mL/Hr) IV Continuous <Continuous>  dextrose 5%. 1000 milliLiter(s) (50 mL/Hr) IV Continuous <Continuous>  dextrose 50% Injectable 25 Gram(s) IV Push once  dextrose 50% Injectable 12.5 Gram(s) IV Push once  dextrose 50% Injectable 25 Gram(s) IV Push once  dextrose 50% Injectable 25 milliLiter(s) IV Push once  glucagon  Injectable 1 milliGRAM(s) IntraMuscular once  heparin   Injectable 5000 Unit(s) SubCutaneous every 8 hours  hydrocortisone sodium succinate Injectable 30 milliGRAM(s) IV Push every 8 hours  influenza  Vaccine (HIGH DOSE) 0.7 milliLiter(s) IntraMuscular once  insulin glargine Injectable (LANTUS) 10 Unit(s) SubCutaneous at bedtime  insulin regular  human corrective regimen sliding scale   SubCutaneous every 6 hours  insulin regular  human recombinant 4 Unit(s) SubCutaneous every 6 hours  levothyroxine Injectable 25 MICROGram(s) IV Push at bedtime  sodium chloride 7% Inhalation 4 milliLiter(s) Inhalation every 12 hours  tacrolimus    0.5 mG/mL Suspension 3 milliGRAM(s) Oral every 24 hours  tacrolimus    0.5 mG/mL Suspension 2 milliGRAM(s) Oral every 24 hours  tamsulosin 0.4 milliGRAM(s) Oral at bedtime    MEDICATIONS  (PRN):  acetaminophen     Tablet .. 650 milliGRAM(s) Oral every 6 hours PRN Mild Pain (1 - 3)  dextrose Oral Gel 15 Gram(s) Oral once PRN Blood Glucose LESS THAN 70 milliGRAM(s)/deciliter  guaiFENesin Oral Liquid (Sugar-Free) 100 milliGRAM(s) Oral every 6 hours PRN Cough  sodium chloride 0.9% lock flush 10 milliLiter(s) IV Push every 1 hour PRN Pre/post blood products, medications, blood draw, and to maintain line patency      ALLERGIES:  Allergies    No Known Allergies    Intolerances        LABS:                        7.1    6.46  )-----------( 121      ( 25 Nov 2022 05:26 )             22.6     11-25    143  |  114<H>  |  74<H>  ----------------------------<  141<H>  3.2<L>   |  19<L>  |  2.56<H>    Ca    8.4      25 Nov 2022 05:26  Phos  4.0     11-25  Mg     1.9     11-25          CAPILLARY BLOOD GLUCOSE      POCT Blood Glucose.: 67 mg/dL (25 Nov 2022 12:06)      RADIOLOGY & ADDITIONAL TESTS: Reviewed. CC: Patient is a 82y old  Male who presents with a chief complaint of LE edema and cough (21 Nov 2022 09:30)      INTERVAL EVENTS: Agitated o/n given Zyprexa 2.5mg x1    SUBJECTIVE / INTERVAL HPI: Patient seen and examined at bedside. Mentation is still not as baseline but more alert than overnight. Denies fever, chills, nausea, vomiting, abdominal pain, chest pain.    ROS: negative unless otherwise stated above.    VITAL SIGNS:  Vital Signs Last 24 Hrs  T(C): 36.9 (25 Nov 2022 09:08), Max: 36.9 (25 Nov 2022 09:08)  T(F): 98.5 (25 Nov 2022 09:08), Max: 98.5 (25 Nov 2022 09:08)  HR: 71 (25 Nov 2022 11:00) (58 - 79)  BP: 137/65 (25 Nov 2022 03:00) (137/65 - 137/65)  BP(mean): 93 (25 Nov 2022 03:00) (93 - 93)  RR: 29 (25 Nov 2022 11:00) (17 - 32)  SpO2: 93% (25 Nov 2022 11:00) (90% - 100%)    Parameters below as of 25 Nov 2022 11:00  Patient On (Oxygen Delivery Method): nasal cannula w/ humidification  O2 Flow (L/min): 6        11-24-22 @ 07:01  -  11-25-22 @ 07:00  --------------------------------------------------------  IN: 3986 mL / OUT: 805 mL / NET: 3181 mL    11-25-22 @ 07:01  -  11-25-22 @ 12:15  --------------------------------------------------------  IN: 379 mL / OUT: 100 mL / NET: 279 mL        PHYSICAL EXAM:  General: NAD, lying in bed  HEENT: dry mucous membranes, anisocoric pupils (R fixed and dilated, L sluggish but reactive)  Neck: supple  Cardiovascular: +S1/S2; RRR, no murmurs  Respiratory: productive cough with deep inspiration; audible secretions; rhonchi in b/l lung bases are stable; no wheezing or rales  Gastrointestinal: soft, NT/ND, +BS, no JVD  Extremities: WWP; trace edema in b/l ankles, no clubbing or cyanosis; L arm 2+ edema extending to mid-forearm stable from yesterday; R arm 1+ edema  Vascular: 2+ radial, DP/PT pulses B/L  Neurological: AAOx3; no focal deficits; following commands    MEDICATIONS:  MEDICATIONS  (STANDING):  albuterol/ipratropium for Nebulization 3 milliLiter(s) Nebulizer every 6 hours  aspirin  chewable 81 milliGRAM(s) Oral daily  atorvastatin 40 milliGRAM(s) Oral at bedtime  carvedilol 25 milliGRAM(s) Oral every 12 hours  cefepime   IVPB 2000 milliGRAM(s) IV Intermittent every 24 hours  chlorhexidine 2% Cloths 1 Application(s) Topical <User Schedule>  dextrose 5%. 1000 milliLiter(s) (100 mL/Hr) IV Continuous <Continuous>  dextrose 5%. 1000 milliLiter(s) (50 mL/Hr) IV Continuous <Continuous>  dextrose 50% Injectable 25 Gram(s) IV Push once  dextrose 50% Injectable 12.5 Gram(s) IV Push once  dextrose 50% Injectable 25 Gram(s) IV Push once  dextrose 50% Injectable 25 milliLiter(s) IV Push once  glucagon  Injectable 1 milliGRAM(s) IntraMuscular once  heparin   Injectable 5000 Unit(s) SubCutaneous every 8 hours  hydrocortisone sodium succinate Injectable 30 milliGRAM(s) IV Push every 8 hours  influenza  Vaccine (HIGH DOSE) 0.7 milliLiter(s) IntraMuscular once  insulin glargine Injectable (LANTUS) 10 Unit(s) SubCutaneous at bedtime  insulin regular  human corrective regimen sliding scale   SubCutaneous every 6 hours  insulin regular  human recombinant 4 Unit(s) SubCutaneous every 6 hours  levothyroxine Injectable 25 MICROGram(s) IV Push at bedtime  sodium chloride 7% Inhalation 4 milliLiter(s) Inhalation every 12 hours  tacrolimus    0.5 mG/mL Suspension 3 milliGRAM(s) Oral every 24 hours  tacrolimus    0.5 mG/mL Suspension 2 milliGRAM(s) Oral every 24 hours  tamsulosin 0.4 milliGRAM(s) Oral at bedtime    MEDICATIONS  (PRN):  acetaminophen     Tablet .. 650 milliGRAM(s) Oral every 6 hours PRN Mild Pain (1 - 3)  dextrose Oral Gel 15 Gram(s) Oral once PRN Blood Glucose LESS THAN 70 milliGRAM(s)/deciliter  guaiFENesin Oral Liquid (Sugar-Free) 100 milliGRAM(s) Oral every 6 hours PRN Cough  sodium chloride 0.9% lock flush 10 milliLiter(s) IV Push every 1 hour PRN Pre/post blood products, medications, blood draw, and to maintain line patency      ALLERGIES:  Allergies    No Known Allergies    Intolerances        LABS:                        7.1    6.46  )-----------( 121      ( 25 Nov 2022 05:26 )             22.6     11-25    143  |  114<H>  |  74<H>  ----------------------------<  141<H>  3.2<L>   |  19<L>  |  2.56<H>    Ca    8.4      25 Nov 2022 05:26  Phos  4.0     11-25  Mg     1.9     11-25          CAPILLARY BLOOD GLUCOSE      POCT Blood Glucose.: 67 mg/dL (25 Nov 2022 12:06)      RADIOLOGY & ADDITIONAL TESTS: Reviewed.

## 2022-11-25 NOTE — PROGRESS NOTE ADULT - ASSESSMENT
[place] Patient is an 82 yo M with ESKD s/p transplant 2013 now CKD 4, glaucoma, DM1, Anemia, CAD, BPH presenting with decompensated heart failure, complicated by cardiac arrest with uptrending creatinine after cardiac arrest now improving    Problem/Plan:  #ELIZABETH on CKD 2/2 ATN in the setting of cardiac arrest   Renal allograft with CKD 4 - baseline sCr 2.3-2.5, usual meds tacrolimus 4mg BID and mycophenolate 500mg BID.   Recs:  -Creatinine now starting to downtrend today 2.56 at baseline   - c/w AM tacrolimus to 3mg and PM dose at 2mg.  - Repeat tacro level on 11/26, 30 mins prior to AM dose   - Tacro level collected 2.7 on 11/24 AM   - strict i's and o's  - UOP 3181cc daily   - Trend BMP daily  - Ok to use lasix IVP if needed     #Hypernatremia  Resolved today       Nephrology team following to monitor    Patient is an 84 yo M with ESKD s/p transplant 2013 now CKD 4, glaucoma, DM1, Anemia, CAD, BPH presenting with decompensated heart failure, complicated by cardiac arrest with uptrending creatinine after cardiac arrest now improving    Problem/Plan:  #ELIZABETH on CKD 2/2 ATN in the setting of cardiac arrest   Renal allograft with CKD 4 - baseline sCr 2.3-2.5, usual meds tacrolimus 4mg BID and mycophenolate 500mg BID.   Recs:  -Creatinine now starting to downtrend today 2.56 at baseline   - c/w AM tacrolimus to 3mg and PM dose at 2mg.  - Repeat tacro level on 11/26, 30 mins prior to AM dose   - Tacro level collected 2.7 on 11/24 AM   - strict i's and o's  - UOP 3181cc daily   - Trend BMP daily  - Ok to use lasix IVP if needed     #Hypernatremia  Resolved today       Nephrology team following to monitor

## 2022-11-25 NOTE — PROGRESS NOTE ADULT - ATTENDING COMMENTS
CKD back to baseline  replete K   recheck tacro tomorrow   can consider gentle diuretics with anasarca -- eg lasix 40 mg/d

## 2022-11-25 NOTE — PROGRESS NOTE ADULT - SUBJECTIVE AND OBJECTIVE BOX
OVERNIGHT: No acute events overnight.   SUBJECTIVE / INTERVAL HPI: Patient was seen and examined this morning.     CAPILLARY BLOOD GLUCOSE & INSULIN RECEIVED  Yesterday  - Dinner FSG: *** mg/dL = *** units of premeal Lispro + *** units of Lispro sliding scale.   - Bedtime FSG: *** mg/dL = *** units of Lantus + *** units Lispro sliding scale.     Today  - Breakfast FSG: *** mg/dL = *** units of premeal Lispro + *** units of Lispro sliding scale.   - Lunch FSG: *** mg/dL = *** units of premeal Lispro + *** units of Lispro sliding scale.     149 mg/dL (11-24 @ 17:20)  182 mg/dL (11-24 @ 22:28)  118 mg/dL (11-25 @ 05:09)  64 mg/dL (11-25 @ 12:04)  67 mg/dL (11-25 @ 12:06)  117 mg/dL (11-25 @ 12:31)  130 mg/dL (11-25 @ 13:53)    REVIEW OF SYSTEMS  Constitutional:  Negative fever, chills or loss of appetite.  Eyes:  Negative blurry vision or double vision.  Cardiovascular:  Negative for chest pain or palpitations.  Respiratory:  Negative for cough, wheezing, or shortness of breath.    Gastrointestinal:  Negative for nausea, vomiting, diarrhea, constipation, or abdominal pain.  Genitourinary:  Negative frequency, urgency or dysuria.  Neurologic:  No headache, confusion, dizziness, lightheadedness.    PHYSICAL EXAM  Vital Signs Last 24 Hrs  T(C): 36.4 (25 Nov 2022 14:25), Max: 36.9 (25 Nov 2022 09:08)  T(F): 97.5 (25 Nov 2022 14:25), Max: 98.5 (25 Nov 2022 09:08)  HR: 70 (25 Nov 2022 15:14) (58 - 79)  BP: 137/65 (25 Nov 2022 03:00) (137/65 - 137/65)  BP(mean): 93 (25 Nov 2022 03:00) (93 - 93)  RR: 30 (25 Nov 2022 15:00) (17 - 32)  SpO2: 100% (25 Nov 2022 15:14) (90% - 100%)    Parameters below as of 25 Nov 2022 15:14  Patient On (Oxygen Delivery Method): nasal cannula        Constitutional: Awake, alert, in no acute distress.   HEENT: Normocephalic, atraumatic, GORDON, no proptosis or lid retraction.   Neck: supple, no acanthosis, no thyromegaly or palpable thyroid nodules.  Respiratory: Lungs clear to ausculation bilaterally.   Cardiovascular: regular rhythm, normal S1 and S2, no audible murmurs.   GI: soft, non-tender, non-distended, bowel sounds present, no masses appreciated.  Extremities: No lower extremity edema, peripheral pulses present.   Skin: no rashes.   Psychiatric: AAO x 3. Normal affect/mood.     LABS  CBC - WBC/HGB/HTC/PLT: 6.46/7.1/22.6/121 (11-25-22)  BMP - Na/K/Cl/Bicarb/BUN/Cr/Gluc/AG/eGFR: 143/3.2/114/19/74/2.56/141/10/24 (11-25-22)  Ca - 8.4 (11-25-22)  Phos - 4.0 (11-25-22)  Mg - 1.9 (11-25-22)  LFT - Alb/Tprot/Tbili/Dbili/AlkPhos/ALT/AST: 2.6/--/0.3/--/95/11/9 (11-23-22)  PT/aPTT/INR: 12.5/37.5/1.05 (11-20-22)   Thyroid Stimulating Hormone, Serum: 20.890 (11-19-22)  Total T4/Free T4: 3.55/-- (11-20-22)    MEDICATIONS  MEDICATIONS  (STANDING):  albuterol/ipratropium for Nebulization 3 milliLiter(s) Nebulizer every 6 hours  aspirin  chewable 81 milliGRAM(s) Oral daily  atorvastatin 40 milliGRAM(s) Oral at bedtime  carvedilol 25 milliGRAM(s) Oral every 12 hours  cefepime   IVPB 2000 milliGRAM(s) IV Intermittent every 24 hours  chlorhexidine 2% Cloths 1 Application(s) Topical <User Schedule>  dextrose 5%. 1000 milliLiter(s) (100 mL/Hr) IV Continuous <Continuous>  dextrose 5%. 1000 milliLiter(s) (50 mL/Hr) IV Continuous <Continuous>  dextrose 50% Injectable 25 Gram(s) IV Push once  dextrose 50% Injectable 12.5 Gram(s) IV Push once  dextrose 50% Injectable 25 Gram(s) IV Push once  glucagon  Injectable 1 milliGRAM(s) IntraMuscular once  heparin   Injectable 5000 Unit(s) SubCutaneous every 8 hours  hydrocortisone sodium succinate Injectable 30 milliGRAM(s) IV Push every 8 hours  influenza  Vaccine (HIGH DOSE) 0.7 milliLiter(s) IntraMuscular once  insulin glargine Injectable (LANTUS) 10 Unit(s) SubCutaneous at bedtime  insulin regular  human corrective regimen sliding scale   SubCutaneous every 6 hours  insulin regular  human recombinant 3 Unit(s) SubCutaneous every 6 hours  levothyroxine Injectable 25 MICROGram(s) IV Push at bedtime  OLANZapine 2.5 milliGRAM(s) Oral once  sodium chloride 7% Inhalation 4 milliLiter(s) Inhalation every 12 hours  tacrolimus    0.5 mG/mL Suspension 2 milliGRAM(s) Oral every 24 hours  tamsulosin 0.4 milliGRAM(s) Oral at bedtime    MEDICATIONS  (PRN):  acetaminophen     Tablet .. 650 milliGRAM(s) Oral every 6 hours PRN Mild Pain (1 - 3)  dextrose Oral Gel 15 Gram(s) Oral once PRN Blood Glucose LESS THAN 70 milliGRAM(s)/deciliter  guaiFENesin Oral Liquid (Sugar-Free) 100 milliGRAM(s) Oral every 6 hours PRN Cough  sodium chloride 0.9% lock flush 10 milliLiter(s) IV Push every 1 hour PRN Pre/post blood products, medications, blood draw, and to maintain line patency    ASSESSMENT / RECOMMENDATIONS    A1C: 9.0 %  BUN: 74  Creatinine: 2.56  GFR: 24  Weight: 82.3  BMI: 28.4  EF:     # Type 2 diabetes mellitus  - Please continue lantus *** units at bedtime.   - Continue lispro *** units before each meal.  - Continue lispro moderate / low dose sliding scale before meals and at bedtime.  - Patient's fingerstick glucose goal is 100-180 mg/dL.    - For discharge, patient can ***.    - Patient can follow up at discharge with [facility] by calling [phone] to make an appointment.      Case discussed with Dr. Tellez. Primary team updated.       Ulysses Weber    Endocrinology Fellow    Service Pager: [phone]    OVERNIGHT: No acute events overnight.   SUBJECTIVE / INTERVAL HPI: Patient was seen and examined this morning. He continues to receive tube feeds with Nepro at 43 mL/hr. Per nursing, there might have been a brief interruption on feeds early in the morning (~2 hours) while changing/cleaning him. He was evaluated by speech and swallow who determined he was high-risk for aspiration and recommended to keep him NPO with alternative means of nutrition.     CAPILLARY BLOOD GLUCOSE & INSULIN RECEIVED  Yesterday  - 6 PM FS mg/dL = 4 units of regular insulin.  - 12 AM FS mg/dL = 10 units of Lantus + 4 units of regular insulin + 1 unit of sliding scale.     Today  - 6 AM FS mg/dL = 4 units of regular insulin.  - 12 PM FS mg/dL -> 117 mg/dL = He didn't receive insulin. In addition, he received 12.5 grams of dextrose IVP once.     REVIEW OF SYSTEMS  Constitutional:  Negative fever, chills or loss of appetite.  Eyes:  Negative blurry vision or double vision.  Cardiovascular:  Negative for chest pain or palpitations.  Respiratory:  Negative for cough, wheezing, or shortness of breath.    Gastrointestinal:  Negative for nausea, vomiting, diarrhea, constipation, or abdominal pain.  Neurologic:  No headache, confusion, dizziness, lightheadedness.    PHYSICAL EXAM  Vital Signs Last 24 Hrs  T(C): 36.4 (2022 14:25), Max: 36.9 (2022 09:08)  T(F): 97.5 (2022 14:25), Max: 98.5 (2022 09:08)  HR: 70 (2022 15:14) (58 - 79)  BP: 137/65 (2022 03:00) (137/65 - 137/65)  BP(mean): 93 (2022 03:00) (93 - 93)  RR: 30 (2022 15:00) (17 - 32)  SpO2: 100% (2022 15:14) (90% - 100%)    Parameters below as of 2022 15:14  Patient On (Oxygen Delivery Method): nasal cannula    Constitutional: Awake, alert, elderly male in no acute distress.   HEENT: Normocephalic, atraumatic, GORDON.  Respiratory: Bilateral rhonchi.   Cardiovascular: regular rhythm, normal S1 and S2, no audible murmurs.   GI: soft, non-tender, non-distended, bowel sounds present. (+) NGT in place.   Extremities: +1 lower extremity edema.  Psychiatric: AAO x 3. Normal affect/mood.     LABS  CBC - WBC/HGB/HTC/PLT: 6.46/7.1/22.6/121 (22)  BMP - Na/K/Cl/Bicarb/BUN/Cr/Gluc/AG/eGFR: 143/3.2/114/19/74/2.56/141/10/24 (22)  Ca - 8.4 (22)  Phos - 4.0 (22)  Mg - 1.9 (22)  LFT - Alb/Tprot/Tbili/Dbili/AlkPhos/ALT/AST: 2.6/--/0.3/--/95/11/9 (22)  PT/aPTT/INR: 12.5/37.5/1.05 (22)   Thyroid Stimulating Hormone, Serum: 20.890 (22)  Total T4/Free T4: 3.55/-- (22)    MEDICATIONS  MEDICATIONS  (STANDING):  albuterol/ipratropium for Nebulization 3 milliLiter(s) Nebulizer every 6 hours  aspirin  chewable 81 milliGRAM(s) Oral daily  atorvastatin 40 milliGRAM(s) Oral at bedtime  carvedilol 25 milliGRAM(s) Oral every 12 hours  cefepime   IVPB 2000 milliGRAM(s) IV Intermittent every 24 hours  chlorhexidine 2% Cloths 1 Application(s) Topical <User Schedule>  dextrose 5%. 1000 milliLiter(s) (100 mL/Hr) IV Continuous <Continuous>  dextrose 5%. 1000 milliLiter(s) (50 mL/Hr) IV Continuous <Continuous>  dextrose 50% Injectable 25 Gram(s) IV Push once  dextrose 50% Injectable 12.5 Gram(s) IV Push once  dextrose 50% Injectable 25 Gram(s) IV Push once  glucagon  Injectable 1 milliGRAM(s) IntraMuscular once  heparin   Injectable 5000 Unit(s) SubCutaneous every 8 hours  hydrocortisone sodium succinate Injectable 30 milliGRAM(s) IV Push every 8 hours  influenza  Vaccine (HIGH DOSE) 0.7 milliLiter(s) IntraMuscular once  insulin glargine Injectable (LANTUS) 10 Unit(s) SubCutaneous at bedtime  insulin regular  human corrective regimen sliding scale   SubCutaneous every 6 hours  insulin regular  human recombinant 3 Unit(s) SubCutaneous every 6 hours  levothyroxine Injectable 25 MICROGram(s) IV Push at bedtime  OLANZapine 2.5 milliGRAM(s) Oral once  sodium chloride 7% Inhalation 4 milliLiter(s) Inhalation every 12 hours  tacrolimus    0.5 mG/mL Suspension 2 milliGRAM(s) Oral every 24 hours  tamsulosin 0.4 milliGRAM(s) Oral at bedtime    MEDICATIONS  (PRN):  acetaminophen     Tablet .. 650 milliGRAM(s) Oral every 6 hours PRN Mild Pain (1 - 3)  dextrose Oral Gel 15 Gram(s) Oral once PRN Blood Glucose LESS THAN 70 milliGRAM(s)/deciliter  guaiFENesin Oral Liquid (Sugar-Free) 100 milliGRAM(s) Oral every 6 hours PRN Cough  sodium chloride 0.9% lock flush 10 milliLiter(s) IV Push every 1 hour PRN Pre/post blood products, medications, blood draw, and to maintain line patency    ASSESSMENT / RECOMMENDATIONS  Mr. Miguel is an 83-year-old male with type 2 diabetes mellitus, coronary artery disease (s/p DAMEON x2 to LAD on 2021), chronic kidney disease stage 4 (s/p renal transplant on , on tracrolimus and prednisone) and benign prostatic hyperplasia who was sent to the hospital by his nephrologist for further evaluation of lower extremity edema (22). He was admitted for further management of LE edema thought to be secondary to his underlying CKD versus congestive heart failure. Hospitalization was complicated by hypoglycemia and cardiac arrest (22, ROSC after 1 minute of chest compressions). Post-cardiac arrest he was noted to develop hypotension, bradycardia and hypothermia. Labs were remarkable for elevated TSH (20) and decreased FT4 (0.77). 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     Although he had features seen with myxedema coma, these findings were better explained by his cardiac arrest as his vital signs prior to the arrest were apparently around his baseline, as was his mental status. Nonetheless, he was started on levothyroxine 25 mcg IV daily (22). In addition, given concern for adrenal insufficiency, he was started on stress-dose steroids (hydrocortisone 50 mg every 6 hours). Since then, he has been extubated (22) and his hydrocortisone has been titrated down to 30 mg every 8 hours. His diabetes has been managed with a moderate dose sliding scale with glucose levels mostly within target goal. He was started on tube feeds and has tolerated goal rate of 43 mL/hr.     Patient's episode of hypoglycemia this morning could possibly be secondary to brief interruption on tube feeds earlier today; however, as a precaution, will decrease regular insulin to 3 units every 6 hours.    A1C: 9.0 %  BUN: 74  Creatinine: 2.56  GFR: 24  Weight: 82.3  BMI: 28.4  EF: 65%    # Hypothyroidism  - TSH 20. FT4 0.77 (22).   - Continue with levothyroxine 25 mcg IV daily.     # Concern for adrenal insufficiency  - Patient has been on prednisone 5 mg daily for renal transplant.   - He had recent hospitalization on 2022 for pneumonia due to pseudomonas where he was also started on stress dose steroids. At that time, a cortisol level was collected prior to starting steroids which was not robust (6.5 ug/dL).   - Please continue with hydrocortisone 30 mg every 8 hours for now.     # Type 2 diabetes mellitus   - Please continue Lantus 10 units at bedtime.   - Decrease nutritional regular insulin to 3 units every 6 hours (only to be continued while tube feeds are running).   - Continue with regular insulin low dose sliding scale every 6 hours.   - Fingerstick glucose goal of 140-180 mg/dL.     Case discussed with Dr. Tellez. Primary team updated.       Ulysses Weber    Endocrinology Fellow    Service Pager: [phone]

## 2022-11-25 NOTE — SWALLOW BEDSIDE ASSESSMENT ADULT - SLP GENERAL OBSERVATIONS
Patient presents in bed on RA (just finished nebulizer tx) with O2 at 88-90%. RN placed NC (reported pt requires 2-4L), with O2 increasing to 93-94%. RN provided verbal consent for PO trials. Baseline wet vocal quality noted. RN provided oropharyngeal suctioning prior to trials, however patient with limited tolerance, and therefore continued to demonstrate wet vocal quality. RN reported baseline cough is non productive.

## 2022-11-25 NOTE — PROVIDER CONTACT NOTE (HYPOGLYCEMIA EVENT) - NS PROVIDER CONTACT RECOMMEND-HYPO
decreased nutritional insulin dose q6 hours. [facility] Division of Hospital Medicine    Chief Complaint:  headache    SUBJECTIVE / OVERNIGHT EVENTS:  Patient states that he has headache which is pressure like around the frontal area, denies any radiation, denies the worse headache ever. Denies blurry vision of floaters.  Patient denies chest pain, SOB, abd pain, N/V, fever, chills, dysuria or any other complaints. All remainder ROS negative.       I&O's Summary    PHYSICAL EXAM:  Vital Signs Last 24 Hrs  T(C): 36.9 (11 Jul 2022 16:36), Max: 36.9 (11 Jul 2022 16:36)  T(F): 98.4 (11 Jul 2022 16:36), Max: 98.4 (11 Jul 2022 16:36)  HR: 127 (11 Jul 2022 16:36) (85 - 127)  BP: 142/92 (11 Jul 2022 16:36) (122/79 - 152/87)  BP(mean): --  RR: 18 (11 Jul 2022 16:36) (18 - 18)  SpO2: 95% (11 Jul 2022 16:36) (94% - 96%)    Parameters below as of 11 Jul 2022 16:36  Patient On (Oxygen Delivery Method): room air      Patient is calm and comfortable, alert and oriented, pleasant and corportative.  Patient is able to speak fluently and effortless.   CONSTITUTIONAL: NAD, well-developed, well-groomed  ENMT: Moist oral mucosa, no pharyngeal injection or exudates; normal dentition  RESPIRATORY: Normal respiratory effort; lungs are clear to auscultation bilaterally  CARDIOVASCULAR: Tachy, normal S1 and S2, no murmur/rub/gallop; No lower extremity edema; Peripheral pulses are 2+ bilaterally  ABDOMEN: Nontender to palpation, normoactive bowel sounds, no rebound/guarding; No hepatosplenomegaly  MUSCLOSKELETAL:  Normal gait; no clubbing or cyanosis of digits; no joint swelling or tenderness to palpation  PSYCH: A+O to person, place, and time; affect appropriate  NEUROLOGY: CN 2-12 are intact and symmetric; no gross sensory deficits;   SKIN: No rashes; no palpable lesions    LABS:                        11.9   8.99  )-----------( 372      ( 10 Jul 2022 07:48 )             36.2     07-10    140  |  99  |  15.5  ----------------------------<  126<H>  5.3   |  30.0<H>  |  0.54    Ca    9.5      10 Jul 2022 07:48    TPro  6.9  /  Alb  3.6  /  TBili  0.4  /  DBili  x   /  AST  90<H>  /  ALT  233<H>  /  AlkPhos  273<H>  07-10      MEDICATIONS  (STANDING):  apixaban 5 milliGRAM(s) Oral two times a day  budesonide 160 MICROgram(s)/formoterol 4.5 MICROgram(s) Inhaler 2 Puff(s) Inhalation two times a day  cefTRIAXone   IVPB 1000 milliGRAM(s) IV Intermittent every 24 hours  diltiazem Injectable 10 milliGRAM(s) IV Push once  gabapentin 400 milliGRAM(s) Oral three times a day  metoprolol tartrate 12.5 milliGRAM(s) Oral two times a day  predniSONE   Tablet 50 milliGRAM(s) Oral daily  tamsulosin 0.4 milliGRAM(s) Oral at bedtime  tiotropium 18 MICROgram(s) Capsule 1 Capsule(s) Inhalation daily    MEDICATIONS  (PRN):  HYDROmorphone   Tablet 2 milliGRAM(s) Oral every 8 hours PRN Severe Pain (7 - 10)  levalbuterol Inhalation 0.63 milliGRAM(s) Inhalation every 6 hours PRN bronchospasms  traMADol 50 milliGRAM(s) Oral every 8 hours PRN Moderate Pain (4 - 6)

## 2022-11-25 NOTE — PROGRESS NOTE ADULT - SUBJECTIVE AND OBJECTIVE BOX
Interval Events: Reviewed  Patient seen and examined at bedside.    Patient is a 82y old  Male who presents with a chief complaint of LE edema and cough (21 Nov 2022 09:30)  he wants to eat    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      History of renal transplant  secondary to DM      Chronic kidney disease (CKD)      Glaucoma      Kidney transplant recipient      Amputation of toe  x 3      H/O heart artery stent          MEDICATIONS:  Pulmonary:  albuterol/ipratropium for Nebulization 3 milliLiter(s) Nebulizer every 6 hours  guaiFENesin Oral Liquid (Sugar-Free) 100 milliGRAM(s) Oral every 6 hours PRN  sodium chloride 7% Inhalation 4 milliLiter(s) Inhalation every 12 hours    Antimicrobials:    Anticoagulants:  aspirin  chewable 81 milliGRAM(s) Oral daily  heparin   Injectable 5000 Unit(s) SubCutaneous every 8 hours    Cardiac:  carvedilol 25 milliGRAM(s) Oral every 12 hours      Allergies    No Known Allergies    Intolerances        Vital Signs Last 24 Hrs  T(C): 36.8 (25 Nov 2022 17:59), Max: 36.9 (25 Nov 2022 09:08)  T(F): 98.3 (25 Nov 2022 17:59), Max: 98.5 (25 Nov 2022 09:08)  HR: 77 (25 Nov 2022 18:15) (58 - 80)  BP: 137/65 (25 Nov 2022 03:00) (137/65 - 137/65)  BP(mean): 93 (25 Nov 2022 03:00) (93 - 93)  RR: 28 (25 Nov 2022 17:00) (17 - 32)  SpO2: 100% (25 Nov 2022 18:15) (90% - 100%)    Parameters below as of 25 Nov 2022 18:15  Patient On (Oxygen Delivery Method): nasal cannula        11-24 @ 07:01  -  11-25 @ 07:00  --------------------------------------------------------  IN: 3986 mL / OUT: 805 mL / NET: 3181 mL    11-25 @ 07:01  -  11-25 @ 18:39  --------------------------------------------------------  IN: 930 mL / OUT: 220 mL / NET: 710 mL          Review of Systems:   •	General: negative  •	Skin/Breast: negative  •	Ophthalmologic: negative  •	ENMT: negative  •	Respiratory and Thorax: negative  •	Cardiovascular: negative  •	Gastrointestinal: negative  •	Genitourinary: negative  •	Musculoskeletal: negative  •	Neurological: negative  •	Psychiatric: negative  •	Hematology/Lymphatics: negative  •	Endocrine: negative  •	Allergic/Immunologic: negative    Physical Exam:   • Constitutional:	refer to the dietion /Nutritionist note  • Eyes:	EOMI; PERRL; no drainage or redness  • ENMT:	No oral lesions; no gross abnormalities  • Neck	No bruits; no thyromegaly or nodules  • Breasts:	not examined  • Back:	No deformity or limitation of movement  • Respiratory:	Breath Sounds equal & clear to percussion & auscultation, no accessory muscle use  • Cardiovascular:	Regular rate & rhythm, normal S1, S2; no murmurs, gallops or rubs; no S3, S4  • Gastrointestinal:	Soft, non-tender, no hepatosplenomegaly, normal bowel sounds  • Genitourinary:	not examined  • Rectal: not examined  • Extremities:	No cyanosis, clubbing or edema  • Vascular:	Equal and normal pulses (carotid, femoral, dorsalis pedis)  • Neurologica:l	not examined  • Skin:	No lesions; no rash  • Lymph Nodes:	No lymphadedenopathy  • Musculoskeletal:	No joint pain, swelling or deformity; no limitation of movement        LABS:      CBC Full  -  ( 25 Nov 2022 05:26 )  WBC Count : 6.46 K/uL  RBC Count : 2.66 M/uL  Hemoglobin : 7.1 g/dL  Hematocrit : 22.6 %  Platelet Count - Automated : 121 K/uL  Mean Cell Volume : 85.0 fl  Mean Cell Hemoglobin : 26.7 pg  Mean Cell Hemoglobin Concentration : 31.4 gm/dL  Auto Neutrophil # : 5.69 K/uL  Auto Lymphocyte # : 0.38 K/uL  Auto Monocyte # : 0.27 K/uL  Auto Eosinophil # : 0.09 K/uL  Auto Basophil # : 0.00 K/uL  Auto Neutrophil % : 88.0 %  Auto Lymphocyte % : 5.9 %  Auto Monocyte % : 4.2 %  Auto Eosinophil % : 1.4 %  Auto Basophil % : 0.0 %    11-25    143  |  114<H>  |  74<H>  ----------------------------<  141<H>  3.2<L>   |  19<L>  |  2.56<H>    Ca    8.4      25 Nov 2022 05:26  Phos  4.0     11-25  Mg     1.9     11-25                          RADIOLOGY & ADDITIONAL STUDIES (The following images were personally reviewed):

## 2022-11-25 NOTE — PROGRESS NOTE ADULT - PROBLEM SELECTOR PLAN 1
Event noticed.  The patient is sedated.  I suctioned the patient copious amount of secretion.  Gas exchange is stable.  Sputum grew Pseudomonas and Serratia.  The patient on appropriate antibiotic.  Continue pulmonary toilet.  Patient has baseline dementia and does not and trial up off sedation.  Tapers pressors as needed.  Cortisol level is adequate.  I discussed the case with critical care. She was extubated.  Patient is on nasal cannula.  Suction the patient copious amount of secretion.  Dysphagia evaluation.  Continue antibiotic.  The patient is hemodynamically stable off pressors and sedative.  Suctioned the patient thick moderate secretion  but decreasing.  He failed swallow eval and will need a peg.  NO CXR.  Sat OK on NC.  On nebs

## 2022-11-25 NOTE — PROVIDER CONTACT NOTE (HYPOGLYCEMIA EVENT) - NS PROVIDER CONTACT BACKGROUND-HYPO
Age: 82y    Gender: Male    POCT Blood Glucose:  122 mg/dL (11-25-22 @ 17:29)  130 mg/dL (11-25-22 @ 13:53)  117 mg/dL (11-25-22 @ 12:31)  67 mg/dL (11-25-22 @ 12:06)  64 mg/dL (11-25-22 @ 12:04)  118 mg/dL (11-25-22 @ 05:09)  182 mg/dL (11-24-22 @ 22:28)      eMAR:  atorvastatin   40 milliGRAM(s) Oral (11-24-22 @ 22:10)    dextrose 50% Injectable   25 milliLiter(s) IV Push (11-25-22 @ 12:15)    hydrocortisone sodium succinate Injectable   30 milliGRAM(s) IV Push (11-25-22 @ 12:07)   30 milliGRAM(s) IV Push (11-25-22 @ 03:36)   30 milliGRAM(s) IV Push (11-24-22 @ 19:12)    insulin glargine Injectable (LANTUS)   10 Unit(s) SubCutaneous (11-24-22 @ 22:32)    insulin regular  human corrective regimen sliding scale   1 Unit(s) SubCutaneous (11-24-22 @ 23:05)    insulin regular  human recombinant   4 Unit(s) SubCutaneous (11-25-22 @ 06:41)   4 Unit(s) SubCutaneous (11-24-22 @ 23:06)   4 Unit(s) SubCutaneous (11-24-22 @ 17:55)    levothyroxine Injectable   25 MICROGram(s) IV Push (11-24-22 @ 22:11)     Recommend follow-up on a yearly basis and daily UV protection. Return for follow-up as scheduled.

## 2022-11-25 NOTE — PROGRESS NOTE ADULT - ATTENDING COMMENTS
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

## 2022-11-26 NOTE — PROGRESS NOTE ADULT - SUBJECTIVE AND OBJECTIVE BOX
Interval Events: Reviewed  Patient seen and examined at bedside.    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      History of renal transplant  secondary to DM      Chronic kidney disease (CKD)      Glaucoma      Kidney transplant recipient      Amputation of toe  x 3      H/O heart artery stent          MEDICATIONS:  Pulmonary:  albuterol/ipratropium for Nebulization 3 milliLiter(s) Nebulizer every 6 hours  guaiFENesin Oral Liquid (Sugar-Free) 100 milliGRAM(s) Oral every 6 hours PRN  sodium chloride 7% Inhalation 4 milliLiter(s) Inhalation every 12 hours    Antimicrobials:    Anticoagulants:  aspirin  chewable 81 milliGRAM(s) Oral daily  heparin   Injectable 5000 Unit(s) SubCutaneous every 8 hours    Cardiac:  carvedilol 25 milliGRAM(s) Oral every 12 hours      Allergies    No Known Allergies    Intolerances        Vital Signs Last 24 Hrs  T(C): 35.9 (26 Nov 2022 18:00), Max: 36.8 (25 Nov 2022 22:00)  T(F): 96.6 (26 Nov 2022 18:00), Max: 98.2 (25 Nov 2022 22:00)  HR: 56 (26 Nov 2022 18:01) (0 - 80)  BP: --  BP(mean): --  RR: 15 (26 Nov 2022 18:01) (12 - 59)  SpO2: 100% (26 Nov 2022 18:01) (90% - 100%)    Parameters below as of 26 Nov 2022 18:01  Patient On (Oxygen Delivery Method): ventilator    O2 Concentration (%): 50    11-25 @ 07:01  -  11-26 @ 07:00  --------------------------------------------------------  IN: 2532 mL / OUT: 740 mL / NET: 1792 mL    11-26 @ 07:01  -  11-26 @ 19:57  --------------------------------------------------------  IN: 2137 mL / OUT: 280 mL / NET: 1857 mL      Mode: AC/ CMV (Assist Control/ Continuous Mandatory Ventilation)  RR (machine): 12  TV (machine): 450  FiO2: 50  PEEP: 5  ITime: 1  MAP: 8.1  PIP: 19      Review of Systems:   •	General: negative  •	Skin/Breast: negative  •	Ophthalmologic: negative  •	ENMT: negative  •	Respiratory and Thorax: cough and sob  •	Cardiovascular: negative  •	Gastrointestinal: negative  •	Genitourinary: negative  •	Musculoskeletal: negative  •	Neurological: negative  •	Psychiatric: negative  •	Hematology/Lymphatics: negative  •	Endocrine: negative  •	Allergic/Immunologic: negative    Physical Exam:   • Constitutional:	refer to the dietion /Nutritionist note  • Eyes:	EOMI; PERRL; no drainage or redness  • ENMT:	No oral lesions; no gross abnormalities  • Neck	No bruits; no thyromegaly or nodules  • Breasts:	not examined  • Back:	No deformity or limitation of movement  • Respiratory:	Breath Sounds equal & clear to percussion & bl rhonchi auscultation, no accessory muscle use  • Cardiovascular:	Regular rate & rhythm, normal S1, S2; no murmurs, gallops or rubs; no S3, S4  • Gastrointestinal:	Soft, non-tender, no hepatosplenomegaly, normal bowel sounds  • Genitourinary:	not examined  • Rectal: not examined  • Extremities:	No cyanosis, clubbing or edema  • Vascular:	Equal and normal pulses (carotid, femoral, dorsalis pedis)  • Neurologica:l	not examined  • Skin:	No lesions; no rash  • Lymph Nodes:	No lymphadedenopathy  • Musculoskeletal:	No joint pain, swelling or deformity; no limitation of movement        LABS:  ABG - ( 26 Nov 2022 14:02 )  pH, Arterial: 7.28  pH, Blood: x     /  pCO2: 39    /  pO2: 174   / HCO3: 18    / Base Excess: -7.9  /  SaO2: 97.6                CBC Full  -  ( 26 Nov 2022 05:21 )  WBC Count : 5.44 K/uL  RBC Count : 2.64 M/uL  Hemoglobin : 7.0 g/dL  Hematocrit : 22.5 %  Platelet Count - Automated : 123 K/uL  Mean Cell Volume : 85.2 fl  Mean Cell Hemoglobin : 26.5 pg  Mean Cell Hemoglobin Concentration : 31.1 gm/dL  Auto Neutrophil # : 4.67 K/uL  Auto Lymphocyte # : 0.43 K/uL  Auto Monocyte # : 0.27 K/uL  Auto Eosinophil # : 0.05 K/uL  Auto Basophil # : 0.00 K/uL  Auto Neutrophil % : 85.8 %  Auto Lymphocyte % : 7.9 %  Auto Monocyte % : 5.0 %  Auto Eosinophil % : 0.9 %  Auto Basophil % : 0.0 %    11-26    148<H>  |  117<H>  |  73<H>  ----------------------------<  108<H>  3.8   |  18<L>  |  2.47<H>    Ca    8.7      26 Nov 2022 05:21  Phos  3.4     11-26  Mg     2.0     11-26                          RADIOLOGY & ADDITIONAL STUDIES (The following images were personally reviewed):

## 2022-11-26 NOTE — PROGRESS NOTE ADULT - PROBLEM SELECTOR PLAN 1
Event noticed.  The patient is sedated.  I suctioned the patient copious amount of secretion.  Gas exchange is stable.  Sputum grew Pseudomonas and Serratia.  The patient on appropriate antibiotic.  Continue pulmonary toilet.  Patient has baseline dementia and does not and trial up off sedation.  Tapers pressors as needed.  Cortisol level is adequate.  I discussed the case with critical care. She was extubated.  Patient is on nasal cannula.  Suction the patient copious amount of secretion.  Dysphagia evaluation.  Continue antibiotic.  The patient is hemodynamically stable off pressors and sedative.  Suctioned the patient thick moderate secretion and increased.  He failed swallow eval and will need a peg.  NO CXR.  Sat OK on NC.  On nebs.  He was suctioned and might nbeed intubatrion and I discussed with family of so then he will need trach

## 2022-11-26 NOTE — PROGRESS NOTE ADULT - ASSESSMENT
84 y/o M PMH CKD 4, renal transplant in 2013 at [facility], glaucoma (blind), DM1, anemia, CAD s/p 2 DAMEON to LAD in 6/21, BPH, recent admission for PNA presents with acute on chronic cough and SUNNY 2/2 acute CHF vs CKD.       NEUROLOGY  #pt extuabted  Off sedation and extubated. Mentating well. Pupils still anisocoric. CT scan negative for acute intracranial pathology.    #Agitation  Patient wishes to leave the hospital and has been agitated typically overnight. Last day of cefepime 2g today.  - consider additional zyprexa 2.5mg IM PRN    CARDIOVASCULAR  #HFpEF  Had EF of 55% on TTE from Oct 2022, grade I diastolic dysfunction. Repeat TTE 11/17 with poor visualization of LV. Patient making poor UOP overnight, urine studies suggest hypovolemia. CVP 5 today from 11 yesterday.   - c/w carvedilol 25mg BID   #Upper extremity edema  Stable from yesterday but increased compared to baseline. US with superficial thrombosis of L cephalic vein extending to AC fossa, no DVTs.   - given patients propensity to bleed and superficial nature of thrombus, no indication for AC    #CAD  #HLD  Hx of CAD s/p 2 DAMEON to LAD in June 2021, currently no CP. Trops 0.03 but no CP or ischemic changes on EKG, likely due to CKD. Takes Plavix and aspirin per last d/c but only aspirin on outpatient note  - c/w ASA daily  - c/w home Lipitor    # PULM  Pt destad on 11/26 require RSI with etomidate. Suspicion for aspiration event.     RENAL  #ELIZABETH on Chronic kidney disease (CKD). - RESOLVED  Baseline sCr 2.3-2.5. On admission, fluid overloaded. Does have orthopnea and intermittent SOB at baseline. Acidotic but at baseline. Follows w Dr. Mac. UA with granular casts. Urine studies this AM suggest pre-renal etiology.  - today sCr back at baseline; BUN 74 increased  - renally dose meds    #Renal transplant  Patient had renal transplant in 2013. On home mycophenolate 500mg BID and tacrolimus 4mg BID. Per nephro, decreased home tacrolimus from 4mg BID --> 2mg BID while in hospital.   - start tacrolimus 3mg in AM, 2mg in PM  - next tacrolimus level 30 minutes before 11/26 AM dose  - continue holding mycophenolate i/s/o infection  - f/u renal recs    #Hypernatremia - RESOLVED  - c/w tube feeds with 250cc q6 free water flushes    GI  #Diarrhea  Patient with loose stool 2 nights ago, have resolved this AM after started on miralax and senna.  - dced miralax and senna  - can consider adding banana flakes to tube feeds if contines  - will continue to monitor for BMs    #Nutrition  NPO with tube feeds.   - ice chips as needed  - s/s to re-evaluate in a few days      #BPH (benign prostatic hyperplasia).   - c/w tamsulosin 0.4mg daily    ID  #Aspiration PNA  Tracheal aspirate cx growing serratia marcescens and pseudomonas. Patient was started on vancomycin and cefepime. MRSA nares positive. Dc'ed vancomycin given supratherapeutic trough   - c/w cefepime 2g q24 x7 day (start 11/19- 11/25)  - given relatively poor renal function, will monitor closely for neurotoxicity  - patient with new agitation, last day of zyprexa today    ENDOCRINE  #DM (diabetes mellitus).   Previously on lantus 15 lispro 5. Endocrine following. One episode of hypoglycemia this morning.  - c/w lantus 10U in PM  - decrease regular insulin 3U TID  - c/w low dose regular ISS  - FSG q6  - goal -180    #Hypothyroidism  TSH 20.8, free t4 0.7, T3 49 (low)  - per endo rec no suspicion of myxedema coma  - continue with synthroid 25mg qD    #Relative Adrenal Insufficiency  Patient was started on hydrocortisone 50mg q6hr --> q8.  - c/w HC 30mg TID    HEME  #Normocytic Anemia  Likely 2.2 CKD. Hgb 6.9 on 11/21, corrected appropriately s/p 1u pRBC. No active signs of bleeding on physical exam. CTH with no intracranial bleeding. Hgb stable today.  - active T+S  - transfuse <7    FLUIDS/ELECTROLYTES/NUTRITION  -IVF as above  -Monitor, careful w advanced kidney disease  -Diet: NPO with tube feeds; s/s to re-evaluate  -DVT ppx: heparin TID  -GI: none  -Dispo: PT recommending [facility]

## 2022-11-27 NOTE — PROGRESS NOTE ADULT - ATTENDING COMMENTS
CKD, h/o renal transplant, blindness, aspiration pna, ATN  physical as above  continue MV  to consider trach and PEG  continue cefepime  renal function stable  keep hgb >7  NG feeds restarted  decision making of high complexity

## 2022-11-27 NOTE — CHART NOTE - NSCHARTNOTEFT_GEN_A_CORE
Infectious Diseases Anti-infective Approval Note    Medication: cefepime   Dose*: 2g  Route: IV  Frequency: q24h  Duration**: 3d    *Dose may be adjusted as needed for alterations in renal function.    **Reflects duration of approval and not necessarily duration of therapy     THIS IS NOT AN INFECTIOUS DISEASES CONSULTATION

## 2022-11-27 NOTE — PROGRESS NOTE ADULT - ASSESSMENT
84 y/o M PMH CKD 4, renal transplant in 2013 at [facility], glaucoma (blind), DM1, anemia, CAD s/p 2 DAMEON to LAD in 6/21, BPH, recent admission for PNA presents with acute on chronic cough and SUNNY 2/2 acute CHF vs CKD.       NEUROLOGY  #pt extuabted  Off sedation and extubated. Mentating well. Pupils still anisocoric. CT scan negative for acute intracranial pathology.    #Agitation  Patient wishes to leave the hospital and has been agitated typically overnight. Last day of cefepime 2g today.  - consider additional zyprexa 2.5mg IM PRN    CARDIOVASCULAR  #HFpEF  Had EF of 55% on TTE from Oct 2022, grade I diastolic dysfunction. Repeat TTE 11/17 with poor visualization of LV. Patient making poor UOP overnight, urine studies suggest hypovolemia. CVP 5 today from 11 yesterday.   - c/w carvedilol 25mg BID   #Upper extremity edema  Stable from yesterday but increased compared to baseline. US with superficial thrombosis of L cephalic vein extending to AC fossa, no DVTs.   - given patients propensity to bleed and superficial nature of thrombus, no indication for AC    #CAD  #HLD  Hx of CAD s/p 2 DAMEON to LAD in June 2021, currently no CP. Trops 0.03 but no CP or ischemic changes on EKG, likely due to CKD. Takes Plavix and aspirin per last d/c but only aspirin on outpatient note  - c/w ASA daily  - c/w home Lipitor    # PULM  Pt destad on 11/26 require RSI with etomidate. Suspicion for aspiration event.     RENAL  #ELIZABETH on Chronic kidney disease (CKD). - RESOLVED  Baseline sCr 2.3-2.5. On admission, fluid overloaded. Does have orthopnea and intermittent SOB at baseline. Acidotic but at baseline. Follows w Dr. Mac. UA with granular casts. Urine studies this AM suggest pre-renal etiology.  - today sCr back at baseline; BUN 74 increased  - renally dose meds    #Renal transplant  Patient had renal transplant in 2013. On home mycophenolate 500mg BID and tacrolimus 4mg BID. Per nephro, decreased home tacrolimus from 4mg BID --> 2mg BID while in hospital.   - start tacrolimus 3mg in AM, 2mg in PM  - next tacrolimus level 30 minutes before 11/26 AM dose  - continue holding mycophenolate i/s/o infection  - f/u renal recs    #Hypernatremia - RESOLVED  - c/w tube feeds with 250cc q6 free water flushes    GI  #Diarrhea  Patient with loose stool 2 nights ago, have resolved this AM after started on miralax and senna.  - dced miralax and senna  - can consider adding banana flakes to tube feeds if contines  - will continue to monitor for BMs    #Nutrition  NPO with tube feeds.   - ice chips as needed  - s/s to re-evaluate in a few days      #BPH (benign prostatic hyperplasia).   - c/w tamsulosin 0.4mg daily    ID  #Aspiration PNA  Tracheal aspirate cx growing serratia marcescens and pseudomonas. Patient was started on vancomycin and cefepime. MRSA nares positive. Dc'ed vancomycin given supratherapeutic trough   - c/w cefepime 2g q24 x7 day (start 11/19- 11/25)  - given relatively poor renal function, will monitor closely for neurotoxicity  - patient with new agitation, last day of zyprexa today    ENDOCRINE  #DM (diabetes mellitus).   Previously on lantus 15 lispro 5. Endocrine following. One episode of hypoglycemia this morning.  - c/w lantus 10U in PM  - decrease regular insulin 3U TID  - c/w low dose regular ISS  - FSG q6  - goal -180    #Hypothyroidism  TSH 20.8, free t4 0.7, T3 49 (low)  - per endo rec no suspicion of myxedema coma  - continue with synthroid 25mg qD    #Relative Adrenal Insufficiency  Patient was started on hydrocortisone 50mg q6hr --> q8.  - c/w HC 30mg TID    HEME  #Normocytic Anemia  Likely 2.2 CKD. Hgb 6.9 on 11/21, corrected appropriately s/p 1u pRBC. No active signs of bleeding on physical exam. CTH with no intracranial bleeding. Hgb stable today.  - active T+S  - transfuse <7    FLUIDS/ELECTROLYTES/NUTRITION  -IVF as above  -Monitor, careful w advanced kidney disease  -Diet: NPO with tube feeds; s/s to re-evaluate  -DVT ppx: heparin TID  -GI: none  -Dispo: PT recommending NAVID 84 y/o M PMH CKD 4, renal transplant in 2013 at [facility], glaucoma (blind), DM1, anemia, CAD s/p 2 DAMEON to LAD in 6/21, BPH, recent admission for PNA presents with acute on chronic cough and SUNNY 2/2 acute CHF vs CKD.       NEUROLOGY  #Intubated  Patient reintubated on 11/26 for likely aspiration event. Pupils still anisocoric. CT scan negative for acute intracranial pathology.   - continue on proprofol  - RASS goal -1 to -2    #Agitation  Patient previously with some agitation overnight received zyprexa 2.5 IM. Now intubated and sedated. Patient has been on cefepime, obtaining level to r/o neurotoxicity.  - f/u cefepime level      CARDIOVASCULAR  #HFpEF  Had EF of 55% on TTE from Oct 2022, grade I diastolic dysfunction. Repeat TTE 11/17 with poor visualization of LV. Patient making good UOP overnight  - c/w carvedilol 25mg BID     #Upper extremity edema  Stable from yesterday but increased compared to baseline. US with superficial thrombosis of L cephalic vein extending to AC fossa, no DVTs.   - given patients propensity to bleed and superficial nature of thrombus, no indication for AC    #CAD  #HLD  Hx of CAD s/p 2 DAMEON to LAD in June 2021, currently no CP. Trops 0.03 but no CP or ischemic changes on EKG, likely due to CKD. Takes Plavix and aspirin per last d/c but only aspirin on outpatient note  - c/w ASA daily  - c/w home Lipitor    PULM      RENAL  #ELIZABETH on Chronic kidney disease (CKD). - RESOLVED  Baseline sCr 2.3-2.5. On admission, fluid overloaded. Does have orthopnea and intermittent SOB at baseline. Acidotic but at baseline. Follows w Dr. Mac. UA with granular casts. Urine studies this AM suggest pre-renal etiology.  - today sCr back at baseline; BUN 74 increased  - renally dose meds    #Renal transplant  Patient had renal transplant in 2013. On home mycophenolate 500mg BID and tacrolimus 4mg BID. Per nephro, decreased home tacrolimus from 4mg BID --> 2mg BID while in hospital.   - start tacrolimus 3mg in AM, 2mg in PM  - next tacrolimus level 30 minutes before 11/26 AM dose  - continue holding mycophenolate i/s/o infection  - f/u renal recs    #Hypernatremia - RESOLVED  - c/w tube feeds with 250cc q6 free water flushes    GI  #Diarrhea  Patient with loose stool 2 nights ago, have resolved this AM after started on miralax and senna.  - dced miralax and senna  - can consider adding banana flakes to tube feeds if contines  - will continue to monitor for BMs    #Nutrition  NPO with tube feeds.   - ice chips as needed  - s/s to re-evaluate in a few days      #BPH (benign prostatic hyperplasia).   - c/w tamsulosin 0.4mg daily    ID  #Aspiration PNA  Tracheal aspirate cx growing serratia marcescens and pseudomonas. Patient was started on vancomycin and cefepime. MRSA nares positive. Dc'ed vancomycin given supratherapeutic trough   - c/w cefepime 2g q24 x7 day (start 11/19- 11/25)  - given relatively poor renal function, will monitor closely for neurotoxicity  - patient with new agitation, last day of zyprexa today    ENDOCRINE  #DM (diabetes mellitus).   Previously on lantus 15 lispro 5. Endocrine following. One episode of hypoglycemia this morning.  - c/w lantus 10U in PM  - decrease regular insulin 3U TID  - c/w low dose regular ISS  - FSG q6  - goal -180    #Hypothyroidism  TSH 20.8, free t4 0.7, T3 49 (low)  - per endo rec no suspicion of myxedema coma  - continue with synthroid 25mg qD    #Relative Adrenal Insufficiency  Patient was started on hydrocortisone 50mg q6hr --> q8.  - c/w HC 30mg TID    HEME  #Normocytic Anemia  Likely 2.2 CKD. Hgb 6.9 on 11/21, corrected appropriately s/p 1u pRBC. No active signs of bleeding on physical exam. CTH with no intracranial bleeding. Hgb stable today.  - active T+S  - transfuse <7    FLUIDS/ELECTROLYTES/NUTRITION  -IVF as above  -Monitor, careful w advanced kidney disease  -Diet: NPO with tube feeds; s/s to re-evaluate  -DVT ppx: heparin TID  -GI: none  -Dispo: PT recommending NAVID 82 y/o M PMH CKD 4, renal transplant in 2013 at [facility], glaucoma (blind), DM1, anemia, CAD s/p 2 DAMEON to LAD in 6/21, BPH, recent admission for PNA presents with acute on chronic cough and SUNNY 2/2 acute CHF vs CKD.       NEUROLOGY  #Intubated  Patient reintubated on 11/26 for likely aspiration event. Pupils still anisocoric. CT scan negative for acute intracranial pathology.   - continue on proprofol  - RASS goal -1 to -2    #Agitation  Patient previously with some agitation overnight received zyprexa 2.5 IM. Now intubated and sedated. Patient has been on cefepime, obtaining level to r/o neurotoxicity.  - f/u cefepime level      CARDIOVASCULAR  #HFpEF  Had EF of 55% on TTE from Oct 2022, grade I diastolic dysfunction. Repeat TTE 11/17 with poor visualization of LV. Patient making good UOP overnight  - c/w carvedilol 25mg BID     #Upper extremity edema  Stable from yesterday but increased compared to baseline. US with superficial thrombosis of L cephalic vein extending to AC fossa, no DVTs.   - given patients propensity to bleed and superficial nature of thrombus, no indication for AC    #CAD  #HLD  Hx of CAD s/p 2 DAMEON to LAD in June 2021, currently no CP. Trops 0.03 but no CP or ischemic changes on EKG, likely due to CKD. Takes Plavix and aspirin per last d/c but only aspirin on outpatient note  - c/w ASA daily  - c/w home Lipitor    PULM  #AHRF      RENAL  #ELIZABETH on Chronic kidney disease (CKD).  Baseline sCr 2.3-2.5. On admission, fluid overloaded. Does have orthopnea and intermittent SOB at baseline. Acidotic but at baseline. Follows w Dr. Mac. Urine studies this AM suggest pre-renal etiology.   - today sCr 2.6 increased; BUN 79 increased  - given 300cc pRBCs and 500cc LR bolus o/n  - renally dose meds    #Renal transplant  Patient had renal transplant in 2013. On home mycophenolate 500mg BID and tacrolimus 4mg BID. Per nephro, decreased home tacrolimus from 4mg BID --> 2mg BID while in hospital.   - c/w tacrolimus 3mg in AM, c/w 2mg in PM  - next tacrolimus level 30 minutes before 11/26 AM dose  - continue holding mycophenolate i/s/o infection  - f/u renal recs    #Hypernatremia - RESOLVED  - c/w tube feeds with 250cc q6 free water flushes    GI  #Diarrhea   Patient with loose stool 2 nights ago, have resolved this AM after started on miralax and senna.  - dced miralax and senna  - can consider adding banana flakes to tube feeds if continues  - will continue to monitor for BMs    #Nutrition  NPO with tube feeds.   - ice chips as needed  - s/s to re-evaluate in a few days      #BPH (benign prostatic hyperplasia).   - c/w tamsulosin 0.4mg daily    ID  #Aspiration PNA  Tracheal aspirate cx growing serratia marcescens and pseudomonas. Patient was started on vancomycin and cefepime. MRSA nares positive. Dc'ed vancomycin given supratherapeutic trough   - c/w cefepime 2g q24 x7 day (start 11/19- 11/25)  - given relatively poor renal function, will monitor closely for neurotoxicity  - patient with new agitation, last day of zyprexa today    ENDOCRINE  #DM (diabetes mellitus).   Previously on lantus 15 lispro 5. Endocrine following. One episode of hypoglycemia this morning.  - c/w lantus 10U in PM  - decrease regular insulin 3U TID  - c/w low dose regular ISS  - FSG q6  - goal -180    #Hypothyroidism  TSH 20.8, free t4 0.7, T3 49 (low)  - per endo rec no suspicion of myxedema coma  - continue with synthroid 25mg qD    #Relative Adrenal Insufficiency  Patient was started on hydrocortisone 50mg q6hr --> q8.  - c/w HC 30mg TID    HEME  #Normocytic Anemia  Likely 2.2 CKD. Hgb 6.9 on 11/21, corrected appropriately s/p 1u pRBC. No active signs of bleeding on physical exam. CTH with no intracranial bleeding. Hgb stable today.  - active T+S  - transfuse <7    FLUIDS/ELECTROLYTES/NUTRITION  -IVF as above  -Monitor, careful w advanced kidney disease  -Diet: NPO with tube feeds; s/s to re-evaluate  -DVT ppx: heparin TID  -GI: none  -Dispo: PT recommending NAVID 84 y/o M PMH CKD 4, renal transplant in 2013 at [facility], glaucoma (blind), DM1, anemia, CAD s/p 2 DAMEON to LAD in 6/21, BPH, recent admission for PNA presents with acute on chronic cough and SUNNY 2/2 acute CHF vs CKD.       NEUROLOGY  #Intubated  Patient reintubated on 11/26 for likely aspiration event. Pupils still anisocoric. CT scan negative for acute intracranial pathology.   - continue on proprofol  - RASS goal -1 to -2    #Agitation  Patient previously with some agitation overnight received zyprexa 2.5 IM. Now intubated and sedated. Patient has been on cefepime, obtaining level to r/o neurotoxicity.  - f/u cefepime level      CARDIOVASCULAR  #HFpEF  Had EF of 55% on TTE from Oct 2022, grade I diastolic dysfunction. Repeat TTE 11/17 with poor visualization of LV. Patient making good UOP overnight  - c/w carvedilol 25mg BID     #Upper extremity edema  Stable from yesterday but increased compared to baseline. US with superficial thrombosis of L cephalic vein extending to AC fossa, no DVTs.   - given patients propensity to bleed and superficial nature of thrombus, no indication for AC    #CAD  #HLD  Hx of CAD s/p 2 DAMEON to LAD in June 2021, currently no CP. Trops 0.03 but no CP or ischemic changes on EKG, likely due to CKD. Takes Plavix and aspirin per last d/c but only aspirin on outpatient note  - c/w ASA daily  - c/w home Lipitor    PULM  #AHRF  Likely 2/2 aspiration event. Now intubated.  - ID management as below for aspiration     RENAL  #ELIZABETH on Chronic kidney disease (CKD).  Baseline sCr 2.3-2.5. On admission, fluid overloaded. Does have orthopnea and intermittent SOB at baseline. Acidotic but at baseline. Follows w Dr. Mac. Urine studies this AM suggest pre-renal etiology. Given 300cc pRBCs and 500cc LR bolus o/n with good UOP  - today sCr 2.6 increased from 2.4 yesterday; BUN 79 increased  - monitor I/Os  - renally dose meds    #Renal transplant  Patient had renal transplant in 2013. On home mycophenolate 500mg BID and tacrolimus 4mg BID. Per nephro, decreased home tacrolimus from 4mg BID --> 2mg BID while in hospital.   - c/w tacrolimus 3mg in AM, c/w 2mg in PM  - continue holding mycophenolate i/s/o infection  - f/u renal recs    #Hypernatremia - RESOLVED  - c/w tube feeds with 250cc q6 free water flushes    GI  #Diarrhea - RESOLVED  Patient with loose stool 2 nights ago, have resolved this AM after started on miralax and senna.  - dced miralax and senna  - can consider adding banana flakes to tube feeds if continues  - will continue to monitor for BMs    #Nutrition  NPO with tube feeds.   - continue for now patient may require trach/peg if unable to extubate      #BPH (benign prostatic hyperplasia).   - c/w tamsulosin 0.4mg daily    ID  #Aspiration PNA  Tracheal aspirate cx growing serratia marcescens and pseudomonas. Patient was started on vancomycin and cefepime. MRSA nares positive. Dc'ed vancomycin given supratherapeutic trough   - c/w cefepime 2g q24 x7 day (start 11/19-25)  - resumed after aspiration event on 11/26    ENDOCRINE  #DM (diabetes mellitus).   Previously on lantus 15 lispro 5. Endocrine following. Elevated FSGs in 200s today however patient was on D5 drip, now discontinued.  - c/w lantus 10U in PM  - decrease regular insulin 3U TID  - c/w low dose regular ISS  - f/u endo recs  - goal -180    #Hypothyroidism  TSH 20.8, free t4 0.7, T3 49 (low)  - per endo rec no suspicion of myxedema coma  - continue with synthroid 25mg qD    #Relative Adrenal Insufficiency  Patient was started on hydrocortisone 50mg q6hr --> q8.  - c/w HC 30mg TID  - wean as tolerated  - f/u endocrine recs    HEME  #Normocytic Anemia  Likely 2.2 CKD. Hgb 6.9 on 11/21, corrected appropriately s/p 1u pRBC. No active signs of bleeding on physical exam. CTH with no intracranial bleeding.  - Hgb 6.5 this AM, gave 1u pRBC on 11/27  - active T+S  - transfuse <7    FLUIDS/ELECTROLYTES/NUTRITION  -IVF as above  -Monitor, careful w advanced kidney disease  -Diet: NPO with tube feeds  -DVT ppx: heparin TID  -GI: none  -Dispo: PT recommending NAVID

## 2022-11-27 NOTE — PROGRESS NOTE ADULT - ATTENDING COMMENTS
82yoM h/o poorly controlled T2DM, CAD s/p DAMEON to LAD, renal transplant in 2013 admitted for lower extremity edema (etiology CKD vs CHF), course c/b cardiac arrest and hypoglycemia, hypotension, bradycardia, hypothermia, found to be hypothyroid this admission. He’s been treated with steroids for possible adrenal insufficiency. Was intubated 11/26 for respiratory decompensation possibly due to an aspiration event. Tube feeds were held and then restarted and increased to goal rate. He was also restarted on lantus 10u and Regular insulin 3u q6h however FSGs have been high. Can increase regular insulin to 5u q6h. Please change the solvent for Cefepime to a non-dextrose containing fluid like NS or 1/2NS. Continue IV levothyroxine 25mcg.     Cara Tellez MD  Endocrinology Attending

## 2022-11-27 NOTE — PROGRESS NOTE ADULT - SUBJECTIVE AND OBJECTIVE BOX
OVERNIGHT: No acute events overnight.   SUBJECTIVE / INTERVAL HPI: Patient was seen and examined this morning.     CAPILLARY BLOOD GLUCOSE & INSULIN RECEIVED  Yesterday  - Dinner FSG: *** mg/dL = *** units of premeal Lispro + *** units of Lispro sliding scale.   - Bedtime FSG: *** mg/dL = *** units of Lantus + *** units Lispro sliding scale.     Today  - Breakfast FSG: *** mg/dL = *** units of premeal Lispro + *** units of Lispro sliding scale.   - Lunch FSG: *** mg/dL = *** units of premeal Lispro + *** units of Lispro sliding scale.     200 mg/dL (11-26 @ 17:47)  239 mg/dL (11-26 @ 22:51)  259 mg/dL (11-27 @ 04:32)  276 mg/dL (11-27 @ 05:17)  249 mg/dL (11-27 @ 13:02)      REVIEW OF SYSTEMS  Constitutional:  Negative fever, chills or loss of appetite.  Eyes:  Negative blurry vision or double vision.  Cardiovascular:  Negative for chest pain or palpitations.  Respiratory:  Negative for cough, wheezing, or shortness of breath.    Gastrointestinal:  Negative for nausea, vomiting, diarrhea, constipation, or abdominal pain.  Genitourinary:  Negative frequency, urgency or dysuria.  Neurologic:  No headache, confusion, dizziness, lightheadedness.    PHYSICAL EXAM  Vital Signs Last 24 Hrs  T(C): 36.4 (27 Nov 2022 14:51), Max: 36.5 (27 Nov 2022 08:22)  T(F): 97.5 (27 Nov 2022 14:51), Max: 97.7 (27 Nov 2022 08:22)  HR: 57 (27 Nov 2022 13:25) (52 - 61)  BP: --  BP(mean): --  RR: 17 (27 Nov 2022 13:25) (13 - 29)  SpO2: 97% (27 Nov 2022 13:25) (95% - 100%)    Parameters below as of 27 Nov 2022 13:25  Patient On (Oxygen Delivery Method): ventilator    O2 Concentration (%): 30    Constitutional: Awake, alert, in no acute distress.   HEENT: Normocephalic, atraumatic, GORDON, no proptosis or lid retraction.   Neck: supple, no acanthosis, no thyromegaly or palpable thyroid nodules.  Respiratory: Lungs clear to ausculation bilaterally.   Cardiovascular: regular rhythm, normal S1 and S2, no audible murmurs.   GI: soft, non-tender, non-distended, bowel sounds present, no masses appreciated.  Extremities: No lower extremity edema, peripheral pulses present.   Skin: no rashes.   Psychiatric: AAO x 3. Normal affect/mood.     LABS  CBC - WBC/HGB/HTC/PLT: 4.98/6.6/21.4/131 (11-27-22)  BMP - Na/K/Cl/Bicarb/BUN/Cr/Gluc/AG/eGFR: 142/3.7/110/20/79/2.67/259/12/23 (11-27-22)  Ca - 8.5 (11-27-22)  Phos - 3.2 (11-27-22)  Mg - 2.0 (11-27-22)  LFT - Alb/Tprot/Tbili/Dbili/AlkPhos/ALT/AST: 2.6/--/0.3/--/95/11/9 (11-23-22)    Thyroid Stimulating Hormone, Serum: 20.890 (11-19-22)  Total T4/Free T4: 3.55/-- (11-20-22)    MEDICATIONS  MEDICATIONS  (STANDING):  albuterol/ipratropium for Nebulization 3 milliLiter(s) Nebulizer every 6 hours  aspirin  chewable 81 milliGRAM(s) Oral daily  atorvastatin 40 milliGRAM(s) Oral at bedtime  carvedilol 25 milliGRAM(s) Oral every 12 hours  cefepime   IVPB 2000 milliGRAM(s) IV Intermittent every 24 hours  chlorhexidine 0.12% Liquid 15 milliLiter(s) Oral Mucosa every 12 hours  chlorhexidine 2% Cloths 1 Application(s) Topical <User Schedule>  dextrose 5%. 1000 milliLiter(s) (50 mL/Hr) IV Continuous <Continuous>  dextrose 5%. 1000 milliLiter(s) (100 mL/Hr) IV Continuous <Continuous>  dextrose 50% Injectable 25 Gram(s) IV Push once  dextrose 50% Injectable 12.5 Gram(s) IV Push once  dextrose 50% Injectable 25 Gram(s) IV Push once  glucagon  Injectable 1 milliGRAM(s) IntraMuscular once  heparin   Injectable 5000 Unit(s) SubCutaneous every 8 hours  hydrocortisone sodium succinate Injectable 30 milliGRAM(s) IV Push every 8 hours  influenza  Vaccine (HIGH DOSE) 0.7 milliLiter(s) IntraMuscular once  insulin glargine Injectable (LANTUS) 10 Unit(s) SubCutaneous at bedtime  insulin regular  human corrective regimen sliding scale   SubCutaneous every 6 hours  insulin regular  human recombinant 3 Unit(s) SubCutaneous every 6 hours  levothyroxine Injectable 25 MICROGram(s) IV Push at bedtime  propofol Infusion 10 MICROgram(s)/kG/Min (4.94 mL/Hr) IV Continuous <Continuous>  sodium chloride 7% Inhalation 4 milliLiter(s) Inhalation every 12 hours  tacrolimus    0.5 mG/mL Suspension 2 milliGRAM(s) Oral every 24 hours  tacrolimus    0.5 mG/mL Suspension 3 milliGRAM(s) Oral every 24 hours  tamsulosin 0.4 milliGRAM(s) Oral at bedtime    MEDICATIONS  (PRN):  acetaminophen     Tablet .. 650 milliGRAM(s) Oral every 6 hours PRN Mild Pain (1 - 3)  dextrose Oral Gel 15 Gram(s) Oral once PRN Blood Glucose LESS THAN 70 milliGRAM(s)/deciliter  guaiFENesin Oral Liquid (Sugar-Free) 100 milliGRAM(s) Oral every 6 hours PRN Cough  sodium chloride 0.9% lock flush 10 milliLiter(s) IV Push every 1 hour PRN Pre/post blood products, medications, blood draw, and to maintain line patency    ASSESSMENT / RECOMMENDATIONS    A1C: 9.0 %  BUN: 79  Creatinine: 2.67  GFR: 23  Weight: 82.3  BMI: 28.4  EF:     # Type 2 diabetes mellitus  - Please continue lantus *** units at bedtime.   - Continue lispro *** units before each meal.  - Continue lispro moderate / low dose sliding scale before meals and at bedtime.  - Patient's fingerstick glucose goal is 100-180 mg/dL.    - For discharge, patient can ***.    - Patient can follow up at discharge with [facility] by calling [phone] to make an appointment.      Case discussed with Dr. Tellez. Primary team updated.       Ulysses Weber    Endocrinology Fellow    Service Pager: [phone]    OVERNIGHT: No acute events overnight.   SUBJECTIVE / INTERVAL HPI: Patient was seen and examined this morning. He remains sedated and intubated. Patient's tube feeds were stopped yesterday morning due to increased secretions, he was later reintubated due to respiratory decompensation (potentially secondary to aspiration) and his tube feeds were resumed around 2 PM.     CAPILLARY BLOOD GLUCOSE & INSULIN RECEIVED  Yesterday  - 6 AM FS = He didn't receive insulin (off feeds).  - 12 PM FS mg/dL = He didn't receive insulin (off feeds).  - 6 PM FS mg/dL = 3 units of regular insulin + 1 units of sliding scale.     Today  - 12 AM FS mg/dL = 10 units of Lantus + 3 units of regular insulin + 2 units of sliding scale.   - 6 AM FS mg/dL = 3 units of regular insulin + 3 units of sliding scale.    - 12 PM FS mg/dL = 3 units of regular insulin + 2 units of sliding scale.     REVIEW OF SYSTEMS  Unable to obtain.    PHYSICAL EXAM  Vital Signs Last 24 Hrs  T(C): 36.4 (2022 14:51), Max: 36.5 (2022 08:22)  T(F): 97.5 (2022 14:51), Max: 97.7 (2022 08:22)  HR: 57 (2022 13:25) (52 - 61)  BP: --  BP(mean): --  RR: 17 (2022 13:25) (13 - 29)  SpO2: 97% (2022 13:25) (95% - 100%)    Parameters below as of 2022 13:25  Patient On (Oxygen Delivery Method): ventilator    O2 Concentration (%): 30    Constitutional: Elderly male, sedated, intubated.   HEENT: Normocephalic, atraumatic, GORDON.  Respiratory: (+) rhonchi. No wheezing.   Cardiovascular: regular rhythm, normal S1 and S2, no audible murmurs.   GI: soft, non-tender, non-distended, bowel sounds present. NGT in place with feeds running at goal of 43 mL/hr.  Extremities: +1 lower extremity edema.    LABS  CBC - WBC/HGB/HTC/PLT: 4.98/6.6/21.4/131 (22)  BMP - Na/K/Cl/Bicarb/BUN/Cr/Gluc/AG/eGFR: 142/3.7/110/20/79/2.67/259/ (22)  Ca - 8.5 (22)  Phos - 3.2 (22)  Mg - 2.0 (22)  LFT - Alb/Tprot/Tbili/Dbili/AlkPhos/ALT/AST: 2.6/--/0.3/--/95// (22)  Thyroid Stimulating Hormone, Serum: 20.890 (22)  Total T4/Free T4: 3.55/-- (22)    MEDICATIONS  MEDICATIONS  (STANDING):  albuterol/ipratropium for Nebulization 3 milliLiter(s) Nebulizer every 6 hours  aspirin  chewable 81 milliGRAM(s) Oral daily  atorvastatin 40 milliGRAM(s) Oral at bedtime  carvedilol 25 milliGRAM(s) Oral every 12 hours  cefepime   IVPB 2000 milliGRAM(s) IV Intermittent every 24 hours  chlorhexidine 0.12% Liquid 15 milliLiter(s) Oral Mucosa every 12 hours  chlorhexidine 2% Cloths 1 Application(s) Topical <User Schedule>  dextrose 5%. 1000 milliLiter(s) (50 mL/Hr) IV Continuous <Continuous>  dextrose 5%. 1000 milliLiter(s) (100 mL/Hr) IV Continuous <Continuous>  dextrose 50% Injectable 25 Gram(s) IV Push once  dextrose 50% Injectable 12.5 Gram(s) IV Push once  dextrose 50% Injectable 25 Gram(s) IV Push once  glucagon  Injectable 1 milliGRAM(s) IntraMuscular once  heparin   Injectable 5000 Unit(s) SubCutaneous every 8 hours  hydrocortisone sodium succinate Injectable 30 milliGRAM(s) IV Push every 8 hours  influenza  Vaccine (HIGH DOSE) 0.7 milliLiter(s) IntraMuscular once  insulin glargine Injectable (LANTUS) 10 Unit(s) SubCutaneous at bedtime  insulin regular  human corrective regimen sliding scale   SubCutaneous every 6 hours  insulin regular  human recombinant 3 Unit(s) SubCutaneous every 6 hours  levothyroxine Injectable 25 MICROGram(s) IV Push at bedtime  propofol Infusion 10 MICROgram(s)/kG/Min (4.94 mL/Hr) IV Continuous <Continuous>  sodium chloride 7% Inhalation 4 milliLiter(s) Inhalation every 12 hours  tacrolimus    0.5 mG/mL Suspension 2 milliGRAM(s) Oral every 24 hours  tacrolimus    0.5 mG/mL Suspension 3 milliGRAM(s) Oral every 24 hours  tamsulosin 0.4 milliGRAM(s) Oral at bedtime    MEDICATIONS  (PRN):  acetaminophen     Tablet .. 650 milliGRAM(s) Oral every 6 hours PRN Mild Pain (1 - 3)  dextrose Oral Gel 15 Gram(s) Oral once PRN Blood Glucose LESS THAN 70 milliGRAM(s)/deciliter  guaiFENesin Oral Liquid (Sugar-Free) 100 milliGRAM(s) Oral every 6 hours PRN Cough  sodium chloride 0.9% lock flush 10 milliLiter(s) IV Push every 1 hour PRN Pre/post blood products, medications, blood draw, and to maintain line patency    ASSESSMENT / RECOMMENDATIONS  Mr. Miguel is an 83-year-old male with type 2 diabetes mellitus, coronary artery disease (s/p DAMEON x2 to LAD on 2021), chronic kidney disease stage 4 (s/p renal transplant on , on tracrolimus and prednisone) and benign prostatic hyperplasia who was sent to the hospital by his nephrologist for further evaluation of lower extremity edema (22). He was admitted for further management of LE edema thought to be secondary to his underlying CKD versus congestive heart failure. Hospitalization was complicated by hypoglycemia and cardiac arrest (22, ROSC after 1 minute of chest compressions). Post-cardiac arrest he was noted to develop hypotension, bradycardia and hypothermia. Labs were remarkable for elevated TSH (20) and decreased FT4 (0.77). 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     Although he had features seen with myxedema coma, these findings were better explained by his cardiac arrest as his vital signs prior to the arrest were apparently around his baseline, as was his mental status. Nonetheless, he was started on levothyroxine 25 mcg IV daily (22). In addition, given concern for adrenal insufficiency, he was started on stress-dose steroids (hydrocortisone 50 mg every 6 hours). Since then, his hydrocortisone has been titrated down to 30 mg every 8 hours. The patient was extubated on 22; however, he had to be reintubated on 22 due to episode fo respiratory compensation, potentially secondary to aspiration. Patient's tube feeds were stopped yesterday morning, later resumed at ~2 PM. He's currently tolerating tube feeds at goal rate of 43 mL/hr.     A1C: 9.0 %  BUN: 79  Creatinine: 2.67  GFR: 23  Weight: 82.3  BMI: 28.4  EF: 65%    # Type 2 diabetes mellitus  - After reintubation, patient's insulin requirements have increased since he was reintubated yesterday (despite he remains in the same dose of hydrocortisone and has been receiving the same rate of tube feeds since yesterday). We believe that the increase in requirements is likely due to underlying stress from infection.   - Please continue lantus 10 units at bedtime.  - Increase regular insulin to 5 units every 6 hours.   - Continue regular insulin low dose sliding scale before meals and at bedtime.  - Patient's fingerstick glucose goal is 100-180 mg/dL.      # Hypothyroidism  - TSH 20. FT4 0.77 (22).   - Continue with levothyroxine 25 mcg IV daily.   - Recheck thyroid function tests tomorrow with morning labs.     # Concern for adrenal insufficiency  - Patient has been on prednisone 5 mg daily for renal transplant.   - He had recent hospitalization on 2022 for pneumonia due to pseudomonas where he was also started on stress dose steroids. At that time, a cortisol level was collected prior to starting steroids which was not robust (6.5 ug/dL).   - Please continue with hydrocortisone 30 mg every 8 hours for now.     Case discussed with Dr. Tellez. Primary team updated.       Ulysses Weber    Endocrinology Fellow    Service Pager: [phone]    OVERNIGHT: No acute events overnight.   SUBJECTIVE / INTERVAL HPI: Patient was seen and examined this morning. He remains sedated and intubated. Patient's tube feeds were stopped yesterday morning due to increased secretions, he was later reintubated due to respiratory decompensation (potentially secondary to aspiration) and his tube feeds were resumed around 2 PM.     CAPILLARY BLOOD GLUCOSE & INSULIN RECEIVED  Yesterday  - 6 AM FS = He didn't receive insulin (off feeds).  - 12 PM FS mg/dL = He didn't receive insulin (off feeds).  - 6 PM FS mg/dL = 3 units of regular insulin + 1 units of sliding scale.     Today  - 12 AM FS mg/dL = 10 units of Lantus + 3 units of regular insulin + 2 units of sliding scale.   - 6 AM FS mg/dL = 3 units of regular insulin + 3 units of sliding scale.    - 12 PM FS mg/dL = 3 units of regular insulin + 2 units of sliding scale.     REVIEW OF SYSTEMS  Unable to obtain.    PHYSICAL EXAM  Vital Signs Last 24 Hrs  T(C): 36.4 (2022 14:51), Max: 36.5 (2022 08:22)  T(F): 97.5 (2022 14:51), Max: 97.7 (2022 08:22)  HR: 57 (2022 13:25) (52 - 61)  BP: --  BP(mean): --  RR: 17 (2022 13:25) (13 - 29)  SpO2: 97% (2022 13:25) (95% - 100%)    Parameters below as of 2022 13:25  Patient On (Oxygen Delivery Method): ventilator    O2 Concentration (%): 30    Constitutional: Elderly male, sedated, intubated.   HEENT: Normocephalic, atraumatic, GORDON.  Respiratory: (+) rhonchi. No wheezing.   Cardiovascular: regular rhythm, normal S1 and S2, no audible murmurs.   GI: soft, non-tender, non-distended, bowel sounds present. NGT in place with feeds running at goal of 43 mL/hr.  Extremities: +1 lower extremity edema.    LABS  CBC - WBC/HGB/HTC/PLT: 4.98/6.6/21.4/131 (22)  BMP - Na/K/Cl/Bicarb/BUN/Cr/Gluc/AG/eGFR: 142/3.7/110/20/79/2.67/259/ (22)  Ca - 8.5 (22)  Phos - 3.2 (22)  Mg - 2.0 (22)  LFT - Alb/Tprot/Tbili/Dbili/AlkPhos/ALT/AST: 2.6/--/0.3/--/95// (22)  Thyroid Stimulating Hormone, Serum: 20.890 (22)  Total T4/Free T4: 3.55/-- (22)    MEDICATIONS  MEDICATIONS  (STANDING):  albuterol/ipratropium for Nebulization 3 milliLiter(s) Nebulizer every 6 hours  aspirin  chewable 81 milliGRAM(s) Oral daily  atorvastatin 40 milliGRAM(s) Oral at bedtime  carvedilol 25 milliGRAM(s) Oral every 12 hours  cefepime   IVPB 2000 milliGRAM(s) IV Intermittent every 24 hours  chlorhexidine 0.12% Liquid 15 milliLiter(s) Oral Mucosa every 12 hours  chlorhexidine 2% Cloths 1 Application(s) Topical <User Schedule>  dextrose 5%. 1000 milliLiter(s) (50 mL/Hr) IV Continuous <Continuous>  dextrose 5%. 1000 milliLiter(s) (100 mL/Hr) IV Continuous <Continuous>  dextrose 50% Injectable 25 Gram(s) IV Push once  dextrose 50% Injectable 12.5 Gram(s) IV Push once  dextrose 50% Injectable 25 Gram(s) IV Push once  glucagon  Injectable 1 milliGRAM(s) IntraMuscular once  heparin   Injectable 5000 Unit(s) SubCutaneous every 8 hours  hydrocortisone sodium succinate Injectable 30 milliGRAM(s) IV Push every 8 hours  influenza  Vaccine (HIGH DOSE) 0.7 milliLiter(s) IntraMuscular once  insulin glargine Injectable (LANTUS) 10 Unit(s) SubCutaneous at bedtime  insulin regular  human corrective regimen sliding scale   SubCutaneous every 6 hours  insulin regular  human recombinant 3 Unit(s) SubCutaneous every 6 hours  levothyroxine Injectable 25 MICROGram(s) IV Push at bedtime  propofol Infusion 10 MICROgram(s)/kG/Min (4.94 mL/Hr) IV Continuous <Continuous>  sodium chloride 7% Inhalation 4 milliLiter(s) Inhalation every 12 hours  tacrolimus    0.5 mG/mL Suspension 2 milliGRAM(s) Oral every 24 hours  tacrolimus    0.5 mG/mL Suspension 3 milliGRAM(s) Oral every 24 hours  tamsulosin 0.4 milliGRAM(s) Oral at bedtime    MEDICATIONS  (PRN):  acetaminophen     Tablet .. 650 milliGRAM(s) Oral every 6 hours PRN Mild Pain (1 - 3)  dextrose Oral Gel 15 Gram(s) Oral once PRN Blood Glucose LESS THAN 70 milliGRAM(s)/deciliter  guaiFENesin Oral Liquid (Sugar-Free) 100 milliGRAM(s) Oral every 6 hours PRN Cough  sodium chloride 0.9% lock flush 10 milliLiter(s) IV Push every 1 hour PRN Pre/post blood products, medications, blood draw, and to maintain line patency    ASSESSMENT / RECOMMENDATIONS  Mr. Miguel is an 83-year-old male with type 2 diabetes mellitus, coronary artery disease (s/p DAMEON x2 to LAD on 2021), chronic kidney disease stage 4 (s/p renal transplant on , on tracrolimus and prednisone) and benign prostatic hyperplasia who was sent to the hospital by his nephrologist for further evaluation of lower extremity edema (22). He was admitted for further management of LE edema thought to be secondary to his underlying CKD versus congestive heart failure. Hospitalization was complicated by hypoglycemia and cardiac arrest (22, ROSC after 1 minute of chest compressions). Post-cardiac arrest he was noted to develop hypotension, bradycardia and hypothermia. Labs were remarkable for elevated TSH (20) and decreased FT4 (0.77). 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     Although he had features seen with myxedema coma, these findings were better explained by his cardiac arrest as his vital signs prior to the arrest were apparently around his baseline, as was his mental status. Nonetheless, he was started on levothyroxine 25 mcg IV daily (22). In addition, given concern for adrenal insufficiency, he was started on stress-dose steroids (hydrocortisone 50 mg every 6 hours). Since then, his hydrocortisone has been titrated down to 30 mg every 8 hours. The patient was extubated on 22; however, he had to be reintubated on 22 due to episode fo respiratory compensation, potentially secondary to aspiration. Patient's tube feeds were stopped yesterday morning, later resumed at ~2 PM. He's currently tolerating tube feeds at goal rate of 43 mL/hr.     A1C: 9.0 %  BUN: 79  Creatinine: 2.67  GFR: 23  Weight: 82.3  BMI: 28.4  EF: 65%    # Type 2 diabetes mellitus  - After reintubation, patient's insulin requirements have increased since he was reintubated yesterday (despite he remains in the same dose of hydrocortisone and has been receiving the same rate of tube feeds since yesterday). We believe that the increase in requirements is likely due to underlying stress from infection.   - Please continue lantus 10 units at bedtime.  - Increase regular insulin to 5 units every 6 hours.   - Continue regular insulin low dose sliding scale before meals and at bedtime.  - Please change the solvent for Cefepime to a non-dextrose containing fluid like NS or 1/2NS.  - Patient's fingerstick glucose goal is 100-180 mg/dL.      # Hypothyroidism  - TSH 20. FT4 0.77 (22).   - Continue with levothyroxine 25 mcg IV daily.   - Recheck thyroid function tests tomorrow with morning labs.     # Concern for adrenal insufficiency  - Patient has been on prednisone 5 mg daily for renal transplant.   - He had recent hospitalization on 2022 for pneumonia due to pseudomonas where he was also started on stress dose steroids. At that time, a cortisol level was collected prior to starting steroids which was not robust (6.5 ug/dL).   - Please continue with hydrocortisone 30 mg every 8 hours for now.     Case discussed with Dr. Tellez. Primary team updated.       Ulysses Weber    Endocrinology Fellow    Service Pager: [phone]

## 2022-11-27 NOTE — PROGRESS NOTE ADULT - SUBJECTIVE AND OBJECTIVE BOX
Interval Events: Reviewed  Patient seen and examined at bedside.  he is sedated  Patient is a 82y old  Male who presents with a chief complaint of LE edema and cough (2022 09:30)      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      History of renal transplant  secondary to DM      Chronic kidney disease (CKD)      Glaucoma      Kidney transplant recipient      Amputation of toe  x 3      H/O heart artery stent          MEDICATIONS:  Pulmonary:  albuterol/ipratropium for Nebulization 3 milliLiter(s) Nebulizer every 6 hours  guaiFENesin Oral Liquid (Sugar-Free) 100 milliGRAM(s) Oral every 6 hours PRN  sodium chloride 7% Inhalation 4 milliLiter(s) Inhalation every 12 hours    Antimicrobials:    Anticoagulants:  aspirin  chewable 81 milliGRAM(s) Oral daily  heparin   Injectable 5000 Unit(s) SubCutaneous every 8 hours    Cardiac:  carvedilol 25 milliGRAM(s) Oral every 12 hours      Allergies    No Known Allergies    Intolerances        Vital Signs Last 24 Hrs  T(C): 36.3 (2022 19:00), Max: 36.5 (2022 08:22)  T(F): 97.4 (2022 19:00), Max: 97.7 (2022 08:22)  HR: 60 (2022 20:00) (54 - 62)  BP: --  BP(mean): --  RR: 22 (2022 20:00) (14 - 29)  SpO2: 99% (2022 20:00) (95% - 100%)    Parameters below as of 2022 20:00  Patient On (Oxygen Delivery Method): ventilator    O2 Concentration (%): 30     @ :  -   @ 07:00  --------------------------------------------------------  IN: 4042.4 mL / OUT: 650 mL / NET: 3392.4 mL     @ 07:  -   @ 20:27  --------------------------------------------------------  IN: 1795.8 mL / OUT: 325 mL / NET: 1470.8 mL      Mode: AC/ CMV (Assist Control/ Continuous Mandatory Ventilation)  RR (machine): 12  TV (machine): 450  FiO2: 30  PEEP: 5  ITime: 1  MAP: 9  PIP: 20      Review of Systems:   •	General: negative  •	Skin/Breast: negative  •	Ophthalmologic: negative  •	ENMT: negative  •	Respiratory and Thorax: negative  •	Cardiovascular: negative  •	Gastrointestinal: negative  •	Genitourinary: negative  •	Musculoskeletal: negative  •	Neurological: negative  •	Psychiatric: negative  •	Hematology/Lymphatics: negative  •	Endocrine: negative  •	Allergic/Immunologic: negative    Physical Exam:   • Constitutional:	refer to the dietion /Nutritionist note  • Eyes:	EOMI; PERRL; no drainage or redness  • ENMT:	No oral lesions; no gross abnormalities  • Neck	No bruits; no thyromegaly or nodules  • Breasts:	not examined  • Back:	No deformity or limitation of movement  • Respiratory:	Breath Sounds equal & clear to percussion & auscultation, no accessory muscle use  • Cardiovascular:	Regular rate & rhythm, normal S1, S2; no murmurs, gallops or rubs; no S3, S4  • Gastrointestinal:	Soft, non-tender, no hepatosplenomegaly, normal bowel sounds  • Genitourinary:	not examined  • Rectal: not examined  • Extremities:	No cyanosis, clubbing or edema  • Vascular:	Equal and normal pulses (carotid, femoral, dorsalis pedis)  • Neurologica:l	not examined  • Skin:	No lesions; no rash  • Lymph Nodes:	No lymphadedenopathy  • Musculoskeletal:	No joint pain, swelling or deformity; no limitation of movement        LABS:  ABG - ( 2022 04:32 )  pH, Arterial: 7.35  pH, Blood: x     /  pCO2: 34    /  pO2: 335   / HCO3: 19    / Base Excess: -6.0  /  SaO2: 97.3                CBC Full  -  ( 2022 14:38 )  WBC Count : 4.98 K/uL  RBC Count : 2.48 M/uL  Hemoglobin : 6.6 g/dL  Hematocrit : 21.4 %  Platelet Count - Automated : 131 K/uL  Mean Cell Volume : 86.3 fl  Mean Cell Hemoglobin : 26.6 pg  Mean Cell Hemoglobin Concentration : 30.8 gm/dL  Auto Neutrophil # : x  Auto Lymphocyte # : x  Auto Monocyte # : x  Auto Eosinophil # : x  Auto Basophil # : x  Auto Neutrophil % : x  Auto Lymphocyte % : x  Auto Monocyte % : x  Auto Eosinophil % : x  Auto Basophil % : x        142  |  110<H>  |  79<H>  ----------------------------<  259<H>  3.7   |  20<L>  |  2.67<H>    Ca    8.5      2022 04:32  Phos  3.2       Mg     2.0                 Urinalysis Basic - ( 2022 04:32 )    Color: Yellow / Appearance: Clear / S.025 / pH: x  Gluc: x / Ketone: NEGATIVE  / Bili: Negative / Urobili: 0.2 E.U./dL   Blood: x / Protein: 100 mg/dL / Nitrite: NEGATIVE   Leuk Esterase: NEGATIVE / RBC: Many /HPF / WBC 5-10 /HPF   Sq Epi: x / Non Sq Epi: 0-5 /HPF / Bacteria: Present /HPF              Culture Results:   Sputum specimen rejected.  Microscopic examination indicates  oropharyngeal contamination.  Please repeat. ( @ 22:47)      RADIOLOGY & ADDITIONAL STUDIES (The following images were personally reviewed):

## 2022-11-27 NOTE — PROGRESS NOTE ADULT - SUBJECTIVE AND OBJECTIVE BOX
**Incomplete Note**   CC: Patient is a 82y old  Male who presents with a chief complaint of LE edema and cough (2022 09:30)      INTERVAL EVENTS: Hgb 6.5, given 1u pRBCs    SUBJECTIVE / INTERVAL HPI: Patient seen and examined at bedside. Intubating limiting ROS. No active signs of bleeding, 1 BM overnight, no melena.    ROS: negative unless otherwise stated above.    VITAL SIGNS:  Vital Signs Last 24 Hrs  T(C): 36.5 (2022 08:22), Max: 36.5 (2022 08:22)  T(F): 97.7 (2022 08:22), Max: 97.7 (2022 08:22)  HR: 59 (2022 11:02) (52 - 61)  BP: --  BP(mean): --  RR: 24 (2022 11:00) (12 - 29)  SpO2: 99% (2022 11:) (95% - 100%)    Parameters below as of 2022 11:02  Patient On (Oxygen Delivery Method): ventilator          22 @ 07:  -  22 @ 07:00  --------------------------------------------------------  IN: 4042.4 mL / OUT: 650 mL / NET: 3392.4 mL    22 @ 07:01  -  22 @ 12:42  --------------------------------------------------------  IN: 494 mL / OUT: 90 mL / NET: 404 mL        PHYSICAL EXAM:  General: Intubated and sedated  HEENT: dry mucous membranes, anisocoric pupils (R fixed and dilated, L sluggish but reactive)  Neck: supple  Cardiovascular: +S1/S2; RRR, no murmurs  Respiratory: audible secretions; L lungs CTAB, R lung inspiratory crackles   Gastrointestinal: soft, NT/ND, +BS, no JVD  Extremities: WWP; trace edema in b/l ankles, no clubbing or cyanosis; L arm 2+ edema extending to mid-forearm; R arm 1+ edema  Vascular: 2+ radial, DP/PT pulses B/L  Neuro: intact cough reflex, withdraws to pain, opens eyes to voice    Neurological:  MEDICATIONS:  MEDICATIONS  (STANDING):  albuterol/ipratropium for Nebulization 3 milliLiter(s) Nebulizer every 6 hours  aspirin  chewable 81 milliGRAM(s) Oral daily  atorvastatin 40 milliGRAM(s) Oral at bedtime  carvedilol 25 milliGRAM(s) Oral every 12 hours  cefepime   IVPB 2000 milliGRAM(s) IV Intermittent every 24 hours  chlorhexidine 0.12% Liquid 15 milliLiter(s) Oral Mucosa every 12 hours  chlorhexidine 2% Cloths 1 Application(s) Topical <User Schedule>  dextrose 5%. 1000 milliLiter(s) (50 mL/Hr) IV Continuous <Continuous>  dextrose 5%. 1000 milliLiter(s) (100 mL/Hr) IV Continuous <Continuous>  dextrose 50% Injectable 25 Gram(s) IV Push once  dextrose 50% Injectable 12.5 Gram(s) IV Push once  dextrose 50% Injectable 25 Gram(s) IV Push once  glucagon  Injectable 1 milliGRAM(s) IntraMuscular once  heparin   Injectable 5000 Unit(s) SubCutaneous every 8 hours  hydrocortisone sodium succinate Injectable 30 milliGRAM(s) IV Push every 8 hours  influenza  Vaccine (HIGH DOSE) 0.7 milliLiter(s) IntraMuscular once  insulin glargine Injectable (LANTUS) 10 Unit(s) SubCutaneous at bedtime  insulin regular  human corrective regimen sliding scale   SubCutaneous every 6 hours  insulin regular  human recombinant 3 Unit(s) SubCutaneous every 6 hours  levothyroxine Injectable 25 MICROGram(s) IV Push at bedtime  propofol Infusion 10 MICROgram(s)/kG/Min (4.94 mL/Hr) IV Continuous <Continuous>  sodium chloride 7% Inhalation 4 milliLiter(s) Inhalation every 12 hours  tacrolimus    0.5 mG/mL Suspension 2 milliGRAM(s) Oral every 24 hours  tacrolimus    0.5 mG/mL Suspension 3 milliGRAM(s) Oral every 24 hours  tamsulosin 0.4 milliGRAM(s) Oral at bedtime    MEDICATIONS  (PRN):  acetaminophen     Tablet .. 650 milliGRAM(s) Oral every 6 hours PRN Mild Pain (1 - 3)  dextrose Oral Gel 15 Gram(s) Oral once PRN Blood Glucose LESS THAN 70 milliGRAM(s)/deciliter  guaiFENesin Oral Liquid (Sugar-Free) 100 milliGRAM(s) Oral every 6 hours PRN Cough  sodium chloride 0.9% lock flush 10 milliLiter(s) IV Push every 1 hour PRN Pre/post blood products, medications, blood draw, and to maintain line patency      ALLERGIES:  Allergies    No Known Allergies    Intolerances        LABS:                        6.5    4.36  )-----------( 123      ( 2022 04:32 )             21.1         142  |  110<H>  |  79<H>  ----------------------------<  259<H>  3.7   |  20<L>  |  2.67<H>    Ca    8.5      2022 04:32  Phos  3.2       Mg     2.0             Urinalysis Basic - ( 2022 04:32 )    Color: Yellow / Appearance: Clear / S.025 / pH: x  Gluc: x / Ketone: NEGATIVE  / Bili: Negative / Urobili: 0.2 E.U./dL   Blood: x / Protein: 100 mg/dL / Nitrite: NEGATIVE   Leuk Esterase: NEGATIVE / RBC: Many /HPF / WBC 5-10 /HPF   Sq Epi: x / Non Sq Epi: 0-5 /HPF / Bacteria: Present /HPF      CAPILLARY BLOOD GLUCOSE      POCT Blood Glucose.: 276 mg/dL (2022 05:17)      RADIOLOGY & ADDITIONAL TESTS: Reviewed. CC: Patient is a 82y old  Male who presents with a chief complaint of LE edema and cough (2022 09:30)      INTERVAL EVENTS: Hgb 6.5, given 1u pRBC    SUBJECTIVE / INTERVAL HPI: Patient seen and examined at bedside. Intubating limiting ROS. No active signs of bleeding, 1 BM overnight, no melena.     ROS: negative unless otherwise stated above.    VITAL SIGNS:  Vital Signs Last 24 Hrs  T(C): 36.5 (2022 08:22), Max: 36.5 (2022 08:22)  T(F): 97.7 (2022 08:22), Max: 97.7 (2022 08:22)  HR: 59 (2022 11:02) (52 - 61)  BP: --  BP(mean): --  RR: 24 (2022 11:00) (12 - 29)  SpO2: 99% (2022 11:) (95% - 100%)    Parameters below as of 2022 11:02  Patient On (Oxygen Delivery Method): ventilator          22 @ 07:  -  22 @ 07:00  --------------------------------------------------------  IN: 4042.4 mL / OUT: 650 mL / NET: 3392.4 mL    22 @ 07:01  -  22 @ 12:42  --------------------------------------------------------  IN: 494 mL / OUT: 90 mL / NET: 404 mL      PHYSICAL EXAM:  General: Intubated and sedated  HEENT: dry mucous membranes, anisocoric pupils (R fixed and dilated, L sluggish but reactive)  Neck: supple  Cardiovascular: +S1/S2; RRR, no murmurs  Respiratory: audible secretions; L lung CTAB, R lung inspiratory crackles   Gastrointestinal: soft, NT/ND, +BS, no JVD  Extremities: WWP; trace edema in b/l ankles, no clubbing or cyanosis; L arm 2+ edema extending to mid-forearm; R arm 1+ edema  Vascular: 2+ radial, DP/PT pulses B/L  Neuro: intact cough reflex, withdraws to pain, opens eyes to voice    Neurological:  MEDICATIONS:  MEDICATIONS  (STANDING):  albuterol/ipratropium for Nebulization 3 milliLiter(s) Nebulizer every 6 hours  aspirin  chewable 81 milliGRAM(s) Oral daily  atorvastatin 40 milliGRAM(s) Oral at bedtime  carvedilol 25 milliGRAM(s) Oral every 12 hours  cefepime   IVPB 2000 milliGRAM(s) IV Intermittent every 24 hours  chlorhexidine 0.12% Liquid 15 milliLiter(s) Oral Mucosa every 12 hours  chlorhexidine 2% Cloths 1 Application(s) Topical <User Schedule>  dextrose 5%. 1000 milliLiter(s) (50 mL/Hr) IV Continuous <Continuous>  dextrose 5%. 1000 milliLiter(s) (100 mL/Hr) IV Continuous <Continuous>  dextrose 50% Injectable 25 Gram(s) IV Push once  dextrose 50% Injectable 12.5 Gram(s) IV Push once  dextrose 50% Injectable 25 Gram(s) IV Push once  glucagon  Injectable 1 milliGRAM(s) IntraMuscular once  heparin   Injectable 5000 Unit(s) SubCutaneous every 8 hours  hydrocortisone sodium succinate Injectable 30 milliGRAM(s) IV Push every 8 hours  influenza  Vaccine (HIGH DOSE) 0.7 milliLiter(s) IntraMuscular once  insulin glargine Injectable (LANTUS) 10 Unit(s) SubCutaneous at bedtime  insulin regular  human corrective regimen sliding scale   SubCutaneous every 6 hours  insulin regular  human recombinant 3 Unit(s) SubCutaneous every 6 hours  levothyroxine Injectable 25 MICROGram(s) IV Push at bedtime  propofol Infusion 10 MICROgram(s)/kG/Min (4.94 mL/Hr) IV Continuous <Continuous>  sodium chloride 7% Inhalation 4 milliLiter(s) Inhalation every 12 hours  tacrolimus    0.5 mG/mL Suspension 2 milliGRAM(s) Oral every 24 hours  tacrolimus    0.5 mG/mL Suspension 3 milliGRAM(s) Oral every 24 hours  tamsulosin 0.4 milliGRAM(s) Oral at bedtime    MEDICATIONS  (PRN):  acetaminophen     Tablet .. 650 milliGRAM(s) Oral every 6 hours PRN Mild Pain (1 - 3)  dextrose Oral Gel 15 Gram(s) Oral once PRN Blood Glucose LESS THAN 70 milliGRAM(s)/deciliter  guaiFENesin Oral Liquid (Sugar-Free) 100 milliGRAM(s) Oral every 6 hours PRN Cough  sodium chloride 0.9% lock flush 10 milliLiter(s) IV Push every 1 hour PRN Pre/post blood products, medications, blood draw, and to maintain line patency      ALLERGIES:  Allergies    No Known Allergies    Intolerances        LABS:                        6.5    4.36  )-----------( 123      ( 2022 04:32 )             21.1         142  |  110<H>  |  79<H>  ----------------------------<  259<H>  3.7   |  20<L>  |  2.67<H>    Ca    8.5      2022 04:32  Phos  3.2       Mg     2.0             Urinalysis Basic - ( 2022 04:32 )    Color: Yellow / Appearance: Clear / S.025 / pH: x  Gluc: x / Ketone: NEGATIVE  / Bili: Negative / Urobili: 0.2 E.U./dL   Blood: x / Protein: 100 mg/dL / Nitrite: NEGATIVE   Leuk Esterase: NEGATIVE / RBC: Many /HPF / WBC 5-10 /HPF   Sq Epi: x / Non Sq Epi: 0-5 /HPF / Bacteria: Present /HPF      CAPILLARY BLOOD GLUCOSE      POCT Blood Glucose.: 276 mg/dL (2022 05:17)      RADIOLOGY & ADDITIONAL TESTS: Reviewed.

## 2022-11-27 NOTE — PROGRESS NOTE ADULT - PROBLEM SELECTOR PLAN 1
Event noticed.  The patient is sedated.  I suctioned the patient copious amount of secretion.  Gas exchange is stable.  Sputum grew Pseudomonas and Serratia.  The patient on appropriate antibiotic.  Continue pulmonary toilet.  Patient has baseline dementia and does not and trial up off sedation.  Tapers pressors as needed.  Cortisol level is adequate.  I discussed the case with critical care. She was extubated.  Patient is on nasal cannula.  Suction the patient copious amount of secretion.  Dysphagia evaluation.  Continue antibiotic.  The patient is hemodynamically stable off pressors and sedative.  Suctioned the patient thick moderate secretion and increased.  He failed swallow eval and will need a peg.  NO CXR.  Sat OK on NC.  On nebs.  He was intubated.  Sptum GS revealed GNR.  Continue antibioti.  PEG and trach

## 2022-11-28 NOTE — PROGRESS NOTE ADULT - SUBJECTIVE AND OBJECTIVE BOX
OVERNIGHT: No acute events overnight.   SUBJECTIVE / INTERVAL HPI: Patient was seen and examined this morning. He remains sedated and intubated. He continues to receive tube feeds without any interruptions. Patient's hydrocortisone was decreased to 25 mg every 12 hours. Repeat thyroid function tests showed a TSH of 9.2 with a FT4 of 0.69 (22).    CAPILLARY BLOOD GLUCOSE & INSULIN RECEIVED  Yesterday  - 12 PM FS mg/dL = 3 units of regular insulin + 2 units of sliding scale.   - 6 PM FS mg/dL = 5 units of regular insulin + 2 units of sliding scale.     Today  - 12 AM FS mg/dL = 10 units of Lantus + 5 units of regular insulin + 2 units of sliding scale.   - 6 AM FS mg/dL = 5 units of regular insulin + 2 units of sliding scale.    - 12 PM FS mg/dL = 7 units of regular insulin + 2 units of sliding scale.     REVIEW OF SYSTEMS  Unable to obtain.     PHYSICAL EXAM  Vital Signs Last 24 Hrs  T(C): 36.9 (2022 17:05), Max: 37.9 (2022 22:00)  T(F): 98.4 (2022 17:05), Max: 100.2 (2022 22:00)  HR: 62 (2022 17:05) (52 - 80)  BP: 129/56 (2022 17:05) (126/58 - 172/72)  BP(mean): 81 (2022 17:05) (81 - 104)  RR: 22 (2022 17:05) (19 - 32)  SpO2: 93% (2022 17:05) (92% - 100%)    Parameters below as of 2022 17:05  Patient On (Oxygen Delivery Method): ventilator    O2 Concentration (%): 30    Constitutional: Elderly male, sedated, intubated.   HEENT: Normocephalic, atraumatic, GORDON.  Respiratory: Clear to auscultation bilaterally.   Cardiovascular: regular rhythm, normal S1 and S2, no audible murmurs.   GI: soft, non-tender, non-distended, bowel sounds present. NGT in place with feeds running at goal of 43 mL/hr.  Extremities: +1 lower extremity edema.    LABS  CBC - WBC/HGB/HTC/PLT: 5.26/8.5/26.2/146 (22)  BMP - Na/K/Cl/Bicarb/BUN/Cr/Gluc/AG/eGFR: 139/3.7/108/19/77/2.49/239/ (22)  Ca - 8.4 (22)  Phos - 3.0 (22)  Mg - 1.9 (22)  LFT - Alb/Tprot/Tbili/Dbili/AlkPhos/ALT/AST: 2.6/--/0.3/--/95/ (22)  Thyroid Stimulating Hormone, Serum: 9.260 (22)  Total T4/Free T4: --/0.698 (22)    MEDICATIONS  MEDICATIONS  (STANDING):  albuterol/ipratropium for Nebulization 3 milliLiter(s) Nebulizer every 6 hours  amLODIPine   Tablet 5 milliGRAM(s) Oral every 24 hours  aspirin  chewable 81 milliGRAM(s) Oral daily  atorvastatin 40 milliGRAM(s) Oral at bedtime  carvedilol 25 milliGRAM(s) Oral every 12 hours  chlorhexidine 0.12% Liquid 15 milliLiter(s) Oral Mucosa every 12 hours  chlorhexidine 2% Cloths 1 Application(s) Topical <User Schedule>  dextrose 5%. 1000 milliLiter(s) (50 mL/Hr) IV Continuous <Continuous>  dextrose 5%. 1000 milliLiter(s) (100 mL/Hr) IV Continuous <Continuous>  dextrose 50% Injectable 25 Gram(s) IV Push once  dextrose 50% Injectable 12.5 Gram(s) IV Push once  dextrose 50% Injectable 25 Gram(s) IV Push once  glucagon  Injectable 1 milliGRAM(s) IntraMuscular once  heparin   Injectable 5000 Unit(s) SubCutaneous every 8 hours  hydrocortisone sodium succinate Injectable 25 milliGRAM(s) IV Push every 12 hours  influenza  Vaccine (HIGH DOSE) 0.7 milliLiter(s) IntraMuscular once  insulin glargine Injectable (LANTUS) 20 Unit(s) SubCutaneous at bedtime  insulin regular  human corrective regimen sliding scale   SubCutaneous every 6 hours  insulin regular  human recombinant 7 Unit(s) SubCutaneous every 6 hours  mycophenolate mofetil Suspension 500 milliGRAM(s) Oral every 12 hours  propofol Infusion 10 MICROgram(s)/kG/Min (4.94 mL/Hr) IV Continuous <Continuous>  sodium chloride 7% Inhalation 4 milliLiter(s) Inhalation every 12 hours  tacrolimus    0.5 mG/mL Suspension 2 milliGRAM(s) Oral every 24 hours  tacrolimus    0.5 mG/mL Suspension 3 milliGRAM(s) Oral every 24 hours  tamsulosin 0.4 milliGRAM(s) Oral at bedtime  trimethoprim / sulfamethoxazole IVPB 325 milliGRAM(s) IV Intermittent every 12 hours    MEDICATIONS  (PRN):  acetaminophen     Tablet .. 650 milliGRAM(s) Oral every 6 hours PRN Mild Pain (1 - 3)  dextrose Oral Gel 15 Gram(s) Oral once PRN Blood Glucose LESS THAN 70 milliGRAM(s)/deciliter  sodium chloride 0.9% lock flush 10 milliLiter(s) IV Push every 1 hour PRN Pre/post blood products, medications, blood draw, and to maintain line patency    ASSESSMENT / RECOMMENDATIONS  Mr. Miguel is an 83-year-old male with type 2 diabetes mellitus, coronary artery disease (s/p DAMEON x2 to LAD on 2021), chronic kidney disease stage 4 (s/p renal transplant on , on tracrolimus and prednisone) and benign prostatic hyperplasia who was sent to the hospital by his nephrologist for further evaluation of lower extremity edema (22). He was admitted for further management of LE edema which was thought to be secondary to his underlying CKD versus congestive heart failure. Hospitalization was complicated by hypoglycemia and cardiac arrest (22, ROSC after 1 minute of chest compressions). Post-cardiac arrest he was noted to develop hypotension, bradycardia and hypothermia. Labs were remarkable for elevated TSH (20) and decreased FT4 (0.77). 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     Although he had features seen with myxedema coma, these findings were better explained by his cardiac arrest as his vital signs prior to the arrest were apparently around his baseline, as was his mental status. Nonetheless, he was started on levothyroxine 25 mcg IV daily (22). In addition, given concern for adrenal insufficiency, he was started on stress-dose steroids (hydrocortisone 50 mg every 6 hours). Since then, his hydrocortisone has been titrated down to 25 mg every 12 hours. The patient was extubated on 22; however, he had to be reintubated on 22 due to episode fo respiratory decompensation, potentially secondary to aspiration. He's currently tolerating tube feeds at goal rate of 43 mL/hr. After reintubation, his insulin requirements have increased, presumably due to underlying aspiration pneumonia.     A1C: 9.0 %  BUN: 77  Creatinine: 2.49  GFR: 25  Weight: 82.3  BMI: 28.4  EF: 65%    # Type 2 diabetes mellitus  - Patient's insulin requirements have increased since he was reintubated () likely due to underlying stress from aspiration pneumonia.   - Please increase Lantus to 14 units at bedtime.  - Increase regular insulin to 7 units every 6 hours.   - Continue regular insulin moderate dose sliding scale before meals and at bedtime.  - Patient's fingerstick glucose goal is 100-180 mg/dL.      # Hypothyroidism  - TSH 20. FT4 0.77 (22).   - TSH 9.2. FT4 0.69 (22).  - Increase levothyroxine to 50 mcg IV daily.     # Concern for adrenal insufficiency  - Patient has been on prednisone 5 mg daily for renal transplant.   - He had recent hospitalization on 2022 for pneumonia due to pseudomonas where he was also started on stress dose steroids. At that time, a cortisol level was collected prior to starting steroids which was not robust (6.5 ug/dL).   - The patient remains hemodynamically stable. Agree with decreasing hydrocortisone to 25 mg IV every 12 hours.     Case discussed with Dr. Rabago. Primary team updated.       Ulysses Weber    Endocrinology Fellow    Service Pager: [phone]

## 2022-11-28 NOTE — PROGRESS NOTE ADULT - ASSESSMENT
Patient is an 82 yo M with ESKD s/p transplant 2013 now CKD 4, glaucoma, DM1, Anemia, CAD, BPH presenting with decompensated heart failure, complicated by cardiac arrest with uptrending creatinine after cardiac arrest now improving    Problem/Plan:  #ELIZABETH on CKD 2/2 ATN in the setting of cardiac arrest   Renal allograft with CKD 4 - baseline sCr 2.3-2.5, usual meds tacrolimus 4mg BID and mycophenolate 500mg BID.   Recs:  -Creatinine downtrending towards baseline  - c/w AM tacrolimus to 3mg and PM dose at 2mg.  - Repeat tacro level 11/28 PM 30 min prior to evening dose  - Restart cell cept 500mg BID  - strict i's and o's  - Trend BMP daily    #Anemia of chronic disease  -Can consider dosing EPO once bp more controlled

## 2022-11-28 NOTE — CHART NOTE - NSCHARTNOTEFT_GEN_A_CORE
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      History of renal transplant  secondary to DM      Chronic kidney disease (CKD)      Glaucoma      Kidney transplant recipient      Amputation of toe  x 3      H/O heart artery stent    Current Nutrition Order:   Diet, NPO with Tube Feed:   Tube Feeding Modality: Nasogastric  Nepro with Carb Steady (NEPRORTH)  Total Volume for 24 Hours (mL): 1032  Total Number of Cans: 5  Continuous  Starting Tube Feed Rate {mL per Hour}: 10  Increase Tube Feed Rate by (mL): 10     Every hour  Until Goal Tube Feed Rate (mL per Hour): 43  Tube Feed Duration (in Hours): 24  Tube Feed Start Time: 10:45  Free Water Flush  Bolus   Total Volume per Flush (mL): 250   Frequency: Every 12 Hours  Liquid Protein Supplement     Qty per Day:  1 (11-28-22 @ 10:29)    PO Intake: N/A    GI Issues: Abdomen ND/NT, +BS x4, LBM 11/27    Pain: No non-verbal indicators     Skin Integrity: Stg I PI sacrum, +4 peripheral     Labs:   11-28    139  |  108  |  77<H>  ----------------------------<  239<H>  3.7   |  19<L>  |  2.49<H>    Ca    8.4      28 Nov 2022 05:30  Phos  3.0     11-28  Mg     1.9     11-28      CAPILLARY BLOOD GLUCOSE      POCT Blood Glucose.: 221 mg/dL (28 Nov 2022 06:16)  POCT Blood Glucose.: 223 mg/dL (27 Nov 2022 23:01)  POCT Blood Glucose.: 210 mg/dL (27 Nov 2022 17:14)  POCT Blood Glucose.: 249 mg/dL (27 Nov 2022 13:02)      Medications:  MEDICATIONS  (STANDING):  albuterol/ipratropium for Nebulization 3 milliLiter(s) Nebulizer every 6 hours  amLODIPine   Tablet 5 milliGRAM(s) Oral every 24 hours  aspirin  chewable 81 milliGRAM(s) Oral daily  atorvastatin 40 milliGRAM(s) Oral at bedtime  carvedilol 25 milliGRAM(s) Oral every 12 hours  chlorhexidine 0.12% Liquid 15 milliLiter(s) Oral Mucosa every 12 hours  chlorhexidine 2% Cloths 1 Application(s) Topical <User Schedule>  dextrose 5%. 1000 milliLiter(s) (50 mL/Hr) IV Continuous <Continuous>  dextrose 5%. 1000 milliLiter(s) (100 mL/Hr) IV Continuous <Continuous>  dextrose 50% Injectable 25 Gram(s) IV Push once  dextrose 50% Injectable 12.5 Gram(s) IV Push once  dextrose 50% Injectable 25 Gram(s) IV Push once  glucagon  Injectable 1 milliGRAM(s) IntraMuscular once  heparin   Injectable 5000 Unit(s) SubCutaneous every 8 hours  hydrocortisone sodium succinate Injectable 25 milliGRAM(s) IV Push every 12 hours  influenza  Vaccine (HIGH DOSE) 0.7 milliLiter(s) IntraMuscular once  insulin glargine Injectable (LANTUS) 20 Unit(s) SubCutaneous at bedtime  insulin regular  human corrective regimen sliding scale   SubCutaneous every 6 hours  insulin regular  human recombinant 7 Unit(s) SubCutaneous every 6 hours  levothyroxine Injectable 25 MICROGram(s) IV Push at bedtime  propofol Infusion 10 MICROgram(s)/kG/Min (4.94 mL/Hr) IV Continuous <Continuous>  sodium chloride 7% Inhalation 4 milliLiter(s) Inhalation every 12 hours  tacrolimus    0.5 mG/mL Suspension 2 milliGRAM(s) Oral every 24 hours  tacrolimus    0.5 mG/mL Suspension 3 milliGRAM(s) Oral every 24 hours  tamsulosin 0.4 milliGRAM(s) Oral at bedtime    MEDICATIONS  (PRN):  acetaminophen     Tablet .. 650 milliGRAM(s) Oral every 6 hours PRN Mild Pain (1 - 3)  dextrose Oral Gel 15 Gram(s) Oral once PRN Blood Glucose LESS THAN 70 milliGRAM(s)/deciliter  sodium chloride 0.9% lock flush 10 milliLiter(s) IV Push every 1 hour PRN Pre/post blood products, medications, blood draw, and to maintain line patency    Height for BMI (FEET)	5 Feet  Height for BMI (INCHES)	7 Inch(s)  Height for BMI (CENTIMETERS)	170.18 Centimeter(s)  Weight for BMI (lbs)	220 lb  Weight for BMI (kg)	99.8 kg  Body Mass Index	34.4    Weight Change: No new wt since admit.     Estimated energy needs:   -Needs updated 11/23 using IBW 67.3kg adjusted for vented pt.   EEN: 0647-5534 kcal (25-30 kcal/kg)  EPN:  gProt. (1.4-1.6 gProt./kg)  EFN: 0008-5947 ml (25-30 ml/kg)    Subjective: 84 y/o M PMH CKD 4, S/P renal transplant in 2013 at [facility], glaucoma (blind), DM1, anemia, CAD s/p 2 DAMEON to LAD in 6/21, BPH, recent admission for PNA presented with acute on chronic cough and b/l LE edema 2/2 acute CHF vs CKD. Cardiology and renal consulted, and pt was started on Lasix 40mg IV BID with improvement in b/l LE edema. This morning, pt found to be hypoxic to 70s on RA and hypoglycemia 31, difficult to arouse, and rapid response was called. Pt was put on supplemental oxygen with O2sat 80s, still difficult to arouse, and pt was intubated. Lost a pulse on pt so code blue was called, ROSC achieved, and pt was transferred to the ICU for closer monitoring. 11/19: Intubated. 11/22: Bowel care started. 11/23: Diarrhea.    Pt assessed at bedside with family. Rx and labs reviewed. Pt continues to be intubated at time of assessment; vent to CPAP, MAP 81, TF observed running at goal, propofol hung but not running. Pt reportedly tolerating TF at this time. Continue discussions for GOC; monitor establishment of trache/PEG. Most recent SLP eval 11/25; continue to rec NPO with alternative means of nutrition. Pt with diarrhea; monitor for improvement and consider banatrol TID if persists/worsens. No other reports GI distress or further nutritional concerns at this time. RDN will continue to reassess, intervene, and monitor as appropriate.    Previous Nutrition Diagnosis: Inadequate Protein Energy Intake r/t intubation AEB NPO status    Active [ x ]  Resolved [   ] *adjusted 11/23    If resolved, new PES:     Goal: Pt will meet 75% or more of protein/energy needs via most appropriate route for nutrition.     Recommendations:  -Continue TF regimen as tolerated    *Recommend Nepro @43 ml/hr with LPS x1/day to provide 1032 ml TF, 1958 kcal, 99 gProt., and 750 ml FW. This is 23.2 non-protein kcal and 1.47 gProt. per kg IBW 63.7kg.    *FWF per team. If euvolemic and sNa WNL, consider 210 ml FWF q4hr to provide 2010 total fluids per day.   -Monitor pressor and propofol demands; adjust TF as necessary   -Maintain aspiration precautions at all times   -Align nutrition with GOC at all times   -Monitor chemistry, GI fxn, and skin integrity     Risk Level: High [ x ] Moderate [   ] Low [   ].

## 2022-11-28 NOTE — PROGRESS NOTE ADULT - ATTENDING COMMENTS
h/o renal transplant with ATN, aspiration pna, HFpEF  physical as above  continue MV and plan for trach/PEG  cefepime continues  continue carvedilol  renal stable  tacrolimus levels  decision making of high complexity

## 2022-11-28 NOTE — PROGRESS NOTE ADULT - PROBLEM SELECTOR PLAN 1
Event noticed.  The patient is sedated.  I suctioned the patient copious amount of secretion.  Gas exchange is stable.  Sputum grew Pseudomonas and Serratia.  The patient on appropriate antibiotic.  Continue pulmonary toilet.  Patient has baseline dementia and does not and trial up off sedation.  Tapers pressors as needed.  Cortisol level is adequate.  I discussed the case with critical care. She was extubated.  Patient is on nasal cannula.  Suction the patient copious amount of secretion.  Dysphagia evaluation.  Continue antibiotic.  The patient is hemodynamically stable off pressors and sedative.  Suctioned the patient thick moderate secretion and increased.  He failed swallow eval and will need a peg.  NO CXR.  Sat OK on NC.  On nebs.  He was intubated.  Sputum GS revealed GNR and culture positive for sternomonas.  Abt changed to bactrim.  Monitor renal function on abt.  Plan trach and peg.  .

## 2022-11-28 NOTE — PROGRESS NOTE ADULT - SUBJECTIVE AND OBJECTIVE BOX
Patient is a 82y Male seen and evaluated at bedside remains ventilated and stable at this time. Creatinine around baseline. Urine output remains stable.       Meds:  acetaminophen     Tablet .. 650 every 6 hours PRN  albuterol/ipratropium for Nebulization 3 every 6 hours  amLODIPine   Tablet 5 every 24 hours  aspirin  chewable 81 daily  atorvastatin 40 at bedtime  carvedilol 25 every 12 hours  chlorhexidine 0.12% Liquid 15 every 12 hours  chlorhexidine 2% Cloths 1 <User Schedule>  dextrose 5%. 1000 <Continuous>  dextrose 5%. 1000 <Continuous>  dextrose 50% Injectable 25 once  dextrose 50% Injectable 12.5 once  dextrose 50% Injectable 25 once  dextrose Oral Gel 15 once PRN  glucagon  Injectable 1 once  heparin   Injectable 5000 every 8 hours  hydrocortisone sodium succinate Injectable 25 every 12 hours  influenza  Vaccine (HIGH DOSE) 0.7 once  insulin glargine Injectable (LANTUS) 20 at bedtime  insulin regular  human corrective regimen sliding scale  every 6 hours  insulin regular  human recombinant 7 every 6 hours  levothyroxine Injectable 25 at bedtime  propofol Infusion 10 <Continuous>  sodium chloride 0.9% lock flush 10 every 1 hour PRN  sodium chloride 7% Inhalation 4 every 12 hours  tacrolimus    0.5 mG/mL Suspension 2 every 24 hours  tacrolimus    0.5 mG/mL Suspension 3 every 24 hours  tamsulosin 0.4 at bedtime      T(C): , Max: 37.9 (22 @ 22:00)  T(F): , Max: 100.2 (22 @ 22:00)  HR: 64 (22 @ 10:00)  BP: 172/72 (22 @ 09:00)  BP(mean): 104 (22 @ 09:00)  RR: 22 (22 @ 10:00)  SpO2: 97% (22 @ 10:00)  Wt(kg): --     @ 07:01  -   @ 07:00  --------------------------------------------------------  IN: 2981.8 mL / OUT: 802 mL / NET: 2179.8 mL     @ 07:01  -   @ 12:02  --------------------------------------------------------  IN: 143.8 mL / OUT: 135 mL / NET: 8.8 mL          Review of Systems:  ROS negative except as per HPI      PHYSICAL EXAM:  General: intubated and sedated  HEENT: NC/AT; PERRL, anicteric sclera; MMM  Neck: supple  Cardiovascular: +S1/S2, RRR, no murmurs, rubs, gallops  Respiratory: CTA B/L; no W/R/R  Gastrointestinal: soft, NT/ND; +BSx4  Extremities: WWP; no edema, clubbing or cyanosis  Vascular: 2+ radial, DP/PT pulses B/L  Neurological: intubated and sedated      LABS:                        8.5    5.26  )-----------( 146      ( 2022 05:30 )             26.2         139  |  108  |  77<H>  ----------------------------<  239<H>  3.7   |  19<L>  |  2.49<H>    Ca    8.4      2022 05:30  Phos  3.0       Mg     1.9               Urinalysis Basic - ( 2022 04:32 )    Color: Yellow / Appearance: Clear / S.025 / pH: x  Gluc: x / Ketone: NEGATIVE  / Bili: Negative / Urobili: 0.2 E.U./dL   Blood: x / Protein: 100 mg/dL / Nitrite: NEGATIVE   Leuk Esterase: NEGATIVE / RBC: Many /HPF / WBC 5-10 /HPF   Sq Epi: x / Non Sq Epi: 0-5 /HPF / Bacteria: Present /HPF      Sodium, Random Urine: <20 mmol/L ( @ 06:37)  Creatinine, Random Urine: 40 mg/dL ( @ 06:37)  Sodium, Random Urine: 20 mmol/L ( @ 08:13)  Creatinine, Random Urine: 63 mg/dL ( @ 08:13)  Sodium, Random Urine: 20 mmol/L ( @ 04:32)  Osmolality, Random Urine: 386 mosm/kg ( @ 04:32)  Creatinine, Random Urine: 65 mg/dL ( @ 04:32)        RADIOLOGY & ADDITIONAL STUDIES:

## 2022-11-28 NOTE — PROGRESS NOTE ADULT - SUBJECTIVE AND OBJECTIVE BOX
Interval Events: Reviewed  Patient seen and examined at bedside.    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      History of renal transplant  secondary to DM      Chronic kidney disease (CKD)      Glaucoma      Kidney transplant recipient      Amputation of toe  x 3      H/O heart artery stent          MEDICATIONS:  Pulmonary:  albuterol/ipratropium for Nebulization 3 milliLiter(s) Nebulizer every 6 hours  sodium chloride 7% Inhalation 4 milliLiter(s) Inhalation every 12 hours    Antimicrobials:  trimethoprim / sulfamethoxazole IVPB 325 milliGRAM(s) IV Intermittent every 12 hours    Anticoagulants:  aspirin  chewable 81 milliGRAM(s) Oral daily  heparin   Injectable 5000 Unit(s) SubCutaneous every 8 hours    Cardiac:  amLODIPine   Tablet 5 milliGRAM(s) Oral every 24 hours  carvedilol 25 milliGRAM(s) Oral every 12 hours      Allergies    No Known Allergies    Intolerances        Vital Signs Last 24 Hrs  T(C): 36.7 (2022 21:25), Max: 37.9 (2022 22:00)  T(F): 98.1 (2022 21:25), Max: 100.2 (2022 22:00)  HR: 62 (2022 20:00) (52 - 78)  BP: 122/50 (2022 20:00) (120/49 - 172/72)  BP(mean): 72 (2022 20:00) (71 - 104)  RR: 22 (2022 20:00) (19 - 32)  SpO2: 97% (2022 20:00) (91% - 100%)    Parameters below as of 2022 20:00  Patient On (Oxygen Delivery Method): ventilator    O2 Concentration (%): 40     @ 07:01  -   @ 07:00  --------------------------------------------------------  IN: 2981.8 mL / OUT: 802 mL / NET: 2179.8 mL     @ 07: @ 21:41  --------------------------------------------------------  IN: 1261.8 mL / OUT: 400 mL / NET: 861.8 mL      Mode: CPAP with PS  FiO2: 30  PEEP: 5  PS: 10  MAP: 8.8  PIP: 16      Review of Systems:   •	General: negative  •	Skin/Breast: negative  •	Ophthalmologic: negative  •	ENMT: negative  •	Respiratory and Thorax: negative  •	Cardiovascular: negative  •	Gastrointestinal: negative  •	Genitourinary: negative  •	Musculoskeletal: negative  •	Neurological: negative  •	Psychiatric: negative  •	Hematology/Lymphatics: negative  •	Endocrine: negative  •	Allergic/Immunologic: negative    Physical Exam:   • Constitutional:	refer to the dietion /Nutritionist note  • Eyes:	EOMI; PERRL; no drainage or redness  • ENMT:	No oral lesions; no gross abnormalities  • Neck	No bruits; no thyromegaly or nodules  • Breasts:	not examined  • Back:	No deformity or limitation of movement  • Respiratory:	Breath Sounds equal & clear to percussion & auscultation, no accessory muscle use  • Cardiovascular:	Regular rate & rhythm, normal S1, S2; no murmurs, gallops or rubs; no S3, S4  • Gastrointestinal:	Soft, non-tender, no hepatosplenomegaly, normal bowel sounds  • Genitourinary:	not examined  • Rectal: not examined  • Extremities:	No cyanosis, clubbing or edema  • Vascular:	Equal and normal pulses (carotid, femoral, dorsalis pedis)  • Neurologica:l	not examined  • Skin:	No lesions; no rash  • Lymph Nodes:	No lymphadedenopathy  • Musculoskeletal:	No joint pain, swelling or deformity; no limitation of movement        LABS:  ABG - ( 2022 04:32 )  pH, Arterial: 7.35  pH, Blood: x     /  pCO2: 34    /  pO2: 335   / HCO3: 19    / Base Excess: -6.0  /  SaO2: 97.3                CBC Full  -  ( 2022 05:30 )  WBC Count : 5.26 K/uL  RBC Count : 3.14 M/uL  Hemoglobin : 8.5 g/dL  Hematocrit : 26.2 %  Platelet Count - Automated : 146 K/uL  Mean Cell Volume : 83.4 fl  Mean Cell Hemoglobin : 27.1 pg  Mean Cell Hemoglobin Concentration : 32.4 gm/dL  Auto Neutrophil # : 5.08 K/uL  Auto Lymphocyte # : 0.00 K/uL  Auto Monocyte # : 0.05 K/uL  Auto Eosinophil # : 0.05 K/uL  Auto Basophil # : 0.00 K/uL  Auto Neutrophil % : 88.8 %  Auto Lymphocyte % : 0.0 %  Auto Monocyte % : 0.9 %  Auto Eosinophil % : 0.9 %  Auto Basophil % : 0.0 %        139  |  108  |  77<H>  ----------------------------<  239<H>  3.7   |  19<L>  |  2.49<H>    Ca    8.4      2022 05:30  Phos  3.0       Mg     1.9                 Urinalysis Basic - ( 2022 04:32 )    Color: Yellow / Appearance: Clear / S.025 / pH: x  Gluc: x / Ketone: NEGATIVE  / Bili: Negative / Urobili: 0.2 E.U./dL   Blood: x / Protein: 100 mg/dL / Nitrite: NEGATIVE   Leuk Esterase: NEGATIVE / RBC: Many /HPF / WBC 5-10 /HPF   Sq Epi: x / Non Sq Epi: 0-5 /HPF / Bacteria: Present /HPF              Culture Results:   Moderate Stenotrophomonas maltophilia  Susceptibility to follow.  Routine respiratory gaby absent ( @ 22:47)      RADIOLOGY & ADDITIONAL STUDIES (The following images were personally reviewed):

## 2022-11-28 NOTE — PROGRESS NOTE ADULT - ATTENDING COMMENTS
Pt seen on rounds this afternoon and events of the weekend were reviewed.  Was re-intubated for respiratory distress and increased secretions.  AT this point is breathing comfortably on the vent.  Has been hemodynamically stable.  Feeds have been resumed.    Fingersticks have been elevated to the 190-220 range during the past 24 hours.  Lungs with rales at the left base.  Air entry on the left still somewhat less than on the right.  No abdominal distension.  Lower extremities with trace edema  Glucoses have clearly risen, possibly due to infection.  Would be cautious about increasing the Lantus, since do not want to have an excessive dose on board if feeds need to be stopped.  Will increase the Lantus only to 14 units  Assess the regular insulin dose of 7 units q6h for another day before increasing  To increase the levothyroxine to 50 mcg/day IV given the persistently low free T4 level  Taper hydrocortisone to 25 mg q12h, but continue on this dose without further tapering given the severity of his illness

## 2022-11-28 NOTE — PROGRESS NOTE ADULT - SUBJECTIVE AND OBJECTIVE BOX
**Incomplete Note**   CC: Patient is a 82y old  Male who presents with a chief complaint of LE edema and cough (2022 09:30)      INTERVAL EVENTS: MAGGIE    SUBJECTIVE / INTERVAL HPI: Patient seen and examined at bedside. Still intubated. Opens eyes to voice. fr     ROS: negative unless otherwise stated above.    VITAL SIGNS:  Vital Signs Last 24 Hrs  T(C): 35.3 (2022 11:00), Max: 37.9 (2022 22:00)  T(F): 95.5 (2022 11:00), Max: 100.2 (2022 22:00)  HR: 64 (2022 10:00) (52 - 80)  BP: 172/72 (2022 09:00) (172/72 - 172/72)  BP(mean): 104 (2022 09:00) (104 - 104)  RR: 22 (2022 10:00) (14 - 32)  SpO2: 97% (2022 10:00) (95% - 100%)    Parameters below as of 2022 10:00  Patient On (Oxygen Delivery Method): ventilator, CPAP    O2 Concentration (%): 30      22 @ 07:  -  22 @ 07:00  --------------------------------------------------------  IN: 2981.8 mL / OUT: 802 mL / NET: 2179.8 mL    22 @ 07:01  -  22 @ 11:43  --------------------------------------------------------  IN: 143.8 mL / OUT: 135 mL / NET: 8.8 mL        PHYSICAL EXAM:    General: NAD  HEENT: MMM  Neck: supple  Cardiovascular: +S1/S2; RRR  Respiratory: CTA B/L; no W/R/R  Gastrointestinal: soft, NT/ND  Extremities: WWP; no edema, clubbing or cyanosis  Vascular: 2+ radial, DP/PT pulses B/L  Neurological: AAOx3; no focal deficits    MEDICATIONS:  MEDICATIONS  (STANDING):  albuterol/ipratropium for Nebulization 3 milliLiter(s) Nebulizer every 6 hours  amLODIPine   Tablet 5 milliGRAM(s) Oral every 24 hours  aspirin  chewable 81 milliGRAM(s) Oral daily  atorvastatin 40 milliGRAM(s) Oral at bedtime  carvedilol 25 milliGRAM(s) Oral every 12 hours  chlorhexidine 0.12% Liquid 15 milliLiter(s) Oral Mucosa every 12 hours  chlorhexidine 2% Cloths 1 Application(s) Topical <User Schedule>  dextrose 5%. 1000 milliLiter(s) (50 mL/Hr) IV Continuous <Continuous>  dextrose 5%. 1000 milliLiter(s) (100 mL/Hr) IV Continuous <Continuous>  dextrose 50% Injectable 25 Gram(s) IV Push once  dextrose 50% Injectable 12.5 Gram(s) IV Push once  dextrose 50% Injectable 25 Gram(s) IV Push once  glucagon  Injectable 1 milliGRAM(s) IntraMuscular once  heparin   Injectable 5000 Unit(s) SubCutaneous every 8 hours  hydrocortisone sodium succinate Injectable 25 milliGRAM(s) IV Push every 12 hours  influenza  Vaccine (HIGH DOSE) 0.7 milliLiter(s) IntraMuscular once  insulin glargine Injectable (LANTUS) 20 Unit(s) SubCutaneous at bedtime  insulin lispro (ADMELOG) corrective regimen sliding scale   SubCutaneous three times a day before meals  insulin regular  human recombinant 7 Unit(s) SubCutaneous every 6 hours  levothyroxine Injectable 25 MICROGram(s) IV Push at bedtime  propofol Infusion 10 MICROgram(s)/kG/Min (4.94 mL/Hr) IV Continuous <Continuous>  sodium chloride 7% Inhalation 4 milliLiter(s) Inhalation every 12 hours  tacrolimus    0.5 mG/mL Suspension 2 milliGRAM(s) Oral every 24 hours  tacrolimus    0.5 mG/mL Suspension 3 milliGRAM(s) Oral every 24 hours  tamsulosin 0.4 milliGRAM(s) Oral at bedtime    MEDICATIONS  (PRN):  acetaminophen     Tablet .. 650 milliGRAM(s) Oral every 6 hours PRN Mild Pain (1 - 3)  dextrose Oral Gel 15 Gram(s) Oral once PRN Blood Glucose LESS THAN 70 milliGRAM(s)/deciliter  sodium chloride 0.9% lock flush 10 milliLiter(s) IV Push every 1 hour PRN Pre/post blood products, medications, blood draw, and to maintain line patency      ALLERGIES:  Allergies    No Known Allergies    Intolerances        LABS:                        8.5    5.26  )-----------( 146      ( 2022 05:30 )             26.2     -    139  |  108  |  77<H>  ----------------------------<  239<H>  3.7   |  19<L>  |  2.49<H>    Ca    8.4      2022 05:30  Phos  3.0       Mg     1.9             Urinalysis Basic - ( 2022 04:32 )    Color: Yellow / Appearance: Clear / S.025 / pH: x  Gluc: x / Ketone: NEGATIVE  / Bili: Negative / Urobili: 0.2 E.U./dL   Blood: x / Protein: 100 mg/dL / Nitrite: NEGATIVE   Leuk Esterase: NEGATIVE / RBC: Many /HPF / WBC 5-10 /HPF   Sq Epi: x / Non Sq Epi: 0-5 /HPF / Bacteria: Present /HPF      CAPILLARY BLOOD GLUCOSE      POCT Blood Glucose.: 197 mg/dL (2022 11:27)      RADIOLOGY & ADDITIONAL TESTS: Reviewed.   CC: Patient is a 82y old  Male who presents with a chief complaint of LE edema and cough (2022 09:30)      INTERVAL EVENTS: Hgb improved after additional 1u pRBC given    SUBJECTIVE / INTERVAL HPI: Patient seen and examined at bedside. Still intubated. Opens eyes to voice.     ROS: negative unless otherwise stated above.    VITAL SIGNS:  Vital Signs Last 24 Hrs  T(C): 35.3 (2022 11:00), Max: 37.9 (2022 22:00)  T(F): 95.5 (2022 11:00), Max: 100.2 (2022 22:00)  HR: 64 (2022 10:) (52 - 80)  BP: 172/72 (:) (172/72 - 172/72)  BP(mean): 104 (2022 09:) (104 - 104)  RR: 22 (2022 10:) (14 - 32)  SpO2: 97% (2022 10:) (95% - 100%)    Parameters below as of 2022 10:00  Patient On (Oxygen Delivery Method): ventilator, CPAP    O2 Concentration (%): 30      22 @ 07:  -  22 @ 07:00  --------------------------------------------------------  IN: 2981.8 mL / OUT: 802 mL / NET: 2179.8 mL    22 @ 07:  -  22 @ 11:43  --------------------------------------------------------  IN: 143.8 mL / OUT: 135 mL / NET: 8.8 mL        PHYSICAL EXAM:  General: Intubated and sedated  HEENT: dry mucous membranes, anisocoric pupils (R fixed and dilated, L sluggish but reactive)  Neck: supple  Cardiovascular: +S1/S2; RRR, no murmurs  Respiratory: audible secretions; L inspiratory crackles, R CTAB  Gastrointestinal: soft, NT/ND, +BS, no JVD  Extremities: WWP; 1+ edema in b/l ankles, no clubbing or cyanosis; L and R arm 3+ edema extending to mid-forearm  Vascular: 2+ radial, DP/PT pulses B/L  Neuro: intact cough reflex, withdraws to pain, opens eyes to voice    MEDICATIONS:  MEDICATIONS  (STANDING):  albuterol/ipratropium for Nebulization 3 milliLiter(s) Nebulizer every 6 hours  amLODIPine   Tablet 5 milliGRAM(s) Oral every 24 hours  aspirin  chewable 81 milliGRAM(s) Oral daily  atorvastatin 40 milliGRAM(s) Oral at bedtime  carvedilol 25 milliGRAM(s) Oral every 12 hours  chlorhexidine 0.12% Liquid 15 milliLiter(s) Oral Mucosa every 12 hours  chlorhexidine 2% Cloths 1 Application(s) Topical <User Schedule>  dextrose 5%. 1000 milliLiter(s) (50 mL/Hr) IV Continuous <Continuous>  dextrose 5%. 1000 milliLiter(s) (100 mL/Hr) IV Continuous <Continuous>  dextrose 50% Injectable 25 Gram(s) IV Push once  dextrose 50% Injectable 12.5 Gram(s) IV Push once  dextrose 50% Injectable 25 Gram(s) IV Push once  glucagon  Injectable 1 milliGRAM(s) IntraMuscular once  heparin   Injectable 5000 Unit(s) SubCutaneous every 8 hours  hydrocortisone sodium succinate Injectable 25 milliGRAM(s) IV Push every 12 hours  influenza  Vaccine (HIGH DOSE) 0.7 milliLiter(s) IntraMuscular once  insulin glargine Injectable (LANTUS) 20 Unit(s) SubCutaneous at bedtime  insulin lispro (ADMELOG) corrective regimen sliding scale   SubCutaneous three times a day before meals  insulin regular  human recombinant 7 Unit(s) SubCutaneous every 6 hours  levothyroxine Injectable 25 MICROGram(s) IV Push at bedtime  propofol Infusion 10 MICROgram(s)/kG/Min (4.94 mL/Hr) IV Continuous <Continuous>  sodium chloride 7% Inhalation 4 milliLiter(s) Inhalation every 12 hours  tacrolimus    0.5 mG/mL Suspension 2 milliGRAM(s) Oral every 24 hours  tacrolimus    0.5 mG/mL Suspension 3 milliGRAM(s) Oral every 24 hours  tamsulosin 0.4 milliGRAM(s) Oral at bedtime    MEDICATIONS  (PRN):  acetaminophen     Tablet .. 650 milliGRAM(s) Oral every 6 hours PRN Mild Pain (1 - 3)  dextrose Oral Gel 15 Gram(s) Oral once PRN Blood Glucose LESS THAN 70 milliGRAM(s)/deciliter  sodium chloride 0.9% lock flush 10 milliLiter(s) IV Push every 1 hour PRN Pre/post blood products, medications, blood draw, and to maintain line patency      ALLERGIES:  Allergies    No Known Allergies    Intolerances        LABS:                        8.5    5.26  )-----------( 146      ( 2022 05:30 )             26.2         139  |  108  |  77<H>  ----------------------------<  239<H>  3.7   |  19<L>  |  2.49<H>    Ca    8.4      2022 05:30  Phos  3.0       Mg     1.9             Urinalysis Basic - ( 2022 04:32 )    Color: Yellow / Appearance: Clear / S.025 / pH: x  Gluc: x / Ketone: NEGATIVE  / Bili: Negative / Urobili: 0.2 E.U./dL   Blood: x / Protein: 100 mg/dL / Nitrite: NEGATIVE   Leuk Esterase: NEGATIVE / RBC: Many /HPF / WBC 5-10 /HPF   Sq Epi: x / Non Sq Epi: 0-5 /HPF / Bacteria: Present /HPF      CAPILLARY BLOOD GLUCOSE      POCT Blood Glucose.: 197 mg/dL (2022 11:27)      RADIOLOGY & ADDITIONAL TESTS: Reviewed.   CC: Patient is a 82y old  Male who presents with a chief complaint of LE edema and cough (2022 09:30)      INTERVAL EVENTS: Hgb improved after additional 1u pRBC given    SUBJECTIVE / INTERVAL HPI: Patient seen and examined at bedside. Still intubated. Opens eyes to voice.     ROS: negative unless otherwise stated above.    VITAL SIGNS:  Vital Signs Last 24 Hrs  T(C): 35.3 (2022 11:00), Max: 37.9 (2022 22:00)  T(F): 95.5 (2022 11:00), Max: 100.2 (2022 22:00)  HR: 64 (2022 10:) (52 - 80)  BP: 172/72 (:) (172/72 - 172/72)  BP(mean): 104 (2022 09:) (104 - 104)  RR: 22 (2022 10:) (14 - 32)  SpO2: 97% (2022 10:) (95% - 100%)    Parameters below as of 2022 10:00  Patient On (Oxygen Delivery Method): ventilator, CPAP    O2 Concentration (%): 30      22 @ 07:  -  22 @ 07:00  --------------------------------------------------------  IN: 2981.8 mL / OUT: 802 mL / NET: 2179.8 mL    22 @ 07:01  -  22 @ 11:43  --------------------------------------------------------  IN: 143.8 mL / OUT: 135 mL / NET: 8.8 mL        PHYSICAL EXAM:  General: Intubated, off sedation  HEENT: dry mucous membranes, anisocoric pupils (R fixed and dilated, L sluggish but reactive), opens eyes to voice  Neck: supple  Cardiovascular: +S1/S2; RRR, no murmurs  Respiratory: audible secretions; L inspiratory crackles, R CTAB  Gastrointestinal: soft, NT/ND, +BS, no JVD  Extremities: WWP; 1+ edema in b/l ankles, no clubbing or cyanosis; L and R arm 3+ edema extending to mid-forearm  Vascular: 2+ radial, DP/PT pulses B/L  Neuro: intact cough reflex, withdraws to pain, opens eyes to voice    MEDICATIONS:  MEDICATIONS  (STANDING):  albuterol/ipratropium for Nebulization 3 milliLiter(s) Nebulizer every 6 hours  amLODIPine   Tablet 5 milliGRAM(s) Oral every 24 hours  aspirin  chewable 81 milliGRAM(s) Oral daily  atorvastatin 40 milliGRAM(s) Oral at bedtime  carvedilol 25 milliGRAM(s) Oral every 12 hours  chlorhexidine 0.12% Liquid 15 milliLiter(s) Oral Mucosa every 12 hours  chlorhexidine 2% Cloths 1 Application(s) Topical <User Schedule>  dextrose 5%. 1000 milliLiter(s) (50 mL/Hr) IV Continuous <Continuous>  dextrose 5%. 1000 milliLiter(s) (100 mL/Hr) IV Continuous <Continuous>  dextrose 50% Injectable 25 Gram(s) IV Push once  dextrose 50% Injectable 12.5 Gram(s) IV Push once  dextrose 50% Injectable 25 Gram(s) IV Push once  glucagon  Injectable 1 milliGRAM(s) IntraMuscular once  heparin   Injectable 5000 Unit(s) SubCutaneous every 8 hours  hydrocortisone sodium succinate Injectable 25 milliGRAM(s) IV Push every 12 hours  influenza  Vaccine (HIGH DOSE) 0.7 milliLiter(s) IntraMuscular once  insulin glargine Injectable (LANTUS) 20 Unit(s) SubCutaneous at bedtime  insulin lispro (ADMELOG) corrective regimen sliding scale   SubCutaneous three times a day before meals  insulin regular  human recombinant 7 Unit(s) SubCutaneous every 6 hours  levothyroxine Injectable 25 MICROGram(s) IV Push at bedtime  propofol Infusion 10 MICROgram(s)/kG/Min (4.94 mL/Hr) IV Continuous <Continuous>  sodium chloride 7% Inhalation 4 milliLiter(s) Inhalation every 12 hours  tacrolimus    0.5 mG/mL Suspension 2 milliGRAM(s) Oral every 24 hours  tacrolimus    0.5 mG/mL Suspension 3 milliGRAM(s) Oral every 24 hours  tamsulosin 0.4 milliGRAM(s) Oral at bedtime    MEDICATIONS  (PRN):  acetaminophen     Tablet .. 650 milliGRAM(s) Oral every 6 hours PRN Mild Pain (1 - 3)  dextrose Oral Gel 15 Gram(s) Oral once PRN Blood Glucose LESS THAN 70 milliGRAM(s)/deciliter  sodium chloride 0.9% lock flush 10 milliLiter(s) IV Push every 1 hour PRN Pre/post blood products, medications, blood draw, and to maintain line patency      ALLERGIES:  Allergies    No Known Allergies    Intolerances        LABS:                        8.5    5.26  )-----------( 146      ( 2022 05:30 )             26.2         139  |  108  |  77<H>  ----------------------------<  239<H>  3.7   |  19<L>  |  2.49<H>    Ca    8.4      2022 05:30  Phos  3.0       Mg     1.9             Urinalysis Basic - ( 2022 04:32 )    Color: Yellow / Appearance: Clear / S.025 / pH: x  Gluc: x / Ketone: NEGATIVE  / Bili: Negative / Urobili: 0.2 E.U./dL   Blood: x / Protein: 100 mg/dL / Nitrite: NEGATIVE   Leuk Esterase: NEGATIVE / RBC: Many /HPF / WBC 5-10 /HPF   Sq Epi: x / Non Sq Epi: 0-5 /HPF / Bacteria: Present /HPF      CAPILLARY BLOOD GLUCOSE      POCT Blood Glucose.: 197 mg/dL (2022 11:27)      RADIOLOGY & ADDITIONAL TESTS: Reviewed.

## 2022-11-28 NOTE — PROGRESS NOTE ADULT - ASSESSMENT
82 y/o M PMH CKD 4, renal transplant in 2013 at [facility], glaucoma (blind), DM1, anemia, CAD s/p 2 DAMEON to LAD in 6/21, BPH, recent admission for PNA presents with acute on chronic cough and SUNNY 2/2 acute CHF vs CKD.       NEUROLOGY  #Intubated  Patient reintubated on 11/26 for likely aspiration event. Pupils still anisocoric. CT scan negative for acute intracranial pathology.   - off propofol sedation today     CARDIOVASCULAR  #HFpEF  Had EF of 55% on TTE from Oct 2022, grade I diastolic dysfunction. Repeat TTE 11/17 with poor visualization of LV. Patient making good UOP overnight  - c/w carvedilol 25mg BID     #Upper extremity edema  Stable from yesterday but increased compared to baseline. US with superficial thrombosis of L cephalic vein extending to AC fossa, no DVTs.   - given patients propensity to bleed and superficial nature of thrombus, no indication for AC    #CAD  #HLD  Hx of CAD s/p 2 DAMEON to LAD in June 2021, currently no CP. Trops 0.03 but no CP or ischemic changes on EKG, likely due to CKD. Takes Plavix and aspirin per last d/c but only aspirin on outpatient note  - c/w ASA daily  - c/w home Lipitor    #HTN   Known history of HTN. Hypertensive overnight.  - resumed home amlodipine 10mg qD     PULM  #AHRF  Likely 2/2 aspiration event. Now intubated.  - ID management as below for aspiration     RENAL  #ELIZABETH on Chronic kidney disease (CKD).  Baseline sCr 2.3-2.5. On admission, fluid overloaded. Does have orthopnea and intermittent SOB at baseline. Acidotic but at baseline. Follows w Dr. Mac. Urine studies this AM suggest pre-renal etiology. Given 300cc pRBCs and 500cc LR bolus o/n with good UOP.  - today sCr and BUN stable  - monitor I/Os  - renally dose meds    #Renal transplant  Patient had renal transplant in 2013. On home mycophenolate 500mg BID and tacrolimus 4mg BID. Per nephro, decreased home tacrolimus from 4mg BID --> 2mg BID while in hospital.   - c/w tacrolimus 3mg in AM, c/w 2mg in PM  - continue holding mycophenolate i/s/o infection  - f/u renal recs    #Hypernatremia - RESOLVED  - tube feeds with 250cc q6 --> adjusted fwf to 250cc BID    GI  #Diarrhea - RESOLVED  Patient with loose stool 2 nights ago, have resolved this AM after started on miralax and senna.  - dced miralax and senna  - can consider adding banana flakes to tube feeds if continues  - will continue to monitor for BMs    #Nutrition  NPO with tube feeds.   - continue for now patient may require trach/peg if unable to extubate      #BPH (benign prostatic hyperplasia).   - c/w tamsulosin 0.4mg daily    ID  #Aspiration PNA  Tracheal aspirate cx growing serratia marcescens and pseudomonas. Patient was started on vancomycin and cefepime. MRSA nares positive. Dc'ed vancomycin given supratherapeutic trough. Completed cefepime 2g q24 course x9 days.    ENDOCRINE  #DM (diabetes mellitus).   Previously on lantus 15 lispro 5 at home. Endocrine following. Elevated FSGs in 200s today.  - increase lantus from 10U --> 20U in PM  - decrease regular insulin 3U TID --> 5U TID  - start medium dose regular ISS  - f/u endo recs  - goal -180    #Hypothyroidism  TSH 20.8, free t4 0.7, T3 49 (low). Repeat TSH at 9, fT4 0.7  - per endo rec no suspicion of myxedema coma  - continue with synthroid 25mg qD    #Relative Adrenal Insufficiency  Patient on chronic prednisone 5mg daily. He was started on hydrocortisone 50mg q6hr --> q8.  - decrease HC 50mg TID --> BID  - wean as tolerated  - f/u endocrine recs    HEME  #Normocytic Anemia  Likely 2.2 CKD. Hgb 6.9 on 11/21, corrected appropriately s/p 1u pRBC. No active signs of bleeding on physical exam. CTH with no intracranial bleeding. Required 2u pRBC to correct. DEBRA negative on 11  - active T+S  - transfuse <7    FLUIDS/ELECTROLYTES/NUTRITION  -IVF as above  -Monitor, careful w advanced kidney disease  -Diet: NPO with tube feeds  -DVT ppx: heparin TID  -GI: none  -Dispo: PT recommending [facility] 84 y/o M PMH CKD 4, renal transplant in 2013 at [facility], glaucoma (blind), DM1, anemia, CAD s/p 2 DAMEON to LAD in 6/21, BPH, recent admission for PNA presents with acute on chronic cough and SUNNY 2/2 acute CHF vs CKD.       NEUROLOGY  #Intubated  Patient reintubated on 11/26 for likely aspiration event. Pupils still anisocoric. CT scan negative for acute intracranial pathology.   - off propofol sedation today     CARDIOVASCULAR  #HFpEF  Had EF of 55% on TTE from Oct 2022, grade I diastolic dysfunction. Repeat TTE 11/17 with poor visualization of LV. Patient making good UOP overnight  - c/w carvedilol 25mg BID     #Upper extremity edema  Stable from yesterday but increased compared to baseline. US with superficial thrombosis of L cephalic vein extending to AC fossa, no DVTs.   - given patients propensity to bleed and superficial nature of thrombus, no indication for AC    #CAD  #HLD  Hx of CAD s/p 2 DAMEON to LAD in June 2021, currently no CP. Trops 0.03 but no CP or ischemic changes on EKG, likely due to CKD. Takes Plavix and aspirin per last d/c but only aspirin on outpatient note  - c/w ASA daily  - c/w home Lipitor    #HTN   Known history of HTN. Hypertensive overnight.  - resumed home amlodipine 5mg qD     PULM  #AHRF  Likely 2/2 aspiration event. Now intubated.  - ID management as below for aspiration     RENAL  #ELIZABETH on Chronic kidney disease (CKD).  Baseline sCr 2.3-2.5. On admission, fluid overloaded. Does have orthopnea and intermittent SOB at baseline. Acidotic but at baseline. Follows w Dr. Mac. Urine studies this AM suggest pre-renal etiology. Given 300cc pRBCs and 500cc LR bolus o/n with good UOP.  - today sCr and BUN stable  - monitor I/Os  - renally dose meds    #Renal transplant  Patient had renal transplant in 2013. On home mycophenolate 500mg BID and tacrolimus 4mg BID. Per nephro, decreased home tacrolimus from 4mg BID --> 2mg BID while in hospital.   - c/w tacrolimus 3mg in AM, c/w 2mg in PM  - continue holding mycophenolate i/s/o infection  - f/u renal recs    #Hypernatremia - RESOLVED  - tube feeds with 250cc q6 --> adjusted fwf to 250cc BID    GI  #Diarrhea - RESOLVED  Patient with loose stool 2 nights ago, have resolved this AM after started on miralax and senna.  - dced miralax and senna  - can consider adding banana flakes to tube feeds if continues  - will continue to monitor for BMs    #Nutrition  NPO with tube feeds.   - continue for now patient may require trach/peg if unable to extubate      #BPH (benign prostatic hyperplasia).   - c/w tamsulosin 0.4mg daily    ID  #Aspiration PNA  Tracheal aspirate cx growing serratia marcescens and pseudomonas. Patient was started on vancomycin and cefepime. MRSA nares positive. Dc'ed vancomycin given supratherapeutic trough. Completed cefepime 2g q24 course x9 days.    ENDOCRINE  #DM (diabetes mellitus).   Previously on lantus 15 lispro 5 at home. Endocrine following. Elevated FSGs in 200s today.  - increase lantus from 10U --> 20U in PM  - decrease regular insulin 3U TID --> 5U TID  - start medium dose regular ISS  - f/u endo recs  - goal -180    #Hypothyroidism  TSH 20.8, free t4 0.7, T3 49 (low). Repeat TSH at 9, fT4 0.7  - per endo rec no suspicion of myxedema coma  - continue with synthroid 25mg qD    #Relative Adrenal Insufficiency  Patient on chronic prednisone 5mg daily. He was started on hydrocortisone 50mg q6hr --> q8.  - decrease HC 50mg TID --> BID  - wean as tolerated  - f/u endocrine recs    HEME  #Normocytic Anemia  Likely 2.2 CKD. Hgb 6.9 on 11/21, corrected appropriately s/p 1u pRBC. No active signs of bleeding on physical exam. CTH with no intracranial bleeding. Required 2u pRBC to correct. DEBRA negative on 11  - active T+S  - transfuse <7    FLUIDS/ELECTROLYTES/NUTRITION  -IVF as above  -Monitor, careful w advanced kidney disease  -Diet: NPO with tube feeds  -DVT ppx: heparin TID  -GI: none  -Dispo: PT recommending [facility]

## 2022-11-29 NOTE — PROGRESS NOTE ADULT - ASSESSMENT
Patient is an 84 yo M with ESKD s/p transplant 2013 now CKD 4, glaucoma, DM1, Anemia, CAD, BPH presenting with decompensated heart failure c/b cardiac arrest on 11/26 with creatinine within baseline    Problem/Plan:  #CKD Stage 4  Renal allograft with CKD 4 - baseline sCr 2.3-2.5, usual meds tacrolimus 4mg BID and mycophenolate 500mg BID.   Recs:  - c/w AM tacrolimus to 3mg and PM dose at 2mg.  - c/w cell cept 500mg BID  -Please check tacro level at 9:30 PM tonight prior to PM dose.  -Consider 40cc/hr of NS for a total of 1L  - strict i's and o's  - Trend BMP daily    #Anemia of chronic disease  -Can start epo 8k units weekly

## 2022-11-29 NOTE — PROGRESS NOTE ADULT - PROBLEM SELECTOR PLAN 1
Event noticed.  The patient is sedated.  I suctioned the patient copious amount of secretion.  Gas exchange is stable.  Sputum grew Pseudomonas and Serratia.  The patient on appropriate antibiotic.  Continue pulmonary toilet.  Patient has baseline dementia and does not and trial up off sedation.  Tapers pressors as needed.  Cortisol level is adequate.  I discussed the case with critical care. She was extubated.  Patient is on nasal cannula.  Suction the patient copious amount of secretion.  Dysphagia evaluation.  Continue antibiotic.  The patient is hemodynamically stable off pressors and sedative.  Suctioned the patient thick moderate secretion and increased.  He failed swallow eval and will need a peg.  NO CXR.  Sat OK on NC.  On nebs.  He was intubated.  Sputum GS revealed GNR and culture positive for sternomonas.  Abt changed to bactrim.  Monitor renal function on abt.  Plan trach and peg. Is in detail with the family.  Patient is to be.  Prepped for a trach and a PEG on Thursday.  I discussed with GI regarding the PEG will be done tomorrow.  Continue pulmonary toilet.  Continue antibiotic.  Follow-up renal function on Bactrim

## 2022-11-29 NOTE — PROGRESS NOTE ADULT - SUBJECTIVE AND OBJECTIVE BOX
**Incomplete Note**   CC: Patient is a 82y old  Male who presents with a chief complaint of LE edema and cough (21 Nov 2022 09:30)      INTERVAL EVENTS: G    SUBJECTIVE / INTERVAL HPI: Patient seen and examined at bedside. Intubated and sedated.    ROS: negative unless otherwise stated above.    VITAL SIGNS:  Vital Signs Last 24 Hrs  T(C): 35.2 (29 Nov 2022 11:00), Max: 36.9 (28 Nov 2022 17:05)  T(F): 95.3 (29 Nov 2022 11:00), Max: 98.4 (28 Nov 2022 17:05)  HR: 60 (29 Nov 2022 14:00) (51 - 69)  BP: 121/53 (29 Nov 2022 14:00) (112/45 - 131/56)  BP(mean): 77 (29 Nov 2022 14:00) (64 - 83)  RR: 21 (29 Nov 2022 14:00) (15 - 32)  SpO2: 94% (29 Nov 2022 14:00) (90% - 98%)    Parameters below as of 29 Nov 2022 14:00  Patient On (Oxygen Delivery Method): ventilator    O2 Concentration (%): 35      11-28-22 @ 07:01  -  11-29-22 @ 07:00  --------------------------------------------------------  IN: 2288.8 mL / OUT: 605 mL / NET: 1683.8 mL    11-29-22 @ 07:01  -  11-29-22 @ 14:13  --------------------------------------------------------  IN: 730 mL / OUT: 56 mL / NET: 674 mL        PHYSICAL EXAM:    General: NAD  HEENT: MMM  Neck: supple  Cardiovascular: +S1/S2; RRR  Respiratory: CTA B/L; no W/R/R  Gastrointestinal: soft, NT/ND  Extremities: WWP; no edema, clubbing or cyanosis  Vascular: 2+ radial, DP/PT pulses B/L  Neurological: AAOx3; no focal deficits    MEDICATIONS:  MEDICATIONS  (STANDING):  albuterol/ipratropium for Nebulization 3 milliLiter(s) Nebulizer every 6 hours  amLODIPine   Tablet 5 milliGRAM(s) Oral every 24 hours  aspirin  chewable 81 milliGRAM(s) Oral daily  atorvastatin 40 milliGRAM(s) Oral at bedtime  carvedilol 25 milliGRAM(s) Oral every 12 hours  chlorhexidine 0.12% Liquid 15 milliLiter(s) Oral Mucosa every 12 hours  chlorhexidine 2% Cloths 1 Application(s) Topical <User Schedule>  dextrose 5%. 1000 milliLiter(s) (50 mL/Hr) IV Continuous <Continuous>  dextrose 5%. 1000 milliLiter(s) (100 mL/Hr) IV Continuous <Continuous>  dextrose 50% Injectable 25 Gram(s) IV Push once  dextrose 50% Injectable 25 Gram(s) IV Push once  dextrose 50% Injectable 12.5 Gram(s) IV Push once  glucagon  Injectable 1 milliGRAM(s) IntraMuscular once  heparin   Injectable 5000 Unit(s) SubCutaneous every 8 hours  hydrocortisone sodium succinate Injectable 25 milliGRAM(s) IV Push every 12 hours  influenza  Vaccine (HIGH DOSE) 0.7 milliLiter(s) IntraMuscular once  insulin regular  human corrective regimen sliding scale   SubCutaneous every 6 hours  insulin regular  human recombinant 7 Unit(s) SubCutaneous every 6 hours  levothyroxine Injectable 50 MICROGram(s) IV Push at bedtime  mycophenolate mofetil Suspension 500 milliGRAM(s) Oral every 12 hours  propofol Infusion 10 MICROgram(s)/kG/Min (4.94 mL/Hr) IV Continuous <Continuous>  sodium chloride 7% Inhalation 4 milliLiter(s) Inhalation every 12 hours  tacrolimus    0.5 mG/mL Suspension 2 milliGRAM(s) Oral every 24 hours  tacrolimus    0.5 mG/mL Suspension 3 milliGRAM(s) Oral every 24 hours  tamsulosin 0.4 milliGRAM(s) Oral at bedtime  trimethoprim / sulfamethoxazole IVPB 325 milliGRAM(s) IV Intermittent every 12 hours    MEDICATIONS  (PRN):  acetaminophen     Tablet .. 650 milliGRAM(s) Oral every 6 hours PRN Mild Pain (1 - 3)  dextrose Oral Gel 15 Gram(s) Oral once PRN Blood Glucose LESS THAN 70 milliGRAM(s)/deciliter  sodium chloride 0.9% lock flush 10 milliLiter(s) IV Push every 1 hour PRN Pre/post blood products, medications, blood draw, and to maintain line patency      ALLERGIES:  Allergies    No Known Allergies    Intolerances        LABS:                        7.3    5.07  )-----------( 143      ( 29 Nov 2022 04:21 )             23.4     11-29    137  |  106  |  81<H>  ----------------------------<  165<H>  3.3<L>   |  17<L>  |  2.75<H>    Ca    8.2<L>      29 Nov 2022 04:21  Phos  2.7     11-29  Mg     2.0     11-29    TPro  4.5<L>  /  Alb  2.2<L>  /  TBili  0.2  /  DBili  x   /  AST  41<H>  /  ALT  34  /  AlkPhos  71  11-29    PT/INR - ( 29 Nov 2022 04:21 )   PT: 12.6 sec;   INR: 1.06          PTT - ( 29 Nov 2022 04:21 )  PTT:47.5 sec    CAPILLARY BLOOD GLUCOSE      POCT Blood Glucose.: 208 mg/dL (29 Nov 2022 10:53)      RADIOLOGY & ADDITIONAL TESTS: Reviewed.   CC: Patient is a 82y old  Male who presents with a chief complaint of LE edema and cough (21 Nov 2022 09:30)      INTERVAL EVENTS: UOP decreasing overnight, gave 1L bolus x2    SUBJECTIVE / INTERVAL HPI: Patient seen and examined at bedside. Intubated and sedated.    ROS: negative unless otherwise stated above.    VITAL SIGNS:  Vital Signs Last 24 Hrs  T(C): 35.2 (29 Nov 2022 11:00), Max: 36.9 (28 Nov 2022 17:05)  T(F): 95.3 (29 Nov 2022 11:00), Max: 98.4 (28 Nov 2022 17:05)  HR: 60 (29 Nov 2022 14:00) (51 - 69)  BP: 121/53 (29 Nov 2022 14:00) (112/45 - 131/56)  BP(mean): 77 (29 Nov 2022 14:00) (64 - 83)  RR: 21 (29 Nov 2022 14:00) (15 - 32)  SpO2: 94% (29 Nov 2022 14:00) (90% - 98%)    Parameters below as of 29 Nov 2022 14:00  Patient On (Oxygen Delivery Method): ventilator    O2 Concentration (%): 35      11-28-22 @ 07:01  -  11-29-22 @ 07:00  --------------------------------------------------------  IN: 2288.8 mL / OUT: 605 mL / NET: 1683.8 mL    11-29-22 @ 07:01  -  11-29-22 @ 14:13  --------------------------------------------------------  IN: 730 mL / OUT: 56 mL / NET: 674 mL        PHYSICAL EXAM:  General: NAD  HEENT: MMM  Neck: supple  Cardiovascular: +S1/S2; RRR  Respiratory: CTA B/L; no W/R/R  Gastrointestinal: soft, NT/ND  Extremities: WWP; 3+ pitting edema in forearms and trace edema in b/l ankles, no clubbing or cyanosis  Vascular: 2+ radial, DP/PT pulses B/L  Neurological: intubated, anisocoric pupils (R>L stable from yesterday), no focal deficits    MEDICATIONS:  MEDICATIONS  (STANDING):  albuterol/ipratropium for Nebulization 3 milliLiter(s) Nebulizer every 6 hours  amLODIPine   Tablet 5 milliGRAM(s) Oral every 24 hours  aspirin  chewable 81 milliGRAM(s) Oral daily  atorvastatin 40 milliGRAM(s) Oral at bedtime  carvedilol 25 milliGRAM(s) Oral every 12 hours  chlorhexidine 0.12% Liquid 15 milliLiter(s) Oral Mucosa every 12 hours  chlorhexidine 2% Cloths 1 Application(s) Topical <User Schedule>  dextrose 5%. 1000 milliLiter(s) (50 mL/Hr) IV Continuous <Continuous>  dextrose 5%. 1000 milliLiter(s) (100 mL/Hr) IV Continuous <Continuous>  dextrose 50% Injectable 25 Gram(s) IV Push once  dextrose 50% Injectable 25 Gram(s) IV Push once  dextrose 50% Injectable 12.5 Gram(s) IV Push once  glucagon  Injectable 1 milliGRAM(s) IntraMuscular once  heparin   Injectable 5000 Unit(s) SubCutaneous every 8 hours  hydrocortisone sodium succinate Injectable 25 milliGRAM(s) IV Push every 12 hours  influenza  Vaccine (HIGH DOSE) 0.7 milliLiter(s) IntraMuscular once  insulin regular  human corrective regimen sliding scale   SubCutaneous every 6 hours  insulin regular  human recombinant 7 Unit(s) SubCutaneous every 6 hours  levothyroxine Injectable 50 MICROGram(s) IV Push at bedtime  mycophenolate mofetil Suspension 500 milliGRAM(s) Oral every 12 hours  propofol Infusion 10 MICROgram(s)/kG/Min (4.94 mL/Hr) IV Continuous <Continuous>  sodium chloride 7% Inhalation 4 milliLiter(s) Inhalation every 12 hours  tacrolimus    0.5 mG/mL Suspension 2 milliGRAM(s) Oral every 24 hours  tacrolimus    0.5 mG/mL Suspension 3 milliGRAM(s) Oral every 24 hours  tamsulosin 0.4 milliGRAM(s) Oral at bedtime  trimethoprim / sulfamethoxazole IVPB 325 milliGRAM(s) IV Intermittent every 12 hours    MEDICATIONS  (PRN):  acetaminophen     Tablet .. 650 milliGRAM(s) Oral every 6 hours PRN Mild Pain (1 - 3)  dextrose Oral Gel 15 Gram(s) Oral once PRN Blood Glucose LESS THAN 70 milliGRAM(s)/deciliter  sodium chloride 0.9% lock flush 10 milliLiter(s) IV Push every 1 hour PRN Pre/post blood products, medications, blood draw, and to maintain line patency      ALLERGIES:  Allergies    No Known Allergies    Intolerances        LABS:                        7.3    5.07  )-----------( 143      ( 29 Nov 2022 04:21 )             23.4     11-29    137  |  106  |  81<H>  ----------------------------<  165<H>  3.3<L>   |  17<L>  |  2.75<H>    Ca    8.2<L>      29 Nov 2022 04:21  Phos  2.7     11-29  Mg     2.0     11-29    TPro  4.5<L>  /  Alb  2.2<L>  /  TBili  0.2  /  DBili  x   /  AST  41<H>  /  ALT  34  /  AlkPhos  71  11-29    PT/INR - ( 29 Nov 2022 04:21 )   PT: 12.6 sec;   INR: 1.06          PTT - ( 29 Nov 2022 04:21 )  PTT:47.5 sec    CAPILLARY BLOOD GLUCOSE      POCT Blood Glucose.: 208 mg/dL (29 Nov 2022 10:53)      RADIOLOGY & ADDITIONAL TESTS: Reviewed.   CC: Patient is a 82y old  Male who presents with a chief complaint of LE edema and cough (21 Nov 2022 09:30)      HOSPITAL COURSE: 84 y/o M PMH CKD 4, renal transplant in 2013 at [facility] (blind), DM1, anemia, CAD s/p 2 DAMEON to LAD in 6/21, BPH, recent admission for PNA presents with acute on chronic cough and SUNNY 2/2 acute CHF vs CKD. Patient was originally treated with CHF exacerbation with lasix. Rapid response called on 11/19 as patient found to be hypoxic to 70s on RA and hypoglycemic at 31 required intubation. Patient went into cardiac arrest, achieved ROSC with CPR and was transferred to MICU for closer monitoring. Patient was extubated briefly in MICU but aspirated on ice chips requiring reintubation. Patient with history of HFpEF, continued on carvedilol. Patient with ELIZABETH on CKD baseline giving fluid boluses to try to improve UOP declining today. Patient originally found to have pseudomonas growing in sputum on  Patient found to have stenotrophomonas growing in sputum culture for which he was started on bactrim on 11/28 Tracheal aspirate cx (11/19) growing serratia marcescens and pseudomonas. Patient completed a 9 day course of cefepime. MRSA nares positive. Patient with renal transplant, continued on tacrolimus with adjusted dosing per nephrology. Patient with elevated TSH on admission, not meeting criteria for myxedema coma, managed on synthroid. For relative adrenal insufficiency, he was started on hydrocortisone BID, weaning as tolerated per nephrology. Patient with anemia likely 2.2 CKD. Hgb 6.9 on 11/21, corrected appropriately s/p 1u pRBC. No active signs of bleeding on physical exam. CTH with no intracranial bleeding. Required 2u pRBC to correct. DEBRA and stool guaic negative.    INTERVAL EVENTS: UOP decreasing overnight, gave 1L bolus x2    SUBJECTIVE / INTERVAL HPI: Patient seen and examined at bedside. Intubated and sedated.    ROS: negative unless otherwise stated above.    VITAL SIGNS:  Vital Signs Last 24 Hrs  T(C): 35.2 (29 Nov 2022 11:00), Max: 36.9 (28 Nov 2022 17:05)  T(F): 95.3 (29 Nov 2022 11:00), Max: 98.4 (28 Nov 2022 17:05)  HR: 60 (29 Nov 2022 14:00) (51 - 69)  BP: 121/53 (29 Nov 2022 14:00) (112/45 - 131/56)  BP(mean): 77 (29 Nov 2022 14:00) (64 - 83)  RR: 21 (29 Nov 2022 14:00) (15 - 32)  SpO2: 94% (29 Nov 2022 14:00) (90% - 98%)    Parameters below as of 29 Nov 2022 14:00  Patient On (Oxygen Delivery Method): ventilator    O2 Concentration (%): 35      11-28-22 @ 07:01  -  11-29-22 @ 07:00  --------------------------------------------------------  IN: 2288.8 mL / OUT: 605 mL / NET: 1683.8 mL    11-29-22 @ 07:01  -  11-29-22 @ 14:13  --------------------------------------------------------  IN: 730 mL / OUT: 56 mL / NET: 674 mL        PHYSICAL EXAM:  General: NAD  HEENT: MMM  Neck: supple  Cardiovascular: +S1/S2; RRR  Respiratory: CTA B/L; no W/R/R  Gastrointestinal: soft, NT/ND  Extremities: WWP; 3+ pitting edema in forearms and trace edema in b/l ankles, no clubbing or cyanosis  Vascular: 2+ radial, DP/PT pulses B/L  Neurological: intubated, anisocoric pupils (R>L stable from yesterday), no focal deficits    MEDICATIONS:  MEDICATIONS  (STANDING):  albuterol/ipratropium for Nebulization 3 milliLiter(s) Nebulizer every 6 hours  amLODIPine   Tablet 5 milliGRAM(s) Oral every 24 hours  aspirin  chewable 81 milliGRAM(s) Oral daily  atorvastatin 40 milliGRAM(s) Oral at bedtime  carvedilol 25 milliGRAM(s) Oral every 12 hours  chlorhexidine 0.12% Liquid 15 milliLiter(s) Oral Mucosa every 12 hours  chlorhexidine 2% Cloths 1 Application(s) Topical <User Schedule>  dextrose 5%. 1000 milliLiter(s) (50 mL/Hr) IV Continuous <Continuous>  dextrose 5%. 1000 milliLiter(s) (100 mL/Hr) IV Continuous <Continuous>  dextrose 50% Injectable 25 Gram(s) IV Push once  dextrose 50% Injectable 25 Gram(s) IV Push once  dextrose 50% Injectable 12.5 Gram(s) IV Push once  glucagon  Injectable 1 milliGRAM(s) IntraMuscular once  heparin   Injectable 5000 Unit(s) SubCutaneous every 8 hours  hydrocortisone sodium succinate Injectable 25 milliGRAM(s) IV Push every 12 hours  influenza  Vaccine (HIGH DOSE) 0.7 milliLiter(s) IntraMuscular once  insulin regular  human corrective regimen sliding scale   SubCutaneous every 6 hours  insulin regular  human recombinant 7 Unit(s) SubCutaneous every 6 hours  levothyroxine Injectable 50 MICROGram(s) IV Push at bedtime  mycophenolate mofetil Suspension 500 milliGRAM(s) Oral every 12 hours  propofol Infusion 10 MICROgram(s)/kG/Min (4.94 mL/Hr) IV Continuous <Continuous>  sodium chloride 7% Inhalation 4 milliLiter(s) Inhalation every 12 hours  tacrolimus    0.5 mG/mL Suspension 2 milliGRAM(s) Oral every 24 hours  tacrolimus    0.5 mG/mL Suspension 3 milliGRAM(s) Oral every 24 hours  tamsulosin 0.4 milliGRAM(s) Oral at bedtime  trimethoprim / sulfamethoxazole IVPB 325 milliGRAM(s) IV Intermittent every 12 hours    MEDICATIONS  (PRN):  acetaminophen     Tablet .. 650 milliGRAM(s) Oral every 6 hours PRN Mild Pain (1 - 3)  dextrose Oral Gel 15 Gram(s) Oral once PRN Blood Glucose LESS THAN 70 milliGRAM(s)/deciliter  sodium chloride 0.9% lock flush 10 milliLiter(s) IV Push every 1 hour PRN Pre/post blood products, medications, blood draw, and to maintain line patency      ALLERGIES:  Allergies    No Known Allergies    Intolerances        LABS:                        7.3    5.07  )-----------( 143      ( 29 Nov 2022 04:21 )             23.4     11-29    137  |  106  |  81<H>  ----------------------------<  165<H>  3.3<L>   |  17<L>  |  2.75<H>    Ca    8.2<L>      29 Nov 2022 04:21  Phos  2.7     11-29  Mg     2.0     11-29    TPro  4.5<L>  /  Alb  2.2<L>  /  TBili  0.2  /  DBili  x   /  AST  41<H>  /  ALT  34  /  AlkPhos  71  11-29    PT/INR - ( 29 Nov 2022 04:21 )   PT: 12.6 sec;   INR: 1.06          PTT - ( 29 Nov 2022 04:21 )  PTT:47.5 sec    CAPILLARY BLOOD GLUCOSE      POCT Blood Glucose.: 208 mg/dL (29 Nov 2022 10:53)      RADIOLOGY & ADDITIONAL TESTS: Reviewed.   CC: Patient is a 82y old  Male who presents with a chief complaint of LE edema and cough (21 Nov 2022 09:30)      HOSPITAL COURSE: 82 y/o M PMH CKD 4, renal transplant in 2013 at [facility] (blind), DM1, anemia, CAD s/p 2 DAMEON to LAD in 6/21, BPH, recent admission for PNA presents with acute on chronic cough and SUNNY 2/2 acute CHF vs CKD. Patient was originally treated with CHF exacerbation with lasix. Rapid response called on 11/19 as patient found to be hypoxic to 70s on RA and hypoglycemic at 31 required intubation. Patient went into cardiac arrest, achieved ROSC with CPR and was transferred to MICU for closer monitoring. Patient was extubated briefly in MICU but aspirated on ice chips requiring reintubation. Patient with history of HFpEF, continued on carvedilol. Patient with ELIZABETH on CKD baseline giving fluid boluses to try to improve UOP declining today. Tracheal aspirate cx (11/19) growing serratia marcescens and pseudomonas for which he completed a 9 day course of cefepime.  Patient found to have stenotrophomonas growing in sputum culture for which he was started on bactrim on 11/28. MRSA nares positive. Patient with renal transplant, continued on tacrolimus with adjusted dosing per nephrology. Patient with elevated TSH on admission, not meeting criteria for myxedema coma, managed on synthroid. For relative adrenal insufficiency, he was started on hydrocortisone BID, weaning as tolerated per endocrinology. Patient with anemia likely 2.2 CKD. Hgb 6.9 on 11/21, corrected appropriately s/p 1u pRBC. No active signs of bleeding on physical exam. CTH with no intracranial bleeding. Required 2u pRBC to correct. DEBRA and stool guaic negative.    INTERVAL EVENTS: UOP decreasing overnight, gave 1L bolus x2    SUBJECTIVE / INTERVAL HPI: Patient seen and examined at bedside. Intubated and sedated.    ROS: negative unless otherwise stated above.    VITAL SIGNS:  Vital Signs Last 24 Hrs  T(C): 35.2 (29 Nov 2022 11:00), Max: 36.9 (28 Nov 2022 17:05)  T(F): 95.3 (29 Nov 2022 11:00), Max: 98.4 (28 Nov 2022 17:05)  HR: 60 (29 Nov 2022 14:00) (51 - 69)  BP: 121/53 (29 Nov 2022 14:00) (112/45 - 131/56)  BP(mean): 77 (29 Nov 2022 14:00) (64 - 83)  RR: 21 (29 Nov 2022 14:00) (15 - 32)  SpO2: 94% (29 Nov 2022 14:00) (90% - 98%)    Parameters below as of 29 Nov 2022 14:00  Patient On (Oxygen Delivery Method): ventilator    O2 Concentration (%): 35      11-28-22 @ 07:01  -  11-29-22 @ 07:00  --------------------------------------------------------  IN: 2288.8 mL / OUT: 605 mL / NET: 1683.8 mL    11-29-22 @ 07:01  -  11-29-22 @ 14:13  --------------------------------------------------------  IN: 730 mL / OUT: 56 mL / NET: 674 mL        PHYSICAL EXAM:  General: NAD  HEENT: MMM  Neck: supple  Cardiovascular: +S1/S2; RRR  Respiratory: CTA B/L; no W/R/R  Gastrointestinal: soft, NT/ND  Extremities: WWP; 3+ pitting edema in forearms and trace edema in b/l ankles, no clubbing or cyanosis  Vascular: 2+ radial, DP/PT pulses B/L  Neurological: intubated, anisocoric pupils (R>L stable from yesterday), no focal deficits    MEDICATIONS:  MEDICATIONS  (STANDING):  albuterol/ipratropium for Nebulization 3 milliLiter(s) Nebulizer every 6 hours  amLODIPine   Tablet 5 milliGRAM(s) Oral every 24 hours  aspirin  chewable 81 milliGRAM(s) Oral daily  atorvastatin 40 milliGRAM(s) Oral at bedtime  carvedilol 25 milliGRAM(s) Oral every 12 hours  chlorhexidine 0.12% Liquid 15 milliLiter(s) Oral Mucosa every 12 hours  chlorhexidine 2% Cloths 1 Application(s) Topical <User Schedule>  dextrose 5%. 1000 milliLiter(s) (50 mL/Hr) IV Continuous <Continuous>  dextrose 5%. 1000 milliLiter(s) (100 mL/Hr) IV Continuous <Continuous>  dextrose 50% Injectable 25 Gram(s) IV Push once  dextrose 50% Injectable 25 Gram(s) IV Push once  dextrose 50% Injectable 12.5 Gram(s) IV Push once  glucagon  Injectable 1 milliGRAM(s) IntraMuscular once  heparin   Injectable 5000 Unit(s) SubCutaneous every 8 hours  hydrocortisone sodium succinate Injectable 25 milliGRAM(s) IV Push every 12 hours  influenza  Vaccine (HIGH DOSE) 0.7 milliLiter(s) IntraMuscular once  insulin regular  human corrective regimen sliding scale   SubCutaneous every 6 hours  insulin regular  human recombinant 7 Unit(s) SubCutaneous every 6 hours  levothyroxine Injectable 50 MICROGram(s) IV Push at bedtime  mycophenolate mofetil Suspension 500 milliGRAM(s) Oral every 12 hours  propofol Infusion 10 MICROgram(s)/kG/Min (4.94 mL/Hr) IV Continuous <Continuous>  sodium chloride 7% Inhalation 4 milliLiter(s) Inhalation every 12 hours  tacrolimus    0.5 mG/mL Suspension 2 milliGRAM(s) Oral every 24 hours  tacrolimus    0.5 mG/mL Suspension 3 milliGRAM(s) Oral every 24 hours  tamsulosin 0.4 milliGRAM(s) Oral at bedtime  trimethoprim / sulfamethoxazole IVPB 325 milliGRAM(s) IV Intermittent every 12 hours    MEDICATIONS  (PRN):  acetaminophen     Tablet .. 650 milliGRAM(s) Oral every 6 hours PRN Mild Pain (1 - 3)  dextrose Oral Gel 15 Gram(s) Oral once PRN Blood Glucose LESS THAN 70 milliGRAM(s)/deciliter  sodium chloride 0.9% lock flush 10 milliLiter(s) IV Push every 1 hour PRN Pre/post blood products, medications, blood draw, and to maintain line patency      ALLERGIES:  Allergies    No Known Allergies    Intolerances        LABS:                        7.3    5.07  )-----------( 143      ( 29 Nov 2022 04:21 )             23.4     11-29    137  |  106  |  81<H>  ----------------------------<  165<H>  3.3<L>   |  17<L>  |  2.75<H>    Ca    8.2<L>      29 Nov 2022 04:21  Phos  2.7     11-29  Mg     2.0     11-29    TPro  4.5<L>  /  Alb  2.2<L>  /  TBili  0.2  /  DBili  x   /  AST  41<H>  /  ALT  34  /  AlkPhos  71  11-29    PT/INR - ( 29 Nov 2022 04:21 )   PT: 12.6 sec;   INR: 1.06          PTT - ( 29 Nov 2022 04:21 )  PTT:47.5 sec    CAPILLARY BLOOD GLUCOSE      POCT Blood Glucose.: 208 mg/dL (29 Nov 2022 10:53)      RADIOLOGY & ADDITIONAL TESTS: Reviewed.

## 2022-11-29 NOTE — PROGRESS NOTE ADULT - SUBJECTIVE AND OBJECTIVE BOX
OVERNIGHT: No acute events overnight. The tube feeds were stopped last night and his Lantus was held. The regular insulin was increased to 12 units at midnight, later held as he was going to be NPO for tracheostomy placement today.   SUBJECTIVE / INTERVAL HPI: Patient was seen and examined this morning. He remains sedated and intubated. The procedure was rescheduled for Thursday and the tube feeds were resumed around noon. He received 5 units of NPH insulin in the morning to cover him until tonight when he receives his next dose of long-acting insulin.     CAPILLARY BLOOD GLUCOSE & INSULIN RECEIVED  Yesterday  - 12 PM FS mg/dL = 7 units of regular insulin + 2 units of sliding scale.   - 6 PM FS mg/dL = 7 units of regular insulin + 4 units of sliding scale.     Today  - 12 AM FS mg/dL = 12 units of regular insulin + 4 units of sliding scale.   - 6 AM FS mg/dL = 2 units of sliding scale.    - 12 PM FS mg/dL = 5 units of NPH insulin + 4 units of sliding scale.     REVIEW OF SYSTEMS  Unable to obtain.     PHYSICAL EXAM  Vital Signs Last 24 Hrs  T(C): 35.3 (2022 09:15), Max: 36.9 (2022 17:05)  T(F): 95.5 (2022 09:15), Max: 98.4 (2022 17:05)  HR: 51 (2022 10:00) (51 - 69)  BP: 123/53 (2022 10:00) (112/45 - 158/69)  BP(mean): 77 (2022 10:00) (64 - 99)  RR: 20 (2022 10:00) (15 - 32)  SpO2: 95% (2022 10:00) (91% - 99%)    Parameters below as of 2022 10:00  Patient On (Oxygen Delivery Method): ventilator    O2 Concentration (%): 35    Constitutional: Elderly male, sedated, intubated.   HEENT: Normocephalic, atraumatic, GORDON.  Respiratory: Clear to auscultation anteriorly. Mild crackles over left lung base auscultated from side.   Cardiovascular: regular rhythm, normal S1 and S2, no audible murmurs.   GI: soft, non-tender, non-distended, bowel sounds present. NGT in place with feeds running at goal of 43 mL/hr.  Extremities: +1 lower extremity edema.    LABS  CBC - WBC/HGB/HTC/PLT: 5.07/7.3/23.4/143 (22)  BMP - Na/K/Cl/Bicarb/BUN/Cr/Gluc/AG/eGFR: 137/3.3/106/17/81/2.75/165/14/22 (22)  Ca - 8.2 (22)  Phos - 2.7 (22)  Mg - 2.0 (22)  LFT - Alb/Tprot/Tbili/Dbili/AlkPhos/ALT/AST: 2.2/--/0.2/--/71/34/41 (22)  PT/aPTT/INR: 12.6/47.5/1.06 (22)   Thyroid Stimulating Hormone, Serum: 9.260 (22)  Total T4/Free T4: --/0.698 (22)    MEDICATIONS  MEDICATIONS  (STANDING):  albuterol/ipratropium for Nebulization 3 milliLiter(s) Nebulizer every 6 hours  amLODIPine   Tablet 5 milliGRAM(s) Oral every 24 hours  aspirin  chewable 81 milliGRAM(s) Oral daily  atorvastatin 40 milliGRAM(s) Oral at bedtime  carvedilol 25 milliGRAM(s) Oral every 12 hours  chlorhexidine 0.12% Liquid 15 milliLiter(s) Oral Mucosa every 12 hours  chlorhexidine 2% Cloths 1 Application(s) Topical <User Schedule>  dextrose 5%. 1000 milliLiter(s) (50 mL/Hr) IV Continuous <Continuous>  dextrose 5%. 1000 milliLiter(s) (100 mL/Hr) IV Continuous <Continuous>  dextrose 50% Injectable 25 Gram(s) IV Push once  dextrose 50% Injectable 25 Gram(s) IV Push once  dextrose 50% Injectable 12.5 Gram(s) IV Push once  glucagon  Injectable 1 milliGRAM(s) IntraMuscular once  heparin   Injectable 5000 Unit(s) SubCutaneous every 8 hours  hydrocortisone sodium succinate Injectable 25 milliGRAM(s) IV Push every 12 hours  influenza  Vaccine (HIGH DOSE) 0.7 milliLiter(s) IntraMuscular once  insulin regular  human corrective regimen sliding scale   SubCutaneous every 6 hours  insulin regular  human recombinant 12 Unit(s) SubCutaneous every 6 hours  levothyroxine Injectable 50 MICROGram(s) IV Push at bedtime  mycophenolate mofetil Suspension 500 milliGRAM(s) Oral every 12 hours  propofol Infusion 10 MICROgram(s)/kG/Min (4.94 mL/Hr) IV Continuous <Continuous>  sodium chloride 7% Inhalation 4 milliLiter(s) Inhalation every 12 hours  tacrolimus    0.5 mG/mL Suspension 2 milliGRAM(s) Oral every 24 hours  tacrolimus    0.5 mG/mL Suspension 3 milliGRAM(s) Oral every 24 hours  tamsulosin 0.4 milliGRAM(s) Oral at bedtime  trimethoprim / sulfamethoxazole IVPB 325 milliGRAM(s) IV Intermittent every 12 hours    MEDICATIONS  (PRN):  acetaminophen     Tablet .. 650 milliGRAM(s) Oral every 6 hours PRN Mild Pain (1 - 3)  dextrose Oral Gel 15 Gram(s) Oral once PRN Blood Glucose LESS THAN 70 milliGRAM(s)/deciliter  sodium chloride 0.9% lock flush 10 milliLiter(s) IV Push every 1 hour PRN Pre/post blood products, medications, blood draw, and to maintain line patency    ASSESSMENT / RECOMMENDATIONS  Mr. Miguel is an 83-year-old male with type 2 diabetes mellitus, coronary artery disease (s/p DAMEON x2 to LAD on 2021), chronic kidney disease stage 4 (s/p renal transplant on , on tracrolimus and prednisone) and benign prostatic hyperplasia who was sent to the hospital by his nephrologist for further evaluation of lower extremity edema (22). He was admitted for further management of LE edema, thought to be secondary to his underlying CKD versus congestive heart failure. Hospitalization was complicated by hypoglycemia and cardiac arrest (22, ROSC after 1 minute of chest compressions). Post-cardiac arrest he was noted to develop hypotension, bradycardia and hypothermia. Labs were remarkable for elevated TSH (20) and decreased FT4 (0.77). 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     Although he had features seen with myxedema coma, these findings were better explained by his cardiac arrest as his vital signs prior to the arrest were apparently around his baseline, as was his mental status. Nonetheless, he was started on levothyroxine 25 mcg IV daily (22) which has been titrated up to 50 mcg daily (22). In addition, given concern for adrenal insufficiency, he was started on stress-dose steroids which were titrated down to 25 mg every 12 hours. The patient was extubated on 22; however, he had to be reintubated on 22 due to episode of respiratory decompensation, potentially secondary to aspiration. After reintubation, his insulin requirements have increased, presumably due to underlying aspiration pneumonia.     A1C: 9.0 %  BUN: 81  Creatinine: 2.75  GFR: 22  Weight: 82.3  BMI: 28.4  EF: 65%, grade II diastolic dysfunction.    # Type 2 diabetes mellitus      # Hypothyroidism      # Concern for adrenal insufficiency      Case discussed with Dr. Rabago. Primary team updated.       Ulysses Weber    Endocrinology Fellow    Service Pager: [phone]    OVERNIGHT: No acute events overnight. The tube feeds were stopped last night and his Lantus was held. The regular insulin was increased to 12 units at midnight, later held as he was going to be NPO for tracheostomy placement today.   SUBJECTIVE / INTERVAL HPI: Patient was seen and examined this morning. He remains sedated and intubated. The procedure was rescheduled for Thursday and the tube feeds were resumed around noon. He received 5 units of NPH insulin in the morning to cover him until tonight when he receives his next dose of long-acting insulin.     CAPILLARY BLOOD GLUCOSE & INSULIN RECEIVED  Yesterday  - 12 PM FS mg/dL = 7 units of regular insulin + 2 units of sliding scale.   - 6 PM FS mg/dL = 7 units of regular insulin + 4 units of sliding scale.     Today  - 12 AM FS mg/dL = 12 units of regular insulin + 4 units of sliding scale.   - 6 AM FS mg/dL = 2 units of sliding scale.    - 12 PM FS mg/dL = 5 units of NPH insulin + 4 units of sliding scale.     REVIEW OF SYSTEMS  Unable to obtain.     PHYSICAL EXAM  Vital Signs Last 24 Hrs  T(C): 35.3 (2022 09:15), Max: 36.9 (2022 17:05)  T(F): 95.5 (2022 09:15), Max: 98.4 (2022 17:05)  HR: 51 (2022 10:00) (51 - 69)  BP: 123/53 (2022 10:00) (112/45 - 158/69)  BP(mean): 77 (2022 10:00) (64 - 99)  RR: 20 (2022 10:00) (15 - 32)  SpO2: 95% (2022 10:00) (91% - 99%)    Parameters below as of 2022 10:00  Patient On (Oxygen Delivery Method): ventilator    O2 Concentration (%): 35    Constitutional: Elderly male, sedated, intubated.   HEENT: Normocephalic, atraumatic, GORDON.  Respiratory: Clear to auscultation anteriorly. Mild crackles over left lung base auscultated from side.   Cardiovascular: regular rhythm, normal S1 and S2, no audible murmurs.   GI: soft, non-tender, non-distended, bowel sounds present. NGT in place with feeds running at goal of 43 mL/hr.  Extremities: +1 lower extremity edema.    LABS  CBC - WBC/HGB/HTC/PLT: 5.07/7.3/23.4/143 (22)  BMP - Na/K/Cl/Bicarb/BUN/Cr/Gluc/AG/eGFR: 137/3.3/106/17/81/2.75/165/14/22 (22)  Ca - 8.2 (22)  Phos - 2.7 (22)  Mg - 2.0 (22)  LFT - Alb/Tprot/Tbili/Dbili/AlkPhos/ALT/AST: 2.2/--/0.2/--/71/34/41 (22)  PT/aPTT/INR: 12.6/47.5/1.06 (22)   Thyroid Stimulating Hormone, Serum: 9.260 (22)  Total T4/Free T4: --/0.698 (22)    MEDICATIONS  MEDICATIONS  (STANDING):  albuterol/ipratropium for Nebulization 3 milliLiter(s) Nebulizer every 6 hours  amLODIPine   Tablet 5 milliGRAM(s) Oral every 24 hours  aspirin  chewable 81 milliGRAM(s) Oral daily  atorvastatin 40 milliGRAM(s) Oral at bedtime  carvedilol 25 milliGRAM(s) Oral every 12 hours  chlorhexidine 0.12% Liquid 15 milliLiter(s) Oral Mucosa every 12 hours  chlorhexidine 2% Cloths 1 Application(s) Topical <User Schedule>  dextrose 5%. 1000 milliLiter(s) (50 mL/Hr) IV Continuous <Continuous>  dextrose 5%. 1000 milliLiter(s) (100 mL/Hr) IV Continuous <Continuous>  dextrose 50% Injectable 25 Gram(s) IV Push once  dextrose 50% Injectable 25 Gram(s) IV Push once  dextrose 50% Injectable 12.5 Gram(s) IV Push once  glucagon  Injectable 1 milliGRAM(s) IntraMuscular once  heparin   Injectable 5000 Unit(s) SubCutaneous every 8 hours  hydrocortisone sodium succinate Injectable 25 milliGRAM(s) IV Push every 12 hours  influenza  Vaccine (HIGH DOSE) 0.7 milliLiter(s) IntraMuscular once  insulin regular  human corrective regimen sliding scale   SubCutaneous every 6 hours  insulin regular  human recombinant 12 Unit(s) SubCutaneous every 6 hours  levothyroxine Injectable 50 MICROGram(s) IV Push at bedtime  mycophenolate mofetil Suspension 500 milliGRAM(s) Oral every 12 hours  propofol Infusion 10 MICROgram(s)/kG/Min (4.94 mL/Hr) IV Continuous <Continuous>  sodium chloride 7% Inhalation 4 milliLiter(s) Inhalation every 12 hours  tacrolimus    0.5 mG/mL Suspension 2 milliGRAM(s) Oral every 24 hours  tacrolimus    0.5 mG/mL Suspension 3 milliGRAM(s) Oral every 24 hours  tamsulosin 0.4 milliGRAM(s) Oral at bedtime  trimethoprim / sulfamethoxazole IVPB 325 milliGRAM(s) IV Intermittent every 12 hours    MEDICATIONS  (PRN):  acetaminophen     Tablet .. 650 milliGRAM(s) Oral every 6 hours PRN Mild Pain (1 - 3)  dextrose Oral Gel 15 Gram(s) Oral once PRN Blood Glucose LESS THAN 70 milliGRAM(s)/deciliter  sodium chloride 0.9% lock flush 10 milliLiter(s) IV Push every 1 hour PRN Pre/post blood products, medications, blood draw, and to maintain line patency    ASSESSMENT / RECOMMENDATIONS  Mr. Miguel is an 83-year-old male with type 2 diabetes mellitus, coronary artery disease (s/p DAMEON x2 to LAD on 2021), chronic kidney disease stage 4 (s/p renal transplant on , on tracrolimus and prednisone) and benign prostatic hyperplasia who was sent to the hospital by his nephrologist for further evaluation of lower extremity edema (22). He was admitted for further management of LE edema, thought to be secondary to his underlying CKD versus congestive heart failure. Hospitalization was complicated by hypoglycemia and cardiac arrest (22, ROSC after 1 minute of chest compressions). Post-cardiac arrest he was noted to develop hypotension, bradycardia and hypothermia. Labs were remarkable for elevated TSH (20) and decreased FT4 (0.77). 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     Although he had features seen with myxedema coma, these findings were better explained by his cardiac arrest as his vital signs prior to the arrest were apparently around his baseline, as was his mental status. Nonetheless, he was started on levothyroxine 25 mcg IV daily (22) which has been titrated up to 50 mcg daily (22). In addition, given concern for adrenal insufficiency, he was started on stress-dose steroids which were titrated down to 25 mg every 12 hours. The patient was extubated on 22; however, he had to be reintubated on 22 due to episode of respiratory decompensation, potentially secondary to aspiration. After reintubation, his insulin requirements have increased, presumably due to underlying aspiration pneumonia.     A1C: 9.0 %  BUN: 81  Creatinine: 2.75  GFR: 22  Weight: 82.3  BMI: 28.4  EF: 65%, grade II diastolic dysfunction.    # Type 2 diabetes mellitus  - Patient's insulin requirements have increased since he was reintubated () likely due to underlying stress from aspiration pneumonia. He missed his long-acting insulin last night and his glucose chris more than expected, which makes us think that his insulin requirements might be even higher.   - Please increase with Lantus to 16 units at bedtime.  - Continue with regular insulin to 7 units every 6 hours.   - Continue regular insulin moderate dose sliding scale before meals and at bedtime.  - Patient's fingerstick glucose goal is 100-180 mg/dL.      # Hypothyroidism  - TSH 20. FT4 0.77 (22).   - TSH 9.2. FT4 0.69 (22).  - Continue with levothyroxine to 50 mcg IV daily.     # Concern for adrenal insufficiency  - Patient has been on prednisone 5 mg daily for renal transplant.   - He had recent hospitalization on 2022 for pneumonia due to pseudomonas where he was also started on stress dose steroids. At that time, a cortisol level was collected prior to starting steroids which was not robust (6.5 ug/dL).   - The patient remains hemodynamically stable. Continue with hydrocortisone to 25 mg IV every 12 hours.     Case discussed with Dr. Rabago. Primary team updated.       Ulysses Weber    Endocrinology Fellow    Service Pager: [phone]

## 2022-11-29 NOTE — PROGRESS NOTE ADULT - SUBJECTIVE AND OBJECTIVE BOX
Patient is a 82y Male seen and evaluated at bedside remains stable on the vent. Creatinine noted 2.75 from 2.49. K of 3.3.      Meds:    acetaminophen     Tablet .. 650 every 6 hours PRN  albuterol/ipratropium for Nebulization 3 every 6 hours  amLODIPine   Tablet 5 every 24 hours  aspirin  chewable 81 daily  atorvastatin 40 at bedtime  carvedilol 25 every 12 hours  chlorhexidine 0.12% Liquid 15 every 12 hours  chlorhexidine 2% Cloths 1 <User Schedule>  dextrose 5%. 1000 <Continuous>  dextrose 5%. 1000 <Continuous>  dextrose 50% Injectable 25 once  dextrose 50% Injectable 25 once  dextrose 50% Injectable 12.5 once  dextrose Oral Gel 15 once PRN  glucagon  Injectable 1 once  heparin   Injectable 5000 every 8 hours  hydrocortisone sodium succinate Injectable 25 every 12 hours  influenza  Vaccine (HIGH DOSE) 0.7 once  insulin regular  human corrective regimen sliding scale  every 6 hours  insulin regular  human recombinant 12 every 6 hours  levothyroxine Injectable 50 at bedtime  mycophenolate mofetil Suspension 500 every 12 hours  propofol Infusion 10 <Continuous>  sodium chloride 0.9% lock flush 10 every 1 hour PRN  sodium chloride 7% Inhalation 4 every 12 hours  tacrolimus    0.5 mG/mL Suspension 2 every 24 hours  tacrolimus    0.5 mG/mL Suspension 3 every 24 hours  tamsulosin 0.4 at bedtime  trimethoprim / sulfamethoxazole IVPB 325 every 12 hours      T(C): , Max: 36.9 (11-28-22 @ 17:05)  T(F): , Max: 98.4 (11-28-22 @ 17:05)  HR: 54 (11-29-22 @ 11:00)  BP: 116/53 (11-29-22 @ 11:00)  BP(mean): 76 (11-29-22 @ 11:00)  RR: 21 (11-29-22 @ 11:00)  SpO2: 95% (11-29-22 @ 11:00)  Wt(kg): --    11-28 @ 07:01  -  11-29 @ 07:00  --------------------------------------------------------  IN: 2288.8 mL / OUT: 605 mL / NET: 5133.8 mL    11-29 @ 07:01  -  11-29 @ 11:48  --------------------------------------------------------  IN: 670 mL / OUT: 36 mL / NET: 634 mL    Review of Systems:  ROS negative except as per HPI    PHYSICAL EXAM:  General: intubated and sedated  HEENT: NC/AT; PERRL, anicteric sclera; MMM  Neck: supple  Cardiovascular: +S1/S2, RRR, no murmurs, rubs, gallops  Respiratory: CTA B/L; no W/R/R  Gastrointestinal: soft, NT/ND; +BSx4  Extremities: WWP; 2+ pitting edema b/l upper extremities   Vascular: 2+ radial, DP/PT pulses B/L  Neurological: intubated and sedated    LABS:                        7.3    5.07  )-----------( 143      ( 29 Nov 2022 04:21 )             23.4     11-29    137  |  106  |  81<H>  ----------------------------<  165<H>  3.3<L>   |  17<L>  |  2.75<H>    Ca    8.2<L>      29 Nov 2022 04:21  Phos  2.7     11-29  Mg     2.0     11-29    TPro  4.5<L>  /  Alb  2.2<L>  /  TBili  0.2  /  DBili  x   /  AST  41<H>  /  ALT  34  /  AlkPhos  71  11-29      PT/INR - ( 29 Nov 2022 04:21 )   PT: 12.6 sec;   INR: 1.06          PTT - ( 29 Nov 2022 04:21 )  PTT:47.5 sec    Sodium, Random Urine: <20 mmol/L (11-29 @ 04:22)  Creatinine, Random Urine: 45 mg/dL (11-29 @ 04:22)  Osmolality, Random Urine: 373 mosm/kg (11-29 @ 04:22)  Sodium, Random Urine: <20 mmol/L (11-28 @ 06:37)  Creatinine, Random Urine: 40 mg/dL (11-28 @ 06:37)        RADIOLOGY & ADDITIONAL STUDIES:

## 2022-11-29 NOTE — PROGRESS NOTE ADULT - ATTENDING COMMENTS
Pt seen on rounds this afternoon.  Remains intubated/sedated.  As noted by Dr. Robertson, Lantus was held last night but he appears to have been given 12 units of regular at MN (even though feeds were held at that point in anticipation of tracheostomy today)  He was still elevated to 152 at 6 AM even though feeds were off.  Was bridged with 5 NPH late this morning due to lack of Lantus on board  Exam is essentially unchanged.  Abdomen is obese but soft and non-distended.  Breath sounds on the left still somewhat decreased relative to the right, with faint rales at the left base.  No LE edema  --Will increase the Lantus slightly to 16 units, but it is still difficult to determine his exact basal requirements while he is getting feeds.  (Will perhaps be able to do this during the 24 hours when he is off feeds following PEG placement  --Continue the nutritional regular insulin 7 units q6h  --Continue the hydrocortisone 25 mg q12 to cover the increased stress of his current level of illness

## 2022-11-29 NOTE — PROGRESS NOTE ADULT - ATTENDING COMMENTS
Acute hypoxic respiratory failure, unable to wean off ventilator with h/o kidney transplant with acute on chronic kidney disease with persistent encephalopathy. Restart tube feed. Plan for trach and PEG after getting consent from the family. Rest as above.

## 2022-11-29 NOTE — PROGRESS NOTE ADULT - ASSESSMENT
84 y/o M PMH CKD 4, renal transplant in 2013 at [facility], glaucoma (blind), DM1, anemia, CAD s/p 2 DAMEON to LAD in 6/21, BPH, recent admission for PNA presents with acute on chronic cough and SUNNY 2/2 acute CHF vs CKD.       NEUROLOGY  #Intubated  Patient reintubated on 11/26 for likely aspiration event. Pupils still anisocoric. CT scan negative for acute intracranial pathology.   - off propofol sedation today     CARDIOVASCULAR  #HFpEF  Had EF of 55% on TTE from Oct 2022, grade I diastolic dysfunction. Repeat TTE 11/17 with poor visualization of LV. Patient making good UOP overnight  - c/w carvedilol 25mg BID     #Upper extremity edema  Stable from yesterday but increased compared to baseline. US with superficial thrombosis of L cephalic vein extending to AC fossa, no DVTs.   - given patients propensity to bleed and superficial nature of thrombus, no indication for AC    #CAD  #HLD  Hx of CAD s/p 2 DAMEON to LAD in June 2021, currently no CP. Trops 0.03 but no CP or ischemic changes on EKG, likely due to CKD. Takes Plavix and aspirin per last d/c but only aspirin on outpatient note  - c/w ASA daily  - c/w home Lipitor    #HTN   Known history of HTN. Hypertensive overnight.  - resumed home amlodipine 5mg qD     PULM  #AHRF  Likely 2/2 aspiration event. Now intubated.  - ID management as below for aspiration     RENAL  #ELIZABETH on Chronic kidney disease (CKD).  Baseline sCr 2.3-2.5. On admission, fluid overloaded. Does have orthopnea and intermittent SOB at baseline. Acidotic but at baseline. Follows w Dr. Mac. Urine studies this AM suggest pre-renal etiology. Given 300cc pRBCs and 500cc LR bolus o/n with good UOP.  - today sCr and BUN stable  - monitor I/Os  - renally dose meds    #Renal transplant  Patient had renal transplant in 2013. On home mycophenolate 500mg BID and tacrolimus 4mg BID. Per nephro, decreased home tacrolimus from 4mg BID --> 2mg BID while in hospital.   - c/w tacrolimus 3mg in AM, c/w 2mg in PM  - continue holding mycophenolate i/s/o infection  - f/u renal recs    #Hypernatremia - RESOLVED  - tube feeds with 250cc q6 --> adjusted fwf to 250cc BID    GI  #Diarrhea - RESOLVED  Patient with loose stool 2 nights ago, have resolved this AM after started on miralax and senna.  - dced miralax and senna  - can consider adding banana flakes to tube feeds if continues  - will continue to monitor for BMs    #Nutrition  NPO with tube feeds.   - continue for now patient may require trach/peg if unable to extubate      #BPH (benign prostatic hyperplasia).   - c/w tamsulosin 0.4mg daily    ID  #Aspiration PNA  Tracheal aspirate cx growing serratia marcescens and pseudomonas. Patient was started on vancomycin and cefepime. MRSA nares positive. Dc'ed vancomycin given supratherapeutic trough. Completed cefepime 2g q24 course x9 days.    ENDOCRINE  #DM (diabetes mellitus).   Previously on lantus 15 lispro 5 at home. Endocrine following. Elevated FSGs in 200s today.  - increase lantus from 10U --> 20U in PM  - decrease regular insulin 3U TID --> 5U TID  - start medium dose regular ISS  - f/u endo recs  - goal -180    #Hypothyroidism  TSH 20.8, free t4 0.7, T3 49 (low). Repeat TSH at 9, fT4 0.7  - per endo rec no suspicion of myxedema coma  - continue with synthroid 25mg qD    #Relative Adrenal Insufficiency  Patient on chronic prednisone 5mg daily. He was started on hydrocortisone 50mg q6hr --> q8.  - decrease HC 50mg TID --> BID  - wean as tolerated  - f/u endocrine recs    HEME  #Normocytic Anemia  Likely 2.2 CKD. Hgb 6.9 on 11/21, corrected appropriately s/p 1u pRBC. No active signs of bleeding on physical exam. CTH with no intracranial bleeding. Required 2u pRBC to correct. DEBRA negative on 11  - active T+S  - transfuse <7    FLUIDS/ELECTROLYTES/NUTRITION  -IVF as above  -Monitor, careful w advanced kidney disease  -Diet: NPO with tube feeds  -DVT ppx: heparin TID  -GI: none  -Dispo: PT recommending [facility] 84 y/o M PMH CKD 4, renal transplant in 2013 at [facility], glaucoma (blind), DM1, anemia, CAD s/p 2 DAEMON to LAD in 6/21, BPH, recent admission for PNA presents with acute on chronic cough and SUNNY 2/2 acute CHF vs CKD.       NEUROLOGY  #Intubated  Patient reintubated on 11/26 for likely aspiration event. Pupils still anisocoric. CT scan negative for acute intracranial pathology.   - off propofol sedation today   - plan for trach and peg Thursday    CARDIOVASCULAR  #HFpEF  Had EF of 55% on TTE from Oct 2022, grade I diastolic dysfunction. Repeat TTE 11/17 with poor visualization of LV. Patient making good UOP overnight  - c/w carvedilol 25mg BID     #Upper extremity edema  Stable from yesterday but increased compared to baseline. US with superficial thrombosis of L cephalic vein extending to AC fossa, no DVTs.   - given patients propensity to bleed and superficial nature of thrombus, no indication for AC    #CAD  #HLD  Hx of CAD s/p 2 DAMEON to LAD in June 2021, currently no CP. Trops 0.03 but no CP or ischemic changes on EKG, likely due to CKD. Takes Plavix and aspirin per last d/c but only aspirin on outpatient note  - c/w ASA daily  - c/w home Lipitor    #HTN   Known history of HTN. Hypertensive overnight.  - resumed home amlodipine 5mg qD     PULM  #AHRF  Likely 2/2 aspiration event. Now intubated.  - ID management as below for aspiration     RENAL  #ELIZABETH on Chronic kidney disease (CKD).  Baseline sCr 2.3-2.5. On admission, fluid overloaded. Does have orthopnea and intermittent SOB at baseline. Acidotic but at baseline. Follows w Dr. Mac. Urine studies this AM suggest pre-renal etiology. Given 350cc overnight, 1L bolus today  - today sCr and BUN increased  - monitor I/Os  - consider additional 1L LR bolus  - renally dose meds    #Renal transplant  Patient had renal transplant in 2013. On home mycophenolate 500mg BID and tacrolimus 4mg BID. Per nephro, decreased home tacrolimus from 4mg BID --> 2mg BID while in hospital.   - c/w tacrolimus 3mg in AM, c/w 2mg in PM  - resume mycophenolate 500mg BID  - f/u renal recs    #Hypernatremia - RESOLVED  - tube feeds with 250cc q6 --> adjusted fwf to 250cc BID    GI  #Diarrhea - RESOLVED  Patient with loose stool 2 nights ago, have resolved this AM after started on miralax and senna.  - dced miralax and senna  - will continue to monitor for BMs    #Nutrition  NPO with tube feeds.   - continue for now patient may require trach/peg if unable to extubate      #BPH (benign prostatic hyperplasia).   - c/w tamsulosin 0.4mg daily    ID  #Aspiration PNA  Tracheal aspirate cx (11/19) growing serratia marcescens and pseudomonas. Patient was started on vancomycin and cefepime. MRSA nares positive. Dc'ed vancomycin given supratherapeutic trough. Completed cefepime 2g q24 course x9 days.    #Stenotrophomonas in sputum Cx (11/29)  - started on bactrim BS yesterday    ENDOCRINE  #DM (diabetes mellitus).   Previously on lantus 15 lispro 5 at home. Endocrine following. Elevated FSGs in 200s today.  - given NPH this AM  - resumed on regular insulin 7u TID  - c/w medium dose regular ISS  - f/u endo recs  - goal -180    #Hypothyroidism  TSH 20.8, free t4 0.7, T3 49 (low). Repeat TSH at 9, fT4 0.7  - increased synthroid 25mg qD --> 50mg qD  - f/u endo recs    #Relative Adrenal Insufficiency  Patient on chronic prednisone 5mg daily. He was started on hydrocortisone 50mg q6hr --> q8 --> BID   - c/w HC 50mg BID  - wean as tolerated  - f/u endocrine recs    HEME  #Normocytic Anemia  Likely 2.2 CKD. Hgb 6.9 on 11/21, corrected appropriately s/p 1u pRBC. No active signs of bleeding on physical exam. CTH with no intracranial bleeding. Required 2u pRBC to correct. DEBRA and stool guaic negative.  - fr   - active T+S  - transfuse <7    FLUIDS/ELECTROLYTES/NUTRITION  -IVF as above  -Monitor, careful w advanced kidney disease  -Diet: NPO with tube feeds  -DVT ppx: heparin TID  -GI: none  -Dispo: PT recommending [facility] 84 y/o M PMH CKD 4, renal transplant in 2013 at [facility], glaucoma (blind), DM1, anemia, CAD s/p 2 DAMEON to LAD in 6/21, BPH, recent admission for PNA presents with acute on chronic cough and SUNNY 2/2 acute CHF vs CKD.       NEUROLOGY  #Intubated  Patient reintubated on 11/26 for likely aspiration event. Pupils still anisocoric. CT scan negative for acute intracranial pathology.   - off propofol sedation today   - plan for trach and peg Thursday    CARDIOVASCULAR  #HFpEF  Had EF of 55% on TTE from Oct 2022, grade I diastolic dysfunction. Repeat TTE 11/17 with poor visualization of LV. Patient making good UOP overnight  - c/w carvedilol 25mg BID     #Upper extremity edema  Stable from yesterday but increased compared to baseline. US with superficial thrombosis of L cephalic vein extending to AC fossa, no DVTs.   - given patients propensity to bleed and superficial nature of thrombus, no indication for AC    #CAD  #HLD  Hx of CAD s/p 2 DAMEON to LAD in June 2021, currently no CP. Trops 0.03 but no CP or ischemic changes on EKG, likely due to CKD. Takes Plavix and aspirin per last d/c but only aspirin on outpatient note  - c/w ASA daily  - c/w home Lipitor    #HTN   Known history of HTN.   - resumed home amlodipine 5mg qD   - continue to monitor BP    PULM  #AHRF  Likely 2/2 aspiration event. Now intubated.  - ID management as below for aspiration     RENAL  #ELIZABETH on Chronic kidney disease (CKD).  Baseline sCr 2.3-2.5. On admission, fluid overloaded. Does have orthopnea and intermittent SOB at baseline. Acidotic but at baseline. Follows w Dr. Mac. Urine studies this AM suggest pre-renal etiology. Given 350cc overnight, 1L bolus today  - today sCr and BUN increased  - monitor I/Os  - consider additional 1L LR bolus  - renally dose meds    #Renal transplant  Patient had renal transplant in 2013. On home mycophenolate 500mg BID and tacrolimus 4mg BID. Per nephro, decreased home tacrolimus from 4mg BID --> 2mg BID while in hospital.   - c/w tacrolimus 3mg in AM, c/w 2mg in PM  - resume mycophenolate 500mg BID  - next tacrolimus level before PM dose 11/29  - f/u renal recs    #Hypernatremia - RESOLVED  - tube feeds with 250cc q6 --> adjusted fwf to 250cc BID    GI  #Diarrhea - RESOLVED  Patient with loose stool 2 nights ago, have resolved this AM after started on miralax and senna.  - dced miralax and senna  - will continue to monitor for BMs    #Nutrition  NPO with tube feeds.   - continue for now   - will hold prior to trach/peg, pending consent      #BPH (benign prostatic hyperplasia).   - c/w tamsulosin 0.4mg daily    ID  #Aspiration PNA   Tracheal aspirate cx (11/19) growing serratia marcescens and pseudomonas. Patient was started on vancomycin and cefepime. MRSA nares positive. Dc'ed vancomycin given supratherapeutic trough. Completed cefepime 2g q24 course x9 days.    #Stenotrophomonas in sputum cx  Sputum cx from 11/26 growing Stenotrophomonas  - started on bactrim BS yesterday    ENDOCRINE  #DM (diabetes mellitus).   Previously on lantus 15 lispro 5 at home. Endocrine following. Elevated FSGs in 200s today.  - given NPH this AM  - resumed on regular insulin 7u TID  - c/w medium dose regular ISS  - f/u endo recs  - goal -180    #Hypothyroidism  TSH 20.8, free t4 0.7, T3 49 (low). Repeat TSH at 9, fT4 0.7  - increased synthroid 25mg qD --> 50mg qD  - f/u endo recs    #Relative Adrenal Insufficiency  Patient on chronic prednisone 5mg daily. He was started on hydrocortisone 50mg q6hr --> q8 --> BID   - c/w HC 50mg BID  - wean as tolerated  - f/u endocrine recs    HEME  #Normocytic Anemia  Likely 2.2 CKD. Hgb 6.9 on 11/21, corrected appropriately s/p 1u pRBC. No active signs of bleeding on physical exam. CTH with no intracranial bleeding. Required 2u pRBC to correct. DEBRA and stool guaic negative.  - consider epo, if BP remains well controlled  - active T+S  - transfuse <7    FLUIDS/ELECTROLYTES/NUTRITION  -IVF as above  -Monitor, careful w advanced kidney disease  -Diet: NPO with tube feeds  -DVT ppx: heparin TID  -GI: none  -Dispo: PT recommending [facility]

## 2022-11-29 NOTE — PROGRESS NOTE ADULT - SUBJECTIVE AND OBJECTIVE BOX
Interval Events: Reviewed  Patient seen and examined at bedside.    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      History of renal transplant  secondary to DM      Chronic kidney disease (CKD)      Glaucoma      Kidney transplant recipient      Amputation of toe  x 3      H/O heart artery stent          MEDICATIONS:  Pulmonary:  albuterol/ipratropium for Nebulization 3 milliLiter(s) Nebulizer every 6 hours  sodium chloride 7% Inhalation 4 milliLiter(s) Inhalation every 12 hours    Antimicrobials:  trimethoprim / sulfamethoxazole IVPB 325 milliGRAM(s) IV Intermittent every 12 hours    Anticoagulants:  aspirin  chewable 81 milliGRAM(s) Oral daily  heparin   Injectable 5000 Unit(s) SubCutaneous every 8 hours    Cardiac:  amLODIPine   Tablet 5 milliGRAM(s) Oral every 24 hours  carvedilol 25 milliGRAM(s) Oral every 12 hours      Allergies    No Known Allergies    Intolerances        Vital Signs Last 24 Hrs  T(C): 36.3 (29 Nov 2022 18:13), Max: 36.7 (29 Nov 2022 01:02)  T(F): 97.3 (29 Nov 2022 18:13), Max: 98.1 (29 Nov 2022 01:02)  HR: 60 (29 Nov 2022 21:00) (51 - 69)  BP: 114/49 (29 Nov 2022 21:00) (112/45 - 132/59)  BP(mean): 70 (29 Nov 2022 21:00) (64 - 85)  RR: 29 (29 Nov 2022 21:00) (15 - 32)  SpO2: 95% (29 Nov 2022 21:00) (90% - 98%)    Parameters below as of 29 Nov 2022 21:00  Patient On (Oxygen Delivery Method): ventilator    O2 Concentration (%): 35    11-28 @ 07:01  -  11-29 @ 07:00  --------------------------------------------------------  IN: 2288.8 mL / OUT: 605 mL / NET: 1683.8 mL    11-29 @ 07:01  -  11-29 @ 22:19  --------------------------------------------------------  IN: 2759 mL / OUT: 289 mL / NET: 2470 mL      Mode: AC/ CMV (Assist Control/ Continuous Mandatory Ventilation)  RR (machine): 12  TV (machine): 450  FiO2: 35  PEEP: 5  ITime: 1  MAP: 12  PIP: 24      Review of Systems:   •	General: negative  •	Skin/Breast: negative  •	Ophthalmologic: negative  •	ENMT: negative  •	Respiratory and Thorax: negative  •	Cardiovascular: negative  •	Gastrointestinal: negative  •	Genitourinary: negative  •	Musculoskeletal: negative  •	Neurological: negative  •	Psychiatric: negative  •	Hematology/Lymphatics: negative  •	Endocrine: negative  •	Allergic/Immunologic: negative    Physical Exam:   • Constitutional:	refer to the dietion /Nutritionist note  • Eyes:	EOMI; PERRL; no drainage or redness  • ENMT:	No oral lesions; no gross abnormalities  • Neck	No bruits; no thyromegaly or nodules  • Breasts:	not examined  • Back:	No deformity or limitation of movement  • Respiratory:	Breath Sounds equal & clear to percussion & auscultation, no accessory muscle use  • Cardiovascular:	Regular rate & rhythm, normal S1, S2; no murmurs, gallops or rubs; no S3, S4  • Gastrointestinal:	Soft, non-tender, no hepatosplenomegaly, normal bowel sounds  • Genitourinary:	not examined  • Rectal: not examined  • Extremities:	No cyanosis, clubbing or edema  • Vascular:	Equal and normal pulses (carotid, femoral, dorsalis pedis)  • Neurologica:l	not examined  • Skin:	No lesions; no rash  • Lymph Nodes:	No lymphadedenopathy  • Musculoskeletal:	No joint pain, swelling or deformity; no limitation of movement        LABS:      CBC Full  -  ( 29 Nov 2022 04:21 )  WBC Count : 5.07 K/uL  RBC Count : 2.71 M/uL  Hemoglobin : 7.3 g/dL  Hematocrit : 23.4 %  Platelet Count - Automated : 143 K/uL  Mean Cell Volume : 86.3 fl  Mean Cell Hemoglobin : 26.9 pg  Mean Cell Hemoglobin Concentration : 31.2 gm/dL  Auto Neutrophil # : 4.25 K/uL  Auto Lymphocyte # : 0.34 K/uL  Auto Monocyte # : 0.33 K/uL  Auto Eosinophil # : 0.11 K/uL  Auto Basophil # : 0.00 K/uL  Auto Neutrophil % : 83.8 %  Auto Lymphocyte % : 6.7 %  Auto Monocyte % : 6.5 %  Auto Eosinophil % : 2.2 %  Auto Basophil % : 0.0 %    11-29    137  |  106  |  81<H>  ----------------------------<  165<H>  3.3<L>   |  17<L>  |  2.75<H>    Ca    8.2<L>      29 Nov 2022 04:21  Phos  2.7     11-29  Mg     2.0     11-29    TPro  4.5<L>  /  Alb  2.2<L>  /  TBili  0.2  /  DBili  x   /  AST  41<H>  /  ALT  34  /  AlkPhos  71  11-29    PT/INR - ( 29 Nov 2022 04:21 )   PT: 12.6 sec;   INR: 1.06          PTT - ( 29 Nov 2022 04:21 )  PTT:47.5 sec                Culture Results:   No growth at 1 day. (11-28 @ 15:41)  Culture Results:   No growth at 1 day. (11-28 @ 15:41)      RADIOLOGY & ADDITIONAL STUDIES (The following images were personally reviewed):

## 2022-11-30 NOTE — PROGRESS NOTE ADULT - ASSESSMENT
82 y/o M PMH CKD 4, renal transplant in 2013 at [facility], glaucoma (blind), DM1, anemia, CAD s/p 2 DAMEON to LAD in 6/21, BPH, recent admission for PNA presents with acute on chronic cough and SUNNY 2/2 acute CHF vs CKD.        NEUROLOGY  #Intubated  Patient reintubated on 11/26 for likely aspiration event. Pupils still anisocoric. CT scan negative for acute intracranial pathology.   - plan for trach and peg Thursday pending discussion with family, feeds held for this    CARDIOVASCULAR  #HFpEF  Had EF of 55% on TTE from Oct 2022, grade I diastolic dysfunction. Repeat TTE 11/17 with poor visualization of LV. Patient making good UOP overnight  - c/w carvedilol 25mg BID     #Upper extremity edema  Stable from yesterday but increased compared to baseline. US with superficial thrombosis of L cephalic vein extending to AC fossa, no DVTs.   - given patients propensity to bleed and superficial nature of thrombus, no indication for AC    #CAD  #HLD  Hx of CAD s/p 2 DAMEON to LAD in June 2021, currently no CP. Trops 0.03 but no CP or ischemic changes on EKG, likely due to CKD. Takes Plavix and aspirin per last d/c but only aspirin on outpatient note  - c/w ASA daily  - c/w home Lipitor    #HTN   Known history of HTN.   - resumed home amlodipine 5mg qD   - continue to monitor BP    PULM  #AHRF  Likely 2/2 aspiration event. Now intubated.  - ID management as below for aspiration     RENAL  #ELIZABETH on Chronic kidney disease (CKD).  Baseline sCr 2.3-2.5. On admission, fluid overloaded. Does have orthopnea and intermittent SOB at baseline. Acidotic but at baseline. Follows w Dr. Mac. Urine studies this AM suggest pre-renal etiology. Given 350cc overnight, 1L bolus today  - today sCr and BUN increased  - monitor I/Os  - gave 500cc bolus over 30 mins (11/30)  - renally dose meds    #Renal transplant  Patient had renal transplant in 2013. On home mycophenolate 500mg BID and tacrolimus 4mg BID. Per nephro, decreased home tacrolimus from 4mg BID --> 2mg BID while in hospital.   - c/w tacrolimus 3mg in AM, c/w 2mg in PM  - resume mycophenolate 500mg BID  - next tacrolimus level before PM dose 11/30  - f/u renal recs    #Hypernatremia - RESOLVED  - tube feeds with 250cc q6 --> adjusted fwf to 250cc BID    GI  #Diarrhea - RESOLVED  Patient with loose stool 2 nights ago, have resolved this AM after started on miralax and senna.  - dced miralax and senna  - will continue to monitor for BMs    #Nutrition  NPO with tube feeds.   - holding prior to trach/peg, pending consent      #BPH (benign prostatic hyperplasia).   - c/w tamsulosin 0.4mg daily    ID  #Aspiration PNA   Tracheal aspirate cx (11/19) growing serratia marcescens and pseudomonas. Patient was started on vancomycin and cefepime. MRSA nares positive. Dc'ed vancomycin given supratherapeutic trough. Completed cefepime 2g q24 course x9 days.    #Stenotrophomonas in sputum cx  Sputum cx from 11/26 growing Stenotrophomonas  - started on bactrim BS yesterday    ENDOCRINE  #DM (diabetes mellitus).   Previously on lantus 15 lispro 5 at home. Endocrine following. Elevated FSGs in 200s today.  - given NPH this AM  - resumed on regular insulin 7u TID  - c/w medium dose regular ISS  - f/u endo recs  - goal -180    #Hypothyroidism  TSH 20.8, free t4 0.7, T3 49 (low). Repeat TSH at 9, fT4 0.7  - increased synthroid 25mg qD --> 50mg qD  - f/u endo recs    #Relative Adrenal Insufficiency  Patient on chronic prednisone 5mg daily. He was started on hydrocortisone 50mg q6hr --> q8 --> BID   - c/w HC 50mg BID  - wean as tolerated  - f/u endocrine recs    HEME  #Normocytic Anemia  Likely 2.2 CKD. Hgb 6.9 on 11/21, corrected appropriately s/p 1u pRBC. No active signs of bleeding on physical exam. CTH with no intracranial bleeding. Required 2u pRBC to correct. DEBRA and stool guaic negative.  - consider epo, if BP remains well controlled  - active T+S  - transfuse <7    FLUIDS/ELECTROLYTES/NUTRITION  -IVF as above  -Monitor, careful w advanced kidney disease  -Diet: NPO with tube feeds  -DVT ppx: heparin TID  -GI: none  -Dispo: PT recommending [facility]

## 2022-11-30 NOTE — PROGRESS NOTE ADULT - SUBJECTIVE AND OBJECTIVE BOX
OVERNIGHT: No acute events overnight.   SUBJECTIVE / INTERVAL HPI: Patient was seen and examined this morning. Tube feeds were stopped at around 8 AM for PEG placement and nutritional insulin was stopped starting at noon. He had successful PEG tube placement earlier today and will remain NPO for a total of 24 hours. He remains sedated and intubated, hemodynamically stable with BP ~120s/70s mmHg at time of visit. He continues to receive hydrocortisone 25 mg IV every 12 hours and levothyroxine 50 mcg IV daily.     CAPILLARY BLOOD GLUCOSE & INSULIN RECEIVED  Yesterday  - 12 PM FS mg/dL = 5 units of NPH insulin + 4 units of sliding scale.   - 6 PM FS mg/dL = 7 units of regular insulin + 4 units of sliding scale.   - 10 PM FS mg/dL = 16 units of Lantus.     Today  - 12 AM FS mg/dL = 7 units of regular insulin + 2 units of sliding scale.   - 6 AM FS mg/dL = 7 units of regular insulin + 2 units of sliding scale.  -> Tube feeds were stopped at ~8 AM.   - 12 PM FS mg/dL (NPO) = He didn't received insulin.     REVIEW OF SYSTEMS  Unable to obtain.     PHYSICAL EXAM  Vital Signs Last 24 Hrs  T(C): 37 (2022 17:26), Max: 37.1 (2022 14:35)  T(F): 98.6 (2022 17:26), Max: 98.8 (2022 14:35)  HR: 60 (2022 17:00) (57 - 65)  BP: 117/49 (2022 17:00) (110/51 - 142/90)  BP(mean): 71 (2022 17:00) (65 - 101)  RR: 22 (2022 17:00) (14 - 31)  SpO2: 93% (2022 17:00) (90% - 96%)    Parameters below as of 2022 17:00  Patient On (Oxygen Delivery Method): ventilator    O2 Concentration (%): 35    Constitutional: Elderly male, sedated, intubated.   HEENT: Normocephalic, atraumatic, GORDON.  Respiratory: Lungs clear to auscultation bilaterally.   Cardiovascular: regular rhythm, normal S1 and S2, no audible murmurs.   GI: soft, non-tender, non-distended, bowel sounds present. (+) PEG tube in place.   Extremities: +1 lower extremity edema.    LABS  CBC - WBC/HGB/HTC/PLT: 3.81/7.2/23.0/155 (22)  BMP - Na/K/Cl/Bicarb/BUN/Cr/Gluc/AG/eGFR: 137/3.7/106/21/91/3.12/140/10/19 (22)  Ca - 8.0 (22)  Phos - 2.7 (22)  Mg - 1.9 (22)  LFT - Alb/Tprot/Tbili/Dbili/AlkPhos/ALT/AST: 2.2/--/0.2/--/71/34/41 (22)  PT/aPTT/INR: 12.6/47.5/1.06 (22)   Thyroid Stimulating Hormone, Serum: 9.260 (22)  Total T4/Free T4: --/0.698 (22)    MEDICATIONS  MEDICATIONS  (STANDING):  albuterol/ipratropium for Nebulization 3 milliLiter(s) Nebulizer every 6 hours  amLODIPine   Tablet 5 milliGRAM(s) Oral every 24 hours  aspirin  chewable 81 milliGRAM(s) Oral daily  atorvastatin 40 milliGRAM(s) Oral at bedtime  carvedilol 25 milliGRAM(s) Oral every 12 hours  chlorhexidine 0.12% Liquid 15 milliLiter(s) Oral Mucosa every 12 hours  chlorhexidine 2% Cloths 1 Application(s) Topical <User Schedule>  dextrose 5%. 1000 milliLiter(s) (50 mL/Hr) IV Continuous <Continuous>  dextrose 5%. 1000 milliLiter(s) (100 mL/Hr) IV Continuous <Continuous>  dextrose 50% Injectable 25 Gram(s) IV Push once  dextrose 50% Injectable 12.5 Gram(s) IV Push once  dextrose 50% Injectable 25 Gram(s) IV Push once  glucagon  Injectable 1 milliGRAM(s) IntraMuscular once  hydrocortisone sodium succinate Injectable 25 milliGRAM(s) IV Push every 12 hours  influenza  Vaccine (HIGH DOSE) 0.7 milliLiter(s) IntraMuscular once  insulin glargine Injectable (LANTUS) 16 Unit(s) SubCutaneous at bedtime  insulin regular  human corrective regimen sliding scale   SubCutaneous every 6 hours  insulin regular  human recombinant 7 Unit(s) SubCutaneous every 6 hours  levothyroxine Injectable 50 MICROGram(s) IV Push at bedtime  mycophenolate mofetil Suspension 500 milliGRAM(s) Oral every 12 hours  propofol Infusion 10 MICROgram(s)/kG/Min (4.94 mL/Hr) IV Continuous <Continuous>  sodium chloride 7% Inhalation 4 milliLiter(s) Inhalation every 12 hours  tacrolimus    0.5 mG/mL Suspension 3 milliGRAM(s) Oral every 24 hours  tacrolimus    0.5 mG/mL Suspension 2 milliGRAM(s) Oral every 24 hours  tamsulosin 0.4 milliGRAM(s) Oral at bedtime  trimethoprim / sulfamethoxazole IVPB 325 milliGRAM(s) IV Intermittent every 12 hours    MEDICATIONS  (PRN):  acetaminophen     Tablet .. 650 milliGRAM(s) Oral every 6 hours PRN Mild Pain (1 - 3)  dextrose Oral Gel 15 Gram(s) Oral once PRN Blood Glucose LESS THAN 70 milliGRAM(s)/deciliter  sodium chloride 0.9% lock flush 10 milliLiter(s) IV Push every 1 hour PRN Pre/post blood products, medications, blood draw, and to maintain line patency    ASSESSMENT / RECOMMENDATIONS  Mr. Miguel is an 83-year-old male with type 2 diabetes mellitus, coronary artery disease (s/p DAMEON x2 to LAD on 2021), chronic kidney disease stage 4 (s/p renal transplant on , on tracrolimus and prednisone) and benign prostatic hyperplasia who was sent to the hospital by his nephrologist for further evaluation of lower extremity edema (22). He was admitted for further management of LE edema, thought to be secondary to his underlying CKD versus congestive heart failure. Hospitalization was complicated by hypoglycemia and cardiac arrest (22, ROSC after 1 minute of chest compressions). Post-cardiac arrest he was noted to develop hypotension, bradycardia and hypothermia. Labs were remarkable for elevated TSH (20) and decreased FT4 (0.77). 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     Although he had features seen with myxedema coma, these findings were better explained by his cardiac arrest as his vital signs prior to the arrest were apparently around his baseline, as was his mental status. Nonetheless, he was started on levothyroxine 25 mcg IV daily (22) which has been titrated up to 50 mcg daily (22). In addition, given concern for adrenal insufficiency, he was started on stress-dose steroids which were titrated down to 25 mg every 12 hours. The patient was extubated on 22; however, he had to be reintubated on 22 due to episode of respiratory decompensation, potentially secondary to aspiration. After reintubation, his insulin requirements have increased, presumably due to underlying aspiration pneumonia.     A1C: 9.0 %  BUN: 91  Creatinine: 3.12  GFR: 19  Weight: 82.3  BMI: 28.4  EF:     # Type 2 diabetes mellitus  - The patient will be NPO for now as he had PEG tube placed today.   - Please increase with Lantus to 14 units at bedtime to decrease the chance of hypoglycemia while NPO (as we still don't know what his true basal requirements are).   - Continue with regular insulin moderate dose sliding scale every 6 hours.   - When tube feeds are resumed, please continue with regular insulin 7 units every 6 hours.   - Patient's fingerstick glucose goal is 100-180 mg/dL.      # Hypothyroidism  - TSH 20. FT4 0.77 (22).   - TSH 9.2. FT4 0.69 (22).  - Continue with levothyroxine to 50 mcg IV daily.   - Repeat thyroid function tests (TSH, Free T4) next Monday (22).     # Concern for adrenal insufficiency  - Patient has been on prednisone 5 mg daily for renal transplant.   - He had recent hospitalization on 2022 for pneumonia due to pseudomonas where he was also started on stress dose steroids. At that time, a cortisol level was collected prior to starting steroids which was not robust (6.5 ug/dL).   - The patient remains hemodynamically stable. Continue with hydrocortisone to 25 mg IV every 12 hours for now.   - Patient's current dose of hydrocortisone doesn't have the same level of immunosuppressive or anti-inflammatory effect that prednisone would have when adjusting for the dose. Please discuss with nephrology team whether he would require transitioning to prednisone instead of hydrocortisone to augment immunosuppressive effect of his steroid therapy.     Case discussed with Dr. Rabago. Primary team updated.       Ulysses Weber    Endocrinology Fellow    Service Pager: [phone]    OVERNIGHT: No acute events overnight.   SUBJECTIVE / INTERVAL HPI: Patient was seen and examined this morning. Tube feeds were stopped at around 8 AM for PEG placement and nutritional insulin was stopped starting at noon. He had successful PEG tube placement earlier today and will remain NPO for a total of 24 hours. He remains sedated and intubated, hemodynamically stable with BP ~120s/70s mmHg at time of visit. He continues to receive hydrocortisone 25 mg IV every 12 hours and levothyroxine 50 mcg IV daily.     CAPILLARY BLOOD GLUCOSE & INSULIN RECEIVED  Yesterday  - 12 PM FS mg/dL = 5 units of NPH insulin + 4 units of sliding scale.   - 6 PM FS mg/dL = 7 units of regular insulin + 4 units of sliding scale.   - 10 PM FS mg/dL = 16 units of Lantus.     Today  - 12 AM FS mg/dL = 7 units of regular insulin + 2 units of sliding scale.   - 6 AM FS mg/dL = 7 units of regular insulin + 2 units of sliding scale.  -> Tube feeds were stopped at ~8 AM.   - 12 PM FS mg/dL (NPO) = He didn't received insulin.     REVIEW OF SYSTEMS  Unable to obtain.     PHYSICAL EXAM  Vital Signs Last 24 Hrs  T(C): 37 (2022 17:26), Max: 37.1 (2022 14:35)  T(F): 98.6 (2022 17:26), Max: 98.8 (2022 14:35)  HR: 60 (2022 17:00) (57 - 65)  BP: 117/49 (2022 17:00) (110/51 - 142/90)  BP(mean): 71 (2022 17:00) (65 - 101)  RR: 22 (2022 17:00) (14 - 31)  SpO2: 93% (2022 17:00) (90% - 96%)    Parameters below as of 2022 17:00  Patient On (Oxygen Delivery Method): ventilator    O2 Concentration (%): 35    Constitutional: Elderly male, sedated, intubated.   HEENT: Normocephalic, atraumatic, GORDON.  Respiratory: Lungs clear to auscultation bilaterally.   Cardiovascular: regular rhythm, normal S1 and S2, no audible murmurs.   GI: soft, non-tender, non-distended, bowel sounds present. (+) PEG tube in place.   Extremities: +1 lower extremity edema.    LABS  CBC - WBC/HGB/HTC/PLT: 3.81/7.2/23.0/155 (22)  BMP - Na/K/Cl/Bicarb/BUN/Cr/Gluc/AG/eGFR: 137/3.7/106/21/91/3.12/140/10/19 (22)  Ca - 8.0 (22)  Phos - 2.7 (22)  Mg - 1.9 (22)  LFT - Alb/Tprot/Tbili/Dbili/AlkPhos/ALT/AST: 2.2/--/0.2/--/71/34/41 (22)  PT/aPTT/INR: 12.6/47.5/1.06 (22)   Thyroid Stimulating Hormone, Serum: 9.260 (22)  Total T4/Free T4: --/0.698 (22)    MEDICATIONS  MEDICATIONS  (STANDING):  albuterol/ipratropium for Nebulization 3 milliLiter(s) Nebulizer every 6 hours  amLODIPine   Tablet 5 milliGRAM(s) Oral every 24 hours  aspirin  chewable 81 milliGRAM(s) Oral daily  atorvastatin 40 milliGRAM(s) Oral at bedtime  carvedilol 25 milliGRAM(s) Oral every 12 hours  chlorhexidine 0.12% Liquid 15 milliLiter(s) Oral Mucosa every 12 hours  chlorhexidine 2% Cloths 1 Application(s) Topical <User Schedule>  dextrose 5%. 1000 milliLiter(s) (50 mL/Hr) IV Continuous <Continuous>  dextrose 5%. 1000 milliLiter(s) (100 mL/Hr) IV Continuous <Continuous>  dextrose 50% Injectable 25 Gram(s) IV Push once  dextrose 50% Injectable 12.5 Gram(s) IV Push once  dextrose 50% Injectable 25 Gram(s) IV Push once  glucagon  Injectable 1 milliGRAM(s) IntraMuscular once  hydrocortisone sodium succinate Injectable 25 milliGRAM(s) IV Push every 12 hours  influenza  Vaccine (HIGH DOSE) 0.7 milliLiter(s) IntraMuscular once  insulin glargine Injectable (LANTUS) 16 Unit(s) SubCutaneous at bedtime  insulin regular  human corrective regimen sliding scale   SubCutaneous every 6 hours  insulin regular  human recombinant 7 Unit(s) SubCutaneous every 6 hours  levothyroxine Injectable 50 MICROGram(s) IV Push at bedtime  mycophenolate mofetil Suspension 500 milliGRAM(s) Oral every 12 hours  propofol Infusion 10 MICROgram(s)/kG/Min (4.94 mL/Hr) IV Continuous <Continuous>  sodium chloride 7% Inhalation 4 milliLiter(s) Inhalation every 12 hours  tacrolimus    0.5 mG/mL Suspension 3 milliGRAM(s) Oral every 24 hours  tacrolimus    0.5 mG/mL Suspension 2 milliGRAM(s) Oral every 24 hours  tamsulosin 0.4 milliGRAM(s) Oral at bedtime  trimethoprim / sulfamethoxazole IVPB 325 milliGRAM(s) IV Intermittent every 12 hours    MEDICATIONS  (PRN):  acetaminophen     Tablet .. 650 milliGRAM(s) Oral every 6 hours PRN Mild Pain (1 - 3)  dextrose Oral Gel 15 Gram(s) Oral once PRN Blood Glucose LESS THAN 70 milliGRAM(s)/deciliter  sodium chloride 0.9% lock flush 10 milliLiter(s) IV Push every 1 hour PRN Pre/post blood products, medications, blood draw, and to maintain line patency    ASSESSMENT / RECOMMENDATIONS  Mr. Miguel is an 83-year-old male with type 2 diabetes mellitus, coronary artery disease (s/p DAMEON x2 to LAD on 2021), chronic kidney disease stage 4 (s/p renal transplant on , on tracrolimus and prednisone) and benign prostatic hyperplasia who was sent to the hospital by his nephrologist for further evaluation of lower extremity edema (22). He was admitted for further management of LE edema, thought to be secondary to his underlying CKD versus congestive heart failure. Hospitalization was complicated by hypoglycemia and cardiac arrest (22, ROSC after 1 minute of chest compressions). Post-cardiac arrest he was noted to develop hypotension, bradycardia and hypothermia. Labs were remarkable for elevated TSH (20) and decreased FT4 (0.77). 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     Although he had features seen with myxedema coma, these findings were better explained by his cardiac arrest as his vital signs prior to the arrest were apparently around his baseline, as was his mental status. Nonetheless, he was started on levothyroxine 25 mcg IV daily (22) which has been titrated up to 50 mcg daily (22). In addition, given concern for adrenal insufficiency, he was started on stress-dose steroids which were titrated down to 25 mg every 12 hours. The patient was extubated on 22; however, he had to be reintubated on 22 due to episode of respiratory decompensation, potentially secondary to aspiration. After reintubation, his insulin requirements have increased, presumably due to underlying aspiration pneumonia.     A1C: 9.0 %  BUN: 91  Creatinine: 3.12  GFR: 19  Weight: 82.3  BMI: 28.4  EF:     # Type 2 diabetes mellitus  - The patient will be NPO for now as he had PEG tube placed today.   - Please decrease Lantus to 14 units at bedtime (to decrease the chance of hypoglycemia while NPO, as we still don't know what his basal requirements are).   - Continue with regular insulin moderate dose sliding scale every 6 hours.   - When tube feeds are resumed, please continue with regular insulin 7 units every 6 hours.   - Patient's fingerstick glucose goal is 100-180 mg/dL.      # Hypothyroidism  - TSH 20. FT4 0.77 (22).   - TSH 9.2. FT4 0.69 (22).  - Continue with levothyroxine to 50 mcg IV daily.   - Repeat thyroid function tests (TSH, Free T4) next Monday (22).     # Concern for adrenal insufficiency  - The patient was on prednisone 5 mg daily for renal transplant prior to admission.   - He had recent hospitalization (2022) for pneumonia due to pseudomonas where he was also started on stress dose steroids. At that time, a cortisol level was collected prior to starting steroids which was not robust (6.5 ug/dL).   - The patient remains hemodynamically stable. Continue with hydrocortisone to 25 mg IV every 12 hours for now.   - Of note, hydrocortisone doesn't have the same level of immunosuppressive or anti-inflammatory effect as prednisone. Due to history of renal transplant, consider discussing with renal team whether he would require transitioning to prednisone instead of hydrocortisone to augment immunosuppressive effect of the steroids.     Case discussed with Dr. Rabago. Primary team updated.       Ulysses Weber    Endocrinology Fellow    Service Pager: [phone]

## 2022-11-30 NOTE — PROGRESS NOTE ADULT - SUBJECTIVE AND OBJECTIVE BOX
Interval Events: Reviewed  Patient seen and examined at bedside.    Patient is a 82y old  Male who presents with a chief complaint of LE edema and cough (21 Nov 2022 09:30)    he is sedated and tachypneic  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      History of renal transplant  secondary to DM      Chronic kidney disease (CKD)      Glaucoma      Kidney transplant recipient      Amputation of toe  x 3      H/O heart artery stent          MEDICATIONS:  Pulmonary:  albuterol/ipratropium for Nebulization 3 milliLiter(s) Nebulizer every 6 hours  sodium chloride 7% Inhalation 4 milliLiter(s) Inhalation every 12 hours    Antimicrobials:  trimethoprim / sulfamethoxazole IVPB 325 milliGRAM(s) IV Intermittent every 12 hours    Anticoagulants:  aspirin  chewable 81 milliGRAM(s) Oral daily    Cardiac:  amLODIPine   Tablet 5 milliGRAM(s) Oral every 24 hours  carvedilol 25 milliGRAM(s) Oral every 12 hours      Allergies    No Known Allergies    Intolerances        Vital Signs Last 24 Hrs  T(C): 37 (30 Nov 2022 17:26), Max: 37.1 (30 Nov 2022 14:35)  T(F): 98.6 (30 Nov 2022 17:26), Max: 98.8 (30 Nov 2022 14:35)  HR: 59 (30 Nov 2022 18:00) (57 - 65)  BP: 119/53 (30 Nov 2022 18:00) (110/51 - 142/90)  BP(mean): 76 (30 Nov 2022 18:00) (65 - 101)  RR: 24 (30 Nov 2022 18:00) (14 - 31)  SpO2: 93% (30 Nov 2022 18:00) (90% - 96%)    Parameters below as of 30 Nov 2022 18:00  Patient On (Oxygen Delivery Method): ventilator    O2 Concentration (%): 35    11-29 @ 07:01  -  11-30 @ 07:00  --------------------------------------------------------  IN: 4244 mL / OUT: 574 mL / NET: 3670 mL    11-30 @ 07:01 - 11-30 @ 18:27  --------------------------------------------------------  IN: 1207.8 mL / OUT: 183 mL / NET: 1024.8 mL      Mode: AC/ CMV (Assist Control/ Continuous Mandatory Ventilation)  RR (machine): 12  TV (machine): 450  FiO2: 35  PEEP: 5  ITime: 1  MAP: 11  PIP: 19      Review of Systems:   •	General: negative  •	Skin/Breast: negative  •	Ophthalmologic: negative  •	ENMT: negative  •	Respiratory and Thorax: negative  •	Cardiovascular: negative  •	Gastrointestinal: negative  •	Genitourinary: negative  •	Musculoskeletal: negative  •	Neurological: negative  •	Psychiatric: negative  •	Hematology/Lymphatics: negative  •	Endocrine: negative  •	Allergic/Immunologic: negative    Physical Exam:   • Constitutional:	refer to the dietion /Nutritionist note  • Eyes:	EOMI; PERRL; no drainage or redness  • ENMT:	No oral lesions; no gross abnormalities  • Neck	No bruits; no thyromegaly or nodules  • Breasts:	not examined  • Back:	No deformity or limitation of movement  • Respiratory:	Breath Sounds equal & clear to percussion & auscultation, no accessory muscle use  • Cardiovascular:	Regular rate & rhythm, normal S1, S2; no murmurs, gallops or rubs; no S3, S4  • Gastrointestinal:	Soft, non-tender, no hepatosplenomegaly, normal bowel sounds  • Genitourinary:	not examined  • Rectal: not examined  • Extremities:	No cyanosis, clubbing or edema  • Vascular:	Equal and normal pulses (carotid, femoral, dorsalis pedis)  • Neurologica:l	not examined  • Skin:	No lesions; no rash  • Lymph Nodes:	No lymphadedenopathy  • Musculoskeletal:	No joint pain, swelling or deformity; no limitation of movement        LABS:      CBC Full  -  ( 30 Nov 2022 03:57 )  WBC Count : 3.81 K/uL  RBC Count : 2.66 M/uL  Hemoglobin : 7.2 g/dL  Hematocrit : 23.0 %  Platelet Count - Automated : 155 K/uL  Mean Cell Volume : 86.5 fl  Mean Cell Hemoglobin : 27.1 pg  Mean Cell Hemoglobin Concentration : 31.3 gm/dL  Auto Neutrophil # : 2.90 K/uL  Auto Lymphocyte # : 0.52 K/uL  Auto Monocyte # : 0.25 K/uL  Auto Eosinophil # : 0.10 K/uL  Auto Basophil # : 0.00 K/uL  Auto Neutrophil % : 76.2 %  Auto Lymphocyte % : 13.6 %  Auto Monocyte % : 6.6 %  Auto Eosinophil % : 2.6 %  Auto Basophil % : 0.0 %    11-30    137  |  106  |  91<H>  ----------------------------<  140<H>  3.7   |  21<L>  |  3.12<H>    Ca    8.0<L>      30 Nov 2022 03:57  Phos  2.7     11-30  Mg     1.9     11-30    TPro  4.5<L>  /  Alb  2.2<L>  /  TBili  0.2  /  DBili  x   /  AST  41<H>  /  ALT  34  /  AlkPhos  71  11-29    PT/INR - ( 29 Nov 2022 04:21 )   PT: 12.6 sec;   INR: 1.06     CXR unchaged     PTT - ( 29 Nov 2022 04:21 )  PTT:47.5 sec                    RADIOLOGY & ADDITIONAL STUDIES (The following images were personally reviewed):

## 2022-11-30 NOTE — PROGRESS NOTE ADULT - SUBJECTIVE AND OBJECTIVE BOX
OVERNIGHT EVENTS: Urine output was low, received 500cc LR bolus.    SUBJECTIVE / INTERVAL HPI:  Patient seen and examined at bedside.  In no evident distress, son at bedside.    VITAL SIGNS:  Vital Signs Last 24 Hrs  T(C): 36.3 (30 Nov 2022 10:13), Max: 36.5 (29 Nov 2022 22:22)  T(F): 97.3 (30 Nov 2022 10:13), Max: 97.7 (29 Nov 2022 22:22)  HR: 62 (30 Nov 2022 10:00) (55 - 65)  BP: 116/51 (30 Nov 2022 10:00) (111/46 - 142/90)  BP(mean): 73 (30 Nov 2022 10:00) (65 - 101)  RR: 27 (30 Nov 2022 10:00) (14 - 31)  SpO2: 92% (30 Nov 2022 10:00) (90% - 96%)    Parameters below as of 30 Nov 2022 10:00  Patient On (Oxygen Delivery Method): ventilator,CPAP    O2 Concentration (%): 35  I&O's Summary    29 Nov 2022 07:01  -  30 Nov 2022 07:00  --------------------------------------------------------  IN: 4244 mL / OUT: 574 mL / NET: 3670 mL    30 Nov 2022 07:01  -  30 Nov 2022 11:02  --------------------------------------------------------  IN: 158 mL / OUT: 120 mL / NET: 38 mL        PHYSICAL EXAM:  General: NAD  HEENT: MMM  Neck: supple  Cardiovascular: +S1/S2; RRR  Respiratory: CTA B/L; no W/R/R  Gastrointestinal: soft, NT/ND  Extremities: WWP; 3+ pitting edema in forearms and trace edema in b/l ankles, no clubbing or cyanosis  Vascular: 2+ radial, DP/PT pulses B/L  Neurological: intubated, anisocoric pupils (R>L stable from yesterday), no focal deficits    MEDICATIONS:  MEDICATIONS  (STANDING):  albuterol/ipratropium for Nebulization 3 milliLiter(s) Nebulizer every 6 hours  amLODIPine   Tablet 5 milliGRAM(s) Oral every 24 hours  aspirin  chewable 81 milliGRAM(s) Oral daily  atorvastatin 40 milliGRAM(s) Oral at bedtime  carvedilol 25 milliGRAM(s) Oral every 12 hours  chlorhexidine 0.12% Liquid 15 milliLiter(s) Oral Mucosa every 12 hours  chlorhexidine 2% Cloths 1 Application(s) Topical <User Schedule>  dextrose 5%. 1000 milliLiter(s) (50 mL/Hr) IV Continuous <Continuous>  dextrose 5%. 1000 milliLiter(s) (100 mL/Hr) IV Continuous <Continuous>  dextrose 50% Injectable 25 Gram(s) IV Push once  dextrose 50% Injectable 12.5 Gram(s) IV Push once  dextrose 50% Injectable 25 Gram(s) IV Push once  glucagon  Injectable 1 milliGRAM(s) IntraMuscular once  heparin   Injectable 5000 Unit(s) SubCutaneous every 8 hours  hydrocortisone sodium succinate Injectable 25 milliGRAM(s) IV Push every 12 hours  influenza  Vaccine (HIGH DOSE) 0.7 milliLiter(s) IntraMuscular once  insulin glargine Injectable (LANTUS) 16 Unit(s) SubCutaneous at bedtime  insulin regular  human corrective regimen sliding scale   SubCutaneous every 6 hours  insulin regular  human recombinant 7 Unit(s) SubCutaneous every 6 hours  levothyroxine Injectable 50 MICROGram(s) IV Push at bedtime  mycophenolate mofetil Suspension 500 milliGRAM(s) Oral every 12 hours  propofol Infusion 10 MICROgram(s)/kG/Min (4.94 mL/Hr) IV Continuous <Continuous>  sodium chloride 7% Inhalation 4 milliLiter(s) Inhalation every 12 hours  tacrolimus    0.5 mG/mL Suspension 3 milliGRAM(s) Oral every 24 hours  tacrolimus    0.5 mG/mL Suspension 2 milliGRAM(s) Oral every 24 hours  tamsulosin 0.4 milliGRAM(s) Oral at bedtime  trimethoprim / sulfamethoxazole IVPB 325 milliGRAM(s) IV Intermittent every 12 hours    MEDICATIONS  (PRN):  acetaminophen     Tablet .. 650 milliGRAM(s) Oral every 6 hours PRN Mild Pain (1 - 3)  dextrose Oral Gel 15 Gram(s) Oral once PRN Blood Glucose LESS THAN 70 milliGRAM(s)/deciliter  sodium chloride 0.9% lock flush 10 milliLiter(s) IV Push every 1 hour PRN Pre/post blood products, medications, blood draw, and to maintain line patency      ALLERGIES:  Allergies    No Known Allergies    Intolerances        LABS:                        7.2    3.81  )-----------( 155      ( 30 Nov 2022 03:57 )             23.0     11-30    137  |  106  |  91<H>  ----------------------------<  140<H>  3.7   |  21<L>  |  3.12<H>    Ca    8.0<L>      30 Nov 2022 03:57  Phos  2.7     11-30  Mg     1.9     11-30    TPro  4.5<L>  /  Alb  2.2<L>  /  TBili  0.2  /  DBili  x   /  AST  41<H>  /  ALT  34  /  AlkPhos  71  11-29    PT/INR - ( 29 Nov 2022 04:21 )   PT: 12.6 sec;   INR: 1.06          PTT - ( 29 Nov 2022 04:21 )  PTT:47.5 sec    CAPILLARY BLOOD GLUCOSE      POCT Blood Glucose.: 153 mg/dL (30 Nov 2022 06:38)      RADIOLOGY & ADDITIONAL TESTS: Reviewed.

## 2022-11-30 NOTE — PROGRESS NOTE ADULT - SUBJECTIVE AND OBJECTIVE BOX
Patient is a 82y Male seen and evaluated at bedside creatinine up to 3.12 this morning, was started on bactrim on the 28th. Urine studies still suggestive of a pre-renal pattern      Meds:    acetaminophen     Tablet .. 650 every 6 hours PRN  albuterol/ipratropium for Nebulization 3 every 6 hours  amLODIPine   Tablet 5 every 24 hours  aspirin  chewable 81 daily  atorvastatin 40 at bedtime  carvedilol 25 every 12 hours  chlorhexidine 0.12% Liquid 15 every 12 hours  chlorhexidine 2% Cloths 1 <User Schedule>  dextrose 5%. 1000 <Continuous>  dextrose 5%. 1000 <Continuous>  dextrose 50% Injectable 25 once  dextrose 50% Injectable 12.5 once  dextrose 50% Injectable 25 once  dextrose Oral Gel 15 once PRN  glucagon  Injectable 1 once  hydrocortisone sodium succinate Injectable 25 every 12 hours  influenza  Vaccine (HIGH DOSE) 0.7 once  insulin glargine Injectable (LANTUS) 16 at bedtime  insulin regular  human corrective regimen sliding scale  every 6 hours  insulin regular  human recombinant 7 every 6 hours  levothyroxine Injectable 50 at bedtime  mycophenolate mofetil Suspension 500 every 12 hours  propofol Infusion 10 <Continuous>  sodium chloride 0.9% lock flush 10 every 1 hour PRN  sodium chloride 7% Inhalation 4 every 12 hours  tacrolimus    0.5 mG/mL Suspension 3 every 24 hours  tacrolimus    0.5 mG/mL Suspension 2 every 24 hours  tamsulosin 0.4 at bedtime  trimethoprim / sulfamethoxazole IVPB 325 every 12 hours      T(C): , Max: 36.5 (11-29-22 @ 22:22)  T(F): , Max: 97.7 (11-29-22 @ 22:22)  HR: 60 (11-30-22 @ 11:00)  BP: 118/53 (11-30-22 @ 11:00)  BP(mean): 76 (11-30-22 @ 11:00)  RR: 27 (11-30-22 @ 11:00)  SpO2: 93% (11-30-22 @ 11:00)  Wt(kg): --    11-29 @ 07:01  -  11-30 @ 07:00  --------------------------------------------------------  IN: 4244 mL / OUT: 574 mL / NET: 3670 mL    11-30 @ 07:01  -  11-30 @ 11:53  --------------------------------------------------------  IN: 763 mL / OUT: 145 mL / NET: 618 mL    Review of Systems:  ROS negative except as per HPI      PHYSICAL EXAM:  General: intubated and sedated  HEENT: NC/AT; PERRL, anicteric sclera; MMM  Neck: supple  Cardiovascular: +S1/S2, RRR, no murmurs, rubs, gallops  Respiratory: CTA B/L; no W/R/R  Gastrointestinal: soft, NT/ND; +BSx4  Extremities: WWP; 2+ pitting edema b/l upper extremities   Vascular: 2+ radial, DP/PT pulses B/L  Neurological: intubated and sedated      LABS:                        7.2    3.81  )-----------( 155      ( 30 Nov 2022 03:57 )             23.0     11-30    137  |  106  |  91<H>  ----------------------------<  140<H>  3.7   |  21<L>  |  3.12<H>    Ca    8.0<L>      30 Nov 2022 03:57  Phos  2.7     11-30  Mg     1.9     11-30    TPro  4.5<L>  /  Alb  2.2<L>  /  TBili  0.2  /  DBili  x   /  AST  41<H>  /  ALT  34  /  AlkPhos  71  11-29      PT/INR - ( 29 Nov 2022 04:21 )   PT: 12.6 sec;   INR: 1.06          PTT - ( 29 Nov 2022 04:21 )  PTT:47.5 sec    Sodium, Random Urine: 25 mmol/L (11-30 @ 03:57)  Osmolality, Random Urine: 334 mosm/kg (11-30 @ 03:57)  Creatinine, Random Urine: 41 mg/dL (11-30 @ 03:57)  Sodium, Random Urine: <20 mmol/L (11-29 @ 04:22)  Creatinine, Random Urine: 45 mg/dL (11-29 @ 04:22)  Osmolality, Random Urine: 373 mosm/kg (11-29 @ 04:22)        RADIOLOGY & ADDITIONAL STUDIES:

## 2022-11-30 NOTE — PROGRESS NOTE ADULT - ATTENDING COMMENTS
Acute hypoxic respiratory failure, unable to wean off ventilator with h/o kidney transplant with acute on chronic kidney disease with persistent encephalopathy.   Plan:  - Trach tomorrow  - GI has been called for PEG tube.   - Bactrim to continue for HAP (Stenotrophomonas)  - Steroid and tacrolimus to continue. Nephrology following patient. His renal failure is worsening.   - Rest as above

## 2022-11-30 NOTE — PROGRESS NOTE ADULT - ATTENDING COMMENTS
creat rising-- BUN rising as well as unlikely just due to trimethoprim effect on creat   seems could be intravascularly dry-- agree with IVF

## 2022-11-30 NOTE — PROGRESS NOTE ADULT - PROBLEM SELECTOR PLAN 1
Event noticed.  The patient is sedated.  I suctioned the patient copious amount of secretion.  Gas exchange is stable.  Sputum grew Pseudomonas and Serratia.  The patient on appropriate antibiotic.  Continue pulmonary toilet.  Patient has baseline dementia and does not and trial up off sedation.  Tapers pressors as needed.  Cortisol level is adequate.  I discussed the case with critical care. She was extubated.  Patient is on nasal cannula.  Suction the patient copious amount of secretion.  Dysphagia evaluation.  Continue antibiotic.  The patient is hemodynamically stable off pressors and sedative.  Suctioned the patient thick moderate secretion and increased.  He failed swallow eval and will need a peg.  On nebs.  He was intubated.  Sputum GS revealed GNR and culture positive for sternomonas.  Abt changed to bactrim.  Monitor renal function on abt which is worsening.  Plan trach tomorrow.  Peg done . Is in detail with the family.  Patient is to be.  Prepped for a trach and a PEG on Thursday.  I discussed with GI regarding the PEG will be done tomorrow.  Continue pulmonary toilet.  Continue antibiotic.  Follow-up renal function on Bactrim

## 2022-11-30 NOTE — PROGRESS NOTE ADULT - ATTENDING COMMENTS
Pt seen on rounds this afternoon.  Still intubated and sedated.  Had PEG placed today, and feeds have been off since this morning in preparation for this.  He remains hemodynamically stable  (/53).  Lungs seem clearer, with only faint rales at the left base, and with equal breath sounds bilaterally.  Abdomen is soft and non-distended.  Bowel sounds hypoactive.  Has 1-2+ edema of the LEs.  Glucoses were 117/182/153 overnight to this morning.  He received 16 Lantus last night.  As a precaution while the pt will be off feeds, decrease the Lantus to 14 units.  (will use the 24 hours off feeds to better assess his true Lantus requirements).  Continue current hydrocortisone and levothyroxine

## 2022-12-01 NOTE — PROGRESS NOTE ADULT - SUBJECTIVE AND OBJECTIVE BOX
PEG site examined: clean no leak  abdomen: obese, soft non tender  may commence feeds  routine use and  care of peg

## 2022-12-01 NOTE — PROGRESS NOTE ADULT - SUBJECTIVE AND OBJECTIVE BOX
OVERNIGHT: Patient's urinary output was noted to be decreased. He was placed on david hugger for hypothermia. He received one pRBC for low hemoglobin (6.5 g/dL).   SUBJECTIVE / INTERVAL HPI: Patient was seen and examined this morning. He had tracheostomy placed earlier today. The patient has been NPO since yesterday before PEG tube placement. Tube feeds will be resumed at 6 PM later today. His creatinine continues to trend up, now at 3.49 mg/dL. He remains hemodynamically stable with systolic blood pressure mostly in the 110's.     CAPILLARY BLOOD GLUCOSE & INSULIN RECEIVED  Yesterday  - 12 PM FS mg/dL = He didn't receive insulin.   - 6 PM FS mg/dL = He didn't receive insulin.   - 10 PM FS mg/dL = 7 units of Lantus.     Today  - 12 AM FS mg/dL = He didn't receive insulin.   - 6 AM FS mg/dL = He didn't receive insulin.   - 12 PM FS mg/dL = He didn't receive insulin.     REVIEW OF SYSTEMS  Unable to obtain.     PHYSICAL EXAM  Vital Signs Last 24 Hrs  T(C): 35.9 (01 Dec 2022 13:00), Max: 37.1 (2022 14:35)  T(F): 96.6 (01 Dec 2022 13:00), Max: 98.8 (2022 14:35)  HR: 51 (01 Dec 2022 13:00) (49 - 63)  BP: 121/52 (01 Dec 2022 13:00) (96/46 - 127/53)  BP(mean): 75 (01 Dec 2022 13:00) (66 - 76)  RR: 14 (01 Dec 2022 13:00) (13 - 26)  SpO2: 93% (01 Dec 2022 13:00) (90% - 99%)    Parameters below as of 01 Dec 2022 13:00  Patient On (Oxygen Delivery Method): ventilator    O2 Concentration (%): 40    Constitutional: Elderly male, sedated, intubated.   HEENT: Normocephalic, atraumatic, GORDON.  Respiratory: Lungs clear to auscultation bilaterally.   Cardiovascular: regular rhythm, normal S1 and S2, no audible murmurs.   GI: soft, non-tender, non-distended, bowel sounds present. (+) PEG tube in place.   Extremities: +1 lower extremity edema.    LABS  CBC - WBC/HGB/HTC/PLT: 4.11/8.1/25.7/185 (22)  BMP - Na/K/Cl/Bicarb/BUN/Cr/Gluc/AG/eGFR: 132/4.4/103/18/89/3.49/107/11/17 (22)  Ca - 7.7 (22)  Phos - 3.1 (22)  Mg - 1.8 (22)  LFT - Alb/Tprot/Tbili/Dbili/AlkPhos/ALT/AST: 1.9/--/<0.2/--/70/47/61 (22)  PT/aPTT/INR: 12.5/34.8/1.05 (22)   Thyroid Stimulating Hormone, Serum: 9.260 (22)  Total T4/Free T4: --/0.698 (22)    MEDICATIONS  MEDICATIONS  (STANDING):  albuterol/ipratropium for Nebulization 3 milliLiter(s) Nebulizer every 6 hours  aspirin  chewable 81 milliGRAM(s) Oral daily  atorvastatin 40 milliGRAM(s) Oral at bedtime  chlorhexidine 0.12% Liquid 15 milliLiter(s) Oral Mucosa every 12 hours  chlorhexidine 2% Cloths 1 Application(s) Topical <User Schedule>  dextrose 5%. 1000 milliLiter(s) (100 mL/Hr) IV Continuous <Continuous>  dextrose 5%. 1000 milliLiter(s) (50 mL/Hr) IV Continuous <Continuous>  dextrose 50% Injectable 25 Gram(s) IV Push once  dextrose 50% Injectable 12.5 Gram(s) IV Push once  dextrose 50% Injectable 25 Gram(s) IV Push once  fentaNYL   Infusion. 0.501 MICROgram(s)/kG/Hr (4.12 mL/Hr) IV Continuous <Continuous>  glucagon  Injectable 1 milliGRAM(s) IntraMuscular once  hydrocortisone sodium succinate Injectable 25 milliGRAM(s) IV Push every 12 hours  influenza  Vaccine (HIGH DOSE) 0.7 milliLiter(s) IntraMuscular once  insulin regular  human corrective regimen sliding scale   SubCutaneous every 6 hours  levothyroxine Injectable 50 MICROGram(s) IV Push at bedtime  mycophenolate mofetil Suspension 500 milliGRAM(s) Oral every 12 hours  norepinephrine Infusion 0.05 MICROgram(s)/kG/Min (7.72 mL/Hr) IV Continuous <Continuous>  pantoprazole   Suspension 40 milliGRAM(s) Enteral Tube every 24 hours  propofol Infusion 20 MICROgram(s)/kG/Min (9.88 mL/Hr) IV Continuous <Continuous>  sodium chloride 7% Inhalation 4 milliLiter(s) Inhalation every 12 hours  tacrolimus    0.5 mG/mL Suspension 3 milliGRAM(s) Oral every 24 hours  tacrolimus    0.5 mG/mL Suspension 2 milliGRAM(s) Oral every 24 hours  tamsulosin 0.4 milliGRAM(s) Oral at bedtime    MEDICATIONS  (PRN):  acetaminophen    Suspension .. 650 milliGRAM(s) Oral every 6 hours PRN Mild Pain (1 - 3)  dextrose Oral Gel 15 Gram(s) Oral once PRN Blood Glucose LESS THAN 70 milliGRAM(s)/deciliter  sodium chloride 0.9% lock flush 10 milliLiter(s) IV Push every 1 hour PRN Pre/post blood products, medications, blood draw, and to maintain line patency    ASSESSMENT / RECOMMENDATIONS  Mr. Miguel is an 83-year-old male with type 2 diabetes mellitus, coronary artery disease (s/p DAMEON x2 to LAD on 2021), chronic kidney disease stage 4 (s/p renal transplant on , on tracrolimus and prednisone) and benign prostatic hyperplasia who was sent to the hospital by his nephrologist for further evaluation of lower extremity edema (22). He was admitted for further management of LE edema, thought to be secondary to his underlying CKD versus congestive heart failure. Hospitalization was complicated by hypoglycemia and cardiac arrest (22, ROSC after 1 minute of chest compressions). Post-cardiac arrest he was noted to develop hypotension, bradycardia and hypothermia. Labs were remarkable for elevated TSH (20) and decreased FT4 (0.77). 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     Although he had features seen with myxedema coma, these findings were better explained by his cardiac arrest as his vital signs prior to the arrest were apparently around his baseline, as was his mental status. Nonetheless, he was started on levothyroxine 25 mcg IV daily (22) which has been titrated up to 50 mcg daily (22). In addition, given concern for adrenal insufficiency, he was started on stress-dose steroids which were titrated down to 25 mg every 12 hours. The patient was extubated on 22; however, he had to be reintubated on 22 due to episode of respiratory decompensation, potentially secondary to aspiration. After reintubation, his insulin requirements have increased, presumably due to underlying aspiration pneumonia.     A1C: 9.0 %  BUN: 89  Creatinine: 3.49  GFR: 17  Weight: 82.3  BMI: 28.4    # Type 2 diabetes mellitus    # Hypothyroidism     # Concern for adrenal insufficiency    Case discussed with Dr. Rabago. Primary team updated.       Ulysses Weber    Endocrinology Fellow    Service Pager: [phone]    OVERNIGHT: Patient's urinary output was noted to be decreased. He was placed on david hugger for hypothermia. He received one pRBC for low hemoglobin (6.5 g/dL).   SUBJECTIVE / INTERVAL HPI: Patient was seen and examined this morning. He had tracheostomy placed earlier today. The patient has been NPO since yesterday before PEG tube placement. Tube feeds will be resumed at 6 PM later today. His creatinine continues to trend up, now at 3.49 mg/dL with urinary output of 193 mL over the past 24 hours. He remains hemodynamically stable with systolic blood pressure mostly in the 110's.     CAPILLARY BLOOD GLUCOSE & INSULIN RECEIVED  Yesterday  - 12 PM FS mg/dL = He didn't receive insulin.   - 6 PM FS mg/dL = He didn't receive insulin.   - 10 PM FS mg/dL = 7 units of Lantus.     Today  - 12 AM FS mg/dL = He didn't receive insulin.   - 6 AM FS mg/dL = He didn't receive insulin.   - 12 PM FS mg/dL = He didn't receive insulin.     REVIEW OF SYSTEMS  Unable to obtain.     PHYSICAL EXAM  Vital Signs Last 24 Hrs  T(C): 35.9 (01 Dec 2022 13:00), Max: 37.1 (2022 14:35)  T(F): 96.6 (01 Dec 2022 13:00), Max: 98.8 (2022 14:35)  HR: 51 (01 Dec 2022 13:00) (49 - 63)  BP: 121/52 (01 Dec 2022 13:00) (96/46 - 127/53)  BP(mean): 75 (01 Dec 2022 13:00) (66 - 76)  RR: 14 (01 Dec 2022 13:00) (13 - 26)  SpO2: 93% (01 Dec 2022 13:00) (90% - 99%)    Parameters below as of 01 Dec 2022 13:00  Patient On (Oxygen Delivery Method): ventilator    O2 Concentration (%): 40    Constitutional: Elderly male, sedated, intubated.   HEENT: Normocephalic, atraumatic, GORDON.  Respiratory: Lungs clear to auscultation bilaterally.   Cardiovascular: regular rhythm, normal S1 and S2, no audible murmurs.   GI: soft, non-tender, non-distended, bowel sounds present. (+) PEG tube in place.   Extremities: +1 lower extremity edema.    LABS  CBC - WBC/HGB/HTC/PLT: 4.11/8.1/25.7/185 (22)  BMP - Na/K/Cl/Bicarb/BUN/Cr/Gluc/AG/eGFR: 132/4.4/103/18/89/3.49/107/11/17 (22)  Ca - 7.7 (22)  Phos - 3.1 (22)  Mg - 1.8 (22)  LFT - Alb/Tprot/Tbili/Dbili/AlkPhos/ALT/AST: 1.9/--/<0.2/--/70/47/61 (22)  PT/aPTT/INR: 12.5/34.8/1.05 (22)   Thyroid Stimulating Hormone, Serum: 9.260 (22)  Total T4/Free T4: --/0.698 (22)    MEDICATIONS  MEDICATIONS  (STANDING):  albuterol/ipratropium for Nebulization 3 milliLiter(s) Nebulizer every 6 hours  aspirin  chewable 81 milliGRAM(s) Oral daily  atorvastatin 40 milliGRAM(s) Oral at bedtime  chlorhexidine 0.12% Liquid 15 milliLiter(s) Oral Mucosa every 12 hours  chlorhexidine 2% Cloths 1 Application(s) Topical <User Schedule>  dextrose 5%. 1000 milliLiter(s) (100 mL/Hr) IV Continuous <Continuous>  dextrose 5%. 1000 milliLiter(s) (50 mL/Hr) IV Continuous <Continuous>  dextrose 50% Injectable 25 Gram(s) IV Push once  dextrose 50% Injectable 12.5 Gram(s) IV Push once  dextrose 50% Injectable 25 Gram(s) IV Push once  fentaNYL   Infusion. 0.501 MICROgram(s)/kG/Hr (4.12 mL/Hr) IV Continuous <Continuous>  glucagon  Injectable 1 milliGRAM(s) IntraMuscular once  hydrocortisone sodium succinate Injectable 25 milliGRAM(s) IV Push every 12 hours  influenza  Vaccine (HIGH DOSE) 0.7 milliLiter(s) IntraMuscular once  insulin regular  human corrective regimen sliding scale   SubCutaneous every 6 hours  levothyroxine Injectable 50 MICROGram(s) IV Push at bedtime  mycophenolate mofetil Suspension 500 milliGRAM(s) Oral every 12 hours  norepinephrine Infusion 0.05 MICROgram(s)/kG/Min (7.72 mL/Hr) IV Continuous <Continuous>  pantoprazole   Suspension 40 milliGRAM(s) Enteral Tube every 24 hours  propofol Infusion 20 MICROgram(s)/kG/Min (9.88 mL/Hr) IV Continuous <Continuous>  sodium chloride 7% Inhalation 4 milliLiter(s) Inhalation every 12 hours  tacrolimus    0.5 mG/mL Suspension 3 milliGRAM(s) Oral every 24 hours  tacrolimus    0.5 mG/mL Suspension 2 milliGRAM(s) Oral every 24 hours  tamsulosin 0.4 milliGRAM(s) Oral at bedtime    MEDICATIONS  (PRN):  acetaminophen    Suspension .. 650 milliGRAM(s) Oral every 6 hours PRN Mild Pain (1 - 3)  dextrose Oral Gel 15 Gram(s) Oral once PRN Blood Glucose LESS THAN 70 milliGRAM(s)/deciliter  sodium chloride 0.9% lock flush 10 milliLiter(s) IV Push every 1 hour PRN Pre/post blood products, medications, blood draw, and to maintain line patency    ASSESSMENT / RECOMMENDATIONS  Mr. Miguel is an 83-year-old male with type 2 diabetes mellitus, coronary artery disease (s/p DAMEON x2 to LAD on 2021), chronic kidney disease stage 4 (s/p renal transplant on , on tracrolimus and prednisone) and benign prostatic hyperplasia who was sent to the hospital by his nephrologist for further evaluation of lower extremity edema (22). He was admitted for further management of LE edema, thought to be secondary to his underlying CKD versus congestive heart failure. Hospitalization was complicated by hypoglycemia and cardiac arrest (22, ROSC after 1 minute of chest compressions). Post-cardiac arrest he was noted to develop hypotension, bradycardia and hypothermia. Labs were remarkable for elevated TSH (20) and decreased FT4 (0.77). 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     Although he had features seen with myxedema coma, these findings were better explained by his cardiac arrest as his vital signs prior to the arrest were apparently around his baseline, as was his mental status. Nonetheless, he was started on levothyroxine 25 mcg IV daily (22) which has been titrated up to 50 mcg daily (22). In addition, given concern for adrenal insufficiency, he was started on stress-dose steroids which were titrated down to 25 mg every 12 hours. The patient was extubated on 22; however, he had to be reintubated on 22 due to episode of respiratory decompensation, potentially secondary to aspiration. After reintubation, his insulin requirements have increased, presumably due to underlying aspiration pneumonia.     A1C: 9.0 %  BUN: 89  Creatinine: 3.49  GFR: 17  Weight: 82.3  BMI: 28.4    # Type 2 diabetes mellitus    # Hypothyroidism     # Concern for adrenal insufficiency    Case discussed with Dr. Rabago. Primary team updated.       Ulysses Weber    Endocrinology Fellow    Service Pager: [phone]    OVERNIGHT: Patient's urinary output was noted to be decreased. He was placed on david hugger for hypothermia. He received one pRBC for low hemoglobin (6.5 g/dL).   SUBJECTIVE / INTERVAL HPI: Patient was seen and examined this morning. He had tracheostomy placed earlier today. The patient has been NPO since yesterday before PEG tube placement. Tube feeds will be resumed at 6 PM later today. His creatinine continues to trend up, now at 3.49 mg/dL with urinary output of 193 mL over the past 24 hours. Per nephrology, suspect he will be needing CVVHD in the next day or so. He had remained hemodynamically stable with systolic blood pressure mostly in the 110's; however, MAP was noted to decrease to ~55 in the afternoon and he was started on levophed (0.06 mcg/kg at time of evaluation).     CAPILLARY BLOOD GLUCOSE & INSULIN RECEIVED  Yesterday  - 12 PM FS mg/dL = He didn't receive insulin.   - 6 PM FS mg/dL = He didn't receive insulin.   - 10 PM FS mg/dL = 7 units of Lantus.     Today  - 12 AM FS mg/dL = He didn't receive insulin.   - 6 AM FS mg/dL = He didn't receive insulin.   - 12 PM FS mg/dL = He didn't receive insulin.     REVIEW OF SYSTEMS  Unable to obtain.     PHYSICAL EXAM  Vital Signs Last 24 Hrs  T(C): 35.9 (01 Dec 2022 13:00), Max: 37.1 (2022 14:35)  T(F): 96.6 (01 Dec 2022 13:00), Max: 98.8 (2022 14:35)  HR: 51 (01 Dec 2022 13:00) (49 - 63)  BP: 121/52 (01 Dec 2022 13:00) (96/46 - 127/53)  BP(mean): 75 (01 Dec 2022 13:00) (66 - 76)  RR: 14 (01 Dec 2022 13:00) (13 - 26)  SpO2: 93% (01 Dec 2022 13:00) (90% - 99%)    Parameters below as of 01 Dec 2022 13:00  Patient On (Oxygen Delivery Method): ventilator    O2 Concentration (%): 40    Constitutional: Elderly male, sedated, intubated.   HEENT: Normocephalic, atraumatic, GORDON.  Respiratory: Lungs clear to auscultation bilaterally.   Cardiovascular: regular rhythm, normal S1 and S2, no audible murmurs.   GI: soft, non-tender, non-distended, bowel sounds present. (+) PEG tube in place.   Extremities: +1 lower extremity edema.    LABS  CBC - WBC/HGB/HTC/PLT: 4.11/8.1/25.7/185 (22)  BMP - Na/K/Cl/Bicarb/BUN/Cr/Gluc/AG/eGFR: 132/4.4/103/18/89/3.49/107/11/17 (22)  Ca - 7.7 (22)  Phos - 3.1 (22)  Mg - 1.8 (22)  LFT - Alb/Tprot/Tbili/Dbili/AlkPhos/ALT/AST: 1.9/--/<0.2/--/70/47/61 (22)  PT/aPTT/INR: 12.5/34.8/1.05 (22)   Thyroid Stimulating Hormone, Serum: 9.260 (22)  Total T4/Free T4: --/0.698 (22)    MEDICATIONS  MEDICATIONS  (STANDING):  albuterol/ipratropium for Nebulization 3 milliLiter(s) Nebulizer every 6 hours  aspirin  chewable 81 milliGRAM(s) Oral daily  atorvastatin 40 milliGRAM(s) Oral at bedtime  chlorhexidine 0.12% Liquid 15 milliLiter(s) Oral Mucosa every 12 hours  chlorhexidine 2% Cloths 1 Application(s) Topical <User Schedule>  dextrose 5%. 1000 milliLiter(s) (100 mL/Hr) IV Continuous <Continuous>  dextrose 5%. 1000 milliLiter(s) (50 mL/Hr) IV Continuous <Continuous>  dextrose 50% Injectable 25 Gram(s) IV Push once  dextrose 50% Injectable 12.5 Gram(s) IV Push once  dextrose 50% Injectable 25 Gram(s) IV Push once  fentaNYL   Infusion. 0.501 MICROgram(s)/kG/Hr (4.12 mL/Hr) IV Continuous <Continuous>  glucagon  Injectable 1 milliGRAM(s) IntraMuscular once  hydrocortisone sodium succinate Injectable 25 milliGRAM(s) IV Push every 12 hours  influenza  Vaccine (HIGH DOSE) 0.7 milliLiter(s) IntraMuscular once  insulin regular  human corrective regimen sliding scale   SubCutaneous every 6 hours  levothyroxine Injectable 50 MICROGram(s) IV Push at bedtime  mycophenolate mofetil Suspension 500 milliGRAM(s) Oral every 12 hours  norepinephrine Infusion 0.05 MICROgram(s)/kG/Min (7.72 mL/Hr) IV Continuous <Continuous>  pantoprazole   Suspension 40 milliGRAM(s) Enteral Tube every 24 hours  propofol Infusion 20 MICROgram(s)/kG/Min (9.88 mL/Hr) IV Continuous <Continuous>  sodium chloride 7% Inhalation 4 milliLiter(s) Inhalation every 12 hours  tacrolimus    0.5 mG/mL Suspension 3 milliGRAM(s) Oral every 24 hours  tacrolimus    0.5 mG/mL Suspension 2 milliGRAM(s) Oral every 24 hours  tamsulosin 0.4 milliGRAM(s) Oral at bedtime    MEDICATIONS  (PRN):  acetaminophen    Suspension .. 650 milliGRAM(s) Oral every 6 hours PRN Mild Pain (1 - 3)  dextrose Oral Gel 15 Gram(s) Oral once PRN Blood Glucose LESS THAN 70 milliGRAM(s)/deciliter  sodium chloride 0.9% lock flush 10 milliLiter(s) IV Push every 1 hour PRN Pre/post blood products, medications, blood draw, and to maintain line patency    ASSESSMENT / RECOMMENDATIONS  Mr. Miguel is an 83-year-old male with type 2 diabetes mellitus, coronary artery disease (s/p DAMEON x2 to LAD on 2021), chronic kidney disease stage 4 (s/p renal transplant on , on tracrolimus and prednisone) and benign prostatic hyperplasia who was sent to the hospital by his nephrologist for further evaluation of lower extremity edema (22). He was admitted for further management of LE edema, thought to be secondary to his underlying CKD versus congestive heart failure. Hospitalization was complicated by hypoglycemia and cardiac arrest (22, ROSC after 1 minute of chest compressions). Post-cardiac arrest he was noted to develop hypotension, bradycardia and hypothermia. Labs were remarkable for elevated TSH (20) and decreased FT4 (0.77). 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     Although he had features seen with myxedema coma, these findings were better explained by his cardiac arrest as his vital signs prior to the arrest were apparently around his baseline, as was his mental status. Nonetheless, he was started on levothyroxine 25 mcg IV daily (22) which has been titrated up to 50 mcg daily (22). In addition, given concern for adrenal insufficiency, he was started on stress-dose steroids which were titrated down to 25 mg every 12 hours. The patient was extubated on 22; however, he had to be reintubated on 22 due to episode of respiratory decompensation, potentially secondary to aspiration. After reintubation, his insulin requirements have increased, presumably due to underlying aspiration pneumonia.     A1C: 9.0 %  BUN: 89  Creatinine: 3.49  GFR: 17  Weight: 82.3  BMI: 28.4  EF: 65%, grade II diastolic dysfunction.    # Type 2 diabetes mellitus  - Patient's dose of Lantus was decreased to 7 units overnight due to down-trending glucose levels and borderline low glucose level (85 mg/dL). This amount of insulin was enough to cover him likely due to decrease in renal function, now with low urinary output and rising creatinine level.   - Please continue with Lantus 7 units at bedtime for now; however, would hold Lantus altogether if glucose level is less 110 mg/dL by bedtime.    - Tube feeds will be started at ~6 PM today at 10 mL/hr, with plan to increase them by 10 cc/hr every ~4 hours if tolerable. Nonetheless, would not start nutritional insulin for now.   - Decrease regular insulin sliding scale to low dose every 6 hours.   - Patient's fingerstick glucose goal is 100-180 mg/dL.      # Hypothyroidism  - TSH 20. FT4 0.77 (22).   - TSH 9.2. FT4 0.69 (22).  - Continue with levothyroxine to 50 mcg IV daily.   - Repeat thyroid function tests (TSH, Free T4) next Monday (22).     # Concern for adrenal insufficiency  - The patient was on prednisone 5 mg daily for renal transplant prior to admission.   - He had recent hospitalization (2022) for pneumonia due to pseudomonas where he was also started on stress dose steroids. At that time, a cortisol level was collected prior to starting steroids which was not robust (6.5 ug/dL).   - The patient's blood pressure has decreased and he was started on levophed infusion.   - Due to increasing stress, would recommend to increase hydrocortisone to 25 mg IV every 8 hours for now. However, would leave up to discretion of primary team to increase even higher if septic shock is suspected in setting of hypothermia and hypotension.   - Of note, hydrocortisone doesn't have the same level of immunosuppressive or anti-inflammatory effect as prednisone. Due to history of renal transplant, consider discussing with renal team whether he would require transitioning to prednisone instead of hydrocortisone to augment immunosuppressive effect of the steroids.     Case discussed with Dr. Rabago. Primary team updated.       Ulysses Weber    Endocrinology Fellow    Service Pager: [phone]

## 2022-12-01 NOTE — PROGRESS NOTE ADULT - PROBLEM SELECTOR PLAN 1
Event noticed.  The patient is sedated.  I suctioned the patient copious amount of secretion.  Gas exchange is stable.  Sputum grew Pseudomonas and Serratia.  The patient on appropriate antibiotic.  Continue pulmonary toilet.  Patient has baseline dementia and does not and trial up off sedation.  Tapers pressors as needed.  Cortisol level is adequate.  I discussed the case with critical care. She was extubated.  Patient is on nasal cannula.  Suction the patient copious amount of secretion.  Dysphagia evaluation.  Continue antibiotic.  The patient is hemodynamically stable off pressors and sedative.  Suctioned the patient thick moderate secretion and increased.  He failed swallow eval and will need a peg.  On nebs.  He was intubated.  Sputum GS revealed GNR and culture positive for sternomonas.  Abt changed to bactrim.  Monitor renal function on abt which is worsening.  Plan trach tomorrow.  Peg done . Is in detail with the family.  Patient is to be.  Prepped for a trach and a PEG on Thursday.  I discussed with GI regarding the PEG will be done tomorrow.  Continue pulmonary toilet.  Continue antibiotic.  Follow-up renal function on Bactrim  discussed with renal will need HD.  Broch revealed minimal secretions and airway were normal.  Follow on cultures

## 2022-12-01 NOTE — PROGRESS NOTE ADULT - ASSESSMENT
82 y/o M PMH CKD 4, renal transplant in 2013 at [facility], glaucoma (blind), DM1, anemia, CAD s/p 2 DAMEON to LAD in 6/21, BPH, recent admission for PNA presents with acute on chronic cough and SUNNY 2/2 acute CHF vs CKD.        NEUROLOGY  #Metabolic Encephalopathy  A&Ox0 currently. Pupils still anisocoric. CT scan negative for acute intracranial pathology.     CARDIOVASCULAR  #HFpEF  Had EF of 55% on TTE from Oct 2022, grade I diastolic dysfunction. Repeat TTE 11/17 with poor visualization of LV. Patient making good UOP overnight  - c/w carvedilol 25mg BID     #Upper extremity edema  Stable from yesterday but increased compared to baseline. US with superficial thrombosis of L cephalic vein extending to AC fossa, no DVTs.   - given patient's propensity to bleed and superficial nature of thrombus, no indication for AC    #CAD  #HLD  Hx of CAD s/p 2 DAMEON to LAD in June 2021, currently no CP. Trops 0.03 but no CP or ischemic changes on EKG, likely due to CKD. Takes Plavix and aspirin per last d/c but only aspirin on outpatient note  - c/w ASA daily  - c/w home Lipitor    #HTN   Known history of HTN.   - resumed home amlodipine 5mg qD   - continue to monitor BP    PULM  #AHRF  Likely 2/2 aspiration event. Intubated on 11/26.  - ID management as below for aspiration   - received trach Thursday 12/1, feeds held for this    RENAL  #ELIZABETH on Chronic kidney disease (CKD).  Baseline sCr 2.3-2.5. On admission, fluid overloaded. Does have orthopnea and intermittent SOB at baseline. Acidotic but at baseline. Follows w Dr. Mac. Urine studies this AM suggest pre-renal etiology. Given 350cc overnight, 1L bolus today  - today sCr and BUN increased  - monitor I/Os  - gave 500cc bolus over 30 mins (11/30)  - renally dose meds    #Renal transplant  Patient had renal transplant in 2013. On home mycophenolate 500mg BID and tacrolimus 4mg BID. Per nephro, decreased home tacrolimus from 4mg BID --> 2mg BID while in hospital.   - c/w tacrolimus 3mg in AM, c/w 2mg in PM  - resume mycophenolate 500mg BID  - next tacrolimus level before PM dose 11/30  - f/u renal recs    #Hypernatremia - RESOLVED  - tube feeds with 250cc q6 --> adjusted fwf to 250cc BID    GI  #Diarrhea - RESOLVED  Patient with loose stool 2 nights ago, have resolved this AM after started on miralax and senna.  - dced miralax and senna  - will continue to monitor for BMs    #Nutrition  NPO with PEG feeds.   - holding until PEG cleared for use (placed 11/30)      #BPH (benign prostatic hyperplasia).   - c/w tamsulosin 0.4mg daily    ID  #Aspiration PNA   Tracheal aspirate cx (11/19) growing serratia marcescens and pseudomonas. Patient was started on vancomycin and cefepime. MRSA nares positive. Dc'ed vancomycin given supratherapeutic trough. Completed cefepime 2g q24 course x9 days.    #Stenotrophomonas in sputum cx  Sputum cx from 11/26 growing Stenotrophomonas  - started on Bactrim BS 11/28 evening; for 10 days will receive 11/28 - 12/5    ENDOCRINE  #DM (diabetes mellitus).   Previously on lantus 15 lispro 5 at home. Endocrine following. Elevated FSGs in 200s today.  - given NPH this AM  - resumed on regular insulin 7u TID  - c/w medium dose regular ISS  - f/u endo recs  - goal -180    #Hypothyroidism  TSH 20.8, free t4 0.7, T3 49 (low). Repeat TSH at 9, fT4 0.7  - increased synthroid 25mg qD --> 50mg qD  - f/u endo recs    #Relative Adrenal Insufficiency  Patient on chronic prednisone 5mg daily. He was started on hydrocortisone 50mg q6hr --> q8 --> BID   - c/w HC 50mg BID  - wean as tolerated  - f/u endocrine recs    HEME  #Normocytic Anemia  Likely 2.2 CKD. Hgb 6.9 on 11/21, corrected appropriately s/p 1u pRBC. No active signs of bleeding on physical exam. CTH with no intracranial bleeding. Required 2u pRBC to correct. DEBRA and stool guaic negative.  - consider epo, if BP remains well controlled  - active T+S  - transfuse <7    FLUIDS/ELECTROLYTES/NUTRITION  -IVF as above  -Monitor, careful w advanced kidney disease  -Diet: plan for NPO with PEG feeds  -DVT ppx: heparin TID  -GI: none  -Dispo: PT recommending [facility]ID 84 y/o M PMH CKD 4, renal transplant in 2013 at [facility], glaucoma (blind), DM1, anemia, CAD s/p 2 DAMEON to LAD in 6/21, BPH, recent admission for PNA presents with acute on chronic cough and SUNNY 2/2 acute CHF vs CKD.        NEUROLOGY  #Metabolic Encephalopathy  A&Ox0 currently. Pupils still anisocoric. CT scan negative for acute intracranial pathology.     CARDIOVASCULAR  #HFpEF  Had EF of 55% on TTE from Oct 2022, grade I diastolic dysfunction. Repeat TTE 11/17 with poor visualization of LV. Patient making good UOP overnight  - c/w carvedilol 25mg BID     #Upper extremity edema  Stable from yesterday but increased compared to baseline. US with superficial thrombosis of L cephalic vein extending to AC fossa, no DVTs.   - given patient's propensity to bleed and superficial nature of thrombus, no indication for AC    #CAD  #HLD  Hx of CAD s/p 2 DAMEON to LAD in June 2021, currently no CP. Trops 0.03 but no CP or ischemic changes on EKG, likely due to CKD. Takes Plavix and aspirin per last d/c but only aspirin on outpatient note  - c/w ASA daily  - c/w home Lipitor    #HTN   Known history of HTN.   - resumed home amlodipine 5mg qD   - continue to monitor BP    PULM  #AHRF  Likely 2/2 aspiration event. Intubated on 11/26.  - ID management as below for aspiration   - received trach Thursday 12/1, feeds held for this    RENAL  #ELIZABETH on Chronic kidney disease (CKD).  Baseline sCr 2.3-2.5. On admission, fluid overloaded. Does have orthopnea and intermittent SOB at baseline. Acidotic but at baseline. Follows w Dr. Mac. Urine studies this AM suggest pre-renal etiology. Given 350cc overnight, 1L bolus today  - today sCr and BUN increased  - monitor I/Os  - gave 500cc bolus over 30 mins (11/30)  - renally dose meds    #Renal transplant  Patient had renal transplant in 2013. On home mycophenolate 500mg BID and tacrolimus 4mg BID. Per nephro, decreased home tacrolimus from 4mg BID --> 2mg BID while in hospital.   - c/w tacrolimus 3mg in AM, c/w 2mg in PM  - resume mycophenolate 500mg BID  - next tacrolimus level before PM dose 11/30  - f/u renal recs    #Hypernatremia - RESOLVED  - tube feeds with 250cc q6 --> adjusted fwf to 250cc BID    GI  #Diarrhea - RESOLVED  Patient with loose stool 2 nights ago, have resolved this AM after started on miralax and senna.  - dced miralax and senna  - will continue to monitor for BMs    #Nutrition  NPO with PEG feeds.   - restarted feeds at 6pm 12/2      #BPH (benign prostatic hyperplasia).   - c/w tamsulosin 0.4mg daily    ID  #Aspiration PNA   Tracheal aspirate cx (11/19) growing serratia marcescens and pseudomonas. Patient was started on vancomycin and cefepime. MRSA nares positive. Dc'ed vancomycin given supratherapeutic trough. Completed cefepime 2g q24 course x9 days.    #Stenotrophomonas in sputum cx  Sputum cx from 11/26 growing Stenotrophomonas  - started on Bactrim BS 11/28 evening; for 10 days will receive 11/28 - 12/5    ENDOCRINE  #DM (diabetes mellitus).   Previously on lantus 15 lispro 5 at home. Endocrine following. Elevated FSGs in 200s today.  - will resume on lantus 7 units on 12/1 evening if FS >110 following resumption of feeds (was NPO for peg and trach placement)  - on low dose regular ISS  - f/u endo recs  - goal -180    #Hypothyroidism  TSH 20.8, free t4 0.7, T3 49 (low). Repeat TSH at 9, fT4 0.7  - increased synthroid 25mg qD --> 50mg qD  - f/u endo recs    #Relative Adrenal Insufficiency  Patient on chronic prednisone 5mg daily. He was started on hydrocortisone 50mg q6hr --> q8 --> BID   - c/w HC 50mg BID  - wean as tolerated  - f/u endocrine recs    HEME  #Normocytic Anemia  Likely 2.2 CKD. Hgb 6.9 on 11/21, corrected appropriately s/p 1u pRBC. No active signs of bleeding on physical exam. CTH with no intracranial bleeding. Required 2u pRBC to correct. DEBRA and stool guaic negative.  - consider epo, if BP remains well controlled  - active T+S  - transfuse <7    FLUIDS/ELECTROLYTES/NUTRITION  -IVF as above  -Monitor, careful w advanced kidney disease  -Diet: plan for NPO with PEG feeds  -DVT ppx: heparin TID  -GI: none  -Dispo: PT recommending NAVID

## 2022-12-01 NOTE — PROGRESS NOTE ADULT - ASSESSMENT
Patient is an 84 yo M with ESKD s/p transplant 2013 now CKD 4, glaucoma, DM1, Anemia, CAD, BPH presenting with decompensated heart failure c/b cardiac arrest on 11/26 now with ELIZABETH likely iATN    Problem/Plan:  #ELIZABETH on CKD Stage 4 likely iATN in the setting of cardiac arrest   Renal allograft with CKD 4 - baseline sCr 2.3-2.5, usual meds tacrolimus 4mg BID and mycophenolate 500mg BID.   Recs:  - c/w AM tacrolimus to 3mg and PM dose at 2mg.  - c/w cell cept 500mg BID  -Please check tacro level at 8:30 PM tonight prior to PM dose. goal 4-6  -suspect dose may need adjusting with worsening ELIZABETH   - strict i's and o's  - Trend BMP BID  -no acute need for HD today however given decreasing uop and rising Cr suspect CVVHD in the next day or so  -maintain MAP >70     Floresita Freeman D.O.  PGY 5 nephrology fellow  [phone]         Patient is an 84 yo M with ESKD s/p transplant 2013 now CKD 4, glaucoma, DM1, Anemia, CAD, BPH presenting with decompensated heart failure c/b cardiac arrest on 11/26 now with ELIZABETH likely iATN    Problem/Plan:  #ELIZABETH on CKD Stage 4 likely iATN in the setting of cardiac arrest   Renal allograft with CKD 4 - baseline sCr 2.3-2.5, usual meds tacrolimus 4mg BID and mycophenolate 500mg BID.   Recs:  - c/w AM tacrolimus to 3mg and PM dose at 2mg.  - c/w cell cept 500mg BID  -Please check tacro level at 8:30 PM tonight prior to PM dose. goal 4-6  -suspect dose may need adjusting with worsening ELIZABETH   -would consider switching from bactrim to alternative abx in light of elevated creatinine  - strict i's and o's  - Trend BMP BID  -no acute need for HD today however given decreasing uop and rising Cr suspect CVVHD in the next day or so  -maintain MAP >70     Floresita Freeman D.O.  PGY 5 nephrology fellow  [phone]         Patient is an 84 yo M with ESKD s/p transplant 2013 now CKD 4, glaucoma, DM1, Anemia, CAD, BPH presenting with decompensated heart failure c/b cardiac arrest on 11/26 now with ELIZABETH likely iATN    Problem/Plan:  #ELIZABETH on CKD Stage 4 likely iATN in the setting of cardiac arrest   Renal allograft with CKD 4 - baseline sCr 2.3-2.5, usual meds tacrolimus 4mg BID and mycophenolate 500mg BID.   Recs:  - c/w AM tacrolimus to 3mg and PM dose at 2mg.  - c/w cell cept 500mg BID  -Please check tacro level at 8:30 PM tonight prior to PM dose. goal 4-6  -suspect dose may need adjusting with worsening ELIZABETH given UOP  -would consider switching from bactrim to alternative abx in light of elevated creatinine  - strict i's and o's  - Trend BMP BID  -no acute need for HD today however given decreasing uop and rising Cr suspect CVVHD in the next day or so  -maintain MAP >70     Floresita Freeman D.O.  PGY 5 nephrology fellow  [phone]

## 2022-12-01 NOTE — CHART NOTE - NSCHARTNOTEFT_GEN_A_CORE
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      History of renal transplant  secondary to DM      Chronic kidney disease (CKD)      Glaucoma      Kidney transplant recipient      Amputation of toe  x 3      H/O heart artery stent    Current Nutrition Order:   Diet, NPO after Midnight:      NPO Start Date: 30-Nov-2022,   NPO Start Time: 23:59  Except Medications (11-30-22 @ 17:37)  -Previously: Nepro @43 ml/hr with LPS x1/day     PO Intake: N/A    GI Issues: Abdomen ND/NT, +BS x4, LBM 11/27    Pain: No non-verbal indicators     Skin Integrity: SDTI sacrum, +3 BLE    Labs:   12-01    132<L>  |  103  |  89<H>  ----------------------------<  107<H>  4.4   |  18<L>  |  3.49<H>    Ca    7.7<L>      01 Dec 2022 05:30  Phos  3.1     12-01  Mg     1.8     12-01    TPro  4.4<L>  /  Alb  1.9<L>  /  TBili  <0.2  /  DBili  x   /  AST  61<H>  /  ALT  47<H>  /  AlkPhos  70  12-01    CAPILLARY BLOOD GLUCOSE      POCT Blood Glucose.: 108 mg/dL (01 Dec 2022 06:13)  POCT Blood Glucose.: 94 mg/dL (30 Nov 2022 23:51)  POCT Blood Glucose.: 85 mg/dL (30 Nov 2022 21:11)  POCT Blood Glucose.: 91 mg/dL (30 Nov 2022 17:03)  POCT Blood Glucose.: 102 mg/dL (30 Nov 2022 11:35)      Medications:  MEDICATIONS  (STANDING):  albuterol/ipratropium for Nebulization 3 milliLiter(s) Nebulizer every 6 hours  amLODIPine   Tablet 5 milliGRAM(s) Oral every 24 hours  aspirin  chewable 81 milliGRAM(s) Oral daily  atorvastatin 40 milliGRAM(s) Oral at bedtime  chlorhexidine 0.12% Liquid 15 milliLiter(s) Oral Mucosa every 12 hours  chlorhexidine 2% Cloths 1 Application(s) Topical <User Schedule>  dextrose 5%. 1000 milliLiter(s) (100 mL/Hr) IV Continuous <Continuous>  dextrose 5%. 1000 milliLiter(s) (50 mL/Hr) IV Continuous <Continuous>  dextrose 50% Injectable 25 Gram(s) IV Push once  dextrose 50% Injectable 12.5 Gram(s) IV Push once  dextrose 50% Injectable 25 Gram(s) IV Push once  fentaNYL   Infusion. 0.501 MICROgram(s)/kG/Hr (4.12 mL/Hr) IV Continuous <Continuous>  glucagon  Injectable 1 milliGRAM(s) IntraMuscular once  hydrocortisone sodium succinate Injectable 25 milliGRAM(s) IV Push every 12 hours  influenza  Vaccine (HIGH DOSE) 0.7 milliLiter(s) IntraMuscular once  insulin glargine Injectable (LANTUS) 7 Unit(s) SubCutaneous at bedtime  insulin regular  human corrective regimen sliding scale   SubCutaneous every 6 hours  levothyroxine Injectable 50 MICROGram(s) IV Push at bedtime  magnesium sulfate  IVPB 2 Gram(s) IV Intermittent once  mycophenolate mofetil Suspension 500 milliGRAM(s) Oral every 12 hours  norepinephrine Infusion 0.05 MICROgram(s)/kG/Min (7.72 mL/Hr) IV Continuous <Continuous>  pantoprazole   Suspension 40 milliGRAM(s) Enteral Tube every 24 hours  propofol Infusion 20 MICROgram(s)/kG/Min (9.88 mL/Hr) IV Continuous <Continuous>  sodium chloride 7% Inhalation 4 milliLiter(s) Inhalation every 12 hours  tacrolimus    0.5 mG/mL Suspension 3 milliGRAM(s) Oral every 24 hours  tacrolimus    0.5 mG/mL Suspension 2 milliGRAM(s) Oral every 24 hours  tamsulosin 0.4 milliGRAM(s) Oral at bedtime    MEDICATIONS  (PRN):  acetaminophen    Suspension .. 650 milliGRAM(s) Oral every 6 hours PRN Mild Pain (1 - 3)  dextrose Oral Gel 15 Gram(s) Oral once PRN Blood Glucose LESS THAN 70 milliGRAM(s)/deciliter  sodium chloride 0.9% lock flush 10 milliLiter(s) IV Push every 1 hour PRN Pre/post blood products, medications, blood draw, and to maintain line patency    Height for BMI (FEET)	5 Feet  Height for BMI (INCHES)	7 Inch(s)  Height for BMI (CENTIMETERS)	170.18 Centimeter(s)  Weight for BMI (lbs)	220 lb  Weight for BMI (kg)	99.8 kg  Body Mass Index	34.4    Weight Change: No new wt since admit.     Estimated energy needs:   -Needs updated 11/23 using IBW 67.3kg adjusted for vented pt.   EEN: 1555-9374 kcal (25-30 kcal/kg)  EPN:  gProt. (1.4-1.6 gProt./kg)  EFN: 6913-5150 ml (25-30 ml/kg)    Subjective: 84 y/o M PMH CKD 4, S/P renal transplant in 2013 at [facility], glaucoma (blind), DM1, anemia, CAD s/p 2 DAMEON to LAD in 6/21, BPH, recent admission for PNA presented with acute on chronic cough and b/l LE edema 2/2 acute CHF vs CKD. Cardiology and renal consulted, and pt was started on Lasix 40mg IV BID with improvement in b/l LE edema. This morning, pt found to be hypoxic to 70s on RA and hypoglycemia 31, difficult to arouse, and rapid response was called. Pt was put on supplemental oxygen with O2sat 80s, still difficult to arouse, and pt was intubated. Lost a pulse on pt so code blue was called, ROSC achieved, and pt was transferred to the ICU for closer monitoring. 11/19: Intubated. 11/22: Bowel care started. 11/23: Diarrhea. 11/30: PEG placed.    Pt care discussed in IDT rounds. Rx and labs reviewed. Pt intubated and sedation this admission; today, pt in care x2 attempts. Pt for trache today; TF and various rx held for procedure. Unable to view sedation/tele monitor; privacy curtain drawn. Pt s/p PEG placement 11/30; no TF restarted yet, see recs below. No other reports GI distress or further nutritional concerns at this time. RDN will continue to reassess, intervene, and monitor as appropriate.     Previous Nutrition Diagnosis: Inadequate Protein Energy Intake r/t intubation AEB NPO status    Active [ x ]  Resolved [   ] *adjusted 11/23    If resolved, new PES:     Goal: Pt will meet 75% or more of protein/energy needs via most appropriate route for nutrition.     Recommendations:  -Restart TF regimen via PEG as tolerated    *Recommend Nepro @43 ml/hr with LPS x1/day to provide 1032 ml TF, 1958 kcal, 99 gProt., and 750 ml FW. This is 23.2 non-protein kcal and 1.47 gProt. per kg IBW 63.7kg.    *FWF per team. If euvolemic and sNa WNL, consider 210 ml FWF q4hr to provide 2010 total fluids per day.   -Monitor pressor and propofol demands; adjust TF as necessary   -Maintain aspiration precautions at all times   -Align nutrition with GOC at all times   -Monitor chemistry, GI fxn, and skin integrity     Risk Level: High [ x ] Moderate [   ] Low [   ].

## 2022-12-01 NOTE — PROGRESS NOTE ADULT - ATTENDING COMMENTS
I agree with the fellow's findings and plans as written above with the following additions/amendments:    Seen and examined at bedside. Critically ill, worsening ATN and decreasing urine output. No acute indication for KRT at this time but monitoring closely. Tacro dosing as above, would continue to hold MMF. If coming off solucortef can restart home prednisone 5mg PO QD, stress dose or other steroids per primary team. Further recs as above

## 2022-12-01 NOTE — PROGRESS NOTE ADULT - SUBJECTIVE AND OBJECTIVE BOX
OVERNIGHT EVENTS: Urine output dropped o/n, received 500cc fluid bolus.    SUBJECTIVE / INTERVAL HPI: Patient seen and examined at bedside.  In no apparent distress, unchanged from yesterday.    VITAL SIGNS:  Vital Signs Last 24 Hrs  T(C): 36.5 (01 Dec 2022 08:49), Max: 37.1 (30 Nov 2022 14:35)  T(F): 97.7 (01 Dec 2022 08:49), Max: 98.8 (30 Nov 2022 14:35)  HR: 53 (01 Dec 2022 11:00) (49 - 63)  BP: 96/46 (01 Dec 2022 11:00) (96/46 - 127/53)  BP(mean): 66 (01 Dec 2022 11:00) (66 - 76)  RR: 13 (01 Dec 2022 11:00) (13 - 27)  SpO2: 99% (01 Dec 2022 11:00) (92% - 99%)    Parameters below as of 01 Dec 2022 11:00  Patient On (Oxygen Delivery Method): ventilator    O2 Concentration (%): 100  I&O's Summary    30 Nov 2022 07:01  -  01 Dec 2022 07:00  --------------------------------------------------------  IN: 1868.5 mL / OUT: 293 mL / NET: 1575.5 mL    01 Dec 2022 07:01  -  01 Dec 2022 11:39  --------------------------------------------------------  IN: 453.8 mL / OUT: 50 mL / NET: 403.8 mL        PHYSICAL EXAM:  General: NAD  HEENT: MMM  Neck: supple  Cardiovascular: +S1/S2; RRR  Respiratory: CTA B/L; no W/R/R  Gastrointestinal: soft, NT/ND  Extremities: WWP; 3+ pitting edema in forearms and trace edema in b/l ankles, no clubbing or cyanosis  Vascular: 2+ radial, DP/PT pulses B/L  Neurological: intubated, anisocoric pupils (R>L stable from yesterday), no focal deficits    MEDICATIONS:  MEDICATIONS  (STANDING):  albuterol/ipratropium for Nebulization 3 milliLiter(s) Nebulizer every 6 hours  amLODIPine   Tablet 5 milliGRAM(s) Oral every 24 hours  aspirin  chewable 81 milliGRAM(s) Oral daily  atorvastatin 40 milliGRAM(s) Oral at bedtime  chlorhexidine 0.12% Liquid 15 milliLiter(s) Oral Mucosa every 12 hours  chlorhexidine 2% Cloths 1 Application(s) Topical <User Schedule>  dextrose 5%. 1000 milliLiter(s) (100 mL/Hr) IV Continuous <Continuous>  dextrose 5%. 1000 milliLiter(s) (50 mL/Hr) IV Continuous <Continuous>  dextrose 50% Injectable 25 Gram(s) IV Push once  dextrose 50% Injectable 12.5 Gram(s) IV Push once  dextrose 50% Injectable 25 Gram(s) IV Push once  fentaNYL   Infusion. 0.501 MICROgram(s)/kG/Hr (4.12 mL/Hr) IV Continuous <Continuous>  glucagon  Injectable 1 milliGRAM(s) IntraMuscular once  hydrocortisone sodium succinate Injectable 25 milliGRAM(s) IV Push every 12 hours  influenza  Vaccine (HIGH DOSE) 0.7 milliLiter(s) IntraMuscular once  insulin glargine Injectable (LANTUS) 7 Unit(s) SubCutaneous at bedtime  insulin regular  human corrective regimen sliding scale   SubCutaneous every 6 hours  levothyroxine Injectable 50 MICROGram(s) IV Push at bedtime  magnesium sulfate  IVPB 2 Gram(s) IV Intermittent once  mycophenolate mofetil Suspension 500 milliGRAM(s) Oral every 12 hours  norepinephrine Infusion 0.05 MICROgram(s)/kG/Min (7.72 mL/Hr) IV Continuous <Continuous>  pantoprazole   Suspension 40 milliGRAM(s) Enteral Tube every 24 hours  propofol Infusion 20 MICROgram(s)/kG/Min (9.88 mL/Hr) IV Continuous <Continuous>  sodium chloride 7% Inhalation 4 milliLiter(s) Inhalation every 12 hours  tacrolimus    0.5 mG/mL Suspension 3 milliGRAM(s) Oral every 24 hours  tacrolimus    0.5 mG/mL Suspension 2 milliGRAM(s) Oral every 24 hours  tamsulosin 0.4 milliGRAM(s) Oral at bedtime    MEDICATIONS  (PRN):  acetaminophen    Suspension .. 650 milliGRAM(s) Oral every 6 hours PRN Mild Pain (1 - 3)  dextrose Oral Gel 15 Gram(s) Oral once PRN Blood Glucose LESS THAN 70 milliGRAM(s)/deciliter  sodium chloride 0.9% lock flush 10 milliLiter(s) IV Push every 1 hour PRN Pre/post blood products, medications, blood draw, and to maintain line patency      ALLERGIES:  Allergies    No Known Allergies    Intolerances        LABS:                        6.5    3.34  )-----------( 166      ( 01 Dec 2022 05:30 )             21.1     12-01    132<L>  |  103  |  89<H>  ----------------------------<  107<H>  4.4   |  18<L>  |  3.49<H>    Ca    7.7<L>      01 Dec 2022 05:30  Phos  3.1     12-01  Mg     1.8     12-01    TPro  4.4<L>  /  Alb  1.9<L>  /  TBili  <0.2  /  DBili  x   /  AST  61<H>  /  ALT  47<H>  /  AlkPhos  70  12-01    PT/INR - ( 01 Dec 2022 05:30 )   PT: 12.5 sec;   INR: 1.05          PTT - ( 01 Dec 2022 05:30 )  PTT:34.8 sec    CAPILLARY BLOOD GLUCOSE      POCT Blood Glucose.: 134 mg/dL (01 Dec 2022 11:27)      RADIOLOGY & ADDITIONAL TESTS: Reviewed.

## 2022-12-01 NOTE — BRIEF OPERATIVE NOTE - OPERATION/FINDINGS
The patient was placed in the supine position. The anterior neck was prepped and draped in usual sterile fashion. 1% lidocaine was administered approximately 2 fingerbreadths above the sternal notch for local anesthesia. A 1.5-cm vertical incision was then performed 2 fingerbreadths above the sternal notch. Using a curved Merary, blunt dissection was performed down to the level of the pretracheal fascia. At this point, the bronchoscope was introduced through the endotracheal tube and the trachea was properly visualized. The endotracheal tube was then gradually withdrawn within the trachea under direct bronchoscopic visualization. Proper midline position was confirmed by bouncing the needle from the tracheostomy tray over the trachea with bronchoscopic examination. The needle was advanced into the trachea and proper positioning was confirmed with direct visualization. The needle was then removed leaving a white outer cannula in position. The wire from the tracheostomy tray was then advanced through the white outer cannula. The cannula was then removed. The small, blue dilator was then advanced over the wire into the trachea. Once proper dilatation was achieved, the dilator was removed. The large, tapered dilator was then advanced over the wire into the trachea. The dilator was removed leaving the wire and white inner cannula in position. A number 8 percutaneous Shiley tracheostomy tube was then advanced over the wire and white inner cannula into the trachea. Proper positioning was confirmed with bronchoscopic visualization. The tracheostomy tube was then sutured in place with two nylon sutures. It was further secured with a tracheostomy tie.

## 2022-12-01 NOTE — PROGRESS NOTE ADULT - SUBJECTIVE AND OBJECTIVE BOX
Interval Events: Reviewed  Patient seen and examined at bedside.    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      History of renal transplant  secondary to DM      Chronic kidney disease (CKD)      Glaucoma      Kidney transplant recipient      Amputation of toe  x 3      H/O heart artery stent          MEDICATIONS:  Pulmonary:  albuterol/ipratropium for Nebulization 3 milliLiter(s) Nebulizer every 6 hours  sodium chloride 7% Inhalation 4 milliLiter(s) Inhalation every 12 hours    Antimicrobials:    Anticoagulants:  aspirin  chewable 81 milliGRAM(s) Oral daily    Cardiac:  norepinephrine Infusion 0.05 MICROgram(s)/kG/Min IV Continuous <Continuous>      Allergies    No Known Allergies    Intolerances        Vital Signs Last 24 Hrs  T(C): 35.9 (01 Dec 2022 13:00), Max: 37.1 (30 Nov 2022 14:35)  T(F): 96.6 (01 Dec 2022 13:00), Max: 98.8 (30 Nov 2022 14:35)  HR: 51 (01 Dec 2022 13:00) (49 - 63)  BP: 121/52 (01 Dec 2022 13:00) (96/46 - 127/53)  BP(mean): 75 (01 Dec 2022 13:00) (66 - 76)  RR: 14 (01 Dec 2022 13:00) (13 - 26)  SpO2: 93% (01 Dec 2022 13:00) (90% - 99%)    Parameters below as of 01 Dec 2022 13:00  Patient On (Oxygen Delivery Method): ventilator    O2 Concentration (%): 40    11-30 @ 07:01  -  12-01 @ 07:00  --------------------------------------------------------  IN: 1868.5 mL / OUT: 293 mL / NET: 1575.5 mL    12-01 @ 07:01 - 12-01 @ 13:47  --------------------------------------------------------  IN: 550.4 mL / OUT: 50 mL / NET: 500.4 mL      Mode: AC/ CMV (Assist Control/ Continuous Mandatory Ventilation)  RR (machine): 12  TV (machine): 450  FiO2: 35  PEEP: 5  ITime: 1  MAP: 8.9  PIP: 20      Review of Systems:   •	General: negative  •	Skin/Breast: negative  •	Ophthalmologic: negative  •	ENMT: negative  •	Respiratory and Thorax: negative  •	Cardiovascular: negative  •	Gastrointestinal: negative  •	Genitourinary: negative  •	Musculoskeletal: negative  •	Neurological: negative  •	Psychiatric: negative  •	Hematology/Lymphatics: negative  •	Endocrine: negative  •	Allergic/Immunologic: negative    Physical Exam:   • Constitutional:	refer to the dietion /Nutritionist note  • Eyes:	EOMI; PERRL; no drainage or redness  • ENMT:	No oral lesions; no gross abnormalities  • Neck	No bruits; no thyromegaly or nodules  • Breasts:	not examined  • Back:	No deformity or limitation of movement  • Respiratory:	Breath Sounds equal & clear to percussion & auscultation, no accessory muscle use  • Cardiovascular:	Regular rate & rhythm, normal S1, S2; no murmurs, gallops or rubs; no S3, S4  • Gastrointestinal:	Soft, non-tender, no hepatosplenomegaly, normal bowel sounds  • Genitourinary:	not examined  • Rectal: not examined  • Extremities:	No cyanosis, clubbing or edema  • Vascular:	Equal and normal pulses (carotid, femoral, dorsalis pedis)  • Neurologica:l	not examined  • Skin:	No lesions; no rash  • Lymph Nodes:	No lymphadedenopathy  • Musculoskeletal:	No joint pain, swelling or deformity; no limitation of movement        LABS:      CBC Full  -  ( 01 Dec 2022 12:44 )  WBC Count : 4.11 K/uL  RBC Count : 2.91 M/uL  Hemoglobin : 8.1 g/dL  Hematocrit : 25.7 %  Platelet Count - Automated : 185 K/uL  Mean Cell Volume : 88.3 fl  Mean Cell Hemoglobin : 27.8 pg  Mean Cell Hemoglobin Concentration : 31.5 gm/dL  Auto Neutrophil # : x  Auto Lymphocyte # : x  Auto Monocyte # : x  Auto Eosinophil # : x  Auto Basophil # : x  Auto Neutrophil % : x  Auto Lymphocyte % : x  Auto Monocyte % : x  Auto Eosinophil % : x  Auto Basophil % : x    12-01    132<L>  |  103  |  89<H>  ----------------------------<  107<H>  4.4   |  18<L>  |  3.49<H>    Ca    7.7<L>      01 Dec 2022 05:30  Phos  3.1     12-01  Mg     1.8     12-01    TPro  4.4<L>  /  Alb  1.9<L>  /  TBili  <0.2  /  DBili  x   /  AST  61<H>  /  ALT  47<H>  /  AlkPhos  70  12-01    PT/INR - ( 01 Dec 2022 05:30 )   PT: 12.5 sec;   INR: 1.05          PTT - ( 01 Dec 2022 05:30 )  PTT:34.8 sec    < from: Xray Chest 1 View- PORTABLE-Urgent (Xray Chest 1 View- PORTABLE-Urgent .) (12.01.22 @ 11:23) >    ACC: [id number] EXAM:  XR CHEST PORTABLE URGENT 1V                          PROCEDURE DATE:  12/01/2022          INTERPRETATION:  XR CHEST URGENT dated 12/1/2022 11:23 AM    HISTORY: Status post tracheostomy    COMPARISON: Chest radiograph from 11/26/2022 at 12:38 PM    FINDINGS: Status post tracheostomy with interval removal of endotracheal   tube. Interval removal of enteric catheter. Interval increase in right   lung opacity/effusion. Left lower lung opacity/focal atelectasis and   pleural effusion, grossly unchanged. There are no pleural effusions. The   cardiomediastinal silhouette is unremarkable. Aortic knob calcifications.   Degenerative changes in the spine. No acute osseous or soft tissue   abnormality.      IMPRESSION:    1.  Since11/26/2022, status post interval tracheostomy.  2.  Interval increase in right lung opacity/effusion. Left lower lung   pleural effusion and/or focal atelectasis grossly unchanged.    < end of copied text >                  RADIOLOGY & ADDITIONAL STUDIES (The following images were personally reviewed):

## 2022-12-01 NOTE — PROGRESS NOTE ADULT - ATTENDING COMMENTS
Acute hypoxic respiratory failure, unable to wean off ventilator with h/o kidney transplant with acute on chronic kidney disease with persistent encephalopathy.   Plan:  - Trach was done today. PEG was done yesterday  - Restart feeding.   - Titrate sedation and plan to d/c by tomorrow  - Bactrim to continue for HAP (Stenotrophomonas)  - Steroid and tacrolimus to continue. Nephrology following patient. His renal failure is worsening. May need HD in future.   - Rest as above

## 2022-12-01 NOTE — PROGRESS NOTE ADULT - ATTENDING COMMENTS
Pt seen on rounds this afternoon.  PEG had been placed yesterday, tracheostomy was done today.  Feeds are still on hold post PEG placement, will be started at 6 PM.  Was hypothermic this morning, is also now oliguric with rise in creatinine to 3.5 mg%  Was hemodynamically stable earlier today, but BP now low and Levophed has been started--though requirements are low.  On exam he is sedated, mildly tachypneic.    Lungs with only faint rales at the left base. Scattered rhonchi throughout both lung fields  Abdomen soft, non-distended.  Glucoses were 85/94 overnight after 7 units Lantus, now starting to rise (134 mg% this afternoon)  --Will continue the Lantus at 7 units.  REquirements likely to be low given renal failure  --With feeds to be started at only 10 cc/hr, will hold off on nutritional insulin  --Increase hydrocortisone to 25 mg q8h given is hypotension, but consider full stress doses if you feel that he is septic

## 2022-12-01 NOTE — BRIEF OPERATIVE NOTE - NSICDXBRIEFPREOP_GEN_ALL_CORE_FT
PRE-OP DIAGNOSIS:  Respiratory failure, unspecified with hypoxia 01-Dec-2022 10:43:13  Pablito Tello

## 2022-12-01 NOTE — PROGRESS NOTE ADULT - SUBJECTIVE AND OBJECTIVE BOX
Patient is a 82y Male seen and evaluated at bedside. patient remains intubated hypotensive on pressor support renal function worsening with Cr up to 3.49 tacro level noted to be 4 uop 190cc/24 hours       Meds:    acetaminophen    Suspension .. 650 every 6 hours PRN  albuterol/ipratropium for Nebulization 3 every 6 hours  amLODIPine   Tablet 5 every 24 hours  aspirin  chewable 81 daily  atorvastatin 40 at bedtime  chlorhexidine 0.12% Liquid 15 every 12 hours  chlorhexidine 2% Cloths 1 <User Schedule>  dextrose 5%. 1000 <Continuous>  dextrose 5%. 1000 <Continuous>  dextrose 50% Injectable 25 once  dextrose 50% Injectable 12.5 once  dextrose 50% Injectable 25 once  dextrose Oral Gel 15 once PRN  fentaNYL   Infusion. 0.501 <Continuous>  glucagon  Injectable 1 once  hydrocortisone sodium succinate Injectable 25 every 12 hours  influenza  Vaccine (HIGH DOSE) 0.7 once  insulin glargine Injectable (LANTUS) 7 at bedtime  insulin regular  human corrective regimen sliding scale  every 6 hours  levothyroxine Injectable 50 at bedtime  magnesium sulfate  IVPB 2 once  mycophenolate mofetil Suspension 500 every 12 hours  norepinephrine Infusion 0.05 <Continuous>  pantoprazole   Suspension 40 every 24 hours  propofol Infusion 20 <Continuous>  sodium chloride 0.9% lock flush 10 every 1 hour PRN  sodium chloride 7% Inhalation 4 every 12 hours  tacrolimus    0.5 mG/mL Suspension 3 every 24 hours  tacrolimus    0.5 mG/mL Suspension 2 every 24 hours  tamsulosin 0.4 at bedtime      T(C): , Max: 37.1 (11-30-22 @ 14:35)  T(F): , Max: 98.8 (11-30-22 @ 14:35)  HR: 53 (12-01-22 @ 11:00)  BP: 96/46 (12-01-22 @ 11:00)  BP(mean): 66 (12-01-22 @ 11:00)  RR: 13 (12-01-22 @ 11:00)  SpO2: 99% (12-01-22 @ 11:00)  Wt(kg): --    11-30 @ 07:01  -  12-01 @ 07:00  --------------------------------------------------------  IN: 1868.5 mL / OUT: 293 mL / NET: 1575.5 mL    12-01 @ 07:01  -  12-01 @ 11:44  --------------------------------------------------------  IN: 453.8 mL / OUT: 50 mL / NET: 403.8 mL          Review of Systems:  unable to participate      PHYSICAL EXAM:  GENERAL: intubated' sedated on pressor support  CHEST/LUNG mechanical breath sounds   HEART: normal S1S2, RRR  ABDOMEN: Soft, Nontender, +BS,   EXTREMITIES: No clubbing, cyanosis, or edema         LABS:                        6.5    3.34  )-----------( 166      ( 01 Dec 2022 05:30 )             21.1     12-01    132<L>  |  103  |  89<H>  ----------------------------<  107<H>  4.4   |  18<L>  |  3.49<H>    Ca    7.7<L>      01 Dec 2022 05:30  Phos  3.1     12-01  Mg     1.8     12-01    TPro  4.4<L>  /  Alb  1.9<L>  /  TBili  <0.2  /  DBili  x   /  AST  61<H>  /  ALT  47<H>  /  AlkPhos  70  12-01      PT/INR - ( 01 Dec 2022 05:30 )   PT: 12.5 sec;   INR: 1.05          PTT - ( 01 Dec 2022 05:30 )  PTT:34.8 sec    Sodium, Random Urine: 20 mmol/L (11-30 @ 23:12)  Creatinine, Random Urine: 37 mg/dL (11-30 @ 23:12)  Sodium, Random Urine: 25 mmol/L (11-30 @ 03:57)  Osmolality, Random Urine: 334 mosm/kg (11-30 @ 03:57)  Creatinine, Random Urine: 41 mg/dL (11-30 @ 03:57)        RADIOLOGY & ADDITIONAL STUDIES:           Patient is a 82y Male seen and evaluated at bedside. patient remains intubated hypotensive on pressor support renal function worsening with Cr up to 3.49 tacro level noted to be 4 uop 190cc/24 hours       Meds:    acetaminophen    Suspension .. 650 every 6 hours PRN  albuterol/ipratropium for Nebulization 3 every 6 hours  amLODIPine   Tablet 5 every 24 hours  aspirin  chewable 81 daily  atorvastatin 40 at bedtime  chlorhexidine 0.12% Liquid 15 every 12 hours  chlorhexidine 2% Cloths 1 <User Schedule>  dextrose 5%. 1000 <Continuous>  dextrose 5%. 1000 <Continuous>  dextrose 50% Injectable 25 once  dextrose 50% Injectable 12.5 once  dextrose 50% Injectable 25 once  dextrose Oral Gel 15 once PRN  fentaNYL   Infusion. 0.501 <Continuous>  glucagon  Injectable 1 once  hydrocortisone sodium succinate Injectable 25 every 12 hours  influenza  Vaccine (HIGH DOSE) 0.7 once  insulin glargine Injectable (LANTUS) 7 at bedtime  insulin regular  human corrective regimen sliding scale  every 6 hours  levothyroxine Injectable 50 at bedtime  magnesium sulfate  IVPB 2 once  mycophenolate mofetil Suspension 500 every 12 hours  norepinephrine Infusion 0.05 <Continuous>  pantoprazole   Suspension 40 every 24 hours  propofol Infusion 20 <Continuous>  sodium chloride 0.9% lock flush 10 every 1 hour PRN  sodium chloride 7% Inhalation 4 every 12 hours  tacrolimus    0.5 mG/mL Suspension 3 every 24 hours  tacrolimus    0.5 mG/mL Suspension 2 every 24 hours  tamsulosin 0.4 at bedtime      T(C): , Max: 37.1 (11-30-22 @ 14:35)  T(F): , Max: 98.8 (11-30-22 @ 14:35)  HR: 53 (12-01-22 @ 11:00)  BP: 96/46 (12-01-22 @ 11:00)  BP(mean): 66 (12-01-22 @ 11:00)  RR: 13 (12-01-22 @ 11:00)  SpO2: 99% (12-01-22 @ 11:00)  Wt(kg): --    11-30 @ 07:01  -  12-01 @ 07:00  --------------------------------------------------------  IN: 1868.5 mL / OUT: 293 mL / NET: 1575.5 mL    12-01 @ 07:01  -  12-01 @ 11:44  --------------------------------------------------------  IN: 453.8 mL / OUT: 50 mL / NET: 403.8 mL          Review of Systems:  unable to participate    .  VITAL SIGNS:  T(F): 96.3 (12-01-22 @ 14:34), Max: 98.6 (11-30-22 @ 17:26)  HR: 50 (12-01-22 @ 14:00) (49 - 61)  BP: 105/50 (12-01-22 @ 14:00) (96/46 - 127/53)  BP(mean): 70 (12-01-22 @ 14:00) (66 - 76)  ABP: --  ABP(mean): --  RR: 14 (12-01-22 @ 14:00) (13 - 25)  SpO2: 94% (12-01-22 @ 14:00) (90% - 99%)    PHYSICAL EXAM:  Constitutional: Intubated, sedated; NAD  HEENT: ETT tube in place, clear secretions  Respiratory: Mechanical breath sounds bilaterally, not overbreathing vent  Cardiac: +S1/S2; RRR; no M/R/G  Gastrointestinal: soft, NT/ND; no rebound or guarding; +BS  Extremities: WWP, no clubbing or cyanosis; 2+ dependent peripheral edema  Dermatologic: skin warm, dry and intact; no rashes, wounds, or scars  Access: RUE AVF +Thrill/bruit      LABS:                        6.5    3.34  )-----------( 166      ( 01 Dec 2022 05:30 )             21.1     12-01    132<L>  |  103  |  89<H>  ----------------------------<  107<H>  4.4   |  18<L>  |  3.49<H>    Ca    7.7<L>      01 Dec 2022 05:30  Phos  3.1     12-01  Mg     1.8     12-01    TPro  4.4<L>  /  Alb  1.9<L>  /  TBili  <0.2  /  DBili  x   /  AST  61<H>  /  ALT  47<H>  /  AlkPhos  70  12-01      PT/INR - ( 01 Dec 2022 05:30 )   PT: 12.5 sec;   INR: 1.05          PTT - ( 01 Dec 2022 05:30 )  PTT:34.8 sec    Sodium, Random Urine: 20 mmol/L (11-30 @ 23:12)  Creatinine, Random Urine: 37 mg/dL (11-30 @ 23:12)  Sodium, Random Urine: 25 mmol/L (11-30 @ 03:57)  Osmolality, Random Urine: 334 mosm/kg (11-30 @ 03:57)  Creatinine, Random Urine: 41 mg/dL (11-30 @ 03:57)        RADIOLOGY & ADDITIONAL STUDIES:      Creatinine, Serum: 3.49 mg/dL (12-01-22 @ 05:30)  Creatinine, Serum: 3.12 mg/dL (11-30-22 @ 03:57)  Creatinine, Serum: 2.75 mg/dL (11-29-22 @ 04:21)  Creatinine, Serum: 2.49 mg/dL (11-28-22 @ 05:30)  Creatinine, Serum: 2.67 mg/dL (11-27-22 @ 04:32)  Creatinine, Serum: 2.47 mg/dL (11-26-22 @ 05:21)  Creatinine, Serum: 2.56 mg/dL (11-25-22 @ 05:26)  Creatinine, Serum: 2.72 mg/dL (11-24-22 @ 05:41)  Creatinine, Serum: 3.00 mg/dL (11-23-22 @ 05:30)  Creatinine, Serum: 2.90 mg/dL (11-22-22 @ 21:48)      taTacrolimus (), Serum: 4.0:

## 2022-12-02 NOTE — PROGRESS NOTE ADULT - SUBJECTIVE AND OBJECTIVE BOX
Patient is a 82y Male seen and evaluated at bedside. patient s/p trach and PEG yesterday uop 160cc/24 hours tacro level noted to be 4 remains on ln low dose levophed Na 127 K 5.0 bicarb 14 /51      Meds:    acetaminophen    Suspension .. 650 every 6 hours PRN  albuterol/ipratropium for Nebulization 3 every 6 hours  aspirin  chewable 81 daily  atorvastatin 40 at bedtime  chlorhexidine 0.12% Liquid 15 every 12 hours  chlorhexidine 2% Cloths 1 <User Schedule>  dexMEDEtomidine Infusion 0.2 <Continuous>  dextrose 5%. 1000 <Continuous>  dextrose 5%. 1000 <Continuous>  dextrose 5%. 1000 <Continuous>  dextrose 5%. 1000 <Continuous>  dextrose 50% Injectable 25 once  dextrose 50% Injectable 12.5 once  dextrose 50% Injectable 25 once  dextrose 50% Injectable 25 once  dextrose 50% Injectable 12.5 once  dextrose 50% Injectable 25 once  dextrose Oral Gel 15 once PRN  dextrose Oral Gel 15 once PRN  fentaNYL   Patch  25 MICROgram(s)/Hr 1 every 72 hours PRN  glucagon  Injectable 1 once  glucagon  Injectable 1 once  heparin   Injectable 5000 every 8 hours  hydrocortisone sodium succinate Injectable 25 every 12 hours  influenza  Vaccine (HIGH DOSE) 0.7 once  insulin glargine Injectable (LANTUS) 7 at bedtime  insulin regular  human corrective regimen sliding scale  every 6 hours  levothyroxine Injectable 50 at bedtime  mycophenolate mofetil Suspension 500 every 12 hours  norepinephrine Infusion 0.05 <Continuous>  pantoprazole  Injectable 40 daily  sodium chloride 0.9% lock flush 10 every 1 hour PRN  sodium chloride 7% Inhalation 4 every 12 hours  tacrolimus    0.5 mG/mL Suspension 3 every 24 hours  tacrolimus    0.5 mG/mL Suspension 2 every 24 hours  tamsulosin 0.4 at bedtime  trimethoprim / sulfamethoxazole IVPB 325 every 24 hours      T(C): , Max: 37.4 (12-02-22 @ 06:03)  T(F): , Max: 99.3 (12-02-22 @ 06:03)  HR: 76 (12-02-22 @ 10:30)  BP: 113/51 (12-02-22 @ 10:30)  BP(mean): 73 (12-02-22 @ 10:30)  RR: 22 (12-02-22 @ 10:30)  SpO2: 94% (12-02-22 @ 10:30)  Wt(kg): --    12-01 @ 07:01  -  12-02 @ 07:00  --------------------------------------------------------  IN: 2135.3 mL / OUT: 165 mL / NET: 1970.3 mL    12-02 @ 07:01  -  12-02 @ 10:45  --------------------------------------------------------  IN: 108.7 mL / OUT: 30 mL / NET: 78.7 mL          Review of Systems:  unable to participate      PHYSICAL EXAM:  GENERAL: trach collar  NECK: supple, No JVD  CHEST/LUNG: mechanical breath sounds BL  HEART: normal S1S2, RRR  ABDOMEN: Soft, Nontender, +BS, No flank tenderness bilateral  EXTREMITIES: UE edema BL   ACCESS: R radiocephalic AVF w/ palpable thrill       LABS:                        9.4    3.82  )-----------( 176      ( 02 Dec 2022 04:18 )             30.0     12-02    127<L>  |  100  |  97<H>  ----------------------------<  177<H>  5.0   |  14<L>  |  3.41<H>    Ca    7.6<L>      02 Dec 2022 04:18  Phos  4.6     12-02  Mg     2.2     12-02    TPro  5.0<L>  /  Alb  2.1<L>  /  TBili  0.2  /  DBili  x   /  AST  56<H>  /  ALT  53<H>  /  AlkPhos  102  12-02      PT/INR - ( 01 Dec 2022 05:30 )   PT: 12.5 sec;   INR: 1.05          PTT - ( 01 Dec 2022 05:30 )  PTT:34.8 sec    Sodium, Random Urine: 49 mmol/L (12-02 @ 05:05)  Creatinine, Random Urine: 29 mg/dL (12-02 @ 05:05)  Osmolality, Random Urine: 323 mosm/kg (12-02 @ 05:05)  Potassium, Random Urine: 17 mmol/L (12-02 @ 05:05)  Sodium, Random Urine: 20 mmol/L (11-30 @ 23:12)  Creatinine, Random Urine: 37 mg/dL (11-30 @ 23:12)        RADIOLOGY & ADDITIONAL STUDIES:           Patient is a 82y Male seen and evaluated at bedside. patient s/p trach and PEG yesterday uop 160cc/24 hours tacro level noted to be 4 remains on ln low dose levophed Na 127 K 5.0 bicarb 14 /51      Meds:    acetaminophen    Suspension .. 650 every 6 hours PRN  albuterol/ipratropium for Nebulization 3 every 6 hours  aspirin  chewable 81 daily  atorvastatin 40 at bedtime  chlorhexidine 0.12% Liquid 15 every 12 hours  chlorhexidine 2% Cloths 1 <User Schedule>  dexMEDEtomidine Infusion 0.2 <Continuous>  dextrose 5%. 1000 <Continuous>  dextrose 5%. 1000 <Continuous>  dextrose 5%. 1000 <Continuous>  dextrose 5%. 1000 <Continuous>  dextrose 50% Injectable 25 once  dextrose 50% Injectable 12.5 once  dextrose 50% Injectable 25 once  dextrose 50% Injectable 25 once  dextrose 50% Injectable 12.5 once  dextrose 50% Injectable 25 once  dextrose Oral Gel 15 once PRN  dextrose Oral Gel 15 once PRN  fentaNYL   Patch  25 MICROgram(s)/Hr 1 every 72 hours PRN  glucagon  Injectable 1 once  glucagon  Injectable 1 once  heparin   Injectable 5000 every 8 hours  hydrocortisone sodium succinate Injectable 25 every 12 hours  influenza  Vaccine (HIGH DOSE) 0.7 once  insulin glargine Injectable (LANTUS) 7 at bedtime  insulin regular  human corrective regimen sliding scale  every 6 hours  levothyroxine Injectable 50 at bedtime  mycophenolate mofetil Suspension 500 every 12 hours  norepinephrine Infusion 0.05 <Continuous>  pantoprazole  Injectable 40 daily  sodium chloride 0.9% lock flush 10 every 1 hour PRN  sodium chloride 7% Inhalation 4 every 12 hours  tacrolimus    0.5 mG/mL Suspension 3 every 24 hours  tacrolimus    0.5 mG/mL Suspension 2 every 24 hours  tamsulosin 0.4 at bedtime  trimethoprim / sulfamethoxazole IVPB 325 every 24 hours      T(C): , Max: 37.4 (12-02-22 @ 06:03)  T(F): , Max: 99.3 (12-02-22 @ 06:03)  HR: 76 (12-02-22 @ 10:30)  BP: 113/51 (12-02-22 @ 10:30)  BP(mean): 73 (12-02-22 @ 10:30)  RR: 22 (12-02-22 @ 10:30)  SpO2: 94% (12-02-22 @ 10:30)  Wt(kg): --    12-01 @ 07:01  -  12-02 @ 07:00  --------------------------------------------------------  IN: 2135.3 mL / OUT: 165 mL / NET: 1970.3 mL    12-02 @ 07:01  -  12-02 @ 10:45  --------------------------------------------------------  IN: 108.7 mL / OUT: 30 mL / NET: 78.7 mL          Review of Systems:  unable to participate      PHYSICAL EXAM:  GENERAL: trach collar  NECK: supple, No JVD  CHEST/LUNG: mechanical breath sounds BL  HEART: normal S1S2, RRR  ABDOMEN: Soft, Nontender, +BS, No flank tenderness bilateral  EXTREMITIES: UE edema BL   ACCESS: R radiocephalic AVF w/ palpable thrill       LABS:                        9.4    3.82  )-----------( 176      ( 02 Dec 2022 04:18 )             30.0     12-02    127<L>  |  100  |  97<H>  ----------------------------<  177<H>  5.0   |  14<L>  |  3.41<H>    Ca    7.6<L>      02 Dec 2022 04:18  Phos  4.6     12-02  Mg     2.2     12-02    TPro  5.0<L>  /  Alb  2.1<L>  /  TBili  0.2  /  DBili  x   /  AST  56<H>  /  ALT  53<H>  /  AlkPhos  102  12-02      PT/INR - ( 01 Dec 2022 05:30 )   PT: 12.5 sec;   INR: 1.05          PTT - ( 01 Dec 2022 05:30 )  PTT:34.8 sec    Sodium, Random Urine: 49 mmol/L (12-02 @ 05:05)  Creatinine, Random Urine: 29 mg/dL (12-02 @ 05:05)  Osmolality, Random Urine: 323 mosm/kg (12-02 @ 05:05)  Potassium, Random Urine: 17 mmol/L (12-02 @ 05:05)  Sodium, Random Urine: 20 mmol/L (11-30 @ 23:12)  Creatinine, Random Urine: 37 mg/dL (11-30 @ 23:12)        RADIOLOGY & ADDITIONAL STUDIES:      Creatinine, Serum: 3.41 mg/dL (12-02-22 @ 04:18)  Creatinine, Serum: 3.08 mg/dL (12-01-22 @ 21:30)  Creatinine, Serum: 3.49 mg/dL (12-01-22 @ 05:30)  Creatinine, Serum: 3.12 mg/dL (11-30-22 @ 03:57)  Creatinine, Serum: 2.75 mg/dL (11-29-22 @ 04:21)  Creatinine, Serum: 2.49 mg/dL (11-28-22 @ 05:30)  Creatinine, Serum: 2.67 mg/dL (11-27-22 @ 04:32)  Creatinine, Serum: 2.47 mg/dL (11-26-22 @ 05:21)  Creatinine, Serum: 2.56 mg/dL (11-25-22 @ 05:26)  Creatinine, Serum: 2.72 mg/dL (11-24-22 @ 05:41)

## 2022-12-02 NOTE — PROGRESS NOTE ADULT - PROBLEM SELECTOR PLAN 2
- TSH 20. FT4 0.77 (11/19/22).   - TSH 9.2. FT4 0.69 (11/28/22).  - Continue with levothyroxine to 50 mcg IV daily.   - Repeat thyroid function tests (TSH, Free T4) next Monday (12/5/22).

## 2022-12-02 NOTE — PROGRESS NOTE ADULT - ASSESSMENT
84 y/o M PMH CKD 4, renal transplant in 2013 at [facility], glaucoma (blind), DM1, anemia, CAD s/p 2 DAMEON to LAD in 6/21, BPH, recent admission for PNA presents with acute on chronic cough and SUNNY 2/2 acute CHF vs CKD.        NEUROLOGY  #Metabolic Encephalopathy  CT scan negative for acute intracranial pathology. A&Ox0 on sedation, beginning to wean.   - fentanyl gtt dc'd, transitioned to 25mcg patch  (12/2)  - ordered precedex 400/100  (12/2)    CARDIOVASCULAR  #HFpEF  Had EF of 55% on TTE from Oct 2022, grade I diastolic dysfunction. Repeat TTE 11/17 with poor visualization of LV. Patient making good UOP overnight  - c/w carvedilol 25mg BID     #Upper extremity edema  Stable from yesterday but increased compared to baseline. US with superficial thrombosis of L cephalic vein extending to AC fossa, no DVTs.   - given patient's propensity to bleed and superficial nature of thrombus, no intervention    #CAD  #HLD  Hx of CAD s/p 2 DAMEON to LAD in June 2021, currently no CP. Trops 0.03 but no CP or ischemic changes on EKG, likely due to CKD. Takes Plavix and aspirin per last d/c but only aspirin on outpatient note  - c/w ASA daily  - c/w home Lipitor    #HTN   Known history of HTN.   - 12/1 evening was hypotensive, dc'd home amlodipine 5mg qD  - continue to monitor BP    PULM  #AHRF  Likely 2/2 aspiration event. Intubated on 11/26.  - ID management as below for aspiration   - received trach Thursday 12/1, feeds resumed after this    RENAL  #ELIZABETH on Chronic kidney disease (CKD).  Baseline sCr 2.3-2.5. On admission, fluid overloaded. Does have orthopnea and intermittent SOB at baseline. Acidotic but at baseline. Follows w Dr. Mac. Urine studies this AM suggest pre-renal etiology. Given 350cc overnight, 1L bolus today  - today sCr and BUN increased  - monitor I/Os  - gave 500cc bolus over 30 mins (11/30)  - renally dosing meds  - likely will begin HD 12/2 (will need to adjust bactrim dosage accordingly)    #Renal transplant  Patient had renal transplant in 2013. On home mycophenolate 500mg BID and tacrolimus 4mg BID. Per nephro, decreased home tacrolimus from 4mg BID --> 2mg BID while in hospital.   - c/w tacrolimus 3mg in AM, c/w 2mg in PM  - resume mycophenolate 500mg BID  - f/u renal recs    #Hypernatremia - RESOLVED  - tube feeds with 250cc q6 --> adjusted fwf to 250cc BID    GI  #Diarrhea - RESOLVED  Patient with loose stool 2 nights ago, have resolved this AM after started on miralax and senna.  - dced miralax and senna  - will continue to monitor for BMs    #Nutrition  NPO with PEG feeds.   - restarted feeds at 6pm 12/2      #BPH (benign prostatic hyperplasia).   - c/w tamsulosin 0.4mg daily    ID  #Aspiration PNA   Tracheal aspirate cx (11/19) growing serratia marcescens and pseudomonas. Patient was started on vancomycin and cefepime. MRSA nares positive. Dc'ed vancomycin given supratherapeutic trough. Completed cefepime 2g q24 course x9 days.    #Stenotrophomonas in sputum cx  Sputum cx from 11/26 growing Stenotrophomonas  - started on Bactrim BS 11/28 evening; for 10 days will receive 11/28 - 12/5    ENDOCRINE  #DM (diabetes mellitus).   Previously on lantus 15 lispro 5 at home. Endocrine following.  - on lantus 7 units  - on low dose regular ISS  - goal -180    #Hypothyroidism  TSH 20.8, free t4 0.7, T3 49 (low). Repeat TSH at 9, fT4 0.7  - increased synthroid 25mg qD --> 50mg qD  - f/u endo recs    #Relative Adrenal Insufficiency  Patient on chronic prednisone 5mg daily. He was started on hydrocortisone 50mg q6hr --> q8 --> BID   - c/w hydrocortizone 25mg q12  - wean as tolerated  - f/u endocrine recs    HEME  #Normocytic Anemia  Likely 2.2 CKD. Hgb 6.9 on 11/21, corrected appropriately s/p 1u pRBC. No active signs of bleeding on physical exam. CTH with no intracranial bleeding. Required 2u pRBC to correct. DEBRA and stool guaic negative.  - consider epo, if BP remains well controlled  - active T+S  - transfuse <7    FLUIDS/ELECTROLYTES/NUTRITION  -IVF as above  -Monitor, careful w advanced kidney disease  -Diet: NPO with PEG feeds  -DVT ppx: heparin TID  -GI: none  -Dispo: PT recommending [facility]ID 82 y/o M PMH CKD 4, renal transplant in 2013 at [facility], glaucoma (blind), DM1, anemia, CAD s/p 2 DAMEON to LAD in 6/21, BPH, recent admission for PNA presents with acute on chronic cough and SUNNY 2/2 acute CHF vs CKD.        NEUROLOGY  #Metabolic Encephalopathy  CT scan negative for acute intracranial pathology. A&Ox0 on sedation, beginning to wean.   - fentanyl gtt dc'd, transitioned to 25mcg patch  (12/2)  - ordered precedex 400/100  (12/2)    CARDIOVASCULAR  #HFpEF  Had EF of 55% on TTE from Oct 2022, grade I diastolic dysfunction. Repeat TTE 11/17 with poor visualization of LV. Patient making good UOP overnight  - c/w carvedilol 25mg BID     #Upper extremity edema  Stable from yesterday but increased compared to baseline. US with superficial thrombosis of L cephalic vein extending to AC fossa, no DVTs.   - given patient's propensity to bleed and superficial nature of thrombus, no intervention    #CAD  #HLD  Hx of CAD s/p 2 DAMEON to LAD in June 2021, currently no CP. Trops 0.03 but no CP or ischemic changes on EKG, likely due to CKD. Takes Plavix and aspirin per last d/c but only aspirin on outpatient note  - c/w ASA daily  - c/w home Lipitor    #HTN   Known history of HTN.   - 12/1 evening was hypotensive, dc'd home amlodipine 5mg qD  - continue to monitor BP    PULM  #AHRF  Likely 2/2 aspiration event. Intubated on 11/26.  - ID management as below for aspiration   - received trach Thursday 12/1, feeds resumed after this    RENAL  #ELIZABETH on Chronic kidney disease (CKD).  Baseline sCr 2.3-2.5. On admission, fluid overloaded. Does have orthopnea and intermittent SOB at baseline. Acidotic but at baseline. Follows w Dr. Mac. Urine studies this AM suggest pre-renal etiology. Given 350cc overnight, 1L bolus today  - today sCr and BUN increased  - monitor I/Os  - gave 500cc bolus over 30 mins (11/30)  - renally dosing meds  - per nephro, no acute need for HD 12/2, but given decreasing uop and rising Cr, likely need renal replacement therapy in the next day or so    #Renal transplant  Patient had renal transplant in 2013. On home mycophenolate 500mg BID and tacrolimus 4mg BID. Per nephro, decreased home tacrolimus from 4mg BID --> 2mg BID while in hospital.   - c/w tacrolimus 3mg in AM, c/w 2mg in PM  - resume mycophenolate 500mg BID  - f/u renal recs    #Hypernatremia - RESOLVED  - tube feeds with 250cc q6 --> adjusted fwf to 250cc BID    GI  #Diarrhea - RESOLVED  Patient with loose stool 2 nights ago, have resolved this AM after started on miralax and senna.  - dced miralax and senna  - will continue to monitor for BMs    #Nutrition  NPO with PEG feeds.   - restarted feeds at 6pm 12/2      #BPH (benign prostatic hyperplasia).   - c/w tamsulosin 0.4mg daily    ID  #Aspiration PNA   Tracheal aspirate cx (11/19) growing serratia marcescens and pseudomonas. Patient was started on vancomycin and cefepime. MRSA nares positive. Dc'ed vancomycin given supratherapeutic trough. Completed cefepime 2g q24 course x9 days.    #Stenotrophomonas in sputum cx  Sputum cx from 11/26 growing Stenotrophomonas  - started on Bactrim BS 11/28 evening; for 10 days will receive 11/28 - 12/5    ENDOCRINE  #DM (diabetes mellitus).   Previously on lantus 15 lispro 5 at home. Endocrine following.  - on lantus 7 units  - on low dose regular ISS  - goal -180    #Hypothyroidism  TSH 20.8, free t4 0.7, T3 49 (low). Repeat TSH at 9, fT4 0.7  - increased synthroid 25mg qD --> 50mg qD  - f/u endo recs    #Relative Adrenal Insufficiency  Patient on chronic prednisone 5mg daily. He was started on hydrocortisone 50mg q6hr --> q8 --> BID   - c/w hydrocortizone 25mg q12  - wean as tolerated  - f/u endocrine recs    HEME  #Normocytic Anemia  Likely 2.2 CKD. Hgb 6.9 on 11/21, corrected appropriately s/p 1u pRBC. No active signs of bleeding on physical exam. CTH with no intracranial bleeding. Required 2u pRBC to correct. DEBRA and stool guaic negative.  - consider epo, if BP remains well controlled  - active T+S  - transfuse <7    FLUIDS/ELECTROLYTES/NUTRITION  -IVF as above  -Monitor, careful w advanced kidney disease  -Diet: NPO with PEG feeds  -DVT ppx: heparin TID  -GI: none  -Dispo: PT recommending NAVID

## 2022-12-02 NOTE — PROGRESS NOTE ADULT - ATTENDING COMMENTS
Acute hypoxic respiratory failure, unable to wean off ventilator with h/o kidney transplant with acute on chronic kidney disease with persistent encephalopathy.   Plan:  - Trach and PEG were done. Tolerating feeding.   - Sedation d/c today. Patient was agitated. Plan for fentanyl patch and Precedex for now.   - Bactrim to continue for HAP (Stenotrophomonas)  - Steroid and tacrolimus to continue. Nephrology following patient. His renal failure is worsening. May need HD in future.   - Rest as above .

## 2022-12-02 NOTE — PROGRESS NOTE ADULT - NS ATTEND AMEND GEN_ALL_CORE FT
Pt seen on rounds this afternoon.  Family members were at the bedside.    Feeds were started at 6 PM last night, but not at 10 cc/hr as we were told--rather were started directly at goal rate.  Glucose was still in target range at 146 last night, but chris to 178/205 so far today.  Remains oliguric, though creatinine may have plateaued--(3.4 mg% today)  On exam he has coarse rhonchi over both lung fields.  Abdomen is soft, non-tender, non-distended.  Has 2+ edema of all 4 extremities  --Hydrocortisone was not increased--remains at 25 mg q12h.  He is hemodynamically stable, and at this point can remain at this dose  --To continue levothyroxine at 50 mcg/day (IV)  --HIs basal insulin requirement seems to be approximately 7 units Lantus.  Will not change this for now  --Will start back on nutritional insulin to cover the feeds--5 units q6h
PT seen on rounds this afternoon.  Was extubated this morning, appears to be breathing fairly comfortably.  Family member at bedside  Glucoses have been stable but elevated to the 200-220 range  Lungs with coarse rhonchi bilaterally, breath sounds somewhat less on the left relative to the right  Abdomen non-distended.  Has 1+ distal LE edema  Hydrocortisone still at 50 mg q8h.  --Given his overall hemodynamic stability, would decrease the hydrocortisone to 30 mg q8h  --Will continue sliding scale coverage only, but will start back on Lantus if glucoses remain > 180, even if he is not eating

## 2022-12-02 NOTE — PROGRESS NOTE ADULT - SUBJECTIVE AND OBJECTIVE BOX
OVERNIGHT EVENTS:   dc'ed amlodipine for relatively low BPs; timed feeds to restart at 5pm; post txfxn Hgb 8.3 in PM; 40 IV lasix in PM no response; restarted DVT ppx.  placed on low dose ISS per endo.; propofol off since ~3pm, slightly more awake. In evening, gave 7 units of lantus, Na did not rise, sodium dropped to 127, ordered urine studies, urine lytes wnl.    SUBJECTIVE / INTERVAL HPI: Patient seen and examined at bedside.  Resting comfortably, daughter at bedside.    VITAL SIGNS:  Vital Signs Last 24 Hrs  T(C): 37.4 (02 Dec 2022 06:03), Max: 37.4 (02 Dec 2022 06:03)  T(F): 99.3 (02 Dec 2022 06:03), Max: 99.3 (02 Dec 2022 06:03)  HR: 69 (02 Dec 2022 07:00) (50 - 78)  BP: 97/56 (02 Dec 2022 07:00) (81/38 - 139/59)  BP(mean): 63 (02 Dec 2022 07:00) (55 - 85)  RR: 19 (02 Dec 2022 07:00) (12 - 19)  SpO2: 90% (02 Dec 2022 07:00) (90% - 99%)    Parameters below as of 02 Dec 2022 07:00  Patient On (Oxygen Delivery Method): ventilator    O2 Concentration (%): 40  I&O's Summary    01 Dec 2022 07:01  -  02 Dec 2022 07:00  --------------------------------------------------------  IN: 2135.3 mL / OUT: 165 mL / NET: 1970.3 mL    02 Dec 2022 07:01  -  02 Dec 2022 07:52  --------------------------------------------------------  IN: 51.3 mL / OUT: 5 mL / NET: 46.3 mL        PHYSICAL EXAM:  General: NAD  HEENT: MMM  Neck: supple  Cardiovascular: +S1/S2; RRR  Respiratory: CTA B/L; no R/R, mild wheezing  Gastrointestinal: soft, NT/ND  Extremities: WWP; 3+ pitting edema in forearms and trace edema in b/l ankles, no clubbing or cyanosis  Vascular: 2+ radial, DP/PT pulses B/L  Skin:  trach and peg sites clean, dry  Neurological: A&Ox0, anisocoric pupils, no focal deficits    MEDICATIONS:  MEDICATIONS  (STANDING):  albuterol/ipratropium for Nebulization 3 milliLiter(s) Nebulizer every 6 hours  aspirin  chewable 81 milliGRAM(s) Oral daily  atorvastatin 40 milliGRAM(s) Oral at bedtime  chlorhexidine 0.12% Liquid 15 milliLiter(s) Oral Mucosa every 12 hours  chlorhexidine 2% Cloths 1 Application(s) Topical <User Schedule>  dextrose 5%. 1000 milliLiter(s) (50 mL/Hr) IV Continuous <Continuous>  dextrose 5%. 1000 milliLiter(s) (100 mL/Hr) IV Continuous <Continuous>  dextrose 5%. 1000 milliLiter(s) (50 mL/Hr) IV Continuous <Continuous>  dextrose 5%. 1000 milliLiter(s) (100 mL/Hr) IV Continuous <Continuous>  dextrose 50% Injectable 25 Gram(s) IV Push once  dextrose 50% Injectable 12.5 Gram(s) IV Push once  dextrose 50% Injectable 25 Gram(s) IV Push once  dextrose 50% Injectable 25 Gram(s) IV Push once  dextrose 50% Injectable 12.5 Gram(s) IV Push once  dextrose 50% Injectable 25 Gram(s) IV Push once  fentaNYL   Infusion. 0.501 MICROgram(s)/kG/Hr (4.12 mL/Hr) IV Continuous <Continuous>  glucagon  Injectable 1 milliGRAM(s) IntraMuscular once  glucagon  Injectable 1 milliGRAM(s) IntraMuscular once  heparin   Injectable 5000 Unit(s) SubCutaneous every 8 hours  hydrocortisone sodium succinate Injectable 25 milliGRAM(s) IV Push every 12 hours  influenza  Vaccine (HIGH DOSE) 0.7 milliLiter(s) IntraMuscular once  insulin glargine Injectable (LANTUS) 7 Unit(s) SubCutaneous at bedtime  insulin regular  human corrective regimen sliding scale   SubCutaneous every 6 hours  levothyroxine Injectable 50 MICROGram(s) IV Push at bedtime  mycophenolate mofetil Suspension 500 milliGRAM(s) Oral every 12 hours  norepinephrine Infusion 0.05 MICROgram(s)/kG/Min (7.72 mL/Hr) IV Continuous <Continuous>  pantoprazole  Injectable 40 milliGRAM(s) IV Push daily  sodium chloride 7% Inhalation 4 milliLiter(s) Inhalation every 12 hours  tacrolimus    0.5 mG/mL Suspension 3 milliGRAM(s) Oral every 24 hours  tacrolimus    0.5 mG/mL Suspension 2 milliGRAM(s) Oral every 24 hours  tamsulosin 0.4 milliGRAM(s) Oral at bedtime  trimethoprim / sulfamethoxazole IVPB 325 milliGRAM(s) IV Intermittent every 24 hours    MEDICATIONS  (PRN):  acetaminophen    Suspension .. 650 milliGRAM(s) Oral every 6 hours PRN Mild Pain (1 - 3)  dextrose Oral Gel 15 Gram(s) Oral once PRN Blood Glucose LESS THAN 70 milliGRAM(s)/deciliter  dextrose Oral Gel 15 Gram(s) Oral once PRN Blood Glucose LESS THAN 70 milliGRAM(s)/deciliter  sodium chloride 0.9% lock flush 10 milliLiter(s) IV Push every 1 hour PRN Pre/post blood products, medications, blood draw, and to maintain line patency      ALLERGIES:  Allergies    No Known Allergies    Intolerances        LABS:                        9.4    3.82  )-----------( 176      ( 02 Dec 2022 04:18 )             30.0     12-02    127<L>  |  100  |  97<H>  ----------------------------<  177<H>  5.0   |  14<L>  |  3.41<H>    Ca    7.6<L>      02 Dec 2022 04:18  Phos  4.6     12-02  Mg     2.2     12-02    TPro  5.0<L>  /  Alb  2.1<L>  /  TBili  0.2  /  DBili  x   /  AST  56<H>  /  ALT  53<H>  /  AlkPhos  102  12-02    PT/INR - ( 01 Dec 2022 05:30 )   PT: 12.5 sec;   INR: 1.05          PTT - ( 01 Dec 2022 05:30 )  PTT:34.8 sec    CAPILLARY BLOOD GLUCOSE      POCT Blood Glucose.: 178 mg/dL (02 Dec 2022 05:45)      RADIOLOGY & ADDITIONAL TESTS: Reviewed.

## 2022-12-02 NOTE — PROGRESS NOTE ADULT - ATTENDING COMMENTS
I agree with the fellow's findings and plans as written above with the following additions/amendments:    Seen and examined at bedside. Discussed with family member at bedisde UOP still low, monitoring for improvement. Overall more stable, if BPs remain stable may be able to turn cornder without KRT, monitoring closely, currently no acute indication. Would keep immunosuppressive orders as above, continue to check troughs given concern for ELIZABETH worsening based on UOP. Further recs as above

## 2022-12-02 NOTE — PROGRESS NOTE ADULT - PROBLEM SELECTOR PLAN 1
- Patient's dose of Lantus was decreased to 7 units overnight due to down-trending glucose levels and borderline low glucose level (85 mg/dL). This amount of insulin was enough to cover him likely due to decrease in renal function, now with low urinary output and rising creatinine level.   - Please continue with Lantus 7 units at bedtime for now; however, would hold Lantus altogether if glucose level is less 110 mg/dL by bedtime.    - Tube feeds will be started at ~6 PM today at 10 mL/hr, with plan to increase them by 10 cc/hr every ~4 hours if tolerable. Nonetheless, would not start nutritional insulin for now.   - Decrease regular insulin sliding scale to low dose every 6 hours.   - Patient's fingerstick glucose goal is 100-180 mg/dL. - Please continue with Lantus 7 units at bedtime.  - Start regular 5 units every 6 hours with TF at goal of 43ml/hr.  - Continue regular insulin sliding scale to low dose every 6 hours.   - Patient's fingerstick glucose goal is 100-180 mg/dL.

## 2022-12-02 NOTE — PROGRESS NOTE ADULT - ASSESSMENT
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   Recs:  - c/w current tacro dosing at level noted to be 3.4  - c/w cell cept 500mg BID  -Please check tacro level at 8:30 PM tonight prior to PM dose. goal 4-6  -would consider switching from bactrim to alternative abx in light of elevated creatinine  - strict i's and o's  - Trend BMP BID  -no acute need for HD today however given decreasing uop and rising Cr suspect need for RRT in the next day or so  -maintain MAP >70     #Hyponatremia  2/2 ARF   can consider small bolus of 3% hypertonic saline    Floresita Freeman D.O.  PGY 5 nephrology fellow  [phone]         Patient is an 84 yo M with ESKD s/p transplant 2013 now CKD 4, glaucoma, DM1, Anemia, CAD, BPH presenting with decompensated heart failure c/b cardiac arrest on 11/26 now with ELIZABETH likely iATN post cardiac arrest    Problem/Plan:  #ELIZABETH on CKD Stage 4 likely iATN in the setting of cardiac arrest   Renal allograft with CKD 4 - baseline sCr 2.3-2.5, usual meds tacrolimus 4mg BID and mycophenolate 500mg BID.   Recs:  - c/w current tacro dosing at level noted to be 3.4  - c/w cell cept 500mg BID  - Please check tacro level at 8:30 PM tonight prior to PM dose. goal 4-6  - No alternative to bactrim per team, thong ruiz  - strict i's and o's  - Trend BMP BID  -no acute need for HD today however given decreasing uop and rising Cr suspect need for RRT in the next day or so  -maintain MAP >70     #Hyponatremia  2/2 ARF   can consider small bolus of 3% hypertonic saline    Floresita Freeman D.O.  PGY 5 nephrology fellow  [phone]

## 2022-12-02 NOTE — PROGRESS NOTE ADULT - ASSESSMENT
Mr. Miguel is an 83-year-old male with type 2 diabetes mellitus, coronary artery disease (s/p DAMEON x2 to LAD on 6/2021), chronic kidney disease stage 4 (s/p renal transplant on 2013, on tracrolimus and prednisone) and benign prostatic hyperplasia who was sent to the hospital by his nephrologist for further evaluation of lower extremity edema (11/17/22). He was admitted for further management of LE edema, thought to be secondary to his underlying CKD versus congestive heart failure. Hospitalization was complicated by hypoglycemia and cardiac arrest (11/19/22, ROSC after 1 minute of chest compressions). Post-cardiac arrest he was noted to develop hypotension, bradycardia and hypothermia. Labs were remarkable for elevated TSH (20) and decreased FT4 (0.77). 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     Although he had features seen with myxedema coma, these findings were better explained by his cardiac arrest as his vital signs prior to the arrest were apparently around his baseline, as was his mental status. Nonetheless, he was started on levothyroxine 25 mcg IV daily (11/19/22) which has been titrated up to 50 mcg daily (11/28/22). In addition, given concern for adrenal insufficiency, he was started on stress-dose steroids which were titrated down to 25 mg every 12 hours. The patient was extubated on 11/22/22; however, he had to be reintubated on 11/26/22 due to episode of respiratory decompensation, potentially secondary to aspiration. After reintubation, his insulin requirements have increased, presumably due to underlying aspiration pneumonia. He is now s/p trach and peg. TF Nepro 43ml/hr continuous.    A1C: 9.0 %  BUN: 97 mg/dL  Creatinine: 3.41 mg/dL  GFR: 17 mL/min/1.73m2  Weight (kg): 82.3  BMI (kg/m2): 28.4  Ejection Fraction: EF: 65%, grade II diastolic dysfunction.

## 2022-12-02 NOTE — PROGRESS NOTE ADULT - SUBJECTIVE AND OBJECTIVE BOX
OVERNIGHT: Patient's urinary output was noted to be decreased. He was placed on david hugger for hypothermia. He received one pRBC for low hemoglobin (6.5 g/dL).   SUBJECTIVE / INTERVAL HPI: Patient was seen and examined this morning. He had tracheostomy and peg placed yesterday. Tube feeds were resumed at 6 PM yesterday at goal rate of 43ml/hr. There is some concern of leak at trach site and possible aspiration due to light brown colored secretions. ENT to further evaluate. His creatinine continues to trend up, but maybe plateaued at 3.4 mg/dL with urinary output of 165 mL over the past 24 hours. Per nephrology, suspect he will be needing CVVHD in the next day or so. He had remained hemodynamically stable with systolic blood pressure mostly in the 110's.  He is receiving hydrocortisone 25mg every 12 hours. Levophed discontinued this morning. Remains on sedation.    CAPILLARY BLOOD GLUCOSE & INSULIN RECEIVED  Yesterday  - 12 PM FS mg/dL = He didn't receive insulin.   - 6 PM FS mg/dL = He didn't receive insulin. Nepro re-started thru peg.    Today  - 12 AM  FS mg/dL = 7 units of Lantus.   - 6 AM FS mg/dL = Lispro 1.  - 12 PM FS mg/dL = Lispro 2.    REVIEW OF SYSTEMS  Unable to obtain.     PHYSICAL EXAM  Vital Signs Last 24 Hrs  T(C): 36.5 (02 Dec 2022 10:00), Max: 37.4 (02 Dec 2022 06:03)  T(F): 97.7 (02 Dec 2022 10:00), Max: 99.3 (02 Dec 2022 06:03)  HR: 73 (02 Dec 2022 12:00) (50 - 84)  BP: 97/51 (02 Dec 2022 12:00) (81/38 - 139/59)  BP(mean): 69 (02 Dec 2022 12:00) (55 - 91)  RR: 30 (02 Dec 2022 12:00) (12 - 30)  SpO2: 90% (02 Dec 2022 12:00) (90% - 97%)    Parameters below as of 02 Dec 2022 12:00  Patient On (Oxygen Delivery Method): ventilator    O2 Concentration (%): 40Constitutional: Awake, alert, in no acute distress.   Constitutional: Elderly male, sedated, intubated.   HEENT: Normocephalic, atraumatic, GORDON.  Respiratory: Lungs clear to auscultation bilaterally.   Cardiovascular: regular rhythm, normal S1 and S2, no audible murmurs.   GI: soft, non-tender, non-distended, bowel sounds present. (+) PEG tube in place.   Extremities: +1 lower extremity edema.    LABS  CBC - WBC/HGB/HTC/PLT: 3.82/9.4/30.0/176 (22)  BMP - Na/K/Cl/Bicarb/BUN/Cr/Gluc: 127/5.0/100/14/97/3.41/177 (22)  Anion Gap: 13 (22)  eGFR: 17 (22)  Calcium: 7.6 (22)  Phosphorus: 4.6 (22)  Magnesium: 2.2 (22)  LFT - Alb/Tprot/Tbili/Dbili/AlkPhos/ALT/AST: 2.1/--/0.2/--/102/53/56 (22)  PT/aPTT/INR: 12.5/34.8/1.05 (22)   Thyroid Stimulating Hormone, Serum: 9.260 (22)  Total T4/Free T4: --/0.698 (22)      MEDICATIONS  MEDICATIONS  (STANDING):  albuterol/ipratropium for Nebulization 3 milliLiter(s) Nebulizer every 6 hours  aspirin  chewable 81 milliGRAM(s) Oral daily  atorvastatin 40 milliGRAM(s) Oral at bedtime  chlorhexidine 0.12% Liquid 15 milliLiter(s) Oral Mucosa every 12 hours  chlorhexidine 2% Cloths 1 Application(s) Topical <User Schedule>  dexMEDEtomidine Infusion 0.2 MICROgram(s)/kG/Hr (4.12 mL/Hr) IV Continuous <Continuous>  dextrose 5%. 1000 milliLiter(s) (50 mL/Hr) IV Continuous <Continuous>  dextrose 5%. 1000 milliLiter(s) (100 mL/Hr) IV Continuous <Continuous>  dextrose 5%. 1000 milliLiter(s) (100 mL/Hr) IV Continuous <Continuous>  dextrose 5%. 1000 milliLiter(s) (50 mL/Hr) IV Continuous <Continuous>  dextrose 50% Injectable 25 Gram(s) IV Push once  dextrose 50% Injectable 12.5 Gram(s) IV Push once  dextrose 50% Injectable 25 Gram(s) IV Push once  dextrose 50% Injectable 25 Gram(s) IV Push once  dextrose 50% Injectable 12.5 Gram(s) IV Push once  dextrose 50% Injectable 25 Gram(s) IV Push once  glucagon  Injectable 1 milliGRAM(s) IntraMuscular once  glucagon  Injectable 1 milliGRAM(s) IntraMuscular once  heparin   Injectable 5000 Unit(s) SubCutaneous every 8 hours  hydrocortisone sodium succinate Injectable 25 milliGRAM(s) IV Push every 12 hours  influenza  Vaccine (HIGH DOSE) 0.7 milliLiter(s) IntraMuscular once  insulin glargine Injectable (LANTUS) 7 Unit(s) SubCutaneous at bedtime  insulin regular  human corrective regimen sliding scale   SubCutaneous every 6 hours  levothyroxine Injectable 50 MICROGram(s) IV Push at bedtime  mycophenolate mofetil Suspension 500 milliGRAM(s) Oral every 12 hours  norepinephrine Infusion 0.05 MICROgram(s)/kG/Min (7.72 mL/Hr) IV Continuous <Continuous>  pantoprazole  Injectable 40 milliGRAM(s) IV Push daily  sodium chloride 7% Inhalation 4 milliLiter(s) Inhalation every 12 hours  tacrolimus    0.5 mG/mL Suspension 3 milliGRAM(s) Oral every 24 hours  tacrolimus    0.5 mG/mL Suspension 2 milliGRAM(s) Oral every 24 hours  tamsulosin 0.4 milliGRAM(s) Oral at bedtime  trimethoprim / sulfamethoxazole IVPB 325 milliGRAM(s) IV Intermittent every 24 hours    MEDICATIONS  (PRN):  acetaminophen    Suspension .. 650 milliGRAM(s) Oral every 6 hours PRN Mild Pain (1 - 3)  dextrose Oral Gel 15 Gram(s) Oral once PRN Blood Glucose LESS THAN 70 milliGRAM(s)/deciliter  dextrose Oral Gel 15 Gram(s) Oral once PRN Blood Glucose LESS THAN 70 milliGRAM(s)/deciliter  fentaNYL   Patch  25 MICROgram(s)/Hr 1 Patch Transdermal every 72 hours PRN agitation  sodium chloride 0.9% lock flush 10 milliLiter(s) IV Push every 1 hour PRN Pre/post blood products, medications, blood draw, and to maintain line patency   OVERNIGHT: Patient's urinary output was noted to be decreased. He was placed on david hugger for hypothermia. He received one pRBC for low hemoglobin (6.5 g/dL).   SUBJECTIVE / INTERVAL HPI: Patient was seen and examined this morning. He had tracheostomy and peg placed yesterday. Tube feeds were resumed at 6 PM yesterday at goal rate of 43ml/hr. His creatinine continues to trend up, but maybe plateaued at 3.4 mg/dL with urinary output of 165 mL over the past 24 hours. Per nephrology, suspect he will be needing CVVHD in the next day or so. He had remained hemodynamically stable.  He is receiving hydrocortisone 25mg every 12 hours. Levophed discontinued this morning. Remains on sedation.    CAPILLARY BLOOD GLUCOSE & INSULIN RECEIVED  Yesterday  - 12 PM FS mg/dL = He didn't receive insulin.   - 6 PM FS mg/dL = He didn't receive insulin. Nepro re-started thru peg.    Today  - 12 AM  FS mg/dL = 7 units of Lantus.   - 6 AM FS mg/dL = Lispro 1.  - 12 PM FS mg/dL = Lispro 2.    REVIEW OF SYSTEMS  Unable to obtain.     PHYSICAL EXAM  Vital Signs Last 24 Hrs  T(C): 36.5 (02 Dec 2022 10:00), Max: 37.4 (02 Dec 2022 06:03)  T(F): 97.7 (02 Dec 2022 10:00), Max: 99.3 (02 Dec 2022 06:03)  HR: 73 (02 Dec 2022 12:00) (50 - 84)  BP: 97/51 (02 Dec 2022 12:00) (81/38 - 139/59)  BP(mean): 69 (02 Dec 2022 12:00) (55 - 91)  RR: 30 (02 Dec 2022 12:00) (12 - 30)  SpO2: 90% (02 Dec 2022 12:00) (90% - 97%)    Parameters below as of 02 Dec 2022 12:00  Patient On (Oxygen Delivery Method): ventilator    O2 Concentration (%): 40Constitutional: Awake, alert, in no acute distress.   Constitutional: Elderly male, sedated, intubated.   HEENT: Normocephalic, atraumatic, GORDON.  Respiratory: Lungs clear to auscultation bilaterally.   Cardiovascular: regular rhythm, normal S1 and S2, no audible murmurs.   GI: soft, non-tender, non-distended, bowel sounds present. (+) PEG tube in place.   Extremities: +1 lower extremity edema.    LABS  CBC - WBC/HGB/HTC/PLT: 3.82/9.4/30.0/176 (22)  BMP - Na/K/Cl/Bicarb/BUN/Cr/Gluc: 127/5.0/100/14/97/3.41/177 (22)  Anion Gap: 13 (22)  eGFR: 17 (22)  Calcium: 7.6 (22)  Phosphorus: 4.6 (22)  Magnesium: 2.2 (22)  LFT - Alb/Tprot/Tbili/Dbili/AlkPhos/ALT/AST: 2.1/--/0.2/--/102/53/56 (22)  PT/aPTT/INR: 12.5/34.8/1.05 (22)   Thyroid Stimulating Hormone, Serum: 9.260 (22)  Total T4/Free T4: --/0.698 (22)      MEDICATIONS  MEDICATIONS  (STANDING):  albuterol/ipratropium for Nebulization 3 milliLiter(s) Nebulizer every 6 hours  aspirin  chewable 81 milliGRAM(s) Oral daily  atorvastatin 40 milliGRAM(s) Oral at bedtime  chlorhexidine 0.12% Liquid 15 milliLiter(s) Oral Mucosa every 12 hours  chlorhexidine 2% Cloths 1 Application(s) Topical <User Schedule>  dexMEDEtomidine Infusion 0.2 MICROgram(s)/kG/Hr (4.12 mL/Hr) IV Continuous <Continuous>  dextrose 5%. 1000 milliLiter(s) (50 mL/Hr) IV Continuous <Continuous>  dextrose 5%. 1000 milliLiter(s) (100 mL/Hr) IV Continuous <Continuous>  dextrose 5%. 1000 milliLiter(s) (100 mL/Hr) IV Continuous <Continuous>  dextrose 5%. 1000 milliLiter(s) (50 mL/Hr) IV Continuous <Continuous>  dextrose 50% Injectable 25 Gram(s) IV Push once  dextrose 50% Injectable 12.5 Gram(s) IV Push once  dextrose 50% Injectable 25 Gram(s) IV Push once  dextrose 50% Injectable 25 Gram(s) IV Push once  dextrose 50% Injectable 12.5 Gram(s) IV Push once  dextrose 50% Injectable 25 Gram(s) IV Push once  glucagon  Injectable 1 milliGRAM(s) IntraMuscular once  glucagon  Injectable 1 milliGRAM(s) IntraMuscular once  heparin   Injectable 5000 Unit(s) SubCutaneous every 8 hours  hydrocortisone sodium succinate Injectable 25 milliGRAM(s) IV Push every 12 hours  influenza  Vaccine (HIGH DOSE) 0.7 milliLiter(s) IntraMuscular once  insulin glargine Injectable (LANTUS) 7 Unit(s) SubCutaneous at bedtime  insulin regular  human corrective regimen sliding scale   SubCutaneous every 6 hours  levothyroxine Injectable 50 MICROGram(s) IV Push at bedtime  mycophenolate mofetil Suspension 500 milliGRAM(s) Oral every 12 hours  norepinephrine Infusion 0.05 MICROgram(s)/kG/Min (7.72 mL/Hr) IV Continuous <Continuous>  pantoprazole  Injectable 40 milliGRAM(s) IV Push daily  sodium chloride 7% Inhalation 4 milliLiter(s) Inhalation every 12 hours  tacrolimus    0.5 mG/mL Suspension 3 milliGRAM(s) Oral every 24 hours  tacrolimus    0.5 mG/mL Suspension 2 milliGRAM(s) Oral every 24 hours  tamsulosin 0.4 milliGRAM(s) Oral at bedtime  trimethoprim / sulfamethoxazole IVPB 325 milliGRAM(s) IV Intermittent every 24 hours    MEDICATIONS  (PRN):  acetaminophen    Suspension .. 650 milliGRAM(s) Oral every 6 hours PRN Mild Pain (1 - 3)  dextrose Oral Gel 15 Gram(s) Oral once PRN Blood Glucose LESS THAN 70 milliGRAM(s)/deciliter  dextrose Oral Gel 15 Gram(s) Oral once PRN Blood Glucose LESS THAN 70 milliGRAM(s)/deciliter  fentaNYL   Patch  25 MICROgram(s)/Hr 1 Patch Transdermal every 72 hours PRN agitation  sodium chloride 0.9% lock flush 10 milliLiter(s) IV Push every 1 hour PRN Pre/post blood products, medications, blood draw, and to maintain line patency

## 2022-12-02 NOTE — PROGRESS NOTE ADULT - PROVIDER SPECIALTY LIST ADULT
10/5/2022    Mitesh Hernandez  [address] [facility] [id number]        Dear Mitesh    I am pleased to report to you that your recent biopsy/biopsies showed no signs of malignancy.        Sincerely,      IVAN GOMEZ KELI [facility]   [address] [place] [address] [phone]      Talha Herron MD    Endocrinology

## 2022-12-02 NOTE — PROGRESS NOTE ADULT - SUBJECTIVE AND OBJECTIVE BOX
Interval Events: Reviewed  Patient seen and examined at bedside.    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      History of renal transplant  secondary to DM      Chronic kidney disease (CKD)      Glaucoma      Kidney transplant recipient      Amputation of toe  x 3      H/O heart artery stent          MEDICATIONS:  Pulmonary:  albuterol/ipratropium for Nebulization 3 milliLiter(s) Nebulizer every 6 hours  sodium chloride 7% Inhalation 4 milliLiter(s) Inhalation every 12 hours    Antimicrobials:  trimethoprim / sulfamethoxazole IVPB 325 milliGRAM(s) IV Intermittent every 24 hours    Anticoagulants:  aspirin  chewable 81 milliGRAM(s) Oral daily  heparin   Injectable 5000 Unit(s) SubCutaneous every 8 hours    Cardiac:  norepinephrine Infusion 0.05 MICROgram(s)/kG/Min IV Continuous <Continuous>      Allergies    No Known Allergies    Intolerances        Vital Signs Last 24 Hrs  T(C): 36.5 (02 Dec 2022 10:00), Max: 37.4 (02 Dec 2022 06:03)  T(F): 97.7 (02 Dec 2022 10:00), Max: 99.3 (02 Dec 2022 06:03)  HR: 71 (02 Dec 2022 13:00) (50 - 84)  BP: 114/44 (02 Dec 2022 13:00) (81/38 - 139/59)  BP(mean): 64 (02 Dec 2022 13:00) (55 - 91)  RR: 24 (02 Dec 2022 13:00) (12 - 30)  SpO2: 93% (02 Dec 2022 13:00) (90% - 97%)    Parameters below as of 02 Dec 2022 13:00  Patient On (Oxygen Delivery Method): ventilator    O2 Concentration (%): 40    12-01 @ 07:01  -  12-02 @ 07:00  --------------------------------------------------------  IN: 2135.3 mL / OUT: 165 mL / NET: 1970.3 mL    12-02 @ 07:01  -  12-02 @ 13:31  --------------------------------------------------------  IN: 322 mL / OUT: 120 mL / NET: 202 mL      Mode: AC/ CMV (Assist Control/ Continuous Mandatory Ventilation)  RR (machine): 12  TV (machine): 450  FiO2: 40  PEEP: 5  ITime: 1  MAP: 9.5  PIP: 19      Review of Systems:   •	General: negative  •	Skin/Breast: negative  •	Ophthalmologic: negative  •	ENMT: negative  •	Respiratory and Thorax: negative  •	Cardiovascular: negative  •	Gastrointestinal: negative  •	Genitourinary: negative  •	Musculoskeletal: negative  •	Neurological: negative  •	Psychiatric: negative  •	Hematology/Lymphatics: negative  •	Endocrine: negative  •	Allergic/Immunologic: negative    Physical Exam:   • Constitutional:	refer to the dietion /Nutritionist note  • Eyes:	EOMI; PERRL; no drainage or redness  • ENMT:	No oral lesions; no gross abnormalities  • Neck	No bruits; no thyromegaly or nodules  • Breasts:	not examined  • Back:	No deformity or limitation of movement  • Respiratory:	Breath Sounds equal & clear to percussion & auscultation, no accessory muscle use  • Cardiovascular:	Regular rate & rhythm, normal S1, S2; no murmurs, gallops or rubs; no S3, S4  • Gastrointestinal:	Soft, non-tender, no hepatosplenomegaly, normal bowel sounds  • Genitourinary:	not examined  • Rectal: not examined  • Extremities:	No cyanosis, clubbing or edema  • Vascular:	Equal and normal pulses (carotid, femoral, dorsalis pedis)  • Neurologica:l	not examined  • Skin:	No lesions; no rash  • Lymph Nodes:	No lymphadedenopathy  • Musculoskeletal:	No joint pain, swelling or deformity; no limitation of movement        LABS:      CBC Full  -  ( 02 Dec 2022 04:18 )  WBC Count : 3.82 K/uL  RBC Count : 3.42 M/uL  Hemoglobin : 9.4 g/dL  Hematocrit : 30.0 %  Platelet Count - Automated : 176 K/uL  Mean Cell Volume : 87.7 fl  Mean Cell Hemoglobin : 27.5 pg  Mean Cell Hemoglobin Concentration : 31.3 gm/dL  Auto Neutrophil # : 2.84 K/uL  Auto Lymphocyte # : 0.48 K/uL  Auto Monocyte # : 0.43 K/uL  Auto Eosinophil # : 0.03 K/uL  Auto Basophil # : 0.01 K/uL  Auto Neutrophil % : 74.2 %  Auto Lymphocyte % : 12.6 %  Auto Monocyte % : 11.3 %  Auto Eosinophil % : 0.8 %  Auto Basophil % : 0.3 %    12-02    127<L>  |  100  |  97<H>  ----------------------------<  177<H>  5.0   |  14<L>  |  3.41<H>    Ca    7.6<L>      02 Dec 2022 04:18  Phos  4.6     12-02  Mg     2.2     12-02    TPro  5.0<L>  /  Alb  2.1<L>  /  TBili  0.2  /  DBili  x   /  AST  56<H>  /  ALT  53<H>  /  AlkPhos  102  12-02    PT/INR - ( 01 Dec 2022 05:30 )   PT: 12.5 sec;   INR: 1.05          PTT - ( 01 Dec 2022 05:30 )  PTT:34.8 sec                    RADIOLOGY & ADDITIONAL STUDIES (The following images were personally reviewed):

## 2022-12-02 NOTE — PROGRESS NOTE ADULT - PROBLEM SELECTOR PLAN 3
- The patient was on prednisone 5 mg daily for renal transplant prior to admission.   - He had recent hospitalization (October 2022) for pneumonia due to pseudomonas where he was also started on stress dose steroids. At that time, a cortisol level was collected prior to starting steroids which was not robust (6.5 ug/dL).   - The patient's blood pressure has decreased and he was started on levophed infusion.   - Due to increasing stress, would recommend to increase hydrocortisone to 25 mg IV every 8 hours for now. However, would leave up to discretion of primary team to increase even higher if septic shock is suspected in setting of hypothermia and hypotension.   - Of note, hydrocortisone doesn't have the same level of immunosuppressive or anti-inflammatory effect as prednisone. Due to history of renal transplant, consider discussing with renal team whether he would require transitioning to prednisone instead of hydrocortisone to augment immunosuppressive effect of the steroids.     Case discussed with Dr. Rabago. Primary team updated.

## 2022-12-02 NOTE — PROGRESS NOTE ADULT - PROBLEM SELECTOR PLAN 1
Event noticed.  The patient is sedated.  I suctioned the patient copious amount of secretion.  Gas exchange is stable.  Sputum grew Pseudomonas and Serratia.  The patient on appropriate antibiotic.  Continue pulmonary toilet.  Patient has baseline dementia and does not and trial up off sedation.  Tapers pressors as needed.  Cortisol level is adequate.  I discussed the case with critical care. She was extubated.  Patient is on nasal cannula.  Suction the patient copious amount of secretion.  Dysphagia evaluation.  Continue antibiotic.  The patient is hemodynamically stable off pressors and sedative.  Suctioned the patient thick moderate secretion and increased.  He failed swallow eval and will need a peg.  On nebs.  He was intubated.  Sputum GS revealed GNR and culture positive for sternomonas.  Abt changed to bactrim.  Monitor renal function on abt which is worsening.  Plan trach tomorrow.  Peg done . Is in detail with the family.  Patient is to be.  Prepped for a trach and a PEG on Thursday.  I discussed with GI regarding the PEG will be done tomorrow.  Continue pulmonary toilet.  Continue antibiotic.  Follow-up renal function on Bactrim  discussed with renal will need HD.  Broch revealed minimal secretions and airway were normal.  Follow on cultures.   waen off fentanyl and start weaning discussed with family

## 2022-12-03 NOTE — PROGRESS NOTE ADULT - SUBJECTIVE AND OBJECTIVE BOX
Patient is a 82y Male seen and evaluated at bedside remains anasarcic, creatinine rising to 4.29, however urine output non-oliguric, possible ATN is starting to resolve. Tacro level noted 2.4, normally would have expected higher given worsening kidney function, but if not having daily BM's could be contributing to poor gut absorption.       Meds:  acetaminophen    Suspension .. 650 every 6 hours PRN  albuterol/ipratropium for Nebulization 3 every 6 hours  aspirin  chewable 81 daily  atorvastatin 40 at bedtime  chlorhexidine 0.12% Liquid 15 every 12 hours  chlorhexidine 2% Cloths 1 <User Schedule>  dexMEDEtomidine Infusion 0.2 <Continuous>  dextrose 5%. 1000 <Continuous>  dextrose 5%. 1000 <Continuous>  dextrose 5%. 1000 <Continuous>  dextrose 5%. 1000 <Continuous>  dextrose 50% Injectable 25 once  dextrose 50% Injectable 12.5 once  dextrose 50% Injectable 25 once  dextrose 50% Injectable 25 once  dextrose 50% Injectable 12.5 once  dextrose 50% Injectable 25 once  dextrose Oral Gel 15 once PRN  dextrose Oral Gel 15 once PRN  fentaNYL   Patch  25 MICROgram(s)/Hr 1 every 72 hours PRN  glucagon  Injectable 1 once  glucagon  Injectable 1 once  heparin   Injectable 5000 every 8 hours  hydrocortisone sodium succinate Injectable 25 every 12 hours  influenza  Vaccine (HIGH DOSE) 0.7 once  insulin glargine Injectable (LANTUS) 7 at bedtime  insulin regular  human corrective regimen sliding scale  every 6 hours  insulin regular  human recombinant 5 every 6 hours  levothyroxine Injectable 50 at bedtime  mycophenolate mofetil Suspension 500 every 12 hours  norepinephrine Infusion 0.05 <Continuous>  pantoprazole  Injectable 40 daily  sodium chloride 0.9% lock flush 10 every 1 hour PRN  sodium chloride 7% Inhalation 4 every 12 hours  tacrolimus    0.5 mG/mL Suspension 3 every 24 hours  tacrolimus    0.5 mG/mL Suspension 2 every 24 hours  tamsulosin 0.4 at bedtime  trimethoprim / sulfamethoxazole IVPB 325 every 24 hours      T(C): , Max: 37.4 (12-03-22 @ 02:04)  T(F): , Max: 99.3 (12-03-22 @ 02:04)  HR: 71 (12-03-22 @ 10:48)  BP: 129/52 (12-03-22 @ 10:00)  BP(mean): 75 (12-03-22 @ 10:00)  RR: 27 (12-03-22 @ 10:48)  SpO2: 95% (12-03-22 @ 10:48)  Wt(kg): --    12-02 @ 07:01  -  12-03 @ 07:00  --------------------------------------------------------  IN: 1352 mL / OUT: 714 mL / NET: 638 mL    12-03 @ 07:01  -  12-03 @ 11:03  --------------------------------------------------------  IN: 178.7 mL / OUT: 120 mL / NET: 58.7 mL      Review of Systems:  ROS negative except as per HPI      PHYSICAL EXAM:  GENERAL: trach collar  NECK: supple, No JVD  CHEST/LUNG: mechanical breath sounds BL  HEART: normal S1S2, RRR  ABDOMEN: Soft, Nontender, +BS, No flank tenderness bilateral  EXTREMITIES: Remains grossly anasarca 2+ pitting edema b/l UE and LE  ACCESS: R radiocephalic AVF w/ palpable thrill       LABS:                        9.0    4.50  )-----------( 277      ( 03 Dec 2022 05:30 )             29.2     12-03    130<L>  |  100  |  106<H>  ----------------------------<  168<H>  5.1   |  14<L>  |  4.29<H>    Ca    8.3<L>      03 Dec 2022 05:30  Phos  4.7     12-03  Mg     2.4     12-03    TPro  5.3<L>  /  Alb  2.4<L>  /  TBili  0.2  /  DBili  x   /  AST  38  /  ALT  44  /  AlkPhos  106  12-03          Sodium, Random Urine: 49 mmol/L (12-02 @ 05:05)  Creatinine, Random Urine: 29 mg/dL (12-02 @ 05:05)  Osmolality, Random Urine: 323 mosm/kg (12-02 @ 05:05)  Potassium, Random Urine: 17 mmol/L (12-02 @ 05:05)        RADIOLOGY & ADDITIONAL STUDIES:

## 2022-12-03 NOTE — CHART NOTE - NSCHARTNOTEFT_GEN_A_CORE
Glucose readings reviewed, tube feeding reviewed,   pt off levophed earlier today  recommend increase regular insulin to 7 units every 6 hours, c/w lantus 7 units at beditme  will follow

## 2022-12-03 NOTE — PROGRESS NOTE ADULT - SUBJECTIVE AND OBJECTIVE BOX
OVERNIGHT EVENTS:  Taken off fentanyl drip yesterday, placed on precedex and fentanyl patch; overnight became agitated and received 50mg IV fentanyl push, later 25mg x2.    SUBJECTIVE / INTERVAL HPI: Patient seen and examined at bedside.  In no apparent distress currently, not agitated; follows commands.    VITAL SIGNS:  Vital Signs Last 24 Hrs  T(C): 37 (03 Dec 2022 09:23), Max: 37.4 (03 Dec 2022 02:04)  T(F): 98.6 (03 Dec 2022 09:23), Max: 99.3 (03 Dec 2022 02:04)  HR: 74 (03 Dec 2022 11:00) (64 - 83)  BP: 114/45 (03 Dec 2022 11:00) (97/51 - 181/102)  BP(mean): 115 (03 Dec 2022 11:00) (58 - 133)  RR: 32 (03 Dec 2022 11:00) (18 - 37)  SpO2: 91% (03 Dec 2022 11:00) (88% - 100%)    Parameters below as of 03 Dec 2022 11:00  Patient On (Oxygen Delivery Method): ventilator    O2 Concentration (%): 40  I&O's Summary    02 Dec 2022 07:01  -  03 Dec 2022 07:00  --------------------------------------------------------  IN: 1352 mL / OUT: 714 mL / NET: 638 mL    03 Dec 2022 07:01  -  03 Dec 2022 11:07  --------------------------------------------------------  IN: 235.7 mL / OUT: 135 mL / NET: 100.7 mL        PHYSICAL EXAM:  General: NAD  HEENT: MMM  Neck: supple  Cardiovascular: +S1/S2; RRR  Respiratory: CTA B/L; no R/R, mild wheezing  Gastrointestinal: soft, NT/ND  Extremities: WWP; 3+ pitting edema in forearms and trace edema in b/l ankles, no clubbing or cyanosis  Vascular: 2+ radial, DP/PT pulses B/L  Skin:  trach and peg sites clean, dry  Neurological: more alert, follows commands, no focal deficits    MEDICATIONS:  MEDICATIONS  (STANDING):  albuterol/ipratropium for Nebulization 3 milliLiter(s) Nebulizer every 6 hours  aspirin  chewable 81 milliGRAM(s) Oral daily  atorvastatin 40 milliGRAM(s) Oral at bedtime  chlorhexidine 0.12% Liquid 15 milliLiter(s) Oral Mucosa every 12 hours  chlorhexidine 2% Cloths 1 Application(s) Topical <User Schedule>  dexMEDEtomidine Infusion 0.2 MICROgram(s)/kG/Hr (4.12 mL/Hr) IV Continuous <Continuous>  dextrose 5%. 1000 milliLiter(s) (100 mL/Hr) IV Continuous <Continuous>  dextrose 5%. 1000 milliLiter(s) (50 mL/Hr) IV Continuous <Continuous>  dextrose 5%. 1000 milliLiter(s) (100 mL/Hr) IV Continuous <Continuous>  dextrose 5%. 1000 milliLiter(s) (50 mL/Hr) IV Continuous <Continuous>  dextrose 50% Injectable 25 Gram(s) IV Push once  dextrose 50% Injectable 12.5 Gram(s) IV Push once  dextrose 50% Injectable 25 Gram(s) IV Push once  dextrose 50% Injectable 25 Gram(s) IV Push once  dextrose 50% Injectable 12.5 Gram(s) IV Push once  dextrose 50% Injectable 25 Gram(s) IV Push once  glucagon  Injectable 1 milliGRAM(s) IntraMuscular once  glucagon  Injectable 1 milliGRAM(s) IntraMuscular once  heparin   Injectable 5000 Unit(s) SubCutaneous every 8 hours  hydrocortisone sodium succinate Injectable 25 milliGRAM(s) IV Push every 12 hours  influenza  Vaccine (HIGH DOSE) 0.7 milliLiter(s) IntraMuscular once  insulin glargine Injectable (LANTUS) 7 Unit(s) SubCutaneous at bedtime  insulin regular  human corrective regimen sliding scale   SubCutaneous every 6 hours  insulin regular  human recombinant 5 Unit(s) SubCutaneous every 6 hours  levothyroxine Injectable 50 MICROGram(s) IV Push at bedtime  mycophenolate mofetil Suspension 500 milliGRAM(s) Oral every 12 hours  norepinephrine Infusion 0.05 MICROgram(s)/kG/Min (7.72 mL/Hr) IV Continuous <Continuous>  pantoprazole  Injectable 40 milliGRAM(s) IV Push daily  sodium chloride 7% Inhalation 4 milliLiter(s) Inhalation every 12 hours  tacrolimus    0.5 mG/mL Suspension 3 milliGRAM(s) Oral every 24 hours  tacrolimus    0.5 mG/mL Suspension 2 milliGRAM(s) Oral every 24 hours  tamsulosin 0.4 milliGRAM(s) Oral at bedtime  trimethoprim / sulfamethoxazole IVPB 325 milliGRAM(s) IV Intermittent every 24 hours    MEDICATIONS  (PRN):  acetaminophen    Suspension .. 650 milliGRAM(s) Oral every 6 hours PRN Mild Pain (1 - 3)  dextrose Oral Gel 15 Gram(s) Oral once PRN Blood Glucose LESS THAN 70 milliGRAM(s)/deciliter  dextrose Oral Gel 15 Gram(s) Oral once PRN Blood Glucose LESS THAN 70 milliGRAM(s)/deciliter  fentaNYL   Patch  25 MICROgram(s)/Hr 1 Patch Transdermal every 72 hours PRN agitation  sodium chloride 0.9% lock flush 10 milliLiter(s) IV Push every 1 hour PRN Pre/post blood products, medications, blood draw, and to maintain line patency      ALLERGIES:  Allergies    No Known Allergies    Intolerances        LABS:                        9.0    4.50  )-----------( 277      ( 03 Dec 2022 05:30 )             29.2     12-03    130<L>  |  100  |  106<H>  ----------------------------<  168<H>  5.1   |  14<L>  |  4.29<H>    Ca    8.3<L>      03 Dec 2022 05:30  Phos  4.7     12-03  Mg     2.4     12-03    TPro  5.3<L>  /  Alb  2.4<L>  /  TBili  0.2  /  DBili  x   /  AST  38  /  ALT  44  /  AlkPhos  106  12-03        CAPILLARY BLOOD GLUCOSE      POCT Blood Glucose.: 212 mg/dL (03 Dec 2022 06:08)      RADIOLOGY & ADDITIONAL TESTS: Reviewed.

## 2022-12-03 NOTE — PROGRESS NOTE ADULT - ASSESSMENT
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   Recs:  - c/w current tacro dosing; tacro level noted  - c/w cell cept 500mg BID  - No alternative to bactrim per team, monitoring closely  -Please make sure pt having daily BM; start miralax and senna  - strict i's and o's  - Trend BMP BID  -no acute need for HD today; monitor urine output remains non-oliguric; may be starting to recover from ATN  -maintain MAP >70     #Metabolic Acidosis  -Please start sodium bicarbonate 1300mg TID     #Hyponatremia  2/2 ARF   can consider small bolus of 3% hypertonic saline

## 2022-12-03 NOTE — PROGRESS NOTE ADULT - ATTENDING COMMENTS
I agree with the fellow's findings and plans as written above with the following additions/amendments:    Seen and examined at bedside. Urine output slowly increasing, may be improving in terms of iATN, tacro level also not elevated which is a good sign of clearance still occurring. No BM per nurse at bedside which may be contributing to low level however, would ensure at least daily BM with miralax. Otherwise would keep dosing the same for now, further recs as above

## 2022-12-03 NOTE — PROGRESS NOTE ADULT - ASSESSMENT
82 y/o M PMH CKD 4, renal transplant in 2013 at [facility], [place], glaucoma (blind), DM1, anemia, CAD s/p 2 DAMEON to LAD in 6/21, BPH, recent admission for PNA presents with acute on chronic cough 2/2 PNA and SUNNY 2/2 acute CKD.       NEUROLOGY  #Metabolic Encephalopathy  #Sedation  CT scan negative for acute intracranial pathology. Began to wean sedation on 12/2.  - fentanyl gtt dc'd, transitioned to 25mcg patch  (12/2)  - on precedex 400/100  (12/2), weaning as tolerated with IV pushes of fentanyl as needed (required 50mgx1 and 25mgx2 o/n 12/2 - 12/3)    CARDIOVASCULAR  #HFpEF  Had EF of 55% on TTE from Oct 2022, grade I diastolic dysfunction. Repeat TTE 11/17 with poor visualization of LV. Patient making good UOP overnight  - c/w carvedilol 25mg BID     #Upper extremity edema  Stable from yesterday but increased compared to baseline. US with superficial thrombosis of L cephalic vein extending to AC fossa, no DVTs.   - given patient's propensity to bleed and superficial nature of thrombus, no intervention    #CAD  #HLD  Hx of CAD s/p 2 DAMEON to LAD in June 2021, currently no CP. Trops 0.03 but no CP or ischemic changes on EKG, likely due to CKD. Takes Plavix and aspirin per last d/c but only aspirin on outpatient note  - c/w ASA daily  - c/w home Lipitor    #HTN   Known history of HTN.   - 12/1 evening was hypotensive, dc'd home amlodipine 5mg qD  - continue to monitor BP    PULM  #AHRF  Likely 2/2 aspiration event. Intubated on 11/26.  - ID management as below for aspiration   - received trach Thursday 12/1, feeds resumed after this    RENAL  #ELIZABETH on Chronic kidney disease (CKD).  Baseline sCr 2.3-2.5. On admission, fluid overloaded. Does have orthopnea and intermittent SOB at baseline. Acidotic but at baseline. Follows w Dr. Mac.  - sCr and BUN increasing daily, urine output poor  - monitor I/Os  - renally dosing meds  - per nephro, no acute need for HD 12/2, but given decreasing uop and rising Cr, likely need renal replacement therapy in the next day or so    #Renal transplant  Patient had renal transplant in 2013. On home mycophenolate 500mg BID and tacrolimus 4mg BID. Per nephro, decreased home tacrolimus from 4mg BID --> 2mg BID while in hospital.   - c/w tacrolimus 3mg in AM, c/w 2mg in PM, levels daily  - c/w mycophenolate 500mg BID  - f/u renal recs    #Hypernatremia - RESOLVED    GI  #Diarrhea - RESOLVED  - will continue to monitor for BMs    #Nutrition  NPO with PEG feeds.   - restarted feeds at 6pm 12/2      #BPH (benign prostatic hyperplasia).   - c/w tamsulosin 0.4mg daily    ID  #Aspiration PNA   Tracheal aspirate cx (11/19) growing serratia marcescens and pseudomonas. Patient was started on vancomycin and cefepime. MRSA nares positive. Dc'ed vancomycin given supratherapeutic trough. Completed cefepime 2g q24 course x9 days.    #Stenotrophomonas in sputum cx  Sputum cx from 11/26 growing Stenotrophomonas  - started on Bactrim BS 11/28 evening; for 10 days will receive 11/28 - 12/5    ENDOCRINE  #DM (diabetes mellitus).   Previously on lantus 15 lispro 5 at home. Endocrine following.  - on lantus 7 units  - on low dose regular ISS  - goal -180    #Hypothyroidism  TSH 20.8, free t4 0.7, T3 49 (low). Repeat TSH at 9, fT4 0.7  - on synthroid 50mcg qD  - f/u endo recs    #Relative Adrenal Insufficiency  Patient on chronic prednisone 5mg daily. He was started on hydrocortisone 50mg q6hr --> q8 --> BID   - c/w hydrocortizone 25mg q12  - wean as tolerated  - f/u endocrine recs    HEME  #Normocytic Anemia  Likely 2.2 CKD. Hgb 6.9 on 11/21, corrected appropriately s/p 1u pRBC. No active signs of bleeding on physical exam. CTH with no intracranial bleeding. Required 2u pRBC to correct. DEBRA and stool guaic negative.  - consider epo, if BP remains well controlled  - active T+S  - transfuse <7    FLUIDS/ELECTROLYTES/NUTRITION  -IVF as above  -Monitor, careful w advanced kidney disease  -Diet: NPO with PEG feeds  -DVT ppx: heparin TID  -GI: none  -Dispo: PT recommending [facility] 84 y/o M PMH CKD 4, renal transplant in 2013 at [facility], glaucoma (blind), DM1, anemia, CAD s/p 2 DAMEON to LAD in 6/21, BPH, recent admission for PNA presents with acute on chronic cough 2/2 PNA and SUNNY 2/2 acute CKD.       NEUROLOGY  #Metabolic Encephalopathy  #Sedation  CT scan negative for acute intracranial pathology. Began to wean sedation on 12/2.  - fentanyl gtt dc'd, transitioned to 25mcg patch  (12/2)  - on precedex 400/100  (12/2), weaning as tolerated with IV pushes of fentanyl as needed (required 50mgx1 and 25mgx2 o/n 12/2 - 12/3)    CARDIOVASCULAR  #HFpEF  Had EF of 55% on TTE from Oct 2022, grade I diastolic dysfunction. Repeat TTE 11/17 with poor visualization of LV. Patient making good UOP overnight  - c/w carvedilol 25mg BID     #Upper extremity edema  Stable from yesterday but increased compared to baseline. US with superficial thrombosis of L cephalic vein extending to AC fossa, no DVTs.   - given patient's propensity to bleed and superficial nature of thrombus, no intervention    #CAD  #HLD  Hx of CAD s/p 2 DAMEON to LAD in June 2021, currently no CP. Trops 0.03 but no CP or ischemic changes on EKG, likely due to CKD. Takes Plavix and aspirin per last d/c but only aspirin on outpatient note  - c/w ASA daily  - c/w home Lipitor    #HTN   Known history of HTN.   - 12/1 evening was hypotensive, dc'd home amlodipine 5mg qD  - continue to monitor BP    PULM  #AHRF  Likely 2/2 aspiration event. Intubated on 11/26.  - ID management as below for aspiration   - received trach Thursday 12/1, feeds resumed after this    RENAL  #ELIZABETH on Chronic kidney disease (CKD).  Baseline sCr 2.3-2.5. On admission, fluid overloaded. Does have orthopnea and intermittent SOB at baseline. Acidotic but at baseline. Follows w Dr. Mac.  - sCr and BUN increasing daily, urine output poor  - monitor I/Os  - renally dosing meds  - per nephro, no acute need for HD, but may need renal replacement therapy in near future    #Renal transplant  Patient had renal transplant in 2013. On home mycophenolate 500mg BID and tacrolimus 4mg BID. Per nephro, decreased home tacrolimus from 4mg BID --> 2mg BID while in hospital.   - c/w tacrolimus 3mg in AM, c/w 2mg in PM, levels daily  - c/w mycophenolate 500mg BID  - f/u renal recs    #Hypernatremia - RESOLVED    GI  #Diarrhea - RESOLVED  - will continue to monitor for BMs    #Nutrition  NPO with PEG feeds.   - restarted feeds at 6pm 12/2      #BPH (benign prostatic hyperplasia).   - c/w tamsulosin 0.4mg daily    ID  #Aspiration PNA   Tracheal aspirate cx (11/19) growing serratia marcescens and pseudomonas. Patient was started on vancomycin and cefepime. MRSA nares positive. Dc'ed vancomycin given supratherapeutic trough. Completed cefepime 2g q24 course x9 days.    #Stenotrophomonas in sputum cx  Sputum cx from 11/26 growing Stenotrophomonas  - started on Bactrim BS 11/28 evening; for 10 days will receive 11/28 - 12/5    ENDOCRINE  #DM (diabetes mellitus).   Previously on lantus 15 lispro 5 at home. Endocrine following.  - on lantus 7 units  - on low dose regular ISS  - goal -180    #Hypothyroidism  TSH 20.8, free t4 0.7, T3 49 (low). Repeat TSH at 9, fT4 0.7  - on synthroid 50mcg qD  - f/u endo recs    #Relative Adrenal Insufficiency  Patient on chronic prednisone 5mg daily. He was started on hydrocortisone 50mg q6hr --> q8 --> BID   - c/w hydrocortizone 25mg q12  - wean as tolerated  - f/u endocrine recs    HEME  #Normocytic Anemia  Likely 2.2 CKD. Hgb 6.9 on 11/21, corrected appropriately s/p 1u pRBC. No active signs of bleeding on physical exam. CTH with no intracranial bleeding. Required 2u pRBC to correct. DEBRA and stool guaic negative.  - consider epo, if BP remains well controlled  - active T+S  - transfuse <7    FLUIDS/ELECTROLYTES/NUTRITION  -IVF as above  -Monitor, careful w advanced kidney disease  -Diet: NPO with PEG feeds  -DVT ppx: heparin TID  -GI: none  -Dispo: PT recommending [facility]

## 2022-12-04 NOTE — PROCEDURE NOTE - NSPOSTCAREGUIDE_GEN_A_CORE
Verbal/written post procedure instructions were given to patient/caregiver/Instructed patient/caregiver regarding signs and symptoms of infection/Keep the cast/splint/dressing clean and dry/Care for catheter as per unit/ICU protocols
Verbal/written post procedure instructions were given to patient/caregiver/Instructed patient/caregiver to follow-up with primary care physician/Instructed patient/caregiver regarding signs and symptoms of infection/Keep the cast/splint/dressing clean and dry/Care for catheter as per unit/ICU protocols

## 2022-12-04 NOTE — PROGRESS NOTE ADULT - ATTENDING COMMENTS
I agree with the fellow's findings and plans as written above with the following additions/amendments:    Seen and examined at bedside. Urinating well, has not had bowel movement in days likely affecting tacrolimus absorption - aggressive bowel management as per primary team. No acute indication for KRT. Uptrend of sCr slowing, UOP improving, hopeful for some signs of recovery if no further hemodynamic setbacks. Monitoring closely, further recs as above

## 2022-12-04 NOTE — PROCEDURE NOTE - NSPROCDETAILS_GEN_ALL_CORE
location identified, draped/prepped, sterile technique used/blood seen on insertion/dressing applied/flushes easily/secured in place
guidewire recovered/lumen(s) aspirated and flushed/sterile dressing applied/sterile technique, catheter placed/ultrasound guidance with use of sterile gel and probe cove
location identified, draped/prepped, sterile technique used/blood seen on insertion/dressing applied/flushes easily/secured in place/sterile technique, catheter placed
ultrasound-guidance/location identified, draped/prepped, sterile technique used, needle inserted/introduced/positive blood return obtained via catheter/connected to a pressurized flush line/sutured in place/hemostasis with direct pressure, dressing applied/Seldinger technique/all materials/supplies accounted for at end of procedure
location identified, draped/prepped, sterile technique used/blood seen on insertion/dressing applied/flushes easily/secured in place/sterile technique, catheter placed
patient pre-oxygenated, tube inserted, placement confirmed
location identified, draped/prepped, sterile technique used/blood seen on insertion/dressing applied/flushes easily/secured in place/sterile technique, catheter placed

## 2022-12-04 NOTE — PROGRESS NOTE ADULT - ATTENDING COMMENTS
Acute hypoxemic respiratory failure secondary to multiple pneumonias s/p tracheostomy due to failure to wean from mechanical ventilation. S/p peg. C/w treatment for Stenotrophomas pneumonia. Encephelopathy slowly improving; added Seroquel. Able to tolerate trach collar today. Rest of plan as above.

## 2022-12-04 NOTE — PROGRESS NOTE ADULT - ASSESSMENT
82 y/o M PMH CKD 4, renal transplant in 2013 at [facility], glaucoma (blind), DM1, anemia, CAD s/p 2 DAMEON to LAD in 6/21, BPH, recent admission for PNA presents with acute on chronic cough 2/2 PNA and SUNNY 2/2 acute CKD.       NEUROLOGY  #Metabolic Encephalopathy  #Sedation  CT scan negative for acute intracranial pathology. Began to wean sedation on 12/2.  - fentanyl gtt dc'd, transitioned to 25mcg patch  (12/2)  - on precedex 400/100  (12/2), weaning as tolerated with IV pushes of fentanyl as needed (required 50mgx1 and 25mgx2 o/n 12/2 - 12/3)    CARDIOVASCULAR  #HFpEF  Had EF of 55% on TTE from Oct 2022, grade I diastolic dysfunction. Repeat TTE 11/17 with poor visualization of LV. Patient making good UOP overnight  - c/w carvedilol 25mg BID     #Upper extremity edema  Stable from yesterday but increased compared to baseline. US with superficial thrombosis of L cephalic vein extending to AC fossa, no DVTs.   - given patient's propensity to bleed and superficial nature of thrombus, no intervention    #CAD  #HLD  Hx of CAD s/p 2 DAMEON to LAD in June 2021, currently no CP. Trops 0.03 but no CP or ischemic changes on EKG, likely due to CKD. Takes Plavix and aspirin per last d/c but only aspirin on outpatient note  - c/w ASA daily  - c/w home Lipitor    #HTN   Known history of HTN.   - 12/1 evening was hypotensive, dc'd home amlodipine 5mg qD  - continue to monitor BP    PULM  #AHRF  Likely 2/2 aspiration event. Intubated on 11/26.  - ID management as below for aspiration   - received trach Thursday 12/1, feeds resumed after this    RENAL  #ELIZABETH on Chronic kidney disease (CKD).  Baseline sCr 2.3-2.5. On admission, fluid overloaded. Does have orthopnea and intermittent SOB at baseline. Acidotic but at baseline. Follows w Dr. Mac.  - sCr and BUN increasing daily, urine output poor  - monitor I/Os  - renally dosing meds  - per nephro, no acute need for HD, but may need renal replacement therapy in near future    #Renal transplant  Patient had renal transplant in 2013. On home mycophenolate 500mg BID and tacrolimus 4mg BID. Per nephro, decreased home tacrolimus from 4mg BID --> 2mg BID while in hospital.   - c/w tacrolimus 3mg in AM, c/w 2mg in PM, levels daily  - c/w mycophenolate 500mg BID  - f/u renal recs    #Hypernatremia - RESOLVED    GI  #Diarrhea - RESOLVED  - will continue to monitor for BMs    #Nutrition  NPO with PEG feeds.   - restarted feeds at 6pm 12/2      #BPH (benign prostatic hyperplasia).   - c/w tamsulosin 0.4mg daily    ID  #Aspiration PNA   Tracheal aspirate cx (11/19) growing serratia marcescens and pseudomonas. Patient was started on vancomycin and cefepime. MRSA nares positive. Dc'ed vancomycin given supratherapeutic trough. Completed cefepime 2g q24 course x9 days.    #Stenotrophomonas in sputum cx  Sputum cx from 11/26 growing Stenotrophomonas  - started on Bactrim BS 11/28 evening; for 10 days will receive 11/28 - 12/5    ENDOCRINE  #DM (diabetes mellitus).   Previously on lantus 15 lispro 5 at home. Endocrine following.  - on lantus 7 units  - on low dose regular ISS  - goal -180    #Hypothyroidism  TSH 20.8, free t4 0.7, T3 49 (low). Repeat TSH at 9, fT4 0.7  - on synthroid 50mcg qD  - f/u endo recs    #Relative Adrenal Insufficiency  Patient on chronic prednisone 5mg daily. He was started on hydrocortisone 50mg q6hr --> q8 --> BID   - c/w hydrocortizone 25mg q12  - wean as tolerated  - f/u endocrine recs    HEME  #Normocytic Anemia  Likely 2.2 CKD. Hgb 6.9 on 11/21, corrected appropriately s/p 1u pRBC. No active signs of bleeding on physical exam. CTH with no intracranial bleeding. Required 2u pRBC to correct. DEBRA and stool guaic negative.  - consider epo, if BP remains well controlled  - active T+S  - transfuse <7    FLUIDS/ELECTROLYTES/NUTRITION  -IVF as above  -Monitor, careful w advanced kidney disease  -Diet: NPO with PEG feeds  -DVT ppx: heparin TID  -GI: none  -Dispo: PT recommending NAVID 82 y/o M PMH CKD 4, renal transplant in 2013 at [facility], glaucoma (blind), DM1, anemia, CAD s/p 2 DAMEON to LAD in 6/21, BPH, recent admission for PNA presents with acute on chronic cough 2/2 PNA and SUNNY 2/2 acute CKD.       NEUROLOGY  #Metabolic Encephalopathy  #Sedation  #Agitation  CT scan negative for acute intracranial pathology. Began to wean sedation on 12/2.  - c/w fentanyl 25mcg patch  - on precedex 0.7 --> 0.6, weaning as tolerated  - start seroquel 25mg q12h    CARDIOVASCULAR  #HFpEF  Had EF of 55% on TTE from Oct 2022, grade I diastolic dysfunction. Repeat TTE 11/17 with poor visualization of LV. Patient making good UOP overnight  - holding BP meds, patient currently requiring pressors    #Upper extremity edema  Stable from yesterday but increased compared to baseline. US with superficial thrombosis of L cephalic vein extending to AC fossa, no DVTs.   - given patient's propensity to bleed and superficial nature of thrombus, no intervention    #CAD  #HLD  Hx of CAD s/p 2 DAMEON to LAD in June 2021, currently no CP. Trops 0.03 but no CP or ischemic changes on EKG, likely due to CKD. Takes Plavix and aspirin per last d/c but only aspirin on outpatient note  - c/w ASA daily  - c/w home Lipitor    #HTN   Known history of HTN.   - 12/1 evening was hypotensive, dc'd home amlodipine 5mg qD  - continue to monitor BP    PULM  #AHRF  Likely 2/2 aspiration event. Intubated on 11/26.  - ID management as below for aspiration   - received trach Thursday 12/1, feeds resumed after this    RENAL  #ELIZABETH on Chronic kidney disease (CKD).  Baseline sCr 2.3-2.5. On admission, fluid overloaded. Does have orthopnea and intermittent SOB at baseline. Acidotic but at baseline. Follows w Dr. Mac.  - sCr and BUN increasing daily, urine output poor  - monitor I/Os  - renally dosing meds  - per nephro, no acute need for HD, but may need renal replacement therapy in near future    #Renal transplant  Patient had renal transplant in 2013. On home mycophenolate 500mg BID and tacrolimus 4mg BID. Per nephro, decreased home tacrolimus from 4mg BID --> 2mg BID while in hospital.   - c/w tacrolimus 3mg in AM, c/w 2mg in PM, levels daily  - c/w mycophenolate 500mg BID  - f/u renal recs    #Hypernatremia - RESOLVED    GI  #Diarrhea - RESOLVED  - will continue to monitor for BMs    #Nutrition  NPO with PEG feeds.   - restarted feeds at 6pm 12/2      #BPH (benign prostatic hyperplasia).   - c/w tamsulosin 0.4mg daily    ID  #Aspiration PNA   Tracheal aspirate cx (11/19) growing serratia marcescens and pseudomonas. Patient was started on vancomycin and cefepime. MRSA nares positive. Dc'ed vancomycin given supratherapeutic trough. Completed cefepime 2g q24 course x9 days.    #Stenotrophomonas in sputum cx  Sputum cx from 11/26 growing Stenotrophomonas  - started on Bactrim BS 11/28 evening; for 10 days will receive 11/28 - 12/5    ENDOCRINE  #DM (diabetes mellitus).   Previously on lantus 15 lispro 5 at home. Endocrine following.  - on lantus 7 units  - on low dose regular ISS  - goal -180    #Hypothyroidism  TSH 20.8, free t4 0.7, T3 49 (low). Repeat TSH at 9, fT4 0.7  - on synthroid 50mcg qD  - f/u endo recs    #Relative Adrenal Insufficiency  Patient on chronic prednisone 5mg daily. He was started on hydrocortisone 50mg q6hr --> q8 --> BID   - c/w hydrocortizone 25mg q12  - wean as tolerated  - f/u endocrine recs    HEME  #Normocytic Anemia  Likely 2.2 CKD. Hgb 6.9 on 11/21, corrected appropriately s/p 1u pRBC. No active signs of bleeding on physical exam. CTH with no intracranial bleeding. Required 2u pRBC to correct. DEBRA and stool guaic negative.  - consider epo, if BP remains well controlled  - active T+S  - transfuse <7    FLUIDS/ELECTROLYTES/NUTRITION  -IVF as above  -Monitor, careful w advanced kidney disease  -Diet: NPO with PEG feeds  -DVT ppx: heparin TID  -GI: none  -Dispo: PT recommending [facility] 82 y/o M PMH CKD 4, renal transplant in 2013 at [facility], glaucoma (blind), DM1, anemia, CAD s/p 2 DAMEON to LAD in 6/21, BPH, recent admission for PNA presents with acute on chronic cough 2/2 PNA and SUNNY 2/2 acute CKD.       NEUROLOGY  #Metabolic Encephalopathy  #Sedation  #Agitation  CT scan negative for acute intracranial pathology. Began to wean sedation on 12/2.  - c/w fentanyl 25mcg patch  - on precedex 0.7 --> 0.6, weaning as tolerated  - start seroquel 25mg q12h    CARDIOVASCULAR  #HFpEF  Had EF of 55% on TTE from Oct 2022, grade I diastolic dysfunction. Repeat TTE 11/17 with poor visualization of LV. Patient making good UOP overnight  - holding BP meds, patient currently requiring pressors    #Upper extremity edema  Stable from yesterday but increased compared to baseline. US with superficial thrombosis of L cephalic vein extending to AC fossa, no DVTs.   - given patient's propensity to bleed and superficial nature of thrombus, no intervention    #CAD  #HLD  Hx of CAD s/p 2 DAMEON to LAD in June 2021, currently no CP. Trops 0.03 but no CP or ischemic changes on EKG, likely due to CKD. Takes Plavix and aspirin per last d/c but only aspirin on outpatient note  - c/w ASA daily  - c/w home Lipitor    #HTN   Known history of HTN.   - holding hypertension meds as pt currently requiring pressor support  - continue to monitor BP    PULM  #AHRF  Likely 2/2 aspiration event. Intubated on 11/26.  - ID management as below for aspiration   - Received trach on 12/1    RENAL  #ELIZABETH on Chronic kidney disease (CKD)  Baseline sCr 2.3-2.5. On admission, fluid overloaded. Does have orthopnea and intermittent SOB at baseline. Acidotic but at baseline. Follows w Dr. Mac.  - sCr and BUN increasing daily, urine output poor  - monitor I/Os  - renally dosing meds  - per nephro, no acute need for HD, but may need renal replacement therapy in near future    #Renal transplant  Patient had renal transplant in 2013. On home mycophenolate 500mg BID and tacrolimus 4mg BID. Per nephro, decreased home tacrolimus from 4mg BID --> 2mg BID while in hospital.   - c/w tacrolimus 3mg in AM, c/w 2mg in PM, levels daily  - c/w mycophenolate 500mg BID  - f/u renal recs    #Hyponatremia  - stable; monitor BMP    GI  #Constipation  - pt without BM in 3 days  - start lactulose 10g q8h  - monitor for BMs    #Nutrition  NPO with PEG feeds.  - restarted feeds at 6pm 12/2      #BPH (benign prostatic hyperplasia).   - c/w tamsulosin 0.4mg daily    ID  #Aspiration PNA   Tracheal aspirate cx (11/19) growing serratia marcescens and pseudomonas. Patient was started on vancomycin and cefepime. MRSA nares positive. DC'ed vancomycin given supratherapeutic trough. Completed cefepime 2g q24 course x9 days.    #Stenotrophomonas in sputum cx  Sputum cx from 11/26 growing Stenotrophomonas  - c/w Bactrim BS for 10 days (11/28 - 12/5)    ENDOCRINE  #DM (diabetes mellitus).   Previously on lantus 15 lispro 5 at home. Endocrine following.  - c/w lantus 7 units  - c/w humulin 7 units q6h  - on low dose regular ISS  - goal -180  - f/u endo recs    #Hypothyroidism  TSH 20.8, free t4 0.7, T3 49 (low). Repeat TSH at 9, fT4 0.7  - on synthroid 50mcg IV qD    #Relative Adrenal Insufficiency  Patient on chronic prednisone 5mg daily. He was started on hydrocortisone 50mg q6hr --> q8 --> BID   - c/w hydrocortisone 25mg q12  - wean as tolerated  - f/u endocrine recs    HEME  #Normocytic Anemia  Likely 2.2 CKD. Hgb 6.9 on 11/21, corrected appropriately s/p 1u pRBC. No active signs of bleeding on physical exam. CTH with no intracranial bleeding. Required 2u pRBC to correct. DEBRA and stool guaic negative.  - consider epo, if BP remains well controlled  - active T+S  - transfuse <7    FLUIDS/ELECTROLYTES/NUTRITION  -IVF as above  -Monitor, careful w advanced kidney disease  -Diet: NPO with PEG feeds  -DVT ppx: heparin TID  -GI: protonix 40 qd  -Dispo: MICU; PT recommending NAVID

## 2022-12-04 NOTE — PROGRESS NOTE ADULT - ASSESSMENT
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   Recs:  - c/w current tacro dosing; tacro level noted  - c/w cell cept 500mg BID  -Please obtain tacrolimus level this evening 8:30 PM.  -Please make sure pt having daily BM; start miralax and senna  - No alternative to bactrim per team, monitoring closely  - strict i's and o's  - Trend BMP BID  -no acute need for HD today; monitor urine output remains non-oliguric; may be starting to recover from ATN  -maintain MAP >70     #Metabolic Acidosis  -Please start sodium bicarbonate 650mg TID     #Hyponatremia  2/2 ARF   -Continue to monitor improving sowly

## 2022-12-04 NOTE — PROCEDURE NOTE - NSICDXPROCEDURE_GEN_ALL_CORE_FT
PROCEDURES:  US guided vascular access 19-Nov-2022 17:50:39  Tanya Kapoor  
PROCEDURES:  Insertion of intravenous catheter with ultrasound guidance 03-Dec-2022 04:13:48  Catherine Dickinson  
PROCEDURES:  US guided vascular access 19-Nov-2022 17:50:39  Tanya Kapoor  
PROCEDURES:  US guided vascular access 19-Nov-2022 17:50:39  Tanya Kapoor  
PROCEDURES:  Endotracheal intubation 26-Nov-2022 11:37:07  Dimas Fallon  
PROCEDURES:  Insertion, arterial line, radial, left 20-Nov-2022 18:05:23  Kimberly Michele  Ultrasound guidance of both renal veins 20-Nov-2022 18:05:40  Kimberly Michele

## 2022-12-04 NOTE — PROCEDURE NOTE - NSSITEPREP_SKIN_A_CORE
chlorhexidine/Adherence to aseptic technique: hand hygiene prior to donning barriers (gown, gloves), don cap and mask, sterile drape over patient
chlorhexidine
chlorhexidine/Adherence to aseptic technique: hand hygiene prior to donning barriers (gown, gloves), don cap and mask, sterile drape over patient
chlorhexidine

## 2022-12-04 NOTE — PROCEDURE NOTE - NSINDICATIONS_GEN_A_CORE
antibiotic therapy
critical illness/emergency venous access/venous access
antibiotic therapy
emergency venous access
airway protection/cardiac arrest
other
labile blood pressures, on pressor/monitoring purposes

## 2022-12-04 NOTE — CHART NOTE - NSCHARTNOTEFT_GEN_A_CORE
Insulin administration reviewed  tube feeding reviewed  case d/w team  please increase lantus to 9 units at bedtime, regular insulin to 9 units every 6 hours for feeds and sliding scale with regular insulin moderate dose every 6 hours

## 2022-12-04 NOTE — PROGRESS NOTE ADULT - SUBJECTIVE AND OBJECTIVE BOX
INTERVAL HPI/OVERNIGHT EVENTS:    SUBJECTIVE: Patient seen and examined at bedside.    OBJECTIVE:    VITAL SIGNS:  ICU Vital Signs Last 24 Hrs  T(C): 36.7 (04 Dec 2022 01:02), Max: 37 (03 Dec 2022 09:23)  T(F): 98 (04 Dec 2022 01:02), Max: 98.6 (03 Dec 2022 09:23)  HR: 66 (04 Dec 2022 04:00) (64 - 82)  BP: 123/49 (04 Dec 2022 04:00) (91/43 - 168/74)  BP(mean): 71 (04 Dec 2022 04:00) (62 - 115)  ABP: --  ABP(mean): --  RR: 24 (04 Dec 2022 04:00) (22 - 43)  SpO2: 96% (04 Dec 2022 04:00) (88% - 99%)    O2 Parameters below as of 04 Dec 2022 02:00  Patient On (Oxygen Delivery Method): ventilator    O2 Concentration (%): 40      Mode: AC/ CMV (Assist Control/ Continuous Mandatory Ventilation), RR (machine): 12, TV (machine): 450, FiO2: 40, PEEP: 5, ITime: 1, MAP: 11, PIP: 20    12-02 @ 07:01 - 12-03 @ 07:00  --------------------------------------------------------  IN: 1352 mL / OUT: 714 mL / NET: 638 mL    12-03 @ 07:01  -  12-04 @ 05:36  --------------------------------------------------------  IN: 1936.2 mL / OUT: 653 mL / NET: 1283.2 mL      CAPILLARY BLOOD GLUCOSE      POCT Blood Glucose.: 217 mg/dL (04 Dec 2022 05:19)      PHYSICAL EXAM:    General: NAD  HEENT: NC/AT; anicteric sclera  Neck: supple, no JVD  Respiratory: CTA B/L; no W/R/R  Cardiovascular: +S1/S2; RRR; no M/R/G  Gastrointestinal: soft, NT/ND; +BS x4  Extremities: WWP; 2+ peripheral pulses B/L; no LE edema  Skin: normal color and turgor; no rash  Neurological:     MEDICATIONS:  MEDICATIONS  (STANDING):  albuterol/ipratropium for Nebulization 3 milliLiter(s) Nebulizer every 6 hours  aspirin  chewable 81 milliGRAM(s) Oral daily  atorvastatin 40 milliGRAM(s) Oral at bedtime  chlorhexidine 0.12% Liquid 15 milliLiter(s) Oral Mucosa every 12 hours  chlorhexidine 2% Cloths 1 Application(s) Topical <User Schedule>  dexMEDEtomidine Infusion 0.2 MICROgram(s)/kG/Hr (4.12 mL/Hr) IV Continuous <Continuous>  dextrose 5%. 1000 milliLiter(s) (50 mL/Hr) IV Continuous <Continuous>  dextrose 5%. 1000 milliLiter(s) (100 mL/Hr) IV Continuous <Continuous>  dextrose 5%. 1000 milliLiter(s) (50 mL/Hr) IV Continuous <Continuous>  dextrose 5%. 1000 milliLiter(s) (100 mL/Hr) IV Continuous <Continuous>  dextrose 50% Injectable 25 Gram(s) IV Push once  dextrose 50% Injectable 12.5 Gram(s) IV Push once  dextrose 50% Injectable 25 Gram(s) IV Push once  dextrose 50% Injectable 25 Gram(s) IV Push once  dextrose 50% Injectable 12.5 Gram(s) IV Push once  dextrose 50% Injectable 25 Gram(s) IV Push once  glucagon  Injectable 1 milliGRAM(s) IntraMuscular once  glucagon  Injectable 1 milliGRAM(s) IntraMuscular once  heparin   Injectable 5000 Unit(s) SubCutaneous every 8 hours  hydrocortisone sodium succinate Injectable 25 milliGRAM(s) IV Push every 12 hours  influenza  Vaccine (HIGH DOSE) 0.7 milliLiter(s) IntraMuscular once  insulin glargine Injectable (LANTUS) 7 Unit(s) SubCutaneous at bedtime  insulin regular  human corrective regimen sliding scale   SubCutaneous every 6 hours  insulin regular  human recombinant 5 Unit(s) SubCutaneous every 6 hours  levothyroxine Injectable 50 MICROGram(s) IV Push at bedtime  mycophenolate mofetil Suspension 500 milliGRAM(s) Oral every 12 hours  norepinephrine Infusion 0.05 MICROgram(s)/kG/Min (7.72 mL/Hr) IV Continuous <Continuous>  pantoprazole  Injectable 40 milliGRAM(s) IV Push daily  sodium chloride 7% Inhalation 4 milliLiter(s) Inhalation every 12 hours  tacrolimus    0.5 mG/mL Suspension 3 milliGRAM(s) Oral every 24 hours  tacrolimus    0.5 mG/mL Suspension 2 milliGRAM(s) Oral every 24 hours  tamsulosin 0.4 milliGRAM(s) Oral at bedtime  trimethoprim / sulfamethoxazole IVPB 325 milliGRAM(s) IV Intermittent every 24 hours    MEDICATIONS  (PRN):  acetaminophen    Suspension .. 650 milliGRAM(s) Oral every 6 hours PRN Mild Pain (1 - 3)  dextrose Oral Gel 15 Gram(s) Oral once PRN Blood Glucose LESS THAN 70 milliGRAM(s)/deciliter  dextrose Oral Gel 15 Gram(s) Oral once PRN Blood Glucose LESS THAN 70 milliGRAM(s)/deciliter  fentaNYL   Patch  25 MICROgram(s)/Hr 1 Patch Transdermal every 72 hours PRN agitation  sodium chloride 0.9% lock flush 10 milliLiter(s) IV Push every 1 hour PRN Pre/post blood products, medications, blood draw, and to maintain line patency      ALLERGIES:  Allergies    No Known Allergies    Intolerances        LABS:                        7.8    5.94  )-----------( 171      ( 04 Dec 2022 05:08 )             25.1     12-03    130<L>  |  100  |  106<H>  ----------------------------<  168<H>  5.1   |  14<L>  |  4.29<H>    Ca    8.3<L>      03 Dec 2022 05:30  Phos  4.7     12-03  Mg     2.4     12-03    TPro  5.3<L>  /  Alb  2.4<L>  /  TBili  0.2  /  DBili  x   /  AST  38  /  ALT  44  /  AlkPhos  106  12-03    LIVER FUNCTIONS - ( 03 Dec 2022 05:30 )  Alb: 2.4 g/dL / Pro: 5.3 g/dL / ALK PHOS: 106 U/L / ALT: 44 U/L / AST: 38 U/L / GGT: x                     RADIOLOGY & ADDITIONAL TESTS: Reviewed.   **incomplete**    INTERVAL HPI/OVERNIGHT EVENTS: Patient received fentanyl 50mcg x 2 for agitation at 9pm and 4am.     SUBJECTIVE: Patient seen and examined at bedside. Not in any apparent distress, not agitated. Unable to obtain further ROS as patient sedated.     OBJECTIVE:    VITAL SIGNS:  ICU Vital Signs Last 24 Hrs  T(C): 36.7 (04 Dec 2022 01:02), Max: 37 (03 Dec 2022 09:23)  T(F): 98 (04 Dec 2022 01:02), Max: 98.6 (03 Dec 2022 09:23)  HR: 66 (04 Dec 2022 04:00) (64 - 82)  BP: 123/49 (04 Dec 2022 04:00) (91/43 - 168/74)  BP(mean): 71 (04 Dec 2022 04:00) (62 - 115)  ABP: --  ABP(mean): --  RR: 24 (04 Dec 2022 04:00) (22 - 43)  SpO2: 96% (04 Dec 2022 04:00) (88% - 99%)    O2 Parameters below as of 04 Dec 2022 02:00  Patient On (Oxygen Delivery Method): ventilator    O2 Concentration (%): 40      Mode: AC/ CMV (Assist Control/ Continuous Mandatory Ventilation), RR (machine): 12, TV (machine): 450, FiO2: 40, PEEP: 5, ITime: 1, MAP: 11, PIP: 20    12-02 @ 07:01  -  12-03 @ 07:00  --------------------------------------------------------  IN: 1352 mL / OUT: 714 mL / NET: 638 mL    12-03 @ 07:01  -  12-04 @ 05:36  --------------------------------------------------------  IN: 1936.2 mL / OUT: 653 mL / NET: 1283.2 mL      CAPILLARY BLOOD GLUCOSE      POCT Blood Glucose.: 217 mg/dL (04 Dec 2022 05:19)      PHYSICAL EXAM:    General: NAD  HEENT: NC/AT; anicteric sclera  Neck: supple, trach site clean  Respiratory: CTA B/L; no W/R/R  Cardiovascular: +S1/S2; RRR; no M/R/G  Gastrointestinal: soft, NT/ND; +BS x4; PEG tube in place  Extremities: WWP; 2+ peripheral pulses B/L; trace B/L LE edema and 3+ B/L UE edema  Skin: normal color and turgor; no rash  Neurological: A&Ox0, spontaneously moving extremities, does not follow commands    MEDICATIONS:  MEDICATIONS  (STANDING):  albuterol/ipratropium for Nebulization 3 milliLiter(s) Nebulizer every 6 hours  aspirin  chewable 81 milliGRAM(s) Oral daily  atorvastatin 40 milliGRAM(s) Oral at bedtime  chlorhexidine 0.12% Liquid 15 milliLiter(s) Oral Mucosa every 12 hours  chlorhexidine 2% Cloths 1 Application(s) Topical <User Schedule>  dexMEDEtomidine Infusion 0.2 MICROgram(s)/kG/Hr (4.12 mL/Hr) IV Continuous <Continuous>  dextrose 5%. 1000 milliLiter(s) (50 mL/Hr) IV Continuous <Continuous>  dextrose 5%. 1000 milliLiter(s) (100 mL/Hr) IV Continuous <Continuous>  dextrose 5%. 1000 milliLiter(s) (50 mL/Hr) IV Continuous <Continuous>  dextrose 5%. 1000 milliLiter(s) (100 mL/Hr) IV Continuous <Continuous>  dextrose 50% Injectable 25 Gram(s) IV Push once  dextrose 50% Injectable 12.5 Gram(s) IV Push once  dextrose 50% Injectable 25 Gram(s) IV Push once  dextrose 50% Injectable 25 Gram(s) IV Push once  dextrose 50% Injectable 12.5 Gram(s) IV Push once  dextrose 50% Injectable 25 Gram(s) IV Push once  glucagon  Injectable 1 milliGRAM(s) IntraMuscular once  glucagon  Injectable 1 milliGRAM(s) IntraMuscular once  heparin   Injectable 5000 Unit(s) SubCutaneous every 8 hours  hydrocortisone sodium succinate Injectable 25 milliGRAM(s) IV Push every 12 hours  influenza  Vaccine (HIGH DOSE) 0.7 milliLiter(s) IntraMuscular once  insulin glargine Injectable (LANTUS) 7 Unit(s) SubCutaneous at bedtime  insulin regular  human corrective regimen sliding scale   SubCutaneous every 6 hours  insulin regular  human recombinant 5 Unit(s) SubCutaneous every 6 hours  levothyroxine Injectable 50 MICROGram(s) IV Push at bedtime  mycophenolate mofetil Suspension 500 milliGRAM(s) Oral every 12 hours  norepinephrine Infusion 0.05 MICROgram(s)/kG/Min (7.72 mL/Hr) IV Continuous <Continuous>  pantoprazole  Injectable 40 milliGRAM(s) IV Push daily  sodium chloride 7% Inhalation 4 milliLiter(s) Inhalation every 12 hours  tacrolimus    0.5 mG/mL Suspension 3 milliGRAM(s) Oral every 24 hours  tacrolimus    0.5 mG/mL Suspension 2 milliGRAM(s) Oral every 24 hours  tamsulosin 0.4 milliGRAM(s) Oral at bedtime  trimethoprim / sulfamethoxazole IVPB 325 milliGRAM(s) IV Intermittent every 24 hours    MEDICATIONS  (PRN):  acetaminophen    Suspension .. 650 milliGRAM(s) Oral every 6 hours PRN Mild Pain (1 - 3)  dextrose Oral Gel 15 Gram(s) Oral once PRN Blood Glucose LESS THAN 70 milliGRAM(s)/deciliter  dextrose Oral Gel 15 Gram(s) Oral once PRN Blood Glucose LESS THAN 70 milliGRAM(s)/deciliter  fentaNYL   Patch  25 MICROgram(s)/Hr 1 Patch Transdermal every 72 hours PRN agitation  sodium chloride 0.9% lock flush 10 milliLiter(s) IV Push every 1 hour PRN Pre/post blood products, medications, blood draw, and to maintain line patency      ALLERGIES:  Allergies    No Known Allergies    Intolerances        LABS:                        7.8    5.94  )-----------( 171      ( 04 Dec 2022 05:08 )             25.1     12-03    130<L>  |  100  |  106<H>  ----------------------------<  168<H>  5.1   |  14<L>  |  4.29<H>    Ca    8.3<L>      03 Dec 2022 05:30  Phos  4.7     12-03  Mg     2.4     12-03    TPro  5.3<L>  /  Alb  2.4<L>  /  TBili  0.2  /  DBili  x   /  AST  38  /  ALT  44  /  AlkPhos  106  12-03    LIVER FUNCTIONS - ( 03 Dec 2022 05:30 )  Alb: 2.4 g/dL / Pro: 5.3 g/dL / ALK PHOS: 106 U/L / ALT: 44 U/L / AST: 38 U/L / GGT: x                     RADIOLOGY & ADDITIONAL TESTS: Reviewed.   INTERVAL HPI/OVERNIGHT EVENTS: Patient received fentanyl 50mcg x 2 for agitation at 9pm and 4am.     SUBJECTIVE: Patient seen and examined at bedside. Not in any apparent distress, not agitated. Unable to obtain further ROS as patient sedated. Patient currently on levo 0.01 and precedex 8 --> 7.     OBJECTIVE:    VITAL SIGNS:  ICU Vital Signs Last 24 Hrs  T(C): 36.7 (04 Dec 2022 01:02), Max: 37 (03 Dec 2022 09:23)  T(F): 98 (04 Dec 2022 01:02), Max: 98.6 (03 Dec 2022 09:23)  HR: 66 (04 Dec 2022 04:00) (64 - 82)  BP: 123/49 (04 Dec 2022 04:00) (91/43 - 168/74)  BP(mean): 71 (04 Dec 2022 04:00) (62 - 115)  ABP: --  ABP(mean): --  RR: 24 (04 Dec 2022 04:00) (22 - 43)  SpO2: 96% (04 Dec 2022 04:00) (88% - 99%)    O2 Parameters below as of 04 Dec 2022 02:00  Patient On (Oxygen Delivery Method): ventilator    O2 Concentration (%): 40      Mode: AC/ CMV (Assist Control/ Continuous Mandatory Ventilation), RR (machine): 12, TV (machine): 450, FiO2: 40, PEEP: 5, ITime: 1, MAP: 11, PIP: 20    12-02 @ 07:01  -  12-03 @ 07:00  --------------------------------------------------------  IN: 1352 mL / OUT: 714 mL / NET: 638 mL    12-03 @ 07:01  -  12-04 @ 05:36  --------------------------------------------------------  IN: 1936.2 mL / OUT: 653 mL / NET: 1283.2 mL      CAPILLARY BLOOD GLUCOSE      POCT Blood Glucose.: 217 mg/dL (04 Dec 2022 05:19)      PHYSICAL EXAM:    General: NAD  HEENT: NC/AT; anicteric sclera  Neck: supple, trach site clean  Respiratory: CTA B/L; no W/R/R  Cardiovascular: +S1/S2; RRR; no M/R/G  Gastrointestinal: soft, NT/ND; +BS x4; PEG tube in place  Extremities: WWP; 2+ peripheral pulses B/L; trace B/L LE edema and 3+ B/L UE edema  Skin: normal color and turgor; no rash  Neurological: A&Ox0, spontaneously moving extremities, does not follow commands    MEDICATIONS:  MEDICATIONS  (STANDING):  albuterol/ipratropium for Nebulization 3 milliLiter(s) Nebulizer every 6 hours  aspirin  chewable 81 milliGRAM(s) Oral daily  atorvastatin 40 milliGRAM(s) Oral at bedtime  chlorhexidine 0.12% Liquid 15 milliLiter(s) Oral Mucosa every 12 hours  chlorhexidine 2% Cloths 1 Application(s) Topical <User Schedule>  dexMEDEtomidine Infusion 0.2 MICROgram(s)/kG/Hr (4.12 mL/Hr) IV Continuous <Continuous>  dextrose 5%. 1000 milliLiter(s) (50 mL/Hr) IV Continuous <Continuous>  dextrose 5%. 1000 milliLiter(s) (100 mL/Hr) IV Continuous <Continuous>  dextrose 5%. 1000 milliLiter(s) (50 mL/Hr) IV Continuous <Continuous>  dextrose 5%. 1000 milliLiter(s) (100 mL/Hr) IV Continuous <Continuous>  dextrose 50% Injectable 25 Gram(s) IV Push once  dextrose 50% Injectable 12.5 Gram(s) IV Push once  dextrose 50% Injectable 25 Gram(s) IV Push once  dextrose 50% Injectable 25 Gram(s) IV Push once  dextrose 50% Injectable 12.5 Gram(s) IV Push once  dextrose 50% Injectable 25 Gram(s) IV Push once  glucagon  Injectable 1 milliGRAM(s) IntraMuscular once  glucagon  Injectable 1 milliGRAM(s) IntraMuscular once  heparin   Injectable 5000 Unit(s) SubCutaneous every 8 hours  hydrocortisone sodium succinate Injectable 25 milliGRAM(s) IV Push every 12 hours  influenza  Vaccine (HIGH DOSE) 0.7 milliLiter(s) IntraMuscular once  insulin glargine Injectable (LANTUS) 7 Unit(s) SubCutaneous at bedtime  insulin regular  human corrective regimen sliding scale   SubCutaneous every 6 hours  insulin regular  human recombinant 5 Unit(s) SubCutaneous every 6 hours  levothyroxine Injectable 50 MICROGram(s) IV Push at bedtime  mycophenolate mofetil Suspension 500 milliGRAM(s) Oral every 12 hours  norepinephrine Infusion 0.05 MICROgram(s)/kG/Min (7.72 mL/Hr) IV Continuous <Continuous>  pantoprazole  Injectable 40 milliGRAM(s) IV Push daily  sodium chloride 7% Inhalation 4 milliLiter(s) Inhalation every 12 hours  tacrolimus    0.5 mG/mL Suspension 3 milliGRAM(s) Oral every 24 hours  tacrolimus    0.5 mG/mL Suspension 2 milliGRAM(s) Oral every 24 hours  tamsulosin 0.4 milliGRAM(s) Oral at bedtime  trimethoprim / sulfamethoxazole IVPB 325 milliGRAM(s) IV Intermittent every 24 hours    MEDICATIONS  (PRN):  acetaminophen    Suspension .. 650 milliGRAM(s) Oral every 6 hours PRN Mild Pain (1 - 3)  dextrose Oral Gel 15 Gram(s) Oral once PRN Blood Glucose LESS THAN 70 milliGRAM(s)/deciliter  dextrose Oral Gel 15 Gram(s) Oral once PRN Blood Glucose LESS THAN 70 milliGRAM(s)/deciliter  fentaNYL   Patch  25 MICROgram(s)/Hr 1 Patch Transdermal every 72 hours PRN agitation  sodium chloride 0.9% lock flush 10 milliLiter(s) IV Push every 1 hour PRN Pre/post blood products, medications, blood draw, and to maintain line patency      ALLERGIES:  Allergies    No Known Allergies    Intolerances        LABS:                        7.8    5.94  )-----------( 171      ( 04 Dec 2022 05:08 )             25.1     12-03    130<L>  |  100  |  106<H>  ----------------------------<  168<H>  5.1   |  14<L>  |  4.29<H>    Ca    8.3<L>      03 Dec 2022 05:30  Phos  4.7     12-03  Mg     2.4     12-03    TPro  5.3<L>  /  Alb  2.4<L>  /  TBili  0.2  /  DBili  x   /  AST  38  /  ALT  44  /  AlkPhos  106  12-03    LIVER FUNCTIONS - ( 03 Dec 2022 05:30 )  Alb: 2.4 g/dL / Pro: 5.3 g/dL / ALK PHOS: 106 U/L / ALT: 44 U/L / AST: 38 U/L / GGT: x                     RADIOLOGY & ADDITIONAL TESTS: Reviewed.

## 2022-12-04 NOTE — PROGRESS NOTE ADULT - SUBJECTIVE AND OBJECTIVE BOX
Patient is a 82y Male seen and evaluated at bedside remains stable. Cr appears to plateau. K noted hemolyzed and urine output 745cc improving slowly. No urgent indication for HD at this time. Still without bowel movement      Meds:    acetaminophen    Suspension .. 650 every 6 hours PRN  albuterol/ipratropium for Nebulization 3 every 6 hours  aspirin  chewable 81 daily  atorvastatin 40 at bedtime  chlorhexidine 0.12% Liquid 15 every 12 hours  chlorhexidine 2% Cloths 1 <User Schedule>  dexMEDEtomidine Infusion 0.2 <Continuous>  dextrose 5%. 1000 <Continuous>  dextrose 5%. 1000 <Continuous>  dextrose 5%. 1000 <Continuous>  dextrose 5%. 1000 <Continuous>  dextrose 50% Injectable 25 once  dextrose 50% Injectable 12.5 once  dextrose 50% Injectable 25 once  dextrose 50% Injectable 25 once  dextrose 50% Injectable 12.5 once  dextrose 50% Injectable 25 once  dextrose Oral Gel 15 once PRN  dextrose Oral Gel 15 once PRN  fentaNYL   Patch  25 MICROgram(s)/Hr 1 every 72 hours PRN  glucagon  Injectable 1 once  glucagon  Injectable 1 once  heparin   Injectable 5000 every 8 hours  hydrocortisone sodium succinate Injectable 25 every 12 hours  influenza  Vaccine (HIGH DOSE) 0.7 once  insulin glargine Injectable (LANTUS) 7 at bedtime  insulin regular  human corrective regimen sliding scale  every 6 hours  insulin regular  human recombinant 7 every 6 hours  lactulose Syrup 10 every 8 hours  levothyroxine Injectable 50 at bedtime  mycophenolate mofetil Suspension 500 every 12 hours  norepinephrine Infusion 0.05 <Continuous>  pantoprazole  Injectable 40 daily  QUEtiapine 25 every 12 hours  sodium chloride 0.9% lock flush 10 every 1 hour PRN  sodium chloride 7% Inhalation 4 every 12 hours  tacrolimus    0.5 mG/mL Suspension 2 every 24 hours  tacrolimus    0.5 mG/mL Suspension 3 every 24 hours  tamsulosin 0.4 at bedtime  trimethoprim / sulfamethoxazole IVPB 325 every 24 hours      T(C): , Max: 36.8 (12-03-22 @ 17:36)  T(F): , Max: 98.2 (12-03-22 @ 17:36)  HR: 67 (12-04-22 @ 10:00)  BP: 109/45 (12-04-22 @ 10:00)  BP(mean): 65 (12-04-22 @ 10:00)  RR: 26 (12-04-22 @ 10:00)  SpO2: 93% (12-04-22 @ 10:00)  Wt(kg): --    12-03 @ 07:01  -  12-04 @ 07:00  --------------------------------------------------------  IN: 2066.2 mL / OUT: 745 mL / NET: 1321.2 mL    12-04 @ 07:01  -  12-04 @ 10:47  --------------------------------------------------------  IN: 181.8 mL / OUT: 149 mL / NET: 32.8 mL      Review of Systems:  ROS negative except as per HPI    PHYSICAL EXAM:  GENERAL: trach collar  NECK: supple, No JVD  CHEST/LUNG: mechanical breath sounds BL  HEART: normal S1S2, RRR  ABDOMEN: Soft, Nontender, +BS, No flank tenderness bilateral  EXTREMITIES: Remains grossly anasarca 2+ pitting edema b/l UE and LE  ACCESS: R radiocephalic AVF w/ palpable thrill       LABS:                        7.8    5.94  )-----------( 171      ( 04 Dec 2022 05:08 )             25.1     12-04    131<L>  |  100  |  109<H>  ----------------------------<  216<H>  See Note   |  16<L>  |  4.31<H>    Ca    8.0<L>      04 Dec 2022 05:08  Phos  4.2     12-04  Mg     2.3     12-04    TPro  5.4<L>  /  Alb  2.1<L>  /  TBili  0.2  /  DBili  x   /  AST  See Note  /  ALT  See Note  /  AlkPhos  94  12-04                RADIOLOGY & ADDITIONAL STUDIES:

## 2022-12-05 NOTE — PROGRESS NOTE ADULT - ATTENDING COMMENTS
Acute hypoxic respiratory failure, unable to wean off ventilator s/p trach with h/o kidney transplant with acute on chronic kidney disease (worsening) with persistent encephalopathy.   Plan:  - Fentanyl 25 jaky patch. Off IV sedation.   - Trach and PEG were done. Tolerating feeding.   - Bactrim course for HAP (Stenotrophomonas) completed.   - Steroid and tacrolimus to continue. Nephrology following patient. His renal failure is worsening. May need HD in future.   - IV lasix and metolazone  - Plan for transfer to [facility] facility.   - Rest as above . Acute hypoxic respiratory failure, unable to wean off ventilator s/p trach with h/o kidney transplant with acute on chronic kidney disease (worsening) with persistent encephalopathy with adrenal insufficiency.  Plan:  - Fentanyl 25 jaky patch. Off IV sedation.   - Trach and PEG were done. Tolerating feeding.   - Bactrim course for HAP (Stenotrophomonas) completed.   - Steroid and tacrolimus to continue. Nephrology following patient. His renal failure is worsening. May need HD in future.   - IV lasix and metolazone  - Mycophenolate and tacrolimbus.  - Plan for transfer to [facility] facility.   - Rest as above .

## 2022-12-05 NOTE — PROGRESS NOTE ADULT - PROBLEM SELECTOR PLAN 1
Event noticed.  The patient is sedated.  I suctioned the patient copious amount of secretion.  Gas exchange is stable.  Sputum grew Pseudomonas and Serratia.  The patient on appropriate antibiotic.  Continue pulmonary toilet.  Patient has baseline dementia and does not and trial up off sedation.  Tapers pressors as needed.  Cortisol level is adequate.  I discussed the case with critical care. She was extubated.  Patient is on nasal cannula.  Suction the patient copious amount of secretion.  Dysphagia evaluation.  Continue antibiotic.  The patient is hemodynamically stable off pressors and sedative.  Suctioned the patient thick moderate secretion and increased.  He failed swallow eval and will need a peg.  On nebs.  He was intubated.  Sputum GS revealed GNR and culture positive for sternomonas.  Abt changed to bactrim.  Monitor renal function on abt which is worsening.  Plan trach tomorrow.  Peg done . Is in detail with the family.  The patient is off antibiotic.  The renal function is deteriorating.  I will discuss with nephrology.  The also the patient will require repeat chest x-ray.  My concern has baseline tachypnea and moderate secretion.  His oxygen saturation is stable and he is off pressors

## 2022-12-05 NOTE — PROGRESS NOTE ADULT - SUBJECTIVE AND OBJECTIVE BOX
OVERNIGHT EVENTS:    SUBJECTIVE / INTERVAL HPI: Patient seen and examined at bedside.     VITAL SIGNS:  Vital Signs Last 24 Hrs  T(C): 36.6 (05 Dec 2022 01:03), Max: 36.6 (05 Dec 2022 01:03)  T(F): 97.9 (05 Dec 2022 01:03), Max: 97.9 (05 Dec 2022 01:03)  HR: 73 (05 Dec 2022 05:50) (62 - 79)  BP: 113/53 (05 Dec 2022 05:46) (88/39 - 155/65)  BP(mean): 76 (05 Dec 2022 05:46) (57 - 102)  RR: 30 (05 Dec 2022 05:50) (20 - 34)  SpO2: 93% (05 Dec 2022 05:50) (91% - 99%)    Parameters below as of 05 Dec 2022 05:50  Patient On (Oxygen Delivery Method): ventilator    O2 Concentration (%): 40    PHYSICAL EXAM:    General: WDWN  HEENT: NCAT; PERRL, anicteric sclera; MMM  Neck: supple, trachea midline  Cardiovascular: S1, S2 normal; RRR, no M/G/R  Respiratory: CTABL; no W/R/R  Gastrointestinal: soft, nontender, nondistended. bowel sounds present.  Skin: no ulcerations or visible rashes appreciated  Extremities: WWP; no edema, clubbing or cyanosis  Vascular: 2+ radial, DP/PT pulses B/L  Neurological: AAOx3; CN II-XII grossly intact; no focal deficits    MEDICATIONS:  MEDICATIONS  (STANDING):  albuterol/ipratropium for Nebulization 3 milliLiter(s) Nebulizer every 6 hours  aspirin  chewable 81 milliGRAM(s) Oral daily  atorvastatin 40 milliGRAM(s) Oral at bedtime  chlorhexidine 0.12% Liquid 15 milliLiter(s) Oral Mucosa every 12 hours  chlorhexidine 2% Cloths 1 Application(s) Topical <User Schedule>  dextrose 5%. 1000 milliLiter(s) (50 mL/Hr) IV Continuous <Continuous>  dextrose 5%. 1000 milliLiter(s) (100 mL/Hr) IV Continuous <Continuous>  dextrose 5%. 1000 milliLiter(s) (100 mL/Hr) IV Continuous <Continuous>  dextrose 5%. 1000 milliLiter(s) (50 mL/Hr) IV Continuous <Continuous>  dextrose 50% Injectable 25 Gram(s) IV Push once  dextrose 50% Injectable 12.5 Gram(s) IV Push once  dextrose 50% Injectable 25 Gram(s) IV Push once  dextrose 50% Injectable 25 Gram(s) IV Push once  dextrose 50% Injectable 12.5 Gram(s) IV Push once  dextrose 50% Injectable 25 Gram(s) IV Push once  glucagon  Injectable 1 milliGRAM(s) IntraMuscular once  glucagon  Injectable 1 milliGRAM(s) IntraMuscular once  heparin   Injectable 5000 Unit(s) SubCutaneous every 8 hours  hydrocortisone sodium succinate Injectable 25 milliGRAM(s) IV Push every 12 hours  influenza  Vaccine (HIGH DOSE) 0.7 milliLiter(s) IntraMuscular once  insulin glargine Injectable (LANTUS) 7 Unit(s) SubCutaneous at bedtime  insulin regular  human corrective regimen sliding scale   SubCutaneous every 6 hours  insulin regular  human recombinant 9 Unit(s) SubCutaneous every 6 hours  lactulose Syrup 10 Gram(s) Oral every 8 hours  levothyroxine Injectable 50 MICROGram(s) IV Push at bedtime  mycophenolate mofetil Suspension 500 milliGRAM(s) Oral every 12 hours  pantoprazole  Injectable 40 milliGRAM(s) IV Push daily  QUEtiapine 25 milliGRAM(s) Oral every 12 hours  sodium chloride 7% Inhalation 4 milliLiter(s) Inhalation every 12 hours  tacrolimus    0.5 mG/mL Suspension 3 milliGRAM(s) Oral every 24 hours  tacrolimus    0.5 mG/mL Suspension 2 milliGRAM(s) Oral every 24 hours  tamsulosin 0.4 milliGRAM(s) Oral at bedtime    MEDICATIONS  (PRN):  acetaminophen    Suspension .. 650 milliGRAM(s) Oral every 6 hours PRN Mild Pain (1 - 3)  dextrose Oral Gel 15 Gram(s) Oral once PRN Blood Glucose LESS THAN 70 milliGRAM(s)/deciliter  dextrose Oral Gel 15 Gram(s) Oral once PRN Blood Glucose LESS THAN 70 milliGRAM(s)/deciliter  fentaNYL   Patch  25 MICROgram(s)/Hr 1 Patch Transdermal every 72 hours PRN agitation  sodium chloride 0.9% lock flush 10 milliLiter(s) IV Push every 1 hour PRN Pre/post blood products, medications, blood draw, and to maintain line patency      ALLERGIES:  Allergies    No Known Allergies    Intolerances        LABS:                        9.0    5.81  )-----------( 309      ( 05 Dec 2022 04:36 )             28.1     12-05    131<L>  |  100  |  125<H>  ----------------------------<  160<H>  4.7   |  13<L>  |  4.48<H>    Ca    8.4      05 Dec 2022 04:36  Phos  4.6     12-05  Mg     2.4     12-05    TPro  5.2<L>  /  Alb  2.1<L>  /  TBili  0.2  /  DBili  x   /  AST  65<H>  /  ALT  56<H>  /  AlkPhos  100  12-05        CAPILLARY BLOOD GLUCOSE      POCT Blood Glucose.: 150 mg/dL (05 Dec 2022 04:01)      RADIOLOGY & ADDITIONAL TESTS: Reviewed. OVERNIGHT EVENTS:  MAGGIE ON    SUBJECTIVE / INTERVAL HPI: Patient seen and examined at bedside. In NAD, pt doesnt appears in pain or in  distress, not agitated. Unable to obtain further ROS as patient sedated. Patient currently on levo 0.01 and precedex    VITAL SIGNS:  Vital Signs Last 24 Hrs  T(C): 36.6 (05 Dec 2022 01:03), Max: 36.6 (05 Dec 2022 01:03)  T(F): 97.9 (05 Dec 2022 01:03), Max: 97.9 (05 Dec 2022 01:03)  HR: 73 (05 Dec 2022 05:50) (62 - 79)  BP: 113/53 (05 Dec 2022 05:46) (88/39 - 155/65)  BP(mean): 76 (05 Dec 2022 05:46) (57 - 102)  RR: 30 (05 Dec 2022 05:50) (20 - 34)  SpO2: 93% (05 Dec 2022 05:50) (91% - 99%)    Parameters below as of 05 Dec 2022 05:50  Patient On (Oxygen Delivery Method): ventilator    O2 Concentration (%): 40    PHYSICAL EXAM:    General: WDWN  HEENT: NCAT; PERRL, anicteric sclera; MMM  Neck: supple, trachea midline  Cardiovascular: S1, S2 normal; RRR, no M/G/R  Respiratory: CTABL; no W/R/R  Gastrointestinal: soft, nontender, nondistended. bowel sounds present.  Skin: no ulcerations or visible rashes appreciated  Extremities: WWP; no edema, clubbing or cyanosis  Vascular: 2+ radial, DP/PT pulses B/L  Neurological: AAOx3; CN II-XII grossly intact; no focal deficits    MEDICATIONS:  MEDICATIONS  (STANDING):  albuterol/ipratropium for Nebulization 3 milliLiter(s) Nebulizer every 6 hours  aspirin  chewable 81 milliGRAM(s) Oral daily  atorvastatin 40 milliGRAM(s) Oral at bedtime  chlorhexidine 0.12% Liquid 15 milliLiter(s) Oral Mucosa every 12 hours  chlorhexidine 2% Cloths 1 Application(s) Topical <User Schedule>  dextrose 5%. 1000 milliLiter(s) (50 mL/Hr) IV Continuous <Continuous>  dextrose 5%. 1000 milliLiter(s) (100 mL/Hr) IV Continuous <Continuous>  dextrose 5%. 1000 milliLiter(s) (100 mL/Hr) IV Continuous <Continuous>  dextrose 5%. 1000 milliLiter(s) (50 mL/Hr) IV Continuous <Continuous>  dextrose 50% Injectable 25 Gram(s) IV Push once  dextrose 50% Injectable 12.5 Gram(s) IV Push once  dextrose 50% Injectable 25 Gram(s) IV Push once  dextrose 50% Injectable 25 Gram(s) IV Push once  dextrose 50% Injectable 12.5 Gram(s) IV Push once  dextrose 50% Injectable 25 Gram(s) IV Push once  glucagon  Injectable 1 milliGRAM(s) IntraMuscular once  glucagon  Injectable 1 milliGRAM(s) IntraMuscular once  heparin   Injectable 5000 Unit(s) SubCutaneous every 8 hours  hydrocortisone sodium succinate Injectable 25 milliGRAM(s) IV Push every 12 hours  influenza  Vaccine (HIGH DOSE) 0.7 milliLiter(s) IntraMuscular once  insulin glargine Injectable (LANTUS) 7 Unit(s) SubCutaneous at bedtime  insulin regular  human corrective regimen sliding scale   SubCutaneous every 6 hours  insulin regular  human recombinant 9 Unit(s) SubCutaneous every 6 hours  lactulose Syrup 10 Gram(s) Oral every 8 hours  levothyroxine Injectable 50 MICROGram(s) IV Push at bedtime  mycophenolate mofetil Suspension 500 milliGRAM(s) Oral every 12 hours  pantoprazole  Injectable 40 milliGRAM(s) IV Push daily  QUEtiapine 25 milliGRAM(s) Oral every 12 hours  sodium chloride 7% Inhalation 4 milliLiter(s) Inhalation every 12 hours  tacrolimus    0.5 mG/mL Suspension 3 milliGRAM(s) Oral every 24 hours  tacrolimus    0.5 mG/mL Suspension 2 milliGRAM(s) Oral every 24 hours  tamsulosin 0.4 milliGRAM(s) Oral at bedtime    MEDICATIONS  (PRN):  acetaminophen    Suspension .. 650 milliGRAM(s) Oral every 6 hours PRN Mild Pain (1 - 3)  dextrose Oral Gel 15 Gram(s) Oral once PRN Blood Glucose LESS THAN 70 milliGRAM(s)/deciliter  dextrose Oral Gel 15 Gram(s) Oral once PRN Blood Glucose LESS THAN 70 milliGRAM(s)/deciliter  fentaNYL   Patch  25 MICROgram(s)/Hr 1 Patch Transdermal every 72 hours PRN agitation  sodium chloride 0.9% lock flush 10 milliLiter(s) IV Push every 1 hour PRN Pre/post blood products, medications, blood draw, and to maintain line patency      ALLERGIES:  Allergies    No Known Allergies    Intolerances        LABS:                        9.0    5.81  )-----------( 309      ( 05 Dec 2022 04:36 )             28.1     12-05    131<L>  |  100  |  125<H>  ----------------------------<  160<H>  4.7   |  13<L>  |  4.48<H>    Ca    8.4      05 Dec 2022 04:36  Phos  4.6     12-05  Mg     2.4     12-05    TPro  5.2<L>  /  Alb  2.1<L>  /  TBili  0.2  /  DBili  x   /  AST  65<H>  /  ALT  56<H>  /  AlkPhos  100  12-05        CAPILLARY BLOOD GLUCOSE      POCT Blood Glucose.: 150 mg/dL (05 Dec 2022 04:01)      RADIOLOGY & ADDITIONAL TESTS: Reviewed. OVERNIGHT EVENTS:  MAGGIE ON    SUBJECTIVE / INTERVAL HPI: Patient seen and examined at bedside. In NAD, lying in bed, sedated and intubated.  pt doesnt appears in pain or in distress, not agitated. Unable to obtain further ROS as patient sedated. Patient currently on levo 0.0193 and precedex .[id number]    VITAL SIGNS:  Vital Signs Last 24 Hrs  T(C): 36.6 (05 Dec 2022 01:03), Max: 36.6 (05 Dec 2022 01:03)  T(F): 97.9 (05 Dec 2022 01:03), Max: 97.9 (05 Dec 2022 01:03)  HR: 73 (05 Dec 2022 05:50) (62 - 79)  BP: 113/53 (05 Dec 2022 05:46) (88/39 - 155/65)  BP(mean): 76 (05 Dec 2022 05:46) (57 - 102)  RR: 30 (05 Dec 2022 05:50) (20 - 34)  SpO2: 93% (05 Dec 2022 05:50) (91% - 99%)    Parameters below as of 05 Dec 2022 05:50  Patient On (Oxygen Delivery Method): ventilator    O2 Concentration (%): 40    PHYSICAL EXAM:    General: NAD, lying in bed, sedated and intubated, non-verbal  HEENT: NC/AT; anicteric sclera  Neck: supple, trach tube in place  Respiratory: CTA B/L; no W/R/R  Cardiovascular: +S1/S2; RRR; no M/R/G  Gastrointestinal: soft, NT/ND; PEG tube in place, site in clean with no active bleeding  Extremities: WWP; 2+ peripheral pulses B/L; trace B/L LE edema and 3+ B/L UE edema  Skin: normal color and turgor; no rash  Neurological: A&Ox0, sedated, spontaneously moving extremities, does not follow commands    MEDICATIONS:  MEDICATIONS  (STANDING):  albuterol/ipratropium for Nebulization 3 milliLiter(s) Nebulizer every 6 hours  aspirin  chewable 81 milliGRAM(s) Oral daily  atorvastatin 40 milliGRAM(s) Oral at bedtime  chlorhexidine 0.12% Liquid 15 milliLiter(s) Oral Mucosa every 12 hours  chlorhexidine 2% Cloths 1 Application(s) Topical <User Schedule>  dextrose 5%. 1000 milliLiter(s) (50 mL/Hr) IV Continuous <Continuous>  dextrose 5%. 1000 milliLiter(s) (100 mL/Hr) IV Continuous <Continuous>  dextrose 5%. 1000 milliLiter(s) (100 mL/Hr) IV Continuous <Continuous>  dextrose 5%. 1000 milliLiter(s) (50 mL/Hr) IV Continuous <Continuous>  dextrose 50% Injectable 25 Gram(s) IV Push once  dextrose 50% Injectable 12.5 Gram(s) IV Push once  dextrose 50% Injectable 25 Gram(s) IV Push once  dextrose 50% Injectable 25 Gram(s) IV Push once  dextrose 50% Injectable 12.5 Gram(s) IV Push once  dextrose 50% Injectable 25 Gram(s) IV Push once  glucagon  Injectable 1 milliGRAM(s) IntraMuscular once  glucagon  Injectable 1 milliGRAM(s) IntraMuscular once  heparin   Injectable 5000 Unit(s) SubCutaneous every 8 hours  hydrocortisone sodium succinate Injectable 25 milliGRAM(s) IV Push every 12 hours  influenza  Vaccine (HIGH DOSE) 0.7 milliLiter(s) IntraMuscular once  insulin glargine Injectable (LANTUS) 7 Unit(s) SubCutaneous at bedtime  insulin regular  human corrective regimen sliding scale   SubCutaneous every 6 hours  insulin regular  human recombinant 9 Unit(s) SubCutaneous every 6 hours  lactulose Syrup 10 Gram(s) Oral every 8 hours  levothyroxine Injectable 50 MICROGram(s) IV Push at bedtime  mycophenolate mofetil Suspension 500 milliGRAM(s) Oral every 12 hours  pantoprazole  Injectable 40 milliGRAM(s) IV Push daily  QUEtiapine 25 milliGRAM(s) Oral every 12 hours  sodium chloride 7% Inhalation 4 milliLiter(s) Inhalation every 12 hours  tacrolimus    0.5 mG/mL Suspension 3 milliGRAM(s) Oral every 24 hours  tacrolimus    0.5 mG/mL Suspension 2 milliGRAM(s) Oral every 24 hours  tamsulosin 0.4 milliGRAM(s) Oral at bedtime    MEDICATIONS  (PRN):  acetaminophen    Suspension .. 650 milliGRAM(s) Oral every 6 hours PRN Mild Pain (1 - 3)  dextrose Oral Gel 15 Gram(s) Oral once PRN Blood Glucose LESS THAN 70 milliGRAM(s)/deciliter  dextrose Oral Gel 15 Gram(s) Oral once PRN Blood Glucose LESS THAN 70 milliGRAM(s)/deciliter  fentaNYL   Patch  25 MICROgram(s)/Hr 1 Patch Transdermal every 72 hours PRN agitation  sodium chloride 0.9% lock flush 10 milliLiter(s) IV Push every 1 hour PRN Pre/post blood products, medications, blood draw, and to maintain line patency      ALLERGIES:  Allergies    No Known Allergies    Intolerances        LABS:                        9.0    5.81  )-----------( 309      ( 05 Dec 2022 04:36 )             28.1     12-05    131<L>  |  100  |  125<H>  ----------------------------<  160<H>  4.7   |  13<L>  |  4.48<H>    Ca    8.4      05 Dec 2022 04:36  Phos  4.6     12-05  Mg     2.4     12-05    TPro  5.2<L>  /  Alb  2.1<L>  /  TBili  0.2  /  DBili  x   /  AST  65<H>  /  ALT  56<H>  /  AlkPhos  100  12-05        CAPILLARY BLOOD GLUCOSE      POCT Blood Glucose.: 150 mg/dL (05 Dec 2022 04:01)      RADIOLOGY & ADDITIONAL TESTS: Reviewed.

## 2022-12-05 NOTE — CHART NOTE - NSCHARTNOTEFT_GEN_A_CORE
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      History of renal transplant  secondary to DM      Chronic kidney disease (CKD)      Glaucoma      Kidney transplant recipient      Amputation of toe  x 3      H/O heart artery stent    Current Nutrition Order: Nepro @43 ml/hr     PO Intake: N/A    GI Issues: Abdomen ND/NT, +BS x4, LBM 12/1    Pain: No non-verbal indicators     Skin Integrity: DTI sacrum, +4 BLE    Labs:   12-05    131<L>  |  100  |  125<H>  ----------------------------<  160<H>  4.7   |  13<L>  |  4.48<H>    Ca    8.4      05 Dec 2022 04:36  Phos  4.6     12-05  Mg     2.4     12-05    TPro  5.2<L>  /  Alb  2.1<L>  /  TBili  0.2  /  DBili  x   /  AST  65<H>  /  ALT  56<H>  /  AlkPhos  100  12-05    CAPILLARY BLOOD GLUCOSE      POCT Blood Glucose.: 239 mg/dL (05 Dec 2022 12:05)  POCT Blood Glucose.: 150 mg/dL (05 Dec 2022 04:01)  POCT Blood Glucose.: 121 mg/dL (04 Dec 2022 22:08)  POCT Blood Glucose.: 150 mg/dL (04 Dec 2022 18:28)      Medications:  MEDICATIONS  (STANDING):  albuterol/ipratropium for Nebulization 3 milliLiter(s) Nebulizer every 6 hours  aspirin  chewable 81 milliGRAM(s) Oral daily  atorvastatin 40 milliGRAM(s) Oral at bedtime  chlorhexidine 0.12% Liquid 15 milliLiter(s) Oral Mucosa every 12 hours  chlorhexidine 2% Cloths 1 Application(s) Topical <User Schedule>  dextrose 5%. 1000 milliLiter(s) (50 mL/Hr) IV Continuous <Continuous>  dextrose 5%. 1000 milliLiter(s) (100 mL/Hr) IV Continuous <Continuous>  dextrose 5%. 1000 milliLiter(s) (100 mL/Hr) IV Continuous <Continuous>  dextrose 5%. 1000 milliLiter(s) (50 mL/Hr) IV Continuous <Continuous>  dextrose 50% Injectable 25 Gram(s) IV Push once  dextrose 50% Injectable 12.5 Gram(s) IV Push once  dextrose 50% Injectable 25 Gram(s) IV Push once  dextrose 50% Injectable 25 Gram(s) IV Push once  dextrose 50% Injectable 12.5 Gram(s) IV Push once  dextrose 50% Injectable 25 Gram(s) IV Push once  glucagon  Injectable 1 milliGRAM(s) IntraMuscular once  glucagon  Injectable 1 milliGRAM(s) IntraMuscular once  heparin   Injectable 5000 Unit(s) SubCutaneous every 8 hours  hydrocortisone sodium succinate Injectable 25 milliGRAM(s) IV Push every 12 hours  influenza  Vaccine (HIGH DOSE) 0.7 milliLiter(s) IntraMuscular once  insulin glargine Injectable (LANTUS) 7 Unit(s) SubCutaneous at bedtime  insulin regular  human corrective regimen sliding scale   SubCutaneous every 6 hours  insulin regular  human recombinant 9 Unit(s) SubCutaneous every 6 hours  levothyroxine Injectable 50 MICROGram(s) IV Push at bedtime  mycophenolate mofetil Suspension 500 milliGRAM(s) Oral every 12 hours  pantoprazole  Injectable 40 milliGRAM(s) IV Push daily  QUEtiapine 25 milliGRAM(s) Oral every 12 hours  sodium bicarbonate  Infusion 0.109 mEq/kG/Hr (60 mL/Hr) IV Continuous <Continuous>  sodium chloride 7% Inhalation 4 milliLiter(s) Inhalation every 12 hours  tacrolimus    0.5 mG/mL Suspension 3 milliGRAM(s) Oral every 24 hours  tacrolimus    0.5 mG/mL Suspension 2 milliGRAM(s) Oral every 24 hours  tamsulosin 0.4 milliGRAM(s) Oral at bedtime    MEDICATIONS  (PRN):  acetaminophen    Suspension .. 650 milliGRAM(s) Oral every 6 hours PRN Mild Pain (1 - 3)  dextrose Oral Gel 15 Gram(s) Oral once PRN Blood Glucose LESS THAN 70 milliGRAM(s)/deciliter  dextrose Oral Gel 15 Gram(s) Oral once PRN Blood Glucose LESS THAN 70 milliGRAM(s)/deciliter  fentaNYL   Patch  25 MICROgram(s)/Hr 1 Patch Transdermal every 72 hours PRN agitation  sodium chloride 0.9% lock flush 10 milliLiter(s) IV Push every 1 hour PRN Pre/post blood products, medications, blood draw, and to maintain line patency    Height for BMI (FEET)	5 Feet  Height for BMI (INCHES)	7 Inch(s)  Height for BMI (CENTIMETERS)	170.18 Centimeter(s)  Weight for BMI (lbs)	220 lb  Weight for BMI (kg)	99.8 kg  Body Mass Index	34.4    Weight Change: No new wt since admit.     Estimated energy needs:   -Needs updated 11/23 using IBW 67.3kg adjusted for vented pt.   EEN: 7368-5901 kcal (25-30 kcal/kg)  EPN:  gProt. (1.4-1.6 gProt./kg)  EFN: 3722-7447 ml (25-30 ml/kg)    Subjective: 82 y/o M PMH CKD 4, S/P renal transplant in 2013 at [facility], glaucoma (blind), DM1, anemia, CAD s/p 2 DAMEON to LAD in 6/21, BPH, recent admission for PNA presented with acute on chronic cough and b/l LE edema 2/2 acute CHF vs CKD. Cardiology and renal consulted, and pt was started on Lasix 40mg IV BID with improvement in b/l LE edema. This morning, pt found to be hypoxic to 70s on RA and hypoglycemia 31, difficult to arouse, and rapid response was called. Pt was put on supplemental oxygen with O2sat 80s, still difficult to arouse, and pt was intubated. Lost a pulse on pt so code blue was called, ROSC achieved, and pt was transferred to the ICU for closer monitoring. 11/19: Intubated. 11/22: Bowel care started. 11/23: Diarrhea. 11/30: PEG placed. 12/4: Constipation, started on lactulose.     Pt care discussed in IDT rounds. Rx and labs reviewed. Pt s/p trache set to VC-AC, MAP 97, no pressors, no propofol. Observed TF running at goal; see recs below. Pt with noted constipation; started on lactulose to bowel care regimen. No other reports GI distress or further nutritional concerns at this time. RDN will continue to reassess, intervene, and monitor as appropriate.     Previous Nutrition Diagnosis: Inadequate Protein Energy Intake r/t intubation AEB NPO status    Active [ x ]  Resolved [   ] *adjusted 11/23    If resolved, new PES:     Goal: Pt will meet 75% or more of protein/energy needs via most appropriate route for nutrition.     Recommendations:  -Continue TF regimen via PEG as tolerated; add LPS x1/day    *Recommend Nepro @43 ml/hr with LPS x1/day to provide 1032 ml TF, 1958 kcal, 99 gProt., and 750 ml FW. This is 23.2 non-protein kcal and 1.47 gProt. per kg IBW 63.7kg.    *FWF per team. If euvolemic and sNa WNL, consider 210 ml FWF q4hr to provide 2010 total fluids per day.   -Monitor pressor and propofol demands; adjust TF as necessary   -Maintain aspiration precautions at all times   -Align nutrition with GOC at all times   -Monitor chemistry, GI fxn, and skin integrity     Risk Level: High [   ] Moderate [ x ] Low [   ].

## 2022-12-05 NOTE — PROGRESS NOTE ADULT - SUBJECTIVE AND OBJECTIVE BOX
Patient is a 82y Male seen and evaluated at bedside seen this morning, creatinine with mild rise today but with worsening acidosis. Noted to have some hypotension over last 24 hours which could have caused further injury to the kidney. Urine output remains non-oliguric      Meds:  acetaminophen    Suspension .. 650 every 6 hours PRN  albuterol/ipratropium for Nebulization 3 every 6 hours  aspirin  chewable 81 daily  atorvastatin 40 at bedtime  chlorhexidine 0.12% Liquid 15 every 12 hours  chlorhexidine 2% Cloths 1 <User Schedule>  dextrose 5%. 1000 <Continuous>  dextrose 5%. 1000 <Continuous>  dextrose 5%. 1000 <Continuous>  dextrose 5%. 1000 <Continuous>  dextrose 50% Injectable 25 once  dextrose 50% Injectable 12.5 once  dextrose 50% Injectable 25 once  dextrose 50% Injectable 25 once  dextrose 50% Injectable 12.5 once  dextrose 50% Injectable 25 once  dextrose Oral Gel 15 once PRN  dextrose Oral Gel 15 once PRN  fentaNYL   Patch  25 MICROgram(s)/Hr 1 every 72 hours PRN  glucagon  Injectable 1 once  glucagon  Injectable 1 once  heparin   Injectable 5000 every 8 hours  hydrocortisone sodium succinate Injectable 25 every 12 hours  influenza  Vaccine (HIGH DOSE) 0.7 once  insulin glargine Injectable (LANTUS) 7 at bedtime  insulin regular  human corrective regimen sliding scale  every 6 hours  insulin regular  human recombinant 9 every 6 hours  lactulose Syrup 10 every 8 hours  levothyroxine Injectable 50 at bedtime  mycophenolate mofetil Suspension 500 every 12 hours  pantoprazole  Injectable 40 daily  QUEtiapine 25 every 12 hours  sodium chloride 0.9% lock flush 10 every 1 hour PRN  sodium chloride 7% Inhalation 4 every 12 hours  tacrolimus    0.5 mG/mL Suspension 3 every 24 hours  tacrolimus    0.5 mG/mL Suspension 2 every 24 hours  tamsulosin 0.4 at bedtime      T(C): , Max: 37 (12-05-22 @ 06:03)  T(F): , Max: 98.6 (12-05-22 @ 06:03)  HR: 82 (12-05-22 @ 11:00)  BP: 106/47 (12-05-22 @ 11:00)  BP(mean): 68 (12-05-22 @ 11:00)  RR: 24 (12-05-22 @ 11:00)  SpO2: 95% (12-05-22 @ 11:00)  Wt(kg): --    12-04 @ 07:01 - 12-05 @ 07:00  --------------------------------------------------------  IN: 1373.9 mL / OUT: 578 mL / NET: 795.9 mL    12-05 @ 07:01 - 12-05 @ 11:20  --------------------------------------------------------  IN: 172 mL / OUT: 155 mL / NET: 17 mL    Review of Systems:  ROS negative except as per HPI      PHYSICAL EXAM:  GENERAL: trach collar  NECK: supple, No JVD  CHEST/LUNG: mechanical breath sounds BL  HEART: normal S1S2, RRR  ABDOMEN: Soft, Nontender, +BS, No flank tenderness bilateral  EXTREMITIES: Remains grossly anasarca 2+ pitting edema b/l UE and LE  ACCESS: R radiocephalic AVF w/ palpable thrill       LABS:                        9.0    5.81  )-----------( 309      ( 05 Dec 2022 04:36 )             28.1     12-05    131<L>  |  100  |  125<H>  ----------------------------<  160<H>  4.7   |  13<L>  |  4.48<H>    Ca    8.4      05 Dec 2022 04:36  Phos  4.6     12-05  Mg     2.4     12-05    TPro  5.2<L>  /  Alb  2.1<L>  /  TBili  0.2  /  DBili  x   /  AST  65<H>  /  ALT  56<H>  /  AlkPhos  100  12-05                RADIOLOGY & ADDITIONAL STUDIES:           Patient is a 82y Male seen and evaluated at bedside seen this morning, creatinine with mild rise today but with worsening acidosis. Noted to have some hypotension over last 24 hours which could have caused further injury to the kidney. Urine output remains non-oliguric      Meds:  acetaminophen    Suspension .. 650 every 6 hours PRN  albuterol/ipratropium for Nebulization 3 every 6 hours  aspirin  chewable 81 daily  atorvastatin 40 at bedtime  chlorhexidine 0.12% Liquid 15 every 12 hours  chlorhexidine 2% Cloths 1 <User Schedule>  dextrose 5%. 1000 <Continuous>  dextrose 5%. 1000 <Continuous>  dextrose 5%. 1000 <Continuous>  dextrose 5%. 1000 <Continuous>  dextrose 50% Injectable 25 once  dextrose 50% Injectable 12.5 once  dextrose 50% Injectable 25 once  dextrose 50% Injectable 25 once  dextrose 50% Injectable 12.5 once  dextrose 50% Injectable 25 once  dextrose Oral Gel 15 once PRN  dextrose Oral Gel 15 once PRN  fentaNYL   Patch  25 MICROgram(s)/Hr 1 every 72 hours PRN  glucagon  Injectable 1 once  glucagon  Injectable 1 once  heparin   Injectable 5000 every 8 hours  hydrocortisone sodium succinate Injectable 25 every 12 hours  influenza  Vaccine (HIGH DOSE) 0.7 once  insulin glargine Injectable (LANTUS) 7 at bedtime  insulin regular  human corrective regimen sliding scale  every 6 hours  insulin regular  human recombinant 9 every 6 hours  lactulose Syrup 10 every 8 hours  levothyroxine Injectable 50 at bedtime  mycophenolate mofetil Suspension 500 every 12 hours  pantoprazole  Injectable 40 daily  QUEtiapine 25 every 12 hours  sodium chloride 0.9% lock flush 10 every 1 hour PRN  sodium chloride 7% Inhalation 4 every 12 hours  tacrolimus    0.5 mG/mL Suspension 3 every 24 hours  tacrolimus    0.5 mG/mL Suspension 2 every 24 hours  tamsulosin 0.4 at bedtime      T(C): , Max: 37 (12-05-22 @ 06:03)  T(F): , Max: 98.6 (12-05-22 @ 06:03)  HR: 82 (12-05-22 @ 11:00)  BP: 106/47 (12-05-22 @ 11:00)  BP(mean): 68 (12-05-22 @ 11:00)  RR: 24 (12-05-22 @ 11:00)  SpO2: 95% (12-05-22 @ 11:00)  Wt(kg): --    12-04 @ 07:01  -  12-05 @ 07:00  --------------------------------------------------------  IN: 1373.9 mL / OUT: 578 mL / NET: 795.9 mL    12-05 @ 07:01  -  12-05 @ 11:20  --------------------------------------------------------  IN: 172 mL / OUT: 155 mL / NET: 17 mL    Review of Systems:  ROS negative except as per HPI      PHYSICAL EXAM:  GENERAL: trach collar  NECK: supple, No JVD  CHEST/LUNG: mechanical breath sounds BL  HEART: normal S1S2, RRR  ABDOMEN: Soft, Nontender, +BS, No flank tenderness bilateral  EXTREMITIES: Remains grossly anasarca 2+ pitting edema b/l UE and LE  ACCESS: R radiocephalic AVF w/ palpable thrill       LABS:                        9.0    5.81  )-----------( 309      ( 05 Dec 2022 04:36 )             28.1     12-05    131<L>  |  100  |  125<H>  ----------------------------<  160<H>  4.7   |  13<L>  |  4.48<H>    Ca    8.4      05 Dec 2022 04:36  Phos  4.6     12-05  Mg     2.4     12-05    TPro  5.2<L>  /  Alb  2.1<L>  /  TBili  0.2  /  DBili  x   /  AST  65<H>  /  ALT  56<H>  /  AlkPhos  100  12-05                RADIOLOGY & ADDITIONAL STUDIES:        Creatinine, Serum: 4.48 mg/dL (12-05-22 @ 04:36)  Creatinine, Serum: 4.31 mg/dL (12-04-22 @ 05:08)  Creatinine, Serum: 4.29 mg/dL (12-03-22 @ 05:30)  Creatinine, Serum: 4.02 mg/dL (12-02-22 @ 15:27)  Creatinine, Serum: 3.41 mg/dL (12-02-22 @ 04:18)  Creatinine, Serum: 3.08 mg/dL (12-01-22 @ 21:30)  Creatinine, Serum: 3.49 mg/dL (12-01-22 @ 05:30)  Creatinine, Serum: 3.12 mg/dL (11-30-22 @ 03:57)  Creatinine, Serum: 2.75 mg/dL (11-29-22 @ 04:21)  Creatinine, Serum: 2.49 mg/dL (11-28-22 @ 05:30)      tTacrolimus (), Serum: 3.6: Tacrolimus testing is performed on the [employer]  by   chemiluminescent microparticle immunoassay. The therapeutic range of   tacrolimus is not clearly defined but target 12-hour trough whole blood   concentrations are 5.0 - 20.0 ng/mL early post transplant. Twenty-four   hour   trough concentrations are 33-50% less than the corresponding 12-hour   trough. ng/mL (12.03.22 @ 22:37)

## 2022-12-05 NOTE — PROGRESS NOTE ADULT - ATTENDING COMMENTS
I agree with the fellow's findings and plans as written above with the following additions/amendments:    Seen and examined at bedside. No acute indicaiton for HD but has not improved sCr or UOP. Tacrolimus level noted, will continue current dosing with plan to ensure bowel movement which will help with absorption. Would replete bicarb deficit with normal bicarb as above. Further recs as above

## 2022-12-05 NOTE — PROGRESS NOTE ADULT - ATTENDING COMMENTS
Pt seen on rounds this afternoon and events of the weekend reviewed.    His Lantus was increased to 9 units, regular insulin still at 9 units q6h  He continues to tolerate the tube feeds  BPs have been acceptable, but on the lower side of normal.  He is still on hydrocortisone 25 mg q12h  Sedation has been discontinued, but he is still somnolent, intermittently slightly agitated per family  Abdomen is soft, with ?? slight epigastric guarding.  Lungs with coarse rhonchi bilaterally, again with mild decreased in breath sounds on the left relative to right.  Fingersticks have been mostly in the mid-100 range, but with a mild spike to 239 at noon today  --As noted above, free T4 level is still low and has actually decreased slightly from last value.  TSH has also risen slightly.  Will increase the IV levothyroxine to 60 mcg/day  --Continue the HC at 25 mg q12h unless fever or hemodynamic instability  --Based on glucoses with the Lantus of 7 units when the feeds were off, would decrease the Lantus back to this dose

## 2022-12-05 NOTE — PROGRESS NOTE ADULT - ASSESSMENT
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   Recs:  - c/w current tacro dosing; tacro level noted  - c/w cell cept 500mg BID  -Please obtain tacrolimus level this evening 8:30 PM - ordered.  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   Recs:  - c/w current tacro dosing; tacro level noted  - c/w cell cept 500mg BID  -Please obtain tacrolimus level this evening 8:30 PM - ordered.  -Please make sure pt having daily BM; start miralax and senna  -please start sodium bicarb gtt 150meq of sodium bicarb in STERILE water @ 60cc/hr  - No alternative to bactrim per team, monitoring closely  - strict i's and o's  - Trend BMP BID  -no acute need for HD today; monitor urine output remains non-oliguric; may be starting to recover from ATN  -maintain MAP >70     #Metabolic Acidosis  -Please start sodium bicarbonate 650mg TID     #Hyponatremia  2/2 ARF   -Continue to monitor improving slowly

## 2022-12-05 NOTE — PROGRESS NOTE ADULT - SUBJECTIVE AND OBJECTIVE BOX
OVERNIGHT: No acute events overnight.   SUBJECTIVE / INTERVAL HPI: Patient was seen and examined this morning.     CAPILLARY BLOOD GLUCOSE & INSULIN RECEIVED  Yesterday  - Dinner FSG: *** mg/dL = *** units of Lispro + *** units of sliding scale.   - Bedtime FSG: *** mg/dL = *** units of Lantus + *** units sliding scale.     Today  - Breakfast FSG: *** mg/dL = *** units of Lispro + *** units of sliding scale.   - Lunch FSG: *** mg/dL = *** units of Lispro + *** units of sliding scale.     121 mg/dL (12-04 @ 22:08)  150 mg/dL (12-05 @ 04:01)  160 mg/dL (12-05 @ 04:36)  239 mg/dL (12-05 @ 12:05)  109 mg/dL (12-05 @ 17:59)      REVIEW OF SYSTEMS  Constitutional:  Negative fever, chills or loss of appetite.  Eyes:  Negative blurry vision or double vision.  Cardiovascular:  Negative for chest pain or palpitations.  Respiratory:  Negative for cough, wheezing, or shortness of breath.    Gastrointestinal:  Negative for nausea, vomiting, diarrhea, constipation, or abdominal pain.  Genitourinary:  Negative frequency, urgency or dysuria.  Neurologic:  No headache, confusion, dizziness, lightheadedness.    PHYSICAL EXAM  Vital Signs Last 24 Hrs  T(C): 36.6 (05 Dec 2022 20:00), Max: 37.1 (05 Dec 2022 14:40)  T(F): 97.8 (05 Dec 2022 20:00), Max: 98.7 (05 Dec 2022 14:40)  HR: 80 (05 Dec 2022 19:00) (65 - 82)  BP: 98/46 (05 Dec 2022 19:00) (88/39 - 155/65)  BP(mean): 66 (05 Dec 2022 19:00) (57 - 102)  RR: 34 (05 Dec 2022 19:00) (19 - 37)  SpO2: 96% (05 Dec 2022 19:00) (89% - 97%)    Parameters below as of 05 Dec 2022 19:00  Patient On (Oxygen Delivery Method): ventilator    O2 Concentration (%): 40    Constitutional: Awake, alert, in no acute distress.   HEENT: Normocephalic, atraumatic, GORDON, no proptosis or lid retraction.   Neck: supple, no acanthosis, no thyromegaly or palpable thyroid nodules.  Respiratory: Lungs clear to ausculation bilaterally.   Cardiovascular: regular rhythm, normal S1 and S2, no audible murmurs.   GI: soft, non-tender, non-distended, bowel sounds present, no masses appreciated.  Extremities: No lower extremity edema, peripheral pulses present.   Skin: no rashes.   Psychiatric: AAO x 3. Normal affect/mood.     LABS  CBC - WBC/HGB/HTC/PLT: 5.81/9.0/28.1/309 (12-05-22)  BMP - Na/K/Cl/Bicarb/BUN/Cr/Gluc/AG/eGFR: 131/4.7/100/13/125/4.48/160/18/12 (12-05-22)  Ca - 8.4 (12-05-22)  Phos - 4.6 (12-05-22)  Mg - 2.4 (12-05-22)  LFT - Alb/Tprot/Tbili/Dbili/AlkPhos/ALT/AST: 2.1/--/0.2/--/100/56/65 (12-05-22)  PT/aPTT/INR: 12.5/34.8/1.05 (12-01-22)   Thyroid Stimulating Hormone, Serum: 11.170 (12-05-22)  Total T4/Free T4: --/0.561 (12-05-22)    MEDICATIONS  MEDICATIONS  (STANDING):  albuterol/ipratropium for Nebulization 3 milliLiter(s) Nebulizer every 6 hours  aspirin  chewable 81 milliGRAM(s) Oral daily  atorvastatin 40 milliGRAM(s) Oral at bedtime  chlorhexidine 0.12% Liquid 15 milliLiter(s) Oral Mucosa every 12 hours  chlorhexidine 2% Cloths 1 Application(s) Topical <User Schedule>  dextrose 5%. 1000 milliLiter(s) (50 mL/Hr) IV Continuous <Continuous>  dextrose 5%. 1000 milliLiter(s) (100 mL/Hr) IV Continuous <Continuous>  dextrose 5%. 1000 milliLiter(s) (100 mL/Hr) IV Continuous <Continuous>  dextrose 5%. 1000 milliLiter(s) (50 mL/Hr) IV Continuous <Continuous>  dextrose 50% Injectable 25 Gram(s) IV Push once  dextrose 50% Injectable 12.5 Gram(s) IV Push once  dextrose 50% Injectable 25 Gram(s) IV Push once  dextrose 50% Injectable 25 Gram(s) IV Push once  dextrose 50% Injectable 12.5 Gram(s) IV Push once  dextrose 50% Injectable 25 Gram(s) IV Push once  fentaNYL   Patch  50 MICROgram(s)/Hr. 1 Patch Transdermal every 48 hours  glucagon  Injectable 1 milliGRAM(s) IntraMuscular once  glucagon  Injectable 1 milliGRAM(s) IntraMuscular once  heparin   Injectable 5000 Unit(s) SubCutaneous every 8 hours  hydrocortisone sodium succinate Injectable 25 milliGRAM(s) IV Push every 12 hours  influenza  Vaccine (HIGH DOSE) 0.7 milliLiter(s) IntraMuscular once  insulin glargine Injectable (LANTUS) 7 Unit(s) SubCutaneous at bedtime  insulin regular  human corrective regimen sliding scale   SubCutaneous every 6 hours  insulin regular  human recombinant 9 Unit(s) SubCutaneous every 6 hours  levothyroxine Injectable 60 MICROGram(s) IV Push at bedtime  mycophenolate mofetil Suspension 500 milliGRAM(s) Oral every 12 hours  pantoprazole  Injectable 40 milliGRAM(s) IV Push daily  QUEtiapine 50 milliGRAM(s) Oral two times a day  sodium chloride 7% Inhalation 4 milliLiter(s) Inhalation every 12 hours  sterile water 1000 milliLiter(s) (60 mL/Hr) IV Continuous <Continuous>  tacrolimus    0.5 mG/mL Suspension 3 milliGRAM(s) Oral every 24 hours  tacrolimus    0.5 mG/mL Suspension 2 milliGRAM(s) Oral every 24 hours  tamsulosin 0.4 milliGRAM(s) Oral at bedtime    MEDICATIONS  (PRN):  acetaminophen    Suspension .. 650 milliGRAM(s) Oral every 6 hours PRN Mild Pain (1 - 3)  dextrose Oral Gel 15 Gram(s) Oral once PRN Blood Glucose LESS THAN 70 milliGRAM(s)/deciliter  dextrose Oral Gel 15 Gram(s) Oral once PRN Blood Glucose LESS THAN 70 milliGRAM(s)/deciliter  sodium chloride 0.9% lock flush 10 milliLiter(s) IV Push every 1 hour PRN Pre/post blood products, medications, blood draw, and to maintain line patency    ASSESSMENT / RECOMMENDATIONS    A1C: 9.0 %  BUN: 125  Creatinine: 4.48  GFR: 12  Weight: 82.3  BMI: 28.4  EF:     # Type 2 diabetes mellitus  - Please continue lantus *** units at bedtime.   - Continue lispro *** units before each meal.  - Continue lispro moderate / low dose sliding scale before meals and at bedtime.  - Patient's fingerstick glucose goal is 100-180 mg/dL.    - For discharge, patient can ***.    - Patient can follow up at discharge with [facility] by calling [phone] to make an appointment.      Case discussed with Dr. Rabago. Primary team updated.       Ulysses Weber    Endocrinology Fellow    Service Pager: [phone]    OVERNIGHT: No acute events overnight.   SUBJECTIVE / INTERVAL HPI: Patient was seen and examined this morning. The patient continues to receive hydrocortisone 25 mg every 12 hours with systolic blood pressures in the 110's earlier in the morning, high 90's during evaluation. Levophed and precedex were discontinued this morning at 7 AM. He continues to receive tube feeds with Nepro at goal rate of 43 mL/hr without interruptions. His urinary output has increased to 578 mL over the past 24-hours (despite elevation in creatinine to 4.48 mg/dL, per nephrology likely resolving ATN). The patient's repeat thyroid function tests showed a TSH of 11.1 (from 9.2) and a Free T4 of 0.56 (from 0.69).     CAPILLARY BLOOD GLUCOSE & INSULIN RECEIVED  Yesterday  - 12 PM FS mg/dL = 7 units of regular insulin + 2 units of sliding scale.   - 6 PM FS mg/dL = 9 units of regular insulin.  - 10 PM FS mg/dL = 7 units of Lantus + 9 units of regular insulin.     Today  - 4 AM FS mg/dL = 9 units of regular insulin.  - 12 PM FS mg/dL = 9 units of regular insulin + 2 units of sliding scale.     REVIEW OF SYSTEMS  Unable to obtain.     PHYSICAL EXAM  Vital Signs Last 24 Hrs  T(C): 36.6 (05 Dec 2022 20:00), Max: 37.1 (05 Dec 2022 14:40)  T(F): 97.8 (05 Dec 2022 20:00), Max: 98.7 (05 Dec 2022 14:40)  HR: 80 (05 Dec 2022 19:00) (65 - 82)  BP: 98/46 (05 Dec 2022 19:00) (88/39 - 155/65)  BP(mean): 66 (05 Dec 2022 19:00) (57 - 102)  RR: 34 (05 Dec 2022 19:00) (19 - 37)  SpO2: 96% (05 Dec 2022 19:00) (89% - 97%)    Parameters below as of 05 Dec 2022 19:00  Patient On (Oxygen Delivery Method): ventilator    O2 Concentration (%): 40    Constitutional: Awake, alert, elderly male, on mechanical ventilation via tracheostomy.   HEENT: Normocephalic, atraumatic, GORDON.  Respiratory: (+) Bilateral rhonchi. (+) Mild wheezing.   Cardiovascular: regular rhythm, normal S1 and S2, no audible murmurs.   GI: soft, non-tender, non-distended, bowel sounds present.  Extremities: +2 bilateral lower extremity edema.    LABS  CBC - WBC/HGB/HTC/PLT: 5.81/9.0/28.1/309 (22)  BMP - Na/K/Cl/Bicarb/BUN/Cr/Gluc/AG/eGFR: 131/4.7/100/13/125/4.48/160/18/12 (22)  Ca - 8.4 (22)  Phos - 4.6 (22)  Mg - 2.4 (22)  LFT - Alb/Tprot/Tbili/Dbili/AlkPhos/ALT/AST: 2.1/--/0.2/--/100/56/65 (22)  PT/aPTT/INR: 12.5/34.8/1.05 (22)   Thyroid Stimulating Hormone, Serum: 11.170 (22)  Total T4/Free T4: --/0.561 (22)    MEDICATIONS  MEDICATIONS  (STANDING):  albuterol/ipratropium for Nebulization 3 milliLiter(s) Nebulizer every 6 hours  aspirin  chewable 81 milliGRAM(s) Oral daily  atorvastatin 40 milliGRAM(s) Oral at bedtime  chlorhexidine 0.12% Liquid 15 milliLiter(s) Oral Mucosa every 12 hours  chlorhexidine 2% Cloths 1 Application(s) Topical <User Schedule>  dextrose 5%. 1000 milliLiter(s) (50 mL/Hr) IV Continuous <Continuous>  dextrose 5%. 1000 milliLiter(s) (100 mL/Hr) IV Continuous <Continuous>  dextrose 5%. 1000 milliLiter(s) (100 mL/Hr) IV Continuous <Continuous>  dextrose 5%. 1000 milliLiter(s) (50 mL/Hr) IV Continuous <Continuous>  dextrose 50% Injectable 25 Gram(s) IV Push once  dextrose 50% Injectable 12.5 Gram(s) IV Push once  dextrose 50% Injectable 25 Gram(s) IV Push once  dextrose 50% Injectable 25 Gram(s) IV Push once  dextrose 50% Injectable 12.5 Gram(s) IV Push once  dextrose 50% Injectable 25 Gram(s) IV Push once  fentaNYL   Patch  50 MICROgram(s)/Hr. 1 Patch Transdermal every 48 hours  glucagon  Injectable 1 milliGRAM(s) IntraMuscular once  glucagon  Injectable 1 milliGRAM(s) IntraMuscular once  heparin   Injectable 5000 Unit(s) SubCutaneous every 8 hours  hydrocortisone sodium succinate Injectable 25 milliGRAM(s) IV Push every 12 hours  influenza  Vaccine (HIGH DOSE) 0.7 milliLiter(s) IntraMuscular once  insulin glargine Injectable (LANTUS) 7 Unit(s) SubCutaneous at bedtime  insulin regular  human corrective regimen sliding scale   SubCutaneous every 6 hours  insulin regular  human recombinant 9 Unit(s) SubCutaneous every 6 hours  levothyroxine Injectable 60 MICROGram(s) IV Push at bedtime  mycophenolate mofetil Suspension 500 milliGRAM(s) Oral every 12 hours  pantoprazole  Injectable 40 milliGRAM(s) IV Push daily  QUEtiapine 50 milliGRAM(s) Oral two times a day  sodium chloride 7% Inhalation 4 milliLiter(s) Inhalation every 12 hours  sterile water 1000 milliLiter(s) (60 mL/Hr) IV Continuous <Continuous>  tacrolimus    0.5 mG/mL Suspension 3 milliGRAM(s) Oral every 24 hours  tacrolimus    0.5 mG/mL Suspension 2 milliGRAM(s) Oral every 24 hours  tamsulosin 0.4 milliGRAM(s) Oral at bedtime    MEDICATIONS  (PRN):  acetaminophen    Suspension .. 650 milliGRAM(s) Oral every 6 hours PRN Mild Pain (1 - 3)  dextrose Oral Gel 15 Gram(s) Oral once PRN Blood Glucose LESS THAN 70 milliGRAM(s)/deciliter  dextrose Oral Gel 15 Gram(s) Oral once PRN Blood Glucose LESS THAN 70 milliGRAM(s)/deciliter  sodium chloride 0.9% lock flush 10 milliLiter(s) IV Push every 1 hour PRN Pre/post blood products, medications, blood draw, and to maintain line patency    ASSESSMENT / RECOMMENDATIONS  Mr. Miguel is an 83-year-old male with type 2 diabetes mellitus, coronary artery disease (s/p DAMEON x2 to LAD on 2021), chronic kidney disease stage 4 (s/p renal transplant on , on tracrolimus and prednisone) and benign prostatic hyperplasia who was sent to the hospital by his nephrologist for further evaluation of lower extremity edema (22). He was admitted for further management of lower extremity edema, thought to be secondary to his underlying CKD versus congestive heart failure. Hospitalization was complicated by hypoglycemia and cardiac arrest (22, ROSC after 1 minute of chest compressions). Post-cardiac arrest he was noted to develop hypotension, bradycardia and hypothermia. Labs were remarkable for elevated TSH (20) and decreased FT4 (0.77). 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     Although he had features seen with myxedema coma, these findings were better explained by his cardiac arrest as his vital signs prior to the arrest were apparently around his baseline, as was his mental status. Nonetheless, he was started on levothyroxine 25 mcg IV daily (22) which has been titrated up to 50 mcg daily (22). Nonetheless, his thyroid function tests show that his Free T4 remains low (0.56) with an elevated TSH (11.1). In addition, given concern for adrenal insufficiency, he was started on stress-dose steroids which have been titrated down to 25 mg every 12 hours. The patient was extubated on 22; however, he had to be reintubated on 22 due to episode of respiratory decompensation, potentially secondary to aspiration. After reintubation, his insulin requirements have increased, presumably due to underlying aspiration pneumonia. He is now s/p trach and peg, receiving tube feeds with Nepro at 43 mL/hr with no interruptions.     A1C: 9.0 %  BUN: 125  Creatinine: 4.48  GFR: 12  Weight: 82.3  BMI: 28.4  EF: 65%, grade II diastolic dysfunction    # Type 2 diabetes mellitus  - Please decrease Lantus to 7 units at bedtime.  - Continue with regular 9 units every 6 hours while on tube feeds at goal of 43 mL/hr.   - Continue regular insulin sliding scale low dose every 6 hours.   - Patient's fingerstick glucose goal is 100-180 mg/dL.    # Hypothyroidism   - TSH 20. FT4 0.77 (22) -> Started on levothyroxine 25 mcg IV daily.   - TSH 9.2. FT4 0.69 (22) -> Increased to levothyroxine 50 mcg IV daily.   - TSH 11.1. FT4 0.56. (22).  - Increase levothyroxine to 60 mcg IV daily.     # Concern for adrenal insufficiency   - The patient was on prednisone 5 mg daily for renal transplant prior to admission.   - He had recent hospitalization (2022) for pneumonia due to pseudomonas where he was also started on stress dose steroids. At that time, a cortisol level was collected prior to starting steroids which was not robust (6.5 ug/dL).   - Continue with hydrocortisone 25 mg IV every 12 hours for now. However, would leave up to discretion of primary team to increase even higher if septic shock is suspected.    Case discussed with Dr. Rabago. Primary team updated.       Ulysses Weber    Endocrinology Fellow    Service Pager: [phone]    OVERNIGHT: No acute events overnight.   SUBJECTIVE / INTERVAL HPI: Patient was seen and examined this morning. The patient continues to receive hydrocortisone 25 mg every 12 hours with systolic blood pressures in the 110's earlier in the morning, high 90's during evaluation. Levophed and precedex were discontinued this morning at 7 AM. He continues to receive tube feeds with Nepro at goal rate of 43 mL/hr without interruptions. His urinary output has increased to 578 mL over the past 24-hours (despite elevation in creatinine to 4.48 mg/dL; per nephrology, may be starting to recover from ATN as non-oliguric). The patient's repeat thyroid function tests showed a TSH of 11.1 (from 9.2) and a Free T4 of 0.56 (from 0.69).     CAPILLARY BLOOD GLUCOSE & INSULIN RECEIVED  Yesterday  - 12 PM FS mg/dL = 7 units of regular insulin + 2 units of sliding scale.   - 6 PM FS mg/dL = 9 units of regular insulin.  - 10 PM FS mg/dL = 7 units of Lantus + 9 units of regular insulin.     Today  - 4 AM FS mg/dL = 9 units of regular insulin.  - 12 PM FS mg/dL = 9 units of regular insulin + 2 units of sliding scale.     REVIEW OF SYSTEMS  Unable to obtain.     PHYSICAL EXAM  Vital Signs Last 24 Hrs  T(C): 36.6 (05 Dec 2022 20:00), Max: 37.1 (05 Dec 2022 14:40)  T(F): 97.8 (05 Dec 2022 20:00), Max: 98.7 (05 Dec 2022 14:40)  HR: 80 (05 Dec 2022 19:00) (65 - 82)  BP: 98/46 (05 Dec 2022 19:00) (88/39 - 155/65)  BP(mean): 66 (05 Dec 2022 19:00) (57 - 102)  RR: 34 (05 Dec 2022 19:00) (19 - 37)  SpO2: 96% (05 Dec 2022 19:00) (89% - 97%)    Parameters below as of 05 Dec 2022 19:00  Patient On (Oxygen Delivery Method): ventilator    O2 Concentration (%): 40    Constitutional: Awake, alert, elderly male, on mechanical ventilation via tracheostomy.   HEENT: Normocephalic, atraumatic, GORDON.  Respiratory: (+) Bilateral rhonchi. (+) Mild wheezing.   Cardiovascular: regular rhythm, normal S1 and S2, no audible murmurs.   GI: soft, non-tender, non-distended, bowel sounds present.  Extremities: +2 bilateral lower extremity edema.    LABS  CBC - WBC/HGB/HTC/PLT: 5.81/9.0/28.1/309 (22)  BMP - Na/K/Cl/Bicarb/BUN/Cr/Gluc/AG/eGFR: 131/4.7/100/13/125/4.48/160/18/12 (22)  Ca - 8.4 (22)  Phos - 4.6 (22)  Mg - 2.4 (22)  LFT - Alb/Tprot/Tbili/Dbili/AlkPhos/ALT/AST: 2.1/--/0.2/--/100/56/65 (22)  PT/aPTT/INR: 12.5/34.8/1.05 (22)   Thyroid Stimulating Hormone, Serum: 11.170 (22)  Total T4/Free T4: --/0.561 (22)    MEDICATIONS  MEDICATIONS  (STANDING):  albuterol/ipratropium for Nebulization 3 milliLiter(s) Nebulizer every 6 hours  aspirin  chewable 81 milliGRAM(s) Oral daily  atorvastatin 40 milliGRAM(s) Oral at bedtime  chlorhexidine 0.12% Liquid 15 milliLiter(s) Oral Mucosa every 12 hours  chlorhexidine 2% Cloths 1 Application(s) Topical <User Schedule>  dextrose 5%. 1000 milliLiter(s) (50 mL/Hr) IV Continuous <Continuous>  dextrose 5%. 1000 milliLiter(s) (100 mL/Hr) IV Continuous <Continuous>  dextrose 5%. 1000 milliLiter(s) (100 mL/Hr) IV Continuous <Continuous>  dextrose 5%. 1000 milliLiter(s) (50 mL/Hr) IV Continuous <Continuous>  dextrose 50% Injectable 25 Gram(s) IV Push once  dextrose 50% Injectable 12.5 Gram(s) IV Push once  dextrose 50% Injectable 25 Gram(s) IV Push once  dextrose 50% Injectable 25 Gram(s) IV Push once  dextrose 50% Injectable 12.5 Gram(s) IV Push once  dextrose 50% Injectable 25 Gram(s) IV Push once  fentaNYL   Patch  50 MICROgram(s)/Hr. 1 Patch Transdermal every 48 hours  glucagon  Injectable 1 milliGRAM(s) IntraMuscular once  glucagon  Injectable 1 milliGRAM(s) IntraMuscular once  heparin   Injectable 5000 Unit(s) SubCutaneous every 8 hours  hydrocortisone sodium succinate Injectable 25 milliGRAM(s) IV Push every 12 hours  influenza  Vaccine (HIGH DOSE) 0.7 milliLiter(s) IntraMuscular once  insulin glargine Injectable (LANTUS) 7 Unit(s) SubCutaneous at bedtime  insulin regular  human corrective regimen sliding scale   SubCutaneous every 6 hours  insulin regular  human recombinant 9 Unit(s) SubCutaneous every 6 hours  levothyroxine Injectable 60 MICROGram(s) IV Push at bedtime  mycophenolate mofetil Suspension 500 milliGRAM(s) Oral every 12 hours  pantoprazole  Injectable 40 milliGRAM(s) IV Push daily  QUEtiapine 50 milliGRAM(s) Oral two times a day  sodium chloride 7% Inhalation 4 milliLiter(s) Inhalation every 12 hours  sterile water 1000 milliLiter(s) (60 mL/Hr) IV Continuous <Continuous>  tacrolimus    0.5 mG/mL Suspension 3 milliGRAM(s) Oral every 24 hours  tacrolimus    0.5 mG/mL Suspension 2 milliGRAM(s) Oral every 24 hours  tamsulosin 0.4 milliGRAM(s) Oral at bedtime    MEDICATIONS  (PRN):  acetaminophen    Suspension .. 650 milliGRAM(s) Oral every 6 hours PRN Mild Pain (1 - 3)  dextrose Oral Gel 15 Gram(s) Oral once PRN Blood Glucose LESS THAN 70 milliGRAM(s)/deciliter  dextrose Oral Gel 15 Gram(s) Oral once PRN Blood Glucose LESS THAN 70 milliGRAM(s)/deciliter  sodium chloride 0.9% lock flush 10 milliLiter(s) IV Push every 1 hour PRN Pre/post blood products, medications, blood draw, and to maintain line patency    ASSESSMENT / RECOMMENDATIONS  Mr. Miguel is an 83-year-old male with type 2 diabetes mellitus, coronary artery disease (s/p DAMEON x2 to LAD on 2021), chronic kidney disease stage 4 (s/p renal transplant on , on tracrolimus and prednisone) and benign prostatic hyperplasia who was sent to the hospital by his nephrologist for further evaluation of lower extremity edema (22). He was admitted for further management of lower extremity edema, thought to be secondary to his underlying CKD versus congestive heart failure. Hospitalization was complicated by hypoglycemia and cardiac arrest (22, ROSC after 1 minute of chest compressions). Post-cardiac arrest he was noted to develop hypotension, bradycardia and hypothermia. Labs were remarkable for elevated TSH (20) and decreased FT4 (0.77). 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     Although he had features seen with myxedema coma, these findings were better explained by his cardiac arrest as his vital signs prior to the arrest were apparently around his baseline, as was his mental status. Nonetheless, he was started on levothyroxine 25 mcg IV daily (22) which has been titrated up to 50 mcg daily (22). Nonetheless, his thyroid function tests show that his Free T4 remains low (0.56) with an elevated TSH (11.1). In addition, given concern for adrenal insufficiency, he was started on stress-dose steroids which have been titrated down to 25 mg every 12 hours. The patient was extubated on 22; however, he had to be reintubated on 22 due to episode of respiratory decompensation, potentially secondary to aspiration. After reintubation, his insulin requirements have increased, presumably due to underlying aspiration pneumonia. He is now s/p trach and peg, receiving tube feeds with Nepro at 43 mL/hr with no interruptions.     A1C: 9.0 %  BUN: 125  Creatinine: 4.48  GFR: 12  Weight: 82.3  BMI: 28.4  EF: 65%, grade II diastolic dysfunction    # Type 2 diabetes mellitus  - Please decrease Lantus to 7 units at bedtime.  - Continue with regular 9 units every 6 hours while on tube feeds at goal of 43 mL/hr.   - Continue regular insulin sliding scale low dose every 6 hours.   - Patient's fingerstick glucose goal is 100-180 mg/dL.    # Hypothyroidism   - TSH 20. FT4 0.77 (22) -> Started on levothyroxine 25 mcg IV daily.   - TSH 9.2. FT4 0.69 (22) -> Increased to levothyroxine 50 mcg IV daily.   - TSH 11.1. FT4 0.56. (22).  - Increase levothyroxine to 60 mcg IV daily.     # Concern for adrenal insufficiency   - The patient was on prednisone 5 mg daily for renal transplant prior to admission.   - He had recent hospitalization (2022) for pneumonia due to pseudomonas where he was also started on stress dose steroids. At that time, a cortisol level was collected prior to starting steroids which was not robust (6.5 ug/dL).   - Continue with hydrocortisone 25 mg IV every 12 hours for now. However, would leave up to discretion of primary team to increase even higher if septic shock is suspected.    Case discussed with Dr. Rbaago. Primary team updated.       Ulysses Weber    Endocrinology Fellow    Service Pager: [phone]

## 2022-12-05 NOTE — PROGRESS NOTE ADULT - SUBJECTIVE AND OBJECTIVE BOX
Interval Events: Reviewed  Patient seen and examined at bedside.    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      History of renal transplant  secondary to DM      Chronic kidney disease (CKD)      Glaucoma      Kidney transplant recipient      Amputation of toe  x 3      H/O heart artery stent          MEDICATIONS:  Pulmonary:  albuterol/ipratropium for Nebulization 3 milliLiter(s) Nebulizer every 6 hours  sodium chloride 7% Inhalation 4 milliLiter(s) Inhalation every 12 hours    Antimicrobials:    Anticoagulants:  aspirin  chewable 81 milliGRAM(s) Oral daily  heparin   Injectable 5000 Unit(s) SubCutaneous every 8 hours    Cardiac:      Allergies    No Known Allergies    Intolerances        Vital Signs Last 24 Hrs  T(C): 36.6 (05 Dec 2022 20:00), Max: 37.1 (05 Dec 2022 14:40)  T(F): 97.8 (05 Dec 2022 20:00), Max: 98.7 (05 Dec 2022 14:40)  HR: 79 (05 Dec 2022 21:26) (71 - 84)  BP: 103/46 (05 Dec 2022 21:26) (92/46 - 155/65)  BP(mean): 66 (05 Dec 2022 21:26) (58 - 102)  RR: 35 (05 Dec 2022 21:26) (19 - 38)  SpO2: 94% (05 Dec 2022 21:26) (89% - 98%)    Parameters below as of 05 Dec 2022 21:26  Patient On (Oxygen Delivery Method): ventilator    O2 Concentration (%): 40    12-04 @ 07:01  -  12-05 @ 07:00  --------------------------------------------------------  IN: 1373.9 mL / OUT: 578 mL / NET: 795.9 mL    12-05 @ 07:01  -  12-05 @ 21:40  --------------------------------------------------------  IN: 1065 mL / OUT: 380 mL / NET: 685 mL      Mode: AC/ CMV (Assist Control/ Continuous Mandatory Ventilation)  RR (machine): 12  TV (machine): 450  FiO2: 40  PEEP: 5  ITime: 1  MAP: 12  PC: 5  PIP: 17      Review of Systems:   •	General: negative  •	Skin/Breast: negative  •	Ophthalmologic: negative  •	ENMT: negative  •	Respiratory and Thorax: negative  •	Cardiovascular: negative  •	Gastrointestinal: negative  •	Genitourinary: negative  •	Musculoskeletal: negative  •	Neurological: negative  •	Psychiatric: negative  •	Hematology/Lymphatics: negative  •	Endocrine: negative  •	Allergic/Immunologic: negative    Physical Exam:   • Constitutional:	refer to the dietion /Nutritionist note  • Eyes:	EOMI; PERRL; no drainage or redness  • ENMT:	No oral lesions; no gross abnormalities  • Neck	No bruits; no thyromegaly or nodules  • Breasts:	not examined  • Back:	No deformity or limitation of movement  • Respiratory:	Breath Sounds equal & clear to percussion & bl rhonchi auscultation, no accessory muscle use  • Cardiovascular:	Regular rate & rhythm, normal S1, S2; no murmurs, gallops or rubs; no S3, S4  • Gastrointestinal:	Soft, non-tender, no hepatosplenomegaly, normal bowel sounds  • Genitourinary:	not examined  • Rectal: not examined  • Extremities:	No cyanosis, clubbing or edema  • Vascular:	Equal and normal pulses (carotid, femoral, dorsalis pedis)  • Neurologica:l	not examined  • Skin:	No lesions; no rash  • Lymph Nodes:	No lymphadedenopathy  • Musculoskeletal:	No joint pain, swelling or deformity; no limitation of movement        LABS:      CBC Full  -  ( 05 Dec 2022 04:36 )  WBC Count : 5.81 K/uL  RBC Count : 3.32 M/uL  Hemoglobin : 9.0 g/dL  Hematocrit : 28.1 %  Platelet Count - Automated : 309 K/uL  Mean Cell Volume : 84.6 fl  Mean Cell Hemoglobin : 27.1 pg  Mean Cell Hemoglobin Concentration : 32.0 gm/dL  Auto Neutrophil # : 4.66 K/uL  Auto Lymphocyte # : 0.44 K/uL  Auto Monocyte # : 0.60 K/uL  Auto Eosinophil # : 0.06 K/uL  Auto Basophil # : 0.00 K/uL  Auto Neutrophil % : 80.2 %  Auto Lymphocyte % : 7.6 %  Auto Monocyte % : 10.3 %  Auto Eosinophil % : 1.0 %  Auto Basophil % : 0.0 %    12-05    131<L>  |  100  |  125<H>  ----------------------------<  160<H>  4.7   |  13<L>  |  4.48<H>    Ca    8.4      05 Dec 2022 04:36  Phos  4.6     12-05  Mg     2.4     12-05    TPro  5.2<L>  /  Alb  2.1<L>  /  TBili  0.2  /  DBili  x   /  AST  65<H>  /  ALT  56<H>  /  AlkPhos  100  12-05      I reviewed worsening of the right lower lobe infiltrate                  RADIOLOGY & ADDITIONAL STUDIES (The following images were personally reviewed):

## 2022-12-05 NOTE — PROGRESS NOTE ADULT - ASSESSMENT
84 y/o M PMH CKD 4, renal transplant in 2013 at [facility], glaucoma (blind), DM1, anemia, CAD s/p 2 DAMEON to LAD in 6/21, BPH, recent admission for PNA presents with acute on chronic cough 2/2 PNA and SUNNY 2/2 acute CKD.           NEUROLOGY  #Metabolic Encephalopathy  #Sedation  #Agitation  CT scan negative for acute intracranial pathology. Began to wean sedation on 12/2.  - c/w fentanyl 25mcg patch  - on precedex 0.7 --> 0.6, weaning as tolerated  - start seroquel 25mg q12h  - given fentanyl 50 for restlesness    CARDIOVASCULAR  #HFpEF  Had EF of 55% on TTE from Oct 2022, grade I diastolic dysfunction. Repeat TTE 11/17 with poor visualization of LV. Patient making good UOP overnight  - holding BP meds, patient currently requiring pressors  -started Metolazone 10 and Lasix 80 for fluid overload status    #Upper extremity edema  Stable from yesterday but increased compared to baseline. US with superficial thrombosis of L cephalic vein extending to AC fossa, no DVTs.   - given patient's propensity to bleed and superficial nature of thrombus, no intervention  -started Metolazone 10 and Lasix 80        #CAD  #HLD  Hx of CAD s/p 2 DAMEON to LAD in June 2021, currently no CP. Trops 0.03 but no CP or ischemic changes on EKG, likely due to CKD. Takes Plavix and aspirin per last d/c but only aspirin on outpatient note  - c/w ASA daily  - c/w home Lipitor    #HTN   Known history of HTN.   - holding hypertension meds as pt currently requiring pressor support  - continue to monitor BP    PULM  #AHRF  Likely 2/2 aspiration event. Intubated on 11/26.  - ID management as below for aspiration   - Received trach on 12/1    RENAL  #ELIZABETH on Chronic kidney disease (CKD)  Baseline sCr 2.3-2.5. On admission, fluid overloaded. Does have orthopnea and intermittent SOB at baseline. Acidotic but at baseline. Follows w Dr. Mac.  - sCr and BUN increasing daily, urine output poor  - monitor I/Os  - renally dosing meds  - per nephro, no acute need for HD, but may need renal replacement therapy in near future  - Started sodiu bicarb 150 rate 60cc/hr    #Renal transplant  Patient had renal transplant in 2013. On home mycophenolate 500mg BID and tacrolimus 4mg BID. Per nephro, decreased home tacrolimus from 4mg BID --> 2mg BID while in hospital.   - c/w tacrolimus 3mg in AM, c/w 2mg in PM, levels daily  - c/w mycophenolate 500mg BID  - Started sodiu bicarb 150 rate 60cc/hr  - f/u renal recs    #Hyponatremia  - stable; monitor BMP    GI  #Constipation  - pt without BM in 3 days initially, started lactulose 10g q8h, had 3 BM ON  -lactulose dc'd  - monitor for BMs    #Nutrition  NPO with PEG feeds.  - restarted feeds at 6pm 12/2      #BPH (benign prostatic hyperplasia).   - c/w tamsulosin 0.4mg daily    ID  #Aspiration PNA   Tracheal aspirate cx (11/19) growing serratia marcescens and pseudomonas. Patient was started on vancomycin and cefepime. MRSA nares positive. DC'ed vancomycin given supratherapeutic trough. Completed cefepime 2g q24 course x9 days.    #Stenotrophomonas in sputum cx  Sputum cx from 11/26 growing Stenotrophomonas  - c/w Bactrim BS for 10 days (11/28 - 12/5)    ENDOCRINE  #DM (diabetes mellitus).   Previously on lantus 15 lispro 5 at home. Endocrine following.  - c/w lantus 7 units  - c/w humulin 7 units q6h  - on low dose regular ISS  - goal -180  - f/u endo recs    #Hypothyroidism  TSH 20.8, free t4 0.7, T3 49 (low). Repeat TSH at 9, fT4 0.7  - on synthroid 50mcg IV qD    #Relative Adrenal Insufficiency  Patient on chronic prednisone 5mg daily. He was started on hydrocortisone 50mg q6hr --> q8 --> BID   - c/w hydrocortisone 25mg q12  - wean as tolerated  - f/u endocrine recs    HEME  #Normocytic Anemia  Likely 2.2 CKD. Hgb 6.9 on 11/21, corrected appropriately s/p 1u pRBC. No active signs of bleeding on physical exam. CTH with no intracranial bleeding. Required 2u pRBC to correct. DEBRA and stool guaic negative.  - consider epo, if BP remains well controlled  - active T+S  - transfuse <7    FLUIDS/ELECTROLYTES/NUTRITION  -IVF as above  -Monitor, careful w advanced kidney disease  -Diet: NPO with PEG feeds  -DVT ppx: heparin TID  -GI: protonix 40 qd  -Dispo: MICU; PT recommending NAVID   82 y/o M PMH CKD 4, renal transplant in 2013 at [facility], glaucoma (blind), DM1, anemia, CAD s/p 2 DAMEON to LAD in 6/21, BPH, recent admission for PNA presents with acute on chronic cough 2/2 PNA and SUNNY 2/2 acute CKD.           NEUROLOGY  #Metabolic Encephalopathy  #Sedation  #Agitation  CT scan negative for acute intracranial pathology. Began to wean sedation on 12/2.  - c/w fentanyl 25mcg patch  - on precedex 0.7 --> 0.6, weaning as tolerated  - start seroquel 25mg q12h  - given fentanyl 50 for restlesness    CARDIOVASCULAR  #HFpEF  Had EF of 55% on TTE from Oct 2022, grade I diastolic dysfunction. Repeat TTE 11/17 with poor visualization of LV. Patient making good UOP overnight  - holding BP meds, patient currently requiring pressors  -S/p Metolazone 10 and Lasix 80 for fluid overload status    #Upper extremity edema  Stable from yesterday but increased compared to baseline. US with superficial thrombosis of L cephalic vein extending to AC fossa, no DVTs.   - given patient's propensity to bleed and superficial nature of thrombus, no intervention  -started Metolazone 10 and Lasix 80        #CAD  #HLD  Hx of CAD s/p 2 DAMEON to LAD in June 2021, currently no CP. Trops 0.03 but no CP or ischemic changes on EKG, likely due to CKD. Takes Plavix and aspirin per last d/c but only aspirin on outpatient note  - c/w ASA daily  - c/w home Lipitor    #HTN   Known history of HTN.   - holding hypertension meds as pt currently requiring pressor support  - continue to monitor BP    PULM  #AHRF  Likely 2/2 aspiration event. Intubated on 11/26.  - ID management as below for aspiration   - Received trach on 12/1    RENAL  #ELIZABETH on Chronic kidney disease (CKD)  Baseline sCr 2.3-2.5. On admission, fluid overloaded. Does have orthopnea and intermittent SOB at baseline. Acidotic but at baseline. Follows w Dr. Mac.  - sCr and BUN increasing daily, urine output poor  - monitor I/Os  - renally dosing meds  - per nephro, no acute need for HD, but may need renal replacement therapy in near future  - Started sodiu bicarb 150 rate 60cc/hr    #Renal transplant  Patient had renal transplant in 2013. On home mycophenolate 500mg BID and tacrolimus 4mg BID. Per nephro, decreased home tacrolimus from 4mg BID --> 2mg BID while in hospital.   - c/w tacrolimus 3mg in AM, c/w 2mg in PM, levels daily  - c/w mycophenolate 500mg BID  - Started sodiu bicarb 150 rate 60cc/hr  - f/u renal recs    #Hyponatremia  - stable; monitor BMP    GI  #Constipation  - pt without BM in 3 days initially, started lactulose 10g q8h, had 3 BM ON  -lactulose dc'd  - monitor for BMs    #Nutrition  NPO with PEG feeds.  - restarted feeds at 6pm 12/2      #BPH (benign prostatic hyperplasia).   - c/w tamsulosin 0.4mg daily    ID  #Aspiration PNA   Tracheal aspirate cx (11/19) growing serratia marcescens and pseudomonas. Patient was started on vancomycin and cefepime. MRSA nares positive. DC'ed vancomycin given supratherapeutic trough. Completed cefepime 2g q24 course x9 days.    #Stenotrophomonas in sputum cx  Sputum cx from 11/26 growing Stenotrophomonas  - c/w Bactrim BS for 10 days (11/28 - 12/5)    ENDOCRINE  #DM (diabetes mellitus).   Previously on lantus 15 lispro 5 at home. Endocrine following.  - c/w lantus 7 units  - c/w humulin 7 units q6h  - on low dose regular ISS  - goal -180  - f/u endo recs    #Hypothyroidism  TSH 20.8, free t4 0.7, T3 49 (low). Repeat TSH at 9, fT4 0.7  - on synthroid 50mcg IV qD    #Relative Adrenal Insufficiency  Patient on chronic prednisone 5mg daily. He was started on hydrocortisone 50mg q6hr --> q8 --> BID   - c/w hydrocortisone 25mg q12  - wean as tolerated  - f/u endocrine recs    HEME  #Normocytic Anemia  Likely 2.2 CKD. Hgb 6.9 on 11/21, corrected appropriately s/p 1u pRBC. No active signs of bleeding on physical exam. CTH with no intracranial bleeding. Required 2u pRBC to correct. DEBRA and stool guaic negative.  - consider epo, if BP remains well controlled  - active T+S  - transfuse <7    FLUIDS/ELECTROLYTES/NUTRITION  -IVF as above  -Monitor, careful w advanced kidney disease  -Diet: NPO with PEG feeds  -DVT ppx: heparin TID  -GI: protonix 40 qd  -Dispo: MICU; PT recommending NAVID

## 2022-12-06 NOTE — PROGRESS NOTE ADULT - ATTENDING COMMENTS
Pt seen on rounds this afternoon.  Is still somnolent off sedation.  Needed levophed to maintain BP during HD today.  Still oliguric, and creatinine has risen to 4.9 mg%  Fingerstick dropped to 68 at 10 PM last night without apparent interruption of feeds, with 9 units of regular insulin having been given at 6 PM for .  He was given his usual lantus, but the MN dose of regular was held (even though FS at that time ws 118).  He then chris to 186 this morning at 6 AM.    Exam is mostly unchanged.  Abdomen is obese/soft.  Extremities with 3+ edema.  Lungs with coarse rhonchi, left basal rales, but also with a suggestion of mild expiratory wheezing.    --To continue hydrocortisone and levothyroxine as is  --Will decrease the nutritional regular insulin to 8 units q6h.

## 2022-12-06 NOTE — PROCEDURE NOTE - NSUSBLINES_ED_ALL
Right Upper Lobe
Bilaterally and diffusely
Left Upper Lobe/Left Lower Lobe
Right Middle Lobe/Right Lower Lobe
Left Upper Lobe/Left Lower Lobe

## 2022-12-06 NOTE — PROGRESS NOTE ADULT - ATTENDING COMMENTS
I agree with the fellow's findings and plans as written above with the following additions/amendments:    Seen and examined at bedside on HD, no acute distress, tolerating UF with minimal pressors. Continue HD as above, further recs as above

## 2022-12-06 NOTE — PROGRESS NOTE ADULT - ATTENDING COMMENTS
renal transplant, AHRF with aspiration pna, ATN, CAD, DM  physical as above  continue MV, trial of CPAP in AM  start HD  adjusting insulin  follow off antibiotics  rest as above  decision making of high complexity

## 2022-12-06 NOTE — PROCEDURE NOTE - NSUSFINDINGS_ED_ALL
Right Effusion/Left Effusion
moderate sized and simple appearing/Right Effusion
Right Effusion/Left Effusion
Right Effusion/Left Effusion

## 2022-12-06 NOTE — PROCEDURE NOTE - NSUS ED PROCEDURE ASSISTED BY
The procedure was performed independently
Assistance was available

## 2022-12-06 NOTE — PROCEDURE NOTE - NSUSDIAGNOSIS_ED_ALL
Pleural Effusion Right/Pleural Effusion Left
Pleural Effusion Right/Pleural Effusion Left
Hyperdynamic
Pleural Effusion Right/Pulmonary Edema/Pneumonia
Pleural Effusion Right/Pleural Effusion Left

## 2022-12-06 NOTE — PROGRESS NOTE ADULT - SUBJECTIVE AND OBJECTIVE BOX
Interval Events: Reviewed  Patient seen and examined at bedside.    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      History of renal transplant  secondary to DM      Chronic kidney disease (CKD)      Glaucoma      Kidney transplant recipient      Amputation of toe  x 3      H/O heart artery stent          MEDICATIONS:  Pulmonary:  albuterol/ipratropium for Nebulization 3 milliLiter(s) Nebulizer every 6 hours  sodium chloride 7% Inhalation 4 milliLiter(s) Inhalation every 12 hours    Antimicrobials:    Anticoagulants:  aspirin  chewable 81 milliGRAM(s) Oral daily  heparin   Injectable 5000 Unit(s) SubCutaneous every 8 hours    Cardiac:  norepinephrine Infusion 0.05 MICROgram(s)/kG/Min IV Continuous <Continuous>      Allergies    No Known Allergies    Intolerances        Vital Signs Last 24 Hrs  T(C): 36.4 (06 Dec 2022 13:40), Max: 36.7 (06 Dec 2022 05:47)  T(F): 97.5 (06 Dec 2022 13:40), Max: 98.1 (06 Dec 2022 05:47)  HR: 95 (06 Dec 2022 17:27) (71 - 95)  BP: 119/51 (06 Dec 2022 17:00) (45/22 - 136/56)  BP(mean): 74 (06 Dec 2022 17:00) (30 - 90)  RR: 29 (06 Dec 2022 17:27) (10 - 38)  SpO2: 97% (06 Dec 2022 17:27) (92% - 100%)    Parameters below as of 06 Dec 2022 17:27  Patient On (Oxygen Delivery Method): ventilator,cpap         @ :  -   @ 07:00  --------------------------------------------------------  IN:  mL / OUT: 575 mL / NET: 1434 mL     @ 07:  -   @ 17:56  --------------------------------------------------------  IN: 1103.1 mL / OUT: 2585 mL / NET: -1481.9 mL      Mode: CPAP with PS  FiO2: 40  PEEP: 5  ITime: 1  MAP: 6.9  PIP: 12      Review of Systems:   •	General: negative  •	Skin/Breast: negative  •	Ophthalmologic: negative  •	ENMT: negative  •	Respiratory and Thorax: negative  •	Cardiovascular: negative  •	Gastrointestinal: negative  •	Genitourinary: negative  •	Musculoskeletal: negative  •	Neurological: negative  •	Psychiatric: negative  •	Hematology/Lymphatics: negative  •	Endocrine: negative  •	Allergic/Immunologic: negative    Physical Exam:   • Constitutional:	refer to the dietion /Nutritionist note  • Eyes:	EOMI; PERRL; no drainage or redness  • ENMT:	No oral lesions; no gross abnormalities  • Neck	No bruits; no thyromegaly or nodules  • Breasts:	not examined  • Back:	No deformity or limitation of movement  • Respiratory:	Breath Sounds equal & clear to percussion & auscultation, no accessory muscle use  • Cardiovascular:	Regular rate & rhythm, normal S1, S2; no murmurs, gallops or rubs; no S3, S4  • Gastrointestinal:	Soft, non-tender, no hepatosplenomegaly, normal bowel sounds  • Genitourinary:	not examined  • Rectal: not examined  • Extremities:	No cyanosis, clubbing or edema  • Vascular:	Equal and normal pulses (carotid, femoral, dorsalis pedis)  • Neurologica:l	not examined  • Skin:	No lesions; no rash  • Lymph Nodes:	No lymphadedenopathy  • Musculoskeletal:	No joint pain, swelling or deformity; no limitation of movement        LABS:      CBC Full  -  ( 06 Dec 2022 06:30 )  WBC Count : 6.09 K/uL  RBC Count : 3.19 M/uL  Hemoglobin : 8.7 g/dL  Hematocrit : 27.2 %  Platelet Count - Automated : 273 K/uL  Mean Cell Volume : 85.3 fl  Mean Cell Hemoglobin : 27.3 pg  Mean Cell Hemoglobin Concentration : 32.0 gm/dL  Auto Neutrophil # : 4.69 K/uL  Auto Lymphocyte # : 0.61 K/uL  Auto Monocyte # : 0.59 K/uL  Auto Eosinophil # : 0.15 K/uL  Auto Basophil # : 0.00 K/uL  Auto Neutrophil % : 77.0 %  Auto Lymphocyte % : 10.0 %  Auto Monocyte % : 9.7 %  Auto Eosinophil % : 2.5 %  Auto Basophil % : 0.0 %        132<L>  |  100  |  138<H>  ----------------------------<  180<H>  5.2   |  17<L>  |  4.92<H>    Ca    8.5      06 Dec 2022 06:30  Phos  4.9       Mg     2.5         TPro  5.1<L>  /  Alb  2.3<L>  /  TBili  0.2  /  DBili  x   /  AST  73<H>  /  ALT  74<H>  /  AlkPhos  95            Urinalysis Basic - ( 06 Dec 2022 03:02 )    Color: Yellow / Appearance: SL Cloudy / S.020 / pH: x  Gluc: x / Ketone: NEGATIVE  / Bili: Negative / Urobili: 0.2 E.U./dL   Blood: x / Protein: 30 mg/dL / Nitrite: NEGATIVE   Leuk Esterase: NEGATIVE / RBC: > 10 /HPF / WBC < 5 /HPF   Sq Epi: x / Non Sq Epi: 0-5 /HPF / Bacteria: Present /HPF              Culture Results:   Sputum specimen rejected.  Microscopic examination indicates  oropharyngeal contamination.  Please repeat. ( @ 15:28)      RADIOLOGY & ADDITIONAL STUDIES (The following images were personally reviewed):

## 2022-12-06 NOTE — PROCEDURE NOTE - NSUS ED PROCEDURE DATE TIME1 DT
06-Dec-2022 14:41
01-Dec-2022 19:07
01-Dec-2022 19:08
06-Dec-2022 14:42
04-Dec-2022 10:01
19-Nov-2022

## 2022-12-06 NOTE — PROGRESS NOTE ADULT - SUBJECTIVE AND OBJECTIVE BOX
OVERNIGHT EVENTS:  25mg IV push fentanyl for agitation.    SUBJECTIVE / INTERVAL HPI: Patient seen and examined at bedside.  In no apparent distress, not agitated in AM.    VITAL SIGNS:  Vital Signs Last 24 Hrs  T(C): 36.3 (06 Dec 2022 13:07), Max: 37.1 (05 Dec 2022 14:40)  T(F): 97.3 (06 Dec 2022 13:07), Max: 98.7 (05 Dec 2022 14:40)  HR: 80 (06 Dec 2022 13:00) (71 - 89)  BP: 132/58 (06 Dec 2022 13:00) (45/22 - 134/61)  BP(mean): 83 (06 Dec 2022 13:00) (30 - 88)  RR: 22 (06 Dec 2022 13:00) (10 - 38)  SpO2: 100% (06 Dec 2022 13:00) (92% - 100%)    Parameters below as of 06 Dec 2022 13:00  Patient On (Oxygen Delivery Method): ventilator    O2 Concentration (%): 40  I&O's Summary    05 Dec 2022 07:01  -  06 Dec 2022 07:00  --------------------------------------------------------  IN: 2009 mL / OUT: 575 mL / NET: 1434 mL    06 Dec 2022 07:01  -  06 Dec 2022 13:12  --------------------------------------------------------  IN: 563.4 mL / OUT: 145 mL / NET: 418.4 mL        PHYSICAL EXAM:  General: NAD, lying in bed, alternatively sedated/agitated, non-verbal  HEENT: NC/AT; anicteric sclera  Neck: supple, trach tube in place  Respiratory: CTA B/L; no W/R/R  Cardiovascular: +S1/S2; RRR; no M/R/G  Gastrointestinal: soft, NT/ND; PEG tube in place, site in clean with no active bleeding  Extremities: WWP; 2+ peripheral pulses B/L; trace B/L LE edema and 3+ B/L UE edema  Skin: normal color and turgor; no rash  Neurological: sedated, spontaneously moving extremities, does follow commands when less sedated    MEDICATIONS:  MEDICATIONS  (STANDING):  albuterol/ipratropium for Nebulization 3 milliLiter(s) Nebulizer every 6 hours  aspirin  chewable 81 milliGRAM(s) Oral daily  atorvastatin 40 milliGRAM(s) Oral at bedtime  chlorhexidine 0.12% Liquid 15 milliLiter(s) Oral Mucosa every 12 hours  chlorhexidine 2% Cloths 1 Application(s) Topical <User Schedule>  dextrose 5%. 1000 milliLiter(s) (50 mL/Hr) IV Continuous <Continuous>  dextrose 5%. 1000 milliLiter(s) (100 mL/Hr) IV Continuous <Continuous>  dextrose 5%. 1000 milliLiter(s) (50 mL/Hr) IV Continuous <Continuous>  dextrose 5%. 1000 milliLiter(s) (100 mL/Hr) IV Continuous <Continuous>  dextrose 50% Injectable 25 Gram(s) IV Push once  dextrose 50% Injectable 12.5 Gram(s) IV Push once  dextrose 50% Injectable 25 Gram(s) IV Push once  dextrose 50% Injectable 25 Gram(s) IV Push once  dextrose 50% Injectable 12.5 Gram(s) IV Push once  dextrose 50% Injectable 25 Gram(s) IV Push once  [id number]   Patch  50 MICROgram(s)/Hr. 1 Patch Transdermal every 48 hours  glucagon  Injectable 1 milliGRAM(s) IntraMuscular once  glucagon  Injectable 1 milliGRAM(s) IntraMuscular once  heparin   Injectable 5000 Unit(s) SubCutaneous every 8 hours  hydrocortisone sodium succinate Injectable 25 milliGRAM(s) IV Push every 12 hours  influenza  Vaccine (HIGH DOSE) 0.7 milliLiter(s) IntraMuscular once  insulin glargine Injectable (LANTUS) 7 Unit(s) SubCutaneous at bedtime  insulin regular  human corrective regimen sliding scale   SubCutaneous every 6 hours  insulin regular  human recombinant 9 Unit(s) SubCutaneous every 6 hours  levothyroxine Injectable 60 MICROGram(s) IV Push at bedtime  mycophenolate mofetil Suspension 500 milliGRAM(s) Oral every 12 hours  norepinephrine Infusion 0.05 MICROgram(s)/kG/Min (7.72 mL/Hr) IV Continuous <Continuous>  pantoprazole  Injectable 40 milliGRAM(s) IV Push daily  QUEtiapine 50 milliGRAM(s) Oral two times a day  sodium chloride 7% Inhalation 4 milliLiter(s) Inhalation every 12 hours  sterile water 1000 milliLiter(s) (60 mL/Hr) IV Continuous <Continuous>  tacrolimus    0.5 mG/mL Suspension 2 milliGRAM(s) Oral every 24 hours  tacrolimus    0.5 mG/mL Suspension 3 milliGRAM(s) Oral every 24 hours  tamsulosin 0.4 milliGRAM(s) Oral at bedtime    MEDICATIONS  (PRN):  acetaminophen    Suspension .. 650 milliGRAM(s) Oral every 6 hours PRN Mild Pain (1 - 3)  dextrose Oral Gel 15 Gram(s) Oral once PRN Blood Glucose LESS THAN 70 milliGRAM(s)/deciliter  dextrose Oral Gel 15 Gram(s) Oral once PRN Blood Glucose LESS THAN 70 milliGRAM(s)/deciliter  sodium chloride 0.9% lock flush 10 milliLiter(s) IV Push every 1 hour PRN Pre/post blood products, medications, blood draw, and to maintain line patency      ALLERGIES:  Allergies    No Known Allergies    Intolerances        LABS:                        8.7    6.09  )-----------( 273      ( 06 Dec 2022 06:30 )             27.2     12-    132<L>  |  100  |  138<H>  ----------------------------<  180<H>  5.2   |  17<L>  |  4.92<H>    Ca    8.5      06 Dec 2022 06:30  Phos  4.9     12  Mg     2.5         TPro  5.1<L>  /  Alb  2.3<L>  /  TBili  0.2  /  DBili  x   /  AST  73<H>  /  ALT  74<H>  /  AlkPhos  95  12      Urinalysis Basic - ( 06 Dec 2022 03:02 )    Color: Yellow / Appearance: SL Cloudy / S.020 / pH: x  Gluc: x / Ketone: NEGATIVE  / Bili: Negative / Urobili: 0.2 E.U./dL   Blood: x / Protein: 30 mg/dL / Nitrite: NEGATIVE   Leuk Esterase: NEGATIVE / RBC: > 10 /HPF / WBC < 5 /HPF   Sq Epi: x / Non Sq Epi: 0-5 /HPF / Bacteria: Present /HPF      CAPILLARY BLOOD GLUCOSE      POCT Blood Glucose.: 161 mg/dL (06 Dec 2022 11:52)      RADIOLOGY & ADDITIONAL TESTS: Reviewed.

## 2022-12-06 NOTE — PROGRESS NOTE ADULT - PROBLEM SELECTOR PLAN 1
Event noticed.  The patient is sedated.  I suctioned the patient copious amount of secretion.  Gas exchange is stable.  Sputum grew Pseudomonas and Serratia.  The patient on appropriate antibiotic.  Continue pulmonary toilet.  Patient has baseline dementia and does not and trial up off sedation.  Tapers pressors as needed.  Cortisol level is adequate.  I discussed the case with critical care. She was extubated.  Patient is on nasal cannula.  Suction the patient copious amount of secretion.  Dysphagia evaluation.  Continue antibiotic.  The patient is hemodynamically stable off pressors and sedative.  Suctioned the patient thick moderate secretion and increased.  He failed swallow eval and will need a peg.  On nebs.  He was intubated.  Sputum GS revealed GNR and culture positive for sternomonas.  Abt changed to bactrim.  Monitor renal function on abt which is worsening.  Plan trach tomorrow.  Peg done . Is in detail with the family.  The patient is off antibiotic.  The renal function is deteriorating.  I will discuss with nephrology.  The also the patient will require repeat chest x-ray.  My concern has baseline tachypnea and moderate secretion.  His oxygen saturation is stable and he is off pressors  US revealed large right pleural effusion and B lines.  He is fluid overloaded.  I discussed with renal and he underwent HD and 2 l were removed.   He improved with decrease in RR and on PS trial.

## 2022-12-06 NOTE — PROGRESS NOTE ADULT - SUBJECTIVE AND OBJECTIVE BOX
OVERNIGHT: Regular insulin dose was held due to low-normal blood glucose.  SUBJECTIVE / INTERVAL HPI: Patient was seen and examined this morning. Blood pressure remains soft with MAP in the mid 60's, now off levophed. He continues to receive hydrocortisone 25 mg every 12 hours. Tube feeds have been running without interruptions (Nepro, 43 mL/hr). Urinary output was 575 mL over the past 24 hours with rising creatinine (4.92 mg/dL).     CAPILLARY BLOOD GLUCOSE & INSULIN RECEIVED  Yesterday  - 12 PM FS mg/dL = 9 units of regular insulin + 2 units of sliding scale.   - 6 PM FS mg/dL = 9 units of regular insulin.  - 10:42 PM FS mg/dL; 10:45 PM FS mg/dL = 7 units of Lantus.     Today  - 12:19 AM FS mg/dL = He didn't receive insulin.   - 6 AM FS mg/dL = 9 units of regular insulin + 1 units of sliding scale.    - 12 PM FSG: *** mg/dL = *** units of regular insulin + *** units of sliding scale.     REVIEW OF SYSTEMS  Unable to obtain.     PHYSICAL EXAM  Vital Signs Last 24 Hrs  T(C): 36.1 (06 Dec 2022 09:16), Max: 37.1 (05 Dec 2022 14:40)  T(F): 97 (06 Dec 2022 09:16), Max: 98.7 (05 Dec 2022 14:40)  HR: 73 (06 Dec 2022 09:31) (71 - 88)  BP: 97/44 (06 Dec 2022 09:00) (92/46 - 116/56)  BP(mean): 64 (06 Dec 2022 09:00) (58 - 73)  RR: 21 (06 Dec 2022 09:31) (20 - 38)  SpO2: 96% (06 Dec 2022 09:31) (92% - 98%)    Parameters below as of 06 Dec 2022 09:31  Patient On (Oxygen Delivery Method): ventilator    O2 Concentration (%): 40    Constitutional: Awake, alert, in no acute distress.   HEENT: Normocephalic, atraumatic, GORDON, no proptosis or lid retraction.   Neck: supple, no acanthosis, no thyromegaly or palpable thyroid nodules.  Respiratory: Lungs clear to ausculation bilaterally.   Cardiovascular: regular rhythm, normal S1 and S2, no audible murmurs.   GI: soft, non-tender, non-distended, bowel sounds present, no masses appreciated.  Extremities: No lower extremity edema, peripheral pulses present.   Skin: no rashes.   Psychiatric: AAO x 3. Normal affect/mood.     LABS  CBC - WBC/HGB/HTC/PLT: 6.09/8.7/27.2/273 (22)  BMP - Na/K/Cl/Bicarb/BUN/Cr/Gluc/AG/eGFR: 132/5.2/100/17/138/4.92/180/15/11 (22)  Ca - 8.5 (22)  Phos - 4.9 (22)  Mg - 2.5 (22)  LFT - Alb/Tprot/Tbili/Dbili/AlkPhos/ALT/AST: 2.3/--/0.2/--/95/74/73 (22)  PT/aPTT/INR: 12.5/34.8/1.05 (22)   Thyroid Stimulating Hormone, Serum: 11.170 (22)  Total T4/Free T4: --/0.561 (22)    MEDICATIONS  MEDICATIONS  (STANDING):  albuterol/ipratropium for Nebulization 3 milliLiter(s) Nebulizer every 6 hours  aspirin  chewable 81 milliGRAM(s) Oral daily  atorvastatin 40 milliGRAM(s) Oral at bedtime  chlorhexidine 0.12% Liquid 15 milliLiter(s) Oral Mucosa every 12 hours  chlorhexidine 2% Cloths 1 Application(s) Topical <User Schedule>  dextrose 5%. 1000 milliLiter(s) (50 mL/Hr) IV Continuous <Continuous>  dextrose 5%. 1000 milliLiter(s) (100 mL/Hr) IV Continuous <Continuous>  dextrose 5%. 1000 milliLiter(s) (50 mL/Hr) IV Continuous <Continuous>  dextrose 5%. 1000 milliLiter(s) (100 mL/Hr) IV Continuous <Continuous>  dextrose 50% Injectable 25 Gram(s) IV Push once  dextrose 50% Injectable 12.5 Gram(s) IV Push once  dextrose 50% Injectable 25 Gram(s) IV Push once  dextrose 50% Injectable 25 Gram(s) IV Push once  dextrose 50% Injectable 12.5 Gram(s) IV Push once  dextrose 50% Injectable 25 Gram(s) IV Push once  fentaNYL   Patch  50 MICROgram(s)/Hr. 1 Patch Transdermal every 48 hours  glucagon  Injectable 1 milliGRAM(s) IntraMuscular once  glucagon  Injectable 1 milliGRAM(s) IntraMuscular once  heparin   Injectable 5000 Unit(s) SubCutaneous every 8 hours  hydrocortisone sodium succinate Injectable 25 milliGRAM(s) IV Push every 12 hours  influenza  Vaccine (HIGH DOSE) 0.7 milliLiter(s) IntraMuscular once  insulin glargine Injectable (LANTUS) 7 Unit(s) SubCutaneous at bedtime  insulin regular  human corrective regimen sliding scale   SubCutaneous every 6 hours  insulin regular  human recombinant 9 Unit(s) SubCutaneous every 6 hours  levothyroxine Injectable 60 MICROGram(s) IV Push at bedtime  mycophenolate mofetil Suspension 500 milliGRAM(s) Oral every 12 hours  pantoprazole  Injectable 40 milliGRAM(s) IV Push daily  QUEtiapine 50 milliGRAM(s) Oral two times a day  sodium chloride 7% Inhalation 4 milliLiter(s) Inhalation every 12 hours  sterile water 1000 milliLiter(s) (60 mL/Hr) IV Continuous <Continuous>  tacrolimus    0.5 mG/mL Suspension 2 milliGRAM(s) Oral every 24 hours  tacrolimus    0.5 mG/mL Suspension 3 milliGRAM(s) Oral every 24 hours  tamsulosin 0.4 milliGRAM(s) Oral at bedtime    MEDICATIONS  (PRN):  acetaminophen    Suspension .. 650 milliGRAM(s) Oral every 6 hours PRN Mild Pain (1 - 3)  dextrose Oral Gel 15 Gram(s) Oral once PRN Blood Glucose LESS THAN 70 milliGRAM(s)/deciliter  dextrose Oral Gel 15 Gram(s) Oral once PRN Blood Glucose LESS THAN 70 milliGRAM(s)/deciliter  sodium chloride 0.9% lock flush 10 milliLiter(s) IV Push every 1 hour PRN Pre/post blood products, medications, blood draw, and to maintain line patency    ASSESSMENT / RECOMMENDATIONS    A1C: 9.0 %  BUN: 138  Creatinine: 4.92  GFR: 11  Weight: 82.3  BMI: 28.4  EF:     # Type 2 diabetes mellitus  - Please continue lantus *** units at bedtime.   - Continue lispro *** units before each meal.  - Continue lispro moderate / low dose sliding scale before meals and at bedtime.  - Patient's fingerstick glucose goal is 100-180 mg/dL.    - For discharge, patient can ***.    - Patient can follow up at discharge with [facility] by calling [phone] to make an appointment.      Case discussed with Dr. Rabago. Primary team updated.       Ulysses Weber    Endocrinology Fellow    Service Pager: [phone]    OVERNIGHT: Regular insulin dose was held due to low-normal blood glucose.  SUBJECTIVE / INTERVAL HPI: Patient was seen and examined this morning. Blood pressure remains soft with MAP in the mid 60's, now off levophed. He continues to receive hydrocortisone 25 mg every 12 hours. Tube feeds have been running without interruptions (Nepro, 43 mL/hr). Urinary output was 575 mL over the past 24 hours with rising creatinine (4.92 mg/dL).     CAPILLARY BLOOD GLUCOSE & INSULIN RECEIVED  Yesterday  - 12 PM FS mg/dL = 9 units of regular insulin + 2 units of sliding scale.   - 6 PM FS mg/dL = 9 units of regular insulin.  - 10:42 PM FS mg/dL; 10:45 PM FS mg/dL = 7 units of Lantus.     Today  - 12:19 AM FS mg/dL = He didn't receive insulin.   - 6 AM FS mg/dL = 9 units of regular insulin + 1 units of sliding scale.    - 12 PM FSG: *** mg/dL = *** units of regular insulin + *** units of sliding scale.     REVIEW OF SYSTEMS  Unable to obtain.     PHYSICAL EXAM  Vital Signs Last 24 Hrs  T(C): 36.1 (06 Dec 2022 09:16), Max: 37.1 (05 Dec 2022 14:40)  T(F): 97 (06 Dec 2022 09:16), Max: 98.7 (05 Dec 2022 14:40)  HR: 73 (06 Dec 2022 09:31) (71 - 88)  BP: 97/44 (06 Dec 2022 09:00) (92/46 - 116/56)  BP(mean): 64 (06 Dec 2022 09:00) (58 - 73)  RR: 21 (06 Dec 2022 09:31) (20 - 38)  SpO2: 96% (06 Dec 2022 09:31) (92% - 98%)    Parameters below as of 06 Dec 2022 09:31  Patient On (Oxygen Delivery Method): ventilator    O2 Concentration (%): 40    Constitutional: Awake, alert, elderly male, on mechanical ventilation via tracheostomy.   HEENT: Normocephalic, atraumatic, GORDON.  Respiratory: (+) Bilateral rhonchi. (+) Mild wheezing.   Cardiovascular: regular rhythm, normal S1 and S2, no audible murmurs.   GI: soft, non-tender, non-distended, bowel sounds present.  Extremities: +2 bilateral lower extremity edema.    LABS  CBC - WBC/HGB/HTC/PLT: 6.09/8.7/27.2/273 (22)  BMP - Na/K/Cl/Bicarb/BUN/Cr/Gluc/AG/eGFR: 132/5.2/100/17/138/4.92/180/15/11 (22)  Ca - 8.5 (22)  Phos - 4.9 (22)  Mg - 2.5 (22)  LFT - Alb/Tprot/Tbili/Dbili/AlkPhos/ALT/AST: 2.3/--/0.2/--/95/74/73 (22)  PT/aPTT/INR: 12.5/34.8/1.05 (22)   Thyroid Stimulating Hormone, Serum: 11.170 (22)  Total T4/Free T4: --/0.561 (22)    MEDICATIONS  MEDICATIONS  (STANDING):  albuterol/ipratropium for Nebulization 3 milliLiter(s) Nebulizer every 6 hours  aspirin  chewable 81 milliGRAM(s) Oral daily  atorvastatin 40 milliGRAM(s) Oral at bedtime  chlorhexidine 0.12% Liquid 15 milliLiter(s) Oral Mucosa every 12 hours  chlorhexidine 2% Cloths 1 Application(s) Topical <User Schedule>  dextrose 5%. 1000 milliLiter(s) (50 mL/Hr) IV Continuous <Continuous>  dextrose 5%. 1000 milliLiter(s) (100 mL/Hr) IV Continuous <Continuous>  dextrose 5%. 1000 milliLiter(s) (50 mL/Hr) IV Continuous <Continuous>  dextrose 5%. 1000 milliLiter(s) (100 mL/Hr) IV Continuous <Continuous>  dextrose 50% Injectable 25 Gram(s) IV Push once  dextrose 50% Injectable 12.5 Gram(s) IV Push once  dextrose 50% Injectable 25 Gram(s) IV Push once  dextrose 50% Injectable 25 Gram(s) IV Push once  dextrose 50% Injectable 12.5 Gram(s) IV Push once  dextrose 50% Injectable 25 Gram(s) IV Push once  fentaNYL   Patch  50 MICROgram(s)/Hr. 1 Patch Transdermal every 48 hours  glucagon  Injectable 1 milliGRAM(s) IntraMuscular once  glucagon  Injectable 1 milliGRAM(s) IntraMuscular once  heparin   Injectable 5000 Unit(s) SubCutaneous every 8 hours  hydrocortisone sodium succinate Injectable 25 milliGRAM(s) IV Push every 12 hours  influenza  Vaccine (HIGH DOSE) 0.7 milliLiter(s) IntraMuscular once  insulin glargine Injectable (LANTUS) 7 Unit(s) SubCutaneous at bedtime  insulin regular  human corrective regimen sliding scale   SubCutaneous every 6 hours  insulin regular  human recombinant 9 Unit(s) SubCutaneous every 6 hours  levothyroxine Injectable 60 MICROGram(s) IV Push at bedtime  mycophenolate mofetil Suspension 500 milliGRAM(s) Oral every 12 hours  pantoprazole  Injectable 40 milliGRAM(s) IV Push daily  QUEtiapine 50 milliGRAM(s) Oral two times a day  sodium chloride 7% Inhalation 4 milliLiter(s) Inhalation every 12 hours  sterile water 1000 milliLiter(s) (60 mL/Hr) IV Continuous <Continuous>  tacrolimus    0.5 mG/mL Suspension 2 milliGRAM(s) Oral every 24 hours  tacrolimus    0.5 mG/mL Suspension 3 milliGRAM(s) Oral every 24 hours  tamsulosin 0.4 milliGRAM(s) Oral at bedtime    MEDICATIONS  (PRN):  acetaminophen    Suspension .. 650 milliGRAM(s) Oral every 6 hours PRN Mild Pain (1 - 3)  dextrose Oral Gel 15 Gram(s) Oral once PRN Blood Glucose LESS THAN 70 milliGRAM(s)/deciliter  dextrose Oral Gel 15 Gram(s) Oral once PRN Blood Glucose LESS THAN 70 milliGRAM(s)/deciliter  sodium chloride 0.9% lock flush 10 milliLiter(s) IV Push every 1 hour PRN Pre/post blood products, medications, blood draw, and to maintain line patency    ASSESSMENT / RECOMMENDATIONS  Mr. Miguel is an 83-year-old male with type 2 diabetes mellitus, coronary artery disease (s/p DAMEON x2 to LAD on 2021), chronic kidney disease stage 4 (s/p renal transplant on , on tracrolimus and prednisone) and benign prostatic hyperplasia who was sent to the hospital by his nephrologist for further evaluation of lower extremity edema (22). He was admitted for further management of lower extremity edema, thought to be secondary to his underlying CKD versus congestive heart failure. Hospitalization was complicated by hypoglycemia and cardiac arrest (22, ROSC after 1 minute of chest compressions). Post-cardiac arrest he was noted to develop hypotension, bradycardia and hypothermia. Labs were remarkable for elevated TSH (20) and decreased FT4 (0.77). 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     Although he had features seen with myxedema coma, these findings were better explained by his cardiac arrest as his vital signs prior to the arrest were apparently around his baseline, as was his mental status. Nonetheless, he was started on levothyroxine 25 mcg IV daily (22) which has been titrated up to 50 mcg daily (22). Nonetheless, his thyroid function tests show that his Free T4 remains low (0.56) with an elevated TSH (11.1). In addition, given concern for adrenal insufficiency, he was started on stress-dose steroids which have been titrated down to 25 mg every 12 hours. The patient was extubated on 22; however, he had to be reintubated on 22 due to episode of respiratory decompensation, potentially secondary to aspiration. After reintubation, his insulin requirements have increased, presumably due to underlying aspiration pneumonia. He is now s/p trach and peg, receiving tube feeds with Nepro at 43 mL/hr with no interruptions.     A1C: 9.0 %  BUN: 138  Creatinine: 4.92  GFR: 11  Weight: 82.3  BMI: 28.4  EF: 65%, grade II diastolic dysfunction.    # Type 2 diabetes mellitus    # Hypothyroidism    # Concern for adrenal insufficiency    Case discussed with Dr. Rabago. Primary team updated.       Ulysses Weber    Endocrinology Fellow    Service Pager: [phone]    OVERNIGHT: Regular insulin dose was held due to low-normal blood glucose.  SUBJECTIVE / INTERVAL HPI: Patient was seen and examined this morning. Blood pressure remains soft with MAP in the mid 60's, now off levophed. He continues to receive hydrocortisone 25 mg every 12 hours. Tube feeds have been running without interruptions (Nepro, 43 mL/hr). Urinary output was 575 mL over the past 24 hours with rising creatinine (4.92 mg/dL).     CAPILLARY BLOOD GLUCOSE & INSULIN RECEIVED  Yesterday  - 12 PM FS mg/dL = 9 units of regular insulin + 2 units of sliding scale.   - 6 PM FS mg/dL = 9 units of regular insulin.  - 10:42 PM FS mg/dL; 10:45 PM FS mg/dL = 7 units of Lantus.     Today  - 12:19 AM FS mg/dL = He didn't receive insulin.   - 6 AM FS mg/dL = 9 units of regular insulin + 1 units of sliding scale.    - 12 PM FSG: *** mg/dL = *** units of regular insulin + *** units of sliding scale.     REVIEW OF SYSTEMS  Unable to obtain.     PHYSICAL EXAM  Vital Signs Last 24 Hrs  T(C): 36.1 (06 Dec 2022 09:16), Max: 37.1 (05 Dec 2022 14:40)  T(F): 97 (06 Dec 2022 09:16), Max: 98.7 (05 Dec 2022 14:40)  HR: 73 (06 Dec 2022 09:31) (71 - 88)  BP: 97/44 (06 Dec 2022 09:00) (92/46 - 116/56)  BP(mean): 64 (06 Dec 2022 09:00) (58 - 73)  RR: 21 (06 Dec 2022 09:31) (20 - 38)  SpO2: 96% (06 Dec 2022 09:31) (92% - 98%)    Parameters below as of 06 Dec 2022 09:31  Patient On (Oxygen Delivery Method): ventilator    O2 Concentration (%): 40    Constitutional: Awake, alert, elderly male, on mechanical ventilation via tracheostomy.   HEENT: Normocephalic, atraumatic, GORDON.  Respiratory: (+) Bilateral rhonchi. (+) Decreased breath sounds over left lung fields. (+) Mild wheezing.   Cardiovascular: regular rhythm, normal S1 and S2, no audible murmurs.   GI: soft, non-tender, non-distended, bowel sounds present.  Extremities: +2 bilateral lower extremity edema.    LABS  CBC - WBC/HGB/HTC/PLT: 6.09/8.7/27.2/273 (22)  BMP - Na/K/Cl/Bicarb/BUN/Cr/Gluc/AG/eGFR: 132/5.2/100/17/138/4.92/180/15/11 (22)  Ca - 8.5 (22)  Phos - 4.9 (22)  Mg - 2.5 (22)  LFT - Alb/Tprot/Tbili/Dbili/AlkPhos/ALT/AST: 2.3/--/0.2/--/95/74/73 (22)  PT/aPTT/INR: 12.5/34.8/1.05 (22)   Thyroid Stimulating Hormone, Serum: 11.170 (22)  Total T4/Free T4: --/0.561 (22)    MEDICATIONS  MEDICATIONS  (STANDING):  albuterol/ipratropium for Nebulization 3 milliLiter(s) Nebulizer every 6 hours  aspirin  chewable 81 milliGRAM(s) Oral daily  atorvastatin 40 milliGRAM(s) Oral at bedtime  chlorhexidine 0.12% Liquid 15 milliLiter(s) Oral Mucosa every 12 hours  chlorhexidine 2% Cloths 1 Application(s) Topical <User Schedule>  dextrose 5%. 1000 milliLiter(s) (50 mL/Hr) IV Continuous <Continuous>  dextrose 5%. 1000 milliLiter(s) (100 mL/Hr) IV Continuous <Continuous>  dextrose 5%. 1000 milliLiter(s) (50 mL/Hr) IV Continuous <Continuous>  dextrose 5%. 1000 milliLiter(s) (100 mL/Hr) IV Continuous <Continuous>  dextrose 50% Injectable 25 Gram(s) IV Push once  dextrose 50% Injectable 12.5 Gram(s) IV Push once  dextrose 50% Injectable 25 Gram(s) IV Push once  dextrose 50% Injectable 25 Gram(s) IV Push once  dextrose 50% Injectable 12.5 Gram(s) IV Push once  dextrose 50% Injectable 25 Gram(s) IV Push once  fentaNYL   Patch  50 MICROgram(s)/Hr. 1 Patch Transdermal every 48 hours  glucagon  Injectable 1 milliGRAM(s) IntraMuscular once  glucagon  Injectable 1 milliGRAM(s) IntraMuscular once  heparin   Injectable 5000 Unit(s) SubCutaneous every 8 hours  hydrocortisone sodium succinate Injectable 25 milliGRAM(s) IV Push every 12 hours  influenza  Vaccine (HIGH DOSE) 0.7 milliLiter(s) IntraMuscular once  insulin glargine Injectable (LANTUS) 7 Unit(s) SubCutaneous at bedtime  insulin regular  human corrective regimen sliding scale   SubCutaneous every 6 hours  insulin regular  human recombinant 9 Unit(s) SubCutaneous every 6 hours  levothyroxine Injectable 60 MICROGram(s) IV Push at bedtime  mycophenolate mofetil Suspension 500 milliGRAM(s) Oral every 12 hours  pantoprazole  Injectable 40 milliGRAM(s) IV Push daily  QUEtiapine 50 milliGRAM(s) Oral two times a day  sodium chloride 7% Inhalation 4 milliLiter(s) Inhalation every 12 hours  sterile water 1000 milliLiter(s) (60 mL/Hr) IV Continuous <Continuous>  tacrolimus    0.5 mG/mL Suspension 2 milliGRAM(s) Oral every 24 hours  tacrolimus    0.5 mG/mL Suspension 3 milliGRAM(s) Oral every 24 hours  tamsulosin 0.4 milliGRAM(s) Oral at bedtime    MEDICATIONS  (PRN):  acetaminophen    Suspension .. 650 milliGRAM(s) Oral every 6 hours PRN Mild Pain (1 - 3)  dextrose Oral Gel 15 Gram(s) Oral once PRN Blood Glucose LESS THAN 70 milliGRAM(s)/deciliter  dextrose Oral Gel 15 Gram(s) Oral once PRN Blood Glucose LESS THAN 70 milliGRAM(s)/deciliter  sodium chloride 0.9% lock flush 10 milliLiter(s) IV Push every 1 hour PRN Pre/post blood products, medications, blood draw, and to maintain line patency    ASSESSMENT / RECOMMENDATIONS  Mr. Miguel is an 83-year-old male with type 2 diabetes mellitus, coronary artery disease (s/p DAMEON x2 to LAD on 2021), chronic kidney disease stage 4 (s/p renal transplant on , on tracrolimus and prednisone) and benign prostatic hyperplasia who was sent to the hospital by his nephrologist for further evaluation of lower extremity edema (22). He was admitted for further management of lower extremity edema, thought to be secondary to his underlying CKD versus congestive heart failure. Hospitalization was complicated by hypoglycemia and cardiac arrest (22, ROSC after 1 minute of chest compressions). Post-cardiac arrest he was noted to develop hypotension, bradycardia and hypothermia. Labs were remarkable for elevated TSH (20) and decreased FT4 (0.77). 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     Although he had features seen with myxedema coma, these findings were better explained by his cardiac arrest as his vital signs prior to the arrest were apparently around his baseline, as was his mental status. Nonetheless, he was started on levothyroxine 25 mcg IV daily (22) which has been titrated up to 50 mcg daily (22). Nonetheless, his thyroid function tests show that his Free T4 remains low (0.56) with an elevated TSH (11.1). In addition, given concern for adrenal insufficiency, he was started on stress-dose steroids which have been titrated down to 25 mg every 12 hours. The patient was extubated on 22; however, he had to be reintubated on 22 due to episode of respiratory decompensation, potentially secondary to aspiration. After reintubation, his insulin requirements have increased, presumably due to underlying aspiration pneumonia. He is now s/p trach and peg, receiving tube feeds with Nepro at 43 mL/hr with no interruptions.     A1C: 9.0 %  BUN: 138  Creatinine: 4.92  GFR: 11  Weight: 82.3  BMI: 28.4  EF: 65%, grade II diastolic dysfunction.    # Type 2 diabetes mellitus    # Hypothyroidism    # Concern for adrenal insufficiency    Case discussed with Dr. Rabago. Primary team updated.       Ulysses Weber    Endocrinology Fellow    Service Pager: [phone]    OVERNIGHT: Regular insulin dose was held due to low-normal blood glucose.  SUBJECTIVE / INTERVAL HPI: Patient was seen and examined this morning. Blood pressure remains soft with MAP in the mid 60's, now off levophed. He continues to receive hydrocortisone 25 mg every 12 hours. Tube feeds have been running without interruptions (Nepro, 43 mL/hr). Urinary output was 575 mL over the past 24 hours with rising creatinine (4.92 mg/dL).     CAPILLARY BLOOD GLUCOSE & INSULIN RECEIVED  Yesterday  - 12 PM FS mg/dL = 9 units of regular insulin + 2 units of sliding scale.   - 6 PM FS mg/dL = 9 units of regular insulin.  - 10:42 PM FS mg/dL; 10:45 PM FS mg/dL = 7 units of Lantus.     Today  - 12:19 AM FS mg/dL = He didn't receive insulin.   - 6 AM FS mg/dL = 9 units of regular insulin + 1 units of sliding scale.    - 12 PM FS mg/dL = 9  units of regular insulin + 1 units of sliding scale.     REVIEW OF SYSTEMS  Unable to obtain.     PHYSICAL EXAM  Vital Signs Last 24 Hrs  T(C): 36.1 (06 Dec 2022 09:16), Max: 37.1 (05 Dec 2022 14:40)  T(F): 97 (06 Dec 2022 09:16), Max: 98.7 (05 Dec 2022 14:40)  HR: 73 (06 Dec 2022 09:31) (71 - 88)  BP: 97/44 (06 Dec 2022 09:00) (92/46 - 116/56)  BP(mean): 64 (06 Dec 2022 09:00) (58 - 73)  RR: 21 (06 Dec 2022 09:31) (20 - 38)  SpO2: 96% (06 Dec 2022 09:31) (92% - 98%)    Parameters below as of 06 Dec 2022 09:31  Patient On (Oxygen Delivery Method): ventilator    O2 Concentration (%): 40    Constitutional: Awake, alert, elderly male, on mechanical ventilation via tracheostomy.   HEENT: Normocephalic, atraumatic, GORDON.  Respiratory: (+) Bilateral rhonchi. (+) Decreased breath sounds over left lung fields. (+) Mild wheezing.   Cardiovascular: regular rhythm, normal S1 and S2, no audible murmurs.   GI: soft, non-tender, non-distended, bowel sounds present.  Extremities: +2 bilateral lower extremity edema.    LABS  CBC - WBC/HGB/HTC/PLT: 6.09/8.7/27.2/273 (22)  BMP - Na/K/Cl/Bicarb/BUN/Cr/Gluc/AG/eGFR: 132/5.2/100/17/138/4.92/180/15/11 (22)  Ca - 8.5 (22)  Phos - 4.9 (22)  Mg - 2.5 (22)  LFT - Alb/Tprot/Tbili/Dbili/AlkPhos/ALT/AST: 2.3/--/0.2/--/95/74/73 (22)  PT/aPTT/INR: 12.5/34.8/1.05 (22)   Thyroid Stimulating Hormone, Serum: 11.170 (22)  Total T4/Free T4: --/0.561 (22)    MEDICATIONS  MEDICATIONS  (STANDING):  albuterol/ipratropium for Nebulization 3 milliLiter(s) Nebulizer every 6 hours  aspirin  chewable 81 milliGRAM(s) Oral daily  atorvastatin 40 milliGRAM(s) Oral at bedtime  chlorhexidine 0.12% Liquid 15 milliLiter(s) Oral Mucosa every 12 hours  chlorhexidine 2% Cloths 1 Application(s) Topical <User Schedule>  dextrose 5%. 1000 milliLiter(s) (50 mL/Hr) IV Continuous <Continuous>  dextrose 5%. 1000 milliLiter(s) (100 mL/Hr) IV Continuous <Continuous>  dextrose 5%. 1000 milliLiter(s) (50 mL/Hr) IV Continuous <Continuous>  dextrose 5%. 1000 milliLiter(s) (100 mL/Hr) IV Continuous <Continuous>  dextrose 50% Injectable 25 Gram(s) IV Push once  dextrose 50% Injectable 12.5 Gram(s) IV Push once  dextrose 50% Injectable 25 Gram(s) IV Push once  dextrose 50% Injectable 25 Gram(s) IV Push once  dextrose 50% Injectable 12.5 Gram(s) IV Push once  dextrose 50% Injectable 25 Gram(s) IV Push once  fentaNYL   Patch  50 MICROgram(s)/Hr. 1 Patch Transdermal every 48 hours  glucagon  Injectable 1 milliGRAM(s) IntraMuscular once  glucagon  Injectable 1 milliGRAM(s) IntraMuscular once  heparin   Injectable 5000 Unit(s) SubCutaneous every 8 hours  hydrocortisone sodium succinate Injectable 25 milliGRAM(s) IV Push every 12 hours  influenza  Vaccine (HIGH DOSE) 0.7 milliLiter(s) IntraMuscular once  insulin glargine Injectable (LANTUS) 7 Unit(s) SubCutaneous at bedtime  insulin regular  human corrective regimen sliding scale   SubCutaneous every 6 hours  insulin regular  human recombinant 9 Unit(s) SubCutaneous every 6 hours  levothyroxine Injectable 60 MICROGram(s) IV Push at bedtime  mycophenolate mofetil Suspension 500 milliGRAM(s) Oral every 12 hours  pantoprazole  Injectable 40 milliGRAM(s) IV Push daily  QUEtiapine 50 milliGRAM(s) Oral two times a day  sodium chloride 7% Inhalation 4 milliLiter(s) Inhalation every 12 hours  sterile water 1000 milliLiter(s) (60 mL/Hr) IV Continuous <Continuous>  tacrolimus    0.5 mG/mL Suspension 2 milliGRAM(s) Oral every 24 hours  tacrolimus    0.5 mG/mL Suspension 3 milliGRAM(s) Oral every 24 hours  tamsulosin 0.4 milliGRAM(s) Oral at bedtime    MEDICATIONS  (PRN):  acetaminophen    Suspension .. 650 milliGRAM(s) Oral every 6 hours PRN Mild Pain (1 - 3)  dextrose Oral Gel 15 Gram(s) Oral once PRN Blood Glucose LESS THAN 70 milliGRAM(s)/deciliter  dextrose Oral Gel 15 Gram(s) Oral once PRN Blood Glucose LESS THAN 70 milliGRAM(s)/deciliter  sodium chloride 0.9% lock flush 10 milliLiter(s) IV Push every 1 hour PRN Pre/post blood products, medications, blood draw, and to maintain line patency    ASSESSMENT / RECOMMENDATIONS  Mr. Miguel is an 83-year-old male with type 2 diabetes mellitus, coronary artery disease (s/p DAMEON x2 to LAD on 2021), chronic kidney disease stage 4 (s/p renal transplant on , on tracrolimus and prednisone) and benign prostatic hyperplasia who was sent to the hospital by his nephrologist for further evaluation of lower extremity edema (22). He was admitted for further management of lower extremity edema, thought to be secondary to his underlying CKD versus congestive heart failure. Hospitalization was complicated by hypoglycemia and cardiac arrest (22, ROSC after 1 minute of chest compressions). Post-cardiac arrest he was noted to develop hypotension, bradycardia and hypothermia. Labs were remarkable for elevated TSH (20) and decreased FT4 (0.77). 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     Although he had features seen with myxedema coma, these findings were better explained by his cardiac arrest as his vital signs prior to the arrest were apparently around his baseline, as was his mental status. Nonetheless, he was started on levothyroxine 25 mcg IV daily (22) which has been titrated up to 50 mcg daily (22). Nonetheless, his thyroid function tests show that his Free T4 remains low (0.56) with an elevated TSH (11.1). In addition, given concern for adrenal insufficiency, he was started on stress-dose steroids which have been titrated down to 25 mg every 12 hours. The patient was extubated on 22; however, he had to be reintubated on 22 due to episode of respiratory decompensation, potentially secondary to aspiration. After reintubation, his insulin requirements have increased, presumably due to underlying aspiration pneumonia. He is now s/p trach and peg, receiving tube feeds with Nepro at 43 mL/hr with no reported interruptions.     A1C: 9.0 %  BUN: 138  Creatinine: 4.92  GFR: 11  Weight: 82.3  BMI: 28.4  EF: 65%, grade II diastolic dysfunction.    # Type 2 diabetes mellitus  - Please continue with Lantus 7 units at bedtime.  - Decrease regular insulin to 8 units every 6 hours while on tube feeds (at goal of 43 mL/hr).  - Continue regular insulin sliding scale low dose every 6 hours.   - Patient's fingerstick glucose goal is 100-180 mg/dL.    # Hypothyroidism   - TSH 20. FT4 0.77 (22) -> Started on levothyroxine 25 mcg IV daily.   - TSH 9.2. FT4 0.69 (22) -> Increased to levothyroxine 50 mcg IV daily.   - TSH 11.1. FT4 0.56. (22) -> Increased to levothyroxine 60 mcg IV daily.   - Continue with levothyroxine to 60 mcg IV daily for now.     # Concern for adrenal insufficiency   - The patient was on prednisone 5 mg daily for renal transplant prior to admission.   - He had recent hospitalization (2022) for pneumonia due to pseudomonas where he was also started on stress dose steroids. At that time, a cortisol level was collected prior to starting steroids which was not robust (6.5 ug/dL).   - Continue with hydrocortisone 25 mg IV every 12 hours for now. However, would leave up to discretion of primary team to increase even higher if septic shock is suspected.    Case discussed with Dr. Rabago. Primary team updated.       Ulysses Weber    Endocrinology Fellow    Service Pager: [phone]    OVERNIGHT: Regular insulin dose was held due to low-normal blood glucose.  SUBJECTIVE / INTERVAL HPI: Patient was seen and examined this morning. Blood pressure remains soft with MAP in the mid 60's. He had to be restarted on levophed during hemodialysis this afternoon with improvement of systolic blood pressure to 110s. He continues to receive hydrocortisone 25 mg every 12 hours. Tube feeds have been running without interruptions (Nepro, 43 mL/hr). Urinary output was 575 mL over the past 24 hours with rising creatinine (4.92 mg/dL).     CAPILLARY BLOOD GLUCOSE & INSULIN RECEIVED  Yesterday  - 12 PM FS mg/dL = 9 units of regular insulin + 2 units of sliding scale.   - 6 PM FS mg/dL = 9 units of regular insulin.  - 10:42 PM FS mg/dL; 10:45 PM FS mg/dL = 7 units of Lantus.     Today  - 12:19 AM FS mg/dL = He didn't receive insulin.   - 6 AM FS mg/dL = 9 units of regular insulin + 1 units of sliding scale.    - 12 PM FS mg/dL = 9  units of regular insulin + 1 units of sliding scale.     REVIEW OF SYSTEMS  Unable to obtain.     PHYSICAL EXAM  Vital Signs Last 24 Hrs  T(C): 36.1 (06 Dec 2022 09:16), Max: 37.1 (05 Dec 2022 14:40)  T(F): 97 (06 Dec 2022 09:16), Max: 98.7 (05 Dec 2022 14:40)  HR: 73 (06 Dec 2022 09:31) (71 - 88)  BP: 97/44 (06 Dec 2022 09:00) (92/46 - 116/56)  BP(mean): 64 (06 Dec 2022 09:00) (58 - 73)  RR: 21 (06 Dec 2022 09:31) (20 - 38)  SpO2: 96% (06 Dec 2022 09:31) (92% - 98%)    Parameters below as of 06 Dec 2022 09:31  Patient On (Oxygen Delivery Method): ventilator    O2 Concentration (%): 40    Constitutional: Awake, alert, elderly male, on mechanical ventilation via tracheostomy.   HEENT: Normocephalic, atraumatic, GORDON.  Respiratory: (+) Bilateral rhonchi. (+) Decreased breath sounds over left lung fields. (+) Mild wheezing.   Cardiovascular: regular rhythm, normal S1 and S2, no audible murmurs.   GI: soft, non-tender, non-distended, bowel sounds present.  Extremities: +2 bilateral lower extremity edema.    LABS  CBC - WBC/HGB/HTC/PLT: 6.09/8.7/27.2/273 (22)  BMP - Na/K/Cl/Bicarb/BUN/Cr/Gluc/AG/eGFR: 132/5.2/100/17/138/4.92/180/15/11 (22)  Ca - 8.5 (22)  Phos - 4.9 (22)  Mg - 2.5 (22)  LFT - Alb/Tprot/Tbili/Dbili/AlkPhos/ALT/AST: 2.3/--/0.2/--/95/74/73 (22)  PT/aPTT/INR: 12.5/34.8/1.05 (22)   Thyroid Stimulating Hormone, Serum: 11.170 (22)  Total T4/Free T4: --/0.561 (22)    MEDICATIONS  MEDICATIONS  (STANDING):  albuterol/ipratropium for Nebulization 3 milliLiter(s) Nebulizer every 6 hours  aspirin  chewable 81 milliGRAM(s) Oral daily  atorvastatin 40 milliGRAM(s) Oral at bedtime  chlorhexidine 0.12% Liquid 15 milliLiter(s) Oral Mucosa every 12 hours  chlorhexidine 2% Cloths 1 Application(s) Topical <User Schedule>  dextrose 5%. 1000 milliLiter(s) (50 mL/Hr) IV Continuous <Continuous>  dextrose 5%. 1000 milliLiter(s) (100 mL/Hr) IV Continuous <Continuous>  dextrose 5%. 1000 milliLiter(s) (50 mL/Hr) IV Continuous <Continuous>  dextrose 5%. 1000 milliLiter(s) (100 mL/Hr) IV Continuous <Continuous>  dextrose 50% Injectable 25 Gram(s) IV Push once  dextrose 50% Injectable 12.5 Gram(s) IV Push once  dextrose 50% Injectable 25 Gram(s) IV Push once  dextrose 50% Injectable 25 Gram(s) IV Push once  dextrose 50% Injectable 12.5 Gram(s) IV Push once  dextrose 50% Injectable 25 Gram(s) IV Push once  fentaNYL   Patch  50 MICROgram(s)/Hr. 1 Patch Transdermal every 48 hours  glucagon  Injectable 1 milliGRAM(s) IntraMuscular once  glucagon  Injectable 1 milliGRAM(s) IntraMuscular once  heparin   Injectable 5000 Unit(s) SubCutaneous every 8 hours  hydrocortisone sodium succinate Injectable 25 milliGRAM(s) IV Push every 12 hours  influenza  Vaccine (HIGH DOSE) 0.7 milliLiter(s) IntraMuscular once  insulin glargine Injectable (LANTUS) 7 Unit(s) SubCutaneous at bedtime  insulin regular  human corrective regimen sliding scale   SubCutaneous every 6 hours  insulin regular  human recombinant 9 Unit(s) SubCutaneous every 6 hours  levothyroxine Injectable 60 MICROGram(s) IV Push at bedtime  mycophenolate mofetil Suspension 500 milliGRAM(s) Oral every 12 hours  pantoprazole  Injectable 40 milliGRAM(s) IV Push daily  QUEtiapine 50 milliGRAM(s) Oral two times a day  sodium chloride 7% Inhalation 4 milliLiter(s) Inhalation every 12 hours  sterile water 1000 milliLiter(s) (60 mL/Hr) IV Continuous <Continuous>  tacrolimus    0.5 mG/mL Suspension 2 milliGRAM(s) Oral every 24 hours  tacrolimus    0.5 mG/mL Suspension 3 milliGRAM(s) Oral every 24 hours  tamsulosin 0.4 milliGRAM(s) Oral at bedtime    MEDICATIONS  (PRN):  acetaminophen    Suspension .. 650 milliGRAM(s) Oral every 6 hours PRN Mild Pain (1 - 3)  dextrose Oral Gel 15 Gram(s) Oral once PRN Blood Glucose LESS THAN 70 milliGRAM(s)/deciliter  dextrose Oral Gel 15 Gram(s) Oral once PRN Blood Glucose LESS THAN 70 milliGRAM(s)/deciliter  sodium chloride 0.9% lock flush 10 milliLiter(s) IV Push every 1 hour PRN Pre/post blood products, medications, blood draw, and to maintain line patency    ASSESSMENT / RECOMMENDATIONS  Mr. Miguel is an 83-year-old male with type 2 diabetes mellitus, coronary artery disease (s/p DAMEON x2 to LAD on 2021), chronic kidney disease stage 4 (s/p renal transplant on , on tracrolimus and prednisone) and benign prostatic hyperplasia who was sent to the hospital by his nephrologist for further evaluation of lower extremity edema (22). He was admitted for further management of lower extremity edema, thought to be secondary to his underlying CKD versus congestive heart failure. Hospitalization was complicated by hypoglycemia and cardiac arrest (22, ROSC after 1 minute of chest compressions). Post-cardiac arrest he was noted to develop hypotension, bradycardia and hypothermia. Labs were remarkable for elevated TSH (20) and decreased FT4 (0.77). 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     Although he had features seen with myxedema coma, these findings were better explained by his cardiac arrest as his vital signs prior to the arrest were apparently around his baseline, as was his mental status. Nonetheless, he was started on levothyroxine 25 mcg IV daily (22) which has been titrated up to 50 mcg daily (22). Nonetheless, his thyroid function tests show that his Free T4 remains low (0.56) with an elevated TSH (11.1). In addition, given concern for adrenal insufficiency, he was started on stress-dose steroids which have been titrated down to 25 mg every 12 hours. The patient was extubated on 22; however, he had to be reintubated on 22 due to episode of respiratory decompensation, potentially secondary to aspiration. After reintubation, his insulin requirements have increased, presumably due to underlying aspiration pneumonia. He is now s/p trach and peg, receiving tube feeds with Nepro at 43 mL/hr with no reported interruptions.     A1C: 9.0 %  BUN: 138  Creatinine: 4.92  GFR: 11  Weight: 82.3  BMI: 28.4  EF: 65%, grade II diastolic dysfunction.    # Type 2 diabetes mellitus  - Please continue with Lantus 7 units at bedtime.  - Decrease regular insulin to 8 units every 6 hours while on tube feeds (at goal of 43 mL/hr).  - Continue regular insulin sliding scale low dose every 6 hours.   - Patient's fingerstick glucose goal is 100-180 mg/dL.    # Hypothyroidism   - TSH 20. FT4 0.77 (22) -> Started on levothyroxine 25 mcg IV daily.   - TSH 9.2. FT4 0.69 (22) -> Increased to levothyroxine 50 mcg IV daily.   - TSH 11.1. FT4 0.56. (22) -> Increased to levothyroxine 60 mcg IV daily.   - Continue with levothyroxine to 60 mcg IV daily for now.     # Concern for adrenal insufficiency   - The patient was on prednisone 5 mg daily for renal transplant prior to admission.   - He had recent hospitalization (2022) for pneumonia due to pseudomonas where he was also started on stress dose steroids. At that time, a cortisol level was collected prior to starting steroids which was not robust (6.5 ug/dL).   - Continue with hydrocortisone 25 mg IV every 12 hours for now. However, would leave up to discretion of primary team to increase even higher if septic shock is suspected.    Case discussed with Dr. Rabago. Primary team updated.       Ulysses Weber    Endocrinology Fellow    Service Pager: [phone]

## 2022-12-06 NOTE — PROGRESS NOTE ADULT - SUBJECTIVE AND OBJECTIVE BOX
Patient is a 82y Male seen and evaluated at bedside very edematous weeping through skin. Labs getting worse, discussed with team and family will start HD today for clearance and volume removal.       Meds:    acetaminophen    Suspension .. 650 every 6 hours PRN  albuterol/ipratropium for Nebulization 3 every 6 hours  aspirin  chewable 81 daily  atorvastatin 40 at bedtime  chlorhexidine 0.12% Liquid 15 every 12 hours  chlorhexidine 2% Cloths 1 <User Schedule>  dextrose 5%. 1000 <Continuous>  dextrose 5%. 1000 <Continuous>  dextrose 5%. 1000 <Continuous>  dextrose 5%. 1000 <Continuous>  dextrose 50% Injectable 25 once  dextrose 50% Injectable 12.5 once  dextrose 50% Injectable 25 once  dextrose 50% Injectable 25 once  dextrose 50% Injectable 12.5 once  dextrose 50% Injectable 25 once  dextrose Oral Gel 15 once PRN  dextrose Oral Gel 15 once PRN  epoetin gui-epbx (RETACRIT) Injectable 8000 once  fentaNYL   Patch  50 MICROgram(s)/Hr. 1 every 48 hours  glucagon  Injectable 1 once  glucagon  Injectable 1 once  heparin   Injectable 5000 every 8 hours  hydrocortisone sodium succinate Injectable 25 every 12 hours  influenza  Vaccine (HIGH DOSE) 0.7 once  insulin glargine Injectable (LANTUS) 7 at bedtime  insulin regular  human corrective regimen sliding scale  every 6 hours  insulin regular  human recombinant 9 every 6 hours  levothyroxine Injectable 60 at bedtime  mycophenolate mofetil Suspension 500 every 12 hours  pantoprazole  Injectable 40 daily  QUEtiapine 50 two times a day  sodium chloride 0.9% lock flush 10 every 1 hour PRN  sodium chloride 7% Inhalation 4 every 12 hours  sterile water 1000 <Continuous>  tacrolimus    0.5 mG/mL Suspension 2 every 24 hours  tacrolimus    0.5 mG/mL Suspension 3 every 24 hours  tamsulosin 0.4 at bedtime      T(C): , Max: 37.1 (22 @ 14:40)  T(F): , Max: 98.7 (22 @ 14:40)  HR: 83 (22 @ 10:00)  BP: 96/46 (22 @ 10:00)  BP(mean): 66 (22 @ 10:00)  RR: 21 (22 @ 10:00)  SpO2: 96% (22 @ 10:00)  Wt(kg): --     @ 07:  -   @ 07:00  --------------------------------------------------------  IN:  mL / OUT: 575 mL / NET: 1434 mL     @ 07:  -   @ 10:51  --------------------------------------------------------  IN: 249 mL / OUT: 50 mL / NET: 199 mL    Review of Systems:  ROS negative except as per HPI      PHYSICAL EXAM:  GENERAL: trach collar  NECK: supple, No JVD  CHEST/LUNG: mechanical breath sounds BL  HEART: normal S1S2, RRR  ABDOMEN: Soft, Nontender, +BS, No flank tenderness bilateral  EXTREMITIES: Remains grossly anasarca 2+ weeping pitting edema b/l LE and UE  ACCESS: R radiocephalic AVF w/ palpable thrill         LABS:                        8.7    6.09  )-----------( 273      ( 06 Dec 2022 06:30 )             27.2         132<L>  |  100  |  138<H>  ----------------------------<  180<H>  5.2   |  17<L>  |  4.92<H>    Ca    8.5      06 Dec 2022 06:30  Phos  4.9       Mg     2.5         TPro  5.1<L>  /  Alb  2.3<L>  /  TBili  0.2  /  DBili  x   /  AST  73<H>  /  ALT  74<H>  /  AlkPhos  95          Urinalysis Basic - ( 06 Dec 2022 03:02 )    Color: Yellow / Appearance: SL Cloudy / S.020 / pH: x  Gluc: x / Ketone: NEGATIVE  / Bili: Negative / Urobili: 0.2 E.U./dL   Blood: x / Protein: 30 mg/dL / Nitrite: NEGATIVE   Leuk Esterase: NEGATIVE / RBC: > 10 /HPF / WBC < 5 /HPF   Sq Epi: x / Non Sq Epi: 0-5 /HPF / Bacteria: Present /HPF      Sodium, Random Urine: 81 mmol/L ( @ 03:02)  Creatinine, Random Urine: 21 mg/dL ( @ 03:02)        RADIOLOGY & ADDITIONAL STUDIES:          Hemoglobin: 8.7 g/dL (22 @ 06:30)  Phosphorus Level, Serum: 4.9 mg/dL (22 @ 06:30)  Hemoglobin: 9.0 g/dL (22 @ 04:36)  Phosphorus Level, Serum: 4.6 mg/dL (22 @ 04:36)    Albumin, Serum: 2.3 g/dL (22 @ 06:30)  Albumin, Serum: 2.1 g/dL (22 @ 04:36)    T(C): 36.1 (22 @ 09:16), Max: 37.1 (22 @ 14:40)  HR: 83 (22 @ 10:00) (71 - 88)  BP: 96/46 (22 @ 10:00) (92/46 - 116/56)  RR: 21 (22 @ 10:00) (20 - 38)  SpO2: 96% (22 @ 10:00) (92% - 98%)  epoetin gui-epbx (RETACRIT) Injectable 8000 Unit(s) IV Push once, 22 @ 10:51, Routine, Stop order after: 1 Doses      Hemodialysis Treatment.:     Schedule: Once, Modality: Hemodialysis, Access: Arteriovenous Fistula    Dialyzer: Optiflux [id number], Time: 120 Min    Blood Flow: 400 mL/Min , Dialysate Flow: 500 mL/Min, Dialysate Temp: 36.5, Tubinmm (Adult)    Target Fluid Removal: 2 Liters    Dialysate Electrolytes (mEq/L): Potassium 2, Calcium 2.5, Sodium 138, Bicarbonate 35 (22 @ 10:51) [Active]       Patient is a 82y Male seen and evaluated at bedside very edematous weeping through skin. Labs getting worse, discussed with team and family will start HD today for clearance and volume removal.       Meds:    acetaminophen    Suspension .. 650 every 6 hours PRN  albuterol/ipratropium for Nebulization 3 every 6 hours  aspirin  chewable 81 daily  atorvastatin 40 at bedtime  chlorhexidine 0.12% Liquid 15 every 12 hours  chlorhexidine 2% Cloths 1 <User Schedule>  dextrose 5%. 1000 <Continuous>  dextrose 5%. 1000 <Continuous>  dextrose 5%. 1000 <Continuous>  dextrose 5%. 1000 <Continuous>  dextrose 50% Injectable 25 once  dextrose 50% Injectable 12.5 once  dextrose 50% Injectable 25 once  dextrose 50% Injectable 25 once  dextrose 50% Injectable 12.5 once  dextrose 50% Injectable 25 once  dextrose Oral Gel 15 once PRN  dextrose Oral Gel 15 once PRN  epoetin gui-epbx (RETACRIT) Injectable 8000 once  fentaNYL   Patch  50 MICROgram(s)/Hr. 1 every 48 hours  glucagon  Injectable 1 once  glucagon  Injectable 1 once  heparin   Injectable 5000 every 8 hours  hydrocortisone sodium succinate Injectable 25 every 12 hours  influenza  Vaccine (HIGH DOSE) 0.7 once  insulin glargine Injectable (LANTUS) 7 at bedtime  insulin regular  human corrective regimen sliding scale  every 6 hours  insulin regular  human recombinant 9 every 6 hours  levothyroxine Injectable 60 at bedtime  mycophenolate mofetil Suspension 500 every 12 hours  pantoprazole  Injectable 40 daily  QUEtiapine 50 two times a day  sodium chloride 0.9% lock flush 10 every 1 hour PRN  sodium chloride 7% Inhalation 4 every 12 hours  sterile water 1000 <Continuous>  tacrolimus    0.5 mG/mL Suspension 2 every 24 hours  tacrolimus    0.5 mG/mL Suspension 3 every 24 hours  tamsulosin 0.4 at bedtime      T(C): , Max: 37.1 (22 @ 14:40)  T(F): , Max: 98.7 (22 @ 14:40)  HR: 83 (22 @ 10:00)  BP: 96/46 (22 @ 10:00)  BP(mean): 66 (22 @ 10:00)  RR: 21 (22 @ 10:00)  SpO2: 96% (22 @ 10:00)  Wt(kg): --     @ 07:  -   @ 07:00  --------------------------------------------------------  IN:  mL / OUT: 575 mL / NET: 1434 mL     @ 07:  -   @ 10:51  --------------------------------------------------------  IN: 249 mL / OUT: 50 mL / NET: 199 mL    Review of Systems:  ROS negative except as per HPI      PHYSICAL EXAM:  GENERAL: trach collar  NECK: supple, No JVD  CHEST/LUNG: mechanical breath sounds BL  HEART: normal S1S2, RRR  ABDOMEN: Soft, Nontender, +BS, No flank tenderness bilateral  EXTREMITIES: Remains grossly anasarca 2+ weeping pitting edema b/l LE and UE  ACCESS: R radiocephalic AVF w/ palpable thrill         LABS:                        8.7    6.09  )-----------( 273      ( 06 Dec 2022 06:30 )             27.2         132<L>  |  100  |  138<H>  ----------------------------<  180<H>  5.2   |  17<L>  |  4.92<H>    Ca    8.5      06 Dec 2022 06:30  Phos  4.9       Mg     2.5         TPro  5.1<L>  /  Alb  2.3<L>  /  TBili  0.2  /  DBili  x   /  AST  73<H>  /  ALT  74<H>  /  AlkPhos  95          Urinalysis Basic - ( 06 Dec 2022 03:02 )    Color: Yellow / Appearance: SL Cloudy / S.020 / pH: x  Gluc: x / Ketone: NEGATIVE  / Bili: Negative / Urobili: 0.2 E.U./dL   Blood: x / Protein: 30 mg/dL / Nitrite: NEGATIVE   Leuk Esterase: NEGATIVE / RBC: > 10 /HPF / WBC < 5 /HPF   Sq Epi: x / Non Sq Epi: 0-5 /HPF / Bacteria: Present /HPF      Sodium, Random Urine: 81 mmol/L ( @ 03:02)  Creatinine, Random Urine: 21 mg/dL ( @ 03:02)        RADIOLOGY & ADDITIONAL STUDIES:          Hemoglobin: 8.7 g/dL (22 @ 06:30)  Phosphorus Level, Serum: 4.9 mg/dL (22 @ 06:30)  Hemoglobin: 9.0 g/dL (22 @ 04:36)  Phosphorus Level, Serum: 4.6 mg/dL (22 @ 04:36)    Albumin, Serum: 2.3 g/dL (22 @ 06:30)  Albumin, Serum: 2.1 g/dL (22 @ 04:36)    T(C): 36.1 (22 @ 09:16), Max: 37.1 (22 @ 14:40)  HR: 83 (22 @ 10:00) (71 - 88)  BP: 96/46 (22 @ 10:00) (92/46 - 116/56)  RR: 21 (22 @ 10:00) (20 - 38)  SpO2: 96% (22 @ 10:00) (92% - 98%)  epoetin gui-epbx (RETACRIT) Injectable 8000 Unit(s) IV Push once, 22 @ 10:51, Routine, Stop order after: 1 Doses      Hemodialysis Treatment.:     Schedule: Once, Modality: Hemodialysis, Access: Arteriovenous Fistula    Dialyzer: Optiflux [id number], Time: 120 Min    Blood Flow: 400 mL/Min , Dialysate Flow: 500 mL/Min, Dialysate Temp: 36.5, Tubinmm (Adult)    Target Fluid Removal: 2 Liters    Dialysate Electrolytes (mEq/L): Potassium 2, Calcium 2.5, Sodium 138, Bicarbonate 35 (22 @ 10:51) [Active]    Seen on HD, on minimal pressors. SBP stable. Tolerating 2L UF

## 2022-12-06 NOTE — PROGRESS NOTE ADULT - ASSESSMENT
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 Labs have progressively getting worse with creatinine up to 4.9~ BUN is 135, fluid overloaded. Discussed with team and family, will re-initiate HD to help with clearance and volume  Recs:  - c/w current tacro dosing; tacro level noted  - c/w cell cept 500mg BID  -Please obtain tacrolimus level 12/7 8:30 AM  -Please make sure pt having daily BM; start miralax and senna  -DC sodium bicarb gtt  - No alternative to bactrim per team, monitoring closely  - strict i's and o's  - Trend BMP BID  -maintain MAP >70   -HD today    #Metabolic Acidosis  -Can DC sodium bicarb tabs    #Hyponatremia  2/2 ARF   -Expect correction with HD    #Anemia of chronic disease  -Epo w/ HD

## 2022-12-06 NOTE — PROCEDURE NOTE - NSUSCPTCODES_ED_ALL
09245 US Chest (PTX, Pleural Effussion/CHF vs COPD)
[id number] US Chest (PTX, Pleural Effussion/CHF vs COPD)
53114 Echocardiography Transthoracic with Image 2D (Echo/FAST)
[id number] US Chest (PTX, Pleural Effussion/CHF vs COPD)
50263 US Chest (PTX, Pleural Effussion/CHF vs COPD)

## 2022-12-06 NOTE — PROGRESS NOTE ADULT - ASSESSMENT
82 y/o M PMH CKD 4, renal transplant in 2013 at [facility], glaucoma (blind), DM1, anemia, CAD s/p 2 DAMEON to LAD in 6/21, BPH, recent admission for PNA presents with acute on chronic cough 2/2 PNA and SUNNY 2/2 acute CKD.     NEUROLOGY  #Metabolic Encephalopathy  #Sedation  #Agitation  CT scan negative for acute intracranial pathology. Began to wean sedation on 12/2.  - c/w fentanyl 25mcg patch  - on precedex 0.7 --> 0.6, weaning as tolerated  - started seroquel 25mg q12h  - receives 25mg to 50mg IVPB of fentanyl for agitation    CARDIOVASCULAR  # Fluid Overloaded  - S/p Metolazone 10 and Lasix 80 for fluid overload status 12/5  - started on HD on 12/6    #HFpEF  Had EF of 55% on TTE from Oct 2022, grade I diastolic dysfunction. Repeat TTE 11/17 with poor visualization of LV. Patient making good UOP overnight  - holding BP meds    #Upper extremity edema  Stable from yesterday but increased compared to baseline. US with superficial thrombosis of L cephalic vein extending to AC fossa, no DVTs.   - given patient's propensity to bleed and superficial nature of thrombus, no intervention  -s/p Metolazone 10 and Lasix 80 12/5    #CAD  #HLD  Hx of CAD s/p 2 DAMEON to LAD in June 2021, currently no CP. Trops 0.03 but no CP or ischemic changes on EKG, likely due to CKD. Takes Plavix and aspirin per last d/c but only aspirin on outpatient note  - c/w ASA daily  - c/w home Lipitor    #HTN   Known history of HTN.   - holding hypertension meds as pt currently requiring pressor support  - continue to monitor BP    PULM  #AHRF  Likely 2/2 aspiration event. Intubated on 11/26.  - ID management as below for aspiration   - Received trach on 12/1    RENAL  #ELIZABETH on Chronic kidney disease (CKD)  Baseline sCr 2.3-2.5. On admission, fluid overloaded. Does have orthopnea and intermittent SOB at baseline. Acidotic but at baseline. Follows w Dr. Mac.  - sCr and BUN increasing daily, urine output poor  - monitor I/Os  - renally dosing meds  - s/p sodium bicarb 150 rate 60cc/hr, discontinued on 12/6  - initiated HD 12/6    #Renal transplant  Patient had renal transplant in 2013. On home mycophenolate 500mg BID and tacrolimus 4mg BID. Per nephro, decreased home tacrolimus from 4mg BID --> 2mg BID while in hospital.   - c/w tacrolimus 3mg in AM, c/w 2mg in PM, levels daily  - c/w mycophenolate 500mg BID  - f/u renal recs    #Hyponatremia  - stable; monitor BMP    GI  #Nutrition  NPO with PEG feeds.  - restarted feeds at 6pm 12/2      #Urinary Retention  - remove bourgeois if not chronic, TOV  - on home tamsulosin 0.4mg daily, discontinue if bourgeois remains    ID  #Aspiration PNA   Tracheal aspirate cx (11/19) growing serratia marcescens and pseudomonas. Patient was started on vancomycin and cefepime. MRSA nares positive. DC'ed vancomycin given supratherapeutic trough. Completed cefepime 2g q24 course x9 days.    #Stenotrophomonas in sputum cx  Sputum cx from 11/26 growing Stenotrophomonas  - c/w Bactrim BS for 10 days (11/28 - 12/5)    ENDOCRINE  #DM (diabetes mellitus).   Previously on lantus 15 lispro 5 at home. Endocrine following.  - c/w lantus 7 units  - c/w humulin 7 units q6h  - on low dose regular ISS  - goal -180  - f/u endo recs    #Hypothyroidism  TSH 20.8, free t4 0.7, T3 49 (low). Repeat TSH at 9, fT4 0.7  - on synthroid 50mcg IV qD    #Relative Adrenal Insufficiency  Patient on chronic prednisone 5mg daily. He was started on hydrocortisone 50mg q6hr --> q8 --> BID   - c/w hydrocortisone 25mg q12  - wean as tolerated  - f/u endocrine recs    HEME  #Normocytic Anemia  Likely 2.2 CKD. Hgb 6.9 on 11/21, corrected appropriately s/p 1u pRBC. No active signs of bleeding on physical exam. CTH with no intracranial bleeding. Required 2u pRBC to correct. DEBRA and stool guaic negative.  - consider epo, if BP remains well controlled  - active T+S  - transfuse <7    FLUIDS/ELECTROLYTES/NUTRITION  -IVF as above  -Monitor, careful w advanced kidney disease  -Diet: NPO with PEG feeds  -DVT ppx: heparin TID  -GI: protonix 40 qd  -Dispo: MICU; PT recommending NAVID

## 2022-12-07 NOTE — PROGRESS NOTE ADULT - ATTENDING COMMENTS
I agree with the fellow's findings and plans as written above with the following additions/amendments:    Seen and examined at bedside on HD, tolerating while on pressors, will likely meet goal UF. Continue HD as above, further recs as above

## 2022-12-07 NOTE — PROGRESS NOTE ADULT - SUBJECTIVE AND OBJECTIVE BOX
OVERNIGHT EVENTS:  tolerated CPAP o/n    SUBJECTIVE / INTERVAL HPI: Patient seen and examined at bedside.  In no apparent distress, somewhat more alert.    VITAL SIGNS:  Vital Signs Last 24 Hrs  T(C): 36.4 (07 Dec 2022 05:00), Max: 37.5 (07 Dec 2022 01:05)  T(F): 97.5 (07 Dec 2022 05:00), Max: 99.5 (07 Dec 2022 01:05)  HR: 74 (07 Dec 2022 14:00) (68 - 105)  BP: 116/50 (07 Dec 2022 14:00) (87/41 - 204/160)  BP(mean): 72 (07 Dec 2022 14:) (59 - 179)  RR: 20 (07 Dec 2022 14:) (16 - 34)  SpO2: 100% (07 Dec 2022 14:) (89% - 100%)    Parameters below as of 07 Dec 2022 14:00  Patient On (Oxygen Delivery Method): ventilator    O2 Concentration (%): 40  I&O's Summary    06 Dec 2022 07:01  -  07 Dec 2022 07:00  --------------------------------------------------------  IN: 1798.1 mL / OUT: 2745 mL / NET: -946.9 mL    07 Dec 2022 07:01  -  07 Dec 2022 14:23  --------------------------------------------------------  IN: 353.4 mL / OUT: 78 mL / NET: 275.4 mL        PHYSICAL EXAM:  General: NAD, lying in bed, alternatively sedated/agitated, non-verbal  HEENT: NC/AT; anicteric sclera  Neck: supple, trach tube in place  Respiratory: CTA B/L; no W/R/R  Cardiovascular: +S1/S2; RRR; no M/R/G  Gastrointestinal: soft, NT/ND; PEG tube in place, site in clean with no active bleeding  Extremities: WWP; 2+ peripheral pulses B/L; trace B/L LE edema and 3+ B/L UE edema  Skin: normal color and turgor; no rash  Neurological: sedated, spontaneously moving extremities, does follow commands when less sedated, turns to voice    MEDICATIONS:  MEDICATIONS  (STANDING):  albuterol/ipratropium for Nebulization 3 milliLiter(s) Nebulizer every 6 hours  aspirin  chewable 81 milliGRAM(s) Oral daily  atorvastatin 40 milliGRAM(s) Oral at bedtime  chlorhexidine 0.12% Liquid 15 milliLiter(s) Oral Mucosa every 12 hours  chlorhexidine 2% Cloths 1 Application(s) Topical <User Schedule>  dexMEDEtomidine Infusion 0.04 MICROgram(s)/kG/Hr (0.82 mL/Hr) IV Continuous <Continuous>  dextrose 5%. 1000 milliLiter(s) (50 mL/Hr) IV Continuous <Continuous>  dextrose 5%. 1000 milliLiter(s) (100 mL/Hr) IV Continuous <Continuous>  dextrose 5%. 1000 milliLiter(s) (50 mL/Hr) IV Continuous <Continuous>  dextrose 5%. 1000 milliLiter(s) (100 mL/Hr) IV Continuous <Continuous>  dextrose 50% Injectable 25 Gram(s) IV Push once  dextrose 50% Injectable 12.5 Gram(s) IV Push once  dextrose 50% Injectable 25 Gram(s) IV Push once  dextrose 50% Injectable 25 Gram(s) IV Push once  dextrose 50% Injectable 12.5 Gram(s) IV Push once  dextrose 50% Injectable 25 Gram(s) IV Push once  [id number]   Patch  50 MICROgram(s)/Hr. 1 Patch Transdermal every 48 hours  glucagon  Injectable 1 milliGRAM(s) IntraMuscular once  glucagon  Injectable 1 milliGRAM(s) IntraMuscular once  heparin   Injectable 5000 Unit(s) SubCutaneous every 8 hours  hydrocortisone sodium succinate Injectable 25 milliGRAM(s) IV Push every 12 hours  influenza  Vaccine (HIGH DOSE) 0.7 milliLiter(s) IntraMuscular once  insulin glargine Injectable (LANTUS) 7 Unit(s) SubCutaneous at bedtime  insulin regular  human corrective regimen sliding scale   SubCutaneous every 6 hours  insulin regular  human recombinant 8 Unit(s) SubCutaneous every 6 hours  levothyroxine Injectable 60 MICROGram(s) IV Push at bedtime  midodrine 10 milliGRAM(s) Oral every 8 hours  mycophenolate mofetil Suspension 500 milliGRAM(s) Oral every 12 hours  norepinephrine Infusion 0.05 MICROgram(s)/kG/Min (7.72 mL/Hr) IV Continuous <Continuous>  QUEtiapine 50 milliGRAM(s) Oral two times a day  sodium chloride 7% Inhalation 4 milliLiter(s) Inhalation every 12 hours  tacrolimus    0.5 mG/mL Suspension 3 milliGRAM(s) Oral every 24 hours  tacrolimus    0.5 mG/mL Suspension 2 milliGRAM(s) Oral every 24 hours  tamsulosin 0.4 milliGRAM(s) Oral at bedtime    MEDICATIONS  (PRN):  acetaminophen    Suspension .. 650 milliGRAM(s) Oral every 6 hours PRN Mild Pain (1 - 3)  dextrose Oral Gel 15 Gram(s) Oral once PRN Blood Glucose LESS THAN 70 milliGRAM(s)/deciliter  dextrose Oral Gel 15 Gram(s) Oral once PRN Blood Glucose LESS THAN 70 milliGRAM(s)/deciliter  sodium chloride 0.9% lock flush 10 milliLiter(s) IV Push every 1 hour PRN Pre/post blood products, medications, blood draw, and to maintain line patency      ALLERGIES:  Allergies    No Known Allergies    Intolerances        LABS:                        8.2    4.70  )-----------( 299      ( 07 Dec 2022 06:01 )             25.8     12-07    134<L>  |  98  |  102<H>  ----------------------------<  187<H>  4.8   |  22  |  4.25<H>    Ca    8.4      07 Dec 2022 06:01  Phos  4.4     12  Mg     2.3     12    TPro  5.2<L>  /  Alb  2.2<L>  /  TBili  0.2  /  DBili  x   /  AST  66<H>  /  ALT  70<H>  /  AlkPhos  98  12-07      Urinalysis Basic - ( 06 Dec 2022 03:02 )    Color: Yellow / Appearance: SL Cloudy / S.020 / pH: x  Gluc: x / Ketone: NEGATIVE  / Bili: Negative / Urobili: 0.2 E.U./dL   Blood: x / Protein: 30 mg/dL / Nitrite: NEGATIVE   Leuk Esterase: NEGATIVE / RBC: > 10 /HPF / WBC < 5 /HPF   Sq Epi: x / Non Sq Epi: 0-5 /HPF / Bacteria: Present /HPF      CAPILLARY BLOOD GLUCOSE      POCT Blood Glucose.: 153 mg/dL (07 Dec 2022 11:32)      RADIOLOGY & ADDITIONAL TESTS: Reviewed.

## 2022-12-07 NOTE — PROGRESS NOTE ADULT - ASSESSMENT
Patient is an 84 yo M with ESKD s/p transplant 2013 now CKD 4, glaucoma, DM1, Anemia, CAD, BPH presenting with decompensated heart failure c/b cardiac arrest on 11/26 now with ELIZABETH likely iATN post cardiac arrest    Problem/Plan:  #ELIZABETH on CKD Stage 4 likely iATN in the setting of cardiac arrest   Renal allograft with CKD 4 - baseline sCr 2.3-2.5, usual meds tacrolimus 4mg BID and mycophenolate 500mg BID. Started on HD in setting of worsening acidosis, uremia and hypervolemia.    Recs:  - c/w current tacro dosing; tacro level noted  - c/w cell cept 500mg BID  - Tacro level pending  -Please make sure pt having daily BM; start miralax and senna  - No alternative to bactrim per team, monitoring closely  - strict i's and o's  -Trend BMP daily  -maintain MAP >70   -HD today    #Metabolic Acidosis  -Resolving with HD    #Hyponatremia  2/2 ARF   -Resolving with HD    #Anemia of chronic disease  -Epo w/ HD

## 2022-12-07 NOTE — PROGRESS NOTE ADULT - ATTENDING COMMENTS
ATN s/p renal transplant, AF, aspiration pna, metabolic encephalopathy  physical as above  failed TC but ok on CPAP  continue precedex, seroquel  nepro feeds  follow tacrolimus levels  restart midodrine  following tacrolimus levels  decision making of high complexity

## 2022-12-07 NOTE — PROGRESS NOTE ADULT - ASSESSMENT
84 y/o M PMH CKD 4, renal transplant in 2013 at [facility], glaucoma (blind), DM1, anemia, CAD s/p 2 DAMEON to LAD in 6/21, BPH, recent admission for PNA presents with acute on chronic cough 2/2 PNA and SUNNY 2/2 acute CKD.     NEUROLOGY  #Metabolic Encephalopathy  #Sedation  #Agitation  CT scan negative for acute intracranial pathology. Began to wean sedation on 12/2.  - c/w fentanyl 25mcg patch  - on precedex, weaning as tolerated  - on seroquel 25mg q12h  - receives 25mg IVPB of fentanyl for agitation, attempting to wean    CARDIOVASCULAR  # Fluid Overloaded  - S/p Metolazone 10 and Lasix 80 for fluid overload status 12/5  - started on HD on 12/6, removed 2L    #Hypotension  - on midodrine 10 q8 (started on 12/7)    #HFpEF  Had EF of 55% on TTE from Oct 2022, grade I diastolic dysfunction. Repeat TTE 11/17 with poor visualization of LV. Patient making good UOP overnight  - holding BP meds    #Upper extremity edema  Stable from yesterday but increased compared to baseline. US with superficial thrombosis of L cephalic vein extending to AC fossa, no DVTs.   - given patient's propensity to bleed and superficial nature of thrombus, no intervention  -s/p Metolazone 10 and Lasix 80 12/5    #CAD  #HLD  Hx of CAD s/p 2 DAMEON to LAD in June 2021, currently no CP. Trops 0.03 but no CP or ischemic changes on EKG, likely due to CKD. Takes Plavix and aspirin per last d/c but only aspirin on outpatient note  - c/w ASA daily  - c/w home Lipitor    #HTN   Known history of HTN.   - holding hypertension meds as pt currently requiring pressor support  - continue to monitor BP    PULM  #AHRF  Likely 2/2 aspiration event. Intubated on 11/26.  - ID management as below for aspiration   - Received trach on 12/1  - tolerated CPAP o/n 12/6-7  - trach collar attempt 12/7 am unsuccessful will continue to attempt    RENAL  #ELIZABETH on Chronic kidney disease (CKD)  Baseline sCr 2.3-2.5. On admission, fluid overloaded. Does have orthopnea and intermittent SOB at baseline. Acidotic but at baseline. Follows w Dr. Mac.  - sCr and BUN increasing daily, urine output poor  - monitor I/Os  - renally dosing meds  - s/p sodium bicarb 150 rate 60cc/hr, discontinued on 12/6  - initiated HD 12/6, removed 2L fluid    #Renal transplant  Patient had renal transplant in 2013. On home mycophenolate 500mg BID and tacrolimus 4mg BID. Per nephro, decreased home tacrolimus from 4mg BID --> 2mg BID while in hospital.   - c/w tacrolimus 3mg in AM, c/w 2mg in PM, levels daily  - c/w mycophenolate 500mg BID  - f/u renal recs    #Hyponatremia  - stable; monitor BMP    GI  #Nutrition  NPO with PEG feeds.  - restarted feeds at 6pm 12/2      #Urinary Retention  - remove bourgeois if not chronic, TOV  - on home tamsulosin 0.4mg daily, discontinue if bourgeois remains    ID  #Aspiration PNA  RESOLVED  Tracheal aspirate cx (11/19) growing serratia marcescens and pseudomonas. Patient was started on vancomycin and cefepime. MRSA nares positive. DC'ed vancomycin given supratherapeutic trough. Completed cefepime 2g q24 course x9 days.    #Stenotrophomonas in sputum cx RESOLVED  Sputum cx from 11/26 growing Stenotrophomonas  - completed course of Bactrim BS for 10 days (11/28 - 12/5)    ENDOCRINE  #DM (diabetes mellitus).   Previously on lantus 15 lispro 5 at home. Endocrine following.  - c/w lantus 7 units  - c/w humulin 7 units q6h  - on low dose regular ISS  - goal -180  - f/u endo recs    #Hypothyroidism  TSH 20.8, free t4 0.7, T3 49 (low). Repeat TSH at 9, fT4 0.7  - on synthroid 50mcg IV qD    #Relative Adrenal Insufficiency  Patient on chronic prednisone 5mg daily. He was started on hydrocortisone 50mg q6hr --> q8 --> BID   - c/w hydrocortisone 25mg q12  - wean as tolerated  - f/u endocrine recs    HEME  #Normocytic Anemia  Likely 2.2 CKD. Hgb 6.9 on 11/21, corrected appropriately s/p 1u pRBC. No active signs of bleeding on physical exam. CTH with no intracranial bleeding. Required 2u pRBC to correct. DEBRA and stool guaic negative.  - consider epo, if BP remains well controlled  - active T+S  - transfuse <7    FLUIDS/ELECTROLYTES/NUTRITION  -IVF as above  -Monitor, careful w advanced kidney disease  -Diet: NPO with PEG feeds  -DVT ppx: heparin TID  -GI: protonix 40 qd  -Dispo: MICU; PT recommending NAVID

## 2022-12-07 NOTE — PROGRESS NOTE ADULT - ATTENDING COMMENTS
Pt seen on rounds this afternoon.  Levophed discontinued, but then restarted for his HD.  Sedation restarted due to agitation, though he was still intermittently restless during our visit.  Glucose again dropped at 10 PM last night, but only to 86 mg%.  He was given his Lantus, but the MN regular insulin was again held, and as occurred the night before, he chris to the 170 range at 6 AM.  On exam he is sedated (did not try to arouse).  HD in progress.  Lungs still with coarse rhonchi bilaterally, some transmitted upper airway sounds.  Abdomen obese, soft, not apparently tender.  LE edema significantly decreased--now 1+  --will leave the Lantus at 7 units, decrease the regular further to 6 units q6h  --Continue hydrocortisone and levothyroxine as before.

## 2022-12-07 NOTE — PROGRESS NOTE ADULT - SUBJECTIVE AND OBJECTIVE BOX
Interval Events: Reviewed  Patient seen and examined at bedside.    Patient is a 82y old  Male who presents with a chief complaint of cough 2/2 PNA (04 Dec 2022 05:36)    awake and agitated  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      History of renal transplant  secondary to DM      Chronic kidney disease (CKD)      Glaucoma      Kidney transplant recipient      Amputation of toe  x 3      H/O heart artery stent          MEDICATIONS:  Pulmonary:  albuterol/ipratropium for Nebulization 3 milliLiter(s) Nebulizer every 6 hours  sodium chloride 7% Inhalation 4 milliLiter(s) Inhalation every 12 hours    Antimicrobials:    Anticoagulants:  aspirin  chewable 81 milliGRAM(s) Oral daily  heparin   Injectable 5000 Unit(s) SubCutaneous every 8 hours    Cardiac:  midodrine 10 milliGRAM(s) Oral every 8 hours  norepinephrine Infusion 0.05 MICROgram(s)/kG/Min IV Continuous <Continuous>      Allergies    No Known Allergies    Intolerances        Vital Signs Last 24 Hrs  T(C): 36.7 (07 Dec 2022 18:05), Max: 37.5 (07 Dec 2022 01:05)  T(F): 98 (07 Dec 2022 18:05), Max: 99.5 (07 Dec 2022 01:05)  HR: 87 (07 Dec 2022 19:00) (68 - 105)  BP: 108/39 (07 Dec 2022 19:00) (87/41 - 204/160)  BP(mean): 57 (07 Dec 2022 19:00) (57 - 179)  RR: 24 (07 Dec 2022 19:00) (16 - 34)  SpO2: 99% (07 Dec 2022 19:00) (89% - 100%)    Parameters below as of 07 Dec 2022 19:00  Patient On (Oxygen Delivery Method): ventilator    O2 Concentration (%): 40     @ 07:  -   @ 07:00  --------------------------------------------------------  IN: 1798.1 mL / OUT: 2745 mL / NET: -946.9 mL     @ 07:  -  12-07 @ 19:11  --------------------------------------------------------  IN: 1290.8 mL / OUT: 3163 mL / NET: -1872.2 mL      Mode: AC/ CMV (Assist Control/ Continuous Mandatory Ventilation)  RR (machine): 20  TV (machine): 450  FiO2: 40  PEEP: 5  ITime: 1  MAP: 9.9  PIP: 21      Review of Systems:   •	General: negative  •	Skin/Breast: negative  •	Ophthalmologic: negative  •	ENMT: negative  •	Respiratory and Thorax: negative  •	Cardiovascular: negative  •	Gastrointestinal: negative  •	Genitourinary: negative  •	Musculoskeletal: negative  •	Neurological: negative  •	Psychiatric: negative  •	Hematology/Lymphatics: negative  •	Endocrine: negative  •	Allergic/Immunologic: negative    Physical Exam:   • Constitutional:	refer to the dietion /Nutritionist note  • Eyes:	EOMI; PERRL; no drainage or redness  • ENMT:	No oral lesions; no gross abnormalities  • Neck	No bruits; no thyromegaly or nodules  • Breasts:	not examined  • Back:	No deformity or limitation of movement  • Respiratory:	Breath Sounds equal & clear to percussion & auscultation, no accessory muscle use  • Cardiovascular:	Regular rate & rhythm, normal S1, S2; no murmurs, gallops or rubs; no S3, S4  • Gastrointestinal:	Soft, non-tender, no hepatosplenomegaly, normal bowel sounds  • Genitourinary:	not examined  • Rectal: not examined  • Extremities:	No cyanosis, clubbing or edema  • Vascular:	Equal and normal pulses (carotid, femoral, dorsalis pedis)  • Neurologica:l	not examined  • Skin:	No lesions; no rash  • Lymph Nodes:	No lymphadedenopathy  • Musculoskeletal:	No joint pain, swelling or deformity; no limitation of movement        LABS:      CBC Full  -  ( 07 Dec 2022 06:01 )  WBC Count : 4.70 K/uL  RBC Count : 3.04 M/uL  Hemoglobin : 8.2 g/dL  Hematocrit : 25.8 %  Platelet Count - Automated : 299 K/uL  Mean Cell Volume : 84.9 fl  Mean Cell Hemoglobin : 27.0 pg  Mean Cell Hemoglobin Concentration : 31.8 gm/dL  Auto Neutrophil # : 3.65 K/uL  Auto Lymphocyte # : 0.43 K/uL  Auto Monocyte # : 0.43 K/uL  Auto Eosinophil # : 0.11 K/uL  Auto Basophil # : 0.00 K/uL  Auto Neutrophil % : 77.8 %  Auto Lymphocyte % : 9.1 %  Auto Monocyte % : 9.1 %  Auto Eosinophil % : 2.3 %  Auto Basophil % : 0.0 %        134<L>  |  98  |  102<H>  ----------------------------<  187<H>  4.8   |  22  |  4.25<H>    Ca    8.4      07 Dec 2022 06:01  Phos  4.4       Mg     2.3         TPro  5.2<L>  /  Alb  2.2<L>  /  TBili  0.2  /  DBili  x   /  AST  66<H>  /  ALT  70<H>  /  AlkPhos  98            Urinalysis Basic - ( 06 Dec 2022 03:02 )    Color: Yellow / Appearance: SL Cloudy / S.020 / pH: x  Gluc: x / Ketone: NEGATIVE  / Bili: Negative / Urobili: 0.2 E.U./dL   Blood: x / Protein: 30 mg/dL / Nitrite: NEGATIVE   Leuk Esterase: NEGATIVE / RBC: > 10 /HPF / WBC < 5 /HPF   Sq Epi: x / Non Sq Epi: 0-5 /HPF / Bacteria: Present /HPF                  RADIOLOGY & ADDITIONAL STUDIES (The following images were personally reviewed):

## 2022-12-07 NOTE — PROGRESS NOTE ADULT - PROBLEM SELECTOR PLAN 1
Event noticed.  The patient is sedated.  I suctioned the patient copious amount of secretion.  Gas exchange is stable.  Sputum grew Pseudomonas and Serratia.  The patient on appropriate antibiotic.  Continue pulmonary toilet.  Patient has baseline dementia and does not and trial up off sedation.  Tapers pressors as needed.  Cortisol level is adequate.  I discussed the case with critical care. She was extubated.  Patient is on nasal cannula.  Suction the patient copious amount of secretion.  Dysphagia evaluation.  Continue antibiotic.  The patient is hemodynamically stable off pressors and sedative.  Suctioned the patient thick moderate secretion and increased.  He failed swallow eval and will need a peg.  On nebs.  He was intubated.  Sputum GS revealed GNR and culture positive for sternomonas.  Abt changed to bactrim.  Monitor renal function on abt which is worsening.  Plan trach tomorrow.  Peg done . Is in detail with the family.  The patient is off antibiotic.  The renal function is deteriorating.He is scheduled for HD with removal of 2.5 l.  He is tolerating PSV.  No thoracentesis required at this point

## 2022-12-07 NOTE — PROGRESS NOTE ADULT - SUBJECTIVE AND OBJECTIVE BOX
OVERNIGHT: No acute events overnight.   SUBJECTIVE / INTERVAL HPI: Patient was seen and examined this morning. He was started on fentanyl and precedex due to agitation. Levophed was discontinued around 11 AM today due to hypertension. He continues to receive hydrocortisone 25 mg every 12 hours. Tube feeds have been running without interruptions (Nepro, 43 mL/hr). Urinary output was 345 mL over the past 24 hours. He had 2 liters removed yesterday during hemodialysis session yesterday, now with plans to remove another 2.5 liters later today.     CAPILLARY BLOOD GLUCOSE & INSULIN RECEIVED  Yesterday  - 6 AM FS mg/dL = 9 units of regular insulin + 1 units of sliding scale.    - 12 PM FS mg/dL = 9 units of regular insulin + 1 units of sliding scale.    - 6 PM FS mg/dL = 8 units of regular insulin.  - 10 PM FS mg/dL = 7 units of Lantus.    Today  - 6 AM FS mg/dL = 8 units of regular insulin + 1 units of sliding scale.    - 12 PM FS mg/dL = 8 units of regular insulin + 1 units of sliding scale.     REVIEW OF SYSTEMS  Unable to obtain.     PHYSICAL EXAM  Vital Signs Last 24 Hrs  T(C): 36.4 (07 Dec 2022 05:00), Max: 37.5 (07 Dec 2022 01:05)  T(F): 97.5 (07 Dec 2022 05:00), Max: 99.5 (07 Dec 2022 01:05)  HR: 105 (07 Dec 2022 12:00) (68 - 105)  BP: 103/51 (07 Dec 2022 12:00) (87/41 - 170/129)  BP(mean): 73 (07 Dec 2022 12:00) (59 - 144)  RR: 25 (07 Dec 2022 12:00) (16 - 34)  SpO2: 96% (07 Dec 2022 12:00) (89% - 100%)    Parameters below as of 07 Dec 2022 12:00  Patient On (Oxygen Delivery Method): tracheostomy collar    O2 Concentration (%): 40    Constitutional: Awake, alert, elderly male, on mechanical ventilation via tracheostomy.   HEENT: Normocephalic, atraumatic, GORDON.  Respiratory: (+) Bilateral rhonchi. (+) Decreased breath sounds over left lung fields. (+) Mild wheezing.   Cardiovascular: regular rhythm, normal S1 and S2, no audible murmurs.   GI: soft, non-tender, non-distended, bowel sounds present.  Extremities: +2 bilateral lower extremity edema.    LABS  CBC - WBC/HGB/HTC/PLT: 4.70/8.2/25.8/299 (22)  BMP - Na/K/Cl/Bicarb/BUN/Cr/Gluc/AG/eGFR: 134/4.8/98/22/102/4.25/187/14/13 (22)  Ca - 8.4 (22)  Phos - 4.4 (22)  Mg - 2.3 (22)  LFT - Alb/Tprot/Tbili/Dbili/AlkPhos/ALT/AST: 2.2/--/0.2/--/98/70/66 (22)  PT/aPTT/INR: 12.5/34.8/1.05 (22)   Thyroid Stimulating Hormone, Serum: 11.170 (22)  Total T4/Free T4: --/0.561 (22)    MEDICATIONS  MEDICATIONS  (STANDING):  albuterol/ipratropium for Nebulization 3 milliLiter(s) Nebulizer every 6 hours  aspirin  chewable 81 milliGRAM(s) Oral daily  atorvastatin 40 milliGRAM(s) Oral at bedtime  chlorhexidine 0.12% Liquid 15 milliLiter(s) Oral Mucosa every 12 hours  chlorhexidine 2% Cloths 1 Application(s) Topical <User Schedule>  dexMEDEtomidine Infusion 0.04 MICROgram(s)/kG/Hr (0.82 mL/Hr) IV Continuous <Continuous>  dextrose 5%. 1000 milliLiter(s) (50 mL/Hr) IV Continuous <Continuous>  dextrose 5%. 1000 milliLiter(s) (100 mL/Hr) IV Continuous <Continuous>  dextrose 5%. 1000 milliLiter(s) (50 mL/Hr) IV Continuous <Continuous>  dextrose 5%. 1000 milliLiter(s) (100 mL/Hr) IV Continuous <Continuous>  dextrose 50% Injectable 25 Gram(s) IV Push once  dextrose 50% Injectable 12.5 Gram(s) IV Push once  dextrose 50% Injectable 25 Gram(s) IV Push once  dextrose 50% Injectable 25 Gram(s) IV Push once  dextrose 50% Injectable 12.5 Gram(s) IV Push once  dextrose 50% Injectable 25 Gram(s) IV Push once  fentaNYL   Patch  50 MICROgram(s)/Hr. 1 Patch Transdermal every 48 hours  glucagon  Injectable 1 milliGRAM(s) IntraMuscular once  glucagon  Injectable 1 milliGRAM(s) IntraMuscular once  heparin   Injectable 5000 Unit(s) SubCutaneous every 8 hours  hydrocortisone sodium succinate Injectable 25 milliGRAM(s) IV Push every 12 hours  influenza  Vaccine (HIGH DOSE) 0.7 milliLiter(s) IntraMuscular once  insulin glargine Injectable (LANTUS) 7 Unit(s) SubCutaneous at bedtime  insulin regular  human corrective regimen sliding scale   SubCutaneous every 6 hours  insulin regular  human recombinant 8 Unit(s) SubCutaneous every 6 hours  levothyroxine Injectable 60 MICROGram(s) IV Push at bedtime  midodrine 10 milliGRAM(s) Oral every 8 hours  mycophenolate mofetil Suspension 500 milliGRAM(s) Oral every 12 hours  norepinephrine Infusion 0.05 MICROgram(s)/kG/Min (7.72 mL/Hr) IV Continuous <Continuous>  QUEtiapine 50 milliGRAM(s) Oral two times a day  sodium chloride 7% Inhalation 4 milliLiter(s) Inhalation every 12 hours  tacrolimus    0.5 mG/mL Suspension 3 milliGRAM(s) Oral every 24 hours  tacrolimus    0.5 mG/mL Suspension 2 milliGRAM(s) Oral every 24 hours  tamsulosin 0.4 milliGRAM(s) Oral at bedtime    MEDICATIONS  (PRN):  acetaminophen    Suspension .. 650 milliGRAM(s) Oral every 6 hours PRN Mild Pain (1 - 3)  dextrose Oral Gel 15 Gram(s) Oral once PRN Blood Glucose LESS THAN 70 milliGRAM(s)/deciliter  dextrose Oral Gel 15 Gram(s) Oral once PRN Blood Glucose LESS THAN 70 milliGRAM(s)/deciliter  sodium chloride 0.9% lock flush 10 milliLiter(s) IV Push every 1 hour PRN Pre/post blood products, medications, blood draw, and to maintain line patency    ASSESSMENT / RECOMMENDATIONS  Mr. Miguel is an 83-year-old male with type 2 diabetes mellitus, coronary artery disease (s/p DAMEON x2 to LAD on 2021), chronic kidney disease stage 4 (s/p renal transplant on , on tracrolimus and prednisone) and benign prostatic hyperplasia who was sent to the hospital by his nephrologist for further evaluation of lower extremity edema (22). He was admitted for further management of lower extremity edema, thought to be secondary to his underlying CKD versus congestive heart failure. Hospitalization was complicated by hypoglycemia and cardiac arrest (22, ROSC after 1 minute of chest compressions). Post-cardiac arrest he was noted to develop hypotension, bradycardia and hypothermia. Labs were remarkable for elevated TSH (20) and decreased FT4 (0.77). 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     Although he had features seen with myxedema coma, these findings were better explained by his cardiac arrest as his vital signs prior to the arrest were apparently around his baseline, as was his mental status. Nonetheless, he was started on levothyroxine 25 mcg IV daily (22) which has been titrated up to 50 mcg daily (22). Nonetheless, his thyroid function tests show that his Free T4 remains low (0.56) with an elevated TSH (11.1). In addition, given concern for adrenal insufficiency, he was started on stress-dose steroids which have been titrated down to 25 mg every 12 hours. The patient was extubated on 22; however, he had to be reintubated on 22 due to episode of respiratory decompensation, potentially secondary to aspiration. After reintubation, his insulin requirements have increased, presumably due to underlying aspiration pneumonia. He is now s/p trach and peg, receiving tube feeds with Nepro at 43 mL/hr with no reported interruptions.     A1C: 9.0 %  BUN: 102  Creatinine: 4.25  GFR: 13  Weight: 82.3  BMI: 28.4  EF: 65%, grade II diastolic dysfunction.    ***    Case discussed with Dr. Rabago. Primary team updated.       Ulysses Weber    Endocrinology Fellow    Service Pager: [phone]    OVERNIGHT: No acute events overnight.   SUBJECTIVE / INTERVAL HPI: Patient was seen and examined this morning. He was started on fentanyl and precedex due to agitation. Levophed was discontinued around 11 AM today due to hypertension. He continues to receive hydrocortisone 25 mg every 12 hours. Tube feeds have been running without interruptions (Nepro, 43 mL/hr). Urinary output was 345 mL over the past 24 hours. He had 2 liters removed yesterday during hemodialysis session yesterday, now with plans to remove another 2.5 liters later today.     CAPILLARY BLOOD GLUCOSE & INSULIN RECEIVED  Yesterday  - 6 AM FS mg/dL = 9 units of regular insulin + 1 units of sliding scale.    - 12 PM FS mg/dL = 9 units of regular insulin + 1 units of sliding scale.    - 6 PM FS mg/dL = 8 units of regular insulin.  - 10 PM FS mg/dL = 7 units of Lantus.    Today  - 6 AM FS mg/dL = 8 units of regular insulin + 1 units of sliding scale.    - 12 PM FS mg/dL = 8 units of regular insulin + 1 units of sliding scale.     REVIEW OF SYSTEMS  Unable to obtain.     PHYSICAL EXAM  Vital Signs Last 24 Hrs  T(C): 36.4 (07 Dec 2022 05:00), Max: 37.5 (07 Dec 2022 01:05)  T(F): 97.5 (07 Dec 2022 05:00), Max: 99.5 (07 Dec 2022 01:05)  HR: 105 (07 Dec 2022 12:00) (68 - 105)  BP: 103/51 (07 Dec 2022 12:00) (87/41 - 170/129)  BP(mean): 73 (07 Dec 2022 12:00) (59 - 144)  RR: 25 (07 Dec 2022 12:00) (16 - 34)  SpO2: 96% (07 Dec 2022 12:00) (89% - 100%)    Parameters below as of 07 Dec 2022 12:00  Patient On (Oxygen Delivery Method): tracheostomy collar    O2 Concentration (%): 40    Constitutional: Awake, alert, elderly male, on mechanical ventilation via tracheostomy.   HEENT: Normocephalic, atraumatic, GORDON.  Respiratory: (+) Bilateral rhonchi. (+) Decreased breath sounds over left lung fields. (+) Mild wheezing.   Cardiovascular: regular rhythm, normal S1 and S2, no audible murmurs.   GI: soft, non-tender, non-distended, bowel sounds present.  Extremities: +2 bilateral lower extremity edema.    LABS  CBC - WBC/HGB/HTC/PLT: 4.70/8.2/25.8/299 (22)  BMP - Na/K/Cl/Bicarb/BUN/Cr/Gluc/AG/eGFR: 134/4.8/98/22/102/4.25/187/14/13 (22)  Ca - 8.4 (22)  Phos - 4.4 (22)  Mg - 2.3 (22)  LFT - Alb/Tprot/Tbili/Dbili/AlkPhos/ALT/AST: 2.2/--/0.2/--/98/70/66 (22)  PT/aPTT/INR: 12.5/34.8/1.05 (22)   Thyroid Stimulating Hormone, Serum: 11.170 (22)  Total T4/Free T4: --/0.561 (22)    MEDICATIONS  MEDICATIONS  (STANDING):  albuterol/ipratropium for Nebulization 3 milliLiter(s) Nebulizer every 6 hours  aspirin  chewable 81 milliGRAM(s) Oral daily  atorvastatin 40 milliGRAM(s) Oral at bedtime  chlorhexidine 0.12% Liquid 15 milliLiter(s) Oral Mucosa every 12 hours  chlorhexidine 2% Cloths 1 Application(s) Topical <User Schedule>  dexMEDEtomidine Infusion 0.04 MICROgram(s)/kG/Hr (0.82 mL/Hr) IV Continuous <Continuous>  dextrose 5%. 1000 milliLiter(s) (50 mL/Hr) IV Continuous <Continuous>  dextrose 5%. 1000 milliLiter(s) (100 mL/Hr) IV Continuous <Continuous>  dextrose 5%. 1000 milliLiter(s) (50 mL/Hr) IV Continuous <Continuous>  dextrose 5%. 1000 milliLiter(s) (100 mL/Hr) IV Continuous <Continuous>  dextrose 50% Injectable 25 Gram(s) IV Push once  dextrose 50% Injectable 12.5 Gram(s) IV Push once  dextrose 50% Injectable 25 Gram(s) IV Push once  dextrose 50% Injectable 25 Gram(s) IV Push once  dextrose 50% Injectable 12.5 Gram(s) IV Push once  dextrose 50% Injectable 25 Gram(s) IV Push once  fentaNYL   Patch  50 MICROgram(s)/Hr. 1 Patch Transdermal every 48 hours  glucagon  Injectable 1 milliGRAM(s) IntraMuscular once  glucagon  Injectable 1 milliGRAM(s) IntraMuscular once  heparin   Injectable 5000 Unit(s) SubCutaneous every 8 hours  hydrocortisone sodium succinate Injectable 25 milliGRAM(s) IV Push every 12 hours  influenza  Vaccine (HIGH DOSE) 0.7 milliLiter(s) IntraMuscular once  insulin glargine Injectable (LANTUS) 7 Unit(s) SubCutaneous at bedtime  insulin regular  human corrective regimen sliding scale   SubCutaneous every 6 hours  insulin regular  human recombinant 8 Unit(s) SubCutaneous every 6 hours  levothyroxine Injectable 60 MICROGram(s) IV Push at bedtime  midodrine 10 milliGRAM(s) Oral every 8 hours  mycophenolate mofetil Suspension 500 milliGRAM(s) Oral every 12 hours  norepinephrine Infusion 0.05 MICROgram(s)/kG/Min (7.72 mL/Hr) IV Continuous <Continuous>  QUEtiapine 50 milliGRAM(s) Oral two times a day  sodium chloride 7% Inhalation 4 milliLiter(s) Inhalation every 12 hours  tacrolimus    0.5 mG/mL Suspension 3 milliGRAM(s) Oral every 24 hours  tacrolimus    0.5 mG/mL Suspension 2 milliGRAM(s) Oral every 24 hours  tamsulosin 0.4 milliGRAM(s) Oral at bedtime    MEDICATIONS  (PRN):  acetaminophen    Suspension .. 650 milliGRAM(s) Oral every 6 hours PRN Mild Pain (1 - 3)  dextrose Oral Gel 15 Gram(s) Oral once PRN Blood Glucose LESS THAN 70 milliGRAM(s)/deciliter  dextrose Oral Gel 15 Gram(s) Oral once PRN Blood Glucose LESS THAN 70 milliGRAM(s)/deciliter  sodium chloride 0.9% lock flush 10 milliLiter(s) IV Push every 1 hour PRN Pre/post blood products, medications, blood draw, and to maintain line patency    ASSESSMENT / RECOMMENDATIONS  Mr. Miguel is an 83-year-old male with type 2 diabetes mellitus, coronary artery disease (s/p DAMEON x2 to LAD on 2021), chronic kidney disease stage 4 (s/p renal transplant on , on tracrolimus and prednisone) and benign prostatic hyperplasia who was sent to the hospital by his nephrologist for further evaluation of lower extremity edema (22). He was admitted for further management of lower extremity edema, thought to be secondary to his underlying CKD versus congestive heart failure. Hospitalization was complicated by hypoglycemia and cardiac arrest (22, ROSC after 1 minute of chest compressions). Post-cardiac arrest he was noted to develop hypotension, bradycardia and hypothermia. Labs were remarkable for elevated TSH (20) and decreased FT4 (0.77). 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     Although he had features seen with myxedema coma, these findings were better explained by his cardiac arrest as his vital signs prior to the arrest were apparently around his baseline, as was his mental status. Nonetheless, he was started on levothyroxine 25 mcg IV daily (22) which has been titrated up to 50 mcg daily (22). Nonetheless, his thyroid function tests show that his Free T4 remains low (0.56) with an elevated TSH (11.1). In addition, given concern for adrenal insufficiency, he was started on stress-dose steroids which have been titrated down to 25 mg every 12 hours. The patient was extubated on 22; however, he had to be reintubated on 22 due to episode of respiratory decompensation, potentially secondary to aspiration. After reintubation, his insulin requirements have increased, presumably due to underlying aspiration pneumonia. He is now s/p trach and peg, receiving tube feeds with Nepro at 43 mL/hr with no reported interruptions.     A1C: 9.0 %  BUN: 102  Creatinine: 4.25  GFR: 13  Weight: 82.3  BMI: 28.4  EF: 65%, grade II diastolic dysfunction.    # Type 2 diabetes mellitus  - He had a low-normal blood glucose at 10 PM and his regular insulin dose was held. He needs less insulin than what he's currently receiving.   - Please continue with Lantus 7 units at bedtime.  - Decrease regular insulin to 6 units every 6 hours while on tube feeds (at goal of 43 mL/hr).  - Continue regular insulin sliding scale low dose every 6 hours.   - Patient's fingerstick glucose goal is 100-180 mg/dL.    # Hypothyroidism   - TSH 20. FT4 0.77 (22) -> Started on levothyroxine 25 mcg IV daily.   - TSH 9.2. FT4 0.69 (22) -> Increased to levothyroxine 50 mcg IV daily.   - TSH 11.1. FT4 0.56. (22) -> Increased to levothyroxine 60 mcg IV daily.   - Continue with levothyroxine to 60 mcg IV daily for now.     # Concern for adrenal insufficiency   - The patient was on prednisone 5 mg daily for renal transplant prior to admission.   - He had recent hospitalization (2022) for pneumonia due to pseudomonas where he was also started on stress dose steroids. At that time, a cortisol level was collected prior to starting steroids which was not robust (6.5 ug/dL).   - Continue with hydrocortisone 25 mg IV every 12 hours for now. However, would leave up to discretion of primary team to increase even higher if septic shock is suspected.    Case discussed with Dr. Rabago. Primary team updated.       Ulysses Weber    Endocrinology Fellow    Service Pager: [phone]

## 2022-12-07 NOTE — PROGRESS NOTE ADULT - SUBJECTIVE AND OBJECTIVE BOX
Patient is a 82y Male seen and evaluated at bedside, awake this AM off sedation appears agitated. Team to restart precedex. Tolerated Hd yesterday with 2L of UF, will aim for 2.5L today.       Meds:    acetaminophen    Suspension .. 650 every 6 hours PRN  albuterol/ipratropium for Nebulization 3 every 6 hours  aspirin  chewable 81 daily  atorvastatin 40 at bedtime  chlorhexidine 0.12% Liquid 15 every 12 hours  chlorhexidine 2% Cloths 1 <User Schedule>  dextrose 5%. 1000 <Continuous>  dextrose 5%. 1000 <Continuous>  dextrose 5%. 1000 <Continuous>  dextrose 5%. 1000 <Continuous>  dextrose 50% Injectable 25 once  dextrose 50% Injectable 12.5 once  dextrose 50% Injectable 25 once  dextrose 50% Injectable 25 once  dextrose 50% Injectable 12.5 once  dextrose 50% Injectable 25 once  dextrose Oral Gel 15 once PRN  dextrose Oral Gel 15 once PRN  fentaNYL   Patch  50 MICROgram(s)/Hr. 1 every 48 hours  glucagon  Injectable 1 once  glucagon  Injectable 1 once  heparin   Injectable 5000 every 8 hours  hydrocortisone sodium succinate Injectable 25 every 12 hours  influenza  Vaccine (HIGH DOSE) 0.7 once  insulin glargine Injectable (LANTUS) 7 at bedtime  insulin regular  human corrective regimen sliding scale  every 6 hours  insulin regular  human recombinant 8 every 6 hours  levothyroxine Injectable 60 at bedtime  midodrine 10 every 8 hours  mycophenolate mofetil Suspension 500 every 12 hours  norepinephrine Infusion 0.05 <Continuous>  pantoprazole  Injectable 40 daily  QUEtiapine 50 two times a day  sodium chloride 0.9% lock flush 10 every 1 hour PRN  sodium chloride 7% Inhalation 4 every 12 hours  tacrolimus    0.5 mG/mL Suspension 3 every 24 hours  tacrolimus    0.5 mG/mL Suspension 2 every 24 hours  tamsulosin 0.4 at bedtime      T(C): , Max: 37.5 (22 @ 01:05)  T(F): , Max: 99.5 (22 @ 01:05)  HR: 100 (22 @ 10:00)  BP: 89/46 (22 @ 10:00)  BP(mean): 63 (22 @ 10:00)  RR: 25 (22 @ 10:00)  SpO2: 100% (22 @ 10:00)  Wt(kg): --     @ 07:01  -   @ 07:00  --------------------------------------------------------  IN: 1798.1 mL / OUT: 2745 mL / NET: -946.9 mL     @ 07:01  -   @ 10:29  --------------------------------------------------------  IN: 86 mL / OUT: 13 mL / NET: 73 mL      Review of Systems:  ROS negative except as per HPI      PHYSICAL EXAM:  GENERAL: trach collar  NECK: supple, No JVD  CHEST/LUNG: mechanical breath sounds BL  HEART: normal S1S2, RRR  ABDOMEN: Soft, Nontender, +BS, No flank tenderness bilateral  EXTREMITIES: Remains grossly anasarca 2+ weeping pitting edema b/l LE and UE  ACCESS: R radiocephalic AVF w/ palpable thrill       LABS:                        8.2    4.70  )-----------( 299      ( 07 Dec 2022 06:01 )             25.8         134<L>  |  98  |  102<H>  ----------------------------<  187<H>  4.8   |  22  |  4.25<H>    Ca    8.4      07 Dec 2022 06:01  Phos  4.4       Mg     2.3         TPro  5.2<L>  /  Alb  2.2<L>  /  TBili  0.2  /  DBili  x   /  AST  66<H>  /  ALT  70<H>  /  AlkPhos  98      Hepatitis B Surface Antibody: Nonreact ( @ 12:20)  Hepatitis C Virus S/CO Ratio: 0.04 S/CO ( @ 12:20)      Urinalysis Basic - ( 06 Dec 2022 03:02 )    Color: Yellow / Appearance: SL Cloudy / S.020 / pH: x  Gluc: x / Ketone: NEGATIVE  / Bili: Negative / Urobili: 0.2 E.U./dL   Blood: x / Protein: 30 mg/dL / Nitrite: NEGATIVE   Leuk Esterase: NEGATIVE / RBC: > 10 /HPF / WBC < 5 /HPF   Sq Epi: x / Non Sq Epi: 0-5 /HPF / Bacteria: Present /HPF      Sodium, Random Urine: 81 mmol/L ( @ 03:02)  Creatinine, Random Urine: 21 mg/dL ( @ 03:02)        RADIOLOGY & ADDITIONAL STUDIES:        Hemoglobin: 8.2 g/dL (22 @ 06:01)  Phosphorus Level, Serum: 4.4 mg/dL (22 @ 06:01)  Hemoglobin: 8.7 g/dL (22 @ 06:30)  Phosphorus Level, Serum: 4.9 mg/dL (22 @ 06:30)    Albumin, Serum: 2.2 g/dL (22 @ 06:01)  Hepatitis B Surface Antibody: Nonreact (22 @ 12:20)    T(C): 36.4 (22 @ 05:00), Max: 37.5 (22 @ 01:05)  HR: 100 (22 @ 10:00) (68 - 100)  BP: 89/46 (22 @ 10:00) (45/22 - 136/56)  RR: 25 (22 @ 10:00) (10 - 30)  SpO2: 100% (22 @ 10:00) (89% - 100%)  epoetin gui-epbx (RETACRIT) Injectable 8000 Unit(s) IV Push once, 22 @ 10:51, Routine, Stop order after: 1 Doses      Hemodialysis Treatment.:     Schedule: Once, Modality: Hemodialysis, Access: Arteriovenous Fistula    Dialyzer: Optiflux [id number], Time: 150 Min    Blood Flow: 400 mL/Min , Dialysate Flow: 500 mL/Min, Dialysate Temp: 36.5, Tubinmm (Adult)    Target Fluid Removal: 2.5 Liters    Dialysate Electrolytes (mEq/L): Potassium 2, Calcium 2.5, Sodium 138, Bicarbonate 35 (22 @ 08:43) [Active]     Patient is a 82y Male seen and evaluated at bedside, awake this AM off sedation appears agitated. Team to restart precedex. Tolerated Hd yesterday with 2L of UF, will aim for 2.5L today.       Meds:    acetaminophen    Suspension .. 650 every 6 hours PRN  albuterol/ipratropium for Nebulization 3 every 6 hours  aspirin  chewable 81 daily  atorvastatin 40 at bedtime  chlorhexidine 0.12% Liquid 15 every 12 hours  chlorhexidine 2% Cloths 1 <User Schedule>  dextrose 5%. 1000 <Continuous>  dextrose 5%. 1000 <Continuous>  dextrose 5%. 1000 <Continuous>  dextrose 5%. 1000 <Continuous>  dextrose 50% Injectable 25 once  dextrose 50% Injectable 12.5 once  dextrose 50% Injectable 25 once  dextrose 50% Injectable 25 once  dextrose 50% Injectable 12.5 once  dextrose 50% Injectable 25 once  dextrose Oral Gel 15 once PRN  dextrose Oral Gel 15 once PRN  fentaNYL   Patch  50 MICROgram(s)/Hr. 1 every 48 hours  glucagon  Injectable 1 once  glucagon  Injectable 1 once  heparin   Injectable 5000 every 8 hours  hydrocortisone sodium succinate Injectable 25 every 12 hours  influenza  Vaccine (HIGH DOSE) 0.7 once  insulin glargine Injectable (LANTUS) 7 at bedtime  insulin regular  human corrective regimen sliding scale  every 6 hours  insulin regular  human recombinant 8 every 6 hours  levothyroxine Injectable 60 at bedtime  midodrine 10 every 8 hours  mycophenolate mofetil Suspension 500 every 12 hours  norepinephrine Infusion 0.05 <Continuous>  pantoprazole  Injectable 40 daily  QUEtiapine 50 two times a day  sodium chloride 0.9% lock flush 10 every 1 hour PRN  sodium chloride 7% Inhalation 4 every 12 hours  tacrolimus    0.5 mG/mL Suspension 3 every 24 hours  tacrolimus    0.5 mG/mL Suspension 2 every 24 hours  tamsulosin 0.4 at bedtime      T(C): , Max: 37.5 (22 @ 01:05)  T(F): , Max: 99.5 (22 @ 01:05)  HR: 100 (22 @ 10:00)  BP: 89/46 (22 @ 10:00)  BP(mean): 63 (22 @ 10:00)  RR: 25 (22 @ 10:00)  SpO2: 100% (22 @ 10:00)  Wt(kg): --     @ 07:01  -   @ 07:00  --------------------------------------------------------  IN: 1798.1 mL / OUT: 2745 mL / NET: -946.9 mL     @ 07:01  -   @ 10:29  --------------------------------------------------------  IN: 86 mL / OUT: 13 mL / NET: 73 mL      Review of Systems:  ROS negative except as per HPI      PHYSICAL EXAM:  GENERAL: trach collar  NECK: supple, No JVD  CHEST/LUNG: mechanical breath sounds BL  HEART: normal S1S2, RRR  ABDOMEN: Soft, Nontender, +BS, No flank tenderness bilateral  EXTREMITIES: Remains grossly anasarca 2+ weeping pitting edema b/l LE and UE  ACCESS: R radiocephalic AVF w/ palpable thrill       LABS:                        8.2    4.70  )-----------( 299      ( 07 Dec 2022 06:01 )             25.8         134<L>  |  98  |  102<H>  ----------------------------<  187<H>  4.8   |  22  |  4.25<H>    Ca    8.4      07 Dec 2022 06:01  Phos  4.4       Mg     2.3         TPro  5.2<L>  /  Alb  2.2<L>  /  TBili  0.2  /  DBili  x   /  AST  66<H>  /  ALT  70<H>  /  AlkPhos  98      Hepatitis B Surface Antibody: Nonreact ( @ 12:20)  Hepatitis C Virus S/CO Ratio: 0.04 S/CO ( @ 12:20)      Urinalysis Basic - ( 06 Dec 2022 03:02 )    Color: Yellow / Appearance: SL Cloudy / S.020 / pH: x  Gluc: x / Ketone: NEGATIVE  / Bili: Negative / Urobili: 0.2 E.U./dL   Blood: x / Protein: 30 mg/dL / Nitrite: NEGATIVE   Leuk Esterase: NEGATIVE / RBC: > 10 /HPF / WBC < 5 /HPF   Sq Epi: x / Non Sq Epi: 0-5 /HPF / Bacteria: Present /HPF      Sodium, Random Urine: 81 mmol/L ( @ 03:02)  Creatinine, Random Urine: 21 mg/dL ( @ 03:02)        RADIOLOGY & ADDITIONAL STUDIES:        Hemoglobin: 8.2 g/dL (22 @ 06:01)  Phosphorus Level, Serum: 4.4 mg/dL (22 @ 06:01)  Hemoglobin: 8.7 g/dL (22 @ 06:30)  Phosphorus Level, Serum: 4.9 mg/dL (22 @ 06:30)    Albumin, Serum: 2.2 g/dL (22 @ 06:01)  Hepatitis B Surface Antibody: Nonreact (22 @ 12:20)    T(C): 36.4 (22 @ 05:00), Max: 37.5 (22 @ 01:05)  HR: 100 (22 @ 10:00) (68 - 100)  BP: 89/46 (22 @ 10:00) (45/22 - 136/56)  RR: 25 (22 @ 10:00) (10 - 30)  SpO2: 100% (22 @ 10:00) (89% - 100%)  epoetin gui-epbx (RETACRIT) Injectable 8000 Unit(s) IV Push once, 22 @ 10:51, Routine, Stop order after: 1 Doses      Hemodialysis Treatment.:     Schedule: Once, Modality: Hemodialysis, Access: Arteriovenous Fistula    Dialyzer: Optiflux [id number], Time: 150 Min    Blood Flow: 400 mL/Min , Dialysate Flow: 500 mL/Min, Dialysate Temp: 36.5, Tubinmm (Adult)    Target Fluid Removal: 2.5 Liters    Dialysate Electrolytes (mEq/L): Potassium 2, Calcium 2.5, Sodium 138, Bicarbonate 35 (22 @ 08:43) [Active]    Seen on HD, 17 gauge needle used. Discussed with nurse will try larger needle next tx. Qb limited to 250ml/min,   UF goal set for 2.5L. SBP 120s with levophed on

## 2022-12-08 NOTE — PROGRESS NOTE ADULT - ATTENDING COMMENTS
Pt seen on rounds this afternoon.  Son was at the bedside.  Remains sedated, and without any signs of restlessness or agitation  Glucoses were stable in the 110-150 range yesterday afternoon and evening, but he then dropped to 63 this morning despite the decrease in regular insulin.  FS chris to 123 after treatment of the hypoglycemia, but 6 AM nutritional insulin was then held.  Glucose at noon chris only to 120 mg%.  On exam, his LE edema continues to slowly decrease, though the UE edema remains about the same  Lungs still with coarse rhonchi bilaterally and faint exp wheezing.  Abdomen soft and non-distended  Continues on hydrocortisone 50 mg q12h, IV levothyroxine 60 mcg/day  With pt's insulin requirements clearly decreasing, will decrease the Lantus slightly to 6 units, also the nutritional to 6 units of regular q6h  Will repeat TFTs early next week Pt seen on rounds this afternoon.  Son was at the bedside.  Remains sedated, and without any signs of restlessness or agitation  Glucoses were stable in the 110-150 range yesterday afternoon and evening, but he then dropped to 63 this morning despite the decrease in regular insulin.  FS chris to 123 after treatment of the hypoglycemia, but 6 AM nutritional insulin was then held.  Glucose at noon chris only to 120 mg%.  On exam, his LE edema continues to slowly decrease, though the UE edema remains about the same  Lungs still with coarse rhonchi bilaterally and faint exp wheezing.  Abdomen soft and non-distended  Continues on hydrocortisone 50 mg q12h, IV levothyroxine 60 mcg/day.  Will repeat the TFTs early next week  With pt's insulin requirements clearly decreasing, will decrease the nutritional regular insulin to 6 units q6h

## 2022-12-08 NOTE — PROGRESS NOTE ADULT - PROBLEM SELECTOR PLAN 1
Event noticed.  The patient is sedated.  I suctioned the patient copious amount of secretion.  Gas exchange is stable.  Sputum grew Pseudomonas and Serratia.  The patient on appropriate antibiotic.  Continue pulmonary toilet.  Patient has baseline dementia and does not and trial up off sedation.  Tapers pressors as needed.  Cortisol level is adequate.  I discussed the case with critical care. She was extubated.  Patient is on nasal cannula.  Suction the patient copious amount of secretion.  Dysphagia evaluation.  Continue antibiotic.  The patient is hemodynamically stable off pressors and sedative.  Suctioned the patient thick moderate secretion and increased.  He failed swallow eval and will need a peg.  On nebs.  He was intubated.  Sputum GS revealed GNR and culture positive for sternomonas.  Abt changed to bactrim.  Monitor renal function on abt which is worsening.  Plan trach tomorrow.  Peg done . Is in detail with the family.  The patient is off antibiotic.  The renal function is deteriorating.  Improved with hemodialysis and fluid removal.  Patient on trach collar and tolerated well.  Suction the patient moderate amount of thick secretion.  No chest x-ray.  No thoracentesis.  Continue with dialysis with fluid removal.

## 2022-12-08 NOTE — PROGRESS NOTE ADULT - SUBJECTIVE AND OBJECTIVE BOX
Interval Events: Reviewed  Patient seen and examined at bedside.    Patient is a 82y old  Male who presents with a chief complaint of cough 2/2 PNA (04 Dec 2022 05:36)    agitated and coughing  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      History of renal transplant  secondary to DM      Chronic kidney disease (CKD)      Glaucoma      Kidney transplant recipient      Amputation of toe  x 3      H/O heart artery stent          MEDICATIONS:  Pulmonary:  albuterol/ipratropium for Nebulization 3 milliLiter(s) Nebulizer every 6 hours  sodium chloride 7% Inhalation 4 milliLiter(s) Inhalation every 12 hours    Antimicrobials:    Anticoagulants:  aspirin  chewable 81 milliGRAM(s) Oral daily  heparin   Injectable 5000 Unit(s) SubCutaneous every 8 hours    Cardiac:  midodrine 20 milliGRAM(s) Oral every 8 hours  norepinephrine Infusion 0.05 MICROgram(s)/kG/Min IV Continuous <Continuous>      Allergies    No Known Allergies    Intolerances        Vital Signs Last 24 Hrs  T(C): 37.7 (08 Dec 2022 11:05), Max: 37.9 (08 Dec 2022 06:03)  T(F): 99.9 (08 Dec 2022 11:05), Max: 100.2 (08 Dec 2022 06:03)  HR: 62 (08 Dec 2022 14:05) (62 - 99)  BP: 145/91 (08 Dec 2022 14:05) (103/42 - 192/71)  BP(mean): 111 (08 Dec 2022 14:05) (57 - 114)  RR: 23 (08 Dec 2022 14:05) (18 - 36)  SpO2: 99% (08 Dec 2022 14:05) (96% - 100%)    Parameters below as of 08 Dec 2022 14:05  Patient On (Oxygen Delivery Method): tracheostomy collar  O2 Flow (L/min): 10  O2 Concentration (%): 40    12-07 @ 07:01  -  12-08 @ 07:00  --------------------------------------------------------  IN: 1913.5 mL / OUT: 3313 mL / NET: -1399.5 mL    12-08 @ 07:01  -  12-08 @ 14:31  --------------------------------------------------------  IN: 808.5 mL / OUT: 3938 mL / NET: -3129.5 mL      Mode: standby      Review of Systems:   •	General: negative  •	Skin/Breast: negative  •	Ophthalmologic: negative  •	ENMT: negative  •	Respiratory and Thorax: cough  •	Cardiovascular: negative  •	Gastrointestinal: negative  •	Genitourinary: negative  •	Musculoskeletal: negative  •	Neurological: negative  •	Psychiatric: negative  •	Hematology/Lymphatics: negative  •	Endocrine: negative  •	Allergic/Immunologic: negative    Physical Exam:   • Constitutional:	refer to the dietion /Nutritionist note  • Eyes:	EOMI; PERRL; no drainage or redness  • ENMT:	No oral lesions; no gross abnormalities  • Neck	No bruits; no thyromegaly or nodules  • Breasts:	not examined  • Back:	No deformity or limitation of movement  • Respiratory:	Breath Sounds equal & clear to percussion & bl rhonchiauscultation, no accessory muscle use  • Cardiovascular:	Regular rate & rhythm, normal S1, S2; no murmurs, gallops or rubs; no S3, S4  • Gastrointestinal:	Soft, non-tender, no hepatosplenomegaly, normal bowel sounds  • Genitourinary:	not examined  • Rectal: not examined  • Extremities:	No cyanosis, clubbing or edema  • Vascular:	Equal and normal pulses (carotid, femoral, dorsalis pedis)  • Neurologica:l	not examined  • Skin:	No lesions; no rash  • Lymph Nodes:	No lymphadedenopathy  • Musculoskeletal:	No joint pain, swelling or deformity; no limitation of movement        LABS:      CBC Full  -  ( 08 Dec 2022 06:01 )  WBC Count : 4.49 K/uL  RBC Count : 3.64 M/uL  Hemoglobin : 9.9 g/dL  Hematocrit : 31.8 %  Platelet Count - Automated : 394 K/uL  Mean Cell Volume : 87.4 fl  Mean Cell Hemoglobin : 27.2 pg  Mean Cell Hemoglobin Concentration : 31.1 gm/dL  Auto Neutrophil # : 2.98 K/uL  Auto Lymphocyte # : 0.59 K/uL  Auto Monocyte # : 0.34 K/uL  Auto Eosinophil # : 0.17 K/uL  Auto Basophil # : 0.00 K/uL  Auto Neutrophil % : 66.4 %  Auto Lymphocyte % : 13.1 %  Auto Monocyte % : 7.5 %  Auto Eosinophil % : 3.7 %  Auto Basophil % : 0.0 %    12-08    135  |  96  |  81<H>  ----------------------------<  58<L>  4.6   |  25  |  3.63<H>    Ca    8.7      08 Dec 2022 06:01  Phos  3.4     12-08  Mg     2.2     12-08    TPro  5.7<L>  /  Alb  2.4<L>  /  TBili  0.3  /  DBili  x   /  AST  56<H>  /  ALT  66<H>  /  AlkPhos  113  12-08                        RADIOLOGY & ADDITIONAL STUDIES (The following images were personally reviewed):

## 2022-12-08 NOTE — PROGRESS NOTE ADULT - ASSESSMENT
84 y/o M PMH CKD 4, renal transplant in 2013 at [facility], glaucoma (blind), DM1, anemia, CAD s/p 2 DAMEON to LAD in 6/21, BPH, recent admission for PNA presents with acute on chronic cough 2/2 PNA and SUNNY 2/2 acute CKD.     NEUROLOGY  #Metabolic Encephalopathy  #Sedation  #Agitation  CT scan negative for acute intracranial pathology. Began to wean sedation on 12/2.  - c/w fentanyl 25mcg patch  - on precedex, weaning as tolerated  - on seroquel 75mg q12h (increased on 12/8)  - receives 25mg IVPB of fentanyl for agitation, attempting to wean    CARDIOVASCULAR  # Fluid Overloaded  - S/p Metolazone 10 and Lasix 80 for fluid overload status 12/5  - started on HD on 12/6, removed 2L    #Hypotension  - on midodrine 20 q8 (started on 12/7, increased on 12/8)    #HFpEF  Had EF of 55% on TTE from Oct 2022, grade I diastolic dysfunction. Repeat TTE 11/17 with poor visualization of LV. Patient making good UOP overnight  - holding BP meds    #Upper extremity edema  Stable from yesterday but increased compared to baseline. US with superficial thrombosis of L cephalic vein extending to AC fossa, no DVTs.   - given patient's propensity to bleed and superficial nature of thrombus, no intervention  -s/p Metolazone 10 and Lasix 80 12/5    #CAD  #HLD  Hx of CAD s/p 2 DAMEON to LAD in June 2021, currently no CP. Trops 0.03 but no CP or ischemic changes on EKG, likely due to CKD. Takes Plavix and aspirin per last d/c but only aspirin on outpatient note  - c/w ASA daily  - c/w home Lipitor    #HTN   Known history of HTN.   - holding hypertension meds as pt currently requiring pressor support  - continue to monitor BP    PULM  #AHRF  Likely 2/2 aspiration event. Intubated on 11/26.  - ID management as below for aspiration   - Received trach on 12/1  - tolerated CPAP o/n 12/6-7  - trach collar attempt 12/7 am unsuccessful will continue to attempt    RENAL  #ELIZABETH on Chronic kidney disease (CKD)  Baseline sCr 2.3-2.5. On admission, fluid overloaded. Does have orthopnea and intermittent SOB at baseline. Acidotic but at baseline. Follows w Dr. Mac.  - sCr and BUN increasing daily, urine output poor  - monitor I/Os  - renally dosing meds  - s/p sodium bicarb 150 rate 60cc/hr, discontinued on 12/6  - initiated HD 12/6, removed 2L fluid    #Renal transplant  Patient had renal transplant in 2013. On home mycophenolate 500mg BID and tacrolimus 4mg BID. Per nephro, decreased home tacrolimus from 4mg BID --> 2mg BID while in hospital.   - c/w tacrolimus in AM and in PM, based on level; per Renal increased to 4mg q12h on 12/8  - c/w mycophenolate 500mg BID  - f/u renal recs    #Hyponatremia  - stable; monitor BMP    GI  #Nutrition  NPO with PEG feeds.  - restarted feeds at 6pm 12/2      #Urinary Retention  - remove bourgeois if not chronic, TOV  - on home tamsulosin 0.4mg daily, discontinue if bourgeois remains    ID  #Aspiration PNA  RESOLVED  Tracheal aspirate cx (11/19) growing serratia marcescens and pseudomonas. Patient was started on vancomycin and cefepime. MRSA nares positive. DC'ed vancomycin given supratherapeutic trough. Completed cefepime 2g q24 course x9 days.    #Stenotrophomonas in sputum cx RESOLVED  Sputum cx from 11/26 growing Stenotrophomonas  - completed course of Bactrim BS for 10 days (11/28 - 12/5)    ENDOCRINE  #DM (diabetes mellitus).   Previously on lantus 15 lispro 5 at home. Endocrine following.  - c/w lantus 7 units  - c/w humulin 7 units q6h  - on low dose regular ISS  - goal -180  - f/u endo recs    #Hypothyroidism  TSH 20.8, free t4 0.7, T3 49 (low). Repeat TSH at 9, fT4 0.7  - on synthroid 50mcg IV qD    #Relative Adrenal Insufficiency  Patient on chronic prednisone 5mg daily. He was started on hydrocortisone 50mg q6hr --> q8 --> BID   - c/w hydrocortisone 25mg q12  - wean as tolerated  - f/u endocrine recs    HEME  #Normocytic Anemia  Likely 2.2 CKD. Hgb 6.9 on 11/21, corrected appropriately s/p 1u pRBC. No active signs of bleeding on physical exam. CTH with no intracranial bleeding. Required 2u pRBC to correct. DEBRA and stool guaic negative.  - consider epo, if BP remains well controlled  - active T+S  - transfuse <7    FLUIDS/ELECTROLYTES/NUTRITION  -IVF as above  -Monitor, careful w advanced kidney disease  -Diet: NPO with PEG feeds  -DVT ppx: heparin TID  -GI: protonix 40 qd  -Dispo: MICU; PT recommending [facility]

## 2022-12-08 NOTE — PROGRESS NOTE ADULT - ATTENDING COMMENTS
Renal transplant, ATN, aspiration pna with AHRF, sepsis, hypotension, DM, metobolic encephalopathy  physical as above  increase midodrine to 20 mg  trial of TC  continue insulin and HC  continue feeds  increase seroquel to 75 BID and try off precedex  decision making of high complexity

## 2022-12-08 NOTE — PROGRESS NOTE ADULT - SUBJECTIVE AND OBJECTIVE BOX
OVERNIGHT EVENTS:  Tolerating trach, some agitation overnight; increased seroquel.    SUBJECTIVE / INTERVAL HPI: Patient seen and examined at bedside.  In no apparent distress.    VITAL SIGNS:  Vital Signs Last 24 Hrs  T(C): 37.7 (08 Dec 2022 11:05), Max: 37.9 (08 Dec 2022 06:03)  T(F): 99.9 (08 Dec 2022 11:05), Max: 100.2 (08 Dec 2022 06:03)  HR: 73 (08 Dec 2022 12:00) (69 - 99)  BP: 155/98 (08 Dec 2022 12:00) (103/42 - 192/71)  BP(mean): 114 (08 Dec 2022 12:00) (57 - 114)  RR: 25 (08 Dec 2022 12:00) (18 - 36)  SpO2: 98% (08 Dec 2022 12:00) (96% - 100%)    Parameters below as of 08 Dec 2022 13:00  Patient On (Oxygen Delivery Method): tracheostomy collar  O2 Flow (L/min): 10  O2 Concentration (%): 40  I&O's Summary    07 Dec 2022 07:01  -  08 Dec 2022 07:00  --------------------------------------------------------  IN: 1913.5 mL / OUT: 3313 mL / NET: -1399.5 mL    08 Dec 2022 07:01  -  08 Dec 2022 12:54  --------------------------------------------------------  IN: 408.5 mL / OUT: 38 mL / NET: 370.5 mL        PHYSICAL EXAM:  General: NAD, lying in bed, alternatively sedated/agitated, non-verbal  HEENT: NC/AT; anicteric sclera  Neck: supple, trach tube in place  Respiratory: CTA B/L; no W/R/R  Cardiovascular: +S1/S2; RRR; no M/R/G  Gastrointestinal: soft, NT/ND; PEG tube in place, site in clean with no active bleeding  Extremities: WWP; 2+ peripheral pulses B/L; trace B/L LE edema and 3+ B/L UE edema  Skin: normal color and turgor; no rash  Neurological: sedated, spontaneously moving extremities, does follow commands when less sedated, turns to voice    MEDICATIONS:  MEDICATIONS  (STANDING):  albuterol/ipratropium for Nebulization 3 milliLiter(s) Nebulizer every 6 hours  aspirin  chewable 81 milliGRAM(s) Oral daily  atorvastatin 40 milliGRAM(s) Oral at bedtime  chlorhexidine 0.12% Liquid 15 milliLiter(s) Oral Mucosa every 12 hours  chlorhexidine 2% Cloths 1 Application(s) Topical <User Schedule>  dexMEDEtomidine Infusion 0.04 MICROgram(s)/kG/Hr (0.82 mL/Hr) IV Continuous <Continuous>  dextrose 5%. 1000 milliLiter(s) (100 mL/Hr) IV Continuous <Continuous>  dextrose 5%. 1000 milliLiter(s) (50 mL/Hr) IV Continuous <Continuous>  dextrose 5%. 1000 milliLiter(s) (100 mL/Hr) IV Continuous <Continuous>  dextrose 5%. 1000 milliLiter(s) (50 mL/Hr) IV Continuous <Continuous>  dextrose 50% Injectable 25 Gram(s) IV Push once  dextrose 50% Injectable 12.5 Gram(s) IV Push once  dextrose 50% Injectable 25 Gram(s) IV Push once  dextrose 50% Injectable 25 Gram(s) IV Push once  dextrose 50% Injectable 12.5 Gram(s) IV Push once  dextrose 50% Injectable 25 Gram(s) IV Push once  fentaNYL   Patch  50 MICROgram(s)/Hr. 1 Patch Transdermal every 48 hours  glucagon  Injectable 1 milliGRAM(s) IntraMuscular once  glucagon  Injectable 1 milliGRAM(s) IntraMuscular once  heparin   Injectable 5000 Unit(s) SubCutaneous every 8 hours  hydrocortisone sodium succinate Injectable 25 milliGRAM(s) IV Push every 12 hours  influenza  Vaccine (HIGH DOSE) 0.7 milliLiter(s) IntraMuscular once  insulin glargine Injectable (LANTUS) 7 Unit(s) SubCutaneous <User Schedule>  insulin regular  human corrective regimen sliding scale   SubCutaneous every 6 hours  insulin regular  human recombinant 6 Unit(s) SubCutaneous every 6 hours  levothyroxine Injectable 60 MICROGram(s) IV Push at bedtime  midodrine 20 milliGRAM(s) Oral every 8 hours  mycophenolate mofetil Suspension 500 milliGRAM(s) Oral every 12 hours  norepinephrine Infusion 0.05 MICROgram(s)/kG/Min (7.72 mL/Hr) IV Continuous <Continuous>  pantoprazole   Suspension 40 milliGRAM(s) Oral every 24 hours  polyethylene glycol 3350 17 Gram(s) Oral every 12 hours  QUEtiapine 75 milliGRAM(s) Oral every 12 hours  senna 2 Tablet(s) Oral at bedtime  sodium chloride 7% Inhalation 4 milliLiter(s) Inhalation every 12 hours  tacrolimus    0.5 mG/mL Suspension 4 milliGRAM(s) Oral every 24 hours  tamsulosin 0.4 milliGRAM(s) Oral at bedtime    MEDICATIONS  (PRN):  acetaminophen    Suspension .. 650 milliGRAM(s) Oral every 6 hours PRN Mild Pain (1 - 3)  dextrose Oral Gel 15 Gram(s) Oral once PRN Blood Glucose LESS THAN 70 milliGRAM(s)/deciliter  dextrose Oral Gel 15 Gram(s) Oral once PRN Blood Glucose LESS THAN 70 milliGRAM(s)/deciliter  sodium chloride 0.9% lock flush 10 milliLiter(s) IV Push every 1 hour PRN Pre/post blood products, medications, blood draw, and to maintain line patency      ALLERGIES:  Allergies    No Known Allergies    Intolerances        LABS:                        9.9    4.49  )-----------( 394      ( 08 Dec 2022 06:01 )             31.8     12-08    135  |  96  |  81<H>  ----------------------------<  58<L>  4.6   |  25  |  3.63<H>    Ca    8.7      08 Dec 2022 06:01  Phos  3.4     12-08  Mg     2.2     12-08    TPro  5.7<L>  /  Alb  2.4<L>  /  TBili  0.3  /  DBili  x   /  AST  56<H>  /  ALT  66<H>  /  AlkPhos  113  12-08        CAPILLARY BLOOD GLUCOSE      POCT Blood Glucose.: 120 mg/dL (08 Dec 2022 12:48)      RADIOLOGY & ADDITIONAL TESTS: Reviewed.

## 2022-12-08 NOTE — PROGRESS NOTE ADULT - ATTENDING COMMENTS
I agree with the fellow's findings and plans as written above with the following additions/amendments:    Seen and examined at bedside on HD, tolerating well, BP stable, will push UF. Continue HD as above

## 2022-12-08 NOTE — PROGRESS NOTE ADULT - SUBJECTIVE AND OBJECTIVE BOX
Patient is a 82y Male seen and evaluated at bedside remains stable. Tolerated 2.5L of UF yesterday. Fluid status appears improved on trach collar. Will do HD today with expectation of 3L of UF and will need tacro dose increased.      Meds:    acetaminophen    Suspension .. 650 every 6 hours PRN  albuterol/ipratropium for Nebulization 3 every 6 hours  aspirin  chewable 81 daily  atorvastatin 40 at bedtime  chlorhexidine 0.12% Liquid 15 every 12 hours  chlorhexidine 2% Cloths 1 <User Schedule>  dexMEDEtomidine Infusion 0.04 <Continuous>  dextrose 5%. 1000 <Continuous>  dextrose 5%. 1000 <Continuous>  dextrose 5%. 1000 <Continuous>  dextrose 5%. 1000 <Continuous>  dextrose 50% Injectable 25 once  dextrose 50% Injectable 12.5 once  dextrose 50% Injectable 25 once  dextrose 50% Injectable 25 once  dextrose 50% Injectable 12.5 once  dextrose 50% Injectable 25 once  dextrose Oral Gel 15 once PRN  dextrose Oral Gel 15 once PRN  fentaNYL   Patch  50 MICROgram(s)/Hr. 1 every 48 hours  glucagon  Injectable 1 once  glucagon  Injectable 1 once  heparin   Injectable 5000 every 8 hours  hydrocortisone sodium succinate Injectable 25 every 12 hours  influenza  Vaccine (HIGH DOSE) 0.7 once  insulin glargine Injectable (LANTUS) 7 <User Schedule>  insulin regular  human corrective regimen sliding scale  every 6 hours  insulin regular  human recombinant 6 every 6 hours  levothyroxine Injectable 60 at bedtime  midodrine 20 every 8 hours  mycophenolate mofetil Suspension 500 every 12 hours  norepinephrine Infusion 0.05 <Continuous>  pantoprazole   Suspension 40 every 24 hours  polyethylene glycol 3350 17 every 12 hours  QUEtiapine 75 every 12 hours  senna 2 at bedtime  sodium chloride 0.9% lock flush 10 every 1 hour PRN  sodium chloride 7% Inhalation 4 every 12 hours  tacrolimus    0.5 mG/mL Suspension 2 every 24 hours  tacrolimus    0.5 mG/mL Suspension 3 every 24 hours  tamsulosin 0.4 at bedtime      T(C): , Max: 37.9 (12-08-22 @ 06:03)  T(F): , Max: 100.2 (12-08-22 @ 06:03)  HR: 78 (12-08-22 @ 11:00)  BP: 192/71 (12-08-22 @ 11:00)  BP(mean): 102 (12-08-22 @ 11:00)  RR: 21 (12-08-22 @ 11:00)  SpO2: 98% (12-08-22 @ 11:00)  Wt(kg): --    12-07 @ 07:01  -  12-08 @ 07:00  --------------------------------------------------------  IN: 1913.5 mL / OUT: 3313 mL / NET: -1399.5 mL    12-08 @ 07:01  -  12-08 @ 11:43  --------------------------------------------------------  IN: 408.5 mL / OUT: 38 mL / NET: 370.5 mL    Review of Systems:  ROS negative except as per HPI    PHYSICAL EXAM:  GENERAL: trach collar  NECK: supple, No JVD  Lungs: No rales, ronchi or wheezing noted  HEART: normal S1S2, RRR  ABDOMEN: Soft, Nontender, +BS, No flank tenderness bilateral  EXTREMITIES: Remains anasarcic, although edema is slowly improving  ACCESS: R radiocephalic AVF w/ palpable thrill d    LABS:                        9.9    4.49  )-----------( 394      ( 08 Dec 2022 06:01 )             31.8     12-08    135  |  96  |  81<H>  ----------------------------<  58<L>  4.6   |  25  |  3.63<H>    Ca    8.7      08 Dec 2022 06:01  Phos  3.4     12-08  Mg     2.2     12-08    TPro  5.7<L>  /  Alb  2.4<L>  /  TBili  0.3  /  DBili  x   /  AST  56<H>  /  ALT  66<H>  /  AlkPhos  113  12-08                RADIOLOGY & ADDITIONAL STUDIES:           Patient is a 82y Male seen and evaluated at bedside remains stable. Tolerated 2.5L of UF yesterday. Fluid status appears improved on trach collar. Will do HD today with expectation of 3L of UF and will need tacro dose increased.      Meds:    acetaminophen    Suspension .. 650 every 6 hours PRN  albuterol/ipratropium for Nebulization 3 every 6 hours  aspirin  chewable 81 daily  atorvastatin 40 at bedtime  chlorhexidine 0.12% Liquid 15 every 12 hours  chlorhexidine 2% Cloths 1 <User Schedule>  dexMEDEtomidine Infusion 0.04 <Continuous>  dextrose 5%. 1000 <Continuous>  dextrose 5%. 1000 <Continuous>  dextrose 5%. 1000 <Continuous>  dextrose 5%. 1000 <Continuous>  dextrose 50% Injectable 25 once  dextrose 50% Injectable 12.5 once  dextrose 50% Injectable 25 once  dextrose 50% Injectable 25 once  dextrose 50% Injectable 12.5 once  dextrose 50% Injectable 25 once  dextrose Oral Gel 15 once PRN  dextrose Oral Gel 15 once PRN  fentaNYL   Patch  50 MICROgram(s)/Hr. 1 every 48 hours  glucagon  Injectable 1 once  glucagon  Injectable 1 once  heparin   Injectable 5000 every 8 hours  hydrocortisone sodium succinate Injectable 25 every 12 hours  influenza  Vaccine (HIGH DOSE) 0.7 once  insulin glargine Injectable (LANTUS) 7 <User Schedule>  insulin regular  human corrective regimen sliding scale  every 6 hours  insulin regular  human recombinant 6 every 6 hours  levothyroxine Injectable 60 at bedtime  midodrine 20 every 8 hours  mycophenolate mofetil Suspension 500 every 12 hours  norepinephrine Infusion 0.05 <Continuous>  pantoprazole   Suspension 40 every 24 hours  polyethylene glycol 3350 17 every 12 hours  QUEtiapine 75 every 12 hours  senna 2 at bedtime  sodium chloride 0.9% lock flush 10 every 1 hour PRN  sodium chloride 7% Inhalation 4 every 12 hours  tacrolimus    0.5 mG/mL Suspension 2 every 24 hours  tacrolimus    0.5 mG/mL Suspension 3 every 24 hours  tamsulosin 0.4 at bedtime      T(C): , Max: 37.9 (12-08-22 @ 06:03)  T(F): , Max: 100.2 (12-08-22 @ 06:03)  HR: 78 (12-08-22 @ 11:00)  BP: 192/71 (12-08-22 @ 11:00)  BP(mean): 102 (12-08-22 @ 11:00)  RR: 21 (12-08-22 @ 11:00)  SpO2: 98% (12-08-22 @ 11:00)  Wt(kg): --    12-07 @ 07:01  -  12-08 @ 07:00  --------------------------------------------------------  IN: 1913.5 mL / OUT: 3313 mL / NET: -1399.5 mL    12-08 @ 07:01  -  12-08 @ 11:43  --------------------------------------------------------  IN: 408.5 mL / OUT: 38 mL / NET: 370.5 mL    Review of Systems:  ROS negative except as per HPI    PHYSICAL EXAM:  GENERAL: trach collar, NAD  NECK: supple, No JVD  Lungs: No rales, ronchi or wheezing noted  HEART: normal S1S2, RRR  ABDOMEN: Soft, Nontender, +BS, No flank tenderness bilateral  EXTREMITIES: Remains anasarcic, although edema is slowly improving  ACCESS: R radiocephalic AVF w/ palpable thrill    LABS:                        9.9    4.49  )-----------( 394      ( 08 Dec 2022 06:01 )             31.8     12-08    135  |  96  |  81<H>  ----------------------------<  58<L>  4.6   |  25  |  3.63<H>    Ca    8.7      08 Dec 2022 06:01  Phos  3.4     12-08  Mg     2.2     12-08    TPro  5.7<L>  /  Alb  2.4<L>  /  TBili  0.3  /  DBili  x   /  AST  56<H>  /  ALT  66<H>  /  AlkPhos  113  12-08                RADIOLOGY & ADDITIONAL STUDIES:

## 2022-12-08 NOTE — PROGRESS NOTE ADULT - ASSESSMENT
Patient is an 84 yo M with ESKD s/p transplant  now CKD 4, glaucoma, DM1, Anemia, CAD, BPH presenting with decompensated heart failure c/b cardiac arrest on  now with iATN requiring HD as of     Problem/Plan:  #iATN on CKD stage 4 now requiring HD  Renal allograft with CKD 4 - baseline sCr 2.3-2.5, usual meds tacrolimus 4mg BID and mycophenolate 500mg BID. Was started on HD on  due to worsening volume status, acidosis and concern for uremia.     Recs:  - Increase tacrolimus to 4mg AM and 4mg PM (12 hrs apart)  - c/w cell cept 500mg BID  - Obtain tacro level on  at 8:30 PM  -Please make sure pt having daily BM; daily miralax and senna  - strict i's and o's  -Trend BMP daily  -maintain MAP >70   -HD today w/ goal of 3L UF    #Metabolic Acidosis  -Resolving with HD    #Hyponatremia  2/2 ARF   -Resolving with HD    #Anemia of chronic disease  -Epo w/ HD        Hemoglobin: 9.9 g/dL (22 @ 06:01)  Phosphorus Level, Serum: 3.4 mg/dL (22 @ 06:01)  Hemoglobin: 8.2 g/dL (22 @ 06:01)  Phosphorus Level, Serum: 4.4 mg/dL (22 @ 06:01)    Albumin, Serum: 2.4 g/dL (22 @ 06:01)  Albumin, Serum: 2.2 g/dL (22 @ 06:01)    T(C): 37.7 (22 @ 11:05), Max: 37.9 (22 @ 06:03)  HR: 79 (22 @ 11:05) (69 - 105)  BP: 175/71 (22 @ 11:05) (103/42 - 204/160)  RR: 24 (22 @ 11:05) (18 - 36)  SpO2: 99% (22 @ 11:05) (96% - 100%)  epoetin gui-epbx (RETACRIT) Injectable 8000 Unit(s) IV Push once, 22 @ 10:51, Routine, Stop order after: 1 Doses  epoetin gui-epbx (RETACRIT) Injectable 8000 Unit(s) IV Push once, 22 @ 08:32, Routine, Stop order after: 1 Doses      Hemodialysis Treatment.:     Schedule: Once, Modality: Hemodialysis, Access: Arteriovenous Fistula    Dialyzer: Optiflux [id number], Time: 180 Min    Blood Flow: 400 mL/Min , Dialysate Flow: 500 mL/Min, Dialysate Temp: 36.5, Tubinmm (Adult)    Target Fluid Removal: 3 Liters    Dialysate Electrolytes (mEq/L): Potassium 2, Calcium 2.5, Sodium 138, Bicarbonate 35    Additional Instructions: Please use 16 gauge or larger needle. (22 @ 08:32) [Completed]    Seen on HD, c/w prescription outlined as above. SBP stable 130s

## 2022-12-08 NOTE — PROGRESS NOTE ADULT - SUBJECTIVE AND OBJECTIVE BOX
OVERNIGHT: No acute events overnight.   SUBJECTIVE / INTERVAL HPI: Patient was seen and examined this morning.     CAPILLARY BLOOD GLUCOSE & INSULIN RECEIVED  Yesterday  - 12 PM FS mg/dL = 8 units of regular insulin + 1 units of sliding scale.   - 6 PM FS  mg/dL = 8 units of regular insulin.    Today  - 12 AM FS mg/dL = 7 units of Lantus + 8 units of regular insulin.  - 6 AM FS mg/dL -> 68 mg/dL -> 123 mg/dL = He didn't receive insulin.    - 12 PM FSG: *** mg/dL = *** units of regular insulin + *** units of sliding scale.     REVIEW OF SYSTEMS  Constitutional:  Negative fever, chills or loss of appetite.  Eyes:  Negative blurry vision or double vision.  Cardiovascular:  Negative for chest pain or palpitations.  Respiratory:  Negative for cough, wheezing, or shortness of breath.    Gastrointestinal:  Negative for nausea, vomiting, diarrhea, constipation, or abdominal pain.  Genitourinary:  Negative frequency, urgency or dysuria.  Neurologic:  No headache, confusion, dizziness, lightheadedness.    PHYSICAL EXAM  Vital Signs Last 24 Hrs  T(C): 37.7 (08 Dec 2022 10:40), Max: 37.9 (08 Dec 2022 06:03)  T(F): 99.9 (08 Dec 2022 10:40), Max: 100.2 (08 Dec 2022 06:03)  HR: 78 (08 Dec 2022 11:00) (69 - 105)  BP: 192/71 (08 Dec 2022 11:00) (103/42 - 204/160)  BP(mean): 102 (08 Dec 2022 11:00) (57 - 179)  RR: 21 (08 Dec 2022 11:00) (18 - 36)  SpO2: 98% (08 Dec 2022 11:00) (96% - 100%)    Parameters below as of 08 Dec 2022 11:00  Patient On (Oxygen Delivery Method): tracheostomy collar  O2 Flow (L/min): 10  O2 Concentration (%): 40    Constitutional: Awake, alert, in no acute distress.   HEENT: Normocephalic, atraumatic, GORDON, no proptosis or lid retraction.   Neck: supple, no acanthosis, no thyromegaly or palpable thyroid nodules.  Respiratory: Lungs clear to ausculation bilaterally.   Cardiovascular: regular rhythm, normal S1 and S2, no audible murmurs.   GI: soft, non-tender, non-distended, bowel sounds present, no masses appreciated.  Extremities: No lower extremity edema, peripheral pulses present.   Skin: no rashes.   Psychiatric: AAO x 3. Normal affect/mood.     LABS  CBC - WBC/HGB/HTC/PLT: 4.49/9.9/31.8/394 (22)  BMP - Na/K/Cl/Bicarb/BUN/Cr/Gluc/AG/eGFR: 135/4.6/96/25/81/3.63/58/14/16 (22)  Ca - 8.7 (22)  Phos - 3.4 (22)  Mg - 2.2 (22)  LFT - Alb/Tprot/Tbili/Dbili/AlkPhos/ALT/AST: 2.4/--/0.3/--/113/66/56 (22)    Thyroid Stimulating Hormone, Serum: 11.170 (22)  Total T4/Free T4: --/0.561 (22)    MEDICATIONS  MEDICATIONS  (STANDING):  albuterol/ipratropium for Nebulization 3 milliLiter(s) Nebulizer every 6 hours  aspirin  chewable 81 milliGRAM(s) Oral daily  atorvastatin 40 milliGRAM(s) Oral at bedtime  chlorhexidine 0.12% Liquid 15 milliLiter(s) Oral Mucosa every 12 hours  chlorhexidine 2% Cloths 1 Application(s) Topical <User Schedule>  dexMEDEtomidine Infusion 0.04 MICROgram(s)/kG/Hr (0.82 mL/Hr) IV Continuous <Continuous>  dextrose 5%. 1000 milliLiter(s) (50 mL/Hr) IV Continuous <Continuous>  dextrose 5%. 1000 milliLiter(s) (100 mL/Hr) IV Continuous <Continuous>  dextrose 5%. 1000 milliLiter(s) (50 mL/Hr) IV Continuous <Continuous>  dextrose 5%. 1000 milliLiter(s) (100 mL/Hr) IV Continuous <Continuous>  dextrose 50% Injectable 25 Gram(s) IV Push once  dextrose 50% Injectable 12.5 Gram(s) IV Push once  dextrose 50% Injectable 25 Gram(s) IV Push once  dextrose 50% Injectable 25 Gram(s) IV Push once  dextrose 50% Injectable 12.5 Gram(s) IV Push once  dextrose 50% Injectable 25 Gram(s) IV Push once  epoetin gui-epbx (RETACRIT) Injectable 8000 Unit(s) IV Push once  fentaNYL   Patch  50 MICROgram(s)/Hr. 1 Patch Transdermal every 48 hours  glucagon  Injectable 1 milliGRAM(s) IntraMuscular once  glucagon  Injectable 1 milliGRAM(s) IntraMuscular once  heparin   Injectable 5000 Unit(s) SubCutaneous every 8 hours  hydrocortisone sodium succinate Injectable 25 milliGRAM(s) IV Push every 12 hours  influenza  Vaccine (HIGH DOSE) 0.7 milliLiter(s) IntraMuscular once  insulin glargine Injectable (LANTUS) 7 Unit(s) SubCutaneous <User Schedule>  insulin regular  human corrective regimen sliding scale   SubCutaneous every 6 hours  insulin regular  human recombinant 6 Unit(s) SubCutaneous every 6 hours  levothyroxine Injectable 60 MICROGram(s) IV Push at bedtime  midodrine 20 milliGRAM(s) Oral every 8 hours  mycophenolate mofetil Suspension 500 milliGRAM(s) Oral every 12 hours  norepinephrine Infusion 0.05 MICROgram(s)/kG/Min (7.72 mL/Hr) IV Continuous <Continuous>  pantoprazole   Suspension 40 milliGRAM(s) Oral every 24 hours  polyethylene glycol 3350 17 Gram(s) Oral every 12 hours  QUEtiapine 75 milliGRAM(s) Oral every 12 hours  senna 2 Tablet(s) Oral at bedtime  sodium chloride 7% Inhalation 4 milliLiter(s) Inhalation every 12 hours  tacrolimus    0.5 mG/mL Suspension 2 milliGRAM(s) Oral every 24 hours  tacrolimus    0.5 mG/mL Suspension 3 milliGRAM(s) Oral every 24 hours  tamsulosin 0.4 milliGRAM(s) Oral at bedtime    MEDICATIONS  (PRN):  acetaminophen    Suspension .. 650 milliGRAM(s) Oral every 6 hours PRN Mild Pain (1 - 3)  dextrose Oral Gel 15 Gram(s) Oral once PRN Blood Glucose LESS THAN 70 milliGRAM(s)/deciliter  dextrose Oral Gel 15 Gram(s) Oral once PRN Blood Glucose LESS THAN 70 milliGRAM(s)/deciliter  sodium chloride 0.9% lock flush 10 milliLiter(s) IV Push every 1 hour PRN Pre/post blood products, medications, blood draw, and to maintain line patency    ASSESSMENT / RECOMMENDATIONS    A1C: 9.0 %  BUN: 81  Creatinine: 3.63  GFR: 16  Weight: 82.3  BMI: 28.4  EF:     # Type 2 diabetes mellitus  - Please continue lantus *** units at bedtime.   - Continue lispro *** units before each meal.  - Continue lispro moderate / low dose sliding scale before meals and at bedtime.  - Patient's fingerstick glucose goal is 100-180 mg/dL.    - For discharge, patient can ***.    - Patient can follow up at discharge with [facility] by calling [phone] to make an appointment.      Case discussed with Dr. Rabago. Primary team updated.       Ulysses Weber    Endocrinology Fellow    Service Pager: [phone]    OVERNIGHT: No acute events overnight.   SUBJECTIVE / INTERVAL HPI: Patient was seen and examined this morning. The was placed back on levophed for hemodialysis. His blood glucose decreased to 60's this morning (insulin was adjusted based on recommendations after low blood glucose). He continues to receive tube feeds without significant interruptions.     CAPILLARY BLOOD GLUCOSE & INSULIN RECEIVED  Yesterday  - 12 PM FS mg/dL = 8 units of regular insulin + 1 units of sliding scale.   - 6 PM FS  mg/dL = 8 units of regular insulin.    Today  - 12 AM FS mg/dL = 7 units of Lantus + 8 units of regular insulin.  - 6 AM FS mg/dL -> 68 mg/dL -> 123 mg/dL = He didn't receive insulin.    - 12 PM FS mg/dL = 6 units of regular insulin.     REVIEW OF SYSTEMS  Unable to obtain.    PHYSICAL EXAM  Vital Signs Last 24 Hrs  T(C): 37.7 (08 Dec 2022 10:40), Max: 37.9 (08 Dec 2022 06:03)  T(F): 99.9 (08 Dec 2022 10:40), Max: 100.2 (08 Dec 2022 06:03)  HR: 78 (08 Dec 2022 11:00) (69 - 105)  BP: 192/71 (08 Dec 2022 11:00) (103/42 - 204/160)  BP(mean): 102 (08 Dec 2022 11:00) (57 - 179)  RR: 21 (08 Dec 2022 11:00) (18 - 36)  SpO2: 98% (08 Dec 2022 11:00) (96% - 100%)    Parameters below as of 08 Dec 2022 11:00  Patient On (Oxygen Delivery Method): tracheostomy collar  O2 Flow (L/min): 10  O2 Concentration (%): 40    Constitutional: Awake, alert, elderly male.   HEENT: Normocephalic, atraumatic, GORDON.  Respiratory: (+) Bilateral rhonchi, on trache collar   Cardiovascular: regular rhythm, normal S1 and S2, no audible murmurs.   GI: soft, non-tender, non-distended, bowel sounds present.  Extremities: +2 bilateral lower extremity edema.    LABS  CBC - WBC/HGB/HTC/PLT: 4.49/9.9/31.8/394 (12-08-22)  BMP - Na/K/Cl/Bicarb/BUN/Cr/Gluc/AG/eGFR: 135/4.6/96/25/81/3.63/58/14/16 (22)  Ca - 8.7 (22)  Phos - 3.4 (22)  Mg - 2.2 (22)  LFT - Alb/Tprot/Tbili/Dbili/AlkPhos/ALT/AST: 2.4/--/0.3/--/113/66/56 (22)    Thyroid Stimulating Hormone, Serum: 11.170 (22)  Total T4/Free T4: --/0.561 (22)    MEDICATIONS  MEDICATIONS  (STANDING):  albuterol/ipratropium for Nebulization 3 milliLiter(s) Nebulizer every 6 hours  aspirin  chewable 81 milliGRAM(s) Oral daily  atorvastatin 40 milliGRAM(s) Oral at bedtime  chlorhexidine 0.12% Liquid 15 milliLiter(s) Oral Mucosa every 12 hours  chlorhexidine 2% Cloths 1 Application(s) Topical <User Schedule>  dexMEDEtomidine Infusion 0.04 MICROgram(s)/kG/Hr (0.82 mL/Hr) IV Continuous <Continuous>  dextrose 5%. 1000 milliLiter(s) (50 mL/Hr) IV Continuous <Continuous>  dextrose 5%. 1000 milliLiter(s) (100 mL/Hr) IV Continuous <Continuous>  dextrose 5%. 1000 milliLiter(s) (50 mL/Hr) IV Continuous <Continuous>  dextrose 5%. 1000 milliLiter(s) (100 mL/Hr) IV Continuous <Continuous>  dextrose 50% Injectable 25 Gram(s) IV Push once  dextrose 50% Injectable 12.5 Gram(s) IV Push once  dextrose 50% Injectable 25 Gram(s) IV Push once  dextrose 50% Injectable 25 Gram(s) IV Push once  dextrose 50% Injectable 12.5 Gram(s) IV Push once  dextrose 50% Injectable 25 Gram(s) IV Push once  epoetin gui-epbx (RETACRIT) Injectable 8000 Unit(s) IV Push once  fentaNYL   Patch  50 MICROgram(s)/Hr. 1 Patch Transdermal every 48 hours  glucagon  Injectable 1 milliGRAM(s) IntraMuscular once  glucagon  Injectable 1 milliGRAM(s) IntraMuscular once  heparin   Injectable 5000 Unit(s) SubCutaneous every 8 hours  hydrocortisone sodium succinate Injectable 25 milliGRAM(s) IV Push every 12 hours  influenza  Vaccine (HIGH DOSE) 0.7 milliLiter(s) IntraMuscular once  insulin glargine Injectable (LANTUS) 7 Unit(s) SubCutaneous <User Schedule>  insulin regular  human corrective regimen sliding scale   SubCutaneous every 6 hours  insulin regular  human recombinant 6 Unit(s) SubCutaneous every 6 hours  levothyroxine Injectable 60 MICROGram(s) IV Push at bedtime  midodrine 20 milliGRAM(s) Oral every 8 hours  mycophenolate mofetil Suspension 500 milliGRAM(s) Oral every 12 hours  norepinephrine Infusion 0.05 MICROgram(s)/kG/Min (7.72 mL/Hr) IV Continuous <Continuous>  pantoprazole   Suspension 40 milliGRAM(s) Oral every 24 hours  polyethylene glycol 3350 17 Gram(s) Oral every 12 hours  QUEtiapine 75 milliGRAM(s) Oral every 12 hours  senna 2 Tablet(s) Oral at bedtime  sodium chloride 7% Inhalation 4 milliLiter(s) Inhalation every 12 hours  tacrolimus    0.5 mG/mL Suspension 2 milliGRAM(s) Oral every 24 hours  tacrolimus    0.5 mG/mL Suspension 3 milliGRAM(s) Oral every 24 hours  tamsulosin 0.4 milliGRAM(s) Oral at bedtime    MEDICATIONS  (PRN):  acetaminophen    Suspension .. 650 milliGRAM(s) Oral every 6 hours PRN Mild Pain (1 - 3)  dextrose Oral Gel 15 Gram(s) Oral once PRN Blood Glucose LESS THAN 70 milliGRAM(s)/deciliter  dextrose Oral Gel 15 Gram(s) Oral once PRN Blood Glucose LESS THAN 70 milliGRAM(s)/deciliter  sodium chloride 0.9% lock flush 10 milliLiter(s) IV Push every 1 hour PRN Pre/post blood products, medications, blood draw, and to maintain line patency    ASSESSMENT / RECOMMENDATIONS  Mr. Miguel is an 83-year-old male with type 2 diabetes mellitus, coronary artery disease (s/p DAMEON x2 to LAD on 2021), chronic kidney disease stage 4 (s/p renal transplant on , on tracrolimus and prednisone) and benign prostatic hyperplasia who was sent to the hospital by his nephrologist for further evaluation of lower extremity edema (22). He was admitted for further management of lower extremity edema, thought to be secondary to his underlying CKD versus congestive heart failure. Hospitalization was complicated by hypoglycemia and cardiac arrest (22, ROSC after 1 minute of chest compressions). Post-cardiac arrest he was noted to develop hypotension, bradycardia and hypothermia. Labs were remarkable for elevated TSH (20) and decreased FT4 (0.77). 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     Although he had features seen with myxedema coma, these findings were better explained by his cardiac arrest as his vital signs prior to the arrest were apparently around his baseline, as was his mental status. Nonetheless, he was started on levothyroxine 25 mcg IV daily (22) which has been titrated up to 50 mcg daily (22). Nonetheless, his thyroid function tests show that his Free T4 remains low (0.56) with an elevated TSH (11.1). In addition, given concern for adrenal insufficiency, he was started on stress-dose steroids which have been titrated down to 25 mg every 12 hours. The patient was extubated on 22; however, he had to be reintubated on 22 due to episode of respiratory decompensation, potentially secondary to aspiration. He is now s/p trach and peg, receiving tube feeds with Nepro at 43 mL/hr with no reported interruptions.     A1C: 9.0 %  BUN: 81  Creatinine: 3.63  GFR: 16  Weight: 82.3  BMI: 28.4  EF: 65%, grade II diastolic dysfunction.    # Type 2 diabetes mellitus  - Please continue with Lantus 7 units at bedtime.  - Decrease regular insulin to 6 units every 6 hours while on tube feeds (at goal of 43 mL/hr).  - Continue regular insulin sliding scale low dose every 6 hours.   - Patient's fingerstick glucose goal is 100-180 mg/dL.    # Hypothyroidism   - TSH 20. FT4 0.77 (22) -> Started on levothyroxine 25 mcg IV daily.   - TSH 9.2. FT4 0.69 (22) -> Increased to levothyroxine 50 mcg IV daily.   - TSH 11.1. FT4 0.56. (22) -> Increased to levothyroxine 60 mcg IV daily.   - Continue with levothyroxine to 60 mcg IV daily for now.     # Concern for adrenal insufficiency   - The patient was on prednisone 5 mg daily for renal transplant prior to admission.   - He had recent hospitalization (2022) for pneumonia due to pseudomonas where he was also started on stress dose steroids. At that time, a cortisol level was collected prior to starting steroids which was not robust (6.5 ug/dL).   - Continue with hydrocortisone 25 mg IV every 12 hours for now. However, would leave up to discretion of primary team to increase even higher if septic shock is suspected.  Case discussed with Dr. Rabago. Primary team updated.       Ulysses Weber    Endocrinology Fellow    Service Pager: [phone]

## 2022-12-09 NOTE — PROGRESS NOTE ADULT - ATTENDING COMMENTS
renal transplant, ATN, chronic aspiration s/p trach/peg, DM  physical as above  continue tc with suctioning  HD  midodrine at 20 mg q 8h, trying to wean NE  decrease precedex to 0.2 and DC in AM if OK  sugars ok, continue HC  continue feeds  follow tacro levels  decision making of high complexity

## 2022-12-09 NOTE — PROGRESS NOTE ADULT - SUBJECTIVE AND OBJECTIVE BOX
--------------------------------------------------------------------------------  Chief Complaint: ESRD/Ongoing hemodialysis requirement    24 hour events/subjective:  Tolerated 3.5L of UF yesterday with HD. Will dialyze again today, remains net positive 18L for this admission.   Edema slowly improving.     PAST HISTORY  --------------------------------------------------------------------------------  No significant changes to PMH, PSH, FHx, SHx, unless otherwise noted    ALLERGIES & MEDICATIONS  --------------------------------------------------------------------------------  Allergies    No Known Allergies    Intolerances      Standing Inpatient Medications  albuterol/ipratropium for Nebulization 3 milliLiter(s) Nebulizer every 6 hours  aspirin  chewable 81 milliGRAM(s) Oral daily  atorvastatin 40 milliGRAM(s) Oral at bedtime  chlorhexidine 0.12% Liquid 15 milliLiter(s) Oral Mucosa every 12 hours  chlorhexidine 2% Cloths 1 Application(s) Topical <User Schedule>  dexMEDEtomidine Infusion 0.04 MICROgram(s)/kG/Hr IV Continuous <Continuous>  dextrose 5%. 1000 milliLiter(s) IV Continuous <Continuous>  dextrose 5%. 1000 milliLiter(s) IV Continuous <Continuous>  dextrose 5%. 1000 milliLiter(s) IV Continuous <Continuous>  dextrose 5%. 1000 milliLiter(s) IV Continuous <Continuous>  dextrose 50% Injectable 25 Gram(s) IV Push once  dextrose 50% Injectable 12.5 Gram(s) IV Push once  dextrose 50% Injectable 25 Gram(s) IV Push once  dextrose 50% Injectable 25 Gram(s) IV Push once  dextrose 50% Injectable 12.5 Gram(s) IV Push once  dextrose 50% Injectable 25 Gram(s) IV Push once  fentaNYL   Patch  50 MICROgram(s)/Hr. 1 Patch Transdermal every 48 hours  glucagon  Injectable 1 milliGRAM(s) IntraMuscular once  glucagon  Injectable 1 milliGRAM(s) IntraMuscular once  heparin   Injectable 5000 Unit(s) SubCutaneous every 8 hours  hydrocortisone sodium succinate Injectable 25 milliGRAM(s) IV Push every 12 hours  influenza  Vaccine (HIGH DOSE) 0.7 milliLiter(s) IntraMuscular once  insulin glargine Injectable (LANTUS) 7 Unit(s) SubCutaneous <User Schedule>  insulin regular  human corrective regimen sliding scale   SubCutaneous every 6 hours  insulin regular  human recombinant 6 Unit(s) SubCutaneous every 6 hours  levothyroxine Injectable 60 MICROGram(s) IV Push at bedtime  midodrine 20 milliGRAM(s) Oral every 8 hours  mycophenolate mofetil Suspension 500 milliGRAM(s) Oral every 12 hours  pantoprazole   Suspension 40 milliGRAM(s) Oral every 24 hours  polyethylene glycol 3350 17 Gram(s) Oral every 12 hours  QUEtiapine 100 milliGRAM(s) Oral every 12 hours  senna 2 Tablet(s) Oral at bedtime  sodium chloride 7% Inhalation 4 milliLiter(s) Inhalation every 12 hours  tacrolimus    0.5 mG/mL Suspension 4 milliGRAM(s) Oral every 24 hours  tacrolimus    0.5 mG/mL Suspension 4 milliGRAM(s) Oral every 24 hours  tamsulosin 0.4 milliGRAM(s) Oral at bedtime    PRN Inpatient Medications  acetaminophen    Suspension .. 650 milliGRAM(s) Oral every 6 hours PRN  dextrose Oral Gel 15 Gram(s) Oral once PRN  dextrose Oral Gel 15 Gram(s) Oral once PRN  sodium chloride 0.9% lock flush 10 milliLiter(s) IV Push every 1 hour PRN      REVIEW OF SYSTEMS  --------------------------------------------------------------------------------  All other systems were reviewed and are negative, except as noted.    VITALS/PHYSICAL EXAM  --------------------------------------------------------------------------------  T(C): 36.7 (12-09-22 @ 09:02), Max: 37.3 (12-08-22 @ 14:05)  HR: 68 (12-09-22 @ 10:00) (59 - 80)  BP: 115/76 (12-09-22 @ 10:00) (87/39 - 193/72)  RR: 30 (12-09-22 @ 10:00) (22 - 36)  SpO2: 99% (12-09-22 @ 10:00) (94% - 100%)  Wt(kg): --  Drug Dosing Weight  Height (cm): 170.2 (17 Nov 2022 12:38)  Weight (kg): 82.3 (20 Nov 2022 13:06)  BMI (kg/m2): 28.4 (20 Nov 2022 13:06)  BSA (m2): 1.94 (20 Nov 2022 13:06)        12-08-22 @ 07:01  -  12-09-22 @ 07:00  --------------------------------------------------------  IN: 2002.7 mL / OUT: 4163 mL / NET: -2160.3 mL    12-09-22 @ 07:01  -  12-09-22 @ 12:28  --------------------------------------------------------  IN: 261.3 mL / OUT: 60 mL / NET: 201.3 mL      PHYSICAL EXAM:  GENERAL: trach collar, NAD  NECK: supple, No JVD  Lungs: No rales, ronchi or wheezing noted  HEART: normal S1S2, RRR  ABDOMEN: Soft, Nontender, +BS, No flank tenderness bilateral  EXTREMITIES: Remains anasarcic, although edema is slowly improving  ACCESS: R radiocephalic AVF w/ palpable thrill      LABS/STUDIES  --------------------------------------------------------------------------------              9.9    4.49  >-----------<  394      [12-08-22 @ 06:01]              31.8     135  |  96  |  81  ----------------------------<  58      [12-08-22 @ 06:01]  4.6   |  25  |  3.63        Ca     8.7     [12-08-22 @ 06:01]      Mg     2.2     [12-08-22 @ 06:01]      Phos  3.4     [12-08-22 @ 06:01]    TPro  5.7  /  Alb  2.4  /  TBili  0.3  /  DBili  x   /  AST  56  /  ALT  66  /  AlkPhos  113  [12-08-22 @ 06:01]          Iron 79, TIBC 148, %sat 53      [10-23-22 @ 05:30]  Ferritin 175      [10-23-22 @ 05:30]  HbA1c 8.6      [11-19-19 @ 06:36]  TSH 11.170      [12-05-22 @ 04:36]  Lipid: chol --, TG 91, HDL --, LDL --      [11-30-22 @ 03:57]    HBsAb Nonreact      [12-06-22 @ 12:20]  HBsAg Nonreact      [12-06-22 @ 12:20]  HCV 0.04, Nonreact      [12-06-22 @ 12:20]      RADIOLOGY:  --------------------------------------------------------------------------------------

## 2022-12-09 NOTE — PROGRESS NOTE ADULT - ATTENDING COMMENTS
Pt seen on rounds this afternoon.  Glucoses were stable overnight, but he then became hypoglycemic at noon today (40 mg%) despite the decrease in regular insulin to 6 units and lack of interruption of the feeds.  Was treated with D50W, subsequent glucose up to 120 mg%  Remains sedated, appears comfortable.    Again required Levophed in order to tolerate HD.    LE edema is minimal at this point, UE edema may have decreased somewhat  Lungs were almost clear on the left side, still with coarse rhonchi on the right  -- We continue to chantal decreasing insulin requirement.  Since some of the hypoglycemia may reflect excessive basal insulin, will decrease the Lantus to 6 units.   --Will also decrease the nutritional regular insulin to 4 units q6h

## 2022-12-09 NOTE — PROGRESS NOTE ADULT - ATTENDING COMMENTS
I agree with the fellow's findings and plans as written above with the following additions/amendments:    Seen and examined at bedside on HD, tolerating well on minimal pressors, continue high UF as above, further recs as above

## 2022-12-09 NOTE — PROGRESS NOTE ADULT - ASSESSMENT
Patient is an 82 yo M with ESKD s/p transplant  now CKD 4, glaucoma, DM1, Anemia, CAD, BPH presenting with decompensated heart failure c/b cardiac arrest on  now with iATN requiring HD as of     Problem/Plan:  #iATN on CKD stage 4 now requiring HD  Renal allograft with CKD 4 - baseline sCr 2.3-2.5, usual meds tacrolimus 4mg BID and mycophenolate 500mg BID. Was started on HD on  due to worsening volume status, acidosis and concern for uremia.   Remains 18L net positive for this admission  Recs:  - c/w tacrolimus to 4mg AM and 4mg PM (12 hrs apart)  - c/w cell cept 500mg BID  - Obtain tacro level on  at 8:30 PM  -Please make sure pt having daily BM; daily miralax and senna  - strict i's and o's  -Trend BMP daily  -maintain MAP >70   -HD today w/ goal of 3L UF    #Metabolic Acidosis  -Resolving with HD    #Hyponatremia  2/2 ARF   -Resolving with HD    #Anemia of chronic disease  -Epo w/ HD      Hemoglobin: 9.9 g/dL (22 @ 06:01)  Phosphorus Level, Serum: 3.4 mg/dL (22 @ 06:01)  Hemoglobin: 8.2 g/dL (22 @ 06:01)  Phosphorus Level, Serum: 4.4 mg/dL (22 @ 06:01)    Albumin, Serum: 2.4 g/dL (22 @ 06:01)  Albumin, Serum: 2.2 g/dL (22 @ 06:01)    T(C): 36.7 (22 @ 09:02), Max: 37.3 (22 @ 14:05)  HR: 68 (22 @ 10:00) (59 - 80)  BP: 115/76 (22 @ 10:00) (87/39 - 193/72)  RR: 30 (22 @ 10:00) (22 - 36)  SpO2: 99% (22 @ 10:00) (94% - 100%)  epoetin gui-epbx (RETACRIT) Injectable 8000 Unit(s) IV Push once, 22 @ 08:32, Routine, Stop order after: 1 Doses  epoetin gui-epbx (RETACRIT) Injectable 8000 Unit(s) IV Push once, 22 @ 10:51, Routine, Stop order after: 1 Doses      Hemodialysis Treatment.:     Schedule: Once, Modality: Hemodialysis, Access: Arteriovenous Fistula    Dialyzer: Optiflux [id number], Time: 180 Min    Blood Flow: 400 mL/Min , Dialysate Flow: 600 mL/Min, Dialysate Temp: 36.5, Tubinmm (Adult)    Target Fluid Removal: 3 Liters    Dialysate Electrolytes (mEq/L): Potassium 2, Calcium 2.5, Sodium 138, Bicarbonate 35    Additional Instructions: Please use 16 gauge or larger needle. (22 @ 10:05) [Active]

## 2022-12-09 NOTE — PROGRESS NOTE ADULT - ASSESSMENT
84 y/o M PMH CKD 4, renal transplant in 2013 at [facility], glaucoma (blind), DM1, anemia, CAD s/p 2 DAMEON to LAD in 6/21, BPH, recent admission for PNA presents with acute on chronic cough 2/2 PNA and SUNNY 2/2 acute CKD.     NEUROLOGY  #Metabolic Encephalopathy  #Sedation  #Agitation  CT scan negative for acute intracranial pathology. Began to wean sedation on 12/2.  - c/w fentanyl 25mcg patch  - on precedex, weaning as tolerated  - on seroquel 100mg q12h (increased on 12/9)  - receives 25mg IVPB of fentanyl for agitation, attempting to wean    CARDIOVASCULAR  # Fluid Overloaded  - S/p Metolazone 10 and Lasix 80 for fluid overload status 12/5  - started on HD on 12/6, removing 2-3L each session    #Hypotension  - on midodrine 20 q8 (started on 12/7, increased on 12/8)    #HFpEF  Had EF of 55% on TTE from Oct 2022, grade I diastolic dysfunction. Repeat TTE 11/17 with poor visualization of LV. Patient making good UOP overnight  - holding BP meds    #Upper extremity edema  Stable from yesterday but increased compared to baseline. US with superficial thrombosis of L cephalic vein extending to AC fossa, no DVTs.   - given patient's propensity to bleed and superficial nature of thrombus, no intervention  -s/p Metolazone 10 and Lasix 80 12/5    #CAD  #HLD  Hx of CAD s/p 2 DAMEON to LAD in June 2021, currently no CP. Trops 0.03 but no CP or ischemic changes on EKG, likely due to CKD. Takes Plavix and aspirin per last d/c but only aspirin on outpatient note  - c/w ASA daily  - c/w home Lipitor    #HTN   Known history of HTN.   - holding hypertension meds as pt currently requiring pressor support  - continue to monitor BP    PULM  #AHRF  Likely 2/2 aspiration event. Intubated on 11/26.  - ID management as below for aspiration   - Received trach on 12/1  - tolerated CPAP o/n 12/6-7  - trach collar tolerated well o/n 12/8-9; continuing to tolerate 12/9    RENAL  #ELIZABETH on Chronic kidney disease (CKD)  Baseline sCr 2.3-2.5. On admission, fluid overloaded. Does have orthopnea and intermittent SOB at baseline. Acidotic but at baseline. Follows w Dr. Mac.  - sCr and BUN increasing daily, urine output poor  - monitor I/Os  - renally dosing meds  - s/p sodium bicarb 150 rate 60cc/hr, discontinued on 12/6  - initiated HD 12/6, removed 2L fluid    #Renal transplant  Patient had renal transplant in 2013. On home mycophenolate 500mg BID and tacrolimus 4mg BID. Per nephro, decreased home tacrolimus from 4mg BID --> 2mg BID while in hospital.   - c/w tacrolimus in AM and in PM, based on level; per Renal increased to 4mg q12h on 12/8  - c/w mycophenolate 500mg BID  - f/u renal recs    #Hyponatremia  - stable; monitor BMP    GI  #Nutrition  NPO with PEG feeds.  - restarted feeds at 6pm 12/2      #Urinary Retention  - remove bourgeois if not chronic, TOV  - on home tamsulosin 0.4mg daily, discontinue if bourgeois remains    ID  #Aspiration PNA  RESOLVED  Tracheal aspirate cx (11/19) growing serratia marcescens and pseudomonas. Patient was started on vancomycin and cefepime. MRSA nares positive. DC'ed vancomycin given supratherapeutic trough. Completed cefepime 2g q24 course x9 days.    #Stenotrophomonas in sputum cx RESOLVED  Sputum cx from 11/26 growing Stenotrophomonas  - completed course of Bactrim BS for 10 days (11/28 - 12/5)    ENDOCRINE  #DM (diabetes mellitus).   Previously on lantus 15 lispro 5 at home. Endocrine following.  - on lantus 7 units, will adjust per endo recs as low FSBG in AM 12/9  - c/w humulin 7 units q6h  - on low dose regular ISS  - goal -180  - f/u endo recs    #Hypothyroidism  TSH 20.8, free t4 0.7, T3 49 (low). Repeat TSH at 9, fT4 0.7  - on synthroid 50mcg IV qD    #Relative Adrenal Insufficiency  Patient on chronic prednisone 5mg daily. He was started on hydrocortisone 50mg q6hr --> q8 --> BID   - c/w hydrocortisone 25mg q12  - wean as tolerated  - f/u endocrine recs    HEME  #Normocytic Anemia  Likely 2.2 CKD. Hgb 6.9 on 11/21, corrected appropriately s/p 1u pRBC. No active signs of bleeding on physical exam. CTH with no intracranial bleeding. Required 2u pRBC to correct. DEBRA and stool guaic negative.  - consider epo, if BP remains well controlled  - active T+S  - transfuse <7    FLUIDS/ELECTROLYTES/NUTRITION  -IVF as above  -Monitor, careful w advanced kidney disease  -Diet: NPO with PEG feeds  -DVT ppx: heparin TID  -GI: protonix 40 qd  -Dispo: MICU; PT recommending [facility]

## 2022-12-09 NOTE — PROGRESS NOTE ADULT - SUBJECTIVE AND OBJECTIVE BOX
OVERNIGHT: No acute events overnight.   SUBJECTIVE / INTERVAL HPI: Patient was seen and examined this morning. He developed hypoglycemic a noon. Per flowsheets, tube feeds have been running consistently at 43 mL/hr without significant interruptions. He continues to receive hydrocortisone 25 mg IV every 12 hours. Tolerated trach collar throughout the night.     CAPILLARY BLOOD GLUCOSE & INSULIN RECEIVED  Yesterday  - 6 PM FS mg/dL = 6 units of Lispro.    Today  - 12 AM FS mg/dL = 7 units of Lantus + 6 units sliding scale.   - 6 AM FS mg/dL = 6 units of sliding scale.   - 12 PM FS mg/dL = He didn't receive insulin. He was given 1 amp of dextrose 50% (25 grams).     REVIEW OF SYSTEMS  Constitutional:  Negative fever, chills or loss of appetite.  Eyes:  Negative blurry vision or double vision.  Cardiovascular:  Negative for chest pain or palpitations.  Respiratory:  Negative for cough, wheezing, or shortness of breath.    Gastrointestinal:  Negative for nausea, vomiting, diarrhea, constipation, or abdominal pain.  Genitourinary:  Negative frequency, urgency or dysuria.  Neurologic:  No headache, confusion, dizziness, lightheadedness.    PHYSICAL EXAM  Vital Signs Last 24 Hrs  T(C): 36.7 (09 Dec 2022 09:02), Max: 37.3 (08 Dec 2022 14:05)  T(F): 98.1 (09 Dec 2022 09:02), Max: 99.1 (08 Dec 2022 14:05)  HR: 67 (09 Dec 2022 12:00) (59 - 80)  BP: 128/53 (09 Dec 2022 12:00) (87/39 - 193/72)  BP(mean): 77 (09 Dec 2022 12:00) (57 - 111)  RR: 34 (09 Dec 2022 12:00) (22 - 36)  SpO2: 94% (09 Dec 2022 12:00) (94% - 100%)    Parameters below as of 09 Dec 2022 12:00  Patient On (Oxygen Delivery Method): tracheostomy collar  O2 Flow (L/min): 10  O2 Concentration (%): 40    Constitutional: Awake, alert, in no acute distress.   HEENT: Normocephalic, atraumatic, GORDON, no proptosis or lid retraction.   Neck: supple, no acanthosis, no thyromegaly or palpable thyroid nodules.  Respiratory: Lungs clear to ausculation bilaterally.   Cardiovascular: regular rhythm, normal S1 and S2, no audible murmurs.   GI: soft, non-tender, non-distended, bowel sounds present, no masses appreciated.  Extremities: No lower extremity edema, peripheral pulses present.   Skin: no rashes.   Psychiatric: AAO x 3. Normal affect/mood.     LABS  CBC - WBC/HGB/HTC/PLT: 4.49/9.9/31.8/394 (22)  BMP - Na/K/Cl/Bicarb/BUN/Cr/Gluc/AG/eGFR: 135/4.6/96/25/81/3.63/58/14/16 (22)  Ca - 8.7 (22)  Phos - 3.4 (22)  Mg - 2.2 (22)  LFT - Alb/Tprot/Tbili/Dbili/AlkPhos/ALT/AST: 2.4/--/0.3/--/113/66/56 (22)    Thyroid Stimulating Hormone, Serum: 11.170 (22)  Total T4/Free T4: --/0.561 (22)    MEDICATIONS  MEDICATIONS  (STANDING):  albuterol/ipratropium for Nebulization 3 milliLiter(s) Nebulizer every 6 hours  aspirin  chewable 81 milliGRAM(s) Oral daily  atorvastatin 40 milliGRAM(s) Oral at bedtime  chlorhexidine 0.12% Liquid 15 milliLiter(s) Oral Mucosa every 12 hours  chlorhexidine 2% Cloths 1 Application(s) Topical <User Schedule>  dexMEDEtomidine Infusion 0.04 MICROgram(s)/kG/Hr (0.82 mL/Hr) IV Continuous <Continuous>  dextrose 5%. 1000 milliLiter(s) (50 mL/Hr) IV Continuous <Continuous>  dextrose 5%. 1000 milliLiter(s) (100 mL/Hr) IV Continuous <Continuous>  dextrose 5%. 1000 milliLiter(s) (100 mL/Hr) IV Continuous <Continuous>  dextrose 5%. 1000 milliLiter(s) (50 mL/Hr) IV Continuous <Continuous>  dextrose 50% Injectable 25 Gram(s) IV Push once  dextrose 50% Injectable 12.5 Gram(s) IV Push once  dextrose 50% Injectable 25 Gram(s) IV Push once  dextrose 50% Injectable 25 Gram(s) IV Push once  dextrose 50% Injectable 12.5 Gram(s) IV Push once  dextrose 50% Injectable 25 Gram(s) IV Push once  fentaNYL   Patch  50 MICROgram(s)/Hr. 1 Patch Transdermal every 48 hours  glucagon  Injectable 1 milliGRAM(s) IntraMuscular once  glucagon  Injectable 1 milliGRAM(s) IntraMuscular once  heparin   Injectable 5000 Unit(s) SubCutaneous every 8 hours  hydrocortisone sodium succinate Injectable 25 milliGRAM(s) IV Push every 12 hours  influenza  Vaccine (HIGH DOSE) 0.7 milliLiter(s) IntraMuscular once  insulin glargine Injectable (LANTUS) 7 Unit(s) SubCutaneous <User Schedule>  insulin regular  human corrective regimen sliding scale   SubCutaneous every 6 hours  insulin regular  human recombinant 6 Unit(s) SubCutaneous every 6 hours  lactulose Syrup 15 Gram(s) Oral once  levothyroxine Injectable 60 MICROGram(s) IV Push at bedtime  midodrine 20 milliGRAM(s) Oral every 8 hours  mycophenolate mofetil Suspension 500 milliGRAM(s) Oral every 12 hours  pantoprazole   Suspension 40 milliGRAM(s) Oral every 24 hours  polyethylene glycol 3350 17 Gram(s) Oral every 12 hours  QUEtiapine 100 milliGRAM(s) Oral every 12 hours  senna 2 Tablet(s) Oral at bedtime  sodium chloride 7% Inhalation 4 milliLiter(s) Inhalation every 12 hours  tacrolimus    0.5 mG/mL Suspension 4 milliGRAM(s) Oral every 24 hours  tacrolimus    0.5 mG/mL Suspension 4 milliGRAM(s) Oral every 24 hours  tamsulosin 0.4 milliGRAM(s) Oral at bedtime    MEDICATIONS  (PRN):  acetaminophen    Suspension .. 650 milliGRAM(s) Oral every 6 hours PRN Mild Pain (1 - 3)  dextrose Oral Gel 15 Gram(s) Oral once PRN Blood Glucose LESS THAN 70 milliGRAM(s)/deciliter  dextrose Oral Gel 15 Gram(s) Oral once PRN Blood Glucose LESS THAN 70 milliGRAM(s)/deciliter  sodium chloride 0.9% lock flush 10 milliLiter(s) IV Push every 1 hour PRN Pre/post blood products, medications, blood draw, and to maintain line patency    ASSESSMENT / RECOMMENDATIONS    A1C: 9.0 %  BUN: 81  Creatinine: 3.63  GFR: 16  Weight: 82.3  BMI: 28.4  EF:     # Type 2 diabetes mellitus  - Please continue lantus *** units at bedtime.   - Continue lispro *** units before each meal.  - Continue lispro moderate / low dose sliding scale before meals and at bedtime.  - Patient's fingerstick glucose goal is 100-180 mg/dL.    - For discharge, patient can ***.    - Patient can follow up at discharge with [facility] by calling [phone] to make an appointment.      Case discussed with Dr. Rabago. Primary team updated.       Ulysses Weber    Endocrinology Fellow    Service Pager: [phone]    OVERNIGHT: No acute events overnight.   SUBJECTIVE / INTERVAL HPI: Patient was seen and examined this morning. He developed hypoglycemic a noon. Per flowsheets, tube feeds have been running consistently at 43 mL/hr without significant interruptions. He continues to receive hydrocortisone 25 mg IV every 12 hours. Tolerated trach collar throughout the night.     CAPILLARY BLOOD GLUCOSE & INSULIN RECEIVED  Yesterday  - 6 PM FS mg/dL = 6 units of Lispro.    Today  - 12 AM FS mg/dL = 7 units of Lantus + 6 units sliding scale.   - 6 AM FS mg/dL = 6 units of sliding scale.   - 12 PM FS mg/dL = He didn't receive insulin. He was given 1 amp of dextrose 50% (25 grams).     REVIEW OF SYSTEMS  Unable to obtain.     PHYSICAL EXAM  Vital Signs Last 24 Hrs  T(C): 36.7 (09 Dec 2022 09:02), Max: 37.3 (08 Dec 2022 14:05)  T(F): 98.1 (09 Dec 2022 09:02), Max: 99.1 (08 Dec 2022 14:05)  HR: 67 (09 Dec 2022 12:00) (59 - 80)  BP: 128/53 (09 Dec 2022 12:00) (87/39 - 193/72)  BP(mean): 77 (09 Dec 2022 12:00) (57 - 111)  RR: 34 (09 Dec 2022 12:00) (22 - 36)  SpO2: 94% (09 Dec 2022 12:00) (94% - 100%)    Parameters below as of 09 Dec 2022 12:00  Patient On (Oxygen Delivery Method): tracheostomy collar  O2 Flow (L/min): 10  O2 Concentration (%): 40    Constitutional: Awake, alert, elderly male.   HEENT: Normocephalic, atraumatic, GORDON.  Respiratory: (+) Bilateral rhonchi (improved from previous days), on trach collar.   Cardiovascular: regular rhythm, normal S1 and S2, no audible murmurs.   GI: soft, non-tender, non-distended, bowel sounds present.  Extremities: +2 bilateral lower extremity edema.    LABS  CBC - WBC/HGB/HTC/PLT: 4.49/9.9/31.8/394 (22)  BMP - Na/K/Cl/Bicarb/BUN/Cr/Gluc/AG/eGFR: 135/4.6/96/25/81/3.63/58/14/16 (22)  Ca - 8.7 (22)  Phos - 3.4 (22)  Mg - 2.2 (22)  LFT - Alb/Tprot/Tbili/Dbili/AlkPhos/ALT/AST: 2.4/--/0.3/--/113/66/56 (22)    Thyroid Stimulating Hormone, Serum: 11.170 (22)  Total T4/Free T4: --/0.561 (22)    MEDICATIONS  MEDICATIONS  (STANDING):  albuterol/ipratropium for Nebulization 3 milliLiter(s) Nebulizer every 6 hours  aspirin  chewable 81 milliGRAM(s) Oral daily  atorvastatin 40 milliGRAM(s) Oral at bedtime  chlorhexidine 0.12% Liquid 15 milliLiter(s) Oral Mucosa every 12 hours  chlorhexidine 2% Cloths 1 Application(s) Topical <User Schedule>  dexMEDEtomidine Infusion 0.04 MICROgram(s)/kG/Hr (0.82 mL/Hr) IV Continuous <Continuous>  dextrose 5%. 1000 milliLiter(s) (50 mL/Hr) IV Continuous <Continuous>  dextrose 5%. 1000 milliLiter(s) (100 mL/Hr) IV Continuous <Continuous>  dextrose 5%. 1000 milliLiter(s) (100 mL/Hr) IV Continuous <Continuous>  dextrose 5%. 1000 milliLiter(s) (50 mL/Hr) IV Continuous <Continuous>  dextrose 50% Injectable 25 Gram(s) IV Push once  dextrose 50% Injectable 12.5 Gram(s) IV Push once  dextrose 50% Injectable 25 Gram(s) IV Push once  dextrose 50% Injectable 25 Gram(s) IV Push once  dextrose 50% Injectable 12.5 Gram(s) IV Push once  dextrose 50% Injectable 25 Gram(s) IV Push once  fentaNYL   Patch  50 MICROgram(s)/Hr. 1 Patch Transdermal every 48 hours  glucagon  Injectable 1 milliGRAM(s) IntraMuscular once  glucagon  Injectable 1 milliGRAM(s) IntraMuscular once  heparin   Injectable 5000 Unit(s) SubCutaneous every 8 hours  hydrocortisone sodium succinate Injectable 25 milliGRAM(s) IV Push every 12 hours  influenza  Vaccine (HIGH DOSE) 0.7 milliLiter(s) IntraMuscular once  insulin glargine Injectable (LANTUS) 7 Unit(s) SubCutaneous <User Schedule>  insulin regular  human corrective regimen sliding scale   SubCutaneous every 6 hours  insulin regular  human recombinant 6 Unit(s) SubCutaneous every 6 hours  lactulose Syrup 15 Gram(s) Oral once  levothyroxine Injectable 60 MICROGram(s) IV Push at bedtime  midodrine 20 milliGRAM(s) Oral every 8 hours  mycophenolate mofetil Suspension 500 milliGRAM(s) Oral every 12 hours  pantoprazole   Suspension 40 milliGRAM(s) Oral every 24 hours  polyethylene glycol 3350 17 Gram(s) Oral every 12 hours  QUEtiapine 100 milliGRAM(s) Oral every 12 hours  senna 2 Tablet(s) Oral at bedtime  sodium chloride 7% Inhalation 4 milliLiter(s) Inhalation every 12 hours  tacrolimus    0.5 mG/mL Suspension 4 milliGRAM(s) Oral every 24 hours  tacrolimus    0.5 mG/mL Suspension 4 milliGRAM(s) Oral every 24 hours  tamsulosin 0.4 milliGRAM(s) Oral at bedtime    MEDICATIONS  (PRN):  acetaminophen    Suspension .. 650 milliGRAM(s) Oral every 6 hours PRN Mild Pain (1 - 3)  dextrose Oral Gel 15 Gram(s) Oral once PRN Blood Glucose LESS THAN 70 milliGRAM(s)/deciliter  dextrose Oral Gel 15 Gram(s) Oral once PRN Blood Glucose LESS THAN 70 milliGRAM(s)/deciliter  sodium chloride 0.9% lock flush 10 milliLiter(s) IV Push every 1 hour PRN Pre/post blood products, medications, blood draw, and to maintain line patency    ASSESSMENT / RECOMMENDATIONS  Mr. Miguel is an 83-year-old male with type 2 diabetes mellitus, coronary artery disease (s/p DAMEON x2 to LAD on 2021), chronic kidney disease stage 4 (s/p renal transplant on , on tracrolimus and prednisone) and benign prostatic hyperplasia who was sent to the hospital by his nephrologist for further evaluation of lower extremity edema (22). He was admitted for further management of lower extremity edema, thought to be secondary to his underlying CKD versus congestive heart failure. Hospitalization was complicated by hypoglycemia and cardiac arrest (22, ROSC after 1 minute of chest compressions). Post-cardiac arrest he was noted to develop hypotension, bradycardia and hypothermia. Labs were remarkable for elevated TSH (20) and decreased FT4 (0.77). 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     Although he had features seen with myxedema coma, these findings were better explained by his cardiac arrest as his vital signs prior to the arrest were apparently around his baseline, as was his mental status. Nonetheless, he was started on levothyroxine 25 mcg IV daily (22) which has been titrated up to 50 mcg daily (22). Nonetheless, his thyroid function tests show that his Free T4 remains low (0.56) with an elevated TSH (11.1). In addition, given concern for adrenal insufficiency, he was started on stress-dose steroids which have been titrated down to 25 mg every 12 hours. The patient was extubated on 22; however, he had to be reintubated on 22 due to episode of respiratory decompensation, potentially secondary to aspiration. He is now s/p trach and peg, receiving tube feeds with Nepro at 43 mL/hr with no reported interruptions.     A1C: 9.0 %  BUN: 81  Creatinine: 3.63  GFR: 16  Weight: 82.3  BMI: 28.4  EF: 65%, grade II diastolic dysfunction.    # Type 2 diabetes mellitus  - Patient developed episode of hypoglycemic earlier today. His insulin requirements might be decrease presumably due to improvement in his underlying infection.   - Please decrease Lantus to 6 units at midnight.   - Decrease regular insulin to 4 units every 6 hours while on tube feeds (at goal of 43 mL/hr).  - Continue regular insulin sliding scale low dose every 6 hours.   - Patient's fingerstick glucose goal is 100-180 mg/dL.    # Hypothyroidism   - TSH 20. FT4 0.77 (22) -> Started on levothyroxine 25 mcg IV daily.   - TSH 9.2. FT4 0.69 (22) -> Increased to levothyroxine 50 mcg IV daily.   - TSH 11.1. FT4 0.56. (22) -> Increased to levothyroxine 60 mcg IV daily.   - Continue with levothyroxine to 60 mcg IV daily for now.     # Concern for adrenal insufficiency   - The patient was on prednisone 5 mg daily for renal transplant prior to admission.   - He had recent hospitalization (2022) for pneumonia due to pseudomonas where he was also started on stress dose steroids. At that time, a cortisol level was collected prior to starting steroids which was not robust (6.5 ug/dL).   - Continue with hydrocortisone 25 mg IV every 12 hours for now. However, would leave up to discretion of primary team to increase even higher if septic shock is suspected.    Case discussed with Dr. Rabago. Primary team updated.       Ulysses Weber    Endocrinology Fellow    Service Pager: [phone]

## 2022-12-09 NOTE — PROGRESS NOTE ADULT - SUBJECTIVE AND OBJECTIVE BOX
OVERNIGHT EVENTS:  Tolerated trach collar well; agitated in AM, Seroquel increased.    SUBJECTIVE / INTERVAL HPI: Patient seen and examined at bedside.  Calm, in no apparent distress, turning to voice.    VITAL SIGNS:  Vital Signs Last 24 Hrs  T(C): 36.7 (09 Dec 2022 09:02), Max: 37.3 (08 Dec 2022 14:05)  T(F): 98.1 (09 Dec 2022 09:02), Max: 99.1 (08 Dec 2022 14:05)  HR: 68 (09 Dec 2022 10:00) (59 - 80)  BP: 115/76 (09 Dec 2022 10:00) (87/39 - 193/72)  BP(mean): 89 (09 Dec 2022 10:00) (57 - 111)  RR: 30 (09 Dec 2022 10:00) (22 - 36)  SpO2: 99% (09 Dec 2022 10:00) (94% - 100%)    Parameters below as of 09 Dec 2022 10:00  Patient On (Oxygen Delivery Method): tracheostomy collar  O2 Flow (L/min): 10  O2 Concentration (%): 40  I&O's Summary    08 Dec 2022 07:01  -  09 Dec 2022 07:00  --------------------------------------------------------  IN: 2002.7 mL / OUT: 4163 mL / NET: -2160.3 mL    09 Dec 2022 07:01  -  09 Dec 2022 12:26  --------------------------------------------------------  IN: 261.3 mL / OUT: 60 mL / NET: 201.3 mL        PHYSICAL EXAM:  General: NAD, lying in bed, mildly agitated, non-verbal  HEENT: NC/AT; anicteric sclera  Neck: supple, trach tube in place  Respiratory: CTA B/L; no W/R/R  Cardiovascular: +S1/S2; RRR; no M/R/G  Gastrointestinal: soft, NT/ND; PEG tube in place, site in clean with no active bleeding  Extremities: WWP; 2+ peripheral pulses B/L; trace B/L LE edema and 3+ B/L UE edema  Skin: normal color and turgor; no rash  Neurological:  weaning sedation; spontaneously moving extremities, occasionally following commands, turns to voice    MEDICATIONS:  MEDICATIONS  (STANDING):  albuterol/ipratropium for Nebulization 3 milliLiter(s) Nebulizer every 6 hours  aspirin  chewable 81 milliGRAM(s) Oral daily  atorvastatin 40 milliGRAM(s) Oral at bedtime  chlorhexidine 0.12% Liquid 15 milliLiter(s) Oral Mucosa every 12 hours  chlorhexidine 2% Cloths 1 Application(s) Topical <User Schedule>  dexMEDEtomidine Infusion 0.04 MICROgram(s)/kG/Hr (0.82 mL/Hr) IV Continuous <Continuous>  dextrose 5%. 1000 milliLiter(s) (50 mL/Hr) IV Continuous <Continuous>  dextrose 5%. 1000 milliLiter(s) (100 mL/Hr) IV Continuous <Continuous>  dextrose 5%. 1000 milliLiter(s) (100 mL/Hr) IV Continuous <Continuous>  dextrose 5%. 1000 milliLiter(s) (50 mL/Hr) IV Continuous <Continuous>  dextrose 50% Injectable 25 Gram(s) IV Push once  dextrose 50% Injectable 12.5 Gram(s) IV Push once  dextrose 50% Injectable 25 Gram(s) IV Push once  dextrose 50% Injectable 25 Gram(s) IV Push once  dextrose 50% Injectable 12.5 Gram(s) IV Push once  dextrose 50% Injectable 25 Gram(s) IV Push once  fentaNYL   Patch  50 MICROgram(s)/Hr. 1 Patch Transdermal every 48 hours  glucagon  Injectable 1 milliGRAM(s) IntraMuscular once  glucagon  Injectable 1 milliGRAM(s) IntraMuscular once  heparin   Injectable 5000 Unit(s) SubCutaneous every 8 hours  hydrocortisone sodium succinate Injectable 25 milliGRAM(s) IV Push every 12 hours  influenza  Vaccine (HIGH DOSE) 0.7 milliLiter(s) IntraMuscular once  insulin glargine Injectable (LANTUS) 7 Unit(s) SubCutaneous <User Schedule>  insulin regular  human corrective regimen sliding scale   SubCutaneous every 6 hours  insulin regular  human recombinant 6 Unit(s) SubCutaneous every 6 hours  levothyroxine Injectable 60 MICROGram(s) IV Push at bedtime  midodrine 20 milliGRAM(s) Oral every 8 hours  mycophenolate mofetil Suspension 500 milliGRAM(s) Oral every 12 hours  pantoprazole   Suspension 40 milliGRAM(s) Oral every 24 hours  polyethylene glycol 3350 17 Gram(s) Oral every 12 hours  QUEtiapine 100 milliGRAM(s) Oral every 12 hours  senna 2 Tablet(s) Oral at bedtime  sodium chloride 7% Inhalation 4 milliLiter(s) Inhalation every 12 hours  tacrolimus    0.5 mG/mL Suspension 4 milliGRAM(s) Oral every 24 hours  tacrolimus    0.5 mG/mL Suspension 4 milliGRAM(s) Oral every 24 hours  tamsulosin 0.4 milliGRAM(s) Oral at bedtime    MEDICATIONS  (PRN):  acetaminophen    Suspension .. 650 milliGRAM(s) Oral every 6 hours PRN Mild Pain (1 - 3)  dextrose Oral Gel 15 Gram(s) Oral once PRN Blood Glucose LESS THAN 70 milliGRAM(s)/deciliter  dextrose Oral Gel 15 Gram(s) Oral once PRN Blood Glucose LESS THAN 70 milliGRAM(s)/deciliter  sodium chloride 0.9% lock flush 10 milliLiter(s) IV Push every 1 hour PRN Pre/post blood products, medications, blood draw, and to maintain line patency      ALLERGIES:  Allergies    No Known Allergies    Intolerances        LABS:                        9.9    4.49  )-----------( 394      ( 08 Dec 2022 06:01 )             31.8     12-08    135  |  96  |  81<H>  ----------------------------<  58<L>  4.6   |  25  |  3.63<H>    Ca    8.7      08 Dec 2022 06:01  Phos  3.4     12-08  Mg     2.2     12-08    TPro  5.7<L>  /  Alb  2.4<L>  /  TBili  0.3  /  DBili  x   /  AST  56<H>  /  ALT  66<H>  /  AlkPhos  113  12-08        CAPILLARY BLOOD GLUCOSE      POCT Blood Glucose.: 90 mg/dL (09 Dec 2022 06:51)      RADIOLOGY & ADDITIONAL TESTS: Reviewed.

## 2022-12-09 NOTE — PROGRESS NOTE ADULT - PROBLEM SELECTOR PLAN 1
Event noticed.  The patient is sedated.  I suctioned the patient copious amount of secretion.  Gas exchange is stable.  Sputum grew Pseudomonas and Serratia.  The patient on appropriate antibiotic.  Continue pulmonary toilet.  Patient has baseline dementia and does not and trial up off sedation.  Tapers pressors as needed.  Cortisol level is adequate.  I discussed the case with critical care. She was extubated.  Patient is on nasal cannula.  Suction the patient copious amount of secretion.  Dysphagia evaluation.  Continue antibiotic.  The patient is hemodynamically stable off pressors and sedative.  Suctioned the patient thick moderate secretion and increased.  He failed swallow eval and will need a peg.  On nebs.  He was intubated.  Sputum GS revealed GNR and culture positive for sternomonas.  Abt changed to bactrim.  Monitor renal function on abt which is worsening.  Plan trach tomorrow.  Peg done . Is in detail with the family.  The patient is off antibiotic.  The renal function is deteriorating.  Improved with hemodialysis and fluid removal and 2.5 liters wwere removed today.  Patient on trach collar and tolerated well.  Suction the patient moderate amount of thick secretion.  No chest x-ray.  No thoracentesis.  Continue with dialysis with fluid removal.  Discussed with family regarding LTAC

## 2022-12-09 NOTE — PROGRESS NOTE ADULT - SUBJECTIVE AND OBJECTIVE BOX
Interval Events: Reviewed  Patient seen and examined at bedside.    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      History of renal transplant  secondary to DM      Chronic kidney disease (CKD)      Glaucoma      Kidney transplant recipient      Amputation of toe  x 3      H/O heart artery stent          MEDICATIONS:  Pulmonary:  albuterol/ipratropium for Nebulization 3 milliLiter(s) Nebulizer every 6 hours  sodium chloride 7% Inhalation 4 milliLiter(s) Inhalation every 12 hours    Antimicrobials:    Anticoagulants:  aspirin  chewable 81 milliGRAM(s) Oral daily  heparin   Injectable 5000 Unit(s) SubCutaneous every 8 hours    Cardiac:  midodrine 20 milliGRAM(s) Oral every 8 hours  norepinephrine Infusion 0.05 MICROgram(s)/kG/Min IV Continuous <Continuous>      Allergies    No Known Allergies    Intolerances        Vital Signs Last 24 Hrs  T(C): 37.1 (09 Dec 2022 18:45), Max: 37.2 (09 Dec 2022 15:45)  T(F): 98.8 (09 Dec 2022 18:45), Max: 98.9 (09 Dec 2022 15:45)  HR: 77 (09 Dec 2022 20:00) (59 - 88)  BP: 110/46 (09 Dec 2022 20:00) (87/39 - 148/100)  BP(mean): 67 (09 Dec 2022 20:00) (57 - 116)  RR: 29 (09 Dec 2022 20:00) (22 - 38)  SpO2: 100% (09 Dec 2022 20:00) (94% - 100%)    Parameters below as of 09 Dec 2022 20:00  Patient On (Oxygen Delivery Method): tracheostomy collar  O2 Flow (L/min): 10  O2 Concentration (%): 40    12-08 @ 07:01  -  12-09 @ 07:00  --------------------------------------------------------  IN: 2002.7 mL / OUT: 4163 mL / NET: -2160.3 mL    12-09 @ 07:01  -  12-09 @ 20:42  --------------------------------------------------------  IN: 1306.2 mL / OUT: 3630 mL / NET: -2323.8 mL      Mode: standby      Review of Systems:   •	General: negative  •	Skin/Breast: negative  •	Ophthalmologic: negative  •	ENMT: negative  •	Respiratory and Thorax: negative  •	Cardiovascular: negative  •	Gastrointestinal: negative  •	Genitourinary: negative  •	Musculoskeletal: negative  •	Neurological: negative  •	Psychiatric: negative  •	Hematology/Lymphatics: negative  •	Endocrine: negative  •	Allergic/Immunologic: negative    Physical Exam:   • Constitutional:	refer to the dietion /Nutritionist note  • Eyes:	EOMI; PERRL; no drainage or redness  • ENMT:	No oral lesions; no gross abnormalities  • Neck	No bruits; no thyromegaly or nodules  • Breasts:	not examined  • Back:	No deformity or limitation of movement  • Respiratory:	Breath Sounds equal & clear to percussion & auscultation, no accessory muscle use  • Cardiovascular:	Regular rate & rhythm, normal S1, S2; no murmurs, gallops or rubs; no S3, S4  • Gastrointestinal:	Soft, non-tender, no hepatosplenomegaly, normal bowel sounds  • Genitourinary:	not examined  • Rectal: not examined  • Extremities:	No cyanosis, clubbing or edema  • Vascular:	Equal and normal pulses (carotid, femoral, dorsalis pedis)  • Neurologica:l	not examined  • Skin:	No lesions; no rash  • Lymph Nodes:	No lymphadedenopathy  • Musculoskeletal:	No joint pain, swelling or deformity; no limitation of movement        LABS:      CBC Full  -  ( 09 Dec 2022 19:01 )  WBC Count : 9.81 K/uL  RBC Count : 3.75 M/uL  Hemoglobin : 10.1 g/dL  Hematocrit : 32.1 %  Platelet Count - Automated : 359 K/uL  Mean Cell Volume : 85.6 fl  Mean Cell Hemoglobin : 26.9 pg  Mean Cell Hemoglobin Concentration : 31.5 gm/dL  Auto Neutrophil # : 8.78 K/uL  Auto Lymphocyte # : 0.09 K/uL  Auto Monocyte # : 0.60 K/uL  Auto Eosinophil # : 0.09 K/uL  Auto Basophil # : 0.00 K/uL  Auto Neutrophil % : 87.8 %  Auto Lymphocyte % : 0.9 %  Auto Monocyte % : 6.1 %  Auto Eosinophil % : 0.9 %  Auto Basophil % : 0.0 %    12-09    135  |  97  |  37<H>  ----------------------------<  137<H>  3.4<L>   |  27  |  1.88<H>    Ca    8.5      09 Dec 2022 19:01  Phos  1.9     12-09  Mg     2.0     12-09    TPro  5.7<L>  /  Alb  2.4<L>  /  TBili  0.3  /  DBili  x   /  AST  56<H>  /  ALT  66<H>  /  AlkPhos  113  12-08                        RADIOLOGY & ADDITIONAL STUDIES (The following images were personally reviewed):

## 2022-12-10 NOTE — PROGRESS NOTE ADULT - SUBJECTIVE AND OBJECTIVE BOX
INTERVAL HPI/OVERNIGHT EVENTS:    SUBJECTIVE: Patient seen and examined at bedside.    OBJECTIVE:    VITAL SIGNS:  ICU Vital Signs Last 24 Hrs  T(C): 37.5 (10 Dec 2022 11:25), Max: 37.6 (10 Dec 2022 05:03)  T(F): 99.5 (10 Dec 2022 11:25), Max: 99.7 (10 Dec 2022 05:03)  HR: 84 (10 Dec 2022 14:00) (70 - 100)  BP: 164/67 (10 Dec 2022 14:00) (98/44 - 164/67)  BP(mean): 97 (10 Dec 2022 14:00) (63 - 116)  ABP: --  ABP(mean): --  RR: 31 (10 Dec 2022 14:00) (25 - 37)  SpO2: 100% (10 Dec 2022 14:00) (94% - 100%)    O2 Parameters below as of 10 Dec 2022 14:00  Patient On (Oxygen Delivery Method): tracheostomy collar  O2 Flow (L/min): 10  O2 Concentration (%): 40      Mode: standby    12-09 @ 07:01  -  12-10 @ 07:00  --------------------------------------------------------  IN: 1824.3 mL / OUT: 3680 mL / NET: -1855.7 mL    12-10 @ 07:01  -  12-10 @ 14:30  --------------------------------------------------------  IN: 518.7 mL / OUT: 30 mL / NET: 488.7 mL      CAPILLARY BLOOD GLUCOSE      POCT Blood Glucose.: 129 mg/dL (10 Dec 2022 11:54)      PHYSICAL EXAM:    General: NAD  HEENT: anicteric sclera, MMM  Neck: supple, no JVD  Respiratory: CTA B/L; no W/R/R  Cardiovascular: +S1/S2; RRR; no M/R/G  Gastrointestinal: soft, NT/ND; +BS x4  Extremities: WWP; 2+ peripheral pulses B/L; no LE edema  Skin: normal color and turgor; no rash  Neurological: A&Ox    MEDICATIONS:  MEDICATIONS  (STANDING):  albuterol/ipratropium for Nebulization 3 milliLiter(s) Nebulizer every 6 hours  aspirin  chewable 81 milliGRAM(s) Oral daily  atorvastatin 40 milliGRAM(s) Oral at bedtime  chlorhexidine 0.12% Liquid 15 milliLiter(s) Oral Mucosa every 12 hours  chlorhexidine 2% Cloths 1 Application(s) Topical <User Schedule>  dexMEDEtomidine Infusion 0.04 MICROgram(s)/kG/Hr (0.82 mL/Hr) IV Continuous <Continuous>  dextrose 5%. 1000 milliLiter(s) (50 mL/Hr) IV Continuous <Continuous>  dextrose 5%. 1000 milliLiter(s) (100 mL/Hr) IV Continuous <Continuous>  dextrose 5%. 1000 milliLiter(s) (50 mL/Hr) IV Continuous <Continuous>  dextrose 5%. 1000 milliLiter(s) (100 mL/Hr) IV Continuous <Continuous>  dextrose 50% Injectable 25 Gram(s) IV Push once  dextrose 50% Injectable 12.5 Gram(s) IV Push once  dextrose 50% Injectable 25 Gram(s) IV Push once  dextrose 50% Injectable 25 Gram(s) IV Push once  dextrose 50% Injectable 12.5 Gram(s) IV Push once  dextrose 50% Injectable 25 Gram(s) IV Push once  fentaNYL   Patch  50 MICROgram(s)/Hr. 1 Patch Transdermal every 48 hours  glucagon  Injectable 1 milliGRAM(s) IntraMuscular once  glucagon  Injectable 1 milliGRAM(s) IntraMuscular once  heparin   Injectable 5000 Unit(s) SubCutaneous every 8 hours  hydrocortisone sodium succinate Injectable 25 milliGRAM(s) IV Push every 12 hours  influenza  Vaccine (HIGH DOSE) 0.7 milliLiter(s) IntraMuscular once  insulin glargine Injectable (LANTUS) 6 Unit(s) SubCutaneous <User Schedule>  insulin regular  human corrective regimen sliding scale   SubCutaneous every 6 hours  insulin regular  human recombinant 4 Unit(s) SubCutaneous every 6 hours  lactulose Syrup 15 Gram(s) Oral every 8 hours  levothyroxine Injectable 60 MICROGram(s) IV Push at bedtime  midodrine 20 milliGRAM(s) Oral every 8 hours  mycophenolate mofetil Suspension 500 milliGRAM(s) Oral every 12 hours  norepinephrine Infusion 0.05 MICROgram(s)/kG/Min (7.72 mL/Hr) IV Continuous <Continuous>  pantoprazole   Suspension 40 milliGRAM(s) Oral every 24 hours  polyethylene glycol 3350 17 Gram(s) Oral every 12 hours  QUEtiapine 100 milliGRAM(s) Oral every 12 hours  senna 2 Tablet(s) Oral at bedtime  sodium chloride 7% Inhalation 4 milliLiter(s) Inhalation every 12 hours  tacrolimus    0.5 mG/mL Suspension 4 milliGRAM(s) Oral every 24 hours  tacrolimus    0.5 mG/mL Suspension 4 milliGRAM(s) Oral every 24 hours  tamsulosin 0.4 milliGRAM(s) Oral at bedtime    MEDICATIONS  (PRN):  acetaminophen    Suspension .. 650 milliGRAM(s) Oral every 6 hours PRN Mild Pain (1 - 3)  dextrose Oral Gel 15 Gram(s) Oral once PRN Blood Glucose LESS THAN 70 milliGRAM(s)/deciliter  dextrose Oral Gel 15 Gram(s) Oral once PRN Blood Glucose LESS THAN 70 milliGRAM(s)/deciliter  sodium chloride 0.9% lock flush 10 milliLiter(s) IV Push every 1 hour PRN Pre/post blood products, medications, blood draw, and to maintain line patency      ALLERGIES:  Allergies    No Known Allergies    Intolerances        LABS:                        8.7    6.16  )-----------( 333      ( 10 Dec 2022 11:36 )             28.0     12-10    138  |  97  |  57<H>  ----------------------------<  144<H>  3.5   |  28  |  2.93<H>    Ca    8.8      10 Dec 2022 11:36  Phos  2.9     12-10  Mg     2.2     12-10                    RADIOLOGY & ADDITIONAL TESTS: Reviewed. INTERVAL HPI/OVERNIGHT EVENTS: Per Endo recs, adjusted insulin regimen to Lantus 6 and Humulin 4 q6h.     SUBJECTIVE: Patient seen and examined at bedside. Some agitation but following commands.     OBJECTIVE:    VITAL SIGNS:  ICU Vital Signs Last 24 Hrs  T(C): 37.5 (10 Dec 2022 11:25), Max: 37.6 (10 Dec 2022 05:03)  T(F): 99.5 (10 Dec 2022 11:25), Max: 99.7 (10 Dec 2022 05:03)  HR: 84 (10 Dec 2022 14:00) (70 - 100)  BP: 164/67 (10 Dec 2022 14:00) (98/44 - 164/67)  BP(mean): 97 (10 Dec 2022 14:00) (63 - 116)  ABP: --  ABP(mean): --  RR: 31 (10 Dec 2022 14:00) (25 - 37)  SpO2: 100% (10 Dec 2022 14:00) (94% - 100%)    O2 Parameters below as of 10 Dec 2022 14:00  Patient On (Oxygen Delivery Method): tracheostomy collar  O2 Flow (L/min): 10  O2 Concentration (%): 40      Mode: standby    12-09 @ 07:01  -  12-10 @ 07:00  --------------------------------------------------------  IN: 1824.3 mL / OUT: 3680 mL / NET: -1855.7 mL    12-10 @ 07:01  -  12-10 @ 14:30  --------------------------------------------------------  IN: 518.7 mL / OUT: 30 mL / NET: 488.7 mL      CAPILLARY BLOOD GLUCOSE      POCT Blood Glucose.: 129 mg/dL (10 Dec 2022 11:54)      PHYSICAL EXAM:    General: NAD  HEENT: anicteric sclera, 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)  Neck: supple  Respiratory: CTA B/L; no W/R/R  Cardiovascular: +S1/S2; RRR; no M/R/G  Gastrointestinal: soft, nondistended; +BS x4  Extremities: WWP; 2+ peripheral pulses B/L; 1+ LE edema, 3+ UE edema  Skin: normal color and turgor; no rash  Neurological: minimal sedation, following commands, turns head to voice, moving all extremities spontaneously    MEDICATIONS:  MEDICATIONS  (STANDING):  albuterol/ipratropium for Nebulization 3 milliLiter(s) Nebulizer every 6 hours  aspirin  chewable 81 milliGRAM(s) Oral daily  atorvastatin 40 milliGRAM(s) Oral at bedtime  chlorhexidine 0.12% Liquid 15 milliLiter(s) Oral Mucosa every 12 hours  chlorhexidine 2% Cloths 1 Application(s) Topical <User Schedule>  dexMEDEtomidine Infusion 0.04 MICROgram(s)/kG/Hr (0.82 mL/Hr) IV Continuous <Continuous>  dextrose 5%. 1000 milliLiter(s) (50 mL/Hr) IV Continuous <Continuous>  dextrose 5%. 1000 milliLiter(s) (100 mL/Hr) IV Continuous <Continuous>  dextrose 5%. 1000 milliLiter(s) (50 mL/Hr) IV Continuous <Continuous>  dextrose 5%. 1000 milliLiter(s) (100 mL/Hr) IV Continuous <Continuous>  dextrose 50% Injectable 25 Gram(s) IV Push once  dextrose 50% Injectable 12.5 Gram(s) IV Push once  dextrose 50% Injectable 25 Gram(s) IV Push once  dextrose 50% Injectable 25 Gram(s) IV Push once  dextrose 50% Injectable 12.5 Gram(s) IV Push once  dextrose 50% Injectable 25 Gram(s) IV Push once  fentaNYL   Patch  50 MICROgram(s)/Hr. 1 Patch Transdermal every 48 hours  glucagon  Injectable 1 milliGRAM(s) IntraMuscular once  glucagon  Injectable 1 milliGRAM(s) IntraMuscular once  heparin   Injectable 5000 Unit(s) SubCutaneous every 8 hours  hydrocortisone sodium succinate Injectable 25 milliGRAM(s) IV Push every 12 hours  influenza  Vaccine (HIGH DOSE) 0.7 milliLiter(s) IntraMuscular once  insulin glargine Injectable (LANTUS) 6 Unit(s) SubCutaneous <User Schedule>  insulin regular  human corrective regimen sliding scale   SubCutaneous every 6 hours  insulin regular  human recombinant 4 Unit(s) SubCutaneous every 6 hours  lactulose Syrup 15 Gram(s) Oral every 8 hours  levothyroxine Injectable 60 MICROGram(s) IV Push at bedtime  midodrine 20 milliGRAM(s) Oral every 8 hours  mycophenolate mofetil Suspension 500 milliGRAM(s) Oral every 12 hours  norepinephrine Infusion 0.05 MICROgram(s)/kG/Min (7.72 mL/Hr) IV Continuous <Continuous>  pantoprazole   Suspension 40 milliGRAM(s) Oral every 24 hours  polyethylene glycol 3350 17 Gram(s) Oral every 12 hours  QUEtiapine 100 milliGRAM(s) Oral every 12 hours  senna 2 Tablet(s) Oral at bedtime  sodium chloride 7% Inhalation 4 milliLiter(s) Inhalation every 12 hours  tacrolimus    0.5 mG/mL Suspension 4 milliGRAM(s) Oral every 24 hours  tacrolimus    0.5 mG/mL Suspension 4 milliGRAM(s) Oral every 24 hours  tamsulosin 0.4 milliGRAM(s) Oral at bedtime    MEDICATIONS  (PRN):  acetaminophen    Suspension .. 650 milliGRAM(s) Oral every 6 hours PRN Mild Pain (1 - 3)  dextrose Oral Gel 15 Gram(s) Oral once PRN Blood Glucose LESS THAN 70 milliGRAM(s)/deciliter  dextrose Oral Gel 15 Gram(s) Oral once PRN Blood Glucose LESS THAN 70 milliGRAM(s)/deciliter  sodium chloride 0.9% lock flush 10 milliLiter(s) IV Push every 1 hour PRN Pre/post blood products, medications, blood draw, and to maintain line patency      ALLERGIES:  Allergies    No Known Allergies    Intolerances        LABS:                        8.7    6.16  )-----------( 333      ( 10 Dec 2022 11:36 )             28.0     12-10    138  |  97  |  57<H>  ----------------------------<  144<H>  3.5   |  28  |  2.93<H>    Ca    8.8      10 Dec 2022 11:36  Phos  2.9     12-10  Mg     2.2     12-10                    RADIOLOGY & ADDITIONAL TESTS: Reviewed.

## 2022-12-10 NOTE — PROGRESS NOTE ADULT - ASSESSMENT
82 y/o M PMH CKD 4, renal transplant in 2013 at [facility], glaucoma (blind), DM1, anemia, CAD s/p 2 DAMEON to LAD in 6/21, BPH, recent admission for PNA presents with acute on chronic cough 2/2 PNA and SUNNY 2/2 acute CKD.     NEUROLOGY  #Metabolic Encephalopathy  #Sedation  #Agitation  CT scan negative for acute intracranial pathology. Began to wean sedation on 12/2.  - c/w fentanyl 25mcg patch  - on precedex, weaning as tolerated  - on seroquel 100mg q12h (increased on 12/9)  - receives 25mg IVPB of fentanyl for agitation, attempting to wean    CARDIOVASCULAR  # Fluid Overloaded  - S/p Metolazone 10 and Lasix 80 for fluid overload status 12/5  - started on HD on 12/6, removing 2-3L each session    #Hypotension  - on midodrine 20 q8 (started on 12/7, increased on 12/8)    #HFpEF  Had EF of 55% on TTE from Oct 2022, grade I diastolic dysfunction. Repeat TTE 11/17 with poor visualization of LV. Patient making good UOP overnight  - holding BP meds    #Upper extremity edema  Stable from yesterday but increased compared to baseline. US with superficial thrombosis of L cephalic vein extending to AC fossa, no DVTs.   - given patient's propensity to bleed and superficial nature of thrombus, no intervention  -s/p Metolazone 10 and Lasix 80 12/5    #CAD  #HLD  Hx of CAD s/p 2 DAMEON to LAD in June 2021, currently no CP. Trops 0.03 but no CP or ischemic changes on EKG, likely due to CKD. Takes Plavix and aspirin per last d/c but only aspirin on outpatient note  - c/w ASA daily  - c/w home Lipitor    #HTN   Known history of HTN.   - holding hypertension meds as pt currently requiring pressor support  - continue to monitor BP    PULM  #AHRF  Likely 2/2 aspiration event. Intubated on 11/26.  - ID management as below for aspiration   - Received trach on 12/1  - tolerated CPAP o/n 12/6-7  - trach collar tolerated well o/n 12/8-9; continuing to tolerate 12/9    RENAL  #ELIZABETH on Chronic kidney disease (CKD)  Baseline sCr 2.3-2.5. On admission, fluid overloaded. Does have orthopnea and intermittent SOB at baseline. Acidotic but at baseline. Follows w Dr. Mac.  - sCr and BUN increasing daily, urine output poor  - monitor I/Os  - renally dosing meds  - s/p sodium bicarb 150 rate 60cc/hr, discontinued on 12/6  - initiated HD 12/6, removed 2L fluid    #Renal transplant  Patient had renal transplant in 2013. On home mycophenolate 500mg BID and tacrolimus 4mg BID. Per nephro, decreased home tacrolimus from 4mg BID --> 2mg BID while in hospital.   - c/w tacrolimus in AM and in PM, based on level; per Renal increased to 4mg q12h on 12/8  - c/w mycophenolate 500mg BID  - f/u renal recs    #Hyponatremia  - stable; monitor BMP    GI  #Nutrition  NPO with PEG feeds.  - restarted feeds at 6pm 12/2      #Urinary Retention  - remove bourgeois if not chronic, TOV  - on home tamsulosin 0.4mg daily, discontinue if bourgeois remains    ID  #Aspiration PNA  RESOLVED  Tracheal aspirate cx (11/19) growing serratia marcescens and pseudomonas. Patient was started on vancomycin and cefepime. MRSA nares positive. DC'ed vancomycin given supratherapeutic trough. Completed cefepime 2g q24 course x9 days.    #Stenotrophomonas in sputum cx RESOLVED  Sputum cx from 11/26 growing Stenotrophomonas  - completed course of Bactrim BS for 10 days (11/28 - 12/5)    ENDOCRINE  #DM (diabetes mellitus).   Previously on lantus 15 lispro 5 at home. Endocrine following.  - on lantus 7 units, will adjust per endo recs as low FSBG in AM 12/9  - c/w humulin 7 units q6h  - on low dose regular ISS  - goal -180  - f/u endo recs    #Hypothyroidism  TSH 20.8, free t4 0.7, T3 49 (low). Repeat TSH at 9, fT4 0.7  - on synthroid 50mcg IV qD    #Relative Adrenal Insufficiency  Patient on chronic prednisone 5mg daily. He was started on hydrocortisone 50mg q6hr --> q8 --> BID   - c/w hydrocortisone 25mg q12  - wean as tolerated  - f/u endocrine recs    HEME  #Normocytic Anemia  Likely 2.2 CKD. Hgb 6.9 on 11/21, corrected appropriately s/p 1u pRBC. No active signs of bleeding on physical exam. CTH with no intracranial bleeding. Required 2u pRBC to correct. DEBRA and stool guaic negative.  - consider epo, if BP remains well controlled  - active T+S  - transfuse <7    FLUIDS/ELECTROLYTES/NUTRITION  -IVF as above  -Monitor, careful w advanced kidney disease  -Diet: NPO with PEG feeds  -DVT ppx: heparin TID  -GI: protonix 40 qd  -Dispo: MICU; PT recommending [facility] 82 y/o M PMH CKD 4, renal transplant in 2013 at [facility], glaucoma (blind), DM1, anemia, CAD s/p 2 DAMEON to LAD in 6/21, BPH, recent admission for PNA presents with acute on chronic cough 2/2 PNA and SUNNY 2/2 acute CKD.     NEUROLOGY  #Metabolic Encephalopathy  #Sedation  #Agitation  #Delirium  CT scan negative for acute intracranial pathology. Began to wean sedation on 12/2.  - c/w fentanyl 25mcg patch  - on precedex, plan to wean off as tolerated  - on seroquel 100mg q12h (increased on 12/9)  - start tenex 1mg qd to help minimize agitation while weaning propofol    CARDIOVASCULAR  # Fluid Overloaded  - started on HD on 12/6, removing 2-3L each session  - completed HD isolated UF today with removal of 3L fluid    #Hypotension  - on midodrine 20 q8 (started on 12/7, increased on 12/8)    #HFpEF  Had EF of 55% on TTE from Oct 2022, grade I diastolic dysfunction. Repeat TTE 11/17 with poor visualization of LV. Patient making good UOP overnight  - holding BP meds    #Upper extremity edema  Stable from yesterday but increased compared to baseline. US with superficial thrombosis of L cephalic vein extending to AC fossa, no DVTs.   - given patient's propensity to bleed and superficial nature of thrombus, no intervention  - obtain LUE XR as concern for fracture (pt with injury prior to hospitalization)    #CAD  #HLD  Hx of CAD s/p 2 DAMEON to LAD in June 2021, currently no CP. Trops 0.03 but no CP or ischemic changes on EKG, likely due to CKD. Takes Plavix and aspirin per last d/c but only aspirin on outpatient note  - c/w ASA daily  - c/w home Lipitor    #HTN   Known history of HTN.   - holding hypertension meds as pt currently requiring pressor support  - continue to monitor BP    PULM  #AHRF  Likely 2/2 aspiration event. Intubated on 11/26.  - ID management as below for aspiration   - Received trach on 12/1  - tolerated CPAP o/n 12/6-7  - trach collar tolerated well     RENAL  #ELIZABETH on Chronic kidney disease (CKD)  Baseline sCr 2.3-2.5. On admission, fluid overloaded. Does have orthopnea and intermittent SOB at baseline. Acidotic but at baseline. Follows w Dr. Mac.  - sCr and BUN increasing daily, urine output poor  - monitor I/Os  - renally dosing meds  - s/p sodium bicarb 150 rate 60cc/hr, discontinued on 12/6  - initiated HD 12/6, removed 2L fluid  - f/u renal recs for HD and UF    #Renal transplant  Patient had renal transplant in 2013. On home mycophenolate 500mg BID and tacrolimus 4mg BID. Per nephro, decreased home tacrolimus from 4mg BID --> 2mg BID while in hospital.   - c/w tacrolimus in AM and in PM, based on level; per Renal increased to 4mg q12h on 12/8  - c/w mycophenolate 500mg BID  - f/u renal recs    #Hyponatremia  - stable; monitor BMP    GI  #Nutrition  NPO with PEG feeds.  - restarted feeds at 6pm 12/2      #Urinary Retention  - remove bourgeois if not chronic, TOV  - on home tamsulosin 0.4mg daily, discontinue if bourgeois remains    ID  #Aspiration PNA  RESOLVED  Tracheal aspirate cx (11/19) growing serratia marcescens and pseudomonas. Patient was started on vancomycin and cefepime. MRSA nares positive. DC'ed vancomycin given supratherapeutic trough. Completed cefepime 2g q24 course x9 days.    #Stenotrophomonas in sputum cx RESOLVED  Sputum cx from 11/26 growing Stenotrophomonas  - completed course of Bactrim BS for 10 days (11/28 - 12/5)    ENDOCRINE  #DM (diabetes mellitus).   Previously on lantus 15 lispro 5 at home. Endocrine following.  - on lantus 6 units  - c/w humulin 4 units q6h  - on low dose regular ISS  - goal -180  - f/u endo recs    #Hypothyroidism  TSH 20.8, free t4 0.7, T3 49 (low). Repeat TSH at 9, fT4 0.7  - on synthroid 50mcg IV qD    #Relative Adrenal Insufficiency  Patient on chronic prednisone 5mg daily. He was started on hydrocortisone 50mg q6hr --> q8 --> BID   - c/w hydrocortisone 25mg q12  - wean as tolerated  - f/u endocrine recs    HEME  #Normocytic Anemia  Likely 2.2 CKD. Hgb 6.9 on 11/21, corrected appropriately s/p 1u pRBC. No active signs of bleeding on physical exam. CTH with no intracranial bleeding. Required 2u pRBC to correct. DEBRA and stool guaic negative.  - consider epo, if BP remains well controlled  - active T+S  - transfuse <7    FLUIDS/ELECTROLYTES/NUTRITION  -IVF as above  -Monitor, careful w advanced kidney disease  -Diet: NPO with PEG feeds  -DVT ppx: heparin TID  -GI: protonix 40 qd  -Dispo: MICU; PT recommending [facility], Plan for LTAC

## 2022-12-10 NOTE — PROGRESS NOTE ADULT - ASSESSMENT
Patient is an 82 yo M with ESKD s/p transplant  now CKD 4, presenting with decompensated heart failure c/b cardiac arrest on  now with iATN, started on iHD  , tolerating HD, goal of 3L UF today     Problem/Plan:  #iATN on CKD stage 4 now requiring HD  Renal allograft with CKD 4 - baseline sCr 2.3-2.5, usual meds tacrolimus 4mg BID and mycophenolate 500mg BID. Was started on HD on  due to worsening volume status, acidosis and concern for uremia.   Remains 15L net positive for this admission    Plan   -HD today, isolated UF only of 3L  Hemodialysis Treatment.:     Schedule: Once, Modality: Ultrafiltration, Access: Arteriovenous Fistula    Dialyzer: Optiflux [id number], Time: 180 Min    Blood Flow: 400 mL/Min , Tubinmm (Adult)    Target Fluid Removal: 3 Liters    Additional Instructions: Please use 16 gauge or larger needle.     - c/w tacrolimus to 4mg AM and 4mg PM (12 hrs apart)  - c/w cell cept 500mg BID  - Tacro level: Pending   -Please make sure pt having daily BM; daily miralax and senna  - strict i's and o's  -Trend BMP daily  -maintain MAP >70   -HD today w/ goal of 3L UF    Access:  RUE AVF functional     #Anemia of chronic disease  Hgb 8.7  -Epo w/ HD    Renal Bone Disease   Calcium: 8.8  Phos: 2.9  Trend phos daily with labs

## 2022-12-10 NOTE — PROGRESS NOTE ADULT - SUBJECTIVE AND OBJECTIVE BOX
Patient is a 82y Male seen and evaluated at bedside. No acute distress, does not offer any complaints, Tolerated HD yesterday and able to meet goal of 3L UF. Seen on HD today tolerating procedure, VSS, continue HD with goal of 3L UF.       Meds:    acetaminophen    Suspension .. 650 every 6 hours PRN  albuterol/ipratropium for Nebulization 3 every 6 hours  aspirin  chewable 81 daily  atorvastatin 40 at bedtime  chlorhexidine 0.12% Liquid 15 every 12 hours  chlorhexidine 2% Cloths 1 <User Schedule>  dexMEDEtomidine Infusion 0.04 <Continuous>  dextrose 5%. 1000 <Continuous>  dextrose 5%. 1000 <Continuous>  dextrose 5%. 1000 <Continuous>  dextrose 5%. 1000 <Continuous>  dextrose 50% Injectable 25 once  dextrose 50% Injectable 12.5 once  dextrose 50% Injectable 25 once  dextrose 50% Injectable 25 once  dextrose 50% Injectable 12.5 once  dextrose 50% Injectable 25 once  dextrose Oral Gel 15 once PRN  dextrose Oral Gel 15 once PRN  fentaNYL   Patch  50 MICROgram(s)/Hr. 1 every 48 hours  glucagon  Injectable 1 once  glucagon  Injectable 1 once  heparin   Injectable 5000 every 8 hours  hydrocortisone sodium succinate Injectable 25 every 12 hours  influenza  Vaccine (HIGH DOSE) 0.7 once  insulin glargine Injectable (LANTUS) 6 <User Schedule>  insulin regular  human corrective regimen sliding scale  every 6 hours  insulin regular  human recombinant 4 every 6 hours  lactulose Syrup 15 every 8 hours  levothyroxine Injectable 60 at bedtime  midodrine 20 every 8 hours  mycophenolate mofetil Suspension 500 every 12 hours  norepinephrine Infusion 0.05 <Continuous>  pantoprazole   Suspension 40 every 24 hours  polyethylene glycol 3350 17 every 12 hours  QUEtiapine 100 every 12 hours  senna 2 at bedtime  sodium chloride 0.9% lock flush 10 every 1 hour PRN  sodium chloride 7% Inhalation 4 every 12 hours  tacrolimus    0.5 mG/mL Suspension 4 every 24 hours  tacrolimus    0.5 mG/mL Suspension 4 every 24 hours  tamsulosin 0.4 at bedtime      T(C): , Max: 37.6 (12-10-22 @ 05:03)  T(F): , Max: 99.7 (12-10-22 @ 05:03)  HR: 73 (12-10-22 @ 15:00)  BP: 127/50 (12-10-22 @ 15:00)  BP(mean): 72 (12-10-22 @ 15:00)  RR: 34 (12-10-22 @ 15:00)  SpO2: 100% (12-10-22 @ 15:00)  Wt(kg): --    12-09 @ 07:01  -  12-10 @ 07:00  --------------------------------------------------------  IN: 1824.3 mL / OUT: 3680 mL / NET: -1855.7 mL    12-10 @ 07:01  -  12-10 @ 15:21  --------------------------------------------------------  IN: 518.7 mL / OUT: 3030 mL / NET: -2511.3 mL          Review of Systems:  ROS negative except as per HPI      PHYSICAL EXAM:  GENERAL: trach collar, NAD  NECK: supple, No JVD  Lungs: No rales, ronchi or wheezing noted  HEART: normal S1S2, RRR  ABDOMEN: Soft, Nontender, +BS, No flank tenderness bilateral  EXTREMITIES: Remains anasarcic, although edema is slowly improving  ACCESS: R radiocephalic AVF w/ palpable thrill      LABS:                        8.7    6.16  )-----------( 333      ( 10 Dec 2022 11:36 )             28.0     12-10    138  |  97  |  57<H>  ----------------------------<  144<H>  3.5   |  28  |  2.93<H>    Ca    8.8      10 Dec 2022 11:36  Phos  2.9     12-10  Mg     2.2     12-10                  RADIOLOGY & ADDITIONAL STUDIES:

## 2022-12-11 NOTE — PROGRESS NOTE ADULT - PROBLEM SELECTOR PLAN 1
-patient continues on hydrocortisone  -the past two glucose readings were > 200  -we can consider conservative/slight adjustment of regular insulin from Humulin R 4 to 6 units with appropriate HOLDING parameters.  Can wait for the next glucose levels to make the changes.  Due to his hx of hypoglycemia, will not aim for tight targets.  Aim for glucose of 100-180  -if/when steroids get tapered, insulin requirements may decrease further  -continue with lantus and sliding scales are ordered  -hypoglycemia protocol  -hold humulin R if tube feeds are being held for any reason

## 2022-12-11 NOTE — PROGRESS NOTE ADULT - ASSESSMENT
Mr. Miguel is an 82-year-old man with type 2 diabetes mellitus, coronary artery disease, chronic kidney disease stage 4 (s/p renal transplant on 2013, on tracrolimus and prednisone) and hypothyroidism admitted with lower extremity edema. Course complicated by hypoglycemia and cardiac arrest

## 2022-12-11 NOTE — PROGRESS NOTE ADULT - ASSESSMENT
82 y/o M PMH CKD 4, renal transplant in 2013 at [facility], glaucoma (blind), DM1, anemia, CAD s/p 2 DAMEON to LAD in 6/21, BPH, recent admission for PNA presents with acute on chronic cough 2/2 PNA and SUNNY 2/2 acute CKD.     NEUROLOGY  #Metabolic Encephalopathy  #Sedation  #Agitation  #Delirium  CT scan negative for acute intracranial pathology. Began to wean sedation on 12/2.  - c/w fentanyl 25mcg patch  - stopped precedex on 12/11  - on seroquel 100mg q12h (increased on 12/9)  - start tenex (guanfacine) 1mg qd to help minimize agitation while weaning propofol    CARDIOVASCULAR  # Fluid Overloaded  - started on HD on 12/6, removing 2-3L each session  - completed HD isolated UF today with removal of 3L fluid    #Hypotension  - on midodrine 20 q8 (started on 12/7, increased on 12/8)  - stopped Levophed drip on 12/11    #HFpEF  Had EF of 55% on TTE from Oct 2022, grade I diastolic dysfunction. Repeat TTE 11/17 with poor visualization of LV. Patient making good UOP overnight  - holding BP meds    #Upper extremity edema  Stable from yesterday but increased compared to baseline. US with superficial thrombosis of L cephalic vein extending to AC fossa, no DVTs.   - given patient's propensity to bleed and superficial nature of thrombus, no intervention  - obtain LUE XR as concern for fracture (pt with injury prior to hospitalization)    #CAD  #HLD  Hx of CAD s/p 2 DAMEON to LAD in June 2021, currently no CP. Trops 0.03 but no CP or ischemic changes on EKG, likely due to CKD. Takes Plavix and aspirin per last d/c but only aspirin on outpatient note  - c/w ASA daily  - c/w home Lipitor    #HTN   Known history of HTN.   - holding hypertension meds  - continue to monitor BP    PULM  #AHRF  Likely 2/2 aspiration event. Intubated on 11/26.  - ID management as below for aspiration   - Received trach on 12/1  - tolerated CPAP o/n 12/6-7  - trach collar tolerated well     RENAL  #ELIZABETH on Chronic kidney disease (CKD)  Baseline sCr 2.3-2.5. On admission, fluid overloaded. Does have orthopnea and intermittent SOB at baseline. Acidotic but at baseline. Follows w Dr. Mac.  - sCr and BUN increasing daily, urine output poor  - monitor I/Os  - renally dosing meds  - s/p sodium bicarb 150 rate 60cc/hr, discontinued on 12/6  - initiated HD 12/6, removed 2L fluid  - f/u renal recs for HD and UF    #Renal transplant  Patient had renal transplant in 2013. On home mycophenolate 500mg BID and tacrolimus 4mg BID. Per nephro, decreased home tacrolimus from 4mg BID --> 2mg BID while in hospital.   - c/w tacrolimus in AM and in PM, based on level; per Renal increased to 4mg q12h on 12/8  - c/w mycophenolate 500mg BID  - f/u renal recs    #Hyponatremia  - stable; monitor BMP    GI  #Nutrition  NPO with PEG feeds.  - restarted feeds at 6pm 12/2      #Urinary Retention  - remove bourgeois if not chronic, TOV  - on home tamsulosin 0.4mg daily, discontinue if bourgeois remains    ID  #Aspiration PNA  RESOLVED  Tracheal aspirate cx (11/19) growing serratia marcescens and pseudomonas. Patient was started on vancomycin and cefepime. MRSA nares positive. DC'ed vancomycin given supratherapeutic trough. Completed cefepime 2g q24 course x9 days.    #Stenotrophomonas in sputum cx RESOLVED  Sputum cx from 11/26 growing Stenotrophomonas  - completed course of Bactrim BS for 10 days (11/28 - 12/5)    ENDOCRINE  #DM (diabetes mellitus).   Previously on lantus 15 lispro 5 at home. Endocrine following.  - on lantus 6 units  - c/w humulin 4 units q6h  - on low dose regular ISS  - goal -180  - f/u endo recs    #Hypothyroidism  TSH 20.8, free t4 0.7, T3 49 (low). Repeat TSH at 9, fT4 0.7  - on synthroid 50mcg IV qD    #Relative Adrenal Insufficiency  Patient on chronic prednisone 5mg daily. He was started on hydrocortisone 50mg q6hr --> q8 --> BID   - c/w hydrocortisone 25mg q12  - wean as tolerated  - f/u endocrine recs    HEME  #Normocytic Anemia  Likely 2.2 CKD. Hgb 6.9 on 11/21, corrected appropriately s/p 1u pRBC. No active signs of bleeding on physical exam. CTH with no intracranial bleeding. Required 2u pRBC to correct. DEBRA and stool guaic negative.  - consider epo, if BP remains well controlled  - active T+S  - transfuse <7    FLUIDS/ELECTROLYTES/NUTRITION  -IVF as above  -Monitor, careful w advanced kidney disease  -Diet: NPO with PEG feeds  -DVT ppx: heparin TID  -GI: protonix 40 qd  -Dispo: MICU; PT recommending [facility], Plan for LTAC

## 2022-12-11 NOTE — PROGRESS NOTE ADULT - SUBJECTIVE AND OBJECTIVE BOX
OVERNIGHT EVENTS:  Stopped precedex and levophed drips, pt seems to be tolerating.    SUBJECTIVE / INTERVAL HPI: Patient seen and examined at bedside.  In no apparent distress, less agitated than previously in AM.    VITAL SIGNS:  Vital Signs Last 24 Hrs  T(C): 37 (11 Dec 2022 14:57), Max: 38 (10 Dec 2022 17:00)  T(F): 98.6 (11 Dec 2022 14:57), Max: 100.4 (10 Dec 2022 17:00)  HR: 85 (11 Dec 2022 15:00) (70 - 101)  BP: 101/44 (11 Dec 2022 15:00) (81/43 - 158/63)  BP(mean): 63 (11 Dec 2022 15:00) (55 - 93)  RR: 39 (11 Dec 2022 15:00) (28 - 39)  SpO2: 97% (11 Dec 2022 15:00) (95% - 100%)    Parameters below as of 11 Dec 2022 15:00  Patient On (Oxygen Delivery Method): tracheostomy collar  O2 Flow (L/min): 10  O2 Concentration (%): 40  I&O's Summary    10 Dec 2022 07:01  -  11 Dec 2022 07:00  --------------------------------------------------------  IN: 1296 mL / OUT: 3125 mL / NET: -1829 mL    11 Dec 2022 07:01  -  11 Dec 2022 15:49  --------------------------------------------------------  IN: 344 mL / OUT: 50 mL / NET: 294 mL        PHYSICAL EXAM:  General: NAD  HEENT: anicteric sclera, 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)  Neck: supple  Respiratory: CTA B/L; no W/R/R  Cardiovascular: +S1/S2; RRR; no M/R/G  Gastrointestinal: soft, nondistended; +BS x4  Extremities: WWP; 2+ peripheral pulses B/L; 1+ LE edema, 3+ UE edema  Skin: normal color and turgor; no rash  Neurological: mild agitation, not following commands, turns head to voice, moving all extremities spontaneously    MEDICATIONS:  MEDICATIONS  (STANDING):  albuterol/ipratropium for Nebulization 3 milliLiter(s) Nebulizer every 6 hours  aspirin  chewable 81 milliGRAM(s) Oral daily  atorvastatin 40 milliGRAM(s) Oral at bedtime  chlorhexidine 0.12% Liquid 15 milliLiter(s) Oral Mucosa every 12 hours  chlorhexidine 2% Cloths 1 Application(s) Topical <User Schedule>  dextrose 5%. 1000 milliLiter(s) (50 mL/Hr) IV Continuous <Continuous>  dextrose 5%. 1000 milliLiter(s) (100 mL/Hr) IV Continuous <Continuous>  dextrose 5%. 1000 milliLiter(s) (100 mL/Hr) IV Continuous <Continuous>  dextrose 5%. 1000 milliLiter(s) (50 mL/Hr) IV Continuous <Continuous>  dextrose 50% Injectable 25 Gram(s) IV Push once  dextrose 50% Injectable 12.5 Gram(s) IV Push once  dextrose 50% Injectable 25 Gram(s) IV Push once  dextrose 50% Injectable 25 Gram(s) IV Push once  dextrose 50% Injectable 12.5 Gram(s) IV Push once  dextrose 50% Injectable 25 Gram(s) IV Push once  fentaNYL   Patch  50 MICROgram(s)/Hr. 1 Patch Transdermal every 48 hours  glucagon  Injectable 1 milliGRAM(s) IntraMuscular once  glucagon  Injectable 1 milliGRAM(s) IntraMuscular once  guanFACINE 1 milliGRAM(s) Oral <User Schedule>  hydrocortisone sodium succinate Injectable 25 milliGRAM(s) IV Push every 12 hours  influenza  Vaccine (HIGH DOSE) 0.7 milliLiter(s) IntraMuscular once  insulin glargine Injectable (LANTUS) 6 Unit(s) SubCutaneous <User Schedule>  insulin regular  human corrective regimen sliding scale   SubCutaneous every 6 hours  insulin regular  human recombinant 4 Unit(s) SubCutaneous every 6 hours  levothyroxine Injectable 60 MICROGram(s) IV Push at bedtime  midodrine 20 milliGRAM(s) Oral every 8 hours  mycophenolate mofetil Suspension 500 milliGRAM(s) Oral every 12 hours  pantoprazole   Suspension 40 milliGRAM(s) Oral every 24 hours  polyethylene glycol 3350 17 Gram(s) Oral every 12 hours  QUEtiapine 100 milliGRAM(s) Oral every 12 hours  senna 2 Tablet(s) Oral at bedtime  sodium chloride 7% Inhalation 4 milliLiter(s) Inhalation every 12 hours  tacrolimus    0.5 mG/mL Suspension 4 milliGRAM(s) Oral every 24 hours  tacrolimus    0.5 mG/mL Suspension 4 milliGRAM(s) Oral every 24 hours  tamsulosin 0.4 milliGRAM(s) Oral at bedtime    MEDICATIONS  (PRN):  acetaminophen    Suspension .. 650 milliGRAM(s) Oral every 6 hours PRN Temp greater or equal to 38C (100.4F), Mild Pain (1 - 3)  dextrose Oral Gel 15 Gram(s) Oral once PRN Blood Glucose LESS THAN 70 milliGRAM(s)/deciliter  dextrose Oral Gel 15 Gram(s) Oral once PRN Blood Glucose LESS THAN 70 milliGRAM(s)/deciliter  sodium chloride 0.9% lock flush 10 milliLiter(s) IV Push every 1 hour PRN Pre/post blood products, medications, blood draw, and to maintain line patency      ALLERGIES:  Allergies    No Known Allergies    Intolerances        LABS:                        8.8    8.81  )-----------( 321      ( 11 Dec 2022 05:18 )             28.5     12-11    135  |  97  |  73<H>  ----------------------------<  189<H>  3.9   |  29  |  3.34<H>    Ca    8.9      11 Dec 2022 05:18  Phos  3.3     12-11  Mg     2.4     12-11          CAPILLARY BLOOD GLUCOSE      POCT Blood Glucose.: 201 mg/dL (11 Dec 2022 11:17)      RADIOLOGY & ADDITIONAL TESTS: Reviewed.

## 2022-12-11 NOTE — PROGRESS NOTE ADULT - PROBLEM SELECTOR PLAN 3
-patient continues on steroids.  Unable to check axis while on steroids    Endocrine will continue to follow. Discussed with primary team

## 2022-12-11 NOTE — PROGRESS NOTE ADULT - SUBJECTIVE AND OBJECTIVE BOX
Patient's daughter at bedside. No acute events overnight  Endocrine following for diabetes    MEDICATIONS  (STANDING):  albuterol/ipratropium for Nebulization 3 milliLiter(s) Nebulizer every 6 hours  aspirin  chewable 81 milliGRAM(s) Oral daily  atorvastatin 40 milliGRAM(s) Oral at bedtime  chlorhexidine 0.12% Liquid 15 milliLiter(s) Oral Mucosa every 12 hours  chlorhexidine 2% Cloths 1 Application(s) Topical <User Schedule>  dextrose 5%. 1000 milliLiter(s) (50 mL/Hr) IV Continuous <Continuous>  dextrose 5%. 1000 milliLiter(s) (100 mL/Hr) IV Continuous <Continuous>  dextrose 5%. 1000 milliLiter(s) (50 mL/Hr) IV Continuous <Continuous>  dextrose 5%. 1000 milliLiter(s) (100 mL/Hr) IV Continuous <Continuous>  dextrose 50% Injectable 25 Gram(s) IV Push once  dextrose 50% Injectable 12.5 Gram(s) IV Push once  dextrose 50% Injectable 25 Gram(s) IV Push once  dextrose 50% Injectable 25 Gram(s) IV Push once  dextrose 50% Injectable 12.5 Gram(s) IV Push once  dextrose 50% Injectable 25 Gram(s) IV Push once  fentaNYL   Patch  50 MICROgram(s)/Hr. 1 Patch Transdermal every 48 hours  glucagon  Injectable 1 milliGRAM(s) IntraMuscular once  glucagon  Injectable 1 milliGRAM(s) IntraMuscular once  guanFACINE 1 milliGRAM(s) Oral <User Schedule>  heparin   Injectable 5000 Unit(s) SubCutaneous every 8 hours  hydrocortisone sodium succinate Injectable 25 milliGRAM(s) IV Push every 12 hours  influenza  Vaccine (HIGH DOSE) 0.7 milliLiter(s) IntraMuscular once  insulin glargine Injectable (LANTUS) 6 Unit(s) SubCutaneous <User Schedule>  insulin regular  human corrective regimen sliding scale   SubCutaneous every 6 hours  insulin regular  human recombinant 4 Unit(s) SubCutaneous every 6 hours  lactulose Syrup 15 Gram(s) Oral every 8 hours  levothyroxine Injectable 60 MICROGram(s) IV Push at bedtime  midodrine 20 milliGRAM(s) Oral every 8 hours  mycophenolate mofetil Suspension 500 milliGRAM(s) Oral every 12 hours  pantoprazole   Suspension 40 milliGRAM(s) Oral every 24 hours  polyethylene glycol 3350 17 Gram(s) Oral every 12 hours  QUEtiapine 100 milliGRAM(s) Oral every 12 hours  senna 2 Tablet(s) Oral at bedtime  sodium chloride 7% Inhalation 4 milliLiter(s) Inhalation every 12 hours  tacrolimus    0.5 mG/mL Suspension 4 milliGRAM(s) Oral every 24 hours  tacrolimus    0.5 mG/mL Suspension 4 milliGRAM(s) Oral every 24 hours  tamsulosin 0.4 milliGRAM(s) Oral at bedtime    MEDICATIONS  (PRN):  acetaminophen    Suspension .. 650 milliGRAM(s) Oral every 6 hours PRN Temp greater or equal to 38C (100.4F), Mild Pain (1 - 3)  dextrose Oral Gel 15 Gram(s) Oral once PRN Blood Glucose LESS THAN 70 milliGRAM(s)/deciliter  dextrose Oral Gel 15 Gram(s) Oral once PRN Blood Glucose LESS THAN 70 milliGRAM(s)/deciliter  sodium chloride 0.9% lock flush 10 milliLiter(s) IV Push every 1 hour PRN Pre/post blood products, medications, blood draw, and to maintain line patency      Allergies  No Known Allergies      Review of Systems:  Constitutional: No fever  Intubated   UNABLE TO OBTAIN    PHYSICAL EXAM:  VITALS: T(C): 35.9 (12-11-22 @ 08:26)  T(F): 96.6 (12-11-22 @ 08:26), Max: 100.4 (12-10-22 @ 17:00)  HR: 88 (12-11-22 @ 10:00) (70 - 101)  BP: 112/55 (12-11-22 @ 10:00) (81/43 - 164/67)  RR:  (28 - 37)  SpO2:  (95% - 100%)  Wt(kg): --  GENERAL: NAD, well-groomed, well-developed  EYES: No proptosis, no lid lag, anicteric  CARDIOVASCULAR: Regular rate  GI: Soft  NEURO: unable to assess    POCT Blood Glucose.: 209 mg/dL (12-11-22 @ 05:46)  Humulin R 4 + 2  POCT Blood Glucose.: 217 mg/dL (12-10-22 @ 23:10)  Lantus 6 + Humulin R 4 + 2 SS  POCT Blood Glucose.: 161 mg/dL (12-10-22 @ 17:33)  Humulin R 4 + 1 SS  POCT Blood Glucose.: 129 mg/dL (12-10-22 @ 11:54)  Humulin R 4  POCT Blood Glucose.: 251 mg/dL (12-10-22 @ 06:21)  Humulin R 4 + 3 SS  POCT Blood Glucose.: 193 mg/dL (12-10-22 @ 00:32)  POCT Blood Glucose.: 120 mg/dL (12-09-22 @ 17:06)  POCT Blood Glucose.: 85 mg/dL (12-09-22 @ 13:28)  POCT Blood Glucose.: 40 mg/dL (12-09-22 @ 12:55)  POCT Blood Glucose.: 40 mg/dL (12-09-22 @ 12:55)  POCT Blood Glucose.: 90 mg/dL (12-09-22 @ 06:51)  POCT Blood Glucose.: 147 mg/dL (12-08-22 @ 23:36)  POCT Blood Glucose.: 115 mg/dL (12-08-22 @ 17:22)  POCT Blood Glucose.: 120 mg/dL (12-08-22 @ 12:48)      12-11    135  |  97  |  73<H>  ----------------------------<  189<H>  3.9   |  29  |  3.34<H>    eGFR: 18<L>    Ca    8.9      12-11  Mg     2.4     12-11  Phos  3.3     12-11      Thyroid Function Tests:  12-05 @ 04:36 TSH 11.170 FreeT4 0.49043-34 @ 05:30 TSH 9.260 FreeT4 0.698

## 2022-12-11 NOTE — PROGRESS NOTE ADULT - CRITICAL CARE ATTENDING COMMENT
Critically ill patient complicated diabetes with hypoglycemia.  Discussed plan with patient's daughter and team, continued glycemic management
Patient seen and examined;  Discussed with house staff;  Send off thyroid function and also cortisol with bradycardia and hypothermis  Pneumonia to be treated
Acute hypoxemic respiratory failure secondary to multiple pneumonias s/p tracheostomy due to failure to wean from mechanical ventilation. S/p peg. C/w treatment for Stenotrophomas pneumonia. Encephelopathy slowly improving; c/w Fentanyl patch and slowly titrate off Precedex with goal off in 24 hours. Monitor urine output. Rest of plan as above.
Patient sen and examined;  Endocrine plans noted  No active wean;  On antibiotics

## 2022-12-12 NOTE — PROGRESS NOTE ADULT - SUBJECTIVE AND OBJECTIVE BOX
Interval Events: Reviewed  Patient seen and examined at bedside.    Patient is a 82y old  Male who presents with a chief complaint of Cardiac arrest (11 Dec 2022 11:08)    he is requiring suctioning  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      History of renal transplant  secondary to DM      Chronic kidney disease (CKD)      Glaucoma      Kidney transplant recipient      Amputation of toe  x 3      H/O heart artery stent          MEDICATIONS:  Pulmonary:  albuterol/ipratropium for Nebulization 3 milliLiter(s) Nebulizer every 6 hours  sodium chloride 7% Inhalation 4 milliLiter(s) Inhalation every 12 hours    Antimicrobials:    Anticoagulants:  aspirin  chewable 81 milliGRAM(s) Oral daily    Cardiac:  guanFACINE 1 milliGRAM(s) Oral <User Schedule>  midodrine 20 milliGRAM(s) Oral every 8 hours      Allergies    No Known Allergies    Intolerances        Vital Signs Last 24 Hrs  T(C): 36.5 (12 Dec 2022 18:08), Max: 36.8 (11 Dec 2022 22:02)  T(F): 97.7 (12 Dec 2022 18:08), Max: 98.3 (12 Dec 2022 14:41)  HR: 81 (12 Dec 2022 21:00) (75 - 108)  BP: 102/55 (12 Dec 2022 21:00) (89/40 - 122/58)  BP(mean): 76 (12 Dec 2022 21:00) (58 - 83)  RR: 18 (12 Dec 2022 21:00) (18 - 40)  SpO2: 99% (12 Dec 2022 21:00) (90% - 100%)    Parameters below as of 12 Dec 2022 21:00  Patient On (Oxygen Delivery Method): tracheostomy collar  O2 Flow (L/min): 10  O2 Concentration (%): 40    12-11 @ 07:01  -  12-12 @ 07:00  --------------------------------------------------------  IN: 1032 mL / OUT: 165 mL / NET: 867 mL    12-12 @ 07:01  -  12-12 @ 21:12  --------------------------------------------------------  IN: 1002 mL / OUT: 3485 mL / NET: -2483 mL          Review of Systems:   •	General: negative  •	Skin/Breast: negative  •	Ophthalmologic: negative  •	ENMT: negative  •	Respiratory and Thorax: cough  •	Cardiovascular: negative  •	Gastrointestinal: negative  •	Genitourinary: negative  •	Musculoskeletal: negative  •	Neurological: agitated  •	Psychiatric: negative  •	Hematology/Lymphatics: negative  •	Endocrine: negative  •	Allergic/Immunologic: negative    Physical Exam:   • Constitutional:	refer to the dietion /Nutritionist note  • Eyes:	EOMI; PERRL; no drainage or redness  • ENMT:	No oral lesions; no gross abnormalities  • Neck	No bruits; no thyromegaly or nodules  • Breasts:	not examined  • Back:	No deformity or limitation of movement  • Respiratory:	Breath Sounds equal & clear to percussion & bl rhonhi auscultation, no accessory muscle use  • Cardiovascular:	Regular rate & rhythm, normal S1, S2; no murmurs, gallops or rubs; no S3, S4  • Gastrointestinal:	Soft, non-tender, no hepatosplenomegaly, normal bowel sounds  • Genitourinary:	not examined  • Rectal: not examined  • Extremities:	No cyanosis, clubbing or edema  • Vascular:	Equal and normal pulses (carotid, femoral, dorsalis pedis)  • Neurologica:l	not examined  • Skin:	No lesions; no rash  • Lymph Nodes:	No lymphadedenopathy  • Musculoskeletal:	No joint pain, swelling or deformity; no limitation of movement        LABS:      CBC Full  -  ( 12 Dec 2022 04:27 )  WBC Count : 7.09 K/uL  RBC Count : 2.90 M/uL  Hemoglobin : 8.0 g/dL  Hematocrit : 25.9 %  Platelet Count - Automated : 280 K/uL  Mean Cell Volume : 89.3 fl  Mean Cell Hemoglobin : 27.6 pg  Mean Cell Hemoglobin Concentration : 30.9 gm/dL  Auto Neutrophil # : 5.47 K/uL  Auto Lymphocyte # : 0.48 K/uL  Auto Monocyte # : 0.85 K/uL  Auto Eosinophil # : 0.20 K/uL  Auto Basophil # : 0.01 K/uL  Auto Neutrophil % : 77.2 %  Auto Lymphocyte % : 6.8 %  Auto Monocyte % : 12.0 %  Auto Eosinophil % : 2.8 %  Auto Basophil % : 0.1 %    12-12    130<L>  |  93<L>  |  83<H>  ----------------------------<  204<H>  3.6   |  26  |  3.65<H>    Ca    8.7      12 Dec 2022 04:27  Phos  3.5     12-12  Mg     2.4     12-12                          RADIOLOGY & ADDITIONAL STUDIES (The following images were personally reviewed):

## 2022-12-12 NOTE — PROGRESS NOTE ADULT - ASSESSMENT
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 Was started on HD on  due to worsening volume status, acidosis and concern for uremia.   Remains 12L net positive for this admission  Electrolytes noted     Plan   -HD today with 3L   Hemodialysis Treatment.:     Schedule: Once, Modality: Hemodialysis, Access: Arteriovenous Fistula    Dialyzer: Optiflux [id number], Time: 180 Min    Blood Flow: 400 mL/Min , Dialysate Flow: 600 mL/Min, Dialysate Temp: 36.5, Tubinmm (Adult)    Target Fluid Removal: 3 Liters    Dialysate Electrolytes (mEq/L): Potassium 4, Calcium 2.5, Sodium 138, Bicarbonate 35    Additional Instructions: Please use 16 gauge or larger needle.     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 Was started on HD on  due to worsening volume status, acidosis and concern for uremia.   Remains 12L net positive for this admission  Electrolytes noted     Plan   -  -HD today with 3L   Hemodialysis Treatment.:     Schedule: Once, Modality: Hemodialysis, Access: Arteriovenous Fistula    Dialyzer: Optiflux [id number], Time: 180 Min    Blood Flow: 400 mL/Min , Dialysate Flow: 600 mL/Min, Dialysate Temp: 36.5, Tubinmm (Adult)    Target Fluid Removal: 3 Liters    Dialysate Electrolytes (mEq/L): Potassium 4, Calcium 2.5, Sodium 138, Bicarbonate 35    Additional Instructions: Please use 16 gauge or larger needle.   -Plan for additional HD tx  with isolated UF 3L   - c/w tacrolimus to 4mg AM and 4mg PM (12 hrs apart)  - c/w cell cept 500mg BID  - Tacro level: 2.7  - strict i's and o's  -Trend BMP daily  -maintain MAP >70       Access:  RUE AVF functional     Blood pressure:  Stable   UF with HD, as tolerated   Midodrine with HD,   Will lower Dialysate Temp to help with intradialytic hypotension     #Anemia of chronic disease  Hgb 8.0  -Epo w/ HD    Renal Bone Disease   Calcium: 8.7  Phos: 3.5  Trend phos daily with labs     presenting with decompensated heart failure c/b cardiac arrest on      Will be off Service tomorrow  and returning back  , Dr. Grant will be covering the Hemodialysis Service

## 2022-12-12 NOTE — PROGRESS NOTE ADULT - SUBJECTIVE AND OBJECTIVE BOX
Tolerated last HD session on 12/10 isolated UF 3L, tolerated procedure well. Seen and examined today at bedside, tolerating HD with no complaints, hemodynamically stable,  goal of 3L UF today. Net positive 12L, goal net neutral by mid week.       Meds:    acetaminophen    Suspension .. 650 every 6 hours PRN  albuterol/ipratropium for Nebulization 3 every 6 hours  aspirin  chewable 81 daily  atorvastatin 40 at bedtime  chlorhexidine 2% Cloths 1 <User Schedule>  dextrose 5%. 1000 <Continuous>  dextrose 5%. 1000 <Continuous>  dextrose 5%. 1000 <Continuous>  dextrose 5%. 1000 <Continuous>  dextrose 50% Injectable 25 once  dextrose 50% Injectable 12.5 once  dextrose 50% Injectable 25 once  dextrose 50% Injectable 25 once  dextrose 50% Injectable 12.5 once  dextrose 50% Injectable 25 once  dextrose Oral Gel 15 once PRN  dextrose Oral Gel 15 once PRN  glucagon  Injectable 1 once  glucagon  Injectable 1 once  guanFACINE 1 <User Schedule>  hydrocortisone sodium succinate Injectable 25 every 12 hours  influenza  Vaccine (HIGH DOSE) 0.7 once  insulin glargine Injectable (LANTUS) 6 <User Schedule>  insulin regular  human corrective regimen sliding scale  every 6 hours  insulin regular  human recombinant 4 every 6 hours  levothyroxine Injectable 60 at bedtime  midodrine 20 every 8 hours  mycophenolate mofetil Suspension 500 every 12 hours  pantoprazole   Suspension 40 every 24 hours  polyethylene glycol 3350 17 every 12 hours  QUEtiapine 75 every 12 hours  senna 2 at bedtime  sodium chloride 0.9% lock flush 10 every 1 hour PRN  sodium chloride 7% Inhalation 4 every 12 hours  tacrolimus    0.5 mG/mL Suspension 4 every 24 hours  tacrolimus    0.5 mG/mL Suspension 4 every 24 hours  tamsulosin 0.4 at bedtime      T(C): , Max: 37 (12-11-22 @ 14:57)  T(F): , Max: 98.6 (12-11-22 @ 14:57)  HR: 87 (12-12-22 @ 11:00)  BP: 107/44 (12-12-22 @ 11:00)  BP(mean): 64 (12-12-22 @ 11:00)  RR: 35 (12-12-22 @ 11:00)  SpO2: 94% (12-12-22 @ 11:00)  Wt(kg): --    12-11 @ 07:01 - 12-12 @ 07:00  --------------------------------------------------------  IN: 1032 mL / OUT: 165 mL / NET: 867 mL    12-12 @ 07:01 - 12-12 @ 11:58  --------------------------------------------------------  IN: 215 mL / OUT: 35 mL / NET: 180 mL          Review of Systems:  ROS negative except as per HPI      PHYSICAL EXAM:  GENERAL: trach collar, NAD, laying in bed tolerating HD   NECK: supple, No JVD  Lungs: No rales, ronchi or wheezing noted  HEART: normal S1S2, RRR  ABDOMEN: Soft, Nontender, +BS, No flank tenderness bilateral  EXTREMITIES: Remains anasarcic, although edema is slowly improving  ACCESS: R radiocephalic AVF w/ palpable thrill, c/d/i accessed       LABS:                        8.0    7.09  )-----------( 280      ( 12 Dec 2022 04:27 )             25.9     12-12    130<L>  |  93<L>  |  83<H>  ----------------------------<  204<H>  3.6   |  26  |  3.65<H>    Ca    8.7      12 Dec 2022 04:27  Phos  3.5     12-12  Mg     2.4     12-12                  RADIOLOGY & ADDITIONAL STUDIES:

## 2022-12-12 NOTE — PROGRESS NOTE ADULT - ATTENDING COMMENTS
Pt seen on rounds this afternoon and events of the weekend reviewed.  Glucoses have been distinctly higher, most of the fingersticks over 170 mg% in the last  24 hours--(though decreased to 110 mg% at noon today).  Is now on 6 Lantus qhs, 4 reg q6h.  Tolerating feeds without interruption  Lungs still with coarse rhonchi on the right, left side was surprisingly clear today.  Abdomen is soft and without apparent tenderness.  LE edema is essentially resolved  Based on the blood sugars when he was off the feeds, will continue the Lantus at 6 units.  Will increase the regular insulin to 5 units q6h  Continue hydrocortisone 25 mg 12h and levothyroxine 60 mcg IV daily  To recheck TFTs tomorrow

## 2022-12-12 NOTE — PROGRESS NOTE ADULT - PROBLEM SELECTOR PLAN 1
Event noticed.  The patient is sedated.  I suctioned the patient copious amount of secretion.  Gas exchange is stable.  Sputum grew Pseudomonas and Serratia.  The patient on appropriate antibiotic.  Continue pulmonary toilet.  Patient has baseline dementia and does not and trial up off sedation.  Tapers pressors as needed.  Cortisol level is adequate.  I discussed the case with critical care. She was extubated.  Patient is on nasal cannula.  Suction the patient copious amount of secretion.  Dysphagia evaluation.  Continue antibiotic.  The patient is hemodynamically stable off pressors and sedative.  Suctioned the patient thick moderate secretion and increased.  He failed swallow eval and will need a peg.  On nebs.  He was intubated.  Sputum GS revealed GNR and culture positive for sternomonas.  Abt changed to bactrim.  Monitor renal function on abt which is worsening.  Plan trach tomorrow.  Peg done . Is in detail with the family.  The patient is off antibiotic.  The renal function is deteriorating.  Improved with hemodialysis and fluid removal and 2.5 liters wwere removed today.  Patient on trach collar and tolerated well.  Suction the patient moderate amount of thick secretion.  He is requiring frequent suctioning.  No chest x-ray.  No thoracentesis.  Continue with dialysis with fluid removal.  Discussed with family regarding LTAC

## 2022-12-12 NOTE — CHART NOTE - NSCHARTNOTEFT_GEN_A_CORE
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      History of renal transplant  secondary to DM      Chronic kidney disease (CKD)      Glaucoma      Kidney transplant recipient      Amputation of toe  x 3      H/O heart artery stent    Current Nutrition Order: Nepro @43 ml/hr with LPS x1/day      PO Intake: N/A    GI Issues: Abdomen ND/NT, +BS x4, LBM 12/12    Pain: No non-verbal indicators     Skin Integrity: DTI sacrum, +3 BUE     Labs:   12-12    130<L>  |  93<L>  |  83<H>  ----------------------------<  204<H>  3.6   |  26  |  3.65<H>    Ca    8.7      12 Dec 2022 04:27  Phos  3.5     12-12  Mg     2.4     12-12      CAPILLARY BLOOD GLUCOSE      POCT Blood Glucose.: 110 mg/dL (12 Dec 2022 11:14)  POCT Blood Glucose.: 182 mg/dL (12 Dec 2022 05:22)  POCT Blood Glucose.: 278 mg/dL (11 Dec 2022 23:11)  POCT Blood Glucose.: 177 mg/dL (11 Dec 2022 17:02)      Medications:  MEDICATIONS  (STANDING):  albuterol/ipratropium for Nebulization 3 milliLiter(s) Nebulizer every 6 hours  aspirin  chewable 81 milliGRAM(s) Oral daily  atorvastatin 40 milliGRAM(s) Oral at bedtime  chlorhexidine 2% Cloths 1 Application(s) Topical <User Schedule>  dextrose 5%. 1000 milliLiter(s) (100 mL/Hr) IV Continuous <Continuous>  dextrose 5%. 1000 milliLiter(s) (50 mL/Hr) IV Continuous <Continuous>  dextrose 5%. 1000 milliLiter(s) (50 mL/Hr) IV Continuous <Continuous>  dextrose 5%. 1000 milliLiter(s) (100 mL/Hr) IV Continuous <Continuous>  dextrose 50% Injectable 25 Gram(s) IV Push once  dextrose 50% Injectable 12.5 Gram(s) IV Push once  dextrose 50% Injectable 25 Gram(s) IV Push once  dextrose 50% Injectable 25 Gram(s) IV Push once  dextrose 50% Injectable 12.5 Gram(s) IV Push once  dextrose 50% Injectable 25 Gram(s) IV Push once  fentaNYL   Patch  25 MICROgram(s)/Hr 1 Patch Transdermal every 72 hours  glucagon  Injectable 1 milliGRAM(s) IntraMuscular once  glucagon  Injectable 1 milliGRAM(s) IntraMuscular once  guanFACINE 1 milliGRAM(s) Oral <User Schedule>  hydrocortisone sodium succinate Injectable 25 milliGRAM(s) IV Push every 12 hours  influenza  Vaccine (HIGH DOSE) 0.7 milliLiter(s) IntraMuscular once  insulin glargine Injectable (LANTUS) 6 Unit(s) SubCutaneous <User Schedule>  insulin regular  human corrective regimen sliding scale   SubCutaneous every 6 hours  insulin regular  human recombinant 4 Unit(s) SubCutaneous every 6 hours  levothyroxine Injectable 60 MICROGram(s) IV Push at bedtime  midodrine 20 milliGRAM(s) Oral every 8 hours  mycophenolate mofetil Suspension 500 milliGRAM(s) Oral every 12 hours  pantoprazole   Suspension 40 milliGRAM(s) Oral every 24 hours  polyethylene glycol 3350 17 Gram(s) Oral every 12 hours  QUEtiapine 75 milliGRAM(s) Oral every 12 hours  senna 2 Tablet(s) Oral at bedtime  sodium chloride 7% Inhalation 4 milliLiter(s) Inhalation every 12 hours  tacrolimus    0.5 mG/mL Suspension 4 milliGRAM(s) Oral every 24 hours  tacrolimus    0.5 mG/mL Suspension 4 milliGRAM(s) Oral every 24 hours  tamsulosin 0.4 milliGRAM(s) Oral at bedtime    MEDICATIONS  (PRN):  acetaminophen    Suspension .. 650 milliGRAM(s) Oral every 6 hours PRN Temp greater or equal to 38C (100.4F), Mild Pain (1 - 3)  dextrose Oral Gel 15 Gram(s) Oral once PRN Blood Glucose LESS THAN 70 milliGRAM(s)/deciliter  dextrose Oral Gel 15 Gram(s) Oral once PRN Blood Glucose LESS THAN 70 milliGRAM(s)/deciliter  sodium chloride 0.9% lock flush 10 milliLiter(s) IV Push every 1 hour PRN Pre/post blood products, medications, blood draw, and to maintain line patency    Height for BMI (FEET)	5 Feet  Height for BMI (INCHES)	7 Inch(s)  Height for BMI (CENTIMETERS)	170.18 Centimeter(s)  Weight for BMI (lbs)	220 lb  Weight for BMI (kg)	99.8 kg  Body Mass Index	34.4    Weight Change:   12/12 - 92.8kg    *7kg wt loss since admit (7% x3 weeks); pt with noted fluid flux.     Estimated energy needs:   -Needs updated 11/23 using IBW 67.3kg adjusted for vented pt.   EEN: 2993-5853 kcal (25-30 kcal/kg)  EPN:  gProt. (1.4-1.6 gProt./kg)  EFN: 1393-3053 ml (25-30 ml/kg)    Subjective: 84 y/o M PMH CKD 4, S/P renal transplant in 2013 at [facility], glaucoma (blind), DM1, anemia, CAD s/p 2 DAMEON to LAD in 6/21, BPH, recent admission for PNA presented with acute on chronic cough and b/l LE edema 2/2 acute CHF vs CKD. Cardiology and renal consulted, and pt was started on Lasix 40mg IV BID with improvement in b/l LE edema. This morning, pt found to be hypoxic to 70s on RA and hypoglycemia 31, difficult to arouse, and rapid response was called. Pt was put on supplemental oxygen with O2sat 80s, still difficult to arouse, and pt was intubated. Lost a pulse on pt so code blue was called, ROSC achieved, and pt was transferred to the ICU for closer monitoring. 11/19: Intubated. 11/22: Bowel care started. 11/23: Diarrhea. 11/30: PEG placed. 12/4: Constipation, started on lactulose. 12/7: Started on miralax, senna. 12/11: Lactulose d/c'd    Pt care discussed in IDT rounds. Rx and labs reviewed. Pt remains intubated and sedated at time of assessment; vent to VC-AC, MAP 83, no pressors, no propofol. Spoke with RN, pt tolerating TF; hx constipation this hospital stay -treated with miralax, senna, and lactulose; effective. Pt awaiting LTAC placement. Lytes WNL and hyponatremic; consider transition to standard concentrated formula -see recs below. No reports other GI distress or further nutritional concerns at this time. RDN will continue to reassess, intervene, and monitor as appropriate.     Previous Nutrition Diagnosis: Inadequate Protein Energy Intake r/t intubation AEB NPO status    Active [ x ]  Resolved [   ] *adjusted 11/23    If resolved, new PES:     Goal: Pt will meet 75% or more of protein/energy needs via most appropriate route for nutrition.     Recommendations:  -Continue TF regimen via PEG as tolerated   *Recommend Nepro @43 ml/hr with LPS x1/day to provide 1032 ml TF, 1958 kcal, 99 gProt., and 750 ml FW. This is 23.2 non-protein kcal and 1.47 gProt. per kg IBW 63.7kg.    *FWF per team. If euvolemic and sNa WNL, consider 210 ml FWF q4hr to provide 2010 total fluids per day.    *Consider transition to Jevity 1.5 @46 ml/hr with LPS x2/day to provide 1104 ml TF, 1856 kcal, 100 gProt., and 839 ml.   -Monitor pressor and propofol demands; adjust TF as necessary   -Maintain aspiration precautions at all times   -Align nutrition with GOC at all times   -Monitor chemistry, GI fxn, and skin integrity     Risk Level: High [   ] Moderate [ x ] Low [   ].

## 2022-12-12 NOTE — PROGRESS NOTE ADULT - SUBJECTIVE AND OBJECTIVE BOX
OVERNIGHT EVENTS:  Blood tinged sputum, paused heparin.    SUBJECTIVE / INTERVAL HPI: Patient seen and examined at bedside.  In no distress, less agitation than previously.    VITAL SIGNS:  Vital Signs Last 24 Hrs  T(C): 36.6 (12 Dec 2022 10:35), Max: 37 (11 Dec 2022 14:57)  T(F): 97.8 (12 Dec 2022 10:35), Max: 98.6 (11 Dec 2022 14:57)  HR: 88 (12 Dec 2022 13:00) (72 - 97)  BP: 116/51 (12 Dec 2022 13:00) (89/40 - 122/58)  BP(mean): 73 (12 Dec 2022 13:00) (58 - 83)  RR: 33 (12 Dec 2022 13:00) (22 - 40)  SpO2: 94% (12 Dec 2022 13:00) (90% - 100%)    Parameters below as of 12 Dec 2022 13:00  Patient On (Oxygen Delivery Method): tracheostomy collar  O2 Flow (L/min): 10  O2 Concentration (%): 40  I&O's Summary    11 Dec 2022 07:01  -  12 Dec 2022 07:00  --------------------------------------------------------  IN: 1032 mL / OUT: 165 mL / NET: 867 mL    12 Dec 2022 07:01  -  12 Dec 2022 13:47  --------------------------------------------------------  IN: 301 mL / OUT: 55 mL / NET: 246 mL        PHYSICAL EXAM:  General: NAD  HEENT: anicteric sclera, 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)  Neck: supple  Respiratory: CTA B/L; no W/R/R  Cardiovascular: +S1/S2; RRR; no M/R/G  Gastrointestinal: soft, nondistended; +BS x4  Extremities: WWP; 2+ peripheral pulses B/L; 1+ LE edema, 3+ UE edema  Skin: normal color and turgor; no rash  Neurological: mild agitation, not following commands, turns head to voice, moving all extremities spontaneously    MEDICATIONS:  MEDICATIONS  (STANDING):  albuterol/ipratropium for Nebulization 3 milliLiter(s) Nebulizer every 6 hours  aspirin  chewable 81 milliGRAM(s) Oral daily  atorvastatin 40 milliGRAM(s) Oral at bedtime  chlorhexidine 2% Cloths 1 Application(s) Topical <User Schedule>  dextrose 5%. 1000 milliLiter(s) (100 mL/Hr) IV Continuous <Continuous>  dextrose 5%. 1000 milliLiter(s) (50 mL/Hr) IV Continuous <Continuous>  dextrose 5%. 1000 milliLiter(s) (100 mL/Hr) IV Continuous <Continuous>  dextrose 5%. 1000 milliLiter(s) (50 mL/Hr) IV Continuous <Continuous>  dextrose 50% Injectable 25 Gram(s) IV Push once  dextrose 50% Injectable 12.5 Gram(s) IV Push once  dextrose 50% Injectable 25 Gram(s) IV Push once  dextrose 50% Injectable 25 Gram(s) IV Push once  dextrose 50% Injectable 12.5 Gram(s) IV Push once  dextrose 50% Injectable 25 Gram(s) IV Push once  fentaNYL   Patch  25 MICROgram(s)/Hr 1 Patch Transdermal every 72 hours  glucagon  Injectable 1 milliGRAM(s) IntraMuscular once  glucagon  Injectable 1 milliGRAM(s) IntraMuscular once  guanFACINE 1 milliGRAM(s) Oral <User Schedule>  hydrocortisone sodium succinate Injectable 25 milliGRAM(s) IV Push every 12 hours  influenza  Vaccine (HIGH DOSE) 0.7 milliLiter(s) IntraMuscular once  insulin glargine Injectable (LANTUS) 6 Unit(s) SubCutaneous <User Schedule>  insulin regular  human corrective regimen sliding scale   SubCutaneous every 6 hours  insulin regular  human recombinant 4 Unit(s) SubCutaneous every 6 hours  levothyroxine Injectable 60 MICROGram(s) IV Push at bedtime  midodrine 20 milliGRAM(s) Oral every 8 hours  mycophenolate mofetil Suspension 500 milliGRAM(s) Oral every 12 hours  pantoprazole   Suspension 40 milliGRAM(s) Oral every 24 hours  polyethylene glycol 3350 17 Gram(s) Oral every 12 hours  QUEtiapine 75 milliGRAM(s) Oral every 12 hours  senna 2 Tablet(s) Oral at bedtime  sodium chloride 7% Inhalation 4 milliLiter(s) Inhalation every 12 hours  tacrolimus    0.5 mG/mL Suspension 4 milliGRAM(s) Oral every 24 hours  tacrolimus    0.5 mG/mL Suspension 4 milliGRAM(s) Oral every 24 hours  tamsulosin 0.4 milliGRAM(s) Oral at bedtime    MEDICATIONS  (PRN):  acetaminophen    Suspension .. 650 milliGRAM(s) Oral every 6 hours PRN Temp greater or equal to 38C (100.4F), Mild Pain (1 - 3)  dextrose Oral Gel 15 Gram(s) Oral once PRN Blood Glucose LESS THAN 70 milliGRAM(s)/deciliter  dextrose Oral Gel 15 Gram(s) Oral once PRN Blood Glucose LESS THAN 70 milliGRAM(s)/deciliter  sodium chloride 0.9% lock flush 10 milliLiter(s) IV Push every 1 hour PRN Pre/post blood products, medications, blood draw, and to maintain line patency      ALLERGIES:  Allergies    No Known Allergies    Intolerances        LABS:                        8.0    7.09  )-----------( 280      ( 12 Dec 2022 04:27 )             25.9     12-12    130<L>  |  93<L>  |  83<H>  ----------------------------<  204<H>  3.6   |  26  |  3.65<H>    Ca    8.7      12 Dec 2022 04:27  Phos  3.5     12-12  Mg     2.4     12-12          CAPILLARY BLOOD GLUCOSE      POCT Blood Glucose.: 110 mg/dL (12 Dec 2022 11:14)      RADIOLOGY & ADDITIONAL TESTS: Reviewed.

## 2022-12-12 NOTE — PROGRESS NOTE ADULT - ATTENDING COMMENTS
events noted, chart reviewed  82 yo man with CKD 4, recent drop of GFR to < 15 (CKD 5); h/o kidney transplant 2013, prior tx was on HD   admitted with PNA in ICU and found to have oliguric-iATN necessitating acute HD on 12/6. Remains HD dependent  seen and evaluated while on dialysis  tolerating the procedure well  continue full treatment as prescribed: 3L UF goal.

## 2022-12-12 NOTE — PROGRESS NOTE ADULT - SUBJECTIVE AND OBJECTIVE BOX
OVERNIGHT: No acute events overnight.   SUBJECTIVE / INTERVAL HPI: Patient was seen and examined this morning. He continues to receive hydrocortisone 25 mg every 12 hours. Bloog glucoses levels chris to >200 mg/dL yesterday; therefore, it was recommended to increase regular insulin to 6 units every 6 hours. Per flowsheets, systolic blood pressure remains in the 90s-110s with MAPs in the 60s-70s. He continues to receive tube feeds at 43 mL/hr without significant interruptions.     CAPILLARY BLOOD GLUCOSE & INSULIN RECEIVED  Yesterday  - 12 PM FS mg/dL = 4 units of regular insulin + 2 units of sliding scale.   - 6 PM FS mg/dL = 4 units of regular insulin + 1 units of sliding scale.     Today  - 12 AM FS mg/dL = 6 units of Lantus + 4 units of regular insulin + 3 units of sliding scale.   - 6 AM FS mg/dL = 4 units of regular insulin + 1 units of sliding scale.    - 12 PM FSG: *** mg/dL = *** units of regular insulin + *** units of sliding scale.     REVIEW OF SYSTEMS  Constitutional:  Negative fever, chills or loss of appetite.  Eyes:  Negative blurry vision or double vision.  Cardiovascular:  Negative for chest pain or palpitations.  Respiratory:  Negative for cough, wheezing, or shortness of breath.    Gastrointestinal:  Negative for nausea, vomiting, diarrhea, constipation, or abdominal pain.  Genitourinary:  Negative frequency, urgency or dysuria.  Neurologic:  No headache, confusion, dizziness, lightheadedness.    PHYSICAL EXAM  Vital Signs Last 24 Hrs  T(C): 36.3 (12 Dec 2022 09:41), Max: 37 (11 Dec 2022 14:57)  T(F): 97.3 (12 Dec 2022 09:41), Max: 98.6 (11 Dec 2022 14:57)  HR: 97 (12 Dec 2022 10:00) (72 - 97)  BP: 117/55 (12 Dec 2022 10:00) (89/40 - 158/63)  BP(mean): 78 (12 Dec 2022 10:00) (58 - 91)  RR: 40 (12 Dec 2022 10:00) (22 - 40)  SpO2: 90% (12 Dec 2022 10:00) (90% - 100%)    Parameters below as of 12 Dec 2022 10:00  Patient On (Oxygen Delivery Method): tracheostomy collar  O2 Flow (L/min): 10  O2 Concentration (%): 40    Constitutional: Awake, alert, in no acute distress.   HEENT: Normocephalic, atraumatic, GORDON, no proptosis or lid retraction.   Neck: supple, no acanthosis, no thyromegaly or palpable thyroid nodules.  Respiratory: Lungs clear to ausculation bilaterally.   Cardiovascular: regular rhythm, normal S1 and S2, no audible murmurs.   GI: soft, non-tender, non-distended, bowel sounds present, no masses appreciated.  Extremities: No lower extremity edema, peripheral pulses present.   Skin: no rashes.   Psychiatric: AAO x 3. Normal affect/mood.     LABS  CBC - WBC/HGB/HTC/PLT: 7.09/8.0/25.9/280 (22)  BMP - Na/K/Cl/Bicarb/BUN/Cr/Gluc/AG/eGFR: 130/3.6/93/26/83/3.65/204/11/16 (22)  Ca - 8.7 (22)  Phos - 3.5 (22)  Mg - 2.4 (22)  LFT - Alb/Tprot/Tbili/Dbili/AlkPhos/ALT/AST: 2.4/--/0.3/--/113/66/56 (22)    Thyroid Stimulating Hormone, Serum: 11.170 (22)  Total T4/Free T4: --/0.561 (22)    MEDICATIONS  MEDICATIONS  (STANDING):  albuterol/ipratropium for Nebulization 3 milliLiter(s) Nebulizer every 6 hours  aspirin  chewable 81 milliGRAM(s) Oral daily  atorvastatin 40 milliGRAM(s) Oral at bedtime  chlorhexidine 2% Cloths 1 Application(s) Topical <User Schedule>  dextrose 5%. 1000 milliLiter(s) (100 mL/Hr) IV Continuous <Continuous>  dextrose 5%. 1000 milliLiter(s) (50 mL/Hr) IV Continuous <Continuous>  dextrose 5%. 1000 milliLiter(s) (100 mL/Hr) IV Continuous <Continuous>  dextrose 5%. 1000 milliLiter(s) (50 mL/Hr) IV Continuous <Continuous>  dextrose 50% Injectable 25 Gram(s) IV Push once  dextrose 50% Injectable 12.5 Gram(s) IV Push once  dextrose 50% Injectable 25 Gram(s) IV Push once  dextrose 50% Injectable 25 Gram(s) IV Push once  dextrose 50% Injectable 12.5 Gram(s) IV Push once  dextrose 50% Injectable 25 Gram(s) IV Push once  glucagon  Injectable 1 milliGRAM(s) IntraMuscular once  glucagon  Injectable 1 milliGRAM(s) IntraMuscular once  guanFACINE 1 milliGRAM(s) Oral <User Schedule>  hydrocortisone sodium succinate Injectable 25 milliGRAM(s) IV Push every 12 hours  influenza  Vaccine (HIGH DOSE) 0.7 milliLiter(s) IntraMuscular once  insulin glargine Injectable (LANTUS) 6 Unit(s) SubCutaneous <User Schedule>  insulin regular  human corrective regimen sliding scale   SubCutaneous every 6 hours  insulin regular  human recombinant 4 Unit(s) SubCutaneous every 6 hours  levothyroxine Injectable 60 MICROGram(s) IV Push at bedtime  midodrine 20 milliGRAM(s) Oral every 8 hours  mycophenolate mofetil Suspension 500 milliGRAM(s) Oral every 12 hours  pantoprazole   Suspension 40 milliGRAM(s) Oral every 24 hours  polyethylene glycol 3350 17 Gram(s) Oral every 12 hours  QUEtiapine 75 milliGRAM(s) Oral every 12 hours  senna 2 Tablet(s) Oral at bedtime  sodium chloride 7% Inhalation 4 milliLiter(s) Inhalation every 12 hours  tacrolimus    0.5 mG/mL Suspension 4 milliGRAM(s) Oral every 24 hours  tacrolimus    0.5 mG/mL Suspension 4 milliGRAM(s) Oral every 24 hours  tamsulosin 0.4 milliGRAM(s) Oral at bedtime    MEDICATIONS  (PRN):  acetaminophen    Suspension .. 650 milliGRAM(s) Oral every 6 hours PRN Temp greater or equal to 38C (100.4F), Mild Pain (1 - 3)  dextrose Oral Gel 15 Gram(s) Oral once PRN Blood Glucose LESS THAN 70 milliGRAM(s)/deciliter  dextrose Oral Gel 15 Gram(s) Oral once PRN Blood Glucose LESS THAN 70 milliGRAM(s)/deciliter  sodium chloride 0.9% lock flush 10 milliLiter(s) IV Push every 1 hour PRN Pre/post blood products, medications, blood draw, and to maintain line patency    ASSESSMENT / RECOMMENDATIONS    A1C: 9.0 %  BUN: 83  Creatinine: 3.65  GFR: 16  Weight: 82.3  BMI: 28.4  EF:     # Type 2 diabetes mellitus  - Please continue lantus *** units at bedtime.   - Continue lispro *** units before each meal.  - Continue lispro moderate / low dose sliding scale before meals and at bedtime.  - Patient's fingerstick glucose goal is 100-180 mg/dL.    - For discharge, patient can ***.    - Patient can follow up at discharge with [facility] by calling [phone] to make an appointment.      Case discussed with Dr. Rabago. Primary team updated.       Ulysses Weber    Endocrinology Fellow    Service Pager: [phone]    OVERNIGHT: No acute events overnight.   SUBJECTIVE / INTERVAL HPI: Patient was seen and examined this morning. He continues to receive hydrocortisone 25 mg every 12 hours. Bloog glucoses levels chris to >200 mg/dL yesterday; therefore, it was recommended to increase regular insulin to 6 units every 6 hours. Per flowsheets, systolic blood pressure remains in the 90s-110s with MAPs in the 60s-70s. He continues to receive tube feeds at 43 mL/hr without significant interruptions.     CAPILLARY BLOOD GLUCOSE & INSULIN RECEIVED  Yesterday  - 12 PM FS mg/dL = 4 units of regular insulin + 2 units of sliding scale.   - 6 PM FS mg/dL = 4 units of regular insulin + 1 units of sliding scale.     Today  - 12 AM FS mg/dL = 6 units of Lantus + 4 units of regular insulin + 3 units of sliding scale.   - 6 AM FS mg/dL = 4 units of regular insulin + 1 units of sliding scale.    - 12 PM FS mg/dL = He didn't receive insulin.     REVIEW OF SYSTEMS  Unable to obtain.     PHYSICAL EXAM  Vital Signs Last 24 Hrs  T(C): 36.3 (12 Dec 2022 09:41), Max: 37 (11 Dec 2022 14:57)  T(F): 97.3 (12 Dec 2022 09:41), Max: 98.6 (11 Dec 2022 14:57)  HR: 97 (12 Dec 2022 10:00) (72 - 97)  BP: 117/55 (12 Dec 2022 10:00) (89/40 - 158/63)  BP(mean): 78 (12 Dec 2022 10:00) (58 - 91)  RR: 40 (12 Dec 2022 10:00) (22 - 40)  SpO2: 90% (12 Dec 2022 10:00) (90% - 100%)    Parameters below as of 12 Dec 2022 10:00  Patient On (Oxygen Delivery Method): tracheostomy collar  O2 Flow (L/min): 10  O2 Concentration (%): 40    Constitutional: Awake, alert, in no acute distress.   HEENT: Normocephalic, atraumatic, GORDON, no proptosis or lid retraction.   Neck: supple, no acanthosis, no thyromegaly or palpable thyroid nodules.  Respiratory: Lungs clear to ausculation bilaterally.   Cardiovascular: regular rhythm, normal S1 and S2, no audible murmurs.   GI: soft, non-tender, non-distended, bowel sounds present, no masses appreciated.  Extremities: No lower extremity edema, peripheral pulses present.   Skin: no rashes.   Psychiatric: AAO x 3. Normal affect/mood.     LABS  CBC - WBC/HGB/HTC/PLT: 7.09/8.0/25.9/280 (22)  BMP - Na/K/Cl/Bicarb/BUN/Cr/Gluc/AG/eGFR: 130/3.6/93/26/83/3.65/204/11/16 (22)  Ca - 8.7 (22)  Phos - 3.5 (22)  Mg - 2.4 (22)  LFT - Alb/Tprot/Tbili/Dbili/AlkPhos/ALT/AST: 2.4/--/0.3/--/113/66/56 (22)    Thyroid Stimulating Hormone, Serum: 11.170 (22)  Total T4/Free T4: --/0.561 (22)    MEDICATIONS  MEDICATIONS  (STANDING):  albuterol/ipratropium for Nebulization 3 milliLiter(s) Nebulizer every 6 hours  aspirin  chewable 81 milliGRAM(s) Oral daily  atorvastatin 40 milliGRAM(s) Oral at bedtime  chlorhexidine 2% Cloths 1 Application(s) Topical <User Schedule>  dextrose 5%. 1000 milliLiter(s) (100 mL/Hr) IV Continuous <Continuous>  dextrose 5%. 1000 milliLiter(s) (50 mL/Hr) IV Continuous <Continuous>  dextrose 5%. 1000 milliLiter(s) (100 mL/Hr) IV Continuous <Continuous>  dextrose 5%. 1000 milliLiter(s) (50 mL/Hr) IV Continuous <Continuous>  dextrose 50% Injectable 25 Gram(s) IV Push once  dextrose 50% Injectable 12.5 Gram(s) IV Push once  dextrose 50% Injectable 25 Gram(s) IV Push once  dextrose 50% Injectable 25 Gram(s) IV Push once  dextrose 50% Injectable 12.5 Gram(s) IV Push once  dextrose 50% Injectable 25 Gram(s) IV Push once  glucagon  Injectable 1 milliGRAM(s) IntraMuscular once  glucagon  Injectable 1 milliGRAM(s) IntraMuscular once  guanFACINE 1 milliGRAM(s) Oral <User Schedule>  hydrocortisone sodium succinate Injectable 25 milliGRAM(s) IV Push every 12 hours  influenza  Vaccine (HIGH DOSE) 0.7 milliLiter(s) IntraMuscular once  insulin glargine Injectable (LANTUS) 6 Unit(s) SubCutaneous <User Schedule>  insulin regular  human corrective regimen sliding scale   SubCutaneous every 6 hours  insulin regular  human recombinant 4 Unit(s) SubCutaneous every 6 hours  levothyroxine Injectable 60 MICROGram(s) IV Push at bedtime  midodrine 20 milliGRAM(s) Oral every 8 hours  mycophenolate mofetil Suspension 500 milliGRAM(s) Oral every 12 hours  pantoprazole   Suspension 40 milliGRAM(s) Oral every 24 hours  polyethylene glycol 3350 17 Gram(s) Oral every 12 hours  QUEtiapine 75 milliGRAM(s) Oral every 12 hours  senna 2 Tablet(s) Oral at bedtime  sodium chloride 7% Inhalation 4 milliLiter(s) Inhalation every 12 hours  tacrolimus    0.5 mG/mL Suspension 4 milliGRAM(s) Oral every 24 hours  tacrolimus    0.5 mG/mL Suspension 4 milliGRAM(s) Oral every 24 hours  tamsulosin 0.4 milliGRAM(s) Oral at bedtime    MEDICATIONS  (PRN):  acetaminophen    Suspension .. 650 milliGRAM(s) Oral every 6 hours PRN Temp greater or equal to 38C (100.4F), Mild Pain (1 - 3)  dextrose Oral Gel 15 Gram(s) Oral once PRN Blood Glucose LESS THAN 70 milliGRAM(s)/deciliter  dextrose Oral Gel 15 Gram(s) Oral once PRN Blood Glucose LESS THAN 70 milliGRAM(s)/deciliter  sodium chloride 0.9% lock flush 10 milliLiter(s) IV Push every 1 hour PRN Pre/post blood products, medications, blood draw, and to maintain line patency    ASSESSMENT / RECOMMENDATIONS    A1C: 9.0 %  BUN: 83  Creatinine: 3.65  GFR: 16  Weight: 82.3  BMI: 28.4  EF:     # Type 2 diabetes mellitus  - Patient developed hyperglycemia with blood glucose levels >200 mg/dL in two occasions. Therefore, will recommend to increase regular insulin. Would not increase higher as his blood glucose appears to be fluctuating due to changing insulin requirements, previously developing hypoglycemia at different times of the day while on 6 units of regular insulin, now hyperglycemic with 4 units of regular insulin.   - Please continue Lantus 6 units at bedtime.   - Increase regular insulin to 5 units every 6 hours.   - Continue regular insulin low dose sliding scale every 6 hours.   - Patient's fingerstick glucose goal is 100-180 mg/dL.      Case discussed with Dr. Rabago. Primary team updated.       Ulysses Weber    Endocrinology Fellow    Service Pager: [phone]    OVERNIGHT: No acute events overnight.   SUBJECTIVE / INTERVAL HPI: Patient was seen and examined this morning. He continues to receive hydrocortisone 25 mg every 12 hours. Bloog glucoses levels chris to >200 mg/dL yesterday; therefore, it was recommended to increase regular insulin to 6 units every 6 hours. Per flowsheets, systolic blood pressure remains in the 90s-110s with MAPs in the 60s-70s. He continues to receive tube feeds at 43 mL/hr without significant interruptions.     CAPILLARY BLOOD GLUCOSE & INSULIN RECEIVED  Yesterday  - 12 PM FS mg/dL = 4 units of regular insulin + 2 units of sliding scale.   - 6 PM FS mg/dL = 4 units of regular insulin + 1 units of sliding scale.     Today  - 12 AM FS mg/dL = 6 units of Lantus + 4 units of regular insulin + 3 units of sliding scale.   - 6 AM FS mg/dL = 4 units of regular insulin + 1 units of sliding scale.    - 12 PM FS mg/dL = He didn't receive insulin.     REVIEW OF SYSTEMS  Unable to obtain.     PHYSICAL EXAM  Vital Signs Last 24 Hrs  T(C): 36.3 (12 Dec 2022 09:41), Max: 37 (11 Dec 2022 14:57)  T(F): 97.3 (12 Dec 2022 09:41), Max: 98.6 (11 Dec 2022 14:57)  HR: 97 (12 Dec 2022 10:00) (72 - 97)  BP: 117/55 (12 Dec 2022 10:00) (89/40 - 158/63)  BP(mean): 78 (12 Dec 2022 10:00) (58 - 91)  RR: 40 (12 Dec 2022 10:00) (22 - 40)  SpO2: 90% (12 Dec 2022 10:00) (90% - 100%)    Parameters below as of 12 Dec 2022 10:00  Patient On (Oxygen Delivery Method): tracheostomy collar  O2 Flow (L/min): 10  O2 Concentration (%): 40    Constitutional: Awake, alert, elderly male.   HEENT: Normocephalic, atraumatic, GORDON.  Respiratory: (+) Bilateral rhonchi, on trach collar.   Cardiovascular: regular rhythm, normal S1 and S2, no audible murmurs.   GI: soft, non-tender, non-distended, bowel sounds present.  Extremities: +1 bilateral lower extremity edema.    LABS  CBC - WBC/HGB/HTC/PLT: 7.09/8.0/25.9/280 (22)  BMP - Na/K/Cl/Bicarb/BUN/Cr/Gluc/AG/eGFR: 130/3.6/93/26/83/3.65/204/11/16 (22)  Ca - 8.7 (22)  Phos - 3.5 (22)  Mg - 2.4 (22)  LFT - Alb/Tprot/Tbili/Dbili/AlkPhos/ALT/AST: 2.4/--/0.3/--/113/66/56 (22)  Thyroid Stimulating Hormone, Serum: 11.170 (22)  Total T4/Free T4: --/0.561 (22)    MEDICATIONS  MEDICATIONS  (STANDING):  albuterol/ipratropium for Nebulization 3 milliLiter(s) Nebulizer every 6 hours  aspirin  chewable 81 milliGRAM(s) Oral daily  atorvastatin 40 milliGRAM(s) Oral at bedtime  chlorhexidine 2% Cloths 1 Application(s) Topical <User Schedule>  dextrose 5%. 1000 milliLiter(s) (100 mL/Hr) IV Continuous <Continuous>  dextrose 5%. 1000 milliLiter(s) (50 mL/Hr) IV Continuous <Continuous>  dextrose 5%. 1000 milliLiter(s) (100 mL/Hr) IV Continuous <Continuous>  dextrose 5%. 1000 milliLiter(s) (50 mL/Hr) IV Continuous <Continuous>  dextrose 50% Injectable 25 Gram(s) IV Push once  dextrose 50% Injectable 12.5 Gram(s) IV Push once  dextrose 50% Injectable 25 Gram(s) IV Push once  dextrose 50% Injectable 25 Gram(s) IV Push once  dextrose 50% Injectable 12.5 Gram(s) IV Push once  dextrose 50% Injectable 25 Gram(s) IV Push once  glucagon  Injectable 1 milliGRAM(s) IntraMuscular once  glucagon  Injectable 1 milliGRAM(s) IntraMuscular once  guanFACINE 1 milliGRAM(s) Oral <User Schedule>  hydrocortisone sodium succinate Injectable 25 milliGRAM(s) IV Push every 12 hours  influenza  Vaccine (HIGH DOSE) 0.7 milliLiter(s) IntraMuscular once  insulin glargine Injectable (LANTUS) 6 Unit(s) SubCutaneous <User Schedule>  insulin regular  human corrective regimen sliding scale   SubCutaneous every 6 hours  insulin regular  human recombinant 4 Unit(s) SubCutaneous every 6 hours  levothyroxine Injectable 60 MICROGram(s) IV Push at bedtime  midodrine 20 milliGRAM(s) Oral every 8 hours  mycophenolate mofetil Suspension 500 milliGRAM(s) Oral every 12 hours  pantoprazole   Suspension 40 milliGRAM(s) Oral every 24 hours  polyethylene glycol 3350 17 Gram(s) Oral every 12 hours  QUEtiapine 75 milliGRAM(s) Oral every 12 hours  senna 2 Tablet(s) Oral at bedtime  sodium chloride 7% Inhalation 4 milliLiter(s) Inhalation every 12 hours  tacrolimus    0.5 mG/mL Suspension 4 milliGRAM(s) Oral every 24 hours  tacrolimus    0.5 mG/mL Suspension 4 milliGRAM(s) Oral every 24 hours  tamsulosin 0.4 milliGRAM(s) Oral at bedtime    MEDICATIONS  (PRN):  acetaminophen    Suspension .. 650 milliGRAM(s) Oral every 6 hours PRN Temp greater or equal to 38C (100.4F), Mild Pain (1 - 3)  dextrose Oral Gel 15 Gram(s) Oral once PRN Blood Glucose LESS THAN 70 milliGRAM(s)/deciliter  dextrose Oral Gel 15 Gram(s) Oral once PRN Blood Glucose LESS THAN 70 milliGRAM(s)/deciliter  sodium chloride 0.9% lock flush 10 milliLiter(s) IV Push every 1 hour PRN Pre/post blood products, medications, blood draw, and to maintain line patency    ASSESSMENT / RECOMMENDATIONS  Mr. Miguel is an 83-year-old male with type 2 diabetes mellitus, coronary artery disease (s/p DAMEON x2 to LAD on 2021), chronic kidney disease stage 4 (s/p renal transplant on , on tracrolimus and prednisone) and benign prostatic hyperplasia who was sent to the hospital by his nephrologist for further evaluation of lower extremity edema (22). He was admitted for further management of lower extremity edema, thought to be secondary to his underlying CKD versus congestive heart failure. Hospitalization was complicated by hypoglycemia and cardiac arrest (22, ROSC after 1 minute of chest compressions). Post-cardiac arrest he was noted to develop hypotension, bradycardia and hypothermia. Labs were remarkable for elevated TSH (20) and decreased FT4 (0.77). 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     Although he had features seen with myxedema coma, these findings were better explained by his cardiac arrest as his vital signs prior to the arrest were apparently around his baseline, as was his mental status. He was started on levothyroxine supplementation. In addition, given concern for adrenal insufficiency, he was started on stress-dose steroids which have been titrated down to 25 mg every 12 hours. The patient was extubated on 22; however, he had to be reintubated on 22 due to episode of respiratory decompensation, potentially secondary to aspiration. He is now s/p trach and peg.    A1C: 9.0 %  BUN: 83  Creatinine: 3.65  GFR: 16  Weight: 82.3  BMI: 28.4  EF: 65%, grade II diastolic dysfunction.    # Type 2 diabetes mellitus  - Patient now becoming hyperglycemic while on 4 units of regular insulin every 6 hours (however, previously developing hypoglycemia on 6 units of regular insulin every 6 hours).   - Please continue with Lantus 6 units at midnight.   - Increase regular insulin to 5 units every 6 hours while on tube feeds.  - Continue regular insulin sliding scale low dose every 6 hours.   - Patient's fingerstick glucose goal is 100-180 mg/dL.    # Hypothyroidism   - TSH 20. FT4 0.77 (22) -> Started on levothyroxine 25 mcg IV daily.   - TSH 9.2. FT4 0.69 (22) -> Increased to levothyroxine 50 mcg IV daily.   - TSH 11.1. FT4 0.56. (22) -> Increased to levothyroxine 60 mcg IV daily.   - Continue with levothyroxine to 60 mcg IV daily for now.     # Concern for adrenal insufficiency   - The patient was on prednisone 5 mg daily for renal transplant prior to admission.   - He had recent hospitalization (2022) for pneumonia due to pseudomonas where he was also started on stress dose steroids. At that time, a cortisol level was collected prior to starting steroids which was not robust (6.5 ug/dL).   - Continue with hydrocortisone 25 mg IV every 12 hours for now. However, would leave up to discretion of primary team to increase even higher if septic shock is suspected.    Case discussed with Dr. Rabago. Primary team updated.       Ulysses Weber    Endocrinology Fellow    Service Pager: [phone]

## 2022-12-12 NOTE — PROGRESS NOTE ADULT - ASSESSMENT
82 y/o M PMH CKD 4, renal transplant in 2013 at [facility], glaucoma (blind), DM1, anemia, CAD s/p 2 DAMEON to LAD in 6/21, BPH, recent admission for PNA presents with acute on chronic cough 2/2 PNA and SUNNY 2/2 acute CKD.     NEUROLOGY  #Metabolic Encephalopathy  #Sedation  #Agitation  #Delirium  CT scan negative for acute intracranial pathology. Began to wean sedation on 12/2.  - c/w fentanyl 25mcg patch  - stopped precedex on 12/11  - on seroquel 75mg q12h (decreased on 12/12)  - started tenex (guanfacine) 1mg qd to help minimize agitation while weaning propofol    CARDIOVASCULAR  # Fluid Overloaded  - started on HD on 12/6, removing 2-3L each session  - completed HD isolated UF today with removal of 3L fluid    #Hypotension   - on midodrine 20 q8 (started on 12/7, increased on 12/8)  - stopped Levophed drip on 12/11    #HFpEF  Had EF of 55% on TTE from Oct 2022, grade I diastolic dysfunction. Repeat TTE 11/17 with poor visualization of LV. Patient making good UOP overnight  - holding BP meds    #Upper extremity edema  Stable from yesterday but increased compared to baseline. US with superficial thrombosis of L cephalic vein extending to AC fossa, no DVTs.   - given patient's propensity to bleed and superficial nature of thrombus, no intervention  - obtain LUE XR as concern for fracture (pt with injury prior to hospitalization)    #CAD  #HLD  Hx of CAD s/p 2 DAMEON to LAD in June 2021, currently no CP. Trops 0.03 but no CP or ischemic changes on EKG, likely due to CKD. Takes Plavix and aspirin per last d/c but only aspirin on outpatient note  - c/w ASA daily  - c/w home Lipitor    #HTN   Known history of HTN.   - holding hypertension meds  - continue to monitor BP    PULM  #AHRF  Likely 2/2 aspiration event. Intubated on 11/26.  - ID management as below for aspiration   - Received trach on 12/1  - tolerated CPAP o/n 12/6-7  - trach collar tolerated well     RENAL  #ELIZABETH on Chronic kidney disease (CKD)  Baseline sCr 2.3-2.5. On admission, fluid overloaded. Does have orthopnea and intermittent SOB at baseline. Acidotic but at baseline. Follows w Dr. Mac.  - sCr and BUN increasing daily, urine output poor  - monitor I/Os  - renally dosing meds  - s/p sodium bicarb 150 rate 60cc/hr, discontinued on 12/6  - initiated HD 12/6, removed 2L fluid  - f/u renal recs for HD and UF    #Renal transplant  Patient had renal transplant in 2013. On home mycophenolate 500mg BID and tacrolimus 4mg BID. Per nephro, decreased home tacrolimus from 4mg BID --> 2mg BID while in hospital.   - c/w tacrolimus in AM and in PM, based on level; per Renal increased to 4mg q12h on 12/8  - c/w mycophenolate 500mg BID  - f/u renal recs    #Hyponatremia  - stable; monitor BMP    GI  #Nutrition  NPO with PEG feeds.  - restarted feeds at 6pm 12/2      #Urinary Retention  - remove bourgeois if not chronic, TOV  - on home tamsulosin 0.4mg daily, discontinue if bourgeois remains    ID  #Aspiration PNA  RESOLVED  Tracheal aspirate cx (11/19) growing serratia marcescens and pseudomonas. Patient was started on vancomycin and cefepime. MRSA nares positive. DC'ed vancomycin given supratherapeutic trough. Completed cefepime 2g q24 course x9 days.    #Stenotrophomonas in sputum cx RESOLVED  Sputum cx from 11/26 growing Stenotrophomonas  - completed course of Bactrim BS for 10 days (11/28 - 12/5)    ENDOCRINE  #DM (diabetes mellitus).   Previously on lantus 15 lispro 5 at home. Endocrine following.  - on lantus 6 units  - c/w humulin 4 units q6h  - on low dose regular ISS  - goal -180  - f/u endo recs    #Hypothyroidism  TSH 20.8, free t4 0.7, T3 49 (low). Repeat TSH at 9, fT4 0.7  - on synthroid 50mcg IV qD    #Relative Adrenal Insufficiency  Patient on chronic prednisone 5mg daily. He was started on hydrocortisone 50mg q6hr --> q8 --> BID   - c/w hydrocortisone 25mg q12  - wean as tolerated  - f/u endocrine recs    HEME  #Normocytic Anemia  Likely 2.2 CKD. Hgb 6.9 on 11/21, corrected appropriately s/p 1u pRBC. No active signs of bleeding on physical exam. CTH with no intracranial bleeding. Required 2u pRBC to correct. DEBRA and stool guaic negative.  - consider epo, if BP remains well controlled  - active T+S  - transfuse <7    FLUIDS/ELECTROLYTES/NUTRITION  -IVF as above  -Monitor, careful w advanced kidney disease  -Diet: NPO with PEG feeds  -DVT ppx: heparin TID  -GI: protonix 40 qd  -Dispo: MICU; PT recommending [facility], Plan for LTAC

## 2022-12-12 NOTE — PROGRESS NOTE ADULT - ATTENDING COMMENTS
Renal transplant now with ATN, aspiration pna s/p trach, metabolic encephalopathy  physical as above  tolerting TC but needs lots of suctioning  continue HD  midodrine  decrease seroquel to 75 on ganfacin  decrease fentanyl to 25 mg patch  continue nepro  decision making of high complexity

## 2022-12-13 NOTE — PROGRESS NOTE ADULT - ATTENDING COMMENTS
Acute hypoxemic respiratory failure secondary to multiple pneumonias s/p tracheostomy due to failure to wean from mechanical ventilation. S/p peg. Finished abx for Serratia/Pseudomonal/Stenotrophomonas pneumonia. Encephelopathy slowly improving; added Seroquel. Attempt Valentina valve. Dispo planning to LTACH. Rest of plan as above.

## 2022-12-13 NOTE — PROGRESS NOTE ADULT - ATTENDING COMMENTS
tolerated HD adequately yesterday  Remains HD dependent 1st HD here 12/6  82 yo man with CKD 4,   acute kidney injury  h/o kidney transplant 2013, prior tx was on HD  admitted with PNA in ICU   Still volume overloaded- repeat renal therapy tody- isolated UF- target 2L    seen and evaluated while on dialysis  tolerating the procedure well  continue full treatment as outlined above

## 2022-12-13 NOTE — PROGRESS NOTE ADULT - SUBJECTIVE AND OBJECTIVE BOX
Patient is a 82y Male seen and evaluated at bedside during dialysis. Overnight events noted. Remains dialysis dependent. Bp 100s-110s. K 4.2, bicarb 29, BUN 52      Meds:    acetaminophen    Suspension .. 650 every 6 hours PRN  albuterol/ipratropium for Nebulization 3 every 6 hours  aspirin  chewable 81 daily  atorvastatin 40 at bedtime  chlorhexidine 2% Cloths 1 <User Schedule>  dextrose 5%. 1000 <Continuous>  dextrose 5%. 1000 <Continuous>  dextrose 5%. 1000 <Continuous>  dextrose 5%. 1000 <Continuous>  dextrose 50% Injectable 25 once  dextrose 50% Injectable 12.5 once  dextrose 50% Injectable 25 once  dextrose 50% Injectable 25 once  dextrose 50% Injectable 12.5 once  dextrose 50% Injectable 25 once  dextrose Oral Gel 15 once PRN  dextrose Oral Gel 15 once PRN  fentaNYL   Patch  25 MICROgram(s)/Hr 1 every 72 hours  glucagon  Injectable 1 once  glucagon  Injectable 1 once  guanFACINE 1 <User Schedule>  hydrocortisone sodium succinate Injectable 25 every 12 hours  influenza  Vaccine (HIGH DOSE) 0.7 once  insulin glargine Injectable (LANTUS) 6 <User Schedule>  insulin regular  human corrective regimen sliding scale  every 6 hours  insulin regular  human recombinant 5 every 6 hours  levothyroxine Injectable 60 at bedtime  midodrine 20 every 8 hours  mycophenolate mofetil Suspension 500 every 12 hours  pantoprazole   Suspension 40 every 24 hours  QUEtiapine 75 every 12 hours  sodium chloride 0.9% lock flush 10 every 1 hour PRN  sodium chloride 7% Inhalation 4 every 12 hours  tacrolimus    0.5 mG/mL Suspension 4 every 24 hours  tacrolimus    0.5 mG/mL Suspension 4 every 24 hours  tamsulosin 0.4 at bedtime      T(C): , Max: 37.9 (12-13-22 @ 06:03)  T(F): , Max: 100.2 (12-13-22 @ 06:03)  HR: 90 (12-13-22 @ 10:00)  BP: 112/48 (12-13-22 @ 10:00)  BP(mean): 69 (12-13-22 @ 10:00)  RR: 21 (12-13-22 @ 10:00)  SpO2: 100% (12-13-22 @ 10:00)  Wt(kg): --    12-12 @ 07:01  -  12-13 @ 07:00  --------------------------------------------------------  IN: 1432 mL / OUT: 3600 mL / NET: -2168 mL    12-13 @ 07:01  -  12-13 @ 11:19  --------------------------------------------------------  IN: 43 mL / OUT: 0 mL / NET: 43 mL          Review of Systems:  ROS negative except as per HPI      PHYSICAL EXAM:  GENERAL: trach collar, NAD, laying in bed tolerating HD   NECK: supple, No JVD  Lungs: No rales, ronchi or wheezing noted  HEART: normal S1S2, RRR  ABDOMEN: Soft, Nontender, +BS, No flank tenderness bilateral  EXTREMITIES: Remains anasarcic, although edema is slowly improving  ACCESS: R radiocephalic AVF w/ palpable thrill, c/d/i accessed     LABS:                        8.0    7.26  )-----------( 283      ( 13 Dec 2022 04:50 )             26.1     12-13    133<L>  |  96  |  52<H>  ----------------------------<  251<H>  4.2   |  29  |  2.58<H>    Ca    8.5      13 Dec 2022 04:50  Phos  2.7     12-13  Mg     2.2     12-13                  RADIOLOGY & ADDITIONAL STUDIES:

## 2022-12-13 NOTE — PROGRESS NOTE ADULT - SUBJECTIVE AND OBJECTIVE BOX
OVERNIGHT: No acute events overnight.   SUBJECTIVE / INTERVAL HPI: Patient was seen and examined this morning. Patient was experiencing diarrhea; therefore, Senna/Miralax were discontinued. He continues to receive hydrocortisone 25 mg every 12 hours. Blood glucoses levels have remained elevated (>200 mg/dL) since yesterday afternoon.  He continues to receive tube feeds at 43 mL/hr without significant interruptions.     CAPILLARY BLOOD GLUCOSE & INSULIN RECEIVED  Yesterday  - 12 PM FS mg/dL = He didn't receive insulin.   - 6 PM FS mg/dL = 4 units of regular insulin + 2 units of sliding scale.     Today  - 12 AM FS mg/dL = 6 units of Lantus + 5 units of regular insulin + 3 units of sliding scale.   - 6 AM FS mg/dL = 5 units of regular insulin + 2 units of sliding scale.    - 12 PM FSG: *** mg/dL = *** units of regular insulin + *** units of sliding scale.     REVIEW OF SYSTEMS  Unable to obtain.     PHYSICAL EXAM  Vital Signs Last 24 Hrs  T(C): 37.9 (13 Dec 2022 06:03), Max: 37.9 (13 Dec 2022 06:03)  T(F): 100.2 (13 Dec 2022 06:03), Max: 100.2 (13 Dec 2022 06:03)  HR: 81 (13 Dec 2022 09:00) (59 - 108)  BP: 105/51 (13 Dec 2022 09:00) (83/54 - 129/55)  BP(mean): 74 (13 Dec 2022 09:00) (58 - 108)  RR: 22 (13 Dec 2022 09:00) (15 - 40)  SpO2: 100% (13 Dec 2022 09:00) (90% - 100%)    Parameters below as of 13 Dec 2022 09:00  Patient On (Oxygen Delivery Method): tracheostomy collar  O2 Flow (L/min): 10  O2 Concentration (%): 40    Constitutional: Awake, alert, in no acute distress.   HEENT: Normocephalic, atraumatic, GORDON, no proptosis or lid retraction.   Neck: supple, no acanthosis, no thyromegaly or palpable thyroid nodules.  Respiratory: Lungs clear to ausculation bilaterally.   Cardiovascular: regular rhythm, normal S1 and S2, no audible murmurs.   GI: soft, non-tender, non-distended, bowel sounds present, no masses appreciated.  Extremities: No lower extremity edema, peripheral pulses present.   Skin: no rashes.   Psychiatric: AAO x 3. Normal affect/mood.     LABS  CBC - WBC/HGB/HTC/PLT: 7.26/8.0/26.1/283 (22)  BMP - Na/K/Cl/Bicarb/BUN/Cr/Gluc/AG/eGFR: 133/4.2/96/29/52/2.58/251/8/24 (22)  Ca - 8.5 (22)  Phos - 2.7 (22)  Mg - 2.2 (22)  LFT - Alb/Tprot/Tbili/Dbili/AlkPhos/ALT/AST: 2.4/--/0.3/--/113/66/56 (22)    Thyroid Stimulating Hormone, Serum: 6.880 (22)  Total T4/Free T4: --/0.744 (22)    MEDICATIONS  MEDICATIONS  (STANDING):  albuterol/ipratropium for Nebulization 3 milliLiter(s) Nebulizer every 6 hours  aspirin  chewable 81 milliGRAM(s) Oral daily  atorvastatin 40 milliGRAM(s) Oral at bedtime  chlorhexidine 2% Cloths 1 Application(s) Topical <User Schedule>  dextrose 5%. 1000 milliLiter(s) (50 mL/Hr) IV Continuous <Continuous>  dextrose 5%. 1000 milliLiter(s) (100 mL/Hr) IV Continuous <Continuous>  dextrose 5%. 1000 milliLiter(s) (100 mL/Hr) IV Continuous <Continuous>  dextrose 5%. 1000 milliLiter(s) (50 mL/Hr) IV Continuous <Continuous>  dextrose 50% Injectable 25 Gram(s) IV Push once  dextrose 50% Injectable 12.5 Gram(s) IV Push once  dextrose 50% Injectable 25 Gram(s) IV Push once  dextrose 50% Injectable 25 Gram(s) IV Push once  dextrose 50% Injectable 12.5 Gram(s) IV Push once  dextrose 50% Injectable 25 Gram(s) IV Push once  fentaNYL   Patch  25 MICROgram(s)/Hr 1 Patch Transdermal every 72 hours  glucagon  Injectable 1 milliGRAM(s) IntraMuscular once  glucagon  Injectable 1 milliGRAM(s) IntraMuscular once  guanFACINE 1 milliGRAM(s) Oral <User Schedule>  hydrocortisone sodium succinate Injectable 25 milliGRAM(s) IV Push every 12 hours  influenza  Vaccine (HIGH DOSE) 0.7 milliLiter(s) IntraMuscular once  insulin glargine Injectable (LANTUS) 6 Unit(s) SubCutaneous <User Schedule>  insulin regular  human corrective regimen sliding scale   SubCutaneous every 6 hours  insulin regular  human recombinant 5 Unit(s) SubCutaneous every 6 hours  levothyroxine Injectable 60 MICROGram(s) IV Push at bedtime  midodrine 20 milliGRAM(s) Oral every 8 hours  mycophenolate mofetil Suspension 500 milliGRAM(s) Oral every 12 hours  pantoprazole   Suspension 40 milliGRAM(s) Oral every 24 hours  QUEtiapine 75 milliGRAM(s) Oral every 12 hours  sodium chloride 7% Inhalation 4 milliLiter(s) Inhalation every 12 hours  tacrolimus    0.5 mG/mL Suspension 4 milliGRAM(s) Oral every 24 hours  tacrolimus    0.5 mG/mL Suspension 4 milliGRAM(s) Oral every 24 hours  tamsulosin 0.4 milliGRAM(s) Oral at bedtime    MEDICATIONS  (PRN):  acetaminophen    Suspension .. 650 milliGRAM(s) Oral every 6 hours PRN Temp greater or equal to 38C (100.4F), Mild Pain (1 - 3)  dextrose Oral Gel 15 Gram(s) Oral once PRN Blood Glucose LESS THAN 70 milliGRAM(s)/deciliter  dextrose Oral Gel 15 Gram(s) Oral once PRN Blood Glucose LESS THAN 70 milliGRAM(s)/deciliter  sodium chloride 0.9% lock flush 10 milliLiter(s) IV Push every 1 hour PRN Pre/post blood products, medications, blood draw, and to maintain line patency    ASSESSMENT / RECOMMENDATIONS  Mr. Miguel is an 83-year-old male with type 2 diabetes mellitus, coronary artery disease (s/p DAMEON x2 to LAD on 2021), chronic kidney disease stage 4 (s/p renal transplant on , on tracrolimus and prednisone) and benign prostatic hyperplasia who was sent to the hospital by his nephrologist for further evaluation of lower extremity edema (22). He was admitted for further management of lower extremity edema, thought to be secondary to his underlying CKD versus congestive heart failure. Hospitalization was complicated by hypoglycemia and cardiac arrest (22, ROSC after 1 minute of chest compressions). Post-cardiac arrest he was noted to develop hypotension, bradycardia and hypothermia. Labs were remarkable for elevated TSH (20) and decreased FT4 (0.77). 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     Although he had features seen with myxedema coma, these findings were better explained by his cardiac arrest as his vital signs prior to the arrest were apparently around his baseline, as was his mental status. He was started on levothyroxine supplementation. In addition, given concern for adrenal insufficiency, he was started on stress-dose steroids which have been titrated down to 25 mg every 12 hours. The patient was extubated on 22; however, he had to be reintubated on 22 due to episode of respiratory decompensation, potentially secondary to aspiration. He is now s/p trach and peg.    A1C: 9.0 %  BUN: 52  Creatinine: 2.58  GFR: 24  Weight: 82.3  BMI: 28.4  EF: 65%, grade II diastolic dysfunction.    # Type 2 diabetes mellitus  - Please continue with Lantus 6 units at midnight.   - Increase regular insulin to *** units every 6 hours while on tube feeds.  - Continue regular insulin sliding scale low dose every 6 hours.   - Patient's fingerstick glucose goal is 100-180 mg/dL.    # Hypothyroidism   - TSH 20. FT4 0.77 (22) -> Started on levothyroxine 25 mcg IV daily.   - TSH 9.2. FT4 0.69 (22) -> Increased to levothyroxine 50 mcg IV daily.   - TSH 11.1. FT4 0.56. (22) -> Increased to levothyroxine 60 mcg IV daily.  - TSH  6.88. FT4 0.744 (22)  - Continue with levothyroxine 60 mcg IV daily.     # Concern for adrenal insufficiency   - The patient was on prednisone 5 mg daily for renal transplant prior to admission.   - He had recent hospitalization (2022) for pneumonia due to pseudomonas where he was also started on stress dose steroids. At that time, a cortisol level was collected prior to starting steroids which was not robust (6.5 ug/dL).   - Continue with hydrocortisone 25 mg IV every 12 hours for now. However, would leave up to discretion of primary team to increase even higher if septic shock is suspected.    Case discussed with Dr. Rabago. Primary team updated.       Ulysses Weber    Endocrinology Fellow    Service Pager: [phone]    OVERNIGHT: No acute events overnight.   SUBJECTIVE / INTERVAL HPI: Patient was seen and examined this morning. He was experiencing diarrhea; therefore, Senna/Miralax were discontinued. Blood glucose levels have remained elevated (>200 mg/dL) since yesterday afternoon. He continues to receive tube feeds at 43 mL/hr without significant interruptions and remains on hydrocortisone 25 mg every 12 hours.     CAPILLARY BLOOD GLUCOSE & INSULIN RECEIVED  Yesterday  - 12 PM FS mg/dL = He didn't receive insulin.   - 6 PM FS mg/dL = 4 units of regular insulin + 2 units of sliding scale.     Today  - 12 AM FS mg/dL = 6 units of Lantus + 5 units of regular insulin + 3 units of sliding scale.   - 6 AM FS mg/dL = 5 units of regular insulin + 2 units of sliding scale.    - 12 PM FSG: *** mg/dL = *** units of regular insulin + *** units of sliding scale.     REVIEW OF SYSTEMS  Unable to obtain.     PHYSICAL EXAM  Vital Signs Last 24 Hrs  T(C): 37.9 (13 Dec 2022 06:03), Max: 37.9 (13 Dec 2022 06:03)  T(F): 100.2 (13 Dec 2022 06:03), Max: 100.2 (13 Dec 2022 06:03)  HR: 81 (13 Dec 2022 09:00) (59 - 108)  BP: 105/51 (13 Dec 2022 09:00) (83/54 - 129/55)  BP(mean): 74 (13 Dec 2022 09:00) (58 - 108)  RR: 22 (13 Dec 2022 09:00) (15 - 40)  SpO2: 100% (13 Dec 2022 09:00) (90% - 100%)    Parameters below as of 13 Dec 2022 09:00  Patient On (Oxygen Delivery Method): tracheostomy collar  O2 Flow (L/min): 10  O2 Concentration (%): 40    Constitutional: Awake, alert, in no acute distress.   HEENT: Normocephalic, atraumatic, GORDON, no proptosis or lid retraction.   Neck: supple, no acanthosis, no thyromegaly or palpable thyroid nodules.  Respiratory: Lungs clear to ausculation bilaterally.   Cardiovascular: regular rhythm, normal S1 and S2, no audible murmurs.   GI: soft, non-tender, non-distended, bowel sounds present, no masses appreciated.  Extremities: No lower extremity edema, peripheral pulses present.   Skin: no rashes.   Psychiatric: AAO x 3. Normal affect/mood.     LABS  CBC - WBC/HGB/HTC/PLT: 7.26/8.0/26.1/283 (22)  BMP - Na/K/Cl/Bicarb/BUN/Cr/Gluc/AG/eGFR: 133/4.2/96/29/52/2.58/251/8/24 (22)  Ca - 8.5 (22)  Phos - 2.7 (22)  Mg - 2.2 (22)  LFT - Alb/Tprot/Tbili/Dbili/AlkPhos/ALT/AST: 2.4/--/0.3/--/113/66/56 (22)    Thyroid Stimulating Hormone, Serum: 6.880 (22)  Total T4/Free T4: --/0.744 (22)    MEDICATIONS  MEDICATIONS  (STANDING):  albuterol/ipratropium for Nebulization 3 milliLiter(s) Nebulizer every 6 hours  aspirin  chewable 81 milliGRAM(s) Oral daily  atorvastatin 40 milliGRAM(s) Oral at bedtime  chlorhexidine 2% Cloths 1 Application(s) Topical <User Schedule>  dextrose 5%. 1000 milliLiter(s) (50 mL/Hr) IV Continuous <Continuous>  dextrose 5%. 1000 milliLiter(s) (100 mL/Hr) IV Continuous <Continuous>  dextrose 5%. 1000 milliLiter(s) (100 mL/Hr) IV Continuous <Continuous>  dextrose 5%. 1000 milliLiter(s) (50 mL/Hr) IV Continuous <Continuous>  dextrose 50% Injectable 25 Gram(s) IV Push once  dextrose 50% Injectable 12.5 Gram(s) IV Push once  dextrose 50% Injectable 25 Gram(s) IV Push once  dextrose 50% Injectable 25 Gram(s) IV Push once  dextrose 50% Injectable 12.5 Gram(s) IV Push once  dextrose 50% Injectable 25 Gram(s) IV Push once  fentaNYL   Patch  25 MICROgram(s)/Hr 1 Patch Transdermal every 72 hours  glucagon  Injectable 1 milliGRAM(s) IntraMuscular once  glucagon  Injectable 1 milliGRAM(s) IntraMuscular once  guanFACINE 1 milliGRAM(s) Oral <User Schedule>  hydrocortisone sodium succinate Injectable 25 milliGRAM(s) IV Push every 12 hours  influenza  Vaccine (HIGH DOSE) 0.7 milliLiter(s) IntraMuscular once  insulin glargine Injectable (LANTUS) 6 Unit(s) SubCutaneous <User Schedule>  insulin regular  human corrective regimen sliding scale   SubCutaneous every 6 hours  insulin regular  human recombinant 5 Unit(s) SubCutaneous every 6 hours  levothyroxine Injectable 60 MICROGram(s) IV Push at bedtime  midodrine 20 milliGRAM(s) Oral every 8 hours  mycophenolate mofetil Suspension 500 milliGRAM(s) Oral every 12 hours  pantoprazole   Suspension 40 milliGRAM(s) Oral every 24 hours  QUEtiapine 75 milliGRAM(s) Oral every 12 hours  sodium chloride 7% Inhalation 4 milliLiter(s) Inhalation every 12 hours  tacrolimus    0.5 mG/mL Suspension 4 milliGRAM(s) Oral every 24 hours  tacrolimus    0.5 mG/mL Suspension 4 milliGRAM(s) Oral every 24 hours  tamsulosin 0.4 milliGRAM(s) Oral at bedtime    MEDICATIONS  (PRN):  acetaminophen    Suspension .. 650 milliGRAM(s) Oral every 6 hours PRN Temp greater or equal to 38C (100.4F), Mild Pain (1 - 3)  dextrose Oral Gel 15 Gram(s) Oral once PRN Blood Glucose LESS THAN 70 milliGRAM(s)/deciliter  dextrose Oral Gel 15 Gram(s) Oral once PRN Blood Glucose LESS THAN 70 milliGRAM(s)/deciliter  sodium chloride 0.9% lock flush 10 milliLiter(s) IV Push every 1 hour PRN Pre/post blood products, medications, blood draw, and to maintain line patency    ASSESSMENT / RECOMMENDATIONS  Mr. Miguel is an 83-year-old male with type 2 diabetes mellitus, coronary artery disease (s/p DAMEON x2 to LAD on 2021), chronic kidney disease stage 4 (s/p renal transplant on , on tracrolimus and prednisone) and benign prostatic hyperplasia who was sent to the hospital by his nephrologist for further evaluation of lower extremity edema (22). He was admitted for further management of lower extremity edema, thought to be secondary to his underlying CKD versus congestive heart failure. Hospitalization was complicated by hypoglycemia and cardiac arrest (22, ROSC after 1 minute of chest compressions). Post-cardiac arrest he was noted to develop hypotension, bradycardia and hypothermia. Labs were remarkable for elevated TSH (20) and decreased FT4 (0.77). 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     Although he had features seen with myxedema coma, these findings were better explained by his cardiac arrest as his vital signs prior to the arrest were apparently around his baseline, as was his mental status. He was started on levothyroxine supplementation. In addition, given concern for adrenal insufficiency, he was started on stress-dose steroids which have been titrated down to 25 mg every 12 hours. The patient was extubated on 22; however, he had to be reintubated on 22 due to episode of respiratory decompensation, potentially secondary to aspiration. He is now s/p trach and peg.    A1C: 9.0 %  BUN: 52  Creatinine: 2.58  GFR: 24  Weight: 82.3  BMI: 28.4  EF: 65%, grade II diastolic dysfunction.    # Type 2 diabetes mellitus  - Please continue with Lantus 6 units at midnight.   - Increase regular insulin to *** units every 6 hours while on tube feeds.  - Continue regular insulin sliding scale low dose every 6 hours.   - Patient's fingerstick glucose goal is 100-180 mg/dL.    # Hypothyroidism   - TSH 20. FT4 0.77 (22) -> Started on levothyroxine 25 mcg IV daily.   - TSH 9.2. FT4 0.69 (22) -> Increased to levothyroxine 50 mcg IV daily.   - TSH 11.1. FT4 0.56. (22) -> Increased to levothyroxine 60 mcg IV daily.  - TSH  6.88. FT4 0.744 (22)  - Continue with levothyroxine 60 mcg IV daily.     # Concern for adrenal insufficiency   - The patient was on prednisone 5 mg daily for renal transplant prior to admission.   - He had recent hospitalization (2022) for pneumonia due to pseudomonas where he was also started on stress dose steroids. At that time, a cortisol level was collected prior to starting steroids which was not robust (6.5 ug/dL).   - Continue with hydrocortisone 25 mg IV every 12 hours for now. However, would leave up to discretion of primary team to increase even higher if septic shock is suspected.    Case discussed with Dr. Rabago. Primary team updated.       Ulysses Weber    Endocrinology Fellow    Service Pager: [phone]    OVERNIGHT: No acute events overnight.   SUBJECTIVE / INTERVAL HPI: Patient was seen and examined this morning. He was experiencing diarrhea; therefore, Senna/Miralax were discontinued. Blood glucose levels have remained elevated (>200 mg/dL) since yesterday afternoon. He continues to receive tube feeds at 43 mL/hr without significant interruptions and remains on hydrocortisone 25 mg every 12 hours.     CAPILLARY BLOOD GLUCOSE & INSULIN RECEIVED  Yesterday  - 12 PM FS mg/dL = He didn't receive insulin.   - 6 PM FS mg/dL = 4 units of regular insulin + 2 units of sliding scale.     Today  - 12 AM FS mg/dL = 6 units of Lantus + 5 units of regular insulin + 3 units of sliding scale.   - 6 AM FS mg/dL = 5 units of regular insulin + 2 units of sliding scale.    - 12 PM FS mg/dL = 5 units of regular insulin + 3 units of sliding scale.     REVIEW OF SYSTEMS  Unable to obtain.     PHYSICAL EXAM  Vital Signs Last 24 Hrs  T(C): 37.9 (13 Dec 2022 06:03), Max: 37.9 (13 Dec 2022 06:03)  T(F): 100.2 (13 Dec 2022 06:03), Max: 100.2 (13 Dec 2022 06:03)  HR: 81 (13 Dec 2022 09:00) (59 - 108)  BP: 105/51 (13 Dec 2022 09:00) (83/54 - 129/55)  BP(mean): 74 (13 Dec 2022 09:00) (58 - 108)  RR: 22 (13 Dec 2022 09:00) (15 - 40)  SpO2: 100% (13 Dec 2022 09:00) (90% - 100%)    Parameters below as of 13 Dec 2022 09:00  Patient On (Oxygen Delivery Method): tracheostomy collar  O2 Flow (L/min): 10  O2 Concentration (%): 40    Constitutional: Awake, alert, elderly male.   HEENT: Normocephalic, atraumatic, GORDON.  Respiratory: (+) Bilateral rhonchi, on trach collar.   Cardiovascular: regular rhythm, normal S1 and S2, no audible murmurs.   GI: soft, non-tender, non-distended, bowel sounds present.  Extremities: Trace bilateral lower extremity edema.    LABS  CBC - WBC/HGB/HTC/PLT: 7.26/8.0/26.1/283 (22)  BMP - Na/K/Cl/Bicarb/BUN/Cr/Gluc/AG/eGFR: 133/4.2/96/29/52/2.58/251/8/24 (22)  Ca - 8.5 (22)  Phos - 2.7 (22)  Mg - 2.2 (22)  LFT - Alb/Tprot/Tbili/Dbili/AlkPhos/ALT/AST: 2.4/--/0.3/--/113/66/56 (22)  Thyroid Stimulating Hormone, Serum: 6.880 (22)  Total T4/Free T4: --/0.744 (22)    MEDICATIONS  MEDICATIONS  (STANDING):  albuterol/ipratropium for Nebulization 3 milliLiter(s) Nebulizer every 6 hours  aspirin  chewable 81 milliGRAM(s) Oral daily  atorvastatin 40 milliGRAM(s) Oral at bedtime  chlorhexidine 2% Cloths 1 Application(s) Topical <User Schedule>  dextrose 5%. 1000 milliLiter(s) (50 mL/Hr) IV Continuous <Continuous>  dextrose 5%. 1000 milliLiter(s) (100 mL/Hr) IV Continuous <Continuous>  dextrose 5%. 1000 milliLiter(s) (100 mL/Hr) IV Continuous <Continuous>  dextrose 5%. 1000 milliLiter(s) (50 mL/Hr) IV Continuous <Continuous>  dextrose 50% Injectable 25 Gram(s) IV Push once  dextrose 50% Injectable 12.5 Gram(s) IV Push once  dextrose 50% Injectable 25 Gram(s) IV Push once  dextrose 50% Injectable 25 Gram(s) IV Push once  dextrose 50% Injectable 12.5 Gram(s) IV Push once  dextrose 50% Injectable 25 Gram(s) IV Push once  fentaNYL   Patch  25 MICROgram(s)/Hr 1 Patch Transdermal every 72 hours  glucagon  Injectable 1 milliGRAM(s) IntraMuscular once  glucagon  Injectable 1 milliGRAM(s) IntraMuscular once  guanFACINE 1 milliGRAM(s) Oral <User Schedule>  hydrocortisone sodium succinate Injectable 25 milliGRAM(s) IV Push every 12 hours  influenza  Vaccine (HIGH DOSE) 0.7 milliLiter(s) IntraMuscular once  insulin glargine Injectable (LANTUS) 6 Unit(s) SubCutaneous <User Schedule>  insulin regular  human corrective regimen sliding scale   SubCutaneous every 6 hours  insulin regular  human recombinant 5 Unit(s) SubCutaneous every 6 hours  levothyroxine Injectable 60 MICROGram(s) IV Push at bedtime  midodrine 20 milliGRAM(s) Oral every 8 hours  mycophenolate mofetil Suspension 500 milliGRAM(s) Oral every 12 hours  pantoprazole   Suspension 40 milliGRAM(s) Oral every 24 hours  QUEtiapine 75 milliGRAM(s) Oral every 12 hours  sodium chloride 7% Inhalation 4 milliLiter(s) Inhalation every 12 hours  tacrolimus    0.5 mG/mL Suspension 4 milliGRAM(s) Oral every 24 hours  tacrolimus    0.5 mG/mL Suspension 4 milliGRAM(s) Oral every 24 hours  tamsulosin 0.4 milliGRAM(s) Oral at bedtime    MEDICATIONS  (PRN):  acetaminophen    Suspension .. 650 milliGRAM(s) Oral every 6 hours PRN Temp greater or equal to 38C (100.4F), Mild Pain (1 - 3)  dextrose Oral Gel 15 Gram(s) Oral once PRN Blood Glucose LESS THAN 70 milliGRAM(s)/deciliter  dextrose Oral Gel 15 Gram(s) Oral once PRN Blood Glucose LESS THAN 70 milliGRAM(s)/deciliter  sodium chloride 0.9% lock flush 10 milliLiter(s) IV Push every 1 hour PRN Pre/post blood products, medications, blood draw, and to maintain line patency    ASSESSMENT / RECOMMENDATIONS  Mr. Miguel is an 83-year-old male with type 2 diabetes mellitus, coronary artery disease (s/p DAMEON x2 to LAD on 2021), chronic kidney disease stage 4 (s/p renal transplant on , on tracrolimus and prednisone) and benign prostatic hyperplasia who was sent to the hospital by his nephrologist for further evaluation of lower extremity edema (22). He was admitted for further management of lower extremity edema, thought to be secondary to his underlying CKD versus congestive heart failure. Hospitalization was complicated by hypoglycemia and cardiac arrest (22, ROSC after 1 minute of chest compressions). Post-cardiac arrest he was noted to develop hypotension, bradycardia and hypothermia. Labs were remarkable for elevated TSH (20) and decreased FT4 (0.77). 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     Although he had features seen with myxedema coma, these findings were better explained by his cardiac arrest as his vital signs prior to the arrest were apparently around his baseline, as was his mental status. He was started on levothyroxine supplementation. In addition, given concern for adrenal insufficiency, he was started on stress-dose steroids which have been titrated down to 25 mg every 12 hours. The patient was extubated on 22; however, he had to be reintubated on 22 due to episode of respiratory decompensation, potentially secondary to aspiration. He is now s/p trach and peg.    A1C: 9.0 %  BUN: 52  Creatinine: 2.58  GFR: 24  Weight: 82.3  BMI: 28.4  EF: 65%, grade II diastolic dysfunction.    # Type 2 diabetes mellitus  - Patient's insulin requirements appear to be increasing while on same rate of tube feeds and same steroid dosage. WBC is within normal limits and temperature remains within normal limits. For now, will increase regular insulin.   - Please continue with Lantus 6 units at midnight.   - Increase regular insulin to 7 units every 6 hours while on tube feeds.  - Continue regular insulin sliding scale low dose every 6 hours.   - Patient's fingerstick glucose goal is 100-180 mg/dL.    # Hypothyroidism   - TSH 20. FT4 0.77 (22) -> Started on levothyroxine 25 mcg IV daily.   - TSH 9.2. FT4 0.69 (22) -> Increased to levothyroxine 50 mcg IV daily.   - TSH 11.1. FT4 0.56. (22) -> Increased to levothyroxine 60 mcg IV daily.  - TSH  6.88. FT4 0.744 (22)  - Continue with levothyroxine 60 mcg IV daily.     # Concern for adrenal insufficiency   - The patient was on prednisone 5 mg daily for renal transplant prior to admission.   - He had recent hospitalization (2022) for pneumonia due to pseudomonas where he was also started on stress dose steroids. At that time, a cortisol level was collected prior to starting steroids which was not robust (6.5 ug/dL).   - Continue with hydrocortisone 25 mg IV every 12 hours for now. However, would leave up to discretion of primary team to increase even higher if septic shock is suspected.    Case discussed with Dr. Rabago. Primary team updated.       Ulysses Weber    Endocrinology Fellow    Service Pager: [phone]

## 2022-12-13 NOTE — PROGRESS NOTE ADULT - SUBJECTIVE AND OBJECTIVE BOX
OVERNIGHT EVENTS:    SUBJECTIVE / INTERVAL HPI: Patient seen and examined at bedside.     VITAL SIGNS:  Vital Signs Last 24 Hrs  T(C): 34.6 (13 Dec 2022 13:00), Max: 37.9 (13 Dec 2022 06:03)  T(F): 94.3 (13 Dec 2022 13:00), Max: 100.2 (13 Dec 2022 06:03)  HR: 84 (13 Dec 2022 14:00) (59 - 108)  BP: 124/62 (13 Dec 2022 14:00) (83/54 - 136/58)  BP(mean): 76 (13 Dec 2022 14:00) (58 - 108)  RR: 22 (13 Dec 2022 14:00) (15 - 32)  SpO2: 100% (13 Dec 2022 14:00) (93% - 100%)    Parameters below as of 13 Dec 2022 14:00  Patient On (Oxygen Delivery Method): tracheostomy collar  O2 Flow (L/min): 10  O2 Concentration (%): 40  I&O's Summary    12 Dec 2022 07:01  -  13 Dec 2022 07:00  --------------------------------------------------------  IN: 1432 mL / OUT: 3600 mL / NET: -2168 mL    13 Dec 2022 07:01  -  13 Dec 2022 14:39  --------------------------------------------------------  IN: 372 mL / OUT: 2250 mL / NET: -1878 mL        PHYSICAL EXAM:  General: NAD  HEENT: anicteric sclera, 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)  Neck: supple  Respiratory: CTA B/L; no W/R/R  Cardiovascular: +S1/S2; RRR; no M/R/G  Gastrointestinal: soft, nondistended; +BS x4  Extremities: WWP; 2+ peripheral pulses B/L; 1+ LE edema, 3+ UE edema  Skin: normal color and turgor; no rash  Neurological: mild agitation, following commands, turns head to voice, moving all extremities spontaneously    MEDICATIONS:  MEDICATIONS  (STANDING):  albuterol/ipratropium for Nebulization 3 milliLiter(s) Nebulizer every 6 hours  aspirin  chewable 81 milliGRAM(s) Oral daily  atorvastatin 40 milliGRAM(s) Oral at bedtime  chlorhexidine 2% Cloths 1 Application(s) Topical <User Schedule>  dextrose 5%. 1000 milliLiter(s) (100 mL/Hr) IV Continuous <Continuous>  dextrose 5%. 1000 milliLiter(s) (50 mL/Hr) IV Continuous <Continuous>  dextrose 5%. 1000 milliLiter(s) (100 mL/Hr) IV Continuous <Continuous>  dextrose 5%. 1000 milliLiter(s) (50 mL/Hr) IV Continuous <Continuous>  dextrose 50% Injectable 25 Gram(s) IV Push once  dextrose 50% Injectable 12.5 Gram(s) IV Push once  dextrose 50% Injectable 25 Gram(s) IV Push once  dextrose 50% Injectable 25 Gram(s) IV Push once  dextrose 50% Injectable 12.5 Gram(s) IV Push once  dextrose 50% Injectable 25 Gram(s) IV Push once  epoetin gui-epbx (RETACRIT) Injectable 8000 Unit(s) IV Push once  glucagon  Injectable 1 milliGRAM(s) IntraMuscular once  glucagon  Injectable 1 milliGRAM(s) IntraMuscular once  guanFACINE 1 milliGRAM(s) Oral <User Schedule>  hydrocortisone sodium succinate Injectable 25 milliGRAM(s) IV Push every 12 hours  influenza  Vaccine (HIGH DOSE) 0.7 milliLiter(s) IntraMuscular once  insulin glargine Injectable (LANTUS) 6 Unit(s) SubCutaneous <User Schedule>  insulin regular  human corrective regimen sliding scale   SubCutaneous every 6 hours  insulin regular  human recombinant 5 Unit(s) SubCutaneous every 6 hours  levothyroxine Injectable 60 MICROGram(s) IV Push at bedtime  midodrine 20 milliGRAM(s) Oral every 8 hours  mycophenolate mofetil Suspension 500 milliGRAM(s) Oral every 12 hours  QUEtiapine 100 milliGRAM(s) Oral every 12 hours  QUEtiapine 25 milliGRAM(s) Oral once  sodium chloride 7% Inhalation 4 milliLiter(s) Inhalation every 12 hours  tacrolimus    0.5 mG/mL Suspension 4 milliGRAM(s) Oral every 24 hours  tacrolimus    0.5 mG/mL Suspension 4 milliGRAM(s) Oral every 24 hours  tamsulosin 0.4 milliGRAM(s) Oral at bedtime    MEDICATIONS  (PRN):  acetaminophen    Suspension .. 650 milliGRAM(s) Oral every 6 hours PRN Temp greater or equal to 38C (100.4F), Mild Pain (1 - 3)  dextrose Oral Gel 15 Gram(s) Oral once PRN Blood Glucose LESS THAN 70 milliGRAM(s)/deciliter  dextrose Oral Gel 15 Gram(s) Oral once PRN Blood Glucose LESS THAN 70 milliGRAM(s)/deciliter  sodium chloride 0.9% lock flush 10 milliLiter(s) IV Push every 1 hour PRN Pre/post blood products, medications, blood draw, and to maintain line patency      ALLERGIES:  Allergies    No Known Allergies    Intolerances        LABS:                        8.0    7.26  )-----------( 283      ( 13 Dec 2022 04:50 )             26.1     12-13    133<L>  |  96  |  52<H>  ----------------------------<  251<H>  4.2   |  29  |  2.58<H>    Ca    8.5      13 Dec 2022 04:50  Phos  2.7     12-13  Mg     2.2     12-13          CAPILLARY BLOOD GLUCOSE      POCT Blood Glucose.: 276 mg/dL (13 Dec 2022 10:53)      RADIOLOGY & ADDITIONAL TESTS: Reviewed.   OVERNIGHT EVENTS:  PEG tube clogged, was fixed; insulin adjusted.    SUBJECTIVE / INTERVAL HPI: Patient seen and examined at bedside.  In no distress, less agitated than previously; daughter at bedside, was able to follow commands.    VITAL SIGNS:  Vital Signs Last 24 Hrs  T(C): 34.6 (13 Dec 2022 13:00), Max: 37.9 (13 Dec 2022 06:03)  T(F): 94.3 (13 Dec 2022 13:00), Max: 100.2 (13 Dec 2022 06:03)  HR: 84 (13 Dec 2022 14:00) (59 - 108)  BP: 124/62 (13 Dec 2022 14:00) (83/54 - 136/58)  BP(mean): 76 (13 Dec 2022 14:00) (58 - 108)  RR: 22 (13 Dec 2022 14:00) (15 - 32)  SpO2: 100% (13 Dec 2022 14:00) (93% - 100%)    Parameters below as of 13 Dec 2022 14:00  Patient On (Oxygen Delivery Method): tracheostomy collar  O2 Flow (L/min): 10  O2 Concentration (%): 40  I&O's Summary    12 Dec 2022 07:01  -  13 Dec 2022 07:00  --------------------------------------------------------  IN: 1432 mL / OUT: 3600 mL / NET: -2168 mL    13 Dec 2022 07:01  -  13 Dec 2022 14:39  --------------------------------------------------------  IN: 372 mL / OUT: 2250 mL / NET: -1878 mL        PHYSICAL EXAM:  General: NAD  HEENT: anicteric sclera, 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)  Neck: supple  Respiratory: CTA B/L; no W/R/R  Cardiovascular: +S1/S2; RRR; no M/R/G  Gastrointestinal: soft, nondistended; +BS x4  Extremities: WWP; 2+ peripheral pulses B/L; 1+ LE edema, 3+ UE edema  Skin: normal color and turgor; no rash  Neurological: mild agitation, following commands, turns head to voice, moving all extremities spontaneously    MEDICATIONS:  MEDICATIONS  (STANDING):  albuterol/ipratropium for Nebulization 3 milliLiter(s) Nebulizer every 6 hours  aspirin  chewable 81 milliGRAM(s) Oral daily  atorvastatin 40 milliGRAM(s) Oral at bedtime  chlorhexidine 2% Cloths 1 Application(s) Topical <User Schedule>  dextrose 5%. 1000 milliLiter(s) (100 mL/Hr) IV Continuous <Continuous>  dextrose 5%. 1000 milliLiter(s) (50 mL/Hr) IV Continuous <Continuous>  dextrose 5%. 1000 milliLiter(s) (100 mL/Hr) IV Continuous <Continuous>  dextrose 5%. 1000 milliLiter(s) (50 mL/Hr) IV Continuous <Continuous>  dextrose 50% Injectable 25 Gram(s) IV Push once  dextrose 50% Injectable 12.5 Gram(s) IV Push once  dextrose 50% Injectable 25 Gram(s) IV Push once  dextrose 50% Injectable 25 Gram(s) IV Push once  dextrose 50% Injectable 12.5 Gram(s) IV Push once  dextrose 50% Injectable 25 Gram(s) IV Push once  epoetin gui-epbx (RETACRIT) Injectable 8000 Unit(s) IV Push once  glucagon  Injectable 1 milliGRAM(s) IntraMuscular once  glucagon  Injectable 1 milliGRAM(s) IntraMuscular once  guanFACINE 1 milliGRAM(s) Oral <User Schedule>  hydrocortisone sodium succinate Injectable 25 milliGRAM(s) IV Push every 12 hours  influenza  Vaccine (HIGH DOSE) 0.7 milliLiter(s) IntraMuscular once  insulin glargine Injectable (LANTUS) 6 Unit(s) SubCutaneous <User Schedule>  insulin regular  human corrective regimen sliding scale   SubCutaneous every 6 hours  insulin regular  human recombinant 5 Unit(s) SubCutaneous every 6 hours  levothyroxine Injectable 60 MICROGram(s) IV Push at bedtime  midodrine 20 milliGRAM(s) Oral every 8 hours  mycophenolate mofetil Suspension 500 milliGRAM(s) Oral every 12 hours  QUEtiapine 100 milliGRAM(s) Oral every 12 hours  QUEtiapine 25 milliGRAM(s) Oral once  sodium chloride 7% Inhalation 4 milliLiter(s) Inhalation every 12 hours  tacrolimus    0.5 mG/mL Suspension 4 milliGRAM(s) Oral every 24 hours  tacrolimus    0.5 mG/mL Suspension 4 milliGRAM(s) Oral every 24 hours  tamsulosin 0.4 milliGRAM(s) Oral at bedtime    MEDICATIONS  (PRN):  acetaminophen    Suspension .. 650 milliGRAM(s) Oral every 6 hours PRN Temp greater or equal to 38C (100.4F), Mild Pain (1 - 3)  dextrose Oral Gel 15 Gram(s) Oral once PRN Blood Glucose LESS THAN 70 milliGRAM(s)/deciliter  dextrose Oral Gel 15 Gram(s) Oral once PRN Blood Glucose LESS THAN 70 milliGRAM(s)/deciliter  sodium chloride 0.9% lock flush 10 milliLiter(s) IV Push every 1 hour PRN Pre/post blood products, medications, blood draw, and to maintain line patency      ALLERGIES:  Allergies    No Known Allergies    Intolerances        LABS:                        8.0    7.26  )-----------( 283      ( 13 Dec 2022 04:50 )             26.1     12-13    133<L>  |  96  |  52<H>  ----------------------------<  251<H>  4.2   |  29  |  2.58<H>    Ca    8.5      13 Dec 2022 04:50  Phos  2.7     12-13  Mg     2.2     12-13          CAPILLARY BLOOD GLUCOSE      POCT Blood Glucose.: 276 mg/dL (13 Dec 2022 10:53)      RADIOLOGY & ADDITIONAL TESTS: Reviewed.

## 2022-12-13 NOTE — PROGRESS NOTE ADULT - ASSESSMENT
Patient is an 82 yo M with ESKD s/p transplant  now CKD 4, currently with Oliguric-iATN dialysis dependent, initiated iHD . Goal net neutral by mid week     Problem/Plan:  # Oliguric- iATN-D   Renal allograft with CKD 4 - baseline sCr 2.3-2.5, usual meds tacrolimus 4mg BID and mycophenolate 500mg BID. Was started on HD on  due to worsening volume status, acidosis and concern for uremia.   Remains 13 L net positive for this admission  Electrolytes noted     Plan   -HD today as below.    Hemodialysis Treatment.:     Schedule: Once, Modality: Ultrafiltration, Access: Arteriovenous Fistula    Dialyzer: Optiflux [id number], Time: 120 Min    Blood Flow: 400 mL/Min , Tubinmm (Adult)    Target Fluid Removal: 2 Liters    Additional Instructions: Please use 16 gauge or larger needle. (22 @ 06:58) [Active]    Pt seen on dialysis. VS stable. Tolerating well. Continue as above    - c/w tacrolimus to 4mg AM and 4mg PM (12 hrs apart)  - c/w cell cept 500mg BID  - Tacro level: 2.7 (12/10)  - strict i's and o's  - Trend BMP daily  - maintain MAP >70       Access:  RUE AVF functional     Blood pressure:  Stable   UF with HD, as tolerated   Midodrine with HD,   Will lower Dialysate Temp to help with intradialytic hypotension     #Anemia of chronic disease  Hgb 8.0  -Epo w/ HD    Renal Bone Disease   Calcium: 8.5  Phos: 2.7  Trend phos daily with labs     presenting with decompensated heart failure c/b cardiac arrest on

## 2022-12-13 NOTE — PROGRESS NOTE ADULT - ATTENDING COMMENTS
Pt seen on rounds this afternoon.  Glucoses have been distinctly higher, in the mid-high 200 range since last evening.  Continues to tolerate feeds without interruption.  No clear evidence of infection to explain the hyperglycemia, and steroids remains at stable dose of 25 mg q12h.  His nutritional insulin at 4 units q6h, however, is lower than most of his previous requirements.  Lungs still with coarse rhonchi bilaterally.  Abdomen soft and apparently non-tender (though he becomes slightly restless when abdomen palpated).  Main change in exam is marked decrease in lower extremity edema.  --Will increase the nutritional insulin back to 7 units regular q6h.  Keep Lantus at 6 units.  --His free T4 has risen on the increased levothyroxine (now 60 mcg/day IV), and his TSH level is down to 6.9 uU/ml.  To continue this regimen

## 2022-12-13 NOTE — PROGRESS NOTE ADULT - ASSESSMENT
84 y/o M PMH CKD 4, renal transplant in 2013 at [facility], glaucoma (blind), DM1, anemia, CAD s/p 2 DAMEON to LAD in 6/21, BPH, recent admission for PNA presents with acute on chronic cough 2/2 PNA and SUNNY 2/2 acute CKD.     NEUROLOGY  #Agitation  CT scan negative for acute intracranial pathology. Began to wean sedation on 12/2.  - stopped precedex on 12/11  - on seroquel 100mg q12h (increased from 75mg on 12/13)    CARDIOVASCULAR  # Fluid Overloaded  - started on HD on 12/6, removing 2-3L each session    #Hypotension   - on midodrine 20 q8 (started on 12/7, increased on 12/8)  - stopped Levophed drip on 12/11    #HFpEF  Had EF of 55% on TTE from Oct 2022, grade I diastolic dysfunction. Repeat TTE 11/17 with poor visualization of LV. Patient making good UOP overnight  - holding BP meds    #Upper extremity edema  Stable from yesterday but increased compared to baseline. US with superficial thrombosis of L cephalic vein extending to AC fossa, no DVTs.   - given patient's propensity to bleed and superficial nature of thrombus, no intervention  - obtain LUE XR as concern for fracture (pt with injury prior to hospitalization)    #CAD  #HLD  Hx of CAD s/p 2 DAMEON to LAD in June 2021, currently no CP. Trops 0.03 but no CP or ischemic changes on EKG, likely due to CKD. Takes Plavix and aspirin per last d/c but only aspirin on outpatient note  - c/w ASA daily  - c/w home Lipitor    #HTN   Known history of HTN.   - holding hypertension meds  - continue to monitor BP    PULM  #AHRF  Likely 2/2 aspiration event. Intubated on 11/26.  - ID management as below for aspiration   - Received trach on 12/1  - tolerated CPAP o/n 12/6-7  - trach collar tolerated well     RENAL  #ELIZABETH on Chronic kidney disease (CKD)  Baseline sCr 2.3-2.5. On admission, fluid overloaded. Does have orthopnea and intermittent SOB at baseline. Acidotic but at baseline. Follows w Dr. Mac.  - sCr and BUN increasing daily, urine output poor  - monitor I/Os  - renally dosing meds  - s/p sodium bicarb 150 rate 60cc/hr, discontinued on 12/6  - initiated HD 12/6, removed 2L fluid  - f/u renal recs for HD and UF    #Renal transplant  Patient had renal transplant in 2013. On home mycophenolate 500mg BID and tacrolimus 4mg BID. Per nephro, decreased home tacrolimus from 4mg BID --> 2mg BID while in hospital.   - c/w tacrolimus in AM and in PM, based on level; per Renal increased to 4mg q12h on 12/8  - c/w mycophenolate 500mg BID  - f/u renal recs    #Hyponatremia  - stable; monitor BMP    GI  #Nutrition  NPO with PEG feeds.  - restarted feeds at 6pm 12/2      #Urinary Retention  - remove bourgeois if not chronic, TOV  - on home tamsulosin 0.4mg daily, discontinue if bourgeois remains    ID  #Aspiration PNA  RESOLVED  Tracheal aspirate cx (11/19) growing serratia marcescens and pseudomonas. Patient was started on vancomycin and cefepime. MRSA nares positive. DC'ed vancomycin given supratherapeutic trough. Completed cefepime 2g q24 course x9 days.    #Stenotrophomonas in sputum cx RESOLVED  Sputum cx from 11/26 growing Stenotrophomonas  - completed course of Bactrim BS for 10 days (11/28 - 12/5)    ENDOCRINE  #DM (diabetes mellitus).   Previously on lantus 15 lispro 5 at home. Endocrine following.  - on lantus 6 units  - c/w humulin 4 units q6h  - on low dose regular ISS  - goal -180  - f/u endo recs    #Hypothyroidism  TSH 20.8, free t4 0.7, T3 49 (low). Repeat TSH at 9, fT4 0.7  - on synthroid 50mcg IV qD    #Relative Adrenal Insufficiency  Patient on chronic prednisone 5mg daily. He was started on hydrocortisone 50mg q6hr --> q8 --> BID   - c/w hydrocortisone 25mg q12  - wean as tolerated  - f/u endocrine recs    HEME  #Normocytic Anemia  Likely 2.2 CKD. Hgb 6.9 on 11/21, corrected appropriately s/p 1u pRBC. No active signs of bleeding on physical exam. CTH with no intracranial bleeding. Required 2u pRBC to correct. DEBRA and stool guaic negative.  - consider epo, if BP remains well controlled  - active T+S  - transfuse <7    FLUIDS/ELECTROLYTES/NUTRITION  -IVF as above  -Monitor, careful w advanced kidney disease  -Diet: NPO with PEG feeds  -DVT ppx: heparin TID  -GI: protonix 40 qd  -Dispo: MICU; PT recommending [facility], Plan for LTAC 82 y/o M PMH CKD 4, renal transplant in 2013 at [facility], glaucoma (blind), DM1, anemia, CAD s/p 2 DAMEON to LAD in 6/21, BPH, recent admission for PNA presents with acute on chronic cough 2/2 PNA and SUNNY 2/2 acute CKD.     NEUROLOGY  #Agitation  CT scan negative for acute intracranial pathology. Began to wean sedation on 12/2.  - stopped precedex on 12/11  - on seroquel 100mg q12h (increased from 75mg on 12/13)    CARDIOVASCULAR  # Fluid Overloaded  - started on HD on 12/6, removing 2-3L each session    #Hypotension   - on midodrine 20 q8 (started on 12/7, increased on 12/8)  - stopped Levophed drip on 12/11    #HFpEF  Had EF of 55% on TTE from Oct 2022, grade I diastolic dysfunction. Repeat TTE 11/17 with poor visualization of LV. Patient making good UOP overnight  - holding BP meds    #Upper extremity edema  Stable from yesterday but increased compared to baseline. US with superficial thrombosis of L cephalic vein extending to AC fossa, no DVTs.   - given patient's propensity to bleed and superficial nature of thrombus, no intervention      #CAD  #HLD  Hx of CAD s/p 2 DAMEON to LAD in June 2021, currently no CP. Trops 0.03 but no CP or ischemic changes on EKG, likely due to CKD. Takes Plavix and aspirin per last d/c but only aspirin on outpatient note  - c/w ASA daily  - c/w home Lipitor    #HTN   Known history of HTN.   - holding hypertension meds  - continue to monitor BP    PULM  #AHRF  Likely 2/2 aspiration event. Intubated on 11/26.  - ID management as below for aspiration   - Received trach on 12/1  - tolerated CPAP o/n 12/6-7  - trach collar tolerated well     RENAL  #ELIZABETH on Chronic kidney disease (CKD)  Baseline sCr 2.3-2.5. On admission, fluid overloaded. Does have orthopnea and intermittent SOB at baseline. Acidotic but at baseline. Follows w Dr. Mac.  - sCr and BUN increasing daily, urine output poor  - monitor I/Os  - renally dosing meds  - s/p sodium bicarb 150 rate 60cc/hr, discontinued on 12/6  - initiated HD 12/6, removed 2L fluid  - f/u renal recs for HD and UF    #Renal transplant  Patient had renal transplant in 2013. On home mycophenolate 500mg BID and tacrolimus 4mg BID. Per nephro, decreased home tacrolimus from 4mg BID --> 2mg BID while in hospital.   - c/w tacrolimus in AM and in PM, based on level; per Renal increased to 4mg q12h on 12/8  - c/w mycophenolate 500mg BID  - f/u renal recs    #Hyponatremia  - stable; monitor BMP    GI  #Nutrition  NPO with PEG feeds.  - restarted feeds at 6pm 12/2      #Urinary Retention  - remove bourgeois if not chronic, TOV  - on home tamsulosin 0.4mg daily, discontinue if bourgeois remains    ID  #Aspiration PNA  RESOLVED  Tracheal aspirate cx (11/19) growing serratia marcescens and pseudomonas. Patient was started on vancomycin and cefepime. MRSA nares positive. DC'ed vancomycin given supratherapeutic trough. Completed cefepime 2g q24 course x9 days.    #Stenotrophomonas in sputum cx RESOLVED  Sputum cx from 11/26 growing Stenotrophomonas  - completed course of Bactrim BS for 10 days (11/28 - 12/5)    ENDOCRINE  #DM (diabetes mellitus).   Previously on lantus 15 lispro 5 at home. Endocrine following.  - on lantus 6 units  - c/w humulin 4 units q6h  - on low dose regular ISS  - goal -180  - f/u endo recs    #Hypothyroidism  TSH 20.8, free t4 0.7, T3 49 (low). Repeat TSH at 9, fT4 0.7  - on synthroid 50mcg IV qD    #Relative Adrenal Insufficiency  Patient on chronic prednisone 5mg daily. He was started on hydrocortisone 50mg q6hr --> q8 --> BID   - c/w hydrocortisone 25mg q12  - wean as tolerated  - f/u endocrine recs    HEME  #Normocytic Anemia  Likely 2.2 CKD. Hgb 6.9 on 11/21, corrected appropriately s/p 1u pRBC. No active signs of bleeding on physical exam. CTH with no intracranial bleeding. Required 2u pRBC to correct. DEBRA and stool guaic negative.  - consider epo, if BP remains well controlled  - active T+S  - transfuse <7    FLUIDS/ELECTROLYTES/NUTRITION  -IVF as above  -Monitor, careful w advanced kidney disease  -Diet: NPO with PEG feeds  -DVT ppx: heparin TID  -GI: protonix 40 qd  -Dispo: MICU; PT recommending [facility], Plan for LTAC

## 2022-12-14 NOTE — PROGRESS NOTE ADULT - SUBJECTIVE AND OBJECTIVE BOX
Tolerated HD yesterday isolated UF only of 3L. Seen and examine at bedside, plan for HD today with goal of 2L UF. VSS, net negative 2.1L in 24hrs, almost at net even fluid balance       Meds:    acetaminophen    Suspension .. 650 every 6 hours PRN  albuterol/ipratropium for Nebulization 3 every 6 hours  aspirin  chewable 81 daily  atorvastatin 40 at bedtime  chlorhexidine 2% Cloths 1 <User Schedule>  dextrose 5%. 1000 <Continuous>  dextrose 5%. 1000 <Continuous>  dextrose 50% Injectable 25 once  dextrose 50% Injectable 25 once  dextrose 50% Injectable 12.5 once  dextrose 50% Injectable 25 once  dextrose Oral Gel 15 once PRN  epoetin gui-epbx (RETACRIT) Injectable 8000 once  glucagon  Injectable 1 once  glucagon  Injectable 1 once  guanFACINE 1 <User Schedule>  heparin   Injectable 5000 every 8 hours  hydrocortisone sodium succinate Injectable 25 every 12 hours  influenza  Vaccine (HIGH DOSE) 0.7 once  insulin glargine Injectable (LANTUS) 6 at bedtime  insulin regular  human corrective regimen sliding scale  every 6 hours  insulin regular  human recombinant 7 every 6 hours  levothyroxine Injectable 60 at bedtime  midodrine 30 <User Schedule>  mycophenolate mofetil Suspension 500 every 12 hours  QUEtiapine 100 at bedtime  sodium chloride 0.9% lock flush 10 every 1 hour PRN  sodium chloride 7% Inhalation 4 every 12 hours  tacrolimus    0.5 mG/mL Suspension 4 every 24 hours  tacrolimus    0.5 mG/mL Suspension 4 every 24 hours  tamsulosin 0.4 at bedtime      T(C): , Max: 36.4 (12-13-22 @ 15:00)  T(F): , Max: 97.6 (12-13-22 @ 15:00)  HR: 85 (12-14-22 @ 11:07)  BP: 166/60 (12-14-22 @ 11:07)  BP(mean): 87 (12-14-22 @ 11:07)  RR: 25 (12-14-22 @ 11:07)  SpO2: 98% (12-14-22 @ 11:07)  Wt(kg): --    12-13 @ 07:01  -  12-14 @ 07:00  --------------------------------------------------------  IN: 1146 mL / OUT: 2485 mL / NET: -1339 mL    12-14 @ 07:01  -  12-14 @ 12:05  --------------------------------------------------------  IN: 172 mL / OUT: 75 mL / NET: 97 mL          Review of Systems:  ROS negative except as per HPI      PHYSICAL EXAM:  GENERAL: trach collar, NAD, laying in bed   NECK: supple, No JVD  Lungs: No rales, ronchi or wheezing noted  HEART: normal S1S2, RRR  ABDOMEN: Soft, Nontender, +BS, No flank tenderness bilateral  EXTREMITIES: Remains anasarcic, although edema is slowly improving  ACCESS: R radiocephalic AVF w/ palpable thrill, c/d/i     LABS:                        7.8    8.75  )-----------( 233      ( 14 Dec 2022 05:30 )             25.3     12-14    134<L>  |  96  |  68<H>  ----------------------------<  171<H>  3.6   |  28  |  2.83<H>    Ca    8.6      14 Dec 2022 05:30  Phos  2.8     12-14  Mg     2.3     12-14                  RADIOLOGY & ADDITIONAL STUDIES:

## 2022-12-14 NOTE — PROGRESS NOTE ADULT - SUBJECTIVE AND OBJECTIVE BOX
OVERNIGHT: No acute events overnight.   SUBJECTIVE / INTERVAL HPI: Patient was seen and examined this morning. Patient's blood glucose levels have decreased from 200's to 100's since increasing regular insulin to 7 units. He continues to receive hydrocortisone 24 mg every 12 hours.    CAPILLARY BLOOD GLUCOSE & INSULIN RECEIVED  Yesterday  - 12 PM FS mg/dL = 5 units of regular insulin + 3 units of sliding scale.   - 6 PM FS mg/dL = 7 units of regular insulin + 2 units of sliding scale.     Today  - 12 AM FS mg/dL = 6 units of Lantus + 7 units of regular insulin + 2 units of sliding scale.   - 6 AM FS mg/dL = 7 units of regular insulin + 1 units of sliding scale.    - 12 PM FS mg/dL = 7 units of regular insulin.    REVIEW OF SYSTEMS  Unable to obtain    PHYSICAL EXAM  Vital Signs Last 24 Hrs  T(C): 35.5 (14 Dec 2022 12:00), Max: 36.4 (13 Dec 2022 15:00)  T(F): 95.9 (14 Dec 2022 12:00), Max: 97.6 (13 Dec 2022 15:00)  HR: 88 (14 Dec 2022 13:00) (62 - 91)  BP: 128/96 (14 Dec 2022 13:00) (102/76 - 166/60)  BP(mean): 109 (14 Dec 2022 13:00) (58 - 109)  RR: 23 (14 Dec 2022 13:00) (17 - 33)  SpO2: 98% (14 Dec 2022 13:00) (97% - 100%)    Parameters below as of 14 Dec 2022 13:00  Patient On (Oxygen Delivery Method): tracheostomy collar  O2 Flow (L/min): 10  O2 Concentration (%): 40    Constitutional: Awake, alert, elderly male.   HEENT: Normocephalic, atraumatic, GORDON.  Respiratory: (+) Bilateral rhonchi, on trach collar.   Cardiovascular: regular rhythm, normal S1 and S2, no audible murmurs.   GI: soft, non-tender, non-distended, bowel sounds present.  Extremities: Trace bilateral lower extremity edema.    LABS  CBC - WBC/HGB/HTC/PLT: 8.75/7.8/25.3/233 (22)  BMP - Na/K/Cl/Bicarb/BUN/Cr/Gluc/AG/eGFR: 134/3.6/96/28/68/2.83/171/10/21 (22)  Ca - 8.6 (22)  Phos - 2.8 (22)  Mg - 2.3 (22)  LFT - Alb/Tprot/Tbili/Dbili/AlkPhos/ALT/AST: 2.4/--/0.3/--/113/66/56 (22)  Thyroid Stimulating Hormone, Serum: 6.880 (22)  Total T4/Free T4: --/0.744 (22)    MEDICATIONS  MEDICATIONS  (STANDING):  albuterol/ipratropium for Nebulization 3 milliLiter(s) Nebulizer every 6 hours  aspirin  chewable 81 milliGRAM(s) Oral daily  atorvastatin 40 milliGRAM(s) Oral at bedtime  chlorhexidine 2% Cloths 1 Application(s) Topical <User Schedule>  dextrose 5%. 1000 milliLiter(s) (50 mL/Hr) IV Continuous <Continuous>  dextrose 5%. 1000 milliLiter(s) (100 mL/Hr) IV Continuous <Continuous>  dextrose 50% Injectable 25 Gram(s) IV Push once  dextrose 50% Injectable 25 Gram(s) IV Push once  dextrose 50% Injectable 12.5 Gram(s) IV Push once  dextrose 50% Injectable 25 Gram(s) IV Push once  glucagon  Injectable 1 milliGRAM(s) IntraMuscular once  glucagon  Injectable 1 milliGRAM(s) IntraMuscular once  guanFACINE 1 milliGRAM(s) Oral <User Schedule>  heparin   Injectable 5000 Unit(s) SubCutaneous every 8 hours  hydrocortisone sodium succinate Injectable 25 milliGRAM(s) IV Push every 12 hours  influenza  Vaccine (HIGH DOSE) 0.7 milliLiter(s) IntraMuscular once  insulin glargine Injectable (LANTUS) 6 Unit(s) SubCutaneous at bedtime  insulin regular  human corrective regimen sliding scale   SubCutaneous every 6 hours  insulin regular  human recombinant 7 Unit(s) SubCutaneous every 6 hours  levothyroxine Injectable 60 MICROGram(s) IV Push at bedtime  midodrine 30 milliGRAM(s) Oral <User Schedule>  mycophenolate mofetil Suspension 500 milliGRAM(s) Oral every 12 hours  QUEtiapine 100 milliGRAM(s) Oral at bedtime  sodium chloride 7% Inhalation 4 milliLiter(s) Inhalation every 12 hours  tacrolimus    0.5 mG/mL Suspension 4 milliGRAM(s) Oral every 24 hours  tacrolimus    0.5 mG/mL Suspension 4 milliGRAM(s) Oral every 24 hours  tamsulosin 0.4 milliGRAM(s) Oral at bedtime    MEDICATIONS  (PRN):  acetaminophen    Suspension .. 650 milliGRAM(s) Oral every 6 hours PRN Temp greater or equal to 38C (100.4F), Mild Pain (1 - 3)  dextrose Oral Gel 15 Gram(s) Oral once PRN Blood Glucose LESS THAN 70 milliGRAM(s)/deciliter  sodium chloride 0.9% lock flush 10 milliLiter(s) IV Push every 1 hour PRN Pre/post blood products, medications, blood draw, and to maintain line patency    ASSESSMENT / RECOMMENDATIONS    A1C: 9.0 %  BUN: 68  Creatinine: 2.83  GFR: 21  Weight: 82.3  BMI: 28.4  EF:     # Type 2 diabetes mellitus  - Please continue lantus *** units at bedtime.   - Continue lispro *** units before each meal.  - Continue lispro moderate / low dose sliding scale before meals and at bedtime.  - Patient's fingerstick glucose goal is 100-180 mg/dL.    - For discharge, patient can ***.    - Patient can follow up at discharge with [facility] by calling [phone] to make an appointment.      Case discussed with Dr. Rabago. Primary team updated.       Ulysses Weber    Endocrinology Fellow    Service Pager: [phone]

## 2022-12-14 NOTE — SWALLOW BEDSIDE ASSESSMENT ADULT - SWALLOW EVAL: DIAGNOSIS
Severe oral deficits in setting of AMS and agitation
Patient presents at high risk for aspiration and its negative sequela given poor secretion management (baseline wet vocal quality and need for deep oropharyngeal suctioning), need for supplemental O2, known dx of PNA, and reduced mobility. PO diet is premature at this time.

## 2022-12-14 NOTE — PROGRESS NOTE ADULT - SUBJECTIVE AND OBJECTIVE BOX
O/N Events: No acute events overnight    Subjective/ROS: Patient seen and examined at bedside. Agitation under control, pt not in distress.     Denies Fever/Chills, HA, CP, SOB, n/v, changes in bowel/urinary habits.  12pt ROS otherwise negative.    VITALS  Vital Signs Last 24 Hrs  T(C): 35.3 (14 Dec 2022 06:02), Max: 36.4 (13 Dec 2022 15:00)  T(F): 95.5 (14 Dec 2022 06:02), Max: 97.6 (13 Dec 2022 15:00)  HR: 85 (14 Dec 2022 11:07) (62 - 91)  BP: 166/60 (14 Dec 2022 11:07) (102/76 - 166/60)  BP(mean): 87 (14 Dec 2022 11:07) (58 - 98)  RR: 25 (14 Dec 2022 11:07) (17 - 33)  SpO2: 98% (14 Dec 2022 11:07) (97% - 100%)    Parameters below as of 14 Dec 2022 11:07  Patient On (Oxygen Delivery Method): tracheostomy collar  O2 Flow (L/min): 10  O2 Concentration (%): 40    CAPILLARY BLOOD GLUCOSE      POCT Blood Glucose.: 186 mg/dL (14 Dec 2022 05:26)  POCT Blood Glucose.: 230 mg/dL (13 Dec 2022 23:56)  POCT Blood Glucose.: 214 mg/dL (13 Dec 2022 17:02)      PHYSICAL EXAM      MEDICATIONS  (STANDING):  albuterol/ipratropium for Nebulization 3 milliLiter(s) Nebulizer every 6 hours  aspirin  chewable 81 milliGRAM(s) Oral daily  atorvastatin 40 milliGRAM(s) Oral at bedtime  chlorhexidine 2% Cloths 1 Application(s) Topical <User Schedule>  dextrose 5%. 1000 milliLiter(s) (100 mL/Hr) IV Continuous <Continuous>  dextrose 5%. 1000 milliLiter(s) (50 mL/Hr) IV Continuous <Continuous>  dextrose 50% Injectable 25 Gram(s) IV Push once  dextrose 50% Injectable 25 Gram(s) IV Push once  dextrose 50% Injectable 12.5 Gram(s) IV Push once  dextrose 50% Injectable 25 Gram(s) IV Push once  epoetin gui-epbx (RETACRIT) Injectable 8000 Unit(s) IV Push once  glucagon  Injectable 1 milliGRAM(s) IntraMuscular once  glucagon  Injectable 1 milliGRAM(s) IntraMuscular once  guanFACINE 1 milliGRAM(s) Oral <User Schedule>  heparin   Injectable 5000 Unit(s) SubCutaneous every 8 hours  hydrocortisone sodium succinate Injectable 25 milliGRAM(s) IV Push every 12 hours  influenza  Vaccine (HIGH DOSE) 0.7 milliLiter(s) IntraMuscular once  insulin glargine Injectable (LANTUS) 6 Unit(s) SubCutaneous at bedtime  insulin regular  human corrective regimen sliding scale   SubCutaneous every 6 hours  insulin regular  human recombinant 7 Unit(s) SubCutaneous every 6 hours  levothyroxine Injectable 60 MICROGram(s) IV Push at bedtime  midodrine 30 milliGRAM(s) Oral <User Schedule>  mycophenolate mofetil Suspension 500 milliGRAM(s) Oral every 12 hours  QUEtiapine 100 milliGRAM(s) Oral at bedtime  sodium chloride 7% Inhalation 4 milliLiter(s) Inhalation every 12 hours  tacrolimus    0.5 mG/mL Suspension 4 milliGRAM(s) Oral every 24 hours  tacrolimus    0.5 mG/mL Suspension 4 milliGRAM(s) Oral every 24 hours  tamsulosin 0.4 milliGRAM(s) Oral at bedtime    MEDICATIONS  (PRN):  acetaminophen    Suspension .. 650 milliGRAM(s) Oral every 6 hours PRN Temp greater or equal to 38C (100.4F), Mild Pain (1 - 3)  dextrose Oral Gel 15 Gram(s) Oral once PRN Blood Glucose LESS THAN 70 milliGRAM(s)/deciliter  sodium chloride 0.9% lock flush 10 milliLiter(s) IV Push every 1 hour PRN Pre/post blood products, medications, blood draw, and to maintain line patency      No Known Allergies      LABS                        7.8    8.75  )-----------( 233      ( 14 Dec 2022 05:30 )             25.3     12-14    134<L>  |  96  |  68<H>  ----------------------------<  171<H>  3.6   |  28  |  2.83<H>    Ca    8.6      14 Dec 2022 05:30  Phos  2.8     12-14  Mg     2.3     12-14                  IMAGING/EKG/ETC   O/N Events: No acute events overnight    Subjective/ROS: Patient seen and examined at bedside. Agitation under control, pt not in distress.     Denies Fever/Chills, HA, CP, SOB, n/v, changes in bowel/urinary habits.  12pt ROS otherwise negative.    VITALS  Vital Signs Last 24 Hrs  T(C): 35.3 (14 Dec 2022 06:02), Max: 36.4 (13 Dec 2022 15:00)  T(F): 95.5 (14 Dec 2022 06:02), Max: 97.6 (13 Dec 2022 15:00)  HR: 85 (14 Dec 2022 11:07) (62 - 91)  BP: 166/60 (14 Dec 2022 11:07) (102/76 - 166/60)  BP(mean): 87 (14 Dec 2022 11:07) (58 - 98)  RR: 25 (14 Dec 2022 11:07) (17 - 33)  SpO2: 98% (14 Dec 2022 11:07) (97% - 100%)    Parameters below as of 14 Dec 2022 11:07  Patient On (Oxygen Delivery Method): tracheostomy collar  O2 Flow (L/min): 10  O2 Concentration (%): 40    CAPILLARY BLOOD GLUCOSE      POCT Blood Glucose.: 186 mg/dL (14 Dec 2022 05:26)  POCT Blood Glucose.: 230 mg/dL (13 Dec 2022 23:56)  POCT Blood Glucose.: 214 mg/dL (13 Dec 2022 17:02)      PHYSICAL EXAM  General: NAD  HEENT: anicteric sclera, 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)  Neck: supple  Respiratory: CTA B/L; no W/R/R  Cardiovascular: +S1/S2; RRR; no M/R/G  Gastrointestinal: soft, nondistended; +BS x4  Extremities: WWP; 2+ peripheral pulses B/L; 1+ LE edema, 3+ UE edema  Skin: normal color and turgor; no rash  Neurological: mild agitation, following commands, turns head to voice, moving all extremities spontaneously    MEDICATIONS  (STANDING):  albuterol/ipratropium for Nebulization 3 milliLiter(s) Nebulizer every 6 hours  aspirin  chewable 81 milliGRAM(s) Oral daily  atorvastatin 40 milliGRAM(s) Oral at bedtime  chlorhexidine 2% Cloths 1 Application(s) Topical <User Schedule>  dextrose 5%. 1000 milliLiter(s) (100 mL/Hr) IV Continuous <Continuous>  dextrose 5%. 1000 milliLiter(s) (50 mL/Hr) IV Continuous <Continuous>  dextrose 50% Injectable 25 Gram(s) IV Push once  dextrose 50% Injectable 25 Gram(s) IV Push once  dextrose 50% Injectable 12.5 Gram(s) IV Push once  dextrose 50% Injectable 25 Gram(s) IV Push once  epoetin gui-epbx (RETACRIT) Injectable 8000 Unit(s) IV Push once  glucagon  Injectable 1 milliGRAM(s) IntraMuscular once  glucagon  Injectable 1 milliGRAM(s) IntraMuscular once  guanFACINE 1 milliGRAM(s) Oral <User Schedule>  heparin   Injectable 5000 Unit(s) SubCutaneous every 8 hours  hydrocortisone sodium succinate Injectable 25 milliGRAM(s) IV Push every 12 hours  influenza  Vaccine (HIGH DOSE) 0.7 milliLiter(s) IntraMuscular once  insulin glargine Injectable (LANTUS) 6 Unit(s) SubCutaneous at bedtime  insulin regular  human corrective regimen sliding scale   SubCutaneous every 6 hours  insulin regular  human recombinant 7 Unit(s) SubCutaneous every 6 hours  levothyroxine Injectable 60 MICROGram(s) IV Push at bedtime  midodrine 30 milliGRAM(s) Oral <User Schedule>  mycophenolate mofetil Suspension 500 milliGRAM(s) Oral every 12 hours  QUEtiapine 100 milliGRAM(s) Oral at bedtime  sodium chloride 7% Inhalation 4 milliLiter(s) Inhalation every 12 hours  tacrolimus    0.5 mG/mL Suspension 4 milliGRAM(s) Oral every 24 hours  tacrolimus    0.5 mG/mL Suspension 4 milliGRAM(s) Oral every 24 hours  tamsulosin 0.4 milliGRAM(s) Oral at bedtime    MEDICATIONS  (PRN):  acetaminophen    Suspension .. 650 milliGRAM(s) Oral every 6 hours PRN Temp greater or equal to 38C (100.4F), Mild Pain (1 - 3)  dextrose Oral Gel 15 Gram(s) Oral once PRN Blood Glucose LESS THAN 70 milliGRAM(s)/deciliter  sodium chloride 0.9% lock flush 10 milliLiter(s) IV Push every 1 hour PRN Pre/post blood products, medications, blood draw, and to maintain line patency      No Known Allergies      LABS                        7.8    8.75  )-----------( 233      ( 14 Dec 2022 05:30 )             25.3     12-14    134<L>  |  96  |  68<H>  ----------------------------<  171<H>  3.6   |  28  |  2.83<H>    Ca    8.6      14 Dec 2022 05:30  Phos  2.8     12-14  Mg     2.3     12-14                  IMAGING/EKG/ETC   O/N Events: No acute events overnight    Subjective/ROS: Patient seen and examined at bedside. Agitation under control, pt not in distress.     Denies Fever/Chills, HA, CP, SOB, n/v, changes in bowel/urinary habits.  12pt ROS otherwise negative.    VITALS  Vital Signs Last 24 Hrs  T(C): 35.3 (14 Dec 2022 06:02), Max: 36.4 (13 Dec 2022 15:00)  T(F): 95.5 (14 Dec 2022 06:02), Max: 97.6 (13 Dec 2022 15:00)  HR: 85 (14 Dec 2022 11:07) (62 - 91)  BP: 166/60 (14 Dec 2022 11:07) (102/76 - 166/60)  BP(mean): 87 (14 Dec 2022 11:07) (58 - 98)  RR: 25 (14 Dec 2022 11:07) (17 - 33)  SpO2: 98% (14 Dec 2022 11:07) (97% - 100%)    Parameters below as of 14 Dec 2022 11:07  Patient On (Oxygen Delivery Method): tracheostomy collar  O2 Flow (L/min): 10  O2 Concentration (%): 40    CAPILLARY BLOOD GLUCOSE      POCT Blood Glucose.: 186 mg/dL (14 Dec 2022 05:26)  POCT Blood Glucose.: 230 mg/dL (13 Dec 2022 23:56)  POCT Blood Glucose.: 214 mg/dL (13 Dec 2022 17:02)      PHYSICAL EXAM  General: NAD  HEENT: Coloboma R eye, sclera anicteric    Neck: Trachea midline, supple  Respiratory: Clear breath sounds; no W/R/R  Cardiovascular: +S1/S2; RRR; no M/R/G  Gastrointestinal: NTND; +BS wnl  Extremities: WWP; 2+ peripheral pulses B/L; 1+ LE edema, 3+ UE edema  Neurological: Mildly agitated, pt follows commands     MEDICATIONS  (STANDING):  albuterol/ipratropium for Nebulization 3 milliLiter(s) Nebulizer every 6 hours  aspirin  chewable 81 milliGRAM(s) Oral daily  atorvastatin 40 milliGRAM(s) Oral at bedtime  chlorhexidine 2% Cloths 1 Application(s) Topical <User Schedule>  dextrose 5%. 1000 milliLiter(s) (100 mL/Hr) IV Continuous <Continuous>  dextrose 5%. 1000 milliLiter(s) (50 mL/Hr) IV Continuous <Continuous>  dextrose 50% Injectable 25 Gram(s) IV Push once  dextrose 50% Injectable 25 Gram(s) IV Push once  dextrose 50% Injectable 12.5 Gram(s) IV Push once  dextrose 50% Injectable 25 Gram(s) IV Push once  epoetin gui-epbx (RETACRIT) Injectable 8000 Unit(s) IV Push once  glucagon  Injectable 1 milliGRAM(s) IntraMuscular once  glucagon  Injectable 1 milliGRAM(s) IntraMuscular once  guanFACINE 1 milliGRAM(s) Oral <User Schedule>  heparin   Injectable 5000 Unit(s) SubCutaneous every 8 hours  hydrocortisone sodium succinate Injectable 25 milliGRAM(s) IV Push every 12 hours  influenza  Vaccine (HIGH DOSE) 0.7 milliLiter(s) IntraMuscular once  insulin glargine Injectable (LANTUS) 6 Unit(s) SubCutaneous at bedtime  insulin regular  human corrective regimen sliding scale   SubCutaneous every 6 hours  insulin regular  human recombinant 7 Unit(s) SubCutaneous every 6 hours  levothyroxine Injectable 60 MICROGram(s) IV Push at bedtime  midodrine 30 milliGRAM(s) Oral <User Schedule>  mycophenolate mofetil Suspension 500 milliGRAM(s) Oral every 12 hours  QUEtiapine 100 milliGRAM(s) Oral at bedtime  sodium chloride 7% Inhalation 4 milliLiter(s) Inhalation every 12 hours  tacrolimus    0.5 mG/mL Suspension 4 milliGRAM(s) Oral every 24 hours  tacrolimus    0.5 mG/mL Suspension 4 milliGRAM(s) Oral every 24 hours  tamsulosin 0.4 milliGRAM(s) Oral at bedtime    MEDICATIONS  (PRN):  acetaminophen    Suspension .. 650 milliGRAM(s) Oral every 6 hours PRN Temp greater or equal to 38C (100.4F), Mild Pain (1 - 3)  dextrose Oral Gel 15 Gram(s) Oral once PRN Blood Glucose LESS THAN 70 milliGRAM(s)/deciliter  sodium chloride 0.9% lock flush 10 milliLiter(s) IV Push every 1 hour PRN Pre/post blood products, medications, blood draw, and to maintain line patency      No Known Allergies      LABS                        7.8    8.75  )-----------( 233      ( 14 Dec 2022 05:30 )             25.3     12-14    134<L>  |  96  |  68<H>  ----------------------------<  171<H>  3.6   |  28  |  2.83<H>    Ca    8.6      14 Dec 2022 05:30  Phos  2.8     12-14  Mg     2.3     12-14                  IMAGING/EKG/ETC  Reviewed

## 2022-12-14 NOTE — DISCHARGE NOTE PROVIDER - HOSPITAL COURSE
84 y/o M PMH CKD 4, renal transplant in 2013 at [facility], glaucoma (blind), DM1, anemia, CAD s/p 2 DAMEON to LAD in 6/21, BPH, recent admission for PNA presents with acute on chronic cough 2/2 PNA and SUNNY 2/2 acute CKD.     NEUROLOGY  #Agitation  CT scan negative for acute intracranial pathology. Began to wean sedation on 12/2.  - stopped precedex on 12/11  - Seroquel changed to 75mg in am and 100mg at nighttime     CARDIOVASCULAR  # Fluid Overloaded  - started on HD on 12/6, removing 2-3L each session  #Hypotension   - Midodrine switched to 30mg with dialysis sessions   - stopped Levophed drip on 12/11  #HFpEF  Had EF of 55% on TTE from Oct 2022, grade I diastolic dysfunction. Repeat TTE 11/17 with poor visualization of LV. Patient making good UOP overnight  - holding BP meds  #CAD  #HLD  Hx of CAD s/p 2 DAMEON to LAD in June 2021, currently no CP. Trops 0.03 but no CP or ischemic changes on EKG, likely due to CKD. Takes Plavix and aspirin per last d/c but only aspirin on outpatient note  - c/w ASA daily  - c/w home Lipitor  #HTN   Known history of HTN.   - holding hypertension meds  - continue to monitor BP    PULM  #AHRF  Likely 2/2 aspiration event. Intubated on 11/26.  - ID management as below for aspiration   - Received trach on 12/1  - tolerated CPAP o/n 12/6-7  - trach collar tolerated well     RENAL  #ELIZABETH on Chronic kidney disease (CKD)  Baseline sCr 2.3-2.5. On admission, fluid overloaded. Does have orthopnea and intermittent SOB at baseline. Acidotic but at baseline. Follows w Dr. Mac.  - sCr and BUN increasing daily, urine output poor  - monitor I/Os  - renally dosing meds  - s/p sodium bicarb 150 rate 60cc/hr, discontinued on 12/6  - initiated HD 12/6, removed 2L fluid  - f/u renal recs for HD and UF    #Renal transplant  Patient had renal transplant in 2013. On home mycophenolate 500mg BID and tacrolimus 4mg BID. Per nephro, decreased home tacrolimus from 4mg BID --> 2mg BID while in hospital.   - c/w tacrolimus in AM and in PM, based on level; per Renal increased to 4mg q12h on 12/8  - c/w mycophenolate 500mg BID  - f/u renal recs    GI  #Nutrition  NPO with PEG feeds.  - restarted feeds at 6pm 12/2      #Urinary Retention  - remove bourgeois if not chronic, TOV  - on home tamsulosin 0.4mg daily, discontinue if bourgeois remains    ID  #Aspiration PNA  RESOLVED  Tracheal aspirate cx (11/19) growing serratia marcescens and pseudomonas. Patient was started on vancomycin and cefepime. MRSA nares positive. DC'ed vancomycin given supratherapeutic trough. Completed cefepime 2g q24 course x9 days.    #Stenotrophomonas in sputum cx RESOLVED  Sputum cx from 11/26 growing Stenotrophomonas  - completed course of Bactrim BS for 10 days (11/28 - 12/5)    ENDOCRINE  #DM (diabetes mellitus).   Previously on lantus 15 lispro 5 at home. Endocrine following.  - on lantus 6 units and humulin 4 units q6h  - on low dose regular ISS - goal -180    #Hypothyroidism  TSH 20.8, free t4 0.7, T3 49 (low). Repeat TSH at 9, fT4 0.7  - on synthroid 50mcg IV qD    #Relative Adrenal Insufficiency  Patient on chronic prednisone 5mg daily. He was started on hydrocortisone 50mg q6hr --> q8 --> BID   - c/w hydrocortisone 25mg q12  - wean as tolerated  - f/u endocrine recs    HEME  #Normocytic Anemia  Likely 2.2 CKD. Hgb 6.9 on 11/21, corrected appropriately s/p 1u pRBC. No active signs of bleeding on physical exam. CTH with no intracranial bleeding. Required 2u pRBC to correct. DEBRA and stool guaic negative.    FLUIDS/ELECTROLYTES/NUTRITION  -IVF as above  -Monitor, careful w advanced kidney disease  -Diet: NPO with PEG feeds  -DVT ppx: heparin 500mg q8  -GI: protonix 40 qd  -Dispo: MICU; PT recommending [facility], Approved for LTach    Hospital course: Patient admitted for pneumonia and lower extremity edema in the setting of CKD. Patient intubated after aspiration event on 11/26 and received trach on 12/1. Aspiration aspirate from aspiration event growing serratia and pseudomonas. Patent was started on vancomycin and cefepime due to MRSA positive nares. Vancomycin was discontinued due to supratheraputic trough so cefepime 2g qd was continued for 10 days. Sputum cultures growing stenotrophomonas which was treated with a 10 day course of Bactrim. Inability to protect airway and altered mental status further facilitated intubation. CT scan performed on 12/2 which showed no acute intracranial pathology in investigation of AMS. Precedex stopped on 12/11. Patient frequently agitated and treated with seroquel at 50-100mg increments. Patient received hemodialysis starting on 12/6 removing 2-3 L per session. Patient kept on levophed in the setting of sepsis which was stopped on 12/11. Midodrine used to control pressures after that, finishing at 30mg with dialysis sessions to prevent hypotension. Patient continued on tacrolimus and mycophenolate motefil for prior renal transplant. On 12/8 increased tacrolimus from 2-4mg per renal recs. Tacrolimus levels were followed and remained within therapeutic range. Mycophenolate motefil was continued at 500mg BID. Patient continued on peg feeds during icu course. Speech and swallow evaluation was performed and deemed patient non amenable to PO intake as well as not a candidate for vocal intervention. Patient with history of adrenal insufficiency continued on hydrocortisone 50mg which was slowly down titrated during admission. Patient’s hydrocortisone was discontinued today (12/16). Patient’s insulin regimen managed throughout stay but finished on Humulin 7 units q6 and sliding scale. Patient found to be retaining during hospital course so started on bladder scans q12. All recently wnl. Patient on trach and peg, Protonix 40 for GI prophylaxis and heparin 500 q8 for DVT prophylaxis and medically stable for stepdown. Patient noted to have positive BCx and tracheal aspirate for pseudomonas. Patient started on Zosyn (Pseudomonal coverage) with clinical improvement noted --  WBC downtrending, afebrile. Also found to be positive for rhino/enterovirus causing mild GI upset. Patient to be continued on Zosyn for total 10 day course. Stable for DC to LTAC.       Acute respiratory failure with hypoxia.    Likely 2/2 aspiration event. Intubated on 11/26. Received trach on 12/1. Tolerated CPAP o/n 12/6-7. Tracheal aspirate cx (11/19) growing serratia marcescens and pseudomonas. Patient was started on vancomycin and cefepime. MRSA nares positive. DC'ed vancomycin given supratherapeutic trough. Completed cefepime 2g q24 course x9 days. Sputum cx from 11/26 growing Stenotrophomonas. completed course of Bactrim BS for 10 days (11/28 - 12/5). Sputum cx from 12/5 NG final. Sputum Cx and BCx from 12/19 showing pseudmonas and RVP showing entero/rhinovirus.   - c/w Zosyn 4.5mg q12hr for psudeomonal pna -- 12/19/22- 12/29/22 for total 10 day course  - c/w trach collar as tolerated and switch to VCAC for comfort  - continue with suctioning to manage secretions     #Renal transplant  Patient had renal transplant in 2013. On home mycophenolate 500mg BID and tacrolimus 4mg BID. Per nephro, decreased home tacrolimus from 4mg BID --> 2mg BID while in hospital.  Per renal, increased tacrolimus on 12/8 to 4mg q12h.  - c/w tacrolimus 4mg PO via PEG BID  - c/w mycophenolate 500mg PO via PEG BID    #DM (diabetes mellitus).   - regular insulin to 5 units SQ q6h while on tube feeds  - c/w Lantus 7 qhs      #Hypothyroid.   TSH 20.8, free t4 0.7, T3 49 (low). Repeat TSH at 9, fT4 0.7. TSH 20. FT4 0.77 (11/19/22) -> Started on levothyroxine 25 mcg IV daily. TSH 9.2. FT4 0.69 (11/28/22) -> Increased to levothyroxine 50 mcg IV daily. TSH 11.1. FT4 0.56. (12/5/22) -> Increased to levothyroxine 60 mcg IV daily. TSH 6.88. FT4 0.744 (12/13/22).  - c/w synthroid 60 mcg IV qhs  - f/u endo recs.    #(HFpEF) heart failure with preserved ejection fraction.   Had EF of 55% on TTE from Oct 2022, grade I diastolic dysfunction. Repeat TTE 11/17 with poor visualization of LV. Patient making good UOP overnight  BP medications held at this time -- please do not resume  - c/w Midodrine 30mg qd via PEG.     #Agitation  CT scan negative for acute intracranial pathology. Began to wean sedation on 12/2. Stopped precedex on 12/11.  - Seroquel 25mg PO BID via PEG tube standing -- continue    Hospital course: Patient admitted for pneumonia and lower extremity edema in the setting of CKD. Patient intubated after aspiration event on  and received trach on . Aspiration aspirate from aspiration event growing serratia and pseudomonas. Patent was started on vancomycin and cefepime due to MRSA positive nares. Vancomycin was discontinued due to supratheraputic trough so cefepime 2g qd was continued for 10 days. Sputum cultures growing stenotrophomonas which was treated with a 10 day course of Bactrim. Inability to protect airway and altered mental status further facilitated intubation. CT scan performed on  which showed no acute intracranial pathology in investigation of AMS. Precedex stopped on . Patient frequently agitated and treated with seroquel at 50-100mg increments. Patient received hemodialysis starting on  removing 2-3 L per session. Patient kept on levophed in the setting of sepsis which was stopped on . Midodrine used to control pressures after that, finishing at 30mg with dialysis sessions to prevent hypotension. Patient continued on tacrolimus and mycophenolate motefil for prior renal transplant. On  increased tacrolimus from 2-4mg per renal recs. Tacrolimus levels were followed and remained within therapeutic range. Mycophenolate motefil was continued at 500mg BID. Patient continued on peg feeds during icu course. Speech and swallow evaluation was performed and deemed patient non amenable to PO intake as well as not a candidate for vocal intervention. Patient with history of adrenal insufficiency continued on hydrocortisone 50mg which was slowly down titrated during admission. Patient’s hydrocortisone was discontinued today (). Patient’s insulin regimen managed throughout stay but finished on Humulin 7 units q6 and sliding scale. Patient found to be retaining during hospital course so started on bladder scans q12. All recently wnl. Patient on trach and peg, Protonix 40 for GI prophylaxis and heparin 500 q8 for DVT prophylaxis and medically stable for stepdown. Patient noted to have positive BCx and tracheal aspirate for pseudomonas. Patient started on Zosyn (Pseudomonal coverage) with clinical improvement noted --  WBC downtrending, afebrile. Also found to be positive for rhino/enterovirus causing mild GI upset. Patient to be continued on Zosyn for total 10 day course. Stable for DC to LTAC.       Acute respiratory failure with hypoxia.    Likely 2/2 aspiration event. Intubated on . Received trach on . Tolerated CPAP o/n -. Tracheal aspirate cx () growing serratia marcescens and pseudomonas. Patient was started on vancomycin and cefepime. MRSA nares positive. DC'ed vancomycin given supratherapeutic trough. Completed cefepime 2g q24 course x9 days. Sputum cx from  growing Stenotrophomonas. completed course of Bactrim BS for 10 days ( - ). Sputum cx from  NG final. Sputum Cx and BCx from  showing pseudmonas and RVP showing entero/rhinovirus.   - c/w Zosyn 4.5mg q12hr for psudeomonal pna -- 22- 22 for total 10 day course  - c/w trach collar as tolerated and switch to VCAC for comfort  - continue with suctioning to manage secretions     #Renal transplant  Patient had renal transplant in . On home mycophenolate 500mg BID and tacrolimus 4mg BID. Per nephro, decreased home tacrolimus from 4mg BID --> 2mg BID while in hospital.  Per renal, increased tacrolimus on  to 4mg q12h.  - c/w tacrolimus 4mg PO via PEG BID  - c/w mycophenolate 500mg PO via PEG BID    #DM (diabetes mellitus).   - regular insulin to 5 units SQ q6h while on tube feeds  - c/w Lantus 7 qhs      #Hypothyroid.   TSH 20.8, free t4 0.7, T3 49 (low). Repeat TSH at 9, fT4 0.7. TSH 20. FT4 0.77 (22) -> Started on levothyroxine 25 mcg IV daily. TSH 9.2. FT4 0.69 (22) -> Increased to levothyroxine 50 mcg IV daily. TSH 11.1. FT4 0.56. (22) -> Increased to levothyroxine 60 mcg IV daily. TSH 6.88. FT4 0.744 (22).  - c/w synthroid 60 mcg IV qhs  - f/u endo recs.    #(HFpEF) heart failure with preserved ejection fraction.   Had EF of 55% on TTE from Oct 2022, grade I diastolic dysfunction. Repeat TTE  with poor visualization of LV. Patient making good UOP overnight  BP medications held at this time -- please do not resume  - c/w Midodrine 30mg qd via PEG.     #Agitation  CT scan negative for acute intracranial pathology. Began to wean sedation on . Stopped precedex on .  - Seroquel 25mg PO BID via PEG tube standing -- continue     # Oliguric- iATN-D   Renal allograft with CKD 4 - baseline sCr 2.3-2.5, usual meds tacrolimus 4mg BID and mycophenolate 500mg BID. Was started on HD on  due to worsening volume status, acidosis and concern for uremia.   Electrolytes noted     - Hemodialysis Treatment.:     Schedule: Once, Modality: Hemodialysis, Access: Arteriovenous Fistula    Dialyzer: Optiflux [id number], Time: 180 Min    Blood Flow: 400 mL/Min , Dialysate Flow: 600 mL/Min, Dialysate Temp: 36.5, Tubinmm (Adult)    Target Fluid Removal: 3 Liters    Dialysate Electrolytes (mEq/L): Potassium 3, Calcium 2.5, Sodium 138, Bicarbonate 35  - c/w tacrolimus to 4mg AM and 4mg PM (12 hrs apart)  - c/w cell cept 500mg BID  - strict i's and o's  - Trend BMP daily  - maintain MAP >70     Access:  RUE AVF functional     #Anemia of chronic disease  Hgb 7.4  -Epo w/ HD    Renal Bone Disease   Calcium: 9.1  Phos: 3.5  Trend phos daily with labs        Hospital course: Patient admitted for pneumonia and lower extremity edema in the setting of CKD. Patient intubated after aspiration event on  and received trach on . Aspiration aspirate from aspiration event growing serratia and pseudomonas. Patent was started on vancomycin and cefepime due to MRSA positive nares. Vancomycin was discontinued due to supratheraputic trough so cefepime 2g qd was continued for 10 days. Sputum cultures growing stenotrophomonas which was treated with a 10 day course of Bactrim. Inability to protect airway and altered mental status further facilitated intubation. CT scan performed on  which showed no acute intracranial pathology in investigation of AMS. Precedex stopped on . Patient frequently agitated and treated with seroquel at 50-100mg increments. Patient received hemodialysis starting on  removing 2-3 L per session. Patient kept on levophed in the setting of sepsis which was stopped on . Midodrine used to control pressures after that, finishing at 30mg with dialysis sessions to prevent hypotension. Patient continued on tacrolimus and mycophenolate motefil for prior renal transplant. On  increased tacrolimus from 2-4mg per renal recs. Tacrolimus levels were followed and remained within therapeutic range. Mycophenolate motefil was continued at 500mg BID. Patient continued on peg feeds during icu course. Speech and swallow evaluation was performed and deemed patient non amenable to PO intake as well as not a candidate for vocal intervention. Patient with history of adrenal insufficiency continued on hydrocortisone 50mg which was slowly down titrated during admission. Patient’s hydrocortisone was discontinued today (). Patient’s insulin regimen managed throughout stay but finished on Humulin 7 units q6 and sliding scale. Patient found to be retaining during hospital course so started on bladder scans q12. All recently wnl. Patient on trach and peg, Protonix 40 for GI prophylaxis and heparin 500 q8 for DVT prophylaxis and medically stable for stepdown. Patient noted to have positive BCx and tracheal aspirate for pseudomonas. Patient started on Zosyn (Pseudomonal coverage) with clinical improvement noted --  WBC downtrending, afebrile. Also found to be positive for rhino/enterovirus causing mild GI upset. Patient to be continued on Zosyn for total 10 day course. Trach interchanged at bedside on ; placement confirmed with CXR. Stable for DC to LTAC.       Acute respiratory failure with hypoxia.    Likely 2/2 aspiration event. Intubated on . Received trach on . Tolerated CPAP o/n -. Tracheal aspirate cx () growing serratia marcescens and pseudomonas. Patient was started on vancomycin and cefepime. MRSA nares positive. DC'ed vancomycin given supratherapeutic trough. Completed cefepime 2g q24 course x9 days. Sputum cx from  growing Stenotrophomonas. completed course of Bactrim BS for 10 days ( - ). Sputum cx from  NG final. Sputum Cx and BCx from  showing pseudmonas and RVP showing entero/rhinovirus.   - c/w Zosyn 4.5mg q12hr for psudeomonal pna -- 22- 22 for total 10 day course  - c/w trach collar as tolerated and switch to VCAC for comfort  - continue with suctioning to manage secretions     #Renal transplant  Patient had renal transplant in . On home mycophenolate 500mg BID and tacrolimus 4mg BID. Per nephro, decreased home tacrolimus from 4mg BID --> 2mg BID while in hospital.  Per renal, increased tacrolimus on  to 4mg q12h.  - c/w tacrolimus 4mg PO via PEG BID  - c/w mycophenolate 500mg PO via PEG BID    #DM (diabetes mellitus).   - regular insulin to 5 units SQ q6h while on tube feeds  - c/w Lantus 7 qhs      #Hypothyroid.   TSH 20.8, free t4 0.7, T3 49 (low). Repeat TSH at 9, fT4 0.7. TSH 20. FT4 0.77 (22) -> Started on levothyroxine 25 mcg IV daily. TSH 9.2. FT4 0.69 (22) -> Increased to levothyroxine 50 mcg IV daily. TSH 11.1. FT4 0.56. (22) -> Increased to levothyroxine 60 mcg IV daily. TSH 6.88. FT4 0.744 (22).  - c/w synthroid 60 mcg IV qhs  - f/u endo recs.    #(HFpEF) heart failure with preserved ejection fraction.   Had EF of 55% on TTE from Oct 2022, grade I diastolic dysfunction. Repeat TTE  with poor visualization of LV. Patient making good UOP overnight  BP medications held at this time -- please do not resume  - c/w Midodrine 30mg qd via PEG.     #Agitation  CT scan negative for acute intracranial pathology. Began to wean sedation on . Stopped precedex on .  - Seroquel 25mg PO BID via PEG tube standing -- continue     # Oliguric- iATN-D   Renal allograft with CKD 4 - baseline sCr 2.3-2.5, usual meds tacrolimus 4mg BID and mycophenolate 500mg BID. Was started on HD on  due to worsening volume status, acidosis and concern for uremia.   Electrolytes noted     - Hemodialysis Treatment.:     Schedule: Once, Modality: Hemodialysis, Access: Arteriovenous Fistula    Dialyzer: Optiflux [id number], Time: 180 Min    Blood Flow: 400 mL/Min , Dialysate Flow: 600 mL/Min, Dialysate Temp: 36.5, Tubinmm (Adult)    Target Fluid Removal: 3 Liters    Dialysate Electrolytes (mEq/L): Potassium 3, Calcium 2.5, Sodium 138, Bicarbonate 35  - c/w tacrolimus to 4mg AM and 4mg PM (12 hrs apart)  - c/w cell cept 500mg BID  - strict i's and o's  - Trend BMP daily  - maintain MAP >70     Access:  RUE AVF functional     #Anemia of chronic disease  Hgb 7.4  -Epo w/ HD    Renal Bone Disease   Calcium: 9.1  Phos: 3.5  Trend phos daily with labs

## 2022-12-14 NOTE — PROGRESS NOTE ADULT - ATTENDING COMMENTS
82 yo man with CKD 4,  acute kidney injury, dialysis dependent for now, admitted with PNA in ICU   1st HD here 12/6  h/o kidney transplant 2013, prior tx was on HD, continues on immunosuppressive agents  tolerated dialysis adequately yesterday  repeat HD today to drawn off additional fluid  reviewed with family member at bedside

## 2022-12-14 NOTE — PROGRESS NOTE ADULT - ATTENDING COMMENTS
Acute hypoxemic respiratory failure secondary to multiple pneumonias s/p tracheostomy due to failure to wean from mechanical ventilation. S/p peg. Finished abx for Serratia/Pseudomonal/Stenotrophomonas pneumonia. Encephelopathy slowly improving; added Seroquel. Attempt Valentina valve as continues to tolerate trach collar 24/7. Dispo planning to LTACH. Rest of plan as above.

## 2022-12-14 NOTE — DISCHARGE NOTE PROVIDER - NSDCMRMEDTOKEN_GEN_ALL_CORE_FT
aspirin 81 mg oral tablet, chewable: 1 tab(s) orally once a day x 30 days   atorvastatin 40 mg oral tablet: 1 tab(s) orally once a day  cardiac rehab: Cardiac Rehabilitation  Diagnosis: Coronary Artery Disease  3 times per week for 12 weeks     carvedilol 25 mg oral tablet: 1 tab(s) orally every 12 hours  clopidogrel 75 mg oral tablet: 1 tab(s) orally once a day  dexamethasone 6 mg oral tablet: 1 tab(s) orally every 24 hours  docusate sodium 100 mg oral capsule: 1 cap(s) orally 2 times a day  HumaLOG Mix 75/25 KwikPen subcutaneous suspension: 12-16 unit(s) subcutaneous 2 times a day (pending glucose levels)  Lasix 20 mg oral tablet: 1 tab(s) orally once a day  levoFLOXacin 500 mg oral tablet: 1 tab(s) orally once a day   levoFLOXacin 750 mg oral tablet: 1 tab(s) orally once a day   mycophenolate mofetil 500 mg oral tablet: 1 tab(s) orally 2 times a day  Senna 8.6 mg oral tablet: 1 tab(s) orally once a day (at bedtime)  sodium polystyrene sulfonate: 15 milligram(s) orally once a day   tacrolimus 1 mg oral capsule: 3 cap(s) orally once a day (at bedtime)  tacrolimus 5 mg oral capsule: 1 cap(s) orally once a day (AT NOON)  tamsulosin 0.4 mg oral capsule: 1 cap(s) orally once a day (at bedtime)   aspirin 81 mg oral tablet, chewable: 1 tab(s) orally once a day x 30 days   atorvastatin 40 mg oral tablet: 1 tab(s) orally once a day  bacitracin 500 units/g topical ointment: 1 application topically once a day  chlorhexidine 0.12% mucous membrane liquid: 15 milliliter(s) mucous membrane every 12 hours  chlorhexidine 2% topical pad: Apply topically to affected area once a day  insulin glargine 100 units/mL subcutaneous solution: 7 unit(s) subcutaneous once a day (at bedtime)  insulin regular 100 units/mL human recombinant injectable solution: 5 unit(s) injectable every 6 hours  ipratropium-albuterol 0.5 mg-2.5 mg/3 mL inhalation solution: 3 milliliter(s) inhaled every 6 hours  midodrine 10 mg oral tablet: 3 tab(s) orally every 8 hours  mycophenolate mofetil 500 mg oral tablet: 1 tab(s) orally 2 times a day  pantoprazole 40 mg oral granule, delayed release: 1 tab(s) by gastrostomy tube once a day  piperacillin-tazobactam: 4.5 milligram(s) intravenous every 12 hours until 12/29/2022    QUEtiapine 25 mg oral tablet: 1 tab(s) orally 2 times a day  tacrolimus: 4 milligram(s) by gastrostomy tube once a day  tamsulosin 0.4 mg oral capsule: 1 cap(s) orally once a day (at bedtime)   aspirin 81 mg oral tablet, chewable: 1 tab(s) orally once a day x 30 days   atorvastatin 40 mg oral tablet: 1 tab(s) orally once a day  bacitracin 500 units/g topical ointment: 1 application topically once a day  chlorhexidine 0.12% mucous membrane liquid: 15 milliliter(s) mucous membrane every 12 hours  chlorhexidine 2% topical pad: Apply topically to affected area once a day  insulin glargine 100 units/mL subcutaneous solution: 7 unit(s) subcutaneous once a day (at bedtime)  insulin regular 100 units/mL human recombinant injectable solution: 5 unit(s) injectable every 6 hours  ipratropium-albuterol 0.5 mg-2.5 mg/3 mL inhalation solution: 3 milliliter(s) inhaled every 6 hours  levothyroxine 20 mcg (0.02 mg)/mL intravenous solution: 3 milliliter(s) intravenous once a day (at bedtime)  midodrine 10 mg oral tablet: 3 tab(s) orally every 8 hours  mycophenolate mofetil 500 mg oral tablet: 1 tab(s) orally 2 times a day  pantoprazole 40 mg oral granule, delayed release: 1 tab(s) by gastrostomy tube once a day  piperacillin-tazobactam: 4.5 milligram(s) intravenous every 12 hours until 12/29/2022    QUEtiapine 25 mg oral tablet: 1 tab(s) orally 2 times a day  tacrolimus: 4 milligram(s) by gastrostomy tube once a day  tamsulosin 0.4 mg oral capsule: 1 cap(s) orally once a day (at bedtime)   aspirin 81 mg oral tablet, chewable: 1 tab(s) orally once a day x 30 days   atorvastatin 40 mg oral tablet: 1 tab(s) orally once a day  bacitracin 500 units/g topical ointment: 1 application topically once a day  chlorhexidine 0.12% mucous membrane liquid: 15 milliliter(s) mucous membrane every 12 hours  chlorhexidine 2% topical pad: Apply topically to affected area once a day  insulin glargine 100 units/mL subcutaneous solution: 7 unit(s) subcutaneous once a day (at bedtime)  insulin regular 100 units/mL human recombinant injectable solution: 5 unit(s) injectable every 6 hours  ipratropium-albuterol 0.5 mg-2.5 mg/3 mL inhalation solution: 3 milliliter(s) inhaled every 6 hours  levothyroxine: 60 microgram(s) intravenous once a day  midodrine 10 mg oral tablet: 3 tab(s) orally every 8 hours  mycophenolate mofetil 500 mg oral tablet: 1 tab(s) orally 2 times a day  pantoprazole 40 mg oral granule, delayed release: 1 tab(s) by gastrostomy tube once a day  piperacillin-tazobactam: 4.5 milligram(s) intravenous every 12 hours until 12/29/2022    QUEtiapine 25 mg oral tablet: 1 tab(s) orally 2 times a day  tacrolimus: 4 milligram(s) by gastrostomy tube once a day  tamsulosin 0.4 mg oral capsule: 1 cap(s) orally once a day (at bedtime)

## 2022-12-14 NOTE — SWALLOW BEDSIDE ASSESSMENT ADULT - SWALLOW EVAL: RECOMMENDED DIET
NPO with alternative means of nutrition. Allow periodic ice chips (~2/hour) for swallow practice and secretion management,
Continue NPO with PEG feeds

## 2022-12-14 NOTE — DISCHARGE NOTE PROVIDER - NSDCFUSCHEDAPPT_GEN_ALL_CORE_FT
[place] [id number]  Scheduled Appointment: 01/17/2023    Jimenez Baird  [place] [address]  Scheduled Appointment: 01/17/2023

## 2022-12-14 NOTE — DISCHARGE NOTE PROVIDER - NSDCCPCAREPLAN_GEN_ALL_CORE_FT
PRINCIPAL DISCHARGE DIAGNOSIS  Diagnosis: Pneumonia  Assessment and Plan of Treatment:       SECONDARY DISCHARGE DIAGNOSES  Diagnosis: Peripheral edema  Assessment and Plan of Treatment:      PRINCIPAL DISCHARGE DIAGNOSIS  Diagnosis: Pneumonia  Assessment and Plan of Treatment: You were found to have pneumonia likely due to aspiration. You initally had a fever, elevated immune cell counts, and findings on your chest X-ray indicative of pneumonia. We have treated your pneumonia with antibiotics and your immune cell count and body temperature have returned to normal parameters. If you experience new onset fevers, chills, nausea, vomitting, or bloody diarhea please call 911.   Please continue with your antibiotic Zosyn 4.5g/every 12 hours until 12/29. Please continue with your physical therapy at subacute rehab.        SECONDARY DISCHARGE DIAGNOSES  Diagnosis: Peripheral edema  Assessment and Plan of Treatment:      PRINCIPAL DISCHARGE DIAGNOSIS  Diagnosis: Pneumonia  Assessment and Plan of Treatment: You were found to have pneumonia likely due to aspiration. You initally had a fever, elevated immune cell counts, and findings on your chest X-ray indicative of pneumonia. We have treated your pneumonia with antibiotics and your immune cell count and body temperature have returned to normal parameters. If you experience new onset fevers, chills, nausea, vomitting, or bloody diarhea please call 911.   Please continue with your antibiotic Zosyn 4.5g/every 12 hours until . Please continue with your physical therapy at subacute rehab.        SECONDARY DISCHARGE DIAGNOSES  Diagnosis: Chronic kidney disease (CKD)  Assessment and Plan of Treatment: Dialysis M/W/F   Hemodialysis Treatment.:     Schedule: Once, Modality: Hemodialysis, Access: Arteriovenous Fistula    Dialyzer: Optiflux [id number], Time: 180 Min    Blood Flow: 400 mL/Min , Dialysate Flow: 600 mL/Min, Dialysate Temp: 36.5, Tubinmm (Adult)    Target Fluid Removal: 3 Liters    Dialysate Electrolytes (mEq/L): Potassium 3, Calcium 2.5, Sodium 138, Bicarbonate 35  - c/w tacrolimus to 4mg AM and 4mg PM (12 hrs apart)  - c/w cell cept 500mg BID  - strict i's and o's  - Trend BMP daily  - maintain MAP >70       Diagnosis: Peripheral edema  Assessment and Plan of Treatment:

## 2022-12-14 NOTE — DISCHARGE NOTE PROVIDER - NSDCCAREPROVSEEN_GEN_ALL_CORE_FT
Deondre, Darlene Prather, Viviana HADLEY Darlene Mendosa Bushra A  Patient seen and examined with house-staff during bedside rounds.  Resident note read, including vitals, physical findings, laboratory data, and radiological reports.   Revisions included below.  Direct personal management at bed side and extensive interpretation of the data.  Plan was outlined and discussed in details with the housestaff.  Decision making of high complexity  Action taken for acute disease activity to reflect the level of care provided:  - medication reconciliation  - review laboratory data  I changed her trach to size 8.  No airleak.  No distress.  The patient tolerated the procedure.  Continue antibiotic.  Blood cultures are negative.  Be discharged today

## 2022-12-14 NOTE — SWALLOW BEDSIDE ASSESSMENT ADULT - NS SPL SWALLOW CLINIC TRIAL FT
Mild prolonged oral manipulation of ice chip. Wet vocal quality noted post swallow, however unable to determine if related to PO trial 2/2 baseline wet vocal quality. Trials stopped after ice chips 2/2 fluctuating and low O2 (89-93%), wet vocal quality, and fatigue. Pt is not safe for PO diet at this time. This svc will f/u within 48 hours to reassess swallow function and candidacy for PO diet.  Discussed case and recommendations with RN and Dr. Villalobos.
Pt presents with severe oral deficits in setting of AMS and agitation characterized by refusal to participate in exam - refused to open mouth, refused to accept ice despite max encouragement by wife. Once 1 ice chip was placed into oral cavity with a drop of blue dye, Pt forcefully expectorated ice chip. Pt is not ready for PO trials at this time. Will re-evaluate once mental status improves and agitation resolves.

## 2022-12-14 NOTE — PROGRESS NOTE ADULT - ASSESSMENT
Patient is an 84 yo M with ESKD s/p transplant  now CKD 4, currently with Oliguric-iATN dialysis dependent, initiated iHD . Goal net neutral by mid week     Problem/Plan:  # Oliguric- iATN-D   Renal allograft with CKD 4 - baseline sCr 2.3-2.5, usual meds tacrolimus 4mg BID and mycophenolate 500mg BID. Was started on HD on  due to worsening volume status, acidosis and concern for uremia.   Remains 5 L net positive for this admission  Electrolytes noted   Net negative 2.1L in 24 hrs       Plan   HD today as prescribed   Hemodialysis Treatment.:     Schedule: Once, Modality: Hemodialysis, Access: Arteriovenous Fistula    Dialyzer: Optiflux [id number], Time: 180 Min    Blood Flow: 400 mL/Min , Dialysate Flow: 600 mL/Min, Dialysate Temp: 36.5, Tubinmm (Adult)    Target Fluid Removal: 2 Liters    Dialysate Electrolytes (mEq/L): Potassium 3, Calcium 2.5, Sodium 138, Bicarbonate 35      Will reassess volume status tomorrow for additional UF, almost at goal volume status    - c/w tacrolimus to 4mg AM and 4mg PM (12 hrs apart)  - c/w cell cept 500mg BID  - Tacro level: 2.7 (12/10)  - strict i's and o's  - Trend BMP daily  - maintain MAP >70       Access:  RUE AVF functional     Blood pressure:  Stable   UF with HD, as tolerated   Midodrine with HD,   Will lower Dialysate Temp to help with intradialytic hypotension     #Anemia of chronic disease  Hgb 7.8  -Epo w/ HD    Renal Bone Disease   Calcium: 8.56  Phos: 2.8  Trend phos daily with labs     presenting with decompensated heart failure c/b cardiac arrest on

## 2022-12-14 NOTE — PROGRESS NOTE ADULT - ATTENDING COMMENTS
seen and evaluated while on dialysis   tolerating the procedure adequately- increased UF target to 3L

## 2022-12-14 NOTE — PROGRESS NOTE ADULT - SUBJECTIVE AND OBJECTIVE BOX
Hemoglobin: 7.8 g/dL (22 @ 05:30)  Phosphorus Level, Serum: 2.8 mg/dL (22 @ 05:30)  Hemoglobin: 8.0 g/dL (22 @ 04:50)  Phosphorus Level, Serum: 2.7 mg/dL (22 @ 04:50)    Albumin, Serum: 2.4 g/dL (22 @ 06:01)  Albumin, Serum: 2.2 g/dL (22 @ 06:01)    T(C): 35.5 (22 @ 12:00), Max: 36.4 (22 @ 15:00)  HR: 90 (22 @ 12:00) (62 - 91)  BP: 153/61 (22 @ 12:00) (102/76 - 166/60)  RR: 25 (22 @ 12:00) (17 - 33)  SpO2: 99% (22 @ 12:00) (97% - 100%)  epoetin gui-epbx (RETACRIT) Injectable 8000 Unit(s) IV Push once, 22 @ 08:32, Routine, Stop order after: 1 Doses  epoetin gui-epbx (RETACRIT) Injectable 8000 Unit(s) IV Push once, 22 @ 10:51, Routine, Stop order after: 1 Doses  epoetin gui-epbx (RETACRIT) Injectable 8000 Unit(s) IV Push once, 22 @ 11:25, Routine, Stop order after: 1 Doses      Hemodialysis Treatment.:     Schedule: Once, Modality: Hemodialysis, Access: Arteriovenous Fistula    Dialyzer: Optiflux [id number], Time: 180 Min    Blood Flow: 400 mL/Min , Dialysate Flow: 600 mL/Min, Dialysate Temp: 36.5, Tubinmm (Adult)    Target Fluid Removal: 2 Liters    Dialysate Electrolytes (mEq/L): Potassium 3, Calcium 2.5, Sodium 138, Bicarbonate 35    Additional Instructions: Please use 16 gauge or larger needle. (22 @ 08:54) [Completed]    Seen on HD, /96. UF goal increased to 3L, tolerating well.

## 2022-12-14 NOTE — PROGRESS NOTE ADULT - ASSESSMENT
84 y/o M PMH CKD 4, renal transplant in 2013 at [facility], glaucoma (blind), DM1, anemia, CAD s/p 2 DAMEON to LAD in 6/21, BPH, recent admission for PNA presents with acute on chronic cough 2/2 PNA and SUNNY 2/2 acute CKD.     NEUROLOGY  #Agitation  CT scan negative for acute intracranial pathology. Began to wean sedation on 12/2.  - stopped precedex on 12/11  - Seroquel changed to 75mg in am and 100mg at nighttime     CARDIOVASCULAR  # Fluid Overloaded  - started on HD on 12/6, removing 2-3L each session    #Hypotension   - Midodrine switched to 30mg with dialysis sessions   - stopped Levophed drip on 12/11    #HFpEF  Had EF of 55% on TTE from Oct 2022, grade I diastolic dysfunction. Repeat TTE 11/17 with poor visualization of LV. Patient making good UOP overnight  - holding BP meds    #Upper extremity edema  Stable from yesterday but increased compared to baseline. US with superficial thrombosis of L cephalic vein extending to AC fossa, no DVTs.   - given patient's propensity to bleed and superficial nature of thrombus, no intervention  - obtain LUE XR as concern for fracture (pt with injury prior to hospitalization)    #CAD  #HLD  Hx of CAD s/p 2 DAMEON to LAD in June 2021, currently no CP. Trops 0.03 but no CP or ischemic changes on EKG, likely due to CKD. Takes Plavix and aspirin per last d/c but only aspirin on outpatient note  - c/w ASA daily  - c/w home Lipitor    #HTN   Known history of HTN.   - holding hypertension meds  - continue to monitor BP    PULM  #AHRF  Likely 2/2 aspiration event. Intubated on 11/26.  - ID management as below for aspiration   - Received trach on 12/1  - tolerated CPAP o/n 12/6-7  - trach collar tolerated well     RENAL  #ELIZABETH on Chronic kidney disease (CKD)  Baseline sCr 2.3-2.5. On admission, fluid overloaded. Does have orthopnea and intermittent SOB at baseline. Acidotic but at baseline. Follows w Dr. Mac.  - sCr and BUN increasing daily, urine output poor  - monitor I/Os  - renally dosing meds  - s/p sodium bicarb 150 rate 60cc/hr, discontinued on 12/6  - initiated HD 12/6, removed 2L fluid  - f/u renal recs for HD and UF    #Renal transplant  Patient had renal transplant in 2013. On home mycophenolate 500mg BID and tacrolimus 4mg BID. Per nephro, decreased home tacrolimus from 4mg BID --> 2mg BID while in hospital.   - c/w tacrolimus in AM and in PM, based on level; per Renal increased to 4mg q12h on 12/8  - c/w mycophenolate 500mg BID  - f/u renal recs    #Hyponatremia  - stable; monitor BMP    GI  #Nutrition  NPO with PEG feeds.  - restarted feeds at 6pm 12/2      #Urinary Retention  - remove bourgeois if not chronic, TOV  - on home tamsulosin 0.4mg daily, discontinue if bourgeois remains    ID  #Aspiration PNA  RESOLVED  Tracheal aspirate cx (11/19) growing serratia marcescens and pseudomonas. Patient was started on vancomycin and cefepime. MRSA nares positive. DC'ed vancomycin given supratherapeutic trough. Completed cefepime 2g q24 course x9 days.    #Stenotrophomonas in sputum cx RESOLVED  Sputum cx from 11/26 growing Stenotrophomonas  - completed course of Bactrim BS for 10 days (11/28 - 12/5)    ENDOCRINE  #DM (diabetes mellitus).   Previously on lantus 15 lispro 5 at home. Endocrine following.  - on lantus 6 units  - c/w humulin 4 units q6h  - on low dose regular ISS  - goal -180  - f/u endo recs    #Hypothyroidism  TSH 20.8, free t4 0.7, T3 49 (low). Repeat TSH at 9, fT4 0.7  - on synthroid 50mcg IV qD    #Relative Adrenal Insufficiency  Patient on chronic prednisone 5mg daily. He was started on hydrocortisone 50mg q6hr --> q8 --> BID   - c/w hydrocortisone 25mg q12  - wean as tolerated  - f/u endocrine recs    HEME  #Normocytic Anemia  Likely 2.2 CKD. Hgb 6.9 on 11/21, corrected appropriately s/p 1u pRBC. No active signs of bleeding on physical exam. CTH with no intracranial bleeding. Required 2u pRBC to correct. DEBRA and stool guaic negative.  - consider epo, if BP remains well controlled  - active T+S  - transfuse <7    FLUIDS/ELECTROLYTES/NUTRITION  -IVF as above  -Monitor, careful w advanced kidney disease  -Diet: NPO with PEG feeds  -DVT ppx: heparin 500mg q8  -GI: protonix 40 qd  -Dispo: MICU; PT recommending [facility], Approved for LTach

## 2022-12-15 NOTE — PROGRESS NOTE ADULT - ASSESSMENT
82 y/o M PMH CKD 4, renal transplant in 2013 at [facility], glaucoma (blind), DM1, anemia, CAD s/p 2 DAMEON to LAD in 6/21, BPH, recent admission for PNA presents with acute on chronic cough 2/2 PNA and SUNNY 2/2 acute CKD.     NEUROLOGY  #Agitation  CT scan negative for acute intracranial pathology. Began to wean sedation on 12/2.  - stopped precedex on 12/11  - Seroquel changed to 50mg prn     CARDIOVASCULAR  # Fluid Overloaded  - started on HD on 12/6, removing 2-3L each session    #Hypotension   - Midodrine switched to 30mg with dialysis sessions   - stopped Levophed drip on 12/11    #HFpEF  Had EF of 55% on TTE from Oct 2022, grade I diastolic dysfunction. Repeat TTE 11/17 with poor visualization of LV. Patient making good UOP overnight  - holding BP meds    #Upper extremity edema  Stable from yesterday but increased compared to baseline. US with superficial thrombosis of L cephalic vein extending to AC fossa, no DVTs.   - given patient's propensity to bleed and superficial nature of thrombus, no intervention  - obtain LUE XR as concern for fracture (pt with injury prior to hospitalization)    #CAD  #HLD  Hx of CAD s/p 2 DAMEON to LAD in June 2021, currently no CP. Trops 0.03 but no CP or ischemic changes on EKG, likely due to CKD. Takes Plavix and aspirin per last d/c but only aspirin on outpatient note  - c/w ASA daily  - c/w home Lipitor    #HTN   Known history of HTN.   - holding hypertension meds  - continue to monitor BP    PULM  #AHRF  Likely 2/2 aspiration event. Intubated on 11/26.  - ID management as below for aspiration   - Received trach on 12/1  - tolerated CPAP o/n 12/6-7  - trach collar tolerated well     RENAL  #ELIZABETH on Chronic kidney disease (CKD)  Baseline sCr 2.3-2.5. On admission, fluid overloaded. Does have orthopnea and intermittent SOB at baseline. Acidotic but at baseline. Follows w Dr. Mac.  - sCr and BUN increasing daily, urine output poor  - monitor I/Os  - renally dosing meds  - s/p sodium bicarb 150 rate 60cc/hr, discontinued on 12/6  - initiated HD 12/6, removed 2L fluid  - f/u renal recs for HD and UF    #Renal transplant  Patient had renal transplant in 2013. On home mycophenolate 500mg BID and tacrolimus 4mg BID. Per nephro, decreased home tacrolimus from 4mg BID --> 2mg BID while in hospital.   - c/w tacrolimus in AM and in PM, based on level; per Renal increased to 4mg q12h on 12/8  - c/w mycophenolate 500mg BID  - f/u renal recs    #Hyponatremia  - stable; monitor BMP    GI  #Nutrition  NPO with PEG feeds.  - restarted feeds at 6pm 12/2  - speech and swallow consulted       #Urinary Retention  - remove bourgeois if not chronic, TOV  - on home tamsulosin 0.4mg daily, discontinue if bourgeois remains    ID  #Aspiration PNA  RESOLVED  Tracheal aspirate cx (11/19) growing serratia marcescens and pseudomonas. Patient was started on vancomycin and cefepime. MRSA nares positive. DC'ed vancomycin given supratherapeutic trough. Completed cefepime 2g q24 course x9 days.    #Stenotrophomonas in sputum cx RESOLVED  Sputum cx from 11/26 growing Stenotrophomonas  - completed course of Bactrim BS for 10 days (11/28 - 12/5)    ENDOCRINE  #DM (diabetes mellitus).   Previously on lantus 15 lispro 5 at home. Endocrine following.  - on lantus 6 units  - c/w humulin 4 units q6h  - on low dose regular ISS  - goal -180  - f/u endo recs    #Hypothyroidism  TSH 20.8, free t4 0.7, T3 49 (low). Repeat TSH at 9, fT4 0.7  - on synthroid 50mcg IV qD    #Relative Adrenal Insufficiency  Patient on chronic prednisone 5mg daily. He was started on hydrocortisone 50mg q6hr --> q8 --> BID   - Hydrocortisone titrated down to 25mg qD  - wean as tolerated  - f/u endocrine recs    HEME  #Normocytic Anemia  Likely 2.2 CKD. Hgb 6.9 on 11/21, corrected appropriately s/p 1u pRBC. No active signs of bleeding on physical exam. CTH with no intracranial bleeding. Required 2u pRBC to correct. DEBRA and stool guaic negative.  - consider epo, if BP remains well controlled  - active T+S  - transfuse <7    FLUIDS/ELECTROLYTES/NUTRITION  -IVF as above  -Monitor, careful w advanced kidney disease  -Diet: NPO with PEG feeds  -DVT ppx: heparin 500mg q8  -GI: protonix 40 qd  -Dispo: MICU; PT recommending [facility], Approved for LTach, c/w family discussions

## 2022-12-15 NOTE — ADVANCED PRACTICE NURSE CONSULT - RECOMMEDATIONS
Recommend Triad ointment to sites at gluteal cleft and urethral meatus, spoke with ABBEY Robbins and house staff Don Mendoza.

## 2022-12-15 NOTE — SWALLOW BEDSIDE ASSESSMENT ADULT - SLP PERTINENT HISTORY OF CURRENT PROBLEM
renal transplant, glaucoma, DM, anemia, prolonged hospitalization for PNA, multiple intubations, now s/p trach and PEG, Pt legally blind
82 y/o M PMH CKD 4, renal transplant in 2013 at [facility], glaucoma (blind), DM1, anemia, CAD s/p 2 DAMEON to LAD in 6/21, BPH, recent admission for PNA presents with acute on chronic cough 2/2 PNA and SUNNY 2/2 acute CKD.
82 y/o M PMH CKD 4, renal transplant in 2013 at [facility], glaucoma (blind), DM1, anemia, CAD s/p 2 DAMEON to LAD in 6/21, BPH, recent admission for PNA presents with acute on chronic cough and SUNNY 2/2 acute CHF vs CKD.  S/p intubation

## 2022-12-15 NOTE — PROGRESS NOTE ADULT - SUBJECTIVE AND OBJECTIVE BOX
O/N Events: No acute events overnight     Subjective/ROS: Patient seen and examined at bedside. Patient alert and responsive, non-English speaking.     Denies Fever/Chills, HA, CP, SOB, n/v, changes in bowel/urinary habits.  12pt ROS otherwise negative.    VITALS  Vital Signs Last 24 Hrs  T(C): 36.9 (15 Dec 2022 06:03), Max: 36.9 (15 Dec 2022 06:03)  T(F): 98.4 (15 Dec 2022 06:03), Max: 98.4 (15 Dec 2022 06:03)  HR: 99 (15 Dec 2022 11:00) (83 - 107)  BP: 155/55 (15 Dec 2022 11:00) (115/44 - 156/58)  BP(mean): 79 (15 Dec 2022 11:00) (63 - 114)  RR: 24 (15 Dec 2022 11:00) (15 - 41)  SpO2: 96% (15 Dec 2022 11:00) (86% - 100%)    Parameters below as of 15 Dec 2022 11:00  Patient On (Oxygen Delivery Method): tracheostomy collar  O2 Flow (L/min): 10  O2 Concentration (%): 40    CAPILLARY BLOOD GLUCOSE      POCT Blood Glucose.: 160 mg/dL (15 Dec 2022 11:40)  POCT Blood Glucose.: 151 mg/dL (15 Dec 2022 05:54)  POCT Blood Glucose.: 196 mg/dL (15 Dec 2022 00:06)  POCT Blood Glucose.: 148 mg/dL (14 Dec 2022 17:28)      PHYSICAL EXAM  General: NAD, responsive but sedated appearing   HEENT: Anicteric sclera, [place]ob[place] R eye   Neck: Soft, trachea midline, supple  Respiratory: Clear breath sounds; no W/R/R  Cardiovascular: +S1/S2; RRR; no M/R/G  Gastrointestinal: Nondistended, nontender; +BS wnl  Extremities: WWP; 2+ peripheral pulses B/L; 1+ LE edema, 3+ UE edema  Neurological: Non-agitated    MEDICATIONS  (STANDING):  albuterol/ipratropium for Nebulization 3 milliLiter(s) Nebulizer every 6 hours  aspirin  chewable 81 milliGRAM(s) Oral daily  atorvastatin 40 milliGRAM(s) Oral at bedtime  chlorhexidine 2% Cloths 1 Application(s) Topical <User Schedule>  dextrose 5%. 1000 milliLiter(s) (100 mL/Hr) IV Continuous <Continuous>  dextrose 5%. 1000 milliLiter(s) (50 mL/Hr) IV Continuous <Continuous>  dextrose 50% Injectable 25 Gram(s) IV Push once  dextrose 50% Injectable 25 Gram(s) IV Push once  dextrose 50% Injectable 12.5 Gram(s) IV Push once  dextrose 50% Injectable 25 Gram(s) IV Push once  glucagon  Injectable 1 milliGRAM(s) IntraMuscular once  glucagon  Injectable 1 milliGRAM(s) IntraMuscular once  heparin   Injectable 5000 Unit(s) SubCutaneous every 8 hours  influenza  Vaccine (HIGH DOSE) 0.7 milliLiter(s) IntraMuscular once  insulin glargine Injectable (LANTUS) 6 Unit(s) SubCutaneous at bedtime  insulin regular  human corrective regimen sliding scale   SubCutaneous every 6 hours  insulin regular  human recombinant 7 Unit(s) SubCutaneous every 6 hours  levothyroxine Injectable 60 MICROGram(s) IV Push at bedtime  midodrine 30 milliGRAM(s) Oral <User Schedule>  mycophenolate mofetil Suspension 500 milliGRAM(s) Oral every 12 hours  sodium chloride 7% Inhalation 4 milliLiter(s) Inhalation every 12 hours  tacrolimus    0.5 mG/mL Suspension 4 milliGRAM(s) Oral every 24 hours  tacrolimus    0.5 mG/mL Suspension 4 milliGRAM(s) Oral every 24 hours  tamsulosin 0.4 milliGRAM(s) Oral at bedtime    MEDICATIONS  (PRN):  acetaminophen    Suspension .. 650 milliGRAM(s) Oral every 6 hours PRN Temp greater or equal to 38C (100.4F), Mild Pain (1 - 3)  dextrose Oral Gel 15 Gram(s) Oral once PRN Blood Glucose LESS THAN 70 milliGRAM(s)/deciliter  sodium chloride 0.9% lock flush 10 milliLiter(s) IV Push every 1 hour PRN Pre/post blood products, medications, blood draw, and to maintain line patency      No Known Allergies      LABS                        9.2    14.68 )-----------( 281      ( 15 Dec 2022 05:30 )             31.2     12-15    134<L>  |  94<L>  |  39<H>  ----------------------------<  153<H>  4.2   |  29  |  2.21<H>    Ca    8.9      15 Dec 2022 05:30  Phos  2.2     12-15  Mg     2.2     12-15    TPro  5.6<L>  /  Alb  2.4<L>  /  TBili  0.3  /  DBili  x   /  AST  32  /  ALT  45  /  AlkPhos  122<H>  12-15                IMAGING/EKG/ETC  Reviewed

## 2022-12-15 NOTE — ADVANCED PRACTICE NURSE CONSULT - ASSESSMENT
82 y/o M PMH CKD 4, renal transplant in 2013 at [facility], glaucoma (blind), DM1, anemia, CAD s/p 2 DAMEON to LAD in 6/21, BPH, recent admission for PNA presents with acute on chronic cough 2/2 PNA and SUNNY 2/2 acute CKD. Moisture damage noted to gluteal cleft, irregular borders to wounds, pale wound beds, very near rectum, pt now with a rectal tube. Resolving fungal rash over bilat buttocks.  Stable eschar to urethral meatus from 11-1 o'clock, Triad ointment applied over moisture damage at gluteal cleft as well as small amount over eschar on urethral meatus. Antifungal moisture barrier ointment applied to bilat buttocks.

## 2022-12-15 NOTE — SWALLOW BEDSIDE ASSESSMENT ADULT - SPECIFY REASON(S)
Assess for safety of PO intake
To assess swallow function and candidacy for PO intake
Determine candidacy for PO trials

## 2022-12-15 NOTE — SWALLOW BEDSIDE ASSESSMENT ADULT - COMMENTS
Received awake, resting in bed. Mary present & assisted with Yiddish/English translation. Pt was reportedly asking to have PEG-tube removed, but refused PO trials (verbalized no & became orally defensive when presented with sip of water on a spoon). Mary explained purpose of my visit multiple times, but Pt continued to refuse. This svc will reattempt.
Pt is restless and agitated. Wife at bedside, communicating with Pt in Yiddish. Pt with #8 Shiley with cuff partially deflated. RN reports thick secretions and need for frequent suctioning. Cuff was fully deflated by RN, suctioning was provided. Pt did not attempt to voice with digital occlusion. Pt became increasingly agitated.

## 2022-12-15 NOTE — PROGRESS NOTE ADULT - ATTENDING COMMENTS
Acute hypoxemic respiratory failure secondary to multiple pneumonias s/p tracheostomy due to failure to wean from mechanical ventilation. S/p peg. Finished abx for Serratia/Pseudomonal/Stenotrophomonas pneumonia. Encephelopathy slowly improving; will now make Seroquel PRN as he has been oversedated and cannot participate with the [place] valve. Continues to tolerate trach collar 24/7. Dispo planning to LTACH. Rest of plan as above.

## 2022-12-16 NOTE — PROGRESS NOTE ADULT - SUBJECTIVE AND OBJECTIVE BOX
O/N Events: Bladder scans wnl     Subjective/ROS: Patient seen and examined at bedside. No acute complaints. Slightly agitated     Denies Fever/Chills, HA, CP, SOB, n/v, changes in bowel/urinary habits.  12pt ROS otherwise negative.    VITALS  Vital Signs Last 24 Hrs  T(C): 36.4 (16 Dec 2022 14:09), Max: 37.5 (16 Dec 2022 09:25)  T(F): 97.6 (16 Dec 2022 14:09), Max: 99.5 (16 Dec 2022 09:25)  HR: 101 (16 Dec 2022 16:00) (84 - 107)  BP: 119/77 (16 Dec 2022 15:00) (94/64 - 179/79)  BP(mean): 94 (16 Dec 2022 15:00) (52 - 123)  RR: 29 (16 Dec 2022 16:00) (20 - 40)  SpO2: 100% (16 Dec 2022 16:00) (93% - 100%)    Parameters below as of 16 Dec 2022 16:00  Patient On (Oxygen Delivery Method): tracheostomy collar  O2 Flow (L/min): 10  O2 Concentration (%): 40    CAPILLARY BLOOD GLUCOSE      POCT Blood Glucose.: 89 mg/dL (16 Dec 2022 12:16)  POCT Blood Glucose.: 73 mg/dL (16 Dec 2022 05:07)  POCT Blood Glucose.: 76 mg/dL (16 Dec 2022 00:03)  POCT Blood Glucose.: 148 mg/dL (15 Dec 2022 17:52)      PHYSICAL EXAM  General: NAD, agitated   HEENT: [place]oboma R eye, no scleral icterus, copious oral secretions     Neck: Supple, trachea midline   Respiratory: Breath sounds wnl; no W/R/R  Cardiovascular: +S1/S2; RRR; no M/R/G  Gastrointestinal: NTND; +BS x4  Extremities: WWP; 2+ peripheral pulses B/L; 1+ LE edema, 3+ UE edema  Neurological: AandOx0     MEDICATIONS  (STANDING):  albuterol/ipratropium for Nebulization 3 milliLiter(s) Nebulizer every 6 hours  aspirin  chewable 81 milliGRAM(s) Oral daily  atorvastatin 40 milliGRAM(s) Oral at bedtime  chlorhexidine 2% Cloths 1 Application(s) Topical <User Schedule>  dextrose 5%. 1000 milliLiter(s) (50 mL/Hr) IV Continuous <Continuous>  dextrose 5%. 1000 milliLiter(s) (100 mL/Hr) IV Continuous <Continuous>  dextrose 50% Injectable 25 Gram(s) IV Push once  dextrose 50% Injectable 12.5 Gram(s) IV Push once  dextrose 50% Injectable 25 Gram(s) IV Push once  dextrose 50% Injectable 25 Gram(s) IV Push once  glucagon  Injectable 1 milliGRAM(s) IntraMuscular once  glucagon  Injectable 1 milliGRAM(s) IntraMuscular once  heparin   Injectable 5000 Unit(s) SubCutaneous every 8 hours  influenza  Vaccine (HIGH DOSE) 0.7 milliLiter(s) IntraMuscular once  insulin regular  human corrective regimen sliding scale   SubCutaneous every 6 hours  insulin regular  human recombinant 7 Unit(s) SubCutaneous every 6 hours  levothyroxine Injectable 60 MICROGram(s) IV Push at bedtime  midodrine 30 milliGRAM(s) Oral <User Schedule>  mycophenolate mofetil Suspension 500 milliGRAM(s) Oral every 12 hours  sodium chloride 7% Inhalation 4 milliLiter(s) Inhalation every 12 hours  tacrolimus    0.5 mG/mL Suspension 4 milliGRAM(s) Oral every 24 hours  tacrolimus    0.5 mG/mL Suspension 4 milliGRAM(s) Oral every 24 hours  tamsulosin 0.4 milliGRAM(s) Oral at bedtime    MEDICATIONS  (PRN):  acetaminophen    Suspension .. 650 milliGRAM(s) Oral every 6 hours PRN Temp greater or equal to 38C (100.4F), Mild Pain (1 - 3)  dextrose Oral Gel 15 Gram(s) Oral once PRN Blood Glucose LESS THAN 70 milliGRAM(s)/deciliter  sodium chloride 0.9% lock flush 10 milliLiter(s) IV Push every 1 hour PRN Pre/post blood products, medications, blood draw, and to maintain line patency      No Known Allergies      LABS                        8.7    13.33 )-----------( 310      ( 16 Dec 2022 05:07 )             28.4     12-16    135  |  94<L>  |  51<H>  ----------------------------<  88  3.8   |  32<H>  |  2.58<H>    Ca    8.9      16 Dec 2022 05:07  Phos  2.5     12-16  Mg     2.4     12-16    TPro  5.6<L>  /  Alb  2.7<L>  /  TBili  0.3  /  DBili  x   /  AST  36  /  ALT  42  /  AlkPhos  140<H>  12-16                IMAGING/EKG/ETC  Reviewed    Hospital course: Patient admitted for pneumonia and lower extremity edema in the setting of CKD. Patient intubated after aspiration event on 11/26 and received trach on 12/1. Aspiration aspirate from aspiration event growing serratia and pseudomonas. Patent was started on vancomycin and cefepime due to MRSA positive nares. Vancomycin was discontinued due to supratheraputic trough so cefepime 2g qd was continued for 10 days. Sputum cultures growing stenotrophomonas which was treated with a 10 day course of Bactrim. Inability to protect airway and altered mental status further facilitated intubation. CT scan performed on 12/2 which showed no acute intracranial pathology in investigation of AMS. Precedex stopped on 12/11. Patient frequently agitated and treated with seroquel at 50-100mg increments. Patient received hemodialysis starting on 12/6 removing 2-3 L per session. Patient kept on levophed in the setting of sepsis which was stopped on 12/11. Midodrine used to control pressures after that, finishing at 30mg with dialysis sessions to prevent hypotension. Patient continued on tacrolimus and mycophenolate motefil for prior renal transplant. On 12/8 increased tacrolimus from 2-4mg per renal recs. Tacrolimus levels were followed and remained within therapeutic range. Mycophenolate motefil was continued at 500mg BID. Patient continued on peg feeds during icu course. Speech and swallow evaluation was performed and deemed patient non amenable to PO intake as well as not a candidate for vocal intervention. Patient with history of adrenal insufficiency continued on hydrocortisone 50mg which was slowly down titrated during admission. Patient’s hydrocortisone was discontinued today (12/16). Patient’s insulin regimen managed throughout stay but finished on Humulin 7 units q6 and sliding scale. Patient found to be retaining during hospital course so started on bladder scans q12. All recently wnl. Patient on trach and peg, Protonix 40 for GI prophylaxis and heparin 500 q8 for DVT prophylaxis and medically stable for stepdown. Pt approved for LTACH facility for outpatient but family not certain of desire for placement. Dispo is home vs LTACH.    O/N Events: Bladder scans wnl     Subjective/ROS: Patient seen and examined at bedside. No acute complaints. Slightly agitated     Denies Fever/Chills, HA, CP, SOB, n/v, changes in bowel/urinary habits.  12pt ROS otherwise negative.    VITALS  Vital Signs Last 24 Hrs  T(C): 36.4 (16 Dec 2022 14:09), Max: 37.5 (16 Dec 2022 09:25)  T(F): 97.6 (16 Dec 2022 14:09), Max: 99.5 (16 Dec 2022 09:25)  HR: 101 (16 Dec 2022 16:00) (84 - 107)  BP: 119/77 (16 Dec 2022 15:00) (94/64 - 179/79)  BP(mean): 94 (16 Dec 2022 15:00) (52 - 123)  RR: 29 (16 Dec 2022 16:00) (20 - 40)  SpO2: 100% (16 Dec 2022 16:00) (93% - 100%)    Parameters below as of 16 Dec 2022 16:00  Patient On (Oxygen Delivery Method): tracheostomy collar  O2 Flow (L/min): 10  O2 Concentration (%): 40    CAPILLARY BLOOD GLUCOSE      POCT Blood Glucose.: 89 mg/dL (16 Dec 2022 12:16)  POCT Blood Glucose.: 73 mg/dL (16 Dec 2022 05:07)  POCT Blood Glucose.: 76 mg/dL (16 Dec 2022 00:03)  POCT Blood Glucose.: 148 mg/dL (15 Dec 2022 17:52)      PHYSICAL EXAM  General: NAD, agitated   HEENT: Coloboma R eye, no scleral icterus, copious oral secretions     Neck: Supple, trachea midline   Respiratory: Breath sounds wnl; no W/R/R  Cardiovascular: +S1/S2; RRR; no M/R/G  Gastrointestinal: NTND; +BS x4  Extremities: WWP; 2+ peripheral pulses B/L; 1+ LE edema, 3+ UE edema  Neurological: AandOx0     MEDICATIONS  (STANDING):  albuterol/ipratropium for Nebulization 3 milliLiter(s) Nebulizer every 6 hours  aspirin  chewable 81 milliGRAM(s) Oral daily  atorvastatin 40 milliGRAM(s) Oral at bedtime  chlorhexidine 2% Cloths 1 Application(s) Topical <User Schedule>  dextrose 5%. 1000 milliLiter(s) (50 mL/Hr) IV Continuous <Continuous>  dextrose 5%. 1000 milliLiter(s) (100 mL/Hr) IV Continuous <Continuous>  dextrose 50% Injectable 25 Gram(s) IV Push once  dextrose 50% Injectable 12.5 Gram(s) IV Push once  dextrose 50% Injectable 25 Gram(s) IV Push once  dextrose 50% Injectable 25 Gram(s) IV Push once  glucagon  Injectable 1 milliGRAM(s) IntraMuscular once  glucagon  Injectable 1 milliGRAM(s) IntraMuscular once  heparin   Injectable 5000 Unit(s) SubCutaneous every 8 hours  influenza  Vaccine (HIGH DOSE) 0.7 milliLiter(s) IntraMuscular once  insulin regular  human corrective regimen sliding scale   SubCutaneous every 6 hours  insulin regular  human recombinant 7 Unit(s) SubCutaneous every 6 hours  levothyroxine Injectable 60 MICROGram(s) IV Push at bedtime  midodrine 30 milliGRAM(s) Oral <User Schedule>  mycophenolate mofetil Suspension 500 milliGRAM(s) Oral every 12 hours  sodium chloride 7% Inhalation 4 milliLiter(s) Inhalation every 12 hours  tacrolimus    0.5 mG/mL Suspension 4 milliGRAM(s) Oral every 24 hours  tacrolimus    0.5 mG/mL Suspension 4 milliGRAM(s) Oral every 24 hours  tamsulosin 0.4 milliGRAM(s) Oral at bedtime    MEDICATIONS  (PRN):  acetaminophen    Suspension .. 650 milliGRAM(s) Oral every 6 hours PRN Temp greater or equal to 38C (100.4F), Mild Pain (1 - 3)  dextrose Oral Gel 15 Gram(s) Oral once PRN Blood Glucose LESS THAN 70 milliGRAM(s)/deciliter  sodium chloride 0.9% lock flush 10 milliLiter(s) IV Push every 1 hour PRN Pre/post blood products, medications, blood draw, and to maintain line patency      No Known Allergies      LABS                        8.7    13.33 )-----------( 310      ( 16 Dec 2022 05:07 )             28.4     12-16    135  |  94<L>  |  51<H>  ----------------------------<  88  3.8   |  32<H>  |  2.58<H>    Ca    8.9      16 Dec 2022 05:07  Phos  2.5     12-16  Mg     2.4     12-16    TPro  5.6<L>  /  Alb  2.7<L>  /  TBili  0.3  /  DBili  x   /  AST  36  /  ALT  42  /  AlkPhos  140<H>  12-16                IMAGING/EKG/ETC  Reviewed

## 2022-12-16 NOTE — PROGRESS NOTE ADULT - PROBLEM SELECTOR PLAN 2
RESOLVED  Tracheal aspirate cx (11/19) growing serratia marcescens and pseudomonas. Patient was started on vancomycin and cefepime. MRSA nares positive. DC'ed vancomycin given supratherapeutic trough. Completed cefepime 2g q24 course x9 days. #ELIZABETH on Chronic kidney disease (CKD)  Baseline sCr 2.3-2.5. On admission, fluid overloaded. Does have orthopnea and intermittent SOB at baseline. Acidotic but at baseline. Follows w Dr. Mac.  - sCr and BUN increasing daily, urine output poor  - monitor I/Os  - renally dosing meds  - s/p sodium bicarb 150 rate 60cc/hr, discontinued on 12/6  - initiated HD 12/6, removed 2L fluid  - f/u renal recs for HD and UF      #Renal transplant  Patient had renal transplant in 2013. On home mycophenolate 500mg BID and tacrolimus 4mg BID. Per nephro, decreased home tacrolimus from 4mg BID --> 2mg BID while in hospital.   - c/w tacrolimus in AM and in PM, based on level; per Renal increased to 4mg q12h on 12/8  - c/w mycophenolate 500mg BID  - Tacrolimus levels trended as per renal and wnl   - f/u renal recs    #Hyponatremia  - stable; monitor BMP #ELIZABETH on Chronic kidney disease (CKD)  Baseline sCr 2.3-2.5. On admission, fluid overloaded. Does have orthopnea and intermittent SOB at baseline. Acidotic but at baseline. Follows w Dr. Mac. S/p sodium bicarb 150 rate 60cc/hr, discontinued on 12/6. Initiated HD 12/6, removed 2L fluid.  - monitor BUN, Cr, UOP  - monitor I/Os  - renally dosing meds  - f/u renal recs for HD and UF      #Renal transplant  Patient had renal transplant in 2013. On home mycophenolate 500mg BID and tacrolimus 4mg BID. Per nephro, decreased home tacrolimus from 4mg BID --> 2mg BID while in hospital.  Per renal, increased tacrolimus on 12/8 to 4mg q12h.  - c/w tacrolimus 4mg PO via PEG BID  - c/w mycophenolate 500mg PO via PEG BID  - trend tacrolimus levels per renal  - f/u renal recs    #Hyponatremia  - stable; monitor BMP

## 2022-12-16 NOTE — PROGRESS NOTE ADULT - ATTENDING COMMENTS
Pt seen on rounds this afternoon.  Glucoses have been markedly lower, with fingersticks in the 75-90 range since MN without any interruption in the feeds.  The decrease may well be due to the discontinuation of hydrocortisone.  Exam is unchanged.  He has minimal LE edema.  Still with coarse rhonchi, transmitted upper airway sounds over both lung fields.  --Would discontinue the Lantus, continue only 2 units of regular q6h to cover the tube feeds  --Need to assess for adequate recovery of endogenous adrenal function--would obtain a cortisol level tomorrow AM./  --TFTs have been steadily improving, continue levothyroxine at 60 mg IV daily

## 2022-12-16 NOTE — PROGRESS NOTE ADULT - PROBLEM SELECTOR PLAN 1
Likely 2/2 aspiration event. Intubated on 11/26.  - ID management as below for aspiration   - Received trach on 12/1  - tolerated CPAP o/n 12/6-7  - trach collar tolerated well Likely 2/2 aspiration event. Intubated on 11/26.  - ID management as below for aspiration   - Received trach on 12/1  - tolerated CPAP o/n 12/6-7  - trach collar tolerated well      #Aspiration pneumonia  RESOLVED  Tracheal aspirate cx (11/19) growing serratia marcescens and pseudomonas. Patient was started on vancomycin and cefepime. MRSA nares positive. DC'ed vancomycin given supratherapeutic trough. Completed cefepime 2g q24 course x9 days.      #Stenotrophomonas in sputum cx RESOLVED  Sputum cx from 11/26 growing Stenotrophomonas  - completed course of Bactrim BS for 10 days (11/28 - 12/5) Likely 2/2 aspiration event. Intubated on 11/26. Received trach on 12/1. Tolerated CPAP o/n 12/6-7  - trach collar tolerated well  - c/w hypertonic saline inhalation 4 mL BID  - c/w duonebs q6h    #Aspiration pneumonia  Tracheal aspirate cx (11/19) growing serratia marcescens and pseudomonas. Patient was started on vancomycin and cefepime. MRSA nares positive. DC'ed vancomycin given supratherapeutic trough. Completed cefepime 2g q24 course x9 days.  - RESOLVED    #Stenotrophomonas in sputum cx  Sputum cx from 11/26 growing Stenotrophomonas. completed course of Bactrim BS for 10 days (11/28 - 12/5).  - RESOLVED

## 2022-12-16 NOTE — PROGRESS NOTE ADULT - SUBJECTIVE AND OBJECTIVE BOX
OVERNIGHT: No acute events overnight.   SUBJECTIVE / INTERVAL HPI: Patient was seen and examined this morning. His hydrocortisone was discontinued today (last dose received this morning at 5 AM, 25 mg IV once). He remains hemodynamically stable. Tube feeds continue to run at 43 mL/hr without any interruptions. Blood glucose levels have decreased from mid 100’s to 70-80s mg/dL this morning.     CAPILLARY BLOOD GLUCOSE & INSULIN RECEIVED  151 mg/dL (12-15 @ 05:54) -> 7 units of regular insulin + 1 unit sliding scale.    160 mg/dL (12-15 @ 11:40) -> 7 units of regular insulin + 1 unit sliding scale.   148 mg/dL (12-15 @ 17:52) -> 7 units of regular insulin.   76 mg/dL (12-16 @ 00:03) -> 6 units of lantus + 7 units of regular insulin.    73 mg/dL (12-16 @ 05:07) -> He didn’t received insulin.    89 mg/dL (12-16 @ 12:16) -> He didn’t received insulin.      REVIEW OF SYSTEMS  Unable to obtain.     PHYSICAL EXAM  Vital Signs Last 24 Hrs  T(C): 36.4 (16 Dec 2022 22:00), Max: 37.5 (16 Dec 2022 09:25)  T(F): 97.6 (16 Dec 2022 22:00), Max: 99.5 (16 Dec 2022 09:25)  HR: 96 (16 Dec 2022 20:40) (84 - 107)  BP: 126/53 (16 Dec 2022 20:40) (94/64 - 179/79)  BP(mean): 76 (16 Dec 2022 20:40) (52 - 115)  RR: 331 (16 Dec 2022 20:40) (20 - 331)  SpO2: 97% (16 Dec 2022 20:40) (93% - 100%)    Parameters below as of 16 Dec 2022 20:40  Patient On (Oxygen Delivery Method): tracheostomy collar  O2 Flow (L/min): 10  O2 Concentration (%): 40    Constitutional: Awake, alert, elderly male.   HEENT: Normocephalic, atraumatic, GORDON.  Respiratory: (+) Bilateral rhonchi, on trach collar.   Cardiovascular: regular rhythm, normal S1 and S2, no audible murmurs.   GI: soft, non-tender, non-distended, bowel sounds present.  Extremities: Trace bilateral lower extremity edema,    LABS  CBC - WBC/HGB/HTC/PLT: 13.33/8.7/28.4/310 (12-16-22)  BMP - Na/K/Cl/Bicarb/BUN/Cr/Gluc/AG/eGFR: 135/3.8/94/32/51/2.58/88/9/24 (12-16-22)  Ca - 8.9 (12-16-22)  Phos - 2.5 (12-16-22)  Mg - 2.4 (12-16-22)  LFT - Alb/Tprot/Tbili/Dbili/AlkPhos/ALT/AST: 2.7/--/0.3/--/140/42/36 (12-16-22)  Thyroid Stimulating Hormone, Serum: 6.880 (12-13-22)  Total T4/Free T4: --/0.744 (12-13-22)    MEDICATIONS  MEDICATIONS  (STANDING):  albuterol/ipratropium for Nebulization 3 milliLiter(s) Nebulizer every 6 hours  aspirin  chewable 81 milliGRAM(s) Oral daily  atorvastatin 40 milliGRAM(s) Oral at bedtime  chlorhexidine 2% Cloths 1 Application(s) Topical <User Schedule>  dextrose 5%. 1000 milliLiter(s) (100 mL/Hr) IV Continuous <Continuous>  dextrose 5%. 1000 milliLiter(s) (50 mL/Hr) IV Continuous <Continuous>  dextrose 50% Injectable 25 Gram(s) IV Push once  dextrose 50% Injectable 25 Gram(s) IV Push once  dextrose 50% Injectable 12.5 Gram(s) IV Push once  dextrose 50% Injectable 25 Gram(s) IV Push once  glucagon  Injectable 1 milliGRAM(s) IntraMuscular once  glucagon  Injectable 1 milliGRAM(s) IntraMuscular once  heparin   Injectable 5000 Unit(s) SubCutaneous every 8 hours  influenza  Vaccine (HIGH DOSE) 0.7 milliLiter(s) IntraMuscular once  insulin regular  human corrective regimen sliding scale   SubCutaneous every 6 hours  insulin regular  human recombinant 7 Unit(s) SubCutaneous every 6 hours  levothyroxine Injectable 60 MICROGram(s) IV Push at bedtime  midodrine 30 milliGRAM(s) Oral <User Schedule>  mycophenolate mofetil Suspension 500 milliGRAM(s) Oral every 12 hours  sodium chloride 7% Inhalation 4 milliLiter(s) Inhalation every 12 hours  tacrolimus    0.5 mG/mL Suspension 4 milliGRAM(s) Oral every 24 hours  tacrolimus    0.5 mG/mL Suspension 4 milliGRAM(s) Oral every 24 hours  tamsulosin 0.4 milliGRAM(s) Oral at bedtime    MEDICATIONS  (PRN):  acetaminophen    Suspension .. 650 milliGRAM(s) Oral every 6 hours PRN Temp greater or equal to 38C (100.4F), Mild Pain (1 - 3)  dextrose Oral Gel 15 Gram(s) Oral once PRN Blood Glucose LESS THAN 70 milliGRAM(s)/deciliter  sodium chloride 0.9% lock flush 10 milliLiter(s) IV Push every 1 hour PRN Pre/post blood products, medications, blood draw, and to maintain line patency    ASSESSMENT / RECOMMENDATIONS  Mr. Miguel is an 83-year-old male with type 2 diabetes mellitus, coronary artery disease (s/p DAMEON x2 to LAD on 6/2021), chronic kidney disease stage 4 (s/p renal transplant on 2013, on tracrolimus and prednisone) and benign prostatic hyperplasia who was sent to the hospital by his nephrologist for further evaluation of lower extremity edema (11/17/22). He was admitted for further management of lower extremity edema, thought to be secondary to his underlying CKD versus congestive heart failure. Hospitalization was complicated by hypoglycemia and cardiac arrest (11/19/22, ROSC after 1 minute of chest compressions). Post-cardiac arrest he was noted to develop hypotension, bradycardia and hypothermia. Labs were remarkable for elevated TSH (20) and decreased FT4 (0.77). 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     Although he had features seen with myxedema coma, these findings were better explained by his cardiac arrest as his vital signs prior to the arrest were apparently around his baseline, as was his mental status. He was started on levothyroxine supplementation. The patient was extubated on 11/22/22; however, he had to be reintubated on 11/26/22 due to episode of respiratory decompensation, potentially secondary to aspiration. He is now s/p trach and peg.    A1C: 9.0 %  BUN: 51  Creatinine: 2.58  GFR: 24  Weight: 82.3  BMI: 28.4  EF: 65%, grade II diastolic dysfunction.    # Type 2 diabetes mellitus  - Patient's insulin requirements continue to decrease, likely effect from withdrawal of steroids.   - Please discontinue long-acting insulin for now.   - Decrease regular insulin to 2 units every 6 hours while on tube feeds. Hold regular insulin if blood glucose <100 mg/dL.   - Continue regular insulin sliding scale low dose every 6 hours.   - Patient's fingerstick glucose goal is 100-180 mg/dL.    # Hypothyroidism   - TSH 20. FT4 0.77 (11/19/22) -> Started on levothyroxine 25 mcg IV daily.   - TSH 9.2. FT4 0.69 (11/28/22) -> Increased to levothyroxine 50 mcg IV daily.   - TSH 11.1. FT4 0.56. (12/5/22) -> Increased to levothyroxine 60 mcg IV daily.  - TSH  6.88. FT4 0.744 (12/13/22)  - Continue with levothyroxine 60 mcg IV daily.     # Concern for adrenal insufficiency   - The patient was on prednisone 5 mg daily for renal transplant prior to admission.   - He had recent hospitalization (October 2022) for pneumonia due to pseudomonas where he was also started on stress dose steroids. At that time, a cortisol level was collected prior to starting steroids which was not robust (6.5 ug/dL).   - Patient's hydrocortisone was tapered off today. Please obtain a morning cortisol level with tomorrow's labs.     Case discussed with Dr. Rabago. Primary team updated.       Ulysses Weber    Endocrinology Fellow    Service Pager: [phone]

## 2022-12-16 NOTE — PROGRESS NOTE ADULT - PROBLEM SELECTOR PLAN 9
Stable from yesterday but increased compared to baseline. US with superficial thrombosis of L cephalic vein extending to AC fossa, no DVTs.   - given patient's propensity to bleed and superficial nature of thrombus, no intervention  - obtain LUE XR as concern for fracture (pt with injury prior to hospitalization)

## 2022-12-16 NOTE — SPEAKING VALVE EVALUATION - COMMENTS
Family at bedside. Extensive education was provided to Pt daughter regarding reasons for why Pt is not a candidate for using a speaking valve at this time including copious secretions, agitation, and inability to follow commands. Daughter verbalized understanding

## 2022-12-16 NOTE — SPEAKING VALVE EVALUATION - OBSERVATIONS
Pt presents with increased restlessness and agitation as well as copious secretions with no meaningful improvement since 12/14 or 12/15 visits. Pt is not a candidate for PMV trials in setting of above.

## 2022-12-16 NOTE — PROGRESS NOTE ADULT - PROBLEM SELECTOR PLAN 5
Previously on lantus 15 lispro 5 at home. Endocrine following.  - goal -180  - f/u endo recs  - on Humulin 7units q6  - c/w sliding scale TSH 20.8, free t4 0.7, T3 49 (low). Repeat TSH at 9, fT4 0.7  - on synthroid 50mcg IV qD TSH 20.8, free t4 0.7, T3 49 (low). Repeat TSH at 9, fT4 0.7.  - f/u endo recs  per endo:  - TSH 20. FT4 0.77 (11/19/22) -> Started on levothyroxine 25 mcg IV daily.   - TSH 9.2. FT4 0.69 (11/28/22) -> Increased to levothyroxine 50 mcg IV daily.   - TSH 11.1. FT4 0.56. (12/5/22) -> Increased to levothyroxine 60 mcg IV daily.  - TSH 6.88. FT4 0.744 (12/13/22)  - c/w synthroid 60 mcg IV qhs TSH 20.8, free t4 0.7, T3 49 (low). Repeat TSH at 9, fT4 0.7. TSH 20. FT4 0.77 (11/19/22) -> Started on levothyroxine 25 mcg IV daily. TSH 9.2. FT4 0.69 (11/28/22) -> Increased to levothyroxine 50 mcg IV daily. TSH 11.1. FT4 0.56. (12/5/22) -> Increased to levothyroxine 60 mcg IV daily. TSH 6.88. FT4 0.744 (12/13/22).  - c/w synthroid 60 mcg IV qhs  - f/u endo recs

## 2022-12-16 NOTE — PROGRESS NOTE ADULT - PROBLEM SELECTOR PLAN 10
Relative Adrenal Insufficiency. Patient on chronic prednisone 5mg daily. He was started on hydrocortisone 50mg q6hr --> q8 --> BID   - Hydrocortisone discontinued   - f/u endocrine recs Relative Adrenal Insufficiency. Patient on chronic prednisone 5mg daily prior to admission. He had recent hospitalization (October 2022) for pneumonia due to pseudomonas where he was also started on stress dose steroids. At that time, a cortisol level was collected prior to starting steroids which was not robust (6.5 ug/dL). Patient's hydrocortisone was tapered off today. He was started on hydrocortisone 50mg q6hr --> q8 --> BID.  - hydrocortisone tapered off today  - f/u cortisol level in AM  - f/u endocrine recs

## 2022-12-16 NOTE — PROGRESS NOTE ADULT - ATTENDING COMMENTS
84 yo man with CKD 4,  acute kidney injury, dialysis dependent for now, admitted with PNA in ICU   no sign of renal recovery  tacro level 5.6- okay  1st HD here 12/6  h/o kidney transplant 2013, prior tx was on HD, continues on immunosuppressive agents  tolerated dialysis adequately yesterday    dialysis today-  seen and evaluated while on dialysis  tolerating the treatment adequately  continue full treatment as prescribed

## 2022-12-16 NOTE — PROGRESS NOTE ADULT - SUBJECTIVE AND OBJECTIVE BOX
Patient is a 82y Male seen and evaluated at bedside. Overnight events noted. Plan for HD today with goal of 1 L UF given -110s. Net positive 1.3 L/24 hours      Meds:    acetaminophen    Suspension .. 650 every 6 hours PRN  albuterol/ipratropium for Nebulization 3 every 6 hours  aspirin  chewable 81 daily  atorvastatin 40 at bedtime  chlorhexidine 2% Cloths 1 <User Schedule>  dextrose 5%. 1000 <Continuous>  dextrose 5%. 1000 <Continuous>  dextrose 50% Injectable 25 once  dextrose 50% Injectable 12.5 once  dextrose 50% Injectable 25 once  dextrose 50% Injectable 25 once  dextrose Oral Gel 15 once PRN  glucagon  Injectable 1 once  glucagon  Injectable 1 once  heparin   Injectable 5000 every 8 hours  influenza  Vaccine (HIGH DOSE) 0.7 once  insulin glargine Injectable (LANTUS) 4 at bedtime  insulin regular  human corrective regimen sliding scale  every 6 hours  insulin regular  human recombinant 5 every 6 hours  levothyroxine Injectable 60 at bedtime  midodrine 30 <User Schedule>  mycophenolate mofetil Suspension 500 every 12 hours  sodium chloride 0.9% lock flush 10 every 1 hour PRN  sodium chloride 7% Inhalation 4 every 12 hours  tacrolimus    0.5 mG/mL Suspension 4 every 24 hours  tacrolimus    0.5 mG/mL Suspension 4 every 24 hours  tamsulosin 0.4 at bedtime      T(C): , Max: 37.5 (12-16-22 @ 09:25)  T(F): , Max: 99.5 (12-16-22 @ 09:25)  HR: 98 (12-16-22 @ 12:00)  BP: 126/56 (12-16-22 @ 12:00)  BP(mean): 63 (12-16-22 @ 12:00)  RR: 26 (12-16-22 @ 12:00)  SpO2: 99% (12-16-22 @ 12:00)  Wt(kg): --    12-15 @ 07:01  -  12-16 @ 07:00  --------------------------------------------------------  IN: 1482 mL / OUT: 120 mL / NET: 1362 mL    12-16 @ 07:01  -  12-16 @ 14:16  --------------------------------------------------------  IN: 378 mL / OUT: 0 mL / NET: 378 mL          Review of Systems:  ROS negative except as per HPI      PHYSICAL EXAM:  GENERAL: trach collar, NAD, laying in bed   NECK: supple, No JVD  Lungs: No rales, ronchi or wheezing noted  HEART: normal S1S2, RRR  ABDOMEN: Soft, Nontender, +BS, No flank tenderness bilateral  EXTREMITIES: Remains anasarcic, although edema is slowly improving  ACCESS: R radiocephalic AVF w/ palpable thrill, c/d/i     LABS:                        8.7    13.33 )-----------( 310      ( 16 Dec 2022 05:07 )             28.4     12-16    135  |  94<L>  |  51<H>  ----------------------------<  88  3.8   |  32<H>  |  2.58<H>    Ca    8.9      16 Dec 2022 05:07  Phos  2.5     12-16  Mg     2.4     12-16    TPro  5.6<L>  /  Alb  2.7<L>  /  TBili  0.3  /  DBili  x   /  AST  36  /  ALT  42  /  AlkPhos  140<H>  12-16                RADIOLOGY & ADDITIONAL STUDIES:

## 2022-12-16 NOTE — PROGRESS NOTE ADULT - PROBLEM SELECTOR PLAN 12
- remove bourgeois if not chronic, TOV  - on home tamsulosin 0.4mg daily, discontinue if bourgeois remains  - c/w bladder scan q12, wnl #Nutrition  NPO with PEG feeds.  - restarted feeds at 6pm 12/2  - speech and swallow consulted - Pt is not a candidate for using a speaking valve at this time due to copious secretions, agitation, and inability to follow commands.    #Prophylactic measure  -IVF as above  -Monitor, careful w advanced kidney disease  -Diet: NPO with PEG feeds  -DVT ppx: heparin 500mg q8  -GI: protonix 40 qd  -Dispo: MICU; PT recommending [facility], Approved for LTach, c/w family discussions #Nutrition  NPO with PEG feeds. Restarted feeds at 6pm 12/2  - speech and swallow consulted - Pt is not a candidate for using a speaking valve at this time due to copious secretions, agitation, and inability to follow commands.    #Prophylactic measure  - F: none  - E: Monitor, careful w advanced kidney disease  - Diet: NPO with PEG feeds  - DVT ppx: heparin 500mg SQ q8h  - GI: protonix suspension 40 qd via PEG qd  - Dispo: telemetry (7lach). PT recommending [facility], Approved for LTach, c/w family discussions

## 2022-12-16 NOTE — SPEAKING VALVE EVALUATION - DEFER SPEAKING VALVE USE
Pt is not a candidate for PMV use at this time. Please reconsult when secretion management and mental status improve.

## 2022-12-16 NOTE — PROGRESS NOTE ADULT - SUBJECTIVE AND OBJECTIVE BOX
61y Male with history of type 2 DM, HTN, HLD, colon cancer s/p colon resection, prostate cancer 2015 s/p radiation therapy, CAD s/p CABG (3v), ischemic cardiomyopathy with an EF of 35-40% and symptomatic paroxysmal atrial fibrillation.        Male now presents in anticipation of elective radiofrequency ablation of atrial fibrillation.       Echocardiogram (date): Echo: 3/25/22: EF 35-40%, normal LA size  Stress Test (date):  Cardiac CT or MRI (date):   Cardiac Cath (date):   Cardiac surgery (date):     To summarize, the patient was hospitalized earlier this year for NSTEMI and subsequently underwent CABG. Post CABG the patient developed atrial fibrillation/flutter. He was discharged on amiodarone and eliquis (2.5 mg bid at discharge later changed to 5 mg bid). On outpatient follow up he was seen by Dr. Woods and was noted to be in atrial fibrillation. He denied any symptoms such as shortness of breath or palpitations. He had no prior history of atrial fibrillation or flutter. When seen in my office he had converted back to sinus rhythm. I discontinued amiodarone. He reported intermittent palpitations at the time. He was started on metoprolol and I asked him to wear an outpatient event monitor. He was noted to have episodes of atrial fibrillation with RVR on outpatient monitoring. He is here to discuss this further.  ****************** STEPDOWN NOTE MICU ([facility]) TO TELEMETRY ([facility]) ******************    HOSPITAL COURSE:  Patient admitted for pneumonia and lower extremity edema in the setting of CKD. Patient intubated after aspiration event on 11/26 and received trach on 12/1. Aspiration aspirate from aspiration event growing serratia and pseudomonas. Patent was started on vancomycin and cefepime due to MRSA positive nares. Vancomycin was discontinued due to supratheraputic trough so cefepime 2g qd was continued for 10 days. Sputum cultures growing stenotrophomonas which was treated with a 10 day course of Bactrim. Inability to protect airway and altered mental status further facilitated intubation. CT scan performed on 12/2 which showed no acute intracranial pathology in investigation of AMS. Precedex stopped on 12/11. Patient frequently agitated and treated with seroquel at 50-100mg increments. Patient received hemodialysis starting on 12/6 removing 2-3 L per session. Patient kept on levophed in the setting of sepsis which was stopped on 12/11. Midodrine used to control pressures after that, finishing at 30mg with dialysis sessions to prevent hypotension. Patient continued on tacrolimus and mycophenolate motefil for prior renal transplant. On 12/8 increased tacrolimus from 2-4mg per renal recs. Tacrolimus levels were followed and remained within therapeutic range. Mycophenolate motefil was continued at 500mg BID. Patient continued on peg feeds during icu course. Speech and swallow evaluation was performed and deemed patient non amenable to PO intake as well as not a candidate for vocal intervention. Patient with history of adrenal insufficiency continued on hydrocortisone 50mg which was slowly down titrated during admission. Patient’s hydrocortisone was discontinued today (12/16). Patient’s insulin regimen managed throughout stay but finished on Humulin 7 units q6 and sliding scale. Patient found to be retaining during hospital course so started on bladder scans q12. All recently wnl. Patient on trach and peg, Protonix 40 for GI prophylaxis and heparin 500 q8 for DVT prophylaxis and medically stable for stepdown. Pt approved for LTACH facility for outpatient but family not certain of desire for placement. Dispo is home vs LTACH. Stable for stepdown to telemetry.    INTERVAL EVENTS/SUBJECTIVE: Patient seen and examined at bedside.    Vital Signs Last 12 Hrs  T(F): 96.9 (12-16-22 @ 19:03), Max: 97.6 (12-16-22 @ 14:09)  HR: 96 (12-16-22 @ 20:40) (96 - 107)  BP: 126/53 (12-16-22 @ 20:40) (103/36 - 179/79)  BP(mean): 76 (12-16-22 @ 20:40) (52 - 114)  RR: 331 (12-16-22 @ 20:40) (20 - 331)  SpO2: 97% (12-16-22 @ 20:40) (97% - 100%)  I&O's Summary    15 Dec 2022 07:01  -  16 Dec 2022 07:00  --------------------------------------------------------  IN: 1482 mL / OUT: 120 mL / NET: 1362 mL    16 Dec 2022 07:01  -  16 Dec 2022 21:37  --------------------------------------------------------  IN: 1199 mL / OUT: 1500 mL / NET: -301 mL        PHYSICAL EXAM:  Constitutional: NAD, comfortable in bed.  HEENT: NC/AT, EOMI, no conjunctival pallor or scleral icterus, MMM  Neck: Supple  Respiratory: CTA B/L. No w/r/r.   Cardiovascular: RRR, normal S1 and S2, no m/r/g.   Gastrointestinal: soft NTND, no guarding or rebound tenderness, no palpable masses   Extremities: wwp; no cyanosis, clubbing or edema.   Vascular: intact distal pulses  Neurological: AAOx3, no focal deficits       LABS:                        8.7    13.33 )-----------( 310      ( 16 Dec 2022 05:07 )             28.4     12-16    135  |  94<L>  |  51<H>  ----------------------------<  88  3.8   |  32<H>  |  2.58<H>    Ca    8.9      16 Dec 2022 05:07  Phos  2.5     12-16  Mg     2.4     12-16    TPro  5.6<L>  /  Alb  2.7<L>  /  TBili  0.3  /  DBili  x   /  AST  36  /  ALT  42  /  AlkPhos  140<H>  12-16            RADIOLOGY & ADDITIONAL TESTS:    MEDICATIONS  (STANDING):  albuterol/ipratropium for Nebulization 3 milliLiter(s) Nebulizer every 6 hours  aspirin  chewable 81 milliGRAM(s) Oral daily  atorvastatin 40 milliGRAM(s) Oral at bedtime  chlorhexidine 2% Cloths 1 Application(s) Topical <User Schedule>  dextrose 5%. 1000 milliLiter(s) (100 mL/Hr) IV Continuous <Continuous>  dextrose 5%. 1000 milliLiter(s) (50 mL/Hr) IV Continuous <Continuous>  dextrose 50% Injectable 25 Gram(s) IV Push once  dextrose 50% Injectable 25 Gram(s) IV Push once  dextrose 50% Injectable 25 Gram(s) IV Push once  dextrose 50% Injectable 12.5 Gram(s) IV Push once  glucagon  Injectable 1 milliGRAM(s) IntraMuscular once  glucagon  Injectable 1 milliGRAM(s) IntraMuscular once  heparin   Injectable 5000 Unit(s) SubCutaneous every 8 hours  influenza  Vaccine (HIGH DOSE) 0.7 milliLiter(s) IntraMuscular once  insulin regular  human corrective regimen sliding scale   SubCutaneous every 6 hours  insulin regular  human recombinant 7 Unit(s) SubCutaneous every 6 hours  levothyroxine Injectable 60 MICROGram(s) IV Push at bedtime  midodrine 30 milliGRAM(s) Oral <User Schedule>  mycophenolate mofetil Suspension 500 milliGRAM(s) Oral every 12 hours  sodium chloride 7% Inhalation 4 milliLiter(s) Inhalation every 12 hours  tacrolimus    0.5 mG/mL Suspension 4 milliGRAM(s) Oral every 24 hours  tacrolimus    0.5 mG/mL Suspension 4 milliGRAM(s) Oral every 24 hours  tamsulosin 0.4 milliGRAM(s) Oral at bedtime    MEDICATIONS  (PRN):  acetaminophen    Suspension .. 650 milliGRAM(s) Oral every 6 hours PRN Temp greater or equal to 38C (100.4F), Mild Pain (1 - 3)  dextrose Oral Gel 15 Gram(s) Oral once PRN Blood Glucose LESS THAN 70 milliGRAM(s)/deciliter  sodium chloride 0.9% lock flush 10 milliLiter(s) IV Push every 1 hour PRN Pre/post blood products, medications, blood draw, and to maintain line patency   ****************** STEPDOWN NOTE MICU ([facility]) TO TELEMETRY ([facility]) ******************    HOSPITAL COURSE:  Patient admitted for pneumonia and lower extremity edema in the setting of CKD. Patient intubated after aspiration event on 11/26 and received trach on 12/1. Aspiration aspirate from aspiration event growing serratia and pseudomonas. Patent was started on vancomycin and cefepime due to MRSA positive nares. Vancomycin was discontinued due to supratheraputic trough so cefepime 2g qd was continued for 10 days. Sputum cultures growing stenotrophomonas which was treated with a 10 day course of Bactrim. Inability to protect airway and altered mental status further facilitated intubation. CT scan performed on 12/2 which showed no acute intracranial pathology in investigation of AMS. Precedex stopped on 12/11. Patient frequently agitated and treated with seroquel at 50-100mg increments. Patient received hemodialysis starting on 12/6 removing 2-3 L per session. Patient kept on levophed in the setting of sepsis which was stopped on 12/11. Midodrine used to control pressures after that, finishing at 30mg with dialysis sessions to prevent hypotension. Patient continued on tacrolimus and mycophenolate motefil for prior renal transplant. On 12/8 increased tacrolimus from 2-4mg per renal recs. Tacrolimus levels were followed and remained within therapeutic range. Mycophenolate motefil was continued at 500mg BID. Patient continued on peg feeds during icu course. Speech and swallow evaluation was performed and deemed patient non amenable to PO intake as well as not a candidate for vocal intervention. Patient with history of adrenal insufficiency continued on hydrocortisone 50mg which was slowly down titrated during admission. Patient’s hydrocortisone was discontinued today (12/16). Patient’s insulin regimen managed throughout stay but finished on Humulin 7 units q6 and sliding scale. Patient found to be retaining during hospital course so started on bladder scans q12. All recently wnl. Patient on trach and peg, Protonix 40 for GI prophylaxis and heparin 500 q8 for DVT prophylaxis and medically stable for stepdown. Pt approved for LTACH facility for outpatient but family not certain of desire for placement. Dispo is home vs LTACH. Stable for stepdown to telemetry.    INTERVAL EVENTS/SUBJECTIVE: Patient seen and examined at bedside.    Vital Signs Last 12 Hrs  T(F): 96.9 (12-16-22 @ 19:03), Max: 97.6 (12-16-22 @ 14:09)  HR: 96 (12-16-22 @ 20:40) (96 - 107)  BP: 126/53 (12-16-22 @ 20:40) (103/36 - 179/79)  BP(mean): 76 (12-16-22 @ 20:40) (52 - 114)  RR: 331 (12-16-22 @ 20:40) (20 - 331)  SpO2: 97% (12-16-22 @ 20:40) (97% - 100%)  I&O's Summary    15 Dec 2022 07:01  -  16 Dec 2022 07:00  --------------------------------------------------------  IN: 1482 mL / OUT: 120 mL / NET: 1362 mL    16 Dec 2022 07:01  -  16 Dec 2022 21:37  --------------------------------------------------------  IN: 1199 mL / OUT: 1500 mL / NET: -301 mL      PHYSICAL EXAM:  Constitutional: NAD, comfortable in bed, agitated  HEENT: NC/AT, EOMI, no conjunctival pallor or scleral icterus, MMM. Coloboma R eye, copious oral secretions  Neck: Supple  Respiratory: CTA B/L. No w/r/r.   Cardiovascular: RRR, normal S1 and S2, no m/r/g.   Gastrointestinal: soft NTND, no guarding or rebound tenderness, no palpable masses   Extremities: wwp; no cyanosis, clubbing. 1+ LE edema, 3+ UE edema  Vascular: intact distal pulses  Neurological: AAOx0      LABS:                        8.7    13.33 )-----------( 310      ( 16 Dec 2022 05:07 )             28.4     12-16    135  |  94<L>  |  51<H>  ----------------------------<  88  3.8   |  32<H>  |  2.58<H>    Ca    8.9      16 Dec 2022 05:07  Phos  2.5     12-16  Mg     2.4     12-16    TPro  5.6<L>  /  Alb  2.7<L>  /  TBili  0.3  /  DBili  x   /  AST  36  /  ALT  42  /  AlkPhos  140<H>  12-16            RADIOLOGY & ADDITIONAL TESTS:    MEDICATIONS  (STANDING):  albuterol/ipratropium for Nebulization 3 milliLiter(s) Nebulizer every 6 hours  aspirin  chewable 81 milliGRAM(s) Oral daily  atorvastatin 40 milliGRAM(s) Oral at bedtime  chlorhexidine 2% Cloths 1 Application(s) Topical <User Schedule>  dextrose 5%. 1000 milliLiter(s) (100 mL/Hr) IV Continuous <Continuous>  dextrose 5%. 1000 milliLiter(s) (50 mL/Hr) IV Continuous <Continuous>  dextrose 50% Injectable 25 Gram(s) IV Push once  dextrose 50% Injectable 25 Gram(s) IV Push once  dextrose 50% Injectable 25 Gram(s) IV Push once  dextrose 50% Injectable 12.5 Gram(s) IV Push once  glucagon  Injectable 1 milliGRAM(s) IntraMuscular once  glucagon  Injectable 1 milliGRAM(s) IntraMuscular once  heparin   Injectable 5000 Unit(s) SubCutaneous every 8 hours  influenza  Vaccine (HIGH DOSE) 0.7 milliLiter(s) IntraMuscular once  insulin regular  human corrective regimen sliding scale   SubCutaneous every 6 hours  insulin regular  human recombinant 7 Unit(s) SubCutaneous every 6 hours  levothyroxine Injectable 60 MICROGram(s) IV Push at bedtime  midodrine 30 milliGRAM(s) Oral <User Schedule>  mycophenolate mofetil Suspension 500 milliGRAM(s) Oral every 12 hours  sodium chloride 7% Inhalation 4 milliLiter(s) Inhalation every 12 hours  tacrolimus    0.5 mG/mL Suspension 4 milliGRAM(s) Oral every 24 hours  tacrolimus    0.5 mG/mL Suspension 4 milliGRAM(s) Oral every 24 hours  tamsulosin 0.4 milliGRAM(s) Oral at bedtime    MEDICATIONS  (PRN):  acetaminophen    Suspension .. 650 milliGRAM(s) Oral every 6 hours PRN Temp greater or equal to 38C (100.4F), Mild Pain (1 - 3)  dextrose Oral Gel 15 Gram(s) Oral once PRN Blood Glucose LESS THAN 70 milliGRAM(s)/deciliter  sodium chloride 0.9% lock flush 10 milliLiter(s) IV Push every 1 hour PRN Pre/post blood products, medications, blood draw, and to maintain line patency   61y Male with history of type 2 DM, HTN, HLD, colon cancer s/p colon resection, prostate cancer 2015 s/p radiation therapy, CAD s/p CABG (3v), ischemic cardiomyopathy with an EF of 35-40% and symptomatic paroxysmal atrial fibrillation.   States he is s/p left knee replacement 3/18/2022, after discharge he was found to have hypoxia while at home by the visiting nurse, was sent to ER and found to have NSTEMI, and subsequently underwent CABG.     Denies chest pain, shortness of breath, palpitations, dizziness or near syncope. Patient is scheduled for Afib ablation on 10/13/2022 with Dr. López.       Echocardiogram (date): Echo: 3/25/22: EF 35-40%, normal LA size  Stress Test (date):  Cardiac CT or MRI (date):   Cardiac Cath (date):   Cardiac surgery (date):     To summarize, the patient was hospitalized earlier this year for . Post CABG the patient developed atrial fibrillation/flutter. He was discharged on amiodarone and eliquis (2.5 mg bid at discharge later changed to 5 mg bid). On outpatient follow up he was seen by Dr. Woods and was noted to be in atrial fibrillation. He denied any symptoms such as shortness of breath or palpitations. He had no prior history of atrial fibrillation or flutter. When seen in my office he had converted back to sinus rhythm. I discontinued amiodarone. He reported intermittent palpitations at the time. He was started on metoprolol and I asked him to wear an outpatient event monitor. He was noted to have episodes of atrial fibrillation with RVR on outpatient monitoring. He is here to discuss this further.  61y Male with history of type 2 DM, HTN, HLD, colon cancer s/p colon resection, prostate cancer 2015 s/p radiation therapy, CAD s/p CABG (3v), ischemic cardiomyopathy with an EF of 35-40% and symptomatic paroxysmal atrial fibrillation. States he is s/p left knee replacement 3/18/2022, after discharge he was found to have hypoxia while at home by the visiting nurse, was sent to ER and found to have NSTEMI, and subsequently underwent CABG. Following the CABG procedure the patient developed atrial fibrillation/flutter. He was discharged home on amiodarone and eliquis. While following up he has converted back to sinus rhythm, amiodarone was discontinued. States he wore a event holter monitor at home was found to have episodes of atrial fibrillation. Denies chest pain, shortness of breath, palpitations, dizziness or near syncope. Patient is scheduled for Afib ablation on 10/13/2022 with Dr. López.     Echocardiogram (date): Echo: 3/25/22: EF 35-40%, normal LA size  7/8/22: 1. Left ventricular ejection fraction, by visual estimation, is 40 to 45%.  2. Mildly to moderately decreased global left ventricular systolic function.  3. Basal and mid inferolateral wall, apical septal segment, and basal inferior segment are abnormal as described above.   4. Abnormal septal motion consistent with post-operative status.  5. Normal right ventricular size and function.  6. There is mild septal left ventricular hypertrophy.  7. Spectral Doppler shows impaired relaxation pattern of left ventricular myocardial filling   (Grade I diastolic dysfunction).  8. Dilation of the sinuses of valsalva measuring 4.3 cm.  9. There is no evidence of pericardial effusion.  10. Compared to prior inpatient TTE done on 3/25/2022, the left ventricular function has increased from 35-40% to 40-45%    Stress Test (date): Denies  Cardiac CT or MRI (date): N/A  Cardiac Cath (date):   Cardiac surgery (date): 03/2022 CABG 61y Male with history of type 2 DM, HTN, HLD, colon cancer s/p colon resection, prostate cancer 2015 s/p radiation therapy, CAD s/p CABG (3v), ischemic cardiomyopathy with an EF of 35-40% and symptomatic paroxysmal atrial fibrillation. States he is s/p left knee replacement 3/18/2022, after discharge he was found to have hypoxia while at home by the visiting nurse, was sent to ER and found to have NSTEMI, and subsequently underwent CABG. Following the CABG procedure the patient developed atrial fibrillation/flutter. He was discharged home on amiodarone and eliquis. While following up he has converted back to sinus rhythm, amiodarone was discontinued. States he wore a event holter monitor at home was found to have episodes of atrial fibrillation. Denies chest pain, shortness of breath, palpitations, dizziness or near syncope. Patient is scheduled for Afib ablation on 10/13/2022 with Dr. López.     Echocardiogram (date): Echo: 3/25/22: EF 35-40%, normal LA size  7/8/22: 1. Left ventricular ejection fraction, by visual estimation, is 40 to 45%.  2. Mildly to moderately decreased global left ventricular systolic function.  3. Basal and mid inferolateral wall, apical septal segment, and basal inferior segment are abnormal as described above.   4. Abnormal septal motion consistent with post-operative status.  5. Normal right ventricular size and function.  6. There is mild septal left ventricular hypertrophy.  7. Spectral Doppler shows impaired relaxation pattern of left ventricular myocardial filling   (Grade I diastolic dysfunction).  8. Dilation of the sinuses of valsalva measuring 4.3 cm.  9. There is no evidence of pericardial effusion.  10. Compared to prior inpatient TTE done on 3/25/2022, the left ventricular function has increased from 35-40% to 40-45%    Stress Test (date): Denies  Cardiac CT or MRI (date): N/A  Cardiac Cath (date): 3/28/2022 multivessel CAD   Cardiac surgery (date): 03/2022 CABG

## 2022-12-16 NOTE — PROGRESS NOTE ADULT - PROBLEM SELECTOR PLAN 7
-IVF as above  -Monitor, careful w advanced kidney disease  -Diet: NPO with PEG feeds  -DVT ppx: heparin 500mg q8  -GI: protonix 40 qd  -Dispo: MICU; PT recommending [facility], Approved for [facility]ach, c/w family discussions Likely 2.2 CKD. Hgb 6.9 on 11/21, corrected appropriately s/p 1u pRBC. No active signs of bleeding on physical exam. CTH with no intracranial bleeding. Required 2u pRBC to correct. DEBRA and stool guaic negative.  - consider epo, if BP remains well controlled  - active T+S  - transfuse <7 Likely 2.2 CKD. Hgb 6.9 on 11/21, corrected appropriately s/p 1u pRBC. No active signs of bleeding on physical exam. CTH with no intracranial bleeding. Required 2u pRBC to correct. DEBRA and stool guaic negative.  - consider EPO, if BP remains well controlled  - maintain active T+S  - transfuse for Hgb <7

## 2022-12-16 NOTE — PROGRESS NOTE ADULT - ASSESSMENT
82 y/o M PMH CKD 4, renal transplant in 2013 at [facility], glaucoma (blind), DM1, anemia, CAD s/p 2 DAMEON to LAD in 6/21, BPH, recent admission for PNA presents with acute on chronic cough 2/2 PNA and SUNNY 2/2 acute CKD.     NEUROLOGY  #Agitation  CT scan negative for acute intracranial pathology. Began to wean sedation on 12/2.  - stopped precedex on 12/11  - Seroquel 50mg given for agitation can give stat dosed if pt acute agitated     CARDIOVASCULAR  # Fluid Overloaded  - started on HD on 12/6, removing 2-3L each session    #Hypotension   - Midodrine switched to 30mg with dialysis sessions   - stopped Levophed drip on 12/11    #HFpEF  Had EF of 55% on TTE from Oct 2022, grade I diastolic dysfunction. Repeat TTE 11/17 with poor visualization of LV. Patient making good UOP overnight  - holding BP meds    #Upper extremity edema  Stable from yesterday but increased compared to baseline. US with superficial thrombosis of L cephalic vein extending to AC fossa, no DVTs.   - given patient's propensity to bleed and superficial nature of thrombus, no intervention  - obtain LUE XR as concern for fracture (pt with injury prior to hospitalization)    #CAD  #HLD  Hx of CAD s/p 2 DAMEON to LAD in June 2021, currently no CP. Trops 0.03 but no CP or ischemic changes on EKG, likely due to CKD. Takes Plavix and aspirin per last d/c but only aspirin on outpatient note  - c/w ASA daily  - c/w home Lipitor    #HTN   Known history of HTN.   - holding hypertension meds  - continue to monitor BP    PULM  #AHRF  Likely 2/2 aspiration event. Intubated on 11/26.  - ID management as below for aspiration   - Received trach on 12/1  - tolerated CPAP o/n 12/6-7  - trach collar tolerated well     RENAL  #ELIZABETH on Chronic kidney disease (CKD)  Baseline sCr 2.3-2.5. On admission, fluid overloaded. Does have orthopnea and intermittent SOB at baseline. Acidotic but at baseline. Follows w Dr. Mac.  - sCr and BUN increasing daily, urine output poor  - monitor I/Os  - renally dosing meds  - s/p sodium bicarb 150 rate 60cc/hr, discontinued on 12/6  - initiated HD 12/6, removed 2L fluid  - f/u renal recs for HD and UF    #Renal transplant  Patient had renal transplant in 2013. On home mycophenolate 500mg BID and tacrolimus 4mg BID. Per nephro, decreased home tacrolimus from 4mg BID --> 2mg BID while in hospital.   - c/w tacrolimus in AM and in PM, based on level; per Renal increased to 4mg q12h on 12/8  - c/w mycophenolate 500mg BID  - Tacrolimus levels trended as per renal and wnl   - f/u renal recs    #Hyponatremia  - stable; monitor BMP    GI  #Nutrition  NPO with PEG feeds.  - restarted feeds at 6pm 12/2  - speech and swallow consulted - Pt is not a candidate for using a speaking valve at this time due to copious secretions, agitation, and inability to follow commands.      #Urinary Retention  - remove bourgeois if not chronic, TOV  - on home tamsulosin 0.4mg daily, discontinue if bourgeois remains  - c/w bladder scan q12, wnl     ID  #Aspiration PNA  RESOLVED  Tracheal aspirate cx (11/19) growing serratia marcescens and pseudomonas. Patient was started on vancomycin and cefepime. MRSA nares positive. DC'ed vancomycin given supratherapeutic trough. Completed cefepime 2g q24 course x9 days.    #Stenotrophomonas in sputum cx RESOLVED  Sputum cx from 11/26 growing Stenotrophomonas  - completed course of Bactrim BS for 10 days (11/28 - 12/5)    ENDOCRINE  #DM (diabetes mellitus).   Previously on lantus 15 lispro 5 at home. Endocrine following.  - goal -180  - f/u endo recs  - on Humulin 7units q6  - c/w sliding scale     #Hypothyroidism  TSH 20.8, free t4 0.7, T3 49 (low). Repeat TSH at 9, fT4 0.7  - on synthroid 50mcg IV qD    #Relative Adrenal Insufficiency  Patient on chronic prednisone 5mg daily. He was started on hydrocortisone 50mg q6hr --> q8 --> BID   - Hydrocortisone discontinued   - f/u endocrine recs    HEME  #Normocytic Anemia  Likely 2.2 CKD. Hgb 6.9 on 11/21, corrected appropriately s/p 1u pRBC. No active signs of bleeding on physical exam. CTH with no intracranial bleeding. Required 2u pRBC to correct. DEBRA and stool guaic negative.  - consider epo, if BP remains well controlled  - active T+S  - transfuse <7    FLUIDS/ELECTROLYTES/NUTRITION  -IVF as above  -Monitor, careful w advanced kidney disease  -Diet: NPO with PEG feeds  -DVT ppx: heparin 500mg q8  -GI: protonix 40 qd  -Dispo: MICU; PT recommending [facility], Approved for [facility]ach, c/w family discussions

## 2022-12-16 NOTE — PROGRESS NOTE ADULT - REASON FOR ADMISSION
Acute Hypoxic Respiratory Failure
AHRF
PNA
AHRF
LE edema and cough
cough 2/2 PNA
Cardiac arrest
chronic cough, LE edema

## 2022-12-16 NOTE — PROGRESS NOTE ADULT - ASSESSMENT
82 y/o M PMH CKD 4, renal transplant in 2013 at [facility], glaucoma (blind), DM1, anemia, CAD s/p 2 DAMEON to LAD in 6/21, BPH, recent admission for PNA presents with acute on chronic cough 2/2 PNA and SUNNY 2/2 acute CKD.     NEUROLOGY  #Agitation  CT scan negative for acute intracranial pathology. Began to wean sedation on 12/2.  - stopped precedex on 12/11  - Seroquel 50mg given for agitation can give stat dosed if pt acute agitated     CARDIOVASCULAR  # Fluid Overloaded  - started on HD on 12/6, removing 2-3L each session    #Hypotension   - Midodrine switched to 30mg with dialysis sessions   - stopped Levophed drip on 12/11    #HFpEF  Had EF of 55% on TTE from Oct 2022, grade I diastolic dysfunction. Repeat TTE 11/17 with poor visualization of LV. Patient making good UOP overnight  - holding BP meds    #Upper extremity edema  Stable from yesterday but increased compared to baseline. US with superficial thrombosis of L cephalic vein extending to AC fossa, no DVTs.   - given patient's propensity to bleed and superficial nature of thrombus, no intervention  - obtain LUE XR as concern for fracture (pt with injury prior to hospitalization)    #CAD  #HLD  Hx of CAD s/p 2 DAMEON to LAD in June 2021, currently no CP. Trops 0.03 but no CP or ischemic changes on EKG, likely due to CKD. Takes Plavix and aspirin per last d/c but only aspirin on outpatient note  - c/w ASA daily  - c/w home Lipitor    #HTN   Known history of HTN.   - holding hypertension meds  - continue to monitor BP    PULM  #AHRF  Likely 2/2 aspiration event. Intubated on 11/26.  - ID management as below for aspiration   - Received trach on 12/1  - tolerated CPAP o/n 12/6-7  - trach collar tolerated well     RENAL  #ELIZABETH on Chronic kidney disease (CKD)  Baseline sCr 2.3-2.5. On admission, fluid overloaded. Does have orthopnea and intermittent SOB at baseline. Acidotic but at baseline. Follows w Dr. Mac.  - sCr and BUN increasing daily, urine output poor  - monitor I/Os  - renally dosing meds  - s/p sodium bicarb 150 rate 60cc/hr, discontinued on 12/6  - initiated HD 12/6, removed 2L fluid  - f/u renal recs for HD and UF    #Renal transplant  Patient had renal transplant in 2013. On home mycophenolate 500mg BID and tacrolimus 4mg BID. Per nephro, decreased home tacrolimus from 4mg BID --> 2mg BID while in hospital.   - c/w tacrolimus in AM and in PM, based on level; per Renal increased to 4mg q12h on 12/8  - c/w mycophenolate 500mg BID  - Tacrolimus levels trended as per renal and wnl   - f/u renal recs    #Hyponatremia  - stable; monitor BMP    GI  #Nutrition  NPO with PEG feeds.  - restarted feeds at 6pm 12/2  - speech and swallow consulted - Pt is not a candidate for using a speaking valve at this time due to copious secretions, agitation, and inability to follow commands.      #Urinary Retention  - remove bourgeois if not chronic, TOV  - on home tamsulosin 0.4mg daily, discontinue if bourgeois remains  - c/w bladder scan q12, wnl     ID  #Aspiration PNA  RESOLVED  Tracheal aspirate cx (11/19) growing serratia marcescens and pseudomonas. Patient was started on vancomycin and cefepime. MRSA nares positive. DC'ed vancomycin given supratherapeutic trough. Completed cefepime 2g q24 course x9 days.    #Stenotrophomonas in sputum cx RESOLVED  Sputum cx from 11/26 growing Stenotrophomonas  - completed course of Bactrim BS for 10 days (11/28 - 12/5)    ENDOCRINE  #DM (diabetes mellitus).   Previously on lantus 15 lispro 5 at home. Endocrine following.  - goal -180  - f/u endo recs  - on Humulin 7units q6  - c/w sliding scale     #Hypothyroidism  TSH 20.8, free t4 0.7, T3 49 (low). Repeat TSH at 9, fT4 0.7  - on synthroid 50mcg IV qD    #Relative Adrenal Insufficiency  Patient on chronic prednisone 5mg daily. He was started on hydrocortisone 50mg q6hr --> q8 --> BID   - Hydrocortisone discontinued   - f/u endocrine recs    HEME  #Normocytic Anemia  Likely 2.2 CKD. Hgb 6.9 on 11/21, corrected appropriately s/p 1u pRBC. No active signs of bleeding on physical exam. CTH with no intracranial bleeding. Required 2u pRBC to correct. DEBRA and stool guaic negative.  - consider epo, if BP remains well controlled  - active T+S  - transfuse <7    FLUIDS/ELECTROLYTES/NUTRITION  -IVF as above  -Monitor, careful w advanced kidney disease  -Diet: NPO with PEG feeds  -DVT ppx: heparin 500mg q8  -GI: protonix 40 qd  -Dispo: MICU; PT recommending NAVID, Approved for LTach, c/w family discussions 84 y/o M PMH CKD 4, renal transplant in 2013 at [facility], glaucoma (blind), DM1, anemia, CAD s/p 2 DAMEON to LAD in 6/21, BPH, recent admission for PNA presents with acute on chronic cough 2/2 PNA and SUNNY 2/2 acute CKD.     NEUROLOGY  #Agitation  CT scan negative for acute intracranial pathology. Began to wean sedation on 12/2.  - stopped precedex on 12/11  - Seroquel 50mg given for agitation can give stat dosed if pt acute agitated     CARDIOVASCULAR  # Fluid Overloaded  - started on HD on 12/6, removing 2-3L each session    #Hypotension   - Midodrine switched to 30mg with dialysis sessions   - stopped Levophed drip on 12/11    #HFpEF  Had EF of 55% on TTE from Oct 2022, grade I diastolic dysfunction. Repeat TTE 11/17 with poor visualization of LV. Patient making good UOP overnight  - holding BP meds    #Upper extremity edema  Stable from yesterday but increased compared to baseline. US with superficial thrombosis of L cephalic vein extending to AC fossa, no DVTs.   - given patient's propensity to bleed and superficial nature of thrombus, no intervention  - obtain LUE XR as concern for fracture (pt with injury prior to hospitalization)    #CAD  #HLD  Hx of CAD s/p 2 DAMEON to LAD in June 2021, currently no CP. Trops 0.03 but no CP or ischemic changes on EKG, likely due to CKD. Takes Plavix and aspirin per last d/c but only aspirin on outpatient note  - c/w ASA daily  - c/w home Lipitor    #HTN   Known history of HTN.   - holding hypertension meds  - continue to monitor BP      RENAL  #ELIZABETH on Chronic kidney disease (CKD)  Baseline sCr 2.3-2.5. On admission, fluid overloaded. Does have orthopnea and intermittent SOB at baseline. Acidotic but at baseline. Follows w Dr. Mac.  - sCr and BUN increasing daily, urine output poor  - monitor I/Os  - renally dosing meds  - s/p sodium bicarb 150 rate 60cc/hr, discontinued on 12/6  - initiated HD 12/6, removed 2L fluid  - f/u renal recs for HD and UF    #Renal transplant  Patient had renal transplant in 2013. On home mycophenolate 500mg BID and tacrolimus 4mg BID. Per nephro, decreased home tacrolimus from 4mg BID --> 2mg BID while in hospital.   - c/w tacrolimus in AM and in PM, based on level; per Renal increased to 4mg q12h on 12/8  - c/w mycophenolate 500mg BID  - Tacrolimus levels trended as per renal and wnl   - f/u renal recs    #Hyponatremia  - stable; monitor BMP    GI  #Nutrition  NPO with PEG feeds.  - restarted feeds at 6pm 12/2  - speech and swallow consulted - Pt is not a candidate for using a speaking valve at this time due to copious secretions, agitation, and inability to follow commands.      #Urinary Retention  - remove bourgeois if not chronic, TOV  - on home tamsulosin 0.4mg daily, discontinue if bourgeois remains  - c/w bladder scan q12, wnl     ID    #Stenotrophomonas in sputum cx RESOLVED  Sputum cx from 11/26 growing Stenotrophomonas  - completed course of Bactrim BS for 10 days (11/28 - 12/5)    #Relative Adrenal Insufficiency  Patient on chronic prednisone 5mg daily. He was started on hydrocortisone 50mg q6hr --> q8 --> BID   - Hydrocortisone discontinued   - f/u endocrine recs    HEME  #Normocytic Anemia  Likely 2.2 CKD. Hgb 6.9 on 11/21, corrected appropriately s/p 1u pRBC. No active signs of bleeding on physical exam. CTH with no intracranial bleeding. Required 2u pRBC to correct. DEBRA and stool guaic negative.  - consider epo, if BP remains well controlled  - active T+S  - transfuse <7 84 y/o M PMH CKD 4, renal transplant in 2013 at [facility], glaucoma (blind), DM1, anemia, CAD s/p 2 DAMEON to LAD in 6/21, BPH, recent admission for PNA presents with acute on chronic cough 2/2 PNA and SUNNY 2/2 acute CKD.     #Agitation  CT scan negative for acute intracranial pathology. Began to wean sedation on 12/2.  - stopped precedex on 12/11  - Seroquel 50mg given for agitation can give stat dosed if pt acute agitated  82 y/o M PMH CKD 4, renal transplant in 2013 at [facility], glaucoma (blind), DM1, anemia, CAD s/p 2 DAMEON to LAD in 6/21, BPH, recent admission for PNA presents with acute on chronic cough 2/2 PNA and SUNNY 2/2 acute CKD.

## 2022-12-16 NOTE — PROGRESS NOTE ADULT - PROBLEM SELECTOR PLAN 8
Hx of CAD and HLD s/p 2 DAMEON to LAD in June 2021, currently no CP. Trops 0.03 but no CP or ischemic changes on EKG, likely due to CKD. Takes Plavix and aspirin per last d/c but only aspirin on outpatient note.  - c/w ASA daily  - c/w home Lipitor Hx of CAD and HLD s/p 2 DAMEON to LAD in June 2021, currently no CP. Trops 0.03 but no CP or ischemic changes on EKG, likely due to CKD. Takes Plavix and aspirin per last d/c but only aspirin on outpatient note.  - c/w ASA 81 mg PO qd  - c/w home Lipitor Hx of CAD and HLD s/p 2 DAMEON to LAD in June 2021, currently no CP. Trops 0.03 but no CP or ischemic changes on EKG, likely due to CKD. Takes Plavix and aspirin per last d/c but only aspirin on outpatient note.  - c/w ASA 81 mg PO via PEG qd  - c/w home Lipitor 40 mg PO via PEG qhs

## 2022-12-16 NOTE — PROGRESS NOTE ADULT - PROBLEM SELECTOR PLAN 4
Known history of HTN.   - holding hypertension meds  - continue to monitor BP Known history of HTN.   - holding anti-hypertensives  - continue to monitor BP

## 2022-12-16 NOTE — PROGRESS NOTE ADULT - PROBLEM SELECTOR PLAN 11
#Nutrition  NPO with PEG feeds.  - restarted feeds at 6pm 12/2  - speech and swallow consulted - Pt is not a candidate for using a speaking valve at this time due to copious secretions, agitation, and inability to follow commands.    #Prophylactic measure  -IVF as above  -Monitor, careful w advanced kidney disease  -Diet: NPO with PEG feeds  -DVT ppx: heparin 500mg q8  -GI: protonix 40 qd  -Dispo: MICU; PT recommending [facility]ID, Approved for LTach, c/w family discussions - remove bourgeois if not chronic, TOV  - on home tamsulosin 0.4mg daily, discontinue if bourgeois remains  - c/w bladder scan q12, wnl      #Agitation  CT scan negative for acute intracranial pathology. Began to wean sedation on 12/2.  - stopped precedex on 12/11  - Seroquel 50mg given for agitation can give stat dosed if pt acute agitated History of BPH. Home meds: flomax 0.4 mg PO qd.  - c/w flomax 0.4 mg PO via PEG qhs  - c/w bladder scan q12, wnl  - remove bourgeois if not chronic, TOV  - d/c flomax if bourgeois remains      #Agitation  CT scan negative for acute intracranial pathology. Began to wean sedation on 12/2. Stopped precedex on 12/11.  - Seroquel 50mg given for agitation can give STAT dosed if pt acutely agitated

## 2022-12-16 NOTE — PROGRESS NOTE ADULT - PROBLEM SELECTOR PLAN 3
Previously on lantus 15 lispro 5 at home. Endocrine following.  - goal -180  - f/u endo recs  - on Humulin 7units q6  - c/w sliding scale Previously on lantus 15 lispro 5 at home. Endocrine following.  - goal -180  - f/u endo recs  - c/w Humulin 7units q6  - c/w sliding scale Previously on lantus 15 lispro 5 at home. Endocrine following.  - goal -180  - f/u endo recs    per endo:  - decrease regular insulin to 2 units SQ q6h  - d/c lantus  - c/w regular insulin sliding scale low dose Previously on lantus 15 lispro 5 at home. Endocrine following.  - goal -180  - f/u endo recs    per endo:  - decrease regular insulin to 2 units SQ q6h while on tube feeds  - d/c lantus  - c/w regular insulin sliding scale low dose

## 2022-12-16 NOTE — PROGRESS NOTE ADULT - ASSESSMENT
Patient is an 82 yo M with ESKD s/p transplant  now CKD 4, currently with Oliguric-iATN dialysis dependent, initiated iHD . Goal net neutral by end of week    Problem/Plan:  # Oliguric- iATN-D   Renal allograft with CKD 4 - baseline sCr 2.3-2.5, usual meds tacrolimus 4mg BID and mycophenolate 500mg BID. Was started on HD on  due to worsening volume status, acidosis and concern for uremia.   Overall 14 L net positive for this admission  Electrolytes noted   Net positive 1.3 L in 24 hrs       Plan   HD today as prescribed     Hemodialysis Treatment.:     Schedule: Once, Modality: Hemodialysis, Access: Arteriovenous Fistula    Dialyzer: Optiflux [id number], Time: 180 Min    Blood Flow: 400 mL/Min , Dialysate Flow: 600 mL/Min, Dialysate Temp: 36.5, Tubinmm (Adult)    Target Fluid Removal: 1 Liters    Dialysate Electrolytes (mEq/L): Potassium 3, Calcium 2.5, Sodium 138, Bicarbonate 35    Additional Instructions: Please use 16 gauge or larger needle. (22 @ 09:06) [Completed]    Pt seen on HD. Tolerating well. VS Stable. Target UF being achieved. Continue as above      Will reassess volume status tomorrow for additional UF, almost at goal volume status    - c/w tacrolimus to 4mg AM and 4mg PM (12 hrs apart)  - c/w cell cept 500mg BID  - Tacro level: 5.6 (12/15)  - strict i's and o's  - Trend BMP daily  - maintain MAP >70     Access:  RUE AVF functional     Blood pressure:  Stable   UF with HD, as tolerated   Midodrine with HD,   Will lower Dialysate Temp to help with intradialytic hypotension     #Anemia of chronic disease  Hgb 8.7  -Epo w/ HD    Renal Bone Disease   Calcium: 8.9  Phos: 2.5  Check iPTH  Trend phos daily with labs     presenting with decompensated heart failure c/b cardiac arrest on

## 2022-12-16 NOTE — PROGRESS NOTE ADULT - PROBLEM SELECTOR PLAN 6
TSH 20.8, free t4 0.7, T3 49 (low). Repeat TSH at 9, fT4 0.7  - on synthroid 50mcg IV qD Had EF of 55% on TTE from Oct 2022, grade I diastolic dysfunction. Repeat TTE 11/17 with poor visualization of LV. Patient making good UOP overnight  - holding BP meds    # Fluid Overloaded  - started on HD on 12/6, removing 2-3L each session    #Hypotension   - Midodrine switched to 30mg with dialysis sessions   - stopped Levophed drip on 12/11 Had EF of 55% on TTE from Oct 2022, grade I diastolic dysfunction. Repeat TTE 11/17 with poor visualization of LV. Patient making good UOP overnight  - holding BP meds    # Fluid Overloaded  Started on HD on 12/6, removing 2-3L each session.    #Hypotension   Midodrine switched to 30mg with dialysis sessions. Stopped Levophed drip on 12/11.  - c/w midodrine 30 mg PO via PEG with HD

## 2022-12-16 NOTE — PROGRESS NOTE ADULT - ATTENDING COMMENTS
Acute hypoxemic respiratory failure secondary to multiple pneumonias s/p tracheostomy due to failure to wean from mechanical ventilation. S/p peg. Finished abx for Serratia/Pseudomonal/Stenotrophomonas pneumonia. Encephelopathy slowly improving; PRN seroquel. Continues to tolerate trach collar 24/7. Cannot cooperate with Valentina valve; will reattempt every 48 to 72 hours. Dispo planning to LTACH. Eligible for step down to 7 lach. Rest of plan as above.

## 2022-12-17 NOTE — PROGRESS NOTE ADULT - PROBLEM SELECTOR PLAN 2
#ELIZABETH on Chronic kidney disease (CKD)  Baseline sCr 2.3-2.5. On admission, fluid overloaded. Does have orthopnea and intermittent SOB at baseline. Acidotic but at baseline. Follows w Dr. Mac. S/p sodium bicarb 150 rate 60cc/hr, discontinued on 12/6. Initiated HD 12/6, removed 2L fluid.  - monitor BUN, Cr, UOP  - monitor I/Os  - renally dosing meds  - f/u renal recs for HD and UF      #Renal transplant  Patient had renal transplant in 2013. On home mycophenolate 500mg BID and tacrolimus 4mg BID. Per nephro, decreased home tacrolimus from 4mg BID --> 2mg BID while in hospital.  Per renal, increased tacrolimus on 12/8 to 4mg q12h.  - c/w tacrolimus 4mg PO via PEG BID  - c/w mycophenolate 500mg PO via PEG BID  - trend tacrolimus levels per renal  - f/u renal recs    #Hyponatremia  - stable; monitor BMP

## 2022-12-17 NOTE — PROGRESS NOTE ADULT - ATTENDING COMMENTS
Acute hypoxemic respiratory failure secondary to multiple pneumonias s/p tracheostomy due to failure to wean from mechanical ventilation. S/p peg. Finished abx for Serratia/Pseudomonal/Stenotrophomonas pneumonia. Encephelopathy slowly improving; PRN seroquel. Had acute tracheostomy bleeding of unclear etiology but without any hemodynamic compromise; put on vent for bedside bronchoscopy which was unrevealing. Minimize suctioning for the next 24 hours. Rest of plan as above.

## 2022-12-17 NOTE — PROCEDURE NOTE - NSBRONCHFINDINGS_GEN_A_CORE_FT
Please see above.
The main juan m was sharp. The bilateral airways appeared diffusely erythematous. There were purulent secretions noted in the bilateral proximal airways. No focal bleeding was observed in the tracheobronchial tree. No endobronchial masses were identified.

## 2022-12-17 NOTE — PROGRESS NOTE ADULT - SUBJECTIVE AND OBJECTIVE BOX
Patient is a 82y Male seen and evaluated at bedside. Ovenright events noted. Got HD yesterday, 1 L remvoed without any complications. Labs reviewed.       Meds:    acetaminophen    Suspension .. 650 every 6 hours PRN  albuterol/ipratropium for Nebulization 3 every 6 hours  aspirin  chewable 81 daily  atorvastatin 40 at bedtime  chlorhexidine 2% Cloths 1 <User Schedule>  dextrose 5%. 1000 <Continuous>  dextrose 5%. 1000 <Continuous>  dextrose 50% Injectable 25 once  dextrose 50% Injectable 12.5 once  dextrose 50% Injectable 25 once  dextrose 50% Injectable 25 once  dextrose Oral Gel 15 once PRN  glucagon  Injectable 1 once  glucagon  Injectable 1 once  heparin   Injectable 5000 every 8 hours  influenza  Vaccine (HIGH DOSE) 0.7 once  insulin regular  human corrective regimen sliding scale  every 6 hours  insulin regular  human recombinant 2 every 6 hours  levothyroxine Injectable 60 at bedtime  midodrine 30 <User Schedule>  mycophenolate mofetil Suspension 500 every 12 hours  pantoprazole   Suspension 40 every 24 hours  sodium chloride 0.9% lock flush 10 every 1 hour PRN  sodium chloride 7% Inhalation 4 every 12 hours  tacrolimus    0.5 mG/mL Suspension 4 every 24 hours  tacrolimus    0.5 mG/mL Suspension 4 every 24 hours  tamsulosin 0.4 at bedtime      T(C): , Max: 36.8 (12-17-22 @ 06:00)  T(F): , Max: 98.3 (12-17-22 @ 06:00)  HR: 98 (12-17-22 @ 12:31)  BP: 112/44 (12-17-22 @ 12:31)  BP(mean): 63 (12-17-22 @ 12:31)  RR: 20 (12-17-22 @ 12:31)  SpO2: 96% (12-17-22 @ 12:31)  Wt(kg): --    12-16 @ 07:01  -  12-17 @ 07:00  --------------------------------------------------------  IN: 1672 mL / OUT: 2100 mL / NET: -428 mL    12-17 @ 07:01  -  12-17 @ 14:23  --------------------------------------------------------  IN: 215 mL / OUT: 0 mL / NET: 215 mL          Review of Systems:  ROS negative except as per HPI      PHYSICAL EXAM:  GENERAL: trach collar, NAD, laying in bed   NECK: supple, No JVD  Lungs: No rales, ronchi or wheezing noted  HEART: normal S1S2, RRR  ABDOMEN: Soft, Nontender, +BS, No flank tenderness bilateral  EXTREMITIES: Remains anasarcic, although edema is slowly improving  ACCESS: R radiocephalic AVF w/ palpable thrill, c/d/i     LABS:                        8.6    10.42 )-----------( 288      ( 17 Dec 2022 06:36 )             29.5     12-17    137  |  96  |  39<H>  ----------------------------<  240<H>  4.3   |  28  |  1.80<H>    Ca    8.8      17 Dec 2022 06:36  Phos  2.9     12-17  Mg     2.4     12-17    TPro  5.4<L>  /  Alb  2.5<L>  /  TBili  0.4  /  DBili  x   /  AST  53<H>  /  ALT  48<H>  /  AlkPhos  132<H>  12-17                RADIOLOGY & ADDITIONAL STUDIES:

## 2022-12-17 NOTE — PROGRESS NOTE ADULT - PROBLEM SELECTOR PLAN 7
Likely 2.2 CKD. Hgb 6.9 on 11/21, corrected appropriately s/p 1u pRBC. No active signs of bleeding on physical exam. CTH with no intracranial bleeding. Required 2u pRBC to correct. DEBRA and stool guaic negative.  - consider EPO, if BP remains well controlled  - maintain active T+S  - transfuse for Hgb <7

## 2022-12-17 NOTE — PROGRESS NOTE ADULT - PROBLEM SELECTOR PLAN 5
TSH 20.8, free t4 0.7, T3 49 (low). Repeat TSH at 9, fT4 0.7. TSH 20. FT4 0.77 (11/19/22) -> Started on levothyroxine 25 mcg IV daily. TSH 9.2. FT4 0.69 (11/28/22) -> Increased to levothyroxine 50 mcg IV daily. TSH 11.1. FT4 0.56. (12/5/22) -> Increased to levothyroxine 60 mcg IV daily. TSH 6.88. FT4 0.744 (12/13/22).  - c/w synthroid 60 mcg IV qhs  - f/u endo recs

## 2022-12-17 NOTE — PROGRESS NOTE ADULT - ASSESSMENT
Patient is an 82 yo M with ESKD s/p transplant 2013 now CKD 4, currently with Oliguric-iATN dialysis dependent, initiated iHD 12/6. Goal net neutral by end of week    Problem/Plan:  # Oliguric- iATN-D   Renal allograft with CKD 4 - baseline sCr 2.3-2.5, usual meds tacrolimus 4mg BID and mycophenolate 500mg BID. Was started on HD on 12/6 due to worsening volume status, acidosis and concern for uremia.   Overall 14 L net positive for this admission  Electrolytes noted   Net negative 428 mL in 24 hrs       Plan   Next HD 12/19 unless indication of urgent HD arises before that  - c/w tacrolimus to 4mg AM and 4mg PM (12 hrs apart)  - c/w cell cept 500mg BID  - Tacro level: 5.6 (12/15)  - strict i's and o's  - Trend BMP daily  - maintain MAP >70     Access:  RUE AVF functional     Blood pressure:  Stable   UF with HD, as tolerated   Midodrine with HD,   Will lower Dialysate Temp to help with intradialytic hypotension     #Anemia of chronic disease  Hgb 8.6  -Epo w/ HD    Renal Bone Disease   Calcium: 8.8  Phos: 2.9  Check iPTH  Trend phos daily with labs     presenting with decompensated heart failure c/b cardiac arrest on 11/26

## 2022-12-17 NOTE — PROCEDURE NOTE - NSBRONCHPROCDETAILS_GEN_A_CORE_FT
Bronchoscope inserted through tracheostomy. Tracheostomy noted to be in good position. Bilateral airways were examined to the subsegmental level. Therapeutic aspiration of secretions was performed. The bronchoscope was withdrawn.     
Bronchoscope was introduced via the ETT and advanced to the upper larynx. Inspection of the airways was performed prior to assistance with the tracheostomy procedure. The left tracheobronchial tree was inspected closely to the level of the subsegmental bronchi. All bronchi are patent with no endobronchial lesions and normal mucosa. Secretions noted in the RUL and RLL. The right tracheobronchial tree was also patent and intact with the mucosa normal. Secretions noted in the LLL. The bronchoscope was then used for assistance with visualization of the percutaneous tracheostomy procedure. He tolerated the procedure well.

## 2022-12-17 NOTE — PROGRESS NOTE ADULT - PROBLEM SELECTOR PLAN 3
Previously on lantus 15 lispro 5 at home. Endocrine following.  - goal -180  - f/u endo recs    per endo:  - decrease regular insulin to 2 units SQ q6h while on tube feeds  - d/c lantus  - c/w regular insulin sliding scale low dose Rachlin, Peter (Hospital Medicine)  Rosendo Carrington ([facility])  Estuardo Gooden (Cardiology)  Elizabeth Felix (Hematology Oncology)

## 2022-12-17 NOTE — PROGRESS NOTE ADULT - PROBLEM SELECTOR PLAN 10
Relative Adrenal Insufficiency. Patient on chronic prednisone 5mg daily prior to admission. He had recent hospitalization (October 2022) for pneumonia due to pseudomonas where he was also started on stress dose steroids. At that time, a cortisol level was collected prior to starting steroids which was not robust (6.5 ug/dL). Patient's hydrocortisone was tapered off today. He was started on hydrocortisone 50mg q6hr --> q8 --> BID.  - hydrocortisone tapered off today  - f/u cortisol level in AM  - f/u endocrine recs

## 2022-12-17 NOTE — PROGRESS NOTE ADULT - SUBJECTIVE AND OBJECTIVE BOX
OVERNIGHT EVENTS: Patient received 1am for low FSG. Requiring straight cath x1 for 500cc.     SUBJECTIVE / INTERVAL HPI: Patient seen and examined at bedside. AOx0 unable to obtain ROS. Moving extremities Trach collar in place    VITAL SIGNS:  Vital Signs Last 24 Hrs  T(C): 36.3 (17 Dec 2022 14:31), Max: 36.8 (17 Dec 2022 06:00)  T(F): 97.4 (17 Dec 2022 14:31), Max: 98.3 (17 Dec 2022 06:00)  HR: 98 (17 Dec 2022 12:31) (92 - 110)  BP: 112/44 (17 Dec 2022 12:31) (107/69 - 129/54)  BP(mean): 63 (17 Dec 2022 12:31) (63 - 81)  RR: 20 (17 Dec 2022 12:31) (18 - 29)  SpO2: 96% (17 Dec 2022 12:31) (95% - 100%)    Parameters below as of 17 Dec 2022 12:31  Patient On (Oxygen Delivery Method): tracheostomy collar  O2 Flow (L/min): 10  O2 Concentration (%): 50  I&O's Summary    16 Dec 2022 07:01  -  17 Dec 2022 07:00  --------------------------------------------------------  IN: 1672 mL / OUT: 2100 mL / NET: -428 mL    17 Dec 2022 07:01  -  17 Dec 2022 15:55  --------------------------------------------------------  IN: 215 mL / OUT: 0 mL / NET: 215 mL        PHYSICAL EXAM:    Constitutional: NAD, moving extremities  HEENT: NC/AT, EOMI, no conjunctival pallor or scleral icterus, MMM. Coloboma R eye, copious secretions  Neck: Supple  Respiratory: CTA B/L. No w/r/r.   Cardiovascular: RRR, normal S1 and S2, no m/r/g.   Gastrointestinal: soft NTND, no guarding or rebound tenderness, no palpable masses   Extremities: wwp; no cyanosis, clubbing. 1+ LE edema, 3+ UE edema R > L with erythema  Vascular: intact distal pulses  Neurological: AAOx0      MEDICATIONS:  MEDICATIONS  (STANDING):  albuterol/ipratropium for Nebulization 3 milliLiter(s) Nebulizer every 6 hours  aspirin  chewable 81 milliGRAM(s) Oral daily  atorvastatin 40 milliGRAM(s) Oral at bedtime  chlorhexidine 2% Cloths 1 Application(s) Topical <User Schedule>  dextrose 5%. 1000 milliLiter(s) (100 mL/Hr) IV Continuous <Continuous>  dextrose 5%. 1000 milliLiter(s) (50 mL/Hr) IV Continuous <Continuous>  dextrose 50% Injectable 25 Gram(s) IV Push once  dextrose 50% Injectable 25 Gram(s) IV Push once  dextrose 50% Injectable 12.5 Gram(s) IV Push once  dextrose 50% Injectable 25 Gram(s) IV Push once  glucagon  Injectable 1 milliGRAM(s) IntraMuscular once  glucagon  Injectable 1 milliGRAM(s) IntraMuscular once  heparin   Injectable 5000 Unit(s) SubCutaneous every 8 hours  influenza  Vaccine (HIGH DOSE) 0.7 milliLiter(s) IntraMuscular once  insulin regular  human corrective regimen sliding scale   SubCutaneous every 6 hours  insulin regular  human recombinant 2 Unit(s) SubCutaneous every 6 hours  levothyroxine Injectable 60 MICROGram(s) IV Push at bedtime  midodrine 30 milliGRAM(s) Oral <User Schedule>  mycophenolate mofetil Suspension 500 milliGRAM(s) Oral every 12 hours  pantoprazole   Suspension 40 milliGRAM(s) Oral every 24 hours  sodium chloride 7% Inhalation 4 milliLiter(s) Inhalation every 12 hours  tacrolimus    0.5 mG/mL Suspension 4 milliGRAM(s) Oral every 24 hours  tacrolimus    0.5 mG/mL Suspension 4 milliGRAM(s) Oral every 24 hours  tamsulosin 0.4 milliGRAM(s) Oral at bedtime    MEDICATIONS  (PRN):  acetaminophen    Suspension .. 650 milliGRAM(s) Oral every 6 hours PRN Temp greater or equal to 38C (100.4F), Mild Pain (1 - 3)  dextrose Oral Gel 15 Gram(s) Oral once PRN Blood Glucose LESS THAN 70 milliGRAM(s)/deciliter  sodium chloride 0.9% lock flush 10 milliLiter(s) IV Push every 1 hour PRN Pre/post blood products, medications, blood draw, and to maintain line patency      ALLERGIES:  Allergies    No Known Allergies    Intolerances        LABS:                        8.6    10.42 )-----------( 288      ( 17 Dec 2022 06:36 )             29.5     12-17    137  |  96  |  39<H>  ----------------------------<  240<H>  4.3   |  28  |  1.80<H>    Ca    8.8      17 Dec 2022 06:36  Phos  2.9     12-17  Mg     2.4     12-17    TPro  5.4<L>  /  Alb  2.5<L>  /  TBili  0.4  /  DBili  x   /  AST  53<H>  /  ALT  48<H>  /  AlkPhos  132<H>  12-17        CAPILLARY BLOOD GLUCOSE      POCT Blood Glucose.: 226 mg/dL (17 Dec 2022 11:55)      RADIOLOGY & ADDITIONAL TESTS: Reviewed.

## 2022-12-17 NOTE — PROGRESS NOTE ADULT - ATTENDING COMMENTS
iATN in patient with h/o transplant and CKD4.  HD initiated on 12/6 for reasons above. +Right AVF.  Last HD yesterday which he tolerated well.  1L UF.  Labs reviewed and interim chart.  No indication for HD today. Likely next will be 12/19.  Continue immunosuppressants as above.

## 2022-12-17 NOTE — PROGRESS NOTE ADULT - PROBLEM SELECTOR PLAN 11
History of BPH. Home meds: flomax 0.4 mg PO qd.  - c/w flomax 0.4 mg PO via PEG qhs  - c/w bladder scan q12, wnl  - remove bourgeois if not chronic, TOV  - d/c flomax if bourgeois remains      #Agitation  CT scan negative for acute intracranial pathology. Began to wean sedation on 12/2. Stopped precedex on 12/11.  - Seroquel 50mg given for agitation can give STAT dosed if pt acutely agitated

## 2022-12-17 NOTE — PROGRESS NOTE ADULT - PROBLEM SELECTOR PLAN 8
Hx of CAD and HLD s/p 2 DAMEON to LAD in June 2021, currently no CP. Trops 0.03 but no CP or ischemic changes on EKG, likely due to CKD. Takes Plavix and aspirin per last d/c but only aspirin on outpatient note.  - c/w ASA 81 mg PO via PEG qd  - c/w home Lipitor 40 mg PO via PEG qhs

## 2022-12-17 NOTE — PROGRESS NOTE ADULT - PROBLEM SELECTOR PLAN 1
Likely 2/2 aspiration event. Intubated on 11/26. Received trach on 12/1. Tolerated CPAP o/n 12/6-7    - sputum culture of secretions, noted to be creamy appearance today  - trach collar tolerated well  - c/w hypertonic saline inhalation 4 mL BID  - c/w duonebs q6h    #Aspiration pneumonia  Tracheal aspirate cx (11/19) growing serratia marcescens and pseudomonas. Patient was started on vancomycin and cefepime. MRSA nares positive. DC'ed vancomycin given supratherapeutic trough. Completed cefepime 2g q24 course x9 days.  - RESOLVED    #Stenotrophomonas in sputum cx  Sputum cx from 11/26 growing Stenotrophomonas. completed course of Bactrim BS for 10 days (11/28 - 12/5).  - RESOLVED

## 2022-12-17 NOTE — PROGRESS NOTE ADULT - PROBLEM SELECTOR PLAN 9
Edema of b/l UE R > L with some erythema and irritation around IV site noted.  - obtain R UE doppler

## 2022-12-17 NOTE — PROGRESS NOTE ADULT - ASSESSMENT
84 y/o M PMH CKD 4, renal transplant in 2013 at [facility], glaucoma (blind), DM1, anemia, CAD s/p 2 DAMEON to LAD in 6/21, BPH, recent admission for PNA presents with acute on chronic cough 2/2 PNA and SUNNY 2/2 acute CKD.

## 2022-12-17 NOTE — PROGRESS NOTE ADULT - PROBLEM SELECTOR PLAN 12
#Nutrition  NPO with PEG feeds. Restarted feeds at 6pm 12/2  - speech and swallow consulted - Pt is not a candidate for using a speaking valve at this time due to copious secretions, agitation, and inability to follow commands.    #Prophylactic measure  - F: none  - E: Monitor, careful w advanced kidney disease  - Diet: NPO with PEG feeds  - DVT ppx: heparin 500mg SQ q8h  - GI: protonix suspension 40 qd via PEG qd  - Dispo: telemetry (7lach). PT recommending NAVID, Approved for LTach, c/w family discussions

## 2022-12-17 NOTE — PROGRESS NOTE ADULT - PROBLEM SELECTOR PLAN 6
Had EF of 55% on TTE from Oct 2022, grade I diastolic dysfunction. Repeat TTE 11/17 with poor visualization of LV. Patient making good UOP overnight  - holding BP meds    # Fluid Overloaded  Started on HD on 12/6, removing 2-3L each session.    #Hypotension   Midodrine switched to 30mg with dialysis sessions. Stopped Levophed drip on 12/11.  - c/w midodrine 30 mg PO via PEG with HD

## 2022-12-18 NOTE — PROGRESS NOTE ADULT - SUBJECTIVE AND OBJECTIVE BOX
OVERNIGHT: No acute events overnight.   SUBJECTIVE / INTERVAL HPI: Patient was seen and examined this morning.     CAPILLARY BLOOD GLUCOSE & INSULIN RECEIVED  235 mg/dL (12-17 @ 05:53)  226 mg/dL (12-17 @ 11:55)  212 mg/dL (12-17 @ 16:58)  215 mg/dL (12-17 @ 18:25)  214 mg/dL (12-17 @ 23:58)  137 mg/dL (12-18 @ 05:54)  153 mg/dL (12-18 @ 11:41)      DIET HISTORY  - Breakfast:  - Lunch:  - Dinner:     REVIEW OF SYSTEMS  Constitutional:  Negative fever, chills or loss of appetite.  Eyes:  Negative blurry vision or double vision.  Cardiovascular:  Negative for chest pain or palpitations.  Respiratory:  Negative for cough, wheezing, or shortness of breath.    Gastrointestinal:  Negative for nausea, vomiting, diarrhea, constipation, or abdominal pain.  Genitourinary:  Negative frequency, urgency or dysuria.  Neurologic:  No headache, confusion, dizziness, lightheadedness.    PHYSICAL EXAM  Vital Signs Last 24 Hrs  T(C): 37.7 (18 Dec 2022 14:00), Max: 37.7 (18 Dec 2022 01:36)  T(F): 99.9 (18 Dec 2022 14:00), Max: 99.9 (18 Dec 2022 01:36)  HR: 114 (18 Dec 2022 13:10) (82 - 114)  BP: 137/56 (18 Dec 2022 13:10) (90/52 - 146/60)  BP(mean): 81 (18 Dec 2022 13:10) (68 - 87)  RR: 22 (18 Dec 2022 12:18) (12 - 22)  SpO2: 93% (18 Dec 2022 13:10) (93% - 100%)    Parameters below as of 18 Dec 2022 12:18  Patient On (Oxygen Delivery Method): tracheostomy collar  O2 Flow (L/min): 8  O2 Concentration (%): 40    Constitutional: Awake, alert, in no acute distress.   HEENT: Normocephalic, atraumatic, GORDON, no proptosis or lid retraction.   Neck: supple, no acanthosis, no thyromegaly or palpable thyroid nodules.  Respiratory: Lungs clear to ausculation bilaterally.   Cardiovascular: regular rhythm, normal S1 and S2, no audible murmurs.   GI: soft, non-tender, non-distended, bowel sounds present, no masses appreciated.  Extremities: No lower extremity edema, peripheral pulses present.   Skin: no rashes.   Psychiatric: AAO x 3. Normal affect/mood.     LABS  CBC - WBC/HGB/HTC/PLT: 11.02/7.9/26.6/360 (12-18-22)  BMP - Na/K/Cl/Bicarb/BUN/Cr/Gluc/AG/eGFR: 138/3.7/96/32/49/2.44/144/10/26 (12-18-22)  Ca - 9.1 (12-18-22)  Phos - 2.3 (12-18-22)  Mg - 2.4 (12-18-22)  LFT - Alb/Tprot/Tbili/Dbili/AlkPhos/ALT/AST: 2.5/--/0.4/--/118/38/27 (12-18-22)    Thyroid Stimulating Hormone, Serum: 6.880 (12-13-22)  Total T4/Free T4: --/0.744 (12-13-22)    MEDICATIONS  MEDICATIONS  (STANDING):  albuterol/ipratropium for Nebulization 3 milliLiter(s) Nebulizer every 6 hours  aspirin  chewable 81 milliGRAM(s) Oral daily  atorvastatin 40 milliGRAM(s) Oral at bedtime  chlorhexidine 2% Cloths 1 Application(s) Topical <User Schedule>  dextrose 5%. 1000 milliLiter(s) (50 mL/Hr) IV Continuous <Continuous>  dextrose 5%. 1000 milliLiter(s) (100 mL/Hr) IV Continuous <Continuous>  dextrose 50% Injectable 25 Gram(s) IV Push once  dextrose 50% Injectable 12.5 Gram(s) IV Push once  dextrose 50% Injectable 25 Gram(s) IV Push once  dextrose 50% Injectable 25 Gram(s) IV Push once  glucagon  Injectable 1 milliGRAM(s) IntraMuscular once  glucagon  Injectable 1 milliGRAM(s) IntraMuscular once  heparin   Injectable 5000 Unit(s) SubCutaneous every 8 hours  influenza  Vaccine (HIGH DOSE) 0.7 milliLiter(s) IntraMuscular once  insulin regular  human corrective regimen sliding scale   SubCutaneous every 6 hours  insulin regular  human recombinant 2 Unit(s) SubCutaneous every 6 hours  levothyroxine Injectable 60 MICROGram(s) IV Push at bedtime  lidocaine 2% Injectable 2 milliLiter(s) Local Injection once  midodrine 30 milliGRAM(s) Oral <User Schedule>  mycophenolate mofetil Suspension 500 milliGRAM(s) Oral every 12 hours  pantoprazole   Suspension 40 milliGRAM(s) Oral every 24 hours  sodium chloride 7% Inhalation 4 milliLiter(s) Inhalation every 12 hours  tacrolimus    0.5 mG/mL Suspension 4 milliGRAM(s) Oral every 24 hours  tacrolimus    0.5 mG/mL Suspension 4 milliGRAM(s) Oral every 24 hours  tamsulosin 0.4 milliGRAM(s) Oral at bedtime    MEDICATIONS  (PRN):  acetaminophen    Suspension .. 650 milliGRAM(s) Oral every 6 hours PRN Temp greater or equal to 38C (100.4F), Mild Pain (1 - 3)  dextrose Oral Gel 15 Gram(s) Oral once PRN Blood Glucose LESS THAN 70 milliGRAM(s)/deciliter  sodium chloride 0.9% lock flush 10 milliLiter(s) IV Push every 1 hour PRN Pre/post blood products, medications, blood draw, and to maintain line patency    ASSESSMENT / RECOMMENDATIONS    A1C: 9.0 %  BUN: 49  Creatinine: 2.44  GFR: 26  Weight: 82.3  BMI: 28.4  EF:     # Type 2 diabetes mellitus  - Please continue lantus *** units at bedtime.   - Continue lispro *** units before each meal.  - Continue lispro moderate / low dose sliding scale before meals and at bedtime.  - Patient's fingerstick glucose goal is 100-180 mg/dL.    - For discharge, patient can ***.    - Patient can follow up at discharge with [facility] by calling [phone] to make an appointment.      Case discussed with Dr. Rodríguez. Primary team updated.       Ulysses Weber    Endocrinology Fellow    Service Pager: [phone]    OVERNIGHT: No acute events overnight.   SUBJECTIVE / INTERVAL HPI: Patient was seen and examined this morning. Yesterday, he was noted to have bloody secretions from tracheostomy site with mild increase in work of breathing. He was placed on ventilator and his tube feeds were stopped yesterday afternoon. Bedside bronchoscopy didn't show any signs of bleeding. Patient's blood glucose levels were above target yesterday; however, levels have decreased today, likely due to feeds being held.     CAPILLARY BLOOD GLUCOSE & INSULIN RECEIVED  139 mg/dL (12-16 @ 17:34)   154 mg/dL (12-16 @ 19:17) -> 7 units of regular insulin   64 mg/dL (12-17 @ 00:23)    64 mg/dL (12-17 @ 00:26) -> 25 grams of dextrose IVP once.    147 mg/dL (12-17 @ 01:07)   235 mg/dL (12-17 @ 05:53) -> 2 units of regular insulin + 2 units of sliding scale.    226 mg/dL (12-17 @ 11:55) -> 2 units of regular insulin + 2 units of sliding scale.   212 mg/dL (12-17 @ 16:58) -> 2 units of regular insulin + 2 units of sliding scale.   215 mg/dL (12-17 @ 18:25) -> 2 units of sliding scale.    214 mg/dL (12-17 @ 23:58) -> 2 units of sliding scale.    137 mg/dL (12-18 @ 05:54)   153 mg/dL (12-18 @ 11:41) -> 2 units of regular insulin + 2 units of sliding scale.     REVIEW OF SYSTEMS  Unable to obtain.     PHYSICAL EXAM  Vital Signs Last 24 Hrs  T(C): 37.7 (18 Dec 2022 14:00), Max: 37.7 (18 Dec 2022 01:36)  T(F): 99.9 (18 Dec 2022 14:00), Max: 99.9 (18 Dec 2022 01:36)  HR: 114 (18 Dec 2022 13:10) (82 - 114)  BP: 137/56 (18 Dec 2022 13:10) (90/52 - 146/60)  BP(mean): 81 (18 Dec 2022 13:10) (68 - 87)  RR: 22 (18 Dec 2022 12:18) (12 - 22)  SpO2: 93% (18 Dec 2022 13:10) (93% - 100%)    Parameters below as of 18 Dec 2022 12:18  Patient On (Oxygen Delivery Method): tracheostomy collar  O2 Flow (L/min): 8  O2 Concentration (%): 40    Constitutional: Awake, alert, elderly male.   HEENT: Normocephalic, atraumatic, GORDON.  Respiratory: (+) Bilateral rhonchi, on trach collar.   Cardiovascular: regular rhythm, normal S1 and S2, no audible murmurs.   GI: soft, non-tender, non-distended, bowel sounds present.  Extremities: Trace bilateral lower extremity edema,    LABS  CBC - WBC/HGB/HTC/PLT: 11.02/7.9/26.6/360 (12-18-22)  BMP - Na/K/Cl/Bicarb/BUN/Cr/Gluc/AG/eGFR: 138/3.7/96/32/49/2.44/144/10/26 (12-18-22)  Ca - 9.1 (12-18-22)  Phos - 2.3 (12-18-22)  Mg - 2.4 (12-18-22)  LFT - Alb/Tprot/Tbili/Dbili/AlkPhos/ALT/AST: 2.5/--/0.4/--/118/38/27 (12-18-22)    Thyroid Stimulating Hormone, Serum: 6.880 (12-13-22)  Total T4/Free T4: --/0.744 (12-13-22)    MEDICATIONS  MEDICATIONS  (STANDING):  albuterol/ipratropium for Nebulization 3 milliLiter(s) Nebulizer every 6 hours  aspirin  chewable 81 milliGRAM(s) Oral daily  atorvastatin 40 milliGRAM(s) Oral at bedtime  chlorhexidine 2% Cloths 1 Application(s) Topical <User Schedule>  dextrose 5%. 1000 milliLiter(s) (50 mL/Hr) IV Continuous <Continuous>  dextrose 5%. 1000 milliLiter(s) (100 mL/Hr) IV Continuous <Continuous>  dextrose 50% Injectable 25 Gram(s) IV Push once  dextrose 50% Injectable 12.5 Gram(s) IV Push once  dextrose 50% Injectable 25 Gram(s) IV Push once  dextrose 50% Injectable 25 Gram(s) IV Push once  glucagon  Injectable 1 milliGRAM(s) IntraMuscular once  glucagon  Injectable 1 milliGRAM(s) IntraMuscular once  heparin   Injectable 5000 Unit(s) SubCutaneous every 8 hours  influenza  Vaccine (HIGH DOSE) 0.7 milliLiter(s) IntraMuscular once  insulin regular  human corrective regimen sliding scale   SubCutaneous every 6 hours  insulin regular  human recombinant 2 Unit(s) SubCutaneous every 6 hours  levothyroxine Injectable 60 MICROGram(s) IV Push at bedtime  lidocaine 2% Injectable 2 milliLiter(s) Local Injection once  midodrine 30 milliGRAM(s) Oral <User Schedule>  mycophenolate mofetil Suspension 500 milliGRAM(s) Oral every 12 hours  pantoprazole   Suspension 40 milliGRAM(s) Oral every 24 hours  sodium chloride 7% Inhalation 4 milliLiter(s) Inhalation every 12 hours  tacrolimus    0.5 mG/mL Suspension 4 milliGRAM(s) Oral every 24 hours  tacrolimus    0.5 mG/mL Suspension 4 milliGRAM(s) Oral every 24 hours  tamsulosin 0.4 milliGRAM(s) Oral at bedtime    MEDICATIONS  (PRN):  acetaminophen    Suspension .. 650 milliGRAM(s) Oral every 6 hours PRN Temp greater or equal to 38C (100.4F), Mild Pain (1 - 3)  dextrose Oral Gel 15 Gram(s) Oral once PRN Blood Glucose LESS THAN 70 milliGRAM(s)/deciliter  sodium chloride 0.9% lock flush 10 milliLiter(s) IV Push every 1 hour PRN Pre/post blood products, medications, blood draw, and to maintain line patency    ASSESSMENT / RECOMMENDATIONS  Mr. Miguel is an 83-year-old male with type 2 diabetes mellitus, coronary artery disease (s/p DAMEON x2 to LAD on 6/2021), chronic kidney disease stage 4 (s/p renal transplant on 2013, on tracrolimus and prednisone) and benign prostatic hyperplasia who was sent to the hospital by his nephrologist for further evaluation of lower extremity edema (11/17/22). He was admitted for further management of lower extremity edema, thought to be secondary to his underlying CKD versus congestive heart failure. Hospitalization was complicated by hypoglycemia and cardiac arrest (11/19/22, ROSC after 1 minute of chest compressions). Post-cardiac arrest he was noted to develop hypotension, bradycardia and hypothermia. Labs were remarkable for elevated TSH (20) and decreased FT4 (0.77). 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     Although he had features seen with myxedema coma, these findings were better explained by his cardiac arrest as his vital signs prior to the arrest were apparently around his baseline, as was his mental status. He was started on levothyroxine supplementation. The patient was extubated on 11/22/22; however, he had to be reintubated on 11/26/22 due to episode of respiratory decompensation, potentially secondary to aspiration. He is now s/p trach and peg.    A1C: 9.0 %  BUN: 49  Creatinine: 2.44  GFR: 26  Weight: 82.3  BMI: 28.4  EF: 65%, grade II diastolic dysfunction.    # Type 2 diabetes mellitus  - Patient blood glucose levels were above target yesterday while on tube feeds. After feeds were discontinued, his blood glucose levels have improved this morning/afternoon. Per primary team, they plan to restart tube feeds today again.   - Please increase regular insulin to 4 units every 6 hours when tube feeds are resumed. Hold regular insulin if blood glucose <100 mg/dL.   - Continue regular insulin sliding scale low dose every 6 hours.   - Patient's fingerstick glucose goal is 100-180 mg/dL.    # Hypothyroidism   - TSH 20. FT4 0.77 (11/19/22) -> Started on levothyroxine 25 mcg IV daily.   - TSH 9.2. FT4 0.69 (11/28/22) -> Increased to levothyroxine 50 mcg IV daily.   - TSH 11.1. FT4 0.56. (12/5/22) -> Increased to levothyroxine 60 mcg IV daily.  - TSH  6.88. FT4 0.744 (12/13/22)  - Continue with levothyroxine 60 mcg IV daily.     Case discussed with Dr. Rodríguez. Primary team updated.       Ulysses Weber    Endocrinology Fellow    Service Pager: [phone]

## 2022-12-18 NOTE — PROGRESS NOTE ADULT - ATTENDING COMMENTS
Pt seen at bedside with fellow.   Family at bedside.  Sugars 200s yesterday, but today 137, 153 today because feeds were held.  If feeds are restarted, increase insulin coverage to 4 units q 6h (up from 2 units).

## 2022-12-18 NOTE — PROGRESS NOTE ADULT - ATTENDING COMMENTS
Acute hypoxemic respiratory failure secondary to multiple pneumonias s/p tracheostomy due to failure to wean from mechanical ventilation. S/p peg. Finished abx for Serratia/Pseudomonal/Stenotrophomonas pneumonia. Encephelopathy slowly improving. Had acute tracheostomy bleeding of unclear etiology but without any hemodynamic compromise; now resolved; back on trach collar. LTAC transfer pending. Rest of plan as per above.

## 2022-12-18 NOTE — PROGRESS NOTE ADULT - PROBLEM SELECTOR PLAN 10
Relative Adrenal Insufficiency. Patient on chronic prednisone 5mg daily prior to admission. He had recent hospitalization (October 2022) for pneumonia due to pseudomonas where he was also started on stress dose steroids. At that time, a cortisol level was collected prior to starting steroids which was not robust (6.5 ug/dL). Patient's hydrocortisone was tapered off today. He was started on hydrocortisone 50mg q6hr --> q8 --> BID.  - hydrocortisone tapered off today  - f/u cortisol level in AM  - f/u endocrine recs Relative Adrenal Insufficiency. Patient on chronic prednisone 5mg daily prior to admission. He had recent hospitalization (October 2022) for pneumonia due to pseudomonas where he was also started on stress dose steroids. At that time, a cortisol level was collected prior to starting steroids which was not robust (6.5 ug/dL). Patient's hydrocortisone was tapered off today. He was started on hydrocortisone 50mg q6hr --> q8 --> BID.  - hydrocortisone tapered off  - f/u cortisol level in AM  - f/u endocrine recs

## 2022-12-18 NOTE — PROGRESS NOTE ADULT - PROBLEM SELECTOR PLAN 9
Edema of b/l UE R > L with some erythema and irritation around IV site noted.  - obtain R UE doppler Edema of b/l UE R > L with some erythema and irritation around IV site noted.  - f/u R UE doppler

## 2022-12-18 NOTE — PROGRESS NOTE ADULT - SUBJECTIVE AND OBJECTIVE BOX
***INCOMPLETE NOTE    SUBJECTIVE / INTERVAL HPI: Patient seen and examined at bedside. No overnight events.    VITAL SIGNS:  Vital Signs Last 24 Hrs  T(C): 37.2 (18 Dec 2022 06:03), Max: 37.7 (18 Dec 2022 01:36)  T(F): 99 (18 Dec 2022 06:03), Max: 99.9 (18 Dec 2022 01:36)  HR: 84 (18 Dec 2022 05:01) (82 - 110)  BP: 146/60 (18 Dec 2022 04:05) (90/52 - 146/60)  BP(mean): 87 (18 Dec 2022 04:05) (63 - 87)  RR: 20 (18 Dec 2022 05:01) (12 - 20)  SpO2: 99% (18 Dec 2022 05:01) (95% - 100%)    Parameters below as of 18 Dec 2022 05:01  Patient On (Oxygen Delivery Method): ventilator    O2 Concentration (%): 44    PHYSICAL EXAM:    General: Resting comfortably in bed; NAD  HEENT: NC/AT, EOMI, anicteric sclera, MMM, neck supple, no nasal discharge  Cardiac: RRR; normal S1/S2, no MRG, no LE edema, no JVD  Respiratory: CTAB; no wheezes, ronchi, increased work of breathing, retractions  Gastrointestinal: +BSx4, abdomen soft, NT/ND; no rebound or guarding  Genitourinary: no suprapubic tenderness; bourgeois*; normal external genitalia  Extremities: WWP, no clubbing or cyanosis; no peripheral edema  Vascular: 2+ radial and DP pulses bilaterally  Dermatologic: skin warm, dry and intact; no rashes, open wounds  Neurologic: AAOx3; no focal deficits  Psychiatric: affect and characteristics of appearance, verbalizations, behaviors are appropriate    MEDICATIONS:  MEDICATIONS  (STANDING):  albuterol/ipratropium for Nebulization 3 milliLiter(s) Nebulizer every 6 hours  aspirin  chewable 81 milliGRAM(s) Oral daily  atorvastatin 40 milliGRAM(s) Oral at bedtime  chlorhexidine 2% Cloths 1 Application(s) Topical <User Schedule>  dextrose 5%. 1000 milliLiter(s) (50 mL/Hr) IV Continuous <Continuous>  dextrose 5%. 1000 milliLiter(s) (100 mL/Hr) IV Continuous <Continuous>  dextrose 50% Injectable 12.5 Gram(s) IV Push once  dextrose 50% Injectable 25 Gram(s) IV Push once  dextrose 50% Injectable 25 Gram(s) IV Push once  dextrose 50% Injectable 25 Gram(s) IV Push once  glucagon  Injectable 1 milliGRAM(s) IntraMuscular once  glucagon  Injectable 1 milliGRAM(s) IntraMuscular once  heparin   Injectable 5000 Unit(s) SubCutaneous every 8 hours  influenza  Vaccine (HIGH DOSE) 0.7 milliLiter(s) IntraMuscular once  insulin regular  human corrective regimen sliding scale   SubCutaneous every 6 hours  insulin regular  human recombinant 2 Unit(s) SubCutaneous every 6 hours  levothyroxine Injectable 60 MICROGram(s) IV Push at bedtime  lidocaine 2% Injectable 2 milliLiter(s) Local Injection once  midodrine 30 milliGRAM(s) Oral <User Schedule>  mycophenolate mofetil Suspension 500 milliGRAM(s) Oral every 12 hours  pantoprazole   Suspension 40 milliGRAM(s) Oral every 24 hours  sodium chloride 7% Inhalation 4 milliLiter(s) Inhalation every 12 hours  tacrolimus    0.5 mG/mL Suspension 4 milliGRAM(s) Oral every 24 hours  tacrolimus    0.5 mG/mL Suspension 4 milliGRAM(s) Oral every 24 hours  tamsulosin 0.4 milliGRAM(s) Oral at bedtime    MEDICATIONS  (PRN):  acetaminophen    Suspension .. 650 milliGRAM(s) Oral every 6 hours PRN Temp greater or equal to 38C (100.4F), Mild Pain (1 - 3)  dextrose Oral Gel 15 Gram(s) Oral once PRN Blood Glucose LESS THAN 70 milliGRAM(s)/deciliter  sodium chloride 0.9% lock flush 10 milliLiter(s) IV Push every 1 hour PRN Pre/post blood products, medications, blood draw, and to maintain line patency      ALLERGIES:  Allergies    No Known Allergies    Intolerances        LABS:                        8.6    10.42 )-----------( 288      ( 17 Dec 2022 06:36 )             29.5     12-17    137  |  96  |  39<H>  ----------------------------<  240<H>  4.3   |  28  |  1.80<H>    Ca    8.8      17 Dec 2022 06:36  Phos  2.9     12-17  Mg     2.4     12-17    TPro  5.4<L>  /  Alb  2.5<L>  /  TBili  0.4  /  DBili  x   /  AST  53<H>  /  ALT  48<H>  /  AlkPhos  132<H>  12-17        CAPILLARY BLOOD GLUCOSE      POCT Blood Glucose.: 137 mg/dL (18 Dec 2022 05:54)      RADIOLOGY & ADDITIONAL TESTS: Reviewed.   SUBJECTIVE / INTERVAL HPI: Patient seen and examined at bedside. No overnight events. Pt not able to engage in ROS. Patient with continued significant secretions, blood tinged.     VITAL SIGNS:  Vital Signs Last 24 Hrs  T(C): 37.2 (18 Dec 2022 06:03), Max: 37.7 (18 Dec 2022 01:36)  T(F): 99 (18 Dec 2022 06:03), Max: 99.9 (18 Dec 2022 01:36)  HR: 84 (18 Dec 2022 05:01) (82 - 110)  BP: 146/60 (18 Dec 2022 04:05) (90/52 - 146/60)  BP(mean): 87 (18 Dec 2022 04:05) (63 - 87)  RR: 20 (18 Dec 2022 05:01) (12 - 20)  SpO2: 99% (18 Dec 2022 05:01) (95% - 100%)    Parameters below as of 18 Dec 2022 05:01  Patient On (Oxygen Delivery Method): ventilator    O2 Concentration (%): 44    PHYSICAL EXAM:    General: mildly uncomfortable appearing; alert  HEENT: NC/AT, R eye erythematous, blue iris, drainage  Cardiac: RRR; normal S1/S2, no MRG  Respiratory: significant bilateral rhonchi; trach in place, significant secretions w/ blood tinged  Gastrointestinal: +BSx4, abdomen soft, NT/ND; no rebound or guarding  Genitourinary: no suprapubic tenderness; bourgeois*; normal external genitalia  Extremities: WWP, no clubbing or cyanosis; no peripheral edema  Vascular: 2+ radial and DP pulses bilaterally  Dermatologic: skin warm, dry and intact; no rashes, open wounds  Neurologic: AAOx3; no focal deficits  Psychiatric: affect and characteristics of appearance, verbalizations, behaviors are appropriate    MEDICATIONS:  MEDICATIONS  (STANDING):  albuterol/ipratropium for Nebulization 3 milliLiter(s) Nebulizer every 6 hours  aspirin  chewable 81 milliGRAM(s) Oral daily  atorvastatin 40 milliGRAM(s) Oral at bedtime  chlorhexidine 2% Cloths 1 Application(s) Topical <User Schedule>  dextrose 5%. 1000 milliLiter(s) (50 mL/Hr) IV Continuous <Continuous>  dextrose 5%. 1000 milliLiter(s) (100 mL/Hr) IV Continuous <Continuous>  dextrose 50% Injectable 12.5 Gram(s) IV Push once  dextrose 50% Injectable 25 Gram(s) IV Push once  dextrose 50% Injectable 25 Gram(s) IV Push once  dextrose 50% Injectable 25 Gram(s) IV Push once  glucagon  Injectable 1 milliGRAM(s) IntraMuscular once  glucagon  Injectable 1 milliGRAM(s) IntraMuscular once  heparin   Injectable 5000 Unit(s) SubCutaneous every 8 hours  influenza  Vaccine (HIGH DOSE) 0.7 milliLiter(s) IntraMuscular once  insulin regular  human corrective regimen sliding scale   SubCutaneous every 6 hours  insulin regular  human recombinant 2 Unit(s) SubCutaneous every 6 hours  levothyroxine Injectable 60 MICROGram(s) IV Push at bedtime  lidocaine 2% Injectable 2 milliLiter(s) Local Injection once  midodrine 30 milliGRAM(s) Oral <User Schedule>  mycophenolate mofetil Suspension 500 milliGRAM(s) Oral every 12 hours  pantoprazole   Suspension 40 milliGRAM(s) Oral every 24 hours  sodium chloride 7% Inhalation 4 milliLiter(s) Inhalation every 12 hours  tacrolimus    0.5 mG/mL Suspension 4 milliGRAM(s) Oral every 24 hours  tacrolimus    0.5 mG/mL Suspension 4 milliGRAM(s) Oral every 24 hours  tamsulosin 0.4 milliGRAM(s) Oral at bedtime    MEDICATIONS  (PRN):  acetaminophen    Suspension .. 650 milliGRAM(s) Oral every 6 hours PRN Temp greater or equal to 38C (100.4F), Mild Pain (1 - 3)  dextrose Oral Gel 15 Gram(s) Oral once PRN Blood Glucose LESS THAN 70 milliGRAM(s)/deciliter  sodium chloride 0.9% lock flush 10 milliLiter(s) IV Push every 1 hour PRN Pre/post blood products, medications, blood draw, and to maintain line patency      ALLERGIES:  Allergies    No Known Allergies    Intolerances        LABS:                        8.6    10.42 )-----------( 288      ( 17 Dec 2022 06:36 )             29.5     12-17    137  |  96  |  39<H>  ----------------------------<  240<H>  4.3   |  28  |  1.80<H>    Ca    8.8      17 Dec 2022 06:36  Phos  2.9     12-17  Mg     2.4     12-17    TPro  5.4<L>  /  Alb  2.5<L>  /  TBili  0.4  /  DBili  x   /  AST  53<H>  /  ALT  48<H>  /  AlkPhos  132<H>  12-17        CAPILLARY BLOOD GLUCOSE      POCT Blood Glucose.: 137 mg/dL (18 Dec 2022 05:54)      RADIOLOGY & ADDITIONAL TESTS: Reviewed.   SUBJECTIVE / INTERVAL HPI: Patient seen and examined at bedside. No overnight events. Pt not able to engage in ROS.     VITAL SIGNS:  Vital Signs Last 24 Hrs  T(C): 37.2 (18 Dec 2022 06:03), Max: 37.7 (18 Dec 2022 01:36)  T(F): 99 (18 Dec 2022 06:03), Max: 99.9 (18 Dec 2022 01:36)  HR: 84 (18 Dec 2022 05:01) (82 - 110)  BP: 146/60 (18 Dec 2022 04:05) (90/52 - 146/60)  BP(mean): 87 (18 Dec 2022 04:05) (63 - 87)  RR: 20 (18 Dec 2022 05:01) (12 - 20)  SpO2: 99% (18 Dec 2022 05:01) (95% - 100%)    Parameters below as of 18 Dec 2022 05:01  Patient On (Oxygen Delivery Method): ventilator    O2 Concentration (%): 44    PHYSICAL EXAM:  Constitutional: NAD, moving extremities  HEENT: NC/AT, EOMI, no conjunctival pallor or scleral icterus, MMM. Coloboma R eye, copious secretions  Neck: Supple  Respiratory: diffuse ronchi to anterior auscultation. on vent VC/AC, no increased WOB   Cardiovascular: RRR, normal S1 and S2, no m/r/g.   Gastrointestinal: soft NTND, no guarding or rebound tenderness, no palpable masses   Extremities: wwp; no cyanosis, clubbing. BL LE edema  Neurological: AAOx0    MEDICATIONS:  MEDICATIONS  (STANDING):  albuterol/ipratropium for Nebulization 3 milliLiter(s) Nebulizer every 6 hours  aspirin  chewable 81 milliGRAM(s) Oral daily  atorvastatin 40 milliGRAM(s) Oral at bedtime  chlorhexidine 2% Cloths 1 Application(s) Topical <User Schedule>  dextrose 5%. 1000 milliLiter(s) (50 mL/Hr) IV Continuous <Continuous>  dextrose 5%. 1000 milliLiter(s) (100 mL/Hr) IV Continuous <Continuous>  dextrose 50% Injectable 12.5 Gram(s) IV Push once  dextrose 50% Injectable 25 Gram(s) IV Push once  dextrose 50% Injectable 25 Gram(s) IV Push once  dextrose 50% Injectable 25 Gram(s) IV Push once  glucagon  Injectable 1 milliGRAM(s) IntraMuscular once  glucagon  Injectable 1 milliGRAM(s) IntraMuscular once  heparin   Injectable 5000 Unit(s) SubCutaneous every 8 hours  influenza  Vaccine (HIGH DOSE) 0.7 milliLiter(s) IntraMuscular once  insulin regular  human corrective regimen sliding scale   SubCutaneous every 6 hours  insulin regular  human recombinant 2 Unit(s) SubCutaneous every 6 hours  levothyroxine Injectable 60 MICROGram(s) IV Push at bedtime  lidocaine 2% Injectable 2 milliLiter(s) Local Injection once  midodrine 30 milliGRAM(s) Oral <User Schedule>  mycophenolate mofetil Suspension 500 milliGRAM(s) Oral every 12 hours  pantoprazole   Suspension 40 milliGRAM(s) Oral every 24 hours  sodium chloride 7% Inhalation 4 milliLiter(s) Inhalation every 12 hours  tacrolimus    0.5 mG/mL Suspension 4 milliGRAM(s) Oral every 24 hours  tacrolimus    0.5 mG/mL Suspension 4 milliGRAM(s) Oral every 24 hours  tamsulosin 0.4 milliGRAM(s) Oral at bedtime    MEDICATIONS  (PRN):  acetaminophen    Suspension .. 650 milliGRAM(s) Oral every 6 hours PRN Temp greater or equal to 38C (100.4F), Mild Pain (1 - 3)  dextrose Oral Gel 15 Gram(s) Oral once PRN Blood Glucose LESS THAN 70 milliGRAM(s)/deciliter  sodium chloride 0.9% lock flush 10 milliLiter(s) IV Push every 1 hour PRN Pre/post blood products, medications, blood draw, and to maintain line patency      ALLERGIES:  Allergies    No Known Allergies    Intolerances        LABS:                        8.6    10.42 )-----------( 288      ( 17 Dec 2022 06:36 )             29.5     12-17    137  |  96  |  39<H>  ----------------------------<  240<H>  4.3   |  28  |  1.80<H>    Ca    8.8      17 Dec 2022 06:36  Phos  2.9     12-17  Mg     2.4     12-17    TPro  5.4<L>  /  Alb  2.5<L>  /  TBili  0.4  /  DBili  x   /  AST  53<H>  /  ALT  48<H>  /  AlkPhos  132<H>  12-17        CAPILLARY BLOOD GLUCOSE      POCT Blood Glucose.: 137 mg/dL (18 Dec 2022 05:54)      RADIOLOGY & ADDITIONAL TESTS: Reviewed.

## 2022-12-18 NOTE — PROGRESS NOTE ADULT - PROBLEM SELECTOR PLAN 12
#Nutrition  NPO with PEG feeds. Restarted feeds at 6pm 12/2  - speech and swallow consulted - Pt is not a candidate for using a speaking valve at this time due to copious secretions, agitation, and inability to follow commands.    #Prophylactic measure  - F: none  - E: Monitor, careful w advanced kidney disease  - Diet: NPO with PEG feeds  - DVT ppx: heparin 500mg SQ q8h  - GI: protonix suspension 40 qd via PEG qd  - Dispo: telemetry (7lach). PT recommending [facility], Approved for LTach, c/w family discussions #Nutrition  NPO with PEG feeds. Plan to restart tube feeds if tolerating trach collar well 12/18.  - speech and swallow consulted - Pt is not a candidate for using a speaking valve at this time due to copious secretions, agitation, and inability to follow commands.    #Prophylactic measure  - F: none  - E: Monitor, careful w advanced kidney disease  - Diet: NPO with PEG feeds (restart 12/18 if tolerating trach collar)  - DVT ppx: heparin 500mg SQ q8h  - GI: protonix IV  - Dispo: telemetry (7lach). PT recommending [facility], Approved for LTach, c/w family discussions

## 2022-12-18 NOTE — PROGRESS NOTE ADULT - PROBLEM SELECTOR PLAN 1
Likely 2/2 aspiration event. Intubated on 11/26. Received trach on 12/1. Tolerated CPAP o/n 12/6-7    - sputum culture of secretions, noted to be creamy appearance today  - trach collar tolerated well  - c/w hypertonic saline inhalation 4 mL BID  - c/w duonebs q6h    #Aspiration pneumonia  Tracheal aspirate cx (11/19) growing serratia marcescens and pseudomonas. Patient was started on vancomycin and cefepime. MRSA nares positive. DC'ed vancomycin given supratherapeutic trough. Completed cefepime 2g q24 course x9 days.  - RESOLVED    #Stenotrophomonas in sputum cx  Sputum cx from 11/26 growing Stenotrophomonas. completed course of Bactrim BS for 10 days (11/28 - 12/5).  - RESOLVED Likely 2/2 aspiration event. Intubated on 11/26. Received trach on 12/1. Tolerated CPAP o/n 12/6-7. Sputum cx from 12/5 NG final.  - monitor secretions  - trial trach collar 12/18  - c/w hypertonic saline 7% inhalation 4 mL BID  - c/w duonebs q6h    #Aspiration pneumonia  Tracheal aspirate cx (11/19) growing serratia marcescens and pseudomonas. Patient was started on vancomycin and cefepime. MRSA nares positive. DC'ed vancomycin given supratherapeutic trough. Completed cefepime 2g q24 course x9 days.  - RESOLVED    #Stenotrophomonas in sputum cx  Sputum cx from 11/26 growing Stenotrophomonas. completed course of Bactrim BS for 10 days (11/28 - 12/5).  - RESOLVED

## 2022-12-18 NOTE — PROGRESS NOTE ADULT - PROBLEM SELECTOR PLAN 3
Previously on lantus 15 lispro 5 at home. Endocrine following.  - goal -180  - f/u endo recs    per endo:  - decrease regular insulin to 2 units SQ q6h while on tube feeds  - d/c lantus  - c/w regular insulin sliding scale low dose Previously on lantus 15 lispro 5 at home. Endocrine following.  - goal -180  - f/u endo recs  - plan to restart tube feeds if tolerating trach collar well 12/18    per endo:  - regular insulin to 2 units SQ q6h while on tube feeds  - d/c lantus  - c/w regular insulin sliding scale low dose

## 2022-12-18 NOTE — PROGRESS NOTE ADULT - ASSESSMENT
84 y/o M PMH CKD 4, renal transplant in 2013 at [facility], glaucoma (blind), DM1, anemia, CAD s/p 2 DAMEON to LAD in 6/21, BPH, recent admission for PNA presents with acute on chronic cough 2/2 PNA and SUNNY 2/2 acute CKD.

## 2022-12-19 NOTE — CHART NOTE - NSCHARTNOTEFT_GEN_A_CORE
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    PO Intake: Good (%) [   ]  Fair (50-75%) [   ] Poor (<25%) [   ] NPO [ x ]     GI Issues: No gastrointestinal signs/symptoms. LBM 12/19; rectal tube in place, stool loose per chart.    Pain: No non-verbal indicators present    Skin Integrity: PI urethral meatus, Edema 3+ R arm, 2+ L arm.    Labs:   12-19    135  |  94<L>  |  59<H>  ----------------------------<  167<H>  3.9   |  31  |  2.93<H>    Ca    8.9      19 Dec 2022 05:30  Phos  3.1     12-19  Mg     2.5     12-19    TPro  5.8<L>  /  Alb  2.6<L>  /  TBili  0.4  /  DBili  x   /  AST  36  /  ALT  37  /  AlkPhos  128<H>  12-19    CAPILLARY BLOOD GLUCOSE  POCT Blood Glucose.: 163 mg/dL (19 Dec 2022 11:49)  POCT Blood Glucose.: 251 mg/dL (19 Dec 2022 06:00)  POCT Blood Glucose.: 208 mg/dL (18 Dec 2022 23:26)  POCT Blood Glucose.: 161 mg/dL (18 Dec 2022 21:14)  POCT Blood Glucose.: 108 mg/dL (18 Dec 2022 16:38)    Medications:  MEDICATIONS  (STANDING):  albuterol/ipratropium for Nebulization 3 milliLiter(s) Nebulizer every 6 hours  aspirin  chewable 81 milliGRAM(s) Oral daily  atorvastatin 40 milliGRAM(s) Oral at bedtime  chlorhexidine 2% Cloths 1 Application(s) Topical <User Schedule>  dextrose 5%. 1000 milliLiter(s) (50 mL/Hr) IV Continuous <Continuous>  dextrose 5%. 1000 milliLiter(s) (100 mL/Hr) IV Continuous <Continuous>  dextrose 50% Injectable 25 Gram(s) IV Push once  dextrose 50% Injectable 12.5 Gram(s) IV Push once  dextrose 50% Injectable 25 Gram(s) IV Push once  dextrose 50% Injectable 25 Gram(s) IV Push once  glucagon  Injectable 1 milliGRAM(s) IntraMuscular once  glucagon  Injectable 1 milliGRAM(s) IntraMuscular once  heparin   Injectable 5000 Unit(s) SubCutaneous every 8 hours  influenza  Vaccine (HIGH DOSE) 0.7 milliLiter(s) IntraMuscular once  insulin regular  human corrective regimen sliding scale   SubCutaneous every 6 hours  insulin regular  human recombinant 4 Unit(s) SubCutaneous every 6 hours  levothyroxine Injectable 60 MICROGram(s) IV Push at bedtime  lidocaine 2% Injectable 2 milliLiter(s) Local Injection once  midodrine 30 milliGRAM(s) Oral <User Schedule>  mycophenolate mofetil Suspension 500 milliGRAM(s) Oral every 12 hours  pantoprazole   Suspension 40 milliGRAM(s) Oral every 24 hours  sodium chloride 7% Inhalation 4 milliLiter(s) Inhalation every 12 hours  tacrolimus    0.5 mG/mL Suspension 4 milliGRAM(s) Oral every 24 hours  tacrolimus    0.5 mG/mL Suspension 4 milliGRAM(s) Oral every 24 hours  tamsulosin 0.4 milliGRAM(s) Oral at bedtime    MEDICATIONS  (PRN):  acetaminophen    Suspension .. 650 milliGRAM(s) Oral every 6 hours PRN Temp greater or equal to 38C (100.4F), Mild Pain (1 - 3)  dextrose Oral Gel 15 Gram(s) Oral once PRN Blood Glucose LESS THAN 70 milliGRAM(s)/deciliter  sodium chloride 0.9% lock flush 10 milliLiter(s) IV Push every 1 hour PRN Pre/post blood products, medications, blood draw, and to maintain line patency    Admitting Anthropometrics  Height for BMI (FEET)	5 Feet  Height for BMI (INCHES)	7 Inch(s)  Height for BMI (cm)	170.18 Centimeter(s)  Weight for BMI (lbs)	220 lb  Weight for BMI (kg)	99.8 kg  Body Mass Index	34.4    Weight Change:   12/12 - 95.8 kg / 92.8 kg  12/13 - 97.6 kg / 95.6 kg  12/14 - 96.2 kg / 93.2 kg  12/16 - HD w/ 1 L UF, unable to obtain accurate weight  12/19 - 82 kg   *Suggests ~17 kg wt loss since admit (18% x1 month). Suspect wt discrepancy fluid related - pt 15 L net positive upon admission.    Estimated energy needs:   -Needs updated 11/23 using IBW 67.3kg adjusted for vented pt.   EEN: 3491-4901 kcal (25-30 kcal/kg)  EPN:  gProt. (1.4-1.6 gProt./kg)  EFN: 6959-4697 ml (25-30 ml/kg)    Subjective:   84 y/o M PMH CKD 4, S/P renal transplant in 2013 at [facility], glaucoma (blind), DM1, anemia, CAD s/p 2 DAMEON to LAD in 6/21, BPH, recent admission for PNA presented with acute on chronic cough and b/l LE edema 2/2 acute CHF vs CKD. Cardiology and renal consulted, and pt was started on Lasix 40mg IV BID with improvement in b/l LE edema. This morning, pt found to be hypoxic to 70s on RA and hypoglycemia 31, difficult to arouse, and rapid response was called. Pt was put on supplemental oxygen with O2sat 80s, still difficult to arouse, and pt was intubated. Lost a pulse on pt so code blue was called, ROSC achieved, and pt was transferred to the ICU for closer monitoring. 11/19: Intubated. 11/22: Bowel care started. 11/23: Diarrhea. 11/30: PEG placed. 12/4: Constipation, started on lactulose. 12/7: Started on miralax, senna. 12/11: Lactulose d/c'd. 12/12: Diarrhea,  d/c'd senna and miralax. 12/14: S/S unable to assess for speaking valve. 12/15: maalox for abdominal pain. 12/16: dialysis. S/S - pt not candidate for speaking valve at this time. Transferred [facility]. 12/17: Pt with bloody secretions from trachesostomy. Pt placed on ventilator. 12/18: Transitioned to trach collar. Feeds restarted. 12/19: VCAC in AM, switching to trach collar.     Pt seen for follow up at bedside. Pt on trach collar with vent on standby. MAP 70; pt ordered for midodrine, weaning off pressor support. Observed TF running Nepro at goal rate. Spoke with RN, pt tolerating feeds well with no complaints GI distress. Labs reviewed with Phos 2.3, POCT 251 208 161; ordered for sliding scale. No further nutritional concerns at this time. RD will continue to monitor, reassess, and intervene as appropriate.     Previous Nutrition Diagnosis: Inadequate Protein Energy Intake r/t intubation/trach collar AEB NPO status    Active [ x ]  Resolved [   ] *adjusted 12/19    Goal: Pt will meet 75% or more of protein/energy needs via most appropriate route for nutrition.     Recommendations:  - Continue NPO with EN via PEG as tolerated. Recommend Nepro @ 43 mL/hr x 24 hr w/ LPS 1x/day (100 kcal, 15 g pro). Provides: 1032 ml TF, 1958 kcal, 99 g pro, and 750 ml FW. This is 23.2 non-protein kcal and 1.47 g pro/kg IBW 63.7kg.   >> Maintain aspiration precautions at all times.   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    PO Intake: Good (%) [   ]  Fair (50-75%) [   ] Poor (<25%) [   ] NPO [ x ]     GI Issues: No gastrointestinal signs/symptoms. LBM 12/19; rectal tube in place, stool loose per chart.    Pain: No non-verbal indicators present    Skin Integrity: PI urethral meatus, Edema 3+ R arm, 2+ L arm.    Labs:   12-19    135  |  94<L>  |  59<H>  ----------------------------<  167<H>  3.9   |  31  |  2.93<H>    Ca    8.9      19 Dec 2022 05:30  Phos  3.1     12-19  Mg     2.5     12-19    TPro  5.8<L>  /  Alb  2.6<L>  /  TBili  0.4  /  DBili  x   /  AST  36  /  ALT  37  /  AlkPhos  128<H>  12-19    CAPILLARY BLOOD GLUCOSE  POCT Blood Glucose.: 163 mg/dL (19 Dec 2022 11:49)  POCT Blood Glucose.: 251 mg/dL (19 Dec 2022 06:00)  POCT Blood Glucose.: 208 mg/dL (18 Dec 2022 23:26)  POCT Blood Glucose.: 161 mg/dL (18 Dec 2022 21:14)  POCT Blood Glucose.: 108 mg/dL (18 Dec 2022 16:38)    Medications:  MEDICATIONS  (STANDING):  albuterol/ipratropium for Nebulization 3 milliLiter(s) Nebulizer every 6 hours  aspirin  chewable 81 milliGRAM(s) Oral daily  atorvastatin 40 milliGRAM(s) Oral at bedtime  chlorhexidine 2% Cloths 1 Application(s) Topical <User Schedule>  dextrose 5%. 1000 milliLiter(s) (50 mL/Hr) IV Continuous <Continuous>  dextrose 5%. 1000 milliLiter(s) (100 mL/Hr) IV Continuous <Continuous>  dextrose 50% Injectable 25 Gram(s) IV Push once  dextrose 50% Injectable 12.5 Gram(s) IV Push once  dextrose 50% Injectable 25 Gram(s) IV Push once  dextrose 50% Injectable 25 Gram(s) IV Push once  glucagon  Injectable 1 milliGRAM(s) IntraMuscular once  glucagon  Injectable 1 milliGRAM(s) IntraMuscular once  heparin   Injectable 5000 Unit(s) SubCutaneous every 8 hours  influenza  Vaccine (HIGH DOSE) 0.7 milliLiter(s) IntraMuscular once  insulin regular  human corrective regimen sliding scale   SubCutaneous every 6 hours  insulin regular  human recombinant 4 Unit(s) SubCutaneous every 6 hours  levothyroxine Injectable 60 MICROGram(s) IV Push at bedtime  lidocaine 2% Injectable 2 milliLiter(s) Local Injection once  midodrine 30 milliGRAM(s) Oral <User Schedule>  mycophenolate mofetil Suspension 500 milliGRAM(s) Oral every 12 hours  pantoprazole   Suspension 40 milliGRAM(s) Oral every 24 hours  sodium chloride 7% Inhalation 4 milliLiter(s) Inhalation every 12 hours  tacrolimus    0.5 mG/mL Suspension 4 milliGRAM(s) Oral every 24 hours  tacrolimus    0.5 mG/mL Suspension 4 milliGRAM(s) Oral every 24 hours  tamsulosin 0.4 milliGRAM(s) Oral at bedtime    MEDICATIONS  (PRN):  acetaminophen    Suspension .. 650 milliGRAM(s) Oral every 6 hours PRN Temp greater or equal to 38C (100.4F), Mild Pain (1 - 3)  dextrose Oral Gel 15 Gram(s) Oral once PRN Blood Glucose LESS THAN 70 milliGRAM(s)/deciliter  sodium chloride 0.9% lock flush 10 milliLiter(s) IV Push every 1 hour PRN Pre/post blood products, medications, blood draw, and to maintain line patency    Admitting Anthropometrics  Height for BMI (FEET)	5 Feet  Height for BMI (INCHES)	7 Inch(s)  Height for BMI (cm)	170.18 Centimeter(s)  Weight for BMI (lbs)	220 lb  Weight for BMI (kg)	99.8 kg  Body Mass Index	34.4    Weight Change:   12/12 - 95.8 kg / 92.8 kg  12/13 - 97.6 kg / 95.6 kg  12/14 - 96.2 kg / 93.2 kg  12/16 - HD w/ 1 L UF, unable to obtain accurate weight  12/19 - 82 kg   *Suggests ~17 kg wt loss since admit (18% x1 month). Suspect wt discrepancy fluid related - pt 15 L net positive upon admission.    Estimated energy needs:   -Needs updated 11/23 using IBW 67.3kg adjusted for vented pt.   EEN: 8777-2619 kcal (25-30 kcal/kg)  EPN:  gProt. (1.4-1.6 gProt./kg)  EFN: 7725-7931 ml (25-30 ml/kg)    Subjective:   84 y/o M PMH CKD 4, S/P renal transplant in 2013 at [facility], glaucoma (blind), DM1, anemia, CAD s/p 2 DAMEON to LAD in 6/21, BPH, recent admission for PNA presented with acute on chronic cough and b/l LE edema 2/2 acute CHF vs CKD. Cardiology and renal consulted, and pt was started on Lasix 40mg IV BID with improvement in b/l LE edema. This morning, pt found to be hypoxic to 70s on RA and hypoglycemia 31, difficult to arouse, and rapid response was called. Pt was put on supplemental oxygen with O2sat 80s, still difficult to arouse, and pt was intubated. Lost a pulse on pt so code blue was called, ROSC achieved, and pt was transferred to the ICU for closer monitoring. 11/19: Intubated. 11/22: Bowel care started. 11/23: Diarrhea. 11/30: PEG placed. 12/4: Constipation, started on lactulose. 12/7: Started on miralax, senna. 12/11: Lactulose d/c'd. 12/12: Diarrhea, d/c'd senna and miralax. 12/14: S/S unable to assess for speaking valve. 12/15: maalox for abdominal pain. 12/16: dialysis. S/S - pt not candidate for speaking valve at this time. Transferred to [facility] 12/17: Pt with bloody secretions from trachesostomy. Pt placed on ventilator. 12/18: Transitioned to trach collar. Feeds restarted. 12/19: VCAC in AM, switching to trach collar.     Pt seen for follow up at bedside. Pt on trach collar with vent on standby. MAP 70; pt ordered for midodrine, weaning off pressor support. Observed TF running Nepro at goal rate. Spoke with RN, pt tolerating feeds well with no complaints GI distress. Labs reviewed with POCT 251 208 161; ordered for sliding scale. No further nutritional concerns at this time. RD will continue to monitor, reassess, and intervene as appropriate.     Previous Nutrition Diagnosis: Inadequate Protein Energy Intake r/t intubation/trach collar AEB NPO status    Active [ x ]  Resolved [   ] *adjusted 12/19    Goal: Pt will meet 75% or more of protein/energy needs via most appropriate route for nutrition.     Recommendations:  - Continue NPO with EN via PEG as tolerated. Recommend Nepro @ 43 mL/hr x 24 hr w/ LPS 1x/day (100 kcal, 15 g pro). Provides: 1032 ml TF, 1958 kcal, 99 g pro, and 750 ml FW. This is 24.5 non-protein kcal and 1.55 g pro/kg IBW 63.7kg.   >> Maintain aspiration precautions at all times.   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    PO Intake: Good (%) [   ]  Fair (50-75%) [   ] Poor (<25%) [   ] NPO [ x ]     GI Issues: No gastrointestinal signs/symptoms. LBM 12/19; rectal tube in place, stool loose per chart.    Pain: No non-verbal indicators present    Skin Integrity: PI urethral meatus, Edema 3+ R arm, 2+ L arm.    Labs:   12-19    135  |  94<L>  |  59<H>  ----------------------------<  167<H>  3.9   |  31  |  2.93<H>    Ca    8.9      19 Dec 2022 05:30  Phos  3.1     12-19  Mg     2.5     12-19    TPro  5.8<L>  /  Alb  2.6<L>  /  TBili  0.4  /  DBili  x   /  AST  36  /  ALT  37  /  AlkPhos  128<H>  12-19    CAPILLARY BLOOD GLUCOSE  POCT Blood Glucose.: 163 mg/dL (19 Dec 2022 11:49)  POCT Blood Glucose.: 251 mg/dL (19 Dec 2022 06:00)  POCT Blood Glucose.: 208 mg/dL (18 Dec 2022 23:26)  POCT Blood Glucose.: 161 mg/dL (18 Dec 2022 21:14)  POCT Blood Glucose.: 108 mg/dL (18 Dec 2022 16:38)    Medications:  MEDICATIONS  (STANDING):  albuterol/ipratropium for Nebulization 3 milliLiter(s) Nebulizer every 6 hours  aspirin  chewable 81 milliGRAM(s) Oral daily  atorvastatin 40 milliGRAM(s) Oral at bedtime  chlorhexidine 2% Cloths 1 Application(s) Topical <User Schedule>  dextrose 5%. 1000 milliLiter(s) (50 mL/Hr) IV Continuous <Continuous>  dextrose 5%. 1000 milliLiter(s) (100 mL/Hr) IV Continuous <Continuous>  dextrose 50% Injectable 25 Gram(s) IV Push once  dextrose 50% Injectable 12.5 Gram(s) IV Push once  dextrose 50% Injectable 25 Gram(s) IV Push once  dextrose 50% Injectable 25 Gram(s) IV Push once  glucagon  Injectable 1 milliGRAM(s) IntraMuscular once  glucagon  Injectable 1 milliGRAM(s) IntraMuscular once  heparin   Injectable 5000 Unit(s) SubCutaneous every 8 hours  influenza  Vaccine (HIGH DOSE) 0.7 milliLiter(s) IntraMuscular once  insulin regular  human corrective regimen sliding scale   SubCutaneous every 6 hours  insulin regular  human recombinant 4 Unit(s) SubCutaneous every 6 hours  levothyroxine Injectable 60 MICROGram(s) IV Push at bedtime  lidocaine 2% Injectable 2 milliLiter(s) Local Injection once  midodrine 30 milliGRAM(s) Oral <User Schedule>  mycophenolate mofetil Suspension 500 milliGRAM(s) Oral every 12 hours  pantoprazole   Suspension 40 milliGRAM(s) Oral every 24 hours  sodium chloride 7% Inhalation 4 milliLiter(s) Inhalation every 12 hours  tacrolimus    0.5 mG/mL Suspension 4 milliGRAM(s) Oral every 24 hours  tacrolimus    0.5 mG/mL Suspension 4 milliGRAM(s) Oral every 24 hours  tamsulosin 0.4 milliGRAM(s) Oral at bedtime    MEDICATIONS  (PRN):  acetaminophen    Suspension .. 650 milliGRAM(s) Oral every 6 hours PRN Temp greater or equal to 38C (100.4F), Mild Pain (1 - 3)  dextrose Oral Gel 15 Gram(s) Oral once PRN Blood Glucose LESS THAN 70 milliGRAM(s)/deciliter  sodium chloride 0.9% lock flush 10 milliLiter(s) IV Push every 1 hour PRN Pre/post blood products, medications, blood draw, and to maintain line patency    Admitting Anthropometrics  Height for BMI (FEET)	5 Feet  Height for BMI (INCHES)	7 Inch(s)  Height for BMI (cm)	170.18 Centimeter(s)  Weight for BMI (lbs)	220 lb  Weight for BMI (kg)	99.8 kg  Body Mass Index	34.4    Weight Change:   12/12 - 95.8 kg / 92.8 kg  12/13 - 97.6 kg / 95.6 kg  12/14 - 96.2 kg / 93.2 kg  12/16 - HD w/ 1 L UF, unable to obtain accurate weight  12/19 - 82 kg   *Suggests ~17 kg wt loss since admit (18% x1 month). Suspect wt discrepancy fluid related - pt 15 L net positive upon admission.    Estimated energy needs:   -Needs updated 11/23 using IBW 67.3kg adjusted for vented pt.   EEN: 7764-6005 kcal (25-30 kcal/kg)  EPN:  gProt. (1.4-1.6 gProt./kg)  EFN: 0440-2774 ml (25-30 ml/kg)    Subjective:   84 y/o M PMH CKD 4, S/P renal transplant in 2013 at [facility], glaucoma (blind), DM1, anemia, CAD s/p 2 DAMEON to LAD in 6/21, BPH, recent admission for PNA presented with acute on chronic cough and b/l LE edema 2/2 acute CHF vs CKD. Cardiology and renal consulted, and pt was started on Lasix 40mg IV BID with improvement in b/l LE edema. This morning, pt found to be hypoxic to 70s on RA and hypoglycemia 31, difficult to arouse, and rapid response was called. Pt was put on supplemental oxygen with O2sat 80s, still difficult to arouse, and pt was intubated. Lost a pulse on pt so code blue was called, ROSC achieved, and pt was transferred to the ICU for closer monitoring. 11/19: Intubated. 11/22: Bowel care started. 11/23: Diarrhea. 11/30: PEG placed. 12/4: Constipation, started on lactulose. 12/7: Started on miralax, senna. 12/11: Lactulose d/c'd. 12/12: Diarrhea, d/c'd senna and miralax. 12/14: S/S unable to assess for speaking valve. 12/15: maalox for abdominal pain. 12/16: dialysis. S/S - pt not candidate for speaking valve at this time. Transferred to [facility]. 12/17: Pt with bloody secretions from tracheostomy. Pt placed on ventilator. 12/18: Transitioned to trach collar. Feeds restarted. 12/19: VCAC in AM, switching to trach collar.     Pt seen for follow up at bedside. Pt on trach collar with vent on standby. MAP 70; pt ordered for midodrine, weaning off pressor support. Observed TF running Nepro at goal rate. Spoke with RN, pt tolerating feeds well with no complaints GI distress. Labs reviewed with POCT 251 208 161; ordered for sliding scale. No further nutritional concerns at this time. RD will continue to monitor, reassess, and intervene as appropriate.     Previous Nutrition Diagnosis: Inadequate Protein Energy Intake r/t vent/trach collar AEB NPO status    Active [ x ]  Resolved [   ] *adjusted 12/19    Goal: Pt will meet 75% or more of protein/energy needs via most appropriate route for nutrition.     Recommendations:  - Continue NPO with EN via PEG as tolerated. Recommend Nepro @ 43 mL/hr x 24 hr w/ LPS 1x/day (100 kcal, 15 g pro). Provides: 1032 ml TF, 1958 kcal, 99 g pro, and 750 ml FW. This is 24.5 non-protein kcal and 1.55 g pro/kg IBW 63.7kg.    *Consider transition to Jevity 1.5 @46 ml/hr with LPS x2/day to provide 1104 ml TF, 1856 kcal, 100 gProt., and 839 ml.   >> Maintain aspiration precautions at all times.   - Monitor pressor demands, adjust as necessary  - Monitor weights, chemistry, GI fxn, skin integrity  - Align nutrition with GOC at all times    Risk Level: High [   ] Moderate [ x  ] Low [   ]

## 2022-12-19 NOTE — PROGRESS NOTE ADULT - SUBJECTIVE AND OBJECTIVE BOX
OVERNIGHT EVENTS: MAGGIE    SUBJECTIVE / INTERVAL HPI: Patient seen and examined at bedside. AOX0, unable to obtain ROS.    VITAL SIGNS:  Vital Signs Last 24 Hrs  T(C): 37.9 (19 Dec 2022 09:28), Max: 37.9 (19 Dec 2022 09:28)  T(F): 100.2 (19 Dec 2022 09:28), Max: 100.2 (19 Dec 2022 09:28)  HR: 96 (19 Dec 2022 12:35) (84 - 110)  BP: 102/45 (19 Dec 2022 12:35) (102/45 - 133/55)  BP(mean): 65 (19 Dec 2022 12:35) (65 - 90)  RR: 20 (19 Dec 2022 12:35) (12 - 25)  SpO2: 100% (19 Dec 2022 12:35) (94% - 100%)    Parameters below as of 19 Dec 2022 12:35  Patient On (Oxygen Delivery Method): tracheostomy collar    O2 Concentration (%): 40  I&O's Summary    18 Dec 2022 07:01  -  19 Dec 2022 07:00  --------------------------------------------------------  IN: 695 mL / OUT: 800 mL / NET: -105 mL    19 Dec 2022 07:01  -  19 Dec 2022 13:29  --------------------------------------------------------  IN: 318 mL / OUT: 0 mL / NET: 318 mL        PHYSICAL EXAM:    Constitutional: NAD, moving extremities  HEENT: NC/AT, EOMI, no conjunctival pallor or scleral icterus, MMM. Coloboma R eye, copious secretions  Neck: Supple  Respiratory: diffuse ronchi to anterior auscultation. on trach chollar, pulling good TV  Cardiovascular: RRR, normal S1 and S2, no m/r/g.   Gastrointestinal: soft NTND, no guarding or rebound tenderness, no palpable masses   Extremities: wwp; no cyanosis, clubbing. BL LE edema  Neurological: AAOx0    MEDICATIONS:  MEDICATIONS  (STANDING):  albuterol/ipratropium for Nebulization 3 milliLiter(s) Nebulizer every 6 hours  aspirin  chewable 81 milliGRAM(s) Oral daily  atorvastatin 40 milliGRAM(s) Oral at bedtime  chlorhexidine 2% Cloths 1 Application(s) Topical <User Schedule>  dextrose 5%. 1000 milliLiter(s) (100 mL/Hr) IV Continuous <Continuous>  dextrose 5%. 1000 milliLiter(s) (50 mL/Hr) IV Continuous <Continuous>  dextrose 50% Injectable 25 Gram(s) IV Push once  dextrose 50% Injectable 25 Gram(s) IV Push once  dextrose 50% Injectable 25 Gram(s) IV Push once  dextrose 50% Injectable 12.5 Gram(s) IV Push once  glucagon  Injectable 1 milliGRAM(s) IntraMuscular once  glucagon  Injectable 1 milliGRAM(s) IntraMuscular once  heparin   Injectable 5000 Unit(s) SubCutaneous every 8 hours  influenza  Vaccine (HIGH DOSE) 0.7 milliLiter(s) IntraMuscular once  insulin regular  human corrective regimen sliding scale   SubCutaneous every 6 hours  insulin regular  human recombinant 4 Unit(s) SubCutaneous every 6 hours  levothyroxine Injectable 60 MICROGram(s) IV Push at bedtime  lidocaine 2% Injectable 2 milliLiter(s) Local Injection once  midodrine 30 milliGRAM(s) Oral <User Schedule>  mycophenolate mofetil Suspension 500 milliGRAM(s) Oral every 12 hours  pantoprazole   Suspension 40 milliGRAM(s) Oral every 24 hours  sodium chloride 7% Inhalation 4 milliLiter(s) Inhalation every 12 hours  tacrolimus    0.5 mG/mL Suspension 4 milliGRAM(s) Oral every 24 hours  tacrolimus    0.5 mG/mL Suspension 4 milliGRAM(s) Oral every 24 hours  tamsulosin 0.4 milliGRAM(s) Oral at bedtime    MEDICATIONS  (PRN):  acetaminophen    Suspension .. 650 milliGRAM(s) Oral every 6 hours PRN Temp greater or equal to 38C (100.4F), Mild Pain (1 - 3)  dextrose Oral Gel 15 Gram(s) Oral once PRN Blood Glucose LESS THAN 70 milliGRAM(s)/deciliter  sodium chloride 0.9% lock flush 10 milliLiter(s) IV Push every 1 hour PRN Pre/post blood products, medications, blood draw, and to maintain line patency      ALLERGIES:  Allergies    No Known Allergies    Intolerances        LABS:                        7.7    27.25 )-----------( 346      ( 19 Dec 2022 05:30 )             25.8     12-19    135  |  94<L>  |  59<H>  ----------------------------<  167<H>  3.9   |  31  |  2.93<H>    Ca    8.9      19 Dec 2022 05:30  Phos  3.1     12-19  Mg     2.5     12-19    TPro  5.8<L>  /  Alb  2.6<L>  /  TBili  0.4  /  DBili  x   /  AST  36  /  ALT  37  /  AlkPhos  128<H>  12-19        CAPILLARY BLOOD GLUCOSE      POCT Blood Glucose.: 163 mg/dL (19 Dec 2022 11:49)      RADIOLOGY & ADDITIONAL TESTS: Reviewed.

## 2022-12-19 NOTE — PROGRESS NOTE ADULT - ASSESSMENT
82 y/o M PMH CKD 4, renal transplant in 2013 at [facility], glaucoma (blind), DM1, anemia, CAD s/p 2 DAMEON to LAD in 6/21, BPH, recent admission for PNA presents with acute on chronic cough 2/2 PNA and SUNNY 2/2 acute CKD.

## 2022-12-19 NOTE — PROGRESS NOTE ADULT - PROBLEM SELECTOR PLAN 3
Previously on lantus 15 lispro 5 at home. Endocrine following.  - goal -180  - regular insulin to 4 units SQ q6h while on tube feeds  - d/c lantus  - c/w regular insulin sliding scale low dose  - c/w tube feeds  - Endo following, higinio recs

## 2022-12-19 NOTE — PROGRESS NOTE ADULT - ATTENDING COMMENTS
This patient with hypothyroidism and Diabetes mellitus is on Tube feeds. TSH was 20 so he is appropriately on 60 ucg IV Levothyroxine daily. On 4 units REgular Insulin every 6 hours glucose levels were 108-208 yesterday and today 251-163. I agree with increaseing Regular Insulin to 5 units q 6hours.

## 2022-12-19 NOTE — PROGRESS NOTE ADULT - PROBLEM SELECTOR PLAN 1
Likely 2/2 aspiration event. Intubated on 11/26. Received trach on 12/1. Tolerated CPAP o/n 12/6-7. Sputum cx from 12/5 NG final.  - monitor secretions  - c/w trach collar  - c/w hypertonic saline 7% inhalation 4 mL BID  - c/w duonebs q6h    #Aspiration pneumonia  Tracheal aspirate cx (11/19) growing serratia marcescens and pseudomonas. Patient was started on vancomycin and cefepime. MRSA nares positive. DC'ed vancomycin given supratherapeutic trough. Completed cefepime 2g q24 course x9 days.  - RESOLVED    #Stenotrophomonas in sputum cx  Sputum cx from 11/26 growing Stenotrophomonas. completed course of Bactrim BS for 10 days (11/28 - 12/5).  - RESOLVED Likely 2/2 aspiration event. Intubated on 11/26. Received trach on 12/1. Tolerated CPAP o/n 12/6-7. Tracheal aspirate cx (11/19) growing serratia marcescens and pseudomonas. Patient was started on vancomycin and cefepime. MRSA nares positive. DC'ed vancomycin given supratherapeutic trough. Completed cefepime 2g q24 course x9 days. Sputum cx from 11/26 growing Stenotrophomonas. completed course of Bactrim BS for 10 days (11/28 - 12/5). Sputum cx from 12/5 NG final.    12/19 patient with elevated WBC, axillary 100.2. HDS.  - F/u Bcx, Ucx, RVP, CXR  - monitor secretions  - c/w trach collar  - c/w hypertonic saline 7% inhalation 4 mL BID  - c/w duonebs q6h

## 2022-12-19 NOTE — PROGRESS NOTE ADULT - PROBLEM SELECTOR PLAN 12
#Nutrition  NPO with PEG feeds. Plan to restart tube feeds if tolerating trach collar well 12/18.  - speech and swallow consulted - Pt is not a candidate for using a speaking valve at this time due to copious secretions, agitation, and inability to follow commands.    #Prophylactic measure  - F: none  - E: Monitor, careful w advanced kidney disease  - Diet: NPO with PEG feeds (restart 12/18 if tolerating trach collar)  - DVT ppx: heparin 500mg SQ q8h  - GI: protonix IV  - Dispo: telemetry (7lach). PT recommending NAVID, Approved for LTach, c/w family discussions

## 2022-12-19 NOTE — PROGRESS NOTE ADULT - PROBLEM SELECTOR PLAN 2
#ELIZABETH on Chronic kidney disease (CKD)  Baseline sCr 2.3-2.5. On admission, fluid overloaded. Does have orthopnea and intermittent SOB at baseline. Acidotic but at baseline. Follows w Dr. Mac. S/p sodium bicarb 150 rate 60cc/hr, discontinued on 12/6. Initiated HD 12/6, removed 2L fluid.  - monitor BUN, Cr, UOP  - monitor I/Os  - renally dosing meds  - f/u renal recs for HD and UF      #Renal transplant  Patient had renal transplant in 2013. On home mycophenolate 500mg BID and tacrolimus 4mg BID. Per nephro, decreased home tacrolimus from 4mg BID --> 2mg BID while in hospital.  Per renal, increased tacrolimus on 12/8 to 4mg q12h.  - c/w tacrolimus 4mg PO via PEG BID  - c/w mycophenolate 500mg PO via PEG BID  - trend tacrolimus levels per renal  - f/u renal recs    #Hyponatremia  - stable; monitor BMP #ELIZABETH on Chronic kidney disease (CKD)  Baseline sCr 2.3-2.5. On admission, fluid overloaded. Does have orthopnea and intermittent SOB at baseline. Acidotic but at baseline. Follows w Dr. Mac. S/p sodium bicarb 150 rate 60cc/hr, discontinued on 12/6. Initiated HD 12/6, removed 2L fluid.  - monitor BUN, Cr, UOP  - monitor I/Os  - renally dosing meds  - f/u renal recs for HD and UF    #Renal transplant  Patient had renal transplant in 2013. On home mycophenolate 500mg BID and tacrolimus 4mg BID. Per nephro, decreased home tacrolimus from 4mg BID --> 2mg BID while in hospital.  Per renal, increased tacrolimus on 12/8 to 4mg q12h.  - c/w tacrolimus 4mg PO via PEG BID  - c/w mycophenolate 500mg PO via PEG BID  - trend tacrolimus levels per renal  - f/u renal recs    #Hyponatremia  - stable; monitor BMP

## 2022-12-19 NOTE — PROGRESS NOTE ADULT - PROBLEM SELECTOR PLAN 10
Relative Adrenal Insufficiency. Patient on chronic prednisone 5mg daily prior to admission. He had recent hospitalization (October 2022) for pneumonia due to pseudomonas where he was also started on stress dose steroids. At that time, a cortisol level was collected prior to starting steroids which was not robust (6.5 ug/dL). Patient's hydrocortisone was tapered off today. He was started on hydrocortisone 50mg q6hr --> q8 --> BID.  - hydrocortisone tapered off  - f/u cortisol level in AM  - f/u endocrine recs

## 2022-12-19 NOTE — PROGRESS NOTE ADULT - SUBJECTIVE AND OBJECTIVE BOX
OVERNIGHT: No acute events overnight.   SUBJECTIVE / INTERVAL HPI: Patient was seen and examined this morning. He continues to be on trach collar. Tube feeds now at goal of 43 mL/hr without interruptions.     CAPILLARY BLOOD GLUCOSE & INSULIN RECEIVED  226 mg/dL (12-17 @ 11:55) -> 2 units of regular insulin + 2 units of sliding scale.   212 mg/dL (12-17 @ 16:58) -> 2 units of regular insulin + 2 units of sliding scale.   215 mg/dL (12-17 @ 18:25) -> 2 units of sliding scale. (OFF TUBE FEEDS)   214 mg/dL (12-17 @ 23:58) -> 2 units of sliding scale.    137 mg/dL (12-18 @ 05:54) -> He didn’t receive insulin.    153 mg/dL (12-18 @ 11:41) -> 2 units of regular insulin + 1 unit of sliding scale.   108 mg/dL (12-18 @ 16:38) -> He didn’t receive insulin.  (TUBE FEEDS RESTARTED)   208 mg/dL (12-18 @ 23:26) -> 4 units of regular insulin + 2 units of sling scale.   251 mg/dL (12-19 @ 06:00) -> 4 units of regular insulin + 3 units of sliding scale.    163 mg/dL (12-19 @ 11:49) -> 4 units of regular insulin + 1 unit of sliding scale.      PHYSICAL EXAM  Vital Signs Last 24 Hrs  T(C): 36.6 (19 Dec 2022 17:30), Max: 37.9 (19 Dec 2022 09:28)  T(F): 97.8 (19 Dec 2022 17:30), Max: 100.2 (19 Dec 2022 09:28)  HR: 102 (19 Dec 2022 16:10) (84 - 110)  BP: 152/62 (19 Dec 2022 16:10) (102/45 - 152/62)  BP(mean): 89 (19 Dec 2022 16:10) (65 - 90)  RR: 20 (19 Dec 2022 16:10) (12 - 22)  SpO2: 90% (19 Dec 2022 16:10) (90% - 100%)    Parameters below as of 19 Dec 2022 16:10  Patient On (Oxygen Delivery Method): tracheostomy collar    O2 Concentration (%): 40    Constitutional: Awake, alert, elderly male.   HEENT: Normocephalic, atraumatic, GORDON.  Respiratory: (+) rhonchi, on trach collar.   Cardiovascular: regular rhythm, normal S1 and S2, no audible murmurs.   GI: soft, non-tender, non-distended, bowel sounds present.  Extremities: No lower extremity edema.    LABS  CBC - WBC/HGB/HTC/PLT: 27.25/7.7/25.8/346 (12-19-22)  BMP - Na/K/Cl/Bicarb/BUN/Cr/Gluc/AG/eGFR: 135/3.9/94/31/59/2.93/167/10/21 (12-19-22)  Ca - 8.9 (12-19-22)  Phos - 3.1 (12-19-22)  Mg - 2.5 (12-19-22)  LFT - Alb/Tprot/Tbili/Dbili/AlkPhos/ALT/AST: 2.6/--/0.4/--/128/37/36 (12-19-22)  Thyroid Stimulating Hormone, Serum: 6.880 (12-13-22)  Total T4/Free T4: --/0.744 (12-13-22)    MEDICATIONS  MEDICATIONS  (STANDING):  albuterol/ipratropium for Nebulization 3 milliLiter(s) Nebulizer every 6 hours  aspirin  chewable 81 milliGRAM(s) Oral daily  atorvastatin 40 milliGRAM(s) Oral at bedtime  bacitracin   Ointment 1 Application(s) Topical daily  chlorhexidine 2% Cloths 1 Application(s) Topical <User Schedule>  dextrose 5%. 1000 milliLiter(s) (50 mL/Hr) IV Continuous <Continuous>  dextrose 5%. 1000 milliLiter(s) (100 mL/Hr) IV Continuous <Continuous>  dextrose 50% Injectable 25 Gram(s) IV Push once  dextrose 50% Injectable 12.5 Gram(s) IV Push once  dextrose 50% Injectable 25 Gram(s) IV Push once  dextrose 50% Injectable 25 Gram(s) IV Push once  glucagon  Injectable 1 milliGRAM(s) IntraMuscular once  glucagon  Injectable 1 milliGRAM(s) IntraMuscular once  heparin   Injectable 5000 Unit(s) SubCutaneous every 8 hours  influenza  Vaccine (HIGH DOSE) 0.7 milliLiter(s) IntraMuscular once  insulin regular  human corrective regimen sliding scale   SubCutaneous every 6 hours  insulin regular  human recombinant 4 Unit(s) SubCutaneous every 6 hours  levothyroxine Injectable 60 MICROGram(s) IV Push at bedtime  lidocaine 2% Injectable 2 milliLiter(s) Local Injection once  midodrine 30 milliGRAM(s) Oral <User Schedule>  mycophenolate mofetil Suspension 500 milliGRAM(s) Oral every 12 hours  pantoprazole   Suspension 40 milliGRAM(s) Oral every 24 hours  QUEtiapine 25 milliGRAM(s) Oral two times a day  sodium chloride 7% Inhalation 4 milliLiter(s) Inhalation every 12 hours  tacrolimus    0.5 mG/mL Suspension 4 milliGRAM(s) Oral every 24 hours  tacrolimus    0.5 mG/mL Suspension 4 milliGRAM(s) Oral every 24 hours  tamsulosin 0.4 milliGRAM(s) Oral at bedtime  vancomycin  IVPB 750 milliGRAM(s) IV Intermittent once    MEDICATIONS  (PRN):  acetaminophen    Suspension .. 650 milliGRAM(s) Oral every 6 hours PRN Temp greater or equal to 38C (100.4F), Mild Pain (1 - 3)  dextrose Oral Gel 15 Gram(s) Oral once PRN Blood Glucose LESS THAN 70 milliGRAM(s)/deciliter  sodium chloride 0.9% lock flush 10 milliLiter(s) IV Push every 1 hour PRN Pre/post blood products, medications, blood draw, and to maintain line patency    ASSESSMENT / RECOMMENDATIONS  Mr. Miguel is an 83-year-old male with type 2 diabetes mellitus, coronary artery disease (s/p DAMEON x2 to LAD on 6/2021), chronic kidney disease stage 4 (s/p renal transplant on 2013, on tracrolimus and prednisone) and benign prostatic hyperplasia who was sent to the hospital by his nephrologist for further evaluation of lower extremity edema (11/17/22). He was admitted for further management of lower extremity edema, thought to be secondary to his underlying CKD versus congestive heart failure. Hospitalization was complicated by hypoglycemia and cardiac arrest (11/19/22, ROSC after 1 minute of chest compressions). Post-cardiac arrest he was noted to develop hypotension, bradycardia and hypothermia. Labs were remarkable for elevated TSH (20) and decreased FT4 (0.77). 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 Although he had features seen with myxedema coma, these findings were better explained by his cardiac arrest as his vital signs prior to the arrest were apparently around his baseline, as was his mental status. He was started on levothyroxine supplementation. The patient was extubated on 11/22/22; however, he had to be reintubated on 11/26/22 due to episode of respiratory decompensation, potentially secondary to aspiration. He is now s/p trach and peg.    A1C: 9.0 %  BUN: 59  Creatinine: 2.93  GFR: 21  Weight: 82.3  BMI: 28.4    # Type 2 diabetes mellitus  - Please increase regular insulin to 5 units every 6 hours while on tube feeds. Hold regular insulin if blood glucose <100 mg/dL.   - Continue regular insulin sliding scale low dose every 6 hours.   - Patient's fingerstick glucose goal is 100-180 mg/dL.    # Hypothyroidism   - TSH 20. FT4 0.77 (11/19/22) -> Started on levothyroxine 25 mcg IV daily.   - TSH 9.2. FT4 0.69 (11/28/22) -> Increased to levothyroxine 50 mcg IV daily.   - TSH 11.1. FT4 0.56. (12/5/22) -> Increased to levothyroxine 60 mcg IV daily.  - TSH  6.88. FT4 0.744 (12/13/22)  - Continue with levothyroxine 60 mcg IV daily.     Case discussed with Dr. Quevedo. Primary team updated.       Ulysses Weber    Endocrinology Fellow    Service Pager: [phone]

## 2022-12-20 NOTE — PROGRESS NOTE ADULT - SUBJECTIVE AND OBJECTIVE BOX
OVERNIGHT: Patient's PEG tube was clogged and tube feeds where stopped.  SUBJECTIVE / INTERVAL HPI: Patient was seen and examined this morning. Patient's feeds continue to be held due to PEG malfunction. The standing regular insulin was discontinued for now. Blood glucose levels are now above target, despite tube feeds have been held. He's now connected back to the ventilator due to air leak from trach.     CAPILLARY BLOOD GLUCOSE & INSULIN RECEIVED  208 mg/dL (12-18 @ 23:26) -> 4 units of regular insulin + 2 units of sling scale.   251 mg/dL (12-19 @ 06:00) -> 4 units of regular insulin + 3 units of sliding scale.    163 mg/dL (12-19 @ 11:49) -> 4 units of regular insulin + 1 unit of sliding scale.    194 mg/dL (12-19 @ 17:11) -> 4 units of regular insulin + 1 unit of sliding scale.    280 mg/dL (12-19 @ 23:49) -> 3 units of sliding scale. (Feeds held as PEG tube was clogged).   276 mg/dL (12-20 @ 05:36) -> 3 units of sliding scale. (Feeds held as PEG tube was clogged).   256 mg/dL (12-20 @ 12:08) -> 3 units of sliding scale.     REVIEW OF SYSTEMS  Unable to obtain.     PHYSICAL EXAM  Vital Signs Last 24 Hrs  T(C): 37.1 (20 Dec 2022 14:01), Max: 37.6 (20 Dec 2022 10:00)  T(F): 98.7 (20 Dec 2022 14:01), Max: 99.6 (20 Dec 2022 10:00)  HR: 96 (20 Dec 2022 13:14) (82 - 102)  BP: 124/53 (20 Dec 2022 12:05) (102/46 - 152/62)  BP(mean): 77 (20 Dec 2022 12:05) (67 - 90)  RR: 18 (20 Dec 2022 13:14) (12 - 28)  SpO2: 91% (20 Dec 2022 13:14) (90% - 100%)    Parameters below as of 20 Dec 2022 13:14  Patient On (Oxygen Delivery Method): tracheostomy collar    O2 Concentration (%): 40    Constitutional: Awake, alert, elderly male, laying in bed, on mechanical ventilation via tracheostomy, in no acute distress, not following simple commands.   HEENT: Normocephalic, atraumatic, GORDON.  Respiratory: (+) Diffuse rhonchi.  Cardiovascular: regular rhythm, normal S1 and S2, no audible murmurs.   GI: soft, non-tender, non-distended, bowel sounds present.  Extremities: No lower extremity edema.    LABS  CBC - WBC/HGB/HTC/PLT: 25.22/7.4/24.3/311 (12-20-22)  BMP - Na/K/Cl/Bicarb/BUN/Cr/Gluc/AG/eGFR: 136/3.9/94/27/70/3.46/279/15/17 (12-20-22)  Ca - 9.1 (12-20-22)  Phos - 3.5 (12-20-22)  Mg - 2.6 (12-20-22)  LFT - Alb/Tprot/Tbili/Dbili/AlkPhos/ALT/AST: 2.6/--/0.4/--/128/37/36 (12-19-22)  Thyroid Stimulating Hormone, Serum: 6.880 (12-13-22)  Total T4/Free T4: --/0.744 (12-13-22)    MEDICATIONS  MEDICATIONS  (STANDING):  albuterol/ipratropium for Nebulization 3 milliLiter(s) Nebulizer every 6 hours  aspirin  chewable 81 milliGRAM(s) Oral daily  atorvastatin 40 milliGRAM(s) Oral at bedtime  bacitracin   Ointment 1 Application(s) Topical daily  chlorhexidine 2% Cloths 1 Application(s) Topical <User Schedule>  dextrose 5%. 1000 milliLiter(s) (50 mL/Hr) IV Continuous <Continuous>  dextrose 5%. 1000 milliLiter(s) (100 mL/Hr) IV Continuous <Continuous>  dextrose 50% Injectable 25 Gram(s) IV Push once  dextrose 50% Injectable 12.5 Gram(s) IV Push once  dextrose 50% Injectable 25 Gram(s) IV Push once  dextrose 50% Injectable 25 Gram(s) IV Push once  glucagon  Injectable 1 milliGRAM(s) IntraMuscular once  glucagon  Injectable 1 milliGRAM(s) IntraMuscular once  heparin   Injectable 5000 Unit(s) SubCutaneous every 8 hours  influenza  Vaccine (HIGH DOSE) 0.7 milliLiter(s) IntraMuscular once  insulin regular  human corrective regimen sliding scale   SubCutaneous every 6 hours  insulin regular  human recombinant 5 Unit(s) SubCutaneous every 6 hours  levothyroxine Injectable 60 MICROGram(s) IV Push at bedtime  lidocaine 2% Injectable 2 milliLiter(s) Local Injection once  midodrine 30 milliGRAM(s) Oral <User Schedule>  mycophenolate mofetil Suspension 500 milliGRAM(s) Oral every 12 hours  pantoprazole   Suspension 40 milliGRAM(s) Oral every 24 hours  piperacillin/tazobactam IVPB.. 4.5 Gram(s) IV Intermittent every 12 hours  QUEtiapine 25 milliGRAM(s) Oral two times a day  sodium chloride 7% Inhalation 4 milliLiter(s) Inhalation every 12 hours  tacrolimus    0.5 mG/mL Suspension 4 milliGRAM(s) Oral every 24 hours  tacrolimus    0.5 mG/mL Suspension 4 milliGRAM(s) Oral every 24 hours  tamsulosin 0.4 milliGRAM(s) Oral at bedtime    MEDICATIONS  (PRN):  acetaminophen    Suspension .. 650 milliGRAM(s) Oral every 6 hours PRN Temp greater or equal to 38C (100.4F), Mild Pain (1 - 3)  dextrose Oral Gel 15 Gram(s) Oral once PRN Blood Glucose LESS THAN 70 milliGRAM(s)/deciliter  sodium chloride 0.9% lock flush 10 milliLiter(s) IV Push every 1 hour PRN Pre/post blood products, medications, blood draw, and to maintain line patency    ASSESSMENT / RECOMMENDATIONS  Mr. Miguel is an 83-year-old male with type 2 diabetes mellitus, coronary artery disease (s/p DAMEON x2 to LAD on 6/2021), chronic kidney disease stage 4 (s/p renal transplant on 2013, on tracrolimus and prednisone) and benign prostatic hyperplasia who was sent to the hospital by his nephrologist for further evaluation of lower extremity edema (11/17/22). He was admitted for further management of lower extremity edema, thought to be secondary to his underlying CKD versus congestive heart failure. Hospitalization was complicated by hypoglycemia and cardiac arrest (11/19/22, ROSC after 1 minute of chest compressions). Post-cardiac arrest he was noted to develop hypotension, bradycardia and hypothermia. Labs were remarkable for elevated TSH (20) and decreased FT4 (0.77). 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 Although he had features seen with myxedema coma, these findings were better explained by his cardiac arrest as his vital signs prior to the arrest were apparently around his baseline, as was his mental status. He was started on levothyroxine supplementation. The patient was extubated on 11/22/22; however, he had to be reintubated on 11/26/22 due to episode of respiratory decompensation, potentially secondary to aspiration. He is now s/p trach and peg.    A1C: 9.0 %  BUN: 70  Creatinine: 3.46  GFR: 17  Weight: 82.3  BMI: 28.4    # Type 2 diabetes mellitus  - Please continue lantus *** units at bedtime.   - Continue lispro *** units before each meal.  - Continue lispro moderate / low dose sliding scale before meals and at bedtime.  - Patient's fingerstick glucose goal is 100-180 mg/dL.    - For discharge, patient can ***.    - Patient can follow up at discharge with [facility] by calling [phone] to make an appointment.      Case discussed with Dr. Quevedo. Primary team updated.       Ulysses Weber    Endocrinology Fellow    Service Pager: [phone]    OVERNIGHT: Patient's PEG tube was clogged and tube feeds where stopped.  SUBJECTIVE / INTERVAL HPI: Patient was seen and examined this morning. Patient's feeds continue to be held due to PEG malfunction. The standing regular insulin was discontinued for now. Blood glucose levels are now above target, despite tube feeds have been held. He's now connected back to the ventilator due to air leak from trach.     CAPILLARY BLOOD GLUCOSE & INSULIN RECEIVED  208 mg/dL (12-18 @ 23:26) -> 4 units of regular insulin + 2 units of sling scale.   251 mg/dL (12-19 @ 06:00) -> 4 units of regular insulin + 3 units of sliding scale.    163 mg/dL (12-19 @ 11:49) -> 4 units of regular insulin + 1 unit of sliding scale.    194 mg/dL (12-19 @ 17:11) -> 4 units of regular insulin + 1 unit of sliding scale.    280 mg/dL (12-19 @ 23:49) -> 3 units of sliding scale. (Feeds held as PEG tube was clogged).   276 mg/dL (12-20 @ 05:36) -> 3 units of sliding scale. (Feeds held as PEG tube was clogged).   256 mg/dL (12-20 @ 12:08) -> 3 units of sliding scale.     REVIEW OF SYSTEMS  Unable to obtain.     PHYSICAL EXAM  Vital Signs Last 24 Hrs  T(C): 37.1 (20 Dec 2022 14:01), Max: 37.6 (20 Dec 2022 10:00)  T(F): 98.7 (20 Dec 2022 14:01), Max: 99.6 (20 Dec 2022 10:00)  HR: 96 (20 Dec 2022 13:14) (82 - 102)  BP: 124/53 (20 Dec 2022 12:05) (102/46 - 152/62)  BP(mean): 77 (20 Dec 2022 12:05) (67 - 90)  RR: 18 (20 Dec 2022 13:14) (12 - 28)  SpO2: 91% (20 Dec 2022 13:14) (90% - 100%)    Parameters below as of 20 Dec 2022 13:14  Patient On (Oxygen Delivery Method): tracheostomy collar    O2 Concentration (%): 40    Constitutional: Awake, alert, elderly male, laying in bed, on mechanical ventilation via tracheostomy, in no acute distress, not following simple commands.   HEENT: Normocephalic, atraumatic, GORDON.  Respiratory: (+) Diffuse rhonchi.  Cardiovascular: regular rhythm, normal S1 and S2, no audible murmurs.   GI: soft, non-tender, non-distended, bowel sounds present.  Extremities: No lower extremity edema.    LABS  CBC - WBC/HGB/HTC/PLT: 25.22/7.4/24.3/311 (12-20-22)  BMP - Na/K/Cl/Bicarb/BUN/Cr/Gluc/AG/eGFR: 136/3.9/94/27/70/3.46/279/15/17 (12-20-22)  Ca - 9.1 (12-20-22)  Phos - 3.5 (12-20-22)  Mg - 2.6 (12-20-22)  LFT - Alb/Tprot/Tbili/Dbili/AlkPhos/ALT/AST: 2.6/--/0.4/--/128/37/36 (12-19-22)  Thyroid Stimulating Hormone, Serum: 6.880 (12-13-22)  Total T4/Free T4: --/0.744 (12-13-22)    MEDICATIONS  MEDICATIONS  (STANDING):  albuterol/ipratropium for Nebulization 3 milliLiter(s) Nebulizer every 6 hours  aspirin  chewable 81 milliGRAM(s) Oral daily  atorvastatin 40 milliGRAM(s) Oral at bedtime  bacitracin   Ointment 1 Application(s) Topical daily  chlorhexidine 2% Cloths 1 Application(s) Topical <User Schedule>  dextrose 5%. 1000 milliLiter(s) (50 mL/Hr) IV Continuous <Continuous>  dextrose 5%. 1000 milliLiter(s) (100 mL/Hr) IV Continuous <Continuous>  dextrose 50% Injectable 25 Gram(s) IV Push once  dextrose 50% Injectable 12.5 Gram(s) IV Push once  dextrose 50% Injectable 25 Gram(s) IV Push once  dextrose 50% Injectable 25 Gram(s) IV Push once  glucagon  Injectable 1 milliGRAM(s) IntraMuscular once  glucagon  Injectable 1 milliGRAM(s) IntraMuscular once  heparin   Injectable 5000 Unit(s) SubCutaneous every 8 hours  influenza  Vaccine (HIGH DOSE) 0.7 milliLiter(s) IntraMuscular once  insulin regular  human corrective regimen sliding scale   SubCutaneous every 6 hours  insulin regular  human recombinant 5 Unit(s) SubCutaneous every 6 hours  levothyroxine Injectable 60 MICROGram(s) IV Push at bedtime  lidocaine 2% Injectable 2 milliLiter(s) Local Injection once  midodrine 30 milliGRAM(s) Oral <User Schedule>  mycophenolate mofetil Suspension 500 milliGRAM(s) Oral every 12 hours  pantoprazole   Suspension 40 milliGRAM(s) Oral every 24 hours  piperacillin/tazobactam IVPB.. 4.5 Gram(s) IV Intermittent every 12 hours  QUEtiapine 25 milliGRAM(s) Oral two times a day  sodium chloride 7% Inhalation 4 milliLiter(s) Inhalation every 12 hours  tacrolimus    0.5 mG/mL Suspension 4 milliGRAM(s) Oral every 24 hours  tacrolimus    0.5 mG/mL Suspension 4 milliGRAM(s) Oral every 24 hours  tamsulosin 0.4 milliGRAM(s) Oral at bedtime    MEDICATIONS  (PRN):  acetaminophen    Suspension .. 650 milliGRAM(s) Oral every 6 hours PRN Temp greater or equal to 38C (100.4F), Mild Pain (1 - 3)  dextrose Oral Gel 15 Gram(s) Oral once PRN Blood Glucose LESS THAN 70 milliGRAM(s)/deciliter  sodium chloride 0.9% lock flush 10 milliLiter(s) IV Push every 1 hour PRN Pre/post blood products, medications, blood draw, and to maintain line patency    ASSESSMENT / RECOMMENDATIONS  Mr. Miguel is an 83-year-old male with type 2 diabetes mellitus, coronary artery disease (s/p DAMEON x2 to LAD on 6/2021), chronic kidney disease stage 4 (s/p renal transplant on 2013, on tracrolimus and prednisone) and benign prostatic hyperplasia who was sent to the hospital by his nephrologist for further evaluation of lower extremity edema (11/17/22). He was admitted for further management of lower extremity edema, thought to be secondary to his underlying CKD versus congestive heart failure. Hospitalization was complicated by hypoglycemia and cardiac arrest (11/19/22, ROSC after 1 minute of chest compressions). Post-cardiac arrest he was noted to develop hypotension, bradycardia and hypothermia. Labs were remarkable for elevated TSH (20) and decreased FT4 (0.77). 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 Although he had features seen with myxedema coma, these findings were better explained by his cardiac arrest as his vital signs prior to the arrest were apparently around his baseline, as was his mental status. He was started on levothyroxine supplementation. The patient was extubated on 11/22/22; however, he had to be reintubated on 11/26/22 due to episode of respiratory decompensation, potentially secondary to aspiration. He is now s/p trach and peg.    A1C: 9.0 %  BUN: 70  Creatinine: 3.46  GFR: 17  Weight: 82.3  BMI: 28.4    # Type 2 diabetes mellitus  - Patient's blood glucose remained above goal while tube feeds were held. He likely needs basal insulin at this point.   - Please start lantus 5 units at bedtime.   - Continue regular insulin 5 units every 6 hours when tube feeds are restarted.   - Continue regular insulin low dose sliding scale every 6 hours.  - Patient's fingerstick glucose goal is 100-180 mg/dL.      Case discussed with Dr. Quevedo. Primary team updated.       Ulysses Weber    Endocrinology Fellow    Service Pager: [phone]

## 2022-12-20 NOTE — PROGRESS NOTE ADULT - ASSESSMENT
84 y/o M PMH CKD 4, renal transplant in 2013 at [facility], glaucoma (blind), DM1, anemia, CAD s/p 2 DAMEON to LAD in 6/21, BPH, recent admission for PNA presents with acute on chronic cough 2/2 PNA and SUNNY 2/2 acute CKD.

## 2022-12-20 NOTE — PROGRESS NOTE ADULT - SUBJECTIVE AND OBJECTIVE BOX
OVERNIGHT EVENTS: PEG clogged overnight, intervened on by nursing staff without success    SUBJECTIVE / INTERVAL HPI: Patient seen and examined at bedside. AOx0 unable to obtain ROS.     VITAL SIGNS:  Vital Signs Last 24 Hrs  T(C): 37.1 (20 Dec 2022 14:01), Max: 37.6 (20 Dec 2022 10:00)  T(F): 98.7 (20 Dec 2022 14:01), Max: 99.6 (20 Dec 2022 10:00)  HR: 96 (20 Dec 2022 13:14) (82 - 102)  BP: 124/53 (20 Dec 2022 12:05) (102/46 - 152/62)  BP(mean): 77 (20 Dec 2022 12:05) (67 - 90)  RR: 18 (20 Dec 2022 13:14) (12 - 28)  SpO2: 91% (20 Dec 2022 13:14) (90% - 100%)    Parameters below as of 20 Dec 2022 13:14  Patient On (Oxygen Delivery Method): tracheostomy collar    O2 Concentration (%): 40  I&O's Summary    19 Dec 2022 07:01  -  20 Dec 2022 07:00  --------------------------------------------------------  IN: 619 mL / OUT: 500 mL / NET: 119 mL        PHYSICAL EXAM:    Constitutional: NAD, moving extremities  HEENT: NC/AT, EOMI, no conjunctival pallor or scleral icterus, MMM. Coloboma R eye, copious secretions  Neck: Supple  Respiratory: diffuse ronchi to anterior auscultation. On VC/AC   Cardiovascular: RRR, normal S1 and S2, no m/r/g.   Gastrointestinal: soft NTND, no guarding or rebound tenderness, no palpable masses   Extremities: wwp; no cyanosis, clubbing. BL LE edema  Neurological: AAOx0    MEDICATIONS:  MEDICATIONS  (STANDING):  albuterol/ipratropium for Nebulization 3 milliLiter(s) Nebulizer every 6 hours  aspirin  chewable 81 milliGRAM(s) Oral daily  atorvastatin 40 milliGRAM(s) Oral at bedtime  bacitracin   Ointment 1 Application(s) Topical daily  chlorhexidine 2% Cloths 1 Application(s) Topical <User Schedule>  dextrose 5%. 1000 milliLiter(s) (50 mL/Hr) IV Continuous <Continuous>  dextrose 5%. 1000 milliLiter(s) (100 mL/Hr) IV Continuous <Continuous>  dextrose 50% Injectable 25 Gram(s) IV Push once  dextrose 50% Injectable 25 Gram(s) IV Push once  dextrose 50% Injectable 12.5 Gram(s) IV Push once  dextrose 50% Injectable 25 Gram(s) IV Push once  glucagon  Injectable 1 milliGRAM(s) IntraMuscular once  glucagon  Injectable 1 milliGRAM(s) IntraMuscular once  heparin   Injectable 5000 Unit(s) SubCutaneous every 8 hours  influenza  Vaccine (HIGH DOSE) 0.7 milliLiter(s) IntraMuscular once  insulin regular  human corrective regimen sliding scale   SubCutaneous every 6 hours  insulin regular  human recombinant 5 Unit(s) SubCutaneous every 6 hours  levothyroxine Injectable 60 MICROGram(s) IV Push at bedtime  lidocaine 2% Injectable 2 milliLiter(s) Local Injection once  midodrine 30 milliGRAM(s) Oral <User Schedule>  mycophenolate mofetil Suspension 500 milliGRAM(s) Oral every 12 hours  pantoprazole   Suspension 40 milliGRAM(s) Oral every 24 hours  piperacillin/tazobactam IVPB.. 4.5 Gram(s) IV Intermittent every 12 hours  QUEtiapine 25 milliGRAM(s) Oral two times a day  sodium chloride 7% Inhalation 4 milliLiter(s) Inhalation every 12 hours  tacrolimus    0.5 mG/mL Suspension 4 milliGRAM(s) Oral every 24 hours  tacrolimus    0.5 mG/mL Suspension 4 milliGRAM(s) Oral every 24 hours  tamsulosin 0.4 milliGRAM(s) Oral at bedtime    MEDICATIONS  (PRN):  acetaminophen    Suspension .. 650 milliGRAM(s) Oral every 6 hours PRN Temp greater or equal to 38C (100.4F), Mild Pain (1 - 3)  dextrose Oral Gel 15 Gram(s) Oral once PRN Blood Glucose LESS THAN 70 milliGRAM(s)/deciliter  sodium chloride 0.9% lock flush 10 milliLiter(s) IV Push every 1 hour PRN Pre/post blood products, medications, blood draw, and to maintain line patency      ALLERGIES:  Allergies    No Known Allergies    Intolerances        LABS:                        7.4    25.22 )-----------( 311      ( 20 Dec 2022 10:00 )             24.3     12-20    136  |  94<L>  |  70<H>  ----------------------------<  279<H>  3.9   |  27  |  3.46<H>    Ca    9.1      20 Dec 2022 10:00  Phos  3.5     12-20  Mg     2.6     12-20    TPro  5.8<L>  /  Alb  2.6<L>  /  TBili  0.4  /  DBili  x   /  AST  36  /  ALT  37  /  AlkPhos  128<H>  12-19        CAPILLARY BLOOD GLUCOSE      POCT Blood Glucose.: 256 mg/dL (20 Dec 2022 12:08)      RADIOLOGY & ADDITIONAL TESTS: Reviewed.

## 2022-12-20 NOTE — PROGRESS NOTE ADULT - PROBLEM SELECTOR PLAN 3
Previously on lantus 15 lispro 5 at home. Endocrine following.  - goal -180  - regular insulin to 5 units SQ q6h while on tube feeds  - d/c lantus  - c/w regular insulin sliding scale low dose  - c/w tube feeds  - Endo following, appreciate recs

## 2022-12-20 NOTE — PHARMACOTHERAPY INTERVENTION NOTE - COMMENTS
Recommended to consult infectious diseases since the 12/19 blood culture grew Pseudomonas aeruginosa.    Abhinav Santamaria, PharmD, [facility]  Clinical Pharmacy Specialist, Infectious Diseases  Tele-Antimicrobial Stewardship Program (Tele-ASP)  Tele-ASP Phone: [phone]

## 2022-12-20 NOTE — PROGRESS NOTE ADULT - ASSESSMENT
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 Was started on HD on  due to worsening volume status, acidosis and concern for uremia.   Electrolytes noted     Plan   -HD today   Hemodialysis Treatment.:     Schedule: Once, Modality: Hemodialysis, Access: Arteriovenous Fistula    Dialyzer: Optiflux V022GPi, Time: 180 Min    Blood Flow: 400 mL/Min , Dialysate Flow: 600 mL/Min, Dialysate Temp: 36.5, Tubinmm (Adult)    Target Fluid Removal: 3 Liters    Dialysate Electrolytes (mEq/L): Potassium 3, Calcium 2.5, Sodium 138, Bicarbonate 35  - c/w tacrolimus to 4mg AM and 4mg PM (12 hrs apart)  - c/w cell cept 500mg BID  - strict i's and o's  - Trend BMP daily  - maintain MAP >70     Access:  RUE AVF functional     Blood pressure:  Stable   UF with HD, as tolerated   Midodrine with HD,   Will lower Dialysate Temp to help with intradialytic hypotension     #Anemia of chronic disease  Hgb 7.4  -Epo w/ HD    Renal Bone Disease   Calcium: 9.1  Phos: 3.5  Trend phos daily with labs     presenting with decompensated heart failure c/b cardiac arrest on

## 2022-12-20 NOTE — PROGRESS NOTE ADULT - PROBLEM SELECTOR PLAN 1
Likely 2/2 aspiration event. Intubated on 11/26. Received trach on 12/1. Tolerated CPAP o/n 12/6-7. Tracheal aspirate cx (11/19) growing serratia marcescens and pseudomonas. Patient was started on vancomycin and cefepime. MRSA nares positive. DC'ed vancomycin given supratherapeutic trough. Completed cefepime 2g q24 course x9 days. Sputum cx from 11/26 growing Stenotrophomonas. completed course of Bactrim BS for 10 days (11/28 - 12/5). Sputum cx from 12/5 NG final.    12/19 patient with elevated WBC, axillary 100.2. CXR with increased b/l opacities. s/p Vanc x1 and started Zosyn empircally. Put back on VC/AC for accessory muscle use  - 12/19 Blood cx NGTD   - 12/19 Sputum ET tube Cx Gram neg rods   - Influenza A positive on RVP   - c/w trach collar as tolerated  - c/w hypertonic saline 7% inhalation 4 mL BID  - c/w duonebs q6h

## 2022-12-20 NOTE — PROGRESS NOTE ADULT - ATTENDING COMMENTS
Tube feeding became plugged and was stopped along with regular Insulin q 6h coverage. Glucose level [id number] and today 276-256. If tube feeds restart 5 units Regular Insulin every 6 hours should be administered.

## 2022-12-20 NOTE — PROGRESS NOTE ADULT - SUBJECTIVE AND OBJECTIVE BOX
Patient is a 82y Male seen and evaluated at bedside. Does not follow commands, audible crackles, renal function stable, anuric, HD today.       Meds:    acetaminophen    Suspension .. 650 every 6 hours PRN  albuterol/ipratropium for Nebulization 3 every 6 hours  aspirin  chewable 81 daily  atorvastatin 40 at bedtime  bacitracin   Ointment 1 daily  chlorhexidine 2% Cloths 1 <User Schedule>  dextrose 5%. 1000 <Continuous>  dextrose 5%. 1000 <Continuous>  dextrose 50% Injectable 25 once  dextrose 50% Injectable 12.5 once  dextrose 50% Injectable 25 once  dextrose 50% Injectable 25 once  dextrose Oral Gel 15 once PRN  glucagon  Injectable 1 once  glucagon  Injectable 1 once  heparin   Injectable 5000 every 8 hours  influenza  Vaccine (HIGH DOSE) 0.7 once  insulin regular  human corrective regimen sliding scale  every 6 hours  insulin regular  human recombinant 5 every 6 hours  levothyroxine Injectable 60 at bedtime  lidocaine 2% Injectable 2 once  midodrine 30 <User Schedule>  mycophenolate mofetil Suspension 500 every 12 hours  pantoprazole   Suspension 40 every 24 hours  piperacillin/tazobactam IVPB.. 4.5 every 12 hours  QUEtiapine 25 two times a day  sodium chloride 0.9% lock flush 10 every 1 hour PRN  sodium chloride 7% Inhalation 4 every 12 hours  tacrolimus    0.5 mG/mL Suspension 4 every 24 hours  tacrolimus    0.5 mG/mL Suspension 4 every 24 hours  tamsulosin 0.4 at bedtime      T(C): , Max: 37.6 (12-20-22 @ 10:00)  T(F): , Max: 99.6 (12-20-22 @ 10:00)  HR: 96 (12-20-22 @ 13:14)  BP: 124/53 (12-20-22 @ 12:05)  BP(mean): 77 (12-20-22 @ 12:05)  RR: 18 (12-20-22 @ 13:14)  SpO2: 91% (12-20-22 @ 13:14)  Wt(kg): --    12-19 @ 07:01  -  12-20 @ 07:00  --------------------------------------------------------  IN: 619 mL / OUT: 500 mL / NET: 119 mL          Review of Systems:  ROS negative except as per HPI      PHYSICAL EXAM:  GENERAL: trach collar, NAD, laying in bed   NECK: supple, No JVD  Lungs: No rales, ronchi or wheezing noted  HEART: normal S1S2, RRR  ABDOMEN: Soft, Nontender, +BS, No flank tenderness bilateral  EXTREMITIES: Remains anasarcic, although edema is slowly improving  ACCESS: R radiocephalic AVF w/ palpable thrill, c/d/i         LABS:                        7.4    25.22 )-----------( 311      ( 20 Dec 2022 10:00 )             24.3     12-20    136  |  94<L>  |  70<H>  ----------------------------<  279<H>  3.9   |  27  |  3.46<H>    Ca    9.1      20 Dec 2022 10:00  Phos  3.5     12-20  Mg     2.6     12-20    TPro  5.8<L>  /  Alb  2.6<L>  /  TBili  0.4  /  DBili  x   /  AST  36  /  ALT  37  /  AlkPhos  128<H>  12-19                RADIOLOGY & ADDITIONAL STUDIES:

## 2022-12-21 NOTE — CHART NOTE - NSCHARTNOTESELECT_GEN_ALL_CORE
Anti-infective approval/Event Note
Anti-infective approval/Event Note
Endocrinology/Event Note
Nutrition Services
Nutrition Services
Off Service Note
anti-infective approval/Event Note
Code Blue/Event Note
Event Note
Follow up/Nutrition Services
Limited TTE
Nutrition Services

## 2022-12-21 NOTE — PROGRESS NOTE ADULT - PROBLEM SELECTOR PLAN 9
Edema of b/l UE R > L with some erythema and irritation around IV site noted.  - R UE doppler negative for DVT

## 2022-12-21 NOTE — CHART NOTE - NSCHARTNOTEFT_GEN_A_CORE
Admitting Diagnosis:   Patient is a 82y old  Male who presents with a chief complaint of AHRF (16 Dec 2022 13:20)      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      History of renal transplant  secondary to DM      Chronic kidney disease (CKD)      Glaucoma      Kidney transplant recipient      Amputation of toe  x 3      H/O heart artery stent    Current Nutrition Order:   Diet, NPO with Tube Feed:   Tube Feeding Modality: Nasogastric  Nepro with Carb Steady (NEPRORTH)  Total Volume for 24 Hours (mL): 720  Total Number of Cans: 3  Continuous  Starting Tube Feed Rate {mL per Hour}: 20  Increase Tube Feed Rate by (mL): 10     Every 4 hours  Until Goal Tube Feed Rate (mL per Hour): 30  Tube Feed Duration (in Hours): 24  Tube Feed Start Time: 17:00  Liquid Protein Supplement     Qty per Day:  1 (12-20-22 @ 20:49)    PO Intake: Good (%) [   ]  Fair (50-75%) [   ] Poor (<25%) [   ] NPO [ x ]     GI Issues: No gastrointestinal signs/symptoms. LBM 12/19; rectal tube in place, stool loose per chart.    Pain: No non-verbal indicators present    Skin Integrity: PI stg II penis, PI stg II sacrum, Edema 3+ R arm, 2+ gen.     Labs:   12-21    139  |  97  |  40<H>  ----------------------------<  179<H>  3.5   |  30  |  2.39<H>    Ca    9.0      21 Dec 2022 05:30  Phos  2.7     12-21  Mg     2.3     12-21      CAPILLARY BLOOD GLUCOSE      POCT Blood Glucose.: 182 mg/dL (21 Dec 2022 07:11)  POCT Blood Glucose.: 164 mg/dL (21 Dec 2022 05:49)  POCT Blood Glucose.: 116 mg/dL (20 Dec 2022 23:01)  POCT Blood Glucose.: 239 mg/dL (20 Dec 2022 17:43)  POCT Blood Glucose.: 256 mg/dL (20 Dec 2022 12:08)      Medications:  MEDICATIONS  (STANDING):  albuterol/ipratropium for Nebulization 3 milliLiter(s) Nebulizer every 6 hours  aspirin  chewable 81 milliGRAM(s) Oral daily  atorvastatin 40 milliGRAM(s) Oral at bedtime  bacitracin   Ointment 1 Application(s) Topical daily  chlorhexidine 2% Cloths 1 Application(s) Topical <User Schedule>  dextrose 5%. 1000 milliLiter(s) (50 mL/Hr) IV Continuous <Continuous>  dextrose 5%. 1000 milliLiter(s) (100 mL/Hr) IV Continuous <Continuous>  dextrose 50% Injectable 25 Gram(s) IV Push once  dextrose 50% Injectable 12.5 Gram(s) IV Push once  dextrose 50% Injectable 25 Gram(s) IV Push once  dextrose 50% Injectable 25 Gram(s) IV Push once  glucagon  Injectable 1 milliGRAM(s) IntraMuscular once  glucagon  Injectable 1 milliGRAM(s) IntraMuscular once  heparin   Injectable 5000 Unit(s) SubCutaneous every 8 hours  influenza  Vaccine (HIGH DOSE) 0.7 milliLiter(s) IntraMuscular once  insulin glargine Injectable (LANTUS) 5 Unit(s) SubCutaneous at bedtime  insulin regular  human corrective regimen sliding scale   SubCutaneous every 6 hours  insulin regular  human recombinant 5 Unit(s) SubCutaneous every 6 hours  levothyroxine Injectable 60 MICROGram(s) IV Push at bedtime  lidocaine 2% Injectable 2 milliLiter(s) Local Injection once  midodrine 30 milliGRAM(s) Oral <User Schedule>  mycophenolate mofetil Suspension 500 milliGRAM(s) Oral every 12 hours  oseltamivir 30 milliGRAM(s) Oral once  pantoprazole   Suspension 40 milliGRAM(s) Oral every 24 hours  piperacillin/tazobactam IVPB.. 4.5 Gram(s) IV Intermittent every 12 hours  QUEtiapine 25 milliGRAM(s) Oral two times a day  sodium chloride 7% Inhalation 4 milliLiter(s) Inhalation every 12 hours  tacrolimus    0.5 mG/mL Suspension 4 milliGRAM(s) Oral every 24 hours  tacrolimus    0.5 mG/mL Suspension 4 milliGRAM(s) Oral every 24 hours  tamsulosin 0.4 milliGRAM(s) Oral at bedtime    MEDICATIONS  (PRN):  acetaminophen    Suspension .. 650 milliGRAM(s) Oral every 6 hours PRN Temp greater or equal to 38C (100.4F), Mild Pain (1 - 3)  dextrose Oral Gel 15 Gram(s) Oral once PRN Blood Glucose LESS THAN 70 milliGRAM(s)/deciliter  sodium chloride 0.9% lock flush 10 milliLiter(s) IV Push every 1 hour PRN Pre/post blood products, medications, blood draw, and to maintain line patency    Admitting Anthropometrics  Height for BMI (FEET)	5 Feet  Height for BMI (INCHES)	7 Inch(s)  Height for BMI (cm)	170.18 Centimeter(s)  Weight for BMI (lbs)	220 lb  Weight for BMI (kg)	99.8 kg  Body Mass Index	34.4    Weight Change:   12/12 - 95.8 kg / 92.8 kg  12/13 - 97.6 kg / 95.6 kg  12/14 - 96.2 kg / 93.2 kg  12/16 - HD w/ 1 L UF, unable to obtain accurate weight  12/19 - 82 kg   *Suggests ~17 kg wt loss since admit (18% x1 month). Suspect wt discrepancy fluid related - pt 15 L net positive upon admission.    Estimated energy needs:   -Needs updated 11/23 using IBW 67.3kg adjusted for vented pt.   EEN: 6225-2130 kcal (25-30 kcal/kg)  EPN:  gProt. (1.4-1.6 gProt./kg)  EFN: 7817-7057 ml (25-30 ml/kg)    Subjective:   82 y/o M PMH CKD 4, S/P renal transplant in 2013 at [facility], glaucoma (blind), DM1, anemia, CAD s/p 2 DAMEON to LAD in 6/21, BPH, recent admission for PNA presented with acute on chronic cough and b/l LE edema 2/2 acute CHF vs CKD. Cardiology and renal consulted, and pt was started on Lasix 40mg IV BID with improvement in b/l LE edema. This morning, pt found to be hypoxic to 70s on RA and hypoglycemia 31, difficult to arouse, and rapid response was called. Pt was put on supplemental oxygen with O2sat 80s, still difficult to arouse, and pt was intubated. Lost a pulse on pt so code blue was called, ROSC achieved, and pt was transferred to the ICU for closer monitoring. 11/19: Intubated. 11/22: Bowel care started. 11/23: Diarrhea. 11/30: PEG placed. 12/4: Constipation, started on lactulose. 12/7: Started on miralax, senna. 12/11: Lactulose d/c'd. 12/12: Diarrhea, d/c'd senna and miralax. 12/14: S/S unable to assess for speaking valve. 12/15: maalox for abdominal pain. 12/16: dialysis. S/S - pt not candidate for speaking valve at this time. Transferred to [facility]. 12/17: Pt with bloody secretions from tracheostomy. Pt placed on ventilator. 12/18: Transitioned to trach collar. Feeds restarted. 12/19: VCAC in AM, switching to trach collar. 12/20: PEG clogged. 12/21: Unclogged and TF advanced toward reduced goal.     Pt seen for follow up at bedside. Pt on trach collar with vent on standby. Pt continues on TF advancing s/p PEG clog 12/19; pt tolerating feeds well with no complaints GI distress. Monitor ability to advance TF toward previously well tolerated goal rate. No other reports GI distress or further nutritional concerns at this time. RD will continue to monitor, reassess, and intervene as appropriate.     Previous Nutrition Diagnosis: Inadequate Protein Energy Intake r/t vent/trach collar AEB NPO status    Active [ x ]  Resolved [   ] *adjusted 12/19    Goal: Pt will meet 75% or more of protein/energy needs via most appropriate route for nutrition.     Recommendations:  -Advance TF to goal as medically able   *Recommend Nepro @43 ml/hr with LPS x1/day to provide 1032 ml TF, 1958 kcal, 99 g pro, and 750 ml FW. This is 24.5 non-protein kcal and 1.55 g pro/kg IBW 63.7kg.   -Maintain aspiration precautions at all times  -Continue PEG care to maintain patency   -Align nutrition with GOC at all times  -Monitor chemistry, GI fxn, and skin integrity     Risk Level: High [   ] Moderate [ x ] Low [   ]

## 2022-12-21 NOTE — PROGRESS NOTE ADULT - PROBLEM SELECTOR PLAN 1
Likely 2/2 aspiration event. Intubated on 11/26. Received trach on 12/1. Tolerated CPAP o/n 12/6-7. Tracheal aspirate cx (11/19) growing serratia marcescens and pseudomonas. Patient was started on vancomycin and cefepime. MRSA nares positive. DC'ed vancomycin given supratherapeutic trough. Completed cefepime 2g q24 course x9 days. Sputum cx from 11/26 growing Stenotrophomonas. completed course of Bactrim BS for 10 days (11/28 - 12/5). Sputum cx from 12/5 NG final.    12/19 patient with elevated WBC, axillary 100.2. CXR with increased b/l opacities. s/p Vanc x1 and started Zosyn empirically. Put back on VC/AC for accessory muscle use. Blood cx and sputum cx 12/19 growing Pseudomonas.    - c/w Zosyn for Psuedomonal coverage in sputum and blood  - leukocytosis downtrending today, continue to monitor  - Influenza A positive on RVP   - c/w Tamiflu 75mg - dosed after each HD session  - c/w trach collar as tolerated  - c/w hypertonic saline 7% inhalation 4 mL BID  - c/w duonebs q6h

## 2022-12-21 NOTE — PROGRESS NOTE ADULT - SUBJECTIVE AND OBJECTIVE BOX
OVERNIGHT: No acute events overnight.   SUBJECTIVE / INTERVAL HPI: Patient was seen and examined this morning.     CAPILLARY BLOOD GLUCOSE & INSULIN RECEIVED  279 mg/dL (12-20 @ 10:00)  256 mg/dL (12-20 @ 12:08)  239 mg/dL (12-20 @ 17:43)  116 mg/dL (12-20 @ 23:01)  179 mg/dL (12-21 @ 05:30)  164 mg/dL (12-21 @ 05:49)  182 mg/dL (12-21 @ 07:11)  163 mg/dL (12-21 @ 13:15)  147 mg/dL (12-21 @ 17:05)      DIET HISTORY  - Breakfast:  - Lunch:  - Dinner:     REVIEW OF SYSTEMS  Constitutional:  Negative fever, chills or loss of appetite.  Eyes:  Negative blurry vision or double vision.  Cardiovascular:  Negative for chest pain or palpitations.  Respiratory:  Negative for cough, wheezing, or shortness of breath.    Gastrointestinal:  Negative for nausea, vomiting, diarrhea, constipation, or abdominal pain.  Genitourinary:  Negative frequency, urgency or dysuria.  Neurologic:  No headache, confusion, dizziness, lightheadedness.    PHYSICAL EXAM  Vital Signs Last 24 Hrs  T(C): 37.1 (21 Dec 2022 18:00), Max: 37.1 (21 Dec 2022 18:00)  T(F): 98.7 (21 Dec 2022 18:00), Max: 98.7 (21 Dec 2022 18:00)  HR: 88 (21 Dec 2022 16:45) (76 - 92)  BP: 126/58 (21 Dec 2022 16:10) (110/51 - 144/60)  BP(mean): 83 (21 Dec 2022 16:10) (72 - 83)  RR: 34 (21 Dec 2022 16:45) (13 - 34)  SpO2: 99% (21 Dec 2022 16:45) (96% - 100%)    Parameters below as of 21 Dec 2022 16:45  Patient On (Oxygen Delivery Method): ventilator    O2 Concentration (%): 40    Constitutional: Awake, alert, in no acute distress.   HEENT: Normocephalic, atraumatic, GORDON.  Respiratory: Lungs clear to ausculation bilaterally.   Cardiovascular: regular rhythm, normal S1 and S2, no audible murmurs.   GI: soft, non-tender, non-distended, bowel sounds present.  Extremities: No lower extremity edema.  Psychiatric: AAO x 3. Normal affect/mood.     LABS  CBC - WBC/HGB/HTC/PLT: 15.40/7.7/25.7/317 (12-21-22)  BMP - Na/K/Cl/Bicarb/BUN/Cr/Gluc/AG/eGFR: 139/3.5/97/30/40/2.39/179/12/26 (12-21-22)  Ca - 9.0 (12-21-22)  Phos - 2.7 (12-21-22)  Mg - 2.3 (12-21-22)  LFT - Alb/Tprot/Tbili/Dbili/AlkPhos/ALT/AST: 2.6/--/0.4/--/128/37/36 (12-19-22)    Thyroid Stimulating Hormone, Serum: 4.640 (12-21-22)  Total T4/Free T4: --/0.950 (12-21-22)    MEDICATIONS  MEDICATIONS  (STANDING):  albuterol/ipratropium for Nebulization 3 milliLiter(s) Nebulizer every 6 hours  aspirin  chewable 81 milliGRAM(s) Oral daily  atorvastatin 40 milliGRAM(s) Oral at bedtime  bacitracin   Ointment 1 Application(s) Topical daily  chlorhexidine 2% Cloths 1 Application(s) Topical <User Schedule>  dextrose 5%. 1000 milliLiter(s) (100 mL/Hr) IV Continuous <Continuous>  dextrose 5%. 1000 milliLiter(s) (50 mL/Hr) IV Continuous <Continuous>  dextrose 50% Injectable 25 Gram(s) IV Push once  dextrose 50% Injectable 25 Gram(s) IV Push once  dextrose 50% Injectable 12.5 Gram(s) IV Push once  dextrose 50% Injectable 25 Gram(s) IV Push once  glucagon  Injectable 1 milliGRAM(s) IntraMuscular once  glucagon  Injectable 1 milliGRAM(s) IntraMuscular once  heparin   Injectable 5000 Unit(s) SubCutaneous every 8 hours  influenza  Vaccine (HIGH DOSE) 0.7 milliLiter(s) IntraMuscular once  insulin glargine Injectable (LANTUS) 5 Unit(s) SubCutaneous at bedtime  insulin regular  human corrective regimen sliding scale   SubCutaneous every 6 hours  insulin regular  human recombinant 5 Unit(s) SubCutaneous every 6 hours  levothyroxine Injectable 60 MICROGram(s) IV Push at bedtime  lidocaine 2% Injectable 2 milliLiter(s) Local Injection once  midodrine 30 milliGRAM(s) Oral <User Schedule>  mycophenolate mofetil Suspension 500 milliGRAM(s) Oral every 12 hours  pantoprazole   Suspension 40 milliGRAM(s) Oral every 24 hours  piperacillin/tazobactam IVPB.. 4.5 Gram(s) IV Intermittent every 12 hours  QUEtiapine 25 milliGRAM(s) Oral two times a day  sodium chloride 7% Inhalation 4 milliLiter(s) Inhalation every 12 hours  tacrolimus    0.5 mG/mL Suspension 4 milliGRAM(s) Oral every 24 hours  tacrolimus    0.5 mG/mL Suspension 4 milliGRAM(s) Oral every 24 hours  tamsulosin 0.4 milliGRAM(s) Oral at bedtime    MEDICATIONS  (PRN):  acetaminophen    Suspension .. 650 milliGRAM(s) Oral every 6 hours PRN Temp greater or equal to 38C (100.4F), Mild Pain (1 - 3)  dextrose Oral Gel 15 Gram(s) Oral once PRN Blood Glucose LESS THAN 70 milliGRAM(s)/deciliter  sodium chloride 0.9% lock flush 10 milliLiter(s) IV Push every 1 hour PRN Pre/post blood products, medications, blood draw, and to maintain line patency    ASSESSMENT / RECOMMENDATIONS    A1C: 9.0 %  BUN: 40  Creatinine: 2.39  GFR: 26  Weight: 82.3  BMI: 28.4  EF:     # Type 2 diabetes mellitus  - Please continue lantus *** units at bedtime.   - Continue lispro *** units before each meal.  - Continue lispro moderate / low dose sliding scale before meals and at bedtime.  - Patient's fingerstick glucose goal is 100-180 mg/dL.    - For discharge, patient can ***.    - Patient can follow up at discharge with [facility] by calling [phone] to make an appointment.      Case discussed with Dr. Quevedo. Primary team updated.       Ulysses Weber    Endocrinology Fellow    Service Pager: [phone]    OVERNIGHT: No acute events overnight.   SUBJECTIVE / INTERVAL HPI: Patient was seen and examined this morning. Patient's tube feeds were resumed this morning. Blood glucose remain within goal; however, his blood glucose chris overnight from 116 mg/dL at bedtime to 182 mg/dL this morning.     CAPILLARY BLOOD GLUCOSE & INSULIN RECEIVED  239 mg/dL (12-20 @ 17:43) -> 2 units of sliding scale.    116 mg/dL (12-20 @ 23:01) -> 5 units of lantus    182 mg/dL (12-21 @ 07:11) -> 1 unit of sliding scale.    163 mg/dL (12-21 @ 13:15) -> 5 units of regular insulin +1 unit of sliding scale.      REVIEW OF SYSTEMS  Constitutional:  Negative fever, chills or loss of appetite.  Eyes:  Negative blurry vision or double vision.  Cardiovascular:  Negative for chest pain or palpitations.  Respiratory:  Negative for cough, wheezing, or shortness of breath.    Gastrointestinal:  Negative for nausea, vomiting, diarrhea, constipation, or abdominal pain.  Genitourinary:  Negative frequency, urgency or dysuria.  Neurologic:  No headache, confusion, dizziness, lightheadedness.    PHYSICAL EXAM  Vital Signs Last 24 Hrs  T(C): 37.1 (21 Dec 2022 18:00), Max: 37.1 (21 Dec 2022 18:00)  T(F): 98.7 (21 Dec 2022 18:00), Max: 98.7 (21 Dec 2022 18:00)  HR: 88 (21 Dec 2022 16:45) (76 - 92)  BP: 126/58 (21 Dec 2022 16:10) (110/51 - 144/60)  BP(mean): 83 (21 Dec 2022 16:10) (72 - 83)  RR: 34 (21 Dec 2022 16:45) (13 - 34)  SpO2: 99% (21 Dec 2022 16:45) (96% - 100%)    Parameters below as of 21 Dec 2022 16:45  Patient On (Oxygen Delivery Method): ventilator    O2 Concentration (%): 40    Constitutional: Awake, alert, elderly male, laying in bed, on mechanical ventilation via tracheostomy, in no acute distress, not following simple commands.   HEENT: Normocephalic, atraumatic, GORDON.  Respiratory: (+) Diffuse rhonchi.  Cardiovascular: regular rhythm, normal S1 and S2, no audible murmurs.   GI: soft, non-tender, non-distended, bowel sounds present.  Extremities: No lower extremity edema.    LABS  CBC - WBC/HGB/HTC/PLT: 15.40/7.7/25.7/317 (12-21-22)  BMP - Na/K/Cl/Bicarb/BUN/Cr/Gluc/AG/eGFR: 139/3.5/97/30/40/2.39/179/12/26 (12-21-22)  Ca - 9.0 (12-21-22)  Phos - 2.7 (12-21-22)  Mg - 2.3 (12-21-22)  LFT - Alb/Tprot/Tbili/Dbili/AlkPhos/ALT/AST: 2.6/--/0.4/--/128/37/36 (12-19-22)    Thyroid Stimulating Hormone, Serum: 4.640 (12-21-22)  Total T4/Free T4: --/0.950 (12-21-22)    MEDICATIONS  MEDICATIONS  (STANDING):  albuterol/ipratropium for Nebulization 3 milliLiter(s) Nebulizer every 6 hours  aspirin  chewable 81 milliGRAM(s) Oral daily  atorvastatin 40 milliGRAM(s) Oral at bedtime  bacitracin   Ointment 1 Application(s) Topical daily  chlorhexidine 2% Cloths 1 Application(s) Topical <User Schedule>  dextrose 5%. 1000 milliLiter(s) (100 mL/Hr) IV Continuous <Continuous>  dextrose 5%. 1000 milliLiter(s) (50 mL/Hr) IV Continuous <Continuous>  dextrose 50% Injectable 25 Gram(s) IV Push once  dextrose 50% Injectable 25 Gram(s) IV Push once  dextrose 50% Injectable 12.5 Gram(s) IV Push once  dextrose 50% Injectable 25 Gram(s) IV Push once  glucagon  Injectable 1 milliGRAM(s) IntraMuscular once  glucagon  Injectable 1 milliGRAM(s) IntraMuscular once  heparin   Injectable 5000 Unit(s) SubCutaneous every 8 hours  influenza  Vaccine (HIGH DOSE) 0.7 milliLiter(s) IntraMuscular once  insulin glargine Injectable (LANTUS) 5 Unit(s) SubCutaneous at bedtime  insulin regular  human corrective regimen sliding scale   SubCutaneous every 6 hours  insulin regular  human recombinant 5 Unit(s) SubCutaneous every 6 hours  levothyroxine Injectable 60 MICROGram(s) IV Push at bedtime  lidocaine 2% Injectable 2 milliLiter(s) Local Injection once  midodrine 30 milliGRAM(s) Oral <User Schedule>  mycophenolate mofetil Suspension 500 milliGRAM(s) Oral every 12 hours  pantoprazole   Suspension 40 milliGRAM(s) Oral every 24 hours  piperacillin/tazobactam IVPB.. 4.5 Gram(s) IV Intermittent every 12 hours  QUEtiapine 25 milliGRAM(s) Oral two times a day  sodium chloride 7% Inhalation 4 milliLiter(s) Inhalation every 12 hours  tacrolimus    0.5 mG/mL Suspension 4 milliGRAM(s) Oral every 24 hours  tacrolimus    0.5 mG/mL Suspension 4 milliGRAM(s) Oral every 24 hours  tamsulosin 0.4 milliGRAM(s) Oral at bedtime    MEDICATIONS  (PRN):  acetaminophen    Suspension .. 650 milliGRAM(s) Oral every 6 hours PRN Temp greater or equal to 38C (100.4F), Mild Pain (1 - 3)  dextrose Oral Gel 15 Gram(s) Oral once PRN Blood Glucose LESS THAN 70 milliGRAM(s)/deciliter  sodium chloride 0.9% lock flush 10 milliLiter(s) IV Push every 1 hour PRN Pre/post blood products, medications, blood draw, and to maintain line patency    ASSESSMENT / RECOMMENDATIONS  Mr. Miguel is an 83-year-old male with type 2 diabetes mellitus, coronary artery disease (s/p DAMEON x2 to LAD on 6/2021), chronic kidney disease stage 4 (s/p renal transplant on 2013, on tracrolimus and prednisone) and benign prostatic hyperplasia who was sent to the hospital by his nephrologist for further evaluation of lower extremity edema (11/17/22). He was admitted for further management of lower extremity edema, thought to be secondary to his underlying CKD versus congestive heart failure. Hospitalization was complicated by hypoglycemia and cardiac arrest (11/19/22, ROSC after 1 minute of chest compressions). Post-cardiac arrest he was noted to develop hypotension, bradycardia and hypothermia. Labs were remarkable for elevated TSH (20) and decreased FT4 (0.77). 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 Although he had features seen with myxedema coma, these findings were better explained by his cardiac arrest as his vital signs prior to the arrest were apparently around his baseline, as was his mental status. He was started on levothyroxine supplementation. The patient was extubated on 11/22/22; however, he had to be reintubated on 11/26/22 due to episode of respiratory decompensation, potentially secondary to aspiration. He is now s/p trach and peg.    A1C: 9.0 %  BUN: 40  Creatinine: 2.39  GFR: 26  Weight: 82.3  BMI: 28.4    # Type 2 diabetes mellitus  - Please increase lantus to 7 units at bedtime.   - Continue regular insulin 5 units every 6 hours while on tube feeds.   - Continue regular insulin low dose sliding scale every 6 hours.  - Patient's fingerstick glucose goal is 100-180 mg/dL.      Case discussed with Dr. Quevedo. Primary team updated.       Ulysses Weber    Endocrinology Fellow    Service Pager: [phone]

## 2022-12-21 NOTE — PROGRESS NOTE ADULT - SUBJECTIVE AND OBJECTIVE BOX
OVERNIGHT EVENTS: Tube feeds resumed after gastrogaffin study confirming PEG placement.     SUBJECTIVE / INTERVAL HPI: Patient seen and examined at bedside. Aox0, unable to obtain ROS.     VITAL SIGNS:  Vital Signs Last 24 Hrs  T(C): 36.4 (21 Dec 2022 10:06), Max: 37.1 (20 Dec 2022 14:01)  T(F): 97.6 (21 Dec 2022 10:06), Max: 98.7 (20 Dec 2022 14:01)  HR: 92 (21 Dec 2022 12:15) (78 - 96)  BP: 110/51 (21 Dec 2022 12:15) (110/51 - 144/60)  BP(mean): 74 (21 Dec 2022 12:15) (72 - 78)  RR: 28 (21 Dec 2022 12:15) (12 - 28)  SpO2: 97% (21 Dec 2022 12:15) (91% - 100%)    Parameters below as of 21 Dec 2022 12:15  Patient On (Oxygen Delivery Method): ventilator      I&O's Summary    20 Dec 2022 07:01  -  21 Dec 2022 07:00  --------------------------------------------------------  IN: 400 mL / OUT: 3400 mL / NET: -3000 mL        PHYSICAL EXAM:    Constitutional: NAD  HEENT: NC/AT, EOMI, no conjunctival pallor or scleral icterus, MMM. Coloboma R eye, copious secretions  Neck: Supple  Respiratory: diffuse ronchi to anterior auscultation. On VC/AC   Cardiovascular: RRR, normal S1 and S2, no m/r/g.   Gastrointestinal: soft NTND, no guarding or rebound tenderness, no palpable masses   Extremities: wwp; no cyanosis, clubbing. BL LE edema  Neurological: AAOx0      MEDICATIONS:  MEDICATIONS  (STANDING):  albuterol/ipratropium for Nebulization 3 milliLiter(s) Nebulizer every 6 hours  aspirin  chewable 81 milliGRAM(s) Oral daily  atorvastatin 40 milliGRAM(s) Oral at bedtime  bacitracin   Ointment 1 Application(s) Topical daily  chlorhexidine 2% Cloths 1 Application(s) Topical <User Schedule>  dextrose 5%. 1000 milliLiter(s) (50 mL/Hr) IV Continuous <Continuous>  dextrose 5%. 1000 milliLiter(s) (100 mL/Hr) IV Continuous <Continuous>  dextrose 50% Injectable 25 Gram(s) IV Push once  dextrose 50% Injectable 25 Gram(s) IV Push once  dextrose 50% Injectable 25 Gram(s) IV Push once  dextrose 50% Injectable 12.5 Gram(s) IV Push once  glucagon  Injectable 1 milliGRAM(s) IntraMuscular once  glucagon  Injectable 1 milliGRAM(s) IntraMuscular once  heparin   Injectable 5000 Unit(s) SubCutaneous every 8 hours  influenza  Vaccine (HIGH DOSE) 0.7 milliLiter(s) IntraMuscular once  insulin glargine Injectable (LANTUS) 5 Unit(s) SubCutaneous at bedtime  insulin regular  human corrective regimen sliding scale   SubCutaneous every 6 hours  insulin regular  human recombinant 5 Unit(s) SubCutaneous every 6 hours  levothyroxine Injectable 60 MICROGram(s) IV Push at bedtime  lidocaine 2% Injectable 2 milliLiter(s) Local Injection once  midodrine 30 milliGRAM(s) Oral <User Schedule>  mycophenolate mofetil Suspension 500 milliGRAM(s) Oral every 12 hours  oseltamivir 30 milliGRAM(s) Oral once  pantoprazole   Suspension 40 milliGRAM(s) Oral every 24 hours  piperacillin/tazobactam IVPB.. 4.5 Gram(s) IV Intermittent every 12 hours  QUEtiapine 25 milliGRAM(s) Oral two times a day  sodium chloride 7% Inhalation 4 milliLiter(s) Inhalation every 12 hours  tacrolimus    0.5 mG/mL Suspension 4 milliGRAM(s) Oral every 24 hours  tacrolimus    0.5 mG/mL Suspension 4 milliGRAM(s) Oral every 24 hours  tamsulosin 0.4 milliGRAM(s) Oral at bedtime    MEDICATIONS  (PRN):  acetaminophen    Suspension .. 650 milliGRAM(s) Oral every 6 hours PRN Temp greater or equal to 38C (100.4F), Mild Pain (1 - 3)  dextrose Oral Gel 15 Gram(s) Oral once PRN Blood Glucose LESS THAN 70 milliGRAM(s)/deciliter  sodium chloride 0.9% lock flush 10 milliLiter(s) IV Push every 1 hour PRN Pre/post blood products, medications, blood draw, and to maintain line patency      ALLERGIES:  Allergies    No Known Allergies    Intolerances        LABS:                        7.7    15.40 )-----------( 317      ( 21 Dec 2022 05:30 )             25.7     12-21    139  |  97  |  40<H>  ----------------------------<  179<H>  3.5   |  30  |  2.39<H>    Ca    9.0      21 Dec 2022 05:30  Phos  2.7     12-21  Mg     2.3     12-21          CAPILLARY BLOOD GLUCOSE      POCT Blood Glucose.: 182 mg/dL (21 Dec 2022 07:11)      RADIOLOGY & ADDITIONAL TESTS: Reviewed.

## 2022-12-21 NOTE — PROGRESS NOTE ADULT - ASSESSMENT
84 y/o M PMH CKD 4, renal transplant in 2013 at [facility], glaucoma (blind), DM1, anemia, CAD s/p 2 DAMEON to LAD in 6/21, BPH, recent admission for PNA presents with acute on chronic cough 2/2 PNA and SUNNY 2/2 acute CKD.

## 2022-12-21 NOTE — PROGRESS NOTE ADULT - ATTENDING COMMENTS
Tube feeding was restarted 7 AM today. Glucose levels last night were 239-115 and today 182-163. I agree with increasing the Lantus Insulin to 7 units with 5 units Regular Insulin every 6 hours.

## 2022-12-22 NOTE — PROVIDER CONTACT NOTE (OTHER) - SITUATION
Patient's BP dropped to 77/38 30 minutes prior completion of 3 hours HD treatment, net UF loss 1.8 L. BP cuff readjusted and rechecked with same reading noted. Pt is awake and restless.

## 2022-12-22 NOTE — PROGRESS NOTE ADULT - PROBLEM SELECTOR PROBLEM 1
DM (diabetes mellitus)
Gram-negative pneumonia
Lower extremity edema
Gram-negative pneumonia
Gram-negative pneumonia
Acute respiratory failure with hypoxia
Gram-negative pneumonia
DM (diabetes mellitus)
DM (diabetes mellitus)
Lower extremity edema
Acute respiratory failure with hypoxia
Lower extremity edema
Lower extremity edema
Acute respiratory failure with hypoxia

## 2022-12-22 NOTE — PROGRESS NOTE ADULT - NS ATTEST RISK PROBLEM GEN_ALL_CORE FT
Need for standing insulin regimen now that tube feeds started
Need for continued Tube feeding
Recurrent hypoglycemia
Tube feeds
Fluctuating glucoses, borderline hypoglycemia intermittently
Need for glycemic control plus thyroid/steroid replacement
Poor glycemic control, management of hypothyroidism and adrenal suppression
Fluctuating insulin requirements, adrenal insufficiency
Inadequate glycemic control
Inadequate glycemic control, hypothyroidism
Recurrent hypoglycemia, need for frequent changes in the insulin regimen
Sharp decrease in blood sugars, need for assessment of adrenal function
Need for glycemic control and steroid coverage
Pt critically ill, significantly hypothyroid and on steroid therapy
Tube feeding dependence currently
PEG Feeding and diabetes mellitus
Significant hyperglycemia in past 24 hours
Need for continued insulin/thyroxine adjustments
Inadequate glycemic control in critically ill pt
Inadequate glycemic control

## 2022-12-22 NOTE — PROGRESS NOTE ADULT - ATTENDING COMMENTS
seen on HD with Dr Munoz , agree with above  tolerating rx, VSS  cont HD as above   can probably lower immunosuppression now that on dialysis

## 2022-12-22 NOTE — PROGRESS NOTE ADULT - PROBLEM SELECTOR PROBLEM 8
Prophylactic measure
CAD (coronary artery disease)
CAD (coronary artery disease)
Prophylactic measure
CAD (coronary artery disease)
Prophylactic measure
CAD (coronary artery disease)

## 2022-12-22 NOTE — PROGRESS NOTE ADULT - PROBLEM SELECTOR PLAN 1
Likely 2/2 aspiration event. Intubated on 11/26. Received trach on 12/1. Tolerated CPAP o/n 12/6-7. Tracheal aspirate cx (11/19) growing serratia marcescens and pseudomonas. Patient was started on vancomycin and cefepime. MRSA nares positive. DC'ed vancomycin given supratherapeutic trough. Completed cefepime 2g q24 course x9 days. Sputum cx from 11/26 growing Stenotrophomonas. completed course of Bactrim BS for 10 days (11/28 - 12/5). Sputum cx from 12/5 NG final.    12/19 patient with elevated WBC, axillary 100.2. CXR with increased b/l opacities. s/p Vanc x1 and started Zosyn empirically. Put back on VC/AC for accessory muscle use. Blood cx and sputum cx 12/19 growing Pseudomonas.    - Patient desatted with response to aggressive suctioning. CXR obtained showing increse in left effusion  - c/w Zosyn for Psuedomonal coverage in sputum and blood  - leukocytosis downtrending today, continue to monitor  - Influenza A positive on RVP   - c/w Tamiflu 75mg - dosed after each HD session  - c/w trach collar as tolerated  - c/w hypertonic saline 7% inhalation 4 mL BID  - c/w duonebs q6h

## 2022-12-22 NOTE — PROGRESS NOTE ADULT - SUBJECTIVE AND OBJECTIVE BOX
SUBJECTIVE / INTERVAL HPI: Patient was seen and examined this morning. He continues to receive tube feeds at goal of 30 mL/hr with no significant interruptions.     CAPILLARY BLOOD GLUCOSE & INSULIN RECEIVED  163 mg/dL (12-21 @ 13:15) -> 5 units of regular insulin +1 unit of sliding scale.    147 mg/dL (12-21 @ 17:05) -> 5 units of regular insulin.    217 mg/dL (12-21 @ 23:00) -> 5 units of lantus + 5 units of regular insulin + 2 units of sliding scale.    197 mg/dL (12-22 @ 06:01) -> 5 units of regular insulin + 1 unit of sliding scale.    170 mg/dL (12-22 @ 11:50) -> 5 units of regular insulin + 1 unit of sliding scale.      REVIEW OF SYSTEMS  Unable to obtain.     PHYSICAL EXAM  Vital Signs Last 24 Hrs  T(C): 36.2 (22 Dec 2022 13:26), Max: 37.1 (21 Dec 2022 18:00)  T(F): 97.2 (22 Dec 2022 13:26), Max: 98.7 (21 Dec 2022 18:00)  HR: 92 (22 Dec 2022 12:30) (74 - 100)  BP: 134/60 (22 Dec 2022 12:30) (124/56 - 152/66)  BP(mean): 86 (22 Dec 2022 12:30) (80 - 94)  RR: 18 (22 Dec 2022 12:30) (13 - 34)  SpO2: 94% (22 Dec 2022 12:30) (94% - 100%)    Parameters below as of 22 Dec 2022 12:30  Patient On (Oxygen Delivery Method): ventilator    O2 Concentration (%): 40    Constitutional: Awake, elderly male, laying in bed, on mechanical ventilation via tracheostomy, in no acute distress, not following simple commands.   HEENT: Normocephalic, atraumatic, GORDON.  Respiratory: (+) Diffuse rhonchi.  Cardiovascular: regular rhythm, normal S1 and S2, no audible murmurs.   GI: soft, non-tender, non-distended, bowel sounds present.  Extremities: No lower extremity edema.    LABS  CBC - WBC/HGB/HTC/PLT: 9.80/7.2/24.6/415 (12-22-22)  BMP - Na/K/Cl/Bicarb/BUN/Cr/Gluc/AG/eGFR: 136/3.8/94/30/53/2.97/193/12/20 (12-22-22)  Ca - 8.7 (12-22-22)  Phos - 2.6 (12-22-22)  Mg - 2.3 (12-22-22)  LFT - Alb/Tprot/Tbili/Dbili/AlkPhos/ALT/AST: 2.6/--/0.4/--/128/37/36 (12-19-22)  Thyroid Stimulating Hormone, Serum: 4.640 (12-21-22)  Total T4/Free T4: --/0.950 (12-21-22)    MEDICATIONS  MEDICATIONS  (STANDING):  albuterol/ipratropium for Nebulization 3 milliLiter(s) Nebulizer every 6 hours  aspirin  chewable 81 milliGRAM(s) Oral daily  atorvastatin 40 milliGRAM(s) Oral at bedtime  bacitracin   Ointment 1 Application(s) Topical daily  chlorhexidine 0.12% Liquid 15 milliLiter(s) Oral Mucosa every 12 hours  chlorhexidine 2% Cloths 1 Application(s) Topical <User Schedule>  dextrose 5%. 1000 milliLiter(s) (50 mL/Hr) IV Continuous <Continuous>  dextrose 5%. 1000 milliLiter(s) (100 mL/Hr) IV Continuous <Continuous>  dextrose 50% Injectable 25 Gram(s) IV Push once  dextrose 50% Injectable 12.5 Gram(s) IV Push once  dextrose 50% Injectable 25 Gram(s) IV Push once  dextrose 50% Injectable 25 Gram(s) IV Push once  glucagon  Injectable 1 milliGRAM(s) IntraMuscular once  glucagon  Injectable 1 milliGRAM(s) IntraMuscular once  heparin   Injectable 5000 Unit(s) SubCutaneous every 8 hours  influenza  Vaccine (HIGH DOSE) 0.7 milliLiter(s) IntraMuscular once  insulin glargine Injectable (LANTUS) 7 Unit(s) SubCutaneous at bedtime  insulin glargine Injectable (LANTUS) 5 Unit(s) SubCutaneous at bedtime  insulin regular  human corrective regimen sliding scale   SubCutaneous every 6 hours  insulin regular  human recombinant 5 Unit(s) SubCutaneous every 6 hours  levothyroxine Injectable 60 MICROGram(s) IV Push at bedtime  lidocaine 2% Injectable 2 milliLiter(s) Local Injection once  midodrine 30 milliGRAM(s) Oral <User Schedule>  mycophenolate mofetil Suspension 500 milliGRAM(s) Oral every 12 hours  pantoprazole   Suspension 40 milliGRAM(s) Oral every 24 hours  piperacillin/tazobactam IVPB.. 4.5 Gram(s) IV Intermittent every 12 hours  QUEtiapine 25 milliGRAM(s) Oral two times a day  sodium chloride 7% Inhalation 4 milliLiter(s) Inhalation every 12 hours  tacrolimus    0.5 mG/mL Suspension 4 milliGRAM(s) Oral every 24 hours  tacrolimus    0.5 mG/mL Suspension 4 milliGRAM(s) Oral every 24 hours  tamsulosin 0.4 milliGRAM(s) Oral at bedtime    MEDICATIONS  (PRN):  acetaminophen    Suspension .. 650 milliGRAM(s) Oral every 6 hours PRN Temp greater or equal to 38C (100.4F), Mild Pain (1 - 3)  dextrose Oral Gel 15 Gram(s) Oral once PRN Blood Glucose LESS THAN 70 milliGRAM(s)/deciliter  sodium chloride 0.9% lock flush 10 milliLiter(s) IV Push every 1 hour PRN Pre/post blood products, medications, blood draw, and to maintain line patency    ASSESSMENT / RECOMMENDATIONS  Mr. Miguel is an 83-year-old male with type 2 diabetes mellitus, coronary artery disease (s/p DAMEON x2 to LAD on 6/2021), chronic kidney disease stage 4 (s/p renal transplant on 2013, on tracrolimus and prednisone) and benign prostatic hyperplasia who was sent to the hospital by his nephrologist for further evaluation of lower extremity edema (11/17/22). He was admitted for further management of lower extremity edema, thought to be secondary to his underlying CKD versus congestive heart failure. Hospitalization was complicated by hypoglycemia and cardiac arrest (11/19/22, ROSC after 1 minute of chest compressions). Post-cardiac arrest he was noted to develop hypotension, bradycardia and hypothermia. Labs were remarkable for elevated TSH (20) and decreased FT4 (0.77). 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 Although he had features seen with myxedema coma, these findings were better explained by his cardiac arrest as his vital signs prior to the arrest were apparently around his baseline, as was his mental status. He was started on levothyroxine supplementation. The patient was extubated on 11/22/22; however, he had to be reintubated on 11/26/22 due to episode of respiratory decompensation, potentially secondary to aspiration. He is now s/p trach and peg.    A1C: 9.0 %  BUN: 53  Creatinine: 2.97  GFR: 20  Weight: 82.3  BMI: 28.4    # Type 2 diabetes mellitus  - Blood glucose levels have been slightly above target; however, expect number will improve on Lantus 7 units tonight.   - Please continue lantus 7 units at bedtime.   - Continue regular insulin 5 units every 6 hours while on tube feeds.   - Continue regular insulin low dose sliding scale before meals and at bedtime.  - Patient's fingerstick glucose goal is 100-180 mg/dL.      # Hypothyroidism   - TSH 20. FT4 0.77 (11/19/22) -> Started on levothyroxine 25 mcg IV daily.   - TSH 9.2. FT4 0.69 (11/28/22) -> Increased to levothyroxine 50 mcg IV daily.   - TSH 11.1. FT4 0.56. (12/5/22) -> Increased to levothyroxine 60 mcg IV daily.  - TSH  6.88. FT4 0.744 (12/13/22) -> Continued levothyroxine 60 mcg IV daily.   - TSH 4.64. FT4 0.95 (12/21/22).  - ***.    Case discussed with Dr. Quevedo. Primary team updated.       Ulysses Weber    Endocrinology Fellow    Service Pager: [phone]    SUBJECTIVE / INTERVAL HPI: Patient was seen and examined this morning. He continues to receive tube feeds at goal of 30 mL/hr with no significant interruptions.     CAPILLARY BLOOD GLUCOSE & INSULIN RECEIVED  163 mg/dL (12-21 @ 13:15) -> 5 units of regular insulin +1 unit of sliding scale.    147 mg/dL (12-21 @ 17:05) -> 5 units of regular insulin.    217 mg/dL (12-21 @ 23:00) -> 5 units of lantus + 5 units of regular insulin + 2 units of sliding scale.    197 mg/dL (12-22 @ 06:01) -> 5 units of regular insulin + 1 unit of sliding scale.    170 mg/dL (12-22 @ 11:50) -> 5 units of regular insulin + 1 unit of sliding scale.      REVIEW OF SYSTEMS  Unable to obtain.     PHYSICAL EXAM  Vital Signs Last 24 Hrs  T(C): 36.2 (22 Dec 2022 13:26), Max: 37.1 (21 Dec 2022 18:00)  T(F): 97.2 (22 Dec 2022 13:26), Max: 98.7 (21 Dec 2022 18:00)  HR: 92 (22 Dec 2022 12:30) (74 - 100)  BP: 134/60 (22 Dec 2022 12:30) (124/56 - 152/66)  BP(mean): 86 (22 Dec 2022 12:30) (80 - 94)  RR: 18 (22 Dec 2022 12:30) (13 - 34)  SpO2: 94% (22 Dec 2022 12:30) (94% - 100%)    Parameters below as of 22 Dec 2022 12:30  Patient On (Oxygen Delivery Method): ventilator    O2 Concentration (%): 40    Constitutional: Awake, elderly male, laying in bed, on mechanical ventilation via tracheostomy, in no acute distress, not following simple commands.   HEENT: Normocephalic, atraumatic, GORDON.  Respiratory: (+) Diffuse rhonchi.  Cardiovascular: regular rhythm, normal S1 and S2, no audible murmurs.   GI: soft, non-tender, non-distended, bowel sounds present.  Extremities: No lower extremity edema.    LABS  CBC - WBC/HGB/HTC/PLT: 9.80/7.2/24.6/415 (12-22-22)  BMP - Na/K/Cl/Bicarb/BUN/Cr/Gluc/AG/eGFR: 136/3.8/94/30/53/2.97/193/12/20 (12-22-22)  Ca - 8.7 (12-22-22)  Phos - 2.6 (12-22-22)  Mg - 2.3 (12-22-22)  LFT - Alb/Tprot/Tbili/Dbili/AlkPhos/ALT/AST: 2.6/--/0.4/--/128/37/36 (12-19-22)  Thyroid Stimulating Hormone, Serum: 4.640 (12-21-22)  Total T4/Free T4: --/0.950 (12-21-22)    MEDICATIONS  MEDICATIONS  (STANDING):  albuterol/ipratropium for Nebulization 3 milliLiter(s) Nebulizer every 6 hours  aspirin  chewable 81 milliGRAM(s) Oral daily  atorvastatin 40 milliGRAM(s) Oral at bedtime  bacitracin   Ointment 1 Application(s) Topical daily  chlorhexidine 0.12% Liquid 15 milliLiter(s) Oral Mucosa every 12 hours  chlorhexidine 2% Cloths 1 Application(s) Topical <User Schedule>  dextrose 5%. 1000 milliLiter(s) (50 mL/Hr) IV Continuous <Continuous>  dextrose 5%. 1000 milliLiter(s) (100 mL/Hr) IV Continuous <Continuous>  dextrose 50% Injectable 25 Gram(s) IV Push once  dextrose 50% Injectable 12.5 Gram(s) IV Push once  dextrose 50% Injectable 25 Gram(s) IV Push once  dextrose 50% Injectable 25 Gram(s) IV Push once  glucagon  Injectable 1 milliGRAM(s) IntraMuscular once  glucagon  Injectable 1 milliGRAM(s) IntraMuscular once  heparin   Injectable 5000 Unit(s) SubCutaneous every 8 hours  influenza  Vaccine (HIGH DOSE) 0.7 milliLiter(s) IntraMuscular once  insulin glargine Injectable (LANTUS) 7 Unit(s) SubCutaneous at bedtime  insulin glargine Injectable (LANTUS) 5 Unit(s) SubCutaneous at bedtime  insulin regular  human corrective regimen sliding scale   SubCutaneous every 6 hours  insulin regular  human recombinant 5 Unit(s) SubCutaneous every 6 hours  levothyroxine Injectable 60 MICROGram(s) IV Push at bedtime  lidocaine 2% Injectable 2 milliLiter(s) Local Injection once  midodrine 30 milliGRAM(s) Oral <User Schedule>  mycophenolate mofetil Suspension 500 milliGRAM(s) Oral every 12 hours  pantoprazole   Suspension 40 milliGRAM(s) Oral every 24 hours  piperacillin/tazobactam IVPB.. 4.5 Gram(s) IV Intermittent every 12 hours  QUEtiapine 25 milliGRAM(s) Oral two times a day  sodium chloride 7% Inhalation 4 milliLiter(s) Inhalation every 12 hours  tacrolimus    0.5 mG/mL Suspension 4 milliGRAM(s) Oral every 24 hours  tacrolimus    0.5 mG/mL Suspension 4 milliGRAM(s) Oral every 24 hours  tamsulosin 0.4 milliGRAM(s) Oral at bedtime    MEDICATIONS  (PRN):  acetaminophen    Suspension .. 650 milliGRAM(s) Oral every 6 hours PRN Temp greater or equal to 38C (100.4F), Mild Pain (1 - 3)  dextrose Oral Gel 15 Gram(s) Oral once PRN Blood Glucose LESS THAN 70 milliGRAM(s)/deciliter  sodium chloride 0.9% lock flush 10 milliLiter(s) IV Push every 1 hour PRN Pre/post blood products, medications, blood draw, and to maintain line patency    ASSESSMENT / RECOMMENDATIONS  Mr. Miguel is an 83-year-old male with type 2 diabetes mellitus, coronary artery disease (s/p DAMEON x2 to LAD on 6/2021), chronic kidney disease stage 4 (s/p renal transplant on 2013, on tracrolimus and prednisone) and benign prostatic hyperplasia who was sent to the hospital by his nephrologist for further evaluation of lower extremity edema (11/17/22). He was admitted for further management of lower extremity edema, thought to be secondary to his underlying CKD versus congestive heart failure. Hospitalization was complicated by hypoglycemia and cardiac arrest (11/19/22, ROSC after 1 minute of chest compressions). Post-cardiac arrest he was noted to develop hypotension, bradycardia and hypothermia. Labs were remarkable for elevated TSH (20) and decreased FT4 (0.77). 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 Although he had features seen with myxedema coma, these findings were better explained by his cardiac arrest as his vital signs prior to the arrest were apparently around his baseline, as was his mental status. He was started on levothyroxine supplementation. The patient was extubated on 11/22/22; however, he had to be reintubated on 11/26/22 due to episode of respiratory decompensation, potentially secondary to aspiration. He is now s/p trach and peg.    A1C: 9.0 %  BUN: 53  Creatinine: 2.97  GFR: 20  Weight: 82.3  BMI: 28.4    # Type 2 diabetes mellitus  - Blood glucose levels have been slightly above target; however, expect number will improve on Lantus 7 units tonight.   - Please continue lantus 7 units at bedtime.   - Increase regular insulin to 6 units every 6 hours while on tube feeds.   - Continue regular insulin low dose sliding scale before meals and at bedtime.  - Patient's fingerstick glucose goal is 100-180 mg/dL.      # Hypothyroidism   - TSH 20. FT4 0.77 (11/19/22) -> Started on levothyroxine 25 mcg IV daily.   - TSH 9.2. FT4 0.69 (11/28/22) -> Increased to levothyroxine 50 mcg IV daily.   - TSH 11.1. FT4 0.56. (12/5/22) -> Increased to levothyroxine 60 mcg IV daily.  - TSH  6.88. FT4 0.744 (12/13/22) -> Continued levothyroxine 60 mcg IV daily.   - TSH 4.64. FT4 0.95 (12/21/22)  - Continue with levothyroxine 60 mcg IV daily.     Case discussed with Dr. Quevedo. Primary team updated.       Ulysses Weber    Endocrinology Fellow    Service Pager: [phone]

## 2022-12-22 NOTE — PROGRESS NOTE ADULT - NS ATTEST RISKLEV GEN_ALL_CORE
Moderate
High
Moderate

## 2022-12-22 NOTE — PROGRESS NOTE ADULT - SUBJECTIVE AND OBJECTIVE BOX
OVERNIGHT EVENTS: Patient desatted to 80s while on VCAC, with suctioning he responded with sats back to 90s on FiO2 50. CXR obtained showing inter increase in left pleural effusion.    SUBJECTIVE / INTERVAL HPI: Patient seen and examined at bedside. AOx0 unable to obtain ROS.     VITAL SIGNS:  Vital Signs Last 24 Hrs  T(C): 36.2 (22 Dec 2022 13:26), Max: 37.1 (21 Dec 2022 18:00)  T(F): 97.2 (22 Dec 2022 13:26), Max: 98.7 (21 Dec 2022 18:00)  HR: 92 (22 Dec 2022 12:30) (74 - 100)  BP: 134/60 (22 Dec 2022 12:30) (124/56 - 152/66)  BP(mean): 86 (22 Dec 2022 12:30) (80 - 94)  RR: 18 (22 Dec 2022 12:30) (13 - 34)  SpO2: 94% (22 Dec 2022 12:30) (94% - 100%)    Parameters below as of 22 Dec 2022 12:30  Patient On (Oxygen Delivery Method): ventilator    O2 Concentration (%): 40  I&O's Summary    21 Dec 2022 07:01  -  22 Dec 2022 07:00  --------------------------------------------------------  IN: 635 mL / OUT: 0 mL / NET: 635 mL    22 Dec 2022 07:01  -  22 Dec 2022 15:22  --------------------------------------------------------  IN: 180 mL / OUT: 0 mL / NET: 180 mL        PHYSICAL EXAM:    Constitutional: NAD  HEENT: NC/AT, EOMI, no conjunctival pallor or scleral icterus, MMM. Coloboma R eye, copious secretions  Neck: Supple  Respiratory: diffuse ronchi to anterior auscultation. On VC/AC   Cardiovascular: RRR, normal S1 and S2, no m/r/g.   Gastrointestinal: soft NTND, no guarding or rebound tenderness, no palpable masses   Extremities: wwp; no cyanosis, clubbing. BL LE edema  Neurological: AAOx0    MEDICATIONS:  MEDICATIONS  (STANDING):  albuterol/ipratropium for Nebulization 3 milliLiter(s) Nebulizer every 6 hours  aspirin  chewable 81 milliGRAM(s) Oral daily  atorvastatin 40 milliGRAM(s) Oral at bedtime  bacitracin   Ointment 1 Application(s) Topical daily  chlorhexidine 0.12% Liquid 15 milliLiter(s) Oral Mucosa every 12 hours  chlorhexidine 2% Cloths 1 Application(s) Topical <User Schedule>  dextrose 5%. 1000 milliLiter(s) (50 mL/Hr) IV Continuous <Continuous>  dextrose 5%. 1000 milliLiter(s) (100 mL/Hr) IV Continuous <Continuous>  dextrose 50% Injectable 25 Gram(s) IV Push once  dextrose 50% Injectable 12.5 Gram(s) IV Push once  dextrose 50% Injectable 25 Gram(s) IV Push once  dextrose 50% Injectable 25 Gram(s) IV Push once  glucagon  Injectable 1 milliGRAM(s) IntraMuscular once  glucagon  Injectable 1 milliGRAM(s) IntraMuscular once  heparin   Injectable 5000 Unit(s) SubCutaneous every 8 hours  influenza  Vaccine (HIGH DOSE) 0.7 milliLiter(s) IntraMuscular once  insulin glargine Injectable (LANTUS) 7 Unit(s) SubCutaneous at bedtime  insulin regular  human corrective regimen sliding scale   SubCutaneous every 6 hours  insulin regular  human recombinant 5 Unit(s) SubCutaneous every 6 hours  levothyroxine Injectable 60 MICROGram(s) IV Push at bedtime  lidocaine 2% Injectable 2 milliLiter(s) Local Injection once  midodrine 30 milliGRAM(s) Oral <User Schedule>  mycophenolate mofetil Suspension 500 milliGRAM(s) Oral every 12 hours  pantoprazole   Suspension 40 milliGRAM(s) Oral every 24 hours  piperacillin/tazobactam IVPB.. 4.5 Gram(s) IV Intermittent every 12 hours  QUEtiapine 25 milliGRAM(s) Oral two times a day  sodium chloride 7% Inhalation 4 milliLiter(s) Inhalation every 12 hours  tacrolimus    0.5 mG/mL Suspension 4 milliGRAM(s) Oral every 24 hours  tacrolimus    0.5 mG/mL Suspension 4 milliGRAM(s) Oral every 24 hours  tamsulosin 0.4 milliGRAM(s) Oral at bedtime    MEDICATIONS  (PRN):  acetaminophen    Suspension .. 650 milliGRAM(s) Oral every 6 hours PRN Temp greater or equal to 38C (100.4F), Mild Pain (1 - 3)  dextrose Oral Gel 15 Gram(s) Oral once PRN Blood Glucose LESS THAN 70 milliGRAM(s)/deciliter  sodium chloride 0.9% lock flush 10 milliLiter(s) IV Push every 1 hour PRN Pre/post blood products, medications, blood draw, and to maintain line patency      ALLERGIES:  Allergies    No Known Allergies    Intolerances        LABS:                        7.2    9.80  )-----------( 415      ( 22 Dec 2022 07:00 )             24.6     12-22    136  |  94<L>  |  53<H>  ----------------------------<  193<H>  3.8   |  30  |  2.97<H>    Ca    8.7      22 Dec 2022 07:00  Phos  2.6     12-22  Mg     2.3     12-22          CAPILLARY BLOOD GLUCOSE      POCT Blood Glucose.: 170 mg/dL (22 Dec 2022 11:50)      RADIOLOGY & ADDITIONAL TESTS: Reviewed.

## 2022-12-22 NOTE — PROGRESS NOTE ADULT - SUBJECTIVE AND OBJECTIVE BOX
Patient is a 82y Male seen and evaluated at bedside, tolerating HD, no acute distress, does not offer any complaints. Continue HD as prescribed.        Meds:    acetaminophen    Suspension .. 650 every 6 hours PRN  albuterol/ipratropium for Nebulization 3 every 6 hours  aspirin  chewable 81 daily  atorvastatin 40 at bedtime  bacitracin   Ointment 1 daily  chlorhexidine 0.12% Liquid 15 every 12 hours  chlorhexidine 2% Cloths 1 <User Schedule>  dextrose 5%. 1000 <Continuous>  dextrose 5%. 1000 <Continuous>  dextrose 50% Injectable 25 once  dextrose 50% Injectable 25 once  dextrose 50% Injectable 12.5 once  dextrose 50% Injectable 25 once  dextrose Oral Gel 15 once PRN  glucagon  Injectable 1 once  glucagon  Injectable 1 once  heparin   Injectable 5000 every 8 hours  influenza  Vaccine (HIGH DOSE) 0.7 once  insulin glargine Injectable (LANTUS) 7 at bedtime  insulin glargine Injectable (LANTUS) 5 at bedtime  insulin regular  human corrective regimen sliding scale  every 6 hours  insulin regular  human recombinant 5 every 6 hours  levothyroxine Injectable 60 at bedtime  lidocaine 2% Injectable 2 once  midodrine 30 <User Schedule>  mycophenolate mofetil Suspension 500 every 12 hours  pantoprazole   Suspension 40 every 24 hours  piperacillin/tazobactam IVPB.. 4.5 every 12 hours  QUEtiapine 25 two times a day  sodium chloride 0.9% lock flush 10 every 1 hour PRN  sodium chloride 7% Inhalation 4 every 12 hours  tacrolimus    0.5 mG/mL Suspension 4 every 24 hours  tacrolimus    0.5 mG/mL Suspension 4 every 24 hours  tamsulosin 0.4 at bedtime      T(C): , Max: 37.1 (12-21-22 @ 18:00)  T(F): , Max: 98.7 (12-21-22 @ 18:00)  HR: 92 (12-22-22 @ 12:30)  BP: 134/60 (12-22-22 @ 12:30)  BP(mean): 86 (12-22-22 @ 12:30)  RR: 18 (12-22-22 @ 12:30)  SpO2: 94% (12-22-22 @ 12:30)  Wt(kg): --    12-21 @ 07:01  -  12-22 @ 07:00  --------------------------------------------------------  IN: 635 mL / OUT: 0 mL / NET: 635 mL    12-22 @ 07:01  -  12-22 @ 13:59  --------------------------------------------------------  IN: 180 mL / OUT: 0 mL / NET: 180 mL          Review of Systems:  ROS negative except as per HPI      PHYSICAL EXAM:  GENERAL: trach collar, NAD, laying in bed tolerating HD   NECK: supple, No JVD  Lungs: No rales, ronchi or wheezing noted  HEART: normal S1S2, RRR  ABDOMEN: Soft, Nontender, +BS, No flank tenderness bilateral  EXTREMITIES: Remains anasarcic, although edema is slowly improving  ACCESS: R radiocephalic AVF w/ palpable thrill accessed     LABS:                        7.2    9.80  )-----------( 415      ( 22 Dec 2022 07:00 )             24.6     12-22    136  |  94<L>  |  53<H>  ----------------------------<  193<H>  3.8   |  30  |  2.97<H>    Ca    8.7      22 Dec 2022 07:00  Phos  2.6     12-22  Mg     2.3     12-22                  RADIOLOGY & ADDITIONAL STUDIES:

## 2022-12-22 NOTE — PROGRESS NOTE ADULT - TIME-BASED
35
36
25
35
25
35
20
25
35
20
25
25
35
36
20
25
35
36
15
25
36
25
36
25
35

## 2022-12-22 NOTE — PROGRESS NOTE ADULT - TIME BILLING
Patient seen and examined with house-staff during bedside rounds.  Resident note read, including vitals, physical findings, laboratory data, and radiological reports.   Revisions included below.  Direct personal management at bed side and extensive interpretation of the data.  Plan was outlined and discussed in details with the housestaff.  Decision making of high complexity  Action taken for acute disease activity to reflect the level of care provided:  - medication reconciliation  - review laboratory data
Patient seen and examined with house-staff during bedside rounds.  Resident note read, including vitals, physical findings, laboratory data, and radiological reports.   Revisions included below.  Direct personal management at bed side and extensive interpretation of the data.  Plan was outlined and discussed in details with the housestaff.  Decision making of high complexity  Action taken for acute disease activity to reflect the level of care provided:  - medication reconciliation  - review laboratory data
Patient seen and examined with house-staff during bedside rounds.  Resident note read, including vitals, physical findings, laboratory data, and radiological reports.   Revisions included below.  Direct personal management at bed side and extensive interpretation of the data.  Plan was outlined and discussed in details with the housestaff.  Decision making of high complexity  Action taken for acute disease activity to reflect the level of care provided:  - medication reconciliation  - review laboratory data  Portance for support.  We will contact the patient on trach collar.  Continue pulmonary toilet with a soft rubber catheter.  No for further hemoptysis.  The patient has air leak and able to converse around the trach.  On hemodialysis.  Hemoglobin is stable.  Continue midodrine.  Continue bronchodilator.  Restart on tube feed.  The blood sugar is elevated I will adjust insulin
Insulin decreased
Patient seen and examined with house-staff during bedside rounds.  Resident note read, including vitals, physical findings, laboratory data, and radiological reports.   Revisions included below.  Direct personal management at bed side and extensive interpretation of the data.  Plan was outlined and discussed in details with the housestaff.  Decision making of high complexity  Action taken for acute disease activity to reflect the level of care provided:  - medication reconciliation  - review laboratory data
Insulin adjustment
Insulin adjustment
Patient seen and examined with house-staff during bedside rounds.  Resident note read, including vitals, physical findings, laboratory data, and radiological reports.   Revisions included below.  Direct personal management at bed side and extensive interpretation of the data.  Plan was outlined and discussed in details with the housestaff.  Decision making of high complexity  Action taken for acute disease activity to reflect the level of care provided:  - medication reconciliation  - review laboratory data
as noted above
As above; Coordination of care with primary team, discussed ELIZABETH, KRT, BM  This excludes any time spent on separate procedures or teaching.
As above; Coordination of care with primary team, discussed ELIZABETH, tacrolimus dosing, fluids, lung pathology  This excludes any time spent on separate procedures or teaching.
As above; Coordination of care with primary team, discussed transplant meds, fluid overload, shock  This excludes any time spent on separate procedures or teaching.
Insulin, steroid dose adjustments
Patient seen and examined with house-staff during bedside rounds.  Resident note read, including vitals, physical findings, laboratory data, and radiological reports.   Revisions included below.  Direct personal management at bed side and extensive interpretation of the data.  Plan was outlined and discussed in details with the housestaff.  Decision making of high complexity  Action taken for acute disease activity to reflect the level of care provided:  - medication reconciliation  - review laboratory data
Patient seen and examined with house-staff during bedside rounds.  Resident note read, including vitals, physical findings, laboratory data, and radiological reports.   Revisions included below.  Direct personal management at bed side and extensive interpretation of the data.  Plan was outlined and discussed in details with the housestaff.  Decision making of high complexity  Action taken for acute disease activity to reflect the level of care provided:  - medication reconciliation  - review laboratory data
coordinating care for relative hypoglycemia while on tube feeds.
Insulin adjustment
Insulin adjustments
Levothyroxine and steroid management
Patient seen and examined with house-staff during bedside rounds.  Resident note read, including vitals, physical findings, laboratory data, and radiological reports.   Revisions included below.  Direct personal management at bed side and extensive interpretation of the data.  Plan was outlined and discussed in details with the housestaff.  Decision making of high complexity  Action taken for acute disease activity to reflect the level of care provided:  - medication reconciliation  - review laboratory data
Restart a standing insulin regimen
case complexity
As above; Coordination of care with primary team, discussed ELIZABETH, Tacrolimus, steroids, KRT planning  This excludes any time spent on separate procedures or teaching.
Insulin increased.  Thyroid regimen re-evaluated
Insulin, levothyroxine and hydrocortisone adjusmtment
Patient seen and examined with house-staff during bedside rounds.  Resident note read, including vitals, physical findings, laboratory data, and radiological reports.   Revisions included below.  Direct personal management at bed side and extensive interpretation of the data.  Plan was outlined and discussed in details with the housestaff.  Decision making of high complexity  Action taken for acute disease activity to reflect the level of care provided:  - medication reconciliation  - review laboratory data
case complexity
As above; Coordination of care with primary team, discussed ELIZABETH, KRT, immunosuppression  This excludes any time spent on separate procedures or teaching.
Insulin adjustment
Insulin adjustments
Patient seen and examined;  Agree with diuretics;  Medication as outlined
case complexity
case complexity
Insulin adjustment
Insulin, thyroxine adjustment
Insulin adjustments
Insulin management, assessment of steroid replacement
Patient seen and examined with house-staff during bedside rounds.  Resident note read, including vitals, physical findings, laboratory data, and radiological reports.   Revisions included below.  Direct personal management at bed side and extensive interpretation of the data.  Plan was outlined and discussed in details with the housestaff.  Decision making of high complexity  Action taken for acute disease activity to reflect the level of care provided:  - medication reconciliation  - review laboratory data  he is improving and blood culture came back positive and sputum for gram negative rods.  WBC is decraesing with hemodynamic stability continue zossyn.  he has a jonnie.  Continue cmv.  He also has air leak.  Failed PSV
Patient seen and examined with house-staff during bedside rounds.  Resident note read, including vitals, physical findings, laboratory data, and radiological reports.   Revisions included below.  Direct personal management at bed side and extensive interpretation of the data.  Plan was outlined and discussed in details with the housestaff.  Decision making of high complexity  Action taken for acute disease activity to reflect the level of care provided:  - medication reconciliation  - review laboratory data
Patient seen and examined with house-staff during bedside rounds.  Resident note read, including vitals, physical findings, laboratory data, and radiological reports.   Revisions included below.  Direct personal management at bed side and extensive interpretation of the data.  Plan was outlined and discussed in details with the housestaff.  Decision making of high complexity  Action taken for acute disease activity to reflect the level of care provided:  - medication reconciliation  - review laboratory data      The patient is still on complete ventilatory support.  Continue pulmonary toilet.  Temperature curve is coming down although the white count is elevated.  The blood culture grew Pseudomonas which consistent with a sputum culture.  Follow-up with chest x-ray.  Will call ID for brought in the antibiotic if his white count is still elevated.

## 2022-12-22 NOTE — PROVIDER CONTACT NOTE (OTHER) - DATE AND TIME:
22-Dec-2022 14:35 Tissue Cultured Epidermal Autograft Text: The defect edges were debeveled with a #15 scalpel blade.  Given the location of the defect, shape of the defect and the proximity to free margins a tissue cultured epidermal autograft was deemed most appropriate.  The graft was then trimmed to fit the size of the defect.  The graft was then placed in the primary defect and oriented appropriately.

## 2022-12-22 NOTE — PROGRESS NOTE ADULT - TIME-BASED BILLING (NON-CRITICAL CARE)
Time-based billing (NON-critical care)

## 2022-12-22 NOTE — PROGRESS NOTE ADULT - PROBLEM SELECTOR PROBLEM 9
Edema of upper extremity

## 2022-12-22 NOTE — PROGRESS NOTE ADULT - NS ATTEST RISK GEN_ALL_CORE
Risk Statement (NON-critical care)

## 2022-12-22 NOTE — PROGRESS NOTE ADULT - ASSESSMENT
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 Was started on HD on  due to worsening volume status, acidosis and concern for uremia.   Electrolytes noted     Plan   -HD today   Hemodialysis Treatment.:     Schedule: Once, Modality: Hemodialysis, Access: Arteriovenous Fistula    Dialyzer: Optiflux [id number], Time: 180 Min    Blood Flow: 400 mL/Min , Dialysate Flow: 600 mL/Min, Dialysate Temp: 36.5, Tubinmm (Adult)    Target Fluid Removal: 3 Liters    Dialysate Electrolytes (mEq/L): Potassium 3, Calcium 2.5, Sodium 138, Bicarbonate 35  - c/w tacrolimus to 4mg AM and 4mg PM (12 hrs apart)  - c/w cell cept 500mg BID  - strict i's and o's  - Trend BMP daily  - maintain MAP >70     Access:  RUE AVF functional     Blood pressure:  Stable   UF with HD, as tolerated   Midodrine with HD,   Will lower Dialysate Temp to help with intradialytic hypotension     #Anemia of chronic disease  Hgb 7.2  -Epo w/ HD    Renal Bone Disease   Calcium: 8.7  Phos: 2.6  Trend phos daily with labs     presenting with decompensated heart failure c/b cardiac arrest on

## 2022-12-22 NOTE — PROGRESS NOTE ADULT - PROBLEM SELECTOR PROBLEM 10
Adrenal insufficiency

## 2022-12-23 NOTE — DISCHARGE NOTE NURSING/CASE MANAGEMENT/SOCIAL WORK - PATIENT PORTAL LINK FT
You can access the FollowMyHealth Patient Portal offered by [facility] by registering at the following website: [email or web address]. By joining [facility]’s FollowMyHealth portal, you will also be able to view your health information using other applications (apps) compatible with our system.

## 2022-12-23 NOTE — DISCHARGE NOTE NURSING/CASE MANAGEMENT/SOCIAL WORK - NSDCVIVACCINE_GEN_ALL_CORE_FT
influenza, injectable, quadrivalent, preservative free; 10-Dec-2019 18:18; Tiff Michele (RN); [employer]; [id number] (Exp. Date: 30-Jun-2020); IntraMuscular; Deltoid Right.; 0.5 milliLiter(s); VIS (VIS Published: 15-Aug-2019, VIS Presented: 10-Dec-2019);

## 2022-12-23 NOTE — DISCHARGE NOTE NURSING/CASE MANAGEMENT/SOCIAL WORK - NSDCPEFALRISK_GEN_ALL_CORE
For information on Fall & Injury Prevention, visit: [email or web address] OR  [email or web address] OR  https://www.cdc.gov/steadi/patient.html

## 2022-12-23 NOTE — PROCEDURE NOTE - PROCEDURE DATE TIME, MLM
01-Dec-2022
02-Dec-2022 19:20
04-Dec-2022 19:55
28-Nov-2022 13:06
26-Nov-2022 11:36
19-Nov-2022 17:49
23-Dec-2022 08:12
20-Nov-2022 17:40
17-Dec-2022 15:00
31-Jan-2022

## 2022-12-23 NOTE — PROCEDURE NOTE - NSPROCNAME_GEN_A_CORE
Point of Care Ultrasound Lung
Bronchoscopy
Point of Care Ultrasound Lung
Point of Care Ultrasound Lung
Bronchoscopy
Point of Care Ultrasound Cardiac
General
Peripheral Line Insertion
Central Line Insertion
Peripheral Line Insertion
Tracheal Intubation
Peripheral Line Insertion
Peripheral Line Insertion
Arterial Puncture/Cannulation

## 2022-12-23 NOTE — PROCEDURE NOTE - NSINFORMCONSENT_GEN_A_CORE
This was an emergent procedure.
Benefits, risks, and possible complications of procedure explained to patient/caregiver who verbalized understanding and gave verbal consent.
Benefits, risks, and possible complications of procedure explained to patient/caregiver who verbalized understanding and gave verbal consent.
not requires
Benefits, risks, and possible complications of procedure explained to patient/caregiver who verbalized understanding and gave written consent.
This was an emergent procedure.
Benefits, risks, and possible complications of procedure explained to patient/caregiver who verbalized understanding and gave verbal consent.
Benefits, risks, and possible complications of procedure explained to patient/caregiver who verbalized understanding and gave verbal consent.
This was an emergent procedure.

## 2023-01-01 ENCOUNTER — INPATIENT (INPATIENT)
Facility: HOSPITAL | Age: 83
LOS: 0 days | DRG: 871 | End: 2023-06-13
Payer: MEDICARE

## 2023-01-01 ENCOUNTER — APPOINTMENT (OUTPATIENT)
Dept: PULMONOLOGY | Facility: CLINIC | Age: 83
End: 2023-01-01

## 2023-01-01 VITALS — OXYGEN SATURATION: 95 % | RESPIRATION RATE: 32 BRPM

## 2023-01-01 VITALS
WEIGHT: 176.37 LBS | HEART RATE: 54 BPM | SYSTOLIC BLOOD PRESSURE: 88 MMHG | RESPIRATION RATE: 27 BRPM | DIASTOLIC BLOOD PRESSURE: 45 MMHG

## 2023-01-01 DIAGNOSIS — S98.139A COMPLETE TRAUMATIC AMPUTATION OF ONE UNSPECIFIED LESSER TOE, INITIAL ENCOUNTER: Chronic | ICD-10-CM

## 2023-01-01 DIAGNOSIS — J18.9 PNEUMONIA, UNSPECIFIED ORGANISM: ICD-10-CM

## 2023-01-01 DIAGNOSIS — Z94.0 KIDNEY TRANSPLANT STATUS: Chronic | ICD-10-CM

## 2023-01-01 DIAGNOSIS — Z95.5 PRESENCE OF CORONARY ANGIOPLASTY IMPLANT AND GRAFT: Chronic | ICD-10-CM

## 2023-01-01 LAB
ACANTHOCYTES BLD QL SMEAR: SLIGHT — SIGNIFICANT CHANGE UP
ALBUMIN SERPL ELPH-MCNC: 2.2 G/DL — LOW (ref 3.3–5)
ALBUMIN SERPL ELPH-MCNC: 2.6 G/DL — LOW (ref 3.3–5)
ALP SERPL-CCNC: 185 U/L — HIGH (ref 40–120)
ALP SERPL-CCNC: 253 U/L — HIGH (ref 40–120)
ALT FLD-CCNC: 145 U/L — HIGH (ref 10–45)
ALT FLD-CCNC: 497 U/L — HIGH (ref 10–45)
ANION GAP SERPL CALC-SCNC: 38 MMOL/L — HIGH (ref 5–17)
ANISOCYTOSIS BLD QL: SIGNIFICANT CHANGE UP
ANISOCYTOSIS BLD QL: SLIGHT — SIGNIFICANT CHANGE UP
APTT BLD: 67.5 SEC — HIGH (ref 27.5–35.5)
AST SERPL-CCNC: 1388 U/L — HIGH (ref 10–40)
AST SERPL-CCNC: 269 U/L — HIGH (ref 10–40)
BASE EXCESS BLDA CALC-SCNC: SIGNIFICANT CHANGE UP MMOL/L (ref -2–3)
BASE EXCESS BLDV CALC-SCNC: -26.8 MMOL/L — LOW (ref -2–3)
BASOPHILS # BLD AUTO: 0 K/UL — SIGNIFICANT CHANGE UP (ref 0–0.2)
BASOPHILS # BLD AUTO: 0 K/UL — SIGNIFICANT CHANGE UP (ref 0–0.2)
BASOPHILS NFR BLD AUTO: 0 % — SIGNIFICANT CHANGE UP (ref 0–2)
BASOPHILS NFR BLD AUTO: 0 % — SIGNIFICANT CHANGE UP (ref 0–2)
BILIRUB SERPL-MCNC: 0.4 MG/DL — SIGNIFICANT CHANGE UP (ref 0.2–1.2)
BILIRUB SERPL-MCNC: 0.6 MG/DL — SIGNIFICANT CHANGE UP (ref 0.2–1.2)
BLD GP AB SCN SERPL QL: NEGATIVE — SIGNIFICANT CHANGE UP
BUN SERPL-MCNC: 83 MG/DL — HIGH (ref 7–23)
BUN SERPL-MCNC: 88 MG/DL — HIGH (ref 7–23)
BURR CELLS BLD QL SMEAR: PRESENT — SIGNIFICANT CHANGE UP
BURR CELLS BLD QL SMEAR: PRESENT — SIGNIFICANT CHANGE UP
CA-I SERPL-SCNC: 1.17 MMOL/L — SIGNIFICANT CHANGE UP (ref 1.15–1.33)
CALCIUM SERPL-MCNC: 10.2 MG/DL — SIGNIFICANT CHANGE UP (ref 8.4–10.5)
CALCIUM SERPL-MCNC: 10.6 MG/DL — HIGH (ref 8.4–10.5)
CHLORIDE SERPL-SCNC: 100 MMOL/L — SIGNIFICANT CHANGE UP (ref 96–108)
CHLORIDE SERPL-SCNC: 99 MMOL/L — SIGNIFICANT CHANGE UP (ref 96–108)
CO2 BLDV-SCNC: 8.5 MMOL/L — CRITICAL LOW (ref 22–26)
CO2 SERPL-SCNC: 4 MMOL/L — CRITICAL LOW (ref 22–31)
CO2 SERPL-SCNC: 9 MMOL/L — CRITICAL LOW (ref 22–31)
CREAT SERPL-MCNC: 2.24 MG/DL — HIGH (ref 0.5–1.3)
CREAT SERPL-MCNC: 2.25 MG/DL — HIGH (ref 0.5–1.3)
CULTURE RESULTS: SIGNIFICANT CHANGE UP
EGFR: 28 ML/MIN/1.73M2 — LOW
EGFR: 28 ML/MIN/1.73M2 — LOW
EOSINOPHIL # BLD AUTO: 0 K/UL — SIGNIFICANT CHANGE UP (ref 0–0.5)
EOSINOPHIL # BLD AUTO: 0.19 K/UL — SIGNIFICANT CHANGE UP (ref 0–0.5)
EOSINOPHIL NFR BLD AUTO: 0 % — SIGNIFICANT CHANGE UP (ref 0–6)
EOSINOPHIL NFR BLD AUTO: 0.8 % — SIGNIFICANT CHANGE UP (ref 0–6)
GAS PNL BLDA: SIGNIFICANT CHANGE UP
GAS PNL BLDV: 143 MMOL/L — SIGNIFICANT CHANGE UP (ref 136–145)
GAS PNL BLDV: SIGNIFICANT CHANGE UP
GAS PNL BLDV: SIGNIFICANT CHANGE UP
GIANT PLATELETS BLD QL SMEAR: PRESENT — SIGNIFICANT CHANGE UP
GIANT PLATELETS BLD QL SMEAR: PRESENT — SIGNIFICANT CHANGE UP
GLUCOSE BLDC GLUCOMTR-MCNC: 89 MG/DL — SIGNIFICANT CHANGE UP (ref 70–99)
GLUCOSE SERPL-MCNC: 147 MG/DL — HIGH (ref 70–99)
GLUCOSE SERPL-MCNC: 43 MG/DL — CRITICAL LOW (ref 70–99)
HCO3 BLDA-SCNC: SIGNIFICANT CHANGE UP MMOL/L (ref 21–28)
HCO3 BLDV-SCNC: 7 MMOL/L — LOW (ref 22–29)
HCT VFR BLD CALC: 25 % — LOW (ref 39–50)
HCT VFR BLD CALC: 29 % — LOW (ref 39–50)
HGB BLD-MCNC: 6.4 G/DL — CRITICAL LOW (ref 13–17)
HGB BLD-MCNC: 7.1 G/DL — LOW (ref 13–17)
HOROWITZ INDEX BLDA+IHG-RTO: 100 — SIGNIFICANT CHANGE UP
INR BLD: 1.96 — HIGH (ref 0.88–1.16)
LACTATE SERPL-SCNC: 17 MMOL/L — CRITICAL HIGH (ref 0.5–2)
LYMPHOCYTES # BLD AUTO: 23.9 % — SIGNIFICANT CHANGE UP (ref 13–44)
LYMPHOCYTES # BLD AUTO: 32.5 % — SIGNIFICANT CHANGE UP (ref 13–44)
LYMPHOCYTES # BLD AUTO: 6.31 K/UL — HIGH (ref 1–3.3)
LYMPHOCYTES # BLD AUTO: 7.79 K/UL — HIGH (ref 1–3.3)
MACROCYTES BLD QL: SIGNIFICANT CHANGE UP
MACROCYTES BLD QL: SLIGHT — SIGNIFICANT CHANGE UP
MAGNESIUM SERPL-MCNC: 3 MG/DL — HIGH (ref 1.6–2.6)
MANUAL SMEAR VERIFICATION: SIGNIFICANT CHANGE UP
MANUAL SMEAR VERIFICATION: SIGNIFICANT CHANGE UP
MCHC RBC-ENTMCNC: 24.5 GM/DL — LOW (ref 32–36)
MCHC RBC-ENTMCNC: 25.6 GM/DL — LOW (ref 32–36)
MCHC RBC-ENTMCNC: 28.5 PG — SIGNIFICANT CHANGE UP (ref 27–34)
MCHC RBC-ENTMCNC: 28.7 PG — SIGNIFICANT CHANGE UP (ref 27–34)
MCV RBC AUTO: 112.1 FL — HIGH (ref 80–100)
MCV RBC AUTO: 116.5 FL — HIGH (ref 80–100)
METAMYELOCYTES # FLD: 0.9 % — HIGH (ref 0–0)
METAMYELOCYTES # FLD: 6.7 % — HIGH (ref 0–0)
MICROCYTES BLD QL: SLIGHT — SIGNIFICANT CHANGE UP
MONOCYTES # BLD AUTO: 2.56 K/UL — HIGH (ref 0–0.9)
MONOCYTES # BLD AUTO: 2.59 K/UL — HIGH (ref 0–0.9)
MONOCYTES NFR BLD AUTO: 10.8 % — SIGNIFICANT CHANGE UP (ref 2–14)
MONOCYTES NFR BLD AUTO: 9.7 % — SIGNIFICANT CHANGE UP (ref 2–14)
MYELOCYTES NFR BLD: 3.3 % — HIGH (ref 0–0)
NEUTROPHILS # BLD AUTO: 10.19 K/UL — HIGH (ref 1.8–7.4)
NEUTROPHILS # BLD AUTO: 17.05 K/UL — HIGH (ref 1.8–7.4)
NEUTROPHILS NFR BLD AUTO: 22.5 % — LOW (ref 43–77)
NEUTROPHILS NFR BLD AUTO: 64.6 % — SIGNIFICANT CHANGE UP (ref 43–77)
NEUTS BAND # BLD: 20 % — HIGH (ref 0–8)
NRBC # BLD: 11 /100 — HIGH (ref 0–0)
NRBC # BLD: 9 /100 — HIGH (ref 0–0)
NRBC # BLD: SIGNIFICANT CHANGE UP /100 WBCS (ref 0–0)
NRBC # BLD: SIGNIFICANT CHANGE UP /100 WBCS (ref 0–0)
ORGANISM # SPEC MICROSCOPIC CNT: SIGNIFICANT CHANGE UP
OVALOCYTES BLD QL SMEAR: SLIGHT — SIGNIFICANT CHANGE UP
PCO2 BLDA: 27 MMHG — LOW (ref 35–48)
PCO2 BLDV: 43 MMHG — SIGNIFICANT CHANGE UP (ref 42–55)
PH BLDA: 6.8 — CRITICAL LOW (ref 7.35–7.45)
PH BLDV: 6.83 — CRITICAL LOW (ref 7.32–7.43)
PHOSPHATE SERPL-MCNC: 8.9 MG/DL — HIGH (ref 2.5–4.5)
PLAT MORPH BLD: ABNORMAL
PLAT MORPH BLD: ABNORMAL
PLATELET # BLD AUTO: 170 K/UL — SIGNIFICANT CHANGE UP (ref 150–400)
PLATELET # BLD AUTO: 196 K/UL — SIGNIFICANT CHANGE UP (ref 150–400)
PO2 BLDA: 88 MMHG — SIGNIFICANT CHANGE UP (ref 83–108)
PO2 BLDV: 42 MMHG — SIGNIFICANT CHANGE UP (ref 25–45)
POIKILOCYTOSIS BLD QL AUTO: SIGNIFICANT CHANGE UP
POIKILOCYTOSIS BLD QL AUTO: SIGNIFICANT CHANGE UP
POLYCHROMASIA BLD QL SMEAR: SLIGHT — SIGNIFICANT CHANGE UP
POTASSIUM BLDV-SCNC: 4.4 MMOL/L — SIGNIFICANT CHANGE UP (ref 3.5–5.1)
POTASSIUM SERPL-MCNC: 5.1 MMOL/L — SIGNIFICANT CHANGE UP (ref 3.5–5.3)
POTASSIUM SERPL-MCNC: 5.8 MMOL/L — HIGH (ref 3.5–5.3)
POTASSIUM SERPL-SCNC: 5.1 MMOL/L — SIGNIFICANT CHANGE UP (ref 3.5–5.3)
POTASSIUM SERPL-SCNC: 5.8 MMOL/L — HIGH (ref 3.5–5.3)
PROMYELOCYTES # FLD: 3.4 % — HIGH (ref 0–0)
PROT SERPL-MCNC: 5.6 G/DL — LOW (ref 6–8.3)
PROT SERPL-MCNC: 6.5 G/DL — SIGNIFICANT CHANGE UP (ref 6–8.3)
PROTHROM AB SERPL-ACNC: 23.5 SEC — HIGH (ref 10.5–13.4)
RBC # BLD: 2.23 M/UL — LOW (ref 4.2–5.8)
RBC # BLD: 2.49 M/UL — LOW (ref 4.2–5.8)
RBC # FLD: 17.2 % — HIGH (ref 10.3–14.5)
RBC # FLD: 17.3 % — HIGH (ref 10.3–14.5)
RBC BLD AUTO: ABNORMAL
RBC BLD AUTO: SIGNIFICANT CHANGE UP
RH IG SCN BLD-IMP: POSITIVE — SIGNIFICANT CHANGE UP
SAO2 % BLDA: 95 % — SIGNIFICANT CHANGE UP (ref 94–98)
SAO2 % BLDV: 49.9 % — LOW (ref 67–88)
SODIUM SERPL-SCNC: 141 MMOL/L — SIGNIFICANT CHANGE UP (ref 135–145)
SODIUM SERPL-SCNC: 143 MMOL/L — SIGNIFICANT CHANGE UP (ref 135–145)
SPECIMEN SOURCE: SIGNIFICANT CHANGE UP
SPHEROCYTES BLD QL SMEAR: SLIGHT — SIGNIFICANT CHANGE UP
SPHEROCYTES BLD QL SMEAR: SLIGHT — SIGNIFICANT CHANGE UP
VARIANT LYMPHS # BLD: 0.9 % — SIGNIFICANT CHANGE UP (ref 0–6)
WBC # BLD: 23.98 K/UL — HIGH (ref 3.8–10.5)
WBC # BLD: 26.39 K/UL — HIGH (ref 3.8–10.5)
WBC # FLD AUTO: 23.98 K/UL — HIGH (ref 3.8–10.5)
WBC # FLD AUTO: 26.39 K/UL — HIGH (ref 3.8–10.5)

## 2023-01-01 PROCEDURE — 96374 THER/PROPH/DIAG INJ IV PUSH: CPT

## 2023-01-01 PROCEDURE — 82803 BLOOD GASES ANY COMBINATION: CPT

## 2023-01-01 PROCEDURE — 84132 ASSAY OF SERUM POTASSIUM: CPT

## 2023-01-01 PROCEDURE — 86850 RBC ANTIBODY SCREEN: CPT

## 2023-01-01 PROCEDURE — 74018 RADEX ABDOMEN 1 VIEW: CPT

## 2023-01-01 PROCEDURE — 87635 SARS-COV-2 COVID-19 AMP PRB: CPT

## 2023-01-01 PROCEDURE — 94002 VENT MGMT INPAT INIT DAY: CPT

## 2023-01-01 PROCEDURE — 86900 BLOOD TYPING SEROLOGIC ABO: CPT

## 2023-01-01 PROCEDURE — 36415 COLL VENOUS BLD VENIPUNCTURE: CPT

## 2023-01-01 PROCEDURE — 36600 WITHDRAWAL OF ARTERIAL BLOOD: CPT | Mod: 77

## 2023-01-01 PROCEDURE — 84295 ASSAY OF SERUM SODIUM: CPT

## 2023-01-01 PROCEDURE — 83605 ASSAY OF LACTIC ACID: CPT

## 2023-01-01 PROCEDURE — 80053 COMPREHEN METABOLIC PANEL: CPT

## 2023-01-01 PROCEDURE — 85025 COMPLETE CBC W/AUTO DIFF WBC: CPT

## 2023-01-01 PROCEDURE — 99291 CRITICAL CARE FIRST HOUR: CPT

## 2023-01-01 PROCEDURE — 85730 THROMBOPLASTIN TIME PARTIAL: CPT

## 2023-01-01 PROCEDURE — 83735 ASSAY OF MAGNESIUM: CPT

## 2023-01-01 PROCEDURE — 74018 RADEX ABDOMEN 1 VIEW: CPT | Mod: 26

## 2023-01-01 PROCEDURE — 86923 COMPATIBILITY TEST ELECTRIC: CPT

## 2023-01-01 PROCEDURE — 82962 GLUCOSE BLOOD TEST: CPT

## 2023-01-01 PROCEDURE — 86901 BLOOD TYPING SEROLOGIC RH(D): CPT

## 2023-01-01 PROCEDURE — 82330 ASSAY OF CALCIUM: CPT

## 2023-01-01 PROCEDURE — 99222 1ST HOSP IP/OBS MODERATE 55: CPT

## 2023-01-01 PROCEDURE — 71045 X-RAY EXAM CHEST 1 VIEW: CPT

## 2023-01-01 PROCEDURE — 76937 US GUIDE VASCULAR ACCESS: CPT | Mod: 26,59

## 2023-01-01 PROCEDURE — 85610 PROTHROMBIN TIME: CPT

## 2023-01-01 PROCEDURE — 84100 ASSAY OF PHOSPHORUS: CPT

## 2023-01-01 PROCEDURE — 36000 PLACE NEEDLE IN VEIN: CPT | Mod: 59

## 2023-01-01 PROCEDURE — 87040 BLOOD CULTURE FOR BACTERIA: CPT

## 2023-01-01 PROCEDURE — 99285 EMERGENCY DEPT VISIT HI MDM: CPT

## 2023-01-01 PROCEDURE — 71045 X-RAY EXAM CHEST 1 VIEW: CPT | Mod: 26

## 2023-01-01 RX ORDER — VANCOMYCIN HCL 1 G
1000 VIAL (EA) INTRAVENOUS EVERY 12 HOURS
Refills: 0 | Status: DISCONTINUED | OUTPATIENT
Start: 2023-01-01 | End: 2023-01-01

## 2023-01-01 RX ORDER — LEVOTHYROXINE SODIUM 125 MCG
60 TABLET ORAL AT BEDTIME
Refills: 0 | Status: DISCONTINUED | OUTPATIENT
Start: 2023-01-01 | End: 2023-01-01

## 2023-01-01 RX ORDER — CHLORHEXIDINE GLUCONATE 213 G/1000ML
1 SOLUTION TOPICAL
Refills: 0 | Status: DISCONTINUED | OUTPATIENT
Start: 2023-01-01 | End: 2023-01-01

## 2023-01-01 RX ORDER — MEROPENEM 1 G/30ML
1000 INJECTION INTRAVENOUS EVERY 12 HOURS
Refills: 0 | Status: DISCONTINUED | OUTPATIENT
Start: 2023-01-01 | End: 2023-01-01

## 2023-01-01 RX ORDER — VASOPRESSIN 20 [USP'U]/ML
0.04 INJECTION INTRAVENOUS
Qty: 40 | Refills: 0 | Status: DISCONTINUED | OUTPATIENT
Start: 2023-01-01 | End: 2023-01-01

## 2023-01-01 RX ORDER — DEXTROSE 50 % IN WATER 50 %
50 SYRINGE (ML) INTRAVENOUS ONCE
Refills: 0 | Status: COMPLETED | OUTPATIENT
Start: 2023-01-01 | End: 2023-01-01

## 2023-01-01 RX ORDER — NOREPINEPHRINE BITARTRATE/D5W 8 MG/250ML
0.05 PLASTIC BAG, INJECTION (ML) INTRAVENOUS
Qty: 8 | Refills: 0 | Status: DISCONTINUED | OUTPATIENT
Start: 2023-01-01 | End: 2023-01-01

## 2023-01-01 RX ORDER — PIPERACILLIN AND TAZOBACTAM 4; .5 G/20ML; G/20ML
3.38 INJECTION, POWDER, LYOPHILIZED, FOR SOLUTION INTRAVENOUS ONCE
Refills: 0 | Status: DISCONTINUED | OUTPATIENT
Start: 2023-01-01 | End: 2023-01-01

## 2023-01-01 RX ORDER — PIPERACILLIN AND TAZOBACTAM 4; .5 G/20ML; G/20ML
3.38 INJECTION, POWDER, LYOPHILIZED, FOR SOLUTION INTRAVENOUS EVERY 8 HOURS
Refills: 0 | Status: DISCONTINUED | OUTPATIENT
Start: 2023-01-01 | End: 2023-01-01

## 2023-01-01 RX ORDER — SODIUM BICARBONATE 1 MEQ/ML
50 SYRINGE (ML) INTRAVENOUS ONCE
Refills: 0 | Status: COMPLETED | OUTPATIENT
Start: 2023-01-01 | End: 2023-01-01

## 2023-01-01 RX ORDER — PANTOPRAZOLE SODIUM 20 MG/1
80 TABLET, DELAYED RELEASE ORAL ONCE
Refills: 0 | Status: COMPLETED | OUTPATIENT
Start: 2023-01-01 | End: 2023-01-01

## 2023-01-01 RX ORDER — CHLORHEXIDINE GLUCONATE 213 G/1000ML
15 SOLUTION TOPICAL EVERY 12 HOURS
Refills: 0 | Status: DISCONTINUED | OUTPATIENT
Start: 2023-01-01 | End: 2023-01-01

## 2023-01-01 RX ORDER — MEROPENEM 1 G/30ML
500 INJECTION INTRAVENOUS ONCE
Refills: 0 | Status: DISCONTINUED | OUTPATIENT
Start: 2023-01-01 | End: 2023-01-01

## 2023-01-01 RX ORDER — PIPERACILLIN AND TAZOBACTAM 4; .5 G/20ML; G/20ML
4.5 INJECTION, POWDER, LYOPHILIZED, FOR SOLUTION INTRAVENOUS EVERY 8 HOURS
Refills: 0 | Status: DISCONTINUED | OUTPATIENT
Start: 2023-01-01 | End: 2023-01-01

## 2023-01-01 RX ORDER — PANTOPRAZOLE SODIUM 20 MG/1
80 TABLET, DELAYED RELEASE ORAL EVERY 12 HOURS
Refills: 0 | Status: DISCONTINUED | OUTPATIENT
Start: 2023-01-01 | End: 2023-01-01

## 2023-01-01 RX ORDER — PANTOPRAZOLE SODIUM 20 MG/1
8 TABLET, DELAYED RELEASE ORAL
Qty: 80 | Refills: 0 | Status: DISCONTINUED | OUTPATIENT
Start: 2023-01-01 | End: 2023-01-01

## 2023-01-01 RX ORDER — HYDROMORPHONE HYDROCHLORIDE 2 MG/ML
0.5 INJECTION INTRAMUSCULAR; INTRAVENOUS; SUBCUTANEOUS
Refills: 0 | Status: DISCONTINUED | OUTPATIENT
Start: 2023-01-01 | End: 2023-01-01

## 2023-01-01 RX ADMIN — Medication 7.5 MICROGRAM(S)/KG/MIN: at 08:44

## 2023-01-01 RX ADMIN — Medication 50 MILLIEQUIVALENT(S): at 03:59

## 2023-01-01 RX ADMIN — PANTOPRAZOLE SODIUM 80 MILLIGRAM(S): 20 TABLET, DELAYED RELEASE ORAL at 05:44

## 2023-01-01 RX ADMIN — Medication 20 MILLIGRAM(S): at 03:15

## 2023-01-01 RX ADMIN — Medication 7.5 MICROGRAM(S)/KG/MIN: at 02:30

## 2023-01-01 RX ADMIN — CHLORHEXIDINE GLUCONATE 1 APPLICATION(S): 213 SOLUTION TOPICAL at 05:00

## 2023-01-01 RX ADMIN — Medication 50 MILLILITER(S): at 03:16

## 2023-01-01 RX ADMIN — Medication 7.5 MICROGRAM(S)/KG/MIN: at 10:51

## 2023-01-01 RX ADMIN — Medication 7.5 MICROGRAM(S)/KG/MIN: at 04:45

## 2023-01-01 RX ADMIN — Medication 7.5 MICROGRAM(S)/KG/MIN: at 13:37

## 2023-01-01 RX ADMIN — PIPERACILLIN AND TAZOBACTAM 25 GRAM(S): 4; .5 INJECTION, POWDER, LYOPHILIZED, FOR SOLUTION INTRAVENOUS at 10:51

## 2023-01-01 RX ADMIN — PANTOPRAZOLE SODIUM 80 MILLIGRAM(S): 20 TABLET, DELAYED RELEASE ORAL at 03:10

## 2023-01-01 RX ADMIN — CHLORHEXIDINE GLUCONATE 15 MILLILITER(S): 213 SOLUTION TOPICAL at 05:44

## 2023-01-01 RX ADMIN — Medication 250 MILLIGRAM(S): at 05:43

## 2023-01-09 NOTE — PROGRESS NOTE ADULT - SUBJECTIVE AND OBJECTIVE BOX
Please place Mental Health ref order Pt is needing to est care with psychiatry for depression and anxiety management, stopped meds while recently incarcerated and is not currently prescribed anything. Pt is est with PCP today to get back on meds.    CC:      Interval: Cr stable in high 3's. wife at bedside. no complaints, pt comfortable in bed.      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  Sent from Dr. Aranda office due to doubling of his creatinine.   Since admission creatinine has been rising, now > 3.  Most of history per wife.   is sleepy/lethargic.   No SOB, CP. No hx of CAD, MI, CVA.     Feb 2017 Normal pharm nuclear stress test   Feb 2017 ECHO - mod LVH, mild gradient across LVOT (40mmhg with valsalva)     CT head non contrast  IMPRESSION:   1. No acute intracranial hemorrhage or transcortical infarct.   2. 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   3. Chronic right frontal, right maxillary, and ethmoid sinus disease    CXR:  IMPRESSION:   No evidence of acute cardiopulmonary disease (06 Dec 2019 17:39)          Allergies    No Known Allergies    Intolerances    	    MEDICATIONS:  MEDICATIONS  (STANDING):  amLODIPine   Tablet 5 milliGRAM(s) Oral every 24 hours  aspirin  chewable 81 milliGRAM(s) Oral daily  BACItracin   Ointment 1 Application(s) Topical daily  carvedilol 37.5 milliGRAM(s) Oral every 12 hours  chlorhexidine 4% Liquid 1 Application(s) Topical <User Schedule>  dextrose 5%. 1000 milliLiter(s) (50 mL/Hr) IV Continuous <Continuous>  dextrose 50% Injectable 12.5 Gram(s) IV Push once  dextrose 50% Injectable 25 Gram(s) IV Push once  dextrose 50% Injectable 25 Gram(s) IV Push once  docusate sodium 100 milliGRAM(s) Oral three times a day  heparin  flush 100 Units/mL Injectable 100 Unit(s) IV Push every other day  heparin  Injectable 5000 Unit(s) SubCutaneous every 8 hours  insulin glargine Injectable (LANTUS) 12 Unit(s) SubCutaneous at bedtime  insulin lispro (HumaLOG) corrective regimen sliding scale   SubCutaneous Before meals and at bedtime  linezolid    Tablet 600 milliGRAM(s) Oral every 12 hours  meropenem  IVPB 1000 milliGRAM(s) IV Intermittent every 24 hours  mycophenolate mofetil 500 milliGRAM(s) Oral every 12 hours  polyethylene glycol 3350 17 Gram(s) Oral daily  predniSONE   Tablet 5 milliGRAM(s) Oral every 24 hours  tacrolimus 4 milliGRAM(s) Oral every 12 hours  tamsulosin 0.4 milliGRAM(s) Oral at bedtime        PAST MEDICAL & SURGICAL HISTORY:  History of renal transplant: secondary to DM  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  BPH (benign prostatic hypertrophy)  HTN (hypertension)  Amputation of toe  Kidney transplant recipient      FAMILY HISTORY:      SOCIAL HISTORY:  unchanged    REVIEW OF SYSTEMS:  CONSTITUTIONAL: No fever, weight loss, or fatigue  EYES: No eye pain, visual disturbances, or discharge  ENMT:  No difficulty hearing, tinnitus, vertigo; No sinus or throat pain  NECK: No pain or stiffness  RESPIRATORY: No cough, wheezing, chills or hemoptysis; No Shortness of Breath  CARDIOVASCULAR: No chest pain, palpitations, passing out, dizziness, or leg swelling  GASTROINTESTINAL: No abdominal or epigastric pain. No nausea, vomiting, or hematemesis; No diarrhea or constipation. No melena or hematochezia.  GENITOURINARY: No dysuria, frequency, hematuria, or incontinence  NEUROLOGICAL: No headaches, memory loss, loss of strength, numbness, or tremors  SKIN: No itching, burning, rashes, or lesions   LYMPH Nodes: No enlarged glands  ENDOCRINE: No heat or cold intolerance; No hair loss  MUSCULOSKELETAL: No joint pain or swelling; No muscle, back, or extremity pain  PSYCHIATRIC: No depression, anxiety, mood swings, or difficulty sleeping  HEME/LYMPH: No easy bruising, or bleeding gums  ALLERY AND IMMUNOLOGIC: No hives or eczema	    [X ] All others negative	  [ ] Unable to obtain    PHYSICAL EXAM:  Vital Signs Last 24 Hrs  T(C): 36.3 (13 Dec 2019 11:06), Max: 36.8 (12 Dec 2019 19:15)  T(F): 97.3 (13 Dec 2019 11:06), Max: 98.3 (12 Dec 2019 19:15)  HR: 80 (13 Dec 2019 11:06) (79 - 82)  BP: 178/- (13 Dec 2019 11:06) (154/67 - 185/80)  BP(mean): --  RR: 19 (13 Dec 2019 11:06) (18 - 20)  SpO2: 100% (13 Dec 2019 11:06) (94% - 100%)      Appearance: lethargic/sleepy, no distress	  HEENT:   Normal oral mucosa, PERRL, EOMI	  Lymphatic: No lymphadenopathy  Respiratory: Lungs clear to auscultation	  Psychiatry: A & O x 3, Mood & affect appropriate  Gastrointestinal:  Soft, Non-tender, + BS	  Neurologic: Non-focal    CARDIOVASCULAR EXAM:  Cardiac: No murmurs, Normal S1 S2  Neck: No carotid bruits, no JVD  Extremities: No edema, s/p left 4th/5th toe amp, wound vac in place  Skin: Warm, No rashes, No ecchymoses, no cyanosis,  no ulcerations   Pulses: Peripheral pulses palpable bilaterally  LEFT Femoral - present  RIGHT Femoral - present  LEFT Popliteal - present  RIGHT Popliteal - present  LEFT Dorsalis Pedis - present  RIGHT Dorsalis Pedis - present  LEFT Posterior Tibial - present  RIGHT Posterior Tibial - present     DOPPLER:       LABS:	 	                                  10.7   5.31  )-----------( 162      ( 13 Dec 2019 10:53 )             34.0   12-13    140  |  109<H>  |  44<H>  ----------------------------<  151<H>  4.8   |  19<L>  |  3.89<H>    Ca    9.4      13 Dec 2019 10:53  Mg     2.0     12-12    TPro  5.4<L>  /  Alb  3.0<L>  /  TBili  0.2  /  DBili  <0.2  /  AST  20  /  ALT  16  /  AlkPhos  85  12-13    Assessment:  Acute Renal Failure - unclear etiology: most likely antibiotic induced, did not improve with IVF, renal artery disease noted on duplex PSV ~ 300 cm/s at site of anastomosis.  MRA abdomen with poor visualization of renal artery.    Altered Mental Status - improved  Infected Diabetic Foot Ulcer s/p toe amp   HTN     Plan:  - no plan for renal angiogram at this time given stabilization of renal function.  Most likely etiology is antibiotic induced though cannot be sure.  Perfusion study with adequate flow into the transplant kidney with normal uptake pattern, excretion pattern consistent with acute kidney injury.  Possible plan for biopsy?  - ECHO unremarkable, ELIZABETH not due to cardiac etiology.  Resp status stable.     - HTN better controlled with addition of norvasc to coreg.    Dago Osborne MD  Vascular Cardiology    [address]  OFFICE # [phone]  EMAIL: [email or web address]

## 2023-01-10 PROBLEM — J18.9 CAP (COMMUNITY ACQUIRED PNEUMONIA): Status: ACTIVE | Noted: 2022-01-01

## 2023-01-10 NOTE — DISCUSSION/SUMMARY
[FreeTextEntry1] : ATTENDING'S SUMMARY:\par 1-17-23:\par mild pallor, no icterus\par no JVD, no hepatojugular reflux, no HSM\par no cervical adenopathy, no supraclavicular adenopathy\par normal s1/s2, no murmurs, rubs or gallops\par adequate air entry, conductive sounds appreciated on both sides, few inspiratory crackles, no wheezing\par no cyanosis, no clubbing, no articular manifestations, no thickening of the skin, minimal coarse tremors noted in both hands\par no pedal edema

## 2023-01-10 NOTE — HISTORY OF PRESENT ILLNESS
[TextBox_4] : 83 yo M kidney transplant 10 years ago, diabetes, CAD s/p percutaneous coronary angioplasty\par He has a cough for the past 4 months, congestion, productive. Sputum is clear, light green, yellow\par He is wheelchair bound since July, used to \par He is legally blind\par He broke his right arm early July\par He has shortness of breath with any exertion\par He had COVID at the beginning of the pandemic, March/April 2020\par He stopped smoking 30 years ago. He smoked 1 ppd 35-40 years\par He was a  for [employer]. He worked with [employer], [employer], machine [employer]\par Wife- KENDRICK\par He has been losing weight, 20 lbs. He has a decreased appetite.\par He had COVID-19 a second time in January 2022. As per discharge summary, "He was admitted for acute hypoxic respiratory failure, 2/2 covid. Patient started on course of decadron but not candidate for remdesivir or tocilizumab considering renal function. Patient initially requiring bipap but quickly transitioned to NRB followed by nasal cannula. On day of discharge, patient stable on 2L NC, plan to go home with home O2." He was seen after discharge by Dr. Prather.\par \par Chest CT 2/24/22 bilateral effusions (right greater than left), some reticulation seen in right and left upper lobe with a bronchocentric pattern. There are cysts in the right upper lobe, significantly dilated esophagus. The lung window does not go to the base of the lungs. There is significant coronary calcifications. Lymphadenopathy in the mediastinum\par \par Current Medications: atorvastatin 40mg QD, carvedilol 12.5mg BID, Plavix 75mg QD, prednisone 5mg QD, ASA 81mg QD, Flomax 0.4mg QD, Humalog 100 BID, insulin 16 units 75/25 prn, mycophenolate 500mg BID, tacrolimus 4mg BID, Dulcolax, Senna, Colace 100mg BID, vitamin D3 1000 units QD, Kayexalate 15mg QD, torsemide 40mg QD \par \par 1-17-23:\par \par

## 2023-01-10 NOTE — ASSESSMENT
[FreeTextEntry1] : 1-17-23:\par \par It was a pleasure to see Octaviano today in follow up. His respiratory issues are summarized:\par \par 1. Cough\par Octaviano has had a productive cough for 4 months. It is extensive. It appears to have increased in intensity. He has had a cough chronically waxing and waning over the past several years. He had COVID in March 2020 and January 2022. We have reviewed his CT of the chest, which shows a significantly dilated esophagus. Also, the CT shows an area of consolidation with perilobular fibrosis and possible reverse halo sign at the right base. This may be seen in patients who have recurrent aspiration or organizing pneumonia. However in a person who is on immunosuppressives (Cellcept and Tacrolimus), focal infections such as a bacterial pneumonia including Nocardia cannot be ruled out.\par \par Our differentials include: \par a. Acid reflux. Octaviano has a dilated esophagus on CT. He coughs more when he lays down on his right side. He coughs after eating and drinking. He has lost 20 pounds since July when he broke his arm. His appetite has been decreased.  \par b. Organizing pneumonia at the right base 2/2 COVID-19.\par c. Bacterial/fungal infection of the lung including JOSE ALBERTO. Sputum culture grew pseudomonas. We started him on Cipro, but he had worsening lethargy and came to the ER. He was treated with Zosyn for pseudomonal pneumonia and discharged on levaquin.\par d. Bilateral effusions. He has renal failure. Pro-BNP is elevated. He has grade 2 diastolic dysfunction\par \par Plan:\par - We will order baseline PFT to evaluate Octaviano's lung function.  We will hold off ordering a 6MWT until he using a walker.\par - He needs an urgent GI evaluation with Dr. Master Smith\par - He should make an appointment with cardiologist, Dr. Galindo as he has not been seen in over a year. He will also need cardiology clearance for bronchoscopy. We will check pro-BNP today.\par \par 2. Former smoker\par Octaviano has a 35-40 pack year smoking history. He stopped smoking 30 years ago. We will order PFT to evaluate his lung function.\par \par Return to clinic: 4 weeks

## 2023-04-10 NOTE — ED ADULT NURSE NOTE - NSFALLRSKASSISTTYPE_ED_ALL_ED
Standing/Walking/Toileting Composite Graft Text: The defect edges were debeveled with a #15 scalpel blade.  Given the location of the defect, shape of the defect, the proximity to free margins and the fact the defect was full thickness a composite graft was deemed most appropriate.  The defect was outline and then transferred to the donor site.  A full thickness graft was then excised from the donor site. The graft was then placed in the primary defect, oriented appropriately and then sutured into place.  The secondary defect was then repaired using a primary closure.

## 2023-04-19 NOTE — PROGRESS NOTE ADULT - ATTENDING SUPERVISION STATEMENT
Fellow
Resident
Resident
[de-identified] : He is alert oriented x3.  He is pleasant cooperative throughout exam.  Examination of his bilateral lower extremities was undertaken.  On standing the patient demonstrates a rigid claw toe involving the right second toe.  There is instability with dorsal and plantar translation at the MTP joint.  There is pain when attempting passive range of motion.  There is a rigid second hammertoe.  There is tightness of the extensor tendons.  There is decreased ankle dorsiflexion with the knee extended and knee flexed.  I am only able to dorsiflex to neutral with the knee extended.  This does improve somewhat with the knee flexed.  On the left side the patient demonstrates tenderness through the tibiotalar joint.  He has a same range of motion as the contralateral side, 0 degrees to 45 degrees.  Compartments are soft and compressible.  He is neurovascular intact distally.
Fellow
Resident
Resident/Fellow
Fellow
Resident/Fellow
Resident/Fellow
Fellow
Resident
Resident/Fellow
Fellow
Resident
Fellow
Resident
Fellow
Resident
Resident
Resident/Fellow
Fellow
Resident
Resident
Fellow
Fellow
Resident

## 2023-04-25 NOTE — ED ADULT NURSE NOTE - NSFALLRSKINDICATORS_ED_ALL_ED
Checked on bed, with unlabored respirations. No safety risk noted  Awake on bed  Sitter - 1:1; Continued safety precaution  Gurney in low position, side rail up for pt safety.   Will continue to monitor for any complications.      yes

## 2023-06-13 NOTE — H&P ADULT - ATTENDING COMMENTS
Octaviano Miguel [id number] MICU  This is an 84 y/o male with renal transplantation, renal failure (on HD 5 days per week), DM1, anemia, C. diff,  CAD, on going PNA, was hypotensive, at home had leukocytosis, was started on antibiotics and levophed for severe sepsis with septic shock and sent to the ED.  The baseline is non verbal, non interactive, ventilator dependent, bed bound with sacral ulcer and frequent infections.. In the ED he was bradycardic, hypotensive even on levophed, very ill appearing, ph<6 with severe metabolic acidosis, the family wanted everything done, the patient was brought up to the ICU where further discussions with the family and in light of his worsening condition the family decided based on his wishes to make him DNR/DNI and to cap all the prssors, no lab draws and ensure that he is comfortable.  he was sent to the ED on antibiotics for PNA and levophed for shock  A/P:  -acute on chronic respiratory failure due to PNA  -severe sepsis with septic shock, on levophed, steroids and vasopressin  -ESRD on HD  -acute hemorrhagic anemia  -fluid overload  -CAD  -HAGMA  -Diarrhea with h/o C. diff  -hematochezia on Saturday  -DM with hypoglycemia  -hypothyroidism  Please see the resident's note for the details for the management plan.  The patient has a poor prognoses.

## 2023-06-13 NOTE — PROGRESS NOTE ADULT - SUBJECTIVE AND OBJECTIVE BOX
INTERVAL HPI/OVERNIGHT EVENTS:  Patient made DNR. Subsequently after full conversation with family as well as rabbi, decision made by family to go full comfort measures, in addition to capping pressors, and providing symptom directed care.  SUBJECTIVE: Patient seen and examined at bedside. Patient with baseline AAOx0 status. Unable to elucidate full ROS.     VITAL SIGNS:  ICU Vital Signs Last 24 Hrs  T(C): 35.7 (13 Jun 2023 13:54), Max: 37.1 (13 Jun 2023 09:15)  T(F): 96.3 (13 Jun 2023 13:54), Max: 98.8 (13 Jun 2023 09:15)  HR: 0 (13 Jun 2023 14:12) (0 - 106)  BP: 47/26 (13 Jun 2023 12:40) (47/26 - 183/72)  BP(mean): 32 (13 Jun 2023 12:40) (32 - 85)  ABP: --  ABP(mean): --  RR: 32 (13 Jun 2023 14:12) (15 - 33)  SpO2: 95% (13 Jun 2023 14:12) (53% - 95%)    O2 Parameters below as of 13 Jun 2023 14:12  Patient On (Oxygen Delivery Method): ventilator    O2 Concentration (%): 100      Mode: AC/ CMV (Assist Control/ Continuous Mandatory Ventilation), RR (machine): 32, TV (machine): 440, FiO2: 100, PEEP: 10, ITime: 0.9, MAP: 20, PIP: 31    06-12 @ 07:01 - 06-13 @ 07:00  --------------------------------------------------------  IN: 360 mL / OUT: 0 mL / NET: 360 mL    06-13 @ 07:01  -  06-13 @ 14:49  --------------------------------------------------------  IN: 385 mL / OUT: 0 mL / NET: 385 mL      CAPILLARY BLOOD GLUCOSE      POCT Blood Glucose.: 89 mg/dL (13 Jun 2023 03:15)      PHYSICAL EXAM:    Constitutional: AAOx0. NAD.  HEENT: NC/AT,no conjunctival pallor or scleral icterus, MMM. [place] R eye.  Neck: Supple  Respiratory: diffuse ronchi to anterior auscultation. On VC/AC   Cardiovascular: RRR, normal S1 and S2, no m/r/g.   Gastrointestinal: soft NT, distended. PEG tube in place, PEG site is clean and dry without drainage.  Extremities: wwp; R foot digits 4-5 amputated, digit 2-3 appears necrotic. B/L LE appear cyanotic and mottled at foot going up to groin and b/l hands mottled. +posterior tibial pulses.  Neurological: AAOx0    MEDICATIONS:  MEDICATIONS  (STANDING):  chlorhexidine 0.12% Liquid 15 milliLiter(s) Oral Mucosa every 12 hours  chlorhexidine 2% Cloths 1 Application(s) Topical <User Schedule>  CRRT Treatment    <Continuous>  levothyroxine Injectable 60 MICROGram(s) IV Push at bedtime  pantoprazole  Injectable 80 milliGRAM(s) IV Push every 12 hours  piperacillin/tazobactam IVPB.. 4.5 Gram(s) IV Intermittent every 8 hours  PureFlow Dialysate RFP-400 (K 2 / Ca 3) 5000 milliLiter(s) (2000 mL/Hr) CRRT <Continuous>  vancomycin  IVPB 1000 milliGRAM(s) IV Intermittent every 12 hours    MEDICATIONS  (PRN):  HYDROmorphone  Injectable 0.5 milliGRAM(s) IV Push every 2 hours PRN Moderate pain (4-6), Severe pain (7-10), Respiratory rate greater than 22  LORazepam   Injectable 0.5 milliGRAM(s) IV Push every 4 hours PRN Anxiety      ALLERGIES:  Allergies    No Known Allergies    Intolerances        LABS:                        7.1    23.98 )-----------( 170      ( 13 Jun 2023 08:20 )             29.0     06-13    141  |  99  |  83<H>  ----------------------------<  147<H>  5.8<H>   |  4<LL>  |  2.24<H>    Ca    10.2      13 Jun 2023 08:20  Phos  8.9     06-13  Mg     3.0     06-13    TPro  5.6<L>  /  Alb  2.2<L>  /  TBili  0.6  /  DBili  x   /  AST  1388<H>  /  ALT  497<H>  /  AlkPhos  253<H>  06-13    PT/INR - ( 13 Jun 2023 02:03 )   PT: 23.5 sec;   INR: 1.96          PTT - ( 13 Jun 2023 02:03 )  PTT:67.5 sec      RADIOLOGY & ADDITIONAL TESTS: Reviewed.

## 2023-06-13 NOTE — ED ADULT NURSE NOTE - UNABLE TO OBTAIN
Pt presents via EMS with reported "fever, lethargy" x approx 2-3 days. Has been receiving IV ABX at home, as well as scheduled dialysis. Pt of Dr. De La Rosa, pt has been receiving all medical care at home. Arrives on ventilator to [facility] (as per home use) on Levophed drip (dose unknown to ambulance service), hypotensive and verbally unresponsive. Unresponsive

## 2023-06-13 NOTE — H&P ADULT - ASSESSMENT
82 y/o M PMH CKD 4, renal transplant in 2013 at [facility], glaucoma (blind), DM1, anemia, CAD s/p 2 DAMEON to LAD in 6/21, BPH, recent admission for PNA and treated with multiple antibiotics. Admitted to MICU for concern of Pneumonia.    NEUROLOGY    #Metabolic Encephalopathy v Worsening Mental Status  - Patient w known poor mental status, AAOx0.  Previous CT scan 11/22 No intracranial hemorrhage or acute transcortical infarct.   - Known history of agitation in the past, which was treated inpatient with seroquel 50mg qhs. Consider if patient becomes agitated.      CARDIOVASCULAR  # Fluid Overloaded  - C/w HD as tolerated, will make nephro aware    #Septic Shock, appears fluid overloaded, lactic acidosis to >17.  - Patient presenting with worsening respiratory symptoms, and baseline at home has required levophed in conjunction with dopamine for question of bradycardia. Concern for underlying cardiogenic shock.  - C/w levophed, wean MAP as tolerated for > 65.  - Consider dopamine additional gtt for bradycardia.    #HFpEF  Had EF of 55% on TTE from Oct 2022, grade I diastolic dysfunction. Repeat TTE 11/17 with poor visualization of LV. Patient is HD dependent for fluid removal.  - Make nephro aware of admission in AM.    #CAD  #HLD  Hx of CAD s/p 2 DAMEON to LAD in June 2021, currently no CP. No CP or ischemic changes on EKG, likely due to CKD. Takes Plavix and aspirin per last d/c but only aspirin on outpatient note  - c/w ASA daily  - c/w home Lipitor    #HTN   Known history of HTN.   - holding hypertension meds  - continue to monitor BP    PULM  #Chronic Hypoxic Respiratory Failure  - S/p trach on which was placed during patient's last admission, concern for underlying pneumonia.  - C/w Abx, meropenem and daptomycin  - ID approval for Abx.    RENAL  #ESRD, patient with dialysis initiated during last admission.  - Make nephro aware- f/u renal recs for HD and UF    #Renal transplant  Patient had renal transplant in 2013. On home mycophenolate 500mg BID and tacrolimus 4mg BID. Per nephro, decreased home tacrolimus from 4mg BID --> 2mg BID while in hospital.   - c/w tacrolimus in AM and in PM, based on level; per Renal increased to 4mg q12h on 12/8  - c/w mycophenolate 500mg BID  - Tacrolimus levels trended as per renal and wnl   - f/u renal recs    GI  #Nutrition  NPO with PEG feeds.  - Keep NPO for now    #Abdominal Distension and Diarrhea  - Patient w abdominal distension upon physical exam, grandson reports passing BM and gas, and some diarrhea, on Saturday and Saturday  - Xray abdomen ordered, f/u official read.    #Hematochezia    #Transaminitis  - Patient w elevated LFT's likely consistent with shock liver.      #Urinary Retention  - History of urinary retention , on home tamsulosin 0.4mg daily, discontinue for now given acute shock.  - Place Ward, I/O.    ID  #Presumed PNA  - Patient w presumed pneumonia causing septic shock given distribution of symptoms, was on vancomycin and zosyn at home, and transitioned to meropenem and levaquin today.  - Previous admission w tracheal aspirate growing serratia, stenotrophomonas, and pseudomonas, patient was treated with vancomycin and cefepime.  - BCx, UA w reflux, SCx, MRSA swab, urine legionella, urine strep  - C/w____  - ID consult in AM.    #Leukocytosis  - Patient w leukocytosis at home (unsure value), in addition to elevated upon admission to 26.39. Likely 2/2 to acute septic shock  - Treat with antibiotics as above    ENDOCRINE  #DM (diabetes mellitus).   Previous admission was on Humulin 7U q6h  - A1C, 9.0 in 10/22, will repeat.  - FSG q6 while NPO  - iSS for now    #Hypothyroidism  TSH 20.8, free t4 0.7, T3 49 (low). Repeat TSH at 9, fT4 0.7  - on synthroid 50mcg IV qD      HEME  #Macrocytic Anemia  Likely 2.2 CKD. Hgb 6.4 on admission, sp 1U PRBC. No active signs of bleeding on physical exam.   - Folic acid and B12 level  - Repeat CBC in AM to assess post-transfusion  - active T+S  - transfuse <7    FLUIDS/ELECTROLYTES/NUTRITION  - No IVF  -Replete cautiously given ESRD  -Diet: NPO with PEG feeds  -DVT ppx: heparin 5000mg q8  -GI: protonix 40 qd  -Dispo: MICU     84 y/o M PMH CKD 4, renal transplant in 2013 at [facility], glaucoma (blind), DM1, anemia, CAD s/p 2 DAMEON to LAD in 6/21, BPH, recent admission for PNA and treated with multiple antibiotics. Admitted to MICU for concern of Pneumonia.    NEUROLOGY    #Metabolic Encephalopathy v Worsening Mental Status  - Patient w known poor mental status, AAOx0.  Previous CT scan 11/22 No intracranial hemorrhage or acute transcortical infarct.   - Known history of agitation in the past, which was treated inpatient with seroquel 50mg qhs. Consider if patient becomes agitated.      CARDIOVASCULAR  # Fluid Overloaded  - C/w HD as tolerated, will make nephro aware    #Septic Shock, appears fluid overloaded, lactic acidosis to >17.  - Patient presenting with worsening respiratory symptoms, and baseline at home has required levophed in conjunction with dopamine for question of bradycardia. Concern for underlying cardiogenic shock.  - C/w levophed, wean MAP as tolerated for > 65.  - Consider dopamine additional gtt for bradycardia.    #HFpEF  Had EF of 55% on TTE from Oct 2022, grade I diastolic dysfunction. Repeat TTE 11/17 with poor visualization of LV. Patient is HD dependent for fluid removal.  - Make nephro aware of admission in AM.    #CAD  #HLD  Hx of CAD s/p 2 DAMEON to LAD in June 2021, currently no CP. No CP or ischemic changes on EKG, likely due to CKD. Takes Plavix and aspirin per last d/c but only aspirin on outpatient note  - c/w ASA daily  - c/w home Lipitor    #HTN   Known history of HTN.   - holding hypertension meds  - continue to monitor BP    PULM  #Chronic Hypoxic Respiratory Failure  - S/p trach on which was placed during patient's last admission, concern for underlying pneumonia.  - C/w Abx, meropenem and daptomycin  - ID approval for Abx.    RENAL  #ESRD, patient with dialysis initiated during last admission. Previous renal transplant patient  - Make nephro aware- f/u renal recs for HD and UF  - c/w tacrolimus 4mg PO via PEG BID  - c/w mycophenolate 500mg PO via PEG BID    GI  #Nutrition  NPO with PEG feeds.  - Keep NPO for now    #Abdominal Distension and Diarrhea  - Patient w abdominal distension upon physical exam, grandson reports passing BM and gas, and some diarrhea, on Saturday and Saturday  - Xray abdomen ordered, f/u official read.    #Hematochezia  - Concern for hematochezia on Saturday, one isolated low volume episode, of note patient also has history of hemorrhoids as per family  - PPi IV BID for now  - Transfuse to keep hgB>7  - Trend CBC    #Transaminitis  - Patient w elevated LFT's likely consistent with early signs of shock liver.  -Trend CMP  - Consider RUQ U/S  - Hepatitis Panel      #Urinary Retention  - History of urinary retention , on home tamsulosin 0.4mg daily, discontinue for now given acute shock.  - Place Ward, I/O.      ID  #Presumed PNA  - Patient w presumed pneumonia causing septic shock given distribution of symptoms, was on vancomycin and zosyn at home, and transitioned to meropenem and levaquin today.  - Previous admission w tracheal aspirate growing serratia, stenotrophomonas, and pseudomonas, patient was treated with vancomycin and cefepime.  - BCx, UA w reflux, SCx, MRSA swab, urine legionella, urine strep  - C/w meropenem as well as vancomycin for now  - Vanc dosing by level.  - ID consult in AM.    #Leukocytosis  - Patient w leukocytosis at home (unsure value), in addition to elevated upon admission to 26.39. Likely 2/2 to acute septic shock  - Treat with antibiotics as above    ENDOCRINE  #DM (diabetes mellitus).   Previous admission was on Humulin 7U q6h  - A1C, 9.0 in 10/22, will repeat.  - FSG q6 while NPO  - iSS for now    #Hypothyroidism  TSH 20.8, free t4 0.7, T3 49 (low). Repeat TSH at 9, fT4 0.7  - on synthroid 50mcg IV qD      HEME  #Macrocytic Anemia  Likely 2.2 CKD. Hgb 6.4 on admission, sp 1U PRBC. No active signs of bleeding on physical exam.   - Folic acid and B12 level  - Repeat CBC in AM to assess post-transfusion  - active T+S  - transfuse <7    FLUIDS/ELECTROLYTES/NUTRITION  - No IVF  -Replete cautiously given ESRD  -Diet: NPO with PEG feeds  -DVT ppx: heparin 5000mg q8, hold for now, given questionable GIB  -GI: protonix 40 qd  -Dispo: MICU     82 y/o M PMH CKD 4, renal transplant in 2013 at [facility], glaucoma (blind), DM1, anemia, CAD s/p 2 DAMEON to LAD in 6/21, BPH, recent admission for PNA and treated with multiple antibiotics. Admitted to MICU for concern of Pneumonia.    NEUROLOGY    #Metabolic Encephalopathy v Worsening Mental Status  - Patient w known poor mental status, AAOx0.  Previous CT scan 11/22 No intracranial hemorrhage or acute transcortical infarct.   - Known history of agitation in the past, which was treated inpatient with seroquel 50mg qhs. Consider if patient becomes agitated.      CARDIOVASCULAR  # Fluid Overloaded  - C/w HD as tolerated, will make nephro aware    #Septic Shock, appears fluid overloaded, lactic acidosis to >17. Had also been getting stress dose steroids at home as well as levophed.  - Solumedrol 20mg q12h stress dose steroids for now.  - Patient presenting with worsening respiratory symptoms, and baseline at home has required levophed in conjunction with dopamine for question of bradycardia. Concern for underlying cardiogenic shock.  - C/w levophed, wean MAP as tolerated for > MAP 65.  - C/w vasopressin, wean MAP as tolerated for >MAP 65  - Consider dopamine additional gtt for bradycardia.    #HFpEF  Had EF of 55% on TTE from Oct 2022, grade I diastolic dysfunction. Repeat TTE 11/17 with poor visualization of LV. Patient is HD dependent for fluid removal.  - Make nephro aware of admission in AM.    #CAD  #HLD  Hx of CAD s/p 2 DAMEON to LAD in June 2021, currently no CP. No CP or ischemic changes on EKG, likely due to CKD. Takes Plavix and aspirin per last d/c but only aspirin on outpatient note  - c/w ASA daily  - c/w home Lipitor    #HTN   Known history of HTN.   - holding hypertension meds  - continue to monitor BP    PULM  #Chronic Hypoxic Respiratory Failure i/s/o PNA in conjunction w fluid overload  - S/p trach on which was placed during patient's last admission, concern for underlying pneumonia.  - C/w Abx, meropenem and vancomycin  - ID approval for Abx in AM vs formal consult.    RENAL  #ESRD, patient with dialysis initiated during last admission. Previous renal transplant patient, tacrolimus 4mg PO, and MMF 500mg qd.  - Make nephro aware- f/u renal recs for HD and UF  - Hold off on Immunosuppressive meds for now    #HAGMA, AG of 32, and bicarb of 9 ,presumed 2/2 to ESRD, Delta/Delta, uncomplicated HAGMA.  - Patient w fluid overload, and b/l pleural effusions, will treat w intermittent bicarb amps, q2-4h, consider gtt in AM.  - HD tomorrow as tolerated to address metabolic acidosis    GI  #Nutrition  NPO with PEG feeds.  - Keep NPO for now    #Abdominal Distension and Diarrhea  - Patient w abdominal distension upon physical exam, grandson reports passing BM and gas, and some diarrhea, on Saturday and Saturday  - Xray abdomen ordered, f/u official read.  - C.diff r/o, hx of C.diff in past  - PO vancomycin 125mg qd    #Hematochezia  - Concern for hematochezia on Saturday, one isolated low volume episode, of note patient also has history of hemorrhoids as per family  - PPi IV BID for now  - Transfuse to keep hgB>7  - Trend CBC  - HgB 6.4, will give 1U PRBC.    #Transaminitis  - Patient w elevated LFT's likely consistent with early signs of shock liver.  -Trend CMP  - Consider RUQ U/S  - Hepatitis Panel      #Urinary Retention  - History of urinary retention , on home tamsulosin 0.4mg daily, discontinue for now given acute shock.  - Place Ward, I/O.      ID  #Presumed PNA  - Patient w presumed pneumonia causing septic shock given distribution of symptoms, was on vancomycin and zosyn at home, and transitioned to meropenem and levaquin today.  - Previous admission w tracheal aspirate growing serratia, stenotrophomonas, and pseudomonas, patient was treated with vancomycin and cefepime.  - BCx, UA w reflux, SCx, MRSA swab, urine legionella, urine strep  - C/w meropenem as well as vancomycin for now  - Vanc dosing by level.  - ID consult in AM.    #Leukocytosis  - Patient w leukocytosis at home (unsure value, reports in 20s), in addition to elevated upon admission to 26.39. Likely 2/2 to acute septic shock  - Treat with antibiotics as above    ENDOCRINE  #DM (diabetes mellitus).   Previous admission was on Humulin 7U q6h  - A1C, 9.0 in 10/22, will repeat.  - FSG q6 while NPO  - iSS for now    #Hypothyroidism  TSH 20.8, free t4 0.7, T3 49 (low). Repeat TSH at 9, fT4 0.7  - on synthroid 50mcg IV qD      HEME  #Macrocytic Anemia  Likely 2.2 CKD. Hgb 6.4 on admission, sp 1U PRBC. No active signs of bleeding on physical exam.   - Folic acid and B12 level  - Repeat CBC in AM to assess post-transfusion  - active T+S  - transfuse <7    FLUIDS/ELECTROLYTES/NUTRITION  - No IVF  -Replete cautiously given ESRD  -Diet: NPO with PEG feeds  -DVT ppx: heparin 5000mg q8, hold for now, given questionable GIB  -GI: protonix 40 BID  -Dispo: MICU     82 y/o M PMH CKD 4, renal transplant in 2013 at [facility], glaucoma (blind), DM1, anemia, CAD s/p 2 DAMEON to LAD in 6/21, BPH, recent admission for PNA and treated with multiple antibiotics. Admitted to MICU for concern of Pneumonia.    NEUROLOGY    #Metabolic Encephalopathy v Worsening Mental Status  - Patient w known poor mental status, AAOx0.  Previous CT scan 11/22 No intracranial hemorrhage or acute transcortical infarct.   - Known history of agitation in the past, which was treated inpatient with seroquel 50mg qhs. Consider if patient becomes agitated.      CARDIOVASCULAR  # Fluid Overloaded  - C/w HD as tolerated, will make nephro aware    #Septic Shock, appears fluid overloaded, lactic acidosis to >17. Had also been getting stress dose steroids at home as well as levophed.  - Solumedrol 20mg q12h stress dose steroids for now.  - Patient presenting with worsening respiratory symptoms, and baseline at home has required levophed in conjunction with dopamine for question of bradycardia. Concern for underlying cardiogenic shock.  - C/w levophed, wean MAP as tolerated for > MAP 65.  - C/w vasopressin, wean MAP as tolerated for >MAP 65  - Consider dopamine additional gtt for bradycardia.    #HFpEF  Had EF of 55% on TTE from Oct 2022, grade I diastolic dysfunction. Repeat TTE 11/17 with poor visualization of LV. Patient is HD dependent for fluid removal.  - Make nephro aware of admission in AM.    #CAD  #HLD  Hx of CAD s/p 2 DAMEON to LAD in June 2021, currently no CP. No CP or ischemic changes on EKG, likely due to CKD. Takes Plavix and aspirin per last d/c but only aspirin on outpatient note  - c/w ASA daily  - c/w home Lipitor    #HTN   Known history of HTN.   - holding hypertension meds  - continue to monitor BP    PULM  #Chronic Hypoxic Respiratory Failure i/s/o PNA in conjunction w fluid overload  - S/p trach on which was placed during patient's last admission, concern for underlying pneumonia.  - C/w Abx, meropenem and vancomycin  - ID approval for Abx in AM vs formal consult.    RENAL  #ESRD, patient with dialysis initiated during last admission. Previous renal transplant patient, tacrolimus 4mg PO, and MMF 500mg qd.  - Make nephro aware- f/u renal recs for HD and UF  - Hold off on Immunosuppressive meds for now    #HAGMA, AG of 32, and bicarb of 9 ,presumed 2/2 to ESRD, Delta/Delta, uncomplicated HAGMA.  - Patient w fluid overload, and b/l pleural effusions, will treat w intermittent bicarb amps, q2-4h, consider gtt in AM.  - HD tomorrow as tolerated to address metabolic acidosis    GI  #Nutrition  NPO with PEG feeds.  - Keep NPO for now    #Abdominal Distension and Diarrhea  - Patient w abdominal distension upon physical exam, grandson reports passing BM and gas, and some diarrhea, on Saturday and Saturday  - Xray abdomen ordered, f/u official read.  - C.diff r/o, hx of C.diff in past  - PO vancomycin 125mg qd    #Hematochezia  - Concern for hematochezia on Saturday, one isolated low volume episode, of note patient also has history of hemorrhoids as per family  - PPi IV BID for now  - Transfuse to keep hgB>7  - Trend CBC  - HgB 6.4, will give 1U PRBC.    #Transaminitis  - Patient w elevated LFT's likely consistent with early signs of shock liver.  -Trend CMP  - Consider RUQ U/S  - Hepatitis Panel      #Urinary Retention  - History of urinary retention , on home tamsulosin 0.4mg daily, discontinue for now given acute shock.  - Place Ward, I/O.      ID  #Presumed PNA  - Patient w presumed pneumonia causing septic shock given distribution of symptoms, was on vancomycin and zosyn at home, and transitioned to meropenem and levaquin today.  - Previous admission w tracheal aspirate growing serratia, stenotrophomonas, and pseudomonas, patient was treated with vancomycin and cefepime.  - BCx, UA w reflux, SCx, MRSA swab, urine legionella, urine strep  - C/w meropenem as well as vancomycin for now  - Vanc dosing by level.  - ID consult in AM.    #Leukocytosis  - Patient w leukocytosis at home (unsure value, reports in 20s), in addition to elevated upon admission to 26.39. Likely 2/2 to acute septic shock  - Treat with antibiotics as above    ENDOCRINE  #DM (diabetes mellitus).   Previous admission was on Humulin 7U q6h  - A1C, 9.0 in 10/22, will repeat.  - FSG q6 while NPO  - iSS for now    #Hypothyroidism  TSH 20.8, free t4 0.7, T3 49 (low). Repeat TSH at 9, fT4 0.7  - on synthroid 50mcg IV qD      HEME  #Macrocytic Anemia  Likely 2.2 CKD. Hgb 6.4 on admission, sp 1U PRBC. No active signs of bleeding on physical exam.   - Folic acid and B12 level  - Repeat CBC in AM to assess post-transfusion  - active T+S  - transfuse <7    FLUIDS/ELECTROLYTES/NUTRITION  - No IVF  -Replete cautiously given ESRD  -Diet: NPO with PEG feeds  -DVT ppx: heparin 5000mg q8, hold for now, given questionable GIB  -GI: protonix 80 IV BID  -Dispo: MICU   84 y/o M PMH CKD 4, renal transplant in 2013 at [facility], glaucoma (blind), DM1, anemia, CAD s/p 2 DAMEON to LAD in 6/21, BPH, recent admission for PNA and treated with multiple antibiotics. Admitted to MICU for concern of septic shock presumed 2/2 Pneumonia.    NEUROLOGY    #Metabolic Encephalopathy v Worsening Mental Status  - Patient w known poor mental status, AAOx0.  Previous CT scan 11/22 No intracranial hemorrhage or acute transcortical infarct.   - Known history of agitation in the past, which was treated inpatient with seroquel 50mg qhs. Consider if patient becomes agitated.      CARDIOVASCULAR  # Fluid Overloaded  - C/w HD as tolerated, will make nephro aware    #Septic Shock, appears fluid overloaded, lactic acidosis to >17. Had also been getting stress dose steroids at home as well as levophed.  - Solumedrol 20mg q12h stress dose steroids for now.  - Patient presenting with worsening respiratory symptoms, and baseline at home has required levophed in conjunction with dopamine for question of bradycardia. Concern for underlying cardiogenic shock.  - C/w levophed, wean MAP as tolerated for > MAP 65.  - C/w vasopressin, wean MAP as tolerated for >MAP 65  - Consider dopamine additional gtt for bradycardia.    #HFpEF  Had EF of 55% on TTE from Oct 2022, grade I diastolic dysfunction. Repeat TTE 11/17 with poor visualization of LV. Patient is HD dependent for fluid removal.  - Make nephro aware of admission in AM.    #CAD  #HLD  Hx of CAD s/p 2 DAMEON to LAD in June 2021, currently no CP. No CP or ischemic changes on EKG, likely due to CKD. Takes Plavix and aspirin per last d/c but only aspirin on outpatient note  - c/w ASA daily  - c/w home Lipitor    #HTN   Known history of HTN.   - holding hypertension meds  - continue to monitor BP    PULM  #Chronic Hypoxic Respiratory Failure i/s/o PNA in conjunction w fluid overload  - S/p trach on which was placed during patient's last admission, concern for underlying pneumonia.  - C/w Abx, meropenem and vancomycin  - ID approval for Abx in AM vs formal consult.    RENAL  #ESRD, patient with dialysis initiated during last admission. Previous renal transplant patient, tacrolimus 4mg PO, and MMF 500mg qd.  - Make nephro aware- f/u renal recs for HD and UF  - Hold off on Immunosuppressive meds for now    #HAGMA, AG of 32, and bicarb of 9 ,presumed 2/2 to ESRD, Delta/Delta, uncomplicated HAGMA.  - Patient w fluid overload, and b/l pleural effusions, will treat w intermittent bicarb amps, q2-4h, consider gtt in AM.  - HD tomorrow as tolerated to address metabolic acidosis    GI  #Nutrition  NPO with PEG feeds.  - Keep NPO for now    #Abdominal Distension and Diarrhea  - Patient w abdominal distension upon physical exam, grandson reports passing BM and gas, and some diarrhea, on Saturday and Saturday  - Xray abdomen ordered, f/u official read.  - C.diff r/o, hx of C.diff in past  - PO vancomycin 125mg qd    #Hematochezia  - Concern for hematochezia on Saturday, one isolated low volume episode, of note patient also has history of hemorrhoids as per family  - PPi IV BID for now  - Transfuse to keep hgB>7  - Trend CBC  - HgB 6.4, will give 1U PRBC.    #Transaminitis  - Patient w elevated LFT's likely consistent with early signs of shock liver.  -Trend CMP  - Consider RUQ U/S  - Hepatitis Panel      #Urinary Retention  - History of urinary retention , on home tamsulosin 0.4mg daily, discontinue for now given acute shock.  - Place Ward, I/O.      ID  #Presumed PNA  - Patient w presumed pneumonia causing septic shock given distribution of symptoms, was on vancomycin and zosyn at home, and transitioned to meropenem and levaquin today.  - Previous admission w tracheal aspirate growing serratia, stenotrophomonas, and pseudomonas, patient was treated with vancomycin and cefepime.  - BCx, UA w reflux, SCx, MRSA swab, urine legionella, urine strep  - C/w meropenem as well as vancomycin for now  - Vanc dosing by level.  - ID consult in AM.    #Leukocytosis  - Patient w leukocytosis at home (unsure value, reports in 20s), in addition to elevated upon admission to 26.39. Likely 2/2 to acute septic shock  - Treat with antibiotics as above    ENDOCRINE  #DM (diabetes mellitus).   Previous admission was on Humulin 7U q6h  - A1C, 9.0 in 10/22, will repeat.  - FSG q6 while NPO  - iSS for now    #Hypothyroidism  TSH 20.8, free t4 0.7, T3 49 (low). Repeat TSH at 9, fT4 0.7  - on synthroid 50mcg IV qD      HEME  #Macrocytic Anemia  Likely 2.2 CKD. Hgb 6.4 on admission, sp 1U PRBC. No active signs of bleeding on physical exam.   - Folic acid and B12 level  - Repeat CBC in AM to assess post-transfusion  - active T+S  - transfuse <7    FLUIDS/ELECTROLYTES/NUTRITION  - No IVF  -Replete cautiously given ESRD  -Diet: NPO with PEG feeds  -DVT ppx: heparin 5000mg q8, hold for now, given questionable GIB  -GI: protonix 80 IV BID  -Dispo: MICU   82 y/o M PMH CKD 4, renal transplant in 2013 at [facility], glaucoma (blind), DM1, anemia, CAD s/p 2 DAMEON to LAD in 6/21, BPH, recent admission for PNA and treated with multiple antibiotics. Admitted to MICU for concern of septic shock presumed 2/2 Pneumonia.    NEUROLOGY    #Metabolic Encephalopathy v Worsening Mental Status  - Patient w known poor mental status, AAOx0.  Previous CT scan 11/22 No intracranial hemorrhage or acute transcortical infarct.   - Known history of agitation in the past, which was treated inpatient with seroquel 50mg qhs. Consider if patient becomes agitated.      CARDIOVASCULAR  # Fluid Overloaded  - C/w HD as tolerated, will make nephro aware    #Septic Shock, appears fluid overloaded, lactic acidosis to >17. Had also been getting stress dose steroids at home as well as levophed.  - Solumedrol 20mg q12h stress dose steroids for now.  - Patient presenting with worsening respiratory symptoms, and baseline at home has required levophed in conjunction with dopamine for question of bradycardia. Concern for underlying cardiogenic shock.  - C/w levophed, wean MAP as tolerated for > MAP 65.  - C/w vasopressin, wean MAP as tolerated for >MAP 65  - Consider dopamine additional gtt for bradycardia.    #HFpEF  Had EF of 55% on TTE from Oct 2022, grade I diastolic dysfunction. Repeat TTE 11/17 with poor visualization of LV. Patient is HD dependent for fluid removal.  - Make nephro aware of admission in AM.    #CAD  #HLD  Hx of CAD s/p 2 DAMEON to LAD in June 2021, currently no CP. No CP or ischemic changes on EKG, likely due to CKD. Takes Plavix and aspirin per last d/c but only aspirin on outpatient note  - c/w ASA daily  - c/w home Lipitor    #HTN   Known history of HTN.   - holding hypertension meds  - continue to monitor BP    PULM  #Chronic Hypoxic Respiratory Failure i/s/o PNA in conjunction w fluid overload  - S/p trach on which was placed during patient's last admission, concern for underlying pneumonia.  - C/w Abx, meropenem and vancomycin  - ID approval for Abx in AM vs formal consult.    RENAL  #ESRD, patient with dialysis initiated during last admission. Previous renal transplant patient, tacrolimus 4mg PO, and MMF 500mg qd.  - Make nephro aware- f/u renal recs for HD and UF  - Hold off on Immunosuppressive meds for now    #HAGMA, AG of 32, and bicarb of 9 ,presumed 2/2 to ESRD, Delta/Delta, uncomplicated HAGMA.  - Patient w fluid overload, and b/l pleural effusions, will treat w intermittent bicarb amps, q2-4h, consider gtt in AM.  - HD tomorrow as tolerated to address metabolic acidosis    GI  #Nutrition  NPO with PEG feeds.  - Keep NPO for now    #Abdominal Distension and Diarrhea  - Patient w abdominal distension upon physical exam, grandson reports passing BM and gas, and some diarrhea, on Saturday and Saturday  - Xray abdomen ordered, f/u official read.  - C.diff r/o, hx of C.diff in past  - PO vancomycin 125mg qd    #Hematochezia  - Concern for hematochezia on Saturday, one isolated low volume episode, of note patient also has history of hemorrhoids as per family  - PPi IV BID for now  - Transfuse to keep hgB>7  - Trend CBC  - HgB 6.4, will give 1U PRBC.    #Transaminitis  - Patient w elevated LFT's likely consistent with early signs of shock liver.  -Trend CMP  - Consider RUQ U/S  - Hepatitis Panel      #Urinary Retention  - History of urinary retention , on home tamsulosin 0.4mg daily, discontinue for now given acute shock.  - Place Ward, I/O.      ID  #Presumed PNA  - Patient w presumed pneumonia causing septic shock given distribution of symptoms, was on vancomycin and zosyn at home, and transitioned to meropenem and levaquin today.  - Previous admission w tracheal aspirate growing serratia, stenotrophomonas, and pseudomonas, patient was treated with vancomycin and cefepime.  - BCx, UA w reflux, SCx, MRSA swab, urine legionella, urine strep  - C/w meropenem as well as vancomycin for now  - Vanc dosing by level.  - ID consult in AM.    #Leukocytosis  - Patient w leukocytosis at home (unsure value, reports in 20s), in addition to elevated upon admission to 26.39. Likely 2/2 to acute septic shock  - Treat with antibiotics as above    ENDOCRINE  #DM (diabetes mellitus).   Previous admission was on Humulin 7U q6h  - A1C, 9.0 in 10/22, will repeat.  - FSG q6 while NPO  - iSS for now    #Hypothyroidism  TSH 20.8, free t4 0.7, T3 49 (low). Repeat TSH at 9, fT4 0.7  - on synthroid 50mcg IV qD      HEME  #Macrocytic Anemia  Likely 2.2 CKD. Hgb 6.4 on admission, sp 1U PRBC. No active signs of bleeding on physical exam.   - Folic acid and B12 level  - Repeat CBC in AM to assess post-transfusion  - active T+S  - transfuse <7    FLUIDS/ELECTROLYTES/NUTRITION  -No IVF  -Replete cautiously given ESRD  -Diet: NPO with PEG feeds  -DVT ppx: heparin 5000mg q8, hold for now, given questionable GIB  -GI: protonix 80 IV BID  - Advanced Directives: Patient made DNR  -Dispo: MICU   84 y/o M PMH CKD 4, renal transplant in 2013 at [facility], glaucoma (blind), DM1, anemia, CAD s/p 2 DAMEON to LAD in 6/21, BPH, recent admission for PNA and treated with multiple antibiotics. Admitted to MICU for concern of septic shock presumed 2/2 Pneumonia.    NEUROLOGY    #Metabolic Encephalopathy v Worsening Mental Status  - Patient w known poor mental status, AAOx0.  Previous CT scan 11/22 No intracranial hemorrhage or acute transcortical infarct.   - Known history of agitation in the past, which was treated inpatient with seroquel 50mg qhs. Consider if patient becomes agitated.      CARDIOVASCULAR  # Fluid Overloaded  - C/w HD as tolerated, will make nephro aware    #Septic Shock, appears fluid overloaded, lactic acidosis to >17. Had also been getting stress dose steroids at home as well as levophed.  - Solumedrol 20mg q12h stress dose steroids for now.  - Patient presenting with worsening respiratory symptoms, and baseline at home has required levophed in conjunction with dopamine for question of bradycardia. Concern for underlying cardiogenic shock.  - C/w levophed, wean MAP as tolerated for > MAP 65.  - C/w vasopressin, wean MAP as tolerated for >MAP 65  - Consider dopamine additional gtt for bradycardia.    #HFpEF  Had EF of 55% on TTE from Oct 2022, grade I diastolic dysfunction. Repeat TTE 11/17 with poor visualization of LV. Patient is HD dependent for fluid removal.  - Make nephro aware of admission in AM.    #CAD  #HLD  Hx of CAD s/p 2 DAMEON to LAD in June 2021, currently no CP. No CP or ischemic changes on EKG, likely due to CKD. Takes Plavix and aspirin per last d/c but only aspirin on outpatient note  - c/w ASA daily  - c/w home Lipitor    #HTN   Known history of HTN.   - holding hypertension meds  - continue to monitor BP    PULM  #Chronic Hypoxic Respiratory Failure i/s/o PNA in conjunction w fluid overload  - S/p trach on which was placed during patient's last admission, concern for underlying pneumonia.  - C/w Abx, meropenem and vancomycin  - ID approval for Abx in AM vs formal consult.    RENAL  #ESRD, patient with dialysis initiated during last admission. Previous renal transplant patient, tacrolimus 4mg PO, and MMF 500mg qd.  - Make nephro aware- f/u renal recs for HD and UF  - Hold off on Immunosuppressive meds for now    #HAGMA, AG of 32, and bicarb of 9 ,presumed 2/2 to ESRD, Delta/Delta, uncomplicated HAGMA.  - Patient w fluid overload, and b/l pleural effusions, will treat w intermittent bicarb amps, q2-4h, consider gtt in AM.  - HD tomorrow as tolerated to address metabolic acidosis    GI  #Nutrition  NPO with PEG feeds.  - Keep NPO for now    #Abdominal Distension and Diarrhea  - Patient w abdominal distension upon physical exam, grandson reports passing BM and gas, and some diarrhea, on Saturday and Saturday  - Xray abdomen ordered, f/u official read.  - C.diff r/o, hx of C.diff in past  - PO vancomycin 125mg qd    #Hematochezia  - Concern for hematochezia on Saturday, one isolated low volume episode, of note patient also has history of hemorrhoids as per family  - PPi IV BID for now  - Transfuse to keep hgB>7  - Trend CBC  - HgB 6.4, will give 1U PRBC.    #Transaminitis  - Patient w elevated LFT's likely consistent with early signs of shock liver.  -Trend CMP  - Consider RUQ U/S  - Hepatitis Panel      #Urinary Retention  - History of urinary retention , on home tamsulosin 0.4mg daily, discontinue for now given acute shock.  - Place Ward, I/O.      ID  #Presumed PNA  - Patient w presumed pneumonia causing septic shock given distribution of symptoms, was on vancomycin and zosyn at home, and transitioned to meropenem and levaquin today.  - Previous admission w tracheal aspirate growing serratia, stenotrophomonas, and pseudomonas, patient was treated with vancomycin and cefepime.  - BCx, UA w reflux, SCx, MRSA swab, urine legionella, urine strep  - C/w meropenem as well as vancomycin for now  - Vanc dosing by level.  - ID consult in AM.    #Leukocytosis  - Patient w leukocytosis at home (unsure value, reports in 20s), in addition to elevated upon admission to 26.39. Likely 2/2 to acute septic shock  - Treat with antibiotics as above    ENDOCRINE  #DM (diabetes mellitus).   Previous admission was on Humulin 7U q6h  - A1C, 9.0 in 10/22, will repeat.  - FSG q4 while NPO    #Hypoglycemia  - Patient hypoglycemic upon arrival to 40s, s/p 1 amp D50 w rebound to 89.  - Monitor FSG closely.    #Hypothyroidism  TSH 20.8, free t4 0.7, T3 49 (low). Repeat TSH at 9, fT4 0.7  - on synthroid 60mcg IV qD  - C/w synthroid      HEME  #Macrocytic Anemia  Likely 2.2 CKD. Hgb 6.4 on admission, sp 1U PRBC. No active signs of bleeding on physical exam.   - Folic acid and B12 level  - Repeat CBC in AM to assess post-transfusion  - active T+S  - transfuse <7    FLUIDS/ELECTROLYTES/NUTRITION  -No IVF  -Replete cautiously given ESRD  -Diet: NPO with PEG feeds  -DVT ppx: heparin 5000mg q8, hold for now, given questionable GIB  -GI: protonix 80 IV BID  - Advanced Directives: Patient made DNR  -Dispo: MICU   82 y/o M PMH CKD 4, renal transplant in 2013 at [facility], glaucoma (blind), DM1, anemia, CAD s/p 2 DAMEON to LAD in 6/21, BPH, recent admission for PNA and treated with multiple antibiotics. Admitted to MICU for concern of septic shock presumed 2/2 Pneumonia.    NEUROLOGY    #Metabolic Encephalopathy v Worsening Mental Status  - Patient w known poor mental status, AAOx0.  Previous CT scan 11/22 No intracranial hemorrhage or acute transcortical infarct.   - Known history of agitation in the past, which was treated inpatient with seroquel 50mg qhs. Consider if patient becomes agitated.      CARDIOVASCULAR  # Fluid Overloaded  - C/w HD as tolerated, will make nephro aware    #Septic Shock, appears fluid overloaded, lactic acidosis to >17. Had also been getting stress dose steroids at home as well as levophed.  - Solumedrol 20mg q12h stress dose steroids for now.  - Patient presenting with worsening respiratory symptoms, and baseline at home has required levophed in conjunction with dopamine for question of bradycardia. Concern for underlying cardiogenic shock.  - C/w levophed, wean MAP as tolerated for > MAP 65.  - C/w vasopressin, wean MAP as tolerated for >MAP 65  - Will cap pressors, levophed 1mcg/kg/min, and vasopressin at .04.    #HFpEF  Had EF of 55% on TTE from Oct 2022, grade I diastolic dysfunction. Repeat TTE 11/17 with poor visualization of LV. Patient is HD dependent for fluid removal.  - Make nephro aware of admission in AM.    #CAD  #HLD  Hx of CAD s/p 2 DAMEON to LAD in June 2021, currently no CP. No CP or ischemic changes on EKG, likely due to CKD. Takes Plavix and aspirin per last d/c but only aspirin on outpatient note  - c/w ASA daily  - c/w home Lipitor    #HTN   Known history of HTN.   - holding hypertension meds  - continue to monitor BP    PULM  #Chronic Hypoxic Respiratory Failure i/s/o PNA in conjunction w fluid overload  - S/p trach on which was placed during patient's last admission, concern for underlying pneumonia.  - C/w Abx, meropenem and vancomycin      RENAL  #ESRD, patient with dialysis initiated during last admission. Previous renal transplant patient, tacrolimus 4mg PO, and MMF 500mg qd.  - Make nephro aware- f/u renal recs for HD and UF  - Hold off on Immunosuppressive meds for now    #HAGMA, AG of 32, and bicarb of 9 ,presumed 2/2 to ESRD, Delta/Delta, uncomplicated HAGMA.  - Patient w fluid overload, and b/l pleural effusions, will treat w intermittent bicarb amps, q2-4h, consider gtt in AM.  - HD tomorrow as tolerated to address metabolic acidosis    GI  #Nutrition  NPO with PEG feeds.  - Keep NPO for now  - GOC w family further surrounding feeds.    #Abdominal Distension and Diarrhea  - Patient w abdominal distension upon physical exam, grandson reports passing BM and gas, and some diarrhea, on Saturday and Saturday  - Xray abdomen ordered, f/u official read.  - C.diff r/o, hx of C.diff in past    #Hematochezia  - Concern for hematochezia on Saturday, one isolated low volume episode, of note patient also has history of hemorrhoids as per family  - PPi IV BID for now  - Transfuse to keep hgB>7  - HgB 6.4, will give 1U PRBC.    #Transaminitis  - Patient w elevated LFT's likely consistent with early signs of shock liver. No blood draws as does not align w GOC.      #Urinary Retention  - History of urinary retention , on home tamsulosin 0.4mg daily, discontinue for now given acute shock.  - Place Ward, I/O.      ID  #Presumed PNA  - Patient w presumed pneumonia causing septic shock given distribution of symptoms, was on vancomycin and zosyn at home, and transitioned to meropenem and levaquin today.  - Previous admission w tracheal aspirate growing serratia, stenotrophomonas, and pseudomonas, patient was treated with vancomycin and cefepime.  - C/w meropenem as well as vancomycin for now    #Leukocytosis  - Patient w leukocytosis at home (unsure value, reports in 20s), in addition to elevated upon admission to 26.39. Likely 2/2 to acute septic shock  - Treat with antibiotics as above    ENDOCRINE  #DM (diabetes mellitus).   Previous admission was on Humulin 7U q6h  - A1C, 9.0 in 10/22, will repeat.  - No FSG as does not align w GOC.    #Hypoglycemia  - Patient hypoglycemic upon arrival to 40s, s/p 1 amp D50 w rebound to 89.    #Hypothyroidism  TSH 20.8, free t4 0.7, T3 49 (low). Repeat TSH at 9, fT4 0.7  - on synthroid 60mcg IV qD  - C/w synthroid      HEME  #Macrocytic Anemia  Likely 2.2 CKD. Hgb 6.4 on admission, sp 1U PRBC. No active signs of bleeding on physical exam.   - Folic acid and B12 level  - Repeat CBC in AM to assess post-transfusion  - active T+S  - transfuse <7    Goals of Care  - Patient made DNR, w family opting for comfort directed care  - Continue with antibiotics, will cap pressors  - No blood draws  - Will add ativan 2mg q2PRN for agitation  - Will add dilaudid .5mg q2PRN for tachypnea >22.    FLUIDS/ELECTROLYTES/NUTRITION  -No IVF  -Replete cautiously given ESRD  -Diet: NPO with PEG feeds  -DVT ppx: hhold, does not align w GOC  -GI: protonix 80 IV BID  - Advanced Directives: Patient made DNR, comfort measures  -Dispo: MICU   84 y/o M PMH CKD 4, renal transplant in 2013 at [facility], glaucoma (blind), DM1, anemia, CAD s/p 2 DAMEON to LAD in 6/21, BPH, recent admission for PNA and treated with multiple antibiotics. Admitted to MICU for concern of septic shock presumed 2/2 Pneumonia.    NEUROLOGY    #Metabolic Encephalopathy v Worsening Mental Status  - Patient w known poor mental status, AAOx0.  Previous CT scan 11/22 No intracranial hemorrhage or acute transcortical infarct.   - Known history of agitation in the past, which was treated inpatient with seroquel 50mg qhs.   - Is on seroquel 25mg BID at home.      CARDIOVASCULAR  # Fluid Overloaded  - No HD as does not align w GOC.    #Septic Shock, appears fluid overloaded, lactic acidosis to >17. Had also been getting stress dose steroids at home as well as levophed.  - Patient presenting with worsening respiratory symptoms, and baseline at home has required levophed in conjunction with dopamine for question of bradycardia  - C/w levophed, wean MAP as tolerated for > MAP 65. Capped at .06.    #HFpEF  Had EF of 55% on TTE from Oct 2022, grade I diastolic dysfunction. Repeat TTE 11/17 with poor visualization of LV. Patient is HD dependent for fluid removal.  - No intervention.    #CAD  #HLD  Hx of CAD s/p 2 DAMEON to LAD in June 2021, currently no CP. No CP or ischemic changes on EKG, likely due to CKD. Takes Plavix and aspirin per last d/c but only aspirin on outpatient note  - Will hold off on ASA/ lipitor as does not align w GOC.    #HTN   Known history of HTN.   - holding hypertension meds    PULM  #Chronic Hypoxic Respiratory Failure i/s/o PNA in conjunction w fluid overload  - S/p trach on which was placed during patient's last admission, concern for underlying pneumonia.  - C/w Abx, zosyn and vancomycin      RENAL  #ESRD, patient with dialysis initiated during last admission. Previous renal transplant patient, tacrolimus 4mg PO, and MMF 500mg qd.  - Hold off on Immunosuppressive meds for now    #HAGMA, AG of 32, and bicarb of 9 ,presumed 2/2 to ESRD, Delta/Delta, uncomplicated HAGMA.  - Patient w fluid overload, and b/l pleural effusions, tx w 2 amps bicarb  - No further HD, blood draws, or bicarb amps.    GI  #Nutrition  NPO with PEG feeds.  - Keep NPO for now  - GOC w family further surrounding feeds.    #Abdominal Distension and Diarrhea  - Patient w abdominal distension upon physical exam, grandson reports passing BM and gas, and some diarrhea, on Saturday and Saturday  - Xray abdomen ordered, f/u official read.  - C.diff r/o, hx of C.diff in past    #Hematochezia  - Concern for hematochezia on Saturday, one isolated low volume episode, of note patient also has history of hemorrhoids as per family. hgB 6.4, held off on transfusion as did not align w GOC.  - PPi IV BID for now    #Transaminitis  - Patient w elevated LFT's likely consistent with early signs of shock liver. No blood draws as does not align w GOC.      #Urinary Retention  - History of urinary retention , on home tamsulosin 0.4mg daily, discontinue for now given acute shock.    ID  #Presumed PNA  - Patient w presumed pneumonia causing septic shock given distribution of symptoms, was on vancomycin and zosyn at home, and transitioned to meropenem and levaquin today.  - Previous admission w tracheal aspirate growing serratia, stenotrophomonas, and pseudomonas, patient was treated with vancomycin and cefepime.  - C/w zosyn and vancomycin, s/p 1 dose meropenem in ED.    #Leukocytosis  - Patient w leukocytosis at home (unsure value, reports in 20s), in addition to elevated upon admission to 26.39. Likely 2/2 to acute septic shock  - Treat with antibiotics as above    ENDOCRINE  #DM (diabetes mellitus).   Previous admission was on Humulin 7U q6h  - A1C, 9.0 in 10/22, will repeat.  - No FSG as does not align w GOC.    #Hypoglycemia  - Patient hypoglycemic upon arrival to 40s, s/p 1 amp D50 w rebound to 89.    #Hypothyroidism  TSH 20.8, free t4 0.7, T3 49 (low). Repeat TSH at 9, fT4 0.7  - on synthroid 60mcg IV qD  - C/w synthroid      HEME  #Macrocytic Anemia  Likely 2.2 CKD. Hgb 6.4 on admission, sp 1U PRBC. No active signs of bleeding on physical exam.   - Folic acid and B12 level  - Repeat CBC in AM to assess post-transfusion  - active T+S  - transfuse <7    Goals of Care  - Patient made DNR, w family opting for comfort directed care  - Continue with antibiotics, will cap pressors  - No blood draws  - Will add ativan and dilaudid for symptomatic relief.    FLUIDS/ELECTROLYTES/NUTRITION  -No IVF  - No electrolyte monitoring  -Diet: NPO with PEG feeds  -DVT ppx: hhold, does not align w GOC  -GI: protonix 80 IV BID  - Advanced Directives: Patient made DNR, comfort measures  -Dispo: MICU

## 2023-06-13 NOTE — ED ADULT NURSE NOTE - NSFALLHARMRISKINTERV_ED_ALL_ED

## 2023-06-13 NOTE — ED ADULT NURSE NOTE - CHIEF COMPLAINT QUOTE
Pt presents via EMS with reported "fever, lethargy" x approx 2-3 days. Has been receiving IV ABX at home, as well as scheduled dialysis. Pt of Dr. De La Rosa, pt has been receiving all medical care at home. Arrives on ventilator to [facility] (as per home use) on Levophed drip (dose unknown to ambulance service), hypotensive and verbally unresponsive.

## 2023-06-13 NOTE — PROGRESS NOTE ADULT - ATTENDING COMMENTS
82 y/o M PMH CKD 4, renal transplant in 2013 at [facility], currently on HD at home 5 times a week, glaucoma (blind), DM1, anemia, CAD s/p 2 DAMEON to LAD in 6/21, BPH, recent admission for PNA. Admitted to MICU for concern of septic shock. c/w broad spectrum abx. prognosis grim, family is aware. plan wad to trial CRRT however cannot be performed with AV fistula and his hemodynamics are labile with CRRT or HD unlikely to change overall prognosis. will hold off for now and c/w maximal supportive therapy as he is getting.

## 2023-06-13 NOTE — CONSULT NOTE ADULT - SUBJECTIVE AND OBJECTIVE BOX
Patient is a 82y old  Male who presents with a chief complaint of Septic Shock (13 Jun 2023 10:11)      HPI:  82 y/o M PMH ESR on H( 5 x week at home), renal transplant in 2013 at [facility] (on tacrolimus and MMF), DM, anemia presented to the ED due to hypotension and hypoxia and were urged to come to the ED under the advice of Dr Prather home labs done which revealed leukocytosis and elevated lactate in the ED in the ED labs notable for lactate 17 bicarb 9 P 6.8 CXR revealing BL effusions patient started on levophed for pressor support BP 88/54 - initially patient was made comfort care however family rescinded and nephrology consulted for CVVHD                         s.     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      History of renal transplant  secondary to DM      Chronic kidney disease (CKD)      Glaucoma      Kidney transplant recipient      Amputation of toe  x 3      H/O heart artery stent            Allergies:  No Known Allergies      Home Medications:   chlorhexidine 0.12% Liquid 15 milliLiter(s) Oral Mucosa every 12 hours  chlorhexidine 2% Cloths 1 Application(s) Topical <User Schedule>  CRRT Treatment    <Continuous>  HYDROmorphone  Injectable 0.5 milliGRAM(s) IV Push every 2 hours PRN  levothyroxine Injectable 60 MICROGram(s) IV Push at bedtime  LORazepam   Injectable 0.5 milliGRAM(s) IV Push every 4 hours PRN  norepinephrine Infusion 0.05 MICROgram(s)/kG/Min IV Continuous <Continuous>  pantoprazole  Injectable 80 milliGRAM(s) IV Push every 12 hours  piperacillin/tazobactam IVPB.. 4.5 Gram(s) IV Intermittent every 8 hours  PureFlow Dialysate RFP-400 (K 2 / Ca 3) 5000 milliLiter(s) CRRT <Continuous>  vancomycin  IVPB 1000 milliGRAM(s) IV Intermittent every 12 hours      Hospital Medications:   MEDICATIONS  (STANDING):  chlorhexidine 0.12% Liquid 15 milliLiter(s) Oral Mucosa every 12 hours  chlorhexidine 2% Cloths 1 Application(s) Topical <User Schedule>  CRRT Treatment    <Continuous>  levothyroxine Injectable 60 MICROGram(s) IV Push at bedtime  norepinephrine Infusion 0.05 MICROgram(s)/kG/Min (7.5 mL/Hr) IV Continuous <Continuous>  pantoprazole  Injectable 80 milliGRAM(s) IV Push every 12 hours  piperacillin/tazobactam IVPB.. 4.5 Gram(s) IV Intermittent every 8 hours  PureFlow Dialysate RFP-400 (K 2 / Ca 3) 5000 milliLiter(s) (2000 mL/Hr) CRRT <Continuous>  vancomycin  IVPB 1000 milliGRAM(s) IV Intermittent every 12 hours      SOCIAL HISTORY:  Denies ETOh, Smoking,     Family History:  FAMILY HISTORY:        VITALS:  T(F): 98.8 (06-13-23 @ 09:15), Max: 98.8 (06-13-23 @ 09:15)  HR: 82 (06-13-23 @ 09:00)  BP: 108/53 (06-13-23 @ 09:00)  RR: 24 (06-13-23 @ 09:00)  SpO2: 95% (06-13-23 @ 09:00)  Wt(kg): --    06-12 @ 07:01  -  06-13 @ 07:00  --------------------------------------------------------  IN: 360 mL / OUT: 0 mL / NET: 360 mL    06-13 @ 07:01  -  06-13 @ 10:25  --------------------------------------------------------  IN: 90 mL / OUT: 0 mL / NET: 90 mL      Height (cm): 182 (06-13 @ 02:10)  Weight (kg): 80 (06-13 @ 01:54)  BMI (kg/m2): 24.2 (06-13 @ 02:10)  BSA (m2): 2.01 (06-13 @ 02:10)  CAPILLARY BLOOD GLUCOSE      POCT Blood Glucose.: 89 mg/dL (13 Jun 2023 03:15)      Review of Systems:  unable to participate     PHYSICAL EXAM:  GENERAL: unable to assess  HEENT: trach collar   CHEST/LUNG: decreased breath sounds BL  HEART: Regular rate and rhythm, no murmur, no gallops, no rub   ABDOMEN: + distention   EXTREMITIES: + edema BL LE   ACCESS:     LABS:  06-13    143  |  100  |  88<H>  ----------------------------<  43<LL>  5.1   |  9<LL>  |  2.25<H>    Ca    10.6<H>      13 Jun 2023 02:03    TPro  6.5  /  Alb  2.6<L>  /  TBili  0.4  /  DBili      /  AST  269<H>  /  ALT  145<H>  /  AlkPhos  185<H>  06-13    Creatinine Trend: 2.25 <--                        6.4    26.39 )-----------( 196      ( 13 Jun 2023 02:03 )             25.0     Urine Studies:            65M PMHx ESRD on HD, multiple myeloma, chronic lymphedema, LLE DVT with IVC filter, c dif s/p fecal transplant, who presented to the [facility] ED after being found hypotensive and hypothermic when he went for dialysis. Consult for hemodialysis     Assessment/Plan:     #ESRD on HD MWF @[address] [place] [address] [facility]   usual Rx 3h 3L   last hemodialysis 7/19 per schedule   no acute indication for hemodialysis   next hemodialysis 7/24 per schedule   electrolytes at goal   euvolemic, UF w/HD   dry weight TBD     #HTN   BP at goal   continue with norvasc, metoprolol, clonidine     #access   LUE AVF functional     #anemia  Hb at goal   obtain iron studies including ferritin, transferrin, iron, and TIBC to be able to calculate % saturation   no indication for EPO or IV Iron at this time   transfusion as per primary team     #renal bone disease   Ca ~  Phos ~   PTH pending   VitD25/1,25 pending   no indication for Hectorol at this time     Thank you for the opportunity to participate in the care of your patient. The nephrology service remains available to assist with any questions or concerns. Please feel free to reach us by paging the on-call nephrology fellow for urgent issues or as below.     Alejandro Valero M.D.   PGY-5, Nephrology Fellow   C: [phone]   P: [phone]  Patient is a 82y old  Male who presents with a chief complaint of Septic Shock (13 Jun 2023 10:11)      HPI:  84 y/o M PMH ESR on H( 5 x week at home), renal transplant in 2013 at [facility] (on tacrolimus and MMF), DM, anemia presented to the ED due to hypotension and hypoxia and were urged to come to the ED under the advice of Dr Prather home labs done which revealed leukocytosis and elevated lactate in the ED in the ED labs notable for lactate 17 bicarb 9 P 6.8 CXR revealing BL effusions patient started on levophed for pressor support BP 88/54 - initially patient was made comfort care however family rescinded and nephrology consulted for CVVHD                         s.     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      History of renal transplant  secondary to DM      Chronic kidney disease (CKD)      Glaucoma      Kidney transplant recipient      Amputation of toe  x 3      H/O heart artery stent            Allergies:  No Known Allergies      Home Medications:   chlorhexidine 0.12% Liquid 15 milliLiter(s) Oral Mucosa every 12 hours  chlorhexidine 2% Cloths 1 Application(s) Topical <User Schedule>  CRRT Treatment    <Continuous>  HYDROmorphone  Injectable 0.5 milliGRAM(s) IV Push every 2 hours PRN  levothyroxine Injectable 60 MICROGram(s) IV Push at bedtime  LORazepam   Injectable 0.5 milliGRAM(s) IV Push every 4 hours PRN  norepinephrine Infusion 0.05 MICROgram(s)/kG/Min IV Continuous <Continuous>  pantoprazole  Injectable 80 milliGRAM(s) IV Push every 12 hours  piperacillin/tazobactam IVPB.. 4.5 Gram(s) IV Intermittent every 8 hours  PureFlow Dialysate RFP-400 (K 2 / Ca 3) 5000 milliLiter(s) CRRT <Continuous>  vancomycin  IVPB 1000 milliGRAM(s) IV Intermittent every 12 hours      Hospital Medications:   MEDICATIONS  (STANDING):  chlorhexidine 0.12% Liquid 15 milliLiter(s) Oral Mucosa every 12 hours  chlorhexidine 2% Cloths 1 Application(s) Topical <User Schedule>  CRRT Treatment    <Continuous>  levothyroxine Injectable 60 MICROGram(s) IV Push at bedtime  norepinephrine Infusion 0.05 MICROgram(s)/kG/Min (7.5 mL/Hr) IV Continuous <Continuous>  pantoprazole  Injectable 80 milliGRAM(s) IV Push every 12 hours  piperacillin/tazobactam IVPB.. 4.5 Gram(s) IV Intermittent every 8 hours  PureFlow Dialysate RFP-400 (K 2 / Ca 3) 5000 milliLiter(s) (2000 mL/Hr) CRRT <Continuous>  vancomycin  IVPB 1000 milliGRAM(s) IV Intermittent every 12 hours      SOCIAL HISTORY:  Denies ETOh, Smoking,     Family History:  FAMILY HISTORY:        VITALS:  T(F): 98.8 (06-13-23 @ 09:15), Max: 98.8 (06-13-23 @ 09:15)  HR: 82 (06-13-23 @ 09:00)  BP: 108/53 (06-13-23 @ 09:00)  RR: 24 (06-13-23 @ 09:00)  SpO2: 95% (06-13-23 @ 09:00)  Wt(kg): --    06-12 @ 07:01  -  06-13 @ 07:00  --------------------------------------------------------  IN: 360 mL / OUT: 0 mL / NET: 360 mL    06-13 @ 07:01  -  06-13 @ 10:25  --------------------------------------------------------  IN: 90 mL / OUT: 0 mL / NET: 90 mL      Height (cm): 182 (06-13 @ 02:10)  Weight (kg): 80 (06-13 @ 01:54)  BMI (kg/m2): 24.2 (06-13 @ 02:10)  BSA (m2): 2.01 (06-13 @ 02:10)  CAPILLARY BLOOD GLUCOSE      POCT Blood Glucose.: 89 mg/dL (13 Jun 2023 03:15)      Review of Systems:  unable to participate     PHYSICAL EXAM:  GENERAL: unable to assess  HEENT: trach collar   CHEST/LUNG: decreased breath sounds BL  HEART: Regular rate and rhythm, no murmur, no gallops, no rub   ABDOMEN: + distention   EXTREMITIES: + edema BL LE   ACCESS:     LABS:  06-13    143  |  100  |  88<H>  ----------------------------<  43<LL>  5.1   |  9<LL>  |  2.25<H>    Ca    10.6<H>      13 Jun 2023 02:03    TPro  6.5  /  Alb  2.6<L>  /  TBili  0.4  /  DBili      /  AST  269<H>  /  ALT  145<H>  /  AlkPhos  185<H>  06-13    Creatinine Trend: 2.25 <--                        6.4    26.39 )-----------( 196      ( 13 Jun 2023 02:03 )             25.0     Urine Studies:

## 2023-06-13 NOTE — PROGRESS NOTE ADULT - ASSESSMENT
84 y/o M PMH CKD 4, renal transplant in 2013 at [facility], glaucoma (blind), DM1, anemia, CAD s/p 2 DAMEON to LAD in 6/21, BPH, recent admission for PNA and treated with multiple antibiotics. Admitted to MICU for concern of septic shock presumed 2/2 Pneumonia.    NEUROLOGY    #Metabolic Encephalopathy v Worsening Mental Status  - Patient w known poor mental status, AAOx0.  Previous CT scan 11/22 No intracranial hemorrhage or acute transcortical infarct.   - Known history of agitation in the past, which was treated inpatient with seroquel 50mg qhs.   - Is on seroquel 25mg BID at home.      CARDIOVASCULAR  # Fluid Overloaded  - No HD as does not align w GOC.    #Septic Shock, appears fluid overloaded, lactic acidosis to >17. Had also been getting stress dose steroids at home as well as levophed.  - Patient presenting with worsening respiratory symptoms, and baseline at home has required levophed in conjunction with dopamine for question of bradycardia  - C/w levophed, wean MAP as tolerated for > MAP 65. Capped at .06.    #HFpEF  Had EF of 55% on TTE from Oct 2022, grade I diastolic dysfunction. Repeat TTE 11/17 with poor visualization of LV. Patient is HD dependent for fluid removal.  - No intervention.    #CAD  #HLD  Hx of CAD s/p 2 DAMEON to LAD in June 2021, currently no CP. No CP or ischemic changes on EKG, likely due to CKD. Takes Plavix and aspirin per last d/c but only aspirin on outpatient note  - Will hold off on ASA/ lipitor as does not align w GOC.    #HTN   Known history of HTN.   - holding hypertension meds    PULM  #Chronic Hypoxic Respiratory Failure i/s/o PNA in conjunction w fluid overload  - S/p trach on which was placed during patient's last admission, concern for underlying pneumonia.  - C/w Abx, zosyn and vancomycin      RENAL  #ESRD, patient with dialysis initiated during last admission. Previous renal transplant patient, tacrolimus 4mg PO, and MMF 500mg qd.  - Hold off on Immunosuppressive meds for now    #HAGMA, AG of 32, and bicarb of 9 ,presumed 2/2 to ESRD, Delta/Delta, uncomplicated HAGMA.  - Patient w fluid overload, and b/l pleural effusions, tx w 2 amps bicarb  - No further HD, blood draws, or bicarb amps.    GI  #Nutrition  NPO with PEG feeds.  - Keep NPO for now  - GOC w family further surrounding feeds.    #Abdominal Distension and Diarrhea  - Patient w abdominal distension upon physical exam, grandson reports passing BM and gas, and some diarrhea, on Saturday and Saturday  - Xray abdomen ordered, f/u official read.  - C.diff r/o, hx of C.diff in past    #Hematochezia  - Concern for hematochezia on Saturday, one isolated low volume episode, of note patient also has history of hemorrhoids as per family. hgB 6.4, held off on transfusion as did not align w GOC.  - PPi IV BID for now    #Transaminitis  - Patient w elevated LFT's likely consistent with early signs of shock liver. No blood draws as does not align w GOC.      #Urinary Retention  - History of urinary retention , on home tamsulosin 0.4mg daily, discontinue for now given acute shock.    ID  #Presumed PNA  - Patient w presumed pneumonia causing septic shock given distribution of symptoms, was on vancomycin and zosyn at home, and transitioned to meropenem and levaquin today.  - Previous admission w tracheal aspirate growing serratia, stenotrophomonas, and pseudomonas, patient was treated with vancomycin and cefepime.  - C/w zosyn and vancomycin, s/p 1 dose meropenem in ED.    #Leukocytosis  - Patient w leukocytosis at home (unsure value, reports in 20s), in addition to elevated upon admission to 26.39. Likely 2/2 to acute septic shock  - Treat with antibiotics as above    ENDOCRINE  #DM (diabetes mellitus).   Previous admission was on Humulin 7U q6h  - A1C, 9.0 in 10/22, will repeat.  - No FSG as does not align w GOC.    #Hypoglycemia  - Patient hypoglycemic upon arrival to 40s, s/p 1 amp D50 w rebound to 89.    #Hypothyroidism  TSH 20.8, free t4 0.7, T3 49 (low). Repeat TSH at 9, fT4 0.7  - on synthroid 60mcg IV qD  - C/w synthroid      HEME  #Macrocytic Anemia  Likely 2.2 CKD. Hgb 6.4 on admission, sp 1U PRBC. No active signs of bleeding on physical exam.   - Folic acid and B12 level  - Repeat CBC in AM to assess post-transfusion  - active T+S  - transfuse <7    Goals of Care  - Patient made DNR, w family opting for comfort directed care  - Continue with antibiotics, will cap pressors  - No blood draws  - Will add ativan and dilaudid for symptomatic relief.    FLUIDS/ELECTROLYTES/NUTRITION  -No IVF  - No electrolyte monitoring  -Diet: NPO with PEG feeds  -DVT ppx: hhold, does not align w GOC  -GI: protonix 80 IV BID  - Advanced Directives: Patient made DNR, comfort measures  -Dispo: MICU

## 2023-06-13 NOTE — ED PROVIDER NOTE - CLINICAL SUMMARY MEDICAL DECISION MAKING FREE TEXT BOX
hypotensive, hypoxic and bradycardic. levophed continued on arrival. ptient manually bagged with BVM until o2 saturation and heart rate improve. BP improved on levophed. no additional abx or fluid vocered as patient received broad spectrum abx prior to ed arrival and appears fluid overlaoded, admitted to icu. extensive conversation rgarding poor prognosis held with family at bedside

## 2023-06-13 NOTE — CONSULT NOTE ADULT - ATTENDING COMMENTS
pt admitted with septic shock, severe lactic acidosis, respiratory failure, iATN, planning for CVVHD initiation however he became more hemodynamically unstable and passed away.

## 2023-06-13 NOTE — H&P ADULT - HISTORY OF PRESENT ILLNESS
84 y/o M PMH CKD 4, renal transplant in 2013 at [facility], glaucoma (blind), DM1, anemia, CAD s/p 2 DAMEON to LAD in 6/21, BPH, recent admission for PNA and treated with multiple antibiotics. Patient is now on HD reported 5x a week, and was brought to the ED due to hypotension and hypoxia and Dr. Prather urged family to come to the emergency department. As per documentation, home labs revealed leukocytosis in conjunction with lactate, and patient who was chronically on vancomycin and zosyn was switched to levaquin and meropenem. Patient continues to be full code. Patient with baseline AAOx0 status. Unable to elucidate full ROS. Mary bedside reports    ED course:  T: not recorded HR: 54 BP:88/45 RR:27 O2: 80s on ventilator, settings not recorded  EKG: Not done in ED.  Labs: leukocytosis to 26.39k, H/H 6.4/25.0. .1. Platelet count 196. INR 1.96. Lactate 17.0. VBG 6.83, CO2, 8.5. HCO3 venous 7  Imaging: No imaging  Intervention: Levophed started.  Consults: ICU, admitted to MICU   82 y/o M PMH CKD 4, renal transplant in 2013 at [facility], glaucoma (blind), DM1, anemia, CAD s/p 2 DAMEON to LAD in 6/21, BPH, recent admission for PNA and treated with multiple antibiotics. Patient is now on HD reported 5x a week, and was brought to the ED due to hypotension and hypoxia and Dr. Prather urged family to come to the emergency department. As per documentation, home labs revealed leukocytosis in conjunction with lactate, and patient who was chronically on vancomycin and zosyn was switched to levaquin and meropenem. Patient continues to be full code. Patient with baseline AAOx0 status. Unable to elucidate full ROS. Mary bedside reports    ED course:  T: not recorded HR: 54 BP:88/45 RR:27 O2: 80s on ventilator, settings not recorded  EKG: Not done in ED.  Labs: leukocytosis to 26.39k, H/H 6.4/25.0. .1. Platelet count 196. INR 1.96. Lactate 17.0. VBG 6.83, CO2, 8.5. HCO3 venous 7  Imaging: CXR and abdominal Xray ordered by MICU team.  Intervention: Levophed started. ED team started protonix gtt for concern for GIB.  Consults: ICU, admitted to MICU   84 y/o M PMH CKD 4, renal transplant in 2013 at [facility], glaucoma (blind), DM1, anemia, CAD s/p 2 DAMEON to LAD in 6/21, BPH, recent admission for PNA and treated with multiple antibiotics. Patient is now on HD reported 5x a week, and was brought to the ED due to hypotension and hypoxia and Dr. Prather urged family to come to the emergency department. As per documentation, home labs revealed leukocytosis in conjunction with lactate, and patient who was chronically on vancomycin and zosyn was switched to levaquin and meropenem. Of note, patient with CXR done at home which raised suspicion for pneumonia, and with worsening vital signs, and hypoxia in conjunction with cough. In addition, patient also with one episode of hematochezia on Saturday, in conjunction with loose stool Saturday and this morning. Patient has a known history of C.diff. Patient with baseline AAOx0 status. Unable to elucidate full ROS. Mary bedside reports    ED course:  T: not recorded HR: 54 BP:88/45 RR:27 O2: 80s on ventilator, settings not recorded  EKG: Not done in ED.  Labs: leukocytosis to 26.39k, H/H 6.4/25.0. .1. Platelet count 196. INR 1.96. Lactate 17.0. VBG 6.83, CO2, 8.5. HCO3 venous 7  Imaging: CXR and abdominal Xray ordered by MICU team.  Intervention: Levophed started. ED team started protonix gtt for concern for GIB.  Consults: ICU, admitted to MICU   82 y/o M PMH CKD 4, renal transplant in 2013 at [facility], glaucoma (blind), DM1, anemia, CAD s/p 2 DAMEON to LAD in 6/21, BPH, recent admission for PNA and treated with multiple antibiotics. Patient is now on HD reported 5x a week, and was brought to the ED due to hypotension and hypoxia and Dr. Prather urged family to come to the emergency department. As per documentation, home labs revealed leukocytosis in conjunction with lactate, and patient who was chronically on vancomycin and zosyn was switched to levaquin and meropenem. Of note, patient with CXR done at home which raised suspicion for pneumonia, and with worsening vital signs, and hypoxia in conjunction with cough. In addition, patient also with one episode of hematochezia on Saturday, in conjunction with loose stool Saturday and this morning. Patient has a known history of C.diff. Patient with baseline AAOx0 status. Unable to elucidate full ROS.     INTERVAL UPDATE: Patient made DNR. Subsequently after full conversation with family as well as rabbi, decision made by family to go full comfort measures, in addition to capping pressors, and providing symptom directed care.    ED course:  T: not recorded HR: 54 BP:88/45 RR:27 O2: 80s on ventilator, settings not recorded  EKG: Not done in ED.  Labs: leukocytosis to 26.39k, H/H 6.4/25.0. .1. Platelet count 196. INR 1.96. Lactate 17.0. VBG 6.83, CO2, 8.5. HCO3 venous 7  Imaging: CXR and abdominal Xray ordered by MICU team.  Intervention: Levophed started. ED team started protonix gtt for concern for GIB.  Consults: ICU, admitted to MICU

## 2023-06-13 NOTE — DIETITIAN INITIAL EVALUATION ADULT - OTHER INFO
82 y/o M PMH CKD 4, renal transplant in 2013 at [facility], glaucoma (blind), DM1, anemia, CAD s/p 2 DAMEON to LAD in 6/21, BPH, recent admission for PNA and treated with multiple antibiotics. Admitted to MICU for concern of septic shock presumed 2/2 Pneumonia.    Pt care discussed in IDT rounds. Rx and labs reviewed. Pt intubated at time of assessment; vent to VC-AC, MAP 71, norepinephrine gtt, and no propofol at time of assessment. Spoke with family at bedside who notes pt with UBW of 165 lbs; CBW of 176 lbs with noted +2 general edema. Family notes pt has been losing wt; unsure of specific amount of wt loss or exact time frame. Pt with PEG at baseline and family reports usual regimen of Jevity 1.2 @65 ml/hr x24hrs providing 1560 ml TF, 1872 kcal, 87 gProt, and 1259 ml FW; see recs below. No plans to initiate nutrition at this time iso critical condition and pt in septic shock with lactate of 17 today. No other reports GI distress or further nutritional concerns at this time. RDN will continue to reassess, intervene, and monitor as appropriate.     Pain: No non-verbal indicators   GI: Abdomen ND/NT, +BS x4, LBM 6/13  Skin: PI stg III sacrum, +2 gen. edema

## 2023-06-13 NOTE — H&P ADULT - TIME BILLING
Octaviano Miguel [id number] MICU  This is an 82 y/o male with renal transplantation, renal failure (on HD 5 days per week), DM1, anemia, C. diff,  CAD, on going PNA, was hypotensive, at home had leukocytosis, was started on antibiotics and levophed for severe sepsis with septic shock and sent to the ED.  The baseline is non verbal, non interactive, ventilator dependent, bed bound with sacral ulcer and frequent infections.. In the ED he was bradycardic, hypotensive even on levophed, very ill appearing, ph<6 with severe metabolic acidosis, the family wanted everything done, the patient was brought up to the ICu where further discussions with the family and in light of his worsening condition the family decided based on his wishes to make him DNR/DNI and to cap all the pressors, no lab draws and ensure that he is comfortable.  he was sent to the ED on antibiotics for PNA and levophed for shock  A/P:  -acute on chronic respiratory failure due to PNA  -severe sepsis with septic shock, on levophed, steroids and vasopressin  -ESRD on HD  -acute hemorrhagic anemia  -fluid overload  -CAD  -HAGMA  -Diarrhea with h/o C. diff  -hematochezia on saturday  -DM with hypoglycemia  -hypothyroidism  Please see the resident's note for the details for the management plan.  The patient has a poor prognoses.

## 2023-06-13 NOTE — CHART NOTE - NSCHARTNOTEFT_GEN_A_CORE
Patient presented to ED in presumed septic shock likely to be in setting of underlying PNA. Initial blood work performed in ED showing severe metabolic acidosis c/f multiorgan failure. Discussed at length with family including HCP/grandson Ivan Davis who were able to demonstrate understanding that patient unlikely to survive in light of escalation of IV pressors and IV antibiotics. Decision was collectively made by the family in collaboration with advisement from their community [facility], to ultimately declare patient as DNR, however in alignment with their [employer] ideals, do not wish to withdraw medical care entirely, but rather request trial of IV antibiotics and pressors. Family in understanding that medical interventions such as dialysis are unlikely to improve prognosis and may otherwise hasten hemodynamic worsening of his already labile blood pressure. Family collectively in agreement that highest priority should be given towards optimizing patient's comfort, dignity, and quality of life and to take measures aimed towards minimizing visible or presumed pain, suffering, or anxiety above all other medical interventions. For this reason, family agrees to refrain from blood draws, and is accepting of intermittent standing IV pushes of dilaudid and IV ativan to address pain and anxiety respectively. MOLST completed and placed in patient's chart.     Ivan Davis (grandson/)    Nina Levy (family advisor, [phone])

## 2023-06-13 NOTE — H&P ADULT - NSHPPHYSICALEXAM_GEN_ALL_CORE
Vital Signs Last 12 Hrs  T(F): --  HR: 54 (06-13-23 @ 01:54) (54 - 54)  BP: 88/45 (06-13-23 @ 01:54) (88/45 - 88/45)  BP(mean): --  RR: 27 (06-13-23 @ 01:54) (27 - 27)  SpO2: --    PHYSICAL EXAM:  Constitutional: NAD, comfortable in bed.  HEENT: NC/AT, PERRLA, EOMI, no conjunctival pallor or scleral icterus, MMM  Neck: Supple, no JVD  Respiratory: CTA B/L. No w/r/r.   Cardiovascular: RRR, normal S1 and S2, no m/r/g.   Gastrointestinal: +BS, soft NTND, no guarding or rebound tenderness, no palpable masses   Extremities: wwp; no cyanosis, clubbing or edema.   Vascular: Pulses equal and strong throughout.   Neurological: AAOx3, no CN deficits, strength and sensation intact throughout.   Skin: No gross skin abnormalities or rashes Vital Signs Last 12 Hrs  T(F): --  HR: 54 (06-13-23 @ 01:54) (54 - 54)  BP: 88/45 (06-13-23 @ 01:54) (88/45 - 88/45)  BP(mean): --  RR: 27 (06-13-23 @ 01:54) (27 - 27)  SpO2: --    PHYSICAL EXAM:  Constitutional: AAOx0. NAD.  HEENT: NC/AT, EOMI, no conjunctival pallor or scleral icterus, MMM. [place]ob[place] R eye, copious secretions  Neck: Supple  Respiratory: diffuse ronchi to anterior auscultation. On VC/AC   Cardiovascular: RRR, normal S1 and S2, no m/r/g.   Gastrointestinal: soft NTND, no guarding or rebound tenderness, no palpable masses   Extremities: wwp; no cyanosis, clubbing. BL LE edema  Neurological: AAOx0 Vital Signs Last 12 Hrs  T(F): --  HR: 54 (06-13-23 @ 01:54) (54 - 54)  BP: 88/45 (06-13-23 @ 01:54) (88/45 - 88/45)  BP(mean): --  RR: 27 (06-13-23 @ 01:54) (27 - 27)  SpO2: --    PHYSICAL EXAM:  Constitutional: AAOx0. NAD.  HEENT: NC/AT,no conjunctival pallor or scleral icterus, MMM. [place] R eye.  Neck: Supple  Respiratory: diffuse ronchi to anterior auscultation. On VC/AC   Cardiovascular: RRR, normal S1 and S2, no m/r/g.   Gastrointestinal: soft NT, distended. PEG tube in place, PEG site is clean and dry without drainage.  Extremities: wwp; R foot digits 4-5 amputated, digit 2-3 appears necrotic. B/L LE appear cyanotic and mottled at foot. +posterior tibial pulses.  Neurological: AAOx0

## 2023-06-13 NOTE — DIETITIAN INITIAL EVALUATION ADULT - OTHER CALCULATIONS
IBW used to calculate needs due to pt's current body weight exceeding 120% of IBW adjusted for vented pt and septic shock.

## 2023-06-13 NOTE — DIETITIAN INITIAL EVALUATION ADULT - PERTINENT MEDS FT
MEDICATIONS  (STANDING):  chlorhexidine 0.12% Liquid 15 milliLiter(s) Oral Mucosa every 12 hours  chlorhexidine 2% Cloths 1 Application(s) Topical <User Schedule>  CRRT Treatment    <Continuous>  levothyroxine Injectable 60 MICROGram(s) IV Push at bedtime  norepinephrine Infusion 0.05 MICROgram(s)/kG/Min (7.5 mL/Hr) IV Continuous <Continuous>  pantoprazole  Injectable 80 milliGRAM(s) IV Push every 12 hours  piperacillin/tazobactam IVPB.. 4.5 Gram(s) IV Intermittent every 8 hours  PureFlow Dialysate RFP-400 (K 2 / Ca 3) 5000 milliLiter(s) (2000 mL/Hr) CRRT <Continuous>  vancomycin  IVPB 1000 milliGRAM(s) IV Intermittent every 12 hours    MEDICATIONS  (PRN):  HYDROmorphone  Injectable 0.5 milliGRAM(s) IV Push every 2 hours PRN Moderate pain (4-6), Severe pain (7-10), Respiratory rate greater than 22  LORazepam   Injectable 0.5 milliGRAM(s) IV Push every 4 hours PRN Anxiety

## 2023-06-13 NOTE — PATIENT PROFILE ADULT - FALL HARM RISK - HARM RISK INTERVENTIONS

## 2023-06-13 NOTE — DIETITIAN INITIAL EVALUATION ADULT - ADD RECOMMEND
-Initiate nutrition when medically able    *Recommend Jevity 1.2 @75 ml/hr with LPS x1/day to provide 1800 ml TF, 2260 kcal, 115 gProt., and 1453 ml FW. This is 23 non-protein kcal and 1.47 gProt. per kg IBW 78.2kg.   -Maintain aspiration precautions at all times  -Monitor pressor and propofol demands   -Align nutrition with GOC at all times  -Monitor chemistry, GI fxn, and skin integrity

## 2023-06-13 NOTE — DIETITIAN INITIAL EVALUATION ADULT - PERTINENT LABORATORY DATA
06-13    143  |  100  |  88<H>  ----------------------------<  43<LL>  5.1   |  9<LL>  |  2.25<H>    Ca    10.6<H>      13 Jun 2023 02:03    TPro  6.5  /  Alb  2.6<L>  /  TBili  0.4  /  DBili  x   /  AST  269<H>  /  ALT  145<H>  /  AlkPhos  185<H>  06-13  POCT Blood Glucose.: 89 mg/dL (06-13-23 @ 03:15)  A1C with Estimated Average Glucose Result: 9.0 % (10-22-22 @ 05:30)

## 2023-06-13 NOTE — ED ADULT TRIAGE NOTE - CHIEF COMPLAINT QUOTE
Pt presents via EMS with reported "fever, lethargy" x approx 2-3 days. Has been receiving IV ABX at home, as well as scheduled dialysis. Pt of Dr. De La Rosa, pt has been receiving all medical care at home. Arrives on ventilator to [facility] (as per home use) on Levophed drip (dose unknown to ambulance service), hypotensive and verbally unresponsive.

## 2023-06-13 NOTE — CONSULT NOTE ADULT - ASSESSMENT
82 y/o M PMH ESR on H( 5 x week at home), renal transplant in 2013 at [facility] (on tacrolimus and MMF), DM, anemia presented to the ED due to hypotension and hypoxia w/ septic shock . Consult for hemodialysis     Assessment/Plan:     #ESRD on HD 5 days per week  given hemodynamis instability with modest levophed dose will initiate CVVHD  Qb 300ml/min DFR 2L/hourUF net negative 100ml/hour to aim for 2-2.5L /24 hours   q8H bmp phos while on CVVHD    #renal tx  would hold MMF in light of shock  please obtain tacro trough 30 min before AM dose as may need adjustment    #HTN  in septic shock on levophed        #access   LUE AVF functional     #anemia  Hb not at goal   on PPI drip w/ concern for GIB  please obtain iron studies- however even if iron deplete would hold i.s.o infection  transfusion as per primary team     #renal bone disease   Ca ~10.6  Phos ~pending   will need close monitoring of phos while on CVVHD     Thank you for the opportunity to participate in the care of your patient. The nephrology service remains available to assist with any questions or concerns. Please feel free to reach us by paging the on-call nephrology fellow for urgent issues or as below.     Floresita Freeman D.O  PGY-5, Nephrology Fellow   P: [phone] 84 y/o M PMH ESR on H( 5 x week at home), renal transplant in 2013 at [facility] (on tacrolimus and MMF), DM, anemia presented to the ED due to hypotension and hypoxia w/ septic shock . Consult for hemodialysis     Assessment/Plan:     #ESRD on HD 5 days per week  given hemodynamis instability with modest levophed dose will initiate CVVHD  Qb 300ml/min DFR 2L/hourUF net negative 100ml/hour to aim for 2-2.5L /24 hours   q8H bmp phos while on CVVHD    #renal tx  would hold MMF in light of shock  please obtain tacro trough 30 min before PM dose as may need adjustment    #HTN  in septic shock on levophed        #access   RUE AVF functional     #anemia  Hb not at goal 6.4  on PPI drip w/ concern for GIB  please obtain iron studies- however even if iron deplete would hold i.s.o infection  transfusion as per primary team     #renal bone disease   Ca ~10.6  Phos ~pending   will need close monitoring of phos while on CVVHD     Thank you for the opportunity to participate in the care of your patient. The nephrology service remains available to assist with any questions or concerns. Please feel free to reach us by paging the on-call nephrology fellow for urgent issues or as below.     Floresita Freeman D.O  PGY-5, Nephrology Fellow   P: [phone] 82 y/o M PMH ESR on H( 5 x week at home), renal transplant in 2013 at [facility] (on tacrolimus and MMF), DM, anemia presented to the ED due to hypotension and hypoxia w/ septic shock . Consult for hemodialysis     Assessment/Plan:     #ESRD on HD 5 days per week  given hemodynamic instability with modest levophed dose will initiate CVVHD  Qb 300ml/min DFR 2L/hourUF net negative 100ml/hour to aim for 2-2.5L /24 hours   q8H bmp phos while on CVVHD    #renal tx  would hold MMF in light of shock  please obtain tacro trough 30 min before PM dose as may need adjustment    #HTN  in septic shock on levophed        #access   RUE AVF functional     #anemia  Hb not at goal 6.4  on PPI drip w/ concern for GIB  please obtain iron studies- however even if iron deplete would hold i.s.o infection  transfusion as per primary team     #renal bone disease   Ca ~10.6  Phos ~pending   will need close monitoring of phos while on CVVHD     Thank you for the opportunity to participate in the care of your patient. The nephrology service remains available to assist with any questions or concerns. Please feel free to reach us by paging the on-call nephrology fellow for urgent issues or as below.     Floresita Freeman D.O  PGY-5, Nephrology Fellow   P: [phone]

## 2023-06-13 NOTE — PROCEDURE NOTE - NSPROCDETAILS_GEN_ALL_CORE
location identified, draped/prepped, sterile technique used/blood seen on insertion/dressing applied/flushes easily/secured in place/sterile technique, catheter placed
location identified, draped/prepped, sterile technique used, needle inserted/introduced/all materials/supplies accounted for at end of procedure

## 2023-06-13 NOTE — ED PROVIDER NOTE - OBJECTIVE STATEMENT
82m bed bound, trach to vent s/p cardiac arrest. on dialysis 5x/week presenting for hypotension and hypoxia. was on levophed, dopamine at home with persistent hypotension. home labs showed leukocytosis, elecate lactate, was on vanc/zosyn. switched to levaquin + meropenem today. full code

## 2023-06-13 NOTE — DISCHARGE NOTE FOR THE EXPIRED PATIENT - HOSPITAL COURSE
84 y/o M PMH CKD 4, renal transplant in 2013 at [facility], glaucoma (blind), DM1, anemia, CAD s/p 2 DAMEON to LAD in 6/21, BPH, recent admission for PNA and treated with multiple antibiotics. Patient is now on HD reported 5x a week, and was brought to the ED due to hypotension and hypoxia. Prior to presentation home labs revealed leukocytosis in conjunction with elevated lactate and CXR outpatient concerning for PNA, found to be in septic shock requiring pressors with AHRF.    82 y/o M PMH CKD 4, renal transplant in 2013 at [facility], glaucoma (blind), DM1, anemia, CAD s/p 2 DAMEON to LAD in 6/21, BPH, recent admission for PNA and treated with multiple antibiotics. Patient is now on HD reported 5x a week, and was brought to the ED due to hypotension and hypoxia. Prior to presentation home labs revealed leukocytosis in conjunction with elevated lactate and CXR outpatient concerning for PNA, found to be in septic shock requiring pressors with AHRF. GOC conversation with family, decision was collectively made by the family in collaboration with advisement from their community [facility], to ultimately declare patient as DNR, however in alignment with their [employer] ideals, do not wish to withdraw medical care entirely, but rather request trial of IV antibiotics and pressors. Family in understanding that medical interventions such as dialysis are unlikely to improve prognosis and may otherwise hasten hemodynamic worsening of his already labile blood pressure. Family collectively in agreement that highest priority should be given towards optimizing patient's comfort, dignity, and quality of life and to take measures aimed towards minimizing visible or presumed pain, suffering, or anxiety above all other medical interventions. Patient was bradycardiac to the 30s and ultimately had a cardiac arrest.

## 2023-06-20 DIAGNOSIS — J96.21 ACUTE AND CHRONIC RESPIRATORY FAILURE WITH HYPOXIA: ICD-10-CM

## 2023-06-20 DIAGNOSIS — E10.649 TYPE 1 DIABETES MELLITUS WITH HYPOGLYCEMIA WITHOUT COMA: ICD-10-CM

## 2023-06-20 DIAGNOSIS — Z79.890 HORMONE REPLACEMENT THERAPY: ICD-10-CM

## 2023-06-20 DIAGNOSIS — Z51.5 ENCOUNTER FOR PALLIATIVE CARE: ICD-10-CM

## 2023-06-20 DIAGNOSIS — M89.8X9 OTHER SPECIFIED DISORDERS OF BONE, UNSPECIFIED SITE: ICD-10-CM

## 2023-06-20 DIAGNOSIS — H40.9 UNSPECIFIED GLAUCOMA: ICD-10-CM

## 2023-06-20 DIAGNOSIS — Z86.19 PERSONAL HISTORY OF OTHER INFECTIOUS AND PARASITIC DISEASES: ICD-10-CM

## 2023-06-20 DIAGNOSIS — A41.9 SEPSIS, UNSPECIFIED ORGANISM: ICD-10-CM

## 2023-06-20 DIAGNOSIS — Z79.4 LONG TERM (CURRENT) USE OF INSULIN: ICD-10-CM

## 2023-06-20 DIAGNOSIS — J18.9 PNEUMONIA, UNSPECIFIED ORGANISM: ICD-10-CM

## 2023-06-20 DIAGNOSIS — R33.8 OTHER RETENTION OF URINE: ICD-10-CM

## 2023-06-20 DIAGNOSIS — E10.22 TYPE 1 DIABETES MELLITUS WITH DIABETIC CHRONIC KIDNEY DISEASE: ICD-10-CM

## 2023-06-20 DIAGNOSIS — G93.41 METABOLIC ENCEPHALOPATHY: ICD-10-CM

## 2023-06-20 DIAGNOSIS — Z94.0 KIDNEY TRANSPLANT STATUS: ICD-10-CM

## 2023-06-20 DIAGNOSIS — N40.1 BENIGN PROSTATIC HYPERPLASIA WITH LOWER URINARY TRACT SYMPTOMS: ICD-10-CM

## 2023-06-20 DIAGNOSIS — D62 ACUTE POSTHEMORRHAGIC ANEMIA: ICD-10-CM

## 2023-06-20 DIAGNOSIS — Z93.0 TRACHEOSTOMY STATUS: ICD-10-CM

## 2023-06-20 DIAGNOSIS — E78.5 HYPERLIPIDEMIA, UNSPECIFIED: ICD-10-CM

## 2023-06-20 DIAGNOSIS — E03.9 HYPOTHYROIDISM, UNSPECIFIED: ICD-10-CM

## 2023-06-20 DIAGNOSIS — Z11.52 ENCOUNTER FOR SCREENING FOR COVID-19: ICD-10-CM

## 2023-06-20 DIAGNOSIS — R65.21 SEVERE SEPSIS WITH SEPTIC SHOCK: ICD-10-CM

## 2023-06-20 DIAGNOSIS — Z66 DO NOT RESUSCITATE: ICD-10-CM

## 2023-06-20 DIAGNOSIS — Z95.5 PRESENCE OF CORONARY ANGIOPLASTY IMPLANT AND GRAFT: ICD-10-CM

## 2023-06-20 DIAGNOSIS — N18.6 END STAGE RENAL DISEASE: ICD-10-CM

## 2023-06-20 DIAGNOSIS — K92.1 MELENA: ICD-10-CM

## 2023-06-20 DIAGNOSIS — I25.10 ATHEROSCLEROTIC HEART DISEASE OF NATIVE CORONARY ARTERY WITHOUT ANGINA PECTORIS: ICD-10-CM

## 2023-06-20 DIAGNOSIS — I13.2 HYPERTENSIVE HEART AND CHRONIC KIDNEY DISEASE WITH HEART FAILURE AND WITH STAGE 5 CHRONIC KIDNEY DISEASE, OR END STAGE RENAL DISEASE: ICD-10-CM

## 2023-06-20 DIAGNOSIS — K72.00 ACUTE AND SUBACUTE HEPATIC FAILURE WITHOUT COMA: ICD-10-CM

## 2023-06-20 DIAGNOSIS — E87.20 ACIDOSIS, UNSPECIFIED: ICD-10-CM

## 2023-06-20 DIAGNOSIS — R19.7 DIARRHEA, UNSPECIFIED: ICD-10-CM

## 2023-06-20 DIAGNOSIS — Z89.429 ACQUIRED ABSENCE OF OTHER TOE(S), UNSPECIFIED SIDE: ICD-10-CM

## 2023-06-20 DIAGNOSIS — I50.32 CHRONIC DIASTOLIC (CONGESTIVE) HEART FAILURE: ICD-10-CM

## 2023-06-20 DIAGNOSIS — H54.7 UNSPECIFIED VISUAL LOSS: ICD-10-CM

## 2023-06-20 DIAGNOSIS — Z74.01 BED CONFINEMENT STATUS: ICD-10-CM

## 2023-06-29 NOTE — ED ADULT NURSE NOTE - SUICIDE SCREENING DEPRESSION
Negative Rhomboid Transposition Flap Text: Due to geometric and functional constraints, a flap reconstruction was performed to reconstruct the defect. To that end, adjacent tissue was incised and carried over to close the defect in the following manner: The defect edges were debeveled with a #15 scalpel blade.  Given the location of the defect and the proximity to free margins a rhomboid transposition flap was deemed most appropriate.  Using a sterile surgical marker, an appropriate rhomboid flap was drawn incorporating the defect.    The area thus outlined was incised deep to adipose tissue with a #15 scalpel blade.  The skin margins were undermined to an appropriate distance in all directions utilizing iris scissors.

## 2023-07-28 NOTE — ED PROVIDER NOTE - SEVERE SEPSIS CRITERIA MET YN (MLM)
Hospital follow-up PCP transitional care appointment has been scheduled with Dr. Vanessa Esquivel on 8/17/23 1100. This is the first available appt due to limited provider availability and that coordinates with patient's HD schedule. PCP office does not offer alternate provider option for hospital follow up. Pending patient discharge.  Natanael Bonilla, Care Management Assistant Sepsis Criteria were met:

## 2023-08-29 NOTE — ED ADULT NURSE NOTE - SUICIDE SCREENING QUESTION 2
She called to schedule her dexa and mammo at [facility] and they told her they never received the orders for the dexa. Please send over. No with patient

## 2023-10-27 NOTE — PROGRESS NOTE ADULT - PROBLEM SELECTOR PLAN 8
----- Message from Verenice Lopez MD sent at 10/27/2023 10:45 AM CDT -----  Please let patient know no concerns on all his recent blood work. The regimen remains the same. Thanks   Electrolytes: careful w advanced kidney disease  Nutrition:  consis. carb and renal, kosher  Prophylaxis: heparin sq  Activity: activity  GI: none  C: FC  Dispo: Admit to F

## 2023-11-09 NOTE — ED ADULT NURSE NOTE - CHPI ED NUR TIMING2
sudden onset Griseofulvin Counseling:  I discussed with the patient the risks of griseofulvin including but not limited to photosensitivity, cytopenia, liver damage, nausea/vomiting and severe allergy.  The patient understands that this medication is best absorbed when taken with a fatty meal (e.g., ice cream or french fries).

## 2023-11-20 NOTE — PATIENT PROFILE ADULT - HAVE YOU BEEN EATING POORLY BECAUSE OF A DECREASED APPETITE?
normal... Well appearing, awake, alert, oriented to person, place, time/situation and in no apparent distress. No (0)

## 2024-01-15 NOTE — ED PROVIDER NOTE - NS ED MD DISPO ADMITTING SERVICE
ANTICOAGULATION MANAGEMENT     Rocío Catherine 28 year old female is on warfarin with therapeutic INR result. (Goal INR 2.0-3.0)    Recent labs: (last 7 days)     01/15/24  0000   INR 2.3       ASSESSMENT     Source(s): Chart Review and Patient/Caregiver Call     Warfarin doses taken: Warfarin taken as instructed  Diet: No new diet changes identified. Despite have strep throat, patient feels that she is getting her normal intake of greens in.  Medication/supplement changes: Amoxicillin through 1/20/24  New illness, injury, or hospitalization: Yes: currently being treated for strep throat.   Signs or symptoms of bleeding or clotting: No  Previous result: Therapeutic last visit; previously outside of goal range  Additional findings: None       PLAN     Recommended plan for temporary change(s) affecting INR     Dosing Instructions: Continue your current warfarin dose with next INR in 1 week       Summary  As of 1/15/2024      Full warfarin instructions:  5 mg every Sat; 7.5 mg all other days   Next INR check:  1/29/2024               Telephone call with Rocío who agrees to plan and repeated back plan correctly    Patient to recheck with home meter    Education provided:   Please call back if any changes to your diet, medications or how you've been taking warfarin  Symptom monitoring: monitoring for bleeding signs and symptoms, monitoring for clotting signs and symptoms, and monitoring for stroke signs and symptoms  Contact [phone]  with any changes, questions or concerns.     Plan made per [facility] anticoagulation protocol    Jo Ann Horner RN  Anticoagulation Clinic  1/15/2024    _______________________________________________________________________     Anticoagulation Episode Summary       Current INR goal:  2.0-3.0   TTR:  69.3% (1 y)   Target end date:  Indefinite   Send INR reminders to:  [facility]    Indications    Acute pulmonary embolism  unspecified pulmonary embolism type  unspecified whether  acute cor pulmonale present (H) (Resolved) [I26.99]  Factor 5 Leiden mutation  heterozygous (H24) [D68.51]  Other acute pulmonary embolism without acute cor pulmonale (H) (Resolved) [I26.99]  Acute pulmonary embolism  unspecified pulmonary embolism type  unspecified whether acute cor pulmonale present (H) (Resolved) [I26.99]  Antiphospholipid syndrome (H24) [D68.61]  Long term current use of anticoagulant therapy [Z79.01]  Acute pulmonary embolism without acute cor pulmonale  unspecified pulmonary embolism type (H) (Resolved) [I26.99]  Acute pulmonary embolism without acute cor pulmonale  unspecified pulmonary embolism type (H) [I26.99]  Acute pulmonary embolism  unspecified pulmonary embolism type  unspecified whether acute cor pulmonale present (H) [I26.99]             Comments:  [employer] home meter// manage by exception// Antiphospholipid antibody syndrome (may need CF10 if INR elevated with no known reason)             Anticoagulation Care Providers       Provider Role Specialty Phone number    Jesse Farnsworth MD Referring Family Medicine [phone]    Yessy Lyn APRN [facility] Referring Family Medicine [phone]    Debi Carrasco MD Referring Cardiovascular Disease [phone]    Dionicio Liang MD Referring Family Medicine [phone]             [facility]

## 2024-02-07 NOTE — ED ADULT TRIAGE NOTE - CHIEF COMPLAINT QUOTE
Re: Request for Information  Dear  and office staff, we are requesting information from your office on patient Steve Santiago, MRN: [id number], : 1951. We are unable to optimize your patient for surgery without the information listed below.    Date of surgery: 2024  Surgeon(s):  Elias Celaya MD  Procedure(s):  LEFT REVERSE SHOULDER ARTHROPLASTY, POSSIBLE LATISSIMUS DORSI TRANSFER    Please fax the following items as noted below:    [x] Labs within 90 days  [x] EKG within 6 months  [x] Medical Clearance within 30 days (signed by a physician)  [] Cardiac Clearance within 30 days (signed by a physician)  [x] History and Physical within 30 days (signed by a physician)  [] Chest X-ray  [x] Other PT/INR, UA, PT/PTT, TYPE/SCREEN PRBC    Please fax back as soon as possible to [phone]. If you have any questions, please contact the [facility] Pre-Surgical Testing at [phone] as soon as possible to avoid any delay in service for your patient.    [facility] Order Requirements state:  ALL ORDERS MUST BE DATED, LEGIBLE AND CONTAIN:  Full Patient Legal Name (as appears on ’s license)  Patient’s Date of Birth (as appears on ’s license)  Pre-admission testing as per anesthesia guidelines  Diagnosis and procedural consent.  (No spelling errors or abbreviations)  Physician/provider name  Physician/Provider CMS Approved Signature    All documentation (ie. History and Physical, labs) MUST contain name and date of birth on EACH page.    Thank you for helping us provide you and your patient with the best possible experience!   [place] for abnormal lab results showing kidney failure. hx kidney transplant. referred by dr. duarte

## 2024-02-24 NOTE — DISCHARGE NOTE ADULT - PATIENT PORTAL LINK FT
Face to Face Note  -  Durable Medical Equipment    Sherif Dean M.D. - NPI: [id number]  I certify that this patient is under my care and that they had a durable medical equipment(DME)face to face encounter by myself that meets the physician DME face-to-face encounter requirements with this patient on:    Date of encounter:   Patient:                    MRN:                       YOB: 2025  Cole Jaramillo  [id number]  1985     The encounter with the patient was in whole, or in part, for the following medical condition, which is the primary reason for durable medical equipment:  Other - hypoxia, morbid obesity    I certify that, based on my findings, the following durable medical equipment is medically necessary:    Wheelchair wide enough for 36 in pelvis  Patient needs manual wheelchair for use inside the home based on the above diagnosis. Per guidelines patient meets criteria in the following ways:   A.  Patient has significant impairment in the following Toileting, Feeding, Dressing, Grooming, and Bathing and is is unable to complete these tasks in a reasonable timeframe.   B.  The patient's mobility limitations cannot be sufficiently resolved by use of  fitted cane or walker.   C.  The patient reports his home provides adequate access between rooms,  maneuvering space, and surfaces for use of the manual wheelchair that is  provided.   D.  The use of the manual wheelchair will significantly improve the patient's  ability to participate in MRADLs and the patient will use it on a regular basis in  the home.   E.  The patient has not expressed an unwillingness to use the manual  wheelchair.   F. The patient has limitations of strength, endurance, range of motion, or coordination per OT notes:.    My Clinical findings support the need for the above equipment due to:  Abnormal Gait   You can access the [facility] Patient Portal, offered by [facility], by registering with the following website: [email or web address]

## 2024-05-01 NOTE — ED ADULT TRIAGE NOTE - GLASGOW COMA SCALE: EYE OPENING, MLM
"Subjective:      Patient ID: Robert Chand is a 54 y.o. male.    Chief Complaint: Gout    HPI:   Patient presents for follow up for gout. Per the history, patient had right 1st MTP pain without particular precipitating event August 2021. Resolution of symptoms after steroids per Podiatry.  He had elevated uric acid level of 8 at that time.  No known precipitating events, no history of arthrocentesis.  Patient has had no recurrence of the swelling or pain in his right 1st MTP. Patient reports being off allopurinol for a few days which triggered a gout flare in his right third MTP.  Took colchicine without significant relief.  Went to urgent care received Medrol Dosepak.  This improved the flare.    Today he tells me that actually the symptoms were in the IP joint of the great toe and not the MTP. He denies having redness or severe pain with the attacks. The first attack was while he was kneeling at a pew in [place]. He's had no attacks since 2022. He remains on allopurinol 100 mg daily. He doesn't eat red meat or shellfish. He drinks alcohol rarely. He does have sugary foods. Never had kidney stones. Never had joint aspiration.     Family history of gout in brother and father (started after father was diagnosed with renal cancer).      Social Hx: Former smoker      Objective:   /76   Pulse 63   Ht 5' 5" (1.651 m)   Wt 86.3 kg (190 lb 4.1 oz)   BMI 31.66 kg/m²   Physical Exam  Constitutional:       General: He is not in acute distress.     Appearance: Normal appearance.   HENT:      Head: Normocephalic and atraumatic.      Mouth/Throat:      Mouth: Mucous membranes are moist.      Pharynx: Oropharynx is clear.   Cardiovascular:      Rate and Rhythm: Normal rate and regular rhythm.   Pulmonary:      Effort: Pulmonary effort is normal.      Breath sounds: Normal breath sounds.   Abdominal:      Palpations: Abdomen is soft.      Tenderness: There is no abdominal tenderness.   Musculoskeletal:         General: No " swelling or tenderness.      Cervical back: Normal range of motion. No tenderness.   Skin:     General: Skin is warm and dry.      Comments: No tophi   Neurological:      Mental Status: He is alert and oriented to person, place, and time. Mental status is at baseline.           Assessment:     1. Hyperuricemia    2. Chronic idiopathic gout involving toe of right foot without tophus          Plan:     Problem List Items Addressed This Visit          Unprioritized    Hyperuricemia    Relevant Medications    allopurinoL (ZYLOPRIM) 100 MG tablet    Other Relevant Orders    Uric Acid     Other Visit Diagnoses       Chronic idiopathic gout involving toe of right foot without tophus        Relevant Medications    allopurinoL (ZYLOPRIM) 100 MG tablet    Other Relevant Orders    Uric Acid            Patient presents for follow up for gout.     GFR: >60  Uric acid: 6.9 in 2023  Uric acid goal: <6.0    Unusual presentation for gout by history in the 1st toe IP joint with some swelling but no redness and some throbbing pain but no severe pain. No improvement with colchicine. He has no major risk factors for gout: no metabolic syndrome, renal failure, lead paint exposure, high purine diet, beer consumption, kidney stones. However there seemed to be a flare when off allopurinol for 3 days. Therefore will treat as gout.        Plan:  Refill allopurinol 100 mg daily  Medrol dosepack and colchicine to keep on hand for flares. Colchicine: At the onset of a gout attack, take 2 tablets and take 1 tablet an hour later. Then take 1 tablet twice a day starting the next day for 5 days.  Educated on gout diet.      Follow up in about 1 year (around 5/1/2025).    (E4) spontaneous

## 2024-05-09 NOTE — PHYSICAL THERAPY INITIAL EVALUATION ADULT - IMPAIRED TRANSFERS: SIT/STAND, REHAB EVAL
[facility]  Cardiac Consult     Referring Provider:  Gogo Tran MD     Chief Complaint   Patient presents with    Follow-up    Coronary Artery Disease     No cardiac symptoms present. History of Present Illness:  79 y.o. male with bipolar formally followed by Dr. Sammie Junior seen in f/u for Aortic stenosis, CAD, HTN, hyperlipidemia and bradycardia. He underwent stenting of the LCX and RCA in 2005. Last stress myoview 12/2016 was normal. He has had bradycardia resulting in discontinuation of his beta blocker. He developed severe aortic stenosis. He seems fairly asymptomatic. He was seen by Dr. Rebecca Casey and decided against TAVR. He is here with his daughter. He is doing well. No chest pain, dyspnea or edema. Past Medical History:   has a past medical history of A-fib ([address]), Arthritis, Atrial fibrillation ([address]), Blepharitis of both eyes, CAD (coronary artery disease), GERD (gastroesophageal reflux disease), Gout, Hyperlipidemia, Hypertension, Macular degeneration, NSTEMI (non-ST elevated myocardial infarction) ([address]), PNA (pneumonia), Prostate enlargement, Psychiatric problem, and Tachycardia. Surgical History:   has a past surgical history that includes Coronary angioplasty with stent (2005); skin biopsy; Kidney stone surgery (1980); Coronary artery bypass graft; Cataract removal (1993); TURP (2003); Skin cancer destruction (2006); Colonoscopy; eye surgery; Cardiac surgery; and hip surgery (Left, 6/2/2020). Social History:   reports that he has never smoked. He has never used smokeless tobacco. He reports that he does not drink alcohol and does not use drugs. Family History:  family history includes Heart Disease in his brother and mother; Stroke in his child.      Allergies:  Lasix [furosemide], Zyprexa [olanzapine], and Citalopram     Home Medications:  Outpatient Encounter Medications as of 8/25/2023   Medication Sig Dispense Refill    atorvastatin (LIPITOR) 20 MG tablet Take 1
[FreeTextEntry1] : Impression is one of early dementia along with mild cognitive impairment. Due to her strong family history of Alzheimer's disease, I am going to order an FDG PET scan to see if she has beginning of any dementia process including Alzheimer's disease. She will see us back once her test is completed to discuss the results and institute any appropriate treatment depending on the outcome. She will follow up in 3-4 weeks.    Entered by Laisha Kirk acting as scribe for Dr. Malik.   The documentation recorded by the scribe, in my presence, accurately reflects the service I personally performed, and the decisions made by me with my edits as appropriate. Charli Malik MD, FAAN, FACP Diplomate American Board of Psychiatry & Neurology.  
impaired balance/decreased strength

## 2024-05-10 NOTE — ED PROVIDER NOTE - GASTROINTESTINAL NEGATIVE STATEMENT, MLM
We have received the referral for Service to GI for your patient. Our providers at [facility] and [facility] [facility] or [facility]. Thank you  
no abdominal pain, no bloating, no constipation, no diarrhea, no nausea and no vomiting.

## 2024-06-17 NOTE — PROCEDURE NOTE - NSTOLERANCE_GEN_A_CORE
Department of Anesthesiology  Postprocedure Note    Patient: Laury Grimaldo  MRN: [id number]  YOB: 1947  Date of evaluation: 6/17/2024    Procedure Summary       Date: 06/17/24 Room / Location: [facility] MAIN OR  / [facility] MAIN OR    Anesthesia Start: 0729 Anesthesia Stop: 0936    Procedure: RIGHT REVERSE SHOULDER ARTHROPLASTY WITH BICEPS TENODESIS AND AXILLARY NERVE DISSECTION, EXCISION OF SUBCUTANEOUS CYST (Right: Shoulder) Diagnosis:       Osteoarthritis of glenohumeral joint, right      (Osteoarthritis of glenohumeral joint, right [M19.011])    Surgeons: Ibrahima Estrada MD Responsible Provider: Brenda Lemon MD    Anesthesia Type: regional, general ASA Status: 2            Anesthesia Type: No value filed.    Robbie Phase I: Robbie Score: 7    Robbie Phase II: Robbie Score: 9    Anesthesia Post Evaluation    Patient location during evaluation: PACU  Patient participation: complete - patient participated  Level of consciousness: awake  Airway patency: patent  Nausea & Vomiting: no vomiting and no nausea  Cardiovascular status: hemodynamically stable  Respiratory status: acceptable  Hydration status: stable  Multimodal analgesia pain management approach  Pain management: adequate    No notable events documented.  
Patient tolerated procedure well.
Patient tolerated procedure well.

## 2024-07-22 NOTE — CONSULT NOTE ADULT - SUBJECTIVE AND OBJECTIVE BOX
CC:  Tyler Gordon is here today for Physical (Extreme fatigue)    Medications: medications verified, no change  Added preferred pharmacy  Refills needed today?  NO  denies Latex allergy or sensitivity    Patient would like communication of their results via:  Send letter with results.  If need to speak with pt, call   Cell Phone:   Telephone Information:   Mobile [phone]     Okay to leave a message containing results? Yes  Health Maintenance Due   Topic Date Due    Colorectal Cancer Screen  08/03/2020    COVID-19 Vaccine (2 - 2023-24 season) 09/01/2023     Patient is up to date, no discussion needed..          COVID-19 Screening:    Does the patient OR patient’s household members have any of the following symptoms?  Temperature: Fever ?100.0°F or ?37.8°C?  No  Respiratory symptoms: New or worsening cough, shortness of breath, difficulty breathing, or sore throat? No  GI symptoms: New onset of nausea, vomiting or diarrhea?  No  Miscellaneous: New onset of loss of taste or smell, chills, repeated shaking with chills, muscle pain, headache, congestion or runny nose?  No  Has the patient or a household member tested positive for COVID-19 in the last 14 days?  No  Has the patient or a household member been tested for COVID-19 and are waiting for the results?  No               HPI:  Pt is a 80 y/o M with PMHx HTN, DM, glaucoma (functional blindness), BPH, ESRD s/p Right renal transplant (was on dialysis 3-4 years prior), BPH who presented to [facility] ED with left foot wound infection. According to Pt's wife, Pt developed left plantar foot ulcer about 3 wks ago. Pt was being followed by a podiatrist in [place].  About one week ago, his podiatrist suggested that he see's a wound care specialist and so he is pending an appointment with Dr. Castro on 10/10/19. 2 days prior to arrival, patient started experiencing pain and swelling of left foot in addition to erythema of the foot. Today, Pt came to [facility] ED due to dizziness and hypotension at home. Upon arrival by EMS, Pt was noted to be hypotensive and he was given 500ml of fluid bolus. Pt denies nausea, vomiting, fever or chills.      PAST MEDICAL & SURGICAL HISTORY:  History of renal transplant: secondary to DM  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  BPH (benign prostatic hypertrophy)  HTN (hypertension)  Kidney transplant recipient        REVIEW OF SYSTEMS:    General:	 no weakness; no fevers, no chills  Skin/Breast: no rash  Respiratory and Thorax: no SOB, no cough  Cardiovascular:	No chest pain  Gastrointestinal:	 no nausea, vomiting , diarrhea  Genitourinary:	no dysuria, no difficulty urinating, no hematuria  Musculoskeletal:	no weakness, no joint swelling/pain  Neurological:	no focal weakness/numbness  Endocrine:	no polyuria, no polydipsia      ANTIBIOTICS:  MEDICATIONS  (STANDING):  ampicillin/sulbactam  IVPB 3 Gram(s) IV Intermittent every 6 hours  aspirin  chewable 81 milliGRAM(s) Oral daily  carvedilol 12.5 milliGRAM(s) Oral every 12 hours  dextrose 5%. 1000 milliLiter(s) (50 mL/Hr) IV Continuous <Continuous>  dextrose 50% Injectable 12.5 Gram(s) IV Push once  dextrose 50% Injectable 25 Gram(s) IV Push once  dextrose 50% Injectable 25 Gram(s) IV Push once  enoxaparin Injectable 40 milliGRAM(s) SubCutaneous every 24 hours  influenza   Vaccine 0.5 milliLiter(s) IntraMuscular once  insulin glargine Injectable (LANTUS) 5 Unit(s) SubCutaneous at bedtime  insulin lispro (HumaLOG) corrective regimen sliding scale   SubCutaneous Before meals and at bedtime  mycophenolate mofetil 500 milliGRAM(s) Oral two times a day  predniSONE   Tablet 5 milliGRAM(s) Oral daily  tacrolimus 4 milliGRAM(s) Oral every 12 hours  tamsulosin 0.4 milliGRAM(s) Oral at bedtime    MEDICATIONS  (PRN):  dextrose 40% Gel 15 Gram(s) Oral once PRN Blood Glucose LESS THAN 70 milliGRAM(s)/deciliter  glucagon  Injectable 1 milliGRAM(s) IntraMuscular once PRN Glucose LESS THAN 70 milligrams/deciliter  oxyCODONE    5 mG/acetaminophen 325 mG 1 Tablet(s) Oral every 4 hours PRN Moderate Pain (4 - 6)  oxyCODONE    5 mG/acetaminophen 325 mG 2 Tablet(s) Oral every 4 hours PRN Severe Pain (7 - 10)      Allergies    No Known Allergies    Intolerances        SOCIAL HISTORY:    FAMILY HISTORY:      Vital Signs Last 24 Hrs  T(C): 37.1 (11 Oct 2019 09:43), Max: 37.6 (10 Oct 2019 18:24)  T(F): 98.8 (11 Oct 2019 09:43), Max: 99.7 (10 Oct 2019 18:24)  HR: 77 (11 Oct 2019 09:02) (77 - 86)  BP: 164/72 (11 Oct 2019 09:02) (100/46 - 170/70)  BP(mean): --  RR: 18 (11 Oct 2019 09:02) (18 - 18)  SpO2: 96% (11 Oct 2019 09:02) (94% - 98%)    PHYSICAL EXAM:  Constitutional:  non-toxic, no distress  Eyes: no icterus   Ear/Nose/Throat: no oral lesion, no sinus tenderness on percussion	  Neck:  supple  Respiratory: CTA slim  Cardiovascular: S1S2 RRR, no murmurs  Gastrointestinal:soft, (+) BS, no HSM  Extremities:  Vascular: DP Pulse:	right normal; left normal            LABS:                        10.8   11.91 )-----------( 218      ( 10 Oct 2019 06:25 )             35.5     10-10    135  |  102  |  19  ----------------------------<  240<H>  4.5   |  19<L>  |  0.99    Ca    8.9      10 Oct 2019 06:25  Phos  2.1     10-10  Mg     1.7     10-10            MICROBIOLOGY:    Culture - Surgical Swab (10.09.19 @ 11:40)    Gram Stain:   Numerous Gram positive cocci in pairs  Numerous Gram Negative Rods  Few WBC's    -  Ampicillin: R >16 These ampicillin results predict results for amoxicillin    -  Ampicillin/Sulbactam: I 16/8 Enterobacter, Citrobacter, and Serratia may develop resistance during prolonged therapy (3-4 days)    -  Cefazolin: S <=2 Enterobacter, Citrobacter, and Serratia may develop resistance during prolonged therapy (3-4 days)    -  Ceftriaxone: S <=1 Enterobacter, Citrobacter, and Serratia may develop resistance during prolonged therapy    -  Ceftriaxone: S 0.19    -  Ciprofloxacin: S 0.023    -  Clindamycin: S    -  Erythromycin: S    -  Gentamicin: S <=1    -  Penicillin: S 0.047    -  Piperacillin/Tazobactam: S <=8    -  Tobramycin: S <=2    -  Trimethoprim/Sulfamethoxazole: R >2/38    -  Vancomycin: S    Specimen Source: .Surgical Swab or-left foot ulcer culture    Culture Results:   Numerous Escherichia coli  Numerous Enterococcus faecalis  Numerous Streptococcus anginosus  Susceptibility to follow.    Organism Identification: Escherichia coli  Escherichia coli  Streptococcus anginosus  Streptococcus anginosus    Organism: Escherichia coli    Organism: Streptococcus anginosus    Organism: Streptococcus anginosus    Organism: Escherichia coli    Method Type: JOJO    Method Type: KB    Method Type: ETEST    Method Type: ETEST      RADIOLOGY & ADDITIONAL STUDIES:    < from: Xray Foot AP + Lateral + Oblique, Left (10.09.19 @ 01:18) >  3 views demonstrate multiple ulcers over the lateral mid foot   but no compelling acute osteomyelitis. Chronic deformities of the fifth   digit redemonstrated.

## 2024-07-24 NOTE — ED ADULT TRIAGE NOTE - OTHER COMPLAINTS
hx of HTN, DM insulin dependent, kidney transplant recently admitted for a left foot infection currently getting abx through PICC. breathing unlabored, sating 99% on RA. old fistula to right arm, not being used s/p kidney transplant None
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
## 2024-09-08 NOTE — DIETITIAN INITIAL EVALUATION ADULT - WEIGHT FOR BMI (KG)
09/07/24 0740   PRE-TX-O2   Device (Oxygen Therapy) room air   SpO2 97 %   Pulse Oximetry Type Intermittent   $ Pulse Oximetry - Multiple Charge Pulse Oximetry - Multiple       
   09/08/24 0730   Patient Assessment/Suction   Level of Consciousness (AVPU) alert   Respiratory Effort Normal   PRE-TX-O2   Device (Oxygen Therapy) room air   SpO2 98 %   Pulse Oximetry Type Intermittent   $ Pulse Oximetry - Multiple Charge Pulse Oximetry - Multiple       
80

## 2024-10-15 NOTE — ED PROVIDER NOTE - NORMAL, MLM
12/09/2024/ colon/ please do the RN note and then send back to the  Carla  annabel all pertinent systems normal

## 2024-10-16 NOTE — CONSULT NOTE ADULT - MUSCULOSKELETAL
RN/LPN please print script  Needs faxed to [facility] at [phone]   negative No joint pain, swelling or deformity; no limitation of movement

## 2025-02-10 NOTE — PATIENT PROFILE ADULT - STATED REASON FOR ADMISSION
Pt returning Provider Dorys Amado 
Cough, SOB, B/L LE swelling

## 2025-03-18 NOTE — PROGRESS NOTE ADULT - PROBLEM SELECTOR PLAN 6
Patient ID: Sarita Cobos : 1960 MRN: [id number]    Sarita Cobos is a 64 year old female     -here for chronic medical problems    -Patient  has a past surgical history that includes Colon surgery ().  -Patient's family history includes Cancer, Breast (age of onset: 40 - 49) in her mother; Cancer, Prostate (age of onset: 60 - 69) in her father; Cancer, Thyroid (age of onset: 70 - 79) in her sister; Hypertension in her sister; Patient is unaware of any medical problems in her brother, brother, brother, daughter, daughter, son, and son.  -Patient's  reports that she has quit smoking. Her smoking use included cigarettes. She has never been exposed to tobacco smoke. She has never used smokeless tobacco. She reports that she does not drink alcohol and does not use drugs.  -also here for preventive physical  -made some appts    -After patient consent, I discussed advance care planning with patient today for 17 minutes. There was discussion of the patient's health care wishes if patient became unable to make decisions about their care. Patient educated about current status and progression of patient's current medical problems as well as associated pertinent future medical problems that may arise. Patient was emphasized on the importance of selecting a healthcare agent as well as possible backup agents should patient lack decisional capacity. Patient informed how to correctly fill out components of the [place] healthcare power of  form including agents, patient's wishes, patient signing correctly, witness signing correctly, and people who can be witnesses.    -no complaints of fevers or chills  -no complaints of exertional dyspnea or chest pain    -Social Determinants of Health  Home: lives with son and son's family; sister lives near for support   Work: retired, was  for addiction  Diet: tries to eat healthy mostly at home  Exercise: walks  Transportation: patient does not drive, relatives  bring patient to visits  Sexually active: with male partner  Tobacco use: none, quit at about age 39 around 1999 , smoked about 0.5 ppd from about age 14 to 39  Alcohol use: none  Recreational and/or illicit drug use: none    Vitals:    03/18/25 1255   BP: 122/72   BP Location: LUE - Left upper extremity   Patient Position: Sitting   Cuff Size: Large Adult   Pulse: 69   Resp: 18   Temp: 97.1 °F (36.2 °C)   TempSrc: Temporal   SpO2: 98%   Weight: 113.9 kg (251 lb 1.7 oz)   Height: 5' 7\" (1.702 m)   PainSc:  0   Vital signs reviewed. Additional physical exam findings below in addition to any documented above.  Constitutional: no acute distress, sitting comfortably in chair  HEENT: head normocephalic and atraumatic  Cardiovascular: heart regular rate and regular rhythm  Respiratory: lungs clear to auscultation bilaterally, no respiratory distress  Abdomen: soft, nontender  Neurological: awake and alert  Skin: warm, dry    Assessment and Plan  Problem List Items Addressed This Visit          Hematology and Neoplasia    History of colon cancer - Primary    Relevant Orders    SERVICE TO HEMATOLOGY ONCOLOGY    SERVICE TO GASTROENTEROLOGY     Other Visit Diagnoses       Breast cancer screening by mammogram        Relevant Orders    MAMMO SCREENING BILATERAL W ALECIA    Need for vaccination        Relevant Orders    PNEUMOCOCCAL 20 ([id number])    Need for pneumococcal vaccine        Relevant Orders    PNEUMOCOCCAL 20 ([id number])        #Hypertension-benazepril-hydrochlorothiazide 20-25 mg daily (had swelling when stopped hydrochlorothiazide)  #Hyperlipidemia-healthy diet and exercise, 10-year ASCVD risk score is 7.3%    #Vitamin D deficiency-vitamin D supplementation    #Chronic leukopenia-Pathology smear in Feb 2025 showed \"No convincing dysplastic or megaloblastic changes are identified and no circulating blasts are seen.\" WBC around 3-4, continue to monitor.  #History of colon cancer-S/p chemotherapy. Sees Dr. Zamora, to  follow up. Colonoscopy in Feb 2021 with Dr. Lau, no specimens collected, recommended repeat in 3 years. [facility] GI referral    #Memory problem-MRI in Mar 2025 showed \"Mild cerebral atrophy and leukoaraiosis.\" Sees neuro Dr. Garvey.    #Preventive Health   -DEXA bone density scan: DEXA scheduled May  -Breast cancer screening: \"There is no mammographic evidence of malignancy\" on mammogram in Apr 2024. Scheduled in Jun  -Reviewed missing vaccinations. Patient declined Tetanus vaccine, Shingles vaccine, RSV vaccine.    -Return in about 3 months (around 6/18/2025). or sooner as needed    No LOS data to display  (including reviewing the patient's chart, obtaining history, performing an exam, counseling the patient, coordinating care, and documenting clinical information in the EMR)     We discussed the risks and benefits of any medications started or modified as well as significant and common adverse effects to monitor as appropriate to problems addressed at today's visit.    We discussed healthy lifestyle modifications including diet and regular exercise as pertinent to problems addressed at today's visit. In regards to diet, particularly to decrease salt, fat, and/or carbohydrate intake as appropriate to problems addressed at today's visit.     In addition to the above, as medically indicated and appropriate to problems addressed at today's visit, we discussed at length anticipatory guidance and indications to return to clinic for earlier appointment, go to urgent care, go to the nearest emergency room, and/or call 911.    Prior to discharge all questions and concerns were addressed.  Patient expressed understanding of the working diagnoses, assessments, plan of care.  Plan was made with respect to patient autonomy using a shared decision making model.  Joseph Duenas DO, MA Bioethics  -----       uncontrolled, c/b retinopathy? and diabetic foot ulcers. humalog 75/25 at home. Pt arrived with glucose 220s. Will underdose by weight given poor PO. Titrate accordingly.  - Lantus 12 qhs and mISS

## 2025-07-08 NOTE — ED ADULT NURSE NOTE - NSFALLCONCLUSION_ED_ALL_ED
Detail Level: Simple Instructions: This plan will send the code FBSE to the PM system.  DO NOT or CHANGE the price. Price (Do Not Change): 0.00 Fall with Harm Risk

## 2025-07-10 NOTE — PHYSICAL THERAPY INITIAL EVALUATION ADULT - PHYSICAL ASSIST/NONPHYSICAL ASSIST: SCOOT/BRIDGE, REHAB EVAL
